# Patient Record
Sex: FEMALE | Race: WHITE | URBAN - METROPOLITAN AREA
[De-identification: names, ages, dates, MRNs, and addresses within clinical notes are randomized per-mention and may not be internally consistent; named-entity substitution may affect disease eponyms.]

---

## 2017-01-05 ENCOUNTER — ANTICOAGULATION THERAPY VISIT (OUTPATIENT)
Dept: ANTICOAGULATION | Facility: OTHER | Age: 77
End: 2017-01-05
Payer: COMMERCIAL

## 2017-01-05 DIAGNOSIS — Z79.01 LONG-TERM (CURRENT) USE OF ANTICOAGULANTS: Primary | ICD-10-CM

## 2017-01-05 DIAGNOSIS — I48.91 NEW ONSET ATRIAL FIBRILLATION (H): ICD-10-CM

## 2017-01-05 DIAGNOSIS — I63.40 CEREBROVASCULAR ACCIDENT (CVA) DUE TO EMBOLISM OF CEREBRAL ARTERY (H): ICD-10-CM

## 2017-01-05 LAB — INR POINT OF CARE: 1.6 (ref 0.86–1.14)

## 2017-01-05 PROCEDURE — 36416 COLLJ CAPILLARY BLOOD SPEC: CPT

## 2017-01-05 PROCEDURE — 99207 ZZC NO CHARGE NURSE ONLY: CPT

## 2017-01-05 PROCEDURE — 85610 PROTHROMBIN TIME: CPT | Mod: QW

## 2017-01-05 NOTE — PROGRESS NOTES
ANTICOAGULATION FOLLOW-UP CLINIC VISIT    Patient Name:  Carlos Alberto Fenton  Date:  1/5/2017  Contact Type:  Face to Face    SUBJECTIVE:     Patient Findings     Positives Unexplained INR or factor level change           OBJECTIVE    INR PROTIME   Date Value Ref Range Status   01/05/2017 1.6* 0.86 - 1.14 Final       ASSESSMENT / PLAN  INR assessment SUB    Recheck INR In: 10 DAYS    INR Location Clinic      Anticoagulation Summary as of 1/5/2017     INR goal 2.0-3.0   Selected INR 1.6! (1/5/2017)   Maintenance plan 5 mg (5 mg x 1) on Tue, Thu, Sat; 2.5 mg (5 mg x 0.5) all other days   Full instructions 1/5: 7.5 mg; Otherwise 5 mg on Tue, Thu, Sat; 2.5 mg all other days   Weekly total 25 mg   Plan last modified Catrachita Lacy, RN (1/5/2017)   Next INR check 1/16/2017   Target end date     Indications   Long-term (current) use of anticoagulants [Z79.01] [Z79.01]  CVA (cerebral vascular accident) (H) [I63.9]  New onset atrial fibrillation (H) [I48.91]         Anticoagulation Episode Summary     INR check location     Preferred lab     Send INR reminders to [facility] POOL    Comments 5 mg tablets, pm dose      Anticoagulation Care Providers     Provider Role Specialty Phone number    Nuha Bateman MD [facility] [phone]            See the Encounter Report to view Anticoagulation Flowsheet and Dosing Calendar (Go to Encounters tab in chart review, and find the Anticoagulation Therapy Visit)    Dosage adjustment made based on physician directed care plan.    Catrachita Lacy, RN

## 2017-01-05 NOTE — MR AVS SNAPSHOT
Carlos Alberto Fenton   1/5/2017 1:15 PM   Anticoagulation Therapy Visit    Description:  76 year old female   Provider:  ER ANTI COAG   Department:  Er Anticoag           INR as of 1/5/2017     Selected INR 1.6! (1/5/2017)      Anticoagulation Summary as of 1/5/2017     INR goal 2.0-3.0   Selected INR 1.6! (1/5/2017)   Full instructions 1/5: 7.5 mg; Otherwise 5 mg on Tue, Thu, Sat; 2.5 mg all other days   Next INR check 1/16/2017    Indications   Long-term (current) use of anticoagulants [Z79.01] [Z79.01]  CVA (cerebral vascular accident) (H) [I63.9]  New onset atrial fibrillation (H) [I48.91]         Your next Anticoagulation Clinic appointment(s)     Jan 05, 2017  1:15 PM   [facility] with [facility]   [facility]    [address] [facility] [address]   [phone]            Jan 16, 2017  1:15 PM   Anticoagulation Visit with ER ANTI COAG   [facility]    [address] [facility] [address]   [phone]              Contact Numbers     Clinic Number:         January 2017 Details    Sun Mon Tue Wed Thu Fri Sat     1               2               3               4               5      7.5 mg   See details      6      2.5 mg         7      5 mg           8      2.5 mg         9      2.5 mg         10      5 mg         11      2.5 mg         12      5 mg         13      2.5 mg         14      5 mg           15      2.5 mg         16            17               18               19               20               21                 22               23               24               25               26               27               28                 29               30               31                    Date Details   01/05 This INR check       Date of next INR:  1/16/2017         How to take your warfarin dose     To take:  2.5 mg Take 0.5 of a 5 mg tablet.    To take:  5 mg Take 1 of the 5 mg tablets.    To  take:  7.5 mg Take 1.5 of the 5 mg tablets.

## 2017-01-12 ENCOUNTER — TELEPHONE (OUTPATIENT)
Dept: FAMILY MEDICINE | Facility: OTHER | Age: 77
End: 2017-01-12

## 2017-01-12 NOTE — TELEPHONE ENCOUNTER
She is not on any medications that would lower her blood sugars suddently. I am not concerned about a blood sugars >60 . Please advise appt to discuss further concerns

## 2017-01-12 NOTE — TELEPHONE ENCOUNTER
Pt states that her BS have been lower than normal. Yesterday her morning BS was 99 and today it was 83. The original message was sent to the Coumadin pool but I spoke with the pt and advised her that this would need to go to her PCP. Please advise. Thank you! Jono Guerrero RN, BSN

## 2017-01-12 NOTE — TELEPHONE ENCOUNTER
Patient took medications and not sure what to do now. She feels that her blood sugar is low     Charity Anaya  Reception/ Scheduling

## 2017-01-13 ASSESSMENT — ENCOUNTER SYMPTOMS
NUMBNESS: 1
NERVOUS/ANXIOUS: 1
LOSS OF CONSCIOUSNESS: 0
TASTE DISTURBANCE: 0
HEADACHES: 0
DIZZINESS: 0
DOUBLE VISION: 0
TINGLING: 1
SEIZURES: 0
EYE REDNESS: 1
NECK MASS: 0
PARALYSIS: 0
INSOMNIA: 0
EYE WATERING: 1
SMELL DISTURBANCE: 0
TREMORS: 1
DISTURBANCES IN COORDINATION: 0
SINUS PAIN: 0
MEMORY LOSS: 1
WEAKNESS: 1
PANIC: 1
EYE IRRITATION: 1
SPEECH CHANGE: 0
NAIL CHANGES: 1
SORE THROAT: 0
TROUBLE SWALLOWING: 0
SINUS CONGESTION: 1
DEPRESSION: 1
EYE PAIN: 0
HOARSE VOICE: 0
POOR WOUND HEALING: 1
SKIN CHANGES: 0
DECREASED CONCENTRATION: 1

## 2017-01-16 ENCOUNTER — ANTICOAGULATION THERAPY VISIT (OUTPATIENT)
Dept: ANTICOAGULATION | Facility: OTHER | Age: 77
End: 2017-01-16
Payer: COMMERCIAL

## 2017-01-16 ENCOUNTER — TELEPHONE (OUTPATIENT)
Dept: FAMILY MEDICINE | Facility: OTHER | Age: 77
End: 2017-01-16

## 2017-01-16 DIAGNOSIS — E11.9 TYPE 2 DIABETES MELLITUS WITHOUT COMPLICATION, WITHOUT LONG-TERM CURRENT USE OF INSULIN (H): Primary | ICD-10-CM

## 2017-01-16 DIAGNOSIS — Z79.01 LONG-TERM (CURRENT) USE OF ANTICOAGULANTS: Primary | ICD-10-CM

## 2017-01-16 DIAGNOSIS — I63.40 CEREBROVASCULAR ACCIDENT (CVA) DUE TO EMBOLISM OF CEREBRAL ARTERY (H): ICD-10-CM

## 2017-01-16 DIAGNOSIS — I48.91 NEW ONSET ATRIAL FIBRILLATION (H): ICD-10-CM

## 2017-01-16 LAB — INR POINT OF CARE: 2.2 (ref 0.86–1.14)

## 2017-01-16 PROCEDURE — 36416 COLLJ CAPILLARY BLOOD SPEC: CPT

## 2017-01-16 PROCEDURE — 99207 ZZC NO CHARGE NURSE ONLY: CPT

## 2017-01-16 PROCEDURE — 85610 PROTHROMBIN TIME: CPT | Mod: QW

## 2017-01-16 RX ORDER — LANCETS 33 GAUGE
1 EACH MISCELLANEOUS DAILY
Qty: 100 EACH | Refills: 0 | Status: SHIPPED | OUTPATIENT
Start: 2017-01-16

## 2017-01-16 NOTE — MR AVS SNAPSHOT
Carlos Alberto Fenton   1/16/2017 1:15 PM   Anticoagulation Therapy Visit    Description:  76 year old female   Provider:  ER ANTI COAG   Department:  Er Anticoag           INR as of 1/16/2017     Selected INR 2.2 (1/16/2017)      Anticoagulation Summary as of 1/16/2017     INR goal 2.0-3.0   Selected INR 2.2 (1/16/2017)   Full instructions 5 mg on Tue, Thu, Sat; 2.5 mg all other days   Next INR check 1/30/2017    Indications   Long-term (current) use of anticoagulants [Z79.01] [Z79.01]  CVA (cerebral vascular accident) (H) [I63.9]  New onset atrial fibrillation (H) [I48.91]         Your next Anticoagulation Clinic appointment(s)     Jan 30, 2017 11:15 AM   [facility]    [address] [facility] [address]   [phone]              Contact Numbers     Clinic Number:         January 2017 Details    Sun Mon Tue Wed Thu Fri Sat     1               2               3               4               5               6               7                 8               9               10               11               12               13               14                 15               16      2.5 mg   See details      17      5 mg         18      2.5 mg         19      5 mg         20      2.5 mg         21      5 mg           22      2.5 mg         23      2.5 mg         24      5 mg         25      2.5 mg         26      5 mg         27      2.5 mg         28      5 mg           29      2.5 mg         30            31                    Date Details   01/16 This INR check       Date of next INR:  1/30/2017         How to take your warfarin dose     To take:  2.5 mg Take 0.5 of a 5 mg tablet.    To take:  5 mg Take 1 of the 5 mg tablets.

## 2017-01-16 NOTE — PROGRESS NOTES
ANTICOAGULATION FOLLOW-UP CLINIC VISIT    Patient Name:  Carlos Alberto Fenton  Date:  1/16/2017  Contact Type:  Face to Face    SUBJECTIVE:     Patient Findings     Positives No Problem Findings           OBJECTIVE    INR PROTIME   Date Value Ref Range Status   01/16/2017 2.2* 0.86 - 1.14 Final       ASSESSMENT / PLAN  INR assessment THER    Recheck INR In: 2 WEEKS    INR Location Clinic      Anticoagulation Summary as of 1/16/2017     INR goal 2.0-3.0   Selected INR 2.2 (1/16/2017)   Maintenance plan 5 mg (5 mg x 1) on Tue, Thu, Sat; 2.5 mg (5 mg x 0.5) all other days   Full instructions 5 mg on Tue, Thu, Sat; 2.5 mg all other days   Weekly total 25 mg   No change documented Gordo Brunner RN   Plan last modified Catrachita Lacy RN (1/5/2017)   Next INR check 1/30/2017   Target end date     Indications   Long-term (current) use of anticoagulants [Z79.01] [Z79.01]  CVA (cerebral vascular accident) (H) [I63.9]  New onset atrial fibrillation (H) [I48.91]         Anticoagulation Episode Summary     INR check location     Preferred lab     Send INR reminders to [facility] POOL    Comments 5 mg tablets, pm dose      Anticoagulation Care Providers     Provider Role Specialty Phone number    Nuha Bateman MD [facility] [phone]            See the Encounter Report to view Anticoagulation Flowsheet and Dosing Calendar (Go to Encounters tab in chart review, and find the Anticoagulation Therapy Visit)    Dosage adjustment made based on physician directed care plan.    Gordo Brunner, RN

## 2017-01-16 NOTE — TELEPHONE ENCOUNTER
Test strips      Last Written Prescription Date: 11-  Last Fill Quantity: 100,  # refills: 1   Last Office Visit with [facility], [facility] or [facility] prescribing provider: 12-                                         Next 5 appointments (look out 90 days)     Mar 20, 2017  2:00 PM   [facility] Visit with [facility]eman [facility]   [facility]    [address] [id number] [phone]                  Lancets    Last Written Prescription Date: 11-9-2016  Last Fill Quantity: -,  # refills: -   Last Office Visit with [facility], [facility] or [facility] prescribing provider: 12-                                         Next 5 appointments (look out 90 days)     Mar 20, 2017  2:00 PM   Office Visit with Nu[facility] Bateman MD   [facility]    [address] [place] [address] [phone]                  ** would like sent to [facility]**

## 2017-01-16 NOTE — TELEPHONE ENCOUNTER
Prescription approved per [facility] Refill Protocol.  Jeannie Bryant, RN, BSN

## 2017-01-16 NOTE — TELEPHONE ENCOUNTER
Reason for call:  Medication  Reason for Call:  Medication or medication refill:    Do you use a [facility]?  Name of the pharmacy and phone number for the current request:  [facility]    Name of the medication requested: lancettes, test strips    Other request: none    Can we leave a detailed message on this number? YES    Phone number patient can be reached at: Home number on file [phone] (home)    Best Time: any    Call taken on 1/16/2017 at 1:44 PM by Sherry Love

## 2017-01-17 ENCOUNTER — TELEPHONE (OUTPATIENT)
Dept: PULMONOLOGY | Facility: CLINIC | Age: 77
End: 2017-01-17

## 2017-01-17 DIAGNOSIS — R91.1 PULMONARY NODULE, RIGHT: Primary | ICD-10-CM

## 2017-01-17 NOTE — TELEPHONE ENCOUNTER
PULMONARY NEW PATIENT PRE-VISIT ASSESSMENT    Date Patient Contacted: 1/18/2017 left message for patient to call back        1. Patient is scheduled to see Dr Camargo on 2/20/17  2. Reason for visit: Pulmonary nodules [R91.8]  - Primary   3. Referring provider Nuha Bateman MD    4. Has patient seen previous specialist? ????      5. Previous Imaging: In [facility]: Last CXR done: 10/17/2016, Last Chest CT done: NONE      Outside Imaging: Location/Date/Type/Status (Requested, Received, Patient will hand carry disc, Pushed to iSite, Disc will be mailed) ?????        6.  Pulmonary Function Tests: Scheduled at  NONE       Outside PFT Lab:  Location/Date/Status (Requested, Received, Patient will hand carry) ?????     7.  Sleep History (sleep concerns, diagnosed sleep disorders): ???    Prior Sleep Study: ???    CPAP/BIPAP? ???    Oxygen? ???    Will route to care team for follow-up.    Patient Reminders Given:  Please, make sure you bring an updated list of your medications.   If you need to cancel or reschedule, please call [phone].

## 2017-01-20 ENCOUNTER — PRE VISIT (OUTPATIENT)
Dept: CARDIOLOGY | Facility: CLINIC | Age: 77
End: 2017-01-20

## 2017-01-20 ENCOUNTER — OFFICE VISIT (OUTPATIENT)
Dept: CARDIOLOGY | Facility: CLINIC | Age: 77
End: 2017-01-20
Attending: INTERNAL MEDICINE
Payer: MEDICARE

## 2017-01-20 VITALS
WEIGHT: 159.9 LBS | OXYGEN SATURATION: 93 % | BODY MASS INDEX: 29.43 KG/M2 | HEIGHT: 62 IN | HEART RATE: 57 BPM | SYSTOLIC BLOOD PRESSURE: 100 MMHG | DIASTOLIC BLOOD PRESSURE: 64 MMHG

## 2017-01-20 DIAGNOSIS — R60.9 EDEMA, UNSPECIFIED TYPE: ICD-10-CM

## 2017-01-20 DIAGNOSIS — I63.89: ICD-10-CM

## 2017-01-20 DIAGNOSIS — I42.9 CARDIOMYOPATHY, UNSPECIFIED (H): ICD-10-CM

## 2017-01-20 DIAGNOSIS — I10 ESSENTIAL HYPERTENSION WITH GOAL BLOOD PRESSURE LESS THAN 140/90: ICD-10-CM

## 2017-01-20 DIAGNOSIS — E78.5 HYPERLIPIDEMIA LDL GOAL <130: ICD-10-CM

## 2017-01-20 DIAGNOSIS — I48.0 PAROXYSMAL ATRIAL FIBRILLATION (H): Primary | ICD-10-CM

## 2017-01-20 DIAGNOSIS — I25.83 CORONARY ARTERY DISEASE DUE TO LIPID RICH PLAQUE: ICD-10-CM

## 2017-01-20 DIAGNOSIS — R06.02 SOB (SHORTNESS OF BREATH): ICD-10-CM

## 2017-01-20 DIAGNOSIS — I25.10 CORONARY ARTERY DISEASE DUE TO LIPID RICH PLAQUE: ICD-10-CM

## 2017-01-20 DIAGNOSIS — I48.91 ATRIAL FIBRILLATION (H): ICD-10-CM

## 2017-01-20 LAB
ANION GAP SERPL CALCULATED.3IONS-SCNC: 7 MMOL/L (ref 3–14)
BUN SERPL-MCNC: 25 MG/DL (ref 7–30)
CALCIUM SERPL-MCNC: 9.6 MG/DL (ref 8.5–10.1)
CHLORIDE SERPL-SCNC: 102 MMOL/L (ref 94–109)
CHOLEST SERPL-MCNC: 273 MG/DL
CO2 SERPL-SCNC: 32 MMOL/L (ref 20–32)
CREAT SERPL-MCNC: 0.81 MG/DL (ref 0.52–1.04)
GFR SERPL CREATININE-BSD FRML MDRD: 69 ML/MIN/1.7M2
GLUCOSE SERPL-MCNC: 89 MG/DL (ref 70–99)
HDLC SERPL-MCNC: 58 MG/DL
LDLC SERPL CALC-MCNC: 168 MG/DL
MAGNESIUM SERPL-MCNC: 2.1 MG/DL (ref 1.6–2.3)
NONHDLC SERPL-MCNC: 216 MG/DL
NT-PROBNP SERPL-MCNC: 924 PG/ML (ref 0–450)
POTASSIUM SERPL-SCNC: 4.2 MMOL/L (ref 3.4–5.3)
SODIUM SERPL-SCNC: 141 MMOL/L (ref 133–144)
TRIGL SERPL-MCNC: 236 MG/DL

## 2017-01-20 PROCEDURE — 83880 ASSAY OF NATRIURETIC PEPTIDE: CPT | Performed by: INTERNAL MEDICINE

## 2017-01-20 PROCEDURE — 93010 ELECTROCARDIOGRAM REPORT: CPT | Mod: ZP | Performed by: INTERNAL MEDICINE

## 2017-01-20 PROCEDURE — 99214 OFFICE O/P EST MOD 30 MIN: CPT | Mod: GC | Performed by: INTERNAL MEDICINE

## 2017-01-20 PROCEDURE — 99213 OFFICE O/P EST LOW 20 MIN: CPT | Mod: ZF

## 2017-01-20 PROCEDURE — 80061 LIPID PANEL: CPT | Performed by: INTERNAL MEDICINE

## 2017-01-20 PROCEDURE — 80048 BASIC METABOLIC PNL TOTAL CA: CPT | Performed by: INTERNAL MEDICINE

## 2017-01-20 PROCEDURE — 83735 ASSAY OF MAGNESIUM: CPT | Performed by: INTERNAL MEDICINE

## 2017-01-20 PROCEDURE — 36415 COLL VENOUS BLD VENIPUNCTURE: CPT | Performed by: INTERNAL MEDICINE

## 2017-01-20 RX ORDER — ATORVASTATIN CALCIUM 40 MG/1
40 TABLET, FILM COATED ORAL DAILY
Qty: 90 TABLET | Refills: 3 | Status: ON HOLD | OUTPATIENT
Start: 2017-01-20 | End: 2017-02-17

## 2017-01-20 RX ORDER — FUROSEMIDE 20 MG
20 TABLET ORAL DAILY PRN
Qty: 90 TABLET | Refills: 3 | Status: ON HOLD | OUTPATIENT
Start: 2017-01-20 | End: 2017-02-17

## 2017-01-20 RX ORDER — CARVEDILOL 6.25 MG/1
6.25 TABLET ORAL 2 TIMES DAILY WITH MEALS
Qty: 180 TABLET | Refills: 3 | Status: ON HOLD | OUTPATIENT
Start: 2017-01-20 | End: 2017-02-17

## 2017-01-20 RX ORDER — LIDOCAINE 40 MG/G
CREAM TOPICAL
Status: CANCELLED | OUTPATIENT
Start: 2017-01-20

## 2017-01-20 RX ORDER — SODIUM CHLORIDE 9 MG/ML
INJECTION, SOLUTION INTRAVENOUS CONTINUOUS
Status: CANCELLED | OUTPATIENT
Start: 2017-01-20

## 2017-01-20 ASSESSMENT — PAIN SCALES - GENERAL: PAINLEVEL: NO PAIN (0)

## 2017-01-20 NOTE — Clinical Note
1/20/2017      RE: Carlos Alberto Fenton  [address]  [facility] [address]       Dear Colleague,    Thank you for the opportunity to participate in the care of your patient, Carlos Alberto Fenton, at the [facility]. Please see a copy of my visit note below.    HPI:   Carlos Alberto Fenton is a 76 year-old female with history of hypertension, hyperlipidemia, diabetes, and CVA 11/2016 referred for evaluation of new-onset paroxysmal atrial fibrillation and cardiomyopathy, both noted during her admission 10/2016 for CVA.    She was admitted 10/2016 for CVA after presenting with altered mental status; MRI showed bilateral cortical infarcts raising suspicion for cardioembolic source. Telemetry revealed paroxysmal A.fib. She was started on warfarin for thromboembolic prophylaxis and metoprolol for rate control.    During admission, she also had TTE which showed LVEF=35-40% (new from TTE 1 week prior.)     Cardiology was consulted and felt decreased LVEF most likely represented stress cardiomyopathy; no ischemic workup was recommended. She now presents for follow up.     She does not recall any of the events during her admission, including those around the time of her decrease in LVEF as well as at the time of her episodes of A.fib. She does report some residual balance difficulties but otherwise does not feel she has any significant residual deficits. Prior to CVA, she walked 30 minutes daily on the treadmill. Her current level of activity is fairly limited, however she does report that she is able to walk up 1 flight of stairs regularly in her house without chest discomfort or dyspnea.     She denies any chest pain, dyspnea at rest or with exertion, palpitations, PND, orthopnea, peripheral edema, LH, or syncope.       PAST MEDICAL HISTORY:  Past Medical History   Diagnosis Date     Peripheral neuropathy (H) 1990     cause unknown     Headaches      Hyperlipemias       History of skin cancer        CURRENT MEDICATIONS:  Current Outpatient Prescriptions   Medication Sig Dispense Refill     atorvastatin (LIPITOR) 40 MG tablet Take 1 tablet (40 mg) by mouth daily 90 tablet 3     carvedilol (COREG) 6.25 MG tablet Take 1 tablet (6.25 mg) by mouth 2 times daily (with meals) 180 tablet 3     furosemide (LASIX) 20 MG tablet Take 1 tablet (20 mg) by mouth daily as needed 90 tablet 3     ONETOUCH DELICA LANCETS 33G MISC 1 Device daily 100 each 0     ONE TOUCH ULTRA test strip Test once daily 100 each 0     lisinopril (PRINIVIL/ZESTRIL) 20 MG tablet Take 1 tablet (20 mg) by mouth daily 90 tablet 2     metFORMIN (GLUCOPHAGE) 500 MG tablet Take 1 tablet (500 mg) by mouth 2 times daily (with meals) 180 tablet 1     traZODone (DESYREL) 50 MG tablet Take 0.5 tablets (25 mg) by mouth nightly as needed for sleep 45 tablet 2     venlafaxine (EFFEXOR-ER) 150 MG TB24 24 hr tablet Take 1 tablet (150 mg) by mouth daily (with breakfast) 90 each 1     warfarin (COUMADIN) 5 MG tablet Take 5 mg TU, TH and 2.5 mg other 5 days or as directed by coumadin clinic 30 tablet 2     clotrimazole (LOTRIMIN) 1 % cream Please apply to groins two times daily for one week after discharge and then follow up with PCP if no improvement of your skin rashes 12 g 0     blood glucose monitoring (ONE TOUCH ULTRA 2) meter device kit        NONFORMULARY Place 1 Squirt vaginally 3 times daily as needed For itching due to fungal rashes 1 each 0       PAST SURGICAL HISTORY:  Past Surgical History   Procedure Laterality Date     Hysterectomy, brenda  1988     Tonsillectomy  1942     C appendectomy  1959       ALLERGIES     Allergies   Allergen Reactions     Oxycodone Unknown       FAMILY HISTORY:  Family History   Problem Relation Age of Onset     DIABETES Mother      C.A.D. Mother      DIABETES Father      C.A.D. Father      Cardiovascular Sister      blood clot       SOCIAL HISTORY:  Social History     Social History     Marital  "Status:      Spouse Name: N/A     Number of Children: N/A     Years of Education: N/A     Social History Main Topics     Smoking status: Former Smoker     Smokeless tobacco: Never Used     Alcohol Use: No     Drug Use: No     Sexual Activity: No     Other Topics Concern     Parent/Sibling W/ Cabg, Mi Or Angioplasty Before 65f 55m? Yes     Social History Narrative       ROS:   Constitutional: No fever, chills, or sweats. No weight gain/loss   ENT: No visual disturbance, ear ache, epistaxis, sore throat  Allergies/Immunologic: Negative.   Respiratory: No cough, hemoptysia  Cardiovascular: As per HPI  GI: No nausea, vomiting, hematemesis, melena, or hematochezia  : No urinary frequency, dysuria, or hematuria  Integument: Negative  Psychiatric: Negative  Neuro: Negative  Endocrinology: Negative   Musculoskeletal: Negative    EXAM:  /64 mmHg  Pulse 57  Ht 1.575 m (5' 2\")  Wt 72.53 kg (159 lb 14.4 oz)  BMI 29.24 kg/m2  SpO2 93%  In general, the patient is a pleasant female in no apparent distress.    HEENT: NC/AT.  PERRLA.  EOMI.  Sclerae white, not injected.  Nares clear.  Pharynx without erythema or exudate.  Dentition intact.    Neck: No adenopathy.  No thyromegaly. Carotids +4/4 bilaterally without bruits.  No jugular venous distension.   Heart: RRR. Normal S1, S2 splits physiologically. No murmur, rub, click, or gallop. The PMI is in the 5th ICS in the midclavicular line. There is no heave.    Lungs: CTA.  No ronchi, wheezes, rales.  No dullness to percussion.   Abdomen: Soft, nontender, nondistended. No organomegaly.  No bruits.   Extremities: No clubbing, cyanosis, or edema.  The pulses are +4/4 at the radial, brachial, femoral, popliteal, DP, and PT sites bilaterally.  No bruits are noted.  Neurologic: Alert and oriented to person/place/time, normal speech, gait and affect  Skin: No petechiae, purpura or rash.    Labs:  LIPID RESULTS:  Lab Results   Component Value Date    CHOL 273* 01/20/2017 "    HDL 58 01/20/2017    * 01/20/2017    TRIG 236* 01/20/2017    NHDL 216* 01/20/2017       LIVER ENZYME RESULTS:  Lab Results   Component Value Date    AST 31 11/17/2016    ALT 56* 11/17/2016       CBC RESULTS:  Lab Results   Component Value Date    WBC 5.7 11/17/2016    RBC 4.35 11/17/2016    HGB 12.9 11/17/2016    HCT 43.3 11/17/2016     11/17/2016    MCH 29.7 11/17/2016    MCHC 29.8* 11/17/2016    RDW 16.1* 11/17/2016     11/17/2016       BMP RESULTS:  Lab Results   Component Value Date     01/20/2017    POTASSIUM 4.2 01/20/2017    CHLORIDE 102 01/20/2017    CO2 32 01/20/2017    ANIONGAP 7 01/20/2017    GLC 89 01/20/2017    BUN 25 01/20/2017    CR 0.81 01/20/2017    GFRESTIMATED 69 01/20/2017    GFRESTBLACK 83 01/20/2017    CARLY 9.6 01/20/2017        A1C RESULTS:  Lab Results   Component Value Date    A1C 5.9 10/17/2016       INR RESULTS:  Lab Results   Component Value Date    INR 2.2* 01/16/2017    INR 1.6* 01/05/2017    INR 3.3* 11/23/2016    INR 1.83* 11/10/2016    INR 1.94* 11/09/2016       Procedures:  TTE 10/25/16:   Mildly (EF 40-45%) reduced left ventricular function is present. Mild diffuse hypokinesis is present.  Global right ventricular function is normal.  Compared to the previous study dated 10/18/2016, there has been some interval improvement in LV systolic function.    TTE 10/18/16:  Moderately (EF 35-40%) reduced left ventricular function is present.  Global right ventricular function is mildly reduced.  The atrial septum is intact as assessed by color Doppler and agitated saline bubble study .  No pericardial effusion is present.    TTE 10/14/16:  The rhythm was sinus bradycardia.  The left ventricle is normal in size. Left ventricular systolic function is borderline reduced. The visual ejection fraction is estimated at 50-55%.  The right ventricle is normal in size and systolic function.  Normal left atrial size by volume at 31.6 ml/m2.  There is mild (1+) mitral  regurgitation.  There is mild to moderate (1-2+) tricuspid regurgitation.  The right ventricular systolic pressure is approximated at 25mmHg plus the right atrial pressure.  Right ventricle systolic pressure estimate normal  The inferior vena cava is not dilated.    ECG: sinus bradycardia, VR 54 with inferior infarct pattern, left axis deviation, and poor R-wave progression,  QRS 98 QTc 438.     Assessment and Plan:   76 year old female with history of hypertension, hyperlipidemia, DM, and recent CVA 11/2016 referred for management of newly-diagnosed paroxysmal A.fib ([id number]-[id number]=3 -- age++ HTN+ DM+ CVA++) and new cardiomyopathy of unclear etiology (LVEF~40%)    1. Cardiomyopathy/chronic systolic HF:   Class II, stage D  Etiology unclear, with multiple CAD risk factors (and unable to provide history of events surrounding the episode), raising concern for ischemic etiology; ECG furthermore with evidence of possible prior infarct. Given acuity of drop in LVEF, most probable alternative is stress CM in the setting of CVA.  Plan for coronary angiogram for further evaluation of etiology of cardiomyopathy.   Repeat TTE after coronary angiogram.  We are changing metoprolol tartrate to carvedilol 6.25 mg BID. She will take her BP daily and call us in 2 weeks; our tentative plan is to up-titrate by 3.125 mg in 2 weeks if she has no significant side effects or hypotension.   She should continue lisinopril 20 mg daily.   Indication for initiation of additional neurohormonal therapy (e.g., aldactone) is unclear pending up-titration of appropriate beta blocker and reassessment of LVEF.     2. Paroxysmal atrial fibrillation ([id number]-[id number]=9 -- age++ HTN+ DM+ CVA++):   Agree with rate control strategy; change metoprolol to carvedilol as above. Continue warfarin, goal INR=2-3.     3. Hyperlipidemia:   With DM, at least moderate-intensity statin therapy is indicated per ACC/AHA guidelines. We have started atorvastatin 40 mg  daily.    4. Hypertension:   Blood pressure is well controlled on present therapy, which she should continue.    The patient was seen with and examined by the attending physician, Dr. Lobato, who agrees with the above plan.   Zack Baugh MD  Cardiology Fellow    Attending Attestation:  Patient seen and examined by me with the Fellow. I have performed all pertinent elements of the physical examination and reviewed the note above. I have reviewed pertinent laboratory, echocardiographic, imaging, and cardiac catheterization results. I agree with the plan of care as described in this note.    Star Lobato MD, PhD

## 2017-01-20 NOTE — MR AVS SNAPSHOT
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    Please start taking Coreg 6.25 MG twice daily.    Please start taking Lipitor 40 MG once daily.    Please take Lasix on an as needed basis only, if weight goes up 3 lbs in 3 days or 5 lbs in a week, or if noted ankle/foot/leg swelling.    Please call Carloz in 2 weeks with an update on your blood pressures, pulse, and dizziness.    Please hold/don't take your coumadin for 3 days prior to your angiogram, restart taking after your procedure.    Please don't take your Metformin the morning of your procedure and for 48 hours after.        andThank you for your visit today.  Please call me with any questions or concerns.   Carloz Wilson  RN  Cardiology Care Coordinator  [phone], press option 1 then option 3        Follow-ups after your visit        Follow-up notes from your care team     Return in about 6 months (around 7/20/2017) for CAD, Dr. Lobato.      Your next 10 appointments already scheduled     Jan 20, 2017  4:15 PM   Lab with  LAB   [facility] ([facility])    [address] [place] [address] [phone]            Jan 30, 2017 11:15 AM   Anticoagulation Visit with ER ANTI COAG   [facility]  [facility])    [address]  [facility] [address]   [phone]            Jan 30, 2017 11:30 AM   Procedure 1.5 hr with U2A ROOM 16   [address] [facility] [place] ([facility])    [address] [facility] [address]               Jan 30, 2017  1:00 PM   Cath 90 Minute with UUHCVR3   [facility] [place]  [facility] ([facility])    [address] [facility] [address] [phone]            Feb 20, 2017  1:15 PM   CT CHEST W/O CONTRAST with MGCT1   [facility]    [address] [place] [address] [phone]           Please bring any scans or X-rays taken at other hospitals, if similar tests were done. Also bring a list of your medicines, including vitamins, minerals and over-the-counter drugs. It is safest to leave personal items at home.  Be sure to tell your doctor:   If you have any allergies.   If there s any chance you are pregnant.   If you are breastfeeding.   If you have any special needs.  You do not need to do anything special to prepare.  Please wear loose clothing, such as a sweat suit or jogging clothes. Avoid snaps, zippers and other metal. We may ask you to undress and put on a hospital gown.            Feb 20, 2017  1:40 PM   New Visit with Marty Camargo MD   [facility]    [address] [place] [address] [phone]            Mar 20, 2017  2:00 PM   Office Visit with Nuha Bateman MD   [facility]    [address]  [facility] [address]   [phone]           Bring a current list of meds and any records pertaining to this visit.  For Physicals, please bring immunization records and any forms needing to be filled out.  Please arrive 10 minutes early to complete paperwork.            Jul 14, 2017  1:30 PM    (Arrive by 1:15 PM)   Return Visit with Star Lobato MD   [facility] ([facility])    [address] [facility] [address] [phone]              Future tests that were ordered for you today     Open Future Orders        Priority Expected Expires Ordered    Outpatient Coronary Angiography Adult Order Routine  4/10/2017 1/20/2017    N terminal pro BNP outpatient Routine 1/20/2017 1/20/2018 1/20/2017    Magnesium Routine 1/20/2017 1/20/2018 1/20/2017    Basic metabolic panel Routine 1/20/2017 1/20/2018 1/20/2017    Lipid Profile Routine 1/20/2017 1/20/2018 1/20/2017            Who to contact     If you have questions or need follow up information about today's clinic visit or your schedule please contact [facility] directly at [phone].  Normal or non-critical lab and imaging results will be communicated to you by MyChart, letter or phone within 4 business days after the clinic has received the results. If you do not hear from us within 7 days, please contact the clinic through "[employer]hart or phone. If you have a critical or abnormal lab result, we will notify you by phone as soon as possible.  Submit refill requests through [employer] or call your pharmacy and they will forward the refill request to us. Please allow 3 business days for your refill to be completed.          Additional Information About Your Visit        MyChart Information     [employer] gives you secure access to your electronic health record. If you see a primary care provider, you can also send messages to your care team and make appointments. If you have questions, please call your primary care clinic.  If you do not have a primary care provider, please call [phone] and they will assist you.        Care EveryWhere ID     This is your Care EveryWhere ID. This could be used by other organizations to access your [facility] medical records  [id number]        Your Vitals  "Were     Pulse Height BMI (Body Mass Index) Pulse Oximetry          57 1.575 m (5' 2\") 29.24 kg/m2 93%         Blood Pressure from Last 3 Encounters:   01/20/17 100/64   12/19/16 100/58   11/17/16 110/64    Weight from Last 3 Encounters:   01/20/17 72.53 kg (159 lb 14.4 oz)   12/19/16 71.668 kg (158 lb)   11/17/16 71.215 kg (157 lb)              We Performed the Following     EKG 12-lead, tracing only (Same Day)          Today's Medication Changes          These changes are accurate as of: 1/20/17  3:56 PM.  If you have any questions, ask your nurse or doctor.               Start taking these medicines.        Dose/Directions    atorvastatin 40 MG tablet   Commonly known as:  LIPITOR   Used for:  Hyperlipidemia LDL goal <130   Started by:  Star Lobato MD        Dose:  40 mg   Take 1 tablet (40 mg) by mouth daily   Quantity:  90 tablet   Refills:  3       carvedilol 6.25 MG tablet   Commonly known as:  COREG   Used for:  Cardiomyopathy, unspecified (H)   Started by:  Star Lobato MD        Dose:  6.25 mg   Take 1 tablet (6.25 mg) by mouth 2 times daily (with meals)   Quantity:  180 tablet   Refills:  3         These medicines have changed or have updated prescriptions.        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 Please contact your care team if you have questions.     metoprolol 50 MG tablet   Commonly known as:  LOPRESSOR   Stopped by:  Star Lobato MD                Where to get your medicines      These " medications were sent to [facility] - [place], [place] - [address] [facility]  [address] [facility] [address]     Phone:  [phone]    - atorvastatin 40 MG tablet  - carvedilol 6.25 MG tablet  - furosemide 20 MG tablet             Primary Care Provider Office Phone # Fax #    Nuha Bateman [phone]       [facility] [address]    [facility] [address]        Thank you!     Thank you for choosing [facility]  for your care. Our goal is always to provide you with excellent care. Hearing back from our patients is one way we can continue to improve our services. Please take a few minutes to complete the written survey that you may receive in the mail after your visit with us. Thank you!             Your Updated Medication List - Protect others around you: Learn how to safely use, store and throw away your medicines at www.disposemymeds.org.          This list is accurate as of: 1/20/17  3:56 PM.  Always use your most recent med list.                   Brand Name Dispense Instructions for use    atorvastatin 40 MG tablet    LIPITOR    90 tablet    Take 1 tablet (40 mg) by mouth daily       blood glucose monitoring meter device kit          carvedilol 6.25 MG tablet    COREG    180 tablet    Take 1 tablet (6.25 mg) by mouth 2 times daily (with meals)       clotrimazole 1 % cream    LOTRIMIN    12 g    Please apply to groins two times daily for one week after discharge and then follow up with PCP if no improvement of your skin rashes       furosemide 20 MG tablet    LASIX    90 tablet    Take 1 tablet (20 mg) by mouth daily as needed       lisinopril 20 MG tablet    PRINIVIL/ZESTRIL    90 tablet    Take 1 tablet (20 mg) by mouth daily       metFORMIN 500 MG tablet    GLUCOPHAGE    180 tablet    Take 1 tablet (500 mg) by mouth 2 times daily (with meals)       NONFORMULARY     1 each    Place 1 Squirt vaginally 3 times daily as  needed For itching due to fungal rashes       ONE TOUCH ULTRA test strip   Generic drug:  blood glucose monitoring     100 each    Test once daily       ONETOUCH DELICA LANCETS 33G Misc     100 each    1 Device daily       traZODone 50 MG tablet    DESYREL    45 tablet    Take 0.5 tablets (25 mg) by mouth nightly as needed for sleep       venlafaxine 150 MG Tb24 24 hr tablet    EFFEXOR-ER    90 each    Take 1 tablet (150 mg) by mouth daily (with breakfast)       warfarin 5 MG tablet    COUMADIN    30 tablet    Take 5 mg TU, TH and 2.5 mg other 5 days or as directed by coumadin clinic

## 2017-01-20 NOTE — PATIENT INSTRUCTIONS
Please stop taking your Metoprolol.    Please start taking Coreg 6.25 MG twice daily.    Please start taking Lipitor 40 MG once daily.    Please take Lasix on an as needed basis only, if weight goes up 3 lbs in 3 days or 5 lbs in a week, or if noted ankle/foot/leg swelling.    Please call Carloz in 2 weeks with an update on your blood pressures, pulse, and dizziness.    Please hold/don't take your coumadin for 3 days prior to your angiogram, restart taking after your procedure.    Please don't take your Metformin the morning of your procedure and for 48 hours after.        andThank you for your visit today.  Please call me with any questions or concerns.   Carloz Wilson RN  Cardiology Care Coordinator  [phone], press option 1 then option 3

## 2017-01-20 NOTE — NURSING NOTE
Labs: NT pro BNP, BMP, Magnesium, and lipids in the near future. Patient demonstrated understanding of this information and agreed to call with further questions or concerns.   Left Heart Catheterization: Scheduled for 1/30/17.Patient given Coronary Angiogram and Angioplasty: A Patient's Guide booklet. Patient was instructed regarding left heart catheterization, including discussion of the indication, procedure, preparation, intra-procedural steps, and recovery at home. Patient demonstrated understanding of this information and agreed to call with further questions or concerns.  Med Reconcile: Reviewed and verified all current medications with the patient. The updated medication list was printed and given to the patient.  New Medication:Lipitor 40 MG once daily, Coreg 6.25 MG twice daily. Patient was educated regarding newly prescribed medication, including discussion of  the indication, administration, side effects, and when to report to MD or RN. Patient demonstrated understanding of this information and agreed to call with further questions or concerns.  Return Appointment: Follow up in 6 months.Patient given instructions regarding scheduling next clinic visit. Patient demonstrated understanding of this information and agreed to call with further questions or concerns.  Medication Change: Change Lasix to PRN, stop taking Metoprolol. Patient was educated regarding prescribed medication change, including discussion of the indication, administration, side effects, and when to report to MD or RN. Patient demonstrated understanding of this information and agreed to call with further questions or concerns.  Patient stated she understood all health information given and agreed to call with further questions or concerns.    Patient will hold coumadin for 3 days prior to angiogram.

## 2017-01-23 LAB — INTERPRETATION ECG - MUSE: NORMAL

## 2017-01-27 NOTE — PROGRESS NOTES
HPI:   Carlos Alberto Fenton is a 76 year-old female with history of hypertension, hyperlipidemia, diabetes, and CVA 11/2016 referred for evaluation of new-onset paroxysmal atrial fibrillation and cardiomyopathy, both noted during her admission 10/2016 for CVA.    She was admitted 10/2016 for CVA after presenting with altered mental status; MRI showed bilateral cortical infarcts raising suspicion for cardioembolic source. Telemetry revealed paroxysmal A.fib. She was started on warfarin for thromboembolic prophylaxis and metoprolol for rate control.    During admission, she also had TTE which showed LVEF=35-40% (new from TTE 1 week prior.)     Cardiology was consulted and felt decreased LVEF most likely represented stress cardiomyopathy; no ischemic workup was recommended. She now presents for follow up.     She does not recall any of the events during her admission, including those around the time of her decrease in LVEF as well as at the time of her episodes of A.fib. She does report some residual balance difficulties but otherwise does not feel she has any significant residual deficits. Prior to CVA, she walked 30 minutes daily on the treadmill. Her current level of activity is fairly limited, however she does report that she is able to walk up 1 flight of stairs regularly in her house without chest discomfort or dyspnea.     She denies any chest pain, dyspnea at rest or with exertion, palpitations, PND, orthopnea, peripheral edema, LH, or syncope.       PAST MEDICAL HISTORY:  Past Medical History   Diagnosis Date     Peripheral neuropathy (H) 1990     cause unknown     Headaches      Hyperlipemias      History of skin cancer        CURRENT MEDICATIONS:  Current Outpatient Prescriptions   Medication Sig Dispense Refill     atorvastatin (LIPITOR) 40 MG tablet Take 1 tablet (40 mg) by mouth daily 90 tablet 3     carvedilol (COREG) 6.25 MG tablet Take 1 tablet (6.25 mg) by mouth 2 times daily (with meals) 180 tablet 3      furosemide (LASIX) 20 MG tablet Take 1 tablet (20 mg) by mouth daily as needed 90 tablet 3     ONETOUCH DELICA LANCETS 33G MISC 1 Device daily 100 each 0     ONE TOUCH ULTRA test strip Test once daily 100 each 0     lisinopril (PRINIVIL/ZESTRIL) 20 MG tablet Take 1 tablet (20 mg) by mouth daily 90 tablet 2     metFORMIN (GLUCOPHAGE) 500 MG tablet Take 1 tablet (500 mg) by mouth 2 times daily (with meals) 180 tablet 1     traZODone (DESYREL) 50 MG tablet Take 0.5 tablets (25 mg) by mouth nightly as needed for sleep 45 tablet 2     venlafaxine (EFFEXOR-ER) 150 MG TB24 24 hr tablet Take 1 tablet (150 mg) by mouth daily (with breakfast) 90 each 1     warfarin (COUMADIN) 5 MG tablet Take 5 mg TU, TH and 2.5 mg other 5 days or as directed by coumadin clinic 30 tablet 2     clotrimazole (LOTRIMIN) 1 % cream Please apply to groins two times daily for one week after discharge and then follow up with PCP if no improvement of your skin rashes 12 g 0     blood glucose monitoring (ONE TOUCH ULTRA 2) meter device kit        NONFORMULARY Place 1 Squirt vaginally 3 times daily as needed For itching due to fungal rashes 1 each 0       PAST SURGICAL HISTORY:  Past Surgical History   Procedure Laterality Date     Hysterectomy, brenda  1988     Tonsillectomy  1942     C appendectomy  1959       ALLERGIES     Allergies   Allergen Reactions     Oxycodone Unknown       FAMILY HISTORY:  Family History   Problem Relation Age of Onset     DIABETES Mother      C.A.D. Mother      DIABETES Father      C.A.D. Father      Cardiovascular Sister      blood clot       SOCIAL HISTORY:  Social History     Social History     Marital Status:      Spouse Name: N/A     Number of Children: N/A     Years of Education: N/A     Social History Main Topics     Smoking status: Former Smoker     Smokeless tobacco: Never Used     Alcohol Use: No     Drug Use: No     Sexual Activity: No     Other Topics Concern     Parent/Sibling W/ Cabg, Mi Or Angioplasty  "Before 65f 55m? Yes     Social History Narrative       ROS:   Constitutional: No fever, chills, or sweats. No weight gain/loss   ENT: No visual disturbance, ear ache, epistaxis, sore throat  Allergies/Immunologic: Negative.   Respiratory: No cough, hemoptysia  Cardiovascular: As per HPI  GI: No nausea, vomiting, hematemesis, melena, or hematochezia  : No urinary frequency, dysuria, or hematuria  Integument: Negative  Psychiatric: Negative  Neuro: Negative  Endocrinology: Negative   Musculoskeletal: Negative    EXAM:  /64 mmHg  Pulse 57  Ht 1.575 m (5' 2\")  Wt 72.53 kg (159 lb 14.4 oz)  BMI 29.24 kg/m2  SpO2 93%  In general, the patient is a pleasant female in no apparent distress.    HEENT: NC/AT.  PERRLA.  EOMI.  Sclerae white, not injected.  Nares clear.  Pharynx without erythema or exudate.  Dentition intact.    Neck: No adenopathy.  No thyromegaly. Carotids +4/4 bilaterally without bruits.  No jugular venous distension.   Heart: RRR. Normal S1, S2 splits physiologically. No murmur, rub, click, or gallop. The PMI is in the 5th ICS in the midclavicular line. There is no heave.    Lungs: CTA.  No ronchi, wheezes, rales.  No dullness to percussion.   Abdomen: Soft, nontender, nondistended. No organomegaly.  No bruits.   Extremities: No clubbing, cyanosis, or edema.  The pulses are +4/4 at the radial, brachial, femoral, popliteal, DP, and PT sites bilaterally.  No bruits are noted.  Neurologic: Alert and oriented to person/place/time, normal speech, gait and affect  Skin: No petechiae, purpura or rash.    Labs:  LIPID RESULTS:  Lab Results   Component Value Date    CHOL 273* 01/20/2017    HDL 58 01/20/2017    * 01/20/2017    TRIG 236* 01/20/2017    NHDL 216* 01/20/2017       LIVER ENZYME RESULTS:  Lab Results   Component Value Date    AST 31 11/17/2016    ALT 56* 11/17/2016       CBC RESULTS:  Lab Results   Component Value Date    WBC 5.7 11/17/2016    RBC 4.35 11/17/2016    HGB 12.9 11/17/2016    " HCT 43.3 11/17/2016     11/17/2016    MCH 29.7 11/17/2016    MCHC 29.8* 11/17/2016    RDW 16.1* 11/17/2016     11/17/2016       BMP RESULTS:  Lab Results   Component Value Date     01/20/2017    POTASSIUM 4.2 01/20/2017    CHLORIDE 102 01/20/2017    CO2 32 01/20/2017    ANIONGAP 7 01/20/2017    GLC 89 01/20/2017    BUN 25 01/20/2017    CR 0.81 01/20/2017    GFRESTIMATED 69 01/20/2017    GFRESTBLACK 83 01/20/2017    CARLY 9.6 01/20/2017        A1C RESULTS:  Lab Results   Component Value Date    A1C 5.9 10/17/2016       INR RESULTS:  Lab Results   Component Value Date    INR 2.2* 01/16/2017    INR 1.6* 01/05/2017    INR 3.3* 11/23/2016    INR 1.83* 11/10/2016    INR 1.94* 11/09/2016       Procedures:  TTE 10/25/16:   Mildly (EF 40-45%) reduced left ventricular function is present. Mild diffuse hypokinesis is present.  Global right ventricular function is normal.  Compared to the previous study dated 10/18/2016, there has been some interval improvement in LV systolic function.    TTE 10/18/16:  Moderately (EF 35-40%) reduced left ventricular function is present.  Global right ventricular function is mildly reduced.  The atrial septum is intact as assessed by color Doppler and agitated saline bubble study .  No pericardial effusion is present.    TTE 10/14/16:  The rhythm was sinus bradycardia.  The left ventricle is normal in size. Left ventricular systolic function is borderline reduced. The visual ejection fraction is estimated at 50-55%.  The right ventricle is normal in size and systolic function.  Normal left atrial size by volume at 31.6 ml/m2.  There is mild (1+) mitral regurgitation.  There is mild to moderate (1-2+) tricuspid regurgitation.  The right ventricular systolic pressure is approximated at 25mmHg plus the right atrial pressure.  Right ventricle systolic pressure estimate normal  The inferior vena cava is not dilated.    ECG: sinus bradycardia, VR 54 with inferior infarct pattern,  left axis deviation, and poor R-wave progression,  QRS 98 QTc 438.     Assessment and Plan:   76 year old female with history of hypertension, hyperlipidemia, DM, and recent CVA 11/2016 referred for management of newly-diagnosed paroxysmal A.fib (SNL7HE2-KLSr=5 -- age++ HTN+ DM+ CVA++) and new cardiomyopathy of unclear etiology (LVEF~40%)    1. Cardiomyopathy/chronic systolic HF:   Class II, stage D  Etiology unclear, with multiple CAD risk factors (and unable to provide history of events surrounding the episode), raising concern for ischemic etiology; ECG furthermore with evidence of possible prior infarct. Given acuity of drop in LVEF, most probable alternative is stress CM in the setting of CVA.  Plan for coronary angiogram for further evaluation of etiology of cardiomyopathy.   Repeat TTE after coronary angiogram.  We are changing metoprolol tartrate to carvedilol 6.25 mg BID. She will take her BP daily and call us in 2 weeks; our tentative plan is to up-titrate by 3.125 mg in 2 weeks if she has no significant side effects or hypotension.   She should continue lisinopril 20 mg daily.   Indication for initiation of additional neurohormonal therapy (e.g., aldactone) is unclear pending up-titration of appropriate beta blocker and reassessment of LVEF.     2. Paroxysmal atrial fibrillation (JCI2GA9-UVZp=2 -- age++ HTN+ DM+ CVA++):   Agree with rate control strategy; change metoprolol to carvedilol as above. Continue warfarin, goal INR=2-3.     3. Hyperlipidemia:   With DM, at least moderate-intensity statin therapy is indicated per ACC/AHA guidelines. We have started atorvastatin 40 mg daily.    4. Hypertension:   Blood pressure is well controlled on present therapy, which she should continue.    The patient was seen with and examined by the attending physician, Dr. Lobato, who agrees with the above plan.   Zack Baugh MD  Cardiology Fellow    Attending Attestation:  Patient seen and examined by me  with the Fellow. I have performed all pertinent elements of the physical examination and reviewed the note above. I have reviewed pertinent laboratory, echocardiographic, imaging, and cardiac catheterization results. I agree with the plan of care as described in this note.    Star Lobato MD, PhD

## 2017-01-30 ENCOUNTER — APPOINTMENT (OUTPATIENT)
Dept: CARDIOLOGY | Facility: CLINIC | Age: 77
End: 2017-01-30
Attending: INTERNAL MEDICINE
Payer: MEDICARE

## 2017-01-30 ENCOUNTER — APPOINTMENT (OUTPATIENT)
Dept: MEDSURG UNIT | Facility: CLINIC | Age: 77
End: 2017-01-30
Attending: INTERNAL MEDICINE
Payer: MEDICARE

## 2017-01-30 ENCOUNTER — HOSPITAL ENCOUNTER (OUTPATIENT)
Facility: CLINIC | Age: 77
Discharge: HOME | End: 2017-01-30
Attending: INTERNAL MEDICINE | Admitting: INTERNAL MEDICINE
Payer: MEDICARE

## 2017-01-30 VITALS
DIASTOLIC BLOOD PRESSURE: 89 MMHG | OXYGEN SATURATION: 92 % | BODY MASS INDEX: 28.89 KG/M2 | SYSTOLIC BLOOD PRESSURE: 146 MMHG | TEMPERATURE: 98 F | WEIGHT: 157 LBS | HEART RATE: 62 BPM | HEIGHT: 62 IN | RESPIRATION RATE: 16 BRPM

## 2017-01-30 DIAGNOSIS — I42.9 CARDIOMYOPATHY, UNSPECIFIED (H): ICD-10-CM

## 2017-01-30 DIAGNOSIS — I25.10 CORONARY ARTERY DISEASE DUE TO LIPID RICH PLAQUE: ICD-10-CM

## 2017-01-30 DIAGNOSIS — I25.83 CORONARY ARTERY DISEASE DUE TO LIPID RICH PLAQUE: ICD-10-CM

## 2017-01-30 LAB
ANION GAP SERPL CALCULATED.3IONS-SCNC: 8 MMOL/L (ref 3–14)
BUN SERPL-MCNC: 17 MG/DL (ref 7–30)
CALCIUM SERPL-MCNC: 9.2 MG/DL (ref 8.5–10.1)
CHLORIDE SERPL-SCNC: 104 MMOL/L (ref 94–109)
CO2 SERPL-SCNC: 28 MMOL/L (ref 20–32)
CREAT SERPL-MCNC: 0.68 MG/DL (ref 0.52–1.04)
ERYTHROCYTE [DISTWIDTH] IN BLOOD BY AUTOMATED COUNT: 17.3 % (ref 10–15)
GFR SERPL CREATININE-BSD FRML MDRD: 85 ML/MIN/1.7M2
GLUCOSE BLDC GLUCOMTR-MCNC: 82 MG/DL (ref 70–99)
GLUCOSE SERPL-MCNC: 100 MG/DL (ref 70–99)
HCT VFR BLD AUTO: 40.2 % (ref 35–47)
HGB BLD-MCNC: 12.8 G/DL (ref 11.7–15.7)
INR PPP: 1.16 (ref 0.86–1.14)
MCH RBC QN AUTO: 30.3 PG (ref 26.5–33)
MCHC RBC AUTO-ENTMCNC: 31.8 G/DL (ref 31.5–36.5)
MCV RBC AUTO: 95 FL (ref 78–100)
PLATELET # BLD AUTO: 307 10E9/L (ref 150–450)
POTASSIUM SERPL-SCNC: 4 MMOL/L (ref 3.4–5.3)
RBC # BLD AUTO: 4.23 10E12/L (ref 3.8–5.2)
SODIUM SERPL-SCNC: 140 MMOL/L (ref 133–144)
WBC # BLD AUTO: 7.2 10E9/L (ref 4–11)

## 2017-01-30 PROCEDURE — 93454 CORONARY ARTERY ANGIO S&I: CPT | Mod: 26 | Performed by: INTERNAL MEDICINE

## 2017-01-30 PROCEDURE — 27210946 ZZH KIT HC TOTES DISP CR8

## 2017-01-30 PROCEDURE — 40000172 ZZH STATISTIC PROCEDURE PREP ONLY

## 2017-01-30 PROCEDURE — 80048 BASIC METABOLIC PNL TOTAL CA: CPT | Performed by: INTERNAL MEDICINE

## 2017-01-30 PROCEDURE — 27211089 ZZH KIT ACIST INJECTOR CR3

## 2017-01-30 PROCEDURE — 25000128 H RX IP 250 OP 636: Performed by: INTERNAL MEDICINE

## 2017-01-30 PROCEDURE — 93454 CORONARY ARTERY ANGIO S&I: CPT

## 2017-01-30 PROCEDURE — 85027 COMPLETE CBC AUTOMATED: CPT | Performed by: INTERNAL MEDICINE

## 2017-01-30 PROCEDURE — 93010 ELECTROCARDIOGRAM REPORT: CPT | Performed by: INTERNAL MEDICINE

## 2017-01-30 PROCEDURE — B2111ZZ FLUOROSCOPY OF MULTIPLE CORONARY ARTERIES USING LOW OSMOLAR CONTRAST: ICD-10-PCS | Performed by: INTERNAL MEDICINE

## 2017-01-30 PROCEDURE — 27210787 ZZH MANIFOLD CR2

## 2017-01-30 PROCEDURE — 85610 PROTHROMBIN TIME: CPT | Performed by: INTERNAL MEDICINE

## 2017-01-30 PROCEDURE — 40000065 ZZH STATISTIC EKG NON-CHARGEABLE

## 2017-01-30 PROCEDURE — 82962 GLUCOSE BLOOD TEST: CPT

## 2017-01-30 PROCEDURE — C1894 INTRO/SHEATH, NON-LASER: HCPCS

## 2017-01-30 PROCEDURE — 27210742 ZZH CATH CR1

## 2017-01-30 PROCEDURE — 99152 MOD SED SAME PHYS/QHP 5/>YRS: CPT

## 2017-01-30 PROCEDURE — 25000125 ZZHC RX 250: Performed by: INTERNAL MEDICINE

## 2017-01-30 RX ORDER — DOBUTAMINE HYDROCHLORIDE 200 MG/100ML
2-20 INJECTION INTRAVENOUS CONTINUOUS PRN
Status: DISCONTINUED | OUTPATIENT
Start: 2017-01-30 | End: 2017-01-30

## 2017-01-30 RX ORDER — VERAPAMIL HYDROCHLORIDE 2.5 MG/ML
1-2.5 INJECTION, SOLUTION INTRAVENOUS
Status: DISCONTINUED | OUTPATIENT
Start: 2017-01-30 | End: 2017-01-30

## 2017-01-30 RX ORDER — FENTANYL CITRATE 50 UG/ML
25-50 INJECTION, SOLUTION INTRAMUSCULAR; INTRAVENOUS
Status: DISCONTINUED | OUTPATIENT
Start: 2017-01-30 | End: 2017-01-31 | Stop reason: HOSPADM

## 2017-01-30 RX ORDER — DOPAMINE HYDROCHLORIDE 160 MG/100ML
2-20 INJECTION, SOLUTION INTRAVENOUS CONTINUOUS PRN
Status: DISCONTINUED | OUTPATIENT
Start: 2017-01-30 | End: 2017-01-30

## 2017-01-30 RX ORDER — LIDOCAINE 40 MG/G
CREAM TOPICAL
Status: DISCONTINUED | OUTPATIENT
Start: 2017-01-30 | End: 2017-01-31 | Stop reason: HOSPADM

## 2017-01-30 RX ORDER — PROTAMINE SULFATE 10 MG/ML
25-100 INJECTION, SOLUTION INTRAVENOUS EVERY 5 MIN PRN
Status: DISCONTINUED | OUTPATIENT
Start: 2017-01-30 | End: 2017-01-30

## 2017-01-30 RX ORDER — POTASSIUM CHLORIDE 7.45 MG/ML
10 INJECTION INTRAVENOUS
Status: DISCONTINUED | OUTPATIENT
Start: 2017-01-30 | End: 2017-01-30

## 2017-01-30 RX ORDER — FLUMAZENIL 0.1 MG/ML
0.2 INJECTION, SOLUTION INTRAVENOUS
Status: DISCONTINUED | OUTPATIENT
Start: 2017-01-30 | End: 2017-01-31 | Stop reason: HOSPADM

## 2017-01-30 RX ORDER — ADENOSINE 3 MG/ML
12-12000 INJECTION, SOLUTION INTRAVENOUS
Status: DISCONTINUED | OUTPATIENT
Start: 2017-01-30 | End: 2017-01-30

## 2017-01-30 RX ORDER — NALOXONE HYDROCHLORIDE 0.4 MG/ML
.1-.4 INJECTION, SOLUTION INTRAMUSCULAR; INTRAVENOUS; SUBCUTANEOUS
Status: DISCONTINUED | OUTPATIENT
Start: 2017-01-30 | End: 2017-01-31 | Stop reason: HOSPADM

## 2017-01-30 RX ORDER — PHENYLEPHRINE HCL IN 0.9% NACL 1 MG/10 ML
20-100 SYRINGE (ML) INTRAVENOUS
Status: DISCONTINUED | OUTPATIENT
Start: 2017-01-30 | End: 2017-01-30

## 2017-01-30 RX ORDER — SODIUM NITROPRUSSIDE 25 MG/ML
100-200 INJECTION INTRAVENOUS
Status: DISCONTINUED | OUTPATIENT
Start: 2017-01-30 | End: 2017-01-30

## 2017-01-30 RX ORDER — LORAZEPAM 2 MG/ML
.5-2 INJECTION INTRAMUSCULAR EVERY 4 HOURS PRN
Status: DISCONTINUED | OUTPATIENT
Start: 2017-01-30 | End: 2017-01-30

## 2017-01-30 RX ORDER — ONDANSETRON 2 MG/ML
4 INJECTION INTRAMUSCULAR; INTRAVENOUS EVERY 4 HOURS PRN
Status: DISCONTINUED | OUTPATIENT
Start: 2017-01-30 | End: 2017-01-30

## 2017-01-30 RX ORDER — ENALAPRILAT 1.25 MG/ML
1.25-2.5 INJECTION INTRAVENOUS
Status: DISCONTINUED | OUTPATIENT
Start: 2017-01-30 | End: 2017-01-30

## 2017-01-30 RX ORDER — IOPAMIDOL 755 MG/ML
200 INJECTION, SOLUTION INTRAVASCULAR ONCE
Status: COMPLETED | OUTPATIENT
Start: 2017-01-30 | End: 2017-01-30

## 2017-01-30 RX ORDER — METHYLPREDNISOLONE SODIUM SUCCINATE 125 MG/2ML
125 INJECTION, POWDER, LYOPHILIZED, FOR SOLUTION INTRAMUSCULAR; INTRAVENOUS
Status: DISCONTINUED | OUTPATIENT
Start: 2017-01-30 | End: 2017-01-30

## 2017-01-30 RX ORDER — CLOPIDOGREL BISULFATE 75 MG/1
300-600 TABLET ORAL
Status: DISCONTINUED | OUTPATIENT
Start: 2017-01-30 | End: 2017-01-30

## 2017-01-30 RX ORDER — NITROGLYCERIN 5 MG/ML
100-200 VIAL (ML) INTRAVENOUS
Status: DISCONTINUED | OUTPATIENT
Start: 2017-01-30 | End: 2017-01-30

## 2017-01-30 RX ORDER — ASPIRIN 81 MG/1
81-324 TABLET, CHEWABLE ORAL
Status: DISCONTINUED | OUTPATIENT
Start: 2017-01-30 | End: 2017-01-31 | Stop reason: HOSPADM

## 2017-01-30 RX ORDER — PROTAMINE SULFATE 10 MG/ML
1-5 INJECTION, SOLUTION INTRAVENOUS
Status: DISCONTINUED | OUTPATIENT
Start: 2017-01-30 | End: 2017-01-30

## 2017-01-30 RX ORDER — LIDOCAINE HYDROCHLORIDE 10 MG/ML
30 INJECTION, SOLUTION EPIDURAL; INFILTRATION; INTRACAUDAL; PERINEURAL
Status: DISCONTINUED | OUTPATIENT
Start: 2017-01-30 | End: 2017-01-30

## 2017-01-30 RX ORDER — DIPHENHYDRAMINE HYDROCHLORIDE 50 MG/ML
25-50 INJECTION INTRAMUSCULAR; INTRAVENOUS
Status: DISCONTINUED | OUTPATIENT
Start: 2017-01-30 | End: 2017-01-30

## 2017-01-30 RX ORDER — DEXTROSE MONOHYDRATE 25 G/50ML
12.5-5 INJECTION, SOLUTION INTRAVENOUS EVERY 30 MIN PRN
Status: DISCONTINUED | OUTPATIENT
Start: 2017-01-30 | End: 2017-01-30

## 2017-01-30 RX ORDER — ARGATROBAN 1 MG/ML
150 INJECTION, SOLUTION INTRAVENOUS
Status: DISCONTINUED | OUTPATIENT
Start: 2017-01-30 | End: 2017-01-30

## 2017-01-30 RX ORDER — ARGATROBAN 1 MG/ML
350 INJECTION, SOLUTION INTRAVENOUS
Status: DISCONTINUED | OUTPATIENT
Start: 2017-01-30 | End: 2017-01-30

## 2017-01-30 RX ORDER — NITROGLYCERIN 5 MG/ML
100-500 VIAL (ML) INTRAVENOUS
Status: DISCONTINUED | OUTPATIENT
Start: 2017-01-30 | End: 2017-01-30

## 2017-01-30 RX ORDER — FENTANYL CITRATE 50 UG/ML
25-50 INJECTION, SOLUTION INTRAMUSCULAR; INTRAVENOUS
Status: DISCONTINUED | OUTPATIENT
Start: 2017-01-30 | End: 2017-01-30

## 2017-01-30 RX ORDER — NIFEDIPINE 10 MG/1
10 CAPSULE ORAL
Status: DISCONTINUED | OUTPATIENT
Start: 2017-01-30 | End: 2017-01-30

## 2017-01-30 RX ORDER — HEPARIN SODIUM 1000 [USP'U]/ML
1000-10000 INJECTION, SOLUTION INTRAVENOUS; SUBCUTANEOUS EVERY 5 MIN PRN
Status: DISCONTINUED | OUTPATIENT
Start: 2017-01-30 | End: 2017-01-30

## 2017-01-30 RX ORDER — EPTIFIBATIDE 2 MG/ML
2 INJECTION, SOLUTION INTRAVENOUS CONTINUOUS PRN
Status: DISCONTINUED | OUTPATIENT
Start: 2017-01-30 | End: 2017-01-30

## 2017-01-30 RX ORDER — FLUMAZENIL 0.1 MG/ML
0.2 INJECTION, SOLUTION INTRAVENOUS
Status: DISCONTINUED | OUTPATIENT
Start: 2017-01-30 | End: 2017-01-30

## 2017-01-30 RX ORDER — PROMETHAZINE HYDROCHLORIDE 25 MG/ML
6.25-25 INJECTION, SOLUTION INTRAMUSCULAR; INTRAVENOUS EVERY 4 HOURS PRN
Status: DISCONTINUED | OUTPATIENT
Start: 2017-01-30 | End: 2017-01-30

## 2017-01-30 RX ORDER — NITROGLYCERIN 20 MG/100ML
.07-2 INJECTION INTRAVENOUS CONTINUOUS PRN
Status: DISCONTINUED | OUTPATIENT
Start: 2017-01-30 | End: 2017-01-30

## 2017-01-30 RX ORDER — NALOXONE HYDROCHLORIDE 0.4 MG/ML
0.4 INJECTION, SOLUTION INTRAMUSCULAR; INTRAVENOUS; SUBCUTANEOUS EVERY 5 MIN PRN
Status: DISCONTINUED | OUTPATIENT
Start: 2017-01-30 | End: 2017-01-31 | Stop reason: HOSPADM

## 2017-01-30 RX ORDER — NICARDIPINE HYDROCHLORIDE 2.5 MG/ML
100 INJECTION INTRAVENOUS
Status: DISCONTINUED | OUTPATIENT
Start: 2017-01-30 | End: 2017-01-30

## 2017-01-30 RX ORDER — NALOXONE HYDROCHLORIDE 0.4 MG/ML
.2-.4 INJECTION, SOLUTION INTRAMUSCULAR; INTRAVENOUS; SUBCUTANEOUS
Status: DISCONTINUED | OUTPATIENT
Start: 2017-01-30 | End: 2017-01-31 | Stop reason: HOSPADM

## 2017-01-30 RX ORDER — CLOPIDOGREL BISULFATE 75 MG/1
75 TABLET ORAL
Status: DISCONTINUED | OUTPATIENT
Start: 2017-01-30 | End: 2017-01-30

## 2017-01-30 RX ORDER — HYDRALAZINE HYDROCHLORIDE 20 MG/ML
10-20 INJECTION INTRAMUSCULAR; INTRAVENOUS
Status: DISCONTINUED | OUTPATIENT
Start: 2017-01-30 | End: 2017-01-30

## 2017-01-30 RX ORDER — PRASUGREL 10 MG/1
10-60 TABLET, FILM COATED ORAL
Status: DISCONTINUED | OUTPATIENT
Start: 2017-01-30 | End: 2017-01-30

## 2017-01-30 RX ORDER — ASPIRIN 325 MG
325 TABLET ORAL
Status: DISCONTINUED | OUTPATIENT
Start: 2017-01-30 | End: 2017-01-31 | Stop reason: HOSPADM

## 2017-01-30 RX ORDER — SODIUM CHLORIDE 9 MG/ML
INJECTION, SOLUTION INTRAVENOUS CONTINUOUS
Status: DISCONTINUED | OUTPATIENT
Start: 2017-01-30 | End: 2017-01-31 | Stop reason: HOSPADM

## 2017-01-30 RX ORDER — FUROSEMIDE 10 MG/ML
20-100 INJECTION INTRAMUSCULAR; INTRAVENOUS
Status: DISCONTINUED | OUTPATIENT
Start: 2017-01-30 | End: 2017-01-30

## 2017-01-30 RX ORDER — POTASSIUM CHLORIDE 29.8 MG/ML
20 INJECTION INTRAVENOUS
Status: DISCONTINUED | OUTPATIENT
Start: 2017-01-30 | End: 2017-01-30

## 2017-01-30 RX ORDER — EPTIFIBATIDE 2 MG/ML
180 INJECTION, SOLUTION INTRAVENOUS EVERY 10 MIN PRN
Status: DISCONTINUED | OUTPATIENT
Start: 2017-01-30 | End: 2017-01-30

## 2017-01-30 RX ORDER — NITROGLYCERIN 0.4 MG/1
0.4 TABLET SUBLINGUAL EVERY 5 MIN PRN
Status: DISCONTINUED | OUTPATIENT
Start: 2017-01-30 | End: 2017-01-30

## 2017-01-30 RX ADMIN — MIDAZOLAM HYDROCHLORIDE 1.5 MG: 1 INJECTION, SOLUTION INTRAMUSCULAR; INTRAVENOUS at 17:37

## 2017-01-30 RX ADMIN — SODIUM CHLORIDE: 9 INJECTION, SOLUTION INTRAVENOUS at 12:17

## 2017-01-30 RX ADMIN — IOPAMIDOL 47 ML: 755 INJECTION, SOLUTION INTRAVASCULAR at 18:30

## 2017-01-30 RX ADMIN — FENTANYL CITRATE 75 MCG: 50 INJECTION, SOLUTION INTRAMUSCULAR; INTRAVENOUS at 17:37

## 2017-01-30 NOTE — PROGRESS NOTES
1215  Prep is complete for procedure.  H&P is current.  Labs have been sent.  Carlos Alberto is here for CORS.  She has been off Coumadin 5mg for approx last 3 days.  Also has been off Metformin for past several days.  Denies any pain.  Pt is not sure why she is having angiogram done.  Daughter, Lindsay, is here with her.  Awaiting consent.  CTRN

## 2017-01-30 NOTE — IP AVS SNAPSHOT
[facility] [address] [facility] [address]    Phone:  [phone]    Fax:  [phone]                                       After Visit Summary   1/30/2017    Carlos Alberto [facility]    MRN: [id number]           After Visit Summary Signature Page     I have received my discharge instructions, and my questions have been answered. I have discussed any challenges I see with this plan with the nurse or doctor.    ..........................................................................................................................................  Patient/Patient Representative Signature      ..........................................................................................................................................  Patient Representative Print Name and Relationship to Patient    ..................................................               ................................................  Date                                            Time    ..........................................................................................................................................  Reviewed by Signature/Title    ...................................................              ..............................................  Date                                                            Time

## 2017-01-30 NOTE — IP AVS SNAPSHOT
MRN:[id number]                      After Visit Summary   1/30/2017    Carlos Alberto Fenton    MRN: [id number]           Thank you!     Thank you for choosing [facility] for your care. Our goal is always to provide you with excellent care. Hearing back from our patients is one way we can continue to improve our services. Please take a few minutes to complete the written survey that you may receive in the mail after you visit with us. Thank you!        Patient Information     Date Of Birth          1940        About your hospital stay     You were admitted on:  January 30, 2017 You last received care in the:  Unit [facility] Observation [facility]    You were discharged on:  January 30, 2017       Who to Call     For medical emergencies, please call 911.  For non-urgent questions about your medical care, please call your primary care provider or clinic, [phone]          Attending Provider     Provider    Star Lobato MD       Primary Care Provider Office Phone # Fax #    uNha Bateman [phone]       [address] [address]        After Care Instructions     Discharge Instructions - IF on Metformin (Glucophage or Glucovance) or Metformin containing medications       IF on Metformin (Glucophage or Glucovance) or Metformin containing medications , schedule a Basic Metabolic Panel at [facility] or Primary Clinic in 48 - 72 hours post procedure and PRIOR TO resuming the Metformin or Metformin containing medications.  Hold Metformin (Glucophage or Glucovance) or Metformin containing medications until after the Basic Metabolic Panel on the 2nd or 3rd day following the procedure.  May resume after blood draw is complete.                  Your next 10 appointments already scheduled     Feb 20, 2017  1:15 PM   CT CHEST W/O CONTRAST with MGCT1   [facility] ([facility])    [address] [place] [address]  [facility] [address]   [phone]           Please bring any scans or X-rays taken at other hospitals, if similar tests were done. Also bring a list of your medicines, including vitamins, minerals and over-the-counter drugs. It is safest to leave personal items at home.  Be sure to tell your doctor:   If you have any allergies.   If there s any chance you are pregnant.   If you are breastfeeding.   If you have any special needs.  You do not need to do anything special to prepare.  Please wear loose clothing, such as a sweat suit or jogging clothes. Avoid snaps, zippers and other metal. We may ask you to undress and put on a hospital gown.            Feb 20, 2017  1:40 PM   New Visit with Marty Camargo MD   [facility]    [address] [place] [address]   [phone]            Mar 20, 2017  2:00 PM   [facility]    [address] [place] [address] [phone]           Bring a current list of meds and any records pertaining to this visit.  For Physicals, please bring immunization records and any forms needing to be filled out.  Please arrive 10 minutes early to complete paperwork.            Jul 14, 2017  1:30 PM   (Arrive by 1:15 PM)   Return Visit with Star Lobato MD   [facility]    [address] [facility]  3rd [facility] [id number] [phone]              Additional Services     Cardiovascular Surgery Referral                 Further instructions from your care team         Coronary Angiography     The catheter can be placed into the groin, arm, or wrist.   Angiography is a special type of X-ray that allows your coronary arteries to be viewed and recorded on film. Your doctor can see if the blood vessels to your heart are clogged.   Before the procedure    Tell your doctor  what medicines you take and any allergies you may have.    Don t eat or drink anything after midnight, the night before the procedure.  During the procedure    A long, thin tube called a catheter is placed inside an artery in your groin or arm and guided into your heart.    A contrast dye is injected through the catheter into your blood vessels or heart chambers.    X-rays are taken to to show clear photos of the inside of your heart and coronary arteries.  After the procedure      Your doctor or nurse will tell you how long to lie down and keep the insertion site still.    If the insertion site was in your groin, you may need to lie down with your leg still for several hours.    A nurse will check your blood pressure and the insertion site.    You may be asked to drink fluid to help flush the contrast liquid out of your system.    Have someone drive you home from the hospital.    It s normal to find a small bruise or lump at the insertion site. These common side effects should disappear within a few weeks.  Call your healthcare provider  Contact your healthcare provider if:    You have angina (chest pain).    The insertion site has pain, swelling, redness, bleeding, or drainage.    You have severe pain, coldness, or a bluish color in the leg or arm that held the catheter.    You experience blood in your urine, black or tarry stools, or any other kind of bleeding.    You have a fever over 101 F (38.3 C).     5806-0542 [employer] [address] [place] [address]. All rights reserved. This information is not intended as a substitute for professional medical care. Always follow your healthcare professional's instructions.          Pending Results     Date and Time Order Name Status Description    1/30/2017 1132 EKG 12-lead, tracing only Preliminary             Admission Information        Provider Department Dept Phone    1/30/2017 Star Lobato MD [facility] [phone]      Your  "Vitals Were     Blood Pressure Pulse Temperature    138/81 mmHg 62 98  F (36.7  C) (Oral)    Respirations Height Weight    16 1.575 m (5' 2\") 71.215 kg (157 lb)    BMI (Body Mass Index) Pulse Oximetry       28.71 kg/m2 92%       MyChart Information     [employer] gives you secure access to your electronic health record. If you see a primary care provider, you can also send messages to your care team and make appointments. If you have questions, please call your primary care clinic.  If you do not have a primary care provider, please call [phone] and they will assist you.        Care EveryWhere ID     This is your Care EveryWhere ID. This could be used by other organizations to access your [facility] medical records  [id number]           Review of your medicines      UNREVIEWED medicines. Ask your doctor about these medicines        Dose / Directions    atorvastatin 40 MG tablet   Commonly known as:  LIPITOR   Used for:  Hyperlipidemia LDL goal <130        Dose:  40 mg   Take 1 tablet (40 mg) by mouth daily   Quantity:  90 tablet   Refills:  3       carvedilol 6.25 MG tablet   Commonly known as:  COREG   Used for:  Cardiomyopathy, unspecified (H)        Dose:  6.25 mg   Take 1 tablet (6.25 mg) by mouth 2 times daily (with meals)   Quantity:  180 tablet   Refills:  3       clotrimazole 1 % cream   Commonly known as:  LOTRIMIN   Used for:  Tinea cruris        Please apply to groins two times daily for one week after discharge and then follow up with PCP if no improvement of your skin rashes   Quantity:  12 g   Refills:  0       furosemide 20 MG tablet   Commonly known as:  LASIX   Used for:  Edema, unspecified type, Essential hypertension with goal blood pressure less than 140/90, 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)        Dose:  20 mg   Take 1 tablet (20 mg) by mouth daily as needed   Quantity:  90 tablet   Refills:  3       lisinopril 20 MG tablet   Commonly known as:  " PRINIVIL/ZESTRIL   Used for:  Paroxysmal atrial fibrillation (H), Essential hypertension with goal blood pressure less than 140/90, 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)        Dose:  20 mg   Take 1 tablet (20 mg) by mouth daily   Quantity:  90 tablet   Refills:  2       metFORMIN 500 MG tablet   Commonly known as:  GLUCOPHAGE   Used for:  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)        Dose:  500 mg   Take 1 tablet (500 mg) by mouth 2 times daily (with meals)   Quantity:  180 tablet   Refills:  1       NONFORMULARY   Used for:  Tinea cruris        Dose:  1 Squirt   Place 1 Squirt vaginally 3 times daily as needed For itching due to fungal rashes   Quantity:  1 each   Refills:  0       traZODone 50 MG tablet   Commonly known as:  DESYREL   Used for:  Primary insomnia        Dose:  25 mg   Take 0.5 tablets (25 mg) by mouth nightly as needed for sleep   Quantity:  45 tablet   Refills:  2       venlafaxine 150 MG Tb24 24 hr tablet   Commonly known as:  EFFEXOR-ER   Used for:  Adjustment disorder with depressed mood        Dose:  150 mg   Take 1 tablet (150 mg) by mouth daily (with breakfast)   Quantity:  90 each   Refills:  1       warfarin 5 MG tablet   Commonly known as:  COUMADIN   Used for:  Paroxysmal atrial fibrillation (H), Aphasia, 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)        Take 5 mg TU, TH and 2.5 mg other 5 days or as directed by coumadin clinic   Quantity:  30 tablet   Refills:  2         CONTINUE these medicines which have NOT CHANGED        Dose / Directions    blood glucose monitoring meter device kit        Refills:  0       ONE TOUCH ULTRA test strip   Used for:  Type 2 diabetes mellitus without complication, without long-term current use of insulin (H)   Generic drug:  blood glucose monitoring        Test once daily   Quantity:  100 each   Refills:  0       ONETOUCH DELICA LANCETS 33G Misc   Used for:  Type 2  diabetes mellitus without complication, without long-term current use of insulin (H)        Dose:  1 Device   1 Device daily   Quantity:  100 each   Refills:  0                Protect others around you: Learn how to safely use, store and throw away your medicines at www.disposemymeds.org.             Medication List: This is a list of all your medications and when to take them. Check marks below indicate your daily home schedule. Keep this list as a reference.      Medications           Morning Afternoon Evening Bedtime As Needed    atorvastatin 40 MG tablet   Commonly known as:  LIPITOR   Take 1 tablet (40 mg) by mouth daily                                blood glucose monitoring meter device kit                                carvedilol 6.25 MG tablet   Commonly known as:  COREG   Take 1 tablet (6.25 mg) by mouth 2 times daily (with meals)                                clotrimazole 1 % cream   Commonly known as:  LOTRIMIN   Please apply to groins two times daily for one week after discharge and then follow up with PCP if no improvement of your skin rashes                                furosemide 20 MG tablet   Commonly known as:  LASIX   Take 1 tablet (20 mg) by mouth daily as needed                                lisinopril 20 MG tablet   Commonly known as:  PRINIVIL/ZESTRIL   Take 1 tablet (20 mg) by mouth daily                                metFORMIN 500 MG tablet   Commonly known as:  GLUCOPHAGE   Take 1 tablet (500 mg) by mouth 2 times daily (with meals)                                NONFORMULARY   Place 1 Squirt vaginally 3 times daily as needed For itching due to fungal rashes                                ONE TOUCH ULTRA test strip   Test once daily   Generic drug:  blood glucose monitoring                                ONETOUCH DELICA LANCETS 33G Misc   1 Device daily                                traZODone 50 MG tablet   Commonly known as:  DESYREL   Take 0.5 tablets (25 mg) by mouth nightly as  needed for sleep                                venlafaxine 150 MG Tb24 24 hr tablet   Commonly known as:  EFFEXOR-ER   Take 1 tablet (150 mg) by mouth daily (with breakfast)                                warfarin 5 MG tablet   Commonly known as:  COUMADIN   Take 5 mg TU, TH and 2.5 mg other 5 days or as directed by coumadin clinic

## 2017-01-31 ENCOUNTER — PRE VISIT (OUTPATIENT)
Dept: CARDIOLOGY | Facility: CLINIC | Age: 77
End: 2017-01-31

## 2017-01-31 ENCOUNTER — CARE COORDINATION (OUTPATIENT)
Dept: CARDIOLOGY | Facility: CLINIC | Age: 77
End: 2017-01-31

## 2017-01-31 DIAGNOSIS — I25.10 CORONARY ARTERY DISEASE INVOLVING NATIVE CORONARY ARTERY OF NATIVE HEART WITHOUT ANGINA PECTORIS: Primary | ICD-10-CM

## 2017-01-31 DIAGNOSIS — R73.9 HYPERGLYCEMIA: ICD-10-CM

## 2017-01-31 DIAGNOSIS — Z79.01 LONG TERM CURRENT USE OF ANTICOAGULANT THERAPY: ICD-10-CM

## 2017-01-31 DIAGNOSIS — Z01.810 PRE-OPERATIVE CARDIOVASCULAR EXAMINATION: ICD-10-CM

## 2017-01-31 DIAGNOSIS — R09.89 OTHER SPECIFIED SYMPTOMS AND SIGNS INVOLVING THE CIRCULATORY AND RESPIRATORY SYSTEMS: ICD-10-CM

## 2017-01-31 LAB — INTERPRETATION ECG - MUSE: NORMAL

## 2017-01-31 NOTE — PROGRESS NOTES
2nd call placed patient to set up pre pre operative testing and schedule surgery per Dr Quiñones.  Patient's phone went directly to voicemail.    Called patient's emergency contact Lindsay, the patient's daughter and left a voicemail asking her to contact her mother and ask her to call this writer.

## 2017-01-31 NOTE — PROGRESS NOTES
Patient daughter called to state she is the person we should speak to about her mothers appointments.  daughter states tomorrow at 11 they will come to clinic to see Dr Quiñones, they would like all the pre op tests on Thursday and will have surgery on Monday 02/06.    Mayela Gudino, REESE  [facility]  Cardiothoracic Surgery Care Coordinator  O) [phone]

## 2017-01-31 NOTE — PROGRESS NOTES
Patient sheathe intact, dressing clean/dry, patient's vital signs stable. Patient reports no pain, in pleasant mood.

## 2017-01-31 NOTE — PROGRESS NOTES
"Patient off bedrest at 2200. Right groin site CDI, no hematoma, no bleeding. Pedal pulse present and normal. Patient ate and tolerated regular diet. Denies pain. Ambulated to the bathroom and voided spontaneously. PIV discontinued. All discharge instructions reviewed with patient and daughter and all questions answered. Patient discharged in a stable condition with daughter. Blood pressure 146/89, pulse 62, temperature 98  F (36.7  C), temperature source Oral, resp. rate 16, height 1.575 m (5' 2\"), weight 71.215 kg (157 lb), SpO2 92 %.    "

## 2017-01-31 NOTE — PROGRESS NOTES
Manual pressure held for 10 minutes to right groin site.  Sheath, size 4,  pulled by REESE Girard.  Site CDI, no hematoma.  Stasis achieved at 1958. Off bedrest @ 2927.

## 2017-01-31 NOTE — PROGRESS NOTES
Called patient to set up pre pre operative testing and schedule surgery per Dr Quiñones.  Patient's phone went directly to [employer].

## 2017-02-01 ENCOUNTER — OFFICE VISIT (OUTPATIENT)
Dept: CARDIOLOGY | Facility: CLINIC | Age: 77
End: 2017-02-01
Attending: SURGERY
Payer: MEDICARE

## 2017-02-01 VITALS
OXYGEN SATURATION: 97 % | HEART RATE: 50 BPM | SYSTOLIC BLOOD PRESSURE: 103 MMHG | BODY MASS INDEX: 30 KG/M2 | WEIGHT: 163 LBS | DIASTOLIC BLOOD PRESSURE: 61 MMHG | HEIGHT: 62 IN

## 2017-02-01 DIAGNOSIS — Z79.01 LONG TERM CURRENT USE OF ANTICOAGULANT THERAPY: Primary | ICD-10-CM

## 2017-02-01 DIAGNOSIS — I25.10 CORONARY ARTERY DISEASE DUE TO LIPID RICH PLAQUE: ICD-10-CM

## 2017-02-01 DIAGNOSIS — I25.83 CORONARY ARTERY DISEASE DUE TO LIPID RICH PLAQUE: ICD-10-CM

## 2017-02-01 PROCEDURE — 99214 OFFICE O/P EST MOD 30 MIN: CPT | Mod: ZF

## 2017-02-01 ASSESSMENT — ENCOUNTER SYMPTOMS
FATIGUE: 1
EYE WATERING: 0
PARALYSIS: 0
INSOMNIA: 0
ORTHOPNEA: 0
FEVER: 0
LIGHT-HEADEDNESS: 0
LEG PAIN: 0
CLAUDICATION: 0
EYE REDNESS: 1
PALPITATIONS: 0
TREMORS: 0
DECREASED CONCENTRATION: 1
HYPOTENSION: 0
EXERCISE INTOLERANCE: 0
MEMORY LOSS: 1
POLYDIPSIA: 0
HYPERTENSION: 1
INCREASED ENERGY: 1
SYNCOPE: 0
SPEECH CHANGE: 0
SLEEP DISTURBANCES DUE TO BREATHING: 0
WEIGHT GAIN: 1
TINGLING: 1
DOUBLE VISION: 0
ALTERED TEMPERATURE REGULATION: 1
WEAKNESS: 0
HEADACHES: 0
DEPRESSION: 1
TACHYCARDIA: 0
NERVOUS/ANXIOUS: 1
EYE PAIN: 0
NIGHT SWEATS: 1
HALLUCINATIONS: 0
EYE IRRITATION: 1
WEIGHT LOSS: 0
PANIC: 1
DECREASED APPETITE: 0
LEG SWELLING: 1
SEIZURES: 0
CHILLS: 0
DIZZINESS: 0
POLYPHAGIA: 1
DISTURBANCES IN COORDINATION: 0
LOSS OF CONSCIOUSNESS: 0
NUMBNESS: 1

## 2017-02-01 ASSESSMENT — PAIN SCALES - GENERAL: PAINLEVEL: NO PAIN (0)

## 2017-02-01 NOTE — NURSING NOTE
Chief Complaint   Patient presents with     New Patient     Consult for coronary artery bypass, 2 vessel, mid LAD, mid RCA

## 2017-02-01 NOTE — Clinical Note
2/1/2017      RE: Carlos Alberto Fenton  [address] [facility] [address]       Dear Colleague,    Thank you for the opportunity to participate in the care of your patient, Carlos Alberto Fenton, at the [facility]ART [facility]. Please see a copy of my visit note below.    I was requested to see Ms. Fenton by Dr. Gonsalez for evaluation of options for coronary bypass grafting.        HISTORY OF PRESENT ILLNESS:  Ms. [facility] is a pleasant, 76-year-old female with a past medical history of hypertension, hyperlipidemia, diabetes and stroke who was evaluated for new onset paroxysmal atrial fibrillation and cardiomyopathy.  She has some fatigue and shortness of breath with exertion, but denies any fever, chills, syncope, palpitations or chest pain.  She had a stroke in 10/2016.  Currently, she feels well.  She is able to walk a block and 1 flight of stairs without any chest pain or dyspnea.  She underwent a coronary angiogram recently that showed severe triple-vessel coronary artery disease.      PAST MEDICAL HISTORY:  Hypertension, hyperlipidemia, recent stroke, paroxysmal atrial fibrillation.      HOME MEDICATIONS:  Lipitor, Coreg, Lasix, Coumadin, Effexor, Desyrel, Glucophage, Prinivil.      ALLERGIES:  Oxycodone.       PAST SURGICAL HISTORY:  Hysterectomy, tonsillectomy, appendectomy.      FAMILY HISTORY:  Diabetes and coronary artery disease.      SOCIAL HISTORY:  Former smoker.  Denies current alcohol, drug or tobacco abuse.      REVIEW OF SYSTEMS:   CONSTITUTIONAL:  Occasional fatigue.   RESPIRATORY:  None.   CARDIOVASCULAR:  Coronary artery disease, atrial fibrillation, congestive heart failure.   GENITOURINARY:  None.   GASTROINTESTINAL:  None.   ENDOCRINE:  None.   MUSCULOSKELETAL:  None.   NEUROLOGIC:  None.     PSYCHIATRIC:  None.   INTEGUMENT:  None.   EYES:  None.   ENT:  None.      PHYSICAL EXAMINATION:   VITAL SIGNS:  She is afebrile.  Blood pressure 110/64,  heart rate 62.   GENERAL:  She is awake, alert, oriented x3.  She appears comfortable at rest.   CARDIOVASCULAR:  S1 and S2 normal, no S3, no murmurs.   RESPIRATORY:  Bilateral breath sounds.  No rhonchi or crepitations.   ABDOMEN:  Bowel sounds present, nondistended, nontender.   LOWER EXTREMITIES:  Warm, well perfused.  No pedal edema.   NEUROLOGICAL:  No focal deficits.   EYES:  Pupils equal and reactive to light.   DERMATOLOGICAL:  Within normal limits.   ENT:  Within normal limits.      LABORATORY DATA:  Electrolytes within normal limits.  Creatinine 0.81.  White count 7.2, hemoglobin 12.8, INR 1.16, platelet count 307.      IMAGING:  I reviewed her echocardiogram done in 10/2016 that shows a mildly reduced ejection fraction, EF 40%-45%.  Global RV function is normal.  No valvular abnormalities.  I reviewed her coronary angiogram with Dr. Gonsalez that showed a 50%-60% left main stenosis, a heavily calcified left anterior descending artery stenosis, a 90% focal left anterior descending artery stenosis.  No hemodynamic abnormalities in the circumflex system.  The right coronary artery has a mid 80% stenosis with MOSHE 2 flow.      ASSESSMENT AND PLAN:  A 76-year-old female with severe triple-vessel coronary artery disease, including left main stenosis and paroxysmal atrial fibrillation.  I agree with the recommendation of Dr. Gonsalez to consider her for coronary bypass grafting.  I discussed the benefits of surgery with the patient and family, including the risks of death, stroke, bleeding, wound infection, renal failure, arrhythmias.  As part of our preoperative workup, we will do a CT scan of the chest without contrast, lower extremity vein mapping, carotid duplex and echocardiogram.  I have also discussed with the patient that if the anatomy is favorable, we will do a modified maze procedure that will lessen the chance of recurrent atrial fibrillation as well as place a clip on the base of the left  atrial appendage.  We will plan to perform surgery next week pending the preop testing.       If you have any questions regarding her care, feel free to contact me.      cc:   Star Lobato MD   [facility] at [address] [address]      Gonsalo Gonsalez MD   [facility] at [address] [address]      [facility] Maddie LEE MD         D: 2017 11:51   T: 2017 09:18   MT: melissa      Name:     MENDOZA GREENBERG   MRN:      [id number]        Account:      [id number]   :      1940           Service Date: 2017      Document: [id number]

## 2017-02-01 NOTE — PATIENT INSTRUCTIONS
You were seen today in the [facility]                     Cardiothoracic Surgery Clinic    Your surgeon was:  Dr Mikhail Quiñones     Recommendations:  Dr Mikhail Quiñones is planning to do 3 vessel bypass surgery on Monday Feb 6th.  Your current arrival time is 9:40 am, this may change to 5:00 am.    Your schedule of appointments is attached as well as your pre operative prep letter and your coumadin/Lovenox bridging schedule.      Please feel free to call me with any questions or concerns.    Mayela Gudino RN  Care Coordinator, Cardiothoracic Surgery   [phone]

## 2017-02-01 NOTE — NURSING NOTE
Patient here today for pre op consult with surgeon    All pre op testing will be completed tomorrow.  Schedule of appointments and times given to the patient.    Patient to start lovenox for bridging, instructions given.      Patient and daughter agreed to plan and will call with any questions or concerns.

## 2017-02-01 NOTE — MR AVS SNAPSHOT
After Visit Summary   2/1/2017    Carlos Alberto Fenton    MRN: [id number]           Patient Information     Date Of Birth          1940        Visit Information        Provider Department      2/1/2017 11:00 AM Mikhail Quiñones MD [facility]        Today's Diagnoses     Coronary artery disease due to lipid rich plaque           Care Instructions    You were seen today in the [facility]                     Cardiothoracic Surgery Clinic    Your surgeon was:  Dr Mikhail Quiñones     Recommendations:  Dr Mikhail Quiñones is planning to do 3 vessel bypass surgery on Monday Feb 6th.  Your current arrival time is 9:40 am, this may change to 5:00 am.    Your schedule of appointments is attached as well as your pre operative prep letter and your coumadin/Lovenox bridging schedule.      Please feel free to call me with any questions or concerns.    Mayela Gudino, RN  Care Coordinator, Cardiothoracic Surgery   [phone]                Follow-ups after your visit        Your next 10 appointments already scheduled     Feb 02, 2017  1:00 PM   LAB with  LAB   [facility] ([facility] [place])    [address] [place] [address] [phone]           Patient must bring picture ID.  Patient should be prepared to give a urine specimen  Please do not eat 10-12 hours before your appointment if you are coming in fasting for labs on lipids, cholesterol, or glucose (sugar).  Pregnant women should follow their Care Team instructions. Water with medications is okay. Do not drink coffee or other fluids.   If you have concerns about taking  your medications, please ask at office or if scheduling via [employer], send a message by clicking on Secure Messaging, Message Your Care Team.            Feb 02, 2017  1:30 PM   (Arrive by 1:15 PM)   PAC RN ASSESSMENT with  Pac Rn   [facility] ([facility] [place])    [address] [place] [address]  [address] [address]   [phone]            Feb 02, 2017  2:00 PM   (Arrive by 1:45 PM)   PAC EVALUATION with  Pac Weston 1   [facility] ([facility])    [address] [address]   [phone]            Feb 02, 2017  3:00 PM   (Arrive by 2:45 PM)   PAC Anesthesia Consult with  Pac Anesthesiologist   [facility] Pre[facility] ([facility])    [address] [place] [address]   [phone]            Feb 02, 2017  4:00 PM   (Arrive by 3:45 PM)   CT CHEST W/O CONTRAST with UCCT2   [facility] CT ([facility])    [address] [place] [address]   [phone]           Please bring any scans or X-rays taken at other hospitals, if similar tests were done. Also bring a list of your medicines, including vitamins, minerals and over-the-counter drugs. It is safest to leave personal items at home.  Be sure to tell your doctor:   If you have any allergies.   If there s any chance you are pregnant.   If you are breastfeeding.   If you have any special needs.  You do not need to do anything special to prepare.  Please wear loose clothing, such as a sweat suit or jogging clothes. Avoid snaps, zippers and other metal. We may ask you to undress and put on a hospital gown.            Feb 02, 2017  4:30 PM   (Arrive by 4:15 PM)   XR CHEST 2 VIEWS with UCXR1   [facility] Xray ([facility])    [address] [address]   [phone]           Please bring a list of your current medicines to your exam. (Include vitamins, minerals and over-thecounter medicines.) Leave your valuables at home.  Tell your doctor if there is a chance you may be pregnant.  You do not need to do anything special for this exam.            Feb 02,  2017  5:00 PM   US LOWER EXTREMITY VENOUS MAPPING BILATERAL with UCUSV1   [facility] ([facility] [place])    [address]  [facility] [address] [phone]           Please bring a list of your medicines (including vitamins, minerals and over-the-counter drugs). Also, tell your doctor about any allergies you may have. Wear comfortable clothes and leave your valuables at home.  You do not need to do anything special to prepare for your exam.  Please call the Imaging Department at your exam site with any questions.            Feb 02, 2017  6:00 PM   US CAROTID BILATERAL with UCUSV1   [facility] ([facility] [place])    [address]  [facility] [address] [phone]           Please bring a list of your medicines (including vitamins, minerals and over-the-counter drugs). Also, tell your doctor about any allergies you may have. Wear comfortable clothes and leave your valuables at home.  You do not need to do anything special to prepare for your exam.  Please call the Imaging Department at your exam site with any questions.            Feb 06, 2017   Procedure with Mikhail Quiñones MD   [facility] [place], Same Day Surgery (--)    [address] [facility] [address] [phone]            Feb 20, 2017  1:15 PM   CT CHEST W/O CONTRAST with MGCT1   [facility]    [address] [place] [address] [phone]           Please bring any scans or X-rays taken at other hospitals, if similar tests were done. Also bring a list of your medicines, including vitamins, minerals and over-the-counter drugs. It is safest to leave personal items at home.  Be sure to tell your doctor:   If you have any allergies.   If there s any chance you are pregnant.   If you are breastfeeding.   If you have any special needs.  You do not need to do anything special to prepare.   Please wear loose clothing, such as a sweat suit or jogging clothes. Avoid snaps, zippers and other metal. We may ask you to undress and put on a hospital gown.              Future tests that were ordered for you today     Open Future Orders        Priority Expected Expires Ordered    X-ray Chest 2 vws* Routine 2/1/2017 2/17/2017 1/31/2017    ABO/Rh type and screen Routine 2/1/2017 2/17/2017 1/31/2017    Basic metabolic panel Routine 2/1/2017 2/17/2017 1/31/2017    Hemoglobin A1c Routine 2/1/2017 2/17/2017 1/31/2017    INR Routine 2/1/2017 2/17/2017 1/31/2017    Routine UA with micro reflex to culture Routine 2/1/2017 2/17/2017 1/31/2017    US Carotid Bilateral Routine 2/1/2017 2/17/2017 1/31/2017    Vasc Lower Vein Mapping Bilateral Routine 2/1/2017 2/17/2017 1/31/2017    CT Chest w/o contrast Routine 2/1/2017 2/16/2018 1/31/2017            Who to contact     If you have questions or need follow up information about today's clinic visit or your schedule please contact [facility] directly at [phone].  Normal or non-critical lab and imaging results will be communicated to you by [employer], letter or phone within 4 business days after the clinic has received the results. If you do not hear from us within 7 days, please contact the clinic through [employer] or phone. If you have a critical or abnormal lab result, we will notify you by phone as soon as possible.  Submit refill requests through Sealed or call your pharmacy and they will forward the refill request to us. Please allow 3 business days for your refill to be completed.          Additional Information About Your Visit        [employer] [facility] [employer] Information     Sealed gives you secure access to your electronic health record. If you see a primary care provider, you can also send messages to your care team and make appointments. If you have questions, please call your primary care clinic.  If you do not have a primary care provider, please call [phone] and  "they will assist you.        Care EveryWhere ID     This is your Care EveryWhere ID. This could be used by other organizations to access your [place] medical records  [id number]        Your Vitals Were     Pulse Height BMI (Body Mass Index) Pulse Oximetry          50 1.575 m (5' 2\") 29.81 kg/m2 97%         Blood Pressure from Last 3 Encounters:   02/01/17 103/61   01/30/17 146/89   01/20/17 100/64    Weight from Last 3 Encounters:   02/01/17 73.936 kg (163 lb)   01/30/17 71.215 kg (157 lb)   01/20/17 72.53 kg (159 lb 14.4 oz)              We Performed the Following     Cardiovascular Surgery Referral          Today's Medication Changes          These changes are accurate as of: 2/1/17 11:51 AM.  If you have any questions, ask your nurse or doctor.               Stop taking these medicines if you haven't already. Please contact your care team if you have questions.     NONFORMULARY   Stopped by:  Mikhail Quiñones MD                    Primary Care Provider Office Phone # Fax #    Nuha Bateman [phone]       [address] [address]        Thank you!     Thank you for choosing [facility]  for your care. Our goal is always to provide you with excellent care. Hearing back from our patients is one way we can continue to improve our services. Please take a few minutes to complete the written survey that you may receive in the mail after your visit with us. Thank you!             Your Updated Medication List - Protect others around you: Learn how to safely use, store and throw away your medicines at www.disposemymeds.org.          This list is accurate as of: 2/1/17 11:51 AM.  Always use your most recent med list.                   Brand Name Dispense Instructions for use    atorvastatin 40 MG tablet    LIPITOR    90 tablet    Take 1 tablet (40 mg) by mouth daily       blood glucose monitoring meter device kit          carvedilol 6.25 MG tablet "    COREG    180 tablet    Take 1 tablet (6.25 mg) by mouth 2 times daily (with meals)       clotrimazole 1 % cream    LOTRIMIN    12 g    Please apply to groins two times daily for one week after discharge and then follow up with PCP if no improvement of your skin rashes       furosemide 20 MG tablet    LASIX    90 tablet    Take 1 tablet (20 mg) by mouth daily as needed       lisinopril 20 MG tablet    PRINIVIL/ZESTRIL    90 tablet    Take 1 tablet (20 mg) by mouth daily       metFORMIN 500 MG tablet    GLUCOPHAGE    180 tablet    Take 1 tablet (500 mg) by mouth 2 times daily (with meals)       ONE TOUCH ULTRA test strip   Generic drug:  blood glucose monitoring     100 each    Test once daily       ONETOUCH DELICA LANCETS 33G Misc     100 each    1 Device daily       traZODone 50 MG tablet    DESYREL    45 tablet    Take 0.5 tablets (25 mg) by mouth nightly as needed for sleep       venlafaxine 150 MG Tb24 24 hr tablet    EFFEXOR-ER    90 each    Take 1 tablet (150 mg) by mouth daily (with breakfast)       warfarin 5 MG tablet    COUMADIN    30 tablet    Take 5 mg TU, TH and 2.5 mg other 5 days or as directed by coumadin clinic

## 2017-02-02 ENCOUNTER — ANESTHESIA EVENT (OUTPATIENT)
Dept: SURGERY | Facility: CLINIC | Age: 77
DRG: 228 | End: 2017-02-02
Payer: MEDICARE

## 2017-02-02 ENCOUNTER — OFFICE VISIT (OUTPATIENT)
Dept: SURGERY | Facility: CLINIC | Age: 77
End: 2017-02-02

## 2017-02-02 ENCOUNTER — ALLIED HEALTH/NURSE VISIT (OUTPATIENT)
Dept: SURGERY | Facility: CLINIC | Age: 77
End: 2017-02-02

## 2017-02-02 VITALS
TEMPERATURE: 97.9 F | RESPIRATION RATE: 16 BRPM | HEART RATE: 55 BPM | WEIGHT: 162.9 LBS | HEIGHT: 62 IN | SYSTOLIC BLOOD PRESSURE: 123 MMHG | BODY MASS INDEX: 29.98 KG/M2 | OXYGEN SATURATION: 98 % | DIASTOLIC BLOOD PRESSURE: 85 MMHG

## 2017-02-02 DIAGNOSIS — I25.10 CORONARY ARTERY DISEASE INVOLVING NATIVE CORONARY ARTERY OF NATIVE HEART WITHOUT ANGINA PECTORIS: ICD-10-CM

## 2017-02-02 DIAGNOSIS — I25.83 CORONARY ARTERY DISEASE DUE TO LIPID RICH PLAQUE: Primary | ICD-10-CM

## 2017-02-02 DIAGNOSIS — R73.9 HYPERGLYCEMIA: ICD-10-CM

## 2017-02-02 DIAGNOSIS — Z79.01 LONG TERM CURRENT USE OF ANTICOAGULANT THERAPY: ICD-10-CM

## 2017-02-02 DIAGNOSIS — I25.10 CORONARY ARTERY DISEASE DUE TO LIPID RICH PLAQUE: Primary | ICD-10-CM

## 2017-02-02 DIAGNOSIS — Z01.810 PRE-OPERATIVE CARDIOVASCULAR EXAMINATION: ICD-10-CM

## 2017-02-02 DIAGNOSIS — Z01.818 PREOP EXAMINATION: Primary | ICD-10-CM

## 2017-02-02 LAB
ALBUMIN UR-MCNC: NEGATIVE MG/DL
ANION GAP SERPL CALCULATED.3IONS-SCNC: 8 MMOL/L (ref 3–14)
APPEARANCE UR: CLEAR
BILIRUB UR QL STRIP: NEGATIVE
BUN SERPL-MCNC: 15 MG/DL (ref 7–30)
CALCIUM SERPL-MCNC: 9.3 MG/DL (ref 8.5–10.1)
CHLORIDE SERPL-SCNC: 105 MMOL/L (ref 94–109)
CO2 SERPL-SCNC: 29 MMOL/L (ref 20–32)
COLOR UR AUTO: YELLOW
CREAT SERPL-MCNC: 0.63 MG/DL (ref 0.52–1.04)
GFR SERPL CREATININE-BSD FRML MDRD: NORMAL ML/MIN/1.7M2
GLUCOSE SERPL-MCNC: 76 MG/DL (ref 70–99)
GLUCOSE UR STRIP-MCNC: NEGATIVE MG/DL
HBA1C MFR BLD: 6.3 % (ref 4.3–6)
HGB UR QL STRIP: NEGATIVE
INR PPP: 1.19 (ref 0.86–1.14)
KETONES UR STRIP-MCNC: NEGATIVE MG/DL
LEUKOCYTE ESTERASE UR QL STRIP: NEGATIVE
MUCOUS THREADS #/AREA URNS LPF: PRESENT /LPF
NITRATE UR QL: NEGATIVE
PH UR STRIP: 5 PH (ref 5–7)
POTASSIUM SERPL-SCNC: 4.7 MMOL/L (ref 3.4–5.3)
RBC #/AREA URNS AUTO: 1 /HPF (ref 0–2)
SODIUM SERPL-SCNC: 142 MMOL/L (ref 133–144)
SP GR UR STRIP: 1.02 (ref 1–1.03)
SQUAMOUS #/AREA URNS AUTO: 2 /HPF (ref 0–1)
URN SPEC COLLECT METH UR: ABNORMAL
UROBILINOGEN UR STRIP-MCNC: 0 MG/DL (ref 0–2)
WBC #/AREA URNS AUTO: 1 /HPF (ref 0–2)

## 2017-02-02 ASSESSMENT — COPD QUESTIONNAIRES: COPD: 1

## 2017-02-02 ASSESSMENT — LIFESTYLE VARIABLES: TOBACCO_USE: 1

## 2017-02-02 ASSESSMENT — ENCOUNTER SYMPTOMS: DYSRHYTHMIAS: 1

## 2017-02-02 NOTE — PHARMACY - PREOPERATIVE ASSESSMENT CENTER
ANTICOAGULATION DOCUMENTATION - PAC Pharmacist   Patient seen and interviewed during time of PAC Clinic appt February 2, 2017.     Based on profile review and patient interview Carlos Alberto Fenton has been on warfarin 5 mg Tu/TH and 2.5 mg on every other day of the week (per patient report, of note last anticoagulation team note suggests dose of 5 mg on Tu/Th/Sat and 2.5 mg on other days, but patient reports that it has changed) for treatment of afib with history of stroke in October 2016.    It is prescribed by Dr Guthrie and she fills this medication at [facility].  The expected duration of therapy is undetermined.    Current medications that may interact with this include venlafaxine, trazodone.  There has not been a recent change in oral intake/nutrition.      Carlos Alberto Fenton is scheduled for surgery on 2/6/17 and the perioperative anticoagulation plan outlined by the cardiothoracic surgery team is hold warfarin starting 2/2 and start enoxaparin 1 mg/kg q12 hours on 2/2 evening.  Patient's INR today was 1.19 and she was encouraged to start her enoxaparin once she gets home.  Plan is to have her taker her last dose of enoxaparin on 2/5 AM and hold until surgery. Postoperative resumption of anticoagulation will be decided upon by the inpatient CVTS primary team.   This plan may require re-assessment and modification by her primary team in the perioperative setting depending on patients clinical situation.        Lamin Chacko [facility]  February 2, 2017  3:35 PM

## 2017-02-02 NOTE — OR NURSING
Gave Carlos Alberto [facility] Cardiothoracic surgery Pre-op instructions again to clarify her Lovenox schedule.

## 2017-02-02 NOTE — PATIENT INSTRUCTIONS
Preparing for Your Surgery      Name:  Carlos Alberto Fenton   MRN:  [id number]   :  1940   Today's Date:  2017     Arriving for surgery:  Surgery date:  17  Surgery time:  11:40 a.m.  Arrival time:  09:40 a.m.  Please come to:       [facility] [address]  [address] [place]  [address]    -   parking is available in front of the hospital from 5:15 am to 8:00 pm    -  Stop at the Information Desk in the lobby    -   Inform the information person that you are here for surgery. An escort to 3c will be provided. If you would not like an escort, please proceed to 3C on the 3rd floor. [phone]     What can I eat or drink?  -  You may have solid food or milk products until 8 hours prior to your surgery. 01:00 a.m.-    You may have water, apple juice or 7up/Sprite until 2 hours prior to your surgery 09:40 a.m.    Which medicines can I take?  -  Do NOT take these medications in the morning, the day of surgery:  Furosemide, Lisinopril, Metformin,  (stop Coumadin 17  Start Lovenox protocol per instructions from Cardiology)      -  Please take these medications the day of surgery:  Carvedilol, Effexor    How do I prepare myself?  -  Take two showers: one the night before surgery; and one the morning of surgery.         Use Scrubcare or Hibiclens to wash from neck down.  You may use your own shampoo and conditioner. No other hair products.   -  Do NOT use lotion, powder, deodorant, or antiperspirant the day of your surgery.  -  Do NOT wear any makeup, fingernail polish or jewelry.  -  Begin using Incentive Spirometer 1 week prior to surgery.  Use 4 times per day, up to 5-10 breaths each time.  Bring Incentive Spirometer to hospital.  -Do not bring your own medications to the hospital, except for inhalers and eye drops.  -  Bring your ID and insurance card.    Questions or Concerns:  If you have questions or concerns, please call the  Preoperative Assessment  [place], Monday-Friday 7AM-7PM:  [phone]    Using an Incentive Spirometer  Soon after your surgery, a nurse or therapist will teach you breathing exercises. These keep your lungs clear, strengthen your breathing muscles, and help prevent complications.  The exercises include doing a deep-breathing exercise using a device called an incentive spirometer.  To do these exercises, you will breathe in through your mouth and not your nose. The incentive spirometer only works correctly if you breathe in through your mouth.  Four steps to clear lungs     Deep breathing expands the lungs, aids circulation, and helps prevent pneumonia.   1. Exhale normally.    Relax and breathe out.  2. Place your lips tightly around the mouthpiece.    Make sure the device is upright and not tilted.  3. Inhale as much air as you can through the mouthpiece (don't breath through your nose).    Inhale slowly and deeply.    Hold your breath long enough to keep the balls or disk raised for at least 3 seconds.    If you re inhaling too quickly, your device may make a tone. If you hear this tone, inhale more slowly.  4. Repeat the exercise regularly.    Do this exercise every hour while you're awake, or as your health care provider instructs.    You will also be taught coughing exercises and be asked to do them regularly on your own.    5613-9407 [employer]. [address] [place] [address], [place] [address]. All rights reserved. This information is not intended as a substitute for professional medical care. Always follow your healthcare professional's instructions.        AFTER YOUR SURGERY  Breathing exercises   Breathing exercises help you recover faster. Take deep breaths and let the air out slowly. This will:     Help you wake up after surgery.    Help prevent complications like pneumonia.  Preventing complications will help you go home sooner.   We may give you a breathing device (incentive spirometer) to encourage you to breathe  deeply.   Nausea and vomiting   You may feel sick to your stomach after surgery; if so, let your nurse know.    Pain control:  After surgery, you may have pain. Our goal is to help you manage your pain. Pain medicine will help you feel comfortable enough to do activities that will help you heal.  These activities may include breathing exercises, walking and physical therapy.   To help your health care team treat your pain we will ask: 1) If you have pain  2) where it is located 3) describe your pain in your words  Methods of pain control include medications given by mouth, vein or by nerve block for some surgeries.  We may give you a pain control pump that will:  1) Deliver the medicine through a tube placed in your vein  2) Control the amount of medicine you receive  3) Allow you to push a button to deliver a dose of pain medicine  Sequential Compression Device (SCD) or Pneumo Boots:  You may need to wear SCD S on your legs or feet. These are wraps connected to a machine that pumps in air and releases it. The repeated pumping helps prevent blood clots from forming.

## 2017-02-02 NOTE — MR AVS SNAPSHOT
After Visit Summary   2017    Carlos Alberto Fenton    MRN: [id number]           Patient Information     Date Of Birth          1940        Visit Information        Provider Department      2017 1:30 PM Rn, [facility]        Care Instructions    Preparing for Your Surgery      Name:  Carlos Alberto Fenton   MRN:  [id number]   :  1940   Today's Date:  2017     Arriving for surgery:  Surgery date:  17  Surgery time:  11:40 a.m.  Arrival time:  09:40 a.m.  Please come to:       [facility] [address]  [address] [place]  [address]    -   parking is available in front of the hospital from 5:15 am to 8:00 pm    -  Stop at the Information Desk in the lobby    -   Inform the information person that you are here for surgery. An escort to 3c will be provided. If you would not like an escort, please proceed to 3C on the 3rd floor. [phone]     What can I eat or drink?  -  You may have solid food or milk products until 8 hours prior to your surgery. 01:00 a.m.-    You may have water, apple juice or 7up/Sprite until 2 hours prior to your surgery 09:40 a.m.    Which medicines can I take?  -  Do NOT take these medications in the morning, the day of surgery:  Furosemide, Lisinopril, Metformin,  (stop Coumadin 17  Start Lovenox protocol per instructions from Cardiology)      -  Please take these medications the day of surgery:  Carvedilol, Effexor    How do I prepare myself?  -  Take two showers: one the night before surgery; and one the morning of surgery.         Use Scrubcare or Hibiclens to wash from neck down.  You may use your own shampoo and conditioner. No other hair products.   -  Do NOT use lotion, powder, deodorant, or antiperspirant the day of your surgery.  -  Do NOT wear any makeup, fingernail polish or jewelry.  -  Begin using Incentive Spirometer 1 week prior to surgery.  Use 4 times per day, up  to 5-10 breaths each time.  Bring Incentive Spirometer to hospital.  -Do not bring your own medications to the hospital, except for inhalers and eye drops.  -  Bring your ID and insurance card.    Questions or Concerns:  If you have questions or concerns, please call the  Preoperative Assessment Center, Monday-Friday 7AM-7PM:  [phone]    Using an Incentive Spirometer  Soon after your surgery, a nurse or therapist will teach you breathing exercises. These keep your lungs clear, strengthen your breathing muscles, and help prevent complications.  The exercises include doing a deep-breathing exercise using a device called an incentive spirometer.  To do these exercises, you will breathe in through your mouth and not your nose. The incentive spirometer only works correctly if you breathe in through your mouth.  Four steps to clear lungs     Deep breathing expands the lungs, aids circulation, and helps prevent pneumonia.   1. Exhale normally.    Relax and breathe out.  2. Place your lips tightly around the mouthpiece.    Make sure the device is upright and not tilted.  3. Inhale as much air as you can through the mouthpiece (don't breath through your nose).    Inhale slowly and deeply.    Hold your breath long enough to keep the balls or disk raised for at least 3 seconds.    If you re inhaling too quickly, your device may make a tone. If you hear this tone, inhale more slowly.  4. Repeat the exercise regularly.    Do this exercise every hour while you're awake, or as your health care provider instructs.    You will also be taught coughing exercises and be asked to do them regularly on your own.    6783-0376 [employer]. [address] [place] [address], [place] [address]. All rights reserved. This information is not intended as a substitute for professional medical care. Always follow your healthcare professional's instructions.        AFTER YOUR SURGERY  Breathing exercises   Breathing exercises help you  recover faster. Take deep breaths and let the air out slowly. This will:     Help you wake up after surgery.    Help prevent complications like pneumonia.  Preventing complications will help you go home sooner.   We may give you a breathing device (incentive spirometer) to encourage you to breathe deeply.   Nausea and vomiting   You may feel sick to your stomach after surgery; if so, let your nurse know.    Pain control:  After surgery, you may have pain. Our goal is to help you manage your pain. Pain medicine will help you feel comfortable enough to do activities that will help you heal.  These activities may include breathing exercises, walking and physical therapy.   To help your health care team treat your pain we will ask: 1) If you have pain  2) where it is located 3) describe your pain in your words  Methods of pain control include medications given by mouth, vein or by nerve block for some surgeries.  We may give you a pain control pump that will:  1) Deliver the medicine through a tube placed in your vein  2) Control the amount of medicine you receive  3) Allow you to push a button to deliver a dose of pain medicine  Sequential Compression Device (SCD) or Pneumo Boots:  You may need to wear SCD S on your legs or feet. These are wraps connected to a machine that pumps in air and releases it. The repeated pumping helps prevent blood clots from forming.         Follow-ups after your visit        Your next 10 appointments already scheduled     Feb 02, 2017  3:00 PM   (Arrive by 2:45 PM)   PAC Anesthesia Consult with  Pac Anesthesiologist   [facility] ([facility])    [address] [place] [id number]   [phone]            Feb 02, 2017  3:15 PM   (Arrive by 3:00 PM)   PAC Pharmacist with  Pac Pharmacist   [facility] ([facility])    [address] [place] [address]  [facility] [id number] [phone]            Feb 02, 2017  4:00 PM   (Arrive by 3:45 PM)   CT CHEST W/O CONTRAST with UCCT2   [facility] ([facility])    [address] [place] [id number] [phone]           Please bring any scans or X-rays taken at other hospitals, if similar tests were done. Also bring a list of your medicines, including vitamins, minerals and over-the-counter drugs. It is safest to leave personal items at home.  Be sure to tell your doctor:   If you have any allergies.   If there s any chance you are pregnant.   If you are breastfeeding.   If you have any special needs.  You do not need to do anything special to prepare.  Please wear loose clothing, such as a sweat suit or jogging clothes. Avoid snaps, zippers and other metal. We may ask you to undress and put on a hospital gown.            Feb 02, 2017  4:30 PM   (Arrive by 4:15 PM)   XR CHEST 2 VIEWS with UCXR1   [facility] ([facility])    [address] [phone] [phone]           Please bring a list of your current medicines to your exam. (Include vitamins, minerals and over-thecounter medicines.) Leave your valuables at home.  Tell your doctor if there is a chance you may be pregnant.  You do not need to do anything special for this exam.            Feb 02, 2017  5:00 PM   US LOWER EXTREMITY VENOUS MAPPING BILATERAL with UCUSV1   [facility] ([facility])    [address] [place] [id number] [phone]           Please bring a list of your medicines (including vitamins, minerals and over-the-counter drugs). Also, tell your doctor about any allergies you may have. Wear comfortable clothes and leave your valuables at home.  You do not need to do anything special to prepare for your exam.  Please call the Imaging  Department at your exam site with any questions.            Feb 02, 2017  6:00 PM   US CAROTID BILATERAL with UCUSV1   [facility] ([facility])    [address]  1st Floor  [facility] [address] [phone]           Please bring a list of your medicines (including vitamins, minerals and over-the-counter drugs). Also, tell your doctor about any allergies you may have. Wear comfortable clothes and leave your valuables at home.  You do not need to do anything special to prepare for your exam.  Please call the Imaging Department at your exam site with any questions.            Feb 06, 2017   Procedure with Mikhail Quiñones MD   [facility] [place], Same Day Surgery (--)    [address] [facility] [id number] [phone]            Feb 20, 2017  1:15 PM   CT CHEST W/O CONTRAST with MGCT1   [facility]    [address] [place] [address] [phone]           Please bring any scans or X-rays taken at other hospitals, if similar tests were done. Also bring a list of your medicines, including vitamins, minerals and over-the-counter drugs. It is safest to leave personal items at home.  Be sure to tell your doctor:   If you have any allergies.   If there s any chance you are pregnant.   If you are breastfeeding.   If you have any special needs.  You do not need to do anything special to prepare.  Please wear loose clothing, such as a sweat suit or jogging clothes. Avoid snaps, zippers and other metal. We may ask you to undress and put on a hospital gown.            Feb 20, 2017  1:40 PM   New Visit with Marty Camargo MD   [facility] ([facility])    [address] [place] [id number] [phone]            Mar 20, 2017  2:00 PM   Office Visit with Nuha Bateman MD   [facility]    [address]  [place]  [facility] [id number] [phone]           Bring a current list of meds and any records pertaining to this visit.  For Physicals, please bring immunization records and any forms needing to be filled out.  Please arrive 10 minutes early to complete paperwork.              Who to contact     Please call your clinic at [phone] to:    Ask questions about your health    Make or cancel appointments    Discuss your medicines    Learn about your test results    Speak to your doctor   If you have compliments or concerns about an experience at your clinic, or if you wish to file a complaint, please contact [facility] Patient Relations at [phone] or email us at [email or web address]         Additional Information About Your Visit        [employer] Information     [employer] gives you secure access to your electronic health record. If you see a primary care provider, you can also send messages to your care team and make appointments. If you have questions, please call your primary care clinic.  If you do not have a primary care provider, please call [phone] and they will assist you.      [employer] is an electronic gateway that provides easy, online access to your medical records. With [employer], you can request a clinic appointment, read your test results, renew a prescription or communicate with your care team.     To access your existing account, please contact your [facility] or call [phone] for assistance.        Care EveryWhere ID     This is your Care EveryWhere ID. This could be used by other organizations to access your [facility] medical records  [id number]         Blood Pressure from Last 3 Encounters:   02/02/17 123/85   02/01/17 103/61   01/30/17 146/89    Weight from Last 3 Encounters:   02/02/17 73.891 kg (162 lb 14.4 oz)   02/01/17 73.936 kg (163 lb)   01/30/17 71.215 kg (157 lb)              Today, you had the following     No orders  found for display         Today's Medication Changes          These changes are accurate as of: 2/2/17  2:32 PM.  If you have any questions, ask your nurse or doctor.               These medicines have changed or have updated prescriptions.        Dose/Directions    atorvastatin 40 MG tablet   Commonly known as:  LIPITOR   This may have changed:  when to take this   Used for:  Hyperlipidemia LDL goal <130        Dose:  40 mg   Take 1 tablet (40 mg) by mouth daily   Quantity:  90 tablet   Refills:  3       lisinopril 20 MG tablet   Commonly known as:  PRINIVIL/ZESTRIL   This may have changed:  when to take this   Used for:  Paroxysmal atrial fibrillation (H), Essential hypertension with goal blood pressure less than 140/90, 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)        Dose:  20 mg   Take 1 tablet (20 mg) by mouth daily   Quantity:  90 tablet   Refills:  2                Primary Care Provider Office Phone # Fax #    Nuha Bateman [phone]       [address] [address]        Thank you!     Thank you for choosing [facility]  for your care. Our goal is always to provide you with excellent care. Hearing back from our patients is one way we can continue to improve our services. Please take a few minutes to complete the written survey that you may receive in the mail after your visit with us. Thank you!             Your Updated Medication List - Protect others around you: Learn how to safely use, store and throw away your medicines at www.disposemymeds.org.          This list is accurate as of: 2/2/17  2:32 PM.  Always use your most recent med list.                   Brand Name Dispense Instructions for use    atorvastatin 40 MG tablet    LIPITOR    90 tablet    Take 1 tablet (40 mg) by mouth daily       blood glucose monitoring meter device kit          carvedilol 6.25 MG tablet    COREG    180  tablet    Take 1 tablet (6.25 mg) by mouth 2 times daily (with meals)       clotrimazole 1 % cream    LOTRIMIN    12 g    Please apply to groins two times daily for one week after discharge and then follow up with PCP if no improvement of your skin rashes       enoxaparin 80 MG/0.8ML injection    LOVENOX    6 Syringe    Inject 0.74 mLs (74 mg) Subcutaneous See Admin Instructions for 4 days Please take as instructed in your pre operative instructions.       furosemide 20 MG tablet    LASIX    90 tablet    Take 1 tablet (20 mg) by mouth daily as needed       lisinopril 20 MG tablet    PRINIVIL/ZESTRIL    90 tablet    Take 1 tablet (20 mg) by mouth daily       metFORMIN 500 MG tablet    GLUCOPHAGE    180 tablet    Take 1 tablet (500 mg) by mouth 2 times daily (with meals)       ONE TOUCH ULTRA test strip   Generic drug:  blood glucose monitoring     100 each    Test once daily       ONETOUCH DELICA LANCETS 33G Misc     100 each    1 Device daily       traZODone 50 MG tablet    DESYREL    45 tablet    Take 0.5 tablets (25 mg) by mouth nightly as needed for sleep       venlafaxine 150 MG Tb24 24 hr tablet    EFFEXOR-ER    90 each    Take 1 tablet (150 mg) by mouth daily (with breakfast)       warfarin 5 MG tablet    COUMADIN    30 tablet    Take 5 mg TU, TH and 2.5 mg other 5 days or as directed by coumadin clinic

## 2017-02-02 NOTE — PROGRESS NOTES
I was requested to see Ms. Fenton by Dr. Gonsalez for evaluation of options for coronary bypass grafting.        HISTORY OF PRESENT ILLNESS:  Ms. Fenton is a pleasant, 76-year-old female with a past medical history of hypertension, hyperlipidemia, diabetes and stroke who was evaluated for new onset paroxysmal atrial fibrillation and cardiomyopathy.  She has some fatigue and shortness of breath with exertion, but denies any fever, chills, syncope, palpitations or chest pain.  She had a stroke in 10/2016.  Currently, she feels well.  She is able to walk a block and 1 flight of stairs without any chest pain or dyspnea.  She underwent a coronary angiogram recently that showed severe triple-vessel coronary artery disease.      PAST MEDICAL HISTORY:  Hypertension, hyperlipidemia, recent stroke, paroxysmal atrial fibrillation.      HOME MEDICATIONS:  Lipitor, Coreg, Lasix, Coumadin, Effexor, Desyrel, Glucophage, Prinivil.      ALLERGIES:  Oxycodone.       PAST SURGICAL HISTORY:  Hysterectomy, tonsillectomy, appendectomy.      FAMILY HISTORY:  Diabetes and coronary artery disease.      SOCIAL HISTORY:  Former smoker.  Denies current alcohol, drug or tobacco abuse.      REVIEW OF SYSTEMS:   CONSTITUTIONAL:  Occasional fatigue.   RESPIRATORY:  None.   CARDIOVASCULAR:  Coronary artery disease, atrial fibrillation, congestive heart failure.   GENITOURINARY:  None.   GASTROINTESTINAL:  None.   ENDOCRINE:  None.   MUSCULOSKELETAL:  None.   NEUROLOGIC:  None.     PSYCHIATRIC:  None.   INTEGUMENT:  None.   EYES:  None.   ENT:  None.      PHYSICAL EXAMINATION:   VITAL SIGNS:  She is afebrile.  Blood pressure 110/64, heart rate 62.   GENERAL:  She is awake, alert, oriented x3.  She appears comfortable at rest.   CARDIOVASCULAR:  S1 and S2 normal, no S3, no murmurs.   RESPIRATORY:  Bilateral breath sounds.  No rhonchi or crepitations.   ABDOMEN:  Bowel sounds present, nondistended, nontender.   LOWER EXTREMITIES:  Warm, well perfused.   No pedal edema.   NEUROLOGICAL:  No focal deficits.   EYES:  Pupils equal and reactive to light.   DERMATOLOGICAL:  Within normal limits.   ENT:  Within normal limits.      LABORATORY DATA:  Electrolytes within normal limits.  Creatinine 0.81.  White count 7.2, hemoglobin 12.8, INR 1.16, platelet count 307.      IMAGING:  I reviewed her echocardiogram done in 10/2016 that shows a mildly reduced ejection fraction, EF 40%-45%.  Global RV function is normal.  No valvular abnormalities.  I reviewed her coronary angiogram with Dr. Gonsalez that showed a 50%-60% left main stenosis, a heavily calcified left anterior descending artery stenosis, a 90% focal left anterior descending artery stenosis.  No hemodynamic abnormalities in the circumflex system.  The right coronary artery has a mid 80% stenosis with MOSHE 2 flow.      ASSESSMENT AND PLAN:  A 76-year-old female with severe triple-vessel coronary artery disease, including left main stenosis and paroxysmal atrial fibrillation.  I agree with the recommendation of Dr. Gonsalez to consider her for coronary bypass grafting.  I discussed the benefits of surgery with the patient and family, including the risks of death, stroke, bleeding, wound infection, renal failure, arrhythmias.  As part of our preoperative workup, we will do a CT scan of the chest without contrast, lower extremity vein mapping, carotid duplex and echocardiogram.  I have also discussed with the patient that if the anatomy is favorable, we will do a modified maze procedure that will lessen the chance of recurrent atrial fibrillation as well as place a clip on the base of the left atrial appendage.  We will plan to perform surgery next week pending the preop testing.       If you have any questions regarding her care, feel free to contact me.      cc:   Star Lobato MD   [facility] [place] [address]      Gonsalo Gonsalez MD   [facility] at  [address] [address]      [facility] Maddie LEE MD             D: 2017 11:51   T: 2017 09:18   MT: melissa      Name:     MENDOZA GREENBERG   MRN:      [id number]        Account:      [id number]   :      1940           Service Date: 2017      Document: [id number]

## 2017-02-02 NOTE — ANESTHESIA PREPROCEDURE EVALUATION
Anesthesia Evaluation     . Pt has had prior anesthetic. Type: General and MAC    No history of anesthetic complications     ROS/MED HX    ENT/Pulmonary: Comment: 10 pack years, quit in 70s    (+)sleep apnea, tobacco use, Past use COPD, uses CPAP , . .    Neurologic:     (+)CVA date: 10/2016 with deficits- word finding ,     Cardiovascular:     (+) Dyslipidemia, hypertension--CAD, --. Taking blood thinners Pt has received instructions: Instructions Given to patient: Anticoagulated with planned bridge with Lovenox. CHF Last EF: 40-45% . . :. dysrhythmias a-fib, . Previous cardiac testing Echodate:10/25/16results:Interpretation Summary  Limited echocardiogram study. Mildly (EF 40-45%) reduced left ventricular function is present. Mild diffuse  hypokinesis is present. Global right ventricular function is normal. Compared to the previous study dated 10/18/2016, there has been some interval improvement in LV systolic function.  date: results:ECG reviewed date:30-JAN-2017 11:40:15  results:Sinus bradycardia  Low voltage QRS  Inferior infarct (cited on or before 18-OCT-2016)  Possible Anterolateral infarct (cited on or before 17-OCT-2016)  Abnormal ECG  When compared with ECG of 20-JAN-2017 15:03,  Nonspecific T wave abnormality has replaced inverted T waves in Inferior leads  Nonspecific T wave abnormality has replaced inverted T waves in Anterior leads    Cath date: 1/30/17 results:Coronary angiogram   Two vessel coronary artery disease (mid LAD and mid RCA) with left main disease.            METS/Exercise Tolerance: Comment: Lifelong yoga 3 - Able to walk 1-2 blocks without stopping   Hematologic:     (+) History of blood clots pt is anticoagulated, -     (-) History of Transfusion   Musculoskeletal:  - neg musculoskeletal ROS       GI/Hepatic:  - neg GI/hepatic ROS       Renal/Genitourinary:  - ROS Renal section negative       Endo:     (+) type II DM Last HgA1c: 6.3 date: 2/2/17 Not using insulin - not using insulin  pump not previously admitted for DM/DKA Diabetic complications: neuropathy, .      Psychiatric:     (+) psychiatric history depression      Infectious Disease:  - neg infectious disease ROS       Malignancy:   (+) Malignancy History of Skin  Skin CA Remission status post Surgery,         Other:    (+) No chance of pregnancy C-spine cleared: N/A, no H/O Chronic Pain,no other significant disability              Physical Exam  Normal systems: dental    Airway   Mallampati: II  TM distance: >3 FB  Neck ROM: full    Dental     Cardiovascular   Rhythm and rate: regular and normal      Pulmonary    breath sounds clear to auscultation    Other findings: For further details of assessment, testing, and physical exam please see H and P completed on same date.             PAC Discussion and Assessment    ASA Classification: 3  Case is suitable for: [place]  Anesthetic techniques and relevant risks discussed: GA  Invasive monitoring and risk discussed: Yes  Types: arterial catheter, CVC  Possibility and Risk of blood transfusion discussed: Yes  NPO instructions given:   Additional anesthetic preparation and risks discussed:   Needs early admission to pre-op area:   Other:     PAC Resident/NP Anesthesia Assessment:  Carlos Alberto Fenton is a 76 year old female scheduled to undergo coronary artery bypass grafting, 2 vessel, with Dr. Quiñones on 2/6/17. She has the following specific operative considerations:   - RCRI : >11%  risk of major adverse cardiac event.   - VTE risk: 1.8%  - If afib, [id number] score 8.  Risk category High    - Risk of PONV score = 2.  If > 2, anti-emetic intervention recommended.    BMI 30. 74 kg. Last GA for lumbar spine surgery 3 years ago. No history of problems with anesthesia.         --Coronary artery disease. Two vessel mid LAD and mid RCA with left main disease. Above procedure planned. HLD. Atorvastatin. HTN. Will take Coreg on DOS. Will hold Lisinopril. History of  paroxysmal atrial fibrillation and   new cardiomyopathy discovered during 10/2016 hospitalization. Last EF 40-45%. Lasix prn for ankle puffiness, taken ~ 3 times weekly. Last EKG above, sinus bradycardia. Somewhat limited activity at this time. Denies chest pain or irregular HR.   --Recent history (10/2016) of bilateral parietal lobe cardio-embolic strokes thought attributed to a fib. Prolonged hospital stay complicated by severe encephalopathy, requiring intubation and aspiration pneumonia. Gradual improvement. Residual word finding difficulty. Now living at home. Coumadin with planned Lovenox bridge. Last Coumadin dose yesterday. INR today 1.19. Patient was seen by Pharm D today and instructed to begin Lovenox as soon as possible this evening. Patient reports that she was unable to follow through with outpatient rehab due to daughter's injury. Outpatient cognitive rehab could be considered in the future if appropriate.    --NOAH. Wears CPAP and will bring on DOS. Former smoker. 1 PPD off and on for 10 years. Quit in 1976. Denies pulmonary symptoms.   --DM II. Last A1C 6.3. Neuropathy Will hold Metformin on DOS. Will require close monitoring of blood glucose during periop period.   --Depression/anxiety. Will take Effexor on DOS.    --Past history of lumbar spine surgery.  Type and screen drawn today by service. Antibody screen negative.        Patient was discussed with Dr Argueta.        Reviewed and Signed by PAC Mid-Level Provider/Resident  Mid-Level Provider/Resident: CHACORTA Melvin, ERIK  Date: 2/2/17  Time: 3:41pm    Attending Anesthesiologist Anesthesia Assessment:  STAFF:  Very pleasant 76 y.o. Type 2 diabetic woman with CAD disease for elective 2-vessel CABG  by Dr. Quiñones  using general anesthesia.   History summarized above.    # Two (2)-vessel (right and pLAD) coronary obstructions with preserved LV function  # s/p embolic stroke in October likely due to undiagnosed a. Fib  #only residual = word search is slowed down  Usual labs,  etc.  Instructions given and questions answered.   Final plans by anesthesiology team on DOS.   ---rcp      Reviewed and Signed by PAC Anesthesiologist  Anesthesiologist:   Date: 2/2  Time: 1600  Pass/Fail: Pass  Disposition:     PAC Pharmacist Assessment:        Pharmacist:   Date:   Time:      Anesthesia Plan      History & Physical Review  History and physical reviewed and following examination; no interval change.    ASA Status:  3 .        Plan for General with Intravenous induction. Maintenance will be Balanced.    PONV prophylaxis:  Ondansetron (or other 5HT-3)  Additional equipment: Arterial Line, Central Line, PA Catheter and TAVO Precedex infusion, insulin infusion  Continue Beta Blockers periop  Consider amiodarone for afib prophylaxis  Maintain high MAP during CPB due to recent embolic stroke  Cerebral oximetry      Postoperative Care  Postoperative pain management:  IV analgesics.      Consents          Carotid Duplex  Impression:     1. Right side:      Degree of stenosis: Less than 50% stenosis of the internal carotid  artery due to mild plaque.     2. Left side:        Degree of stenosis: Less than 50% stenosis of the internal carotid  artery due to mild plaque.      The external carotid artery is not visualized.    Cardiac cath (1/30/17)    LM 50-75%  Mid RCA 75-90%  Mid LAC 90-99%  EF 40%    A 76-year-old female with a past medical history of hypertension, hyperlipidemia, diabetes and recent embolic stroke was evaluated for new onset paroxysmal atrial fibrillation and cardiomyopathy. Mild SOB with exertion. Cardiac cath revealed 3 vessel CAD    Chart reviewed and additional information obtained.  Agree with assessment and plan above.    Rajan Barber MD                     .

## 2017-02-03 ENCOUNTER — TELEPHONE (OUTPATIENT)
Dept: CARDIOLOGY | Facility: CLINIC | Age: 77
End: 2017-02-03

## 2017-02-03 NOTE — TELEPHONE ENCOUNTER
Due to a change in the OR schedule, pt surgery has been moved to a sooner time. LM at daughters number asking her to call back to confirm time change. Will try again later

## 2017-02-06 ENCOUNTER — APPOINTMENT (OUTPATIENT)
Dept: GENERAL RADIOLOGY | Facility: CLINIC | Age: 77
DRG: 228 | End: 2017-02-06
Attending: SURGERY
Payer: MEDICARE

## 2017-02-06 ENCOUNTER — ANESTHESIA (OUTPATIENT)
Dept: SURGERY | Facility: CLINIC | Age: 77
DRG: 228 | End: 2017-02-06
Payer: MEDICARE

## 2017-02-06 ENCOUNTER — HOSPITAL ENCOUNTER (INPATIENT)
Facility: CLINIC | Age: 77
LOS: 11 days | Discharge: HOME HEALTH | DRG: 228 | End: 2017-02-17
Attending: SURGERY | Admitting: SURGERY
Payer: MEDICARE

## 2017-02-06 DIAGNOSIS — I63.89: ICD-10-CM

## 2017-02-06 DIAGNOSIS — I10 ESSENTIAL HYPERTENSION WITH GOAL BLOOD PRESSURE LESS THAN 140/90: ICD-10-CM

## 2017-02-06 DIAGNOSIS — R60.9 EDEMA, UNSPECIFIED TYPE: ICD-10-CM

## 2017-02-06 DIAGNOSIS — Z95.1 S/P CABG (CORONARY ARTERY BYPASS GRAFT): ICD-10-CM

## 2017-02-06 DIAGNOSIS — G59 MONONEUROPATHY DUE TO UNDERLYING DISEASE: ICD-10-CM

## 2017-02-06 DIAGNOSIS — I48.91 NEW ONSET ATRIAL FIBRILLATION (H): ICD-10-CM

## 2017-02-06 DIAGNOSIS — G47.01 INSOMNIA DUE TO MEDICAL CONDITION: ICD-10-CM

## 2017-02-06 DIAGNOSIS — T81.40XA POSTOPERATIVE INFECTION, INITIAL ENCOUNTER: ICD-10-CM

## 2017-02-06 DIAGNOSIS — R19.7 DIARRHEA, UNSPECIFIED TYPE: ICD-10-CM

## 2017-02-06 DIAGNOSIS — I25.119 CORONARY ARTERY DISEASE WITH ANGINA PECTORIS, UNSPECIFIED VESSEL OR LESION TYPE, UNSPECIFIED WHETHER NATIVE OR TRANSPLANTED HEART (H): Primary | ICD-10-CM

## 2017-02-06 PROBLEM — I25.10 CORONARY ARTERY DISEASE: Status: ACTIVE | Noted: 2017-02-06

## 2017-02-06 LAB
ABO + RH BLD: NORMAL
ABO + RH BLD: NORMAL
ANION GAP SERPL CALCULATED.3IONS-SCNC: 10 MMOL/L (ref 3–14)
ANION GAP SERPL CALCULATED.3IONS-SCNC: 9 MMOL/L (ref 3–14)
APTT PPP: 36 SEC (ref 22–37)
APTT PPP: 46 SEC (ref 22–37)
APTT PPP: 69 SEC (ref 22–37)
BASE DEFICIT BLDA-SCNC: 0.2 MMOL/L
BASE DEFICIT BLDA-SCNC: 0.9 MMOL/L
BASE DEFICIT BLDA-SCNC: 1.8 MMOL/L
BASE DEFICIT BLDA-SCNC: 3.7 MMOL/L
BASE DEFICIT BLDA-SCNC: 4 MMOL/L
BASE DEFICIT BLDV-SCNC: 0.8 MMOL/L
BASE DEFICIT BLDV-SCNC: 2.7 MMOL/L
BASE EXCESS BLDA CALC-SCNC: 1 MMOL/L
BASE EXCESS BLDA CALC-SCNC: 1.6 MMOL/L
BASE EXCESS BLDA CALC-SCNC: 1.9 MMOL/L
BASE EXCESS BLDA CALC-SCNC: 2.5 MMOL/L
BASE EXCESS BLDA CALC-SCNC: 3.2 MMOL/L
BASE EXCESS BLDV CALC-SCNC: 1 MMOL/L
BASE EXCESS BLDV CALC-SCNC: 1.2 MMOL/L
BASE EXCESS BLDV CALC-SCNC: 5.4 MMOL/L
BLD GP AB SCN SERPL QL: NORMAL
BLD PROD TYP BPU: NORMAL
BLD UNIT ID BPU: 0
BLOOD BANK CMNT PATIENT-IMP: NORMAL
BLOOD BANK CMNT PATIENT-IMP: NORMAL
BLOOD PRODUCT CODE: NORMAL
BPU ID: NORMAL
BUN SERPL-MCNC: 11 MG/DL (ref 7–30)
BUN SERPL-MCNC: 13 MG/DL (ref 7–30)
CA-I BLD-MCNC: 4.2 MG/DL (ref 4.4–5.2)
CA-I BLD-MCNC: 4.3 MG/DL (ref 4.4–5.2)
CA-I BLD-MCNC: 4.5 MG/DL (ref 4.4–5.2)
CA-I BLD-MCNC: 4.5 MG/DL (ref 4.4–5.2)
CA-I BLD-MCNC: 4.6 MG/DL (ref 4.4–5.2)
CA-I BLD-MCNC: 4.7 MG/DL (ref 4.4–5.2)
CA-I BLD-MCNC: 4.7 MG/DL (ref 4.4–5.2)
CALCIUM SERPL-MCNC: 7.3 MG/DL (ref 8.5–10.1)
CALCIUM SERPL-MCNC: 7.6 MG/DL (ref 8.5–10.1)
CHLORIDE SERPL-SCNC: 114 MMOL/L (ref 94–109)
CHLORIDE SERPL-SCNC: 114 MMOL/L (ref 94–109)
CO2 SERPL-SCNC: 23 MMOL/L (ref 20–32)
CO2 SERPL-SCNC: 24 MMOL/L (ref 20–32)
CREAT SERPL-MCNC: 0.56 MG/DL (ref 0.52–1.04)
CREAT SERPL-MCNC: 0.64 MG/DL (ref 0.52–1.04)
ERYTHROCYTE [DISTWIDTH] IN BLOOD BY AUTOMATED COUNT: 18.1 % (ref 10–15)
ERYTHROCYTE [DISTWIDTH] IN BLOOD BY AUTOMATED COUNT: 18.7 % (ref 10–15)
FIBRINOGEN PPP-MCNC: 172 MG/DL (ref 200–420)
FIBRINOGEN PPP-MCNC: 193 MG/DL (ref 200–420)
FIBRINOGEN PPP-MCNC: 213 MG/DL (ref 200–420)
GFR SERPL CREATININE-BSD FRML MDRD: ABNORMAL ML/MIN/1.7M2
GFR SERPL CREATININE-BSD FRML MDRD: ABNORMAL ML/MIN/1.7M2
GLUCOSE BLD-MCNC: 123 MG/DL (ref 70–99)
GLUCOSE BLD-MCNC: 149 MG/DL (ref 70–99)
GLUCOSE BLD-MCNC: 174 MG/DL (ref 70–99)
GLUCOSE BLD-MCNC: 175 MG/DL (ref 70–99)
GLUCOSE BLD-MCNC: 187 MG/DL (ref 70–99)
GLUCOSE BLD-MCNC: 193 MG/DL (ref 70–99)
GLUCOSE BLD-MCNC: 201 MG/DL (ref 70–99)
GLUCOSE BLD-MCNC: 202 MG/DL (ref 70–99)
GLUCOSE BLDC GLUCOMTR-MCNC: 101 MG/DL (ref 70–99)
GLUCOSE BLDC GLUCOMTR-MCNC: 115 MG/DL (ref 70–99)
GLUCOSE BLDC GLUCOMTR-MCNC: 127 MG/DL (ref 70–99)
GLUCOSE BLDC GLUCOMTR-MCNC: 136 MG/DL (ref 70–99)
GLUCOSE BLDC GLUCOMTR-MCNC: 181 MG/DL (ref 70–99)
GLUCOSE BLDC GLUCOMTR-MCNC: 77 MG/DL (ref 70–99)
GLUCOSE BLDC GLUCOMTR-MCNC: 81 MG/DL (ref 70–99)
GLUCOSE BLDC GLUCOMTR-MCNC: 87 MG/DL (ref 70–99)
GLUCOSE SERPL-MCNC: 197 MG/DL (ref 70–99)
GLUCOSE SERPL-MCNC: 96 MG/DL (ref 70–99)
HCO3 BLD-SCNC: 20 MMOL/L (ref 21–28)
HCO3 BLD-SCNC: 22 MMOL/L (ref 21–28)
HCO3 BLD-SCNC: 23 MMOL/L (ref 21–28)
HCO3 BLD-SCNC: 23 MMOL/L (ref 21–28)
HCO3 BLD-SCNC: 24 MMOL/L (ref 21–28)
HCO3 BLD-SCNC: 25 MMOL/L (ref 21–28)
HCO3 BLD-SCNC: 25 MMOL/L (ref 21–28)
HCO3 BLD-SCNC: 27 MMOL/L (ref 21–28)
HCO3 BLD-SCNC: 27 MMOL/L (ref 21–28)
HCO3 BLD-SCNC: 28 MMOL/L (ref 21–28)
HCO3 BLDV-SCNC: 24 MMOL/L (ref 21–28)
HCO3 BLDV-SCNC: 26 MMOL/L (ref 21–28)
HCO3 BLDV-SCNC: 30 MMOL/L (ref 21–28)
HCT VFR BLD AUTO: 27.3 % (ref 35–47)
HCT VFR BLD AUTO: 32.9 % (ref 35–47)
HGB BLD-MCNC: 10.2 G/DL (ref 11.7–15.7)
HGB BLD-MCNC: 10.4 G/DL (ref 11.7–15.7)
HGB BLD-MCNC: 10.4 G/DL (ref 11.7–15.7)
HGB BLD-MCNC: 10.9 G/DL (ref 11.7–15.7)
HGB BLD-MCNC: 7.7 G/DL (ref 11.7–15.7)
HGB BLD-MCNC: 7.9 G/DL (ref 11.7–15.7)
HGB BLD-MCNC: 7.9 G/DL (ref 11.7–15.7)
HGB BLD-MCNC: 8.3 G/DL (ref 11.7–15.7)
HGB BLD-MCNC: 8.7 G/DL (ref 11.7–15.7)
HGB BLD-MCNC: 8.9 G/DL (ref 11.7–15.7)
HGB BLD-MCNC: 9.5 G/DL (ref 11.7–15.7)
INR PPP: 1.08 (ref 0.86–1.14)
INR PPP: 1.55 (ref 0.86–1.14)
INR PPP: 1.63 (ref 0.86–1.14)
INR PPP: 1.66 (ref 0.86–1.14)
LACTATE BLD-SCNC: 0.8 MMOL/L (ref 0.7–2.1)
LACTATE BLD-SCNC: 1 MMOL/L (ref 0.7–2.1)
LACTATE BLD-SCNC: 1.2 MMOL/L (ref 0.7–2.1)
LACTATE BLD-SCNC: 1.3 MMOL/L (ref 0.7–2.1)
LACTATE BLD-SCNC: 1.3 MMOL/L (ref 0.7–2.1)
LACTATE BLD-SCNC: 1.4 MMOL/L (ref 0.7–2.1)
LACTATE BLD-SCNC: 1.8 MMOL/L (ref 0.7–2.1)
LACTATE BLD-SCNC: 2 MMOL/L (ref 0.7–2.1)
LACTATE BLD-SCNC: 2.8 MMOL/L (ref 0.7–2.1)
MAGNESIUM SERPL-MCNC: 2.2 MG/DL (ref 1.6–2.3)
MAGNESIUM SERPL-MCNC: 2.5 MG/DL (ref 1.6–2.3)
MCH RBC QN AUTO: 29.4 PG (ref 26.5–33)
MCH RBC QN AUTO: 29.8 PG (ref 26.5–33)
MCHC RBC AUTO-ENTMCNC: 31.6 G/DL (ref 31.5–36.5)
MCHC RBC AUTO-ENTMCNC: 31.9 G/DL (ref 31.5–36.5)
MCV RBC AUTO: 93 FL (ref 78–100)
MCV RBC AUTO: 94 FL (ref 78–100)
NUM BPU REQUESTED: 2
NUM BPU REQUESTED: 5
O2/TOTAL GAS SETTING VFR VENT: 100 %
O2/TOTAL GAS SETTING VFR VENT: 100 %
O2/TOTAL GAS SETTING VFR VENT: 40 %
O2/TOTAL GAS SETTING VFR VENT: 50 %
O2/TOTAL GAS SETTING VFR VENT: 50 %
O2/TOTAL GAS SETTING VFR VENT: 60 %
O2/TOTAL GAS SETTING VFR VENT: 70 %
O2/TOTAL GAS SETTING VFR VENT: 70 %
O2/TOTAL GAS SETTING VFR VENT: 80 %
O2/TOTAL GAS SETTING VFR VENT: 81 %
OXYHGB MFR BLD: 96 % (ref 92–100)
OXYHGB MFR BLD: 97 % (ref 92–100)
OXYHGB MFR BLD: 97 % (ref 92–100)
OXYHGB MFR BLDV: 36 %
OXYHGB MFR BLDV: 40 %
OXYHGB MFR BLDV: 44 %
OXYHGB MFR BLDV: 49 %
PCO2 BLD: 30 MM HG (ref 35–45)
PCO2 BLD: 31 MM HG (ref 35–45)
PCO2 BLD: 36 MM HG (ref 35–45)
PCO2 BLD: 37 MM HG (ref 35–45)
PCO2 BLD: 38 MM HG (ref 35–45)
PCO2 BLD: 38 MM HG (ref 35–45)
PCO2 BLD: 40 MM HG (ref 35–45)
PCO2 BLD: 41 MM HG (ref 35–45)
PCO2 BLD: 44 MM HG (ref 35–45)
PCO2 BLD: 48 MM HG (ref 35–45)
PCO2 BLDV: 41 MM HG (ref 40–50)
PCO2 BLDV: 44 MM HG (ref 40–50)
PCO2 BLDV: 46 MM HG (ref 40–50)
PCO2 BLDV: 50 MM HG (ref 40–50)
PCO2 BLDV: 55 MM HG (ref 40–50)
PH BLD: 7.33 PH (ref 7.35–7.45)
PH BLD: 7.36 PH (ref 7.35–7.45)
PH BLD: 7.39 PH (ref 7.35–7.45)
PH BLD: 7.41 PH (ref 7.35–7.45)
PH BLD: 7.41 PH (ref 7.35–7.45)
PH BLD: 7.42 PH (ref 7.35–7.45)
PH BLD: 7.42 PH (ref 7.35–7.45)
PH BLD: 7.44 PH (ref 7.35–7.45)
PH BLD: 7.45 PH (ref 7.35–7.45)
PH BLD: 7.53 PH (ref 7.35–7.45)
PH BLDV: 7.28 PH (ref 7.32–7.43)
PH BLDV: 7.28 PH (ref 7.32–7.43)
PH BLDV: 7.37 PH (ref 7.32–7.43)
PH BLDV: 7.41 PH (ref 7.32–7.43)
PH BLDV: 7.45 PH (ref 7.32–7.43)
PHOSPHATE SERPL-MCNC: 1.9 MG/DL (ref 2.5–4.5)
PHOSPHATE SERPL-MCNC: 3.2 MG/DL (ref 2.5–4.5)
PLATELET # BLD AUTO: 125 10E9/L (ref 150–450)
PLATELET # BLD AUTO: 133 10E9/L (ref 150–450)
PLATELET # BLD AUTO: 133 10E9/L (ref 150–450)
PO2 BLD: 100 MM HG (ref 80–105)
PO2 BLD: 111 MM HG (ref 80–105)
PO2 BLD: 136 MM HG (ref 80–105)
PO2 BLD: 169 MM HG (ref 80–105)
PO2 BLD: 289 MM HG (ref 80–105)
PO2 BLD: 292 MM HG (ref 80–105)
PO2 BLD: 337 MM HG (ref 80–105)
PO2 BLD: 372 MM HG (ref 80–105)
PO2 BLD: 478 MM HG (ref 80–105)
PO2 BLD: 85 MM HG (ref 80–105)
PO2 BLDV: 25 MM HG (ref 25–47)
PO2 BLDV: 26 MM HG (ref 25–47)
PO2 BLDV: 27 MM HG (ref 25–47)
PO2 BLDV: 32 MM HG (ref 25–47)
PO2 BLDV: 41 MM HG (ref 25–47)
POTASSIUM BLD-SCNC: 3.5 MMOL/L (ref 3.4–5.3)
POTASSIUM BLD-SCNC: 3.8 MMOL/L (ref 3.4–5.3)
POTASSIUM BLD-SCNC: 4 MMOL/L (ref 3.4–5.3)
POTASSIUM BLD-SCNC: 4.2 MMOL/L (ref 3.4–5.3)
POTASSIUM BLD-SCNC: 4.9 MMOL/L (ref 3.4–5.3)
POTASSIUM BLD-SCNC: 5 MMOL/L (ref 3.4–5.3)
POTASSIUM BLD-SCNC: 5.1 MMOL/L (ref 3.4–5.3)
POTASSIUM BLD-SCNC: 5.7 MMOL/L (ref 3.4–5.3)
POTASSIUM SERPL-SCNC: 3.9 MMOL/L (ref 3.4–5.3)
POTASSIUM SERPL-SCNC: 4.6 MMOL/L (ref 3.4–5.3)
RBC # BLD AUTO: 2.92 10E12/L (ref 3.8–5.2)
RBC # BLD AUTO: 3.54 10E12/L (ref 3.8–5.2)
SODIUM BLD-SCNC: 141 MMOL/L (ref 133–144)
SODIUM BLD-SCNC: 142 MMOL/L (ref 133–144)
SODIUM BLD-SCNC: 142 MMOL/L (ref 133–144)
SODIUM BLD-SCNC: 143 MMOL/L (ref 133–144)
SODIUM SERPL-SCNC: 147 MMOL/L (ref 133–144)
SODIUM SERPL-SCNC: 147 MMOL/L (ref 133–144)
SPECIMEN EXP DATE BLD: NORMAL
TRANSFUSION STATUS PATIENT QL: NORMAL
WBC # BLD AUTO: 13.6 10E9/L (ref 4–11)
WBC # BLD AUTO: 8.5 10E9/L (ref 4–11)

## 2017-02-06 PROCEDURE — 94002 VENT MGMT INPAT INIT DAY: CPT

## 2017-02-06 PROCEDURE — 27210995 ZZH RX 272: Performed by: SURGERY

## 2017-02-06 PROCEDURE — 40000940 XR CHEST PORT 1 VW

## 2017-02-06 PROCEDURE — 25000125 ZZHC RX 250: Performed by: NURSE ANESTHETIST, CERTIFIED REGISTERED

## 2017-02-06 PROCEDURE — 85027 COMPLETE CBC AUTOMATED: CPT | Performed by: THORACIC SURGERY (CARDIOTHORACIC VASCULAR SURGERY)

## 2017-02-06 PROCEDURE — 85610 PROTHROMBIN TIME: CPT | Performed by: ANESTHESIOLOGY

## 2017-02-06 PROCEDURE — 40000344 ZZHCL STATISTIC THAWING COMPONENT: Performed by: SURGERY

## 2017-02-06 PROCEDURE — P9041 ALBUMIN (HUMAN),5%, 50ML: HCPCS | Performed by: THORACIC SURGERY (CARDIOTHORACIC VASCULAR SURGERY)

## 2017-02-06 PROCEDURE — P9016 RBC LEUKOCYTES REDUCED: HCPCS | Performed by: SURGERY

## 2017-02-06 PROCEDURE — 37000009 ZZH ANESTHESIA TECHNICAL FEE, EACH ADDTL 15 MIN: Performed by: SURGERY

## 2017-02-06 PROCEDURE — 83735 ASSAY OF MAGNESIUM: CPT | Performed by: SURGERY

## 2017-02-06 PROCEDURE — 40000048 ZZH STATISTIC DAILY SWAN MONITORING

## 2017-02-06 PROCEDURE — 25000132 ZZH RX MED GY IP 250 OP 250 PS 637: Mod: GY | Performed by: SURGERY

## 2017-02-06 PROCEDURE — 84132 ASSAY OF SERUM POTASSIUM: CPT | Performed by: ANESTHESIOLOGY

## 2017-02-06 PROCEDURE — 80048 BASIC METABOLIC PNL TOTAL CA: CPT | Performed by: SURGERY

## 2017-02-06 PROCEDURE — 02L70CK OCCLUSION OF LEFT ATRIAL APPENDAGE WITH EXTRALUMINAL DEVICE, OPEN APPROACH: ICD-10-PCS | Performed by: SURGERY

## 2017-02-06 PROCEDURE — 40000940 XR ABDOMEN PORT F1 VW

## 2017-02-06 PROCEDURE — 25800025 ZZH RX 258: Performed by: THORACIC SURGERY (CARDIOTHORACIC VASCULAR SURGERY)

## 2017-02-06 PROCEDURE — 25000125 ZZHC RX 250: Performed by: STUDENT IN AN ORGANIZED HEALTH CARE EDUCATION/TRAINING PROGRAM

## 2017-02-06 PROCEDURE — 82947 ASSAY GLUCOSE BLOOD QUANT: CPT

## 2017-02-06 PROCEDURE — 02100Z9 BYPASS CORONARY ARTERY, ONE ARTERY FROM LEFT INTERNAL MAMMARY, OPEN APPROACH: ICD-10-PCS | Performed by: SURGERY

## 2017-02-06 PROCEDURE — 84100 ASSAY OF PHOSPHORUS: CPT | Performed by: THORACIC SURGERY (CARDIOTHORACIC VASCULAR SURGERY)

## 2017-02-06 PROCEDURE — 85730 THROMBOPLASTIN TIME PARTIAL: CPT | Performed by: THORACIC SURGERY (CARDIOTHORACIC VASCULAR SURGERY)

## 2017-02-06 PROCEDURE — 40000275 ZZH STATISTIC RCP TIME EA 10 MIN

## 2017-02-06 PROCEDURE — 20000004 ZZH R&B ICU UMMC

## 2017-02-06 PROCEDURE — 37000008 ZZH ANESTHESIA TECHNICAL FEE, 1ST 30 MIN: Performed by: SURGERY

## 2017-02-06 PROCEDURE — 27210447 ZZH PACK CELL SAVER CSP: Performed by: SURGERY

## 2017-02-06 PROCEDURE — 25000125 ZZHC RX 250: Performed by: SURGERY

## 2017-02-06 PROCEDURE — 27810169 ZZH OR IMPLANT GENERAL: Performed by: SURGERY

## 2017-02-06 PROCEDURE — A9270 NON-COVERED ITEM OR SERVICE: HCPCS | Mod: GY | Performed by: THORACIC SURGERY (CARDIOTHORACIC VASCULAR SURGERY)

## 2017-02-06 PROCEDURE — 36000076 ZZH SURGERY LEVEL 6 EA 15 ADDTL MIN - UMMC: Performed by: SURGERY

## 2017-02-06 PROCEDURE — 83605 ASSAY OF LACTIC ACID: CPT | Performed by: ANESTHESIOLOGY

## 2017-02-06 PROCEDURE — 82330 ASSAY OF CALCIUM: CPT | Performed by: ANESTHESIOLOGY

## 2017-02-06 PROCEDURE — 25000565 ZZH ISOFLURANE, EA 15 MIN: Performed by: SURGERY

## 2017-02-06 PROCEDURE — 36415 COLL VENOUS BLD VENIPUNCTURE: CPT | Performed by: ANESTHESIOLOGY

## 2017-02-06 PROCEDURE — 00000146 ZZHCL STATISTIC GLUCOSE BY METER IP

## 2017-02-06 PROCEDURE — P9012 CRYOPRECIPITATE EACH UNIT: HCPCS | Performed by: SURGERY

## 2017-02-06 PROCEDURE — 27210794 ZZH OR GENERAL SUPPLY STERILE: Performed by: SURGERY

## 2017-02-06 PROCEDURE — 85027 COMPLETE CBC AUTOMATED: CPT | Performed by: SURGERY

## 2017-02-06 PROCEDURE — 41000019 ZZH PERA-PERFUSION EACH ADDTL 15 MIN: Performed by: SURGERY

## 2017-02-06 PROCEDURE — 021109W BYPASS CORONARY ARTERY, TWO ARTERIES FROM AORTA WITH AUTOLOGOUS VENOUS TISSUE, OPEN APPROACH: ICD-10-PCS | Performed by: SURGERY

## 2017-02-06 PROCEDURE — 83605 ASSAY OF LACTIC ACID: CPT

## 2017-02-06 PROCEDURE — 82803 BLOOD GASES ANY COMBINATION: CPT | Performed by: ANESTHESIOLOGY

## 2017-02-06 PROCEDURE — 06BQ4ZZ EXCISION OF LEFT SAPHENOUS VEIN, PERCUTANEOUS ENDOSCOPIC APPROACH: ICD-10-PCS | Performed by: SURGERY

## 2017-02-06 PROCEDURE — 84295 ASSAY OF SERUM SODIUM: CPT | Performed by: ANESTHESIOLOGY

## 2017-02-06 PROCEDURE — 85384 FIBRINOGEN ACTIVITY: CPT | Performed by: THORACIC SURGERY (CARDIOTHORACIC VASCULAR SURGERY)

## 2017-02-06 PROCEDURE — S0017 INJECTION, AMINOCAPROIC ACID: HCPCS | Performed by: SURGERY

## 2017-02-06 PROCEDURE — 84132 ASSAY OF SERUM POTASSIUM: CPT

## 2017-02-06 PROCEDURE — 85730 THROMBOPLASTIN TIME PARTIAL: CPT | Performed by: SURGERY

## 2017-02-06 PROCEDURE — 25000132 ZZH RX MED GY IP 250 OP 250 PS 637: Mod: GY | Performed by: THORACIC SURGERY (CARDIOTHORACIC VASCULAR SURGERY)

## 2017-02-06 PROCEDURE — 82805 BLOOD GASES W/O2 SATURATION: CPT | Performed by: THORACIC SURGERY (CARDIOTHORACIC VASCULAR SURGERY)

## 2017-02-06 PROCEDURE — 25000125 ZZHC RX 250: Performed by: ANESTHESIOLOGY

## 2017-02-06 PROCEDURE — 85049 AUTOMATED PLATELET COUNT: CPT | Performed by: SURGERY

## 2017-02-06 PROCEDURE — 85610 PROTHROMBIN TIME: CPT | Performed by: SURGERY

## 2017-02-06 PROCEDURE — 25000125 ZZHC RX 250: Performed by: THORACIC SURGERY (CARDIOTHORACIC VASCULAR SURGERY)

## 2017-02-06 PROCEDURE — 25000128 H RX IP 250 OP 636: Performed by: SURGERY

## 2017-02-06 PROCEDURE — 83735 ASSAY OF MAGNESIUM: CPT | Performed by: THORACIC SURGERY (CARDIOTHORACIC VASCULAR SURGERY)

## 2017-02-06 PROCEDURE — 25000128 H RX IP 250 OP 636: Performed by: THORACIC SURGERY (CARDIOTHORACIC VASCULAR SURGERY)

## 2017-02-06 PROCEDURE — 40000170 ZZH STATISTIC PRE-PROCEDURE ASSESSMENT II: Performed by: SURGERY

## 2017-02-06 PROCEDURE — 82330 ASSAY OF CALCIUM: CPT | Performed by: THORACIC SURGERY (CARDIOTHORACIC VASCULAR SURGERY)

## 2017-02-06 PROCEDURE — 93503 INSERT/PLACE HEART CATHETER: CPT

## 2017-02-06 PROCEDURE — S0017 INJECTION, AMINOCAPROIC ACID: HCPCS | Performed by: THORACIC SURGERY (CARDIOTHORACIC VASCULAR SURGERY)

## 2017-02-06 PROCEDURE — 85018 HEMOGLOBIN: CPT | Performed by: THORACIC SURGERY (CARDIOTHORACIC VASCULAR SURGERY)

## 2017-02-06 PROCEDURE — 80048 BASIC METABOLIC PNL TOTAL CA: CPT | Performed by: THORACIC SURGERY (CARDIOTHORACIC VASCULAR SURGERY)

## 2017-02-06 PROCEDURE — C9399 UNCLASSIFIED DRUGS OR BIOLOG: HCPCS | Performed by: NURSE ANESTHETIST, CERTIFIED REGISTERED

## 2017-02-06 PROCEDURE — 25800025 ZZH RX 258: Performed by: NURSE ANESTHETIST, CERTIFIED REGISTERED

## 2017-02-06 PROCEDURE — 85384 FIBRINOGEN ACTIVITY: CPT | Performed by: SURGERY

## 2017-02-06 PROCEDURE — 27211024 ZZHC OR SUPPLY OTHER OPNP: Performed by: SURGERY

## 2017-02-06 PROCEDURE — 40000014 ZZH STATISTIC ARTERIAL MONITORING DAILY

## 2017-02-06 PROCEDURE — 41000018 ZZH PER-PERFUSION 1ST 30 MIN: Performed by: SURGERY

## 2017-02-06 PROCEDURE — 84295 ASSAY OF SERUM SODIUM: CPT

## 2017-02-06 PROCEDURE — 40000196 ZZH STATISTIC RAPCV CVP MONITORING

## 2017-02-06 PROCEDURE — 83605 ASSAY OF LACTIC ACID: CPT | Performed by: THORACIC SURGERY (CARDIOTHORACIC VASCULAR SURGERY)

## 2017-02-06 PROCEDURE — 5A1221Z PERFORMANCE OF CARDIAC OUTPUT, CONTINUOUS: ICD-10-PCS | Performed by: SURGERY

## 2017-02-06 PROCEDURE — 27210460 ZZH PUMP APP ADULT PERFUSION: Performed by: SURGERY

## 2017-02-06 PROCEDURE — 82330 ASSAY OF CALCIUM: CPT

## 2017-02-06 PROCEDURE — 85610 PROTHROMBIN TIME: CPT | Performed by: THORACIC SURGERY (CARDIOTHORACIC VASCULAR SURGERY)

## 2017-02-06 PROCEDURE — 02580ZZ DESTRUCTION OF CONDUCTION MECHANISM, OPEN APPROACH: ICD-10-PCS | Performed by: SURGERY

## 2017-02-06 PROCEDURE — 02Q50ZZ REPAIR ATRIAL SEPTUM, OPEN APPROACH: ICD-10-PCS | Performed by: SURGERY

## 2017-02-06 PROCEDURE — P9041 ALBUMIN (HUMAN),5%, 50ML: HCPCS | Performed by: NURSE ANESTHETIST, CERTIFIED REGISTERED

## 2017-02-06 PROCEDURE — 25000128 H RX IP 250 OP 636: Performed by: NURSE ANESTHETIST, CERTIFIED REGISTERED

## 2017-02-06 PROCEDURE — 82947 ASSAY GLUCOSE BLOOD QUANT: CPT | Performed by: ANESTHESIOLOGY

## 2017-02-06 PROCEDURE — 93005 ELECTROCARDIOGRAM TRACING: CPT

## 2017-02-06 PROCEDURE — 3E043XZ INTRODUCTION OF VASOPRESSOR INTO CENTRAL VEIN, PERCUTANEOUS APPROACH: ICD-10-PCS | Performed by: SURGERY

## 2017-02-06 PROCEDURE — 82803 BLOOD GASES ANY COMBINATION: CPT

## 2017-02-06 PROCEDURE — 84100 ASSAY OF PHOSPHORUS: CPT | Performed by: SURGERY

## 2017-02-06 PROCEDURE — 36000074 ZZH SURGERY LEVEL 6 1ST 30 MIN - UMMC: Performed by: SURGERY

## 2017-02-06 PROCEDURE — 25000128 H RX IP 250 OP 636: Performed by: STUDENT IN AN ORGANIZED HEALTH CARE EDUCATION/TRAINING PROGRAM

## 2017-02-06 DEVICE — IMP ATRICLIP GILLIVNOV-COSGROVE EXCL SYS STD 45MM ACH145: Type: IMPLANTABLE DEVICE | Site: HEART | Status: FUNCTIONAL

## 2017-02-06 RX ORDER — ONDANSETRON 2 MG/ML
INJECTION INTRAMUSCULAR; INTRAVENOUS PRN
Status: DISCONTINUED | OUTPATIENT
Start: 2017-02-06 | End: 2017-02-06

## 2017-02-06 RX ORDER — POTASSIUM CHLORIDE 29.8 MG/ML
20 INJECTION INTRAVENOUS
Status: DISCONTINUED | OUTPATIENT
Start: 2017-02-06 | End: 2017-02-17 | Stop reason: HOSPADM

## 2017-02-06 RX ORDER — ALBUMIN, HUMAN INJ 5% 5 %
500-1000 SOLUTION INTRAVENOUS
Status: COMPLETED | OUTPATIENT
Start: 2017-02-06 | End: 2017-02-06

## 2017-02-06 RX ORDER — PAPAVERINE HYDROCHLORIDE 30 MG/ML
INJECTION INTRAMUSCULAR; INTRAVENOUS PRN
Status: DISCONTINUED | OUTPATIENT
Start: 2017-02-06 | End: 2017-02-06 | Stop reason: HOSPADM

## 2017-02-06 RX ORDER — MEPERIDINE HYDROCHLORIDE 25 MG/ML
12.5-25 INJECTION INTRAMUSCULAR; INTRAVENOUS; SUBCUTANEOUS
Status: DISCONTINUED | OUTPATIENT
Start: 2017-02-06 | End: 2017-02-17 | Stop reason: HOSPADM

## 2017-02-06 RX ORDER — DEXMEDETOMIDINE HYDROCHLORIDE 4 UG/ML
0.2-1.2 INJECTION, SOLUTION INTRAVENOUS CONTINUOUS
Status: DISCONTINUED | OUTPATIENT
Start: 2017-02-06 | End: 2017-02-06 | Stop reason: HOSPADM

## 2017-02-06 RX ORDER — FENTANYL CITRATE 50 UG/ML
50-100 INJECTION, SOLUTION INTRAMUSCULAR; INTRAVENOUS
Status: DISCONTINUED | OUTPATIENT
Start: 2017-02-06 | End: 2017-02-07

## 2017-02-06 RX ORDER — POTASSIUM CHLORIDE 7.45 MG/ML
INJECTION INTRAVENOUS PRN
Status: DISCONTINUED | OUTPATIENT
Start: 2017-02-06 | End: 2017-02-06

## 2017-02-06 RX ORDER — NITROGLYCERIN 20 MG/100ML
.07-1 INJECTION INTRAVENOUS CONTINUOUS
Status: DISCONTINUED | OUTPATIENT
Start: 2017-02-06 | End: 2017-02-06

## 2017-02-06 RX ORDER — MUPIROCIN 20 MG/G
0.5 OINTMENT TOPICAL 2 TIMES DAILY
Status: COMPLETED | OUTPATIENT
Start: 2017-02-06 | End: 2017-02-11

## 2017-02-06 RX ORDER — DEXMEDETOMIDINE HYDROCHLORIDE 4 UG/ML
.2-.7 INJECTION, SOLUTION INTRAVENOUS CONTINUOUS
Status: DISCONTINUED | OUTPATIENT
Start: 2017-02-06 | End: 2017-02-07

## 2017-02-06 RX ORDER — SODIUM CHLORIDE, SODIUM LACTATE, POTASSIUM CHLORIDE, CALCIUM CHLORIDE 600; 310; 30; 20 MG/100ML; MG/100ML; MG/100ML; MG/100ML
INJECTION, SOLUTION INTRAVENOUS CONTINUOUS PRN
Status: DISCONTINUED | OUTPATIENT
Start: 2017-02-06 | End: 2017-02-06

## 2017-02-06 RX ORDER — ONDANSETRON 4 MG/1
4 TABLET, ORALLY DISINTEGRATING ORAL EVERY 6 HOURS PRN
Status: DISCONTINUED | OUTPATIENT
Start: 2017-02-06 | End: 2017-02-17 | Stop reason: HOSPADM

## 2017-02-06 RX ORDER — NITROGLYCERIN 20 MG/100ML
INJECTION INTRAVENOUS CONTINUOUS PRN
Status: DISCONTINUED | OUTPATIENT
Start: 2017-02-06 | End: 2017-02-06

## 2017-02-06 RX ORDER — NITROGLYCERIN 10 MG/100ML
INJECTION INTRAVENOUS PRN
Status: DISCONTINUED | OUTPATIENT
Start: 2017-02-06 | End: 2017-02-06

## 2017-02-06 RX ORDER — ASPIRIN 81 MG/1
81 TABLET, CHEWABLE ORAL DAILY
Status: DISCONTINUED | OUTPATIENT
Start: 2017-02-07 | End: 2017-02-17 | Stop reason: HOSPADM

## 2017-02-06 RX ORDER — POTASSIUM CHLORIDE 1.5 G/1.58G
20-40 POWDER, FOR SOLUTION ORAL
Status: DISCONTINUED | OUTPATIENT
Start: 2017-02-06 | End: 2017-02-17 | Stop reason: HOSPADM

## 2017-02-06 RX ORDER — ALBUMIN, HUMAN INJ 5% 5 %
SOLUTION INTRAVENOUS CONTINUOUS PRN
Status: DISCONTINUED | OUTPATIENT
Start: 2017-02-06 | End: 2017-02-06

## 2017-02-06 RX ORDER — DEXTROSE MONOHYDRATE 25 G/50ML
25-50 INJECTION, SOLUTION INTRAVENOUS
Status: DISCONTINUED | OUTPATIENT
Start: 2017-02-06 | End: 2017-02-13

## 2017-02-06 RX ORDER — CALCIUM GLUCONATE 94 MG/ML
1 INJECTION, SOLUTION INTRAVENOUS ONCE
Status: DISCONTINUED | OUTPATIENT
Start: 2017-02-06 | End: 2017-02-06 | Stop reason: CLARIF

## 2017-02-06 RX ORDER — EPHEDRINE SULFATE 50 MG/ML
INJECTION, SOLUTION INTRAMUSCULAR; INTRAVENOUS; SUBCUTANEOUS PRN
Status: DISCONTINUED | OUTPATIENT
Start: 2017-02-06 | End: 2017-02-06

## 2017-02-06 RX ORDER — CEFAZOLIN SODIUM 1 G/3ML
1 INJECTION, POWDER, FOR SOLUTION INTRAMUSCULAR; INTRAVENOUS SEE ADMIN INSTRUCTIONS
Status: DISCONTINUED | OUTPATIENT
Start: 2017-02-06 | End: 2017-02-06 | Stop reason: HOSPADM

## 2017-02-06 RX ORDER — ALBUTEROL SULFATE 90 UG/1
6 AEROSOL, METERED RESPIRATORY (INHALATION) EVERY 4 HOURS PRN
Status: DISCONTINUED | OUTPATIENT
Start: 2017-02-06 | End: 2017-02-13

## 2017-02-06 RX ORDER — PROPOFOL 10 MG/ML
5-75 INJECTION, EMULSION INTRAVENOUS CONTINUOUS
Status: DISCONTINUED | OUTPATIENT
Start: 2017-02-06 | End: 2017-02-07

## 2017-02-06 RX ORDER — POTASSIUM CHLORIDE 750 MG/1
20-40 TABLET, EXTENDED RELEASE ORAL
Status: DISCONTINUED | OUTPATIENT
Start: 2017-02-06 | End: 2017-02-17 | Stop reason: HOSPADM

## 2017-02-06 RX ORDER — LIDOCAINE 40 MG/G
CREAM TOPICAL
Status: DISCONTINUED | OUTPATIENT
Start: 2017-02-06 | End: 2017-02-06 | Stop reason: HOSPADM

## 2017-02-06 RX ORDER — CEFAZOLIN SODIUM 2 G/100ML
2 INJECTION, SOLUTION INTRAVENOUS
Status: COMPLETED | OUTPATIENT
Start: 2017-02-06 | End: 2017-02-06

## 2017-02-06 RX ORDER — HEPARIN SODIUM 1000 [USP'U]/ML
INJECTION, SOLUTION INTRAVENOUS; SUBCUTANEOUS PRN
Status: DISCONTINUED | OUTPATIENT
Start: 2017-02-06 | End: 2017-02-06

## 2017-02-06 RX ORDER — CEFAZOLIN SODIUM 1 G/3ML
1 INJECTION, POWDER, FOR SOLUTION INTRAMUSCULAR; INTRAVENOUS EVERY 8 HOURS
Status: COMPLETED | OUTPATIENT
Start: 2017-02-06 | End: 2017-02-07

## 2017-02-06 RX ORDER — DEXTROSE MONOHYDRATE, SODIUM CHLORIDE, AND POTASSIUM CHLORIDE 50; 1.49; 4.5 G/1000ML; G/1000ML; G/1000ML
INJECTION, SOLUTION INTRAVENOUS CONTINUOUS
Status: DISCONTINUED | OUTPATIENT
Start: 2017-02-06 | End: 2017-02-07

## 2017-02-06 RX ORDER — PROPOFOL 10 MG/ML
INJECTION, EMULSION INTRAVENOUS CONTINUOUS PRN
Status: DISCONTINUED | OUTPATIENT
Start: 2017-02-06 | End: 2017-02-06

## 2017-02-06 RX ORDER — NICOTINE POLACRILEX 4 MG
15-30 LOZENGE BUCCAL
Status: DISCONTINUED | OUTPATIENT
Start: 2017-02-06 | End: 2017-02-13

## 2017-02-06 RX ORDER — MAGNESIUM SULFATE HEPTAHYDRATE 40 MG/ML
4 INJECTION, SOLUTION INTRAVENOUS EVERY 4 HOURS PRN
Status: DISCONTINUED | OUTPATIENT
Start: 2017-02-06 | End: 2017-02-17 | Stop reason: HOSPADM

## 2017-02-06 RX ORDER — PROTAMINE SULFATE 10 MG/ML
25 INJECTION, SOLUTION INTRAVENOUS ONCE
Status: COMPLETED | OUTPATIENT
Start: 2017-02-06 | End: 2017-02-06

## 2017-02-06 RX ORDER — POTASSIUM CHLORIDE 7.45 MG/ML
10 INJECTION INTRAVENOUS
Status: DISCONTINUED | OUTPATIENT
Start: 2017-02-06 | End: 2017-02-17 | Stop reason: HOSPADM

## 2017-02-06 RX ORDER — NALOXONE HYDROCHLORIDE 0.4 MG/ML
.1-.4 INJECTION, SOLUTION INTRAMUSCULAR; INTRAVENOUS; SUBCUTANEOUS
Status: DISCONTINUED | OUTPATIENT
Start: 2017-02-06 | End: 2017-02-06

## 2017-02-06 RX ORDER — SODIUM CHLORIDE 9 MG/ML
INJECTION, SOLUTION INTRAVENOUS CONTINUOUS PRN
Status: DISCONTINUED | OUTPATIENT
Start: 2017-02-06 | End: 2017-02-06

## 2017-02-06 RX ORDER — ACETAMINOPHEN 650 MG/1
650 SUPPOSITORY RECTAL EVERY 4 HOURS PRN
Status: DISCONTINUED | OUTPATIENT
Start: 2017-02-06 | End: 2017-02-07

## 2017-02-06 RX ORDER — ETOMIDATE 2 MG/ML
INJECTION INTRAVENOUS PRN
Status: DISCONTINUED | OUTPATIENT
Start: 2017-02-06 | End: 2017-02-06

## 2017-02-06 RX ORDER — HYDRALAZINE HYDROCHLORIDE 20 MG/ML
10 INJECTION INTRAMUSCULAR; INTRAVENOUS EVERY 30 MIN PRN
Status: DISCONTINUED | OUTPATIENT
Start: 2017-02-06 | End: 2017-02-17 | Stop reason: HOSPADM

## 2017-02-06 RX ORDER — NALOXONE HYDROCHLORIDE 0.4 MG/ML
.1-.4 INJECTION, SOLUTION INTRAMUSCULAR; INTRAVENOUS; SUBCUTANEOUS
Status: DISCONTINUED | OUTPATIENT
Start: 2017-02-06 | End: 2017-02-17 | Stop reason: HOSPADM

## 2017-02-06 RX ORDER — AMOXICILLIN 250 MG
1-2 CAPSULE ORAL 2 TIMES DAILY
Status: DISCONTINUED | OUTPATIENT
Start: 2017-02-06 | End: 2017-02-06

## 2017-02-06 RX ORDER — PROPOFOL 10 MG/ML
INJECTION, EMULSION INTRAVENOUS PRN
Status: DISCONTINUED | OUTPATIENT
Start: 2017-02-06 | End: 2017-02-06

## 2017-02-06 RX ORDER — LIDOCAINE HYDROCHLORIDE 20 MG/ML
INJECTION, SOLUTION INFILTRATION; PERINEURAL PRN
Status: DISCONTINUED | OUTPATIENT
Start: 2017-02-06 | End: 2017-02-06

## 2017-02-06 RX ORDER — FENTANYL CITRATE 50 UG/ML
INJECTION, SOLUTION INTRAMUSCULAR; INTRAVENOUS PRN
Status: DISCONTINUED | OUTPATIENT
Start: 2017-02-06 | End: 2017-02-06

## 2017-02-06 RX ORDER — LIDOCAINE 40 MG/G
CREAM TOPICAL
Status: DISCONTINUED | OUTPATIENT
Start: 2017-02-06 | End: 2017-02-15

## 2017-02-06 RX ORDER — PROPOFOL 10 MG/ML
10-20 INJECTION, EMULSION INTRAVENOUS EVERY 30 MIN PRN
Status: DISCONTINUED | OUTPATIENT
Start: 2017-02-06 | End: 2017-02-07

## 2017-02-06 RX ORDER — ONDANSETRON 2 MG/ML
4 INJECTION INTRAMUSCULAR; INTRAVENOUS EVERY 6 HOURS PRN
Status: DISCONTINUED | OUTPATIENT
Start: 2017-02-06 | End: 2017-02-17 | Stop reason: HOSPADM

## 2017-02-06 RX ORDER — ACETAMINOPHEN 325 MG/1
650 TABLET ORAL EVERY 4 HOURS PRN
Status: DISCONTINUED | OUTPATIENT
Start: 2017-02-06 | End: 2017-02-07

## 2017-02-06 RX ORDER — PROTAMINE SULFATE 10 MG/ML
INJECTION, SOLUTION INTRAVENOUS PRN
Status: DISCONTINUED | OUTPATIENT
Start: 2017-02-06 | End: 2017-02-06

## 2017-02-06 RX ORDER — GLYCOPYRROLATE 0.2 MG/ML
INJECTION, SOLUTION INTRAMUSCULAR; INTRAVENOUS PRN
Status: DISCONTINUED | OUTPATIENT
Start: 2017-02-06 | End: 2017-02-06

## 2017-02-06 RX ADMIN — ONDANSETRON 4 MG: 2 INJECTION INTRAMUSCULAR; INTRAVENOUS at 14:04

## 2017-02-06 RX ADMIN — AMINOCAPROIC ACID 5 G/HR: 250 INJECTION, SOLUTION INTRAVENOUS at 10:44

## 2017-02-06 RX ADMIN — FENTANYL CITRATE 50 MCG: 50 INJECTION, SOLUTION INTRAMUSCULAR; INTRAVENOUS at 10:10

## 2017-02-06 RX ADMIN — ETOMIDATE 10 MG: 2 INJECTION INTRAVENOUS at 08:34

## 2017-02-06 RX ADMIN — PROPOFOL 40 MG: 10 INJECTION, EMULSION INTRAVENOUS at 12:57

## 2017-02-06 RX ADMIN — CEFAZOLIN SODIUM 2 G: 2 INJECTION, SOLUTION INTRAVENOUS at 08:59

## 2017-02-06 RX ADMIN — PANTOPRAZOLE SODIUM 40 MG: 40 INJECTION, POWDER, FOR SOLUTION INTRAVENOUS at 17:09

## 2017-02-06 RX ADMIN — MIDAZOLAM HYDROCHLORIDE 1 MG: 1 INJECTION, SOLUTION INTRAMUSCULAR; INTRAVENOUS at 12:42

## 2017-02-06 RX ADMIN — FENTANYL CITRATE 100 MCG: 50 INJECTION, SOLUTION INTRAMUSCULAR; INTRAVENOUS at 15:32

## 2017-02-06 RX ADMIN — ROCURONIUM BROMIDE 50 MG: 10 INJECTION INTRAVENOUS at 09:31

## 2017-02-06 RX ADMIN — Medication 0 UNITS/HR: at 17:09

## 2017-02-06 RX ADMIN — SUGAMMADEX 150 MG: 100 INJECTION, SOLUTION INTRAVENOUS at 14:37

## 2017-02-06 RX ADMIN — NITROGLYCERIN 0.05 MCG/KG/MIN: 20 INJECTION INTRAVENOUS at 12:41

## 2017-02-06 RX ADMIN — MIDAZOLAM HYDROCHLORIDE 2 MG: 1 INJECTION, SOLUTION INTRAMUSCULAR; INTRAVENOUS at 08:24

## 2017-02-06 RX ADMIN — Medication 6.4 MCG: at 14:21

## 2017-02-06 RX ADMIN — MIDAZOLAM HYDROCHLORIDE 1 MG: 1 INJECTION, SOLUTION INTRAMUSCULAR; INTRAVENOUS at 12:24

## 2017-02-06 RX ADMIN — Medication 6.4 MCG: at 08:46

## 2017-02-06 RX ADMIN — AMINOCAPROIC ACID 1 G/HR: 250 INJECTION, SOLUTION INTRAVENOUS at 16:02

## 2017-02-06 RX ADMIN — METOPROLOL TARTRATE 12.5 MG: 25 TABLET, FILM COATED ORAL at 06:29

## 2017-02-06 RX ADMIN — PROPOFOL 50 MG: 10 INJECTION, EMULSION INTRAVENOUS at 13:00

## 2017-02-06 RX ADMIN — PROPOFOL 30 MCG/KG/MIN: 10 INJECTION, EMULSION INTRAVENOUS at 14:21

## 2017-02-06 RX ADMIN — Medication 6.4 MCG: at 08:48

## 2017-02-06 RX ADMIN — FENTANYL CITRATE 50 MCG: 50 INJECTION, SOLUTION INTRAMUSCULAR; INTRAVENOUS at 09:00

## 2017-02-06 RX ADMIN — Medication 30000 UNITS: at 10:38

## 2017-02-06 RX ADMIN — Medication 6.4 MCG: at 12:47

## 2017-02-06 RX ADMIN — EPINEPHRINE 0.03 MCG/KG/MIN: 1 INJECTION, SOLUTION INTRAMUSCULAR; SUBCUTANEOUS at 12:43

## 2017-02-06 RX ADMIN — POTASSIUM CHLORIDE 10 MEQ: 7.46 INJECTION, SOLUTION INTRAVENOUS at 13:58

## 2017-02-06 RX ADMIN — CALCIUM GLUCONATE 1 G: 94 INJECTION, SOLUTION INTRAVENOUS at 23:32

## 2017-02-06 RX ADMIN — ACETAMINOPHEN 650 MG: 325 TABLET, FILM COATED ORAL at 19:51

## 2017-02-06 RX ADMIN — DEXMEDETOMIDINE HYDROCHLORIDE 0.2 MCG/KG/HR: 4 INJECTION, SOLUTION INTRAVENOUS at 09:12

## 2017-02-06 RX ADMIN — FENTANYL CITRATE 50 MCG: 50 INJECTION, SOLUTION INTRAMUSCULAR; INTRAVENOUS at 09:38

## 2017-02-06 RX ADMIN — Medication 6.4 MCG: at 11:00

## 2017-02-06 RX ADMIN — FENTANYL CITRATE 50 MCG/HR: 50 INJECTION, SOLUTION INTRAMUSCULAR; INTRAVENOUS at 17:10

## 2017-02-06 RX ADMIN — ALBUMIN (HUMAN) 250 ML: 12.5 SOLUTION INTRAVENOUS at 18:05

## 2017-02-06 RX ADMIN — EPINEPHRINE 0.07 MCG/KG/MIN: 1 INJECTION, SOLUTION INTRAMUSCULAR; SUBCUTANEOUS at 16:03

## 2017-02-06 RX ADMIN — MUPIROCIN 0.5 G: 20 OINTMENT TOPICAL at 20:55

## 2017-02-06 RX ADMIN — Medication 2000 UNITS: at 09:52

## 2017-02-06 RX ADMIN — PROPOFOL 50 MG: 10 INJECTION, EMULSION INTRAVENOUS at 08:34

## 2017-02-06 RX ADMIN — NOREPINEPHRINE BITARTRATE 0.03 MCG/KG/MIN: 1 INJECTION INTRAVENOUS at 12:47

## 2017-02-06 RX ADMIN — SODIUM CHLORIDE, POTASSIUM CHLORIDE, SODIUM LACTATE AND CALCIUM CHLORIDE: 600; 310; 30; 20 INJECTION, SOLUTION INTRAVENOUS at 09:12

## 2017-02-06 RX ADMIN — Medication 6.4 MCG: at 12:46

## 2017-02-06 RX ADMIN — NITROGLYCERIN 100 MCG: 10 INJECTION INTRAVENOUS at 14:30

## 2017-02-06 RX ADMIN — ROCURONIUM BROMIDE 50 MG: 10 INJECTION INTRAVENOUS at 08:34

## 2017-02-06 RX ADMIN — FENTANYL CITRATE 100 MCG: 50 INJECTION, SOLUTION INTRAMUSCULAR; INTRAVENOUS at 13:27

## 2017-02-06 RX ADMIN — DEXMEDETOMIDINE HYDROCHLORIDE 0.3 MCG/KG/HR: 4 INJECTION, SOLUTION INTRAVENOUS at 16:02

## 2017-02-06 RX ADMIN — AMINOCAPROIC ACID 1 G/HR: 250 INJECTION, SOLUTION INTRAVENOUS at 11:44

## 2017-02-06 RX ADMIN — Medication 5 MG: at 08:45

## 2017-02-06 RX ADMIN — PROTAMINE SULFATE 120 MG: 10 INJECTION, SOLUTION INTRAVENOUS at 12:59

## 2017-02-06 RX ADMIN — CEFAZOLIN 1 G: 1 INJECTION, POWDER, FOR SOLUTION INTRAMUSCULAR; INTRAVENOUS at 13:01

## 2017-02-06 RX ADMIN — MIDAZOLAM HYDROCHLORIDE 1 MG: 1 INJECTION, SOLUTION INTRAMUSCULAR; INTRAVENOUS at 13:25

## 2017-02-06 RX ADMIN — Medication 6.4 MCG: at 14:23

## 2017-02-06 RX ADMIN — PROTAMINE SULFATE 25 MG: 10 INJECTION, SOLUTION INTRAVENOUS at 15:49

## 2017-02-06 RX ADMIN — FENTANYL CITRATE 50 MCG: 50 INJECTION, SOLUTION INTRAMUSCULAR; INTRAVENOUS at 08:34

## 2017-02-06 RX ADMIN — POTASSIUM PHOSPHATE, MONOBASIC AND POTASSIUM PHOSPHATE, DIBASIC 20 MMOL: 224; 236 INJECTION, SOLUTION INTRAVENOUS at 17:00

## 2017-02-06 RX ADMIN — LIDOCAINE HYDROCHLORIDE 40 MG: 20 INJECTION, SOLUTION INFILTRATION; PERINEURAL at 08:34

## 2017-02-06 RX ADMIN — POTASSIUM CHLORIDE 20 MEQ: 29.8 INJECTION, SOLUTION INTRAVENOUS at 17:10

## 2017-02-06 RX ADMIN — SODIUM CHLORIDE, POTASSIUM CHLORIDE, SODIUM LACTATE AND CALCIUM CHLORIDE: 600; 310; 30; 20 INJECTION, SOLUTION INTRAVENOUS at 08:01

## 2017-02-06 RX ADMIN — Medication 2 UNITS/HR: at 11:16

## 2017-02-06 RX ADMIN — FENTANYL CITRATE 100 MCG: 50 INJECTION, SOLUTION INTRAMUSCULAR; INTRAVENOUS at 09:31

## 2017-02-06 RX ADMIN — FENTANYL CITRATE 100 MCG: 50 INJECTION, SOLUTION INTRAMUSCULAR; INTRAVENOUS at 10:50

## 2017-02-06 RX ADMIN — CEFAZOLIN 1 G: 1 INJECTION, POWDER, FOR SOLUTION INTRAMUSCULAR; INTRAVENOUS at 11:03

## 2017-02-06 RX ADMIN — PROPOFOL 30 MCG/KG/MIN: 10 INJECTION, EMULSION INTRAVENOUS at 16:03

## 2017-02-06 RX ADMIN — POTASSIUM CHLORIDE, DEXTROSE MONOHYDRATE AND SODIUM CHLORIDE: 150; 5; 450 INJECTION, SOLUTION INTRAVENOUS at 17:10

## 2017-02-06 RX ADMIN — CEFAZOLIN SODIUM 1 G: 1 INJECTION, POWDER, FOR SOLUTION INTRAMUSCULAR; INTRAVENOUS at 20:55

## 2017-02-06 RX ADMIN — ROCURONIUM BROMIDE 50 MG: 10 INJECTION INTRAVENOUS at 10:50

## 2017-02-06 RX ADMIN — Medication 6.4 MCG: at 08:53

## 2017-02-06 RX ADMIN — SODIUM CHLORIDE: 9 INJECTION, SOLUTION INTRAVENOUS at 09:15

## 2017-02-06 RX ADMIN — ALBUMIN (HUMAN): 12.5 SOLUTION INTRAVENOUS at 13:50

## 2017-02-06 RX ADMIN — NOREPINEPHRINE BITARTRATE 0.06 MCG/KG/MIN: 1 INJECTION INTRAVENOUS at 16:03

## 2017-02-06 RX ADMIN — GLYCOPYRROLATE 0.2 MG: 0.2 INJECTION, SOLUTION INTRAMUSCULAR; INTRAVENOUS at 08:45

## 2017-02-06 NOTE — PROGRESS NOTES
"CV ICU  17    SUBJECTIVE:    Summary:    Patient is a 76 y.o. F with a PMH of CVA 10/2016 (residual word finding difficulties), DM, CAD/Cardiomyopathy (LV EF 40-45%), HTN, Paroxsymal A Fib (anticoagulated), Former Smoker, and NOHA who is s/p CABG X 3, MAZE procedure, left atrial appendage ligation (AtriClip 45), and closure of patent foramen ovale 17.    OR Course:  Fluids:   mL,  mL, Albumin (amount not recorded)  Products:  Cell Saver 500 mL, pRBC 300 mL  Voided: 1100 mL  EBL 1L  Drips:  Propofol @ 30, Precedex @ 0.2, Epi @ 0.06, NE @ 0.03, Nitro @ 0.05, and Insulin @ 2  Additional:  10 potassium chloride   Bypass time:  11:10-12:55 - one hour and forty five minutes    Interval History:  On Epi and NE drips, Precedex for sedation    OBJECTIVE:    /71 mmHg  Pulse 59  Temp(Src) 97.8  F (36.6  C) (Oral)  Resp 16  Ht 1.575 m (5' 2\")  Wt 73.7 kg (162 lb 7.7 oz)  BMI 29.71 kg/m2  SpO2 95%     Physical Exam:    Neuro:  Intubated and Sedated  Cardiac:  RRR  Pulm:  Intubated, bilateral breath sounds  Abdomen:  soft  Extremities:  Bilateral pulses    Labs:    Lab Results   Component Value Date    WBC 7.2 2017    HGB 10.9* 2017    HCT 40.2 2017    * 2017     2017    POTASSIUM 3.5 2017    CHLORIDE 105 2017    CO2 29 2017    BUN 15 2017    CR 0.63 2017    * 2017    NTBNPI 2437* 10/14/2016    NTBNP 924* 2017    TROPI 0.036 10/25/2016    AST 31 2016    ALT 56* 2016    ALKPHOS 103 2016    BILITOTAL 0.4 2016    INR 1.63* 2017       Imagin/30 EKG:  Sinus bradycardia with low voltage QRS, inferior infarct, possible anterolateral infarct    10/25/16 Echo:  Limited, EF 40-45% with reduced LV function, mild diffuse hypokinesis, global RV function normal, since 10/18/16 there has been interval improvement in LV systolic function    17 Coronary Angiogram:  Two vessel CAD " (mid LAD and mid RCA) with left main disease    2/2/17 Carotid US: Right: less than 50% stenosis of the ICA due to mild plaque; Left: less than 50% stenosis of ICA due to mild plaque    2/2/17 CXR:  No acute cardiopulmonary abnormality; streaky opacity in the left midlung likely atelectasis/scarring    2/2/17 CT chest w/o contrast:  Mosaic attenuation of the lungs, can be seen with small vessel or small airways disease, pulmonary nodules, and calcification of LAD    2/2/17 Lower Extremity Venous Mapping Bilaterally:  4.6 cm right popliteal cyst                                                                                                     Assessment and Plan:  Patient is a 76 y.o. F with a PMH of CVA 10/2016 (residual word finding difficulties), DM, CAD/Cardiomyopathy (LV EF 40-45%), HTN, Paroxsymal A Fib (anticoagulated), Former Smoker, and NOAH who is s/p CABG X 3, MAZE procedure, left atrial appendage ligation (AtriClip 45), and closure of patent foramen ovale 2/6/17.    Neuro:  -Intubated and Sedated on Precedex drip  -Hx of CVA 10/2016 (thought to be 2/2 A Fib) with residual word finding difficulties  -Depression and Anxiety   -Home medications: Trazodone 0.5 tabs (25 mg) nightly and Venlafaxine 150 mg daily     PLAN:  -Precedex and Fentanyl drips  -Fentanyl PRN  -Tylenol 650 mg q 4 hr PRN    CV:  -Requiring Epi and NE drips for hemodynamics  -s/p CABG X 3, MAZE procedure, left atrial appendage ligation (AtriClip 45), and closure of patent foramen ovale 2/6/17  -Mediastinal X 2 and Left pleural X 1 drains  -Hx of HTN  -Hx of Paroxsymal A Fib (anticoagulated)  -Home medications:  Lipitor 40 mg at bedtime, Coreg 6.25 mg BID, Lasix 20 mg daily PRN, Lisinopril 20 mg every morning, and Coumadin/Lovenox    PLAN:  -Epi and NE drips - wean as able  -ASA 81 mg    PULM:  -Intubated  -NOAH - CPAP  -Past smoker    PLAN:  -Wean FiO2  -Wean sedation and pressure support trials for possible extubation tomorrow  -F/u  CXR  -Albuterol PRN    GI/FEN:  -NPO    PLAN:  -NPO    RENAL:  -Adequate urine output in OR    PLAN:  -F/u post op BMP and I&Os    ENDO:  -DM II (HgA1C 6.3)  -Home medications:  Metformin 500 mg BID    PLAN:  -Insulin drip    HEME:  -Hg 10.4, Platelets 133, INR 1.66, and PTT 69 at 1428; received 1 unit pRBC and Cell Saver in OR (1L EBL)  -Anticoagulated 2/2 CVA suspected 2/2 A Fib  -Hx of Blood clots    PLAN:  -F/u post or labs    ID:  No signs of active infection    PLAN:  -Perioperative abx: Ancef    Ppx:  Protonix, holding anticoagulation    Discussed with Dr. Malcolm.    Amy [place], [place] 2  Pager [phone]

## 2017-02-06 NOTE — PROGRESS NOTES
CLINICAL NUTRITION SERVICES - BRIEF NOTE    Received provider consult for nutrition education with comments post op cardiovascular surgery (automatic consult on post-op order set). S/p CABG x 3 on 2/6. Nutrition education to be completed as able/appropriate (as pt s/p CABG).    RD will follow per LOS protocol or if re-consulted.     Tran De León RDN, LD  Pgr: [phone]

## 2017-02-06 NOTE — BRIEF OP NOTE
[facility], [place]    Brief Operative Note    Pre-operative diagnosis: Coronary Artery Disease  Post-operative diagnosis * No post-op diagnosis entered *  Procedure: Procedure(s):  Median Sternotomy, Cardiopulmonary Bypass, Coronary Artery Bypass Graft x3, Left Internal Mammary Artery [place], Endoscopic Left Leg Saphenous Vein [place], MAZE Procedure, Left Atrial Appendage Ligation (AtriClip 45), Patent Foramen Ovale Closure - Wound Class: I-Clean   - Wound Class: I-Clean  Surgeon: Surgeon(s) and Role:     * Mikhail Quiñones MD - Primary     * Praneeth Hills MD - Assisting     * Jessica Rey PA-C - Assisting     * Yanna Santiago PA-C - Assisting     * Issa Felix PA - Assisting  Anesthesia: General   Estimated blood loss: 1000cc  Drains: Mediastinal x2, left pleural x1  Specimens: * No specimens in log *  Findings:   See op note.  Complications: None.      Bilateral PVI, ABDIEL ligation, CABx3, closure PFO

## 2017-02-06 NOTE — ANESTHESIA POSTPROCEDURE EVALUATION
Patient: Carlos Alberto Fenton    Procedure(s):  Median Sternotomy, Cardiopulmonary Bypass, Coronary Artery Bypass Graft x3, Left Internal Mammary Artery [place], Endoscopic Left Leg Saphenous Vein [place], MAZE Procedure, Left Atrial Appendage Ligation (AtriClip 45), Patent Foramen Ovale Closure - Wound Class: I-Clean   - Wound Class: I-Clean    Diagnosis:Coronary Artery Disease  Diagnosis Additional Information: No value filed.    Anesthesia Type:  General    Note:  Anesthesia Post Evaluation    Patient location during evaluation: ICU  Patient participation: Unable to evaluate secondary to administered sedation  Level of consciousness: obtunded/minimal responses  Pain management: adequate  Airway patency: patent  Cardiovascular status: hemodynamically stable  Respiratory status: acceptable, intubated, ventilator and ETT  Hydration status: acceptable  PONV: none     Anesthetic complications: None    Comments: Weaned from CPB without difficulty, V paced at 80, on epinephrine and norepinephrine infusion.  -130/70-90, CVP 10-15, PA 30-40/18-20  Received 1 PRBCs on CPB and cell saver blood.  Last Hgb 10.6  To ICU sedated, remained intubated.  Fast track extubation          Last vitals:  Filed Vitals:    02/06/17 0536   BP: 132/71   Pulse: 59   Temp: 36.6  C (97.8  F)   Resp: 16   SpO2: 95%         Electronically Signed By: Rajan Barber MD  February 6, 2017  2:45 PM

## 2017-02-06 NOTE — IP AVS SNAPSHOT
MRN:[id number]                      After Visit Summary   2/6/2017    Carlos Alberto Fenton    MRN: [id number]           Thank you!     Thank you for choosing [facility] for your care. Our goal is always to provide you with excellent care. Hearing back from our patients is one way we can continue to improve our services. Please take a few minutes to complete the written survey that you may receive in the mail after you visit with us. Thank you!        Patient Information     Date Of Birth          1940        About your hospital stay     You were admitted on:  February 6, 2017 You last received care in the:  [address] [facility]    You were discharged on:  February 17, 2017       Who to Call     For medical emergencies, please call 911.  For non-urgent questions about your medical care, please call your primary care provider or clinic, [phone]  For questions related to your surgery, please call your surgery clinic        Attending Provider     Provider Specialty    Mikhail Quiñones MD Transplant       Primary Care Provider Office Phone # Fax #    Nuha Bateman [phone]       [address] [address]        Follow-up Appointments     Follow Up and recommended labs and tests       [facility]  P: [phone]  F:  [phone]    Appointment made on Wednesday 2/22/2017 at 1:30pm with Dr. Humberto Conner  for establishment of care and post hospitalization follow up.                  Your next 10 appointments already scheduled     Feb 20, 2017  1:15 PM CST   CT CHEST W/O CONTRAST with MGCT1   [facility]    [address] [place] [id number] [phone]           Please bring any scans or X-rays taken at other hospitals, if similar tests were done. Also bring a list of your medicines, including vitamins, minerals and over-the-counter drugs. It is safest to leave  personal items at home.  Be sure to tell your doctor:   If you have any allergies.   If there s any chance you are pregnant.   If you are breastfeeding.   If you have any special needs.  You do not need to do anything special to prepare.  Please wear loose clothing, such as a sweat suit or jogging clothes. Avoid snaps, zippers and other metal. We may ask you to undress and put on a hospital gown.            Feb 20, 2017  1:40 PM CST   New Visit with Marty Camargo MD   [facility]    [address] [place] [address]   [phone]            Feb 22, 2017  1:30 PM CST   Office Visit with Humberto Conner MD   [facility]    [address] [facility] [id number] [phone]           Bring a current list of meds and any records pertaining to this visit.  For Physicals, please bring immunization records and any forms needing to be filled out.  Please arrive 10 minutes early to complete paperwork.            Mar 03, 2017  1:00 PM CST   (Arrive by 12:45 PM)   Return Visit with ILYA HERNANDEZ   [facility])    [address] [place] [id number] [phone]            Mar 20, 2017  2:00 PM CDT   [facility] Visit with Nuha [facility]eman MD   [facility]    [address] [facility] [address]  [facility] [id number] [phone]           Bring a current list of meds and any records pertaining to this visit.  For Physicals, please bring immunization records and any forms needing to be filled out.  Please arrive 10 minutes early to complete paperwork.            Jul 14, 2017  1:30 PM CDT   (Arrive by 1:15 PM)   Return Visit with Star Lobato MD   [facility])    [address] [place] [id number] [phone]              Additional  Services     Home care nursing referral       [facility]  Phone: [phone]  Fax: [phone]    For RN evaluation post hospitalization.   Assess vital signs, respiratory and cardiac status, activity tolerance, hydration, nutritional status.  Med setup and management.   INR lab draws, next INR draw due Monday 2/20/17 with result to [facility], with note that pt is establishing care with Dr. Conner (P: [phone], F: [phone])  PT/OT eval and treat for deconditioning, strengthening and endurance, please assist pt with setting up OP CR when ready.     Your provider has ordered home care nursing services. If you have not been contacted within 2 days of your discharge please call the inpatient department phone number at [phone] .                  Further instructions from your care team       [facility]      AFTER YOU GO HOME FROM YOUR HEART SURGERY    Avoid lifting anything greater than ten pounds for 6 weeks after surgery and then less than 20 pounds for an additional 6 weeks.    No driving for 4 weeks after surgery or while on pain medication. If you had a minimally invasive procedure you may drive after three weeks if not taking pain medication.    Avoid strenuous activities such as bowling, vacuuming, raking, shoveling, golf or tennis for 12 weeks after your surgery. It is okay to resume sex if you feel comfortable in doing so. You may have to try different positions with your partner.     Splint your chest incision by hugging a pillow or bringing your arms across your chest when coughing or sneezing. Avoid pushing off with your arms when getting up for the first month if you have had you sternum opened.     Shower or wash your incisions daily with soap and water (or as instructed), pat dry. Watch for signs of infection: increased redness, tenderness, warmth or any drainage that appears infected (pus like) or is persistent.  Also a  temperature > 100.5 F or chills. Call your surgeon or primary care doctors office immediately. Remove any skin glue left on incisions after 10 days. This will not affect your incision and will speed up healing.    Exercise is very important in your recovery.  Please follow the guidelines set up for you in your cardiac rehab classes at the hospital. If outpatient cardiac rehab was ordered for you, we highly recommend you participate. If you have problems arranging your cardiac rehab, please call [phone].     Avoid sitting for prolonged periods of time, try to walk every hour. If you have a leg incision, elevate your leg often when you are not walking.    Check your weight when you get home from the hospital and continue to check it daily through your recovery. If you notice a weight gain of 2-3 pounds in a week notify your primary care physician, cardiologist or surgeon.    Bowel activity may be slow after surgery. If necessary you may take an over the counter laxative such as Milk of Magnesia or Miralax.      DENTAL VISITS AFTER SURGERY  If you have had your heart valve repaired or replaced, we do not recommend having any dental work done for 6 months and you will need to take an antibiotic prior to dental visits from now on.  Please notify your dentist before any procedure for the proper treatment needed. The antibiotic is taken by mouth one hour prior to visit. This includes routine cleanings.    DO NOT SMOKE.  IF YOU NEED HELP QUITTING, PLEASE TALK WITH YOUR CARDIOLOGIST OR PRIMARY DOCTOR.    If you are on a blood thinner, follow the instructions you were given in the hospital and DO NOT SKIP this medication. Try and take it the same time everyday. Your primary care physician or coumadin clinic will manage the dosing.     For questions or concerns, please call the Cardiothoracic Surgery Department at [phone] 8-4:30 M-F.  They can assist you with your needs and contact or page the surgeon, Nurse  practitioner or Physician Assistant as indicated.     On weekends or after hours, please call [phone] and ask the  to page the Cardiothoracic Surgery fellow on call.      If you have an LVAD device call your LVAD coordinator.   If you had a heart transplant call your Transplant Coordinator.    REGARDING PRESCRIPTION REFILLS.  If you need a refill on your pain medication contact us.  All other medications will be adjusted, discontinued and re-filled by your primary care physician and/or your cardiologist as they were prior to your surgery. We have given you enough for one to three month with possibly one refill.    POST-OP VISIT  You have one follow up visit with [facility] Surgery Advance Care Practitioners on Friday 3/3/17 at 1:00 PM.  You will then return to the care of your primary physician and your cardiologist.   It is important to call for an appointment to see your cardiologist in 4-6 weeks after surgery and your primary care physician in 2-4 weeks after surgery. If there is a need to return to see CT Surgery please call our  at [phone].    Please call us with any questions.    Thank you,    Your Cardiothoracic Surgery Team  Main Office [phone]      Yanna Szymanski RN Care Coordinator  [phone]                  Warfarin Instruction     You have started taking a medicine called warfarin. This is a blood-thinning medicine (anticoagulant). It helps prevent and treat blood clots.      Before leaving the hospital, make sure you know how much to take and how long to take it.      You will need regular blood tests to make sure your blood is clotting safely. It is very important to see your doctor for regular blood tests.    Talk to your doctor before taking any new medicine (this includes over-the-counter drugs and herbal products). Many medicines can interact with warfarin. This may cause more bleeding or too much  "clotting.     Eating a lot of vitamin K--found in green, leafy vegetables--can change the way warfarin works in your body. Do NOT avoid these foods. Instead, try to eat the same amount each day.     Bleeding is the most common side-effect of warfarin. You may notice bleeding gums, a bloody nose, bruises and bleeding longer when you cut yourself. See a doctor at once if:   o You cough up blood  o You find blood in your stool (poop)  o You have a deep cut, or a cut that bleeds longer than 10 minutes   o You have a bad cut, hard fall, accident or hit your head (go to urgent care or the emergency room).    For women who can get pregnant: This medicine can harm an unborn baby. Be very careful not to get pregnant while taking this medicine. If you think you might be pregnant, call your doctor right away.    For more information, read \"Guide to Warfarin Therapy,  the booklet you received in the hospital.        Pending Results     No orders found from 2/4/2017 to 2/7/2017.            Statement of Approval     Ordered          02/17/17 0943  I have reviewed and agree with all the recommendations and orders detailed in this document.  EFFECTIVE NOW     Approved and electronically signed by:  Jennifer Staley APRN CNP           02/17/17 0943  I have reviewed and agree with all the recommendations and orders detailed in this document.     Approved and electronically signed by:  Jennifer Staley APRN CNP             Admission Information     Date & Time Provider Department Dept. Phone    2/6/2017 Mikhail Quiñones MD [address] [facility] [phone]      Your Vitals Were     Blood Pressure Pulse Temperature Respirations Height Weight    109/58 (BP Location: Left arm) 72 98.7  F (37.1  C) (Oral) 18 1.575 m (5' 2\") 75.3 kg (165 lb 14.4 oz)    Pulse Oximetry BMI (Body Mass Index)                97% 30.34 kg/m2          MyChart Information     [facility] gives you secure access to your electronic health record. If " you see a primary care provider, you can also send messages to your care team and make appointments. If you have questions, please call your primary care clinic.  If you do not have a primary care provider, please call [phone] and they will assist you.        Care EveryWhere ID     This is your Care EveryWhere ID. This could be used by other organizations to access your [facility] medical records  [id number]           Review of your medicines      START taking        Dose / Directions    acetaminophen 325 MG tablet   Commonly known as:  TYLENOL   Used for:  S/P CABG (coronary artery bypass graft)        Dose:  325-650 mg   Take 1-2 tablets (325-650 mg) by mouth every 4 hours as needed for mild pain or fever   Quantity:  100 tablet   Refills:  0       amiodarone 200 MG tablet   Commonly known as:  PACERONE/CODARONE   Used for:  New onset atrial fibrillation (H)        Take 2 tabs (400 mg) daily for 14 days, then decrease dose to 1 tab (200 mg) daily for 14 days, then stop   Quantity:  45 tablet   Refills:  0       aspirin  MG EC tablet   Used for:  S/P CABG (coronary artery bypass graft)        Dose:  325 mg   Take 1 tablet (325 mg) by mouth every 6 hours as needed for moderate pain   Quantity:  90 tablet   Refills:  3       ferrous sulfate 325 (65 FE) MG tablet   Commonly known as:  IRON SUPPLEMENT   Used for:  S/P CABG (coronary artery bypass graft)        Dose:  325 mg   Take 1 tablet (325 mg) by mouth daily (with breakfast)   Quantity:  100 tablet   Refills:  1       gabapentin 100 MG capsule   Commonly known as:  NEURONTIN   Used for:  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        Dose:  100 mg   Take 1 capsule (100 mg) by mouth 2 times daily   Quantity:  60 capsule   Refills:  3       HYDROmorphone 2 MG tablet   Commonly known as:  DILAUDID   Used for:  S/P CABG (coronary artery bypass graft)        Dose:  1 mg   Take 0.5 tablets (1 mg) by mouth every 4 hours as needed for moderate to severe pain    Quantity:  30 tablet   Refills:  0       loperamide 2 MG capsule   Commonly known as:  IMODIUM   Used for:  Diarrhea, unspecified type        Dose:  2 mg   Take 1 capsule (2 mg) by mouth 4 times daily as needed for diarrhea   Quantity:  20 capsule   Refills:  0       melatonin 1 MG Tabs tablet   Used for:  Insomnia due to medical condition        Dose:  1 mg   Take 1 tablet (1 mg) by mouth nightly as needed   Quantity:  30 tablet   Refills:  0       pantoprazole 40 MG EC tablet   Commonly known as:  PROTONIX        Dose:  40 mg   Take 1 tablet (40 mg) by mouth every morning   Quantity:  30 tablet   Refills:  0       potassium chloride SA 10 MEQ CR tablet   Commonly known as:  K-DUR/KLOR-CON M   Used for:  S/P CABG (coronary artery bypass graft)        Dose:  10 mEq   Take 1 tablet (10 mEq) by mouth 2 times daily   Quantity:  60 tablet   Refills:  0       sulfamethoxazole-trimethoprim 800-160 MG per tablet   Commonly known as:  BACTRIM DS/SEPTRA DS   Used for:  Postoperative infection, initial encounter        Dose:  1 tablet   Take 1 tablet by mouth 2 times daily   Quantity:  14 tablet   Refills:  0         CONTINUE these medicines which may have CHANGED, or have new prescriptions. If we are uncertain of the size of tablets/capsules you have at home, strength may be listed as something that might have changed.        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)        Dose:  20 mg   Take 1 tablet (20 mg) by mouth 2 times daily   Quantity:  60 tablet   Refills:  3       warfarin 2.5 MG tablet   Commonly known as:  COUMADIN   This may have changed:    - medication strength  - how much to take  - how to take this  - when to take this  - additional  instructions   Used for:  New onset atrial fibrillation (H), 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)        Dose:  5 mg   Take 2 tablets (5 mg) by mouth daily   Quantity:  100 tablet   Refills:  3         CONTINUE these medicines which have NOT CHANGED        Dose / Directions    blood glucose monitoring meter device kit        Refills:  0       clotrimazole 1 % cream   Commonly known as:  LOTRIMIN   Used for:  Tinea cruris        Please apply to groins two times daily for one week after discharge and then follow up with PCP if no improvement of your skin rashes   Quantity:  12 g   Refills:  0       ONE TOUCH ULTRA test strip   Used for:  Type 2 diabetes mellitus without complication, without long-term current use of insulin (H)   Generic drug:  blood glucose monitoring        Test once daily   Quantity:  100 each   Refills:  0       ONETOUCH DELICA LANCETS 33G Misc   Used for:  Type 2 diabetes mellitus without complication, without long-term current use of insulin (H)        Dose:  1 Device   1 Device daily   Quantity:  100 each   Refills:  0       traZODone 50 MG tablet   Commonly known as:  DESYREL   Used for:  Primary insomnia        Dose:  25 mg   Take 0.5 tablets (25 mg) by mouth nightly as needed for sleep   Quantity:  45 tablet   Refills:  2       venlafaxine 150 MG Tb24 24 hr tablet   Commonly known as:  EFFEXOR-ER   Used for:  Adjustment disorder with depressed mood        Dose:  150 mg   Take 1 tablet (150 mg) by mouth daily (with breakfast)   Quantity:  90 each   Refills:  1         STOP taking     atorvastatin 40 MG tablet   Commonly known as:  LIPITOR           carvedilol 6.25 MG tablet   Commonly known as:  COREG           enoxaparin 80 MG/0.8ML injection   Commonly known as:  LOVENOX           lisinopril 20 MG tablet   Commonly known as:  PRINIVIL/ZESTRIL           metFORMIN 500 MG tablet   Commonly known as:  GLUCOPHAGE                Where to get your medicines       These medications were sent to [facility] - [place], [place] - [address]  [address], [facility] [address]     Phone:  [phone]     acetaminophen 325 MG tablet    amiodarone 200 MG tablet    aspirin  MG EC tablet    ferrous sulfate 325 (65 FE) MG tablet    furosemide 20 MG tablet    gabapentin 100 MG capsule    loperamide 2 MG capsule    melatonin 1 MG Tabs tablet    pantoprazole 40 MG EC tablet    potassium chloride SA 10 MEQ CR tablet    sulfamethoxazole-trimethoprim 800-160 MG per tablet    warfarin 2.5 MG tablet         Some of these will need a paper prescription and others can be bought over the counter. Ask your nurse if you have questions.     Bring a paper prescription for each of these medications     HYDROmorphone 2 MG tablet                Protect others around you: Learn how to safely use, store and throw away your medicines at www.disposemymeds.org.             Medication List: This is a list of all your medications and when to take them. Check marks below indicate your daily home schedule. Keep this list as a reference.      Medications           Morning Afternoon Evening Bedtime As Needed    acetaminophen 325 MG tablet   Commonly known as:  TYLENOL   Take 1-2 tablets (325-650 mg) by mouth every 4 hours as needed for mild pain or fever   Last time this was given:  650 mg on 2/14/2017  2:30 PM                                   amiodarone 200 MG tablet   Commonly known as:  PACERONE/CODARONE   Take 2 tabs (400 mg) daily for 14 days, then decrease dose to 1 tab (200 mg) daily for 14 days, then stop   Last time this was given:  400 mg on 2/17/2017  7:51 AM                                   aspirin  MG EC tablet   Take 1 tablet (325 mg) by mouth every 6 hours as needed for moderate pain                                   blood glucose monitoring meter device kit                                clotrimazole 1 % cream   Commonly known as:  LOTRIMIN   Please  apply to groins two times daily for one week after discharge and then follow up with PCP if no improvement of your skin rashes                                      ferrous sulfate 325 (65 FE) MG tablet   Commonly known as:  IRON SUPPLEMENT   Take 1 tablet (325 mg) by mouth daily (with breakfast)                                   furosemide 20 MG tablet   Commonly known as:  LASIX   Take 1 tablet (20 mg) by mouth 2 times daily                                      gabapentin 100 MG capsule   Commonly known as:  NEURONTIN   Take 1 capsule (100 mg) by mouth 2 times daily   Last time this was given:  100 mg on 2/17/2017  7:51 AM                                      HYDROmorphone 2 MG tablet   Commonly known as:  DILAUDID   Take 0.5 tablets (1 mg) by mouth every 4 hours as needed for moderate to severe pain   Last time this was given:  1 mg on 2/17/2017  4:54 AM                                   loperamide 2 MG capsule   Commonly known as:  IMODIUM   Take 1 capsule (2 mg) by mouth 4 times daily as needed for diarrhea   Last time this was given:  2 mg on 2/17/2017 10:55 AM                                   melatonin 1 MG Tabs tablet   Take 1 tablet (1 mg) by mouth nightly as needed   Last time this was given:  1 mg on 2/17/2017  1:25 AM                                   ONE TOUCH ULTRA test strip   Test once daily   Generic drug:  blood glucose monitoring                                ONETOUCH DELICA LANCETS 33G Misc   1 Device daily                                pantoprazole 40 MG EC tablet   Commonly known as:  PROTONIX   Take 1 tablet (40 mg) by mouth every morning   Last time this was given:  40 mg on 2/17/2017  7:51 AM                                   potassium chloride SA 10 MEQ CR tablet   Commonly known as:  K-DUR/KLOR-CON M   Take 1 tablet (10 mEq) by mouth 2 times daily   Last time this was given:  20 mEq on 2/17/2017  7:51 AM                                      sulfamethoxazole-trimethoprim 800-160 MG per  tablet   Commonly known as:  BACTRIM DS/SEPTRA DS   Take 1 tablet by mouth 2 times daily                                      traZODone 50 MG tablet   Commonly known as:  DESYREL   Take 0.5 tablets (25 mg) by mouth nightly as needed for sleep   Last time this was given:  25 mg on 2/16/2017  9:10 PM                                   venlafaxine 150 MG Tb24 24 hr tablet   Commonly known as:  EFFEXOR-ER   Take 1 tablet (150 mg) by mouth daily (with breakfast)                                   warfarin 2.5 MG tablet   Commonly known as:  COUMADIN   Take 2 tablets (5 mg) by mouth daily   Last time this was given:  7.5 mg on 2/16/2017  5:54 PM

## 2017-02-06 NOTE — PROGRESS NOTES
SPIRITUAL HEALTH SERVICES  [facility]   PRE-SURGERY VISIT    Had pre-surgery visit with pt.  Provided spiritual support, prayer.     Uvaldo Coker  Chaplain Resident  Pager [phone]

## 2017-02-06 NOTE — IP AVS SNAPSHOT
[address] [address]    Phone:  [phone]                                       After Visit Summary   2/6/2017    Carlos Alberto Fenton    MRN: [id number]           After Visit Summary Signature Page     I have received my discharge instructions, and my questions have been answered. I have discussed any challenges I see with this plan with the nurse or doctor.    ..........................................................................................................................................  Patient/Patient Representative Signature      ..........................................................................................................................................  Patient Representative Print Name and Relationship to Patient    ..................................................               ................................................  Date                                            Time    ..........................................................................................................................................  Reviewed by Signature/Title    ...................................................              ..............................................  Date                                                            Time

## 2017-02-06 NOTE — ANESTHESIA CARE TRANSFER NOTE
Patient: Carlos Alberto Fenton    Procedure(s):  Median Sternotomy, Cardiopulmonary Bypass, Coronary Artery Bypass Graft x3, Left Internal Mammary Artery [place], Endoscopic Left Leg Saphenous Vein [place], MAZE Procedure, Left Atrial Appendage Ligation (AtriClip 45), Patent Foramen Ovale Closure - Wound Class: I-Clean   - Wound Class: I-Clean    Diagnosis: Coronary Artery Disease  Diagnosis Additional Information: No value filed.    Anesthesia Type:   General     Note:  Airway :ETT  Patient transferred to:ICU  Comments: Pt transported to ICU sedated with Propofol and Precedex, O2 via Ambu, VSS. Placed on vent on arrival to unit -- , RR 12, FiO2 60%, PEEP 5. Reversed with 150mg Suggamadex on arrival to ICU. Report shared with bedside RN.      Vitals: (Last set prior to Anesthesia Care Transfer)    CRNA VITALS  2/6/2017 1354 - 2/6/2017 1446      2/6/2017             ART BP: 125/79 mmHg    ART Mean: 99    Ht Rate: 80    SpO2: 100 %    EKG: V Paced                Electronically Signed By: CHACORTA Massey CRNA  February 6, 2017  2:46 PM

## 2017-02-06 NOTE — ANESTHESIA PROCEDURE NOTES
Arterial Line Procedure Note  Staff:     Anesthesiologist:  MELISA ESTRADA  Location: other  Procedure Start/Stop Times:     patient identified, IV checked, site marked, risks and benefits discussed, informed consent, monitors and equipment checked, pre-op evaluation and at physician/surgeon's request      Correct Patient: Yes      Correct Position: Yes      Correct Site: Yes      Correct Procedure: Yes      Correct Laterality:  Yes    Site Marked:  Yes  Line Placement:     Procedure:  Arterial Line    Insertion Site:  Radial    Insertion laterality:  Right    Skin Prep: Chloraprep      Patient Prep: patient draped, mask, sterile gloves, hat and hand hygiene      Local skin infiltration:  1% lidocaine    amount (mL):  0    Catheter size:  20 gauge, Quick cath    Cath secured with: suture      Dressing:  Tegaderm    Complications:  None obvious    Arterial waveform: Yes      IBP within 10% of NIBP: Yes      TAVO Probe Insertion Note  Probe Inserted by: MELISA ESTRADA   Insertion method: TAVO probe easily inserted   Bite block used:   Yes      Probe type:  Adult 3D   Insertion complications: No complications.      Billing for TAVO report is being done by Anesthesiology      Perioperative TAVO Report  Anesthesia Information  TAVO probe placed and report generated by: : MELISA ESTRADA IOANNA          Preanesthesia Checklist:  Patient identified.  General Procedure Information  Modalities:  3D, 2D, CW Doppler, PW Doppler and Color flow mapping  Diagnostic indications for TAVO:   Cardiomyopathy, other                          .    Echocardiographic and Doppler Measurements        Ventricular Regional Function:  1- Basal Anteroseptal:  hypokinetic  2- Basal Anterior:  hypokinetic  3- Basal Anterolateral:  hypokinetic  4- Basal Inferolateral:  hypokinetic  5- Basal Inferior:  dyskinetic  6- Basal Inferoseptal:  hypokinetic  7- Mid Anteroseptal:  hypokinetic  8- Mid Anterior:  hypokinetic  9- Mid  Anterolateral:  hypokinetic  10- Mid Inferolateral:  hypokinetic  11- Mid Inferior:  dyskinetic  12- Mid Inferoseptal:  hypokinetic  13- Apical Anterior:  hypokinetic  14- Apical Lateral:  hypokinetic  15- Apical Inferior:  hypokinetic  16- Apical Septal:  hypokinetic  17- [place]:  hypokinetic    Valves  Aortic Valve: Annulus normal.  Stenosis not present.  Regurgitation absent.  Leaflets normal.  Leaflet motions normal.    Mitral Valve: Annulus normal.  Stenosis not present.  Regurgitation +2.    Tricuspid Valve: Annulus normal.  Stenosis not present.  Regurgitation +2.        Aorta: Ascending Aorta: Size normal.    Aortic Arch: Size normal.     Descending Aorta: Size normal.         Right Atrium:  Size normal.    Left Atrium: Size normal.  Left atrial appendage normal.     Atrial Septum: Intra-atrial septal morphology contains patent foramen ovale.   Patent foramen ovale shunts left to right.         Other Findings:   Pericardium:  normal.  Pleural Effusion:  none. .  .  Cornoary sinus catheter present.  .  .  Post Intervention Findings  Procedure(s) performed:  CABG. Global function:  Improved.   Regional wall motion:  Improved   Surgeon(s) notified of all postintervention findings:  Yes  .  .  .   .  .  .  .  .  .  .        Echocardiogram Comments      Central Line Procedure Note  Staff:     Anesthesiologist:  MELISA ESTRADA  Location: In OR after induction  Procedure Start/Stop Times:     patient identified, IV checked, site marked, risks and benefits discussed, informed consent, monitors and equipment checked, pre-op evaluation and at physician/surgeon's request      Correct Patient: Yes      Correct Position: Yes      Correct Site: Yes      Correct Procedure: Yes      Correct Laterality:  Yes    Site Marked:  Yes  Line Placement:     Procedure:  Central Line    Insertion laterality:  Right    Insertion site:  Internal Jugular    Position:  Trendelenburg      Maximal Sterile Barriers: All elements of  maximal sterile barrier technique followed      (Maximal sterile barriers include:   Sterile gown, Sterile Gloves, Mask, Cap, Whole body draped, hand hygiene and acceptable skin prep).       Injection Technique:  Ultrasound guided    Sterile Ultrasound Technique:  Sterile probe cover and Sterile gel    Vein evaluated via U/S for patency/adequacy of catheter insertion and is adequate.  Using realtime U/S imaging the vein was punctured, and needle was observed entering vein on U/S      Permanent Image entered into patient's record      Catheter size:  9 Fr, 2 lumen 11.5 cm (MAC)    Catheter length at skin (cm):  10    Cath secured with: suture      Dressing:  Tegaderm and Biopatch    Complications:  None obvious    Blood aspirated all lumens: Yes      All Lumens Flushed: Yes      PA Catheter Insertion Note  Anesthesiologist: MELISA ESTRADA      Introducer: Introducer placed as part of procedure (SEE separate note)   Skin prep:  Chloraprep Cap, Full body drape, hand hygiene, Mask, Sterile gloves and Sterile gown    PA Catheter type:  CCO    Distance catheter advanced:  45 cm.    Appropriate RA, RV, PA  waveforms?: Yes    Dressing:  Biopatch and Tegaderm    Complications:  None apparent

## 2017-02-06 NOTE — PROGRESS NOTES
Received report from REESE Holm, who stated patient was ready for procedure today. Waiting on OR room to be ready at this time.

## 2017-02-07 ENCOUNTER — APPOINTMENT (OUTPATIENT)
Dept: GENERAL RADIOLOGY | Facility: CLINIC | Age: 77
DRG: 228 | End: 2017-02-07
Attending: SURGERY
Payer: MEDICARE

## 2017-02-07 ENCOUNTER — APPOINTMENT (OUTPATIENT)
Dept: OCCUPATIONAL THERAPY | Facility: CLINIC | Age: 77
DRG: 228 | End: 2017-02-07
Attending: SURGERY
Payer: MEDICARE

## 2017-02-07 LAB
ABO + RH BLD: NORMAL
ABO + RH BLD: NORMAL
ANION GAP SERPL CALCULATED.3IONS-SCNC: 9 MMOL/L (ref 3–14)
BASE DEFICIT BLDA-SCNC: 2.7 MMOL/L
BASE DEFICIT BLDA-SCNC: 3.4 MMOL/L
BASE DEFICIT BLDA-SCNC: 3.6 MMOL/L
BASE DEFICIT BLDA-SCNC: 4.2 MMOL/L
BASE DEFICIT BLDA-SCNC: 5.3 MMOL/L
BASE DEFICIT BLDV-SCNC: 0 MMOL/L
BASE DEFICIT BLDV-SCNC: 1.7 MMOL/L
BASE DEFICIT BLDV-SCNC: 1.7 MMOL/L
BASE DEFICIT BLDV-SCNC: 3.2 MMOL/L
BASE DEFICIT BLDV-SCNC: 3.2 MMOL/L
BLD GP AB SCN SERPL QL: NORMAL
BLOOD BANK CMNT PATIENT-IMP: NORMAL
BUN SERPL-MCNC: 13 MG/DL (ref 7–30)
CA-I BLD-MCNC: 4.7 MG/DL (ref 4.4–5.2)
CALCIUM SERPL-MCNC: 7.6 MG/DL (ref 8.5–10.1)
CHLORIDE SERPL-SCNC: 116 MMOL/L (ref 94–109)
CO2 SERPL-SCNC: 23 MMOL/L (ref 20–32)
CREAT SERPL-MCNC: 0.79 MG/DL (ref 0.52–1.04)
ERYTHROCYTE [DISTWIDTH] IN BLOOD BY AUTOMATED COUNT: 18.4 % (ref 10–15)
GFR SERPL CREATININE-BSD FRML MDRD: 70 ML/MIN/1.7M2
GLUCOSE BLDC GLUCOMTR-MCNC: 100 MG/DL (ref 70–99)
GLUCOSE BLDC GLUCOMTR-MCNC: 104 MG/DL (ref 70–99)
GLUCOSE BLDC GLUCOMTR-MCNC: 111 MG/DL (ref 70–99)
GLUCOSE BLDC GLUCOMTR-MCNC: 113 MG/DL (ref 70–99)
GLUCOSE BLDC GLUCOMTR-MCNC: 118 MG/DL (ref 70–99)
GLUCOSE BLDC GLUCOMTR-MCNC: 118 MG/DL (ref 70–99)
GLUCOSE BLDC GLUCOMTR-MCNC: 125 MG/DL (ref 70–99)
GLUCOSE BLDC GLUCOMTR-MCNC: 128 MG/DL (ref 70–99)
GLUCOSE BLDC GLUCOMTR-MCNC: 132 MG/DL (ref 70–99)
GLUCOSE BLDC GLUCOMTR-MCNC: 132 MG/DL (ref 70–99)
GLUCOSE BLDC GLUCOMTR-MCNC: 135 MG/DL (ref 70–99)
GLUCOSE BLDC GLUCOMTR-MCNC: 164 MG/DL (ref 70–99)
GLUCOSE BLDC GLUCOMTR-MCNC: 92 MG/DL (ref 70–99)
GLUCOSE SERPL-MCNC: 132 MG/DL (ref 70–99)
HCO3 BLD-SCNC: 21 MMOL/L (ref 21–28)
HCO3 BLD-SCNC: 22 MMOL/L (ref 21–28)
HCO3 BLD-SCNC: 22 MMOL/L (ref 21–28)
HCO3 BLD-SCNC: 23 MMOL/L (ref 21–28)
HCO3 BLD-SCNC: 23 MMOL/L (ref 21–28)
HCO3 BLDV-SCNC: 24 MMOL/L (ref 21–28)
HCO3 BLDV-SCNC: 24 MMOL/L (ref 21–28)
HCO3 BLDV-SCNC: 25 MMOL/L (ref 21–28)
HCO3 BLDV-SCNC: 25 MMOL/L (ref 21–28)
HCO3 BLDV-SCNC: 26 MMOL/L (ref 21–28)
HCT VFR BLD AUTO: 32.3 % (ref 35–47)
HGB BLD-MCNC: 10.2 G/DL (ref 11.7–15.7)
HGB BLD-MCNC: 10.4 G/DL (ref 11.7–15.7)
HGB BLD-MCNC: 8.5 G/DL (ref 11.7–15.7)
HGB BLD-MCNC: 8.9 G/DL (ref 11.7–15.7)
LACTATE BLD-SCNC: 1.3 MMOL/L (ref 0.7–2.1)
MAGNESIUM SERPL-MCNC: 2.2 MG/DL (ref 1.6–2.3)
MAGNESIUM SERPL-MCNC: 2.2 MG/DL (ref 1.6–2.3)
MAGNESIUM SERPL-MCNC: 2.3 MG/DL (ref 1.6–2.3)
MCH RBC QN AUTO: 29.4 PG (ref 26.5–33)
MCHC RBC AUTO-ENTMCNC: 31.6 G/DL (ref 31.5–36.5)
MCV RBC AUTO: 93 FL (ref 78–100)
O2/TOTAL GAS SETTING VFR VENT: 50 %
O2/TOTAL GAS SETTING VFR VENT: ABNORMAL %
OXYHGB MFR BLD: 96 % (ref 92–100)
OXYHGB MFR BLD: 97 % (ref 92–100)
OXYHGB MFR BLD: 97 % (ref 92–100)
OXYHGB MFR BLDV: 35 %
OXYHGB MFR BLDV: 52 %
OXYHGB MFR BLDV: 56 %
PCO2 BLD: 41 MM HG (ref 35–45)
PCO2 BLD: 42 MM HG (ref 35–45)
PCO2 BLD: 43 MM HG (ref 35–45)
PCO2 BLD: 45 MM HG (ref 35–45)
PCO2 BLD: 46 MM HG (ref 35–45)
PCO2 BLDV: 53 MM HG (ref 40–50)
PH BLD: 7.3 PH (ref 7.35–7.45)
PH BLD: 7.31 PH (ref 7.35–7.45)
PH BLD: 7.32 PH (ref 7.35–7.45)
PH BLDV: 7.26 PH (ref 7.32–7.43)
PH BLDV: 7.26 PH (ref 7.32–7.43)
PH BLDV: 7.28 PH (ref 7.32–7.43)
PH BLDV: 7.28 PH (ref 7.32–7.43)
PH BLDV: 7.31 PH (ref 7.32–7.43)
PHOSPHATE SERPL-MCNC: 2.8 MG/DL (ref 2.5–4.5)
PHOSPHATE SERPL-MCNC: 3.2 MG/DL (ref 2.5–4.5)
PHOSPHATE SERPL-MCNC: 4.2 MG/DL (ref 2.5–4.5)
PLATELET # BLD AUTO: 139 10E9/L (ref 150–450)
PO2 BLD: 106 MM HG (ref 80–105)
PO2 BLD: 108 MM HG (ref 80–105)
PO2 BLD: 127 MM HG (ref 80–105)
PO2 BLD: 128 MM HG (ref 80–105)
PO2 BLD: 142 MM HG (ref 80–105)
PO2 BLDV: 24 MM HG (ref 25–47)
PO2 BLDV: 32 MM HG (ref 25–47)
PO2 BLDV: 34 MM HG (ref 25–47)
POTASSIUM SERPL-SCNC: 4.6 MMOL/L (ref 3.4–5.3)
POTASSIUM SERPL-SCNC: 4.7 MMOL/L (ref 3.4–5.3)
POTASSIUM SERPL-SCNC: 4.8 MMOL/L (ref 3.4–5.3)
RBC # BLD AUTO: 3.47 10E12/L (ref 3.8–5.2)
SODIUM SERPL-SCNC: 148 MMOL/L (ref 133–144)
SPECIMEN EXP DATE BLD: NORMAL
WBC # BLD AUTO: 13.7 10E9/L (ref 4–11)

## 2017-02-07 PROCEDURE — 85018 HEMOGLOBIN: CPT | Performed by: STUDENT IN AN ORGANIZED HEALTH CARE EDUCATION/TRAINING PROGRAM

## 2017-02-07 PROCEDURE — 25000128 H RX IP 250 OP 636: Performed by: SURGERY

## 2017-02-07 PROCEDURE — A9270 NON-COVERED ITEM OR SERVICE: HCPCS | Mod: GY | Performed by: SURGERY

## 2017-02-07 PROCEDURE — 40000014 ZZH STATISTIC ARTERIAL MONITORING DAILY

## 2017-02-07 PROCEDURE — 93005 ELECTROCARDIOGRAM TRACING: CPT

## 2017-02-07 PROCEDURE — 25000132 ZZH RX MED GY IP 250 OP 250 PS 637: Mod: GY | Performed by: ANESTHESIOLOGY

## 2017-02-07 PROCEDURE — 25000128 H RX IP 250 OP 636: Performed by: ANESTHESIOLOGY

## 2017-02-07 PROCEDURE — 94003 VENT MGMT INPAT SUBQ DAY: CPT

## 2017-02-07 PROCEDURE — 40000048 ZZH STATISTIC DAILY SWAN MONITORING

## 2017-02-07 PROCEDURE — 84100 ASSAY OF PHOSPHORUS: CPT | Performed by: SURGERY

## 2017-02-07 PROCEDURE — 00000146 ZZHCL STATISTIC GLUCOSE BY METER IP

## 2017-02-07 PROCEDURE — 40000275 ZZH STATISTIC RCP TIME EA 10 MIN

## 2017-02-07 PROCEDURE — 25000128 H RX IP 250 OP 636: Performed by: THORACIC SURGERY (CARDIOTHORACIC VASCULAR SURGERY)

## 2017-02-07 PROCEDURE — A9270 NON-COVERED ITEM OR SERVICE: HCPCS | Mod: GY | Performed by: THORACIC SURGERY (CARDIOTHORACIC VASCULAR SURGERY)

## 2017-02-07 PROCEDURE — 82805 BLOOD GASES W/O2 SATURATION: CPT | Performed by: THORACIC SURGERY (CARDIOTHORACIC VASCULAR SURGERY)

## 2017-02-07 PROCEDURE — 85018 HEMOGLOBIN: CPT | Performed by: THORACIC SURGERY (CARDIOTHORACIC VASCULAR SURGERY)

## 2017-02-07 PROCEDURE — 86901 BLOOD TYPING SEROLOGIC RH(D): CPT | Performed by: STUDENT IN AN ORGANIZED HEALTH CARE EDUCATION/TRAINING PROGRAM

## 2017-02-07 PROCEDURE — 84100 ASSAY OF PHOSPHORUS: CPT | Performed by: THORACIC SURGERY (CARDIOTHORACIC VASCULAR SURGERY)

## 2017-02-07 PROCEDURE — 86850 RBC ANTIBODY SCREEN: CPT | Performed by: STUDENT IN AN ORGANIZED HEALTH CARE EDUCATION/TRAINING PROGRAM

## 2017-02-07 PROCEDURE — 97530 THERAPEUTIC ACTIVITIES: CPT | Mod: GO

## 2017-02-07 PROCEDURE — 83735 ASSAY OF MAGNESIUM: CPT | Performed by: THORACIC SURGERY (CARDIOTHORACIC VASCULAR SURGERY)

## 2017-02-07 PROCEDURE — 25000128 H RX IP 250 OP 636

## 2017-02-07 PROCEDURE — 83735 ASSAY OF MAGNESIUM: CPT | Performed by: SURGERY

## 2017-02-07 PROCEDURE — 82330 ASSAY OF CALCIUM: CPT | Performed by: THORACIC SURGERY (CARDIOTHORACIC VASCULAR SURGERY)

## 2017-02-07 PROCEDURE — 82805 BLOOD GASES W/O2 SATURATION: CPT | Performed by: SURGERY

## 2017-02-07 PROCEDURE — 25000125 ZZHC RX 250: Performed by: THORACIC SURGERY (CARDIOTHORACIC VASCULAR SURGERY)

## 2017-02-07 PROCEDURE — 99291 CRITICAL CARE FIRST HOUR: CPT | Mod: GC | Performed by: ANESTHESIOLOGY

## 2017-02-07 PROCEDURE — 40000196 ZZH STATISTIC RAPCV CVP MONITORING

## 2017-02-07 PROCEDURE — 84132 ASSAY OF SERUM POTASSIUM: CPT | Performed by: THORACIC SURGERY (CARDIOTHORACIC VASCULAR SURGERY)

## 2017-02-07 PROCEDURE — P9041 ALBUMIN (HUMAN),5%, 50ML: HCPCS | Performed by: SURGERY

## 2017-02-07 PROCEDURE — 86900 BLOOD TYPING SEROLOGIC ABO: CPT | Performed by: STUDENT IN AN ORGANIZED HEALTH CARE EDUCATION/TRAINING PROGRAM

## 2017-02-07 PROCEDURE — 71010 XR CHEST PORT 1 VW: CPT

## 2017-02-07 PROCEDURE — P9041 ALBUMIN (HUMAN),5%, 50ML: HCPCS | Performed by: STUDENT IN AN ORGANIZED HEALTH CARE EDUCATION/TRAINING PROGRAM

## 2017-02-07 PROCEDURE — 20000004 ZZH R&B ICU UMMC

## 2017-02-07 PROCEDURE — A9270 NON-COVERED ITEM OR SERVICE: HCPCS | Mod: GY | Performed by: ANESTHESIOLOGY

## 2017-02-07 PROCEDURE — 25000128 H RX IP 250 OP 636: Performed by: STUDENT IN AN ORGANIZED HEALTH CARE EDUCATION/TRAINING PROGRAM

## 2017-02-07 PROCEDURE — 25000132 ZZH RX MED GY IP 250 OP 250 PS 637: Mod: GY | Performed by: SURGERY

## 2017-02-07 PROCEDURE — 25000132 ZZH RX MED GY IP 250 OP 250 PS 637: Mod: GY | Performed by: THORACIC SURGERY (CARDIOTHORACIC VASCULAR SURGERY)

## 2017-02-07 PROCEDURE — 85027 COMPLETE CBC AUTOMATED: CPT | Performed by: SURGERY

## 2017-02-07 PROCEDURE — 85018 HEMOGLOBIN: CPT | Performed by: SURGERY

## 2017-02-07 PROCEDURE — 80048 BASIC METABOLIC PNL TOTAL CA: CPT | Performed by: SURGERY

## 2017-02-07 PROCEDURE — 83605 ASSAY OF LACTIC ACID: CPT | Performed by: THORACIC SURGERY (CARDIOTHORACIC VASCULAR SURGERY)

## 2017-02-07 PROCEDURE — P9041 ALBUMIN (HUMAN),5%, 50ML: HCPCS | Performed by: ANESTHESIOLOGY

## 2017-02-07 PROCEDURE — 97165 OT EVAL LOW COMPLEX 30 MIN: CPT | Mod: GO

## 2017-02-07 PROCEDURE — 40000133 ZZH STATISTIC OT WARD VISIT

## 2017-02-07 RX ORDER — ALBUMIN, HUMAN INJ 5% 5 %
12.5 SOLUTION INTRAVENOUS ONCE
Status: COMPLETED | OUTPATIENT
Start: 2017-02-07 | End: 2017-02-07

## 2017-02-07 RX ORDER — HEPARIN SODIUM 5000 [USP'U]/.5ML
5000 INJECTION, SOLUTION INTRAVENOUS; SUBCUTANEOUS EVERY 8 HOURS
Status: DISCONTINUED | OUTPATIENT
Start: 2017-02-07 | End: 2017-02-09

## 2017-02-07 RX ORDER — ACETAMINOPHEN 325 MG/1
650 TABLET ORAL
Status: DISCONTINUED | OUTPATIENT
Start: 2017-02-07 | End: 2017-02-09

## 2017-02-07 RX ORDER — HYDROMORPHONE HCL/0.9% NACL/PF 0.2MG/0.2
.2-.4 SYRINGE (ML) INTRAVENOUS
Status: DISCONTINUED | OUTPATIENT
Start: 2017-02-07 | End: 2017-02-08

## 2017-02-07 RX ORDER — ACETAMINOPHEN 325 MG/1
325-650 TABLET ORAL EVERY 4 HOURS PRN
Status: DISCONTINUED | OUTPATIENT
Start: 2017-02-07 | End: 2017-02-17 | Stop reason: HOSPADM

## 2017-02-07 RX ORDER — ALBUMIN, HUMAN INJ 5% 5 %
12.5 SOLUTION INTRAVENOUS ONCE
Status: DISCONTINUED | OUTPATIENT
Start: 2017-02-07 | End: 2017-02-07

## 2017-02-07 RX ORDER — FUROSEMIDE 10 MG/ML
40 INJECTION INTRAMUSCULAR; INTRAVENOUS ONCE
Status: COMPLETED | OUTPATIENT
Start: 2017-02-07 | End: 2017-02-07

## 2017-02-07 RX ORDER — HALOPERIDOL 5 MG/ML
2 INJECTION INTRAMUSCULAR EVERY 6 HOURS PRN
Status: DISCONTINUED | OUTPATIENT
Start: 2017-02-07 | End: 2017-02-08

## 2017-02-07 RX ORDER — HALOPERIDOL 5 MG/ML
INJECTION INTRAMUSCULAR
Status: COMPLETED
Start: 2017-02-07 | End: 2017-02-07

## 2017-02-07 RX ORDER — ALBUMIN, HUMAN INJ 5% 5 %
25 SOLUTION INTRAVENOUS ONCE
Status: COMPLETED | OUTPATIENT
Start: 2017-02-07 | End: 2017-02-07

## 2017-02-07 RX ADMIN — HYDROMORPHONE HYDROCHLORIDE 1 MG: 2 TABLET ORAL at 22:19

## 2017-02-07 RX ADMIN — HYDROMORPHONE HYDROCHLORIDE 0.2 MG: 10 INJECTION, SOLUTION INTRAMUSCULAR; INTRAVENOUS; SUBCUTANEOUS at 13:22

## 2017-02-07 RX ADMIN — CEFAZOLIN SODIUM 1 G: 1 INJECTION, POWDER, FOR SOLUTION INTRAMUSCULAR; INTRAVENOUS at 04:45

## 2017-02-07 RX ADMIN — HYDROMORPHONE HYDROCHLORIDE 0.2 MG: 10 INJECTION, SOLUTION INTRAMUSCULAR; INTRAVENOUS; SUBCUTANEOUS at 23:46

## 2017-02-07 RX ADMIN — ALBUMIN (HUMAN) 12.5 G: 12.5 SOLUTION INTRAVENOUS at 08:12

## 2017-02-07 RX ADMIN — VASOPRESSIN 2 UNITS/HR: 20 INJECTION, SOLUTION INTRAMUSCULAR; SUBCUTANEOUS at 00:00

## 2017-02-07 RX ADMIN — CEFAZOLIN SODIUM 1 G: 1 INJECTION, POWDER, FOR SOLUTION INTRAMUSCULAR; INTRAVENOUS at 12:00

## 2017-02-07 RX ADMIN — ACETAMINOPHEN 650 MG: 325 TABLET, FILM COATED ORAL at 21:13

## 2017-02-07 RX ADMIN — ALBUMIN (HUMAN) 25 G: 12.5 SOLUTION INTRAVENOUS at 04:29

## 2017-02-07 RX ADMIN — EPINEPHRINE 0.08 MCG/KG/MIN: 1 INJECTION, SOLUTION INTRAMUSCULAR; SUBCUTANEOUS at 04:54

## 2017-02-07 RX ADMIN — Medication 5 ML: at 21:13

## 2017-02-07 RX ADMIN — HYDROMORPHONE HYDROCHLORIDE 0.2 MG: 10 INJECTION, SOLUTION INTRAMUSCULAR; INTRAVENOUS; SUBCUTANEOUS at 16:48

## 2017-02-07 RX ADMIN — SODIUM BICARBONATE 50 MEQ: 84 INJECTION INTRAVENOUS at 17:15

## 2017-02-07 RX ADMIN — HEPARIN SODIUM 5000 UNITS: 10000 INJECTION, SOLUTION INTRAVENOUS; SUBCUTANEOUS at 18:34

## 2017-02-07 RX ADMIN — HEPARIN SODIUM 5000 UNITS: 10000 INJECTION, SOLUTION INTRAVENOUS; SUBCUTANEOUS at 12:00

## 2017-02-07 RX ADMIN — Medication 1 UNITS/HR: at 07:38

## 2017-02-07 RX ADMIN — EPINEPHRINE 0.07 MCG/KG/MIN: 1 INJECTION, SOLUTION INTRAMUSCULAR; SUBCUTANEOUS at 20:19

## 2017-02-07 RX ADMIN — HALOPERIDOL LACTATE 2 MG: 5 INJECTION, SOLUTION INTRAMUSCULAR at 18:40

## 2017-02-07 RX ADMIN — ACETAMINOPHEN 650 MG: 325 TABLET, FILM COATED ORAL at 17:16

## 2017-02-07 RX ADMIN — MUPIROCIN 0.5 G: 20 OINTMENT TOPICAL at 20:19

## 2017-02-07 RX ADMIN — HALOPERIDOL 2 MG: 5 INJECTION INTRAMUSCULAR at 18:40

## 2017-02-07 RX ADMIN — DOCUSATE SODIUM 50 MG: 50 LIQUID ORAL at 07:56

## 2017-02-07 RX ADMIN — MUPIROCIN 0.5 G: 20 OINTMENT TOPICAL at 07:56

## 2017-02-07 RX ADMIN — ACETAMINOPHEN 650 MG: 325 TABLET, FILM COATED ORAL at 01:15

## 2017-02-07 RX ADMIN — FUROSEMIDE 40 MG: 10 INJECTION, SOLUTION INTRAVENOUS at 17:16

## 2017-02-07 RX ADMIN — ASPIRIN 81 MG CHEWABLE TABLET 81 MG: 81 TABLET CHEWABLE at 07:54

## 2017-02-07 RX ADMIN — ALBUMIN (HUMAN) 12.5 G: 12.5 SOLUTION INTRAVENOUS at 08:20

## 2017-02-07 RX ADMIN — PANTOPRAZOLE SODIUM 40 MG: 40 INJECTION, POWDER, FOR SOLUTION INTRAVENOUS at 07:54

## 2017-02-07 RX ADMIN — Medication 5 ML: at 07:54

## 2017-02-07 RX ADMIN — FUROSEMIDE 40 MG: 10 INJECTION, SOLUTION INTRAVENOUS at 23:31

## 2017-02-07 ASSESSMENT — PAIN DESCRIPTION - DESCRIPTORS
DESCRIPTORS: ACHING

## 2017-02-07 ASSESSMENT — ACTIVITIES OF DAILY LIVING (ADL): PREVIOUS_RESPONSIBILITIES: MEAL PREP;HOUSEKEEPING;LAUNDRY;SHOPPING;YARDWORK;MEDICATION MANAGEMENT;FINANCES;DRIVING

## 2017-02-07 NOTE — PROGRESS NOTES
"SPIRITUAL HEALTH SERVICES  SPIRITUAL ASSESSMENT Progress Note  [facility] 4E     REFERRAL SOURCE: pt was seen on 3C by  before surgery. This was a follow-up 4E unit  visit.    Pt was quite sleepy. I introduced myself to pt's , let him know I am gone Wednesday, would stop by again on Thursday.  said he feels pt \"is doing quite well after her surgery.\"    PLAN: continue to follow, will check in on pt on Thursday if still on unit.    Mark Keys) Tita Perera M.Div., [facility]  Staff   Pager [phone]      "

## 2017-02-07 NOTE — ADDENDUM NOTE
Addendum  created 02/07/17 0855 by Erna Perla APRN CRNA    Modules edited: Anesthesia Responsible Staff

## 2017-02-07 NOTE — PROGRESS NOTES
"CV ICU  17    SUBJECTIVE:    Summary:    Patient is a 76 y.o. 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    OR Course:  Fluids:   mL,  mL, Albumin (amount not recorded)  Products:  Cell Saver 500 mL, pRBC 300 mL  Voided: 1100 mL  EBL 1L  Drips:  Propofol @ 30, Precedex @ 0.2, Epi @ 0.06, NE @ 0.03, Nitro @ 0.05, and Insulin @ 2  Additional:  10 potassium chloride    Bypass time:  11:10-12:55 - one hour and forty five minutes    Interval History:  Patient off NE this am, s/p 250 cc Albumin bolus this am for urine output of 25 mL/hr    OBJECTIVE:    Vital signs:  Temp: 99.1  F (37.3  C) Temp src: Pulmonary Artery BP: 97/59 mmHg   Heart Rate: 90 Resp: 21 SpO2: 100 % O2 Device: Mechanical Ventilator   Height: 157.5 cm (5' 2\") Weight: 75.2 kg (165 lb 12.6 oz)  Estimated body mass index is 30.31 kg/(m^2) as calculated from the following:    Height as of this encounter: 1.575 m (5' 2\").    Weight as of this encounter: 75.2 kg (165 lb 12.6 oz).    Physical Exam:    Neuro:  Intubated, following commands, off sedation  Cardiac:  RRR  Pulm:  Intubated, bilateral breath sounds  Abdomen:  soft  Extremities:  Bilateral pulses    Labs:      Lab Results   Component Value Date    WBC 13.7* 2017    HGB 8.5* 2017    HCT 32.3* 2017    * 2017    * 2017    POTASSIUM 4.6 2017    CHLORIDE 116* 2017    CO2 23 2017    BUN 13 2017    CR 0.79 2017    * 2017    NTBNPI 2437* 10/14/2016    NTBNP 924* 2017    TROPI 0.036 10/25/2016    AST 31 2016    ALT 56* 2016    ALKPHOS 103 2016    BILITOTAL 0.4 2016    INR 1.55* 2017       Imagin/30 EKG:  Sinus " bradycardia with low voltage QRS, inferior infarct, possible anterolateral infarct    2/7/17 EKG:  V paced 80    10/25/16 Echo:  Limited, EF 40-45% with reduced LV function, mild diffuse hypokinesis, global RV function normal, since 10/18/16 there has been interval improvement in LV systolic function    1/30/17 Coronary Angiogram:  Two vessel CAD (mid LAD and mid RCA) with left main disease    2/2/17 Carotid US: Right: less than 50% stenosis of the ICA due to mild plaque; Left: less than 50% stenosis of ICA due to mild plaque    2/2/17 CXR:  No acute cardiopulmonary abnormality; streaky opacity in the left midlung likely atelectasis/scarring    2/2/17 CT chest w/o contrast:  Mosaic attenuation of the lungs, can be seen with small vessel or small airways disease, pulmonary nodules, and calcification of LAD    2/2/17 Lower Extremity Venous Mapping Bilaterally:  4.6 cm right popliteal cyst                                                                                                    Assessment and Plan:  Patient is a 76 y.o. F with a PMH of CVA 10/2016 (residual word finding difficulties), DM, CAD/Cardiomyopathy (LV EF 40-45%), HTN, Paroxsymal A Fib (anticoagulated), Former Smoker, and NOAH who is s/p CABG X 3, MAZE procedure, left atrial appendage ligation (AtriClip 45), and closure of patent foramen ovale 2/6/17.    Neuro:  -Intubated and off sedation - following commands  -Hx of CVA 10/2016 (thought to be 2/2 A Fib) with residual word finding difficulties  -Depression and Anxiety   -Home medications: Trazodone 0.5 tabs (25 mg) nightly and Venlafaxine 150 mg daily     PLAN:  -Off sedation for extubation  -Once extubated will evaluate pain and for appropriate standing analgesia  -Fentanyl PRN  -Tylenol 650 mg q 4 hr PRN    CV:  -Requiring Epi for hemodynamics  -s/p CABG X 3, MAZE procedure, left atrial appendage ligation (AtriClip 45), and closure of patent foramen ovale 2/6/17  -Mediastinal X 2 and Left pleural X  1 drains  -Hx of HTN  -Hx of Paroxsymal A Fib (anticoagulated) s/p MAZE 2/6  -Home medications:  Lipitor 40 mg at bedtime, Coreg 6.25 mg BID, Lasix 20 mg daily PRN, Lisinopril 20 mg every morning, and Coumadin/Lovenox    PLAN:  -Wean Epinephrine down by 0.01 q 2 hr for CI > 2; f/u with surgery before stopping completely  -ASA 81 mg  -F/u with surgery regarding anticoagulation and treatment plan if A Fib should occur    PULM:  -Intubated and on pressure support   -NOAH - CPAP  -Past smoker    PLAN:  -Extubate - meets extubation criteria   -Albuterol PRN    GI/FEN:  -NPO    PLAN:  -Swallow study after extubation     RENAL:  -BUN/Cr stable    PLAN:  -Monitor urine output and BUN/Cr    ENDO:  -DM II (HgA1C 6.3)  -Home medications:  Metformin 500 mg BID    PLAN:  -Insulin drip    HEME:  -H/H stable  -Anticoagulated prior to surgery 2/2 CVA suspected 2/2 A Fib  -Hx of Blood clots    PLAN:  -CBC daily    ID:  Febrile with T .3 - likely 2/2 surgery  WBC stable    PLAN:  -Continue perioperative abx Ancef per surgery  -Monitor for signs/symptoms of infection    Ppx:  Protonix, SC Heparin    Discussed with Dr. Malcolm.    Amy Palma, [place] 2  Pager [phone]

## 2017-02-07 NOTE — PLAN OF CARE
Problem: Goal Outcome Summary  Goal: Goal Outcome Summary  OT/CR: OT evaluation completed and treatment initiated. Pt educated on sternal precautions and implications for ADLs. Pt mod A for supine > EOB. Pt dangled EOB with min A and R sided lean progressing to CGA. Pt completed sit > stand and transfer to bedside chair with min A x2. Pt limited by fatigue and post-op pain. Pt hemodynamically stable throughout session.     Rec TCU at this time as pt presents below baseline in ADLs/IADLs.

## 2017-02-07 NOTE — PROGRESS NOTES
Pt extubated without problems.  Pt placed on NC 4lpm with SaO2 98%.  BBS clear but decreased in the bases.

## 2017-02-07 NOTE — PROGRESS NOTES
02/07/17 1500   Quick Adds   Type of Visit Initial Occupational Therapy Evaluation   Living Environment   Lives With child(gris), adult   Living Arrangements house   Living Environment Comment Pt and SO previously living in [place]. Per family, pt planning to stay at daughters home during recovery and then determine long term living situation. Pt's daughter's home has 3 levels and bedroom pt would be staying in is on 3rd floor. Pt with both walk-in shower and tub shower.    Self-Care   Dominant Hand right   Usual Activity Tolerance good   Current Activity Tolerance moderate   Regular Exercise yes   Activity/Exercise Type walking   Exercise Amount/Frequency 3-5 times/wk   Equipment Currently Used at Home none   Activity/Exercise/Self-Care Comment Pt independent with ADLs/IADLs at baseline. Pt ambulated without AD.    Functional Level Prior   Ambulation 0-->independent   Transferring 0-->independent   Toileting 0-->independent   Bathing 0-->independent   Dressing 0-->independent   Eating 0-->independent   Communication 0-->understands/communicates without difficulty   Swallowing 0-->swallows foods/liquids without difficulty   Cognition 0 - no cognition issues reported   Fall history within last six months no   General Information   Onset of Illness/Injury or Date of Surgery - Date 02/06/17   Referring Physician Praneeth Hills MD    Patient/Family Goals Statement per  report they would like to get stronger to return to [place]    Additional Occupational Profile Info/Pertinent History of Current Problem Pt POD #1 Median Sternotomy, Cardiopulmonary Bypass, Coronary Artery Bypass Graft x3, Left Internal Mammary Artery [place], Endoscopic Left Leg Saphenous Vein [place], MAZE Procedure, Left Atrial Appendage Ligation (AtriClip 45.   Precautions/Limitations fall precautions;oxygen therapy device and L/min;sternal precautions   Heart Disease Risk Factors Age;Gender;Medical history;Overweight   General  Observations Activity orders: Up with assist    Cognitive Status Examination   Orientation orientation to person, place and time   Level of Consciousness lethargic/somnolent   Able to Follow Commands mild impairment   Personal Safety (Cognitive) mild impairment   Visual Perception   Visual Perception Wears glasses   Sensory Examination   Sensory Quick Adds No deficits were identified   Pain Assessment   Patient Currently in Pain Yes, see Vital Sign flowsheet   Integumentary/Edema   Integumentary/Edema no deficits were identifed   Range of Motion (ROM)   ROM Comment BUEs WFL    Strength   Strength Comments not formally assessed due to post surgical precautions, pt with AG strength assume 3/5 MMT or greater    Mobility   Bed Mobility Comments mod A for supine > EOB and max A for scooting to EOB to have B feet placed on floor    Transfer Skill: Bed to Chair/Chair to Bed   Level of Igo: Bed to Chair minimum assist (75% patients effort)   Physical Assist/Nonphysical Assist: Bed to Chair 2 persons   Transfer Skill: Sit to Stand   Level of Igo: Sit/Stand minimum assist (75% patients effort)   Balance   Balance Comments Pt with shuffled gait and needing min A for transfer to bedside chair    Lower Body Dressing   Level of Igo: Dress Lower Body maximum assist (25% patients effort)   Instrumental Activities of Daily Living (IADL)   Previous Responsibilities meal prep;housekeeping;laundry;shopping;yardwork;medication management;finances;driving   Activities of Daily Living Analysis   Impairments Contributing to Impaired Activities of Daily Living balance impaired;cognition impaired;pain;post surgical precautions;strength decreased;postural control impaired   General Therapy Interventions   Planned Therapy Interventions ADL retraining;IADL retraining;balance training;bed mobility training;cognition;strengthening;risk factor education;progressive activity/exercise;home program guidelines;transfer  "training   Clinical Impression   Criteria for Skilled Therapeutic Interventions Met yes, treatment indicated   OT Diagnosis decreased ADL I    Influenced by the following impairments pain, post surgical precautions, core strength, balance deficits, cognitive deficits    Assessment of Occupational Performance 3-5 Performance Deficits   Identified Performance Deficits dressing, bathing, bed mobility, toileting, stairs, home management, driving   Clinical Decision Making (Complexity) Low complexity   Therapy Frequency 5 times/wk   Predicted Duration of Therapy Intervention (days/wks) 2 weeks    Anticipated Discharge Disposition Transitional Care Facility   Risks and Benefits of Treatment have been explained. Yes   Patient, Family & other staff in agreement with plan of care Yes   Clinical Impression Comments Pt presents with pain, post surgical precautions, core strength, balance deficits, cognitive deficits leading to decreased ADL I. Pt to benefit from skilled OT intervention to address the above stated deficits.    [facility] TM \"6 Clicks\"   2016, Trustees of [facility], under license to [employer].  All rights reserved.   6 Clicks Short Forms Daily Activity Inpatient Short Form   [facility]  \"6 Clicks\" Daily Activity Inpatient Short Form   1. Putting on and taking off regular lower body clothing? 2 - A Lot   2. Bathing (including washing, rinsing, drying)? 2 - A Lot   3. Toileting, which includes using toilet, bedpan or urinal? 2 - A Lot   4. Putting on and taking off regular upper body clothing? 2 - A Lot   5. Taking care of personal grooming such as brushing teeth? 3 - A Little   6. Eating meals? 3 - A Little   Daily Activity Raw Score (Score out of 24.Lower scores equate to lower levels of function) 14   Total Evaluation Time   Total Evaluation Time (Minutes) 5     "

## 2017-02-07 NOTE — PLAN OF CARE
Problem: Goal Outcome Summary  Goal: Goal Outcome Summary  RN:  1.Pt will remain hemodynamically stable.   2.Pain will be well controlled.    Outcome: Improving  Problem: Goal Outcome Summary  Goal: Goal Outcome Summary  Outcome:  Sift Duration: [id number]      NEURO: Alert and oriented x4. ETHEL, follows commands. Pain controlled with dilaudid IV pushes. Beginning to verbalize confused thoughts, pt possibly becoming delirious. Haldol 2mg administered.   CARDIAC: Weaning Epi and Levo for goal MAP > 60. Levo now off, Epi weaned to 0.06. HR paced at 90. Tmax 38.1, tylenol administered.   PULM: Extubated 12:30. Now on 2L NC SpO2 99%. Unlabored breathing.   GI/: Urine output minimal at 20-30/hr. Lasix administered 40 mg.   SKIN: Incision dressings dry and intact.   MOBILITY: Up to chair X1. Tolerated well, slightly orthostatic.

## 2017-02-07 NOTE — PLAN OF CARE
Problem: Goal Outcome Summary  Goal: Goal Outcome Summary  Outcome: Improving  Patient is sedated/sleeping, propofol off, precedex at 0.2 Mcg/kg/min. Pupils equal, round, reactive to light. Opens eyes follows commands. Wiggles toes, squeezes hands. Lungs sound course in upper lobes diminished in bases. Remains intubated, clear/white thick sputum via inline suction.        02/07/17 0400   Mechanical Ventilation   Mode CMV/AC   Oxygen Concentration % 50 %   Rate 12   Tidal Volume 400   PEEP 5   Peak Inspiratory Pressure (cmH2O) 12     HR 80 paced, no ectopy overnight, underlying rhythm is sinus rate in 60's when pacer paused.        02/07/17 0400   Pacemaker   Pacemaker Temporary   Atrial Output (milliamps) 0 milliamps   Ventricular Output (milliamps) 15 milliamps   Ventricular Sensitivity (mV) 2   Pacemaker Set Rate (beats/min) 80 BPM   Pacer Mode VVI   Function Sensing;Capturing   Paced Amount Completely paced (100%)   Battery Check Done   Site Assessment UTV   Dressing Status Normal: Clean, Dry & Intact   Dressing Change Due 02/08/17 02/07/17 0400   Invasive Hemodynamic Monitoring   CVP (mmHg) 8 mmHg   Pulm Artery Pressure (PAP) 28/16 mmHg   Pulm Artery Pressure (PAP) Mean 20 mmHg   Pulm Capillary Wedge Pressure (PCWP) (dbp)   Cardiac Output (CO) 3.6 L/min   Cardiac Index (CI) 2 L/min/m2   Saturated Venous Oxygen (SVO2) 52 %   SaO2 97   Systemic Vasular Resistance (SVR) 1265 (dyne*sec)/cm5   SVRI (dyne*sec)/cm5 2277 (dyne*sec)/cm5   Pulm Vascular Resistance (PVR) 89 (dyne*sec)/cm5   PVRI (dyne*sec)/cm5 160 (dyne*sec)/cm5   Stroke Volume (SV) 45 mL   SVI 25   Left Ventricular Stroke Work (LVSW) 30 g/m/m2   LVSWI 16.7   Right Ventricular Stroke Work (RVSW) 2.5 g/m/m2     bowel sounds hypoactive, not passin gas. Bacon remains in place and is patent with dark jessica urine.  Sternal incision covered, is clean/dry/intact. Three chest tube drains present, needing to be stripped and milked often due to clots  present. Putting out about 20-30 ml of serosanguinous drainage collectively. Tylenol given for fevers as well as bathed, temp from 101.3 down to 99.9 F.   Denies pain.   Patient received one unit of PRBC and 500 of albumin for low urine output and CVP dropped for 16 to 8.  Receiving Epi 0.08 Mcg/kg/min, Levophed 0.03 Mcg/kg/min, Insulin drip algorithm 2 1 unit per hour. Precedex 0.2 Mcg/kg/min   Plan to wait for her wake up some more this morning and extubate when ready.   Continue to monitor patient and update team of changes.

## 2017-02-07 NOTE — PLAN OF CARE
Problem: Goal Outcome Summary  Goal: Goal Outcome Summary  Outcome: No Change  With sedation weaned down, patient follows commands and moves all extremities; pupils 3mm, equal and reactive. Lung sounds clear. CMV FiO2 40%, rate 12, tidal volume 400, peep 5. Scant amt secretions. SVO2 39-49; MDs aware. HR 79-80; paced rhythm. Pulse dopplerable.  MAP goal >35 and SBP less than 130.  Had to go up on pressors to keep MAP greater than 65; MD notified.  Will start vaso at 2 units per order.  CVP 12, 16, 14. PA pressures 38/20, 32/18, 38/22. SVR 1672, 2112, 880. CO 2.7, 3.4, 4.4. CI 1.5, 1.8, 2.3. UOP 25-50ml/hr; MD notified. CT output 200ml first hour on floor, then 10-70ml/hr. Hgb 8.7 at 22:00; one unit of PRBC ordered.  Replaced potassium and phos per protocol.  Tmax 38.2; administered tylenol and applied ice packs. Family updated.  Will continue to closely monitor and notify MD of any changes.

## 2017-02-07 NOTE — PROGRESS NOTES
"CV Surgery Note  02/07/2017    Subjective and Interval Events: From OR last afternoon.  1 U pRBC overnight.    Objective:   Temp: 99.9  F (37.7  C) Temp src: Pulmonary Artery BP: 97/59 mmHg   Heart Rate: 90 Resp: 15 SpO2: 98 % O2 Device: Mechanical Ventilator   Height: 157.5 cm (5' 2\") Weight: 75.2 kg (165 lb 12.6 oz)  Estimated body mass index is 30.31 kg/(m^2) as calculated from the following:    Height as of this encounter: 1.575 m (5' 2\").    Weight as of this encounter: 75.2 kg (165 lb 12.6 oz).  Ventilation Mode: CPAP/PS  FiO2 (%): 50 %  Rate Set (breaths/minute): 12 breaths/min  Tidal Volume Set (mL): 400 mL  PEEP (cm H2O): 5 cmH2O  Pressure Support (cm H2O): 7 cmH2O  Oxygen Concentration (%): 50 %  Resp: 15    Exam:   Gen: Intubated, sedated  Head: NCAT  Chest: Mech vent, paced at 80, CT output more thin, serosang  Abd: Soft, nt/nd  Ext: WWP    Drips: Levo 0.03, Epi 0.08    UO: 1.6L/205  CT: 670/200    WBC: 13.7, Hgb: 10.2, Plt: 139  Na: 148, K: 4.8, Crt: 0.79    Imaging:     CXR:    Impression:     No significant change in bilateral perihilar and lower lobe opacities  with probable small left pleural effusion.    Assessment and Plan: POD # 1 s/p 3v CABG, modified maze with pulmonary isolation, left atrial appendage ligation, PFO closure.    Neuro: Wean sedation as able this AM for PST, following commands appropriately  CV: Wean levo as able, filling pressures down a bit, 250 albumin, wean epi as able, likely will decrease with extubation  Resp: Mech vent, PST this AM, likely extubate later this morning  FEN/GI: Will hold on FT until extubated, bedside swallow eval prior to diet  Renal/: UOP marginal will re-eval after albumin  Heme: Hgb stable s/p 1 U last evening  ID: Periop abx  Endo: SSI  PPx: SQH today, protonix  Dispo: CVICU    Seen and discussed with fellow, Dr. Jeana English MD  General Surgery  Pager: [phone]            "

## 2017-02-07 NOTE — OP NOTE
DATE OF PROCEDURE:  02/06/2017       PREOPERATIVE DIAGNOSES:   1.  Coronary artery disease.   2.  Atrial fibrillation.   3.  Left ventricular dysfunction.      POSTOPERATIVE DIAGNOSES:   1.  Coronary artery disease.   2.  Atrial fibrillation.   3.  Left ventricular dysfunction.   4.  Patent foramen ovale.      OPERATIVE PROCEDURES:   1.  Coronary artery bypass grafting x3 (left internal mammary artery, left anterior descending artery, saphenous vein graft to first diagonal artery, saphenous vein graft to distal right coronary artery).   2.  Modified maze procedure with Radiofrequency ablation with bilateral pulmonary vein isolation using the AtriCure device.   3.  Left atrial appendage ligation with 45 mm AtriClip.   4.  Closure of patent foramen ovale.   5.  Endoscopic vein harvest.      SURGEON:  Mikhail Quiñones MD      ASSISTANTS:  Praneeth Hills MD and Yanna Santiago PA-C      OPERATIVE INDICATIONS:  Carlos Alberto Fenton is a 76-year-old female with coronary artery disease.  A decision was made to proceed with coronary bypass grafting with modified maze procedure.  I discussed risks and benefits of surgery with the patient and family including the risk of death, stroke, bleeding, wound infection, renal failure, arrhythmias.  He is willing to proceed with surgery.      OPERATIVE FINDINGS:  The patient's sternum was of adequate quality.  The veins obtained were adequate for bypass grafting.  The left internal mammary artery was of adequate quality with good flow.  The vessels bypassed include the left anterior descending artery which was a 2 mm vessel with proximal stenosis, the first diagonal artery was 1.75 mm in diameter with proximal stenosis and the distal right coronary artery was 2.5 mm in diameter with proximal stenosis.  There was also a small patent foramen ovale.      DESCRIPTION OF PROCEDURE:  The patient was brought to operating room in stable condition.  Following the general anesthesia, the patient's chest,  abdomen and lower extremities were prepped and draped in the usual sterile manner.  A long segment of saphenous vein was harvested from the left lower extremity from the left greater saphenous vein using endoscopic vein harvest techniques.  Simultaneously a median sternotomy was performed.  The left internal mammary artery was harvested from its bed and dilated with topical papaverine.  Preparation of cardiopulmonary bypass included ACT-guided heparinization and admission of Amicar.  The aorta was cannulated with a 22-Italian arterial cannula via 3-0 Tevdek pursestring pledgeted suture x2.  Angled venous cannula was inserted in both superior and inferior vena cava.  Antegrade and retrograde cardioplegic cannula were placed.  Full cardiopulmonary bypass was initiated.  The aortic pressure was temporarily reduced and the aorta was cross-clamped.  One liter of cold blood cardioplegia was administered via the antegrade and retrograde routes.  Subsequent dose of cardioplegia given at no greater than 20 minute intervals.  Bilateral pulmonary vein isolation on the right and left sides was performed with radiofrequency ablation using the AtriCure device.  Two firings were applied of the AtriCure clamp.  Next, a 45 mm ActriClip was placed at the base of the left atrial appendage.  Next the caval tapes were tightened.  The right atriotomy was performed.  The small patent foramen ovale was closed with double layer 4-0 Prolene suture.  The right atriotomy was closed with double layer 4-0 Prolene suture.  A reverse segment of saphenous vein was anastomosed end-to-side to an arteriotomy in the distal portion of the right coronary artery using 7-0 Prolene.  A second reverse segment of saphenous vein was anastomosed end-to-side to an arteriotomy in the mid portion of the first diagonal artery using 7-0 Prolene.  The distal end of the left internal mammary artery was anastomosed end-to-side to an arteriotomy in the mid portion of the  left anterior descending artery using 7-0 Prolene.  Proximal ends of the 2 vein grafts anastomosed two 4 mm aortotomies using 6-0 Prolene.  Warm dose of retrograde hotshot cardioplegia was given and with high suction on the aortic root vent the cross-clamp was released.  Organized rhythm resumed without need for any defibrillations.  Two right ventricular pacing wires were placed.  Rewarming and reperfusing allowed.  The patient gradually weaned off cardiopulmonary bypass using low dose epinephrine for termination of cardiopulmonary bypass.  LV function was approximately 45% to 50%.  Protamine was given and all cannulas removed.  Careful hemostasis obtained.  Two anterior mediastinal and one left pleural chest tube were placed.  Sternum was closed with surgical steel wires.  Fascia, subcutaneous tissue, skin of chest were closed in layers.  The patient transferred to ICU in stable but critical condition.         GUY LEE MD             D: 2017 09:40   T: 2017 10:01   MT: jeanie      Name:     MENDOZA GREENBERG   MRN:      [id number]        Account:        [id number]   :      1940           Procedure Date: 2017      Document: [id number]       cc: Gonsalo Lobato MD       [facility] Surgery Billing

## 2017-02-08 ENCOUNTER — APPOINTMENT (OUTPATIENT)
Dept: CT IMAGING | Facility: CLINIC | Age: 77
DRG: 228 | End: 2017-02-08
Attending: SURGERY
Payer: MEDICARE

## 2017-02-08 ENCOUNTER — APPOINTMENT (OUTPATIENT)
Dept: PHYSICAL THERAPY | Facility: CLINIC | Age: 77
DRG: 228 | End: 2017-02-08
Attending: SURGERY
Payer: MEDICARE

## 2017-02-08 ENCOUNTER — ALLIED HEALTH/NURSE VISIT (OUTPATIENT)
Dept: NEUROLOGY | Facility: CLINIC | Age: 77
DRG: 228 | End: 2017-02-08
Attending: PSYCHIATRY & NEUROLOGY
Payer: MEDICARE

## 2017-02-08 ENCOUNTER — APPOINTMENT (OUTPATIENT)
Dept: GENERAL RADIOLOGY | Facility: CLINIC | Age: 77
DRG: 228 | End: 2017-02-08
Attending: SURGERY
Payer: MEDICARE

## 2017-02-08 ENCOUNTER — APPOINTMENT (OUTPATIENT)
Dept: CARDIOLOGY | Facility: CLINIC | Age: 77
DRG: 228 | End: 2017-02-08
Attending: SURGERY
Payer: MEDICARE

## 2017-02-08 ENCOUNTER — APPOINTMENT (OUTPATIENT)
Dept: OCCUPATIONAL THERAPY | Facility: CLINIC | Age: 77
DRG: 228 | End: 2017-02-08
Attending: SURGERY
Payer: MEDICARE

## 2017-02-08 DIAGNOSIS — R56.9 SEIZURES (H): Primary | ICD-10-CM

## 2017-02-08 LAB
ALBUMIN SERPL-MCNC: 3.2 G/DL (ref 3.4–5)
ALBUMIN SERPL-MCNC: 3.7 G/DL (ref 3.4–5)
ALBUMIN UR-MCNC: 30 MG/DL
ALP SERPL-CCNC: 38 U/L (ref 40–150)
ALP SERPL-CCNC: 43 U/L (ref 40–150)
ALT SERPL W P-5'-P-CCNC: 834 U/L (ref 0–50)
ALT SERPL W P-5'-P-CCNC: 840 U/L (ref 0–50)
AMMONIA PLAS-SCNC: 31 UMOL/L (ref 10–50)
AMYLASE SERPL-CCNC: 120 U/L (ref 30–110)
ANION GAP SERPL CALCULATED.3IONS-SCNC: 11 MMOL/L (ref 3–14)
ANION GAP SERPL CALCULATED.3IONS-SCNC: 11 MMOL/L (ref 3–14)
ANION GAP SERPL CALCULATED.3IONS-SCNC: 8 MMOL/L (ref 3–14)
ANION GAP SERPL CALCULATED.3IONS-SCNC: 8 MMOL/L (ref 3–14)
APPEARANCE UR: ABNORMAL
AST SERPL W P-5'-P-CCNC: 1303 U/L (ref 0–45)
AST SERPL W P-5'-P-CCNC: 1433 U/L (ref 0–45)
BACTERIA #/AREA URNS HPF: ABNORMAL /HPF
BASE DEFICIT BLDA-SCNC: 0.8 MMOL/L
BASE DEFICIT BLDA-SCNC: 2.4 MMOL/L
BASE DEFICIT BLDA-SCNC: 8.6 MMOL/L
BASE DEFICIT BLDA-SCNC: 9.4 MMOL/L
BASE DEFICIT BLDV-SCNC: 1 MMOL/L
BASE EXCESS BLDV CALC-SCNC: 2 MMOL/L
BILIRUB SERPL-MCNC: 1.4 MG/DL (ref 0.2–1.3)
BILIRUB SERPL-MCNC: 1.6 MG/DL (ref 0.2–1.3)
BILIRUB UR QL STRIP: NEGATIVE
BUN SERPL-MCNC: 19 MG/DL (ref 7–30)
BUN SERPL-MCNC: 20 MG/DL (ref 7–30)
BUN SERPL-MCNC: 22 MG/DL (ref 7–30)
BUN SERPL-MCNC: 23 MG/DL (ref 7–30)
CA-I BLD-MCNC: 4.7 MG/DL (ref 4.4–5.2)
CALCIUM SERPL-MCNC: 7 MG/DL (ref 8.5–10.1)
CALCIUM SERPL-MCNC: 7.8 MG/DL (ref 8.5–10.1)
CALCIUM SERPL-MCNC: 8 MG/DL (ref 8.5–10.1)
CALCIUM SERPL-MCNC: 8.2 MG/DL (ref 8.5–10.1)
CHLORIDE SERPL-SCNC: 110 MMOL/L (ref 94–109)
CHLORIDE SERPL-SCNC: 112 MMOL/L (ref 94–109)
CHLORIDE SERPL-SCNC: 113 MMOL/L (ref 94–109)
CHLORIDE SERPL-SCNC: 116 MMOL/L (ref 94–109)
CO2 SERPL-SCNC: 21 MMOL/L (ref 20–32)
CO2 SERPL-SCNC: 22 MMOL/L (ref 20–32)
CO2 SERPL-SCNC: 27 MMOL/L (ref 20–32)
CO2 SERPL-SCNC: 27 MMOL/L (ref 20–32)
COLOR UR AUTO: YELLOW
CREAT SERPL-MCNC: 0.81 MG/DL (ref 0.52–1.04)
CREAT SERPL-MCNC: 0.83 MG/DL (ref 0.52–1.04)
CREAT SERPL-MCNC: 0.84 MG/DL (ref 0.52–1.04)
CREAT SERPL-MCNC: 1.01 MG/DL (ref 0.52–1.04)
ERYTHROCYTE [DISTWIDTH] IN BLOOD BY AUTOMATED COUNT: 19.2 % (ref 10–15)
GFR SERPL CREATININE-BSD FRML MDRD: 53 ML/MIN/1.7M2
GFR SERPL CREATININE-BSD FRML MDRD: 66 ML/MIN/1.7M2
GFR SERPL CREATININE-BSD FRML MDRD: 67 ML/MIN/1.7M2
GFR SERPL CREATININE-BSD FRML MDRD: 69 ML/MIN/1.7M2
GLUCOSE BLDC GLUCOMTR-MCNC: 112 MG/DL (ref 70–99)
GLUCOSE BLDC GLUCOMTR-MCNC: 116 MG/DL (ref 70–99)
GLUCOSE BLDC GLUCOMTR-MCNC: 130 MG/DL (ref 70–99)
GLUCOSE BLDC GLUCOMTR-MCNC: 144 MG/DL (ref 70–99)
GLUCOSE BLDC GLUCOMTR-MCNC: 147 MG/DL (ref 70–99)
GLUCOSE BLDC GLUCOMTR-MCNC: 166 MG/DL (ref 70–99)
GLUCOSE BLDC GLUCOMTR-MCNC: 168 MG/DL (ref 70–99)
GLUCOSE BLDC GLUCOMTR-MCNC: 182 MG/DL (ref 70–99)
GLUCOSE BLDC GLUCOMTR-MCNC: 184 MG/DL (ref 70–99)
GLUCOSE BLDC GLUCOMTR-MCNC: 203 MG/DL (ref 70–99)
GLUCOSE BLDC GLUCOMTR-MCNC: 93 MG/DL (ref 70–99)
GLUCOSE SERPL-MCNC: 126 MG/DL (ref 70–99)
GLUCOSE SERPL-MCNC: 145 MG/DL (ref 70–99)
GLUCOSE SERPL-MCNC: 162 MG/DL (ref 70–99)
GLUCOSE SERPL-MCNC: 176 MG/DL (ref 70–99)
GLUCOSE UR STRIP-MCNC: NEGATIVE MG/DL
HCO3 BLD-SCNC: 16 MMOL/L (ref 21–28)
HCO3 BLD-SCNC: 17 MMOL/L (ref 21–28)
HCO3 BLD-SCNC: 24 MMOL/L (ref 21–28)
HCO3 BLD-SCNC: 25 MMOL/L (ref 21–28)
HCO3 BLDV-SCNC: 26 MMOL/L (ref 21–28)
HCO3 BLDV-SCNC: 28 MMOL/L (ref 21–28)
HCT VFR BLD AUTO: 28.9 % (ref 35–47)
HGB BLD-MCNC: 9 G/DL (ref 11.7–15.7)
HGB UR QL STRIP: ABNORMAL
INTERPRETATION ECG - MUSE: NORMAL
INTERPRETATION ECG - MUSE: NORMAL
KETONES UR STRIP-MCNC: 5 MG/DL
LACTATE BLD-SCNC: 1.5 MMOL/L (ref 0.7–2.1)
LACTATE BLD-SCNC: 1.9 MMOL/L (ref 0.7–2.1)
LEUKOCYTE ESTERASE UR QL STRIP: NEGATIVE
LIPASE SERPL-CCNC: 48 U/L (ref 73–393)
MAGNESIUM SERPL-MCNC: 1.9 MG/DL (ref 1.6–2.3)
MAGNESIUM SERPL-MCNC: 2.1 MG/DL (ref 1.6–2.3)
MCH RBC QN AUTO: 29.6 PG (ref 26.5–33)
MCHC RBC AUTO-ENTMCNC: 31.1 G/DL (ref 31.5–36.5)
MCV RBC AUTO: 95 FL (ref 78–100)
NITRATE UR QL: NEGATIVE
O2/TOTAL GAS SETTING VFR VENT: ABNORMAL %
OXYHGB MFR BLD: 93 % (ref 92–100)
OXYHGB MFR BLD: 96 % (ref 92–100)
OXYHGB MFR BLD: 97 % (ref 92–100)
OXYHGB MFR BLDV: 45 %
OXYHGB MFR BLDV: 45 %
PCO2 BLD: 29 MM HG (ref 35–45)
PCO2 BLD: 31 MM HG (ref 35–45)
PCO2 BLD: 47 MM HG (ref 35–45)
PCO2 BLD: 47 MM HG (ref 35–45)
PCO2 BLDV: 54 MM HG (ref 40–50)
PCO2 BLDV: 56 MM HG (ref 40–50)
PH BLD: 7.31 PH (ref 7.35–7.45)
PH BLD: 7.34 PH (ref 7.35–7.45)
PH BLDV: 7.29 PH (ref 7.32–7.43)
PH BLDV: 7.31 PH (ref 7.32–7.43)
PH UR STRIP: 5.5 PH (ref 5–7)
PHOSPHATE SERPL-MCNC: 2.6 MG/DL (ref 2.5–4.5)
PLATELET # BLD AUTO: 110 10E9/L (ref 150–450)
PO2 BLD: 100 MM HG (ref 80–105)
PO2 BLD: 124 MM HG (ref 80–105)
PO2 BLD: 79 MM HG (ref 80–105)
PO2 BLD: 86 MM HG (ref 80–105)
PO2 BLDV: 29 MM HG (ref 25–47)
PO2 BLDV: 29 MM HG (ref 25–47)
POTASSIUM SERPL-SCNC: 3.8 MMOL/L (ref 3.4–5.3)
POTASSIUM SERPL-SCNC: 4.2 MMOL/L (ref 3.4–5.3)
POTASSIUM SERPL-SCNC: 4.4 MMOL/L (ref 3.4–5.3)
POTASSIUM SERPL-SCNC: 4.6 MMOL/L (ref 3.4–5.3)
PROT SERPL-MCNC: 5.4 G/DL (ref 6.8–8.8)
PROT SERPL-MCNC: 5.8 G/DL (ref 6.8–8.8)
RBC # BLD AUTO: 3.04 10E12/L (ref 3.8–5.2)
RBC #/AREA URNS AUTO: 45 /HPF (ref 0–2)
SODIUM SERPL-SCNC: 144 MMOL/L (ref 133–144)
SODIUM SERPL-SCNC: 148 MMOL/L (ref 133–144)
SP GR UR STRIP: 1.02 (ref 1–1.03)
SQUAMOUS #/AREA URNS AUTO: <1 /HPF (ref 0–1)
TRANS CELLS #/AREA URNS HPF: 1 /HPF (ref 0–1)
TROPONIN I SERPL-MCNC: 3.02 UG/L (ref 0–0.04)
URATE CRY #/AREA URNS HPF: ABNORMAL /HPF
URN SPEC COLLECT METH UR: ABNORMAL
UROBILINOGEN UR STRIP-MCNC: NORMAL MG/DL (ref 0–2)
WBC # BLD AUTO: 16.8 10E9/L (ref 4–11)
WBC #/AREA URNS AUTO: 7 /HPF (ref 0–2)

## 2017-02-08 PROCEDURE — 25000128 H RX IP 250 OP 636: Performed by: SURGERY

## 2017-02-08 PROCEDURE — 83735 ASSAY OF MAGNESIUM: CPT | Performed by: SURGERY

## 2017-02-08 PROCEDURE — 40000048 ZZH STATISTIC DAILY SWAN MONITORING

## 2017-02-08 PROCEDURE — 40000264 ECHO COMPLETE WITH OPTISON

## 2017-02-08 PROCEDURE — 40000275 ZZH STATISTIC RCP TIME EA 10 MIN

## 2017-02-08 PROCEDURE — 81001 URINALYSIS AUTO W/SCOPE: CPT | Performed by: SURGERY

## 2017-02-08 PROCEDURE — 93306 TTE W/DOPPLER COMPLETE: CPT | Mod: 26 | Performed by: INTERNAL MEDICINE

## 2017-02-08 PROCEDURE — 94660 CPAP INITIATION&MGMT: CPT

## 2017-02-08 PROCEDURE — 00000146 ZZHCL STATISTIC GLUCOSE BY METER IP

## 2017-02-08 PROCEDURE — 71010 XR CHEST PORT 1 VW: CPT

## 2017-02-08 PROCEDURE — 82140 ASSAY OF AMMONIA: CPT | Performed by: SURGERY

## 2017-02-08 PROCEDURE — 25000128 H RX IP 250 OP 636: Performed by: ANESTHESIOLOGY

## 2017-02-08 PROCEDURE — 40000014 ZZH STATISTIC ARTERIAL MONITORING DAILY

## 2017-02-08 PROCEDURE — 84484 ASSAY OF TROPONIN QUANT: CPT | Performed by: SURGERY

## 2017-02-08 PROCEDURE — 25000125 ZZHC RX 250: Performed by: SURGERY

## 2017-02-08 PROCEDURE — 82803 BLOOD GASES ANY COMBINATION: CPT | Performed by: SURGERY

## 2017-02-08 PROCEDURE — 25500064 ZZH RX 255 OP 636: Performed by: SURGERY

## 2017-02-08 PROCEDURE — A9270 NON-COVERED ITEM OR SERVICE: HCPCS | Mod: GY | Performed by: THORACIC SURGERY (CARDIOTHORACIC VASCULAR SURGERY)

## 2017-02-08 PROCEDURE — 80048 BASIC METABOLIC PNL TOTAL CA: CPT | Performed by: SURGERY

## 2017-02-08 PROCEDURE — 82330 ASSAY OF CALCIUM: CPT | Performed by: THORACIC SURGERY (CARDIOTHORACIC VASCULAR SURGERY)

## 2017-02-08 PROCEDURE — 80053 COMPREHEN METABOLIC PANEL: CPT | Performed by: SURGERY

## 2017-02-08 PROCEDURE — 25000128 H RX IP 250 OP 636: Performed by: NEUROLOGICAL SURGERY

## 2017-02-08 PROCEDURE — 25000132 ZZH RX MED GY IP 250 OP 250 PS 637: Mod: GY | Performed by: THORACIC SURGERY (CARDIOTHORACIC VASCULAR SURGERY)

## 2017-02-08 PROCEDURE — 25000128 H RX IP 250 OP 636: Performed by: THORACIC SURGERY (CARDIOTHORACIC VASCULAR SURGERY)

## 2017-02-08 PROCEDURE — 97110 THERAPEUTIC EXERCISES: CPT | Mod: GO | Performed by: OCCUPATIONAL THERAPIST

## 2017-02-08 PROCEDURE — 83605 ASSAY OF LACTIC ACID: CPT | Performed by: THORACIC SURGERY (CARDIOTHORACIC VASCULAR SURGERY)

## 2017-02-08 PROCEDURE — 40000809 ZZH STATISTIC NO DOCUMENTATION TO SUPPORT CHARGE

## 2017-02-08 PROCEDURE — 97163 PT EVAL HIGH COMPLEX 45 MIN: CPT | Mod: GP | Performed by: REHABILITATION PRACTITIONER

## 2017-02-08 PROCEDURE — 99291 CRITICAL CARE FIRST HOUR: CPT | Mod: GC | Performed by: ANESTHESIOLOGY

## 2017-02-08 PROCEDURE — 97110 THERAPEUTIC EXERCISES: CPT | Mod: GP | Performed by: REHABILITATION PRACTITIONER

## 2017-02-08 PROCEDURE — 40000133 ZZH STATISTIC OT WARD VISIT: Performed by: OCCUPATIONAL THERAPIST

## 2017-02-08 PROCEDURE — 40000193 ZZH STATISTIC PT WARD VISIT: Performed by: REHABILITATION PRACTITIONER

## 2017-02-08 PROCEDURE — 25000132 ZZH RX MED GY IP 250 OP 250 PS 637: Mod: GY | Performed by: SURGERY

## 2017-02-08 PROCEDURE — 82150 ASSAY OF AMYLASE: CPT | Performed by: SURGERY

## 2017-02-08 PROCEDURE — 70450 CT HEAD/BRAIN W/O DYE: CPT

## 2017-02-08 PROCEDURE — 85027 COMPLETE CBC AUTOMATED: CPT | Performed by: SURGERY

## 2017-02-08 PROCEDURE — 95951 ZZHC EEG VIDEO < 12 HR: CPT | Mod: 52,ZF

## 2017-02-08 PROCEDURE — 83690 ASSAY OF LIPASE: CPT | Performed by: SURGERY

## 2017-02-08 PROCEDURE — 40000196 ZZH STATISTIC RAPCV CVP MONITORING

## 2017-02-08 PROCEDURE — 87040 BLOOD CULTURE FOR BACTERIA: CPT | Performed by: SURGERY

## 2017-02-08 PROCEDURE — A9270 NON-COVERED ITEM OR SERVICE: HCPCS | Mod: GY | Performed by: SURGERY

## 2017-02-08 PROCEDURE — 84100 ASSAY OF PHOSPHORUS: CPT | Performed by: SURGERY

## 2017-02-08 PROCEDURE — 25000125 ZZHC RX 250: Performed by: THORACIC SURGERY (CARDIOTHORACIC VASCULAR SURGERY)

## 2017-02-08 PROCEDURE — 82805 BLOOD GASES W/O2 SATURATION: CPT | Performed by: THORACIC SURGERY (CARDIOTHORACIC VASCULAR SURGERY)

## 2017-02-08 PROCEDURE — 20000004 ZZH R&B ICU UMMC

## 2017-02-08 RX ORDER — PIPERACILLIN SODIUM, TAZOBACTAM SODIUM 4; .5 G/20ML; G/20ML
4.5 INJECTION, POWDER, LYOPHILIZED, FOR SOLUTION INTRAVENOUS EVERY 6 HOURS
Status: DISCONTINUED | OUTPATIENT
Start: 2017-02-08 | End: 2017-02-11

## 2017-02-08 RX ORDER — PANTOPRAZOLE SODIUM 40 MG/1
40 TABLET, DELAYED RELEASE ORAL EVERY MORNING
Status: DISCONTINUED | OUTPATIENT
Start: 2017-02-08 | End: 2017-02-17 | Stop reason: HOSPADM

## 2017-02-08 RX ADMIN — MUPIROCIN 0.5 G: 20 OINTMENT TOPICAL at 20:44

## 2017-02-08 RX ADMIN — Medication 2 G: at 23:00

## 2017-02-08 RX ADMIN — HUMAN ALBUMIN MICROSPHERES AND PERFLUTREN 6 ML: 10; .22 INJECTION, SOLUTION INTRAVENOUS at 13:00

## 2017-02-08 RX ADMIN — Medication 2 UNITS/HR: at 20:36

## 2017-02-08 RX ADMIN — HYDROMORPHONE HYDROCHLORIDE 0.4 MG: 10 INJECTION, SOLUTION INTRAMUSCULAR; INTRAVENOUS; SUBCUTANEOUS at 02:10

## 2017-02-08 RX ADMIN — MUPIROCIN 0.5 G: 20 OINTMENT TOPICAL at 11:19

## 2017-02-08 RX ADMIN — HYDROMORPHONE HYDROCHLORIDE 2 MG: 2 TABLET ORAL at 07:10

## 2017-02-08 RX ADMIN — ASPIRIN 81 MG CHEWABLE TABLET 81 MG: 81 TABLET CHEWABLE at 17:28

## 2017-02-08 RX ADMIN — HEPARIN SODIUM 5000 UNITS: 10000 INJECTION, SOLUTION INTRAVENOUS; SUBCUTANEOUS at 11:28

## 2017-02-08 RX ADMIN — PIPERACILLIN SODIUM,TAZOBACTAM SODIUM 4.5 G: 4; .5 INJECTION, POWDER, FOR SOLUTION INTRAVENOUS at 08:46

## 2017-02-08 RX ADMIN — POTASSIUM PHOSPHATE, MONOBASIC AND POTASSIUM PHOSPHATE, DIBASIC 10 MMOL: 224; 236 INJECTION, SOLUTION INTRAVENOUS at 05:26

## 2017-02-08 RX ADMIN — HEPARIN SODIUM 5000 UNITS: 10000 INJECTION, SOLUTION INTRAVENOUS; SUBCUTANEOUS at 20:42

## 2017-02-08 RX ADMIN — PIPERACILLIN SODIUM,TAZOBACTAM SODIUM 4.5 G: 4; .5 INJECTION, POWDER, FOR SOLUTION INTRAVENOUS at 20:41

## 2017-02-08 RX ADMIN — HEPARIN SODIUM 5000 UNITS: 10000 INJECTION, SOLUTION INTRAVENOUS; SUBCUTANEOUS at 02:10

## 2017-02-08 RX ADMIN — EPINEPHRINE 0.07 MCG/KG/MIN: 1 INJECTION, SOLUTION INTRAMUSCULAR; SUBCUTANEOUS at 13:42

## 2017-02-08 RX ADMIN — MILRINONE LACTATE 0.25 MCG/KG/MIN: 200 INJECTION, SOLUTION INTRAVENOUS at 14:00

## 2017-02-08 RX ADMIN — VANCOMYCIN HYDROCHLORIDE 1750 MG: 10 INJECTION, POWDER, LYOPHILIZED, FOR SOLUTION INTRAVENOUS at 10:04

## 2017-02-08 RX ADMIN — PHENYLEPHRINE HYDROCHLORIDE 0.5 MCG/KG/MIN: 10 INJECTION, SOLUTION INTRAMUSCULAR; INTRAVENOUS; SUBCUTANEOUS at 22:45

## 2017-02-08 RX ADMIN — POTASSIUM CHLORIDE 20 MEQ: 29.8 INJECTION, SOLUTION INTRAVENOUS at 22:33

## 2017-02-08 RX ADMIN — PIPERACILLIN SODIUM,TAZOBACTAM SODIUM 4.5 G: 4; .5 INJECTION, POWDER, FOR SOLUTION INTRAVENOUS at 15:47

## 2017-02-08 RX ADMIN — SODIUM BICARBONATE 50 MEQ: 84 INJECTION INTRAVENOUS at 11:11

## 2017-02-08 ASSESSMENT — PAIN DESCRIPTION - DESCRIPTORS: DESCRIPTORS: ACHING

## 2017-02-08 NOTE — PROGRESS NOTES
"CV ICU  17    SUBJECTIVE:    Summary:    Patient is a 76 y.o. 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    Interval History:  Patient sleep this am and smacking lips.  She answers questions with stimulation.  Patient still opening and closing mouth constantly over the day with no improvement    OBJECTIVE:    Vital signs:  Temp: 100.9  F (38.3  C) Temp src: Pulmonary Artery     Heart Rate: 102 Resp: 16 SpO2: 96 % O2 Device: Nasal cannula Oxygen Delivery: 3 LPM Height: 157.5 cm (5' 2\") Weight: 75.8 kg (167 lb 1.7 oz)  Estimated body mass index is 30.56 kg/(m^2) as calculated from the following:    Height as of this encounter: 1.575 m (5' 2\").    Weight as of this encounter: 75.8 kg (167 lb 1.7 oz).      Physical Exam:    Neuro:  Sleepy, lip smacking constantly, follows commands and moves all extremities   Cardiac:  RRR  Pulm:  Intubated, coarse bilateral breath sounds  Abdomen:  soft  Extremities:  Bilateral pulses, warm extremities    Labs:      Lab Results   Component Value Date    WBC 16.8* 2017    HGB 9.0* 2017    HCT 28.9* 2017    * 2017     2017    POTASSIUM 4.6 2017    CHLORIDE 110* 2017    CO2 22 2017    BUN 23 2017    CR 1.01 2017    * 2017    NTBNPI 2437* 10/14/2016    NTBNP 924* 2017    TROPI 3.025* 2017    AST 1433* 2017    * 2017    ALKPHOS 43 2017    BILITOTAL 1.4* 2017    INR 1.55* 2017       Imagin/30 EKG:  Sinus bradycardia with low voltage QRS, inferior infarct, possible anterolateral infarct    17 EKG:  V paced 80    10/25/16 Echo:  Limited, EF 40-45% with reduced LV function, mild diffuse hypokinesis, " global RV function normal, since 10/18/16 there has been interval improvement in LV systolic function    1/30/17 Coronary Angiogram:  Two vessel CAD (mid LAD and mid RCA) with left main disease    2/2/17 Carotid US: Right: less than 50% stenosis of the ICA due to mild plaque; Left: less than 50% stenosis of ICA due to mild plaque    2/2/17 CXR:  No acute cardiopulmonary abnormality; streaky opacity in the left midlung likely atelectasis/scarring    2/2/17 CT chest w/o contrast:  Mosaic attenuation of the lungs, can be seen with small vessel or small airways disease, pulmonary nodules, and calcification of LAD    2/2/17 Lower Extremity Venous Mapping Bilaterally:  4.6 cm right popliteal cyst                                                                                                  Assessment and Plan:  Patient is a 76 y.o. F with a PMH of CVA 10/2016 (residual word finding difficulties), DM, CAD/Cardiomyopathy (LV EF 40-45%), HTN, Paroxsymal A Fib (anticoagulated), Former Smoker, and NOAH who is s/p CABG X 3, MAZE procedure, left atrial appendage ligation (AtriClip 45), and closure of patent foramen ovale 2/6/17.    Neuro:  -Patient is sleepy through out the day but answers questions with stimulation  -Constant opening and closing of mouth during the entire day  -Hx of CVA 10/2016 (thought to be 2/2 A Fib) with residual word finding difficulties  -Depression and Anxiety   -Home medications: Trazodone 0.5 tabs (25 mg) nightly and Venlafaxine 150 mg daily     PLAN:  -Neurology consult  -Tylenol 650 mg q 4 hr PRN  -Avoid sedating meds    CV:  -s/p CABG X 3, MAZE procedure, left atrial appendage ligation (AtriClip 45), and closure of patent foramen ovale 2/6/17  -Hx of HTN  -Hx of Paroxsymal A Fib (anticoagulated) s/p MAZE 2/6  -Home medications:  Lipitor 40 mg at bedtime, Coreg 6.25 mg BID, Lasix 20 mg daily PRN, Lisinopril 20 mg every morning, and Coumadin/Lovenox    PLAN:  -MAPs 59-84; PAP 54/28; CI 2; CO 3.5;  SVO2 46; patient was on Epi 0.07 overnight and unable to wean; troponin 3.025, echo 2/8 showed mildly reduced LV function (EF 45-50%), RV normal, and trivial pericardial effusion; patient was started on Milrinone drip; CVT surgery aware  -Drips:  Epi and Milrinone  -ASA 81 mg  -F/u with surgery regarding anticoagulation and treatment plan if A Fib should occur    PULM:  -Extubated 2/7  -NOAH - CPAP at night  -Past smoker    PLAN:  -protecting airway; supplemental O2 PRN  -CPAP at night  -Albuterol PRN    GI/FEN:  -Shock Liver (elevated LFTs)  -ALT/AST: 834/1433  -NPO    PLAN:  -NPO given neurological status  -CMP tomorrow    RENAL:  -Non Anion Gap Metabolic Acidosis (lactate 1.9)  -BUN/Cr trending up  -Urine 30 / hr    PLAN:  -Sodium Bicarbonate PRN   -Monitor CMP and urine output    ENDO:  -DM II (HgA1C 6.3)  -Home medications:  Metformin 500 mg BID    PLAN:  -SSI    HEME:  -H/H stable  -Anticoagulated prior to surgery 2/2 CVA suspected 2/2 A Fib  -Hx of Blood clots    PLAN:  -F/u with CVT surgery regarding anticoagulation  -CBCs    ID:  Febrile and leukocytosis (trending up)    PLAN:  -Started Vancomycin and Zosyn  -Pan Culture    Ppx:  Protonix, SC Heparin    Discussed with Dr. Malcolm.    Amy Palma MD  [place] 2 Anesthesia Resdient  Pager [phone]

## 2017-02-08 NOTE — PLAN OF CARE
Problem: Goal Outcome Summary  Goal: Goal Outcome Summary  Outcome: No Change  Pt remained stable overnight. Paced at 90. MAPs >65. Home CPAP w/ 7L O2 and sats mid 90s. TMax 101.3- receiving scheduled Tylenol. Lung sounds coarse, diminished. Pt has weak cough- encouraged to cough frequently and use IS. 40mg IV Lasix given x1 last night. Pt is oriented and follows commands, but is lethargic at times. Given prn Dilaudid PO and IV for incision pain. Currently has Epi drip infusing. Levo and Insulin drips turned off at 0600. Bacon intact with adequate output. No bm- bowel sounds hypoactive. Chest tubes x3 to suction with 170cc serosang output. Incision sites clean, dry and intact. Hemodynamic numbers: CVP 18, PAP 50s/20s-30s, SVO2 35-45, CO 3.2-4.0, CI 1.7-2.2, SVR 9914-4167. Dr. Gisell Cole updated on pt during the night. Will continue to monitor and update MD with any changes in patient's status.

## 2017-02-08 NOTE — PHARMACY-VANCOMYCIN DOSING SERVICE
Pharmacy Vancomycin Initial Note  Date of Service 2017  Patient's  1940  76 year old, female    Indication: Sepsis    Current estimated CrCl = Estimated Creatinine Clearance: 54.3 mL/min (based on Cr of 0.84).    Creatinine for last 3 days  2017:  9:06 PM Creatinine 0.56 mg/dL;  2:27 PM Creatinine 0.64 mg/dL  2017:  4:00 AM Creatinine 0.79 mg/dL  2017:  3:26 AM Creatinine 0.84 mg/dL    Recent Vancomycin Level(s) for last 3 days  No results found for requested labs within last 3 days.      Vancomycin IV Administrations (past 72 hours)      No vancomycin orders with administrations in past 72 hours.                Nephrotoxins and other renal medications (Future)    Start     Dose/Rate Route Frequency Ordered Stop    17 0830  vancomycin (VANCOCIN) 1,500 mg in NaCl 0.9 % 250 mL intermittent infusion      1,500 mg Intravenous EVERY 24 HOURS 17 0826      17 0830  piperacillin-tazobactam (ZOSYN) 4.5 g vial to attach to  mL bag      4.5 g  over 1 Hours Intravenous EVERY 6 HOURS 17 0824      17 0830  vancomycin (VANCOCIN) 1,750 mg in NaCl 0.9 % 250 mL intermittent infusion      1,750 mg (central catheter) Intravenous ONCE 17 0826      17 1445  EPINEPHrine (ADRENALIN) 5 mg in NaCl 0.9 % 250 mL infusion      0.01-0.1 mcg/kg/min × 73.7 kg  2.2-22.1 mL/hr  Intravenous CONTINUOUS 17 1441      17 1445  norepinephrine (LEVOPHED) 16 mg in D5W 250 mL infusion      0.03-0.4 mcg/kg/min × 73.7 kg  2.1-27.6 mL/hr  Intravenous CONTINUOUS 17 1442            Contrast Orders - past 72 hours     None                Plan:  1.  Start vancomycin  1750 mg IV x1 then 1500 mg IV q24h.   2.  Goal Trough Level: 15-20 mg/L   3.  Pharmacy will check trough levels as appropriate in 1-3 Days.    4. Serum creatinine levels will be ordered daily for the first week of therapy and at least twice weekly for subsequent weeks.    5. [place] method utilized to  dose vancomycin therapy: Method 2    Kat Black, PharmD  Pager [phone]

## 2017-02-08 NOTE — PLAN OF CARE
Problem: Goal Outcome Summary  Goal: Goal Outcome Summary    OT-4e: Pt min/mod Ax2 for sit>stand, and close CGAx2 for pivot to chair. Unsteady on feet, unable to come to full stand. Following less than 25% of commands, with some spontaneous participation in UE AAROM, tolerating well. Rec d/c to TCU.

## 2017-02-08 NOTE — PROGRESS NOTES
02/08/17 1300   Quick Adds   Type of Visit Initial PT Evaluation   Living Environment   Lives With child(gris), adult   Living Arrangements house   Home Accessibility no concerns   Number of Stairs to Enter Home 5   Number of Stairs Within Home 0   Transportation Available car;family or friend will provide   Self-Care   Dominant Hand right   Usual Activity Tolerance good   Regular Exercise yes   Activity/Exercise Type walking   Exercise Amount/Frequency 3-5 times/wk   Equipment Currently Used at Home none   Activity/Exercise/Self-Care Comment Pt independent with ADLs/IADLs at baseline. Pt ambulated without AD   Functional Level Prior   Ambulation 0-->independent   Transferring 0-->independent   Toileting 0-->independent   Bathing 0-->independent   Dressing 0-->independent   Eating 0-->independent   Communication 0-->understands/communicates without difficulty   Swallowing 0-->swallows foods/liquids without difficulty   Cognition 0 - no cognition issues reported   Fall history within last six months no   Which of the above functional risks had a recent onset or change? (stroke last fall, used walker briefly)   Prior Functional Level Comment Info per chart review as pt not answering questions at this time.   General Information   Onset of Illness/Injury or Date of Surgery - Date 02/06/17   Referring Physician Praneeth Hills MD   Patient/Family Goals Statement unable to state   Pertinent History of Current Problem (include personal factors and/or comorbidities that impact the POC) POD # 2 s/p 3v CABG, modified maze with pulmonary isolation, left atrial appendage ligation, PFO closure.  76 y.o. F with a PMH of CVA 10/2016 (residual word finding difficulties), DM, CAD/Cardiomyopathy (LV EF 40-45%), HTN, Paroxsymal A Fib (anticoagulated), Former Smoker, and NOAH who is s/p CABG X 3, MAZE procedure, left atrial appendage ligation (AtriClip 45), and closure of patent foramen ovale 2/6/17.   Precautions/Limitations fall  precautions   Cognitive Status Examination   Orientation (unclear)   Level of Consciousness lethargic/somnolent  (eyes frequently closed, intermittent lip smacking)   Follows Commands and Answers Questions 25% of the time  (or less)   Personal Safety and Judgment (unclear, seemed to recognize when at risk of posterior LOB)   Memory (unable to assess)   Pain Assessment   Patient Currently in Pain No  (no facial or vitals indicating pain)   Integumentary/Edema   Integumentary/Edema Comments sternal incision   Posture    Posture Forward head position;Protracted shoulders;Kyphosis   Range of Motion (ROM)   ROM Quick Adds No deficits were identified   ROM Comment BLE   Strength   Strength Comments when attempted, 0/5, but the fact she transferred bed to chair with Ax2 of nursing, suggests lack of command following and that must have 3 to 3+ LE strength   Transfer Skills   Transfer Comments Pt not following commands for transfers at this moment   Gait   Gait Comments Not safe to initiate as of yet   Balance   Balance Comments Full trunk support from high back chair for sitting up, progressing to Min A for trunk when no chair trunk support   Sensory Examination   Sensory Perception Comments unable to assess   Muscle Tone   Muscle Tone Comments unable to assess due to poor command following   General Therapy Interventions   Planned Therapy Interventions gait training;transfer training;strengthening;progressive activity/exercise;risk factor education;balance training;neuromuscular re-education;home program guidelines   Clinical Impression   Criteria for Skilled Therapeutic Intervention yes, treatment indicated   PT Diagnosis impaired functional mobility   Influenced by the following impairments decreased alertness, command following, strength, endurance, posture, skin integrity, balance   Functional limitations due to impairments Min A for sitting, not safe to stand or walk at this moment   Clinical Presentation  "Evolving/Changing   Clinical Presentation Rationale ICU, fluctuating alertness, poor command following   Clinical Decision Making (Complexity) High complexity   Therapy Frequency` (4x/wk)   Predicted Duration of Therapy Intervention (days/wks) 2/23/17   Anticipated Discharge Disposition Transitional Care Facility   Risk & Benefits of therapy have been explained Yes   Patient, Family & other staff in agreement with plan of care Yes   Clinical Impression Comments Although notes indicate residual word finding difficulties s/p stroke, pt with poor command following and alertness that would not be explained by this.   [facility] [employer] [place] TM \"6 Clicks\"   2016, Trustees of [facility], under license to [employer].  All rights reserved.   6 Clicks Short Forms Basic Mobility Inpatient Short Form   [facility] AM-PAC  \"6 Clicks\" V.2 Basic Mobility Inpatient Short Form   1. Turning from your back to your side while in a flat bed without using bedrails? 2 - A Lot   2. Moving from lying on your back to sitting on the side of a flat bed without using bedrails? 2 - A Lot   3. Moving to and from a bed to a chair (including a wheelchair)? 2 - A Lot   4. Standing up from a chair using your arms (e.g., wheelchair, or bedside chair)? 2 - A Lot   5. To walk in hospital room? 1 - Total   6. Climbing 3-5 steps with a railing? 1 - Total   Basic Mobility Raw Score (Score out of 24.Lower scores equate to lower levels of function) 10   Total Evaluation Time   Total Evaluation Time (Minutes) 5     "

## 2017-02-08 NOTE — PLAN OF CARE
"Problem: Goal Outcome Summary  Goal: Goal Outcome Summary  Outcome: Therapy, progress toward functional goals is gradual  4E PT - Evaluation completed and treatment initiated. Pt Min A progressing to only CGA for sitting without back support edge of chair, however, PROM for all seated LE motions. Responded \"yes\" when asked if fatigued, but not following commands for exercises or even answering her name.  Nursing indicates pt had dilaudid, but it was oral and over 2 1/2 hrs ago and only responds yes/no/okay, but did transfer to chair with Ax2, not lift.  Limited command following and fatigue prevents progression to standing/gait at this time.  Currently recommend TCU to progress transfers and initiate gait.          "

## 2017-02-09 ENCOUNTER — APPOINTMENT (OUTPATIENT)
Dept: GENERAL RADIOLOGY | Facility: CLINIC | Age: 77
DRG: 228 | End: 2017-02-09
Attending: SURGERY
Payer: MEDICARE

## 2017-02-09 ENCOUNTER — APPOINTMENT (OUTPATIENT)
Dept: SPEECH THERAPY | Facility: CLINIC | Age: 77
DRG: 228 | End: 2017-02-09
Attending: SURGERY
Payer: MEDICARE

## 2017-02-09 ENCOUNTER — APPOINTMENT (OUTPATIENT)
Dept: OCCUPATIONAL THERAPY | Facility: CLINIC | Age: 77
DRG: 228 | End: 2017-02-09
Attending: SURGERY
Payer: MEDICARE

## 2017-02-09 ENCOUNTER — APPOINTMENT (OUTPATIENT)
Dept: PHYSICAL THERAPY | Facility: CLINIC | Age: 77
DRG: 228 | End: 2017-02-09
Attending: SURGERY
Payer: MEDICARE

## 2017-02-09 ENCOUNTER — ALLIED HEALTH/NURSE VISIT (OUTPATIENT)
Dept: NEUROLOGY | Facility: CLINIC | Age: 77
DRG: 228 | End: 2017-02-09
Attending: PSYCHIATRY & NEUROLOGY
Payer: MEDICARE

## 2017-02-09 DIAGNOSIS — R41.82 ALTERED MENTAL STATE: Primary | ICD-10-CM

## 2017-02-09 PROBLEM — I48.91 ATRIAL FIBRILLATION WITH RAPID VENTRICULAR RESPONSE (H): Status: ACTIVE | Noted: 2017-02-09

## 2017-02-09 LAB
ALBUMIN SERPL-MCNC: 3.2 G/DL (ref 3.4–5)
ALP SERPL-CCNC: 44 U/L (ref 40–150)
ALT SERPL W P-5'-P-CCNC: 894 U/L (ref 0–50)
ALT SERPL W P-5'-P-CCNC: 921 U/L (ref 0–50)
ANION GAP SERPL CALCULATED.3IONS-SCNC: 7 MMOL/L (ref 3–14)
ANION GAP SERPL CALCULATED.3IONS-SCNC: 8 MMOL/L (ref 3–14)
AST SERPL W P-5'-P-CCNC: 1279 U/L (ref 0–45)
AST SERPL W P-5'-P-CCNC: 1413 U/L (ref 0–45)
BASE EXCESS BLDA CALC-SCNC: 2 MMOL/L
BASE EXCESS BLDV CALC-SCNC: 3.4 MMOL/L
BASE EXCESS BLDV CALC-SCNC: 4 MMOL/L
BASE EXCESS BLDV CALC-SCNC: 5.2 MMOL/L
BASE EXCESS BLDV CALC-SCNC: 5.7 MMOL/L
BILIRUB SERPL-MCNC: 1.6 MG/DL (ref 0.2–1.3)
BUN SERPL-MCNC: 20 MG/DL (ref 7–30)
BUN SERPL-MCNC: 20 MG/DL (ref 7–30)
CA-I BLD-MCNC: 5 MG/DL (ref 4.4–5.2)
CALCIUM SERPL-MCNC: 8.4 MG/DL (ref 8.5–10.1)
CALCIUM SERPL-MCNC: 8.6 MG/DL (ref 8.5–10.1)
CHLORIDE SERPL-SCNC: 107 MMOL/L (ref 94–109)
CHLORIDE SERPL-SCNC: 115 MMOL/L (ref 94–109)
CO2 SERPL-SCNC: 28 MMOL/L (ref 20–32)
CO2 SERPL-SCNC: 28 MMOL/L (ref 20–32)
CREAT SERPL-MCNC: 0.71 MG/DL (ref 0.52–1.04)
CREAT SERPL-MCNC: 0.75 MG/DL (ref 0.52–1.04)
ERYTHROCYTE [DISTWIDTH] IN BLOOD BY AUTOMATED COUNT: 19.1 % (ref 10–15)
ERYTHROCYTE [DISTWIDTH] IN BLOOD BY AUTOMATED COUNT: 19.1 % (ref 10–15)
GFR SERPL CREATININE-BSD FRML MDRD: 75 ML/MIN/1.7M2
GFR SERPL CREATININE-BSD FRML MDRD: 80 ML/MIN/1.7M2
GLUCOSE BLDC GLUCOMTR-MCNC: 110 MG/DL (ref 70–99)
GLUCOSE BLDC GLUCOMTR-MCNC: 113 MG/DL (ref 70–99)
GLUCOSE BLDC GLUCOMTR-MCNC: 117 MG/DL (ref 70–99)
GLUCOSE BLDC GLUCOMTR-MCNC: 120 MG/DL (ref 70–99)
GLUCOSE BLDC GLUCOMTR-MCNC: 127 MG/DL (ref 70–99)
GLUCOSE BLDC GLUCOMTR-MCNC: 132 MG/DL (ref 70–99)
GLUCOSE BLDC GLUCOMTR-MCNC: 135 MG/DL (ref 70–99)
GLUCOSE BLDC GLUCOMTR-MCNC: 138 MG/DL (ref 70–99)
GLUCOSE BLDC GLUCOMTR-MCNC: 141 MG/DL (ref 70–99)
GLUCOSE BLDC GLUCOMTR-MCNC: 165 MG/DL (ref 70–99)
GLUCOSE BLDC GLUCOMTR-MCNC: 85 MG/DL (ref 70–99)
GLUCOSE BLDC GLUCOMTR-MCNC: 88 MG/DL (ref 70–99)
GLUCOSE BLDC GLUCOMTR-MCNC: 95 MG/DL (ref 70–99)
GLUCOSE SERPL-MCNC: 109 MG/DL (ref 70–99)
GLUCOSE SERPL-MCNC: 95 MG/DL (ref 70–99)
HCO3 BLD-SCNC: 27 MMOL/L (ref 21–28)
HCO3 BLDV-SCNC: 29 MMOL/L (ref 21–28)
HCO3 BLDV-SCNC: 29 MMOL/L (ref 21–28)
HCO3 BLDV-SCNC: 31 MMOL/L (ref 21–28)
HCO3 BLDV-SCNC: 31 MMOL/L (ref 21–28)
HCT VFR BLD AUTO: 25.4 % (ref 35–47)
HCT VFR BLD AUTO: 27.9 % (ref 35–47)
HGB BLD-MCNC: 8 G/DL (ref 11.7–15.7)
HGB BLD-MCNC: 8.8 G/DL (ref 11.7–15.7)
MAGNESIUM SERPL-MCNC: 2 MG/DL (ref 1.6–2.3)
MAGNESIUM SERPL-MCNC: 2.8 MG/DL (ref 1.6–2.3)
MCH RBC QN AUTO: 29.7 PG (ref 26.5–33)
MCH RBC QN AUTO: 30.1 PG (ref 26.5–33)
MCHC RBC AUTO-ENTMCNC: 31.5 G/DL (ref 31.5–36.5)
MCHC RBC AUTO-ENTMCNC: 31.5 G/DL (ref 31.5–36.5)
MCV RBC AUTO: 94 FL (ref 78–100)
MCV RBC AUTO: 96 FL (ref 78–100)
O2/TOTAL GAS SETTING VFR VENT: 5 %
O2/TOTAL GAS SETTING VFR VENT: ABNORMAL %
OXYHGB MFR BLDV: 33 %
OXYHGB MFR BLDV: 34 %
OXYHGB MFR BLDV: 40 %
OXYHGB MFR BLDV: 49 %
PCO2 BLD: 43 MM HG (ref 35–45)
PCO2 BLDV: 50 MM HG (ref 40–50)
PCO2 BLDV: 50 MM HG (ref 40–50)
PCO2 BLDV: 51 MM HG (ref 40–50)
PCO2 BLDV: 52 MM HG (ref 40–50)
PH BLD: 7.41 PH (ref 7.35–7.45)
PH BLDV: 7.37 PH (ref 7.32–7.43)
PH BLDV: 7.38 PH (ref 7.32–7.43)
PH BLDV: 7.38 PH (ref 7.32–7.43)
PH BLDV: 7.4 PH (ref 7.32–7.43)
PHOSPHATE SERPL-MCNC: 0.9 MG/DL (ref 2.5–4.5)
PHOSPHATE SERPL-MCNC: 1.9 MG/DL (ref 2.5–4.5)
PLATELET # BLD AUTO: 72 10E9/L (ref 150–450)
PLATELET # BLD AUTO: 79 10E9/L (ref 150–450)
PO2 BLD: 138 MM HG (ref 80–105)
PO2 BLDV: 23 MM HG (ref 25–47)
PO2 BLDV: 24 MM HG (ref 25–47)
PO2 BLDV: 26 MM HG (ref 25–47)
PO2 BLDV: 29 MM HG (ref 25–47)
POTASSIUM SERPL-SCNC: 3.8 MMOL/L (ref 3.4–5.3)
POTASSIUM SERPL-SCNC: 3.9 MMOL/L (ref 3.4–5.3)
PROT SERPL-MCNC: 5.4 G/DL (ref 6.8–8.8)
RBC # BLD AUTO: 2.69 10E12/L (ref 3.8–5.2)
RBC # BLD AUTO: 2.92 10E12/L (ref 3.8–5.2)
SODIUM SERPL-SCNC: 143 MMOL/L (ref 133–144)
SODIUM SERPL-SCNC: 150 MMOL/L (ref 133–144)
WBC # BLD AUTO: 12.2 10E9/L (ref 4–11)
WBC # BLD AUTO: 15.3 10E9/L (ref 4–11)

## 2017-02-09 PROCEDURE — 40000014 ZZH STATISTIC ARTERIAL MONITORING DAILY

## 2017-02-09 PROCEDURE — 40000193 ZZH STATISTIC PT WARD VISIT: Performed by: PHYSICAL THERAPIST

## 2017-02-09 PROCEDURE — A9270 NON-COVERED ITEM OR SERVICE: HCPCS | Mod: GY | Performed by: NEUROLOGICAL SURGERY

## 2017-02-09 PROCEDURE — 00000146 ZZHCL STATISTIC GLUCOSE BY METER IP

## 2017-02-09 PROCEDURE — A9270 NON-COVERED ITEM OR SERVICE: HCPCS | Mod: GY | Performed by: SURGERY

## 2017-02-09 PROCEDURE — 25000132 ZZH RX MED GY IP 250 OP 250 PS 637: Mod: GY | Performed by: SURGERY

## 2017-02-09 PROCEDURE — 85027 COMPLETE CBC AUTOMATED: CPT | Performed by: SURGERY

## 2017-02-09 PROCEDURE — A9270 NON-COVERED ITEM OR SERVICE: HCPCS | Mod: GY | Performed by: THORACIC SURGERY (CARDIOTHORACIC VASCULAR SURGERY)

## 2017-02-09 PROCEDURE — 83735 ASSAY OF MAGNESIUM: CPT | Performed by: SURGERY

## 2017-02-09 PROCEDURE — 97110 THERAPEUTIC EXERCISES: CPT | Mod: GP | Performed by: PHYSICAL THERAPIST

## 2017-02-09 PROCEDURE — 25000128 H RX IP 250 OP 636: Performed by: NEUROLOGICAL SURGERY

## 2017-02-09 PROCEDURE — 25000132 ZZH RX MED GY IP 250 OP 250 PS 637: Mod: GY | Performed by: ANESTHESIOLOGY

## 2017-02-09 PROCEDURE — 36600 WITHDRAWAL OF ARTERIAL BLOOD: CPT

## 2017-02-09 PROCEDURE — A9270 NON-COVERED ITEM OR SERVICE: HCPCS | Mod: GY | Performed by: ANESTHESIOLOGY

## 2017-02-09 PROCEDURE — 82810 BLOOD GASES O2 SAT ONLY: CPT | Performed by: SURGERY

## 2017-02-09 PROCEDURE — 97530 THERAPEUTIC ACTIVITIES: CPT | Mod: GO

## 2017-02-09 PROCEDURE — 40000196 ZZH STATISTIC RAPCV CVP MONITORING

## 2017-02-09 PROCEDURE — 40000048 ZZH STATISTIC DAILY SWAN MONITORING

## 2017-02-09 PROCEDURE — 84460 ALANINE AMINO (ALT) (SGPT): CPT | Performed by: SURGERY

## 2017-02-09 PROCEDURE — 95951 ZZHC EEG VIDEO < 12 HR: CPT | Mod: 52,ZF

## 2017-02-09 PROCEDURE — 84100 ASSAY OF PHOSPHORUS: CPT | Performed by: SURGERY

## 2017-02-09 PROCEDURE — 25000128 H RX IP 250 OP 636: Performed by: SURGERY

## 2017-02-09 PROCEDURE — 84450 TRANSFERASE (AST) (SGOT): CPT | Performed by: SURGERY

## 2017-02-09 PROCEDURE — 25000132 ZZH RX MED GY IP 250 OP 250 PS 637: Mod: GY | Performed by: NEUROLOGICAL SURGERY

## 2017-02-09 PROCEDURE — 40000275 ZZH STATISTIC RCP TIME EA 10 MIN

## 2017-02-09 PROCEDURE — 82330 ASSAY OF CALCIUM: CPT | Performed by: THORACIC SURGERY (CARDIOTHORACIC VASCULAR SURGERY)

## 2017-02-09 PROCEDURE — 83735 ASSAY OF MAGNESIUM: CPT | Performed by: NEUROLOGICAL SURGERY

## 2017-02-09 PROCEDURE — 40000133 ZZH STATISTIC OT WARD VISIT

## 2017-02-09 PROCEDURE — 82805 BLOOD GASES W/O2 SATURATION: CPT | Performed by: THORACIC SURGERY (CARDIOTHORACIC VASCULAR SURGERY)

## 2017-02-09 PROCEDURE — 86022 PLATELET ANTIBODIES: CPT | Performed by: SURGERY

## 2017-02-09 PROCEDURE — 99221 1ST HOSP IP/OBS SF/LOW 40: CPT | Performed by: INTERNAL MEDICINE

## 2017-02-09 PROCEDURE — 80048 BASIC METABOLIC PNL TOTAL CA: CPT | Performed by: SURGERY

## 2017-02-09 PROCEDURE — 20000004 ZZH R&B ICU UMMC

## 2017-02-09 PROCEDURE — 25000125 ZZHC RX 250: Performed by: SURGERY

## 2017-02-09 PROCEDURE — 80048 BASIC METABOLIC PNL TOTAL CA: CPT | Performed by: NEUROLOGICAL SURGERY

## 2017-02-09 PROCEDURE — 25000128 H RX IP 250 OP 636: Performed by: ANESTHESIOLOGY

## 2017-02-09 PROCEDURE — 99291 CRITICAL CARE FIRST HOUR: CPT | Mod: GC | Performed by: ANESTHESIOLOGY

## 2017-02-09 PROCEDURE — 93005 ELECTROCARDIOGRAM TRACING: CPT

## 2017-02-09 PROCEDURE — 82803 BLOOD GASES ANY COMBINATION: CPT | Performed by: SURGERY

## 2017-02-09 PROCEDURE — 92526 ORAL FUNCTION THERAPY: CPT | Mod: GN | Performed by: SPEECH-LANGUAGE PATHOLOGIST

## 2017-02-09 PROCEDURE — 80053 COMPREHEN METABOLIC PANEL: CPT | Performed by: SURGERY

## 2017-02-09 PROCEDURE — 25000125 ZZHC RX 250: Performed by: THORACIC SURGERY (CARDIOTHORACIC VASCULAR SURGERY)

## 2017-02-09 PROCEDURE — 40000225 ZZH STATISTIC SLP WARD VISIT: Performed by: SPEECH-LANGUAGE PATHOLOGIST

## 2017-02-09 PROCEDURE — 25000132 ZZH RX MED GY IP 250 OP 250 PS 637: Mod: GY | Performed by: THORACIC SURGERY (CARDIOTHORACIC VASCULAR SURGERY)

## 2017-02-09 PROCEDURE — 92610 EVALUATE SWALLOWING FUNCTION: CPT | Mod: GN | Performed by: SPEECH-LANGUAGE PATHOLOGIST

## 2017-02-09 PROCEDURE — 71010 XR CHEST PORT 1 VW: CPT

## 2017-02-09 RX ORDER — FUROSEMIDE 10 MG/ML
20 INJECTION INTRAMUSCULAR; INTRAVENOUS ONCE
Status: COMPLETED | OUTPATIENT
Start: 2017-02-09 | End: 2017-02-09

## 2017-02-09 RX ORDER — FUROSEMIDE 10 MG/ML
40 INJECTION INTRAMUSCULAR; INTRAVENOUS ONCE
Status: COMPLETED | OUTPATIENT
Start: 2017-02-09 | End: 2017-02-09

## 2017-02-09 RX ORDER — HYDROMORPHONE HCL/0.9% NACL/PF 0.2MG/0.2
.2-.4 SYRINGE (ML) INTRAVENOUS
Status: DISCONTINUED | OUTPATIENT
Start: 2017-02-09 | End: 2017-02-17 | Stop reason: HOSPADM

## 2017-02-09 RX ORDER — HYDROMORPHONE HCL/0.9% NACL/PF 0.2MG/0.2
0.1 SYRINGE (ML) INTRAVENOUS ONCE
Status: COMPLETED | OUTPATIENT
Start: 2017-02-09 | End: 2017-02-09

## 2017-02-09 RX ADMIN — POTASSIUM PHOSPHATE, MONOBASIC AND POTASSIUM PHOSPHATE, DIBASIC 25 MMOL: 224; 236 INJECTION, SOLUTION INTRAVENOUS at 07:04

## 2017-02-09 RX ADMIN — POTASSIUM CHLORIDE 20 MEQ: 750 TABLET, EXTENDED RELEASE ORAL at 18:19

## 2017-02-09 RX ADMIN — VANCOMYCIN HYDROCHLORIDE 1500 MG: 10 INJECTION, POWDER, LYOPHILIZED, FOR SOLUTION INTRAVENOUS at 08:56

## 2017-02-09 RX ADMIN — FUROSEMIDE 20 MG: 10 INJECTION, SOLUTION INTRAVENOUS at 04:17

## 2017-02-09 RX ADMIN — DEXTROSE MONOHYDRATE 1 MG/MIN: 50 INJECTION, SOLUTION INTRAVENOUS at 11:34

## 2017-02-09 RX ADMIN — Medication 5 ML: at 20:16

## 2017-02-09 RX ADMIN — AMIODARONE HYDROCHLORIDE 0.5 MG/MIN: 50 INJECTION, SOLUTION INTRAVENOUS at 17:58

## 2017-02-09 RX ADMIN — MILRINONE LACTATE 0.25 MCG/KG/MIN: 200 INJECTION, SOLUTION INTRAVENOUS at 02:07

## 2017-02-09 RX ADMIN — ASPIRIN 81 MG CHEWABLE TABLET 81 MG: 81 TABLET CHEWABLE at 08:51

## 2017-02-09 RX ADMIN — Medication 0.5 UNITS/HR: at 08:58

## 2017-02-09 RX ADMIN — PIPERACILLIN SODIUM,TAZOBACTAM SODIUM 4.5 G: 4; .5 INJECTION, POWDER, FOR SOLUTION INTRAVENOUS at 14:32

## 2017-02-09 RX ADMIN — PHENYLEPHRINE HYDROCHLORIDE 0.4 MCG/KG/MIN: 10 INJECTION, SOLUTION INTRAMUSCULAR; INTRAVENOUS; SUBCUTANEOUS at 14:34

## 2017-02-09 RX ADMIN — DOCUSATE SODIUM 100 MG: 50 LIQUID ORAL at 20:17

## 2017-02-09 RX ADMIN — MUPIROCIN 0.5 G: 20 OINTMENT TOPICAL at 20:18

## 2017-02-09 RX ADMIN — Medication 10 ML: at 08:51

## 2017-02-09 RX ADMIN — AMIODARONE HYDROCHLORIDE 150 MG: 1.5 INJECTION, SOLUTION INTRAVENOUS at 11:14

## 2017-02-09 RX ADMIN — ATROPA BELLADONNA AND OPIUM 0.5 SUPPOSITORY: 16.2; 3 SUPPOSITORY RECTAL at 04:18

## 2017-02-09 RX ADMIN — HYDROMORPHONE HYDROCHLORIDE 0.1 MG: 10 INJECTION, SOLUTION INTRAMUSCULAR; INTRAVENOUS; SUBCUTANEOUS at 08:25

## 2017-02-09 RX ADMIN — PANTOPRAZOLE SODIUM 40 MG: 40 TABLET, DELAYED RELEASE ORAL at 08:51

## 2017-02-09 RX ADMIN — POTASSIUM PHOSPHATE, MONOBASIC AND POTASSIUM PHOSPHATE, DIBASIC 20 MMOL: 224; 236 INJECTION, SOLUTION INTRAVENOUS at 17:59

## 2017-02-09 RX ADMIN — MUPIROCIN 0.5 G: 20 OINTMENT TOPICAL at 08:52

## 2017-02-09 RX ADMIN — ACETAMINOPHEN 650 MG: 325 TABLET, FILM COATED ORAL at 13:12

## 2017-02-09 RX ADMIN — MILRINONE LACTATE 0.25 MCG/KG/MIN: 200 INJECTION, SOLUTION INTRAVENOUS at 17:26

## 2017-02-09 RX ADMIN — DOCUSATE SODIUM 100 MG: 50 LIQUID ORAL at 08:52

## 2017-02-09 RX ADMIN — Medication 0.5 UNITS/HR: at 11:11

## 2017-02-09 RX ADMIN — FUROSEMIDE 40 MG: 10 INJECTION, SOLUTION INTRAVENOUS at 11:07

## 2017-02-09 RX ADMIN — PIPERACILLIN SODIUM,TAZOBACTAM SODIUM 4.5 G: 4; .5 INJECTION, POWDER, FOR SOLUTION INTRAVENOUS at 08:26

## 2017-02-09 RX ADMIN — PIPERACILLIN SODIUM,TAZOBACTAM SODIUM 4.5 G: 4; .5 INJECTION, POWDER, FOR SOLUTION INTRAVENOUS at 02:00

## 2017-02-09 RX ADMIN — PIPERACILLIN SODIUM,TAZOBACTAM SODIUM 4.5 G: 4; .5 INJECTION, POWDER, FOR SOLUTION INTRAVENOUS at 20:17

## 2017-02-09 RX ADMIN — HYDROMORPHONE HYDROCHLORIDE 0.2 MG: 10 INJECTION, SOLUTION INTRAMUSCULAR; INTRAVENOUS; SUBCUTANEOUS at 17:03

## 2017-02-09 RX ADMIN — HYDROMORPHONE HYDROCHLORIDE 0.2 MG: 10 INJECTION, SOLUTION INTRAMUSCULAR; INTRAVENOUS; SUBCUTANEOUS at 22:54

## 2017-02-09 RX ADMIN — HYDROMORPHONE HYDROCHLORIDE 0.2 MG: 10 INJECTION, SOLUTION INTRAMUSCULAR; INTRAVENOUS; SUBCUTANEOUS at 14:30

## 2017-02-09 RX ADMIN — HYDROMORPHONE HYDROCHLORIDE 0.2 MG: 10 INJECTION, SOLUTION INTRAMUSCULAR; INTRAVENOUS; SUBCUTANEOUS at 20:17

## 2017-02-09 RX ADMIN — HEPARIN SODIUM 5000 UNITS: 10000 INJECTION, SOLUTION INTRAVENOUS; SUBCUTANEOUS at 02:00

## 2017-02-09 ASSESSMENT — PAIN DESCRIPTION - DESCRIPTORS
DESCRIPTORS: ACHING

## 2017-02-09 NOTE — CONSULTS
[facility]  Neurology Consult  DOS:  February 8, 2017  Reason for Consult: Lethargy, spells of lip smacking, concern for seizure    HPI  Carlos Alberto Fenton is a 76 year old female with PMH cardioembolic bihemispheric strokes in October 2016, DM, HTN, who was admitted 2/6/17 for cardiac surgery. She underwent CABG, MAZE, left atriag appendage ligation, and closure of PFO on 2/6/17. She has been extubated and off all sedation since the morning of 2/7/17. Since early this morning she has been lethargic and intermittently unresponsive, with staring spells with lip smacking. Lip smacking occurs both while sleeping, and while awake with staring and unresponsiveness. Sometimes seems to be distractable during staring, but not consistently.    No prior history of seizure diagnosis in the patient nor her family. Near the beginning of her stroke admission in October 2016, she was encephalopathic to the point she was actually intubated. There was some concern for seizure, though multiple days of EEG recording both on and off sedation revealed no epileptiform activity.    Patient unable to complete 10-point ROS d/t mentation.   History taken from the patient, her daughter, and chart review.    Past Medical/Surgical History  Past Medical History   Diagnosis Date     Peripheral neuropathy (H) 1990     cause unknown     Headaches      Hyperlipemias      History of skin cancer      Diabetes (H)      Cancer (H)      Skin     CAD (coronary artery disease)      2 vessel     Hypertension      Cerebral infarction (H) 10/2016     Irregular heart beat      Antiplatelet or antithrombotic long-term use      Sleep apnea      Cerebral artery occlusion with cerebral infarction (H) 10/2016     Past Surgical History   Procedure Laterality Date     Hysterectomy, brenda  1988     Tonsillectomy  1942     C appendectomy  1959     Back surgery       Bypass graft artery coronary N/A 2/6/2017     Procedure: BYPASS GRAFT ARTERY CORONARY;  Surgeon:  Mikhail Quiñones MD;  Location: [facility] OR     Maze procedure N/A 2/6/2017     Procedure: MAZE PROCEDURE;  Surgeon: Mikhail Quiñones MD;  Location: [facility] OR     Current Facility-Administered Medications   Medication     pantoprazole (PROTONIX) EC tablet 40 mg     piperacillin-tazobactam (ZOSYN) 4.5 g vial to attach to  mL bag     [START ON 2/9/2017] vancomycin (VANCOCIN) 1,500 mg in NaCl 0.9 % 250 mL intermittent infusion     sodium bicarbonate 8.4 % injection 50 mEq     milrinone (PRIMACOR) infusion 200 mcg/mL PREMIX     heparin sodium PF injection 5,000 Units     acetaminophen (TYLENOL) tablet 650 mg     acetaminophen (TYLENOL) tablet 325-650 mg     naloxone (NARCAN) injection 0.1-0.4 mg     dextrose 10 % 1,000 mL infusion     insulin 1 unit/mL in saline (novoLIN-Regular) drip - ADULT IV Infusion     glucose 40 % gel 15-30 g    Or     dextrose 50 % injection 25-50 mL    Or     glucagon injection 1 mg     lidocaine 1 % 1 mL     lidocaine (LMX4) kit     sodium chloride (PF) 0.9% PF flush 3 mL     sodium chloride (PF) 0.9% PF flush 3 mL     Reason beta blocker order not selected     hydrALAZINE (APRESOLINE) injection 10 mg     insulin aspart (NovoLOG) inj (RAPID ACTING)     insulin aspart (NovoLOG) inj (RAPID ACTING)     meperidine (DEMEROL) injection 12.5-25 mg     albuterol (PROAIR HFA/PROVENTIL HFA/VENTOLIN HFA) Inhaler 6 puff     mupirocin (BACTROBAN) 2 % ointment 0.5 g     EPINEPHrine (ADRENALIN) 5 mg in NaCl 0.9 % 250 mL infusion     aspirin chewable tablet 81 mg     potassium chloride SA (K-DUR/KLOR-CON M) CR tablet 20-40 mEq     potassium chloride (KLOR-CON) Packet 20-40 mEq     potassium chloride 10 mEq in 100 mL intermittent infusion     potassium chloride 10 mEq in 100 mL intermittent infusion with 10 mg lidocaine     potassium chloride 20 mEq in 50 mL intermittent infusion     magnesium sulfate 2 g in NS intermittent infusion ([facility]ar[facility] or [facility]V Cmpd)     magnesium sulfate 4 g in 100 mL sterile water (premade)      "potassium phosphate 10 mmol in D5W 250 mL intermittent infusion     potassium phosphate 15 mmol in D5W 250 mL intermittent infusion     potassium phosphate 20 mmol in D5W 500 mL intermittent infusion     potassium phosphate 20 mmol in D5W 250 mL intermittent infusion     potassium phosphate 25 mmol in D5W 500 mL intermittent infusion     ondansetron (ZOFRAN-ODT) ODT tab 4 mg    Or     ondansetron (ZOFRAN) injection 4 mg     norepinephrine (LEVOPHED) 16 mg in D5W 250 mL infusion     sennosides (SENOKOT) syrup 5-10 mL    And     docusate (COLACE) 50 MG/5ML liquid  mg     Allergies   Allergen Reactions     Oxycodone      hallucinations     Adhesive Tape Itching and Rash     Diapers & Supplies Rash     Developed yeast infection from previous hospital stay     Family History  Family History   Problem Relation Age of Onset     DIABETES Mother      C.A.D. Mother      DIABETES Father      C.A.D. Father      Cardiovascular Sister      blood clot   No known seizure history    Social History  Social History   Substance Use Topics     Smoking status: Former Smoker     Smokeless tobacco: Never Used     Alcohol Use: No       OBJECTIVE  BP 97/59 mmHg  Pulse 59  Temp(Src) 100.6  F (38.1  C) (Pulmonary Artery)  Resp 18  Ht 1.575 m (5' 2\")  Wt 75.8 kg (167 lb 1.7 oz)  BMI 30.56 kg/m2  SpO2 97%  GEN Cooperative.  HEENT Sclerae anicteric, MMM  CVS Peripheral Pulse palpable  PULM Initially sleeping with CPAP on - taken off for exam and breathing on room air with exertional dyspnea.  PSYC Affect wnl  NEURO   MS Waxing waning level of alertness and responsiveness. Will be speaking and answering questions appropriately, then suddenly begin staring off, sometimes with lip smacking or R facial twitching. At times while staring she is not responsive to voice or tactile stimuli for 10-20 seconds at a time.   Knows she is in the hospital for heart surgery. Knows it is February 2017.   Able to follow simple verbal commands to move " arms and legs.   CN Visual fields grossly intact. PERRL. EOMI. Facial movements symmetric. Hearing intact to conversation.  No dysarthria   STR Occasional high frequency low amplitude shaking/shivering of both legs, without loss of consciousness - possibly more on the L.   Able to lift both arms antigravity and provide 4/5 strength on elbow flex/ext bilaterally.   Declines to lift legs d/t groin sheath. EHL fairly strong bilaterally.   SNS Patient unable to respond consistently.   RFLX 1+ and symmetric in biceps, brach. Deferred at L knee 2/2 pain. 1+ R knee. Ankles 1+ Plantars down.   CRD FN limited by weakness, but fairly intact. HS limited by weakness.   GAIT Non-ambulatory at present    All labs and imaging reviewed.      IMPRESSION  Staring spells with lip smacking - concerning for complex partial seizure vs toxic metabolic encephalopathy.  --Prior strokes place her at increased risk for seizure. Will start vEEG tonight - hold on starting any anticonvulsant meds for staring spells until EEG started.  --Several reasons to have toxic metabolic encephalopathy. Infectious workup in progress - noted to have fever, leukocytosis, dirty UA, and perihilar opacities.  Also appears to have shock liver with increased AST/ALT to 1400/800.  --No mention of hypoxia in op notes. Pt noted to be hypotensive postop - labile BPs may also contribute to confusion.  Exam without focal deficits to suggest new large infarction. Should get HCT to rule out any other process contributing to mentation.    RECS  1. VEEG tonight  2. Head CT prior to EEG, if possible  3. If found to have seizures on EEG, will likely start keppra  4. Ammonia level  5. Workup for infectious cause of encephalopathy per primary team    Patient discussed with staff neurologist, Dr. Huffman, who agrees with the plan.  Arnulfo Seaman MD, PGY-4.  Team pager: [phone]      Case discussed with me by telephone.     Arnulfo Huffman M.D.

## 2017-02-09 NOTE — PROGRESS NOTES
"[facility]: [place]  NEUROLOGY PROGRESS NOTE  DOS: 02/09/2017    IMPRESSION  # Toxic metabolic encephalopathy  Head CT was normal. Lip smacking recorded on EEG last night and had no change in the EEG background, nor ictal discharge. Based on these findings, her lip smacking/staring spells do not represent seizure activity. EEG would be consistent with a toxic metabolic encephalopathy, likely due to infection, and medication effect. Had worsening fever, leukocytosis and started treatment for suspected infection yesterday. Mentation appears to be improving today and lip smacking has resolved.    Recs:   1. D/c EEG  2. Ongoing treatment of any underlying infection  3. Minimize sedation    No further neuro workup planned at this time. We will sign off. Please contact us with any questions.      SUBJECTIVE  Patient had lip smacking last night while on EEG - no electrographic correlate. Lip smacking resolved last night, and has not occurred at all this morning.    Patient feeling a bit more alert and oriented this morning.      OBJECTIVE  BP 97/59 mmHg  Pulse 59  Temp(Src) 99.3  F (37.4  C) (Pulmonary Artery)  Resp 18  Ht 1.575 m (5' 2\")  Wt 76.2 kg (167 lb 15.9 oz)  BMI 30.72 kg/m2  SpO2 96%  GEN    Cooperative.  HEENT Sclerae anicteric, MMM  CVS    Peripheral Pulse palpable  NEURO   MS     More alert than yesterday. No spells of staring or lip smacking as noted yesterday.            Knows she is in the hospital for heart surgery. Knows it is February 2017.            Able to follow simple verbal commands to move arms and legs.   CN     Visual fields grossly intact. PERRL. EOMI. Facial movements symmetric. Hearing intact to conversation.  No dysarthria   STR   Able to lift both arms antigravity and provide 4/5 strength on elbow flex/ext bilaterally.            Hip flex strength weakly 3/5 bilaterally. EHL fairly strong bilaterally.   RFLX  1+ and symmetric in biceps, brach. Deferred at " L knee 2/2 pain. 1+ R knee. Ankles 1+ Plantars down.   CRD   FN limited by weakness, but fairly intact. HS limited by weakness.   GAIT  Non-ambulatory at present  Recent labs and imaging reviewed and used in formulating the plan.    Patient was seen and discussed with attending neurologist, Dr. Huffman, who agrees with the plan.  Arnulfo Seaman MD. PGY-4. Team pager: [phone]

## 2017-02-09 NOTE — PLAN OF CARE
Problem: Cardiac Surgery (Adult)  Goal: Signs and Symptoms of Listed Potential Problems Will be Absent or Manageable (Cardiac Surgery)  Signs and symptoms of listed potential problems will be absent or manageable by discharge/transition of care (reference Cardiac Surgery (Adult) CPG).  Outcome: Adequate for Discharge Date Met:  02/08/17  Unable to wake up this morning with extreme stimulation able to answer question appropiately but immediately back to deep sleep. Needs lots of encouragement to cough.No changes till 1730 when neurologist came, awoke spoke in sentences and stayed awake for full conversation. Able to chew and swallow aspirin then. Remains NPO. Up in chair X2 with no increase mentation ability. Vitals stable with continous epi and started some MIlrinone which increased CI and svo2 up to lo 50/sP: continue to monitor neuro status closely

## 2017-02-09 NOTE — PLAN OF CARE
Problem: Goal Outcome Summary  Goal: Goal Outcome Summary  PT 4E- pt seen for seated LE exercises while in the chair. Pt performed 10 reps each exercise and need frequent VC for correct performance. Pt will be progressed in exercise. Recommend discharge to TCU at this time as pt is weak and needing min A of 2 for any mobiity.

## 2017-02-09 NOTE — PROCEDURES
EEG#:  -1 (Day #1 of video EEG monitoring)      DATE OF RECORDIN2017      DURATION OF RECORDIN hours, 11 minutes and 27 seconds.      CLINICAL HISTORY:  Carlos Alberto Fenton is a 76-year-old female with history of cardioembolic ischemic infarct in 10/2016, causing small cortical infarcts in the right and left parietal lobes who is currently being evaluated for seizures due to spells, lip smacking, staring and unresponsiveness.  On this day of recording, patient reportedly was given intermittent Dilaudid.      TECHNICAL SUMMARY:  This continuous EEG monitoring procedure was performed with 23 scalp electrodes in the 10-20 system of placement, and additional scalp, precordial and other surface electrodes were used for electrical referencing and artifact detection. Video monitoring was utilized and periodically reviewed by EEG technologist and physician for electroclinical correlation.        BACKGROUND EEG ACTIVITIES:  EEG activity during wakefulness consisted predominantly of theta range activities, with frequent intermittent diffuse delta slowing, in the range of 2-6 Hz.  During maximal wakefulness, there is symmetric, poorly sustained 6 Hz posterior dominant rhythm.  The patient was able to follow commands during stimulation and was able to answer most of orientation questions.      No interictal epileptiform discharges were observed during this recording.      ICTAL RECORDINGS:  No electrographic seizures were recorded on this day.  The patient had an event of lip smacking at 20:42:31 during which there was no change in the EEG background activities.        SUMMARY OF DAY #1 of VIDEO EEG MONITORING:  This is an abnormal EEG due to the presence of frequent polymorphic theta-delta slowing in wakefulness consistent with moderate diffuse encephalopathy, which is etiologically nonspecific.  No focal abnormality was appreciated.  No interictal epileptiform discharges and no electrographic seizures were  seen.  The patient had an event of lip smacking, during which there was no change to EEG background activities.  Clinical correlation is recommended.        I personally reviewed the EEG and agree with the reported findings.     BAMBI SERNA MD       As dictated by KAYY CONNELLY MD            D: 2017 09:37   T: 2017 09:52   MT: BRADY      Name:     MENDOZA GREENBERG   MRN:      [id number]        Account:        [id number]   :      1940           Procedure Date: 2017      Document: [id number]

## 2017-02-09 NOTE — PLAN OF CARE
Problem: Goal Outcome Summary  Goal: Goal Outcome Summary  OT 4E: Pt reported mild incisional pain, therapist cued pt on use of heart pillow to splint incision with movement for pain reduction. Pt supine > EOB with min A at trunk. Pt able to scoot BLEs to EOB with cueing and CGA. Pt completed sit > stand with min A and verbal/tactiles cues for upright posture. Pt completed transfer to bedside chair with min A for balance and assist of 2nd person for line management. Pt on 3L NC VSS throughout.     Rec TCU to increase independence with ADLs and functional mobility.

## 2017-02-09 NOTE — PLAN OF CARE
Problem: Goal Outcome Summary  Goal: Goal Outcome Summary  Outcome: No Change  Patient had a change in neuro status previous shift following dilaudid administration. Patient rouses to voice and is alert and oriented X4, sleeps (with occasional lip pursing movement) between cares. CT head (negative) and ammonia level (normal) noted. EEG overnight per order. Tachycardia coupled with frequent PACs and PVCs noted, K/Mg drawn and replaced, weaned off epi gtt and replaced with low-dose phenylephrine gtt. NPO at this time, titrating insulin to euglycemia. U/o marginal, MD aware. Patient refusing to wear home cpap mask at this time, placed on oxymask for now, SpO2 %, lungs diminished, sternal and chest tube sites unremarkable, labs in am, continue per plan of care.

## 2017-02-09 NOTE — CONSULTS
Electrophysiology Consultation Note   EP Attending: .   Reason for consultation: AF with RVR.   Provider requesting consultation: CV ICU Service.  Date of Service: 2/9/2017      HPI:   Ms. 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 She has some altered mental status post procedure. She has had shock liver, marginal CIs post operatively. She has been weaned off NE and placed on milrinone. She was 100% paced at 90 bpm after her surgery and then today went into AF with RVR. She is unable to report if she has any symptoms at this time. She does have a history of AF for which she was on warfarin at home. Recent echo shows LVEf 40-45%. She has temporary epicardial pacing wire in place. Renal function and electrolytes stable. Current cardiac medications include: ASA, lasix, milrinone, and alfred.   Past Medical History:   Past Medical History   Diagnosis Date     Peripheral neuropathy (H) 1990     cause unknown     Headaches      Hyperlipemias      History of skin cancer      Diabetes (H)      Cancer (H)      Skin     CAD (coronary artery disease)      2 vessel     Hypertension      Cerebral infarction (H) 10/2016     Irregular heart beat      Antiplatelet or antithrombotic long-term use      Sleep apnea      Cerebral artery occlusion with cerebral infarction (H) 10/2016     Past Surgical History:   Past Surgical History   Procedure Laterality Date     Hysterectomy, brenda  1988     Tonsillectomy  1942     C appendectomy  1959     Back surgery       Bypass graft artery coronary N/A 2/6/2017     Procedure: BYPASS GRAFT ARTERY CORONARY;  Surgeon: Mikhail Quiñones MD;  Location: [facility] OR     Maze procedure N/A 2/6/2017     Procedure: MAZE PROCEDURE;  Surgeon: Mikhail Quiñones MD;   Location: [facility] OR     Allergies: Per MAR     Allergies   Allergen Reactions     Oxycodone      hallucinations     Adhesive Tape Itching and Rash     Diapers & Supplies Rash     Developed yeast infection from previous hospital stay     Medications:   Per MAR current outpatient cardiovascular medications include: Prescriptions prior to admission   Medication Sig Dispense Refill Last Dose     atorvastatin (LIPITOR) 40 MG tablet Take 1 tablet (40 mg) by mouth daily (Patient taking differently: Take 40 mg by mouth At Bedtime ) 90 tablet 3 2017 at 0800     carvedilol (COREG) 6.25 MG tablet Take 1 tablet (6.25 mg) by mouth 2 times daily (with meals) 180 tablet 3 2017 at 0500     furosemide (LASIX) 20 MG tablet Take 1 tablet (20 mg) by mouth daily as needed 90 tablet 3 Past Week at Unknown time     lisinopril (PRINIVIL/ZESTRIL) 20 MG tablet Take 1 tablet (20 mg) by mouth daily (Patient taking differently: Take 20 mg by mouth every morning ) 90 tablet 2 2017 at 0800     metFORMIN (GLUCOPHAGE) 500 MG tablet Take 1 tablet (500 mg) by mouth 2 times daily (with meals) 180 tablet 1 2017 at 0800     traZODone (DESYREL) 50 MG tablet Take 0.5 tablets (25 mg) by mouth nightly as needed for sleep 45 tablet 2 2017 at 2100     venlafaxine (EFFEXOR-ER) 150 MG TB24 24 hr tablet Take 1 tablet (150 mg) by mouth daily (with breakfast) 90 each 1 2017 at 0500     clotrimazole (LOTRIMIN) 1 % cream Please apply to groins two times daily for one week after discharge and then follow up with PCP if no improvement of your skin rashes 12 g 0 Past Month at Unknown time     [] enoxaparin (LOVENOX) 80 MG/0.8ML injection Inject 0.74 mLs (74 mg) Subcutaneous See Admin Instructions for 4 days Please take as instructed in your pre operative instructions. 6 Syringe 0      ONETOUCH DELICA LANCETS 33G MISC 1 Device daily 100 each 0 Taking     ONE TOUCH ULTRA test strip Test once daily 100 each 0 Taking     warfarin (COUMADIN) 5 MG  "tablet Take 5 mg TU, TH and 2.5 mg other 5 days or as directed by coumadin clinic 30 tablet 2 2/1/2017     blood glucose monitoring (ONE TOUCH ULTRA 2) meter device kit    Taking     No current outpatient prescriptions on file.     Current Facility-Administered Medications   Medication Dose Route Frequency     amiodarone  150 mg Intravenous Once     furosemide  40 mg Intravenous Once     pantoprazole  40 mg Oral QAM     piperacillin-tazobactam  4.5 g Intravenous Q6H     vancomycin (VANCOCIN) IV  1,500 mg Intravenous Q24H     sodium bicarbonate  50 mEq Intravenous Once     acetaminophen  650 mg Oral 4x daily     sodium chloride (PF)  3 mL Intracatheter Q8H     insulin aspart   Subcutaneous TID w/meals     mupirocin  0.5 g Both Nostrils BID     aspirin  81 mg Oral Daily     sennosides  5-10 mL Oral BID    And     docusate   mg Oral BID     Family History:   Family History   Problem Relation Age of Onset     DIABETES Mother      C.A.D. Mother      DIABETES Father      C.A.D. Father      Cardiovascular Sister      blood clot     Social History:   Social History   Substance Use Topics     Smoking status: Former Smoker     Smokeless tobacco: Never Used     Alcohol Use: No       ROS:   Unable to obtain d/t patient's mental status.     Physical Examination:   VITALS: BP 97/59 mmHg  Pulse 59  Temp(Src) 99.3  F (37.4  C) (Pulmonary Artery)  Resp 18  Ht 1.575 m (5' 2\")  Wt 76.2 kg (167 lb 15.9 oz)  BMI 30.72 kg/m2  SpO2 96%  GENERAL APPEARANCE: NAD   HEENT: MMM.   NECK: Supple.  CHEST: CTAB   CARDIOVASCULAR: Irregular.   ABDOMEN: BS+, soft, ND.   EXTREMITIES: trace pedal edema.  NEURO: altered mental stauts  SKIN: Warm and dry.   Data:   Labs:  [facility]  Recent Labs  Lab 02/09/17  0348 02/08/17  2138 02/08/17  1051 02/08/17  0821   * 148* 144 148*   POTASSIUM 3.9 3.8 4.6 4.2   CHLORIDE 115* 112* 110* 116*   CARLY 8.6 8.2* 8.0* 7.0*   CO2 28 27 22 21   BUN 20 22 23 19   CR 0.71 0.83 1.01 0.81   GLC 95 162* 176* 145* "     CBC  Recent Labs  Lab 17  0348 17  0326 17  2257 17  1115 17  0400  17  2106   WBC 12.2* 16.8*  --   --  13.7*  --  13.6*   RBC 2.69* 3.04*  --   --  3.47*  --  2.92*   HGB 8.0* 9.0* 8.9* 8.5* 10.2*  < > 8.7*   HCT 25.4* 28.9*  --   --  32.3*  --  27.3*   MCV 94 95  --   --  93  --  94   MCH 29.7 29.6  --   --  29.4  --  29.8   MCHC 31.5 31.1*  --   --  31.6  --  31.9   RDW 19.1* 19.2*  --   --  18.4*  --  18.7*   PLT 72* 110*  --   --  139*  --  133*   < > = values in this interval not displayed.  INR  Recent Labs  Lab 17  2106 17  1427 17  1316 17  0616   INR 1.55* 1.66* 1.63* 1.08     No results found for: CKTOTAL, CKMB, TROPN  CHOLESTEROL (mg/dL)   Date Value   2017 273*   10/18/2016 116     HDL CHOLESTEROL (mg/dL)   Date Value   2017 58   10/18/2016 38*     LDL CHOLESTEROL CALCULATED (mg/dL)   Date Value   2017 168*   10/18/2016 62     EK/2016 ECHO:   Interpretation Summary  Mildly (EF 45-50%) reduced left ventricular function is present.  Right ventricular function, chamber size, wall motion, and thickness are  normal.  Trivial pericardial effusion is present.     No change from prior.    Assessment:   Ms. 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 She has some altered mental status post procedure. She has had shock liver, marginal CIs post operatively. She has been weaned off NE and placed on milrinone. She was 100% paced at 90 bpm after her surgery and then today went into AF with RVR. She is unable to report if she has any symptoms at this time. She does have a history of AF for which she was on warfarin at home. Recent echo shows LVEf 40-45%. She has temporary  epicardial pacing wire in place. Renal function and electrolytes stable. Current cardiac medications include: ASA, lasix, milrinone, and alfred.   EP Recommendations:  We discussed in detail with the patient management/treatment options for Misael includin. Stroke Prophylaxis:  CHADSVASC=++CVA, +gender, ++age, +DM, +HTN, +CAD  8, corresponding to a 6.7% annual stroke / systemic emolism event rate. indicating need for long term oral anticoagulation.  We would recommend resuming theraputic anticoagulation as soon as deemed possible from a surgical perspective.   2. Rate Control: Add beta blockers when off pressors.   3. Rhythm Control: Cardioversion, Antiarrhythmics and/or ablation are options for rhythm control. We would recommend a short course of amiodarone. She has been given bolus and infusion. She can then transfer to oral 400 mg BID X 2 weeks then 200 mg daily for 4 weeks (a total of 6 weeks) and then stop. She was not previously on home AATs. If she has sustained AF we can consider DCCV prior to discharge.   4. Risk Factor Management: Statin, maintain tight BP control, and continued NOAH treatment.   She does not have any pacing indication at this time. Her baseline pre-op 12 lead ECG showed sinus israel, so she was likely just being overdrive paced post operatively at 90 bpm. She clearly has intact AV node conduction given she is able to conduct RVR in AF. Therefore, we would recommend capping temporary pacing wire and always to promote intrinsic conduction as much as possible.   At discharge follow up with Dr. Santos in 2-3 months.   The patient states understanding and is agreeable with plan.   Thank you much for allowing us to participate in the care of this pleasant patient.     The patient was discussed w/ Dr. Santos.  The above note reflects our joint plan.    CHACORTA Emery CNP  Electrophysiology Consult Service  Pager: [phone]      I have seen, interviewed, and examined patient. I reviewed the management  plan with the patient.  I have reviewed the laboratory tests, imaging, and other investigations.  Discussed with the team and agree with the findings and plan in this resident/fellow/nurse practitioner's note. In addition, changes in the physical examination, assessment and plan have been incorporated into the note by myself, as to make it a single cohesive document. Plan was communicated to referring team/physician.      Paige Santos MD, MS  Cardiology/Cardiac EP Attending Staff

## 2017-02-09 NOTE — PROGRESS NOTES
"CV Surgery Note  02/08/2017    Subjective and Interval Events: No acute events overnight.  Some confusion and repetitive movements this morning.  Febrile o/n.    Objective:   Temp: 100.6  F (38.1  C) Temp src: Pulmonary Artery     Heart Rate: 105 Resp: 18 SpO2: 97 % O2 Device: Nasal cannula Oxygen Delivery: 3 LPM Height: 157.5 cm (5' 2\") Weight: 75.8 kg (167 lb 1.7 oz)  Estimated body mass index is 30.56 kg/(m^2) as calculated from the following:    Height as of this encounter: 1.575 m (5' 2\").    Weight as of this encounter: 75.8 kg (167 lb 1.7 oz).  Ventilation Mode: CPAP/PS  FiO2 (%): 50 %  Rate Set (breaths/minute): 12 breaths/min  Tidal Volume Set (mL): 400 mL  PEEP (cm H2O): 5 cmH2O  Pressure Support (cm H2O): 7 cmH2O  Oxygen Concentration (%): 50 %  Resp: 18    Exam:   Gen: Awake, alert, frequently smacking lips but can converse  Head: NCAT  Chest: NLB on 3L NC  Heart: Paced  Abd: Soft, nt/nd  Ext: WWP  Strength 5/5 b/l, no focal neuro deficits    Drips: Epi 0.08    UO: 1.6L  CT: 520    WBC: 16.8, Hgb: 9.0, Plt: 110  Na: 148, K: 4.4, Crt: 0.84    Imaging:     CXR:    Impression:     No significant change in bilateral perihilar and lower lobe opacities  with probable small left pleural effusion. Status post extubation.    ECHO:    Interpretation Summary  Mildly (EF 45-50%) reduced left ventricular function is present.  Right ventricular function, chamber size, wall motion, and thickness are  normal.  Trivial pericardial effusion is present.    Assessment and Plan: POD # 2 s/p 3v CABG, modified maze with pulmonary isolation, left atrial appendage ligation, PFO closure.    Neuro: Unclear cause of AMS, likely mutlifactorial, will ask neurology to weigh in, d/c'd narcotics  CV: Wean EPI as able, ECHO unremarkable, started milrinone with some improvement in mixed venous O2  Resp: Mech vent, PST this AM, likely extubate later this morning  FEN/GI: NPO given AMS, will have FT placed should this not improve, LFTs " likely secondary to cardiac output, will trend  Renal/: UOP adequate, c/w payton for accurate I/Os  Heme: Hgb stable  ID: Febrile today, given AMS will panculture and start empiric abx  Endo: SSI  PPx: SQH, protonix  Dispo: CVICU    Seen and discussed with fellow, Dr. Jeana English MD  General Surgery  Pager: [phone]

## 2017-02-09 NOTE — PROGRESS NOTES
02/09/17 1554   General Information   Onset Date 02/06/17   Start of Care Date 02/09/17   Referring Physician Rodrick English MD    Patient Profile Review/OT: Additional Occupational Profile Info See Profile for full history and prior level of function   Patient/Family Goals Statement Pt would like a nito .    Swallowing Evaluation Bedside swallow evaluation   Behaviorial Observations WFL (within functional limits)   Mode of current nutrition NPO   Respiratory Status Extubated on (date)  (2/8/2017)   Comments Patient is a 76 y.o. F with a PMH of CVA 10/2016 (residual word finding difficulties), DM, CAD/Cardiomyopathy (LV EF 40-45%), HTN, Paroxsymal A Fib (anticoagulated), Former Smoker, and NOAH who is s/p CABG X 3, MAZE procedure, left atrial appendage ligation (AtriClip 45), and closure of patent foramen ovale 2/6/17.   Clinical Swallow Evaluation   Oral Musculature generally intact   Structural Abnormalities none present   Dentition present and adequate   Mucosal Quality adequate   Mandibular Strength and Mobility intact   Oral Labial Strength and Mobility WFL   Lingual Strength and Mobility WFL   Buccal Strength and Mobility intact   Laryngeal Function Cough;Throat clear;Swallow;Voicing initiated;Dry swallow palpated   Oral Musculature Comments WFL   Additional Documentation Yes   Clinical Swallow Eval: Thin Liquid Texture Trial   Mode of Presentation, Thin Liquids spoon;straw;fed by clinician   Volume of Liquid or Food Presented ice chips x3, sips via straw x6    Oral Phase of Swallow WFL   Pharyngeal Phase of Swallow reduction in laryngeal movement   Diagnostic Statement No overt s/s of aspiration.    Clinical Swallow Eval: Puree Solid Texture Trial   Mode of Presentation, Puree spoon;fed by clinician   Volume of Puree Presented 1 tsp bites x2    Oral Phase, Puree WFL   Pharyngeal Phase, Puree reduction in laryngeal movement;repeated swallows   Diagnostic Statement No overt s/s of aspiration.    Swallow  Compensations   Swallow Compensations Pacing;Reduce amounts;Alternate viscosity of consistencies   Esophageal Phase of Swallow   Patient reports or presents with symptoms of esophageal dysphagia Yes   Esophageal comments Excessive burping following liquid intake.  Pt    General Therapy Interventions   Planned Therapy Interventions Dysphagia Treatment   Dysphagia treatment Modified diet education;Instruction of safe swallow strategies;Compensatory strategies for swallowing   Swallow Eval: Clinical Impressions   Skilled Criteria for Therapy Intervention Skilled criteria met.  Treatment indicated.   Functional Assessment Scale (FAS) 5   Treatment Diagnosis Mild oropharygneal dysphagia    Diet texture recommendations Full liquid;Thin liquids   Recommended Feeding/Eating Techniques small sips/bites;maintain upright posture during/after eating for 30 mins;alternate between small bites and sips of food/liquid   Demonstrates Need for Referral to Another Service occupational therapy;physical therapy;dietitian   Therapy Frequency 5 times/wk   Predicted Duration of Therapy Intervention (days/wks) 2 weeks   Anticipated Discharge Disposition inpatient rehabilitation facility   Risks and Benefits of Treatment have been explained. Yes   Patient, family and/or staff in agreement with Plan of Care Yes   Clinical Impression Comments Pt seen bedside for dysphagia evaluation per MD orders.  Pt presents with mild oropharyngeal dysphagia in the setting of weakness likely related to recent intubation.  Pt tolerated thin liquids and purees without overt s/s of aspiration.  Reduced laryngeal elevation and need for multiple swallows noted on pureed textures.  Solid texture not trialed due to increased risk for aspiration given weakness.  Recommend advance diet to full liquids when fully alert and positioned upright.  Pt will need to pace self, take small bits/sips, and alternate consistencies.  ST to follow as indicated on POC.     Total  Evaluation Time   Total Evaluation Time (Minutes) 8

## 2017-02-09 NOTE — PLAN OF CARE
Problem: Goal Outcome Summary  Goal: Goal Outcome Summary  SLP: Pt seen bedside for dysphagia evaluation per MD orders.  Pt presents with mild oropharyngeal dysphagia in the setting of weakness likely related to recent intubation.  Pt tolerated thin liquids and purees without overt s/s of aspiration.  Reduced laryngeal elevation and need for multiple swallows noted on pureed textures.  Solid texture not trialed due to increased risk for aspiration given weakness.  Recommend advance diet to full liquids when fully alert and positioned upright.  Pt will need to pace self, take small bits/sips, and alternate consistencies.  ST to follow as indicated on POC.

## 2017-02-10 ENCOUNTER — APPOINTMENT (OUTPATIENT)
Dept: OCCUPATIONAL THERAPY | Facility: CLINIC | Age: 77
DRG: 228 | End: 2017-02-10
Attending: SURGERY
Payer: MEDICARE

## 2017-02-10 ENCOUNTER — APPOINTMENT (OUTPATIENT)
Dept: GENERAL RADIOLOGY | Facility: CLINIC | Age: 77
DRG: 228 | End: 2017-02-10
Attending: SURGERY
Payer: MEDICARE

## 2017-02-10 ENCOUNTER — APPOINTMENT (OUTPATIENT)
Dept: SPEECH THERAPY | Facility: CLINIC | Age: 77
DRG: 228 | End: 2017-02-10
Attending: SURGERY
Payer: MEDICARE

## 2017-02-10 ENCOUNTER — APPOINTMENT (OUTPATIENT)
Dept: PHYSICAL THERAPY | Facility: CLINIC | Age: 77
DRG: 228 | End: 2017-02-10
Attending: SURGERY
Payer: MEDICARE

## 2017-02-10 LAB
ANION GAP SERPL CALCULATED.3IONS-SCNC: 6 MMOL/L (ref 3–14)
ANION GAP SERPL CALCULATED.3IONS-SCNC: 8 MMOL/L (ref 3–14)
ANION GAP SERPL CALCULATED.3IONS-SCNC: 8 MMOL/L (ref 3–14)
BASE EXCESS BLDV CALC-SCNC: 4.8 MMOL/L
BUN SERPL-MCNC: 20 MG/DL (ref 7–30)
BUN SERPL-MCNC: 22 MG/DL (ref 7–30)
BUN SERPL-MCNC: 24 MG/DL (ref 7–30)
CA-I BLD-MCNC: 4.7 MG/DL (ref 4.4–5.2)
CALCIUM SERPL-MCNC: 7.2 MG/DL (ref 8.5–10.1)
CALCIUM SERPL-MCNC: 8.2 MG/DL (ref 8.5–10.1)
CALCIUM SERPL-MCNC: 8.3 MG/DL (ref 8.5–10.1)
CHLORIDE SERPL-SCNC: 103 MMOL/L (ref 94–109)
CHLORIDE SERPL-SCNC: 105 MMOL/L (ref 94–109)
CHLORIDE SERPL-SCNC: 108 MMOL/L (ref 94–109)
CO2 SERPL-SCNC: 27 MMOL/L (ref 20–32)
CO2 SERPL-SCNC: 29 MMOL/L (ref 20–32)
CO2 SERPL-SCNC: 30 MMOL/L (ref 20–32)
CREAT SERPL-MCNC: 0.74 MG/DL (ref 0.52–1.04)
CREAT SERPL-MCNC: 0.83 MG/DL (ref 0.52–1.04)
CREAT SERPL-MCNC: 0.84 MG/DL (ref 0.52–1.04)
ERYTHROCYTE [DISTWIDTH] IN BLOOD BY AUTOMATED COUNT: 18.7 % (ref 10–15)
GFR SERPL CREATININE-BSD FRML MDRD: 66 ML/MIN/1.7M2
GFR SERPL CREATININE-BSD FRML MDRD: 66 ML/MIN/1.7M2
GFR SERPL CREATININE-BSD FRML MDRD: 76 ML/MIN/1.7M2
GLUCOSE BLDC GLUCOMTR-MCNC: 101 MG/DL (ref 70–99)
GLUCOSE BLDC GLUCOMTR-MCNC: 105 MG/DL (ref 70–99)
GLUCOSE BLDC GLUCOMTR-MCNC: 126 MG/DL (ref 70–99)
GLUCOSE BLDC GLUCOMTR-MCNC: 138 MG/DL (ref 70–99)
GLUCOSE BLDC GLUCOMTR-MCNC: 149 MG/DL (ref 70–99)
GLUCOSE BLDC GLUCOMTR-MCNC: 66 MG/DL (ref 70–99)
GLUCOSE BLDC GLUCOMTR-MCNC: 95 MG/DL (ref 70–99)
GLUCOSE BLDC GLUCOMTR-MCNC: 98 MG/DL (ref 70–99)
GLUCOSE SERPL-MCNC: 107 MG/DL (ref 70–99)
GLUCOSE SERPL-MCNC: 168 MG/DL (ref 70–99)
GLUCOSE SERPL-MCNC: 231 MG/DL (ref 70–99)
HCO3 BLDV-SCNC: 30 MMOL/L (ref 21–28)
HCT VFR BLD AUTO: 27.1 % (ref 35–47)
HGB BLD-MCNC: 8.4 G/DL (ref 11.7–15.7)
INTERPRETATION ECG - MUSE: NORMAL
MAGNESIUM SERPL-MCNC: 1.9 MG/DL (ref 1.6–2.3)
MAGNESIUM SERPL-MCNC: 2.1 MG/DL (ref 1.6–2.3)
MAGNESIUM SERPL-MCNC: 2.6 MG/DL (ref 1.6–2.3)
MCH RBC QN AUTO: 29.5 PG (ref 26.5–33)
MCHC RBC AUTO-ENTMCNC: 31 G/DL (ref 31.5–36.5)
MCV RBC AUTO: 95 FL (ref 78–100)
O2/TOTAL GAS SETTING VFR VENT: ABNORMAL %
OXYHGB MFR BLDV: 36 %
PCO2 BLDV: 52 MM HG (ref 40–50)
PF4 HEPARIN CMPLX AB SER QL: NORMAL
PH BLDV: 7.37 PH (ref 7.32–7.43)
PHOSPHATE SERPL-MCNC: 1.9 MG/DL (ref 2.5–4.5)
PHOSPHATE SERPL-MCNC: 2.1 MG/DL (ref 2.5–4.5)
PLATELET # BLD AUTO: 97 10E9/L (ref 150–450)
PO2 BLDV: 25 MM HG (ref 25–47)
POTASSIUM SERPL-SCNC: 3.3 MMOL/L (ref 3.4–5.3)
POTASSIUM SERPL-SCNC: 3.6 MMOL/L (ref 3.4–5.3)
POTASSIUM SERPL-SCNC: 4.3 MMOL/L (ref 3.4–5.3)
RBC # BLD AUTO: 2.85 10E12/L (ref 3.8–5.2)
SODIUM SERPL-SCNC: 138 MMOL/L (ref 133–144)
SODIUM SERPL-SCNC: 141 MMOL/L (ref 133–144)
SODIUM SERPL-SCNC: 145 MMOL/L (ref 133–144)
WBC # BLD AUTO: 16.3 10E9/L (ref 4–11)

## 2017-02-10 PROCEDURE — 25000128 H RX IP 250 OP 636: Performed by: NEUROLOGICAL SURGERY

## 2017-02-10 PROCEDURE — A9270 NON-COVERED ITEM OR SERVICE: HCPCS | Mod: GY | Performed by: SURGERY

## 2017-02-10 PROCEDURE — 40000275 ZZH STATISTIC RCP TIME EA 10 MIN

## 2017-02-10 PROCEDURE — 25000128 H RX IP 250 OP 636: Performed by: SURGERY

## 2017-02-10 PROCEDURE — 00000146 ZZHCL STATISTIC GLUCOSE BY METER IP

## 2017-02-10 PROCEDURE — 25000132 ZZH RX MED GY IP 250 OP 250 PS 637: Mod: GY | Performed by: THORACIC SURGERY (CARDIOTHORACIC VASCULAR SURGERY)

## 2017-02-10 PROCEDURE — 92526 ORAL FUNCTION THERAPY: CPT | Mod: GN | Performed by: SPEECH-LANGUAGE PATHOLOGIST

## 2017-02-10 PROCEDURE — 40000193 ZZH STATISTIC PT WARD VISIT

## 2017-02-10 PROCEDURE — 40000014 ZZH STATISTIC ARTERIAL MONITORING DAILY

## 2017-02-10 PROCEDURE — 20000004 ZZH R&B ICU UMMC

## 2017-02-10 PROCEDURE — 97530 THERAPEUTIC ACTIVITIES: CPT | Mod: GO

## 2017-02-10 PROCEDURE — 40000225 ZZH STATISTIC SLP WARD VISIT: Performed by: SPEECH-LANGUAGE PATHOLOGIST

## 2017-02-10 PROCEDURE — 25000125 ZZHC RX 250: Performed by: THORACIC SURGERY (CARDIOTHORACIC VASCULAR SURGERY)

## 2017-02-10 PROCEDURE — 25000125 ZZHC RX 250: Performed by: SURGERY

## 2017-02-10 PROCEDURE — 97110 THERAPEUTIC EXERCISES: CPT | Mod: GO

## 2017-02-10 PROCEDURE — 97535 SELF CARE MNGMENT TRAINING: CPT | Mod: GO

## 2017-02-10 PROCEDURE — 97530 THERAPEUTIC ACTIVITIES: CPT | Mod: GP

## 2017-02-10 PROCEDURE — 83735 ASSAY OF MAGNESIUM: CPT | Performed by: SURGERY

## 2017-02-10 PROCEDURE — 25000132 ZZH RX MED GY IP 250 OP 250 PS 637: Mod: GY | Performed by: SURGERY

## 2017-02-10 PROCEDURE — 40000133 ZZH STATISTIC OT WARD VISIT

## 2017-02-10 PROCEDURE — 97110 THERAPEUTIC EXERCISES: CPT | Mod: GP

## 2017-02-10 PROCEDURE — 84100 ASSAY OF PHOSPHORUS: CPT | Performed by: SURGERY

## 2017-02-10 PROCEDURE — 40000196 ZZH STATISTIC RAPCV CVP MONITORING

## 2017-02-10 PROCEDURE — 40000048 ZZH STATISTIC DAILY SWAN MONITORING

## 2017-02-10 PROCEDURE — 85027 COMPLETE CBC AUTOMATED: CPT | Performed by: SURGERY

## 2017-02-10 PROCEDURE — 80048 BASIC METABOLIC PNL TOTAL CA: CPT | Performed by: SURGERY

## 2017-02-10 PROCEDURE — 25000125 ZZHC RX 250: Performed by: NEUROLOGICAL SURGERY

## 2017-02-10 PROCEDURE — 82805 BLOOD GASES W/O2 SATURATION: CPT | Performed by: THORACIC SURGERY (CARDIOTHORACIC VASCULAR SURGERY)

## 2017-02-10 PROCEDURE — 71010 XR CHEST PORT 1 VW: CPT

## 2017-02-10 PROCEDURE — 99232 SBSQ HOSP IP/OBS MODERATE 35: CPT | Mod: GC | Performed by: ANESTHESIOLOGY

## 2017-02-10 PROCEDURE — 82330 ASSAY OF CALCIUM: CPT | Performed by: THORACIC SURGERY (CARDIOTHORACIC VASCULAR SURGERY)

## 2017-02-10 PROCEDURE — A9270 NON-COVERED ITEM OR SERVICE: HCPCS | Mod: GY | Performed by: THORACIC SURGERY (CARDIOTHORACIC VASCULAR SURGERY)

## 2017-02-10 RX ORDER — POTASSIUM CHLORIDE 29.8 MG/ML
20 INJECTION INTRAVENOUS
Status: COMPLETED | OUTPATIENT
Start: 2017-02-10 | End: 2017-02-10

## 2017-02-10 RX ORDER — DEXTROSE MONOHYDRATE 25 G/50ML
25-50 INJECTION, SOLUTION INTRAVENOUS
Status: DISCONTINUED | OUTPATIENT
Start: 2017-02-10 | End: 2017-02-17 | Stop reason: HOSPADM

## 2017-02-10 RX ORDER — FUROSEMIDE 10 MG/ML
40 INJECTION INTRAMUSCULAR; INTRAVENOUS ONCE
Status: COMPLETED | OUTPATIENT
Start: 2017-02-10 | End: 2017-02-10

## 2017-02-10 RX ORDER — NICOTINE POLACRILEX 4 MG
15-30 LOZENGE BUCCAL
Status: DISCONTINUED | OUTPATIENT
Start: 2017-02-10 | End: 2017-02-17 | Stop reason: HOSPADM

## 2017-02-10 RX ORDER — FUROSEMIDE 10 MG/ML
10 INJECTION INTRAMUSCULAR; INTRAVENOUS DAILY
Status: DISCONTINUED | OUTPATIENT
Start: 2017-02-10 | End: 2017-02-13

## 2017-02-10 RX ADMIN — AMIODARONE HYDROCHLORIDE 150 MG: 1.5 INJECTION, SOLUTION INTRAVENOUS at 08:36

## 2017-02-10 RX ADMIN — HYDROMORPHONE HYDROCHLORIDE 0.2 MG: 10 INJECTION, SOLUTION INTRAMUSCULAR; INTRAVENOUS; SUBCUTANEOUS at 04:18

## 2017-02-10 RX ADMIN — POTASSIUM PHOSPHATE, MONOBASIC AND POTASSIUM PHOSPHATE, DIBASIC 20 MMOL: 224; 236 INJECTION, SOLUTION INTRAVENOUS at 20:34

## 2017-02-10 RX ADMIN — MUPIROCIN 0.5 G: 20 OINTMENT TOPICAL at 07:57

## 2017-02-10 RX ADMIN — PIPERACILLIN SODIUM,TAZOBACTAM SODIUM 4.5 G: 4; .5 INJECTION, POWDER, FOR SOLUTION INTRAVENOUS at 13:56

## 2017-02-10 RX ADMIN — ATROPA BELLADONNA AND OPIUM 0.5 SUPPOSITORY: 16.2; 3 SUPPOSITORY RECTAL at 18:59

## 2017-02-10 RX ADMIN — POTASSIUM CHLORIDE 40 MEQ: 1.5 POWDER, FOR SOLUTION ORAL at 18:59

## 2017-02-10 RX ADMIN — Medication 5 ML: at 07:57

## 2017-02-10 RX ADMIN — PIPERACILLIN SODIUM,TAZOBACTAM SODIUM 4.5 G: 4; .5 INJECTION, POWDER, FOR SOLUTION INTRAVENOUS at 02:30

## 2017-02-10 RX ADMIN — HYDROMORPHONE HYDROCHLORIDE 0.2 MG: 10 INJECTION, SOLUTION INTRAMUSCULAR; INTRAVENOUS; SUBCUTANEOUS at 06:12

## 2017-02-10 RX ADMIN — HYDROMORPHONE HYDROCHLORIDE 0.4 MG: 10 INJECTION, SOLUTION INTRAMUSCULAR; INTRAVENOUS; SUBCUTANEOUS at 23:34

## 2017-02-10 RX ADMIN — ASPIRIN 81 MG CHEWABLE TABLET 81 MG: 81 TABLET CHEWABLE at 07:56

## 2017-02-10 RX ADMIN — Medication 2 G: at 18:48

## 2017-02-10 RX ADMIN — PIPERACILLIN SODIUM,TAZOBACTAM SODIUM 4.5 G: 4; .5 INJECTION, POWDER, FOR SOLUTION INTRAVENOUS at 07:49

## 2017-02-10 RX ADMIN — Medication 2 G: at 00:52

## 2017-02-10 RX ADMIN — FUROSEMIDE 10 MG: 10 INJECTION, SOLUTION INTRAVENOUS at 11:43

## 2017-02-10 RX ADMIN — VANCOMYCIN HYDROCHLORIDE 1500 MG: 10 INJECTION, POWDER, LYOPHILIZED, FOR SOLUTION INTRAVENOUS at 09:22

## 2017-02-10 RX ADMIN — FUROSEMIDE 40 MG: 10 INJECTION, SOLUTION INTRAVENOUS at 00:52

## 2017-02-10 RX ADMIN — POTASSIUM CHLORIDE 20 MEQ: 29.8 INJECTION, SOLUTION INTRAVENOUS at 02:30

## 2017-02-10 RX ADMIN — MUPIROCIN 0.5 G: 20 OINTMENT TOPICAL at 20:20

## 2017-02-10 RX ADMIN — HYDROMORPHONE HYDROCHLORIDE 0.4 MG: 10 INJECTION, SOLUTION INTRAMUSCULAR; INTRAVENOUS; SUBCUTANEOUS at 14:01

## 2017-02-10 RX ADMIN — POTASSIUM CHLORIDE 20 MEQ: 29.8 INJECTION, SOLUTION INTRAVENOUS at 01:30

## 2017-02-10 RX ADMIN — PIPERACILLIN SODIUM,TAZOBACTAM SODIUM 4.5 G: 4; .5 INJECTION, POWDER, FOR SOLUTION INTRAVENOUS at 20:08

## 2017-02-10 RX ADMIN — HYDROMORPHONE HYDROCHLORIDE 0.2 MG: 10 INJECTION, SOLUTION INTRAMUSCULAR; INTRAVENOUS; SUBCUTANEOUS at 00:52

## 2017-02-10 RX ADMIN — ACETAMINOPHEN 650 MG: 325 TABLET, FILM COATED ORAL at 20:08

## 2017-02-10 RX ADMIN — HYDROMORPHONE HYDROCHLORIDE 0.2 MG: 10 INJECTION, SOLUTION INTRAMUSCULAR; INTRAVENOUS; SUBCUTANEOUS at 20:51

## 2017-02-10 RX ADMIN — POTASSIUM PHOSPHATE, MONOBASIC AND POTASSIUM PHOSPHATE, DIBASIC 15 MMOL: 224; 236 INJECTION, SOLUTION INTRAVENOUS at 06:04

## 2017-02-10 RX ADMIN — DOCUSATE SODIUM 100 MG: 50 LIQUID ORAL at 20:20

## 2017-02-10 RX ADMIN — MILRINONE LACTATE 0.12 MCG/KG/MIN: 200 INJECTION, SOLUTION INTRAVENOUS at 19:26

## 2017-02-10 RX ADMIN — PANTOPRAZOLE SODIUM 40 MG: 40 TABLET, DELAYED RELEASE ORAL at 07:56

## 2017-02-10 RX ADMIN — Medication 10 ML: at 20:08

## 2017-02-10 RX ADMIN — DEXTROSE 15 G: 15 GEL ORAL at 06:26

## 2017-02-10 ASSESSMENT — PAIN DESCRIPTION - DESCRIPTORS
DESCRIPTORS: ACHING

## 2017-02-10 NOTE — PLAN OF CARE
Problem: Goal Outcome Summary  Goal: Goal Outcome Summary  OT 4E: Pt more lethargic during today's session needing frequent VCs to maintain alertness. Pt mod A for supine > EOB. Pt completed transfer to chair with min A x2 and assist for line management. Pt tolerated standing bout ~3 min without LOB. Pt completed g/h tasks with mod VCs for sequencing. Pt on 3L NC, O2 sats >93% max .     Rec TCU as pt presents below baseline in ADLs and functional mobility. Pt limited by post surgical precautions, cognitive deficits, balance deficits, and decreased activity tolerance.

## 2017-02-10 NOTE — PROGRESS NOTES
"D/I: Patient up in chair for 2 hours this morning, eating DD1 diet with assistance, [place] removed, [place] gtt off at 1100, MAPs 70s, -120, goal is to keep less than 130 Afib with RVR, on [place] gtt. Patient oriented x4, some word finding difficulty and seems to ''zone out\" once in awhile.  Takes time to wake up.  Has been awake most of the day, used Dilaudid 0.4mg x1 for incisional pain this afternoon, and is resting quietly.  R: Will continue to monitor and assess.  "

## 2017-02-10 NOTE — PROCEDURES
EEG#: -2  (Day 2 of video-EEG monitoring)        DATE OF RECORDIN2017      DURATION OF RECORDIN hours and 4 minutes      CLINICAL HISTORY:  Carlos Alberto Fenton is a 76-year-old female with history of cardioembolic ischemic infarct in 10/2016, causing small cortical infarcts in the right and left parietal lobes, who is currently being evaluated for seizures due to spells of lip smacking, staring and unresponsiveness.  On this day of recording, patient reportedly was not taking any psychoactive medications.      TECHNICAL SUMMARY:  This continuous EEG monitoring procedure was performed with 23 scalp electrodes in the 10-20 system of placement, and additional scalp, precordial and other surface electrodes were used for electrical referencing and artifact detection.  Video monitoring was utilized and periodically reviewed by EEG technologist and physician for electroclinical correlation.       BACKGROUND EEG ACTIVITIES:  The predominant electro-cerebral activity during wakefulness consisted predominantly of theta range activities with intermittent, diffuse irregular delta slowing, in the range of 2-7 Hz.  During maximal wakefulness, there was a symmetric, poorly sustained posterior dominant rhythm of 6-7 Hz.  Drowsiness was manifested by increase in the diffuse slowing.  During clinically asleep state, normal sleep architecture such as sleep spindles were not present.        No interictal epileptiform discharges were observed during this recording.      ICTAL RECORDINGS:  No electrographic seizures or paroxysmal behavioral events were recorded on this day.      SUMMARY OF DAY #2 OF VIDEO-EEG MONITORING:  This is an abnormal EEG due to the presence of frequent polymorphic theta-delta slowing in wakefulness consistent with diffuse encephalopathy, which is etiologically nonspecific.  No focal abnormality was appreciated.  No interictal epileptiform discharges and no electrographic seizures were recorded.   Target events were not seen today.  Clinical correlation is recommended.         I personally reviewed the video EEG and agree with the reported findings.     BAMBI SERNA MD       As dictated by KAYY CONNELLY MD            D: 02/10/2017 12:02   T: 02/10/2017 12:31   MT: JENNIE      Name:     MENDOZA GREENBERG   MRN:      [id number]        Account:        [id number]   :      1940           Procedure Date: 2017      Document: [id number]

## 2017-02-10 NOTE — PLAN OF CARE
"Problem: Goal Outcome Summary  Goal: Goal Outcome Summary  Outcome: Therapy, progress toward functional goals as expected  SLP: Chart reviewed and consulted with RN. No reports of problems swallowing full liquid diet initiated yesterday. Patient up in chair attempting to feed self yogurt and note difficulty with feeding self. Note confusion and patient stated \"I forget what I'm doing\". Patient consumed 2 oz. yogurt, 2 bites semi-solid and 5-6 oz. water by straw and cup. No overt aspiration signs occurred. Note slow but functional mastication of semi-solid texture, however patient declined further trials. Given confusion, recommend cautiously advance diet to dysphagia diet level 1 with thin liquids given 1:1 supervision/assist and swallow precautions (fully upright and alert, small bites/sips one at a time, slow rate, alternate consistencies). Patient will need assistance with feeding task due to confusion. Consulted with RN and jame ALVAREZ with diet recommendation. Will f/u for diet tolerance and readiness for diet advancement. Recommend TCU at discharge.        "

## 2017-02-10 NOTE — PLAN OF CARE
Problem: Goal Outcome Summary  Goal: Goal Outcome Summary  PT 4E: Pt alert during only 25% of session, however is following 100% of commands while alert. Engaged in bed mobility, transfer, and LE functional strength training. Requires mod A for supine>sit with HOB raised, mod Ax1 sit<>stand with HHA, and min A x1 for bed>chair with HHA and assist of second for line management. Pt with standing tolerance of ~3 minutes while dependent eva cares performed. Limited tolerance to seated LE strengthening 2/2 to dec alertness with inc levels of fatigue by end of session. Pt also noted to be lip smacking intermittently throughout session. RN aware. Hemodynamically stable throughout.    REC: TCU once medically stable.

## 2017-02-10 NOTE — PROGRESS NOTES
"CV Surgery Note  02/09/2017    Subjective and Interval Events: No acute events overnight.  Added phenylephrine, now off epi.  Much more appropriate this MA.    Objective:   Temp: 99.3  F (37.4  C) Temp src: Pulmonary Artery     Heart Rate: 127 Resp: 18 SpO2: (!) 82 % O2 Device: Nasal cannula Oxygen Delivery: 2 LPM Height: 157.5 cm (5' 2\") Weight: 76.2 kg (167 lb 15.9 oz)  Estimated body mass index is 30.72 kg/(m^2) as calculated from the following:    Height as of this encounter: 1.575 m (5' 2\").    Weight as of this encounter: 76.2 kg (167 lb 15.9 oz).  Resp: 18    Exam:   Gen: Awake, alert, NAD  Head: NCAT  Chest: NLB on NC, CT serosang  Heart: Afib  Abd: Soft, nt/nd  Ext: WWP    Drips: Milrinon 0.25, alfred 0.8    UO: 1.9L  CT: 310/20    WBC: 12.2, Hgb: 8.0, Plt: 72  Na: 150, K: 3.9, Crt: 0.71    Assessment and Plan: POD # 3 s/p 3v CABG, modified maze with pulmonary isolation, left atrial appendage ligation, PFO closure.    Neuro: AMS much improved, likely multifactorial, HCT wnl, EEG w/o seizure, appreciate neurology input  CV: Wean phenylephrine as able, CI improved, c/w milrinone for today, afib this AM, bolus with amio and c/w gtt, HR now better controlled, d/w EP this AM, appreciate formal consult  Resp: Extubated, prn NC, IS/pulm toilet  FEN/GI: Speech eval today prior to any diet  Renal/: UOP adequate, c/w payton for accurate I/Os, gentle diuresis today  Heme: Hgb stable, d/c'd heparin given downtrend in platelets, check HIT  ID: Empiric abx for now, await culture results, d/c if negative  Endo: SSI  PPx: D/c heparin, await HIT panel, protonix  Dispo: CVICU    Seen and discussed with staff, Dr. Malcolm.    Rodrick English MD  General Surgery  Pager: [phone]              "

## 2017-02-10 NOTE — PROGRESS NOTES
"CV Surgery Note  02/09/2017    Subjective and Interval Events: No acute events overnight.  Added phenylephrine, now off epi.  Much more appropriate this MA.    Objective:   Temp: 99.3  F (37.4  C) Temp src: Pulmonary Artery     Heart Rate: 127 Resp: 18 SpO2: (!) 82 % O2 Device: Nasal cannula Oxygen Delivery: 2 LPM Height: 157.5 cm (5' 2\") Weight: 76.2 kg (167 lb 15.9 oz)  Estimated body mass index is 30.72 kg/(m^2) as calculated from the following:    Height as of this encounter: 1.575 m (5' 2\").    Weight as of this encounter: 76.2 kg (167 lb 15.9 oz).  Resp: 18    Exam:   Gen: Awake, alert, NAD  Head: NCAT  Chest: NLB on NC, CT serosang  Heart: Afib  Abd: Soft, nt/nd  Ext: WWP    Drips: Milrinon 0.25, alfred 0.8    UO: 1.9L  CT: 310/20    WBC: 12.2, Hgb: 8.0, Plt: 72  Na: 150, K: 3.9, Crt: 0.71    Assessment and Plan: POD # 3 s/p 3v CABG, modified maze with pulmonary isolation, left atrial appendage ligation, PFO closure.    Neuro: AMS much improved, likely multifactorial, HCT wnl, EEG w/o seizure, appreciate neurology input  CV: Wean phenylephrine as able, CI improved, c/w milrinone for today, afib this AM, bolus with amio and c/w gtt, HR now better controlled, d/w EP this AM, appreciate formal consult  Resp: Extubated, prn NC, IS/pulm toilet  FEN/GI: Speech eval today prior to any diet  Renal/: UOP adequate, c/w payton for accurate I/Os, gentle diuresis today  Heme: Hgb stable, d/c'd heparin given downtrend in platelets, check HIT  ID: Empiric abx for now, await culture results, d/c if negative  Endo: SSI  PPx: D/c heparin, await HIT panel, protonix  Dispo: CVICU    Seen and discussed with fellow, Dr. Jeana English MD  General Surgery  Pager: [phone]            "

## 2017-02-10 NOTE — PROGRESS NOTES
"[facility], [place]   Antimicrobial Management Team (AMT) Note              To: CV ICU  Unit: 4E   Allergies   Allergen Reactions     Oxycodone      hallucinations     Adhesive Tape Itching and Rash     Diapers & Supplies Rash     Developed yeast infection from previous hospital stay       Brief Summary/HPI: Patient is a 76 year old female with a PMH of CVA 10/2016, DM, CAD/Cardiomyopathy (LV EF 40-45%), HTN, paroxsymal a-fib (anticoagulated PTA) who underwent CABG x3, maze procedure, left atrial appendage ligation, and closure of patent foramen ovale on 2/6/17. Patient has been febrile since post-op, initially thought to be related to the surgery. Patient developed altered mental status on 2/8, which prompted the initiation of vancomycin and pip/tazo.    Interval History  2/10: Tmax 100.4 F, WBC 16.3  2/9: Tmax 100.6 F, WBC 15.3  2/8: Tmax 101.8 F, WBC 16.8,     Assessment:   1. Suspected sepsis: Patient is febrile with leukocytosis, which could be contributed to by her recent surgery. BCxs from 2/8 NGTD, UA does not look infected. Chest x-ray shows stable basilar/retrocardiac opacities with likely small left pleural effusion and atelectasis. Mentation has improved. No history of MRSA.    Recommendations:  1. Discontinue vancomycin  2. Continue pip/tazo for now  3. Obtain a procalcitonin level to help determine the presence of an infectious etiology    Discussed with ID Staff - Dr. Devon Mann, PharmD  [id number] Pharmacy Resident      Current Antibiotics    piperacillin-tazobactam  4.5 g Intravenous Q6H     vancomycin (VANCOCIN) IV  1,500 mg Intravenous Q24H       Vitals and other clinical features  BP 97/59 mmHg  Pulse 59  Temp(Src) 99  F (37.2  C) (Pulmonary Artery)  Resp 12  Ht 1.575 m (5' 2\")  Wt 77.8 kg (171 lb 8.3 oz)  BMI 31.36 kg/m2  SpO2 90%    Temperature curve:      Labs  Recent Labs   Lab Test  02/10/17   0415  02/09/17   1600  02/09/17   0348  02/08/17   " 0326  02/07/17   2257  02/07/17   1115  02/07/17   0400   02/06/17   2106   11/04/16   0705  11/01/16   0727  10/31/16   0630   10/17/16   2240   10/14/16   2242  12/08/12   1330   WBC  16.3*  15.3*  12.2*  16.8*   --    --   13.7*   --   13.6*   < >  5.6  11.6*  14.2*   < >  7.5   < >  8.7  9.7   ANEU   --    --    --    --    --    --    --    --    --    --   3.2  9.0*  10.3*   --   5.7   --   6.6  7.2   ALYM   --    --    --    --    --    --    --    --    --    --   1.4  1.1  2.9   --   0.9   --   1.2  1.4   ANETTE   --    --    --    --    --    --    --    --    --    --   0.7  1.3  0.8   --   0.9   --   0.7  0.9   AEOS   --    --    --    --    --    --    --    --    --    --   0.1  0.0  0.0   --   0.0   --   0.2  0.1   HGB  8.4*  8.8*  8.0*  9.0*  8.9*  8.5*  10.2*   < >  8.7*   < >  11.0*  11.3*  12.0   < >  12.5   < >  12.7  10.5*   HCT  27.1*  27.9*  25.4*  28.9*   --    --   32.3*   --   27.3*   < >  36.3  36.6  38.5   < >  40.1   < >  42.6  33.0*   MCV  95  96  94  95   --    --   93   --   94   < >  99  98  98   < >  105*   < >  101*  97   PLT  97*  79*  72*  110*   --    --   139*   --   133*   < >  224  224  234   < >  183   < >  269  415    < > = values in this interval not displayed.       Estimated Creatinine Clearance: 55.7 mL/min (based on Cr of 0.83).  Recent Labs   Lab Test  02/10/17   0415  02/09/17   2321  02/09/17   1600  02/09/17   0348  02/08/17   2138  02/08/17   1051   CR  0.83  0.84  0.75  0.71  0.83  1.01       Recent Labs   Lab Test  02/09/17   0348 02/09/17 02/08/17   2138  02/08/17   1051  11/17/16   1720  10/17/16   2240  10/14/16   2242   BILITOTAL  1.6*   --   1.6*  1.4*  0.4  0.7  0.6   ALKPHOS  44   --   38*  43  103  88  125   ALBUMIN  3.2*   --   3.2*  3.7  3.4  2.9*  3.5   AST  1279*  1413*  1303*  1433*  31  27  25   ALT  921*  894*  840*  834*  56*  18  21       Recent Labs   Lab Test  02/08/17   1249  02/08/17   0752  02/07/17   0400  02/06/17   1427  02/06/17   1341   02/06/17   1309  02/06/17   1233  02/06/17   1209  02/06/17   1133  02/06/17   1126  02/06/17   1045  02/06/17   0915  10/17/16   2240  10/17/16   1440   LACT  1.5  1.9  1.3  2.0  1.8  2.8*  1.3  1.3  1.4  1.2  1.0  0.8  1.7  2.5*       Culture results with specimen source  CULTURE MICRO   Date Value Ref Range Status   02/08/2017 No growth after 2 days  Final   02/08/2017 No growth after 2 days  Final   10/19/2016 No growth  Final   10/17/2016 *  Final    Moderate growth Staphylococcus aureus NOT MRSA No MRSA isolated   10/15/2016 No growth after 6 days  Final   10/15/2016 Moderate growth Normal katty  Final    SPECIMEN DESCRIPTION   Date Value Ref Range Status   02/08/2017 Blood Arterial blood  Final   02/08/2017 Blood White port  Final   10/29/2016 Feces  Final   10/19/2016 Catheterized Urine  Final   10/17/2016 Nasal  Final   10/15/2016 Left Hand  Final          Recent Labs  Lab 02/08/17  1051 02/08/17  1050   CULT No growth after 2 days No growth after 2 days   SDES Blood Arterial blood Blood White port       Urine Studies     Recent Labs   Lab Test  02/08/17   1701  02/02/17   1356  11/03/16   1830  10/19/16   1033  10/14/16   2350   URINEPH  5.5  5.0  7.0  5.5  6.0   NITRITE  Negative  Negative  Negative  Negative  Negative   LEUKEST  Negative  Negative  Negative  Moderate*  Trace*   WBCU  7*  1  1  1  2-5*       CSF Testing  No lab results found.    Respiratory Virus Testing    No results found for: RS, FLUAG    Last check of C difficile  C DIFF TOXIN B PCR   Date Value Ref Range Status   10/29/2016  NEG Final    Negative  Negative: Clostridium difficile target DNA sequences NOT detected, presumed   negative for Clostridium difficile toxin B or the number of bacteria present   may be below the limit of detection for the test.   FDA approved assay performed using [employer] real-time PCR.   A negative result does not exclude actual disease due to Clostridium difficile   and may be due to improper  collection, handling and storage of the specimen or   the number of organisms in the specimen is below the detection limit of the   assay.         Imaging:  Results for orders placed or performed during the hospital encounter of 02/06/17   XR Chest Port 1 View    Narrative    Exam:  XR CHEST PORT 1 VW, 2/6/2017 2:15 PM    History: portable chest in OR 23 for too many clips ater surgery    Comparison:  Chest x-ray 2/2/2017    Findings:  Single AP view of the chest. Endotracheal tube tip is  appropriately positioned in the mid thoracic trachea. Right IJ  [place]-Rosi catheter tip terminates in the proximal right main pulmonary  artery. There are bilateral mediastinal drains and a left-sided chest  tube. Atrial appendage closure device. Median sternotomy wires.  Cardiac silhouette is enlarged. There are increased perihilar and left  retrocardiac opacities and a probable small left pleural effusion. No  pneumothorax. Upper abdomen unremarkable. No suspicious radiopaque  foreign body.      Impression    Impression:    1. No evidence of abnormal radiopaque foreign body.  2. Endotracheal tube appropriately positioned in the mid thoracic  trachea. Other support devices as above.  3. Bilateral perihilar and lower lobe opacities most likely  representing a combination of atelectasis and postoperative pulmonary  edema.  4. Probable small left pleural effusion with chest tube in place. No  pneumothorax..    Jessica Rey with cardiovascular surgery was informed of the result  via telephone by Dr. Lebron 2:18 PM on 2/6/2017.    I have personally reviewed the examination and initial interpretation  and I agree with the findings.    ISABELLA LAKE MD   XR Chest Port 1 View    Narrative    Exam:  XR CHEST PORT 1 , 2/7/2017 3:21 AM    History: Post Op CVTS Surgery    Comparison:  2/6/2017    Findings:  Single AP view of the chest. Endotracheal tube tip is  appropriately positioned in the mid thoracic trachea. Right IJ  U[place]es-Rosi  catheter tip terminates in the proximal right main pulmonary  artery. There are bilateral mediastinal drains and a left-sided chest  tube. Atrial appendage closure device. Median sternotomy wires.  Cardiac silhouette is enlarged. There are increased perihilar and left  retrocardiac opacities and a probable small left pleural effusion. No  pneumothorax.     Upper abdomen unremarkable.       Impression    Impression:    No significant change in bilateral perihilar and lower lobe opacities  with probable small left pleural effusion.    I have personally reviewed the examination and initial interpretation  and I agree with the findings.    BEREKET DANIELS MD   XR Chest Port 1 View    Narrative    Exam:  XR CHEST PORT 1 VW, 2/6/2017 3:07 PM    History: ett placement    Comparison:  Chest radiograph from 2/6/2017.    Findings:  Single AP view of the chest obtained. Stable endotracheal  tube position projecting approximately 2 cm above the tamie. Right IJ  approach [place]-Rosi catheter tip projects over the right pulmonary  artery. Enteric tube tip and sidehole project over the stomach. Stable  position of mediastinal drains and left chest tube. Stable  postoperative changes of coronary artery bypass surgery including  median sternotomy wires, mediastinal surgical clips, and left atrial  appendage closure device.     Mild pulmonary vascular congestion. The cardiomediastinal silhouette  is stably enlarged. Probable small left pleural effusion and adjacent  basilar atelectasis and/or consolidation. No pneumothorax. No acute  osseous abnormality.      Impression    Impression:    1. Adequate position of endotracheal tube projecting approximately 2  cm above the tamie. Stable support devices.   2. Probable small left pleural effusion and adjacent basilar  atelectasis and/or consolidation.  3. Stable cardiomegaly and mild pulmonary vascular congestion.  4. Stable postoperative changes of coronary artery bypass surgery.    I  have personally reviewed the examination and initial interpretation  and I agree with the findings.    ISABELLA LAKE MD   XR Abdomen Port 1 View    Narrative    Examination:  XR ABDOMEN PORT F1 VW 2/6/2017 6:29 PM     Correlation: Chest x-ray on 2/6/2017    History: OG s/p OR    Findings: 1 portable abdominal x-ray was obtained. The OG tube  projects over the stomach. Partial visualization of stable mediastinal  drains, left chest tube, postoperative changes of coronary artery  bypass surgery including median sternotomy wires and mediastinal  surgical clips and left arterial appendage closure device. The small  intestine and colon are not distended. There are no abnormal  calcifications. No evidence of pneumatosis. No portal venous gas.  Possible left-sided small pleural effusion.      Impression    Impression:   1. The OG tube projects over the stomach.   2. Partial visualization of stable mediastinal drains, left chest  tube, and postoperative changes of coronary artery bypass surgery.    I have personally reviewed the examination and initial interpretation  and I agree with the findings.    BEREKET DANIELS MD   XR Chest Port 1 View    Narrative    Exam:  XR CHEST PORT 1 VW, 2/8/2017 6:37 AM    History: post extubation    Comparison:  2/7/2017    Findings:  Single AP view of the chest. Patient has been extubated.  Right IJ [place]-Rosi catheter tip terminates over the proximal right  main pulmonary artery as before. There are mediastinal drains and a  left-sided chest tube. Atrial appendage closure device. Median  sternotomy wires. Cardiac silhouette is enlarged, but unchanged.  Persistent perihilar and left retrocardiac opacities and a probable  small left pleural effusion. No pneumothorax.       Impression    Impression:    No significant change in bilateral perihilar and lower lobe opacities  with probable small left pleural effusion. Status post extubation.    I have personally reviewed the examination and  initial interpretation  and I agree with the findings.    CLARITZA DOMINGO MD   CT Head w/o Contrast    Narrative    CT HEAD W/O CONTRAST 2/8/2017 8:26 PM    History:  Alterted Mental Status      Comparison: 10/25/2016     Technique: Axial thin section CT images of the head were obtained from  the base of the skull to the vertex without intravenous contrast and  reviewed in brain, subdural, and bone windows.     Findings:   Mild diffuse cerebral volume loss. There is no evidence of  intracranial hemorrhage, mass effect, or midline shift. Gray to white  matter differentiation is preserved. Moderate periventricular and  subcortical white matter patchy and confluent hypodensities. Basal  cisterns are patent. The ventricles are within normal limits for age.    The bony calvaria and the bones of the skull base appear normal. The  visualized portions of paranasal sinuses and mastoid air cells are  clear.      Impression    Impression:   1.  No acute intracranial pathology.  2. Moderate chronic small vessel ischemic disease.       I have personally reviewed the examination and initial interpretation  and I agree with the findings.    ROSIBEL RAMOS MD   XR Chest Port 1 View    Narrative    XR CHEST PORT 1 VW  2/9/2017 8:29 PM      HISTORY: interval change    COMPARISON: 2/8/2017    FINDINGS: AP view of the chest semiupright. [place]-Rosi catheter tip  projects over the pulmonary outflow tract. Bilateral chest tubes  remain in place. Median sternotomy wires. Surgical clips in the left  hilum. Atrial appendage closure device. Stable enlargement of the  cardiac mediastinal silhouette. Bilateral perihilar and left  retrocardiac opacities appear more conspicuous than on the comparison  study, however this is likely due to lower lung volumes. Supporting  this is an area of platelike atelectasis in the left midlung. Likely  stable small left effusion. No pneumothorax. The visualized osseous  and soft tissue structures are  unremarkable. Unremarkable upper  abdomen.      Impression    IMPRESSION:   1. Stable appearance of support devices as above.  2. Interval change in appearance of opacities is likely related to  relatively decreased lung volumes with atelectasis from the comparison  study.    I have personally reviewed the examination and initial interpretation  and I agree with the findings.    SHAINA GONZALEZ MD   XR Chest Port 1 View    Narrative    XR CHEST PORT 1 VW  2/10/2017 12:54 AM      HISTORY: interval change    COMPARISON: 2/10/2017    FINDINGS: AP view of the chest.    Post surgical changes of CABG. Indistinct pulmonary vasculature.  [place]-Rosi catheter tip projects over the pulmonary outflow tract.  Bilateral chest tubes remain and mediastinal drains are stable. Median  sternotomy wires. Surgical clips in the left hilum. Atrial appendage  closure device. Stable enlargement of the cardiac mediastinal  silhouette. Bilateral perihilar and left retrocardiac opacities  unchanged. Likely stable small left effusion. No pneumothorax. The  visualized osseous and soft tissue structures are unremarkable.  Unremarkable upper abdomen.      Impression    IMPRESSION:   1. Pulmonary vascular congestion.  2. Unchanged left basilar/retrocardiac opacities with likely small  left pleural effusion and atelectasis.    I have personally reviewed the examination and initial interpretation  and I agree with the findings.    SHAINA GONZALEZ MD

## 2017-02-10 NOTE — PROGRESS NOTES
CLINICAL NUTRITION SERVICES    Reason for Assessment:  Heart-healthy nutrition education, received consult (2/6)    Diet History:  Patient sleepy during visit, did not obtain detailed diet history. Currently on dysphagia diet level 1 with thin liquids per SLP.    Nutrition Diagnosis:  Food- and nutrition-related knowledge deficit related to no known previous knowledge of heart-healthy diet as evidenced by consulted 2/6 to discuss post op CV surgery diet education.    Nutrition Prescription/Recs:  Continue diet adv per SLP discretion, encourage heart-healthy diet choices as approp.    Interventions:  Nutrition Education- Provided handouts: Heart Health Guidelines (includes Heart Healthy Food Choices), 10 Tips for Heart-Healthy Cooking and Dining Out With Your Heart In Mind.    Goals:    1.  Patient will verbalize at least four foods high in saturated/trans-fats and/or sodium.    2.  Patient will list at least two interventions to make current meal plan more heart-healthy.     Follow-up:   Patient to ask any further nutrition-related questions before discharge.  In addition, patient may request outpatient RD appointment.      Aziza Aguilar RD, LD (pager [phone])

## 2017-02-10 NOTE — PLAN OF CARE
Problem: Goal Outcome Summary  Goal: Goal Outcome Summary  Outcome: Improving  D/I/A: Pt w/lip smacking while sleeping. No confusion-A&Ox4 when waking off. Neuros intact. Pt into Afib w/RVR. EP consulted w/Amio bolus + gtt started. Pacemaker VVI-60bpm. See FS. Pt weaned off all gtts except for Milrinone @ 0.25 mcg/kg/min. K+ replaced per protocol. Phos replaced x2. CVP-18. PA-50-40s/30-20s. COs-4.1-4.3s. CI-2.2-2.6.SvO2-40-50s on monitor. VBG check @ 34>33 after management of pain, rest, O2.Team aware. Redraw @ 2200. 1x dose of Lasix. Good UO. See FS.CT output 0-100mL, serosanguinous. O2-2LPM NC. Crackles/coarse/diminished. Encourage IC. Full liquid diet after SLP due to lethargy in AM and cough after ice chips. Pt up +2 assist to chair w/lines and movement. BG in 100s. Insulin gtt Algo #1 0-0.5.     P: Continue to monitor. Contact MD w/changes. Encourage pul hygiene and recs per SLP while eating.

## 2017-02-11 ENCOUNTER — APPOINTMENT (OUTPATIENT)
Dept: PHYSICAL THERAPY | Facility: CLINIC | Age: 77
DRG: 228 | End: 2017-02-11
Attending: SURGERY
Payer: MEDICARE

## 2017-02-11 ENCOUNTER — APPOINTMENT (OUTPATIENT)
Dept: OCCUPATIONAL THERAPY | Facility: CLINIC | Age: 77
DRG: 228 | End: 2017-02-11
Attending: SURGERY
Payer: MEDICARE

## 2017-02-11 ENCOUNTER — APPOINTMENT (OUTPATIENT)
Dept: GENERAL RADIOLOGY | Facility: CLINIC | Age: 77
DRG: 228 | End: 2017-02-11
Attending: SURGERY
Payer: MEDICARE

## 2017-02-11 ENCOUNTER — APPOINTMENT (OUTPATIENT)
Dept: SPEECH THERAPY | Facility: CLINIC | Age: 77
DRG: 228 | End: 2017-02-11
Attending: SURGERY
Payer: MEDICARE

## 2017-02-11 LAB
ALBUMIN SERPL-MCNC: 3 G/DL (ref 3.4–5)
ALP SERPL-CCNC: 77 U/L (ref 40–150)
ALT SERPL W P-5'-P-CCNC: 869 U/L (ref 0–50)
ANION GAP SERPL CALCULATED.3IONS-SCNC: 10 MMOL/L (ref 3–14)
ANION GAP SERPL CALCULATED.3IONS-SCNC: 8 MMOL/L (ref 3–14)
ANION GAP SERPL CALCULATED.3IONS-SCNC: 8 MMOL/L (ref 3–14)
APTT PPP: 30 SEC (ref 22–37)
AST SERPL W P-5'-P-CCNC: 679 U/L (ref 0–45)
BILIRUB DIRECT SERPL-MCNC: 1.3 MG/DL (ref 0–0.2)
BILIRUB SERPL-MCNC: 2.6 MG/DL (ref 0.2–1.3)
BUN SERPL-MCNC: 18 MG/DL (ref 7–30)
CA-I BLD-MCNC: 4.8 MG/DL (ref 4.4–5.2)
CALCIUM SERPL-MCNC: 7.7 MG/DL (ref 8.5–10.1)
CALCIUM SERPL-MCNC: 7.9 MG/DL (ref 8.5–10.1)
CALCIUM SERPL-MCNC: 8 MG/DL (ref 8.5–10.1)
CHLORIDE SERPL-SCNC: 101 MMOL/L (ref 94–109)
CHLORIDE SERPL-SCNC: 103 MMOL/L (ref 94–109)
CHLORIDE SERPL-SCNC: 103 MMOL/L (ref 94–109)
CO2 SERPL-SCNC: 24 MMOL/L (ref 20–32)
CO2 SERPL-SCNC: 26 MMOL/L (ref 20–32)
CO2 SERPL-SCNC: 26 MMOL/L (ref 20–32)
CREAT SERPL-MCNC: 0.68 MG/DL (ref 0.52–1.04)
CREAT SERPL-MCNC: 0.72 MG/DL (ref 0.52–1.04)
CREAT SERPL-MCNC: 0.74 MG/DL (ref 0.52–1.04)
ERYTHROCYTE [DISTWIDTH] IN BLOOD BY AUTOMATED COUNT: 18.5 % (ref 10–15)
ERYTHROCYTE [DISTWIDTH] IN BLOOD BY AUTOMATED COUNT: 18.7 % (ref 10–15)
GFR SERPL CREATININE-BSD FRML MDRD: 77 ML/MIN/1.7M2
GFR SERPL CREATININE-BSD FRML MDRD: 79 ML/MIN/1.7M2
GFR SERPL CREATININE-BSD FRML MDRD: 84 ML/MIN/1.7M2
GLUCOSE BLDC GLUCOMTR-MCNC: 136 MG/DL (ref 70–99)
GLUCOSE BLDC GLUCOMTR-MCNC: 142 MG/DL (ref 70–99)
GLUCOSE BLDC GLUCOMTR-MCNC: 148 MG/DL (ref 70–99)
GLUCOSE BLDC GLUCOMTR-MCNC: 77 MG/DL (ref 70–99)
GLUCOSE SERPL-MCNC: 116 MG/DL (ref 70–99)
GLUCOSE SERPL-MCNC: 123 MG/DL (ref 70–99)
GLUCOSE SERPL-MCNC: 195 MG/DL (ref 70–99)
HCT VFR BLD AUTO: 25 % (ref 35–47)
HCT VFR BLD AUTO: 26.3 % (ref 35–47)
HGB BLD-MCNC: 7.9 G/DL (ref 11.7–15.7)
HGB BLD-MCNC: 8.3 G/DL (ref 11.7–15.7)
INR PPP: 2.29 (ref 0.86–1.14)
MAGNESIUM SERPL-MCNC: 2.5 MG/DL (ref 1.6–2.3)
MAGNESIUM SERPL-MCNC: 2.6 MG/DL (ref 1.6–2.3)
MCH RBC QN AUTO: 29.6 PG (ref 26.5–33)
MCH RBC QN AUTO: 29.8 PG (ref 26.5–33)
MCHC RBC AUTO-ENTMCNC: 31.6 G/DL (ref 31.5–36.5)
MCHC RBC AUTO-ENTMCNC: 31.6 G/DL (ref 31.5–36.5)
MCV RBC AUTO: 94 FL (ref 78–100)
MCV RBC AUTO: 94 FL (ref 78–100)
PHOSPHATE SERPL-MCNC: 2.3 MG/DL (ref 2.5–4.5)
PHOSPHATE SERPL-MCNC: 2.5 MG/DL (ref 2.5–4.5)
PHOSPHATE SERPL-MCNC: 5.8 MG/DL (ref 2.5–4.5)
PLATELET # BLD AUTO: 123 10E9/L (ref 150–450)
PLATELET # BLD AUTO: 98 10E9/L (ref 150–450)
POTASSIUM SERPL-SCNC: 4.4 MMOL/L (ref 3.4–5.3)
POTASSIUM SERPL-SCNC: 4.5 MMOL/L (ref 3.4–5.3)
POTASSIUM SERPL-SCNC: ABNORMAL MMOL/L (ref 3.4–5.3)
PROT SERPL-MCNC: 5.9 G/DL (ref 6.8–8.8)
RBC # BLD AUTO: 2.65 10E12/L (ref 3.8–5.2)
RBC # BLD AUTO: 2.8 10E12/L (ref 3.8–5.2)
SODIUM SERPL-SCNC: 135 MMOL/L (ref 133–144)
SODIUM SERPL-SCNC: 137 MMOL/L (ref 133–144)
SODIUM SERPL-SCNC: 137 MMOL/L (ref 133–144)
VANCOMYCIN SERPL-MCNC: 10.3 MG/L
WBC # BLD AUTO: 14.4 10E9/L (ref 4–11)
WBC # BLD AUTO: 15.3 10E9/L (ref 4–11)

## 2017-02-11 PROCEDURE — A9270 NON-COVERED ITEM OR SERVICE: HCPCS | Mod: GY | Performed by: SURGERY

## 2017-02-11 PROCEDURE — 85027 COMPLETE CBC AUTOMATED: CPT | Performed by: SURGERY

## 2017-02-11 PROCEDURE — 25000132 ZZH RX MED GY IP 250 OP 250 PS 637: Mod: GY | Performed by: SURGERY

## 2017-02-11 PROCEDURE — 97116 GAIT TRAINING THERAPY: CPT | Mod: GP

## 2017-02-11 PROCEDURE — 80048 BASIC METABOLIC PNL TOTAL CA: CPT | Performed by: SURGERY

## 2017-02-11 PROCEDURE — 25000132 ZZH RX MED GY IP 250 OP 250 PS 637: Mod: GY | Performed by: THORACIC SURGERY (CARDIOTHORACIC VASCULAR SURGERY)

## 2017-02-11 PROCEDURE — 40000275 ZZH STATISTIC RCP TIME EA 10 MIN

## 2017-02-11 PROCEDURE — 25000125 ZZHC RX 250: Performed by: SURGERY

## 2017-02-11 PROCEDURE — 40000076 ZZH STATISTIC IABP MONITORING

## 2017-02-11 PROCEDURE — 80202 ASSAY OF VANCOMYCIN: CPT | Performed by: SURGERY

## 2017-02-11 PROCEDURE — A9270 NON-COVERED ITEM OR SERVICE: HCPCS | Mod: GY | Performed by: THORACIC SURGERY (CARDIOTHORACIC VASCULAR SURGERY)

## 2017-02-11 PROCEDURE — 40000133 ZZH STATISTIC OT WARD VISIT: Performed by: OCCUPATIONAL THERAPIST

## 2017-02-11 PROCEDURE — 84100 ASSAY OF PHOSPHORUS: CPT | Performed by: SURGERY

## 2017-02-11 PROCEDURE — 71010 XR CHEST PORT 1 VW: CPT

## 2017-02-11 PROCEDURE — 40000193 ZZH STATISTIC PT WARD VISIT

## 2017-02-11 PROCEDURE — 25000128 H RX IP 250 OP 636: Performed by: SURGERY

## 2017-02-11 PROCEDURE — 00000146 ZZHCL STATISTIC GLUCOSE BY METER IP

## 2017-02-11 PROCEDURE — 82248 BILIRUBIN DIRECT: CPT | Performed by: SURGERY

## 2017-02-11 PROCEDURE — 25000125 ZZHC RX 250: Performed by: THORACIC SURGERY (CARDIOTHORACIC VASCULAR SURGERY)

## 2017-02-11 PROCEDURE — 83735 ASSAY OF MAGNESIUM: CPT | Performed by: SURGERY

## 2017-02-11 PROCEDURE — 40000556 ZZH STATISTIC PERIPHERAL IV START W US GUIDANCE

## 2017-02-11 PROCEDURE — 40000225 ZZH STATISTIC SLP WARD VISIT

## 2017-02-11 PROCEDURE — 97535 SELF CARE MNGMENT TRAINING: CPT | Mod: GO | Performed by: OCCUPATIONAL THERAPIST

## 2017-02-11 PROCEDURE — 20000004 ZZH R&B ICU UMMC

## 2017-02-11 PROCEDURE — 82330 ASSAY OF CALCIUM: CPT | Performed by: SURGERY

## 2017-02-11 PROCEDURE — 85730 THROMBOPLASTIN TIME PARTIAL: CPT | Performed by: SURGERY

## 2017-02-11 PROCEDURE — 85610 PROTHROMBIN TIME: CPT | Performed by: SURGERY

## 2017-02-11 PROCEDURE — 97110 THERAPEUTIC EXERCISES: CPT | Mod: GP

## 2017-02-11 PROCEDURE — 92526 ORAL FUNCTION THERAPY: CPT | Mod: GN

## 2017-02-11 PROCEDURE — 99232 SBSQ HOSP IP/OBS MODERATE 35: CPT | Mod: GC | Performed by: ANESTHESIOLOGY

## 2017-02-11 PROCEDURE — 80053 COMPREHEN METABOLIC PANEL: CPT | Performed by: SURGERY

## 2017-02-11 PROCEDURE — 25000128 H RX IP 250 OP 636

## 2017-02-11 RX ORDER — GABAPENTIN 100 MG/1
100 CAPSULE ORAL 2 TIMES DAILY
Status: DISCONTINUED | OUTPATIENT
Start: 2017-02-11 | End: 2017-02-17 | Stop reason: HOSPADM

## 2017-02-11 RX ORDER — DIGOXIN 0.25 MG/ML
500 INJECTION INTRAMUSCULAR; INTRAVENOUS ONCE
Status: COMPLETED | OUTPATIENT
Start: 2017-02-11 | End: 2017-02-11

## 2017-02-11 RX ORDER — DOCUSATE SODIUM 100 MG/1
100 CAPSULE, LIQUID FILLED ORAL 2 TIMES DAILY
Status: DISCONTINUED | OUTPATIENT
Start: 2017-02-11 | End: 2017-02-17 | Stop reason: HOSPADM

## 2017-02-11 RX ORDER — DIGOXIN 0.25 MG/ML
250 INJECTION INTRAMUSCULAR; INTRAVENOUS ONCE
Status: COMPLETED | OUTPATIENT
Start: 2017-02-11 | End: 2017-02-11

## 2017-02-11 RX ORDER — AMIODARONE HYDROCHLORIDE 200 MG/1
400 TABLET ORAL 2 TIMES DAILY
Status: DISCONTINUED | OUTPATIENT
Start: 2017-02-11 | End: 2017-02-17 | Stop reason: HOSPADM

## 2017-02-11 RX ORDER — HEPARIN SODIUM 10000 [USP'U]/100ML
0-3500 INJECTION, SOLUTION INTRAVENOUS CONTINUOUS
Status: DISCONTINUED | OUTPATIENT
Start: 2017-02-11 | End: 2017-02-12

## 2017-02-11 RX ORDER — SODIUM CHLORIDE 9 MG/ML
INJECTION, SOLUTION INTRAVENOUS
Status: COMPLETED
Start: 2017-02-11 | End: 2017-02-11

## 2017-02-11 RX ORDER — SENNOSIDES 8.6 MG
1-2 TABLET ORAL 2 TIMES DAILY
Status: DISCONTINUED | OUTPATIENT
Start: 2017-02-11 | End: 2017-02-15

## 2017-02-11 RX ADMIN — HYDROMORPHONE HYDROCHLORIDE 0.2 MG: 10 INJECTION, SOLUTION INTRAMUSCULAR; INTRAVENOUS; SUBCUTANEOUS at 14:13

## 2017-02-11 RX ADMIN — HYDROMORPHONE HYDROCHLORIDE 1 MG: 2 TABLET ORAL at 05:11

## 2017-02-11 RX ADMIN — SENNOSIDES 2 TABLET: 8.6 TABLET, FILM COATED ORAL at 12:07

## 2017-02-11 RX ADMIN — HEPARIN SODIUM 900 UNITS/HR: 10000 INJECTION, SOLUTION INTRAVENOUS at 19:37

## 2017-02-11 RX ADMIN — MUPIROCIN 0.5 G: 20 OINTMENT TOPICAL at 08:45

## 2017-02-11 RX ADMIN — HYDROMORPHONE HYDROCHLORIDE 0.2 MG: 10 INJECTION, SOLUTION INTRAMUSCULAR; INTRAVENOUS; SUBCUTANEOUS at 07:13

## 2017-02-11 RX ADMIN — DIGOXIN 250 MCG: 0.25 INJECTION INTRAMUSCULAR; INTRAVENOUS at 16:49

## 2017-02-11 RX ADMIN — AMIODARONE HYDROCHLORIDE 400 MG: 200 TABLET ORAL at 13:21

## 2017-02-11 RX ADMIN — GABAPENTIN 100 MG: 100 CAPSULE ORAL at 17:33

## 2017-02-11 RX ADMIN — VANCOMYCIN HYDROCHLORIDE 1500 MG: 10 INJECTION, POWDER, LYOPHILIZED, FOR SOLUTION INTRAVENOUS at 08:44

## 2017-02-11 RX ADMIN — DOCUSATE SODIUM 100 MG: 100 CAPSULE, LIQUID FILLED ORAL at 12:07

## 2017-02-11 RX ADMIN — GABAPENTIN 100 MG: 100 CAPSULE ORAL at 19:55

## 2017-02-11 RX ADMIN — ACETAMINOPHEN 650 MG: 325 TABLET, FILM COATED ORAL at 13:20

## 2017-02-11 RX ADMIN — SODIUM CHLORIDE: 900 INJECTION, SOLUTION INTRAVENOUS at 04:53

## 2017-02-11 RX ADMIN — PANTOPRAZOLE SODIUM 40 MG: 40 TABLET, DELAYED RELEASE ORAL at 08:45

## 2017-02-11 RX ADMIN — HYDROMORPHONE HYDROCHLORIDE 1 MG: 2 TABLET ORAL at 13:20

## 2017-02-11 RX ADMIN — HYDROMORPHONE HYDROCHLORIDE 1 MG: 2 TABLET ORAL at 19:55

## 2017-02-11 RX ADMIN — HYDROMORPHONE HYDROCHLORIDE 0.2 MG: 10 INJECTION, SOLUTION INTRAMUSCULAR; INTRAVENOUS; SUBCUTANEOUS at 03:07

## 2017-02-11 RX ADMIN — ASPIRIN 81 MG CHEWABLE TABLET 81 MG: 81 TABLET CHEWABLE at 08:44

## 2017-02-11 RX ADMIN — DIGOXIN 500 MCG: 0.25 INJECTION INTRAMUSCULAR; INTRAVENOUS at 12:59

## 2017-02-11 RX ADMIN — PIPERACILLIN SODIUM,TAZOBACTAM SODIUM 4.5 G: 4; .5 INJECTION, POWDER, FOR SOLUTION INTRAVENOUS at 09:46

## 2017-02-11 RX ADMIN — SENNOSIDES 2 TABLET: 8.6 TABLET, FILM COATED ORAL at 19:55

## 2017-02-11 RX ADMIN — PIPERACILLIN SODIUM,TAZOBACTAM SODIUM 4.5 G: 4; .5 INJECTION, POWDER, FOR SOLUTION INTRAVENOUS at 03:01

## 2017-02-11 RX ADMIN — FUROSEMIDE 10 MG: 10 INJECTION, SOLUTION INTRAVENOUS at 08:45

## 2017-02-11 RX ADMIN — AMIODARONE HYDROCHLORIDE 400 MG: 200 TABLET ORAL at 19:55

## 2017-02-11 RX ADMIN — DOCUSATE SODIUM 100 MG: 100 CAPSULE, LIQUID FILLED ORAL at 19:55

## 2017-02-11 RX ADMIN — POTASSIUM PHOSPHATE, MONOBASIC AND POTASSIUM PHOSPHATE, DIBASIC 15 MMOL: 224; 236 INJECTION, SOLUTION INTRAVENOUS at 09:50

## 2017-02-11 ASSESSMENT — PAIN DESCRIPTION - DESCRIPTORS
DESCRIPTORS: ACHING

## 2017-02-11 NOTE — PLAN OF CARE
Problem: Goal Outcome Summary  Goal: Goal Outcome Summary    SLP: Pt seen for dysphagia tx at bedside. Per RN and pt, no overt difficulties with breakfast of regular solids (regular diet was initiated following pt being NPO for procedure). Pt observed during trials of soft solid, regular solid and thin liquid trials. No overt s/s of aspiration presented. Significant improvment in mentation which positively impacts her swallowing safety. Educated pt on recommendations/rationale, and aspiration precautions. SLP to sign off, no further skilled intervention warranted.    Speech Language Therapy Discharge Summary    Reason for therapy discharge:    All goals and outcomes met, no further needs identified.    Progress towards therapy goal(s). See goals on [facility] electronic health record for goal details.  Goals met    Therapy recommendation(s):    No further therapy is recommended.

## 2017-02-11 NOTE — PROGRESS NOTES
"CV Surgery Note  02/10/2017    Subjective and Interval Events: No acute events overnight.    Objective:   Temp: 99  F (37.2  C) Temp src: Pulmonary Artery     Heart Rate: 129 Resp: 12 SpO2: 90 % O2 Device: Nasal cannula Oxygen Delivery: 2 LPM Height: 157.5 cm (5' 2\") Weight: 77.8 kg (171 lb 8.3 oz)  Estimated body mass index is 31.36 kg/(m^2) as calculated from the following:    Height as of this encounter: 1.575 m (5' 2\").    Weight as of this encounter: 77.8 kg (171 lb 8.3 oz).  Resp: 12    Exam:   Gen: Awake, alert, NAD  Head: NCAT  Chest: NLB on NC, tachycardic  Abd: Soft, nt/nd  Ext: WWP    Drips: Milrinone 0.25, Phenylephrin 0.4    UO: 1.6L  CT: 390/30    WBC: 16.3, Hgb: 8.4, Plt: 97  Na: 145, K: 4.3, Crt: 0.83    Imaging:     CXR:    IMPRESSION:    1. Pulmonary vascular congestion.  2. Unchanged left basilar/retrocardiac opacities with likely small  left pleural effusion and atelectasis.    Assessment and Plan: POD # 4 s/p 3v CABG, modified maze with pulmonary isolation, left atrial appendage ligation, PFO closure.    Neuro: AMS much improved, likely multifactorial, HCT wnl, EEG w/o seizure, appreciate neurology input  CV: Wean phenylephrine as able, CI improved, halve milrinone for today, rebolus with amio this AM, CI down with increased HR, appreciate EP consult, d/c swan  Resp: Extubated, prn NC, IS/pulm toilet  FEN/GI: Diet advancement per speech  Renal/: UOP adequate, c/w payton for accurate I/Os, gentle diuresis today  Heme: Hgb stable, d/c'd heparin given downtrend in platelets, check HIT  ID: Empiric abx for now, await culture results, d/c if negative  Endo: SSI  PPx: HIT negative, platelets not yet normal, SQH when normalize, protonix  Dispo: CVICU    Seen and discussed with fellow, Dr. Hills.    Rodrick English MD  General Surgery  Pager: [phone]              "

## 2017-02-11 NOTE — PROGRESS NOTES
"CV Surgery Note  02/11/2017    Subjective and Interval Events: No acute events overnight.  More clear MS.    Objective:   Temp: 96  F (35.6  C) Temp src: Oral BP: (!) 121/91 mmHg   Heart Rate: 112 Resp: 16 SpO2: 90 % O2 Device: Nasal cannula Oxygen Delivery: 3 LPM Height: 157.5 cm (5' 2\") Weight: 76 kg (167 lb 8.8 oz)  Estimated body mass index is 30.64 kg/(m^2) as calculated from the following:    Height as of this encounter: 1.575 m (5' 2\").    Weight as of this encounter: 76 kg (167 lb 8.8 oz).  Resp: 16    Exam:   Gen: Awake, alert, NAD  Head: NCAT  Chest: NLB on NC, CT serosang  Heart: Tachcyardic  Abd: Soft, nt/nd  Ext: WWP    Drips: Milrinone 0.025    UO: 1.8L  CT: 140/115, 90/110    WBC: 14.4, Hgb: 7.9, Plt: 98  Na: 137, K: 4.4, Crt: 0.72    Imaging:     CXR:    Impression:    1. [place] catheter removal with right internal jugular sheath tip  remaining in the right brachiocephalic vein.  2. Unchanged pulmonary vascular congestion.  3. Unchanged left basilar/retrocardiac opacities and likely small left  pleural effusion with atelectasis.    Assessment and Plan: POD # 5 s/p 3v CABG, modified maze with pulmonary isolation, left atrial appendage ligation, PFO closure.    Neuro: AMS much improved, likely multifactorial, HCT wnl, EEG w/o seizure, appreciate neurology input  CV: Wean milrinone today, load with dig, appreciate EP input  Resp: Extubated, prn NC, IS/pulm toilet  FEN/GI: Diet advancement per speech  Renal/: UOP adequate, d/c payton  Heme: Hgb stable, HIT negative, heparin gtt for persistent afib  ID: D/c abx, afebrile, cultures NGTD  Endo: SSI  PPx: Heparin gtt, protonix  Dispo: CVICU    Seen and discussed with fellow, Dr. Hills.    Rodrick English MD  General Surgery  Pager: [phone]            "

## 2017-02-11 NOTE — PLAN OF CARE
Problem: Goal Outcome Summary  Goal: Goal Outcome Summary  Outcome: Improving  Pt remains on Amio and Milrinone gtts; not titrated. HR remains Afib 120s. MAP >65. Art line removed d/t becoming occluded; unable to obtain accurate BP readings; draw labs. PRN dilaudid for incisional pain; effective. Patient becomes slightly lethargic and illogical at times after receiving PRN pain medication. Reorientates easily. Minimal CT output. Approximately 40 cc/hr urine output. No BM since surgery. Slept on and off throughout night.

## 2017-02-11 NOTE — PLAN OF CARE
Problem: Goal Outcome Summary  Goal: Goal Outcome Summary    4E: Recommend discharge to TCU to increase pt's (I) with functional transfers and ADLs. Pt needed significant encouragement for mobilizing in room though doing well with activity (appears in part due to fear). Pt able to complete sit<>stand from chair and bed with min A; mod A for toilet transfer. Pt able to complete ~20ft total of functional mobility in room with CGA and IV pole; normal CV response to activity.

## 2017-02-11 NOTE — PLAN OF CARE
Problem: Goal Outcome Summary  Goal: Goal Outcome Summary  4E: Pt transferred supine>sit, sit<>stand with min/mod-Ax1, consistent reinforcement of sternal precautions. Pt occasionally confused/disortiented to situation, but reorients quickly with prompting. Pt ambulated ~30' with 2WW, CGA, w/c follow, on 2L O2 NC with one seated rest break at 15'. Most limited by painful bladder spasms. Currently recommend discharge to TCU when medically appropriate for continued therapy.

## 2017-02-11 NOTE — PROGRESS NOTES
CT Surgery    No complaints    Awake and alert.   Afib , rate 100s-120s  Milrinone 0.062mcg/kg/min  Wires capped    76F s/p CAB, MAXE, PFO closure, left atrial appendage ligation  EP following for Afib, digoxin for rate control  Heparin drip for afib  Antibiotics stopped

## 2017-02-11 NOTE — PHARMACY-VANCOMYCIN DOSING SERVICE
Pharmacy Vancomycin Note  Date of Service 2017  Patient's  1940   76 year old, female    Indication: Sepsis  Goal Trough Level: 15-20 mg/L  Day of Therapy: 4  Current Vancomycin regimen:  1500 mg IV q24h    Current estimated CrCl = Estimated Creatinine Clearance: 63.5 mL/min (based on Cr of 0.72).    Creatinine for last 3 days  2017:  9:38 PM Creatinine 0.83 mg/dL  2017: 11:21 PM Creatinine 0.84 mg/dL;  4:00 PM Creatinine 0.75 mg/dL;  3:48 AM Creatinine 0.71 mg/dL  2/10/2017:  2:46 PM Creatinine 0.74 mg/dL;  4:15 AM Creatinine 0.83 mg/dL  2017:  5:38 AM Creatinine 0.72 mg/dL;  4:10 AM Creatinine 0.68 mg/dL    Recent Vancomycin Levels (past 3 days)  2017:  6:57 AM Vancomycin Level 10.3 mg/L    Vancomycin IV Administrations (past 72 hours)                   vancomycin (VANCOCIN) 1,500 mg in NaCl 0.9 % 250 mL intermittent infusion (mg) 1,500 mg New Bag 17 0844     1,500 mg New Bag 02/10/17 0922     1,500 mg New Bag 17 0856                Nephrotoxins and other renal medications (Future)    Start     Dose/Rate Route Frequency Ordered Stop    02/10/17 1000  furosemide (LASIX) injection 10 mg      10 mg Intravenous DAILY 02/10/17 0947               Contrast Orders - past 72 hours (72h ago through future)    Start     Dose/Rate Route Frequency Ordered Stop    17 1300  perflutren diluted 1mL to 2mL with saline (OPTISON) diluted injection 6 mL      6 mL Intravenous ONCE 17 1259 17 1300          Interpretation of levels and current regimen:  Trough level is  Subtherapeutic. Dose was discontinued today however if we had continued therapy i would have increased the dose to 1000mg q12h. .     Has serum creatinine changed > 50% in last 72 hours: No    Urine output:  good urine output    Renal Function: Stable    Plan:  1.  Increase to 1000mg q12h.   2.  Pharmacy will check trough levels as appropriate in 1-3 Days.    3. Serum creatinine levels will be ordered  daily for the first week of therapy and at least twice weekly for subsequent weeks.        Kani Quevedo PharmD, PGY2 Infectious Disease Pharmacy Resident        .

## 2017-02-11 NOTE — PROGRESS NOTES
Electrophysiology Progress Note    Patient Name: Carlos Alberto Fenton  Medical Record Number: [id number]  Date of Service: 2/11/2017  Ms. 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  At this point she is still on milrinone and amiodarone  -okay for dig load today to assist with weaning of pressors: digoxin 500 mcg iv x 1, the 250 mcg iv in 6 hours  -she is still no milrinone, hold betablockers at this time, please wean milrinone, this is likely driving some of her rate response  -Switch amiodraone to oral 400 mg BID X 2 weeks then 200 mg daily for 4 weeks (a total of 6 weeks) and then stop. She was not previously on home AATs. If she has sustained AF we can consider DCCV prior to discharge.    -she does not have pacing indications, but she may have a prolonged sinus pause after cardioversion due to lack of sinus function and prior rv pacing, that being said her AV node is functioning well  -Stroke Prophylaxis:  CHADSVASC=++CVA, +gender, ++age, +DM, +HTN, +CAD  8, corresponding to a 6.7% annual stroke   -she needs anti-coagulation as soon as possible  -At discharge follow up with Dr. Santos in 2-3 months.       Interval Hx:  Pt improved mental status, drips have been mostly weaned, she is sitting up eating breakfast  Meds:    docusate sodium  100 mg Oral BID     sennosides  1-2 tablet Oral BID     furosemide  10 mg Intravenous Daily     insulin aspart  1-7 Units Subcutaneous TID AC     insulin aspart  1-5 Units Subcutaneous At Bedtime     pantoprazole  40 mg Oral QAM     piperacillin-tazobactam  4.5 g Intravenous Q6H     vancomycin (VANCOCIN) IV   "1,500 mg Intravenous Q24H     sodium bicarbonate  50 mEq Intravenous Once     sodium chloride (PF)  3 mL Intracatheter Q8H     aspirin  81 mg Oral Daily       Objective:   Vital signs: BP 97/77 mmHg  Pulse 59  Temp(Src) 97.8  F (36.6  C) (Oral)  Resp 16  Ht 1.575 m (5' 2\")  Wt 76 kg (167 lb 8.8 oz)  BMI 30.64 kg/m2  SpO2 93%     Intake/Output Summary (Last 24 hours) at 02/11/17 1000  Last data filed at 02/11/17 0700   Gross per 24 hour   Intake 1796.5 ml   Output   1440 ml   Net  356.5 ml     Wt Readings from Last 3 Encounters:   02/11/17 76 kg (167 lb 8.8 oz)   02/02/17 73.891 kg (162 lb 14.4 oz)   02/01/17 73.936 kg (163 lb)     Gen: NAD  Heent: NCAT, supple neck, with IJ sheath in place  Chest: coarse, ct tubes in place, midline sternotomy  Cardiac: irregular, tachy  Abd: soft, nt,nd bs+  Ext: cool, pulses faint , lower extremity edema  Neuro: no focal deficits  Skin: scattered echymosies  Psych: mood congruent    LABS:  CMP  Recent Labs  Lab 02/11/17  0538 02/11/17  0410 02/10/17  1446 02/10/17  0415  02/09/17  0348 02/09/17 02/08/17  2138 02/08/17  1051    137 138 145*  < > 150*  --  148* 144   POTASSIUM 4.4 Unsatisfactory specimen - contaminatedNOTIFIED CHUCHO BAHENA AT 0534 ON 02/11/17 BY EABCanceled, Test credited Unsatisfactory specimen - contaminatedNOTIFIED JUAN BAHENA AT 0535 ON 02/11/17 BY MARYCRUZ 3.3* 4.3  < > 3.9  --  3.8 4.6   CHLORIDE 103 103 103 108  < > 115*  --  112* 110*   CO2 26 24 27 29  < > 28  --  27 22   ANIONGAP 8 10 8 8  < > 7  --  8 11   * 195* 231* 107*  < > 95  --  162* 176*   BUN 18 18 20 22  < > 20  --  22 23   CR 0.72 0.68 0.74 0.83  < > 0.71  --  0.83 1.01   GFRESTIMATED 79 84 76 66  < > 80  --  67 53*   GFRESTBLACK >90African American GFR Calc >90CORRECTED ON 02/11 AT 0542: PREVIOUSLY REPORTED AS >90  GFR Calc >90African American GFR Calc 80  < > >90African American GFR Calc  --  80 64   CARLY 7.9* 7.7* 7.2* 8.3*  < > 8.6  --  8.2* 8.0*   MAG 2.5* " 2.6* 1.9 2.6*  < > 2.8* 2.0 1.9  --    PHOS 2.3* 5.8* 1.9* 2.1*  < > 0.9*  --   --   --    PROTTOTAL  --   --   --   --   --  5.4*  --  5.4* 5.8*   ALBUMIN  --   --   --   --   --  3.2*  --  3.2* 3.7   BILITOTAL  --   --   --   --   --  1.6*  --  1.6* 1.4*   ALKPHOS  --   --   --   --   --  44  --  38* 43   AST  --   --   --   --   --  1279* 1413* 1303* 1433*   ALT  --   --   --   --   --  921* 894* 840* 834*   < > = values in this interval not displayed.  CBC  Recent Labs  Lab 17  0410 02/10/17  0415 17  1600 17  0348   WBC 14.4* 16.3* 15.3* 12.2*   RBC 2.65* 2.85* 2.92* 2.69*   HGB 7.9* 8.4* 8.8* 8.0*   HCT 25.0* 27.1* 27.9* 25.4*   MCV 94 95 96 94   MCH 29.8 29.5 30.1 29.7   MCHC 31.6 31.0* 31.5 31.5   RDW 18.7* 18.7* 19.1* 19.1*   PLT 98* 97* 79* 72*     INR  Recent Labs  Lab 17  2106 17  1427 17  1316 17  0616   INR 1.55* 1.66* 1.63* 1.08     Arterial Blood Gas  Recent Labs  Lab 02/10/17  0417 17  2142 17  1420 17  0812  17  1249 17  1051  17  0752   PH  --   --   --  7.41  --  7.34* 7.31*  --  7.34*   PCO2  --   --   --  43  --  31* 47*  --  29*   PO2  --   --   --  138*  --  100 86  --  124*   HCO3  --   --   --  27  --  17* 24  --  16*   O2PER 2L 3L 4L 5  < > 3L 2l  < > 2L   < > = values in this interval not displayed.  LipidsNo lab results found in last 7 days.    Invalid input(s): TRI  TSHNo lab results found in last 7 days.  [id number] input(s): HGBA1C  Troponin    EKG: reviewed     ECHO:  Recent Results (from the past 4320 hour(s))   ECHO COMPLETE WITH OPTISON    Narrative    [id number]  [id number]  [id number]  [id number]^RIKKI^RAVINDER^FAB           [facility],[place]  Echocardiography Laboratory  [address] [place] [id number]     Name: MENDOZA GREENBERG  MRN: [id number]  : 1940  Study Date: 2017 12:47 PM  Age: 76 yrs  Gender: Female  Patient Location: [facility]  Reason For Study:  CABG  Ordering Physician: RAVINDER BRANDT  Performed By: BRANDAN Cardoso     BSA: 1.8 m2  Height: 62 in  Weight: 167 lb  BP: 97/59 mmHg  _____________________________________________________________________________  __        Procedure  Echocardiogram with two-dimensional, color and spectral Doppler performed.  Contrast Optison. Optison (NDC #1076-9528-21) given intravenously. Patient was  given 6.0 ml mixture of 3 ml Optison and 6 ml saline. 3.0 ml wasted.  _____________________________________________________________________________  __        Interpretation Summary  Mildly (EF 45-50%) reduced left ventricular function is present.  Right ventricular function, chamber size, wall motion, and thickness are  normal.  Trivial pericardial effusion is present.     No change from prior.  _____________________________________________________________________________  __        Left Ventricle  Left ventricular wall thickness is normal. Mildly (EF 45-50%) reduced left  ventricular function is present. Left ventricular diastolic function is  indeterminate. Regional wall motion is probably normal.     Right Ventricle  Right ventricular function, chamber size, wall motion, and thickness are  normal. A right heart catheter is noted in the right ventricle.     Mitral Valve  Trace to mild mitral insufficiency is present.        Aortic Valve  Aortic valve is normal in structure and function.     Tricuspid Valve  Mild tricuspid insufficiency is present. The right ventricular systolic  pressure is approximated at 30.9 mmHg plus the right atrial pressure.     Pulmonic Valve  Trace to mild pulmonic insufficiency is present.     Vessels  The aorta root is normal. The inferior vena cava cannot be assessed.     Pericardium  Trivial pericardial effusion is present.     _____________________________________________________________________________  __  MMode/2D Measurements & Calculations  RVDd: 3.4 cm  IVSd: 0.95 cm  LVIDd: 4.4  cm  LVIDs: 3.5 cm  LVPWd: 0.93 cm  FS: 19.4 %  EDV(Teich): 87.7 ml  ESV(Teich): 52.6 ml  LV mass(C)d: 135.7 grams  Ao root diam: 3.0 cm           Doppler Measurements & Calculations  MV E max venkata: 75.6 cm/sec  TR max venkata: 278.0 cm/sec  TR max P.9 mmHg  Lateral E/e': 9.6  Medial E/e': 15.9           _____________________________________________________________________________  __           Report approved by: Young Alarcon 2017 04:11 PM      ECHO LIMITED WITH OPTISON    Narrative    Interpretation Summary                       [facility],[place]  Echocardiography Laboratory  [address] [place] [address]     Name: MENDOZA GREENBERG  MRN: [id number]  : 1940  Study Date: 10/25/2016 02:01 PM  Age: 76 yrs  Gender: Female  Patient Location: [facility]  Reason For Study: Afib  History: Afib  Ordering Physician: MARK BAIG  Referring Physician: HERNAN WOOD  Performed By: Dalia Hancock RDCS     BSA: 1.9 m2  Height: 62 in  Weight: 191 lb  BP: 119/77 mmHg  ______________________________________________________________________________        Procedure  Limited Portable Echo Adult. Contrast Optison. Optison (NDC #5641-3156-86)  given intravenously. Patient was given 6 ml mixture of 3 ml Optison and 6 ml  saline. 3 ml wasted.  ______________________________________________________________________________     Interpretation Summary  Limited echocardiogram study.  Mildly (EF 40-45%) reduced left ventricular function is present. Mild diffuse  hypokinesis is present.  Global right ventricular function is normal.  Compared to the previous study dated 10/18/2016, there has been some interval  improvement in LV systolic function.     ______________________________________________________________________________           Left Ventricle  Mildly (EF 40-45%) reduced left ventricular function is present. Mild diffuse  hypokinesis is present.     Right Ventricle  The right  ventricle is normal size. Global right ventricular function is  normal.  Atria  Mild biatrial enlargement is present.     Mitral Valve  The mitral valve is normal. Trace mitral insufficiency is present.     Tricuspid Valve  The tricuspid valve is normal. Trace tricuspid insufficiency is present.  Right ventricular systolic pressure is 48mmHg above the right atrial  pressure.     Vessels  The aorta root is normal. The inferior vena cava is normal.     Pericardium  No pericardial effusion is present.  ______________________________________________________________________________     MMode/2D Measurements & Calculations  LA Volume (BP): 82.0 ml  LA Volume Index (BP): 43.9 ml/m2           Doppler Measurements & Calculations  TR max venkata: 346.0 cm/sec  TR max P.9 mmHg           ______________________________________________________________________________        Report approved by: Young Lee 10/25/2016 03:15 PM      ECHO COMPLETE BUBBLE STUDY WITH OPTISON    Narrative    Interpretation Summary                       [facility],[place]  Echocardiography Laboratory  [address] [place] [id number]     Name: MENDOZA GREENBERG  MRN: [id number]  : 1940  Study Date: 10/18/2016 01:26 PM  Age: 76 yrs  Gender: Female  Patient Location: [facility]  Reason For Study: Afib  History: Afib  Ordering Physician: MARK BAIG  Referring Physician: HERNAN WOOD  Performed By: Dalia Hancock RDCS     BSA: 1.8 m2  Height: 62 in  Weight: 175 lb  BP: 157/99 mmHg  ______________________________________________________________________________        Procedure  Contrast Optison. Bubble Echocardiogram with two-dimensional, color and  spectral Doppler performed. Optison (NDC #0951-5381-02) given intravenously.  Patient was given 7 ml mixture of 3 ml Optison and 6 ml saline. 2 ml wasted.  ______________________________________________________________________________     Interpretation  Summary  Moderately (EF 35-40%) reduced left ventricular function is present.  Global right ventricular function is mildly reduced.  The atrial septum is intact as assessed by color Doppler and agitated saline  bubble study .  No pericardial effusion is present.  ______________________________________________________________________________           Left Ventricle  Moderately (EF 35-40%) reduced left ventricular function is present.     Right Ventricle  The right ventricle is normal size. Global right ventricular function is  mildly reduced.  Atria  The right atria appears normal. Mild left atrial enlargement is present. The  atrial septum is intact as assessed by color Doppler and agitated saline  bubble study .     Mitral Valve  Mild mitral insufficiency is present.     Aortic Valve  Aortic valve is normal in structure and function.     Tricuspid Valve  Mild to moderate tricuspid insufficiency is present. Estimated pulmonary  artery systolic pressure is 39 mmHg plus right atrial pressure.     Pulmonic Valve  Trace pulmonic insufficiency is present.     Vessels  The aorta root is normal. IVC size is normal. Patient is on the ventilator  and cannot assess RA pressure.  Pericardium  No pericardial effusion is present.     Compared to Previous Study  This study was compared with the study from 10.16.16, LV function is  reduced .     ______________________________________________________________________________  MMode/2D Measurements & Calculations  LA Volume (BP): 73.0 ml     LA Volume Index (BP): 40.3 ml/m2        Doppler Measurements & Calculations  MV E max venkata: 85.4 cm/sec  LV V1 max P.6 mmHg  LV V1 max: 63.4 cm/sec  LV V1 VTI: 13.5 cm  TR max venkata: 309.5 cm/sec  TR max P.3 mmHg              ______________________________________________________________________________        Report approved by: Young Lee 10/18/2016 03:03 PM      ECHO COMPLETE BUBBLE STUDY WITH OPTISON    Narrative     Interpretation Summary                    [facility]  Echocardiography Laboratory  [address] [place] [address]        Name: MENDOZA GREENBERG  MRN: [id number]  : 1940  Study Date: 10/16/2016 04:18 PM  Age: 76 yrs  Gender: Female  Patient Location: [facility]  Reason For Study: CVA  Ordering Physician: HERNAN WOOD  Referring Physician: JOSELYN KILGORE  Performed By: Bakari Nuñez RDCS     BSA: 1.8 m2  Height: 62 in  Weight: 170 lb  HR: 49  BP: 152/86 mmHg  ______________________________________________________________________________        Procedure  Complete Portable Bubble Echo Adult. Contrast Optison. Complete Echo Adult.  ______________________________________________________________________________     Interpretation Summary     The rhythm was sinus bradycardia.  The left ventricle is normal in size. Left ventricular systolic function is  borderline reduced. The visual ejection fraction is estimated at 50-55%.  The right ventricle is normal in size and systolic function.  Normal left atrial size by volume at 31.6 ml/m2.  There is mild (1+) mitral regurgitation.  There is mild to moderate (1-2+) tricuspid regurgitation.  The right ventricular systolic pressure is approximated at 25mmHg plus the  right atrial pressure.  Right ventricle systolic pressure estimate normal  The inferior vena cava is not dilated.  ______________________________________________________________________________           Left Ventricle  The left ventricle is normal in size. There is normal left ventricular wall  thickness. The transmitral spectral Doppler flow pattern is suggestive of  diastolic dysfunction of the left ventricle. The visual ejection fraction is  estimated at 50-55%. E by E prime ratio is greater than 15, that likely  suggests increased left ventricular filling pressures. Left ventricular  systolic function is borderline reduced. Normal left ventricular wall motion.  No regional  wall motion abnormalities noted. There is no thrombus seen in the  left ventricle.     Right Ventricle  Normal right ventricle structure and size. The right ventricle is normal in  size and function.  Atria  Normal left atrial size. by volume at 31.6 ml/m2. Right atrial size is  normal. There is no atrial shunt seen.     Mitral Valve  The mitral valve is normal in structure and function. There is no evidence of  mitral valve prolapse. There is mild (1+) mitral regurgitation. There is no  mitral valve stenosis.     Tricuspid Valve  Normal tricuspid valve. There is mild to moderate (1-2+) tricuspid  regurgitation. The right ventricular systolic pressure is approximated at  24.7 mmHg plus the right atrial pressure. Right ventricle systolic pressure  estimate normal. The right ventricular systolic pressure is approximated at  25mmHg plus the right atrial pressure.     Aortic Valve  Normal tricuspid aortic valve. The aortic valve is normal in structure and  function. No aortic regurgitation is present. No aortic stenosis is present.     Pulmonic Valve  The pulmonic valve is not well seen, but is grossly normal. There is trace  pulmonic valvular regurgitation.     Vessels  The aortic root is normal size. Normal size ascending aorta. The inferior  vena cava is not dilated.  Pericardial/Pleural  There is no pericardial effusion. There is no pleural effusion.     Rhythm  The rhythm was sinus bradycardia.     ______________________________________________________________________________  MMode/2D Measurements & Calculations  IVSd: 0.79 cm  LVIDd: 5.3 cm  LVIDs: 3.6 cm  LVPWd: 0.67 cm  FS: 31.0 %  EDV(Teich): 133.4 ml  ESV(Teich): 55.7 ml  LV mass(C)d: 131.4 grams  Ao root diam: 3.0 cm  LA dimension: 3.6 cm  asc Aorta Diam: 3.8 cm  LA/Ao: 1.2  LA Volume (BP): 56.2 ml     LA Volume Index (BP): 31.6 ml/m2        Doppler Measurements & Calculations  MV E max venkata: 78.2 cm/sec  MV A max venkata: 41.0 cm/sec  MV E/A: 1.9  MV dec time:  0.42 sec  PA acc time: 0.07 sec  TR max venkata: 248.4 cm/sec  TR max P.7 mmHg              ______________________________________________________________________________        Report approved by: Young Tee 10/16/2016 06:42 PM            Imaging/Angiography:      Thank you for allowing us to participate in the care of this  patient. Please call if you have further questions or concerns.     Carlos Alberto Fenton was seen and examined with Dr. Azevedo, attending physician, who agrees with the above assessment and plan.     Praneeth Ortiz M.D.  Cardiac Electrophysiology Fellow  [phone]  2017

## 2017-02-11 NOTE — PROGRESS NOTES
"CV Surgery Note  02/10/2017    Subjective and Interval Events: No acute events overnight.    Objective:   Temp: 99  F (37.2  C) Temp src: Pulmonary Artery     Heart Rate: 129 Resp: 12 SpO2: 90 % O2 Device: Nasal cannula Oxygen Delivery: 2 LPM Height: 157.5 cm (5' 2\") Weight: 77.8 kg (171 lb 8.3 oz)  Estimated body mass index is 31.36 kg/(m^2) as calculated from the following:    Height as of this encounter: 1.575 m (5' 2\").    Weight as of this encounter: 77.8 kg (171 lb 8.3 oz).  Resp: 12    Exam:   Gen: Awake, alert, NAD  Head: NCAT  Chest: NLB on NC, tachycardic  Abd: Soft, nt/nd  Ext: WWP    Drips: Milrinone 0.25, Phenylephrin 0.4    UO: 1.6L  CT: 390/30    WBC: 16.3, Hgb: 8.4, Plt: 97  Na: 145, K: 4.3, Crt: 0.83    Imaging:     CXR:    IMPRESSION:    1. Pulmonary vascular congestion.  2. Unchanged left basilar/retrocardiac opacities with likely small  left pleural effusion and atelectasis.    Assessment and Plan: POD # 4 s/p 3v CABG, modified maze with pulmonary isolation, left atrial appendage ligation, PFO closure.    Neuro: AMS much improved, likely multifactorial, HCT wnl, EEG w/o seizure, appreciate neurology input  CV: Wean phenylephrine as able, CI improved, halve milrinone for today, rebolus with amio this AM, CI down with increased HR, appreciate EP consult, d/c swan  Resp: Extubated, prn NC, IS/pulm toilet  FEN/GI: Diet advancement per speech  Renal/: UOP adequate, c/w payton for accurate I/Os, gentle diuresis today  Heme: Hgb stable, d/c'd heparin given downtrend in platelets, check HIT  ID: Empiric abx for now, await culture results, d/c if negative  Endo: SSI  PPx: HIT negative, platelets not yet normal, SQH when normalize, protonix  Dispo: CVICU    Seen and discussed with staff, Dr. Malcolm.    Rodrick English MD  General Surgery  Pager: [phone]            "

## 2017-02-12 ENCOUNTER — APPOINTMENT (OUTPATIENT)
Dept: PHYSICAL THERAPY | Facility: CLINIC | Age: 77
DRG: 228 | End: 2017-02-12
Attending: SURGERY
Payer: MEDICARE

## 2017-02-12 ENCOUNTER — APPOINTMENT (OUTPATIENT)
Dept: GENERAL RADIOLOGY | Facility: CLINIC | Age: 77
DRG: 228 | End: 2017-02-12
Attending: SURGERY
Payer: MEDICARE

## 2017-02-12 LAB
ALBUMIN SERPL-MCNC: 2.7 G/DL (ref 3.4–5)
ALP SERPL-CCNC: 87 U/L (ref 40–150)
ALT SERPL W P-5'-P-CCNC: 712 U/L (ref 0–50)
ANION GAP SERPL CALCULATED.3IONS-SCNC: 9 MMOL/L (ref 3–14)
AST SERPL W P-5'-P-CCNC: 464 U/L (ref 0–45)
BILIRUB SERPL-MCNC: 2.1 MG/DL (ref 0.2–1.3)
BUN SERPL-MCNC: 14 MG/DL (ref 7–30)
CA-I BLD-MCNC: 4.1 MG/DL (ref 4.4–5.2)
CALCIUM SERPL-MCNC: 7.8 MG/DL (ref 8.5–10.1)
CHLORIDE SERPL-SCNC: 104 MMOL/L (ref 94–109)
CO2 SERPL-SCNC: 26 MMOL/L (ref 20–32)
CREAT SERPL-MCNC: 0.52 MG/DL (ref 0.52–1.04)
DIGOXIN SERPL-MCNC: 2.6 UG/L (ref 0.5–2)
ERYTHROCYTE [DISTWIDTH] IN BLOOD BY AUTOMATED COUNT: 18.6 % (ref 10–15)
GFR SERPL CREATININE-BSD FRML MDRD: ABNORMAL ML/MIN/1.7M2
GLUCOSE BLDC GLUCOMTR-MCNC: 136 MG/DL (ref 70–99)
GLUCOSE BLDC GLUCOMTR-MCNC: 92 MG/DL (ref 70–99)
GLUCOSE SERPL-MCNC: 126 MG/DL (ref 70–99)
HCT VFR BLD AUTO: 26.4 % (ref 35–47)
HGB BLD-MCNC: 8.2 G/DL (ref 11.7–15.7)
INR PPP: 2.27 (ref 0.86–1.14)
LMWH PPP CHRO-ACNC: 0.38 IU/ML
LMWH PPP CHRO-ACNC: NORMAL IU/ML
MAGNESIUM SERPL-MCNC: 2.2 MG/DL (ref 1.6–2.3)
MCH RBC QN AUTO: 29.4 PG (ref 26.5–33)
MCHC RBC AUTO-ENTMCNC: 31.1 G/DL (ref 31.5–36.5)
MCV RBC AUTO: 95 FL (ref 78–100)
PHOSPHATE SERPL-MCNC: 1.5 MG/DL (ref 2.5–4.5)
PLATELET # BLD AUTO: 120 10E9/L (ref 150–450)
POTASSIUM SERPL-SCNC: 4.3 MMOL/L (ref 3.4–5.3)
PROT SERPL-MCNC: 5.2 G/DL (ref 6.8–8.8)
RBC # BLD AUTO: 2.79 10E12/L (ref 3.8–5.2)
SODIUM SERPL-SCNC: 139 MMOL/L (ref 133–144)
WBC # BLD AUTO: 14.2 10E9/L (ref 4–11)

## 2017-02-12 PROCEDURE — 25000125 ZZHC RX 250: Performed by: THORACIC SURGERY (CARDIOTHORACIC VASCULAR SURGERY)

## 2017-02-12 PROCEDURE — A9270 NON-COVERED ITEM OR SERVICE: HCPCS | Mod: GY | Performed by: SURGERY

## 2017-02-12 PROCEDURE — 83735 ASSAY OF MAGNESIUM: CPT | Performed by: SURGERY

## 2017-02-12 PROCEDURE — 82330 ASSAY OF CALCIUM: CPT | Performed by: SURGERY

## 2017-02-12 PROCEDURE — A9270 NON-COVERED ITEM OR SERVICE: HCPCS | Mod: GY | Performed by: THORACIC SURGERY (CARDIOTHORACIC VASCULAR SURGERY)

## 2017-02-12 PROCEDURE — 85027 COMPLETE CBC AUTOMATED: CPT | Performed by: SURGERY

## 2017-02-12 PROCEDURE — 25000132 ZZH RX MED GY IP 250 OP 250 PS 637: Mod: GY | Performed by: SURGERY

## 2017-02-12 PROCEDURE — 20000004 ZZH R&B ICU UMMC

## 2017-02-12 PROCEDURE — 93005 ELECTROCARDIOGRAM TRACING: CPT

## 2017-02-12 PROCEDURE — 93010 ELECTROCARDIOGRAM REPORT: CPT | Performed by: INTERNAL MEDICINE

## 2017-02-12 PROCEDURE — 40000076 ZZH STATISTIC IABP MONITORING

## 2017-02-12 PROCEDURE — 97530 THERAPEUTIC ACTIVITIES: CPT | Mod: GP

## 2017-02-12 PROCEDURE — 85520 HEPARIN ASSAY: CPT | Performed by: SURGERY

## 2017-02-12 PROCEDURE — 84100 ASSAY OF PHOSPHORUS: CPT | Performed by: SURGERY

## 2017-02-12 PROCEDURE — 00000146 ZZHCL STATISTIC GLUCOSE BY METER IP

## 2017-02-12 PROCEDURE — 80162 ASSAY OF DIGOXIN TOTAL: CPT | Performed by: SURGERY

## 2017-02-12 PROCEDURE — 80053 COMPREHEN METABOLIC PANEL: CPT | Performed by: SURGERY

## 2017-02-12 PROCEDURE — 40000193 ZZH STATISTIC PT WARD VISIT

## 2017-02-12 PROCEDURE — 25000125 ZZHC RX 250: Performed by: SURGERY

## 2017-02-12 PROCEDURE — 71010 XR CHEST PORT 1 VW: CPT

## 2017-02-12 PROCEDURE — 25000132 ZZH RX MED GY IP 250 OP 250 PS 637: Mod: GY | Performed by: THORACIC SURGERY (CARDIOTHORACIC VASCULAR SURGERY)

## 2017-02-12 PROCEDURE — 97116 GAIT TRAINING THERAPY: CPT | Mod: GP

## 2017-02-12 PROCEDURE — 25000128 H RX IP 250 OP 636: Performed by: SURGERY

## 2017-02-12 PROCEDURE — 85610 PROTHROMBIN TIME: CPT | Performed by: SURGERY

## 2017-02-12 PROCEDURE — 40000275 ZZH STATISTIC RCP TIME EA 10 MIN

## 2017-02-12 RX ORDER — WARFARIN SODIUM 2 MG/1
2 TABLET ORAL
Status: COMPLETED | OUTPATIENT
Start: 2017-02-12 | End: 2017-02-12

## 2017-02-12 RX ORDER — WARFARIN SODIUM 3 MG/1
3 TABLET ORAL
Status: DISCONTINUED | OUTPATIENT
Start: 2017-02-12 | End: 2017-02-12

## 2017-02-12 RX ADMIN — AMIODARONE HYDROCHLORIDE 400 MG: 200 TABLET ORAL at 19:33

## 2017-02-12 RX ADMIN — HYDROMORPHONE HYDROCHLORIDE 1 MG: 2 TABLET ORAL at 15:16

## 2017-02-12 RX ADMIN — ACETAMINOPHEN 650 MG: 325 TABLET, FILM COATED ORAL at 09:14

## 2017-02-12 RX ADMIN — WARFARIN SODIUM 2 MG: 2 TABLET ORAL at 17:10

## 2017-02-12 RX ADMIN — HYDROMORPHONE HYDROCHLORIDE 1 MG: 2 TABLET ORAL at 06:15

## 2017-02-12 RX ADMIN — DOCUSATE SODIUM 100 MG: 100 CAPSULE, LIQUID FILLED ORAL at 08:45

## 2017-02-12 RX ADMIN — FUROSEMIDE 10 MG: 10 INJECTION, SOLUTION INTRAVENOUS at 08:45

## 2017-02-12 RX ADMIN — PANTOPRAZOLE SODIUM 40 MG: 40 TABLET, DELAYED RELEASE ORAL at 08:45

## 2017-02-12 RX ADMIN — AMIODARONE HYDROCHLORIDE 400 MG: 200 TABLET ORAL at 08:45

## 2017-02-12 RX ADMIN — POTASSIUM PHOSPHATE, MONOBASIC AND POTASSIUM PHOSPHATE, DIBASIC 20 MMOL: 224; 236 INJECTION, SOLUTION INTRAVENOUS at 06:52

## 2017-02-12 RX ADMIN — GABAPENTIN 100 MG: 100 CAPSULE ORAL at 19:32

## 2017-02-12 RX ADMIN — HYDROMORPHONE HYDROCHLORIDE 0.2 MG: 10 INJECTION, SOLUTION INTRAMUSCULAR; INTRAVENOUS; SUBCUTANEOUS at 17:14

## 2017-02-12 RX ADMIN — ASPIRIN 81 MG CHEWABLE TABLET 81 MG: 81 TABLET CHEWABLE at 08:45

## 2017-02-12 RX ADMIN — HYDROMORPHONE HYDROCHLORIDE 1 MG: 2 TABLET ORAL at 19:45

## 2017-02-12 RX ADMIN — ACETAMINOPHEN 650 MG: 325 TABLET, FILM COATED ORAL at 17:14

## 2017-02-12 RX ADMIN — CALCIUM GLUCONATE 2 G: 94 INJECTION, SOLUTION INTRAVENOUS at 06:45

## 2017-02-12 RX ADMIN — SENNOSIDES 2 TABLET: 8.6 TABLET, FILM COATED ORAL at 08:45

## 2017-02-12 RX ADMIN — GABAPENTIN 100 MG: 100 CAPSULE ORAL at 08:45

## 2017-02-12 ASSESSMENT — PAIN DESCRIPTION - DESCRIPTORS
DESCRIPTORS: ACHING

## 2017-02-12 NOTE — PLAN OF CARE
"Problem: Goal Outcome Summary  Goal: Goal Outcome Summary  4E: Pt transferred supine>sit with min-Ax1 from RN, sit<>stand x3 with SBA with verbal reinforcement of sternal precautions. Ambulated ~80' with 2WW, CGA, w/c follow, on room air with one standing rest break. VSS, pt denied any symptoms, but did experience an apparent hallucination: reaching out to touch \"something floating\" while taking standing rest break from ambulation. Currently recommend TCU at discharged for continued rehab.      "

## 2017-02-12 NOTE — PLAN OF CARE
Problem: Goal Outcome Summary  Goal: Goal Outcome Summary  Outcome: Improving  Patient remains in atrial fib with runs of NSR; frequent PVCs. Patient's HR decreased to 55 bpm while sleeping; other vitals stable. CVTS made aware. HR 60-80s otherwise. Minimal CT output. Patient up to commode frequently to void; adequate UOP. PRN dilaudid for pain. Patient rested comfortably throughout night on CPAP. POC updated to patient and spouse.

## 2017-02-12 NOTE — PROGRESS NOTES
CT Surgery    No acute events.     Sinus rhythm.  BP stable  Chest tubes removed.     76F s/p CAB, MAXE, left atrial appendage ligation, closure PFO  F/u post pull CXR  Digoxin + amiodarone for afib  Coumadin for afib, heparin drip stopped

## 2017-02-12 NOTE — PROGRESS NOTES
Patient up in chair multiple times today, ambulated in hallway.  Able to have small BM. Eating well, diet advanced to cardiac diet, patient able to feed self today.  Bacon d/c'd, continues to have bladder spasms.  Tolerating 1 mg of Dilaudid PO for pain, with IVP Dilaudid for breakthrough pain.  Weaned off of amio and milrinone today, Dig loaded.  Heparin gtt started after difficult IV stick for a peripheral line, so that central line can be used for Heparin Xa draws.  Plan to transfer to floor tomorrow.  Will continue to monitor and assess.

## 2017-02-12 NOTE — PROGRESS NOTES
"CV Surgery Note  02/12/2017    Subjective and Interval Events: HR to 50s overnight, asymptomatic.    Objective:   Temp: 97.4  F (36.3  C) Temp src: Axillary BP: 114/57   Heart Rate: 68 Resp: 16 SpO2: 90 % O2 Device: Nasal cannula Oxygen Delivery: 2 LPM Height: 157.5 cm (5' 2\") Weight: 75.8 kg (167 lb 1.7 oz)  Estimated body mass index is 30.56 kg/(m^2) as calculated from the following:    Height as of this encounter: 1.575 m (5' 2\").    Weight as of this encounter: 75.8 kg (167 lb 1.7 oz).  Resp: 16    Exam:   Gen: Awake, alert, NAD  Head: NCAT  Chest: NLB on NC  Heart: More regular on tele  Abd: Soft, nt/nd  Ext: WWP    UO: 550  CT: Med= 240,     WBC: 14.2, Hgb: 8.2, Plt: 120  Na: 139, K: 4.3, Crt: 0.52    Imaging:     CXR:    IMPRESSION:  1. No significant change in bilateral perihilar opacities likely  pulmonary venous congestion/edema.  2. Slightly decreased retrocardiac opacity, likely atelectasis.  3. Support lines and tubes as detailed above.       Assessment and Plan: POD # 6 s/p 3v CABG, modified maze with pulmonary isolation, left atrial appendage ligation, PFO closure.     Neuro: AMS much improved, likely multifactorial, HCT wnl, EEG w/o seizure  CV: Dig supratherapeutic, HR better controlled, amio to oral today, appreciate EP input, d/c meds today  Resp: Extubated, prn NC, IS/pulm toilet  FEN/GI: Diet advancement per speech  Renal/: UOP adequate, payton out  Heme: Hgb stable, HIT negative, started coumadin d/c heparin gtt  ID: D/c abx, afebrile, cultures NGTD  Endo: SSI  PPx: Heparin gtt, protonix  Dispo: To floor     Seen and discussed with staff, Dr. Yun English MD  General Surgery  Pager: [phone]            "

## 2017-02-12 NOTE — PHARMACY
Clinical Pharmacy - Warfarin Dosing Consult     Pharmacy has been consulted to manage this patient s warfarin therapy.  Indication: Atrial Fibrillation  Therapy Goal: INR 2-3  Warfarin Prior to Admission: Yes  Warfarin PTA Regimen: Take 5 mg TU, TH and 2.5 mg other 5 days   Significant drug interactions: Amiodarine (significant-increased warfarin concentration), Aspirin (increased bleed risk)  Recent documented change in oral intake/nutrition: Unknown  Dose Comments: Will give conservative starting dose due to DDI w/ amiodarone, unknown nutrition intake, and albumin <3.0    INR   Date Value Ref Range Status   02/12/2017 2.27 (H) 0.86 - 1.14 Final   02/11/2017 2.29 (H) 0.86 - 1.14 Final       Recommend warfarin 2 mg today.  Pharmacy will monitor Carlos Alberto BISHOP Fenton daily and order warfarin doses to achieve specified goal.      Please contact pharmacy as soon as possible if the warfarin needs to be held for a procedure or if the warfarin goals change.      Kain ChisholmD, PGY2 Infectious Disease Pharmacy Resident

## 2017-02-13 ENCOUNTER — APPOINTMENT (OUTPATIENT)
Dept: OCCUPATIONAL THERAPY | Facility: CLINIC | Age: 77
DRG: 228 | End: 2017-02-13
Attending: SURGERY
Payer: MEDICARE

## 2017-02-13 ENCOUNTER — APPOINTMENT (OUTPATIENT)
Dept: GENERAL RADIOLOGY | Facility: CLINIC | Age: 77
DRG: 228 | End: 2017-02-13
Attending: SURGERY
Payer: MEDICARE

## 2017-02-13 LAB
ABO + RH BLD: NORMAL
ABO + RH BLD: NORMAL
ANION GAP SERPL CALCULATED.3IONS-SCNC: 8 MMOL/L (ref 3–14)
BLD GP AB SCN SERPL QL: NORMAL
BLOOD BANK CMNT PATIENT-IMP: NORMAL
BUN SERPL-MCNC: 11 MG/DL (ref 7–30)
CALCIUM SERPL-MCNC: 8.3 MG/DL (ref 8.5–10.1)
CHLORIDE SERPL-SCNC: 101 MMOL/L (ref 94–109)
CO2 SERPL-SCNC: 25 MMOL/L (ref 20–32)
CREAT SERPL-MCNC: 0.56 MG/DL (ref 0.52–1.04)
ERYTHROCYTE [DISTWIDTH] IN BLOOD BY AUTOMATED COUNT: 19.4 % (ref 10–15)
GFR SERPL CREATININE-BSD FRML MDRD: ABNORMAL ML/MIN/1.7M2
GLUCOSE BLDC GLUCOMTR-MCNC: 104 MG/DL (ref 70–99)
GLUCOSE BLDC GLUCOMTR-MCNC: 108 MG/DL (ref 70–99)
GLUCOSE BLDC GLUCOMTR-MCNC: 71 MG/DL (ref 70–99)
GLUCOSE SERPL-MCNC: 186 MG/DL (ref 70–99)
HCT VFR BLD AUTO: 28 % (ref 35–47)
HGB BLD-MCNC: 8.7 G/DL (ref 11.7–15.7)
INR PPP: 1.82 (ref 0.86–1.14)
MAGNESIUM SERPL-MCNC: 2 MG/DL (ref 1.6–2.3)
MCH RBC QN AUTO: 30.6 PG (ref 26.5–33)
MCHC RBC AUTO-ENTMCNC: 31.1 G/DL (ref 31.5–36.5)
MCV RBC AUTO: 99 FL (ref 78–100)
PHOSPHATE SERPL-MCNC: 1.6 MG/DL (ref 2.5–4.5)
PLATELET # BLD AUTO: 119 10E9/L (ref 150–450)
POTASSIUM SERPL-SCNC: 4.5 MMOL/L (ref 3.4–5.3)
RBC # BLD AUTO: 2.84 10E12/L (ref 3.8–5.2)
SODIUM SERPL-SCNC: 134 MMOL/L (ref 133–144)
SPECIMEN EXP DATE BLD: NORMAL
WBC # BLD AUTO: 12.6 10E9/L (ref 4–11)

## 2017-02-13 PROCEDURE — 85610 PROTHROMBIN TIME: CPT | Performed by: SURGERY

## 2017-02-13 PROCEDURE — 25000128 H RX IP 250 OP 636: Performed by: NURSE PRACTITIONER

## 2017-02-13 PROCEDURE — 25000132 ZZH RX MED GY IP 250 OP 250 PS 637: Mod: GY | Performed by: SURGERY

## 2017-02-13 PROCEDURE — 25000132 ZZH RX MED GY IP 250 OP 250 PS 637: Mod: GY | Performed by: THORACIC SURGERY (CARDIOTHORACIC VASCULAR SURGERY)

## 2017-02-13 PROCEDURE — A9270 NON-COVERED ITEM OR SERVICE: HCPCS | Mod: GY | Performed by: SURGERY

## 2017-02-13 PROCEDURE — 83735 ASSAY OF MAGNESIUM: CPT | Performed by: SURGERY

## 2017-02-13 PROCEDURE — 36415 COLL VENOUS BLD VENIPUNCTURE: CPT | Performed by: SURGERY

## 2017-02-13 PROCEDURE — 97530 THERAPEUTIC ACTIVITIES: CPT | Mod: GO | Performed by: OCCUPATIONAL THERAPIST

## 2017-02-13 PROCEDURE — 00000146 ZZHCL STATISTIC GLUCOSE BY METER IP

## 2017-02-13 PROCEDURE — 25000125 ZZHC RX 250: Performed by: THORACIC SURGERY (CARDIOTHORACIC VASCULAR SURGERY)

## 2017-02-13 PROCEDURE — A9270 NON-COVERED ITEM OR SERVICE: HCPCS | Mod: GY | Performed by: THORACIC SURGERY (CARDIOTHORACIC VASCULAR SURGERY)

## 2017-02-13 PROCEDURE — 86901 BLOOD TYPING SEROLOGIC RH(D): CPT | Performed by: SURGERY

## 2017-02-13 PROCEDURE — 21400006 ZZH R&B CCU INTERMEDIATE UMMC

## 2017-02-13 PROCEDURE — 84100 ASSAY OF PHOSPHORUS: CPT | Performed by: SURGERY

## 2017-02-13 PROCEDURE — 86850 RBC ANTIBODY SCREEN: CPT | Performed by: SURGERY

## 2017-02-13 PROCEDURE — 86900 BLOOD TYPING SEROLOGIC ABO: CPT | Performed by: SURGERY

## 2017-02-13 PROCEDURE — 85027 COMPLETE CBC AUTOMATED: CPT | Performed by: SURGERY

## 2017-02-13 PROCEDURE — 71010 XR CHEST PORT 1 VW: CPT

## 2017-02-13 PROCEDURE — 25000125 ZZHC RX 250: Performed by: NURSE PRACTITIONER

## 2017-02-13 PROCEDURE — 25000128 H RX IP 250 OP 636: Performed by: SURGERY

## 2017-02-13 PROCEDURE — 25000132 ZZH RX MED GY IP 250 OP 250 PS 637: Mod: GY | Performed by: NURSE PRACTITIONER

## 2017-02-13 PROCEDURE — A9270 NON-COVERED ITEM OR SERVICE: HCPCS | Mod: GY | Performed by: NURSE PRACTITIONER

## 2017-02-13 PROCEDURE — 80048 BASIC METABOLIC PNL TOTAL CA: CPT | Performed by: SURGERY

## 2017-02-13 PROCEDURE — 40000133 ZZH STATISTIC OT WARD VISIT: Performed by: OCCUPATIONAL THERAPIST

## 2017-02-13 RX ORDER — VANCOMYCIN HYDROCHLORIDE 1 G/200ML
1000 INJECTION, SOLUTION INTRAVENOUS ONCE
Status: COMPLETED | OUTPATIENT
Start: 2017-02-13 | End: 2017-02-13

## 2017-02-13 RX ORDER — POTASSIUM CHLORIDE 750 MG/1
20 TABLET, EXTENDED RELEASE ORAL DAILY
Status: DISCONTINUED | OUTPATIENT
Start: 2017-02-13 | End: 2017-02-14

## 2017-02-13 RX ORDER — FUROSEMIDE 10 MG/ML
20 INJECTION INTRAMUSCULAR; INTRAVENOUS
Status: DISCONTINUED | OUTPATIENT
Start: 2017-02-13 | End: 2017-02-14

## 2017-02-13 RX ORDER — CEPHALEXIN 500 MG/1
500 CAPSULE ORAL EVERY 8 HOURS SCHEDULED
Status: DISCONTINUED | OUTPATIENT
Start: 2017-02-13 | End: 2017-02-17 | Stop reason: HOSPADM

## 2017-02-13 RX ORDER — WARFARIN SODIUM 5 MG/1
5 TABLET ORAL
Status: COMPLETED | OUTPATIENT
Start: 2017-02-13 | End: 2017-02-13

## 2017-02-13 RX ADMIN — HYDROMORPHONE HYDROCHLORIDE 1 MG: 2 TABLET ORAL at 06:13

## 2017-02-13 RX ADMIN — DOCUSATE SODIUM 100 MG: 100 CAPSULE, LIQUID FILLED ORAL at 08:05

## 2017-02-13 RX ADMIN — PANTOPRAZOLE SODIUM 40 MG: 40 TABLET, DELAYED RELEASE ORAL at 08:05

## 2017-02-13 RX ADMIN — FUROSEMIDE 20 MG: 10 INJECTION, SOLUTION INTRAVENOUS at 17:06

## 2017-02-13 RX ADMIN — GABAPENTIN 100 MG: 100 CAPSULE ORAL at 19:11

## 2017-02-13 RX ADMIN — FUROSEMIDE 10 MG: 10 INJECTION, SOLUTION INTRAVENOUS at 08:05

## 2017-02-13 RX ADMIN — POTASSIUM PHOSPHATE, MONOBASIC AND POTASSIUM PHOSPHATE, DIBASIC 20 MMOL: 224; 236 INJECTION, SOLUTION INTRAVENOUS at 17:07

## 2017-02-13 RX ADMIN — WARFARIN SODIUM 5 MG: 5 TABLET ORAL at 17:06

## 2017-02-13 RX ADMIN — VANCOMYCIN HYDROCHLORIDE 1000 MG: 1 INJECTION, SOLUTION INTRAVENOUS at 13:54

## 2017-02-13 RX ADMIN — GABAPENTIN 100 MG: 100 CAPSULE ORAL at 08:04

## 2017-02-13 RX ADMIN — HYDROMORPHONE HYDROCHLORIDE 1 MG: 2 TABLET ORAL at 17:41

## 2017-02-13 RX ADMIN — POTASSIUM CHLORIDE 20 MEQ: 750 TABLET, EXTENDED RELEASE ORAL at 13:27

## 2017-02-13 RX ADMIN — Medication 2 G: at 11:28

## 2017-02-13 RX ADMIN — HYDROMORPHONE HYDROCHLORIDE 1 MG: 2 TABLET ORAL at 10:50

## 2017-02-13 RX ADMIN — ASPIRIN 81 MG CHEWABLE TABLET 81 MG: 81 TABLET CHEWABLE at 08:04

## 2017-02-13 RX ADMIN — AMIODARONE HYDROCHLORIDE 400 MG: 200 TABLET ORAL at 19:11

## 2017-02-13 RX ADMIN — CEPHALEXIN 500 MG: 500 CAPSULE ORAL at 17:06

## 2017-02-13 RX ADMIN — AMIODARONE HYDROCHLORIDE 400 MG: 200 TABLET ORAL at 08:05

## 2017-02-13 ASSESSMENT — PAIN DESCRIPTION - DESCRIPTORS
DESCRIPTORS: ACHING
DESCRIPTORS: ACHING

## 2017-02-13 NOTE — PLAN OF CARE
Problem: Goal Outcome Summary  Goal: Goal Outcome Summary  Outcome: Improving  Patient up walking in hallway, eating cardiac diet, good pain control with mostly oral medications, central line out.  Large BM today.  Transfer to floor when bed available. Will continue to monitor and assess and notify provider of any changes.

## 2017-02-13 NOTE — PLAN OF CARE
Problem: Individualization  Goal: Patient Preferences  Waiting to transfer to 6C. Up to bedside commode x4. Home CPAP on during the night. Pain tolerable with discomfort, 1 mg oral dilaudid given x1 with relief.

## 2017-02-13 NOTE — PROGRESS NOTES
Focus: Transfer from 4 E  Diagnosis: Ms. 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 Pt is now in NSR rates in 60s. VSS.   Via: stretcher   Accompanied by: RN  Belongings: with pt.   Teaching: Call don't fall, use of console, meal times, visiting hours, orientation to unit, when to call for the RN (angina/sob/dizzyness, etc.), and stressed the importance of safety.   Access: PIV   Telemetry: Placed on pt.   Ht./Wt.: complete    Pt neuro assessment intact, VSS, pt afebrile, Pt up with assistance of 1 to bedside commode. Pt has a left pleural chest tube connected to low cont suction. Output minimal. No air leak. Pt has a mid line incision that is sealed with glue and is JORGE. Incision has a scant amount of drainage from top end of incision. This RN cleaned the incision. Pt also has a graft site on lower left extremity that has scant amount of drainage also. RN cleaned this incision also. Dilaudid 1 mg PO given at 0613 for incisional pain. RN will continue to monitor.    Chelsea Goodwin

## 2017-02-13 NOTE — PLAN OF CARE
Problem: Goal Outcome Summary  Goal: Goal Outcome Summary  Ambulating well post-op; walked halls with therapy using walker; up in chair; ambulating to BR w/assist. Sats maintained 92-94% on RA. Pain controlled with po dilaudid. Remaining CT now to H2O seal-outpt too high to remove tube today. Pt has area of bloody drainage from upper part of sternal incision as well as serosang drainage from small area of L leg graft site. CVTS aware. Wound cleanser & new drsg applied to sternal open area; CVTS placed 2 new steristrips over leg graft site. Plan to give pt one IV dose of vanco and start on po keflex for protection with open draining areas. Voiding well, but weight still up. Plan to give more IV lasix this afternoon. Replacing Mg, K, and Phos per protocols. Encouraging use of IS. Continue plan of care.

## 2017-02-13 NOTE — CONSULTS
Social Work: Assessment with Discharge Plan    Patient Name:  Carlos Alberto Fenton  :  1940  Age:  76 year old  MRN:  [id number]  Risk/Complexity Score:  Filed Complexity Screen Score: 10  Completed assessment with:  Pt, spouse    Presenting Information   Reason for Referral:  Discharge plan  Date of Intake:  2017  Referral Source:  Chart Review  Decision Maker:  Pt when able  Alternate Decision Maker:  Pt states she would like her daughter Lindsay to be her secondary contact for decision making.  Health Care Directive:  Declined completing, pt and spouse notified that SW can assist with this if needed.  SW also educated on notary services at the hospital and clinics as needed.  Living Situation:  House, pt and spouse live in [place] full time.  They are currently living with daughter while pt heals.  Previous Functional Status:  Independent  Patient and family understanding of hospitalization:  Pt states she had a stroke and then had to have heart surgery.  Cultural/Language/Spiritual Considerations:  None reported  Adjustment to Illness:  Pt states that she is feeling tired, but does not feel that she had any additional impairments from the stroke.    Physical Health  Reason for Admission:  No diagnosis found.  Services Needed/Recommended:  TCU    Mental Health/Chemical Dependency  Diagnosis:  NA  Support/Services in Place:  na  Services Needed/Recommended:  NA    Support System  Significant relationship at present time:  Spouse and daughter  Family of origin is available for support:  Yes  Other support available:  Yes  Gaps in support system:  Pt lives in [facility] full time.  Spouse's preference is TCU to get stronger before going to daughter's home for more healing.  Patient is caregiver to:  None     Provider Information   Primary Care Physician:  Nuha Bateman   [phone]   Clinic:  [address]   / [place] [facility]*      :  ANNA    Financial   Income  "Source:  Retired  Financial Concerns:  None reported  Insurance:    Payor/Plan Subscriber Name Rel Member # Group #   MEDICARE - MEDICARE F* MENDOZA GREENBERG  [id number]       ATTN CLAIMS, [address]   MEDICA - MEDICA PRIME* MENDOZA GREENBERG  [id number]      [address] [address]       Discharge Plan   Patient and family discharge goal:  TCU, however pt would like to think about this more.  Provided education on discharge plan:  YES  Patient agreeable to discharge plan:  YES  A list of Medicare Certified Facilities was provided to the patient and/or family to encourage patient choice. Patient's choices for facility are:    1) [facility] - pt states she has heard of this facility and will look into this option.  2) [facility] - pt will also look into this option  Will NH provide Skilled rehabilitation or complex medical:  YES  General information regarding anticipated insurance coverage and possible out of pocket cost was discussed. Patient and patient's family are aware patient may incur the cost of transportation to the facility, pending insurance payment: YES  Barriers to discharge:  Medical stability.    Discharge Recommendations   Anticipated Disposition:  Facility:  TCU vs Home with Homecare.  Pt and spouse will discuss and let SW know tomorrow which they woudl like to pursue.  Spouse's preference is TCU.  Transportation Needs:  Medical:  Wheelchair  Name of Transportation Company and Phone:  TBD if family cannot drive.      Additional comments   SW met with pt and spouse and introduced self and role.  In absence of HCD pt nominates her daughter to be a primary decision maker.  Spouse is in agreement with this plan.  Pt states that she had a \"stroke\" and then was moved immediately for heart surgery.  Pt states that she was visiting [place] for her \"yearly physical\" at [facility].  Pt does not remember much of her events this afternoon between [facility] and coming to the [facility].  Pt was very sleepy in " the session.  SW discussed options for rehab with pt and spouse present.  Pt and spouse will discuss referral options and let SW know in the morning.  Spouse's preference is to go to TCU short term before returning to daughter's home.    RANDY Yanez, APSW  6C Unit   Pager: [phone]  Phone: [phone]

## 2017-02-13 NOTE — PROGRESS NOTES
CVTS Daily Note  2/13/2017  Attending: Mikhail Quiñones MD    S:  NAEO, transferred out of ICU last evening.  Denies CP, SOB, LH, dizziness or sweats.  Afebrile.  Up and around with therapies. Pain controlled on current regimen. Fair Appetite, (+) BM.      Single CT with increased output; weight still up a bit although voiding well.   Currently in NSR while on amiodarone    Small amount of drainage from upper part of sternal incision and tiny open area with drainage on LLE graft site.       docusate sodium  100 mg Oral BID     sennosides  1-2 tablet Oral BID     amiodarone  400 mg Oral BID     gabapentin  100 mg Oral BID     furosemide  10 mg Intravenous Daily     insulin aspart  1-7 Units Subcutaneous TID AC     insulin aspart  1-5 Units Subcutaneous At Bedtime     pantoprazole  40 mg Oral QAM     sodium bicarbonate  50 mEq Intravenous Once     sodium chloride (PF)  3 mL Intracatheter Q8H     aspirin  81 mg Oral Daily       Warfarin Therapy Reminder       IV fluid REPLACEMENT ONLY       Reason beta blocker order not selected       O:   Vitals:    02/13/17 0200 02/13/17 0400 02/13/17 0545 02/13/17 0737   BP: 114/73   119/79   BP Location:  Left arm Left arm Left arm   Pulse:       Resp:  14 16 16   Temp:  97  F (36.1  C) 96.3  F (35.7  C) 97.6  F (36.4  C)   TempSrc:  Axillary Axillary Oral   SpO2: 94%  93% 96%   Weight:  76.4 kg (168 lb 6.9 oz)     Height:         Vitals:    02/11/17 0511 02/12/17 0600 02/13/17 0400   Weight: 76 kg (167 lb 8.8 oz) 75.8 kg (167 lb 1.7 oz) 76.4 kg (168 lb 6.9 oz)     Intake/Output Summary (Last 24 hours) at 02/13/17 0855  Last data filed at 02/13/17 0600   Gross per 24 hour   Intake             1007 ml   Output             1870 ml   Net             -863 ml     Gen:  AAO x 3, pleasant, NAD  CV:  S1S2 normal, no murmurs, rubs, or gallops, RRR  Pulm:  CTA, no rhonchi or wheezes  Abd:  (+) BS, S/NT/ND, (+) BM  Ext: trace peripheral edema, scant drainage from tiny open area on LLE graft  site.  Incision: sternal incision with scant bloody drainage top of incision and mild erythema, no swelling  Chest Tubes:  dressings clean and dry, serosanguinous, no air leak, output 290/100 cc   Lines: piv, CT x 1     Labs:  BMP    Recent Labs  Lab 02/12/17  0253 02/11/17  1520 02/11/17  0538 02/11/17  0410    135 137 137   POTASSIUM 4.3 4.5 4.4 Unsatisfactory specimen - contaminatedNOTIFIED CHUCHO BAHENA AT 0534 ON 02/11/17 BY EABCanceled, Test credited Unsatisfactory specimen - contaminatedNOTIFIED JUAN BAHENA AT 0535 ON 02/11/17 BY EAB   CHLORIDE 104 101 103 103   CARLY 7.8* 8.0* 7.9* 7.7*   CO2 26 26 26 24   BUN 14 18 18 18   CR 0.52 0.74 0.72 0.68   * 123* 116* 195*     CBC    Recent Labs  Lab 02/12/17  0253 02/11/17  1520 02/11/17  0410 02/10/17  0415   WBC 14.2* 15.3* 14.4* 16.3*   RBC 2.79* 2.80* 2.65* 2.85*   HGB 8.2* 8.3* 7.9* 8.4*   HCT 26.4* 26.3* 25.0* 27.1*   MCV 95 94 94 95   MCH 29.4 29.6 29.8 29.5   MCHC 31.1* 31.6 31.6 31.0*   RDW 18.6* 18.5* 18.7* 18.7*   * 123* 98* 97*     INR    Recent Labs  Lab 02/13/17  0859 02/12/17  0253 02/11/17  1520 02/06/17  2106   INR 1.82* 2.27* 2.29* 1.55*      Hepatic Panel   Lab Results   Component Value Date     02/12/2017     Lab Results   Component Value Date     02/12/2017     No results found for: BILICONJ   Lab Results   Component Value Date    BILITOTAL 2.1 02/12/2017     Lab Results   Component Value Date    ALBUMIN 2.7 02/12/2017     Lab Results   Component Value Date    PROTTOTAL 5.2 02/12/2017      Lab Results   Component Value Date    ALKPHOS 87 02/12/2017       GLUCOSE:     Recent Labs  Lab 02/13/17  0741 02/12/17  2142 02/12/17  1250 02/12/17  0849 02/12/17  0253 02/11/17  2206 02/11/17  1520 02/11/17  1512  02/11/17  0538 02/11/17  0410  02/10/17  1446  02/10/17  0415   GLC  --   --   --   --  126*  --  123*  --   --  116* 195*  --  231*  --  107*   BGM 71 136* 108* 92  --  77  --  148*  < >  --   --   < >  --   < >   --    < > = values in this interval not displayed.    Imaging:  EXAMINATION: XR CHEST PORT 1 VW 2/13/2017 1:12 AM      CLINICAL HISTORY: Interval change      COMPARISON: 2/12/2017      FINDINGS:  Single portable AP view of the chest. Interval removal right IJ  sheath. Interval removal of mediastinal drains. Stable left chest  tube.Left atrial appendage closure device. Median sternotomy wires  from prior CABG.     Stable enlarged cardiac silhouette. No significant change in bilateral  perihilar opacity. Stable moderate retrocardiac opacity. No definite  pleural effusion or pneumothorax.       IMPRESSION:  1. No significant change in mild bilateral perihilar opacities and  bibasilar opacities; pulmonary edema versus atelectasis.  2. Support lines and tubes as detailed above.     A/P: Carlos Alberto Fenton is a 76 year old female with hx of CAD s/p CABG x 3. Modified maze with PVI, ABDIEL and PFO closure on 2/6/17 by Dr. Quiñones.  -- Hospital course complicated CHB postop and AFib with RVR.    Neuro:   - Acute postop pain: APAP, dilaudid prn  - AMS: resolved; likely multifactorial, HCT wnl, EEG w/o seizure, appreciate neurology input  - Hx of CVA 10/2016: MRI showed bilateral cortical infarcts concerning for cardioembolic source. She required intubation eventually for severe encephalopathy. Started on Warfarin for thromboembolic prophylaxis.  - Depression/Anxiety: hold off on home trazodone and venlafaxine, resume Venlafaxine prior to discharge    CV:   - CAD s/p CABG: increase ASA to 325 mg qd, no statin yet due to elevated LFTs, no BBs at this time, no ACEi due to marginal pressors.   - AFib with RVR: milrinone off, amiodarone taper 400 mg BID X 2 weeks then 200 mg daily for 4 weeks (a total of 6 weeks) and then stop. She was not previously on home AATs. If she has sustained AFib then EP will consider DCCV prior to discharge; currently in NSR.     - HLD: Lipitor 40 mg qd, HOLD due to elevated LFTs    Pulm: IS, enc activity  -  keep pleural CT due to high output    ID: complete eva-op abx; afebrile, WBC down-trending  - BCx 2/8/17 NGTD  - Scant drainage from incision and graft site; will give Vanco x 1 then start po Keflex x 7 days.    GI: diet, bowel regimen     Renal/: creatinine stable; weight up 2.7 kg> dry wgt; increase lasix     Heme: Hgb stable, no e/o bleeding    Endo:   - DM2: BG levels 136-71, takes metformin 500 mg BID at home.  Resume at discharge if creatinine OK    PPX: PPI    Anticoagulation: high CHADSVASC = 8; warfarin for AFib, goal INR 2-3, current INR 1.82, likely up due to elevated LFTs  - patient already on warfarin at home 2.5 mg daily except 5 mg on T/Th s/p embolic CVA     Dispo: 6C 2/13; anticipate discharge to   - Follow up with Dr. Santos from EP in 2-3 months post discharge        Jennifer Staley, [facility]  [phone]    8:55 AM   February 13, 2017

## 2017-02-13 NOTE — PLAN OF CARE
Problem: Goal Outcome Summary  Goal: Goal Outcome Summary  PT 6C: CANCEL-Pt sleeping upon attempt for PT session,  requesting to try tomorrow instead of this afternoon.

## 2017-02-13 NOTE — PLAN OF CARE
Problem: Goal Outcome Summary  Goal: Goal Outcome Summary  OT 6C: Pt required encouragement to participate with therapy. Pt ambulated ~50ft x2 with FWW and SBA for safety. Pt completed oral care while seated at EOB and able to safely perform logroll technique for bed mobility with min A and verbal cues. Pt limited by sternal precautions, decreased strength/endurance, and limited tolerance for activity.   Recommend pt discharge to TCU to increase safety and IND with ADLs.

## 2017-02-14 ENCOUNTER — APPOINTMENT (OUTPATIENT)
Dept: GENERAL RADIOLOGY | Facility: CLINIC | Age: 77
DRG: 228 | End: 2017-02-14
Attending: SURGERY
Payer: MEDICARE

## 2017-02-14 ENCOUNTER — APPOINTMENT (OUTPATIENT)
Dept: PHYSICAL THERAPY | Facility: CLINIC | Age: 77
DRG: 228 | End: 2017-02-14
Attending: SURGERY
Payer: MEDICARE

## 2017-02-14 ENCOUNTER — APPOINTMENT (OUTPATIENT)
Dept: OCCUPATIONAL THERAPY | Facility: CLINIC | Age: 77
DRG: 228 | End: 2017-02-14
Attending: SURGERY
Payer: MEDICARE

## 2017-02-14 LAB
ANION GAP SERPL CALCULATED.3IONS-SCNC: 8 MMOL/L (ref 3–14)
BACTERIA SPEC CULT: NO GROWTH
BACTERIA SPEC CULT: NO GROWTH
BUN SERPL-MCNC: 11 MG/DL (ref 7–30)
CA-I BLD-MCNC: 4.1 MG/DL (ref 4.4–5.2)
CALCIUM SERPL-MCNC: 8.1 MG/DL (ref 8.5–10.1)
CHLORIDE SERPL-SCNC: 101 MMOL/L (ref 94–109)
CO2 SERPL-SCNC: 26 MMOL/L (ref 20–32)
CREAT SERPL-MCNC: 0.67 MG/DL (ref 0.52–1.04)
ERYTHROCYTE [DISTWIDTH] IN BLOOD BY AUTOMATED COUNT: 20.8 % (ref 10–15)
GFR SERPL CREATININE-BSD FRML MDRD: 86 ML/MIN/1.7M2
GLUCOSE BLDC GLUCOMTR-MCNC: 101 MG/DL (ref 70–99)
GLUCOSE BLDC GLUCOMTR-MCNC: 118 MG/DL (ref 70–99)
GLUCOSE BLDC GLUCOMTR-MCNC: 177 MG/DL (ref 70–99)
GLUCOSE BLDC GLUCOMTR-MCNC: 91 MG/DL (ref 70–99)
GLUCOSE SERPL-MCNC: 181 MG/DL (ref 70–99)
HCT VFR BLD AUTO: 27.7 % (ref 35–47)
HGB BLD-MCNC: 9 G/DL (ref 11.7–15.7)
INR PPP: 1.65 (ref 0.86–1.14)
INTERPRETATION ECG - MUSE: NORMAL
MAGNESIUM SERPL-MCNC: 2.2 MG/DL (ref 1.6–2.3)
MCH RBC QN AUTO: 32.1 PG (ref 26.5–33)
MCHC RBC AUTO-ENTMCNC: 32.5 G/DL (ref 31.5–36.5)
MCV RBC AUTO: 99 FL (ref 78–100)
MICRO REPORT STATUS: NORMAL
MICRO REPORT STATUS: NORMAL
PHOSPHATE SERPL-MCNC: 2.3 MG/DL (ref 2.5–4.5)
PLATELET # BLD AUTO: 153 10E9/L (ref 150–450)
POTASSIUM SERPL-SCNC: 4.4 MMOL/L (ref 3.4–5.3)
RBC # BLD AUTO: 2.8 10E12/L (ref 3.8–5.2)
SODIUM SERPL-SCNC: 134 MMOL/L (ref 133–144)
SPECIMEN SOURCE: NORMAL
SPECIMEN SOURCE: NORMAL
WBC # BLD AUTO: 10.7 10E9/L (ref 4–11)

## 2017-02-14 PROCEDURE — 85610 PROTHROMBIN TIME: CPT | Performed by: SURGERY

## 2017-02-14 PROCEDURE — 25000132 ZZH RX MED GY IP 250 OP 250 PS 637: Mod: GY | Performed by: NURSE PRACTITIONER

## 2017-02-14 PROCEDURE — 00000146 ZZHCL STATISTIC GLUCOSE BY METER IP

## 2017-02-14 PROCEDURE — 25000125 ZZHC RX 250: Performed by: NURSE PRACTITIONER

## 2017-02-14 PROCEDURE — 84100 ASSAY OF PHOSPHORUS: CPT | Performed by: SURGERY

## 2017-02-14 PROCEDURE — A9270 NON-COVERED ITEM OR SERVICE: HCPCS | Mod: GY | Performed by: SURGERY

## 2017-02-14 PROCEDURE — 80048 BASIC METABOLIC PNL TOTAL CA: CPT | Performed by: SURGERY

## 2017-02-14 PROCEDURE — 97535 SELF CARE MNGMENT TRAINING: CPT | Mod: GO | Performed by: OCCUPATIONAL THERAPIST

## 2017-02-14 PROCEDURE — 21400006 ZZH R&B CCU INTERMEDIATE UMMC

## 2017-02-14 PROCEDURE — 36569 INSJ PICC 5 YR+ W/O IMAGING: CPT

## 2017-02-14 PROCEDURE — A9270 NON-COVERED ITEM OR SERVICE: HCPCS | Mod: GY | Performed by: THORACIC SURGERY (CARDIOTHORACIC VASCULAR SURGERY)

## 2017-02-14 PROCEDURE — 25000132 ZZH RX MED GY IP 250 OP 250 PS 637: Mod: GY | Performed by: SURGERY

## 2017-02-14 PROCEDURE — 71010 XR CHEST PORT 1 VW: CPT

## 2017-02-14 PROCEDURE — 93005 ELECTROCARDIOGRAM TRACING: CPT

## 2017-02-14 PROCEDURE — 83735 ASSAY OF MAGNESIUM: CPT | Performed by: SURGERY

## 2017-02-14 PROCEDURE — 93010 ELECTROCARDIOGRAM REPORT: CPT | Performed by: INTERNAL MEDICINE

## 2017-02-14 PROCEDURE — 27210195 ZZH KIT POWER PICC DOUBLE LUMEN

## 2017-02-14 PROCEDURE — 40000193 ZZH STATISTIC PT WARD VISIT

## 2017-02-14 PROCEDURE — 97110 THERAPEUTIC EXERCISES: CPT | Mod: GP

## 2017-02-14 PROCEDURE — 36415 COLL VENOUS BLD VENIPUNCTURE: CPT | Performed by: SURGERY

## 2017-02-14 PROCEDURE — 25000132 ZZH RX MED GY IP 250 OP 250 PS 637: Mod: GY | Performed by: THORACIC SURGERY (CARDIOTHORACIC VASCULAR SURGERY)

## 2017-02-14 PROCEDURE — 97530 THERAPEUTIC ACTIVITIES: CPT | Mod: GP

## 2017-02-14 PROCEDURE — 82330 ASSAY OF CALCIUM: CPT | Performed by: SURGERY

## 2017-02-14 PROCEDURE — 97116 GAIT TRAINING THERAPY: CPT | Mod: GP

## 2017-02-14 PROCEDURE — 85027 COMPLETE CBC AUTOMATED: CPT | Performed by: SURGERY

## 2017-02-14 PROCEDURE — 25000128 H RX IP 250 OP 636: Performed by: NURSE PRACTITIONER

## 2017-02-14 PROCEDURE — 25000125 ZZHC RX 250: Performed by: THORACIC SURGERY (CARDIOTHORACIC VASCULAR SURGERY)

## 2017-02-14 PROCEDURE — A9270 NON-COVERED ITEM OR SERVICE: HCPCS | Mod: GY | Performed by: NURSE PRACTITIONER

## 2017-02-14 PROCEDURE — 40000133 ZZH STATISTIC OT WARD VISIT: Performed by: OCCUPATIONAL THERAPIST

## 2017-02-14 RX ORDER — WARFARIN SODIUM 5 MG/1
5 TABLET ORAL
Status: COMPLETED | OUTPATIENT
Start: 2017-02-14 | End: 2017-02-14

## 2017-02-14 RX ORDER — HEPARIN SODIUM,PORCINE 10 UNIT/ML
2-5 VIAL (ML) INTRAVENOUS
Status: DISCONTINUED | OUTPATIENT
Start: 2017-02-14 | End: 2017-02-17 | Stop reason: HOSPADM

## 2017-02-14 RX ORDER — LIDOCAINE 40 MG/G
CREAM TOPICAL
Status: DISCONTINUED | OUTPATIENT
Start: 2017-02-14 | End: 2017-02-17 | Stop reason: HOSPADM

## 2017-02-14 RX ORDER — POTASSIUM CHLORIDE 750 MG/1
20 TABLET, EXTENDED RELEASE ORAL 2 TIMES DAILY
Status: DISCONTINUED | OUTPATIENT
Start: 2017-02-14 | End: 2017-02-17 | Stop reason: HOSPADM

## 2017-02-14 RX ORDER — HEPARIN SODIUM,PORCINE 10 UNIT/ML
5-10 VIAL (ML) INTRAVENOUS EVERY 24 HOURS
Status: DISCONTINUED | OUTPATIENT
Start: 2017-02-14 | End: 2017-02-17 | Stop reason: HOSPADM

## 2017-02-14 RX ORDER — HEPARIN SODIUM,PORCINE 10 UNIT/ML
5-10 VIAL (ML) INTRAVENOUS
Status: DISCONTINUED | OUTPATIENT
Start: 2017-02-14 | End: 2017-02-17 | Stop reason: HOSPADM

## 2017-02-14 RX ORDER — FUROSEMIDE 10 MG/ML
40 INJECTION INTRAMUSCULAR; INTRAVENOUS
Status: DISCONTINUED | OUTPATIENT
Start: 2017-02-14 | End: 2017-02-17 | Stop reason: HOSPADM

## 2017-02-14 RX ADMIN — ASPIRIN 81 MG CHEWABLE TABLET 81 MG: 81 TABLET CHEWABLE at 07:46

## 2017-02-14 RX ADMIN — HYDROMORPHONE HYDROCHLORIDE 1 MG: 2 TABLET ORAL at 12:33

## 2017-02-14 RX ADMIN — FUROSEMIDE 20 MG: 10 INJECTION, SOLUTION INTRAVENOUS at 07:53

## 2017-02-14 RX ADMIN — HYDROMORPHONE HYDROCHLORIDE 1 MG: 2 TABLET ORAL at 05:45

## 2017-02-14 RX ADMIN — POTASSIUM CHLORIDE 20 MEQ: 750 TABLET, EXTENDED RELEASE ORAL at 19:21

## 2017-02-14 RX ADMIN — FUROSEMIDE 40 MG: 10 INJECTION, SOLUTION INTRAVENOUS at 16:29

## 2017-02-14 RX ADMIN — POTASSIUM PHOSPHATE, MONOBASIC AND POTASSIUM PHOSPHATE, DIBASIC 15 MMOL: 224; 236 INJECTION, SOLUTION INTRAVENOUS at 12:36

## 2017-02-14 RX ADMIN — AMIODARONE HYDROCHLORIDE 400 MG: 200 TABLET ORAL at 19:23

## 2017-02-14 RX ADMIN — HYDROMORPHONE HYDROCHLORIDE 1 MG: 2 TABLET ORAL at 08:57

## 2017-02-14 RX ADMIN — HYDROMORPHONE HYDROCHLORIDE 1 MG: 2 TABLET ORAL at 15:37

## 2017-02-14 RX ADMIN — ACETAMINOPHEN 650 MG: 325 TABLET, FILM COATED ORAL at 14:30

## 2017-02-14 RX ADMIN — POTASSIUM CHLORIDE 20 MEQ: 750 TABLET, EXTENDED RELEASE ORAL at 07:51

## 2017-02-14 RX ADMIN — HYDROMORPHONE HYDROCHLORIDE 1 MG: 2 TABLET ORAL at 00:41

## 2017-02-14 RX ADMIN — AMIODARONE HYDROCHLORIDE 400 MG: 200 TABLET ORAL at 07:45

## 2017-02-14 RX ADMIN — PANTOPRAZOLE SODIUM 40 MG: 40 TABLET, DELAYED RELEASE ORAL at 07:50

## 2017-02-14 RX ADMIN — DOCUSATE SODIUM 100 MG: 100 CAPSULE, LIQUID FILLED ORAL at 07:48

## 2017-02-14 RX ADMIN — CEPHALEXIN 500 MG: 500 CAPSULE ORAL at 00:41

## 2017-02-14 RX ADMIN — CEPHALEXIN 500 MG: 500 CAPSULE ORAL at 16:29

## 2017-02-14 RX ADMIN — SODIUM CHLORIDE, PRESERVATIVE FREE 5 ML: 5 INJECTION INTRAVENOUS at 19:27

## 2017-02-14 RX ADMIN — CEPHALEXIN 500 MG: 500 CAPSULE ORAL at 07:47

## 2017-02-14 RX ADMIN — LIDOCAINE HYDROCHLORIDE 2 ML: 20 INJECTION, SOLUTION INFILTRATION; PERINEURAL at 18:24

## 2017-02-14 RX ADMIN — GABAPENTIN 100 MG: 100 CAPSULE ORAL at 19:22

## 2017-02-14 RX ADMIN — GABAPENTIN 100 MG: 100 CAPSULE ORAL at 07:49

## 2017-02-14 RX ADMIN — HYDROMORPHONE HYDROCHLORIDE 1 MG: 2 TABLET ORAL at 21:52

## 2017-02-14 RX ADMIN — WARFARIN SODIUM 5 MG: 5 TABLET ORAL at 19:22

## 2017-02-14 RX ADMIN — ACETAMINOPHEN 650 MG: 325 TABLET, FILM COATED ORAL at 06:41

## 2017-02-14 ASSESSMENT — PAIN DESCRIPTION - DESCRIPTORS
DESCRIPTORS: SORE
DESCRIPTORS: ACHING;DISCOMFORT
DESCRIPTORS: SHARP
DESCRIPTORS: DISCOMFORT

## 2017-02-14 NOTE — PLAN OF CARE
Problem: Goal Outcome Summary  Goal: Goal Outcome Summary  PT 6C: Pt is fully oriented today.  On RA throughout entire session with VSS.  Ambulates 300ft, 200ft and 150ft with FWW and 50 ft without.  Requires CGA-SBAx1 during mobility.  STS with SBA and IND bed mobility.  HEP instructed on.  PT recommends d/c home with OP CR and OP PT in order to increase functional strength and mobility. Requires assist as needed from spouse/daughter.

## 2017-02-14 NOTE — PROGRESS NOTES
"Social Work Services Progress Note    Hospital Day: 9  Date of Initial Social Work Evaluation:  2/13/17  Collaborated with:  Pt, spouse     Data:  Pt is a 76 year old female who was recommended for TCU placement.    Intervention:  SW met with pt and spouse to gather choices for TCU placement.  Pt and spouse were discussing their insurance coverage and options.  Spouse states the pt is \"more coherent\" today and will discuss TCU with him.  Pt and spouse will have their choice for care tomorrow.  SW available for futher questions.    Assessment:  Spouse has been at bedside and involved in care.  Pt has a daughter who lives in [place], [place].  Pt and spouse will live with her until pt is medically able to return to [place] where she is a full time resident.    Plan:    Anticipated Disposition:  Facility:  TCU referred, pt and spouse will look at options today and discuss.    Barriers to d/c plan:  Medical stability    Follow Up:  SW will continue to follow for discharge planning.    RANDY Yanez, APSW  6C Unit   Pager: [phone]  Phone: [phone]          "

## 2017-02-14 NOTE — PROGRESS NOTES
CVTS Daily Note  2/14/2017  Attending: Mikhail Quiñones MD    S:  NAEO.  Denies CP, SOB.  Afebrile.  Up and around with therapies. Pain controlled on current regimen. Fair Appetite, (+) BM.      Top part of sternal incision continues with mild yellowish drainage.  LLE graft site with fair amount of serous drainage, both sites with very mildl erythema.      cephalexin  500 mg Oral Q8H ABHISHEK     furosemide  20 mg Intravenous BID     potassium chloride SA  20 mEq Oral Daily     docusate sodium  100 mg Oral BID     sennosides  1-2 tablet Oral BID     amiodarone  400 mg Oral BID     gabapentin  100 mg Oral BID     insulin aspart  1-7 Units Subcutaneous TID AC     insulin aspart  1-5 Units Subcutaneous At Bedtime     pantoprazole  40 mg Oral QAM     sodium bicarbonate  50 mEq Intravenous Once     sodium chloride (PF)  3 mL Intracatheter Q8H     aspirin  81 mg Oral Daily       Warfarin Therapy Reminder       IV fluid REPLACEMENT ONLY       Reason beta blocker order not selected       O:   Vitals:    02/14/17 0500 02/14/17 0739 02/14/17 1000 02/14/17 1015   BP:  118/64 133/55    BP Location:  Right arm Right arm    Pulse:       Resp:  15     Temp:  98.5  F (36.9  C)     TempSrc:  Oral     SpO2:  96% 98% 92%   Weight: 79.4 kg (175 lb 1.6 oz)      Height:         Vitals:    02/12/17 0600 02/13/17 0400 02/14/17 0500   Weight: 75.8 kg (167 lb 1.7 oz) 76.4 kg (168 lb 6.9 oz) 79.4 kg (175 lb 1.6 oz)     Intake/Output Summary (Last 24 hours) at 02/14/17 1126  Last data filed at 02/14/17 0800   Gross per 24 hour   Intake             1930 ml   Output             2605 ml   Net             -675 ml     Gen: AAO x 3, pleasant, NAD  CV: S1S2 normal, no murmurs, rubs, or gallops, RRR  Pulm: CTA, no rhonchi or wheezes  Abd: (+) BS, S/NT/ND, (+) BM  Ext: trace peripheral edema, scant drainage from tiny open area on LLE graft site.  Incision: sternal incision with scant bloody drainage top of incision and mild erythema, no swelling  Chest Tubes:  dressings clean and dry, serosanguinous, no air leak, output 350 cc   Lines: piv, CT x 1     Labs:  BMP    Recent Labs  Lab 02/14/17  0833 02/13/17  0859 02/12/17  0253 02/11/17  1520    134 139 135   POTASSIUM 4.4 4.5 4.3 4.5   CHLORIDE 101 101 104 101   CARLY 8.1* 8.3* 7.8* 8.0*   CO2 26 25 26 26   BUN 11 11 14 18   CR 0.67 0.56 0.52 0.74   * 186* 126* 123*     CBC    Recent Labs  Lab 02/14/17  0833 02/13/17  0859 02/12/17  0253 02/11/17  1520   WBC 10.7 12.6* 14.2* 15.3*   RBC 2.80* 2.84* 2.79* 2.80*   HGB 9.0* 8.7* 8.2* 8.3*   HCT 27.7* 28.0* 26.4* 26.3*   MCV 99 99 95 94   MCH 32.1 30.6 29.4 29.6   MCHC 32.5 31.1* 31.1* 31.6   RDW 20.8* 19.4* 18.6* 18.5*    119* 120* 123*     INR    Recent Labs  Lab 02/14/17  0833 02/13/17  0859 02/12/17  0253 02/11/17  1520   INR 1.65* 1.82* 2.27* 2.29*      Hepatic Panel   Lab Results   Component Value Date     02/12/2017     Lab Results   Component Value Date     02/12/2017     No results found for: BILICONJ   Lab Results   Component Value Date    BILITOTAL 2.1 02/12/2017     Lab Results   Component Value Date    ALBUMIN 2.7 02/12/2017     Lab Results   Component Value Date    PROTTOTAL 5.2 02/12/2017      Lab Results   Component Value Date    ALKPHOS 87 02/12/2017     GLUCOSE:     Recent Labs  Lab 02/14/17  0833 02/14/17  0737 02/14/17  0053 02/13/17  1712 02/13/17  0859 02/13/17  0741 02/12/17  2142 02/12/17  1250  02/12/17  0253  02/11/17  1520  02/11/17  0538 02/11/17  0410   *  --   --   --  186*  --   --   --   --  126*  --  123*  --  116* 195*   BGM  --  91 101* 104*  --  71 136* 108*  < >  --   < >  --   < >  --   --    < > = values in this interval not displayed.    Imaging: EXAMINATION: XR CHEST PORT 1 VW 2/14/2017 1:48 AM      CLINICAL HISTORY: Interval change      COMPARISON: 2/13/2017      FINDINGS:  Single portable AP view of the chest. Stable left chest tube.Left  atrial appendage closure device. Median sternotomy wires  from prior  CABG.      Stable enlarged cardiac silhouette. No significant change in bilateral  perihilar and basilar opacity. Stable moderate retrocardiac opacity.  Tiny left apical pneumothorax.       IMPRESSION:  1. No significant change in mild bilateral perihilar opacities and  bibasilar opacities with moderate retrocardiac density; pulmonary  edema versus atelectasis/consolidation.  2. Support lines and tubes as detailed above.   3. Tiny left apical pneumothorax.    A/P: Carlos Alberto Fenton is a 76 year old female with hx of CAD s/p CABG x 3. Modified maze with PVI, ABDIEL and PFO closure on 2/6/17 by Dr. Quiñones.  -- Hospital course complicated CHB postop and AFib with RVR.     Neuro:   - Acute postop pain: APAP, dilaudid prn  - AMS: resolved; likely multifactorial, HCT wnl, EEG w/o seizure, appreciate neurology input  - Hx of CVA 10/2016: MRI showed bilateral cortical infarcts concerning for cardioembolic source. She required intubation eventually for severe encephalopathy. Started on Warfarin for thromboembolic prophylaxis.  - Depression/Anxiety: hold off on home trazodone and venlafaxine, resume Venlafaxine prior to discharge     CV:   - CAD s/p CABG: increase ASA to 325 mg qd, no statin yet due to elevated LFTs, no BBs at this time, no ACEi due to marginal pressors.   - AFib with RVR: milrinone off, amiodarone taper 400 mg BID X 2 weeks then 200 mg daily for 4 weeks (a total of 6 weeks) and then stop. She was not previously on home AATs. If she has sustained AFib then EP will consider DCCV prior to discharge; currently still in NSR.     - HLD: Lipitor 40 mg qd, HOLD due to elevated LFTs     Pulm: IS, enc activity  - keep pleural CT due to high output     ID: complete eva-op abx; afebrile, WBC down-trending  - BCx 2/8/17 NGTD  - Scant drainage from incision and graft site; will give Vanco x 1 then start po Keflex x 10 days.     GI: diet, bowel regimen      Renal/: creatinine stable; weight up ~6 kg> dry wgt;  increase lasix to 40 mg IV bid with scheduled K+      Heme: Hgb stable, no e/o bleeding     Endo:   - DM2: BG levels , takes metformin 500 mg BID at home. Resume at discharge if creatinine OK     PPX: PPI     Anticoagulation: high CHADSVASC = 8; warfarin for AFib, goal INR 2-3, current INR 1.65  - patient already on warfarin at home 2.5 mg daily except 5 mg on T/Th s/p embolic CVA      Dispo: 6C 2/13; anticipate discharge to home   - Follow up with Dr. Santos from EP in 2-3 months post discharge        Jennifer Staley, DAVID  [phone]    11:26 AM   February 14, 2017

## 2017-02-14 NOTE — PLAN OF CARE
Problem: Individualization  Goal: Patient Preferences  Outcome: Improving  Patient s/p CABGx3, modified MAZE, and PFO closure 2/6/17. Sternal incision with slight redness at top- covered. Left leg graft site with moderate serosang drainage, dressing changed x2. VSS. SR all shift. Complains of pain relieved by PRN PO dilaudid and tylenol. Up to bathroom with SBA. Working with PT/OT.  supportive at bedside. Chest tube to water seal with moderate drainage. Unable to be removed today to to output. +2 edema in lower extremities. Plan to continue to monitor patient and diurese. Plan to give increased dose of IV lasix this afternoon. Notify physician with changes or concerns.

## 2017-02-14 NOTE — PLAN OF CARE
Problem: Goal Outcome Summary  Goal: Goal Outcome Summary  OT 6C: Pt needed much encouragement to participate in OT this PM and appeared drowsy and mildly confused upon arrival. Pt required min A for physical help and verbal cues to tx supine<>seated at EOB. Pt ambulated ~50ft x2 in hallway with FWW and SBA for safety and prolonged seated rest break. Pt limited by weakness, fatigue, sternal precautions, cognition, and dyspnea.   Recommend pt discharge to TCU to increase strength and endurance for IND and safety with ADLs.

## 2017-02-14 NOTE — PLAN OF CARE
Problem: Goal Outcome Summary  Goal: Goal Outcome Summary  Outcome: No Change  D: S/p CABGx3. Hx HTN, CVA.  I/A: Pain controlled with PRN dilaudid. Sinus rhythm. VSS. Chest tube intact with serous drainage output. Up to commode with assist.   P: Continue to monitor.

## 2017-02-14 NOTE — PROVIDER NOTIFICATION
D:pt more tired as shift went on exhibiting wondering conversation when in bed but was able to re orientate. PO dilaudid given once this shift. Pt ambulated three times to bathroom this shift shuffle gait  I:Encourage respiratory toilet, contacted cross cover Surgery,to further access  A:Pt's daughter wanted to talk to the MD also  P:MD currently in the room

## 2017-02-15 ENCOUNTER — APPOINTMENT (OUTPATIENT)
Dept: PHYSICAL THERAPY | Facility: CLINIC | Age: 77
DRG: 228 | End: 2017-02-15
Attending: SURGERY
Payer: MEDICARE

## 2017-02-15 ENCOUNTER — APPOINTMENT (OUTPATIENT)
Dept: OCCUPATIONAL THERAPY | Facility: CLINIC | Age: 77
DRG: 228 | End: 2017-02-15
Attending: SURGERY
Payer: MEDICARE

## 2017-02-15 ENCOUNTER — APPOINTMENT (OUTPATIENT)
Dept: GENERAL RADIOLOGY | Facility: CLINIC | Age: 77
DRG: 228 | End: 2017-02-15
Attending: SURGERY
Payer: MEDICARE

## 2017-02-15 LAB
ANION GAP SERPL CALCULATED.3IONS-SCNC: 10 MMOL/L (ref 3–14)
BUN SERPL-MCNC: 17 MG/DL (ref 7–30)
C DIFF TOX B STL QL: NORMAL
CA-I BLD-MCNC: 4.5 MG/DL (ref 4.4–5.2)
CALCIUM SERPL-MCNC: 8.1 MG/DL (ref 8.5–10.1)
CHLORIDE SERPL-SCNC: 103 MMOL/L (ref 94–109)
CO2 SERPL-SCNC: 27 MMOL/L (ref 20–32)
CREAT SERPL-MCNC: 0.65 MG/DL (ref 0.52–1.04)
ERYTHROCYTE [DISTWIDTH] IN BLOOD BY AUTOMATED COUNT: 21.2 % (ref 10–15)
GFR SERPL CREATININE-BSD FRML MDRD: 88 ML/MIN/1.7M2
GLUCOSE BLDC GLUCOMTR-MCNC: 104 MG/DL (ref 70–99)
GLUCOSE BLDC GLUCOMTR-MCNC: 105 MG/DL (ref 70–99)
GLUCOSE BLDC GLUCOMTR-MCNC: 112 MG/DL (ref 70–99)
GLUCOSE BLDC GLUCOMTR-MCNC: 126 MG/DL (ref 70–99)
GLUCOSE BLDC GLUCOMTR-MCNC: 154 MG/DL (ref 70–99)
GLUCOSE SERPL-MCNC: 141 MG/DL (ref 70–99)
HCT VFR BLD AUTO: 26.9 % (ref 35–47)
HGB BLD-MCNC: 8.2 G/DL (ref 11.7–15.7)
INR PPP: 1.73 (ref 0.86–1.14)
INTERPRETATION ECG - MUSE: NORMAL
MAGNESIUM SERPL-MCNC: 1.8 MG/DL (ref 1.6–2.3)
MCH RBC QN AUTO: 29.8 PG (ref 26.5–33)
MCHC RBC AUTO-ENTMCNC: 30.5 G/DL (ref 31.5–36.5)
MCV RBC AUTO: 98 FL (ref 78–100)
PHOSPHATE SERPL-MCNC: 2.6 MG/DL (ref 2.5–4.5)
PLATELET # BLD AUTO: 202 10E9/L (ref 150–450)
POTASSIUM SERPL-SCNC: 4.1 MMOL/L (ref 3.4–5.3)
RBC # BLD AUTO: 2.75 10E12/L (ref 3.8–5.2)
SODIUM SERPL-SCNC: 139 MMOL/L (ref 133–144)
SPECIMEN SOURCE: NORMAL
WBC # BLD AUTO: 13.7 10E9/L (ref 4–11)

## 2017-02-15 PROCEDURE — 00000146 ZZHCL STATISTIC GLUCOSE BY METER IP

## 2017-02-15 PROCEDURE — A9270 NON-COVERED ITEM OR SERVICE: HCPCS | Mod: GY | Performed by: SURGERY

## 2017-02-15 PROCEDURE — 97530 THERAPEUTIC ACTIVITIES: CPT | Mod: GO | Performed by: OCCUPATIONAL THERAPIST

## 2017-02-15 PROCEDURE — 40000558 ZZH STATISTIC CVC DRESSING CHANGE

## 2017-02-15 PROCEDURE — 40000193 ZZH STATISTIC PT WARD VISIT

## 2017-02-15 PROCEDURE — A9270 NON-COVERED ITEM OR SERVICE: HCPCS | Mod: GY | Performed by: NURSE PRACTITIONER

## 2017-02-15 PROCEDURE — 25000132 ZZH RX MED GY IP 250 OP 250 PS 637: Mod: GY | Performed by: SURGERY

## 2017-02-15 PROCEDURE — 80048 BASIC METABOLIC PNL TOTAL CA: CPT | Performed by: SURGERY

## 2017-02-15 PROCEDURE — 87493 C DIFF AMPLIFIED PROBE: CPT | Performed by: PHYSICIAN ASSISTANT

## 2017-02-15 PROCEDURE — 21400006 ZZH R&B CCU INTERMEDIATE UMMC

## 2017-02-15 PROCEDURE — 97530 THERAPEUTIC ACTIVITIES: CPT | Mod: GP

## 2017-02-15 PROCEDURE — 71010 XR CHEST PORT 1 VW: CPT

## 2017-02-15 PROCEDURE — 36592 COLLECT BLOOD FROM PICC: CPT | Performed by: SURGERY

## 2017-02-15 PROCEDURE — 25000132 ZZH RX MED GY IP 250 OP 250 PS 637: Mod: GY | Performed by: NURSE PRACTITIONER

## 2017-02-15 PROCEDURE — 83735 ASSAY OF MAGNESIUM: CPT | Performed by: SURGERY

## 2017-02-15 PROCEDURE — 85027 COMPLETE CBC AUTOMATED: CPT | Performed by: SURGERY

## 2017-02-15 PROCEDURE — A9270 NON-COVERED ITEM OR SERVICE: HCPCS | Mod: GY | Performed by: THORACIC SURGERY (CARDIOTHORACIC VASCULAR SURGERY)

## 2017-02-15 PROCEDURE — 25000132 ZZH RX MED GY IP 250 OP 250 PS 637: Mod: GY | Performed by: THORACIC SURGERY (CARDIOTHORACIC VASCULAR SURGERY)

## 2017-02-15 PROCEDURE — 25000125 ZZHC RX 250: Performed by: NURSE PRACTITIONER

## 2017-02-15 PROCEDURE — 84100 ASSAY OF PHOSPHORUS: CPT | Performed by: SURGERY

## 2017-02-15 PROCEDURE — 25000128 H RX IP 250 OP 636: Performed by: NURSE PRACTITIONER

## 2017-02-15 PROCEDURE — 85610 PROTHROMBIN TIME: CPT | Performed by: SURGERY

## 2017-02-15 PROCEDURE — 40000133 ZZH STATISTIC OT WARD VISIT: Performed by: OCCUPATIONAL THERAPIST

## 2017-02-15 PROCEDURE — 82330 ASSAY OF CALCIUM: CPT | Performed by: SURGERY

## 2017-02-15 RX ORDER — SENNOSIDES 8.6 MG
1-2 TABLET ORAL 2 TIMES DAILY PRN
Status: DISCONTINUED | OUTPATIENT
Start: 2017-02-15 | End: 2017-02-17 | Stop reason: HOSPADM

## 2017-02-15 RX ORDER — WARFARIN SODIUM 5 MG/1
5 TABLET ORAL
Status: COMPLETED | OUTPATIENT
Start: 2017-02-15 | End: 2017-02-15

## 2017-02-15 RX ADMIN — CEPHALEXIN 500 MG: 500 CAPSULE ORAL at 08:32

## 2017-02-15 RX ADMIN — AMIODARONE HYDROCHLORIDE 400 MG: 200 TABLET ORAL at 08:31

## 2017-02-15 RX ADMIN — GABAPENTIN 100 MG: 100 CAPSULE ORAL at 20:20

## 2017-02-15 RX ADMIN — FUROSEMIDE 40 MG: 10 INJECTION, SOLUTION INTRAVENOUS at 15:56

## 2017-02-15 RX ADMIN — POTASSIUM CHLORIDE 20 MEQ: 750 TABLET, EXTENDED RELEASE ORAL at 08:33

## 2017-02-15 RX ADMIN — AMIODARONE HYDROCHLORIDE 400 MG: 200 TABLET ORAL at 20:20

## 2017-02-15 RX ADMIN — GABAPENTIN 100 MG: 100 CAPSULE ORAL at 08:31

## 2017-02-15 RX ADMIN — PANTOPRAZOLE SODIUM 40 MG: 40 TABLET, DELAYED RELEASE ORAL at 08:33

## 2017-02-15 RX ADMIN — DOCUSATE SODIUM 100 MG: 100 CAPSULE, LIQUID FILLED ORAL at 08:32

## 2017-02-15 RX ADMIN — POTASSIUM CHLORIDE 20 MEQ: 750 TABLET, EXTENDED RELEASE ORAL at 20:20

## 2017-02-15 RX ADMIN — CEPHALEXIN 500 MG: 500 CAPSULE ORAL at 23:31

## 2017-02-15 RX ADMIN — WARFARIN SODIUM 5 MG: 5 TABLET ORAL at 18:17

## 2017-02-15 RX ADMIN — CEPHALEXIN 500 MG: 500 CAPSULE ORAL at 15:56

## 2017-02-15 RX ADMIN — CEPHALEXIN 500 MG: 500 CAPSULE ORAL at 02:12

## 2017-02-15 RX ADMIN — SODIUM CHLORIDE, PRESERVATIVE FREE 10 ML: 5 INJECTION INTRAVENOUS at 18:18

## 2017-02-15 RX ADMIN — FUROSEMIDE 40 MG: 10 INJECTION, SOLUTION INTRAVENOUS at 08:36

## 2017-02-15 RX ADMIN — HYDROMORPHONE HYDROCHLORIDE 1 MG: 2 TABLET ORAL at 02:12

## 2017-02-15 RX ADMIN — HYDROMORPHONE HYDROCHLORIDE 1 MG: 2 TABLET ORAL at 06:59

## 2017-02-15 RX ADMIN — ASPIRIN 81 MG CHEWABLE TABLET 81 MG: 81 TABLET CHEWABLE at 08:31

## 2017-02-15 RX ADMIN — SODIUM CHLORIDE, PRESERVATIVE FREE 5 ML: 5 INJECTION INTRAVENOUS at 14:07

## 2017-02-15 RX ADMIN — SENNOSIDES 2 TABLET: 8.6 TABLET, FILM COATED ORAL at 08:32

## 2017-02-15 RX ADMIN — ALTEPLASE 2 MG: 2.2 INJECTION, POWDER, LYOPHILIZED, FOR SOLUTION INTRAVENOUS at 12:18

## 2017-02-15 NOTE — PLAN OF CARE
Problem: Goal Outcome Summary  Goal: Goal Outcome Summary  PT 6C: patient with decreased tolerance to activity this day, self-reporting fatigue. Ambulates with FWW ~400 feet with SBA, one standing rest break. Performs bed mobility and transfers with supervision. Refuses additional activity.     Recommend discharge home with assist and OP cardiac rehab when medically appropriate.

## 2017-02-15 NOTE — PLAN OF CARE
Problem: Goal Outcome Summary  Goal: Goal Outcome Summary  Outcome: No Change  D: CAD s/p CABG x3 modified MAZE, and PFO closure 2/6  I/A: A&O x4. VSS on RA. SR 70s -->A-fib 70s (between 1800 and 2230- (no rate change, difficult to assess, pt. at IR in early dayna and also intermittent artifact on tele). C/o incisional pain relieved by PRN dilaudid. Pleural CT to H20 seal, drsg reinforced, minimal serosanguinous output. Graft site on lower L leg continues to drain moderate amount of serosanguinous fluid. Good appetite. Adequate uop. No BM today.  and daughter at bedside for most of the evening.  P: Continue to monitor and notify CVTS with questions/concerns.     Provider notification: At 2030 Monitor Technician notified this RN that patient appeared to be in A-fib. 12- lead EKG obtained and verified A-fib. VSS and patient asymptomatic. Surgery Cross-cover notified and no further instructions were given at this time. Continue to monitor.

## 2017-02-15 NOTE — PROGRESS NOTES
Care Coordinator Progress Note     Admission Date/Time:  2/6/2017  Attending MD:  Mikhail Quiñones MD     Data  Chart reviewed, discussed with interdisciplinary team.   Patient with history of CAD s/p CABG x 3. Modified maze with PVI, ABDIEL and PFO closure on 2/6/17  Concerns with insurance coverage for discharge needs: None.  Current Living Situation: Patient lives with family. Pt lives in [facility], but is currently staying with spouse at daughter's home in [place].  Support System: Supportive and Involved daughter.  Services Involved: [facility]  Transportation: Family or Friend will provide. Pt said her daughter will give her a ride home at discharge.  Barriers to Discharge: Post op recovery    Coordination of Care and Referrals: Provided patient/family with options for home care. Pt said she would like to go through [facility]. Pt also states she would like to establish a new PCP doctor through [facility].      Assessment  PT currently recommending home with assist, pt is in agreement with this plan. Pt requested to have home care and therapy set up.  Intervention  Referral made to [facility] (P:[phone], F: [phone]) for RN evaluation post hospitalization. Assess vital signs, respiratory and cardiac status, activity tolerance, hydration, nutritional status, med setup and management. INR lab draws with result to [facility]. PT/OT eval and treat for deconditioning, strengthening and endurance, please assist pt with setting up OP CR when ready.   Per pt request, will need new PCP established.  Plan  Anticipated Discharge Date:  TBD unable to complete discharge planning at this time.  Anticipated Discharge Plan:  Discharge to home with home care    Lindsey Colmenares RN BSN  6C Unit Care Coordinator  Phone number: [phone]  Pager: [phone]

## 2017-02-15 NOTE — PLAN OF CARE
Problem: Goal Outcome Summary  Goal: Goal Outcome Summary  D:  Hx CAD s/p CABG x 3, Modified Maze and PFO closure.   I: Monitored vitals and assessed pt status.    A:  Pt is alert and oriented x 4, but forgetful at times. Up with 1 person assist to commode.  Left pleuralchest tube to water seal, with small amount of serous sanguinous out put. Midline incision with liquid bandage with small drainage noted, dry dressing in place, left leg with dry dressing in place. VSS, SR with PAC on the monitor, on room air denies any SOB.  Had 4 large loose BM today, CTVS aware. Senokot discontinued, stool sample sent for c.diff. Abd soft, BS + 4 quadrants using commode, voiding in adequate amonuts, get BID IV Lasix.     P: Continue to monitor Pt status and report changes to treatment team CTVS .

## 2017-02-15 NOTE — PLAN OF CARE
"Problem: Goal Outcome Summary  Goal: Goal Outcome Summary  OT 6C: Pt needed much encouragement to participate in OT today. Pt ambulated ~225ft with FWW, SBA for safety, and cues for posture/FWW placement. Pt engaged briefly in seated LE washing/dressing but when encouraged to do more ADL activity, pt forcefully told OT to \"get away from me\" and \"get out of here.\" Pt limited by weakness, fatigue, sternal precautions, and lack of participation in therapy.   Rec pt discharge to TCU to increase strength/endurance for ADL safety and IND.      "

## 2017-02-15 NOTE — PROGRESS NOTES
Social Work Services Progress Note    Hospital Day: 10  Date of Initial Social Work Evaluation:  2/13/17  Collaborated with:  Pt, spouse     Data:  Pt is a 76 year old female who was recommended for TCU placement.    Intervention:  RNCC to meet with pt and spouse today.  New rehab notes are indicating home with assist from daughter.  Will follow if SNF is needed and pt appropriate for placement.    Assessment:  Spouse at bedside and involved in care planning    Plan:    Anticipated Disposition:  Pending discussion with RNCC, current recs are for home with assist.    Barriers to d/c plan:  Medical stability    Follow Up:  SW will follow if placement is needed.    RANDY Yanez, APSW  6C Unit   Pager: [phone]  Phone: [phone]

## 2017-02-15 NOTE — PROGRESS NOTES
CVTS Daily Note  2/15/2017  Attending: Mikhail Quiñones MD    S:  NAEO.  Denies CP, SOB.  Afebrile.  Up and around with therapies. Pain controlled on current regimen. Good Appetite, (+) BM.      Lasix increased with excellent diuresis, net neg 1.1L but still large amount of s/s drainage in remaining CT.  Fair amount of drainage from LLE, mostly serous, small amount of s/s drainage from top part of sternal incsiion.  No erythema, on Keflex      alteplase  2 mg Intravenous Once     furosemide  40 mg Intravenous BID     potassium chloride SA  20 mEq Oral BID     heparin lock flush  5-10 mL Intracatheter Q24H     cephalexin  500 mg Oral Q8H ABHISHEK     docusate sodium  100 mg Oral BID     sennosides  1-2 tablet Oral BID     amiodarone  400 mg Oral BID     gabapentin  100 mg Oral BID     insulin aspart  1-7 Units Subcutaneous TID AC     insulin aspart  1-5 Units Subcutaneous At Bedtime     pantoprazole  40 mg Oral QAM     sodium bicarbonate  50 mEq Intravenous Once     sodium chloride (PF)  3 mL Intracatheter Q8H     aspirin  81 mg Oral Daily       Warfarin Therapy Reminder       IV fluid REPLACEMENT ONLY       Reason beta blocker order not selected       O:   Vitals:    02/14/17 2258 02/15/17 0152 02/15/17 0200 02/15/17 0730   BP: 117/84  117/68 124/52   BP Location: Right arm  Left arm Left arm   Pulse:       Resp: 18 16 16   Temp:   97.4  F (36.3  C) 98.2  F (36.8  C)   TempSrc:   Oral Oral   SpO2:   91% 97%   Weight:  79.5 kg (175 lb 4.8 oz)     Height:         Vitals:    02/13/17 0400 02/14/17 0500 02/15/17 0152   Weight: 76.4 kg (168 lb 6.9 oz) 79.4 kg (175 lb 1.6 oz) 79.5 kg (175 lb 4.8 oz)     Intake/Output Summary (Last 24 hours) at 02/15/17 0903  Last data filed at 02/15/17 0800   Gross per 24 hour   Intake             1009 ml   Output             2200 ml   Net            -1191 ml     Gen: AAO x 3, pleasant, NAD  CV: S1S2 normal, no murmurs, rubs, or gallops, RRR  Pulm: CTA, no rhonchi or wheezes  Abd: (+) BS,  S/NT/ND, (+) BM  Ext: trace peripheral edema, fair amount of drainage from LLE graft site.  Incision: sternal incision with scant bloody drainage top of incision, no swelling  Chest Tubes: dressings clean and dry, serosanguinous, no air leak, output 480 cc   Lines: piv, CT x 1     Labs:  BMP    Recent Labs  Lab 02/14/17  0833 02/13/17  0859 02/12/17  0253 02/11/17  1520    134 139 135   POTASSIUM 4.4 4.5 4.3 4.5   CHLORIDE 101 101 104 101   CARLY 8.1* 8.3* 7.8* 8.0*   CO2 26 25 26 26   BUN 11 11 14 18   CR 0.67 0.56 0.52 0.74   * 186* 126* 123*     CBC    Recent Labs  Lab 02/14/17  0833 02/13/17  0859 02/12/17  0253 02/11/17  1520   WBC 10.7 12.6* 14.2* 15.3*   RBC 2.80* 2.84* 2.79* 2.80*   HGB 9.0* 8.7* 8.2* 8.3*   HCT 27.7* 28.0* 26.4* 26.3*   MCV 99 99 95 94   MCH 32.1 30.6 29.4 29.6   MCHC 32.5 31.1* 31.1* 31.6   RDW 20.8* 19.4* 18.6* 18.5*    119* 120* 123*     INR    Recent Labs  Lab 02/14/17  0833 02/13/17  0859 02/12/17  0253 02/11/17  1520   INR 1.65* 1.82* 2.27* 2.29*      Hepatic Panel   Lab Results   Component Value Date     02/12/2017     Lab Results   Component Value Date     02/12/2017     No results found for: BILICONJ   Lab Results   Component Value Date    BILITOTAL 2.1 02/12/2017     Lab Results   Component Value Date    ALBUMIN 2.7 02/12/2017     Lab Results   Component Value Date    PROTTOTAL 5.2 02/12/2017      Lab Results   Component Value Date    ALKPHOS 87 02/12/2017       GLUCOSE:     Recent Labs  Lab 02/15/17  0738 02/15/17  0221 02/14/17  2218 02/14/17  1221 02/14/17  0833 02/14/17  0737 02/14/17  0053  02/13/17  0859  02/12/17  0253  02/11/17  1520  02/11/17  0538 02/11/17  0410   GLC  --   --   --   --  181*  --   --   --  186*  --  126*  --  123*  --  116* 195*   * 154* 177* 118*  --  91 101*  < >  --   < >  --   < >  --   < >  --   --    < > = values in this interval not displayed.    Imaging:  reviewed    A/P: Carlos Alberto Fenton is a 76 year old  female with hx of CAD s/p CABG x 3. Modified maze with PVI, ABDIEL and PFO closure on 2/6/17 by Dr. Quiñones.  -- Hospital course complicated CHB postop and AFib with RVR.      Neuro:   - Acute postop pain: APAP, dilaudid prn  - AMS: resolved; likely multifactorial, HCT wnl, EEG w/o seizure, appreciate neurology input  - Hx of CVA 10/2016: MRI showed bilateral cortical infarcts concerning for cardioembolic source. She required intubation eventually for severe encephalopathy. Started on Warfarin for thromboembolic prophylaxis.  - Depression/Anxiety: hold off on home trazodone and venlafaxine, resume Venlafaxine prior to discharge      CV:   - CAD s/p CABG: increase ASA to 325 mg qd, no statin yet due to elevated LFTs, no BBs at this time, no ACEi due to marginal pressors.   - AFib with RVR: milrinone off, amiodarone taper 400 mg BID X 2 weeks then 200 mg daily for 4 weeks (a total of 6 weeks) and then stop. She was not previously on home AATs. If she has sustained AFib then EP will consider DCCV prior to discharge; currently still in NSR.     - HLD: Lipitor 40 mg qd, HOLD due to elevated LFTs      Pulm: IS, enc activity  - keep pleural CT due to high output, no air-leak      ID: complete eva-op abx; afebrile, WBC down-trending  - BCx 2/8/17 NGTD  - Scant drainage from incision and graft site; will give Vanco x 1 then start po Keflex x 10 days.      GI: diet, bowel regimen      Renal/: creatinine stable; weight up ~5.8 kg> dry wgt; continue lasix to 40 mg IV bid with scheduled K+       Heme: Hgb stable, no e/o bleeding      Endo:   - DM2: BG levels 177-105, takes metformin 500 mg BID at home. Resume at discharge if creatinine OK      PPX: PPI      Anticoagulation: high CHADSVASC = 8; warfarin for AFib, goal INR 2-3, current INR pending  - patient already on warfarin at home 2.5 mg daily except 5 mg on T/Th s/p embolic CVA       Dispo: 6C 2/13; anticipate discharge to home   - Follow up with Dr. Santos from EP in 2-3 months  post discharge        Jennifer Staley [facility]  [phone]    9:03 AM   February 15, 2017

## 2017-02-16 ENCOUNTER — APPOINTMENT (OUTPATIENT)
Dept: GENERAL RADIOLOGY | Facility: CLINIC | Age: 77
DRG: 228 | End: 2017-02-16
Attending: NURSE PRACTITIONER
Payer: MEDICARE

## 2017-02-16 ENCOUNTER — TELEPHONE (OUTPATIENT)
Dept: ANTICOAGULATION | Facility: OTHER | Age: 77
End: 2017-02-16

## 2017-02-16 ENCOUNTER — APPOINTMENT (OUTPATIENT)
Dept: OCCUPATIONAL THERAPY | Facility: CLINIC | Age: 77
DRG: 228 | End: 2017-02-16
Attending: SURGERY
Payer: MEDICARE

## 2017-02-16 LAB
ALBUMIN SERPL-MCNC: 2.3 G/DL (ref 3.4–5)
ALP SERPL-CCNC: 77 U/L (ref 40–150)
ALT SERPL W P-5'-P-CCNC: 242 U/L (ref 0–50)
ANION GAP SERPL CALCULATED.3IONS-SCNC: 7 MMOL/L (ref 3–14)
AST SERPL W P-5'-P-CCNC: 103 U/L (ref 0–45)
BILIRUB DIRECT SERPL-MCNC: 0.5 MG/DL (ref 0–0.2)
BILIRUB SERPL-MCNC: 1.6 MG/DL (ref 0.2–1.3)
BUN SERPL-MCNC: 12 MG/DL (ref 7–30)
CA-I BLD-MCNC: 4.4 MG/DL (ref 4.4–5.2)
CALCIUM SERPL-MCNC: 8 MG/DL (ref 8.5–10.1)
CHLORIDE SERPL-SCNC: 101 MMOL/L (ref 94–109)
CO2 SERPL-SCNC: 26 MMOL/L (ref 20–32)
CREAT SERPL-MCNC: 0.65 MG/DL (ref 0.52–1.04)
ERYTHROCYTE [DISTWIDTH] IN BLOOD BY AUTOMATED COUNT: 21.2 % (ref 10–15)
GFR SERPL CREATININE-BSD FRML MDRD: 89 ML/MIN/1.7M2
GLUCOSE BLDC GLUCOMTR-MCNC: 103 MG/DL (ref 70–99)
GLUCOSE BLDC GLUCOMTR-MCNC: 104 MG/DL (ref 70–99)
GLUCOSE BLDC GLUCOMTR-MCNC: 108 MG/DL (ref 70–99)
GLUCOSE BLDC GLUCOMTR-MCNC: 127 MG/DL (ref 70–99)
GLUCOSE BLDC GLUCOMTR-MCNC: 75 MG/DL (ref 70–99)
GLUCOSE BLDC GLUCOMTR-MCNC: 90 MG/DL (ref 70–99)
GLUCOSE SERPL-MCNC: 103 MG/DL (ref 70–99)
HCT VFR BLD AUTO: 25.4 % (ref 35–47)
HGB BLD-MCNC: 7.8 G/DL (ref 11.7–15.7)
INR PPP: 1.53 (ref 0.86–1.14)
MAGNESIUM SERPL-MCNC: 1.8 MG/DL (ref 1.6–2.3)
MCH RBC QN AUTO: 29.8 PG (ref 26.5–33)
MCHC RBC AUTO-ENTMCNC: 30.7 G/DL (ref 31.5–36.5)
MCV RBC AUTO: 97 FL (ref 78–100)
PHOSPHATE SERPL-MCNC: 2.2 MG/DL (ref 2.5–4.5)
PLATELET # BLD AUTO: 228 10E9/L (ref 150–450)
POTASSIUM SERPL-SCNC: 3.9 MMOL/L (ref 3.4–5.3)
PROT SERPL-MCNC: 5.9 G/DL (ref 6.8–8.8)
RBC # BLD AUTO: 2.62 10E12/L (ref 3.8–5.2)
SODIUM SERPL-SCNC: 134 MMOL/L (ref 133–144)
WBC # BLD AUTO: 10.1 10E9/L (ref 4–11)

## 2017-02-16 PROCEDURE — 84100 ASSAY OF PHOSPHORUS: CPT | Performed by: SURGERY

## 2017-02-16 PROCEDURE — 71020 XR CHEST 2 VW: CPT

## 2017-02-16 PROCEDURE — 80048 BASIC METABOLIC PNL TOTAL CA: CPT | Performed by: SURGERY

## 2017-02-16 PROCEDURE — A9270 NON-COVERED ITEM OR SERVICE: HCPCS | Mod: GY | Performed by: NURSE PRACTITIONER

## 2017-02-16 PROCEDURE — 00000146 ZZHCL STATISTIC GLUCOSE BY METER IP

## 2017-02-16 PROCEDURE — 40000275 ZZH STATISTIC RCP TIME EA 10 MIN

## 2017-02-16 PROCEDURE — 25000128 H RX IP 250 OP 636: Performed by: NURSE PRACTITIONER

## 2017-02-16 PROCEDURE — 80076 HEPATIC FUNCTION PANEL: CPT | Performed by: SURGERY

## 2017-02-16 PROCEDURE — A9270 NON-COVERED ITEM OR SERVICE: HCPCS | Mod: GY | Performed by: SURGERY

## 2017-02-16 PROCEDURE — 83735 ASSAY OF MAGNESIUM: CPT | Performed by: SURGERY

## 2017-02-16 PROCEDURE — A9270 NON-COVERED ITEM OR SERVICE: HCPCS | Mod: GY | Performed by: THORACIC SURGERY (CARDIOTHORACIC VASCULAR SURGERY)

## 2017-02-16 PROCEDURE — 25000132 ZZH RX MED GY IP 250 OP 250 PS 637: Mod: GY | Performed by: SURGERY

## 2017-02-16 PROCEDURE — 25000125 ZZHC RX 250: Performed by: THORACIC SURGERY (CARDIOTHORACIC VASCULAR SURGERY)

## 2017-02-16 PROCEDURE — 21400006 ZZH R&B CCU INTERMEDIATE UMMC

## 2017-02-16 PROCEDURE — 40000802 ZZH SITE CHECK

## 2017-02-16 PROCEDURE — 25000125 ZZHC RX 250: Performed by: NURSE PRACTITIONER

## 2017-02-16 PROCEDURE — 97110 THERAPEUTIC EXERCISES: CPT | Mod: GO | Performed by: OCCUPATIONAL THERAPIST

## 2017-02-16 PROCEDURE — 85610 PROTHROMBIN TIME: CPT | Performed by: SURGERY

## 2017-02-16 PROCEDURE — 40000133 ZZH STATISTIC OT WARD VISIT: Performed by: OCCUPATIONAL THERAPIST

## 2017-02-16 PROCEDURE — 85027 COMPLETE CBC AUTOMATED: CPT | Performed by: SURGERY

## 2017-02-16 PROCEDURE — 25000132 ZZH RX MED GY IP 250 OP 250 PS 637: Mod: GY | Performed by: THORACIC SURGERY (CARDIOTHORACIC VASCULAR SURGERY)

## 2017-02-16 PROCEDURE — 25000132 ZZH RX MED GY IP 250 OP 250 PS 637: Mod: GY | Performed by: NURSE PRACTITIONER

## 2017-02-16 PROCEDURE — 82330 ASSAY OF CALCIUM: CPT | Performed by: SURGERY

## 2017-02-16 PROCEDURE — 36592 COLLECT BLOOD FROM PICC: CPT | Performed by: SURGERY

## 2017-02-16 PROCEDURE — 97535 SELF CARE MNGMENT TRAINING: CPT | Mod: GO | Performed by: OCCUPATIONAL THERAPIST

## 2017-02-16 RX ORDER — LOPERAMIDE HCL 2 MG
2 CAPSULE ORAL 4 TIMES DAILY PRN
Status: DISCONTINUED | OUTPATIENT
Start: 2017-02-16 | End: 2017-02-17 | Stop reason: HOSPADM

## 2017-02-16 RX ORDER — WARFARIN SODIUM 7.5 MG/1
7.5 TABLET ORAL
Status: COMPLETED | OUTPATIENT
Start: 2017-02-16 | End: 2017-02-16

## 2017-02-16 RX ADMIN — ASPIRIN 81 MG CHEWABLE TABLET 81 MG: 81 TABLET CHEWABLE at 09:26

## 2017-02-16 RX ADMIN — WARFARIN SODIUM 7.5 MG: 7.5 TABLET ORAL at 17:54

## 2017-02-16 RX ADMIN — POTASSIUM CHLORIDE 20 MEQ: 750 TABLET, EXTENDED RELEASE ORAL at 09:25

## 2017-02-16 RX ADMIN — SODIUM CHLORIDE, PRESERVATIVE FREE 5 ML: 5 INJECTION INTRAVENOUS at 08:01

## 2017-02-16 RX ADMIN — GABAPENTIN 100 MG: 100 CAPSULE ORAL at 21:00

## 2017-02-16 RX ADMIN — HYDROMORPHONE HYDROCHLORIDE 1 MG: 2 TABLET ORAL at 09:42

## 2017-02-16 RX ADMIN — FUROSEMIDE 40 MG: 10 INJECTION, SOLUTION INTRAVENOUS at 09:31

## 2017-02-16 RX ADMIN — SODIUM CHLORIDE, PRESERVATIVE FREE 10 ML: 5 INJECTION INTRAVENOUS at 17:54

## 2017-02-16 RX ADMIN — POTASSIUM CHLORIDE 20 MEQ: 750 TABLET, EXTENDED RELEASE ORAL at 21:00

## 2017-02-16 RX ADMIN — POTASSIUM PHOSPHATE, MONOBASIC AND POTASSIUM PHOSPHATE, DIBASIC 15 MMOL: 224; 236 INJECTION, SOLUTION INTRAVENOUS at 21:02

## 2017-02-16 RX ADMIN — LOPERAMIDE HYDROCHLORIDE 2 MG: 2 CAPSULE ORAL at 13:30

## 2017-02-16 RX ADMIN — FUROSEMIDE 40 MG: 10 INJECTION, SOLUTION INTRAVENOUS at 16:41

## 2017-02-16 RX ADMIN — POTASSIUM CHLORIDE 20 MEQ: 750 TABLET, EXTENDED RELEASE ORAL at 21:01

## 2017-02-16 RX ADMIN — Medication 2 G: at 18:41

## 2017-02-16 RX ADMIN — AMIODARONE HYDROCHLORIDE 400 MG: 200 TABLET ORAL at 09:26

## 2017-02-16 RX ADMIN — LOPERAMIDE HYDROCHLORIDE 2 MG: 2 CAPSULE ORAL at 11:59

## 2017-02-16 RX ADMIN — PANTOPRAZOLE SODIUM 40 MG: 40 TABLET, DELAYED RELEASE ORAL at 09:25

## 2017-02-16 RX ADMIN — CEPHALEXIN 500 MG: 500 CAPSULE ORAL at 09:24

## 2017-02-16 RX ADMIN — HYDROMORPHONE HYDROCHLORIDE 1 MG: 2 TABLET ORAL at 22:39

## 2017-02-16 RX ADMIN — GABAPENTIN 100 MG: 100 CAPSULE ORAL at 09:25

## 2017-02-16 RX ADMIN — AMIODARONE HYDROCHLORIDE 400 MG: 200 TABLET ORAL at 21:00

## 2017-02-16 RX ADMIN — Medication 25 MG: at 21:10

## 2017-02-16 ASSESSMENT — PAIN DESCRIPTION - DESCRIPTORS: DESCRIPTORS: ACHING

## 2017-02-16 NOTE — PROGRESS NOTES
CVTS Daily Note  2/16/2017  Attending: Mikhail Quiñones MD    S:  NAEO.  Denies CP, SOB.  Low grade temp 100.3, nl WBC.  Up and around with therapies. Pain controlled on current regimen. Good Appetite, (+)  Loose BMs, sample send and C.Diff (-).       Continue Keflex for sternal drainage and LLE graft drainage, improving.  Pleural CT with minimal output, will remove today.      furosemide  40 mg Intravenous BID     potassium chloride SA  20 mEq Oral BID     heparin lock flush  5-10 mL Intracatheter Q24H     cephalexin  500 mg Oral Q8H ABHISHEK     docusate sodium  100 mg Oral BID     amiodarone  400 mg Oral BID     gabapentin  100 mg Oral BID     insulin aspart  1-7 Units Subcutaneous TID AC     insulin aspart  1-5 Units Subcutaneous At Bedtime     pantoprazole  40 mg Oral QAM     sodium bicarbonate  50 mEq Intravenous Once     sodium chloride (PF)  3 mL Intracatheter Q8H     aspirin  81 mg Oral Daily       Warfarin Therapy Reminder       IV fluid REPLACEMENT ONLY       Reason beta blocker order not selected       O:   Vitals:    02/15/17 2326 02/16/17 0201 02/16/17 0244 02/16/17 0351   BP: 109/57   119/59   BP Location: Left arm   Left arm   Pulse:       Resp: 18   20   Temp: 99.2  F (37.3  C) 99.5  F (37.5  C)  99.4  F (37.4  C)   TempSrc: Axillary Axillary  Oral   SpO2: 95%   93%   Weight:   77 kg (169 lb 11.2 oz)    Height:         Vitals:    02/14/17 0500 02/15/17 0152 02/16/17 0244   Weight: 79.4 kg (175 lb 1.6 oz) 79.5 kg (175 lb 4.8 oz) 77 kg (169 lb 11.2 oz)     Intake/Output Summary (Last 24 hours) at 02/16/17 0858  Last data filed at 02/16/17 0801   Gross per 24 hour   Intake             1150 ml   Output             3090 ml   Net            -1940 ml     Gen: AAO x 3, pleasant, NAD  CV: S1S2 normal, no murmurs, rubs, or gallops, RRR  Pulm: CTA, no rhonchi or wheezes  Abd: (+) BS, S/NT/ND, (+) loose BMs  Ext: trace peripheral edema, no drainage drainage from LLE graft site anymore.  Incision: sternal incision with  scant bloody drainage top of incision, no swelling  Chest Tubes: dressings clean and dry, serosanguinous, no air leak, output 100/60 cc   Lines: piv, CT x 1     Labs:  BMP    Recent Labs  Lab 02/16/17  0811 02/15/17  1428 02/14/17  0833 02/13/17  0859    139 134 134   POTASSIUM 3.9 4.1 4.4 4.5   CHLORIDE 101 103 101 101   CARLY 8.0* 8.1* 8.1* 8.3*   CO2 26 27 26 25   BUN 12 17 11 11   CR 0.65 0.65 0.67 0.56   * 141* 181* 186*     CBC    Recent Labs  Lab 02/16/17  0811 02/15/17  1428 02/14/17  0833 02/13/17  0859   WBC 10.1 13.7* 10.7 12.6*   RBC 2.62* 2.75* 2.80* 2.84*   HGB 7.8* 8.2* 9.0* 8.7*   HCT 25.4* 26.9* 27.7* 28.0*   MCV 97 98 99 99   MCH 29.8 29.8 32.1 30.6   MCHC 30.7* 30.5* 32.5 31.1*   RDW 21.2* 21.2* 20.8* 19.4*    202 153 119*     INR    Recent Labs  Lab 02/16/17  0811 02/15/17  1428 02/14/17  0833 02/13/17  0859   INR 1.53* 1.73* 1.65* 1.82*      Hepatic Panel   Lab Results   Component Value Date     02/16/2017     Lab Results   Component Value Date     02/16/2017     No results found for: BILICONJ   Lab Results   Component Value Date    BILITOTAL 1.6 02/16/2017     Lab Results   Component Value Date    ALBUMIN 2.3 02/16/2017     Lab Results   Component Value Date    PROTTOTAL 5.9 02/16/2017      Lab Results   Component Value Date    ALKPHOS 77 02/16/2017     GLUCOSE:     Recent Labs  Lab 02/16/17  0811 02/16/17  0737 02/16/17  0648 02/16/17  0159 02/15/17  2134 02/15/17  1818 02/15/17  1428 02/15/17  1130  02/14/17  0833  02/13/17  0859  02/12/17  0253  02/11/17  1520   *  --   --   --   --   --  141*  --   --  181*  --  186*  --  126*  --  123*   BGM  --  75 103* 104* 112* 126*  --  104*  < >  --   < >  --   < >  --   < >  --    < > = values in this interval not displayed.    Imaging:  reviewed    A/P: Carlos Alberto Fenton is a 76 year old female with hx of CAD s/p CABG x 3. Modified maze with PVI, ABDIEL and PFO closure on 2/6/17 by Dr. Quiñones.  -- Hospital course  complicated CHB postop and AFib with RVR.      Neuro:   - Acute postop pain: APAP, dilaudid prn  - AMS: resolved; likely multifactorial, HCT wnl, EEG w/o seizure, appreciate neurology input  - Hx of CVA 10/2016: MRI showed bilateral cortical infarcts concerning for cardioembolic source. She required intubation eventually for severe encephalopathy. Started on Warfarin for thromboembolic prophylaxis.  - Depression/Anxiety: hold off on home trazodone and venlafaxine, resume Venlafaxine prior to discharge      CV:   - CAD s/p CABG: increase ASA to 325 mg qd, no statin yet due to elevated LFTs, no BBs at this time, no ACEi due to marginal pressors.   - AFib with RVR: milrinone off, amiodarone taper 400 mg BID X 2 weeks then 200 mg daily for 4 weeks (a total of 6 weeks) and then stop. She was not previously on home AATs. If she has sustained AFib then EP will consider DCCV prior to discharge; currently still in NSR.     - HLD: Lipitor 40 mg qd, HOLD due to elevated liver enzymes      Pulm: IS, enc activity  - keep pleural CT due to high output, no air-leak      ID: complete eva-op abx; afebrile, WBC down-trending  - BCx 2/8/17 NGTD  - Scant drainage from incision and graft site; will give Vanco x 1 then start po Keflex x 10 days --> appears resolving.    GI: diet, bowel regimen      Renal/: creatinine stable; weight up ~5.8 kg> dry wgt; continue lasix to 40 mg IV bid with scheduled K+       Heme: Hgb stable, no e/o bleeding      Endo:   - DM2: BG levels , takes metformin 500 mg BID at home. May not need metformin at discharge; recent A1c 6.3     PPX: PPI      Anticoagulation: high CHADSVASC = 8; warfarin for AFib, goal INR 2-3, current INR 1.53, need higher dose  - patient already on warfarin at home 2.5 mg daily except 5 mg on T/Th s/p embolic CVA       Dispo: 6C 2/13; anticipate discharge to home friday   - Follow up with Dr. Santos from EP in 2-3 months post discharge       Discussed with Dr. Ishmael Tesfaye  [facility]  [phone]    8:58 AM   February 16, 2017

## 2017-02-16 NOTE — PLAN OF CARE
Problem: Goal Outcome Summary  Goal: Goal Outcome Summary     OT-6c: Pt ambulated 5 min in hallway with CGA and FWW, tolerating well. Will assess cognition in next session as pt feels some residual confusion. Pt may be able to d/c to home with assist, although pt still deconditioned and feeling more comfortable with TCU at this time. Pt appears to have 24 hour assist if discharging home. At this time rec d/c to TCU vs home with home therapy and OP CR, but will continue to assess.

## 2017-02-16 NOTE — PROGRESS NOTES
Care Coordinator- Discharge Planning     Admission Date/Time:  2/6/2017  Attending MD:  Mikhail Quiñones MD  Data  Date of initial CC assessment:  2/15/2017  Chart reviewed, discussed with interdisciplinary team.   Patient was admitted for:   1. Coronary artery disease with angina pectoris, unspecified vessel or lesion type, unspecified whether native or transplanted heart (H)         Assessment  Full assessment completed in previous note    Coordination of Care and Referrals: Pt requesting to establish new PCP doctor with an internist at [facility].  Intervention  Per pt request, arrangements made with [facility] (P: [phone], F:  [phone]) for establishment of new PCP doctor. Appointment made on Wednesday 2/22/2017 at 1:30pm with Dr. Humberto Conner for establishment of care and post hospitalization follow up.      Plan  Anticipated Discharge Date:  2/17/2017  Anticipated Discharge Plan:  Discharge to home with home care.    CTS Handoff completed:  YES    Lindsey Colmenares RN BSN  6C Unit Care Coordinator  Phone number: [phone]  Pager: [phone]

## 2017-02-16 NOTE — PLAN OF CARE
Problem: Goal Outcome Summary  Goal: Goal Outcome Summary  PT 6C: cancel. Patient refusing PT this day verbalizing frequent episodes of diarrhea.

## 2017-02-16 NOTE — PROGRESS NOTES
CLINICAL NUTRITION SERVICES - LOS BRIEF NOTE    Reviewed nutrition risk factors due to LOS.  Pt is tolerating diet and eating adequately with 100% intake of meals most times with the exception of 1x  25% on 2/15 per nursing documentation. However, per patient, notes good/adequate po intake with typical intake of at least 2 meals daily if not 3, eating to completion. Patient reports appetite has not changed from baseline and even experiences ravenous appetite at times. Reviewed wt hx. No unintentional wt loss PTA and wt gain since admit.     Nutrition education- Patient declined to receive heart healthy education at this time and is okay with just reading the written material that was dropped off on 2/10.     Follow Up / Monitoring:   Will continue to follow pt via rounds.     Cooper Brito  Dietetic Intern    I have read and agree with the above nutrition note.  Kelly Wright, MS, RD, LD, [facility]   6C Pgr:  [phone]

## 2017-02-16 NOTE — DISCHARGE INSTRUCTIONS
[facility]      AFTER YOU GO HOME FROM YOUR HEART SURGERY    Avoid lifting anything greater than ten pounds for 6 weeks after surgery and then less than 20 pounds for an additional 6 weeks.    No driving for 4 weeks after surgery or while on pain medication. If you had a minimally invasive procedure you may drive after three weeks if not taking pain medication.    Avoid strenuous activities such as bowling, vacuuming, raking, shoveling, golf or tennis for 12 weeks after your surgery. It is okay to resume sex if you feel comfortable in doing so. You may have to try different positions with your partner.     Splint your chest incision by hugging a pillow or bringing your arms across your chest when coughing or sneezing. Avoid pushing off with your arms when getting up for the first month if you have had you sternum opened.     Shower or wash your incisions daily with soap and water (or as instructed), pat dry. Watch for signs of infection: increased redness, tenderness, warmth or any drainage that appears infected (pus like) or is persistent.  Also a temperature > 100.5 F or chills. Call your surgeon or primary care doctors office immediately. Remove any skin glue left on incisions after 10 days. This will not affect your incision and will speed up healing.    Exercise is very important in your recovery.  Please follow the guidelines set up for you in your cardiac rehab classes at the hospital. If outpatient cardiac rehab was ordered for you, we highly recommend you participate. If you have problems arranging your cardiac rehab, please call [phone].     Avoid sitting for prolonged periods of time, try to walk every hour. If you have a leg incision, elevate your leg often when you are not walking.    Check your weight when you get home from the hospital and continue to check it daily through your recovery. If you notice a weight gain of 2-3 pounds in a week notify your primary care  physician, cardiologist or surgeon.    Bowel activity may be slow after surgery. If necessary you may take an over the counter laxative such as Milk of Magnesia or Miralax.      DENTAL VISITS AFTER SURGERY  If you have had your heart valve repaired or replaced, we do not recommend having any dental work done for 6 months and you will need to take an antibiotic prior to dental visits from now on.  Please notify your dentist before any procedure for the proper treatment needed. The antibiotic is taken by mouth one hour prior to visit. This includes routine cleanings.    DO NOT SMOKE.  IF YOU NEED HELP QUITTING, PLEASE TALK WITH YOUR CARDIOLOGIST OR PRIMARY DOCTOR.    If you are on a blood thinner, follow the instructions you were given in the hospital and DO NOT SKIP this medication. Try and take it the same time everyday. Your primary care physician or coumadin clinic will manage the dosing.     For questions or concerns, please call the Cardiothoracic Surgery Department at [phone] 8-4:30 M-F.  They can assist you with your needs and contact or page the surgeon, Nurse practitioner or Physician Assistant as indicated.     On weekends or after hours, please call [phone] and ask the  to page the Cardiothoracic Surgery fellow on call.      If you have an LVAD device call your LVAD coordinator.   If you had a heart transplant call your Transplant Coordinator.    REGARDING PRESCRIPTION REFILLS.  If you need a refill on your pain medication contact us.  All other medications will be adjusted, discontinued and re-filled by your primary care physician and/or your cardiologist as they were prior to your surgery. We have given you enough for one to three month with possibly one refill.    POST-OP VISIT  You have one follow up visit with CVTS Surgery Advance Care Practitioners on Friday 3/3/17 at 1:00 PM.  You will then return to the care of your primary physician and your cardiologist.   It is important to  call for an appointment to see your cardiologist in 4-6 weeks after surgery and your primary care physician in 2-4 weeks after surgery. If there is a need to return to see CT Surgery please call our  at [phone].    Please call us with any questions.    Thank you,    Your Cardiothoracic Surgery Team  Main Office [phone]      Yanna Szymanski RN Care Coordinator  [phone]

## 2017-02-16 NOTE — TELEPHONE ENCOUNTER
Received call from Zunilda- RN care coordinator, pt likely being discharged home tomorrow (friday 2/17) with home care. I have asked that pt's coumadin is dosed over the weekend and home care to check INR on Monday since there not be a coumadin clinic RN available on Saturday to address HC INR. Inpatient coumadin doses: Sun 2 mg, M 5 mg, TU 5 mg, W 5 mg= 17 mg, INR today 1.53. Note, pt is on amiodarone and keflex at this time.     Catrachita Lacy, RN- Coumadin Clinic RN

## 2017-02-17 ENCOUNTER — APPOINTMENT (OUTPATIENT)
Dept: PHYSICAL THERAPY | Facility: CLINIC | Age: 77
DRG: 228 | End: 2017-02-17
Attending: SURGERY
Payer: MEDICARE

## 2017-02-17 ENCOUNTER — TELEPHONE (OUTPATIENT)
Dept: FAMILY MEDICINE | Facility: OTHER | Age: 77
End: 2017-02-17

## 2017-02-17 ENCOUNTER — APPOINTMENT (OUTPATIENT)
Dept: OCCUPATIONAL THERAPY | Facility: CLINIC | Age: 77
DRG: 228 | End: 2017-02-17
Attending: SURGERY
Payer: MEDICARE

## 2017-02-17 VITALS
HEIGHT: 62 IN | TEMPERATURE: 98.7 F | WEIGHT: 165.9 LBS | OXYGEN SATURATION: 97 % | SYSTOLIC BLOOD PRESSURE: 109 MMHG | HEART RATE: 72 BPM | BODY MASS INDEX: 30.53 KG/M2 | DIASTOLIC BLOOD PRESSURE: 58 MMHG | RESPIRATION RATE: 18 BRPM

## 2017-02-17 LAB
ANION GAP SERPL CALCULATED.3IONS-SCNC: 9 MMOL/L (ref 3–14)
BUN SERPL-MCNC: 7 MG/DL (ref 7–30)
CALCIUM SERPL-MCNC: 7.7 MG/DL (ref 8.5–10.1)
CHLORIDE SERPL-SCNC: 105 MMOL/L (ref 94–109)
CO2 SERPL-SCNC: 22 MMOL/L (ref 20–32)
CREAT SERPL-MCNC: 0.62 MG/DL (ref 0.52–1.04)
ERYTHROCYTE [DISTWIDTH] IN BLOOD BY AUTOMATED COUNT: 21.2 % (ref 10–15)
GFR SERPL CREATININE-BSD FRML MDRD: ABNORMAL ML/MIN/1.7M2
GLUCOSE BLDC GLUCOMTR-MCNC: 112 MG/DL (ref 70–99)
GLUCOSE BLDC GLUCOMTR-MCNC: 126 MG/DL (ref 70–99)
GLUCOSE SERPL-MCNC: 113 MG/DL (ref 70–99)
HCT VFR BLD AUTO: 24.4 % (ref 35–47)
HGB BLD-MCNC: 7.5 G/DL (ref 11.7–15.7)
INR PPP: 1.84 (ref 0.86–1.14)
MAGNESIUM SERPL-MCNC: 2.2 MG/DL (ref 1.6–2.3)
MCH RBC QN AUTO: 29.8 PG (ref 26.5–33)
MCHC RBC AUTO-ENTMCNC: 30.7 G/DL (ref 31.5–36.5)
MCV RBC AUTO: 97 FL (ref 78–100)
PLATELET # BLD AUTO: 263 10E9/L (ref 150–450)
POTASSIUM SERPL-SCNC: 4 MMOL/L (ref 3.4–5.3)
RBC # BLD AUTO: 2.52 10E12/L (ref 3.8–5.2)
SODIUM SERPL-SCNC: 135 MMOL/L (ref 133–144)
WBC # BLD AUTO: 9.4 10E9/L (ref 4–11)

## 2017-02-17 PROCEDURE — A9270 NON-COVERED ITEM OR SERVICE: HCPCS | Mod: GY | Performed by: ANESTHESIOLOGY

## 2017-02-17 PROCEDURE — 85610 PROTHROMBIN TIME: CPT | Performed by: SURGERY

## 2017-02-17 PROCEDURE — 80048 BASIC METABOLIC PNL TOTAL CA: CPT | Performed by: SURGERY

## 2017-02-17 PROCEDURE — 25000132 ZZH RX MED GY IP 250 OP 250 PS 637: Mod: GY | Performed by: SURGERY

## 2017-02-17 PROCEDURE — A9270 NON-COVERED ITEM OR SERVICE: HCPCS | Mod: GY | Performed by: NURSE PRACTITIONER

## 2017-02-17 PROCEDURE — 25000132 ZZH RX MED GY IP 250 OP 250 PS 637: Mod: GY | Performed by: THORACIC SURGERY (CARDIOTHORACIC VASCULAR SURGERY)

## 2017-02-17 PROCEDURE — 40000193 ZZH STATISTIC PT WARD VISIT

## 2017-02-17 PROCEDURE — A9270 NON-COVERED ITEM OR SERVICE: HCPCS | Mod: GY | Performed by: THORACIC SURGERY (CARDIOTHORACIC VASCULAR SURGERY)

## 2017-02-17 PROCEDURE — 25000132 ZZH RX MED GY IP 250 OP 250 PS 637: Mod: GY | Performed by: ANESTHESIOLOGY

## 2017-02-17 PROCEDURE — 25000132 ZZH RX MED GY IP 250 OP 250 PS 637: Mod: GY | Performed by: NURSE PRACTITIONER

## 2017-02-17 PROCEDURE — A9270 NON-COVERED ITEM OR SERVICE: HCPCS | Mod: GY | Performed by: SURGERY

## 2017-02-17 PROCEDURE — 40000802 ZZH SITE CHECK

## 2017-02-17 PROCEDURE — 97535 SELF CARE MNGMENT TRAINING: CPT | Mod: GO | Performed by: OCCUPATIONAL THERAPIST

## 2017-02-17 PROCEDURE — 83735 ASSAY OF MAGNESIUM: CPT | Performed by: SURGERY

## 2017-02-17 PROCEDURE — 85027 COMPLETE CBC AUTOMATED: CPT | Performed by: SURGERY

## 2017-02-17 PROCEDURE — 36415 COLL VENOUS BLD VENIPUNCTURE: CPT | Performed by: SURGERY

## 2017-02-17 PROCEDURE — 00000146 ZZHCL STATISTIC GLUCOSE BY METER IP

## 2017-02-17 PROCEDURE — 97530 THERAPEUTIC ACTIVITIES: CPT | Mod: GP

## 2017-02-17 PROCEDURE — 40000133 ZZH STATISTIC OT WARD VISIT: Performed by: OCCUPATIONAL THERAPIST

## 2017-02-17 RX ORDER — AMIODARONE HYDROCHLORIDE 200 MG/1
TABLET ORAL
Qty: 45 TABLET | Refills: 0 | Status: SHIPPED | OUTPATIENT
Start: 2017-02-17 | End: 2017-03-08

## 2017-02-17 RX ORDER — WARFARIN SODIUM 5 MG/1
5 TABLET ORAL
Status: DISCONTINUED | OUTPATIENT
Start: 2017-02-17 | End: 2017-02-17 | Stop reason: HOSPADM

## 2017-02-17 RX ORDER — LOPERAMIDE HCL 2 MG
2 CAPSULE ORAL 4 TIMES DAILY PRN
Qty: 20 CAPSULE | Refills: 0 | Status: SHIPPED | OUTPATIENT
Start: 2017-02-17 | End: 2017-04-01

## 2017-02-17 RX ORDER — HYDROMORPHONE HYDROCHLORIDE 2 MG/1
1 TABLET ORAL EVERY 4 HOURS PRN
Qty: 30 TABLET | Refills: 0 | Status: SHIPPED | OUTPATIENT
Start: 2017-02-17 | End: 2017-03-22

## 2017-02-17 RX ORDER — ACETAMINOPHEN 325 MG/1
325-650 TABLET ORAL EVERY 4 HOURS PRN
Qty: 100 TABLET | Refills: 0 | Status: ON HOLD | OUTPATIENT
Start: 2017-02-17 | End: 2017-05-26

## 2017-02-17 RX ORDER — WARFARIN SODIUM 2.5 MG/1
5 TABLET ORAL DAILY
Qty: 100 TABLET | Refills: 3 | Status: ON HOLD | OUTPATIENT
Start: 2017-02-17 | End: 2017-03-02

## 2017-02-17 RX ORDER — GABAPENTIN 100 MG/1
100 CAPSULE ORAL 2 TIMES DAILY
Qty: 60 CAPSULE | Refills: 3 | Status: SHIPPED | OUTPATIENT
Start: 2017-02-17 | End: 2017-03-31

## 2017-02-17 RX ORDER — SULFAMETHOXAZOLE/TRIMETHOPRIM 800-160 MG
1 TABLET ORAL 2 TIMES DAILY
Qty: 14 TABLET | Refills: 0 | Status: ON HOLD | OUTPATIENT
Start: 2017-02-17 | End: 2017-03-02

## 2017-02-17 RX ORDER — POTASSIUM CHLORIDE 750 MG/1
10 TABLET, EXTENDED RELEASE ORAL 2 TIMES DAILY
Qty: 60 TABLET | Refills: 0 | Status: SHIPPED | OUTPATIENT
Start: 2017-02-17 | End: 2017-03-31

## 2017-02-17 RX ORDER — PANTOPRAZOLE SODIUM 40 MG/1
40 TABLET, DELAYED RELEASE ORAL EVERY MORNING
Qty: 30 TABLET | Refills: 0 | Status: SHIPPED | OUTPATIENT
Start: 2017-02-17 | End: 2017-03-31

## 2017-02-17 RX ORDER — FERROUS SULFATE 325(65) MG
325 TABLET ORAL
Qty: 100 TABLET | Refills: 1 | Status: ON HOLD | OUTPATIENT
Start: 2017-02-17 | End: 2018-04-04

## 2017-02-17 RX ORDER — FUROSEMIDE 20 MG
20 TABLET ORAL 2 TIMES DAILY
Qty: 60 TABLET | Refills: 3 | Status: ON HOLD | OUTPATIENT
Start: 2017-02-17 | End: 2017-03-03

## 2017-02-17 RX ADMIN — LOPERAMIDE HYDROCHLORIDE 2 MG: 2 CAPSULE ORAL at 09:55

## 2017-02-17 RX ADMIN — LOPERAMIDE HYDROCHLORIDE 2 MG: 2 CAPSULE ORAL at 01:25

## 2017-02-17 RX ADMIN — PANTOPRAZOLE SODIUM 40 MG: 40 TABLET, DELAYED RELEASE ORAL at 07:51

## 2017-02-17 RX ADMIN — AMIODARONE HYDROCHLORIDE 400 MG: 200 TABLET ORAL at 07:51

## 2017-02-17 RX ADMIN — Medication 1 MG: at 01:25

## 2017-02-17 RX ADMIN — LOPERAMIDE HYDROCHLORIDE 2 MG: 2 CAPSULE ORAL at 10:55

## 2017-02-17 RX ADMIN — ASPIRIN 81 MG CHEWABLE TABLET 81 MG: 81 TABLET CHEWABLE at 07:51

## 2017-02-17 RX ADMIN — HYDROMORPHONE HYDROCHLORIDE 1 MG: 2 TABLET ORAL at 04:54

## 2017-02-17 RX ADMIN — POTASSIUM CHLORIDE 20 MEQ: 750 TABLET, EXTENDED RELEASE ORAL at 07:51

## 2017-02-17 RX ADMIN — GABAPENTIN 100 MG: 100 CAPSULE ORAL at 07:51

## 2017-02-17 ASSESSMENT — PAIN DESCRIPTION - DESCRIPTORS: DESCRIPTORS: ACHING

## 2017-02-17 NOTE — TELEPHONE ENCOUNTER
Katy given verbal orders to go ahead and do pt's INR on Monday as discussed with hosp coordinator.     Catrachita Lacy, RN- Coumadin Clinic RN

## 2017-02-17 NOTE — DISCHARGE SUMMARY
Discharge Summary    Carlos Alberto Fenton MRN# [id number]   YOB: 1940 Age: 76 year old     Date of Admission:  2/6/2017  Date of Discharge::  2/17/2017  Admitting Physician:  Mikhail Quiñones MD  Discharge Physician:  CHACORTA Alexander CNP  [facility] Care Phys[facility]        [facility]          Admission Diagnoses:   Coronary Artery Disease  Cerebral artery occlusion with cerebral infarction  Diabetes  History of skin cancer  Hyperlipemias  Hypertension  Paroxysmal AFib  Peripheral neuropathy  Obstructive sleep apnea.         Discharge Diagnosis:   Active problems:  CAD s/p CABG 2/6/17  PFO s/p repair 2/6/17  PAfib s/p maze, ABDIEL 2/6/17  Possible postop sternal and LLE infection  Elevated liver enzymes  NOAH on CPAP         Procedures:   On 2/6/17:  1. Coronary artery bypass grafting x3 (left internal mammary artery, left anterior descending artery, saphenous vein graft to first diagonal artery, saphenous vein graft to distal right coronary artery).   2. Modified maze procedure with Radiofrequency ablation with bilateral pulmonary vein isolation using the AtriCure device.   3. Left atrial appendage ligation with 45 mm AtriClip.   4. Closure of patent foramen ovale.   5. Endoscopic vein harvest.         Non-operative procedures:   None performed          Consultations:   NUTRITION SERVICES ADULT IP CONSULT  CARDIAC REHAB IP CONSULT  PHYSICAL THERAPY ADULT IP CONSULT  OCCUPATIONAL THERAPY ADULT IP CONSULT  NEUROLOGY IP CONSULT  SPEECH LANGUAGE PATH ADULT IP CONSULT  ELECTROPHYSIOLOGY IP CONSULT  PHARMACY TO DOSE WARFARIN         Imaging Studies:   EXAMINATION: XR CHEST 2 VW 2/16/2017 1:18 PM   CLINICAL HISTORY: Interval change   COMPARISON: 2/15/2017   FINDINGS:  Single portable AP view of the chest. Stable left chest tube.Left  atrial appendage clip device. Median sternotomy wires from prior CABG.     Stable enlarged cardiac silhouette. No significant change in bilateral  perihilar  and basilar opacity. Left retrocardiac opacities. Tiny left  apical pneumothorax.      IMPRESSION:  1. Tiny left apical pneumothorax slightly decreased.  2. No significant change in left retrocardiac opacities.    CT HEAD W/O CONTRAST 2/8/2017 8:26 PM  History: Alterted Mental Status   Comparison: 10/25/2016      Technique: Axial thin section CT images of the head were obtained from  the base of the skull to the vertex without intravenous contrast and  reviewed in brain, subdural, and bone windows.      Findings:   Mild diffuse cerebral volume loss. There is no evidence of  intracranial hemorrhage, mass effect, or midline shift. Gray to white  matter differentiation is preserved. Moderate periventricular and  subcortical white matter patchy and confluent hypodensities. Basal  cisterns are patent. The ventricles are within normal limits for age.     The bony calvaria and the bones of the skull base appear normal. The  visualized portions of paranasal sinuses and mastoid air cells are  clear.      Impression:   1. No acute intracranial pathology.  2. Moderate chronic small vessel ischemic disease.     ECHO 2/8/17:   BSA: 1.8 m2  Height: 62 in  Weight: 167 lb  BP: 97/59 mmHg    Interpretation Summary  Mildly (EF 45-50%) reduced left ventricular function is present.  Right ventricular function, chamber size, wall motion, and thickness are  normal.  Trivial pericardial effusion is present.     No change from prior.    Left Ventricle  Left ventricular wall thickness is normal. Mildly (EF 45-50%) reduced left  ventricular function is present. Left ventricular diastolic function is  indeterminate. Regional wall motion is probably normal.     Right Ventricle  Right ventricular function, chamber size, wall motion, and thickness are  normal. A right heart catheter is noted in the right ventricle.     Mitral Valve  Trace to mild mitral insufficiency is present.        Aortic Valve  Aortic valve is normal in structure and  function.     Tricuspid Valve  Mild tricuspid insufficiency is present. The right ventricular systolic  pressure is approximated at 30.9 mmHg plus the right atrial pressure.     Pulmonic Valve  Trace to mild pulmonic insufficiency is present.     Vessels  The aorta root is normal. The inferior vena cava cannot be assessed.     Pericardium  Trivial pericardial effusion is present.    MMode/2D Measurements & Calculations  RVDd: 3.4 cm  IVSd: 0.95 cm  LVIDd: 4.4 cm  LVIDs: 3.5 cm  LVPWd: 0.93 cm  FS: 19.4 %  EDV(Teich): 87.7 ml  ESV(Teich): 52.6 ml  LV mass(C)d: 135.7 grams  Ao root diam: 3.0 cm     Doppler Measurements & Calculations  MV E max venkata: 75.6 cm/sec  TR max venkata: 278.0 cm/sec  TR max P.9 mmHg  Lateral E/e': 9.6  Medial E/e': 15.9    Lab Results   Component Value Date    INR 1.84 2017    INR 1.53 2017    INR 1.73 02/15/2017     Lab Results   Component Value Date    WBC 9.4 2017     Lab Results   Component Value Date    RBC 2.52 2017     Lab Results   Component Value Date    HGB 7.5 2017     Lab Results   Component Value Date    HCT 24.4 2017     Lab Results   Component Value Date    MCV 97 2017     Lab Results   Component Value Date    MCH 29.8 2017     Lab Results   Component Value Date    MCHC 30.7 2017     Lab Results   Component Value Date    RDW 21.2 2017     Lab Results   Component Value Date     2017       Last Basic Metabolic Panel:  Lab Results   Component Value Date     2017      Lab Results   Component Value Date    POTASSIUM 4.0 2017     Lab Results   Component Value Date    CHLORIDE 105 2017     Lab Results   Component Value Date    CARLY 7.7 2017     Lab Results   Component Value Date    CO2 22 2017     Lab Results   Component Value Date    BUN 7 2017     Lab Results   Component Value Date    CR 0.62 2017     Lab Results   Component Value Date     2017      EEG#: -2 (Day 2 of video-EEG monitoring)   DATE OF RECORDIN2017   DURATION OF RECORDIN hours and 4 minutes       CLINICAL HISTORY: Carlos Alberto Fenton is a 76-year-old female with history of cardioembolic ischemic infarct in 10/2016, causing small cortical infarcts in the right and left parietal lobes, who is currently being evaluated for seizures due to spells of lip smacking, staring and unresponsiveness. On this day of recording, patient reportedly was not taking any psychoactive medications.       TECHNICAL SUMMARY: This continuous EEG monitoring procedure was performed with 23 scalp electrodes in the 10-20 system of placement, and additional scalp, precordial and other surface electrodes were used for electrical referencing and artifact detection. Video monitoring was utilized and periodically reviewed by EEG technologist and physician for electroclinical correlation.       BACKGROUND EEG ACTIVITIES: The predominant electro-cerebral activity during wakefulness consisted predominantly of theta range activities with intermittent, diffuse irregular delta slowing, in the range of 2-7 Hz. During maximal wakefulness, there was a symmetric, poorly sustained posterior dominant rhythm of 6-7 Hz. Drowsiness was manifested by increase in the diffuse slowing. During clinically asleep state, normal sleep architecture such as sleep spindles were not present.       No interictal epileptiform discharges were observed during this recording.       ICTAL RECORDINGS: No electrographic seizures or paroxysmal behavioral events were recorded on this day.       SUMMARY OF DAY #2 OF VIDEO-EEG MONITORING: This is an abnormal EEG due to the presence of frequent polymorphic theta-delta slowing in wakefulness consistent with diffuse encephalopathy, which is etiologically nonspecific. No focal abnormality was appreciated. No interictal epileptiform discharges and no electrographic seizures were recorded. Target events were  not seen today. Clinical correlation is recommended.     Color Urine (no units)   Date Value   02/08/2017 Yellow     Appearance Urine (no units)   Date Value   02/08/2017 Cloudy     Glucose Urine (mg/dL)   Date Value   02/08/2017 Negative     Bilirubin Urine (no units)   Date Value   02/08/2017 Negative     Ketones Urine (mg/dL)   Date Value   02/08/2017 5 (A)     Specific Gravity Urine (no units)   Date Value   02/08/2017 1.023     pH Urine (pH)   Date Value   02/08/2017 5.5     Protein Albumin Urine (mg/dL)   Date Value   02/08/2017 30 (A)     Urobilinogen Urine (EU/dL)   Date Value   10/14/2016 0.2     Nitrite Urine (no units)   Date Value   02/08/2017 Negative     Leukocyte Esterase Urine (no units)   Date Value   02/08/2017 Negative            Medications Prior to Admission:     Prescriptions Prior to Admission   Medication Sig Dispense Refill Last Dose     atorvastatin (LIPITOR) 40 MG tablet Take 1 tablet (40 mg) by mouth daily (Patient taking differently: Take 40 mg by mouth At Bedtime ) 90 tablet 3 2/5/2017 at 0800     traZODone (DESYREL) 50 MG tablet Take 0.5 tablets (25 mg) by mouth nightly as needed for sleep 45 tablet 2 2/5/2017 at 2100     venlafaxine (EFFEXOR-ER) 150 MG TB24 24 hr tablet Take 1 tablet (150 mg) by mouth daily (with breakfast) 90 each 1 2/6/2017 at 0500     clotrimazole (LOTRIMIN) 1 % cream Please apply to groins two times daily for one week after discharge and then follow up with PCP if no improvement of your skin rashes 12 g 0 Past Month at Unknown time     [DISCONTINUED] enoxaparin (LOVENOX) 80 MG/0.8ML injection Inject 0.74 mLs (74 mg) Subcutaneous See Admin Instructions for 4 days Please take as instructed in your pre operative instructions. 6 Syringe 0      [DISCONTINUED] carvedilol (COREG) 6.25 MG tablet Take 1 tablet (6.25 mg) by mouth 2 times daily (with meals) 180 tablet 3 2/6/2017 at 0500     ONETOUCH DELICA LANCETS 33G MISC 1 Device daily 100 each 0 Taking     ONE TOUCH ULTRA  test strip Test once daily 100 each 0 Taking     [DISCONTINUED] lisinopril (PRINIVIL/ZESTRIL) 20 MG tablet Take 1 tablet (20 mg) by mouth daily (Patient taking differently: Take 20 mg by mouth every morning ) 90 tablet 2 2/5/2017 at 0800     [DISCONTINUED] metFORMIN (GLUCOPHAGE) 500 MG tablet Take 1 tablet (500 mg) by mouth 2 times daily (with meals) 180 tablet 1 2/5/2017 at 0800     [DISCONTINUED] warfarin (COUMADIN) 5 MG tablet Take 5 mg TU, TH and 2.5 mg other 5 days or as directed by coumadin clinic 30 tablet 2 2/1/2017     blood glucose monitoring (ONE TOUCH ULTRA 2) meter device kit    Taking            Discharge Medications:     Current Discharge Medication List      START taking these medications    Details   melatonin 1 MG TABS tablet Take 1 tablet (1 mg) by mouth nightly as needed  Qty: 30 tablet, Refills: 0    Associated Diagnoses: Insomnia due to medical condition      HYDROmorphone (DILAUDID) 2 MG tablet Take 0.5 tablets (1 mg) by mouth every 4 hours as needed for moderate to severe pain  Qty: 30 tablet, Refills: 0    Associated Diagnoses: S/P CABG (coronary artery bypass graft)      acetaminophen (TYLENOL) 325 MG tablet Take 1-2 tablets (325-650 mg) by mouth every 4 hours as needed for mild pain or fever  Qty: 100 tablet, Refills: 0    Associated Diagnoses: S/P CABG (coronary artery bypass graft)      aspirin  MG EC tablet Take 1 tablet (325 mg) by mouth every 6 hours as needed for moderate pain  Qty: 90 tablet, Refills: 3    Associated Diagnoses: S/P CABG (coronary artery bypass graft)      amiodarone (PACERONE/CODARONE) 200 MG tablet Take 2 tabs (400 mg) daily for 14 days, then decrease dose to 1 tab (200 mg) daily for 14 days, then stop  Qty: 45 tablet, Refills: 0    Associated Diagnoses: New onset atrial fibrillation (H)      gabapentin (NEURONTIN) 100 MG capsule Take 1 capsule (100 mg) by mouth 2 times daily  Qty: 60 capsule, Refills: 3    Associated Diagnoses: Mononeuropathy due to  underlying disease      loperamide (IMODIUM) 2 MG capsule Take 1 capsule (2 mg) by mouth 4 times daily as needed for diarrhea  Qty: 20 capsule, Refills: 0    Associated Diagnoses: Diarrhea, unspecified type      sulfamethoxazole-trimethoprim (BACTRIM DS/SEPTRA DS) 800-160 MG per tablet Take 1 tablet by mouth 2 times daily  Qty: 14 tablet, Refills: 0    Associated Diagnoses: Postoperative infection, initial encounter      potassium chloride SA (K-DUR/KLOR-CON M) 10 MEQ CR tablet Take 1 tablet (10 mEq) by mouth 2 times daily  Qty: 60 tablet, Refills: 0    Associated Diagnoses: S/P CABG (coronary artery bypass graft)      pantoprazole (PROTONIX) 40 MG EC tablet Take 1 tablet (40 mg) by mouth every morning  Qty: 30 tablet, Refills: 0    Comments: Take for 30 days then stop  Associated Diagnoses: Coronary artery disease with angina pectoris, unspecified vessel or lesion type, unspecified whether native or transplanted heart (H)      ferrous sulfate (IRON SUPPLEMENT) 325 (65 FE) MG tablet Take 1 tablet (325 mg) by mouth daily (with breakfast)  Qty: 100 tablet, Refills: 1    Associated Diagnoses: S/P CABG (coronary artery bypass graft)         CONTINUE these medications which have CHANGED    Details   furosemide (LASIX) 20 MG tablet Take 1 tablet (20 mg) by mouth 2 times daily  Qty: 60 tablet, Refills: 3    Associated Diagnoses: Edema, unspecified type; Essential hypertension with goal blood pressure less than 140/90; 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)      warfarin (COUMADIN) 2.5 MG tablet Take 2 tablets (5 mg) by mouth daily  Qty: 100 tablet, Refills: 3    Comments: Recheck INR on Saturday 2/18/17; Goal INR 2-3  Associated Diagnoses: New onset atrial fibrillation (H); 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)         CONTINUE these medications which have NOT CHANGED    Details   atorvastatin (LIPITOR) 40 MG tablet Take 1 tablet (40 mg) by mouth daily  Qty: 90  tablet, Refills: 3    Associated Diagnoses: Hyperlipidemia LDL goal <130      traZODone (DESYREL) 50 MG tablet Take 0.5 tablets (25 mg) by mouth nightly as needed for sleep  Qty: 45 tablet, Refills: 2    Associated Diagnoses: Primary insomnia      venlafaxine (EFFEXOR-ER) 150 MG TB24 24 hr tablet Take 1 tablet (150 mg) by mouth daily (with breakfast)  Qty: 90 each, Refills: 1    Associated Diagnoses: Adjustment disorder with depressed mood      clotrimazole (LOTRIMIN) 1 % cream Please apply to groins two times daily for one week after discharge and then follow up with PCP if no improvement of your skin rashes  Qty: 12 g, Refills: 0    Associated Diagnoses: Tinea cruris      ONETOUCH DELICA LANCETS 33G MISC 1 Device daily  Qty: 100 each, Refills: 0    Associated Diagnoses: Type 2 diabetes mellitus without complication, without long-term current use of insulin (H)      ONE TOUCH ULTRA test strip Test once daily  Qty: 100 each, Refills: 0    Associated Diagnoses: Type 2 diabetes mellitus without complication, without long-term current use of insulin (H)      blood glucose monitoring (ONE TOUCH ULTRA 2) meter device kit          STOP taking these medications       enoxaparin (LOVENOX) 80 MG/0.8ML injection Comments:   Reason for Stopping:         carvedilol (COREG) 6.25 MG tablet Comments:   Reason for Stopping:         lisinopril (PRINIVIL/ZESTRIL) 20 MG tablet Comments:   Reason for Stopping:         metFORMIN (GLUCOPHAGE) 500 MG tablet Comments:   Reason for Stopping:                   Medications Discontinued or Adjusted During This Hospitalization:   See medication list         Antibiotics Prescribed at Discharge:   Bactrim DS 1 tab PO BID, Duration: 7 days          Brief History of Illness:   Carlos Alberto Fenton is a 76 year old female with history of coronary artery disease who was admitted in anticipation of bypass grafting.      Ms. Fenton has a history of CVA in 10/2016, after she presented with mental status  changes. MRI showed bilateral cortical infarcts concerning for cardioembolic source. She required intubation eventually for severe encephalopathy.  Ms Fenton was started on Warfarin for thromboembolic prophylaxis. During that prior hospitalization she was discovered to be in atrial fibrillation and metoprolol was intiated for rate control.    ECHO evaluations during that time showed a decreased EF 35-40% during one week time interval, unclear etiology, thought possibly stress cardiomyopathy. Last EF 40-45%. Hospital stay was prolonged and she returned to Cardiology in follow up. Due to cardiovascular risk factors angiogram was performed on 1/30/17 showing 2 vessel coronary artery disease with left main lesion. Consult with Dr. Quiñones with above procedure now planned.  Patient presents today feeling well. She is living with her daughter since she had a stroke.  Goal is to return to [place] where she and her hubsnad permanently reside after she recovers.  She is performing ADLs without difficulty. She has some residual word finding difficulty.           Hospital Course:   Ms. Fenton tolerated the procedure well and post surgery was taken to CVICU in stable condition.  She was extubated on POD #1 without problems.  Pressors were slowly weaned off when able.  On POD #2 she was noted to have some confusion and repetitive movements and there was concern for complex partial seizures versus toxic metabolic encephalopathy.  She was seen by neurology. Head CT was negative for any acute process and EEG was consistent with toxic encephalopathy. We did note her liver enzymes were elevated with ALT/AST of 834/1433 respectively, possibly due to shocked liver.  At discharge this was improving with ALT down to 243 and AST down to 103.  We already started amiodarone for brief episode of AFib with RVR and therefore we will hold off on a statin.     She had a brief episode of AFib with RVR and she apparently had some arrhthymias prior to  surgery when AFib was noted when she had her CVA.  Ms. Fenton was started on amiodarone bolus and then an infusion. She was transitioned to oral amiodarone and at discharge will be on 400 mg daily x 2 weeks, then 200 mg daily x 2 weeks then discontinue.  She can follow up with Dr. Santos from Electrophysiology in 4-6 weeks.  Due to her low heart rate and low normal blood pressures, we elected to hold metoprolol and an ACEi as would be the guidelines for bypass surgery.  She is not on a statin either due to elevated liver enzymes as she is already on amiodarone. Would recommend the primary physician to check LFTs in a few weeks. We started aspirin 325 mg qd.      Ms. Fenton has a history of DM2 and was on low dose metformin.  Recent A1c is 6.3.  Blood glucose levels have been stable here in the 127-112 range without need for sliding scale insulin.  We elected not to resume metformin at discharge.     She developed some drainage from the top part of her sternal incision as well as drainage from the left leg graft site.  She was started on Keflex, did well and drainage stopped but this morning there was more drainage present.  Discovered that she has not taken her antibiotic due to gastric upset and diarrhea.  Stool is C.diff negative and Keflex was changed to Bactrim DS 1 tab bid x 7 days per Rx recommendation.      Warfarin was resumed for her history of embolic CVA.  Antibiotics will impact her INR, so she will need to get her INR checked Saturday by home health nursing. Her primary physician will be managing her warfarin. Goal INR is 2-3.  Please note that Ms. Fenton may be a good candidate for different blood thinner in the future as her permanent home is in [facility] with limited services available to her for checking INR.    Overall Ms. Fenton has done well.  She is up and around with therapies.  Pain is adequately controlled.  Fair to good appetite. Sleeping fairly well.  She uses her own CPAP machine. Full return of  bowel and bladder function. Discharge instructions were reviewed with the patient who verbalized understanding of instructions.  She was discharged home with support of her  and daughter.    Post-operative pain control included Hydromorphone (dilaudid) IV and will be Hydromorphone oral on discharge.          Final Pathology Result:   No pathology submitted         Discharge Instructions and Follow-Up:   Discharge diet: Regular   Discharge activity: Lifting restricted to 10 pounds for 6 weeks, then up to 20 lbs for additional 6 weeks  No heavy lifting, pushing, pulling for 12 week(s)  No driving for 4 week(s)   Discharge follow-up: F/U [facility] 2/3/17 at 1:00PM  F/U Dr. Conner, [facility] 2/22/17 at 1:30   Tubes/Lines/Drains: None   Wound care: Wash with warm soapy water and pat dry.  You may shower but no baths until incisions completely healed. You may have drainage from your old chest tube sites after you cough or sneeze.  This is nothing to worry about as long as it is clear, thin yellowish fluid.  It can stain your clothes and you may put gauze or an old towel over the site until it stops draining; it is self-limiting but can be annoying.            Home Health Care:   Arranged     Your provider has referred you to: FM: [facility] [phone]   [email or web address]          Home care nursing referral    [facility]   Phone: [phone]   Fax: [phone]     For RN evaluation post hospitalization.   Assess vital signs, respiratory and cardiac status, activity tolerance, hydration, nutritional status.   Med setup and management.   INR lab draws, next INR draw due Monday 2/20/17 with result to [facility], with note that pt is establishing care with Dr. Conner (P: [phone], F: [phone])   PT/OT eval and treat for deconditioning, strengthening and endurance, please assist pt with setting up OP CR when  ready.     Your provider has ordered home care nursing services. If you have not been contacted within 2 days of your discharge please call the inpatient department phone number at [phone] .                     Discharge Disposition:   Discharged to home      Condition at discharge: Stable    DAVID May  Pager: [phone]  Rounding pager: [phone]                       :

## 2017-02-17 NOTE — PROGRESS NOTES
Problem: Goal Outcome Summary  Goal: Goal Outcome Summary  Outcome: Improving  D: CAD, s/p CABG x 3, Modified Maze and PFO closure on 2/6/17  I: Monitored vitals and assessed pt status.   A:  Pt is alert and oriented x 4, denies any SOB. C/o of incisional pain midline. PRN Dilaudid 1 mg given x 2 overnight. Midline incision with liquid bandage, and left leg incision open to air. Leg incision is leaking serous drainage overnight. Incision was cleaned and RN applied primapore dressing. Chest tube removed on day shift and dry dressing in place. Pt is on room air and denies any SOB. VSS, SR with PAC's on the monitor. Phosphorus replacement done per protocol. Potassium replaced per protocol. Pt had loose BM diarrhea, x 2 , Imodium 2 mg given x 1 overnight. Pt up with standby assistance and uses walker to get to commode and bathroom. BS check ranging 100's and no coverage required per sliding scale.   P: RN will continue to monitor and report changes to treatment team.    Chelsea Goodwin

## 2017-02-17 NOTE — PROGRESS NOTES
DISCHARGE            2/17/2017         ----------------------------------------------------------------------------  Discharged to: Home  Via: Automobile  Accompanied by: Family  Discharge Instructions: diet, activity, medications, follow up appointments, when to call the MD, aftercare instructions, and what to watchout for (i.e. s/s of infection, increasing SOB, palpitations, chest pain,)  Prescriptions: To be filled by [place] discharge pharmacy per pt's request; medication list reviewed & sent with pt  Follow Up Appointments: arranged; information given  Belongings: All sent with pt  Midline catheter IV: out  Telemetry: off  Pt exhibits understanding of above discharge instructions; all questions answered. Post surgical instructions were given. Importance of medication compliance stressed. Pt verbalized understanding of all d/c instructions. Pt daughter expressed concern regarding pt being d/c due to  at home with the flu. Pt and daughter were reassured that the pt is medically stable to go home, and to take extra precautions at home in order to avoid contamination (i.e. Wear a mask, careful handwashing, etc). CVTS NP notified of the daughters concern and agreed with the writer's comments.     Discharge Paperwork: Signed, copied, and sent home with patient.

## 2017-02-17 NOTE — PHARMACY-ANTICOAGULATION SERVICE
Anticoagulation Dose History     Recent Dosing and Labs Latest Ref Rng & Units 2/11/2017 2/12/2017 2/13/2017 2/14/2017 2/15/2017 2/16/2017 2/17/2017    Warfarin 2 mg - - 2 mg - - - - -    Warfarin 5 mg - - - 5 mg 5 mg 5 mg - -    Warfarin 7.5 mg - - - - - - 7.5 mg -    INR 0.86 - 1.14 2.29(H) 2.27(H) 1.82(H) 1.65(H) 1.73(H) 1.53(H) 1.84(H)    INR 0.86 - 1.14 - - - - - - -    INR Point of Care 0.86 - 1.14 - - - - - - -        Pt is being switched from Keflex to Bactrim and is preparing for D/C. Will have INR checked again tomorrow.    Recommend 5 mg today (2/17).    Jerrell Hunter, PharmD Student

## 2017-02-17 NOTE — PLAN OF CARE
Problem: Goal Outcome Summary  Goal: Goal Outcome Summary     OT-6c: Administered MOCA cognitive screen, with pt scoring 25/30 indicating minimal impairments (26 and above is considered normal). Pt with some awareness of deficits. Rec assist with medication management. Rec d/c to home with assist, home PT, and Phase II OP CR at [facility].

## 2017-02-17 NOTE — PLAN OF CARE
Problem: Goal Outcome Summary  Goal: Goal Outcome Summary  PT 6C: patient refusing OOB activity, heavily educated on safety at home including use of FWW for ambulation, having help available when showering, dressing, performing stairs. Patient verbalizing she doesn't need to perform stairs at daughters home and has access to FWW for use. Educated to ambulate frequently over weekend as HH PT likely will not arrive until Monday.      Unable to make accurate discharge recommendation due to patient refusal to work with PT since 2/15, at that time recommendation was for home with OP rehab, patient expected to discharge today to [facility] with  PT.     Physical Therapy Discharge Summary    Reason for therapy discharge:    Discharged to home with home therapy.    Progress towards therapy goal(s). See goals on Care Plan in [facility] electronic health record for goal details.  Goals partially met.  Barriers to achieving goals:   discharge from facility.    Therapy recommendation(s):    Continued therapy is recommended.  Rationale/Recommendations:  HH PT to progress indep with functional mobility.

## 2017-02-17 NOTE — TELEPHONE ENCOUNTER
Katy from  Home Care. She would like a call to get verbal orders for INR, she cannot take the order out of Epic. Please call her at [phone] please leave detailed message.   Thank you,  Cindy Orozco- Pt Rep.

## 2017-02-17 NOTE — PLAN OF CARE
Problem: Goal Outcome Summary  Goal: Goal Outcome Summary  Outcome: Improving  D:  CAD, s/p CABG x 3, Modified Maze and PFO closure on 2/6/17  I: Monitored vitals and assessed pt status.   A:  Pt is alert and oriented x 4, denies any SOB. C/o of incisional pain midline, PRN Hydromorphone 1 mg given with effect. Midline incision with liquid bandage, and left leg incision open to air. Chest tube removed today, dry dressing in place.  X-ray completed post removal. Pt is on room air, denies any SOB. VSS,  SR with PAC's on the monitor.  Mg =1.8, replacement per protocol.  Pt had loose BM diarrhea, x 4 , Imodium 2 mg given x 2. Up with standby assistance and uses walker to get to commode and bathroom. Receiving BID IV Lasix 40 mg with good urine output.  BS check ranging 100's no coverage per sliding scale.   P: Continue to monitor Pt status and report changes to treatment team.

## 2017-02-18 NOTE — PLAN OF CARE
Problem: Goal Outcome Summary  Goal: Goal Outcome Summary  Occupational Therapy Discharge Summary     Reason for therapy discharge:    Discharged to home.     Progress towards therapy goal(s). See goals on Care Plan in [facility] electronic health record for goal details.  Goals partially met.  Barriers to achieving goals:   discharge from facility.     Therapy recommendation(s):    Continued therapy is recommended.  Rationale/Recommendations:  Home with assist, home PT, and Phase II OP CR at [facility]. .

## 2017-02-20 ENCOUNTER — TELEPHONE (OUTPATIENT)
Dept: NURSING | Facility: CLINIC | Age: 77
End: 2017-02-20

## 2017-02-20 ENCOUNTER — TELEPHONE (OUTPATIENT)
Dept: FAMILY MEDICINE | Facility: OTHER | Age: 77
End: 2017-02-20

## 2017-02-20 ENCOUNTER — CARE COORDINATION (OUTPATIENT)
Dept: CARDIOLOGY | Facility: CLINIC | Age: 77
End: 2017-02-20

## 2017-02-20 ENCOUNTER — DOCUMENTATION ONLY (OUTPATIENT)
Dept: CARE COORDINATION | Facility: CLINIC | Age: 77
End: 2017-02-20

## 2017-02-20 ENCOUNTER — ANTICOAGULATION THERAPY VISIT (OUTPATIENT)
Dept: ANTICOAGULATION | Facility: OTHER | Age: 77
End: 2017-02-20
Payer: COMMERCIAL

## 2017-02-20 DIAGNOSIS — I63.40 CEREBROVASCULAR ACCIDENT (CVA) DUE TO EMBOLISM OF CEREBRAL ARTERY (H): ICD-10-CM

## 2017-02-20 DIAGNOSIS — Z79.01 LONG-TERM (CURRENT) USE OF ANTICOAGULANTS: ICD-10-CM

## 2017-02-20 DIAGNOSIS — I48.91 NEW ONSET ATRIAL FIBRILLATION (H): ICD-10-CM

## 2017-02-20 LAB — INR PPP: 5.3

## 2017-02-20 PROCEDURE — 99207 ZZC NO CHARGE NURSE ONLY: CPT | Performed by: FAMILY MEDICINE

## 2017-02-20 NOTE — PROGRESS NOTES
[facility] now requests orders and shares plan of care/discharge summaries for some patients through [facility].  Please REPLY TO THIS MESSAGE in order to give authorization for orders when needed.  This is considered a verbal order, you will still receive a faxed copy of orders for signature.  Thank you for your assistance in improving collaboration for our patients.    ORDER    SN 2 x week for 3 weeks, 1 x week for 6 weeks with 5 prn  PT/OT/SW  HA 2 x week for 9 weeks    Wound cares orders: Clean incisions/chest tube sites with soap/water, NS, wound cleanser.  Cover with ABD/gauze and secure with medipore tape. Changes dressing every other day or as needed for soiling.    OF note: LLE incision site is weaping clear fluid. At SOC assessment this was dripping down the leg.  No s/s of infection present.  Site covered with ABD pad.    Client will be establishing care with Dr. Conner on 2/22/17.  She is in need of IM provider.     SOC on 2/20/17  SUMMARY TO MD PAYNE ....Client DCd from the hosptital on 2/17/17. She is status post CABG. Client resides in a [facility] with her daughter.  She also had a CVA back in OCT of 2016 and has issues with memory/confusion.  Client reporting daughter is able to help with some cares, but would benefit from SW visit for alternative options/transportation assist. Client has surgical incision to LLE at sight vein was harvested.  This elizabeth has been draining a large amount of serous fluid.  Client had JORGE at SOC and it was dripping. SN covered with ABD and medipore tape.  No s/s of infection present.  Client also has midline chest incision that is CDI with no s/s of infection.  There are three other chest wounds from her chest tube sites. These are also draining a small/moderate amount of serous fluid. SN covered these incisions with sterile gauze/medipore tape.  Client reporting pain to her chest and LLE.  Pain is getting up to a 6 or 7 and as low as a 2. She is taking  1000mg of Tylenol typically BID. She will take a dilaudid if needed for breakthrough pain, but has been avoiding this medication.  SN reconciled meds at SOC visit and assessed med set. There were errors in med set and SN will plan to continue setting up meds and teach client on proper med set.  OT needed for cognitive eval and medicaiton assistance as well.  Client denies falls. She is getting around without assistive device, but does have some unsteadiness. PT is also needed for LE strenghening. Client does require assist wtih bathing and personal cares and HA will be needed as well.  Client reports appetite is decreased, but she feels she is getting adequate nutrition/hydration. She is watching Na content in her diet. Client is reporting depression and reports feeling down/depressed daily. She also has anxiety present, but this is less often than daily.  Client reports that she would be interested in being referred to outpatient mental health and will address with new PMD at  on 2/22. Client did have severe diarrhea while in the hospital. This has subsided and she reports a formed stool yesterday. She reprots urine has been normal and has not had any urinary frequency despite being on lasix.  Elton has 2 plus pitting BLE and pedal edema.  Her skin is cold to the tough.  She reports SOB with minimal activity.  Client does check and record BP/HR daily.   Client is checking BG every AM as well and this has been between 100 and 115.  Client reporting poor sleep. She does take melatonin and trazadone, but reports this has not helped with sleep.    VS.../62, HR 71, RR 18, O2 sats 94 percent on RA.      BACKGROUND..hx of CAD, edema, essential HTN, acute bilateral cerebral infarction in a watershed distribution, Afib, post op encounter, HLDL, HTN, migraine, hx seizures, sinus israel, DM2, adj d/o w depr mood, insomnia   ANALYSIS...Client is at risk for post op infection, open incision present, risk for fluid  overload, SOB/respiratory depression, mediation mismangement, poor pain control, falls, and constipation.  RECOMMENDATION...SN for incisional cares/teaching/assesment, monitor and mitigate pain, teach nutritin/hydration, assess edema/teach edema managment, and medication teaching/set up. OT/PT/SW/HA.    Medication Duplicate Thereapy Warnings include:  Aspirin/warfarin  Effexor/trazodone    Level 2 interactions include:  Amiodarone/warfarin  Trazodone/amiodarone  Aspirin/warfarin  Bactrim/warfarin  Effexor/warfarin    Ester Pineda RN    [phone]  [email or web address]

## 2017-02-20 NOTE — PROGRESS NOTES
Patient has clinic visit today so no post DC follow up call is needed        Feb 20, 2017 1:40 PM CST   New Visit with Marty Camargo MD   [facility] ([facility]

## 2017-02-20 NOTE — PROGRESS NOTES
Called to speak to patient, daughter answered the phone and said she was sick with the Leno virus and hung up.  Will try again in a couple of days.  Third day trying to contact patient and daughter Lindsay answered the phone states mother is on her way to the ED for necrotic toes.  Will continue to follow.         Tell me how you have been doing since you were discharged from the hospital     ACTIVITY  How is your activity tolerance?     PAIN  How is your pain on a 0-10 scale, how are you managing your pain?     Are you still doing sternal precautions?     Do you hear any clicking when you are moving or taking a deep breath?        SIGNS AND SYMPTOMS OF INFECTION  1. INCREASE IN PAIN   2. FEVER   3. DRAINAGE     If Yes, color:                 5. REDNESS     6. SWELLING     ASSISTANCE  Do you have someone at home to assist you with your daily activities?        MEDICATIONS  I would like to review your discharge medications and answer any questions you may have about them and also make sure you have all the medications that are new to you, and discuss any changes that were made to your pre-hospital medications.     Is someone helping you to set up your medications?       Are you on a blood thinner?       Who is managing your INRs?       FOLLOW UP  Are you scheduled for cardiac rehab?       You are scheduled to see our surgery advanced practice provider for post operative follow up on  at .     You are scheduled to see your cardiologist  on  at  for    You are scheduled to see your primary care physician  on  at  for          CONTACT INFORMATION  Please feel free to call us with any other questions or symptoms that are concerning for you at [phone].  if it is after 4:30 in the afternoon, or a weekend please call [phone] and ask for the on call specialist.  We want to do everything we can to help prevent you needing to return to the ED, so please do not hesitate to call us.

## 2017-02-20 NOTE — TELEPHONE ENCOUNTER
Gave verbal okay and informed needing a face to face with 30 days of last hospital discharge. Patient scheduled with Dr. Humberto Conner

## 2017-02-20 NOTE — MR AVS SNAPSHOT
Carlos Alberto Fenton   2/20/2017   Anticoagulation Therapy Visit    Description:  76 year old female   Provider:  Nuha Bateman MD   Department:  [facility]           INR as of 2/20/2017     Today's INR 5.3!      Anticoagulation Summary as of 2/20/2017     INR goal 2.0-3.0   Today's INR 5.3!   Full instructions 2/20: Hold; 2/21: 1.25 mg   Next INR check 2/22/2017    Indications   Long-term (current) use of anticoagulants [Z79.01] [Z79.01]  CVA (cerebral vascular accident) (H) [I63.9]  New onset atrial fibrillation (H) [I48.91]         Contact Numbers     Clinic Number:         February 2017 Details    Sun Mon Tue Wed Thu Fri Sat        1               2               3               4                 5               6               7               8               9               10               11                 12               13               14               15               16               17               18                 19               20      Hold   See details      21      1.25 mg         22            23               24               25                 26               27               28                    Date Details   02/20 This INR check       Date of next INR:  2/22/2017         How to take your warfarin dose     To take:  1.25 mg Take 0.5 of a 2.5 mg tablet.    Hold Do not take your warfarin dose. See the Details table to the right for additional instructions.

## 2017-02-20 NOTE — TELEPHONE ENCOUNTER
Chantal is calling from  home care and she needs a verbal order for :    SN 2 x week for 3 weeks, 1 x week for 6 weeks with 5 prn  PT/OT/SW  HA 2 x week for 9 weeks     Wound cares orders: Clean incisions/chest tube sites with soap/water, NS, wound cleanser. Cover with ABD/gauze and secure with medipore tape. Changes dressing every other day or as needed for soiling.     wont sign off until she is seen in clinic with him until the 22nd but Chantal from home care is needing these now. She was wondering if  would be okay with giving a verbal. Please advise asap due to chantal being out for the next 3 days.

## 2017-02-20 NOTE — PROGRESS NOTES
ANTICOAGULATION FOLLOW-UP CLINIC VISIT    Patient Name:  Carlos Alberto Fenton  Date:  2/20/2017  Contact Type:  Telephone    SUBJECTIVE:     Patient Findings     Positives Initiation of therapy    Comments Pt was restarted on coumadin in the hosp and her dose was significantly increased over the weekend, she is also now on bactrim and amiodarone which will increase her INR.     Reason for Call: INR     Who is calling? Home Care:      Phone number: [phone]           Name of caller: Hodge     INR Value: 5.3  Are there any other concerns: Yes:      Can we leave a detailed message on this number? YES           OBJECTIVE    INR   Date Value Ref Range Status   02/20/2017 5.3  Final       ASSESSMENT / PLAN  INR assessment SUPRA    Recheck INR In: 2 DAYS    INR Location Homecare INR      Anticoagulation Summary as of 2/20/2017     INR goal 2.0-3.0   Today's INR 5.3!   Maintenance plan No maintenance plan   Full instructions 2/20: Hold; 2/21: 1.25 mg   Plan last modified Catrachita Lacy RN (2/20/2017)   Next INR check 2/22/2017   Target end date     Indications   Long-term (current) use of anticoagulants [Z79.01] [Z79.01]  CVA (cerebral vascular accident) (H) [I63.9]  New onset atrial fibrillation (H) [I48.91]         Anticoagulation Episode Summary     INR check location     Preferred lab     Send INR reminders to [facility] POOL    Comments 5 mg tablets, pm dose      Anticoagulation Care Providers     Provider Role Specialty Phone number    Nuha Bateman MD [facility] [phone]            See the Encounter Report to view Anticoagulation Flowsheet and Dosing Calendar (Go to Encounters tab in chart review, and find the Anticoagulation Therapy Visit)    Dosage adjustment made based on physician directed care plan.    Catrachita Lacy, REESE

## 2017-02-20 NOTE — TELEPHONE ENCOUNTER
Reason for Call:  INR    Who is calling?  Home Care:     Phone number:  [phone]        Name of caller: Naveen    INR Value:  5.3  Are there any other concerns:  Yes:     Can we leave a detailed message on this number? YES    Phone number patient can be reached at: Other phone number:  [phone]      Call taken on 2/20/2017 at 10:22 AM by Yolanda Martinez  \

## 2017-02-20 NOTE — TELEPHONE ENCOUNTER
Ok to give a verbal order . But she needs a face to face with in 30days from the last hospital discharge

## 2017-02-21 ENCOUNTER — TELEPHONE (OUTPATIENT)
Dept: FAMILY MEDICINE | Facility: OTHER | Age: 77
End: 2017-02-21

## 2017-02-21 NOTE — TELEPHONE ENCOUNTER
Called and spoke with daughter and pt in background. She reports that they both have norovirus and are very ill. She did hold her coumadin yesterday (2/21) and I advised that she take 1.25 mg (1/2 tab) today. The HC nurse is supposed to be back there tomorrow to recheck her INR.     Catrachita Lacy, RN- Coumadin Clinic RN

## 2017-02-21 NOTE — TELEPHONE ENCOUNTER
Reason for call:  Someone was going to call her with Instructions on how to take her warfarin and she has not heard from anyone.   Call her at [phone]

## 2017-02-21 NOTE — TELEPHONE ENCOUNTER
"Call Type: Triage Call    Presenting Problem: \"My INR was 5.3 today and my homecare nurse said  that she would get back to me, about taking Coumadin, but I have not  heard from her.\" RN then checked EPIC, and saw, under pt's  Anticoagulation Therapy visit, that there are instructions on 2/20:  Hold; Next INR check: 2/22/2017. RN told this information to pt. and  instructed pt. to call her homecare nurse, for verification, about  pt's Coumadin dosage. Pt. voiced understanding and says that the  homecare nurse did tell pt. to call her, and gave pt. her card. Pt.  voiced understanding.  Triage Note:  Guideline Title: Information Only Call; No Symptom Triage (Adult)  Recommended Disposition: Provide Information or Advice Only  Original Inclination: Wanted to speak with a nurse  Override Disposition:  Intended Action: Follow advice given  Physician Contacted: No  Follow-up call to recent contact; no triage required. Information provided from  past call documentation, approved references or experience. ?  YES  Requesting regular office appointment ? NO  Sign(s) or symptom(s) associated with a diagnosed condition or with a new illness  ? NO  Requesting information about provider, services or community resources ? NO  Call back to complete assessment/clarification of information from prior caller to  complete triage ? NO  Requesting information and provider is best resource; no triage required. ? NO  Caller not with patient and is unable to provide clinical information about  patient to facilitate triage. ? NO  Requesting provider information for recently scheduled test, procedure; no triage  required. Needed information not available per approved resources or clinical  experience. ? NO  Requesting information not available per approved reference or clinical  experience; no triage required. ? NO  Requesting information regarding scheduled exam, test or procedure; no triage  required. Information provided from approved " resources or clinical experience. ?  NO  General information question; no triage required. Information provided from  approved references or knowledge of organization. ? NO  Health information question; person denies any symptoms, no triage required.  Information provided from approved references or clinical experience. ? NO  Physician Instructions:  Care Advice:

## 2017-02-21 NOTE — TELEPHONE ENCOUNTER
Spoke with Yani at [facility] care. Chantal, pt's home care nurse did attempt to call pt yesterday with coumadin dosing instructions but phone went straight to VM as did daughters. She did leave dosing inst on both VM. Pt is supposed to have INR checked tomorrow in clinic when she see's Dr. Conner. Will watch to see if pt goes in tomorrow for this appt and if not, she will need additional dosing instructions until INR can be checked. Possibly Friday by home care.     Catrachita Lacy, RN- Coumadin Clinic RN

## 2017-02-22 ENCOUNTER — ANTICOAGULATION THERAPY VISIT (OUTPATIENT)
Dept: ANTICOAGULATION | Facility: OTHER | Age: 77
End: 2017-02-22

## 2017-02-22 DIAGNOSIS — Z79.01 LONG-TERM (CURRENT) USE OF ANTICOAGULANTS: ICD-10-CM

## 2017-02-22 DIAGNOSIS — I63.40 CEREBROVASCULAR ACCIDENT (CVA) DUE TO EMBOLISM OF CEREBRAL ARTERY (H): ICD-10-CM

## 2017-02-22 DIAGNOSIS — I48.91 NEW ONSET ATRIAL FIBRILLATION (H): ICD-10-CM

## 2017-02-23 ENCOUNTER — TELEPHONE (OUTPATIENT)
Dept: INTERNAL MEDICINE | Facility: CLINIC | Age: 77
End: 2017-02-23

## 2017-02-23 NOTE — TELEPHONE ENCOUNTER
Reason for Call:  Other INR     Detailed comments: Chantal calling from home care needs to know how often to do INR checks at the home, as she will be her home care nurse, please call and advise    Phone Number Patient can be reached at: Other phone number: [phone]     Best Time: any    Can we leave a detailed message on this number? YES    Call taken on 2/23/2017 at 3:09 PM by Audrey Carbajal

## 2017-02-23 NOTE — TELEPHONE ENCOUNTER
Spoke with HC nurse Chantal. Updated her with the dosing given to pt the past few days and status of pt being ill. Chantal has been unsuccessful in getting into touch with pt/daughter today.   INR recheck tomorrow.   Catrachita Layc, RN- Coumadin Clinic RN

## 2017-02-23 NOTE — TELEPHONE ENCOUNTER
I have attempted to contact this  nurse, left message to return call at [phone] or [phone].  Will try again later.    Catrachita Lacy, RN- Coumadin Clinic RN

## 2017-02-24 ENCOUNTER — APPOINTMENT (OUTPATIENT)
Dept: ULTRASOUND IMAGING | Facility: CLINIC | Age: 77
DRG: 813 | End: 2017-02-24
Attending: EMERGENCY MEDICINE
Payer: MEDICARE

## 2017-02-24 ENCOUNTER — TELEPHONE (OUTPATIENT)
Dept: FAMILY MEDICINE | Facility: OTHER | Age: 77
End: 2017-02-24

## 2017-02-24 ENCOUNTER — HOSPITAL ENCOUNTER (INPATIENT)
Facility: CLINIC | Age: 77
LOS: 6 days | Discharge: HOME HEALTH | DRG: 813 | End: 2017-03-03
Attending: EMERGENCY MEDICINE | Admitting: INTERNAL MEDICINE
Payer: MEDICARE

## 2017-02-24 ENCOUNTER — ANTICOAGULATION THERAPY VISIT (OUTPATIENT)
Dept: ANTICOAGULATION | Facility: OTHER | Age: 77
End: 2017-02-24
Payer: COMMERCIAL

## 2017-02-24 ENCOUNTER — HOSPITAL ENCOUNTER (EMERGENCY)
Facility: CLINIC | Age: 77
Discharge: ANOTHER HOSPITAL | End: 2017-02-24
Attending: EMERGENCY MEDICINE | Admitting: EMERGENCY MEDICINE
Payer: MEDICARE

## 2017-02-24 ENCOUNTER — APPOINTMENT (OUTPATIENT)
Dept: CT IMAGING | Facility: CLINIC | Age: 77
DRG: 813 | End: 2017-02-24
Attending: EMERGENCY MEDICINE
Payer: MEDICARE

## 2017-02-24 ENCOUNTER — APPOINTMENT (OUTPATIENT)
Dept: GENERAL RADIOLOGY | Facility: CLINIC | Age: 77
End: 2017-02-24
Attending: EMERGENCY MEDICINE
Payer: MEDICARE

## 2017-02-24 VITALS
WEIGHT: 170 LBS | HEART RATE: 75 BPM | SYSTOLIC BLOOD PRESSURE: 115 MMHG | DIASTOLIC BLOOD PRESSURE: 96 MMHG | TEMPERATURE: 97.5 F | OXYGEN SATURATION: 95 % | BODY MASS INDEX: 31.09 KG/M2 | RESPIRATION RATE: 16 BRPM

## 2017-02-24 DIAGNOSIS — R79.1 SUPRATHERAPEUTIC INR: ICD-10-CM

## 2017-02-24 DIAGNOSIS — R60.0 BILATERAL EDEMA OF LOWER EXTREMITY: ICD-10-CM

## 2017-02-24 DIAGNOSIS — E11.42 TYPE 2 DIABETES MELLITUS WITH DIABETIC POLYNEUROPATHY, UNSPECIFIED LONG TERM INSULIN USE STATUS: ICD-10-CM

## 2017-02-24 DIAGNOSIS — I48.91 NEW ONSET ATRIAL FIBRILLATION (H): ICD-10-CM

## 2017-02-24 DIAGNOSIS — I96 NECROTIC TOES (H): ICD-10-CM

## 2017-02-24 DIAGNOSIS — D64.9 ANEMIA, UNSPECIFIED TYPE: ICD-10-CM

## 2017-02-24 DIAGNOSIS — I63.40 CEREBROVASCULAR ACCIDENT (CVA) DUE TO EMBOLISM OF CEREBRAL ARTERY (H): ICD-10-CM

## 2017-02-24 DIAGNOSIS — I48.0 PAROXYSMAL ATRIAL FIBRILLATION (H): ICD-10-CM

## 2017-02-24 DIAGNOSIS — Z79.01 LONG-TERM (CURRENT) USE OF ANTICOAGULANTS: ICD-10-CM

## 2017-02-24 DIAGNOSIS — M79.671 BILATERAL FOOT PAIN: ICD-10-CM

## 2017-02-24 DIAGNOSIS — M79.672 BILATERAL FOOT PAIN: ICD-10-CM

## 2017-02-24 DIAGNOSIS — T14.8XXA WOUND DRAINAGE: ICD-10-CM

## 2017-02-24 DIAGNOSIS — I96 ISCHEMIC NECROSIS OF TOE (H): ICD-10-CM

## 2017-02-24 DIAGNOSIS — R79.1 ELEVATED INR: ICD-10-CM

## 2017-02-24 DIAGNOSIS — G59 MONONEUROPATHY DUE TO UNDERLYING DISEASE: ICD-10-CM

## 2017-02-24 DIAGNOSIS — I96 GANGRENE (H): Primary | ICD-10-CM

## 2017-02-24 DIAGNOSIS — I87.2 STASIS DERMATITIS OF BOTH LEGS: ICD-10-CM

## 2017-02-24 DIAGNOSIS — I25.119 CORONARY ARTERY DISEASE WITH ANGINA PECTORIS, UNSPECIFIED VESSEL OR LESION TYPE, UNSPECIFIED WHETHER NATIVE OR TRANSPLANTED HEART (H): ICD-10-CM

## 2017-02-24 LAB
ABO + RH BLD: NORMAL
ABO + RH BLD: NORMAL
ALBUMIN SERPL-MCNC: 2.8 G/DL (ref 3.4–5)
ALP SERPL-CCNC: 103 U/L (ref 40–150)
ALT SERPL W P-5'-P-CCNC: 70 U/L (ref 0–50)
ANION GAP SERPL CALCULATED.3IONS-SCNC: 10 MMOL/L (ref 3–14)
AST SERPL W P-5'-P-CCNC: 34 U/L (ref 0–45)
BASOPHILS # BLD AUTO: 0.1 10E9/L (ref 0–0.2)
BASOPHILS NFR BLD AUTO: 0.7 %
BILIRUB SERPL-MCNC: 0.4 MG/DL (ref 0.2–1.3)
BLD GP AB SCN SERPL QL: NORMAL
BLOOD BANK CMNT PATIENT-IMP: NORMAL
BUN SERPL-MCNC: 12 MG/DL (ref 7–30)
CALCIUM SERPL-MCNC: 8.5 MG/DL (ref 8.5–10.1)
CHLORIDE SERPL-SCNC: 104 MMOL/L (ref 94–109)
CO2 SERPL-SCNC: 27 MMOL/L (ref 20–32)
CREAT SERPL-MCNC: 0.94 MG/DL (ref 0.52–1.04)
DIFFERENTIAL METHOD BLD: ABNORMAL
EOSINOPHIL # BLD AUTO: 0.3 10E9/L (ref 0–0.7)
EOSINOPHIL NFR BLD AUTO: 3.7 %
ERYTHROCYTE [DISTWIDTH] IN BLOOD BY AUTOMATED COUNT: 21.4 % (ref 10–15)
GFR SERPL CREATININE-BSD FRML MDRD: 58 ML/MIN/1.7M2
GLUCOSE BLDC GLUCOMTR-MCNC: 95 MG/DL (ref 70–99)
GLUCOSE SERPL-MCNC: 128 MG/DL (ref 70–99)
HCT VFR BLD AUTO: 27.8 % (ref 35–47)
HGB BLD-MCNC: 8.4 G/DL (ref 11.7–15.7)
IMM GRANULOCYTES # BLD: 0 10E9/L (ref 0–0.4)
IMM GRANULOCYTES NFR BLD: 0.5 %
INR PPP: 6.99 (ref 0.86–1.14)
INR PPP: 7
LACTATE BLD-SCNC: 1.7 MMOL/L (ref 0.7–2.1)
LYMPHOCYTES # BLD AUTO: 0.7 10E9/L (ref 0.8–5.3)
LYMPHOCYTES NFR BLD AUTO: 8.2 %
MCH RBC QN AUTO: 29.5 PG (ref 26.5–33)
MCHC RBC AUTO-ENTMCNC: 30.2 G/DL (ref 31.5–36.5)
MCV RBC AUTO: 98 FL (ref 78–100)
MONOCYTES # BLD AUTO: 0.7 10E9/L (ref 0–1.3)
MONOCYTES NFR BLD AUTO: 8.1 %
NEUTROPHILS # BLD AUTO: 6.7 10E9/L (ref 1.6–8.3)
NEUTROPHILS NFR BLD AUTO: 78.8 %
NT-PROBNP SERPL-MCNC: 2570 PG/ML (ref 0–1800)
PLATELET # BLD AUTO: 571 10E9/L (ref 150–450)
POTASSIUM SERPL-SCNC: 4.4 MMOL/L (ref 3.4–5.3)
PROT SERPL-MCNC: 6.6 G/DL (ref 6.8–8.8)
RBC # BLD AUTO: 2.85 10E12/L (ref 3.8–5.2)
SODIUM SERPL-SCNC: 141 MMOL/L (ref 133–144)
SPECIMEN EXP DATE BLD: NORMAL
WBC # BLD AUTO: 8.5 10E9/L (ref 4–11)

## 2017-02-24 PROCEDURE — 00000146 ZZHCL STATISTIC GLUCOSE BY METER IP

## 2017-02-24 PROCEDURE — 25000128 H RX IP 250 OP 636: Performed by: EMERGENCY MEDICINE

## 2017-02-24 PROCEDURE — 99285 EMERGENCY DEPT VISIT HI MDM: CPT | Mod: 25 | Performed by: EMERGENCY MEDICINE

## 2017-02-24 PROCEDURE — 86850 RBC ANTIBODY SCREEN: CPT | Performed by: EMERGENCY MEDICINE

## 2017-02-24 PROCEDURE — 80053 COMPREHEN METABOLIC PANEL: CPT | Performed by: EMERGENCY MEDICINE

## 2017-02-24 PROCEDURE — 85610 PROTHROMBIN TIME: CPT | Performed by: EMERGENCY MEDICINE

## 2017-02-24 PROCEDURE — 83880 ASSAY OF NATRIURETIC PEPTIDE: CPT | Performed by: EMERGENCY MEDICINE

## 2017-02-24 PROCEDURE — 86901 BLOOD TYPING SEROLOGIC RH(D): CPT | Performed by: EMERGENCY MEDICINE

## 2017-02-24 PROCEDURE — 71020 XR CHEST 2 VW: CPT | Mod: TC

## 2017-02-24 PROCEDURE — 93010 ELECTROCARDIOGRAM REPORT: CPT | Performed by: EMERGENCY MEDICINE

## 2017-02-24 PROCEDURE — 96361 HYDRATE IV INFUSION ADD-ON: CPT | Performed by: EMERGENCY MEDICINE

## 2017-02-24 PROCEDURE — 40000556 ZZH STATISTIC PERIPHERAL IV START W US GUIDANCE

## 2017-02-24 PROCEDURE — 99285 EMERGENCY DEPT VISIT HI MDM: CPT | Mod: 25

## 2017-02-24 PROCEDURE — 75635 CT ANGIO ABDOMINAL ARTERIES: CPT

## 2017-02-24 PROCEDURE — 86900 BLOOD TYPING SEROLOGIC ABO: CPT | Performed by: EMERGENCY MEDICINE

## 2017-02-24 PROCEDURE — 85025 COMPLETE CBC W/AUTO DIFF WBC: CPT | Performed by: EMERGENCY MEDICINE

## 2017-02-24 PROCEDURE — 93005 ELECTROCARDIOGRAM TRACING: CPT

## 2017-02-24 PROCEDURE — 99207 ZZC NO CHARGE NURSE ONLY: CPT | Performed by: FAMILY MEDICINE

## 2017-02-24 PROCEDURE — 99207 ZZC APP CREDIT; MD BILLING SHARED VISIT: CPT | Mod: Z6 | Performed by: EMERGENCY MEDICINE

## 2017-02-24 PROCEDURE — 93971 EXTREMITY STUDY: CPT | Mod: RT

## 2017-02-24 PROCEDURE — 25500064 ZZH RX 255 OP 636: Performed by: RADIOLOGY

## 2017-02-24 PROCEDURE — 83605 ASSAY OF LACTIC ACID: CPT | Performed by: EMERGENCY MEDICINE

## 2017-02-24 PROCEDURE — 96360 HYDRATION IV INFUSION INIT: CPT | Performed by: EMERGENCY MEDICINE

## 2017-02-24 PROCEDURE — 99223 1ST HOSP IP/OBS HIGH 75: CPT | Mod: GC | Performed by: INTERNAL MEDICINE

## 2017-02-24 PROCEDURE — 99285 EMERGENCY DEPT VISIT HI MDM: CPT | Performed by: EMERGENCY MEDICINE

## 2017-02-24 RX ORDER — LIDOCAINE HYDROCHLORIDE 10 MG/ML
INJECTION, SOLUTION EPIDURAL; INFILTRATION; INTRACAUDAL; PERINEURAL
Status: DISCONTINUED
Start: 2017-02-24 | End: 2017-02-25 | Stop reason: HOSPADM

## 2017-02-24 RX ORDER — SODIUM CHLORIDE 9 MG/ML
1000 INJECTION, SOLUTION INTRAVENOUS CONTINUOUS
Status: DISCONTINUED | OUTPATIENT
Start: 2017-02-24 | End: 2017-02-24 | Stop reason: HOSPADM

## 2017-02-24 RX ORDER — IOPAMIDOL 755 MG/ML
150 INJECTION, SOLUTION INTRAVASCULAR ONCE
Status: COMPLETED | OUTPATIENT
Start: 2017-02-24 | End: 2017-02-24

## 2017-02-24 RX ADMIN — IOPAMIDOL 150 ML: 755 INJECTION, SOLUTION INTRAVENOUS at 20:04

## 2017-02-24 RX ADMIN — SODIUM CHLORIDE 500 ML: 9 INJECTION, SOLUTION INTRAVENOUS at 22:51

## 2017-02-24 RX ADMIN — SODIUM CHLORIDE 1000 ML: 9 INJECTION, SOLUTION INTRAVENOUS at 14:25

## 2017-02-24 ASSESSMENT — ENCOUNTER SYMPTOMS
SHORTNESS OF BREATH: 0
VOMITING: 0
NUMBNESS: 0
ANAL BLEEDING: 0
DIARRHEA: 0
WEAKNESS: 0
NAUSEA: 0
HEADACHES: 0
ABDOMINAL PAIN: 0
BLOOD IN STOOL: 0

## 2017-02-24 NOTE — ED PROVIDER NOTES
History     Chief Complaint   Patient presents with     Wound Infection     HPI  Carlos Alberto Fenton is a 76 year old female who [facility]. Patient states she was in the hospital for several days and then has been home now for approximately one week. Patient states that she has been fairly well, but two days ago noticed that her toes on her right foot were becoming gangrenous. Patient was seen at the [facility] ED today where she was evaluated with a chest x-ray and some laboratory testing and then was sent here to the ER for further evaluation. Patient presents here with complaints of gangrenous toes on her right foot. Patient denies any vomiting, shortness of breath, any visual complaints, or focal numbness or weakness or headaches. Patient also denies any vomiting, diarrhea, melena, or bright blood per rectum.       This part of the document was transcribed by Lu Bazzi Scribe.   I have reviewed the Medications, Allergies, Past Medical and Surgical History, and Social History in the [employer] system.  Past Medical History   Diagnosis Date     Antiplatelet or antithrombotic long-term use      CAD (coronary artery disease)      2 vessel     Cancer (H)      Skin     Cerebral artery occlusion with cerebral infarction (H) 10/2016     Cerebral infarction (H) 10/2016     Diabetes (H)      Headaches      History of skin cancer      Hyperlipemias      Hypertension      Irregular heart beat      Peripheral neuropathy (H) 1990     cause unknown     Sleep apnea        Past Surgical History   Procedure Laterality Date     Hysterectomy, brenda  1988     Tonsillectomy  1942     C appendectomy  1959     Back surgery       Bypass graft artery coronary N/A 2/6/2017     Procedure: BYPASS GRAFT ARTERY CORONARY;  Surgeon: Mikhail Quiñones MD;  Location: [facility] OR     Maze procedure N/A 2/6/2017     Procedure: MAZE PROCEDURE;  Surgeon: Mikhail Quiñones,  MD;  Location:  OR       Family History   Problem Relation Age of Onset     DIABETES Mother      C.A.D. Mother      DIABETES Father      C.A.D. Father      Cardiovascular Sister      blood clot       Social History   Substance Use Topics     Smoking status: Former Smoker     Smokeless tobacco: Never Used     Alcohol use No     Previous Medications    ACETAMINOPHEN (TYLENOL) 325 MG TABLET    Take 1-2 tablets (325-650 mg) by mouth every 4 hours as needed for mild pain or fever    AMIODARONE (PACERONE/CODARONE) 200 MG TABLET    Take 2 tabs (400 mg) daily for 14 days, then decrease dose to 1 tab (200 mg) daily for 14 days, then stop    ASPIRIN  MG EC TABLET    Take 1 tablet (325 mg) by mouth every 6 hours as needed for moderate pain    BLOOD GLUCOSE MONITORING (ONE TOUCH ULTRA 2) METER DEVICE KIT        CLOTRIMAZOLE (LOTRIMIN) 1 % CREAM    Please apply to groins two times daily for one week after discharge and then follow up with PCP if no improvement of your skin rashes    FERROUS SULFATE (IRON SUPPLEMENT) 325 (65 FE) MG TABLET    Take 1 tablet (325 mg) by mouth daily (with breakfast)    FUROSEMIDE (LASIX) 20 MG TABLET    Take 1 tablet (20 mg) by mouth 2 times daily    GABAPENTIN (NEURONTIN) 100 MG CAPSULE    Take 1 capsule (100 mg) by mouth 2 times daily    HYDROMORPHONE (DILAUDID) 2 MG TABLET    Take 0.5 tablets (1 mg) by mouth every 4 hours as needed for moderate to severe pain    LOPERAMIDE (IMODIUM) 2 MG CAPSULE    Take 1 capsule (2 mg) by mouth 4 times daily as needed for diarrhea    MELATONIN 1 MG TABS TABLET    Take 1 tablet (1 mg) by mouth nightly as needed    ONE TOUCH ULTRA TEST STRIP    Test once daily    ONETOUCH DELICA LANCETS 33G MISC    1 Device daily    PANTOPRAZOLE (PROTONIX) 40 MG EC TABLET    Take 1 tablet (40 mg) by mouth every morning    POTASSIUM CHLORIDE SA (K-DUR/KLOR-CON M) 10 MEQ CR TABLET    Take 1 tablet (10 mEq) by mouth 2 times daily    SULFAMETHOXAZOLE-TRIMETHOPRIM (BACTRIM  DS/SEPTRA DS) 800-160 MG PER TABLET    Take 1 tablet by mouth 2 times daily    TRAZODONE (DESYREL) 50 MG TABLET    Take 0.5 tablets (25 mg) by mouth nightly as needed for sleep    VENLAFAXINE (EFFEXOR-ER) 150 MG TB24 24 HR TABLET    Take 1 tablet (150 mg) by mouth daily (with breakfast)    WARFARIN (COUMADIN) 2.5 MG TABLET    Take 2 tablets (5 mg) by mouth daily        Allergies   Allergen Reactions     Oxycodone      hallucinations     Adhesive Tape Itching and Rash     Diapers & Supplies Rash     Developed yeast infection from previous hospital stay       Review of Systems   Eyes: Negative for visual disturbance.   Respiratory: Negative for shortness of breath.    Gastrointestinal: Negative for abdominal pain, anal bleeding, blood in stool, diarrhea, nausea and vomiting.   Skin:        Positive for gangrenous right toes   Neurological: Negative for weakness, numbness and headaches.       Physical Exam   BP: 124/89  Pulse: 71  Heart Rate: 77  Temp: 97.5  F (36.4  C)  Resp: 18  Physical Exam   Constitutional: She is oriented to person, place, and time. No distress.   Alert and conversant   HENT:   Head: Atraumatic.   Eyes: EOM are normal. Pupils are equal, round, and reactive to light.   Neck: Neck supple.   Cardiovascular: Normal heart sounds.    Pulmonary/Chest: Breath sounds normal.   Postsurgical changes of the anterior chest wall   Abdominal: Soft. There is no tenderness.   Musculoskeletal:   Patient does have several gangrenous toes of the right foot with a difficult dorsalis pedis pulse to palpate.    Patient's left foot has weight ischemic toes of the 1st 3 toes of her foot but does have a fairly good dorsalis pedis pulse palpable   Neurological: She is alert and oriented to person, place, and time.   Grossly intact and symmetric   Skin: Skin is warm.   Psychiatric: She has a normal mood and affect.       ED Course     ED Course     Procedures        EKG reviewed from [facility] earlier today  revealed atrial fibrillation rate controlled at a rate of approximately 80.  There were no acute ST or T-wave changes noted for ischemia.  This is reviewed by me personally.    Chest x-ray at [facility] earlier today revealed :  FINDINGS: Postop findings of the heart and sternum are stable in  appearance. Linear left lateral lung atelectasis or scarring is again  noted. Subtle patchy density in the left base likely represents  atelectasis and/or scarring and is likely still postoperative. No  definite infiltrate is seen. Heart is mildly enlarged. No pneumothorax  or right pleural fluid collection. Small left pleural fluid collection  is difficult to exclude. No other changes.    IMPRESSION: Essentially stable findings of the chest since the most  recent prior study from 2/16/2017. No definite infiltrate or pulmonary  edema, to suggest congestive heart failure, is seen.    JAYDE PRIETO MD    Results for GREENBERGMENDOZA (MRN [id number]) as of 2/24/2017 17:52   Ref. Range 2/24/2017 12:20   Sodium Latest Ref Range: 133 - 144 mmol/L 141   Potassium Latest Ref Range: 3.4 - 5.3 mmol/L 4.4   Chloride Latest Ref Range: 94 - 109 mmol/L 104   Carbon Dioxide Latest Ref Range: 20 - 32 mmol/L 27   Urea Nitrogen Latest Ref Range: 7 - 30 mg/dL 12   Creatinine Latest Ref Range: 0.52 - 1.04 mg/dL 0.94   GFR Estimate Latest Ref Range: >60 mL/min/1.7m2 58 (L)   GFR Estimate If Black Latest Ref Range: >60 mL/min/1.7m2 70   Calcium Latest Ref Range: 8.5 - 10.1 mg/dL 8.5   Anion Gap Latest Ref Range: 3 - 14 mmol/L 10   Albumin Latest Ref Range: 3.4 - 5.0 g/dL 2.8 (L)   Protein Total Latest Ref Range: 6.8 - 8.8 g/dL 6.6 (L)   Bilirubin Total Latest Ref Range: 0.2 - 1.3 mg/dL 0.4   Alkaline Phosphatase Latest Ref Range: 40 - 150 U/L 103   ALT Latest Ref Range: 0 - 50 U/L 70 (H)   AST Latest Ref Range: 0 - 45 U/L 34   N-Terminal Pro BNP Inpatient Latest Ref Range: 0 - 1800 pg/mL 2570 (H)   Glucose Latest Ref Range: 70 - 99 mg/dL 128  (H)   WBC Latest Ref Range: 4.0 - 11.0 10e9/L 8.5   Hemoglobin Latest Ref Range: 11.7 - 15.7 g/dL 8.4 (L)   Hematocrit Latest Ref Range: 35.0 - 47.0 % 27.8 (L)   Platelet Count Latest Ref Range: 150 - 450 10e9/L 571 (H)   RBC Count Latest Ref Range: 3.8 - 5.2 10e12/L 2.85 (L)   MCV Latest Ref Range: 78 - 100 fl 98   MCH Latest Ref Range: 26.5 - 33.0 pg 29.5   MCHC Latest Ref Range: 31.5 - 36.5 g/dL 30.2 (L)   RDW Latest Ref Range: 10.0 - 15.0 % 21.4 (H)   Diff Method Unknown Automated Method   % Neutrophils Latest Units: % 78.8   % Lymphocytes Latest Units: % 8.2   % Monocytes Latest Units: % 8.1   % Eosinophils Latest Units: % 3.7   % Basophils Latest Units: % 0.7   % Immature Granulocytes Latest Units: % 0.5   Absolute Neutrophil Latest Ref Range: 1.6 - 8.3 10e9/L 6.7   Absolute Lymphocytes Latest Ref Range: 0.8 - 5.3 10e9/L 0.7 (L)   Absolute Monocytes Latest Ref Range: 0.0 - 1.3 10e9/L 0.7   Absolute Eosinophils Latest Ref Range: 0.0 - 0.7 10e9/L 0.3   Absolute Basophils Latest Ref Range: 0.0 - 0.2 10e9/L 0.1   Abs Immature Granulocytes Latest Ref Range: 0 - 0.4 10e9/L 0.0   INR Latest Ref Range: 0.86 - 1.14  6.99 (HH)     Results for orders placed or performed during the hospital encounter of 02/24/17 (from the past 24 hour(s))   Glucose by meter   Result Value Ref Range    Glucose 95 70 - 99 mg/dL   ABO/Rh type and screen   Result Value Ref Range    ABO A     RH(D)  Pos     Antibody Screen Neg     Test Valid Only At       [facility]    Specimen Expires 02/27/2017    Lactic acid whole blood   Result Value Ref Range    Lactic Acid 1.7 0.7 - 2.1 mmol/L       Labs Ordered and Resulted from Time of ED Arrival Up to the Time of Departure from the ED   GLUCOSE BY METER   LACTIC ACID WHOLE BLOOD   PERIPHERAL IV CATHETER   ABO/RH TYPE AND SCREEN       Assessments & Plan (with Medical Decision Making)     I have reviewed the nursing notes.    Medications   sodium chloride  (PF) 0.9% PF flush 3 mL (not administered)   sodium chloride (PF) 0.9% PF flush 3 mL ( Intracatheter Canceled Entry 2/24/17 1813)   dextrose 5% and 0.45% NaCl infusion (not administered)   lidocaine (PF) (XYLOCAINE) 1 % injection (not administered)   0.9% sodium chloride BOLUS (not administered)   sodium chloride (PF) 0.9% PF flush 85 mL (85 mLs Intravenous Given 2/24/17 2005)   iopamidol (ISOVUE-370) solution 150 mL (150 mLs Intravenous Given 2/24/17 2004)     Case was discussed with cardiology, CV surgery, vascular surgery and eventually medicine.  Vascular surgery recommended CT angiogram with runoff's to further evaluate the patient's potential thromboembolic status of her lower extremities but did not want to admit the patient primarily.  Several calls were placed back and forth between the services mentioned previously.    I have reviewed the findings, diagnosis, and plan with the patient.    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

## 2017-02-24 NOTE — IP AVS SNAPSHOT
MRN:[id number]                      After Visit Summary   2/24/2017    Carlos Alberto Fenton    MRN: [id number]           Thank you!     Thank you for choosing [facility] for your care. Our goal is always to provide you with excellent care. Hearing back from our patients is one way we can continue to improve our services. Please take a few minutes to complete the written survey that you may receive in the mail after you visit with us. Thank you!        Patient Information     Date Of Birth          1940        About your hospital stay     You were admitted on:  February 25, 2017 You last received care in the:  [address] [facility]    You were discharged on:  March 3, 2017        Reason for your hospital stay       You were in the hospital for a lack of blood flow to your toes. You were started on new medications to help prevent recurrence of this event.                  Who to Call     For medical emergencies, please call 911.  For non-urgent questions about your medical care, please call your primary care provider or clinic, None          Attending Provider     Provider Specialty    Gagan Molina MD Emergency Medicine    Bakari Azevedo MD Clinical Cardiac Electrophysiology       Primary Care Provider    Physician No Ref-Primary       No address on file        After Care Instructions     Activity       Your activity upon discharge: activity as tolerated            Diet       Follow this diet upon discharge: Orders Placed This Encounter      Fluid restriction 2000 ML FLUID      Combination Diet 2 gm NA Diet; Low Saturated Fat Diet            Discharge Instructions       You will START taking fondaparinux shots for the next month. You will then follow up with Dr. Bernal to discuss long term blood thinning medications. You will have follow up with Dr. Easton Torres in 1-2 weeks to evaluate your toe wounds. You should establish care with a primary doctor near your daughter after  discharge.                  Follow-up Appointments     Adult [facility] Follow-up and recommended labs and tests       1) Follow up with Dr. Bernal , at (location with clinic name or city) [facility] Heme/Onc, within 1 month  to evaluate medication change and for hospital follow- up. The following labs/tests are recommended: CBC and BMP.  2) Follow up in wound clinic at the [facility] [place]. Easton Torres every week for next two weeks.  3) Follow up with [facility] in one month (can be same day as your HemOnc followup)    Appointments on [place] and/or [facility] (with [facility] or [facility] provider or service). Call [phone] if you haven't heard regarding these appointments within 7 days of discharge.            Follow Up and recommended labs and tests       Dr. Humberto Conner  [facility]   Phone: [phone]  Fax: [phone]    Appointment on 3/8/17 at 2:45 PM with Dr. Humberto Conner for establishment of care and post hospitalization follow up and BMP lab draw                  Your next 10 appointments already scheduled     Mar 08, 2017  2:45 PM CST   Office Visit with Humberto Conner MD   [facility] ([facility])    [address] [address] [phone]           Bring a current list of meds and any records pertaining to this visit.  For Physicals, please bring immunization records and any forms needing to be filled out.  Please arrive 10 minutes early to complete paperwork.            Mar 20, 2017 11:45 AM CDT   CT CHEST W/O CONTRAST with MGCT1   [facility]    [address] [place] [address] [phone]           Please bring any scans or X-rays taken at other hospitals, if similar tests were done. Also bring a list of your medicines, including vitamins, minerals and over-the-counter drugs. It is safest to leave personal items at home.  Be sure to tell your doctor:   If you have any  allergies.   If there s any chance you are pregnant.   If you are breastfeeding.   If you have any special needs.  You do not need to do anything special to prepare.  Please wear loose clothing, such as a sweat suit or jogging clothes. Avoid snaps, zippers and other metal. We may ask you to undress and put on a hospital gown.            Mar 20, 2017  2:00 PM CDT   Office Visit with Nuha Bateman MD   [facility]    [address]  [place] [id number] [phone]           Bring a current list of meds and any records pertaining to this visit.  For Physicals, please bring immunization records and any forms needing to be filled out.  Please arrive 10 minutes early to complete paperwork.            Jul 14, 2017  1:30 PM CDT   (Arrive by 1:15 PM)   Return Visit with Star Lobato MD   [facility] ([facility])    [address]  [address]  [place] [address] [phone]              Additional Services     Home care nursing referral       [facility]   Phone: [phone]   Fax: [phone]    For RN eval post hospitalization.   Assess: vital signs, surgical site, skin on feet and toes, respiratory and cardiac status, activity tolerance, hydration, nutritional status.   Med set up and management.   PT/OT eval and treat for deconditioning, strengthening, and endurance.   HHA for assist with ADL's.    for referral to community resources.           Your provider has ordered home care nursing services. If you have not been contacted within 2 days of your discharge please call the inpatient department phone number at [phone] .                  Further instructions from your care team       Wound Care  Right and left toes:  -Wash with soap and water  -Dry  -Place lambs wool in between affected toes and wrap with kerlix    Left leg graft site  -Wash with soap and  "water  -Dry  -Apply dry gauze over the site    Pending Results     No orders found from 2/22/2017 to 2/25/2017.            Statement of Approval     Ordered          03/03/17 0931  I have reviewed and agree with all the recommendations and orders detailed in this document.  EFFECTIVE NOW     Approved and electronically signed by:  Jace Aguilera MD             Admission Information     Date & Time Department Dept. Phone    2/24/2017 [address] [facility] [phone]      Your Vitals Were     Blood Pressure Pulse Temperature Respirations Height Weight    114/78 (BP Location: Left arm) 73 97.7  F (36.5  C) (Oral) 18 1.575 m (5' 2\") 79 kg (174 lb 1.6 oz)    Pulse Oximetry BMI (Body Mass Index)                90% 31.84 kg/m2          [facility] Information     [employer] gives you secure access to your electronic health record. If you see a primary care provider, you can also send messages to your care team and make appointments. If you have questions, please call your primary care clinic.  If you do not have a primary care provider, please call [phone] and they will assist you.        Care EveryWhere ID     This is your Care EveryWhere ID. This could be used by other organizations to access your [facility] medical records  [id number]           Review of your medicines      START taking        Dose / Directions    aspirin 81 MG chewable tablet   Used for:  Coronary artery disease with angina pectoris, unspecified vessel or lesion type, unspecified whether native or transplanted heart (H)   Replaces:  aspirin  MG EC tablet        Dose:  81 mg   Take 1 tablet (81 mg) by mouth daily   Quantity:  30 tablet   Refills:  0       atorvastatin 40 MG tablet   Commonly known as:  LIPITOR   Used for:  Coronary artery disease with angina pectoris, unspecified vessel or lesion type, unspecified whether native or transplanted heart (H)        Dose:  40 mg   Take 1 tablet (40 mg) by mouth daily   Quantity:  30 tablet "   Refills:  0       cephALEXin 500 MG capsule   Commonly known as:  KEFLEX   Indication:  Skin and Soft Tissue Infection, Surgical Prophylaxis   Used for:  Wound drainage        Dose:  500 mg   Take 1 capsule (500 mg) by mouth every 6 hours   Quantity:  28 capsule   Refills:  0       fondaparinux 7.5MG/0.6ML injection   Commonly known as:  ARIXTRA   Used for:  Long-term (current) use of anticoagulants        Dose:  7.5 mg   Inject 0.6 mLs (7.5 mg) Subcutaneous daily   Quantity:  40 Syringe   Refills:  0         CONTINUE these medicines which may have CHANGED, or have new prescriptions. If we are uncertain of the size of tablets/capsules you have at home, strength may be listed as something that might have changed.        Dose / Directions    furosemide 40 MG tablet   Commonly known as:  LASIX   This may have changed:    - medication strength  - how much to take  - when to take this   Used for:  Bilateral edema of lower extremity        Dose:  40 mg   Take 1 tablet (40 mg) by mouth 2 times daily   Quantity:  60 tablet   Refills:  1         CONTINUE these medicines which have NOT CHANGED        Dose / Directions    acetaminophen 325 MG tablet   Commonly known as:  TYLENOL   Used for:  S/P CABG (coronary artery bypass graft)        Dose:  325-650 mg   Take 1-2 tablets (325-650 mg) by mouth every 4 hours as needed for mild pain or fever   Quantity:  100 tablet   Refills:  0       amiodarone 200 MG tablet   Commonly known as:  PACERONE/CODARONE   Used for:  New onset atrial fibrillation (H)        Take 2 tabs (400 mg) daily for 14 days, then decrease dose to 1 tab (200 mg) daily for 14 days, then stop   Quantity:  45 tablet   Refills:  0       blood glucose monitoring meter device kit        Refills:  0       clotrimazole 1 % cream   Commonly known as:  LOTRIMIN   Used for:  Tinea cruris        Please apply to groins two times daily for one week after discharge and then follow up with PCP if no improvement of your skin  rashes   Quantity:  12 g   Refills:  0       ferrous sulfate 325 (65 FE) MG tablet   Commonly known as:  IRON SUPPLEMENT   Used for:  S/P CABG (coronary artery bypass graft)        Dose:  325 mg   Take 1 tablet (325 mg) by mouth daily (with breakfast)   Quantity:  100 tablet   Refills:  1       gabapentin 100 MG capsule   Commonly known as:  NEURONTIN   Used for:  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        Dose:  100 mg   Take 1 capsule (100 mg) by mouth 2 times daily   Quantity:  60 capsule   Refills:  3       HYDROmorphone 2 MG tablet   Commonly known as:  DILAUDID   Used for:  S/P CABG (coronary artery bypass graft)        Dose:  1 mg   Take 0.5 tablets (1 mg) by mouth every 4 hours as needed for moderate to severe pain   Quantity:  30 tablet   Refills:  0       loperamide 2 MG capsule   Commonly known as:  IMODIUM   Used for:  Diarrhea, unspecified type        Dose:  2 mg   Take 1 capsule (2 mg) by mouth 4 times daily as needed for diarrhea   Quantity:  20 capsule   Refills:  0       melatonin 1 MG Tabs tablet   Used for:  Insomnia due to medical condition        Dose:  1 mg   Take 1 tablet (1 mg) by mouth nightly as needed   Quantity:  30 tablet   Refills:  0       ONE TOUCH ULTRA test strip   Used for:  Type 2 diabetes mellitus without complication, without long-term current use of insulin (H)   Generic drug:  blood glucose monitoring        Test once daily   Quantity:  100 each   Refills:  0       ONETOUCH DELICA LANCETS 33G Misc   Used for:  Type 2 diabetes mellitus without complication, without long-term current use of insulin (H)        Dose:  1 Device   1 Device daily   Quantity:  100 each   Refills:  0       pantoprazole 40 MG EC tablet   Commonly known as:  PROTONIX   Used for:  Coronary artery disease with angina pectoris, unspecified vessel or lesion type, unspecified whether native or transplanted heart (H)        Dose:  40 mg   Take 1 tablet (40 mg) by mouth every morning   Quantity:  30 tablet    Refills:  0       potassium chloride SA 10 MEQ CR tablet   Commonly known as:  K-DUR/KLOR-CON M   Used for:  S/P CABG (coronary artery bypass graft)        Dose:  10 mEq   Take 1 tablet (10 mEq) by mouth 2 times daily   Quantity:  60 tablet   Refills:  0       traZODone 50 MG tablet   Commonly known as:  DESYREL   Used for:  Primary insomnia        Dose:  25 mg   Take 0.5 tablets (25 mg) by mouth nightly as needed for sleep   Quantity:  45 tablet   Refills:  2       venlafaxine 150 MG Tb24 24 hr tablet   Commonly known as:  EFFEXOR-ER   Used for:  Adjustment disorder with depressed mood        Dose:  150 mg   Take 1 tablet (150 mg) by mouth daily (with breakfast)   Quantity:  90 each   Refills:  1         STOP taking     aspirin  MG EC tablet   Replaced by:  aspirin 81 MG chewable tablet           sulfamethoxazole-trimethoprim 800-160 MG per tablet   Commonly known as:  BACTRIM DS/SEPTRA DS           warfarin 2.5 MG tablet   Commonly known as:  COUMADIN                Where to get your medicines      These medications were sent to [address] [address]     Phone:  [phone]     atorvastatin 40 MG tablet    fondaparinux 7.5MG/0.6ML injection         These medications were sent to [address] [address]     Phone:  [phone]     aspirin 81 MG chewable tablet    cephALEXin 500 MG capsule    furosemide 40 MG tablet                Protect others around you: Learn how to safely use, store and throw away your medicines at www.disposemymeds.org.             Medication List: This is a list of all your medications and when to take them. Check marks below indicate your daily home schedule. Keep this list as a reference.      Medications           Morning Afternoon Evening Bedtime As Needed    acetaminophen 325 MG tablet   Commonly known as:   TYLENOL   Take 1-2 tablets (325-650 mg) by mouth every 4 hours as needed for mild pain or fever   Last time this was given:  650 mg on 3/2/2017 10:16 PM                                   amiodarone 200 MG tablet   Commonly known as:  PACERONE/CODARONE   Take 2 tabs (400 mg) daily for 14 days, then decrease dose to 1 tab (200 mg) daily for 14 days, then stop   Last time this was given:  200 mg on 3/3/2017  8:22 AM                                   aspirin 81 MG chewable tablet   Take 1 tablet (81 mg) by mouth daily   Last time this was given:  81 mg on 3/3/2017  8:22 AM                                   atorvastatin 40 MG tablet   Commonly known as:  LIPITOR   Take 1 tablet (40 mg) by mouth daily   Last time this was given:  40 mg on 3/2/2017  8:51 PM                                   blood glucose monitoring meter device kit                                   cephALEXin 500 MG capsule   Commonly known as:  KEFLEX   Take 1 capsule (500 mg) by mouth every 6 hours   Last time this was given:  500 mg on 3/3/2017 12:38 PM                                            clotrimazole 1 % cream   Commonly known as:  LOTRIMIN   Please apply to groins two times daily for one week after discharge and then follow up with PCP if no improvement of your skin rashes   Last time this was given:  3/3/2017  8:35 AM                                      ferrous sulfate 325 (65 FE) MG tablet   Commonly known as:  IRON SUPPLEMENT   Take 1 tablet (325 mg) by mouth daily (with breakfast)                                   fondaparinux 7.5MG/0.6ML injection   Commonly known as:  ARIXTRA   Inject 0.6 mLs (7.5 mg) Subcutaneous daily   Last time this was given:  7.5 mg on 3/3/2017  8:23 AM                                   furosemide 40 MG tablet   Commonly known as:  LASIX   Take 1 tablet (40 mg) by mouth 2 times daily   Last time this was given:  40 mg on 3/3/2017  8:34 AM                                gabapentin 100 MG capsule   Commonly known as:   NEURONTIN   Take 1 capsule (100 mg) by mouth 2 times daily   Last time this was given:  100 mg on 3/3/2017  8:22 AM                                      HYDROmorphone 2 MG tablet   Commonly known as:  DILAUDID   Take 0.5 tablets (1 mg) by mouth every 4 hours as needed for moderate to severe pain                                   loperamide 2 MG capsule   Commonly known as:  IMODIUM   Take 1 capsule (2 mg) by mouth 4 times daily as needed for diarrhea                                   melatonin 1 MG Tabs tablet   Take 1 tablet (1 mg) by mouth nightly as needed                                   ONE TOUCH ULTRA test strip   Test once daily   Generic drug:  blood glucose monitoring                                   ONETOUCH DELICA LANCETS 33G Misc   1 Device daily                                   pantoprazole 40 MG EC tablet   Commonly known as:  PROTONIX   Take 1 tablet (40 mg) by mouth every morning   Last time this was given:  40 mg on 3/3/2017  8:22 AM                                   potassium chloride SA 10 MEQ CR tablet   Commonly known as:  K-DUR/KLOR-CON M   Take 1 tablet (10 mEq) by mouth 2 times daily   Last time this was given:  20 mEq on 3/2/2017  5:11 PM                                      traZODone 50 MG tablet   Commonly known as:  DESYREL   Take 0.5 tablets (25 mg) by mouth nightly as needed for sleep   Last time this was given:  25 mg on 3/2/2017 10:57 PM                                   venlafaxine 150 MG Tb24 24 hr tablet   Commonly known as:  EFFEXOR-ER   Take 1 tablet (150 mg) by mouth daily (with breakfast)   Last time this was given:  150 mg on 3/3/2017  8:22 AM

## 2017-02-24 NOTE — ED NOTES
Bed: ED04  Expected date: 2/24/17  Expected time: 4:07 PM  Means of arrival: Ambulance  Comments:  Carols Alberto Fenton; 77 y/o woman senf from O[facility] ([facility]) for necrotic toes. Hx of A fib. Recent cardiac bypass surgery.    RN Report:  Coming from [facility]. 2 weeks post triple bypass. Gangrene bilateral feet. Black toes.   INR ok  No podiatrist avail..  Coming to  for podiatry and Ortho  Has bad neuropathy.

## 2017-02-24 NOTE — ED PROVIDER NOTES
History     Chief Complaint   Patient presents with     Foot Pain     HPI  Carlos Alberto Fenton is a 76 year old female who brought in for one-week history of discoloration of her toes that has progressed.  Patient has mild to moderate foot pain.  There is been blisters on the tops of her toes.  On the right great toe this blister has opened and drained.  No redness of the foot.  No fever or chills.  Her graft site has been draining some clear discharge but again no redness in the area.  Her sternal site is scabbed but no drainage.  She denies chest pain.  She's had improving shortness of breath.  No cough.  Currently denies nausea, vomiting, or diarrhea.  No change in her urinary symptoms.  Patient had coronary bypass on February 6.  She did have a history of A. 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  She was off Coumadin for her treatment and she is unsure how she was treated postoperatively from an anticoagulation standpoint.  She is also on aspirin.  Her toes are affected but hands, ears and nose are not affected.  Her INR has been elevated over 5 on last check.  She denies trauma to the feet.    I have reviewed the Medications, Allergies, Past Medical and Surgical History, and Social History in the Epic system.    Review of Systems all other systems reviewed and are negative.  Past Medical History   Diagnosis Date     Antiplatelet or antithrombotic long-term use      CAD (coronary artery disease)      2 vessel     Cancer (H)      Skin     Cerebral artery occlusion with cerebral infarction (H) 10/2016     Cerebral infarction (H) 10/2016     Diabetes (H)      Headaches      History of skin cancer      Hyperlipemias      Hypertension      Irregular heart beat      Peripheral neuropathy (H) 1990     cause unknown     Sleep apnea      Patient Active Problem List   Diagnosis     Peripheral neuropathy (H)     Hyperlipidemia     Headache     History of skin cancer     Hyperlipidemia LDL goal <130     Pelvic  floor dysfunction     Urge incontinence of urine     Generalized muscle weakness     Intractable chronic migraine without aura     Essential hypertension     Hypoxemia     Hyperglycemia     Elevated brain natriuretic peptide (BNP) level     Pulmonary nodule, right     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)     Sinus bradycardia     Fever, unspecified     New onset atrial fibrillation (H)     Stroke (H)     Seizures (H)     CVA (cerebral vascular accident) (H)     Long-term (current) use of anticoagulants [Z79.01]     Adjustment disorder with depressed mood     Primary insomnia     Paroxysmal atrial fibrillation (H)     Essential hypertension with goal blood pressure less than 140/90     Edema, unspecified type     Coronary artery disease     Atrial fibrillation with rapid ventricular response (H)     Current Facility-Administered Medications   Medication     0.9% sodium chloride BOLUS    Followed by     0.9% sodium chloride infusion     Current Outpatient Prescriptions   Medication     melatonin 1 MG TABS tablet     acetaminophen (TYLENOL) 325 MG tablet     aspirin  MG EC tablet     amiodarone (PACERONE/CODARONE) 200 MG tablet     gabapentin (NEURONTIN) 100 MG capsule     furosemide (LASIX) 20 MG tablet     potassium chloride SA (K-DUR/KLOR-CON M) 10 MEQ CR tablet     pantoprazole (PROTONIX) 40 MG EC tablet     warfarin (COUMADIN) 2.5 MG tablet     ferrous sulfate (IRON SUPPLEMENT) 325 (65 FE) MG tablet     traZODone (DESYREL) 50 MG tablet     venlafaxine (EFFEXOR-ER) 150 MG TB24 24 hr tablet     HYDROmorphone (DILAUDID) 2 MG tablet     loperamide (IMODIUM) 2 MG capsule     sulfamethoxazole-trimethoprim (BACTRIM DS/SEPTRA DS) 800-160 MG per tablet     ONETOUCH DELICA LANCETS 33G MISC     ONE TOUCH ULTRA test strip     clotrimazole (LOTRIMIN) 1 % cream     blood glucose monitoring (ONE TOUCH ULTRA 2) meter device kit        Allergies   Allergen Reactions     Oxycodone       hallucinations     Adhesive Tape Itching and Rash     Diapers & Supplies Rash     Developed yeast infection from previous hospital stay     Social History     Social History     Marital status:      Spouse name: N/A     Number of children: N/A     Years of education: N/A     Occupational History     Not on file.     Social History Main Topics     Smoking status: Former Smoker     Smokeless tobacco: Never Used     Alcohol use No     Drug use: No     Sexual activity: No     Other Topics Concern     Parent/Sibling W/ Cabg, Mi Or Angioplasty Before 65f 55m? Yes     Social History Narrative     Family History   Problem Relation Age of Onset     DIABETES Mother      C.A.D. Mother      DIABETES Father      C.A.D. Father      Cardiovascular Sister      blood clot           Physical Exam   BP: 138/86  Pulse: 75  Heart Rate: 75  Temp: 97.5  F (36.4  C)  Resp: 16  Weight: 77.1 kg (170 lb)  SpO2: 95 %  Physical Exam Gen. alert cooperative female in mild distress.  Examination of her lungs reveal basilar crackles but no adventitious wheezes.  Neck bradycardic with regular rate.  Midline chest incision is intact without signs of drainage or infection.  Examination of her extremities reveal the graft site with scant discharge.  No surrounding erythema.  Examination of her feet revealed palpable pulses in the feet feel warm.  Her toes however reveal serous sanguinous blisters on the tops of the toes of her right foot.  The great toe has ruptured and drained.  On the plantar aspect of the 1st 2nd and 3rd toe the toe is black and hard to the touch.  The other toes are  discolored but not black.  On her left foot she has the tips of her 2nd and 3rd toe is black in color.  The other toes are somewhat discolored.  No draining lesions on that foot.    ED Course     ED Course     Procedures             EKG Interpretation:      Interpreted by Brian Shields  Time reviewed: 11:45  Symptoms at time of EKG: sob   Rhythm: normal sinus    Rate: Normal and Bradycardia  Axis: Normal  Ectopy: none  Conduction: normal  ST Segments/ T Waves: Non-specific ST-T wave changes  Q Waves: none  Comparison to prior: Compared to previous EKG the T waves in V3 through V5 are not as prominent as previous.    Clinical Impression: sinus bradycardia                Critical Care time:  none               Labs Ordered and Resulted from Time of ED Arrival Up to the Time of Departure from the ED   CBC WITH PLATELETS DIFFERENTIAL - Abnormal; Notable for the following:        Result Value    RBC Count 2.85 (*)     Hemoglobin 8.4 (*)     Hematocrit 27.8 (*)     MCHC 30.2 (*)     RDW 21.4 (*)     Platelet Count 571 (*)     Absolute Lymphocytes 0.7 (*)     All other components within normal limits   INR - Abnormal; Notable for the following:     INR 6.99 (*)     All other components within normal limits   COMPREHENSIVE METABOLIC PANEL - Abnormal; Notable for the following:     Glucose 128 (*)     GFR Estimate 58 (*)     Albumin 2.8 (*)     Protein Total 6.6 (*)     ALT 70 (*)     All other components within normal limits   NT PROBNP INPATIENT - Abnormal; Notable for the following:     N-Terminal Pro BNP Inpatient 2570 (*)     All other components within normal limits       Assessments & Plan (with Medical Decision Making)   Patient is a 75 yo female presents with discoloration of her toes especially on the right foot.  Patient had recently coronary bypass surgery on February 6.  She did have a history of A. fib and previous embolic CVA.  She was previously on Coumadin prior to and after surgery.  Anticoagulation during surgery is unclear around the surgical procedure.  She is currently on Coumadin and aspirin.  Her INR is supratherapeutic at 6.99.  Question if the patient had an embolic phenomena redated during the surgical procedure affecting the above-described digits.  Do not think this is an acute arterial occlusion at this time.  Her feet are warm.  She has palpable  pulses.  The foot has blisters described above.  There is no secondary infection.  Question viability of the above-described toes.  I contact with Dr. Hernandez from podiatry and he felt the patient could follow-up in the clinic on Monday or Tuesday.  Did not think she needed emergent intervention.  However when the patient talked to her daughter she was quite concerned and adamant that the patient be transferred back to [facility] emergency room for a 2nd opinion and assessment by her cardiac surgeon.  I tried to reason with the patient with her current findings and plan but at this point she was adamant in having the patient transferred.  I discussed this with the provider in the emergency room at [facility] and they will accept care and evaluation on transfer.  The patient be transferred by ambulance at family request.  I have reviewed the nursing notes.    I have reviewed the findings, diagnosis, plan and need for follow up with the patient.    New Prescriptions    No medications on file       Final diagnoses:   Bilateral foot pain   Necrotic toes (H)   Elevated INR       2/24/2017   [facility] EMERGENCY DEPARTMENT     Brian Shields MD  02/24/17 9195

## 2017-02-24 NOTE — ED NOTES
Patient recently had a triple bypass. Noticed about a week ago her toes discoloring, now almost all toes on both feet are black/purple.

## 2017-02-24 NOTE — TELEPHONE ENCOUNTER
I have attempted to contact HC nurse by phone, left message to return call at [phone] or [phone].  Will try again later.  Pt had norovirus earlier this week for a day or two. Also need to verify that pt was using 2.5 mg tablets vs 5 mg tabs with her dosing.     Catrachita Lacy, RN- Coumadin Clinic RN

## 2017-02-24 NOTE — MR AVS SNAPSHOT
Carlos Alberto Fenton   2/24/2017   Anticoagulation Therapy Visit    Description:  76 year old female   Provider:  Nuha Bateman MD   Department:  Er Anticoag           INR as of 2/24/2017     Today's INR 7.0!      Anticoagulation Summary as of 2/24/2017     INR goal 2.0-3.0   Today's INR 7.0!   Full instructions 2/24: Hold; 2/25: Hold; 2/26: 1.25 mg   Next INR check 2/27/2017    Indications   Long-term (current) use of anticoagulants [Z79.01] [Z79.01]  CVA (cerebral vascular accident) (H) [I63.9]  New onset atrial fibrillation (H) [I48.91]         Contact Numbers     Clinic Number:         February 2017 Details    Sun Mon Tue Wed Thu Fri Sat        1               2               3               4                 5               6               7               8               9               10               11                 12               13               14               15               16               17               18                 19               20               21               22               23               24      Hold   See details      25      Hold           26      1.25 mg         27            28                    Date Details   02/24 This INR check       Date of next INR:  2/27/2017         How to take your warfarin dose     To take:  1.25 mg Take 0.5 of a 2.5 mg tablet.    Hold Do not take your warfarin dose. See the Details table to the right for additional instructions.

## 2017-02-24 NOTE — PROGRESS NOTES
Care Coordinator Progress Note     Admission Date/Time:  2/24/2017  Attending MD:  Currently unassigned     Data  Chart reviewed, discussed with interdisciplinary team.   Patient was seen in the ED for blackened toes.      Concerns with insurance coverage for discharge needs: No concerns - Medicare plus [employer] supplement.    Current Living Situation: Patient lives with family (daughter) in a single family home in [place], [place].  Permanent home is with spouse in [place], but she is staying with her daughter to recuperate.      Support System: Supportive and Involved - daughter,     Services Involved: Home Care and Anticoagulation monitoring     Home Care Agency    Transportation:   Needs assessment   Transferred from [facility] ED to [facility] ED via EMS.  Daughter provided transportation home from hospital on recent discharge.    Barriers to Discharge: chronically ill and physical impairment    Coordination of Care and Referrals: Received call from Katy Card RN, [facility] Liaison with report from home care RN re: patient sent to ED at [facility] this morning for blackening of first three toes on right foot.  Discharged 2/17/17 from [facility] after CABG 2/6/17.  Considered TCU but opted to d/c home with home care and family assistance.  New warfarin - INR being drawn by home care and followed by PCP at [facility]. [facility] following for RN, PT, OT, HHA, and SW - had RN visit on Monday and today, other home services have not started yet. Notes and home RN report indicate that there has been some confusion about warfarin dosing over the last several days.  INR today was 7+ and pt was instructed to hold dose today and tomorrow and take 1.5mg on Sunday.  Next INR draw scheduled for primary care appointment on Monday 2/27.  Next home RN visit scheduled for Tuesday 2/28.       Did not assess pt in person this evening - no MD has seen as of this writing.         Assessment  Patient continues to recover from CABG early this month, presenting for evaluation of potential complication.  No indication from home care that patient needs a different level of care on discharge, but will depend on clinical course.     Plan  Anticipated Discharge Date:  Pending ED evaluation  Anticipated Discharge Plan:  Likely return to daughter's home with resumption of home care and INR monitoring.  If admitted, will likely require further assessment from RN CC.    Harvey Mackay RN, PHN, ACM  RN Care Coordinator     [facility]   Emergency Department and 6D Observation Unit  [address] [place] [address]   [email or web address]  [email or web address]   Office: [phone]  Pager: [phone]  Please call or e-mail for fax contact information.

## 2017-02-24 NOTE — PROGRESS NOTES
Updated:   S/O:  Chantal from  home care is left message about pt's INR today of 7.0.  Carlos Alberto Fenton is on Coumadin for A. Fib and CVA.  Current Coumadin dose is 5 Fri, 5 Sat, 5 Sun, 0 Mon, 1.25 Tu, 2.5 Wed, 5 Thu=23.75 mg.   Concerns today are: HC nurse reports that pt has a few toes on her right foot that are blackish and stiff and weight gain. HC nurse called 911 and pt is now in the ED being assessed.     A/P: Carlos Alberto Fenton's INR is Supratherapeutic  for his/her goal range of 2 - 3.  Reasons why INR is out of range may include: Pt recently had norovirus, pt used wrong tablets for her recommended dosing (took too much).  Recommended to have Carlos Alberto Fenton Hold dose Fri, Sat and 1. 25 mg Sun (if she goes home from the [facility]) and to have his/her INR rechecked in 3 days on 2/27/17.      Catrachita Lacy RN- Coumadin Clinic RN

## 2017-02-24 NOTE — TELEPHONE ENCOUNTER
Reason for call:  INR Reason for Call:  INR    Who is calling?  Home Care: [facility]    Phone number:  [phone]    Name of caller: Chantal    INR Value:  7.0    Are there any other concerns:  Yes: Please call asap today. States ok to lm    Can we leave a detailed message on this number? YES    Phone number patient can be reached at: Home number on file [phone] (home)      Call taken on 2/24/2017 at 10:24 AM by Ester Logan

## 2017-02-24 NOTE — PROGRESS NOTES
ANTICOAGULATION FOLLOW-UP CLINIC VISIT    Patient Name:  Carlos Alberto Fenton  Date:  2/24/2017  Contact Type:  Telephone/ Left message for AMIRA Cornejo nurse    SUBJECTIVE:     Patient Findings     Comments S/O:  Chantal from  home care is left message about pt's INR today of 7.0.  Carlos Alberto Fenton is on Coumadin for A. Fib and CVA.  Current Coumadin dose is 5 Fri, 5 Sat, 5 Sun, 0 Mon, 1.25 Tu, 1.25 Wed, 2.5 Thu=20 mg.   Concerns today are: Requested for Chantal to call clinic back to discuss high INR, assess symptoms and confirm recheck on Monday.    A/P: Carlos Alberto Fenton's INR is Supratherapeutic  for his/her goal range of 2 - 3.  Reasons why INR is out of range may include:Pt recently had norovirus.  Recommended to have Carlos Alberto Fenton Hold dose Fri, take 1.25 mg Sat and 2.5 mg Sun and to have his/her INR rechecked in 3 days on 2/27/17.      Gordo Brunner RN, BSN               OBJECTIVE    INR   Date Value Ref Range Status   02/24/2017 7.0  Final       ASSESSMENT / PLAN  INR assessment SUPRA    Recheck INR In: 3 DAYS    INR Location Homecare INR      Anticoagulation Summary as of 2/24/2017     INR goal 2.0-3.0   Today's INR 7.0!   Maintenance plan No maintenance plan   Full instructions 2/24: Hold; 2/25: 1.25 mg; 2/26: 2.5 mg   Plan last modified Catrachita Lacy, RN (2/20/2017)   Next INR check 2/27/2017   Target end date     Indications   Long-term (current) use of anticoagulants [Z79.01] [Z79.01]  CVA (cerebral vascular accident) (H) [I63.9]  New onset atrial fibrillation (H) [I48.91]         Anticoagulation Episode Summary     INR check location     Preferred lab     Send INR reminders to MIHM POOL    Comments 5 mg tablets, pm dose      Anticoagulation Care Providers     Provider Role Specialty Phone number    Nuha Bateman MD [facility] [phone]            See the Encounter Report to view Anticoagulation Flowsheet and Dosing Calendar (Go to Encounters tab in chart review, and find the  Anticoagulation Therapy Visit)    Dosage adjustment made based on physician directed care plan.    Gordo Brunner RN

## 2017-02-24 NOTE — TELEPHONE ENCOUNTER
Received phone call back from HC nurse, Chantal.  States that pt took 2.5 mg on Wednesday instead of 1.25 mg and took 5 mg on Thursday instead of 2.5 mg.  States that she sent pt to the ER via ambulance for SOB, retaining fluid, and blackness in several toes.  States pt might be hospitalized, planning to discuss plan with another INR nurse and call HC nurse back to confirm plan.    Gordo Brunner RN, BSN

## 2017-02-24 NOTE — LETTER
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]  Anemia, unspecified type [D64.9]  Admit Date/Time: 2/24/2017  4:21 PM  Discharge Date: 3/3/2017  Payor Source: Payor: MEDICA / Plan: MEDICA PRIME SOLUTION / Product Type: Indemnity /       Reason for Communication Hand-off Referral: Multiple providers/specialties    Discharge Plan:       Concern for non-adherence with plan of care:   No  Discharge Needs Assessment:  Needs       Most Recent Value    Equipment Currently Used at Home none    Transportation Available car, family or friend will provide    Other Resources Other (see comment) [[facility]]    Home Care [facility] [phone], Fax: [phone]    Other -- [[facility]]            Follow-up specialty is recommended: Yes    Follow-up plan:  Future Appointments  Date Time Provider Department Center   3/8/2017 2:45 PM Humberto Conner MD [facility]   3/20/2017 11:45 AM [facility] [place]   3/20/2017 2:00 PM Nuha Bateman MD [facility]   7/14/2017 1:30 PM Star Lobato MD [facility]       Any outstanding tests or procedures:        Referrals     Future Labs/Procedures    Home care nursing referral     Comments:    [facility]   Phone: [phone]   Fax: [phone]    For RN eval post hospitalization.   Assess: vital signs, surgical site, skin on feet and toes, respiratory and cardiac status, activity tolerance, hydration, nutritional status.   Med set up and management.   PT/OT eval and treat for deconditioning, strengthening, and endurance.   HHA for assist with ADL's.    for  referral to community resources.           Your provider has ordered home care nursing services. If you have not been contacted within 2 days of your discharge please call the inpatient department phone number at [phone] .            Nunez Recommendations:  Post hospitalization follow up.    Lindsey Colmenares RN BSN  6C Unit Care Coordinator  Phone number: [phone]  Pager: [phone]      AVS/Discharge Summary is the source of truth; this is a helpful guide for improved communication of patient story

## 2017-02-24 NOTE — IP AVS SNAPSHOT
[address] [address]    Phone:  [phone]                                       After Visit Summary   2/24/2017    Carlos Alberto Fenton    MRN: [id number]           After Visit Summary Signature Page     I have received my discharge instructions, and my questions have been answered. I have discussed any challenges I see with this plan with the nurse or doctor.    ..........................................................................................................................................  Patient/Patient Representative Signature      ..........................................................................................................................................  Patient Representative Print Name and Relationship to Patient    ..................................................               ................................................  Date                                            Time    ..........................................................................................................................................  Reviewed by Signature/Title    ...................................................              ..............................................  Date                                                            Time

## 2017-02-24 NOTE — TELEPHONE ENCOUNTER
Telephone call back to Chantal, no answer.  Left message with dosing instructions and plan, but requested for Chantal to call back to ask about symptoms/cause of high INR and confirm INR recheck.  Anticoagulation encounter open still.    Gordo Brunner RN, BSN

## 2017-02-25 ENCOUNTER — HOSPITAL ENCOUNTER (INPATIENT)
Dept: VASCULAR ULTRASOUND | Facility: CLINIC | Age: 77
DRG: 813 | End: 2017-02-25
Attending: INTERNAL MEDICINE
Payer: MEDICARE

## 2017-02-25 PROBLEM — I96 TOE GANGRENE (H): Status: ACTIVE | Noted: 2017-02-25

## 2017-02-25 PROBLEM — I82.A11: Status: ACTIVE | Noted: 2017-02-25

## 2017-02-25 PROBLEM — I96 GANGRENE (H): Status: ACTIVE | Noted: 2017-02-25

## 2017-02-25 LAB
AMYLASE SERPL-CCNC: 55 U/L (ref 30–110)
AMYLASE SERPL-CCNC: NORMAL U/L (ref 30–110)
ANION GAP SERPL CALCULATED.3IONS-SCNC: 9 MMOL/L (ref 3–14)
ANION GAP SERPL CALCULATED.3IONS-SCNC: NORMAL MMOL/L (ref 6–17)
APTT PPP: 70 SEC (ref 22–37)
BASOPHILS # BLD AUTO: 0 10E9/L (ref 0–0.2)
BASOPHILS # BLD AUTO: 0.1 10E9/L (ref 0–0.2)
BASOPHILS NFR BLD AUTO: 0.4 %
BASOPHILS NFR BLD AUTO: 0.6 %
BUN SERPL-MCNC: 12 MG/DL (ref 7–30)
BUN SERPL-MCNC: NORMAL MG/DL (ref 7–30)
CALCIUM SERPL-MCNC: 8.7 MG/DL (ref 8.5–10.1)
CALCIUM SERPL-MCNC: NORMAL MG/DL (ref 8.5–10.1)
CHLORIDE SERPL-SCNC: 102 MMOL/L (ref 94–109)
CHLORIDE SERPL-SCNC: NORMAL MMOL/L (ref 94–109)
CO2 SERPL-SCNC: 25 MMOL/L (ref 20–32)
CO2 SERPL-SCNC: NORMAL MMOL/L (ref 20–32)
CREAT SERPL-MCNC: 0.73 MG/DL (ref 0.52–1.04)
CREAT SERPL-MCNC: NORMAL MG/DL (ref 0.52–1.04)
DIFFERENTIAL METHOD BLD: ABNORMAL
DIFFERENTIAL METHOD BLD: NORMAL
EOSINOPHIL # BLD AUTO: 0.1 10E9/L (ref 0–0.7)
EOSINOPHIL # BLD AUTO: 0.2 10E9/L (ref 0–0.7)
EOSINOPHIL NFR BLD AUTO: 1.4 %
EOSINOPHIL NFR BLD AUTO: 1.9 %
ERYTHROCYTE [DISTWIDTH] IN BLOOD BY AUTOMATED COUNT: 21.5 % (ref 10–15)
ERYTHROCYTE [DISTWIDTH] IN BLOOD BY AUTOMATED COUNT: 21.6 % (ref 10–15)
FACT X ACT/NOR PPP CHRO: 21 % (ref 70–130)
GFR SERPL CREATININE-BSD FRML MDRD: 78 ML/MIN/1.7M2
GFR SERPL CREATININE-BSD FRML MDRD: NORMAL ML/MIN/1.7M2
GLUCOSE BLDC GLUCOMTR-MCNC: 108 MG/DL (ref 70–99)
GLUCOSE BLDC GLUCOMTR-MCNC: 114 MG/DL (ref 70–99)
GLUCOSE BLDC GLUCOMTR-MCNC: 144 MG/DL (ref 70–99)
GLUCOSE BLDC GLUCOMTR-MCNC: 173 MG/DL (ref 70–99)
GLUCOSE SERPL-MCNC: 125 MG/DL (ref 70–99)
GLUCOSE SERPL-MCNC: NORMAL MG/DL (ref 70–99)
HCT VFR BLD AUTO: 27.1 % (ref 35–47)
HCT VFR BLD AUTO: 27.3 % (ref 35–47)
HGB BLD-MCNC: 8.2 G/DL (ref 11.7–15.7)
HGB BLD-MCNC: 8.4 G/DL (ref 11.7–15.7)
IMM GRANULOCYTES # BLD: 0.1 10E9/L (ref 0–0.4)
IMM GRANULOCYTES # BLD: 0.1 10E9/L (ref 0–0.4)
IMM GRANULOCYTES NFR BLD: 0.6 %
IMM GRANULOCYTES NFR BLD: 0.6 %
INR PPP: 4.28 (ref 0.86–1.14)
LIPASE SERPL-CCNC: 317 U/L (ref 73–393)
LIPASE SERPL-CCNC: NORMAL U/L (ref 73–393)
LYMPHOCYTES # BLD AUTO: 0.7 10E9/L (ref 0.8–5.3)
LYMPHOCYTES # BLD AUTO: 0.9 10E9/L (ref 0.8–5.3)
LYMPHOCYTES NFR BLD AUTO: 10.4 %
LYMPHOCYTES NFR BLD AUTO: 7.4 %
MCH RBC QN AUTO: 30.1 PG (ref 26.5–33)
MCH RBC QN AUTO: 30.8 PG (ref 26.5–33)
MCHC RBC AUTO-ENTMCNC: 30.3 G/DL (ref 31.5–36.5)
MCHC RBC AUTO-ENTMCNC: 30.8 G/DL (ref 31.5–36.5)
MCV RBC AUTO: 100 FL (ref 78–100)
MCV RBC AUTO: 100 FL (ref 78–100)
MONOCYTES # BLD AUTO: 0.7 10E9/L (ref 0–1.3)
MONOCYTES # BLD AUTO: 0.8 10E9/L (ref 0–1.3)
MONOCYTES NFR BLD AUTO: 7.5 %
MONOCYTES NFR BLD AUTO: 9.6 %
NEUTROPHILS # BLD AUTO: 6.7 10E9/L (ref 1.6–8.3)
NEUTROPHILS # BLD AUTO: 7.5 10E9/L (ref 1.6–8.3)
NEUTROPHILS NFR BLD AUTO: 76.9 %
NEUTROPHILS NFR BLD AUTO: 82.7 %
NRBC # BLD AUTO: 0.1 10*3/UL
NRBC BLD AUTO-RTO: 2 /100
PLATELET # BLD AUTO: 419 10E9/L (ref 150–450)
PLATELET # BLD AUTO: 539 10E9/L (ref 150–450)
POTASSIUM SERPL-SCNC: 4.5 MMOL/L (ref 3.4–5.3)
POTASSIUM SERPL-SCNC: NORMAL MMOL/L (ref 3.4–5.3)
RADIOLOGIST FLAGS: ABNORMAL
RADIOLOGIST FLAGS: ABNORMAL
RBC # BLD AUTO: 2.72 10E12/L (ref 3.8–5.2)
RBC # BLD AUTO: 2.73 10E12/L (ref 3.8–5.2)
RETICS # AUTO: 171.6 10E9/L (ref 25–95)
RETICS/RBC NFR AUTO: 6.3 % (ref 0.5–2)
SODIUM SERPL-SCNC: 136 MMOL/L (ref 133–144)
SODIUM SERPL-SCNC: NORMAL MMOL/L (ref 133–144)
WBC # BLD AUTO: 8.7 10E9/L (ref 4–11)
WBC # BLD AUTO: 9.1 10E9/L (ref 4–11)

## 2017-02-25 PROCEDURE — 86160 COMPLEMENT ANTIGEN: CPT | Performed by: INTERNAL MEDICINE

## 2017-02-25 PROCEDURE — 93005 ELECTROCARDIOGRAM TRACING: CPT

## 2017-02-25 PROCEDURE — 83690 ASSAY OF LIPASE: CPT | Performed by: INTERNAL MEDICINE

## 2017-02-25 PROCEDURE — 36415 COLL VENOUS BLD VENIPUNCTURE: CPT | Performed by: INTERNAL MEDICINE

## 2017-02-25 PROCEDURE — 85260 CLOT FACTOR X STUART-POWER: CPT | Performed by: INTERNAL MEDICINE

## 2017-02-25 PROCEDURE — 80048 BASIC METABOLIC PNL TOTAL CA: CPT | Performed by: INTERNAL MEDICINE

## 2017-02-25 PROCEDURE — A9270 NON-COVERED ITEM OR SERVICE: HCPCS | Mod: GY | Performed by: HOSPITALIST

## 2017-02-25 PROCEDURE — 40000611 ZZHCL STATISTIC MORPHOLOGY W/INTERP HEMEPATH TC 85060: Performed by: INTERNAL MEDICINE

## 2017-02-25 PROCEDURE — 85730 THROMBOPLASTIN TIME PARTIAL: CPT | Performed by: INTERNAL MEDICINE

## 2017-02-25 PROCEDURE — 86147 CARDIOLIPIN ANTIBODY EA IG: CPT | Performed by: INTERNAL MEDICINE

## 2017-02-25 PROCEDURE — 00000146 ZZHCL STATISTIC GLUCOSE BY METER IP

## 2017-02-25 PROCEDURE — 21400006 ZZH R&B CCU INTERMEDIATE UMMC

## 2017-02-25 PROCEDURE — A9270 NON-COVERED ITEM OR SERVICE: HCPCS | Mod: GY | Performed by: INTERNAL MEDICINE

## 2017-02-25 PROCEDURE — 99233 SBSQ HOSP IP/OBS HIGH 50: CPT | Mod: GC | Performed by: INTERNAL MEDICINE

## 2017-02-25 PROCEDURE — 25000128 H RX IP 250 OP 636: Performed by: HOSPITALIST

## 2017-02-25 PROCEDURE — 85045 AUTOMATED RETICULOCYTE COUNT: CPT | Performed by: INTERNAL MEDICINE

## 2017-02-25 PROCEDURE — 85004 AUTOMATED DIFF WBC COUNT: CPT | Performed by: INTERNAL MEDICINE

## 2017-02-25 PROCEDURE — 25000128 H RX IP 250 OP 636: Performed by: INTERNAL MEDICINE

## 2017-02-25 PROCEDURE — 82150 ASSAY OF AMYLASE: CPT | Performed by: INTERNAL MEDICINE

## 2017-02-25 PROCEDURE — 86162 COMPLEMENT TOTAL (CH50): CPT | Performed by: INTERNAL MEDICINE

## 2017-02-25 PROCEDURE — 85610 PROTHROMBIN TIME: CPT | Performed by: INTERNAL MEDICINE

## 2017-02-25 PROCEDURE — 27210197 ZZH KIT POWER PICC TRIPLE LUMEN

## 2017-02-25 PROCEDURE — 25000132 ZZH RX MED GY IP 250 OP 250 PS 637: Mod: GY | Performed by: HOSPITALIST

## 2017-02-25 PROCEDURE — 80048 BASIC METABOLIC PNL TOTAL CA: CPT | Performed by: EMERGENCY MEDICINE

## 2017-02-25 PROCEDURE — 40000275 ZZH STATISTIC RCP TIME EA 10 MIN

## 2017-02-25 PROCEDURE — 85520 HEPARIN ASSAY: CPT | Performed by: INTERNAL MEDICINE

## 2017-02-25 PROCEDURE — 85027 COMPLETE CBC AUTOMATED: CPT | Performed by: INTERNAL MEDICINE

## 2017-02-25 PROCEDURE — 85025 COMPLETE CBC W/AUTO DIFF WBC: CPT | Performed by: INTERNAL MEDICINE

## 2017-02-25 PROCEDURE — 93010 ELECTROCARDIOGRAM REPORT: CPT | Performed by: INTERNAL MEDICINE

## 2017-02-25 PROCEDURE — 36569 INSJ PICC 5 YR+ W/O IMAGING: CPT

## 2017-02-25 PROCEDURE — 25000132 ZZH RX MED GY IP 250 OP 250 PS 637: Mod: GY | Performed by: INTERNAL MEDICINE

## 2017-02-25 PROCEDURE — 40000558 ZZH STATISTIC CVC DRESSING CHANGE

## 2017-02-25 PROCEDURE — 94660 CPAP INITIATION&MGMT: CPT

## 2017-02-25 PROCEDURE — 25000125 ZZHC RX 250: Performed by: INTERNAL MEDICINE

## 2017-02-25 RX ORDER — POTASSIUM CHLORIDE 7.45 MG/ML
10 INJECTION INTRAVENOUS
Status: DISCONTINUED | OUTPATIENT
Start: 2017-02-25 | End: 2017-03-03 | Stop reason: HOSPADM

## 2017-02-25 RX ORDER — ONDANSETRON 4 MG/1
4 TABLET, ORALLY DISINTEGRATING ORAL EVERY 6 HOURS PRN
Status: DISCONTINUED | OUTPATIENT
Start: 2017-02-25 | End: 2017-03-03 | Stop reason: HOSPADM

## 2017-02-25 RX ORDER — AMIODARONE HYDROCHLORIDE 200 MG/1
400 TABLET ORAL DAILY
Status: DISCONTINUED | OUTPATIENT
Start: 2017-02-25 | End: 2017-02-25

## 2017-02-25 RX ORDER — POTASSIUM CHLORIDE 1.5 G/1.58G
20-40 POWDER, FOR SOLUTION ORAL
Status: DISCONTINUED | OUTPATIENT
Start: 2017-02-25 | End: 2017-03-03 | Stop reason: HOSPADM

## 2017-02-25 RX ORDER — LORAZEPAM 0.5 MG/1
0.5 TABLET ORAL ONCE
Status: COMPLETED | OUTPATIENT
Start: 2017-02-25 | End: 2017-02-25

## 2017-02-25 RX ORDER — FUROSEMIDE 10 MG/ML
20 INJECTION INTRAMUSCULAR; INTRAVENOUS ONCE
Status: DISCONTINUED | OUTPATIENT
Start: 2017-02-25 | End: 2017-02-27

## 2017-02-25 RX ORDER — NALOXONE HYDROCHLORIDE 0.4 MG/ML
.1-.4 INJECTION, SOLUTION INTRAMUSCULAR; INTRAVENOUS; SUBCUTANEOUS
Status: DISCONTINUED | OUTPATIENT
Start: 2017-02-25 | End: 2017-03-03 | Stop reason: HOSPADM

## 2017-02-25 RX ORDER — HEPARIN SODIUM,PORCINE 10 UNIT/ML
2-5 VIAL (ML) INTRAVENOUS
Status: COMPLETED | OUTPATIENT
Start: 2017-02-25 | End: 2017-02-26

## 2017-02-25 RX ORDER — VENLAFAXINE HYDROCHLORIDE 150 MG/1
150 TABLET, EXTENDED RELEASE ORAL
Status: DISCONTINUED | OUTPATIENT
Start: 2017-02-25 | End: 2017-03-03 | Stop reason: HOSPADM

## 2017-02-25 RX ORDER — CLOTRIMAZOLE 1 %
CREAM (GRAM) TOPICAL 2 TIMES DAILY
Status: DISCONTINUED | OUTPATIENT
Start: 2017-02-25 | End: 2017-03-03 | Stop reason: HOSPADM

## 2017-02-25 RX ORDER — LIDOCAINE 40 MG/G
CREAM TOPICAL
Status: DISCONTINUED | OUTPATIENT
Start: 2017-02-25 | End: 2017-03-03 | Stop reason: HOSPADM

## 2017-02-25 RX ORDER — ASPIRIN 81 MG/1
81 TABLET, CHEWABLE ORAL DAILY
Status: DISCONTINUED | OUTPATIENT
Start: 2017-02-25 | End: 2017-03-03 | Stop reason: HOSPADM

## 2017-02-25 RX ORDER — ACETAMINOPHEN 325 MG/1
325-650 TABLET ORAL EVERY 4 HOURS PRN
Status: DISCONTINUED | OUTPATIENT
Start: 2017-02-25 | End: 2017-03-03 | Stop reason: HOSPADM

## 2017-02-25 RX ORDER — ATORVASTATIN CALCIUM 40 MG/1
40 TABLET, FILM COATED ORAL
Status: DISCONTINUED | OUTPATIENT
Start: 2017-02-25 | End: 2017-03-03 | Stop reason: HOSPADM

## 2017-02-25 RX ORDER — PANTOPRAZOLE SODIUM 40 MG/1
40 TABLET, DELAYED RELEASE ORAL EVERY MORNING
Status: DISCONTINUED | OUTPATIENT
Start: 2017-02-25 | End: 2017-03-03 | Stop reason: HOSPADM

## 2017-02-25 RX ORDER — POTASSIUM CHLORIDE 29.8 MG/ML
20 INJECTION INTRAVENOUS
Status: DISCONTINUED | OUTPATIENT
Start: 2017-02-25 | End: 2017-03-03 | Stop reason: HOSPADM

## 2017-02-25 RX ORDER — HEPARIN SODIUM,PORCINE 10 UNIT/ML
5-10 VIAL (ML) INTRAVENOUS EVERY 24 HOURS
Status: DISCONTINUED | OUTPATIENT
Start: 2017-02-25 | End: 2017-02-26 | Stop reason: HOSPADM

## 2017-02-25 RX ORDER — AMIODARONE HYDROCHLORIDE 200 MG/1
200 TABLET ORAL DAILY
Status: DISCONTINUED | OUTPATIENT
Start: 2017-02-26 | End: 2017-03-03 | Stop reason: HOSPADM

## 2017-02-25 RX ORDER — HEPARIN SODIUM 10000 [USP'U]/100ML
0-3500 INJECTION, SOLUTION INTRAVENOUS CONTINUOUS
Status: DISCONTINUED | OUTPATIENT
Start: 2017-02-25 | End: 2017-02-27

## 2017-02-25 RX ORDER — HEPARIN SODIUM,PORCINE 10 UNIT/ML
5-10 VIAL (ML) INTRAVENOUS
Status: DISCONTINUED | OUTPATIENT
Start: 2017-02-25 | End: 2017-02-26 | Stop reason: HOSPADM

## 2017-02-25 RX ORDER — ACETAMINOPHEN 325 MG/1
650 TABLET ORAL EVERY 4 HOURS PRN
Status: DISCONTINUED | OUTPATIENT
Start: 2017-02-25 | End: 2017-02-25

## 2017-02-25 RX ORDER — HEPARIN SODIUM 10000 [USP'U]/100ML
0-3500 INJECTION, SOLUTION INTRAVENOUS CONTINUOUS
Status: DISCONTINUED | OUTPATIENT
Start: 2017-02-25 | End: 2017-02-25

## 2017-02-25 RX ORDER — ONDANSETRON 2 MG/ML
4 INJECTION INTRAMUSCULAR; INTRAVENOUS EVERY 6 HOURS PRN
Status: DISCONTINUED | OUTPATIENT
Start: 2017-02-25 | End: 2017-03-03 | Stop reason: HOSPADM

## 2017-02-25 RX ORDER — OSELTAMIVIR PHOSPHATE 75 MG/1
75 CAPSULE ORAL 2 TIMES DAILY
Status: DISCONTINUED | OUTPATIENT
Start: 2017-02-25 | End: 2017-02-25

## 2017-02-25 RX ORDER — MAGNESIUM SULFATE HEPTAHYDRATE 40 MG/ML
4 INJECTION, SOLUTION INTRAVENOUS EVERY 4 HOURS PRN
Status: DISCONTINUED | OUTPATIENT
Start: 2017-02-25 | End: 2017-03-03 | Stop reason: HOSPADM

## 2017-02-25 RX ORDER — LORAZEPAM 0.5 MG/1
0.5 TABLET ORAL 2 TIMES DAILY PRN
Status: DISCONTINUED | OUTPATIENT
Start: 2017-02-25 | End: 2017-03-03 | Stop reason: HOSPADM

## 2017-02-25 RX ORDER — AMIODARONE HYDROCHLORIDE 200 MG/1
200 TABLET ORAL DAILY
Status: DISCONTINUED | OUTPATIENT
Start: 2017-02-25 | End: 2017-02-25

## 2017-02-25 RX ORDER — FUROSEMIDE 20 MG
20 TABLET ORAL 2 TIMES DAILY
Status: DISCONTINUED | OUTPATIENT
Start: 2017-02-25 | End: 2017-02-28

## 2017-02-25 RX ORDER — GABAPENTIN 100 MG/1
100 CAPSULE ORAL 2 TIMES DAILY
Status: DISCONTINUED | OUTPATIENT
Start: 2017-02-25 | End: 2017-03-03 | Stop reason: HOSPADM

## 2017-02-25 RX ORDER — POTASSIUM CHLORIDE 750 MG/1
20-40 TABLET, EXTENDED RELEASE ORAL
Status: DISCONTINUED | OUTPATIENT
Start: 2017-02-25 | End: 2017-03-03 | Stop reason: HOSPADM

## 2017-02-25 RX ADMIN — ENOXAPARIN SODIUM 80 MG: 80 INJECTION SUBCUTANEOUS at 06:51

## 2017-02-25 RX ADMIN — PANTOPRAZOLE SODIUM 40 MG: 40 TABLET, DELAYED RELEASE ORAL at 08:47

## 2017-02-25 RX ADMIN — LORAZEPAM 0.5 MG: 0.5 TABLET ORAL at 16:24

## 2017-02-25 RX ADMIN — LORAZEPAM 0.5 MG: 0.5 TABLET ORAL at 08:46

## 2017-02-25 RX ADMIN — GABAPENTIN 100 MG: 100 CAPSULE ORAL at 08:47

## 2017-02-25 RX ADMIN — HEPARIN SODIUM 950 UNITS/HR: 10000 INJECTION, SOLUTION INTRAVENOUS at 18:33

## 2017-02-25 RX ADMIN — FUROSEMIDE 20 MG: 20 TABLET ORAL at 19:34

## 2017-02-25 RX ADMIN — GABAPENTIN 100 MG: 100 CAPSULE ORAL at 19:34

## 2017-02-25 RX ADMIN — FUROSEMIDE 20 MG: 20 TABLET ORAL at 08:46

## 2017-02-25 RX ADMIN — LIDOCAINE HYDROCHLORIDE 2 ML: 10 INJECTION, SOLUTION INFILTRATION; PERINEURAL at 12:41

## 2017-02-25 RX ADMIN — Medication 25 MG: at 22:56

## 2017-02-25 RX ADMIN — AMIODARONE HYDROCHLORIDE 400 MG: 200 TABLET ORAL at 08:47

## 2017-02-25 RX ADMIN — ATORVASTATIN CALCIUM 40 MG: 40 TABLET, FILM COATED ORAL at 19:34

## 2017-02-25 RX ADMIN — VENLAFAXINE HYDROCHLORIDE 150 MG: 150 TABLET, EXTENDED RELEASE ORAL at 08:47

## 2017-02-25 RX ADMIN — ASPIRIN 81 MG CHEWABLE TABLET 81 MG: 81 TABLET CHEWABLE at 10:41

## 2017-02-25 RX ADMIN — CLOTRIMAZOLE: 10 CREAM TOPICAL at 21:24

## 2017-02-25 NOTE — PLAN OF CARE
Focus:  Admission  Diagnosis: Necrotic toes  Admitted from: [facility]   Via: stretcher   Accompanied by: RN   Belongings: Placed in closet; valuables sent home with family.   Admission paperwork: complete.   Teaching: Call don't fall, use of console, meal times, visiting hours, orientation to unit, when to call for the RN (angina/sob/dizzyness, etc.), and stressed the importance of safety.   Access: PIV   Telemetry: Placed on pt, remains NSR.   Ht./Wt.: complete  MD notified of the fact we have had 4 IVs fail and currently have no access. Vasc access RN evaluated with US and was unable to obtain a good site. We are limited to LUE only due to thrombus in RUE and extravasation in L Lower FS. MD said ok to wait for PICC RN in the am,

## 2017-02-25 NOTE — ED PROVIDER NOTES
Carlos Alberto Fenton is a 76 year old female who was signed out to me with a history of recent bypass and lower extremity gangrene pending CT angio. Case discussed with Vascular and CT surgery --> more concern for cardio embolic events. The patient also has anasarca with an elevated pro-BNP. Case discussed with Cardiology who has accepted the patient for admission.     INR is elevated in the setting of ?microemboli: Considering starting Heparin. Filling defect in the RIJ-->US pending. As for the anticoagulation, this is a complicated patient with multiple problems-->will defer starting heparin to the admitting team.    LUE continue to looks ok in the setting of recent contrast extravasation.     Cari Hodges MD  02/25/17 0045

## 2017-02-25 NOTE — PROGRESS NOTES
SPIRITUAL HEALTH SERVICES  SPIRITUAL ASSESSMENT Progress Note  [facility] ([facility]) 6C     REFERRAL SOURCE: Patient requested hospital  on admission to the unit.     Carlos Alberto was asleep during the three attempts made to see her today.     PLAN: I will alert the Sunday  of the on-going need.     Rosalinda Orourke  Chaplain Resident  Pager [phone]

## 2017-02-25 NOTE — PLAN OF CARE
Problem: Goal Outcome Summary  Goal: Goal Outcome Summary  D/I/A: Pt here w/ ischemic pedal digits. PICC line placed. Dressings changed on oozing LLE graft site and oozing CT site on R abdomen. Otherwise VSS, SR.  P: Continue to monitor.

## 2017-02-25 NOTE — PROGRESS NOTES
"Vascular Surgery    No acute issues.  No pain in both feet.      /77 (BP Location: Left arm)  Pulse 71  Temp 97.7  F (36.5  C) (Oral)  Resp 18  Ht 5' 2\"  Wt 172 lb 14.4 oz  SpO2 96%  BMI 31.62 kg/m2  Ext: right great toe shows evidence of gangrenous changes that are consistent with longer duration issue.  Left 3rd toe at the tip also significant changes.  Pedal pulses are palpable bilaterally.  She has no evidence of significant peripheral neuropathy.  NO signs of infection.      WBC 8.7  Hgb 8.4  PLt 539    INR last night 6.99    A/P:  Probable cardioembolic source of gangrenous changes of bilateral toes.  CT angio reviewed and no evidence of significant disease causing this problem.    - Cardiac ECHO/MRI  - Anticoagulation per primary team  - Suggest work up for hypercoagulable state  - Recommend checking HIT panel  - discussed with cardiology team  - No acute vascular intervention needed, work to keep toes very clean and dry and prevent infection  - She may need amputation of the end of a couple toes pending demarcation    Vu Eden  Vascular Fellow  "

## 2017-02-25 NOTE — CONSULTS
Vascular Surgery Consultation    Carlos Alberto Fenton MRN# [id number]   Age: 76 year old YOB: 1940     Date of Admission:  2/24/2017    Date of Consult:   2/24/17    Reason for consult: Gangrenous changes of toes                           Assessment and Plan:   Assessment:   Carlos Alberto Fenton is a 76 year old female s/p CABG (2/6/17) who presents with gangrenous changes to her toes bilaterally.  Pt is not toxic and does not show evidence or concern for acute limb ischemia.        Plan:   -admit to medicine   - CTA chest/abd/pevis w/ LE runoff to eval for source  -vascular surgery will cont to follow  -please page with any questions      Discussed with fellow Dr. elias         CT angiogram reviewed.  No evidence of significant vascular disease and 3 vessel run-off to both feet.  Most likely based on history of A. Fib and previous TIA/Stroke that this is a small cardioembolic event to both feet.      - Plan:  Admission to medicine/cardiology for management of fluid status in light of recent CAB  Recommend starting heparin infusion and holding coumadin and letting INR drift down  Cardiac ECHO to rule out source of embolus  Also recommend work up for hypercoagulable state given her current INR and probable cardioembolic event  No acute surgical vascular event.   Management as outlined above.  Will follow as consultant.     Vu Elias  Vascular Fellow           Chief Complaint:   Gangrenous changes of toes         History of Present Illness:   Carlos Alberto Fenton is a 76 year old female s/p CABG (2/6/17) who presents with gangrenous changes to her toes bilaterally.  Pt states her home health nurse noticed darkening of her toes 2 days ago and they have progressively worsened prompting her to seek evaluation.  Denies any pain in her feet, calves or thighs.  Denies any swelling.  Denies any recent trauma.  Endorses mild nueropathy in her feet.  Able to move her toes and feet without issue.  Denies any pain or skin  changes in her upper extremities.  Denies any CP or SOB.  Denies abd pain.              Past Medical History:     Past Medical History   Diagnosis Date     Antiplatelet or antithrombotic long-term use      CAD (coronary artery disease)      2 vessel     Cancer (H)      Skin     Cerebral artery occlusion with cerebral infarction (H) 10/2016     Cerebral infarction (H) 10/2016     Diabetes (H)      Headaches      History of skin cancer      Hyperlipemias      Hypertension      Irregular heart beat      Peripheral neuropathy (H) 1990     cause unknown     Sleep apnea              Past Surgical History:     Past Surgical History   Procedure Laterality Date     Hysterectomy, brenda  1988     Tonsillectomy  1942     C appendectomy  1959     Back surgery       Bypass graft artery coronary N/A 2/6/2017     Procedure: BYPASS GRAFT ARTERY CORONARY;  Surgeon: Mikhail Quiñones MD;  Location: [facility] OR     Maze procedure N/A 2/6/2017     Procedure: MAZE PROCEDURE;  Surgeon: Mikhail Quiñones MD;  Location:  OR             Social History:     Social History   Substance Use Topics     Smoking status: Former Smoker     Smokeless tobacco: Never Used     Alcohol use No             Family History:     Family History   Problem Relation Age of Onset     DIABETES Mother      C.A.D. Mother      DIABETES Father      C.A.D. Father      Cardiovascular Sister      blood clot                Allergies:     Allergies   Allergen Reactions     Oxycodone      hallucinations     Adhesive Tape Itching and Rash     Diapers & Supplies Rash     Developed yeast infection from previous hospital stay             Medications:     Current Facility-Administered Medications   Medication     sodium chloride (PF) 0.9% PF flush 3 mL     sodium chloride (PF) 0.9% PF flush 3 mL     dextrose 5% and 0.45% NaCl infusion     lidocaine (PF) (XYLOCAINE) 1 % injection     Current Outpatient Prescriptions   Medication Sig     HYDROmorphone (DILAUDID) 2 MG tablet Take 0.5 tablets (1  mg) by mouth every 4 hours as needed for moderate to severe pain     acetaminophen (TYLENOL) 325 MG tablet Take 1-2 tablets (325-650 mg) by mouth every 4 hours as needed for mild pain or fever     amiodarone (PACERONE/CODARONE) 200 MG tablet Take 2 tabs (400 mg) daily for 14 days, then decrease dose to 1 tab (200 mg) daily for 14 days, then stop     gabapentin (NEURONTIN) 100 MG capsule Take 1 capsule (100 mg) by mouth 2 times daily     loperamide (IMODIUM) 2 MG capsule Take 1 capsule (2 mg) by mouth 4 times daily as needed for diarrhea     sulfamethoxazole-trimethoprim (BACTRIM DS/SEPTRA DS) 800-160 MG per tablet Take 1 tablet by mouth 2 times daily     furosemide (LASIX) 20 MG tablet Take 1 tablet (20 mg) by mouth 2 times daily     potassium chloride SA (K-DUR/KLOR-CON M) 10 MEQ CR tablet Take 1 tablet (10 mEq) by mouth 2 times daily     pantoprazole (PROTONIX) 40 MG EC tablet Take 1 tablet (40 mg) by mouth every morning     warfarin (COUMADIN) 2.5 MG tablet Take 2 tablets (5 mg) by mouth daily     ferrous sulfate (IRON SUPPLEMENT) 325 (65 FE) MG tablet Take 1 tablet (325 mg) by mouth daily (with breakfast)     traZODone (DESYREL) 50 MG tablet Take 0.5 tablets (25 mg) by mouth nightly as needed for sleep     venlafaxine (EFFEXOR-ER) 150 MG TB24 24 hr tablet Take 1 tablet (150 mg) by mouth daily (with breakfast)     melatonin 1 MG TABS tablet Take 1 tablet (1 mg) by mouth nightly as needed     aspirin  MG EC tablet Take 1 tablet (325 mg) by mouth every 6 hours as needed for moderate pain     ONETOUCH DELICA LANCETS 33G MISC 1 Device daily     ONE TOUCH ULTRA test strip Test once daily     clotrimazole (LOTRIMIN) 1 % cream Please apply to groins two times daily for one week after discharge and then follow up with PCP if no improvement of your skin rashes     blood glucose monitoring (ONE TOUCH ULTRA 2) meter device kit                Review of Systems:   Negative in addition to HPI          Physical Exam:    All vitals have been reviewed  Temp:  [97.5  F (36.4  C)] 97.5  F (36.4  C)  Pulse:  [71-75] 71  Heart Rate:  [] 77  Resp:  [16-18] 18  BP: (112-138)/(73-96) 124/89  SpO2:  [95 %] 95 %  No intake or output data in the 24 hours ending 02/24/17 4616  Physical Exam:  NAD, non-toxic appearing  NLB on RA  Well healing median sternotomy  irreg rythym  abd soft, NT. Ecchymotic skin changes scattered over lower abdomen, no pulsatile mass  superficial fungal infection of groins bilaterally   LE feet cool compared to calves, righ foot has gangrenous changes to digits 1-3 and left foot has distal ischemic changes to toes 2&3.  1+ dp pulses bilaterally and palpable PT pulses.  No LE mottling          Data:   All laboratory data reviewed    Results:  BMP  Recent Labs  Lab 02/24/17  1220      POTASSIUM 4.4   CHLORIDE 104   CO2 27   BUN 12   CR 0.94   *     CBC  Recent Labs  Lab 02/24/17  1220   WBC 8.5   HGB 8.4*   *     LFT  Recent Labs  Lab 02/24/17  1220 02/24/17 02/20/17   AST 34  --   --    ALT 70*  --   --    ALKPHOS 103  --   --    BILITOTAL 0.4  --   --    ALBUMIN 2.8*  --   --    INR 6.99* 7.0 5.3       Recent Labs  Lab 02/24/17  1625 02/24/17  1220   GLC  --  128*   BGM 95  --        Imaging: CTA pending         John Maloney MD

## 2017-02-25 NOTE — PROVIDER NOTIFICATION
Left forearm extravasation of contrast media. Dr. Molina notified. Discussed with pharmacy. Ice pack to left forearm.

## 2017-02-25 NOTE — PROGRESS NOTES
[facility] Cardiology Progress Note          Assessment and Plan:   Carlos Alberto Fenton is a 76 year old female with a medical history significant for coronary artery disease s/p bypass surgery 02/06/2017, diabetes, hypertension and sleep apnea, admitted to the Cardiology service via the ED, with findings suggestive of bilateral toe gangrene.      #. Bilateral toe gangrene  Possible embolic disease, given sudden onset, with little to no chronic claudication, in the setting of atrial fibrillation with previous CVA.  No evidence of sepsis, with absence of fever, normal WBC and normal lactate.  Seen by the Vascular team, recommendations appreciated.  -- Hold warfarin  -- switch enoxaparin to heparin gtt  -- Monitor closely for any evidence of compartment syndrome  -- Doppler q4H for DP and TP pulses  -- ECHO ordered  -- HIT panel in AM  -- hypercoagulable workup pending     #. Deep venous thrombosis  Noted on USS of upper extremities. Present in R IJ and R subclavian. Suspect that these may be catheter related from recent hospitalization. Chromogenic  -- Heparin gtt  -- Cardiolipin IgG and IgM    #. Atrial fibrillation:  Known history of paroxysmal Atrial fibrillation.  Rate controlled on admission  On amiodarone and warfarin.  CHADS-VaSc score of 9 (12.2% yearly risk of stroke)  -- amiodarone to 200 mg daily  -- Hold warfarin  -- heparin gtt    #. Coronary artery disease s/p CABG  #. Mild decrease in LVEF  History of CAD, s/p 3v CABG, modified maze with pulmonary isolation, left atrial appendage ligation, PFO closure, done 3 weeks ago.  -- Fluid restriction to 2L per day, salt restriction  -- home diuretics  -- daily weights and I/Os  -- decrease to ASA 81 mg  -- start atorvastatin    #. Diabetes mellitus:  Known history of DM  Well controlled in the past, Last HgbA1c 6.3 (02/02/2017)  No home insulin or OHA  -- Monitor BG and institute sliding scale insulin if needed  -- Hypoglycemia protocol with regular BG  "checks  -- Continue gabapentin     #. Left forearm swelling  Following contrast extravasation.  Hot packs as needed  Monitor for skin breakdown     #. Obstructive sleep apnea:  Uses C-PAP at home.  Will provide while in hospital     #. Hypertension:  Continue home medication     #. Peripheral neuropathy:  Continue home meds as scheduled    FEN: cardiac  Proph: heparin gtt  Dispo: pending further evaluation of gangrene and stabilization of anticoagulation    Code: FULL    I have discussed this patient and plan with Dr. Santos.    Chapincito Bill, PGY-2  Internal Medicine  [phone]    I have seen, interviewed, and examined patient. I have reviewed the laboratory tests, imaging, and other investigations. I have reviewed the management plan with the patient. I discussed with the team and agree with the findings and plan in this resident/fellow/nurse practitioner's note. In addition, changes in the physical examination, assessment and plan have been incorporated into the note by myself, as to make it a single cohesive document.       Paige Santos MD, MS  Cardiology/Cardiac EP Attending Staff            Interval History:   Notes reviewed. No acute events overnight.    Feeling well. No pain in toes. No fever/chills.        Review Of Systems   Negative except as stated above.            Medications:   Reviewed. Details in EPIC.     Blood pressure 132/85, pulse 71, temperature 97.7  F (36.5  C), temperature source Oral, resp. rate 18, height 1.575 m (5' 2\"), weight 78.4 kg (172 lb 14.4 oz), SpO2 98 %.    General: Alert, pleasant  CV: regular, no m/g/r  Chest: clear bilaterally; healing sternotomy  Abdomen: Soft, nontender, nondistended. +BS.   Extremities: 1+ edema to mid shins  Skin: dark blue to black coloration of right 1,2,3 digits and left 2,3 digits of her feet bilaterally  Neuro:  Alert, face symmetric, moving all extremities without focal deficit; decreased sensation in toes.           Data:   Reviewed. Details in " EPIC.

## 2017-02-25 NOTE — H&P
Cardiology History and Physical  Date of service: 2017  Attending physician: Dr. Azevedo      Patient: Carlos Alberto Fenton      : 1940      MRN: [id number]          Assessment/Plan:   Carlos Alberto Fenton is a 76 year old female with a medical history significant for coronary artery disease s/p bypass surgery 2017, diabetes, hypertension and sleep apnea, admitted to the Cardiology service via the ED, with findings suggestive of bilateral toe gangrene.     #. Bilateral toe gangrene  Possible embolic disease, given sudden onset, with little to no chronic claudication, in the setting of atrial fibrillation with previous CVA.  No evidence of sepsis, with absence of fever, normal WBC and normal lactate.  Seen by the Vascular team, recommendations appreciated.  -- Hold warfarin  -- Heparin gtt  -- Monitor closely for any evidence of compartment syndrome  -- Doppler q4H for DP and TP pulses  -- Echocardiogram in the AM    #. Deep venous thrombosis  Noted on USS of upper extremities. Present in R IJ and R subclavian  ? Possible warfarin failure given supra-therapeutic INR (6.99 today).   Considering hypercoagulable state. She gives a history of miscarriages in her and her mother, so lupus anticoagulant is possible  -- Hold warfarin for now     Consider Chromogenic factor X for monitoring     Will add it on today for correlation  -- Heparin gtt  -- Cardiolipin IgG and IgM      If positive, will need to be seen in 12 weeks for a repeat check to make a diagnosis.       Do not think this will make a huge difference in her anticoagulation treatment, as she needs anticoagulation for her Atrial fibrillation.    #.Atrial fibrillation:  Known history of paroxysmal Atrial fibrillation.  Rate controlled on admission  On amiodarone and warfarin.  CHADS-VaSc score of 9 (12.2% yearly risk of stroke)  -- Continue amiodarone  -- Hold warfarin     #. Coronary artery disease  Possible mild volume overload  History of CAD, s/p 3v  CABG, modified maze with pulmonary isolation, left atrial appendage ligation, PFO closure, done 3 weeks ago.  Has remained stable.   No chest pain today, no symptoms suggestive of acute decompensation.  However has elevated NT pro-BNP and crackles on chest examination. She has missed her doses of lasix today.  -- Fluid restriction to 2L per day, salt restriction  -- daily weights and I/Os  -- Will diurese with 20 mg Lasix IV now      Continue home dose tomorrow      Continue to reassess  -- Formal Echocardiogram in the AM   -- Monitor vitals closely    #. Diabetes mellitus:  Known history of DM  Well controlled in the past, Last HgbA1c 6.3 (02/02/2017)  No home insulin or OHA  -- Monitor BG and institute sliding scale insulin if needed  -- Hypoglycemia protocol with regular BG checks  -- Continue gabapentin    #. Left forearm swelling  Following contrast extravasation.  Hot packs as needed  Monitor for skin breakdown    #. Obstructive sleep apnea:  Uses C-PAP at home.  Will provide while in hospital    #. Hypertension:  Continue home medication    #. Peripheral neuropathy:  Continue home meds as scheduled      FEN:  -- IVF: None  -- Labs: As needed  -- Diet: cardiac diet    PPX:   -- Ulcer: Home PPI  -- VTE: On warfarin    Code Status: Full code    Dispo: Cards I service    Patient will be staffed in the AM. Please, see staff addendum for changes/additions to the plan.    Wojciech Ashraf MD  Cardiology  PGY-2  [phone]    1) PICC line for heparin gtt  2) Wait for INR to return to 1 - 2 range  3) Coagulation work-up    I have seen, interviewed, and examined patient. I have reviewed the laboratory tests, imaging, and other investigations. I have reviewed the management plan with the patient. I discussed with the team and agree with the findings and plan in this resident/fellow/nurse practitioner's note. In addition, changes in the physical examination, assessment and plan have been incorporated into the note by  myself, as to make it a single cohesive document.       Paige Santos MD, MS  Cardiology/Cardiac EP Attending Staff              Chief complaint:   Discolored toes          History of Present Illness:   Carlos Alberto Fenton is a 76 year old female with a medical history significant for coronary artery disease s/p bypass surgery 02/06/2017, diabetes, hypertension and sleep apnea, admitted to the Cardiology service via the ED with toe discoloration.   Patient states she was in the hospital for several days and then has been home now for approximately one week. Patient states that she has been fairly well, but two days ago noticed that her toes on her right foot were becoming discolored. Patient was seen at the [facility] ED today where she was evaluated with a chest x-ray and some laboratory testing and then was sent here to the ER for further evaluation. She says that apart from the discolored toes, she is doing well. She denies any pain in her toes, no fever, chills or rigors. She has been short of breath since she was discharged, however, this has improved and she is able to ambulate around her house with less difficulty. She denies any orthopnea or new cough, no increased swelling. No nausea, abdominal pain, vomiting or diarrhea. No bleeding from any orifices reported.            Review of Symptoms:   10-point ROS reviewed, & found negative w/ exceptions noted in the HPI.        Most Recent Imaging:     CTA Abdominal aorta, bilateral iliofem Angio  02/24/2017  IMPRESSION:  1. Mild aortobiiliac atherosclerosis without evidence of aneurysm,  dissection, or significant stenosis in the chest, abdomen or pelvis.  Bilateral lower extremity three-vessel runoff.  2. New postsurgical changes of coronary artery bypass grafting with  small pericardial effusion and trace fluid underlying the sternotomy.  3. Findings suggestive of volume overload including small left pleural  effusion, diffuse interlobular septal thickening  suggestive of  interstitial pulmonary edema, trace pelvic ascites and anasarca.  4. Possible filling defect in the right internal jugular vein  extending into the right brachiocephalic vein suggestive of deep  venous thrombosis versus admixture phenomenon. Consider further  evaluation with venous Doppler ultrasound.  5. During the examination approximately 40 cc of intravenous contrast  versus saline chaser extravasated into the left antecubital fossa. The  patient reported mild tenderness in this region at that time with  intact sensory motor function in the left upper extremity. The patient  was instructed to seek care if there was evidence of skin breakdown.  Elevation of the extremity and hot or cold packs can also help with  absorption of the fluid. Recommend plastic surgery consultation if  there is evidence of tissue necrosis    Echo:   Study Date: 02/08/2017 12:47 PM       Interpretation Summary  Mildly (EF 45-50%) reduced left ventricular function is present.  Right ventricular function, chamber size, wall motion, and thickness are  normal.  Trivial pericardial effusion is present.  No change from prior.                  Past Medical History:     Past Medical History   Diagnosis Date     Antiplatelet or antithrombotic long-term use      CAD (coronary artery disease)      2 vessel     Cancer (H)      Skin     Cerebral artery occlusion with cerebral infarction (H) 10/2016     Cerebral infarction (H) 10/2016     Diabetes (H)      Headaches      History of skin cancer      Hyperlipemias      Hypertension      Irregular heart beat      Peripheral neuropathy (H) 1990     cause unknown     Sleep apnea        Past Surgical History   Procedure Laterality Date     Hysterectomy, brenda  1988     Tonsillectomy  1942     C appendectomy  1959     Back surgery       Bypass graft artery coronary N/A 2/6/2017     Procedure: BYPASS GRAFT ARTERY CORONARY;  Surgeon: Mikhail Quiñones MD;  Location: [facility] OR     Maze procedure N/A  2/6/2017     Procedure: MAZE PROCEDURE;  Surgeon: Mikhail Quiñones MD;  Location: [facility] OR            Allergies:     Allergies   Allergen Reactions     Oxycodone      hallucinations     Adhesive Tape Itching and Rash     Diapers & Supplies Rash     Developed yeast infection from previous hospital stay             Outpatient Medications:       Current Facility-Administered Medications on File Prior to Encounter:  [COMPLETED] 0.9% sodium chloride BOLUS   [DISCONTINUED] 0.9% sodium chloride infusion     Current Outpatient Prescriptions on File Prior to Encounter:  HYDROmorphone (DILAUDID) 2 MG tablet Take 0.5 tablets (1 mg) by mouth every 4 hours as needed for moderate to severe pain   acetaminophen (TYLENOL) 325 MG tablet Take 1-2 tablets (325-650 mg) by mouth every 4 hours as needed for mild pain or fever   amiodarone (PACERONE/CODARONE) 200 MG tablet Take 2 tabs (400 mg) daily for 14 days, then decrease dose to 1 tab (200 mg) daily for 14 days, then stop   gabapentin (NEURONTIN) 100 MG capsule Take 1 capsule (100 mg) by mouth 2 times daily   loperamide (IMODIUM) 2 MG capsule Take 1 capsule (2 mg) by mouth 4 times daily as needed for diarrhea   sulfamethoxazole-trimethoprim (BACTRIM DS/SEPTRA DS) 800-160 MG per tablet Take 1 tablet by mouth 2 times daily   furosemide (LASIX) 20 MG tablet Take 1 tablet (20 mg) by mouth 2 times daily   potassium chloride SA (K-DUR/KLOR-CON M) 10 MEQ CR tablet Take 1 tablet (10 mEq) by mouth 2 times daily   pantoprazole (PROTONIX) 40 MG EC tablet Take 1 tablet (40 mg) by mouth every morning   warfarin (COUMADIN) 2.5 MG tablet Take 2 tablets (5 mg) by mouth daily   ferrous sulfate (IRON SUPPLEMENT) 325 (65 FE) MG tablet Take 1 tablet (325 mg) by mouth daily (with breakfast)   traZODone (DESYREL) 50 MG tablet Take 0.5 tablets (25 mg) by mouth nightly as needed for sleep   venlafaxine (EFFEXOR-ER) 150 MG TB24 24 hr tablet Take 1 tablet (150 mg) by mouth daily (with breakfast)   melatonin 1 MG  TABS tablet Take 1 tablet (1 mg) by mouth nightly as needed   aspirin  MG EC tablet Take 1 tablet (325 mg) by mouth every 6 hours as needed for moderate pain   ONETOUCH DELICA LANCETS 33G MISC 1 Device daily   ONE TOUCH ULTRA test strip Test once daily   clotrimazole (LOTRIMIN) 1 % cream Please apply to groins two times daily for one week after discharge and then follow up with PCP if no improvement of your skin rashes   blood glucose monitoring (ONE TOUCH ULTRA 2) meter device kit              Family History:     Family History   Problem Relation Age of Onset     DIABETES Mother      C.A.D. Mother      DIABETES Father      C.A.D. Father      Cardiovascular Sister      blood clot               Social History:     Social History     Social History     Marital status:      Spouse name: N/A     Number of children: N/A     Years of education: N/A     Occupational History     Not on file.     Social History Main Topics     Smoking status: Former Smoker     Smokeless tobacco: Never Used     Alcohol use No     Drug use: No     Sexual activity: No     Other Topics Concern     Parent/Sibling W/ Cabg, Mi Or Angioplasty Before 65f 55m? Yes     Social History Narrative               Physical Exam:   /67  Pulse 71  Temp 97.5  F (36.4  C) (Oral)  Resp 19  SpO2 97%  Temp (24hrs), Av.5  F (36.4  C), Min:97.5  F (36.4  C), Max:97.5  F (36.4  C)      General:  Alert, not in respiratory or painful distress, Not toxic looking, afebrile, not pale, not dehydrated,   HEENT: anicteric sclera, PERRL, EOMI, MMM, OP unobstructed, w/o erythema/discharge;   CV: RR, nl S1/S2, no S3/S4 appreciated, systolic murmur noted; intact & symmetric 3+ pulses radial pulses, DP pulses palpable but faint. JVD unable to ascertain due to increased neck tissue  Lungs: CTAB, no  crackles, no cough  Abd: Obese, soft, moves with respiration, BS+-, not tender, not distended, no palpable organomegaly, no masses   Ext: Cool feet, discolored  first 3 toes, no sensation in these toes as well, trace BLE edema  Skin: small palpable petechiae on both feet,   Neuro:  AOx3, CN II-XII intact and symmetric, No focal neurologic deficits          Data:   Labs (all laboratory studies reviewed by me):   Most pertinent for:  ROUTINE ICU LABS (Last four results)  CMP  Recent Labs  Lab 02/24/17  1220      POTASSIUM 4.4   CHLORIDE 104   CO2 27   ANIONGAP 10   *   BUN 12   CR 0.94   GFRESTIMATED 58*   GFRESTBLACK 70   CARLY 8.5   PROTTOTAL 6.6*   ALBUMIN 2.8*   BILITOTAL 0.4   ALKPHOS 103   AST 34   ALT 70*     CBC  Recent Labs  Lab 02/24/17  1220   WBC 8.5   RBC 2.85*   HGB 8.4*   HCT 27.8*   MCV 98   MCH 29.5   MCHC 30.2*   RDW 21.4*   *     INR  Recent Labs  Lab 02/24/17  1220 02/24/17 02/20/17   INR 6.99* 7.0 5.3     Arterial Blood GasNo lab results found in last 7 days.  Labs Reviewed on Admission  Pertinent for:  Lab Results   Component Value Date    TROPI 3.025 (HH) 02/08/2017    TROPI 0.036 10/25/2016    TROPI 0.050 (H) 10/24/2016    TROPI 0.050 (H) 10/23/2016    TROPI 0.041 10/18/2016                Imaging (all imaging studies reviewed by me):    #. CXR   CHEST TWO VIEWS 2/24/2017 12:41 PM       IMPRESSION: Essentially stable findings of the chest since the most  recent prior study from 2/16/2017. No definite infiltrate or pulmonary  edema, to suggest congestive heart failure, is seen.    #CTA Abdominal aorta, bilateral iliofem Angio  02/24/2017  IMPRESSION:  1. Mild aortobiiliac atherosclerosis without evidence of aneurysm,  dissection, or significant stenosis in the chest, abdomen or pelvis.  Bilateral lower extremity three-vessel runoff.  2. New postsurgical changes of coronary artery bypass grafting with  small pericardial effusion and trace fluid underlying the sternotomy.  3. Findings suggestive of volume overload including small left pleural  effusion, diffuse interlobular septal thickening suggestive of  interstitial pulmonary edema,  trace pelvic ascites and anasarca.  4. Possible filling defect in the right internal jugular vein  extending into the right brachiocephalic vein suggestive of deep  venous thrombosis versus admixture phenomenon. Consider further  evaluation with venous Doppler ultrasound.  5. During the examination approximately 40 cc of intravenous contrast  versus saline chaser extravasated into the left antecubital fossa. The  patient reported mild tenderness in this region at that time with  intact sensory motor function in the left upper extremity. The patient  was instructed to seek care if there was evidence of skin breakdown.  Elevation of the extremity and hot or cold packs can also help with  absorption of the fluid. Recommend plastic surgery consultation if  there is evidence of tissue necrosis    USS  impression:  1. Nonocclusive deep venous thrombosis in the right internal jugular  vein and right subclavian vein.  2. Occlusive thrombus of the basilic vein at the antecubital fossa

## 2017-02-26 ENCOUNTER — APPOINTMENT (OUTPATIENT)
Dept: CARDIOLOGY | Facility: CLINIC | Age: 77
DRG: 813 | End: 2017-02-26
Attending: INTERNAL MEDICINE
Payer: MEDICARE

## 2017-02-26 LAB
EOSINOPHIL SPEC QL WRIGHT STN: NORMAL
GLUCOSE BLDC GLUCOMTR-MCNC: 118 MG/DL (ref 70–99)
GLUCOSE BLDC GLUCOMTR-MCNC: 127 MG/DL (ref 70–99)
GLUCOSE BLDC GLUCOMTR-MCNC: 93 MG/DL (ref 70–99)
INR PPP: 2.57 (ref 0.86–1.14)
LMWH PPP CHRO-ACNC: 0.18 IU/ML
LMWH PPP CHRO-ACNC: 0.37 IU/ML
LMWH PPP CHRO-ACNC: 0.6 IU/ML
PROTHROM ACT/NOR PPP: 25 % (ref 60–140)
SPECIMEN SOURCE: NORMAL

## 2017-02-26 PROCEDURE — 40000264 ECHO LIMITED WITH OPTISON

## 2017-02-26 PROCEDURE — 25000132 ZZH RX MED GY IP 250 OP 250 PS 637: Mod: GY | Performed by: HOSPITALIST

## 2017-02-26 PROCEDURE — 21400006 ZZH R&B CCU INTERMEDIATE UMMC

## 2017-02-26 PROCEDURE — 93325 DOPPLER ECHO COLOR FLOW MAPG: CPT | Mod: 26 | Performed by: INTERNAL MEDICINE

## 2017-02-26 PROCEDURE — 25000128 H RX IP 250 OP 636: Performed by: INTERNAL MEDICINE

## 2017-02-26 PROCEDURE — A9270 NON-COVERED ITEM OR SERVICE: HCPCS | Mod: GY | Performed by: HOSPITALIST

## 2017-02-26 PROCEDURE — 25500064 ZZH RX 255 OP 636: Performed by: INTERNAL MEDICINE

## 2017-02-26 PROCEDURE — 90662 IIV NO PRSV INCREASED AG IM: CPT | Performed by: INTERNAL MEDICINE

## 2017-02-26 PROCEDURE — 00000146 ZZHCL STATISTIC GLUCOSE BY METER IP

## 2017-02-26 PROCEDURE — 40000802 ZZH SITE CHECK

## 2017-02-26 PROCEDURE — 25000125 ZZHC RX 250: Performed by: INTERNAL MEDICINE

## 2017-02-26 PROCEDURE — A9270 NON-COVERED ITEM OR SERVICE: HCPCS | Mod: GY | Performed by: INTERNAL MEDICINE

## 2017-02-26 PROCEDURE — 85520 HEPARIN ASSAY: CPT | Performed by: INTERNAL MEDICINE

## 2017-02-26 PROCEDURE — 94660 CPAP INITIATION&MGMT: CPT

## 2017-02-26 PROCEDURE — 25000132 ZZH RX MED GY IP 250 OP 250 PS 637: Mod: GY | Performed by: INTERNAL MEDICINE

## 2017-02-26 PROCEDURE — 36415 COLL VENOUS BLD VENIPUNCTURE: CPT | Performed by: INTERNAL MEDICINE

## 2017-02-26 PROCEDURE — 99233 SBSQ HOSP IP/OBS HIGH 50: CPT | Mod: 25 | Performed by: INTERNAL MEDICINE

## 2017-02-26 PROCEDURE — 86146 BETA-2 GLYCOPROTEIN ANTIBODY: CPT | Performed by: INTERNAL MEDICINE

## 2017-02-26 PROCEDURE — 85210 CLOT FACTOR II PROTHROM SPEC: CPT | Performed by: INTERNAL MEDICINE

## 2017-02-26 PROCEDURE — 85610 PROTHROMBIN TIME: CPT | Performed by: INTERNAL MEDICINE

## 2017-02-26 PROCEDURE — 36592 COLLECT BLOOD FROM PICC: CPT | Performed by: INTERNAL MEDICINE

## 2017-02-26 PROCEDURE — 93308 TTE F-UP OR LMTD: CPT

## 2017-02-26 PROCEDURE — 93321 DOPPLER ECHO F-UP/LMTD STD: CPT | Mod: 26 | Performed by: INTERNAL MEDICINE

## 2017-02-26 PROCEDURE — 86022 PLATELET ANTIBODIES: CPT | Performed by: INTERNAL MEDICINE

## 2017-02-26 PROCEDURE — 93308 TTE F-UP OR LMTD: CPT | Mod: 26 | Performed by: INTERNAL MEDICINE

## 2017-02-26 RX ORDER — WARFARIN SODIUM 2.5 MG/1
2.5 TABLET ORAL
Status: COMPLETED | OUTPATIENT
Start: 2017-02-26 | End: 2017-02-26

## 2017-02-26 RX ADMIN — GABAPENTIN 100 MG: 100 CAPSULE ORAL at 09:14

## 2017-02-26 RX ADMIN — INFLUENZA A VIRUS A/CALIFORNIA/7/2009 X-179A (H1N1) ANTIGEN (FORMALDEHYDE INACTIVATED), INFLUENZA A VIRUS A/HONG KONG/4801/2014 X-263B (H3N2) ANTIGEN (FORMALDEHYDE INACTIVATED), AND INFLUENZA B VIRUS B/BRISBANE/60/2008 ANTIGEN (FORMALDEHYDE INACTIVATED) 0.5 ML: 60; 60; 60 INJECTION, SUSPENSION INTRAMUSCULAR at 09:36

## 2017-02-26 RX ADMIN — FUROSEMIDE 20 MG: 20 TABLET ORAL at 19:19

## 2017-02-26 RX ADMIN — PANTOPRAZOLE SODIUM 40 MG: 40 TABLET, DELAYED RELEASE ORAL at 09:14

## 2017-02-26 RX ADMIN — SODIUM CHLORIDE, PRESERVATIVE FREE 5 ML: 5 INJECTION INTRAVENOUS at 15:20

## 2017-02-26 RX ADMIN — HEPARIN SODIUM 750 UNITS/HR: 10000 INJECTION, SOLUTION INTRAVENOUS at 00:40

## 2017-02-26 RX ADMIN — WARFARIN SODIUM 2.5 MG: 2.5 TABLET ORAL at 17:56

## 2017-02-26 RX ADMIN — FUROSEMIDE 20 MG: 20 TABLET ORAL at 09:14

## 2017-02-26 RX ADMIN — CLOTRIMAZOLE: 10 CREAM TOPICAL at 19:19

## 2017-02-26 RX ADMIN — ASPIRIN 81 MG CHEWABLE TABLET 81 MG: 81 TABLET CHEWABLE at 09:14

## 2017-02-26 RX ADMIN — Medication 25 MG: at 23:53

## 2017-02-26 RX ADMIN — VENLAFAXINE HYDROCHLORIDE 150 MG: 150 TABLET, EXTENDED RELEASE ORAL at 09:14

## 2017-02-26 RX ADMIN — GABAPENTIN 100 MG: 100 CAPSULE ORAL at 19:19

## 2017-02-26 RX ADMIN — ATORVASTATIN CALCIUM 40 MG: 40 TABLET, FILM COATED ORAL at 19:19

## 2017-02-26 RX ADMIN — AMIODARONE HYDROCHLORIDE 200 MG: 200 TABLET ORAL at 09:14

## 2017-02-26 RX ADMIN — HUMAN ALBUMIN MICROSPHERES AND PERFLUTREN 6 ML: 10; .22 INJECTION, SOLUTION INTRAVENOUS at 09:15

## 2017-02-26 NOTE — PROGRESS NOTES
"SPIRITUAL HEALTH SERVICES  [facility]  ON-CALL VISIT     REFERRAL SOURCE: Admission request     Brief visit with Carlos Alberto and a visitor at bedside.  She shared that she is doing well & \"on the road to recovery.\"  No needs identified and Carlos Alberto preferred to continue conversation with visitor.     PLAN: No planned follow-up, though unit  will be aware of visit & may choose to assess future needs for spiritual/emotional support/       Cleo Smallwood  Staff   Pager [phone]      "

## 2017-02-26 NOTE — PROGRESS NOTES
"Vascular Surgery    No acute issues.  No pain in both feet.      BP (!) 126/97 (BP Location: Right leg)  Pulse 71  Temp 98  F (36.7  C) (Oral)  Resp 20  Ht 5' 2\"  Wt 172 lb 8 oz  SpO2 95%  BMI 31.55 kg/m2  Ext: right great toe shows evidence of gangrenous changes that are consistent with longer duration issue.  Left 3rd toe at the tip also significant changes.  Pedal pulses are palpable bilaterally.  She has no evidence of significant peripheral neuropathy.  NO signs of infection.      INR 2.57    A/P:  Probable cardioembolic source of gangrenous changes of bilateral toes.  CT angio reviewed and no evidence of significant disease causing this problem.    - Cardiac ECHO/MRI  - Anticoagulation per primary team  - Suggest work up for hypercoagulable state  - Recommend checking HIT panel  - discussed with cardiology team  - No acute vascular intervention needed, work to keep toes very clean and dry and prevent infection  - She may need amputation of the end of a couple toes pending demarcation    Vu Eden  Vascular Fellow  "

## 2017-02-26 NOTE — PLAN OF CARE
A0x4. VSS, on RA. NSR. Afebrile. Denies any pain. Ativan given PRN per request. BM+2, Adequate UO. 2L FR. Graft site dressing changed due to drainage. Drainage noted from LE as well. PICC redressed and line reinforced with quick clot at lumen opening of dressing. Patient cooperative with cares.     Running: Heparin 950 (units/hr) - 10a due @ 00:30 (2/26)  PRN:Ativan 0.5mg (x1)    I/O this shift:  In: 840 [P.O.:840]  Out: 350 [Urine:350]    Temp:  [97.7  F (36.5  C)-98.1  F (36.7  C)] 98.1  F (36.7  C)  Heart Rate:  [77-79] 79  Resp:  [16-18] 16  BP: (102-132)/(50-85) 121/76  SpO2:  [85 %-99 %] 98 %      P: Continue to monitor Pt status and report changes to treatment team.

## 2017-02-26 NOTE — PLAN OF CARE
Problem: Goal Outcome Summary  Goal: Goal Outcome Summary  D/I/A: Pt here w/ ischemic pedal digits. Heparin continues at 600U/hr, recheck ordered for 1500. Dressings changed on oozing LLE graft site and oozing CT site on R abdomen. Per vascular surgery toes cleansed w/ soap and water and R 1st toe painted w/ betadine and blister covered w/ xeroform and kerlix. Otherwise VSS, SR.  P: Possible DC tomorrow pending INR. PT and Ortho consults ordered.

## 2017-02-26 NOTE — PHARMACY-ANTICOAGULATION SERVICE
Clinical Pharmacy - Warfarin Dosing Consult     Pharmacy has been consulted to manage this patient s warfarin therapy.  Indication: Atrial Fibrillation  Therapy Goal: INR 2-3  Warfarin Prior to Admission: Yes  Warfarin PTA Regimen:  5 Fri, 5 Sat, 5 Sun, 0 Mon, 1.25 Tu, 1.25 Wed, 2.5 Thu  Significant drug interactions: amiodarone, asa, heparin gtt  Recent documented change in oral intake/nutrition: No    INR   Date Value Ref Range Status   02/26/2017 2.57 (H) 0.86 - 1.14 Final   02/25/2017 4.28 (H) 0.86 - 1.14 Final     Chromogenic Factor 10   Date Value Ref Range Status   02/25/2017 21 (L) 70 - 130 % Final     Comment:     Therapeutic Range:  A Chromogenic Factor 10 level of approximately 20-40%   inversely correlates with an INR of 2-3 for patients receiving Warfarin.   Chromogenic Factor 10 levels below 20% indicate an INR greater than 3 and   levels above 40% indicate an INR less than 2.       Factor 2 Assay   Date Value Ref Range Status   02/26/2017 25 (L) 60 - 140 % Final       Recommend warfarin 2.5 mg today.  Pharmacy will monitor Carlos Alberto Fenton daily and order warfarin doses to achieve specified goal.      Please contact pharmacy as soon as possible if the warfarin needs to be held for a procedure or if the warfarin goals change.      Samy Moore, Pharm.D, BCPS

## 2017-02-26 NOTE — PLAN OF CARE
Problem: Goal Outcome Summary  Goal: Goal Outcome Summary  Outcome: No Change     D. Pt is stable.  AVSS. Bilateral toes black and also blood blisters on R 2nd and 3rd toes.  +pp with doppler. Up up walking with SBA.  Heparin gtt infusing at 950 units/hr via PICC.  Next 10A is at 0030.    I. Strict I and O's. Bed alarm activated due to fall risks/not calling for assistance.    A. Pt is stable.   P. Continue to monitor.

## 2017-02-26 NOTE — PLAN OF CARE
Problem: Goal Outcome Summary  Goal: Goal Outcome Summary  Outcome: No Change     D. Pt is stable.  AVSS. Bilateral toes black and also blood blisters on R 2nd and 3rd toes.  +pp with doppler. Up up walking with SBA.  Heparin gtt infusing at 950 units/hr via PICC.  Next 10A is at 0030.    I. Strict I and O's. Bed alarm activated due to fall risk/not calling for assistance.    A. Pt is stable.   P. Continue to monitor.

## 2017-02-26 NOTE — PROGRESS NOTES
[facility] Cardiology Progress Note          Assessment and Plan:   Carlos Alberto Fenton is a 76 year old female with a medical history significant for coronary artery disease s/p bypass surgery 02/06/2017, diabetes, hypertension and sleep apnea, admitted to the Cardiology service via the ED, with findings suggestive of bilateral toe gangrene.      #. Bilateral toe gangrene  Suspect thromboembolic or atheroembolic source. No signs of major vascular compromise on CTA. ECHO without LV thrombus. Pulses are palpable. No signs of infection.  -- restart warfarin today (has good correlation between INR and chromogenic factor X and Factor 2)  -- continue heparin gtt, will stop tomorrow if INR remains therapeutic  -- follow up hypercoagulable and HIT panel  -- ABIs per vascular surgery  -- Podiatry consult in AM for follow up     #. Deep venous thrombosis  Noted on US of upper extremities. Present in R IJ and R subclavian. Suspect that these may be catheter related from recent hospitalization.  -- anticoagulation as above    #. Thrombocytosis, improving  Likely reactive to stress of ischemia.   - peripheral smear pending    #. Paroxysmal atrial fibrillation (CHADSVASC 9) with history of CVA  Currently in sinus and is not on any rate control.  -- amiodarone 200 mg daily (will have appt on 3/3 to discuss long term need)  -- warfarin as above    #. Coronary artery disease s/p CABG  #. Mild decrease in LVEF  History of CAD, s/p 3v CABG, modified maze with pulmonary isolation, left atrial appendage ligation, PFO closure, done 3 weeks ago.  -- Fluid restriction to 2L per day, salt restriction  -- home diuretics  -- daily weights and I/Os  -- decrease to ASA 81 mg  -- continue atorvastatin (new medication this admission)    #. Left forearm swelling, improving  Following contrast extravasation.  Hot packs as needed  Monitor for skin breakdown    Chronic medical problems  # DM2 with neuropathy - not on glycemic meds; continue  "gabapentin  # NOAH - CPAP  # HTN - not on meds     FEN: cardiac  Proph: heparin gtt, restart warfarin  Dispo: as soon as tomorrow pending stable INR    Code: FULL    I have discussed this patient and plan with Dr. Santos.    Chapincito Bill, PGY-2  Internal Medicine  [phone]    I have seen, interviewed, and examined patient. I have reviewed the laboratory tests, imaging, and other investigations. I have reviewed the management plan with the patient. I discussed with the team and agree with the findings and plan in this resident/fellow/nurse practitioner's note. In addition, changes in the physical examination, assessment and plan have been incorporated into the note by myself, as to make it a single cohesive document.       Paige Santos MD, MS  Cardiology/Cardiac EP Attending Staff            Interval History:   Notes reviewed. No acute events overnight.    Feeling well. No pain in toes. Some increased warmth        Review Of Systems   Negative except as stated above.            Medications:   Reviewed. Details in EPIC.     Blood pressure 128/65, pulse 71, temperature 98  F (36.7  C), resp. rate 20, height 1.575 m (5' 2\"), weight 78.2 kg (172 lb 8 oz), SpO2 98 %.    General: Alert, pleasant  CV: regular, no m/g/r  Chest: clear bilaterally; healing sternotomy  Abdomen: Soft, nontender, nondistended. +BS.   Extremities: 1+ edema to mid shins  Skin: dark blue to black coloration (slightly darker today) of right 1,2,3 digits and left 2,3 digits of her feet bilaterally; increased warmth of non-black areas of her toes bilaterally  Neuro:  Alert, face symmetric, moving all extremities without focal deficit; decreased sensation in toes.         Data:   Reviewed. Details in EPIC.        "

## 2017-02-27 ENCOUNTER — APPOINTMENT (OUTPATIENT)
Dept: ULTRASOUND IMAGING | Facility: CLINIC | Age: 77
DRG: 813 | End: 2017-02-27
Attending: INTERNAL MEDICINE
Payer: MEDICARE

## 2017-02-27 ENCOUNTER — APPOINTMENT (OUTPATIENT)
Dept: PHYSICAL THERAPY | Facility: CLINIC | Age: 77
DRG: 813 | End: 2017-02-27
Attending: INTERNAL MEDICINE
Payer: MEDICARE

## 2017-02-27 LAB
ALBUMIN SERPL-MCNC: 2.5 G/DL (ref 3.4–5)
ALP SERPL-CCNC: 110 U/L (ref 40–150)
ALT SERPL W P-5'-P-CCNC: 50 U/L (ref 0–50)
APTT PPP: 190 SEC (ref 22–37)
APTT PPP: 51 SEC (ref 22–37)
AST SERPL W P-5'-P-CCNC: 33 U/L (ref 0–45)
BILIRUB DIRECT SERPL-MCNC: 0.3 MG/DL (ref 0–0.2)
BILIRUB SERPL-MCNC: 0.5 MG/DL (ref 0.2–1.3)
BLD PROD TYP BPU: NORMAL
BLD UNIT ID BPU: 0
BLD UNIT ID BPU: 0
BLOOD PRODUCT CODE: NORMAL
BLOOD PRODUCT CODE: NORMAL
BPU ID: NORMAL
BPU ID: NORMAL
C3 SERPL-MCNC: 114 MG/DL (ref 76–169)
C4 SERPL-MCNC: 31 MG/DL (ref 15–50)
CARDIOLIPIN ANTIBODY IGG: 1.9 GPL-U/ML (ref 0–19.9)
CARDIOLIPIN ANTIBODY IGM: 10.5 MPL-U/ML (ref 0–19.9)
COPATH REPORT: NORMAL
ERYTHROCYTE [DISTWIDTH] IN BLOOD BY AUTOMATED COUNT: 22.3 % (ref 10–15)
GLUCOSE BLDC GLUCOMTR-MCNC: 122 MG/DL (ref 70–99)
GLUCOSE BLDC GLUCOMTR-MCNC: 133 MG/DL (ref 70–99)
GLUCOSE BLDC GLUCOMTR-MCNC: 134 MG/DL (ref 70–99)
HCT VFR BLD AUTO: 27 % (ref 35–47)
HGB BLD-MCNC: 7.9 G/DL (ref 11.7–15.7)
INR PPP: 1.88 (ref 0.86–1.14)
INTERPRETATION ECG - MUSE: NORMAL
LMWH PPP CHRO-ACNC: NORMAL IU/ML
MAGNESIUM SERPL-MCNC: 1.6 MG/DL (ref 1.6–2.3)
MCH RBC QN AUTO: 29 PG (ref 26.5–33)
MCHC RBC AUTO-ENTMCNC: 29.3 G/DL (ref 31.5–36.5)
MCV RBC AUTO: 99 FL (ref 78–100)
NUM BPU REQUESTED: 4
PF4 HEPARIN CMPLX AB SER QL: ABNORMAL
PLATELET # BLD AUTO: 284 10E9/L (ref 150–450)
POTASSIUM SERPL-SCNC: 3.6 MMOL/L (ref 3.4–5.3)
PROT SERPL-MCNC: 5.7 G/DL (ref 6.8–8.8)
PROTHROM ACT/NOR PPP: 37 % (ref 60–140)
RBC # BLD AUTO: 2.72 10E12/L (ref 3.8–5.2)
TRANSFUSION STATUS PATIENT QL: NORMAL
WBC # BLD AUTO: 6.4 10E9/L (ref 4–11)

## 2017-02-27 PROCEDURE — 25000128 H RX IP 250 OP 636: Performed by: INTERNAL MEDICINE

## 2017-02-27 PROCEDURE — 84132 ASSAY OF SERUM POTASSIUM: CPT | Performed by: INTERNAL MEDICINE

## 2017-02-27 PROCEDURE — 85730 THROMBOPLASTIN TIME PARTIAL: CPT | Performed by: INTERNAL MEDICINE

## 2017-02-27 PROCEDURE — 36592 COLLECT BLOOD FROM PICC: CPT | Performed by: INTERNAL MEDICINE

## 2017-02-27 PROCEDURE — 93010 ELECTROCARDIOGRAM REPORT: CPT | Performed by: INTERNAL MEDICINE

## 2017-02-27 PROCEDURE — 85520 HEPARIN ASSAY: CPT | Performed by: INTERNAL MEDICINE

## 2017-02-27 PROCEDURE — A9270 NON-COVERED ITEM OR SERVICE: HCPCS | Mod: GY | Performed by: HOSPITALIST

## 2017-02-27 PROCEDURE — 85210 CLOT FACTOR II PROTHROM SPEC: CPT | Performed by: INTERNAL MEDICINE

## 2017-02-27 PROCEDURE — 99233 SBSQ HOSP IP/OBS HIGH 50: CPT | Mod: GC | Performed by: INTERNAL MEDICINE

## 2017-02-27 PROCEDURE — 86022 PLATELET ANTIBODIES: CPT | Performed by: INTERNAL MEDICINE

## 2017-02-27 PROCEDURE — 36415 COLL VENOUS BLD VENIPUNCTURE: CPT | Performed by: INTERNAL MEDICINE

## 2017-02-27 PROCEDURE — A9270 NON-COVERED ITEM OR SERVICE: HCPCS | Mod: GY | Performed by: INTERNAL MEDICINE

## 2017-02-27 PROCEDURE — 25000132 ZZH RX MED GY IP 250 OP 250 PS 637: Mod: GY | Performed by: HOSPITALIST

## 2017-02-27 PROCEDURE — 80076 HEPATIC FUNCTION PANEL: CPT | Performed by: INTERNAL MEDICINE

## 2017-02-27 PROCEDURE — 00000146 ZZHCL STATISTIC GLUCOSE BY METER IP

## 2017-02-27 PROCEDURE — 97116 GAIT TRAINING THERAPY: CPT | Mod: GP

## 2017-02-27 PROCEDURE — 85610 PROTHROMBIN TIME: CPT | Performed by: INTERNAL MEDICINE

## 2017-02-27 PROCEDURE — 25000125 ZZHC RX 250: Performed by: INTERNAL MEDICINE

## 2017-02-27 PROCEDURE — 25000125 ZZHC RX 250: Performed by: HOSPITALIST

## 2017-02-27 PROCEDURE — P9059 PLASMA, FRZ BETWEEN 8-24HOUR: HCPCS | Performed by: STUDENT IN AN ORGANIZED HEALTH CARE EDUCATION/TRAINING PROGRAM

## 2017-02-27 PROCEDURE — 25000132 ZZH RX MED GY IP 250 OP 250 PS 637: Mod: GY | Performed by: INTERNAL MEDICINE

## 2017-02-27 PROCEDURE — 83735 ASSAY OF MAGNESIUM: CPT | Performed by: INTERNAL MEDICINE

## 2017-02-27 PROCEDURE — 97530 THERAPEUTIC ACTIVITIES: CPT | Mod: GP

## 2017-02-27 PROCEDURE — 97162 PT EVAL MOD COMPLEX 30 MIN: CPT | Mod: GP

## 2017-02-27 PROCEDURE — 85027 COMPLETE CBC AUTOMATED: CPT | Performed by: INTERNAL MEDICINE

## 2017-02-27 PROCEDURE — 99223 1ST HOSP IP/OBS HIGH 75: CPT | Performed by: INTERNAL MEDICINE

## 2017-02-27 PROCEDURE — 40000344 ZZHCL STATISTIC THAWING COMPONENT: Performed by: STUDENT IN AN ORGANIZED HEALTH CARE EDUCATION/TRAINING PROGRAM

## 2017-02-27 PROCEDURE — A9270 NON-COVERED ITEM OR SERVICE: HCPCS | Mod: GY | Performed by: PHYSICIAN ASSISTANT

## 2017-02-27 PROCEDURE — 93005 ELECTROCARDIOGRAM TRACING: CPT

## 2017-02-27 PROCEDURE — 99221 1ST HOSP IP/OBS SF/LOW 40: CPT | Mod: 25 | Performed by: INTERNAL MEDICINE

## 2017-02-27 PROCEDURE — 40000193 ZZH STATISTIC PT WARD VISIT

## 2017-02-27 PROCEDURE — 25000132 ZZH RX MED GY IP 250 OP 250 PS 637: Mod: GY | Performed by: PHYSICIAN ASSISTANT

## 2017-02-27 PROCEDURE — 21400006 ZZH R&B CCU INTERMEDIATE UMMC

## 2017-02-27 PROCEDURE — 93922 UPR/L XTREMITY ART 2 LEVELS: CPT

## 2017-02-27 PROCEDURE — 94660 CPAP INITIATION&MGMT: CPT

## 2017-02-27 RX ORDER — WARFARIN SODIUM 4 MG/1
4 TABLET ORAL
Status: COMPLETED | OUTPATIENT
Start: 2017-02-27 | End: 2017-02-27

## 2017-02-27 RX ORDER — NYSTATIN AND TRIAMCINOLONE ACETONIDE 100000; 1 [USP'U]/G; MG/G
CREAM TOPICAL 2 TIMES DAILY
Status: DISCONTINUED | OUTPATIENT
Start: 2017-02-27 | End: 2017-03-03 | Stop reason: HOSPADM

## 2017-02-27 RX ORDER — FUROSEMIDE 10 MG/ML
20 INJECTION INTRAMUSCULAR; INTRAVENOUS ONCE
Status: COMPLETED | OUTPATIENT
Start: 2017-02-28 | End: 2017-02-28

## 2017-02-27 RX ADMIN — FUROSEMIDE 20 MG: 20 TABLET ORAL at 20:36

## 2017-02-27 RX ADMIN — POTASSIUM CHLORIDE 20 MEQ: 750 TABLET, EXTENDED RELEASE ORAL at 16:54

## 2017-02-27 RX ADMIN — AMIODARONE HYDROCHLORIDE 200 MG: 200 TABLET ORAL at 08:15

## 2017-02-27 RX ADMIN — CLOTRIMAZOLE: 10 CREAM TOPICAL at 22:28

## 2017-02-27 RX ADMIN — VENLAFAXINE HYDROCHLORIDE 150 MG: 150 TABLET, EXTENDED RELEASE ORAL at 08:15

## 2017-02-27 RX ADMIN — PANTOPRAZOLE SODIUM 40 MG: 40 TABLET, DELAYED RELEASE ORAL at 08:15

## 2017-02-27 RX ADMIN — ARGATROBAN 1 MCG/KG/MIN: 1 INJECTION INTRAVENOUS at 15:20

## 2017-02-27 RX ADMIN — ASPIRIN 81 MG CHEWABLE TABLET 81 MG: 81 TABLET CHEWABLE at 08:15

## 2017-02-27 RX ADMIN — GABAPENTIN 100 MG: 100 CAPSULE ORAL at 20:36

## 2017-02-27 RX ADMIN — FUROSEMIDE 20 MG: 20 TABLET ORAL at 08:15

## 2017-02-27 RX ADMIN — ATORVASTATIN CALCIUM 40 MG: 40 TABLET, FILM COATED ORAL at 20:35

## 2017-02-27 RX ADMIN — GABAPENTIN 100 MG: 100 CAPSULE ORAL at 08:15

## 2017-02-27 RX ADMIN — HEPARIN SODIUM 600 UNITS/HR: 10000 INJECTION, SOLUTION INTRAVENOUS at 04:12

## 2017-02-27 RX ADMIN — Medication 2 G: at 16:54

## 2017-02-27 RX ADMIN — WARFARIN SODIUM 4 MG: 4 TABLET ORAL at 18:11

## 2017-02-27 RX ADMIN — NYSTATIN AND TRIAMCINOLONE ACETONIDE: 100000; 1 CREAM TOPICAL at 20:35

## 2017-02-27 NOTE — CONSULTS
"Guevara Hall M.D.  Cardiovascular Medicine    I personally saw and examined this patient, discussed care with housestaff and other consultants, reviewed current laboratories and imaging studies, and conveyed impression and diagnostic/therapeutic plan to patient.    Problem List  1. Gangrene of toes (atheroembolic v thromboembolic)  2. Atrial fibrillation  3. History of recent CVA/bilateral  4. CAB  5. MAZE procedure  6. PFO closure  7. LAP closure  8. NOAH  9. Anemia  10. Hypoproteinemia   11. 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  13. Hypervolemia      History  76 year old female seen for ischemic necrosis of lower extremity digits (see pictures) of uncertain etiology including: atrial fibrillation, recent LA surgery, heparin exposure, atherosclerosis and diabetes as well as catheterization exposure.  The patient has been noted to have recent, bilateral parietal strokes, RIJ thrombosis.  Imaging and function work-up negative for macrovascular disease of the vasculature as obvious source of atheroemboli.  Onset of symptoms last week would be very late for cholesterol emboli.  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  Remainder of clotting abnormality studies pending.  She is noted to have transient thrombocytosis.     Surgical history, PMH, allergies and family history reviewed and confirmed          Objective  /82 (BP Location: Right leg)  Pulse 85  Temp 98.1  F (36.7  C) (Oral)  Resp 19  Ht 1.575 m (5' 2\")  Wt 78.6 kg (173 lb 4.8 oz)  SpO2 97%  BMI 31.7 kg/m2   Alert, oriented, pulse irregular, S1 variable, S2 split, no gallop, abdomen soft, no ileofemoral bruits, no other cutaneous evidence of thrombosis, toes per photograph, PT, DT present.     Intake/Output Summary (Last 24 hours) at 02/27/17 1625  Last data filed at 02/27/17 1415   Gross per 24 hour   Intake          1074.47 ml   Output             1925 ml   Net          -850.53 ml     Wt Readings from Last 5 Encounters: "   02/27/17 78.6 kg (173 lb 4.8 oz)   02/24/17 77.1 kg (170 lb)   02/17/17 75.3 kg (165 lb 14.4 oz)   02/02/17 73.9 kg (162 lb 14.4 oz)   02/01/17 73.9 kg (163 lb)       Meds    warfarin  4 mg Oral ONCE at 18:00     nystatin-triamcinolone   Topical BID     clotrimazole   Topical BID     furosemide  20 mg Oral BID     gabapentin  100 mg Oral BID     pantoprazole  40 mg Oral QAM     venlafaxine  150 mg Oral Daily with breakfast     amiodarone  200 mg Oral Daily     atorvastatin  40 mg Oral Daily at 8 pm     aspirin  81 mg Oral Daily     sodium chloride (PF)  3 mL Intracatheter Q8H       Labs  CMP  Recent Labs  Lab 02/27/17  1141 02/25/17  1100 02/25/17  0910 02/24/17  1220   NA  --  136 Test canceled - Lab  error 02/25/17 1000 FMCORRECTED ON 02/25 AT 1034: PREVIOUSLY REPORTED  141   POTASSIUM  --  4.5 Test canceled - Lab  error 02/25/17 1000 FMCORRECTED ON 02/25 AT 1034: PREVIOUSLY REPORTED AS 4.3 4.4   CHLORIDE  --  102 Test canceled - Lab  error 02/25/17 1000 FMCORRECTED ON 02/25 AT 1034: PREVIOUSLY REPORTED  104   CO2  --  25 Test canceled - Lab  error 02/25/17 1000 FMCORRECTED ON 02/25 AT 1034: PREVIOUSLY REPORTED AS 22 27   ANIONGAP  --  9 Test canceled - Lab  error 02/25/17 1000 FMCORRECTED ON 02/25 AT 1034: PREVIOUSLY REPORTED AS 7 10   GLC  --  125* Test canceled - Lab  error 02/25/17 1000 FMCORRECTED ON 02/25 AT 1034: PREVIOUSLY REPORTED AS 94 128*   BUN  --  12 Test canceled - Lab  error 02/25/17 1000 FMCORRECTED ON 02/25 AT 1034: PREVIOUSLY REPORTED AS 7 12   CR  --  0.73 Test canceled - Lab  error 02/25/17 1000 FMCORRECTED ON 02/25 AT 1034: PREVIOUSLY REPORTED AS 0.88 0.94   GFRESTIMATED  --  78 Test canceled - Lab  error 02/25/17 1000 FMCORRECTED ON 02/25 AT 1034: PREVIOUSLY REPORTED AS 62 Non  GFR Calc 58*   GFRESTBLACK  --  >90African American GFR Calc Test canceled - Lab order  entry error 02/25/17 1000 FMCORRECTED ON 02/25 AT 1034: PREVIOUSLY REPORTED AS 76  GFR Calc 70   CARLY  --  8.7 Test canceled - Lab  error 02/25/17 1000 FMCORRECTED ON 02/25 AT 1034: PREVIOUSLY REPORTED AS 8.4 8.5   PROTTOTAL 5.7*  --   --  6.6*   ALBUMIN 2.5*  --   --  2.8*   BILITOTAL 0.5  --   --  0.4   ALKPHOS 110  --   --  103   AST 33  --   --  34   ALT 50  --   --  70*     CBC  Recent Labs  Lab 02/27/17  0833 02/25/17  0953 02/25/17  0408 02/24/17  1220   WBC 6.4 9.1 8.7 8.5   RBC 2.72* 2.72* 2.73* 2.85*   HGB 7.9* 8.2* 8.4* 8.4*   HCT 27.0* 27.1* 27.3* 27.8*   MCV 99 100 100 98   MCH 29.0 30.1 30.8 29.5   MCHC 29.3* 30.3* 30.8* 30.2*   RDW 22.3* 21.6* 21.5* 21.4*    419 539* 571*     INR  Recent Labs  Lab 02/27/17  0833 02/26/17  0808 02/25/17  1100 02/24/17  1220   INR 1.88* 2.57* 4.28* 6.99*     Arterial Blood GasNo lab results found in last 7 days.    Imaging   lower extremity runoff on 2/24/2017.     HISTORY: Ischemic feet status post coronary artery bypass grafting.     COMPARISON: Chest CT on 2/22/2017.     TECHNIQUE: Helical CT through the lung bases through the pubic  symphysis was performed without contrast. Helical CT from the lung  apices to the feet was then performed following the administration of  intravenous contrast in the arterial phase. 3-D reconstructions were  performed and archived.     Contrast: 150cc isovue 370     FINDINGS:     Vasculature: Classic branch pattern of the great vessels. Normal  caliber of the thoracic and abdominal aorta without evidence of  dissection. The celiac axis, superior mesenteric artery, and inferior  mesenteric artery are patent. No aneurysm or stenosis of the common  iliac, external iliac, or internal iliac arteries bilaterally with a  few scattered calcified atherosclerotic plaques.     Right lower extremity: The common femoral, deep femoral, superficial  femoral, and popliteal arteries are patent without stenosis.  The  anterior tibial artery is patent to the ankle. The posterior tibial  artery is patent to the posterior plantar arch. The peroneal artery is  patent to at least the mid calf.     Left lower extremity: The common femoral, deep femoral, superficial  femoral, popliteal are patent. The anterior tibial artery is patent to  the ankle. The posterior tibial artery is patent to at least the  posterior plantar arch. The peroneal artery is patent to at least the  mid calf.     Possible filling defects in the right internal jugular vein extending  inferiorly into the right brachiocephalic vein.     CHEST: Postsurgical changes of coronary artery bypass grafting with  median sternotomy wires noted. Cardiac size is within normal limits.  Small pericardial effusion. Trace fluid underlying the sternotomy. No  central pulmonary embolism. No pathologically enlarged mediastinal,  hilar or axillary lymph nodes. Small left pleural effusion with  minimal overlying atelectasis. Mild linear opacities in the lingula  and right lower lobe likely representing atelectasis. Diffuse  interlobular septal thickening. No pneumothorax.     Abdomen/pelvis: Evaluation of the abdomen and pelvis is limited by  arterial phase of intravenous contrast. The liver, gallbladder,  adrenal glands, kidneys and pancreas are unremarkable. Multiple  punctate calcified granulomas within the large spleen. The stomach,  small bowel and colon are within normal limits. The appendix is not  identified. There are no secondary signs of appendicitis. Postsurgical  changes of hysterectomy. The bladder is mildly distended and otherwise  unremarkable. Trace free fluid in the pelvis.     Bones/soft tissues: Anasarca with bilateral lower extremity edema. No  suspicious osseous lesions. Moderate degenerative changes of the  lumbar spine associated with mild convex left scoliosis of the lumbar  spine. L3-L5 laminectomies.         IMPRESSION:  1. Mild aortobiiliac  atherosclerosis without evidence of aneurysm,  dissection, or significant stenosis in the chest, abdomen or pelvis.  Bilateral patent lower extremity three-vessel runoff.  2. New postsurgical changes of coronary artery bypass grafting with  small pericardial effusion and trace fluid underlying the sternotomy.  3. Findings suggestive of volume overload including small left pleural  effusion, diffuse interlobular septal thickening suggestive of  interstitial pulmonary edema, trace pelvic ascites and anasarca.  4. Probable filling defect in the right internal jugular vein  extending into the right brachiocephalic vein suggestive of deep  venous thrombosis versus admixture phenomenon. Consider further  evaluation with venous Doppler ultrasound.  5. During the examination approximately 40 cc of intravenous contrast  versus saline flush extravasated into the left antecubital fossa. The  patient reported mild tenderness in this region at that time with  intact sensory motor function in the left upper extremity. The patient  was instructed to seek care if there was evidence of skin breakdown.  Elevation of the extremity and hot or cold packs can also help with  absorption of the fluid. Recommend plastic surgery consultation if  there is evidence of tissue necrosis.     [facility], [place]     Brief Operative Note     Pre-operative diagnosis: Coronary Artery Disease  Post-operative diagnosis * No post-op diagnosis entered *  Procedure: Procedure(s):  Median Sternotomy, Cardiopulmonary Bypass, Coronary Artery Bypass Graft x3, Left Internal Mammary Artery Harvey, Endoscopic Left Leg Saphenous Vein Harvey, MAZE Procedure, Left Atrial Appendage Ligation (AtriClip 45), Patent Foramen Ovale Closure - Wound Class: I-Clean  - Wound Class: I-Clean  Surgeon: Surgeon(s) and Role:  * Mikhail Quiñones MD - Primary  * Praneeth Hills MD - Assisting  * Jessica Rey PA-C - Assisting  * Yanna Santiago  MATTHEW CHARLTON - Assisting  * Issa Felix PA - Assisting  Anesthesia: General   Estimated blood loss: 1000cc  Drains: Mediastinal x2, left pleural x1  Specimens: * No specimens in log *  Findings:   See op note.  Complications: None.        Assessment/Plan     The appearance is most consistent with in-situ thrombosis as in my experience cardioembolic lesions to toes are less extensive.  She does not have reasons for cryoglobulins.  She has no clearcut history of collagen vascular disease.  She does have heparin exposure.  PFO closure, MAZE and LA appendage closure all may be associated with small incidence of embolization.      She is currently being treated for HITS.  Our therapies would include prostacyclin or Botox digital blocks.  Prostacyclin has been used to treat HITS but would have small incremental risk of bleeding.  Digital blocks with 27 gauge needle carries small risk of bleeding.    I discussed the risks and benefits and off-label use of therapies described, but will hold off until discussed with hematology.    The goal of therapy would be to limit gangrene and promote healing environment through microvascular augmentation  for debridement or amputation.      I would consider TAVO to look at atrial septum, and left atrial appendage left atrial appendage    I would push diuretics as she is clinically volume up with lower extremity edema (tense) which increases venous pressure and reduces cutaneous blood flow.

## 2017-02-27 NOTE — PROGRESS NOTES
Care Coordinator- Discharge Planning     Admission Date/Time:  2/24/2017  Attending MD:  Dr. Britt Whelan   Data  Date of initial CC assessment:  2/24/17  Chart reviewed, discussed with interdisciplinary team.   Patient was admitted for:   1. Ischemic necrosis of toe (H)    2. Supratherapeutic INR    3. Anemia, unspecified type    4. Paroxysmal atrial fibrillation (H)    5.  History of s/p CABG on 2/6/17.   Assessment  Concerns with insurance coverage for discharge needs: None noted.  Current Living Situation: Patient lives with family (daughter) in a single family home in [place], [place]. Permanent home is with spouse in [place], but she is staying with her daughter to recuperate.   Support System: Supportive and Involved daughter and   Services Involved: home care, [facility].   Transportation: Family or Friend will provide  Barriers to Discharge: acute illness  Coordination of Care and Referrals: Provided patient/family with options for new PCP, resumption of home care.     Pt was re-admitted before going to scheduled outpt PCP appt. Pt requesting to have appt set up with Dr. Humberto Conner at the [facility]. Pt also was already set up for home care with [facility] and she wants to resume their services.    Intervention:      Arrangements made with [facility] (Ph: [phone] Fax: [phone]) for RN eval post hospitalization, assess vital signs, surgical site,skin on feet and toes, respiratory and cardiac status, activity tolerance, hydration, nutritional status, med set up and management, INR lab draws with results to [facility]. PT/OT eval and treat for deconditioning, strengthening, and endurance. HHA for assist with ADL's. SW for referral to community resources.      Arrangements made with Dr. Humberto Conner at the [facility] (Ph: [phone] Fax: [phone]) on 3/3/17 at 4 PM for establishment of  care and post hospitalization follow up, INR lab draw.   Plan  Anticipated Discharge Date:  3/1/17  Anticipated Discharge Plan:  Discharge to home with home care.   CC will continue to monitor pt's medical condition and progress toward discharge.   KENDRA WARE RN BSN  Care Coordinator

## 2017-02-27 NOTE — PROGRESS NOTES
02/27/17 1000   Quick Adds   Type of Visit Initial PT Evaluation   Living Environment   Lives With child(gris), adult   Living Arrangements house   Home Accessibility stairs to enter home;stairs within home   Number of Stairs to Enter Home 2   Number of Stairs Within Home 1   Stair Railings at Home inside, present on left side;outside, present on right side   Transportation Available car;family or friend will provide   Living Environment Comment Pt lives in house with daughter.  Daughter provides 24/7 assistance and is able to provide minimal assistance.  Since recent hospital admission pt reports being in home only.     Self-Care   Usual Activity Tolerance moderate   Current Activity Tolerance moderate   Regular Exercise no   Equipment Currently Used at Home none   Activity/Exercise/Self-Care Comment Pt reports very little activity.  Just recently states she is able to ambulate from living room to kitchen.     Functional Level Prior   Ambulation 0-->independent   Transferring 0-->independent   Toileting 0-->independent   Cognition 0 - no cognition issues reported   Fall history within last six months no   Which of the above functional risks had a recent onset or change? ambulation   Prior Functional Level Comment IND with all mobility and transfers   General Information   Onset of Illness/Injury or Date of Surgery - Date 02/24/17   Referring Physician Chapincito Bill MD   Patient/Family Goals Statement return home at [facility]   Pertinent History of Current Problem (include personal factors and/or comorbidities that impact the POC) 76 year old female with a medical history significant for coronary artery disease s/p bypass surgery 02/06/2017, diabetes, hypertension and sleep apnea, admitted to the Cardiology service via the ED, with findings suggestive of bilateral toe gangrene.    Precautions/Limitations fall precautions;sternal precautions   Heart Disease Risk Factors Age;Medical history;Overweight;Lack of  "physical activity;High blood pressure   General Observations Pt is cooperative with PT.    Cognitive Status Examination   Orientation orientation to person, place and time   Level of Consciousness alert   Follows Commands and Answers Questions 100% of the time   Personal Safety and Judgment intact   Memory intact   Posture    Posture Not impaired   Range of Motion (ROM)   ROM Comment wfl   Strength   Strength Comments WFL   Bed Mobility   Bed Mobility Comments IND with bed flat and moderate use of RUE to push.   Transfer Skills   Transfer Comments SBAx1 from STS and chair to bed   Gait   Gait Comments Ambualtes 150 x2 and 100x2 without AD and SBAx1.  Minimal sway and unsteadiness noted.  No overt LOB   General Therapy Interventions   Planned Therapy Interventions balance training;bed mobility training;gait training;strengthening;risk factor education;home program guidelines;progressive activity/exercise   Clinical Impression   Criteria for Skilled Therapeutic Intervention yes, treatment indicated   PT Diagnosis impaired balance   Influenced by the following impairments poor balance, decreased acitivity tolerance.    Functional limitations due to impairments IND mobility   Clinical Presentation Evolving/Changing   Clinical Presentation Rationale New onset of gangrene to toes, may d/c today/tomorrow   Clinical Decision Making (Complexity) Low complexity   Therapy Frequency` 1 time/week   Predicted Duration of Therapy Intervention (days/wks) 3/1/17   Anticipated Discharge Disposition Home with Home Therapy   Risk & Benefits of therapy have been explained Yes   Patient, Family & other staff in agreement with plan of care Yes   [facility] [employer] TM \"6 Clicks\"   2016, Trustees of [facility], under license to [employer].  All rights reserved.   6 Clicks Short Forms Basic Mobility Inpatient Short Form   [facility] AM-PAC  \"6 Clicks\" V.2 Basic Mobility Inpatient Short Form   1. Turning from your back " to your side while in a flat bed without using bedrails? 4 - None   2. Moving from lying on your back to sitting on the side of a flat bed without using bedrails? 3 - A Little   3. Moving to and from a bed to a chair (including a wheelchair)? 3 - A Little   4. Standing up from a chair using your arms (e.g., wheelchair, or bedside chair)? 4 - None   5. To walk in hospital room? 3 - A Little   6. Climbing 3-5 steps with a railing? 3 - A Little   Basic Mobility Raw Score (Score out of 24.Lower scores equate to lower levels of function) 20   Total Evaluation Time   Total Evaluation Time (Minutes) 10

## 2017-02-27 NOTE — PLAN OF CARE
D: Left toes improving in color, tips of 2nd and 3rd digit black.  I: Monitored/assessed pt. Assisted with cares.  A: Pt stable and comfortable. CPAP on at night. L arm edema increased from yesterday into hand. CMS intact. L PICC placed 2/25. Heparin drip continues at 600u/hr, next Xa in with am labs. Per vascular surgery toes cleansed w/ soap and water and R 1st toe painted w/ betadine and blister covered w/ xeroform and kerlix, this dressing done x4 due to it falling off with her slippers, kerlix applied over. SBA to bathroom. Surgical incisions from L leg graft site and R chest tube site still leaking serous fluid from 2/6. VSS and pt denies pain. Podiatry consult for 2/27.  P: Continue to monitor/assess pt, contact provider with concerns.

## 2017-02-27 NOTE — PLAN OF CARE
Problem: Goal Outcome Summary  Goal: Goal Outcome Summary  PT 6C: Ambulates 150x2 and 100ft x2 without AD and CGA-SBAx1.  No overt LOB.  Ascends and descends  3 stiars with single railing and SBAx1.  Pt minimally unsteady with Rhomberg balance assessment.  Educated on sternal precautions.  Pt recommends d/c home with FWW for longer distances, 24/7 assist from daughter and HH PT in order to increase stability and balance with functional activities.

## 2017-02-27 NOTE — PROGRESS NOTES
[facility]  Patient is currently open to home care services with [facility].  The patient is currently receiving  RN,PT,OT,SW and HHA  services.  [facility]  and team have been notified of patient admission.  [facility] liaison will continue to follow patient during stay.  If appropriate provide orders to resume home care at time of discharge.    Katy Card RN   [facility] Liaison

## 2017-02-27 NOTE — PLAN OF CARE
Problem: Goal Outcome Summary  Goal: Goal Outcome Summary  Outcome: No Change        D/I/A. Pt is stable.  AVSS.  pain free.  LE would care done per POC.  + PP. Pt up in chair for a few hrs this evening.  Up to the commode with SBA.  On Heparin gtt.   P. D/c to home when INR is therapeutic.

## 2017-02-27 NOTE — CONSULTS
[facility], Orthopaedic Surgery Consultation    Carlos Alberto Fenton MRN# [id number]   Age: 76 year old YOB: 1940     Date of Admission:  2/24/2017    Reason for consult: Gangrene to toes bilaterally       Requesting physician: Dr. 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 Change dressing daily. Do not get affected toes wet, but can wash around the affected area with soap and water. Wear surgical shoes to avoid pressure on these toes. Keep bullae intact. Allow gangrene to demarcate, she has good potential for healing due to good blood flow. Watch for signs of infection like increasing redness, swelling and fluctuance to the blackened areas. Prescribed topical steroid cream to use on lower extremities. Consider LE compression therapy - unsure of her volume status as she has had a recent CABG, so I will defer this to medicine team. [place] [facility] [place] about 1-2 weeks post discharge.           History of Present Illness:   Patient was seen and examined by me. History, PMH, Meds, SH, complete ROS (10 organ systems) and PE reviewed with patient and prior medical records.      Carlos Alberto is a 76 year old female who was admitted to [facility] on 2/24/17 for gangrene of toes with history of Afib, CAD, chronic anticoagulant use, DM type 2 controlled, and peripheral neuropathy. She recently had a CABG on 2/6. She was found to have a supratherapeutic INR upon admission as well as gangrenous toes bilaterally. She noticed that her toes were becoming bruised looking last Thursday. She was  sent to the ED and then admitted. Vascular was consulted who feel this is most likely an embolic process. Heparin is being administered. RADHA study has been ordered but hasn't been not performed yet. She also notes that she has been very edematous since her surgery on 2/6 and she never was before. There is also a rash that has been there since surgery that is slowly improving. Nursing has been applying endoform to right toes and gauze wrap. Admits shooting pain from ball of right foot into toes. Denies tingling, burning, fever, chills.           Past Medical History:     Past Medical History   Diagnosis Date     Antiplatelet or antithrombotic long-term use      CAD (coronary artery disease)      2 vessel     Cancer (H)      Skin     Cerebral artery occlusion with cerebral infarction (H) 10/2016     Cerebral infarction (H) 10/2016     Diabetes (H)      Headaches      History of skin cancer      Hyperlipemias      Hypertension      Irregular heart beat      Peripheral neuropathy (H) 1990     cause unknown     Sleep apnea              Past Surgical History:     Past Surgical History   Procedure Laterality Date     Hysterectomy, brenda  1988     Tonsillectomy  1942     C appendectomy  1959     Back surgery       Bypass graft artery coronary N/A 2/6/2017     Procedure: BYPASS GRAFT ARTERY CORONARY;  Surgeon: Mikhail Quiñones MD;  Location: [facility] OR     Maze procedure N/A 2/6/2017     Procedure: MAZE PROCEDURE;  Surgeon: Mikhail Quiñones MD;  Location: [facility] OR     Picc insertion Left 02/25/2017     5fr TL Bard PICC, 47cm (3cm external) in the L basilic vein w/ tip in the SVC RA junction             Social History:     Social History     Social History     Marital status:      Spouse name: N/A     Number of children: N/A     Years of education: N/A     Social History Main Topics     Smoking status: Former Smoker     Smokeless tobacco: Never Used     Alcohol use No     Drug use: No     Sexual activity: No     Other Topics Concern      Parent/Sibling W/ Cabg, Mi Or Angioplasty Before 65f 55m? Yes     Social History Narrative             Family History:     Family History   Problem Relation Age of Onset     DIABETES Mother      C.A.D. Mother      DIABETES Father      C.A.D. Father      Cardiovascular Sister      blood clot              Medications:     Current Facility-Administered Medications   Medication     warfarin (COUMADIN) tablet 4 mg     Warfarin Therapy Reminder (Check START DATE - warfarin may be starting in the FUTURE)     acetaminophen (TYLENOL) tablet 325-650 mg     clotrimazole (LOTRIMIN) 1 % cream     furosemide (LASIX) tablet 20 mg     gabapentin (NEURONTIN) capsule 100 mg     melatonin tablet 1 mg     pantoprazole (PROTONIX) EC tablet 40 mg     traZODone (DESYREL) half-tab 25 mg     venlafaxine (EFFEXOR-ER) 24 hr tablet 150 mg     naloxone (NARCAN) injection 0.1-0.4 mg     Patient is already receiving anticoagulation with heparin, enoxaparin (LOVENOX), warfarin (COUMADIN)  or other anticoagulant medication     potassium chloride SA (K-DUR/KLOR-CON M) CR tablet 20-40 mEq     potassium chloride (KLOR-CON) Packet 20-40 mEq     potassium chloride 10 mEq in 100 mL intermittent infusion     potassium chloride 10 mEq in 100 mL intermittent infusion with 10 mg lidocaine     potassium chloride 20 mEq in 50 mL intermittent infusion     magnesium sulfate 2 g in NS intermittent infusion ([facility]ar[facility] or [facility] Cmpd)     magnesium sulfate 4 g in 100 mL sterile water (premade)     ondansetron (ZOFRAN-ODT) ODT tab 4 mg    Or     ondansetron (ZOFRAN) injection 4 mg     lidocaine (LMX4) kit     amiodarone (PACERONE/CODARONE) tablet 200 mg     atorvastatin (LIPITOR) tablet 40 mg     aspirin chewable tablet 81 mg     LORazepam (ATIVAN) tablet 0.5 mg     sodium chloride (PF) 0.9% PF flush 3 mL     sodium chloride (PF) 0.9% PF flush 3 mL             Allergies:      Allergies   Allergen Reactions     Oxycodone      hallucinations     Adhesive Tape Itching  "and Rash     Diapers & Supplies Rash     Developed yeast infection from previous hospital stay            Review of Systems:   A comprehensive 10 point review of systems (constitutional, ENT, cardiac, peripheral vascular, respiratory, GI, , Musculoskeletal, skin, Neurological) was performed and found to be negative except as described in this note.           Physical Exam:   COMPLETE EXAMINATION:   VITAL SIGNS: /75 (BP Location: Right leg)  Pulse 85  Temp 98.3  F (36.8  C) (Oral)  Resp 18  Ht 1.575 m (5' 2\")  Wt 78.6 kg (173 lb 4.8 oz)  SpO2 98%  BMI 31.7 kg/m2  DP and PT pulses 2/4 bilaterally. Capillary refill <3 seconds. Absent pedal hair. 2+ pitting edema bilaterally.  Gross sensation slightly decreased bilaterally.   Denies pain to palpation of affected digits or elsewhere. Plantar surfaces of right digits 1-3 and left digits 2-3 are distally blackened. Of those affected, only the right hallux has hardened. There are tense, purple colored bullae at the eponychium of the second and third digits of right foot, 2nd digit>3rd digit. There is a superficial open wound noted to the dorsal surface of the right hallux from a popped bullae. Skin below left second and third digits dorsally is warm and erythematous. Nails are normal bilaterally. Skin is normal except for circumferentially around the lower legs and dorsal surfaces of feet which have a non-pruritic erythematous, flaking rash with some superficial openings.           Data:   All pertinent laboratory data reviewed  All imaging studies reviewed by me.    Recent Labs   Lab Test  02/27/17   0833  02/25/17   0953  02/25/17   0408   HGB  7.9*  8.2*  8.4*   WBC  6.4  9.1  8.7     No results for input(s): FTYP, FNEU, FOTH, FCOL, FAPR, FWBC in the last [id number] hours.        "

## 2017-02-27 NOTE — CONSULTS
[facility] Hematology/Oncology Consult    Carlos Alberto Fneton MRN# [id number]   Age: 76 year old YOB: 1940          Reason for Consult:   Drops in platelet count with positive HIT panel         Assessment and Plan:   Mrs. Fenton is a 77 yo female with medical history significant for coronary artery disease s/p bypass surgery on 2/6/17, cerebral infarction in 11/16, afib on warfarin started in 11/16, was admitted for bilateral toe gangrene on 2/24.The drop of her platelet count within the last 2 days is most likely a resolving thrombocytosis 2/2 ischemia stress or dehydration. Of note, her baseline platelet prior to hospitalization was in the 200s. HIT is a possibility given 90% inhibition on HIT panel. However, onset of less than 5 days with no known heparin exposure prior to this hospitalization is atypical for HIT. 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     Recommendations:  - serotonin release assay  - avoid using heparin   - HIGINIO, haptoglobin, LDH  - daily CBC and platelet         History of Present Illness:   History obtained from chart review and confirmed with patient.  Mrs. Fenton is a 77 yo female with medical history significant for coronary artery disease s/p bypass surgery on 2/6/17, cerebral infarction in 11/16, afib on warfarin started in 11/16, was admitted for bilateral toe gangrene on 2/24. Upon admission, her INR was supra-theraputic. She was suspected to have warfarin necrosis, warfarin was hold and heparin was started on 2/25. Platelet started to trend down after starting heparin. HIT was suspected. Heparin was stopped and argatroban was started on 2/27.   Of note, she also has upper DVT showed on ultrasound on 2/24, thought to be PICC  associated.   Per patient, she started taking warfarin in 11/16 to prevent thrombosis 2/2 Afib. She denies any bleeding complications. She started taking bactrim on 2/17 for postoperative infection from the previous admission.   Patient denied history of uncontrolled bleeding or easy bruising, abnormal masses anywhere, fever or chills. She wasn't aware of being on heparin prior to this hospitalization.     Complete ROS is otherwise negative.       Past Medical History:     Past Medical History   Diagnosis Date     Antiplatelet or antithrombotic long-term use      CAD (coronary artery disease)      2 vessel     Cancer (H)      Skin     Cerebral artery occlusion with cerebral infarction (H) 10/2016     Cerebral infarction (H) 10/2016     Diabetes (H)      Headaches      History of skin cancer      Hyperlipemias      Hypertension      Irregular heart beat      Peripheral neuropathy (H) 1990     cause unknown     Sleep apnea           Past Surgical History:     Past Surgical History   Procedure Laterality Date     Hysterectomy, brenda  1988     Tonsillectomy  1942     C appendectomy  1959     Back surgery       Bypass graft artery coronary N/A 2/6/2017     Procedure: BYPASS GRAFT ARTERY CORONARY;  Surgeon: Mikhail Quiñones MD;  Location: [facility] OR     Maze procedure N/A 2/6/2017     Procedure: MAZE PROCEDURE;  Surgeon: Mikhail Quiñones MD;  Location: [facility] OR     Picc insertion Left 02/25/2017     5fr TL Bard PICC, 47cm (3cm external) in the L basilic vein w/ tip in the SVC RA junction          Social History:     Social History     Social History     Marital status:      Spouse name: N/A     Number of children: N/A     Years of education: N/A     Occupational History     Not on file.     Social History Main Topics     Smoking status: Former Smoker     Smokeless tobacco: Never Used     Alcohol use No     Drug use: No     Sexual activity: No     Other Topics Concern     Parent/Sibling W/ Cabg, Mi Or Angioplasty Before 65f 55m?  Yes     Social History Narrative          Family History:     Family History   Problem Relation Age of Onset     DIABETES Mother      C.A.D. Mother      DIABETES Father      C.A.D. Father      Cardiovascular Sister      blood clot          Allergies:     Allergies   Allergen Reactions     Heparin      HIT/ thrombocytopenia     Oxycodone      hallucinations     Adhesive Tape Itching and Rash     Diapers & Supplies Rash     Developed yeast infection from previous hospital stay          Medications:     Prescriptions Prior to Admission   Medication Sig Dispense Refill Last Dose     HYDROmorphone (DILAUDID) 2 MG tablet Take 0.5 tablets (1 mg) by mouth every 4 hours as needed for moderate to severe pain 30 tablet 0 Past Week at Unknown time     acetaminophen (TYLENOL) 325 MG tablet Take 1-2 tablets (325-650 mg) by mouth every 4 hours as needed for mild pain or fever 100 tablet 0 2/24/2017 at Unknown time     amiodarone (PACERONE/CODARONE) 200 MG tablet Take 2 tabs (400 mg) daily for 14 days, then decrease dose to 1 tab (200 mg) daily for 14 days, then stop 45 tablet 0 2/24/2017 at Unknown time     gabapentin (NEURONTIN) 100 MG capsule Take 1 capsule (100 mg) by mouth 2 times daily 60 capsule 3 2/24/2017 at Unknown time     loperamide (IMODIUM) 2 MG capsule Take 1 capsule (2 mg) by mouth 4 times daily as needed for diarrhea 20 capsule 0 Past Month at Unknown time     sulfamethoxazole-trimethoprim (BACTRIM DS/SEPTRA DS) 800-160 MG per tablet Take 1 tablet by mouth 2 times daily 14 tablet 0 2/24/2017 at Unknown time     furosemide (LASIX) 20 MG tablet Take 1 tablet (20 mg) by mouth 2 times daily 60 tablet 3 2/24/2017 at Unknown time     potassium chloride SA (K-DUR/KLOR-CON M) 10 MEQ CR tablet Take 1 tablet (10 mEq) by mouth 2 times daily 60 tablet 0 2/24/2017 at Unknown time     pantoprazole (PROTONIX) 40 MG EC tablet Take 1 tablet (40 mg) by mouth every morning 30 tablet 0 2/24/2017 at Unknown time     warfarin (COUMADIN)  "2.5 MG tablet Take 2 tablets (5 mg) by mouth daily 100 tablet 3 2/23/2017 at Unknown time     ferrous sulfate (IRON SUPPLEMENT) 325 (65 FE) MG tablet Take 1 tablet (325 mg) by mouth daily (with breakfast) 100 tablet 1 2/24/2017 at Unknown time     traZODone (DESYREL) 50 MG tablet Take 0.5 tablets (25 mg) by mouth nightly as needed for sleep 45 tablet 2 2/24/2017 at Unknown time     venlafaxine (EFFEXOR-ER) 150 MG TB24 24 hr tablet Take 1 tablet (150 mg) by mouth daily (with breakfast) 90 each 1 2/24/2017 at Unknown time     melatonin 1 MG TABS tablet Take 1 tablet (1 mg) by mouth nightly as needed 30 tablet 0 Unknown at Unknown time     aspirin  MG EC tablet Take 1 tablet (325 mg) by mouth every 6 hours as needed for moderate pain 90 tablet 3 Unknown at Unknown time     ONETOUCH DELICA LANCETS 33G MISC 1 Device daily 100 each 0 Unknown at Unknown time     ONE TOUCH ULTRA test strip Test once daily 100 each 0 Unknown at Unknown time     clotrimazole (LOTRIMIN) 1 % cream Please apply to groins two times daily for one week after discharge and then follow up with PCP if no improvement of your skin rashes 12 g 0 Unknown at Unknown time     blood glucose monitoring (ONE TOUCH ULTRA 2) meter device kit    Unknown at Unknown time          Physical Exam:   /82 (BP Location: Right leg)  Pulse 85  Temp 98.1  F (36.7  C) (Oral)  Resp 19  Ht 1.575 m (5' 2\")  Wt 78.6 kg (173 lb 4.8 oz)  SpO2 97%  BMI 31.7 kg/m2  Vitals:    02/25/17 0216 02/26/17 0056 02/27/17 0116   Weight: 78.4 kg (172 lb 14.4 oz) 78.2 kg (172 lb 8 oz) 78.6 kg (173 lb 4.8 oz)     General: pleasant, alert and in no acute distress  HEENT: anicteric sclera  Neck: no adenopathy  Respiratory: normal airflow bilaterally, no crackles or wheezes  CV: RRR, normal S1 and S2  Abdomen: normal bowel sounds, no tenderness, no palpable hepatosplenomegaly  Extremities: small petechia on both legs, feet wrapped         Data:   CBC  Recent Labs  Lab 02/27/17  0833 " 02/25/17  0953 02/25/17  0408 02/24/17  1220   WBC 6.4 9.1 8.7 8.5   RBC 2.72* 2.72* 2.73* 2.85*   HGB 7.9* 8.2* 8.4* 8.4*   HCT 27.0* 27.1* 27.3* 27.8*   MCV 99 100 100 98   MCH 29.0 30.1 30.8 29.5   MCHC 29.3* 30.3* 30.8* 30.2*   RDW 22.3* 21.6* 21.5* 21.4*    419 539* 571*     CMP  Recent Labs  Lab 02/27/17  1600 02/27/17  1141 02/25/17  1100 02/25/17  0910 02/24/17  1220   NA  --   --  136 Test canceled - Lab  error 02/25/17 1000 FMCORRECTED ON 02/25 AT 1034: PREVIOUSLY REPORTED  141   POTASSIUM 3.6  --  4.5 Test canceled - Lab  error 02/25/17 1000 FMCORRECTED ON 02/25 AT 1034: PREVIOUSLY REPORTED AS 4.3 4.4   CHLORIDE  --   --  102 Test canceled - Lab  error 02/25/17 1000 FMCORRECTED ON 02/25 AT 1034: PREVIOUSLY REPORTED  104   CO2  --   --  25 Test canceled - Lab  error 02/25/17 1000 FMCORRECTED ON 02/25 AT 1034: PREVIOUSLY REPORTED AS 22 27   ANIONGAP  --   --  9 Test canceled - Lab  error 02/25/17 1000 FMCORRECTED ON 02/25 AT 1034: PREVIOUSLY REPORTED AS 7 10   GLC  --   --  125* Test canceled - Lab  error 02/25/17 1000 FMCORRECTED ON 02/25 AT 1034: PREVIOUSLY REPORTED AS 94 128*   BUN  --   --  12 Test canceled - Lab  error 02/25/17 1000 FMCORRECTED ON 02/25 AT 1034: PREVIOUSLY REPORTED AS 7 12   CR  --   --  0.73 Test canceled - Lab  error 02/25/17 1000 FMCORRECTED ON 02/25 AT 1034: PREVIOUSLY REPORTED AS 0.88 0.94   GFRESTIMATED  --   --  78 Test canceled - Lab  error 02/25/17 1000 FMCORRECTED ON 02/25 AT 1034: PREVIOUSLY REPORTED AS 62 Non  GFR Calc 58*   GFRESTBLACK  --   --  >90African American GFR Calc Test canceled - Lab  error 02/25/17 1000 FMCORRECTED ON 02/25 AT 1034: PREVIOUSLY REPORTED AS 76  GFR Calc 70   CARLY  --   --  8.7 Test canceled - Lab  error 02/25/17 1000 FMCORRECTED ON 02/25 AT 1034: PREVIOUSLY REPORTED AS 8.4 8.5    MAG 1.6  --   --   --   --    PROTTOTAL  --  5.7*  --   --  6.6*   ALBUMIN  --  2.5*  --   --  2.8*   BILITOTAL  --  0.5  --   --  0.4   ALKPHOS  --  110  --   --  103   AST  --  33  --   --  34   ALT  --  50  --   --  70*     INR  Recent Labs  Lab 02/27/17  0833 02/26/17  0808 02/25/17  1100 02/24/17  1220   INR 1.88* 2.57* 4.28* 6.99*

## 2017-02-27 NOTE — PROGRESS NOTES
"Vascular Surgery Progress Note       Patient seen and evaluated at bedside. No overnight complaints. Ambulating independently without pain.     /67  Pulse 85  Temp 98.4  F (36.9  C) (Oral)  Resp 20  Ht 5' 2\"  Wt 173 lb 4.8 oz  SpO2 96%  BMI 31.7 kg/m2    General:  Elderly  female. NAD  Cor: Irregularly irregular.   LE: Palpable femoral pulses bilaterally. Multiphasic dopplerable DP and PT. Acrocyanosis is observed at digits 1-3 on the right with evolving gangrenous changes observed at the tuft of the great toe. Bullous changes over the dorsum of the second toe. Changes also observed at the third toe.     Assessment: 76 year old diabetic female with history of CAD evaluated for micro atheroembolic disease affecting toes in bilateral lower extremity s/p Coronary artery bypass 02/06.    Plan:     1. Will wait for toes to demaracate. Supportive care. Rooke boot and local wound care for toes.   2. Anticoagulation for atrial fibrillation. Prevent further embolic insult. Anticoagulation not definitively shown to improve outcomes with microembolic disease.   3. Hypercoagulable work up as previously requested.   4. Vascular will follow.     Paul Long MD   Vascular Surgery Fellow   Pager: [phone]  "

## 2017-02-28 LAB
ANION GAP SERPL CALCULATED.3IONS-SCNC: 7 MMOL/L (ref 3–14)
APTT PPP: 77 SEC (ref 22–37)
APTT PPP: 94 SEC (ref 22–37)
B2 GLYCOPROT1 IGG SERPL IA-ACNC: 1.4 U/ML
B2 GLYCOPROT1 IGM SERPL IA-ACNC: NORMAL U/ML
BLD GP AB SCN SERPL QL ELUTION: NORMAL
BLD GP AB SCN SERPL QL: NORMAL
BLD PROD TYP BPU: NORMAL
BLD UNIT ID BPU: 0
BLOOD BANK CMNT PATIENT-IMP: NORMAL
BLOOD PRODUCT CODE: NORMAL
BPU ID: NORMAL
BUN SERPL-MCNC: 8 MG/DL (ref 7–30)
CALCIUM SERPL-MCNC: 8.2 MG/DL (ref 8.5–10.1)
CH50 SERPL-ACNC: 64
CHLORIDE SERPL-SCNC: 100 MMOL/L (ref 94–109)
CO2 SERPL-SCNC: 34 MMOL/L (ref 20–32)
CREAT SERPL-MCNC: 0.57 MG/DL (ref 0.52–1.04)
DAT C3-SP REAG RBC QL: NORMAL
DAT IGG-SP REAG RBC-IMP: NORMAL
DAT POLY-SP REAG RBC QL: NORMAL
ERYTHROCYTE [DISTWIDTH] IN BLOOD BY AUTOMATED COUNT: 22.3 % (ref 10–15)
FERRITIN SERPL-MCNC: 193 NG/ML (ref 8–252)
FOLATE SERPL-MCNC: 11.8 NG/ML
GFR SERPL CREATININE-BSD FRML MDRD: ABNORMAL ML/MIN/1.7M2
GLUCOSE BLDC GLUCOMTR-MCNC: 81 MG/DL (ref 70–99)
GLUCOSE BLDC GLUCOMTR-MCNC: 92 MG/DL (ref 70–99)
GLUCOSE BLDC GLUCOMTR-MCNC: 93 MG/DL (ref 70–99)
GLUCOSE SERPL-MCNC: 100 MG/DL (ref 70–99)
HAPTOGLOB SERPL-MCNC: 173 MG/DL (ref 35–175)
HCT VFR BLD AUTO: 23.3 % (ref 35–47)
HGB BLD-MCNC: 6.9 G/DL (ref 11.7–15.7)
INR PPP: 2.58 (ref 0.86–1.14)
IRON SATN MFR SERPL: 9 % (ref 15–46)
IRON SERPL-MCNC: 25 UG/DL (ref 35–180)
LA PPP-IMP: ABNORMAL
LDH SERPL L TO P-CCNC: 264 U/L (ref 81–234)
MAGNESIUM SERPL-MCNC: 2.1 MG/DL (ref 1.6–2.3)
MCH RBC QN AUTO: 29.9 PG (ref 26.5–33)
MCHC RBC AUTO-ENTMCNC: 29.6 G/DL (ref 31.5–36.5)
MCV RBC AUTO: 101 FL (ref 78–100)
NUM BPU REQUESTED: 2
PLATELET # BLD AUTO: 210 10E9/L (ref 150–450)
POTASSIUM SERPL-SCNC: 3.3 MMOL/L (ref 3.4–5.3)
POTASSIUM SERPL-SCNC: 3.4 MMOL/L (ref 3.4–5.3)
POTASSIUM SERPL-SCNC: 4.4 MMOL/L (ref 3.4–5.3)
RBC # BLD AUTO: 2.31 10E12/L (ref 3.8–5.2)
SODIUM SERPL-SCNC: 141 MMOL/L (ref 133–144)
TIBC SERPL-MCNC: 268 UG/DL (ref 240–430)
TRANSFERRIN SERPL-MCNC: 221 MG/DL (ref 210–360)
TRANSFUSION STATUS PATIENT QL: NORMAL
TSH SERPL DL<=0.05 MIU/L-ACNC: 3.04 MU/L (ref 0.4–4)
VIT B12 SERPL-MCNC: 510 PG/ML (ref 193–986)
WBC # BLD AUTO: 5.7 10E9/L (ref 4–11)

## 2017-02-28 PROCEDURE — 82746 ASSAY OF FOLIC ACID SERUM: CPT | Performed by: STUDENT IN AN ORGANIZED HEALTH CARE EDUCATION/TRAINING PROGRAM

## 2017-02-28 PROCEDURE — 85610 PROTHROMBIN TIME: CPT | Performed by: STUDENT IN AN ORGANIZED HEALTH CARE EDUCATION/TRAINING PROGRAM

## 2017-02-28 PROCEDURE — 83540 ASSAY OF IRON: CPT | Performed by: STUDENT IN AN ORGANIZED HEALTH CARE EDUCATION/TRAINING PROGRAM

## 2017-02-28 PROCEDURE — 40000344 ZZHCL STATISTIC THAWING COMPONENT: Performed by: INTERNAL MEDICINE

## 2017-02-28 PROCEDURE — 83010 ASSAY OF HAPTOGLOBIN QUANT: CPT | Performed by: STUDENT IN AN ORGANIZED HEALTH CARE EDUCATION/TRAINING PROGRAM

## 2017-02-28 PROCEDURE — 99233 SBSQ HOSP IP/OBS HIGH 50: CPT | Performed by: INTERNAL MEDICINE

## 2017-02-28 PROCEDURE — 84132 ASSAY OF SERUM POTASSIUM: CPT | Performed by: STUDENT IN AN ORGANIZED HEALTH CARE EDUCATION/TRAINING PROGRAM

## 2017-02-28 PROCEDURE — 86880 COOMBS TEST DIRECT: CPT | Performed by: STUDENT IN AN ORGANIZED HEALTH CARE EDUCATION/TRAINING PROGRAM

## 2017-02-28 PROCEDURE — 85730 THROMBOPLASTIN TIME PARTIAL: CPT | Performed by: INTERNAL MEDICINE

## 2017-02-28 PROCEDURE — 83615 LACTATE (LD) (LDH) ENZYME: CPT | Performed by: STUDENT IN AN ORGANIZED HEALTH CARE EDUCATION/TRAINING PROGRAM

## 2017-02-28 PROCEDURE — 25000125 ZZHC RX 250: Performed by: INTERNAL MEDICINE

## 2017-02-28 PROCEDURE — 85610 PROTHROMBIN TIME: CPT | Performed by: INTERNAL MEDICINE

## 2017-02-28 PROCEDURE — 99233 SBSQ HOSP IP/OBS HIGH 50: CPT | Mod: GC | Performed by: INTERNAL MEDICINE

## 2017-02-28 PROCEDURE — 83735 ASSAY OF MAGNESIUM: CPT | Performed by: STUDENT IN AN ORGANIZED HEALTH CARE EDUCATION/TRAINING PROGRAM

## 2017-02-28 PROCEDURE — 21400006 ZZH R&B CCU INTERMEDIATE UMMC

## 2017-02-28 PROCEDURE — 36592 COLLECT BLOOD FROM PICC: CPT | Performed by: INTERNAL MEDICINE

## 2017-02-28 PROCEDURE — 85730 THROMBOPLASTIN TIME PARTIAL: CPT | Performed by: STUDENT IN AN ORGANIZED HEALTH CARE EDUCATION/TRAINING PROGRAM

## 2017-02-28 PROCEDURE — 86850 RBC ANTIBODY SCREEN: CPT | Performed by: STUDENT IN AN ORGANIZED HEALTH CARE EDUCATION/TRAINING PROGRAM

## 2017-02-28 PROCEDURE — 86860 RBC ANTIBODY ELUTION: CPT | Performed by: STUDENT IN AN ORGANIZED HEALTH CARE EDUCATION/TRAINING PROGRAM

## 2017-02-28 PROCEDURE — 84443 ASSAY THYROID STIM HORMONE: CPT | Performed by: STUDENT IN AN ORGANIZED HEALTH CARE EDUCATION/TRAINING PROGRAM

## 2017-02-28 PROCEDURE — 25000128 H RX IP 250 OP 636: Performed by: INTERNAL MEDICINE

## 2017-02-28 PROCEDURE — 25000132 ZZH RX MED GY IP 250 OP 250 PS 637: Mod: GY | Performed by: INTERNAL MEDICINE

## 2017-02-28 PROCEDURE — P9059 PLASMA, FRZ BETWEEN 8-24HOUR: HCPCS | Performed by: STUDENT IN AN ORGANIZED HEALTH CARE EDUCATION/TRAINING PROGRAM

## 2017-02-28 PROCEDURE — 84466 ASSAY OF TRANSFERRIN: CPT | Performed by: STUDENT IN AN ORGANIZED HEALTH CARE EDUCATION/TRAINING PROGRAM

## 2017-02-28 PROCEDURE — 83550 IRON BINDING TEST: CPT | Performed by: STUDENT IN AN ORGANIZED HEALTH CARE EDUCATION/TRAINING PROGRAM

## 2017-02-28 PROCEDURE — 82607 VITAMIN B-12: CPT | Performed by: STUDENT IN AN ORGANIZED HEALTH CARE EDUCATION/TRAINING PROGRAM

## 2017-02-28 PROCEDURE — 40000802 ZZH SITE CHECK

## 2017-02-28 PROCEDURE — 84132 ASSAY OF SERUM POTASSIUM: CPT | Performed by: INTERNAL MEDICINE

## 2017-02-28 PROCEDURE — 25000132 ZZH RX MED GY IP 250 OP 250 PS 637: Mod: GY | Performed by: HOSPITALIST

## 2017-02-28 PROCEDURE — A9270 NON-COVERED ITEM OR SERVICE: HCPCS | Mod: GY | Performed by: INTERNAL MEDICINE

## 2017-02-28 PROCEDURE — 82728 ASSAY OF FERRITIN: CPT | Performed by: STUDENT IN AN ORGANIZED HEALTH CARE EDUCATION/TRAINING PROGRAM

## 2017-02-28 PROCEDURE — P9059 PLASMA, FRZ BETWEEN 8-24HOUR: HCPCS | Performed by: INTERNAL MEDICINE

## 2017-02-28 PROCEDURE — 00000146 ZZHCL STATISTIC GLUCOSE BY METER IP

## 2017-02-28 PROCEDURE — 80048 BASIC METABOLIC PNL TOTAL CA: CPT | Performed by: STUDENT IN AN ORGANIZED HEALTH CARE EDUCATION/TRAINING PROGRAM

## 2017-02-28 PROCEDURE — 36592 COLLECT BLOOD FROM PICC: CPT | Performed by: STUDENT IN AN ORGANIZED HEALTH CARE EDUCATION/TRAINING PROGRAM

## 2017-02-28 PROCEDURE — A9270 NON-COVERED ITEM OR SERVICE: HCPCS | Mod: GY | Performed by: HOSPITALIST

## 2017-02-28 PROCEDURE — 40000558 ZZH STATISTIC CVC DRESSING CHANGE

## 2017-02-28 PROCEDURE — 40000344 ZZHCL STATISTIC THAWING COMPONENT: Performed by: STUDENT IN AN ORGANIZED HEALTH CARE EDUCATION/TRAINING PROGRAM

## 2017-02-28 PROCEDURE — 25000128 H RX IP 250 OP 636: Performed by: STUDENT IN AN ORGANIZED HEALTH CARE EDUCATION/TRAINING PROGRAM

## 2017-02-28 PROCEDURE — 85027 COMPLETE CBC AUTOMATED: CPT | Performed by: STUDENT IN AN ORGANIZED HEALTH CARE EDUCATION/TRAINING PROGRAM

## 2017-02-28 PROCEDURE — L3260 AMBULATORY SURGICAL BOOT EAC: HCPCS

## 2017-02-28 RX ORDER — METHYLPREDNISOLONE SODIUM SUCCINATE 125 MG/2ML
125 INJECTION, POWDER, LYOPHILIZED, FOR SOLUTION INTRAMUSCULAR; INTRAVENOUS
Status: DISCONTINUED | OUTPATIENT
Start: 2017-02-28 | End: 2017-03-03 | Stop reason: HOSPADM

## 2017-02-28 RX ORDER — FUROSEMIDE 10 MG/ML
20 INJECTION INTRAMUSCULAR; INTRAVENOUS
Status: DISCONTINUED | OUTPATIENT
Start: 2017-02-28 | End: 2017-03-02

## 2017-02-28 RX ORDER — DIPHENHYDRAMINE HYDROCHLORIDE 50 MG/ML
50 INJECTION INTRAMUSCULAR; INTRAVENOUS
Status: DISCONTINUED | OUTPATIENT
Start: 2017-02-28 | End: 2017-03-03 | Stop reason: HOSPADM

## 2017-02-28 RX ORDER — PHYTONADIONE 5 MG/1
5 TABLET ORAL ONCE
Status: COMPLETED | OUTPATIENT
Start: 2017-02-28 | End: 2017-02-28

## 2017-02-28 RX ORDER — EPINEPHRINE 1 MG/ML
0.3 INJECTION INTRAMUSCULAR; INTRAVENOUS; SUBCUTANEOUS
Status: DISCONTINUED | OUTPATIENT
Start: 2017-02-28 | End: 2017-03-03 | Stop reason: HOSPADM

## 2017-02-28 RX ORDER — FUROSEMIDE 40 MG
40 TABLET ORAL 2 TIMES DAILY
Status: DISCONTINUED | OUTPATIENT
Start: 2017-02-28 | End: 2017-02-28

## 2017-02-28 RX ORDER — POTASSIUM CHLORIDE 750 MG/1
40 TABLET, EXTENDED RELEASE ORAL ONCE
Status: DISCONTINUED | OUTPATIENT
Start: 2017-02-28 | End: 2017-02-28

## 2017-02-28 RX ADMIN — PANTOPRAZOLE SODIUM 40 MG: 40 TABLET, DELAYED RELEASE ORAL at 09:04

## 2017-02-28 RX ADMIN — ATORVASTATIN CALCIUM 40 MG: 40 TABLET, FILM COATED ORAL at 19:17

## 2017-02-28 RX ADMIN — POTASSIUM CHLORIDE 20 MEQ: 750 TABLET, EXTENDED RELEASE ORAL at 02:14

## 2017-02-28 RX ADMIN — POTASSIUM CHLORIDE 40 MEQ: 750 TABLET, EXTENDED RELEASE ORAL at 06:07

## 2017-02-28 RX ADMIN — CLOTRIMAZOLE: 10 CREAM TOPICAL at 10:01

## 2017-02-28 RX ADMIN — ASPIRIN 81 MG CHEWABLE TABLET 81 MG: 81 TABLET CHEWABLE at 09:01

## 2017-02-28 RX ADMIN — FUROSEMIDE 20 MG: 10 INJECTION, SOLUTION INTRAVENOUS at 01:39

## 2017-02-28 RX ADMIN — Medication 25 MG: at 00:12

## 2017-02-28 RX ADMIN — LORAZEPAM 0.5 MG: 0.5 TABLET ORAL at 18:06

## 2017-02-28 RX ADMIN — PHYTONADIONE 5 MG: 5 TABLET ORAL at 13:42

## 2017-02-28 RX ADMIN — GABAPENTIN 100 MG: 100 CAPSULE ORAL at 19:17

## 2017-02-28 RX ADMIN — CLOTRIMAZOLE: 10 CREAM TOPICAL at 20:01

## 2017-02-28 RX ADMIN — NYSTATIN AND TRIAMCINOLONE ACETONIDE: 100000; 1 CREAM TOPICAL at 20:02

## 2017-02-28 RX ADMIN — IRON SUCROSE 200 MG: 20 INJECTION, SOLUTION INTRAVENOUS at 19:08

## 2017-02-28 RX ADMIN — NYSTATIN AND TRIAMCINOLONE ACETONIDE: 100000; 1 CREAM TOPICAL at 10:28

## 2017-02-28 RX ADMIN — Medication 25 MG: at 21:36

## 2017-02-28 RX ADMIN — FUROSEMIDE 20 MG: 10 INJECTION, SOLUTION INTRAVENOUS at 09:03

## 2017-02-28 RX ADMIN — VENLAFAXINE HYDROCHLORIDE 150 MG: 150 TABLET, EXTENDED RELEASE ORAL at 09:05

## 2017-02-28 RX ADMIN — POTASSIUM CHLORIDE 20 MEQ: 750 TABLET, EXTENDED RELEASE ORAL at 09:07

## 2017-02-28 RX ADMIN — ARGATROBAN 0.5 MCG/KG/MIN: 1 INJECTION INTRAVENOUS at 06:54

## 2017-02-28 RX ADMIN — FUROSEMIDE 20 MG: 10 INJECTION, SOLUTION INTRAVENOUS at 15:58

## 2017-02-28 RX ADMIN — GABAPENTIN 100 MG: 100 CAPSULE ORAL at 09:04

## 2017-02-28 RX ADMIN — AMIODARONE HYDROCHLORIDE 200 MG: 200 TABLET ORAL at 09:00

## 2017-02-28 NOTE — PLAN OF CARE
Problem: Goal Outcome Summary  Goal: Goal Outcome Summary  D/I/A: Pt here w/ ischemic pedal digits. Currently pt's toes seem to be reperfusing (warmer, pinker, regaining feeling). HIT assay positive, Cards 1 informed and heparin gtt stopped. Argatroban started,  @ 1900, so drip turned off and recheck ordered for 2100. Heme consult and podiatry consults completed. Podiatry recommended betadine soaked gauze dressing changed daily for toes and surgical shoes to be worn on both feet. Pt was found to be in a-fib at one point today, however rate was controlled and pt was asymptomatic. Pt had 6 beats of vtach, cards 1 ordered K, Mg which were both low and both were replaced. Dr Hall saw patient and evaluated her for microvascular interventions.  P: Continue to monitor.

## 2017-02-28 NOTE — CONSULTS
[facility], [place]    Hematology Consultation    Date of Admission:  2/24/2017    Reason for Consult   Reason for consult: concern for HIT in the setting of acute platelet count drop and DVT.    Assessment & Plan     Ms. 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    HIT: after extensive review of patients chart believe now that patient developed HIT on admission for CABG in early February and that this is not delayed HIT.  Patients was exposed to heparin on 2/6/17 (of note history of heparin exposure at prior admissions) right after exposure her platelets began to downtrend and almost halved on day 4 of admission.  That day 2/9/17 she had a HIT gogo that was negative (probably to early in process), her platelet slowly began to recover and then she received heparin again on 2/11/17 when they started again to down trend, heparin stopped 2/12/17 platelets began to trend up on  2/13/17 where they have remained normal until this admission where she was again expose to heparin. HIT gogo positive 2/26/17.  We also believe that the DVT and gangrene on her toes are also possibly related to her HIT and will discuss below further   -- daily CBC diff, PTT  -- continue argatroban, once platelets plateau can transition to fondaparinaux  -- It is essential that it is now on her allergy list and obtains a bracelet saying she has a heparin allergy    Recent exposure to coumadin: last dose 2/27/17 could skew the PTT results which is important for monitoring the argatroban.  Have already communicated with the team to order 2 units of FFP and 5mg vitamin K.    Prolonged INR: INR prior to coumadin exposure was prolonged concerned that  patient has protein c deficiency which could be playing a role into her supratherapeutic INR.  Cannot test protein c deficiency due to recent exposure to coumadin at this time.    B/l gangrenous toes: on exam, per patients report started having symptoms around time of discharge from last admission 2/17/17 could be possibly 2/2 HIT, primary team plans to do TAVO to see if cardiac possibility as origin.  If negative and vascular surgery feels that there is chance of recovery of the tissue will consider therapeutic plasma exchange if TAVO  negative for cardiac origin.  -- please consult vascular surgery to determine that there is a chance of tissue recovery, if so pending TAVO results will consider therapeutic plasma exchange.    RUE DVT: asymptomatic on admission site of previous central line, could be foreign body associated and related to HIT from last admission.  Currently on argatroban for treatment, one platelets plateau, can be transitioned fondaparinux for a month were she will follow up with Dr. Bernal in clinic and then probably bridged at that time to coumadin.    Case and plan discussed with Dr. Dolores Sanchez  PGY4  Hematology Oncology Fellow        Patient Summary    Ms. 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    Subjective    Patient reports she is ready to leave the hospital and feels the same.  Maybe her toes are improving but not sure.    Physical Exam   Temp: 97.9  F (36.6  C) Temp src: Oral BP: 134/74   Heart Rate: 81 Resp: 18 SpO2: 92 % O2 Device: None (Room air)    Vital Signs with Ranges  Temp:  [97.4  F (36.3  C)-98.7  F (37.1  C)] 97.9  F (36.6  C)  Heart Rate:  [74-92] 81  Resp:  [16-20] 18  BP: (104-167)/(62-89)  134/74  FiO2 (%):  [30 %] 30 %  SpO2:  [89 %-97 %] 92 %  173 lbs 1.6 oz  GEN: comfortable, in no distress  HEENT: atraumatic, sclera anicteric  Neck: No LAD  Chest: scars present  CV: + LE edema  LUNG: comfortable on room air  Abdomen: no distension  MSK: no gross deformities  SKIN: warm, dry, no rash  NEURO: no gross abnormalities, alert and oriented x3  PSYCH: appropriate affect and mood.    Data   -Data reviewed today: All pertinent laboratory and imaging results from this encounter were reviewed.    Recent Labs  Lab 02/28/17  1329 02/28/17  0439 02/28/17  0059  02/27/17  1141 02/27/17  0833 02/26/17  0808 02/25/17  1100 02/25/17  0953 02/25/17  0910  02/24/17  1220   WBC  --  5.7  --   --   --  6.4  --   --  9.1  --   < > 8.5   HGB  --  6.9*  --   --   --  7.9*  --   --  8.2*  --   < > 8.4*   MCV  --  101*  --   --   --  99  --   --  100  --   < > 98   PLT  --  210  --   --   --  284  --   --  419  --   < > 571*   INR  --  2.58*  --   --   --  1.88* 2.57* 4.28*  --   --   --  6.99*   NA  --  141  --   --   --   --   --  136  --  Test canceled - Lab  error 02/25/17 1000 FMCORRECTED ON 02/25 AT 1034: PREVIOUSLY REPORTED   --  141   POTASSIUM 4.4 3.3* 3.4  < >  --   --   --  4.5  --  Test canceled - Lab  error 02/25/17 1000 FMCORRECTED ON 02/25 AT 1034: PREVIOUSLY REPORTED AS 4.3  --  4.4   CHLORIDE  --  100  --   --   --   --   --  102  --  Test canceled - Lab  error 02/25/17 1000 FMCORRECTED ON 02/25 AT 1034: PREVIOUSLY REPORTED   --  104   CO2  --  34*  --   --   --   --   --  25  --  Test canceled - Lab  error 02/25/17 1000 FMCORRECTED ON 02/25 AT 1034: PREVIOUSLY REPORTED AS 22  --  27   BUN  --  8  --   --   --   --   --  12  --  Test canceled - Lab  error 02/25/17 1000 FMCORRECTED ON 02/25 AT 1034: PREVIOUSLY REPORTED AS 7  --  12   CR  --  0.57  --   --   --   --   --  0.73  --  Test canceled - Lab  error 02/25/17 1000 FMCORRECTED  ON 02/25 AT 1034: PREVIOUSLY REPORTED AS 0.88  --  0.94   ANIONGAP  --  7  --   --   --   --   --  9  --  Test canceled - Lab  error 02/25/17 1000 FMCORRECTED ON 02/25 AT 1034: PREVIOUSLY REPORTED AS 7  --  10   CARLY  --  8.2*  --   --   --   --   --  8.7  --  Test canceled - Lab  error 02/25/17 1000 FMCORRECTED ON 02/25 AT 1034: PREVIOUSLY REPORTED AS 8.4  --  8.5   GLC  --  100*  --   --   --   --   --  125*  --  Test canceled - Lab  error 02/25/17 1000 FMCORRECTED ON 02/25 AT 1034: PREVIOUSLY REPORTED AS 94  --  128*   ALBUMIN  --   --   --   --  2.5*  --   --   --   --   --   --  2.8*   PROTTOTAL  --   --   --   --  5.7*  --   --   --   --   --   --  6.6*   BILITOTAL  --   --   --   --  0.5  --   --   --   --   --   --  0.4   ALKPHOS  --   --   --   --  110  --   --   --   --   --   --  103   ALT  --   --   --   --  50  --   --   --   --   --   --  70*   AST  --   --   --   --  33  --   --   --   --   --   --  34   LIPASE  --   --   --   --   --   --   --  317  --  Test canceled - Lab  error02/25/17 FMCORRECTED ON 02/25 AT 1034: PREVIOUSLY REPORTED   --   --    < > = values in this interval not displayed.     2/24/2017 12:20 2/25/2017 11:00 2/25/2017 23:37 2/26/2017 08:08 2/26/2017 15:21 2/27/2017 08:33 2/27/2017 17:32   INR 6.99 (HH) 4.28 (H)  2.57 (H)  1.88 (H)    PTT  70 (H)     190 (HH)   Heparin 10A Level   0.60 0.37 0.18 <0.10...    Chromogenic Factor 10  21 (L)        Factor 2 Assay    25 (L)  37 (L)    Heparin Induced Thrombocytopenia    Positive... (A)            No results found for this or any previous visit (from the past 24 hour(s)).  EXAMINATION: DOPPLER VENOUS ULTRASOUND OF THE RIGHT UPPER EXTREMITY,  2/24/2017 11:00 PM      COMPARISON: CTA 2/24/2017     HISTORY: Possible thrombus in the IJ on CTA.     TECHNIQUE: Gray-scale evaluation with compression, spectral flow and  color Doppler assessment of the deep venous system of the right  upper  extremity.     FINDINGS:     Right:   Nonocclusive filling defect in the central right internal jugular vein  and subclavian vein. The basilic vein is not compressible.     Normal blood flow and waveforms are demonstrated in the innominate and  axillary veins. There is normal compressibility of the brachial and  cephalic veins.         IMPRESSION:  1. Nonocclusive deep venous thrombosis in the right internal jugular  vein and right subclavian vein.  2. Occlusive thrombus of the basilic vein at the antecubital fossa.     CT angiogram through the chest, abdomen and pelvis with bilateral  lower extremity runoff on 2/24/2017.     HISTORY: Ischemic feet status post coronary artery bypass grafting.     COMPARISON: Chest CT on 2/22/2017.     TECHNIQUE: Helical CT through the lung bases through the pubic  symphysis was performed without contrast. Helical CT from the lung  apices to the feet was then performed following the administration of  intravenous contrast in the arterial phase. 3-D reconstructions were  performed and archived.     Contrast: 150cc isovue 370     FINDINGS:     Vasculature: Classic branch pattern of the great vessels. Normal  caliber of the thoracic and abdominal aorta without evidence of  dissection. The celiac axis, superior mesenteric artery, and inferior  mesenteric artery are patent. No aneurysm or stenosis of the common  iliac, external iliac, or internal iliac arteries bilaterally with a  few scattered calcified atherosclerotic plaques.     Right lower extremity: The common femoral, deep femoral, superficial  femoral, and popliteal arteries are patent without stenosis. The  anterior tibial artery is patent to the ankle. The posterior tibial  artery is patent to the posterior plantar arch. The peroneal artery is  patent to at least the mid calf.     Left lower extremity: The common femoral, deep femoral, superficial  femoral, popliteal are patent. The anterior tibial artery is patent  to  the ankle. The posterior tibial artery is patent to at least the  posterior plantar arch. The peroneal artery is patent to at least the  mid calf.     Possible filling defects in the right internal jugular vein extending  inferiorly into the right brachiocephalic vein.     CHEST: Postsurgical changes of coronary artery bypass grafting with  median sternotomy wires noted. Cardiac size is within normal limits.  Small pericardial effusion. Trace fluid underlying the sternotomy. No  central pulmonary embolism. No pathologically enlarged mediastinal,  hilar or axillary lymph nodes. Small left pleural effusion with  minimal overlying atelectasis. Mild linear opacities in the lingula  and right lower lobe likely representing atelectasis. Diffuse  interlobular septal thickening. No pneumothorax.     Abdomen/pelvis: Evaluation of the abdomen and pelvis is limited by  arterial phase of intravenous contrast. The liver, gallbladder,  adrenal glands, kidneys and pancreas are unremarkable. Multiple  punctate calcified granulomas within the large spleen. The stomach,  small bowel and colon are within normal limits. The appendix is not  identified. There are no secondary signs of appendicitis. Postsurgical  changes of hysterectomy. The bladder is mildly distended and otherwise  unremarkable. Trace free fluid in the pelvis.     Bones/soft tissues: Anasarca with bilateral lower extremity edema. No  suspicious osseous lesions. Moderate degenerative changes of the  lumbar spine associated with mild convex left scoliosis of the lumbar  spine. L3-L5 laminectomies.         IMPRESSION:  1. Mild aortobiiliac atherosclerosis without evidence of aneurysm,  dissection, or significant stenosis in the chest, abdomen or pelvis.  Bilateral patent lower extremity three-vessel runoff.  2. New postsurgical changes of coronary artery bypass grafting with  small pericardial effusion and trace fluid underlying the sternotomy.  3. Findings suggestive  of volume overload including small left pleural  effusion, diffuse interlobular septal thickening suggestive of  interstitial pulmonary edema, trace pelvic ascites and anasarca.  4. Probable filling defect in the right internal jugular vein  extending into the right brachiocephalic vein suggestive of deep  venous thrombosis versus admixture phenomenon. Consider further  evaluation with venous Doppler ultrasound.  5. During the examination approximately 40 cc of intravenous contrast  versus saline flush extravasated into the left antecubital fossa. The  patient reported mild tenderness in this region at that time with  intact sensory motor function in the left upper extremity. The patient  was instructed to seek care if there was evidence of skin breakdown.  Elevation of the extremity and hot or cold packs can also help with  absorption of the fluid. Recommend plastic surgery consultation if  there is evidence of tissue necrosis.

## 2017-02-28 NOTE — CONSULTS
[facility], [place]    Hematology Consultation    Date of Admission:  2/24/2017    Reason for Consult   Reason for consult: concern for HIT in the setting of acute platelet count drop and DVT.    Assessment & Plan     Ms. 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  She also has acute on chronic mildly macrocytic anemia since CABG that warrants a further evaluation.    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  Pending LORAINE.  Currently on argatroban.  Concerned currently that PTT may be under estimating argatroban level due to recent coumadin exposure will need to be completely reversed with serial PTT.  -- pending LORAINE  -- start PTT q4H overnight for close monitoring, afterwards can switch to daily (ordered)  -- daily CBC diff  -- 4 units FFP to reverse coumadin exposure (ordered)  -- continue on argatroban drip  -- stop coumadin    B/l gangrenous toes: unclear etiology concern related to delayed HIT, also concerned patient has an underlying Protein c or s deficiency but will not be able to evaluate in current condition.  Appears to be improving, will need close evaluation by vascular surgery/podiatry and hem/onc in the out patient setting.    Acute on chronic mildly macrocytic anemia: likely 2/2 recent stress, but need to rule out etiologies, including to hemolysis with clumping of red cells on p. Smear and recent use of bactrim. Retic index appropriate 1.9%  -- ordered (HIGINIO, LD, haptoglobin, iron panel,  B12, folate, TSH)    Case and plan discussed with Dr. Dolores Sanchez  PGY4  Hematology Oncology Fellow        History of Present Illness     Ms. Fenton is a complex medical patient with a PMHx of DM, HTN , CVA 10/2016 2/2 cardio embolic source from afib now on coumadin, CAD s/p CABG and PFO closure 2/6/17 discharged 2/17/17 admitted on 2/24/17 due to b/l gangrenous toes.    During patient hospitalization 2/9-2/17 she was exposed to heparin.    On admission she had a CTA abd aorta to further evaluate her b/l LE and incidentally a RIJ and subclavian vein DVT was discovered patient was asymptomatic.    Patient on admission was supra therapeutic on her coumadin so a chromogenic factor X level was completed which correlated with her INR.    On admission she was started on heparin.  Prior to starting heparin her plt count was 571 now 284.  Hit Lorri 1.01. Pending serotonin release assay.    She reports since her admission her toes appear better, she denies other sites of swelling or bleeding.  Spontaneous bruising, other skin changes or bleeding.    No known family history of bleeding or clotting disorders.    Past Medical History    I have reviewed this patient's medical history and updated it with pertinent information if needed.   Past Medical History   Diagnosis Date     Antiplatelet or antithrombotic long-term use      CAD (coronary artery disease)      2 vessel     Cancer (H)      Skin     Cerebral artery occlusion with cerebral infarction (H) 10/2016     Cerebral infarction (H) 10/2016     Diabetes (H)      Headaches      History of skin cancer      Hyperlipemias      Hypertension      Irregular heart beat      Peripheral neuropathy (H) 1990     cause unknown     Sleep apnea        Past Surgical History   I have reviewed this patient's surgical history and updated it with pertinent information if needed.  Past Surgical History   Procedure Laterality Date     Hysterectomy, brenda  1988     Tonsillectomy  1942      C appendectomy       Back surgery       Bypass graft artery coronary N/A 2017     Procedure: BYPASS GRAFT ARTERY CORONARY;  Surgeon: Mikhail Quiñones MD;  Location: [facility] OR     Maze procedure N/A 2017     Procedure: MAZE PROCEDURE;  Surgeon: Mikhail Quiñones MD;  Location: [facility] OR     Picc insertion Left 2017     5fr TL Bard PICC, 47cm (3cm external) in the L basilic vein w/ tip in the SVC RA junction       Prior to Admission Medications   Prior to Admission Medications   Prescriptions Last Dose Informant Patient Reported? Taking?   HYDROmorphone (DILAUDID) 2 MG tablet Past Week at Unknown time  No Yes   Sig: Take 0.5 tablets (1 mg) by mouth every 4 hours as needed for moderate to severe pain   ONE TOUCH ULTRA test strip Unknown at Unknown time Self No No   Sig: Test once daily   ONETOUCH DELICA LANCETS 33G MISC Unknown at Unknown time Self No No   Si Device daily   acetaminophen (TYLENOL) 325 MG tablet 2017 at Unknown time  No Yes   Sig: Take 1-2 tablets (325-650 mg) by mouth every 4 hours as needed for mild pain or fever   amiodarone (PACERONE/CODARONE) 200 MG tablet 2017 at Unknown time  No Yes   Sig: Take 2 tabs (400 mg) daily for 14 days, then decrease dose to 1 tab (200 mg) daily for 14 days, then stop   aspirin  MG EC tablet Unknown at Unknown time  No No   Sig: Take 1 tablet (325 mg) by mouth every 6 hours as needed for moderate pain   blood glucose monitoring (ONE TOUCH ULTRA 2) meter device kit Unknown at Unknown time Self Yes No   clotrimazole (LOTRIMIN) 1 % cream Unknown at Unknown time Self No No   Sig: Please apply to groins two times daily for one week after discharge and then follow up with PCP if no improvement of your skin rashes   ferrous sulfate (IRON SUPPLEMENT) 325 (65 FE) MG tablet 2017 at Unknown time  No Yes   Sig: Take 1 tablet (325 mg) by mouth daily (with breakfast)   furosemide (LASIX) 20 MG tablet 2017 at Unknown time  No Yes   Sig: Take 1 tablet  (20 mg) by mouth 2 times daily   gabapentin (NEURONTIN) 100 MG capsule 2/24/2017 at Unknown time  No Yes   Sig: Take 1 capsule (100 mg) by mouth 2 times daily   loperamide (IMODIUM) 2 MG capsule Past Month at Unknown time  No Yes   Sig: Take 1 capsule (2 mg) by mouth 4 times daily as needed for diarrhea   melatonin 1 MG TABS tablet Unknown at Unknown time  No No   Sig: Take 1 tablet (1 mg) by mouth nightly as needed   pantoprazole (PROTONIX) 40 MG EC tablet 2/24/2017 at Unknown time  No Yes   Sig: Take 1 tablet (40 mg) by mouth every morning   potassium chloride SA (K-DUR/KLOR-CON M) 10 MEQ CR tablet 2/24/2017 at Unknown time  No Yes   Sig: Take 1 tablet (10 mEq) by mouth 2 times daily   sulfamethoxazole-trimethoprim (BACTRIM DS/SEPTRA DS) 800-160 MG per tablet 2/24/2017 at Unknown time  No Yes   Sig: Take 1 tablet by mouth 2 times daily   traZODone (DESYREL) 50 MG tablet 2/24/2017 at Unknown time Self No Yes   Sig: Take 0.5 tablets (25 mg) by mouth nightly as needed for sleep   venlafaxine (EFFEXOR-ER) 150 MG TB24 24 hr tablet 2/24/2017 at Unknown time Self No Yes   Sig: Take 1 tablet (150 mg) by mouth daily (with breakfast)   warfarin (COUMADIN) 2.5 MG tablet 2/23/2017 at Unknown time  No Yes   Sig: Take 2 tablets (5 mg) by mouth daily      Facility-Administered Medications: None     Allergies   Allergies   Allergen Reactions     Heparin      HIT/ thrombocytopenia     Oxycodone      hallucinations     Adhesive Tape Itching and Rash     Diapers & Supplies Rash     Developed yeast infection from previous hospital stay       Social History   I have reviewed this patient's social history and updated it with pertinent information if needed. Urszulaabel DARIO Fenton  reports that she has quit smoking. She has never used smokeless tobacco. She reports that she does not drink alcohol or use illicit drugs.    Family History   I have reviewed this patient's family history and updated it with pertinent information if needed.   Family  History   Problem Relation Age of Onset     DIABETES Mother      C.A.D. Mother      DIABETES Father      C.A.D. Father      Cardiovascular Sister      blood clot       Review of Systems   The 10 point Review of Systems is negative other than noted in the HPI.     Physical Exam   Temp: 98.1  F (36.7  C) Temp src: Oral BP: 136/82 Pulse: 85 Heart Rate: 78 Resp: 19 SpO2: 97 % O2 Device: None (Room air) Oxygen Delivery: 2 LPM  Vital Signs with Ranges  Temp:  [98.1  F (36.7  C)-99.8  F (37.7  C)] 98.1  F (36.7  C)  Pulse:  [85] 85  Heart Rate:  [72-80] 78  Resp:  [18-20] 19  BP: (136-182)/(67-98) 136/82  SpO2:  [90 %-98 %] 97 %  173 lbs 4.8 oz  GEN: comfortable, in no distress  HEENT: atraumatic, sclera anicteric  Neck: No LAD  Chest: scars present  CV: + LE edema  LUNG: comfortable on room air  Abdomen: no distension  MSK: no gross deformities  SKIN: warm, dry, no rash  NEURO: no gross abnormalities, alert and oriented x3  PSYCH: appropriate affect and mood.    Data   -Data reviewed today: All pertinent laboratory and imaging results from this encounter were reviewed.    Recent Labs  Lab 02/27/17  1600 02/27/17  1141 02/27/17  0833 02/26/17  0808 02/25/17  1100 02/25/17  0953 02/25/17  0910 02/25/17  0408 02/24/17  1220   WBC  --   --  6.4  --   --  9.1  --  8.7 8.5   HGB  --   --  7.9*  --   --  8.2*  --  8.4* 8.4*   MCV  --   --  99  --   --  100  --  100 98   PLT  --   --  284  --   --  419  --  539* 571*   INR  --   --  1.88* 2.57* 4.28*  --   --   --  6.99*   NA  --   --   --   --  136  --  Test canceled - Lab  error 02/25/17 1000 FMCORRECTED ON 02/25 AT 1034: PREVIOUSLY REPORTED   --  141   POTASSIUM 3.6  --   --   --  4.5  --  Test canceled - Lab  error 02/25/17 1000 FMCORRECTED ON 02/25 AT 1034: PREVIOUSLY REPORTED AS 4.3  --  4.4   CHLORIDE  --   --   --   --  102  --  Test canceled - Lab  error 02/25/17 1000 FMCORRECTED ON 02/25 AT 1034: PREVIOUSLY REPORTED   --  104    CO2  --   --   --   --  25  --  Test canceled - Lab  error 02/25/17 1000 FMCORRECTED ON 02/25 AT 1034: PREVIOUSLY REPORTED AS 22  --  27   BUN  --   --   --   --  12  --  Test canceled - Lab  error 02/25/17 1000 FMCORRECTED ON 02/25 AT 1034: PREVIOUSLY REPORTED AS 7  --  12   CR  --   --   --   --  0.73  --  Test canceled - Lab  error 02/25/17 1000 FMCORRECTED ON 02/25 AT 1034: PREVIOUSLY REPORTED AS 0.88  --  0.94   ANIONGAP  --   --   --   --  9  --  Test canceled - Lab  error 02/25/17 1000 FMCORRECTED ON 02/25 AT 1034: PREVIOUSLY REPORTED AS 7  --  10   CARLY  --   --   --   --  8.7  --  Test canceled - Lab  error 02/25/17 1000 FMCORRECTED ON 02/25 AT 1034: PREVIOUSLY REPORTED AS 8.4  --  8.5   GLC  --   --   --   --  125*  --  Test canceled - Lab  error 02/25/17 1000 FMCORRECTED ON 02/25 AT 1034: PREVIOUSLY REPORTED AS 94  --  128*   ALBUMIN  --  2.5*  --   --   --   --   --   --  2.8*   PROTTOTAL  --  5.7*  --   --   --   --   --   --  6.6*   BILITOTAL  --  0.5  --   --   --   --   --   --  0.4   ALKPHOS  --  110  --   --   --   --   --   --  103   ALT  --  50  --   --   --   --   --   --  70*   AST  --  33  --   --   --   --   --   --  34   LIPASE  --   --   --   --  317  --  Test canceled - Lab  error02/25/17 FMCORRECTED ON 02/25 AT 1034: PREVIOUSLY REPORTED   --   --         2/24/2017 12:20 2/25/2017 11:00 2/25/2017 23:37 2/26/2017 08:08 2/26/2017 15:21 2/27/2017 08:33 2/27/2017 17:32   INR 6.99 (HH) 4.28 (H)  2.57 (H)  1.88 (H)    PTT  70 (H)     190 (HH)   Heparin 10A Level   0.60 0.37 0.18 <0.10...    Chromogenic Factor 10  21 (L)        Factor 2 Assay    25 (L)  37 (L)    Heparin Induced Thrombocytopenia    Positive... (A)            Recent Results (from the past 24 hour(s))   US RADHA Doppler No Exercise    Narrative    Exam: Bilateral lower extremity RADHA dated 2/27/2017 3:16 PM    Comparison study: None    Clinical  history: Eval for PVD.    Ordering provider:    Findings:    Right:    PT at ankle: 161 mmHg   DP at foot: 165 mmHg   RADHA: 1.16    Left:   Arm: 142 mmHg   PT at ankle: 165 mmHg   DP at foot: 150 mmHg   RADHA: 1.16      Impression    Impression:   Right leg:   Normal RADHA of 1.16.    Left leg:   Normal RADHA of 1.16.    Guidelines:    RADHA Diagnostic Criteria (Based on criteria published in Circulation  2011; 124: 4168-6844):    > 1.4: Non compressible    1.00 - 1.40: Normal    0.91 - 0.99: Borderline    At or below 0.90: Abnormal    RADHA Diagnostic Criteria (Based on ACC/AHA guideline 2008):    >/=1.3 - non compressible vessels    1.00  -1.29 - Normal    0.91 - 0.99 - Borderline    0.41 - 0.90 - Mild to moderate PAD    0.00 - 0.40 - Severe PAD     EXAMINATION: DOPPLER VENOUS ULTRASOUND OF THE RIGHT UPPER EXTREMITY,  2/24/2017 11:00 PM      COMPARISON: CTA 2/24/2017     HISTORY: Possible thrombus in the IJ on CTA.     TECHNIQUE: Gray-scale evaluation with compression, spectral flow and  color Doppler assessment of the deep venous system of the right upper  extremity.     FINDINGS:     Right:   Nonocclusive filling defect in the central right internal jugular vein  and subclavian vein. The basilic vein is not compressible.     Normal blood flow and waveforms are demonstrated in the innominate and  axillary veins. There is normal compressibility of the brachial and  cephalic veins.         IMPRESSION:  1. Nonocclusive deep venous thrombosis in the right internal jugular  vein and right subclavian vein.  2. Occlusive thrombus of the basilic vein at the antecubital fossa.     CT angiogram through the chest, abdomen and pelvis with bilateral  lower extremity runoff on 2/24/2017.     HISTORY: Ischemic feet status post coronary artery bypass grafting.     COMPARISON: Chest CT on 2/22/2017.     TECHNIQUE: Helical CT through the lung bases through the pubic  symphysis was performed without contrast. Helical CT from the lung  apices  to the feet was then performed following the administration of  intravenous contrast in the arterial phase. 3-D reconstructions were  performed and archived.     Contrast: 150cc isovue 370     FINDINGS:     Vasculature: Classic branch pattern of the great vessels. Normal  caliber of the thoracic and abdominal aorta without evidence of  dissection. The celiac axis, superior mesenteric artery, and inferior  mesenteric artery are patent. No aneurysm or stenosis of the common  iliac, external iliac, or internal iliac arteries bilaterally with a  few scattered calcified atherosclerotic plaques.     Right lower extremity: The common femoral, deep femoral, superficial  femoral, and popliteal arteries are patent without stenosis. The  anterior tibial artery is patent to the ankle. The posterior tibial  artery is patent to the posterior plantar arch. The peroneal artery is  patent to at least the mid calf.     Left lower extremity: The common femoral, deep femoral, superficial  femoral, popliteal are patent. The anterior tibial artery is patent to  the ankle. The posterior tibial artery is patent to at least the  posterior plantar arch. The peroneal artery is patent to at least the  mid calf.     Possible filling defects in the right internal jugular vein extending  inferiorly into the right brachiocephalic vein.     CHEST: Postsurgical changes of coronary artery bypass grafting with  median sternotomy wires noted. Cardiac size is within normal limits.  Small pericardial effusion. Trace fluid underlying the sternotomy. No  central pulmonary embolism. No pathologically enlarged mediastinal,  hilar or axillary lymph nodes. Small left pleural effusion with  minimal overlying atelectasis. Mild linear opacities in the lingula  and right lower lobe likely representing atelectasis. Diffuse  interlobular septal thickening. No pneumothorax.     Abdomen/pelvis: Evaluation of the abdomen and pelvis is limited by  arterial phase of  intravenous contrast. The liver, gallbladder,  adrenal glands, kidneys and pancreas are unremarkable. Multiple  punctate calcified granulomas within the large spleen. The stomach,  small bowel and colon are within normal limits. The appendix is not  identified. There are no secondary signs of appendicitis. Postsurgical  changes of hysterectomy. The bladder is mildly distended and otherwise  unremarkable. Trace free fluid in the pelvis.     Bones/soft tissues: Anasarca with bilateral lower extremity edema. No  suspicious osseous lesions. Moderate degenerative changes of the  lumbar spine associated with mild convex left scoliosis of the lumbar  spine. L3-L5 laminectomies.         IMPRESSION:  1. Mild aortobiiliac atherosclerosis without evidence of aneurysm,  dissection, or significant stenosis in the chest, abdomen or pelvis.  Bilateral patent lower extremity three-vessel runoff.  2. New postsurgical changes of coronary artery bypass grafting with  small pericardial effusion and trace fluid underlying the sternotomy.  3. Findings suggestive of volume overload including small left pleural  effusion, diffuse interlobular septal thickening suggestive of  interstitial pulmonary edema, trace pelvic ascites and anasarca.  4. Probable filling defect in the right internal jugular vein  extending into the right brachiocephalic vein suggestive of deep  venous thrombosis versus admixture phenomenon. Consider further  evaluation with venous Doppler ultrasound.  5. During the examination approximately 40 cc of intravenous contrast  versus saline flush extravasated into the left antecubital fossa. The  patient reported mild tenderness in this region at that time with  intact sensory motor function in the left upper extremity. The patient  was instructed to seek care if there was evidence of skin breakdown.  Elevation of the extremity and hot or cold packs can also help with  absorption of the fluid. Recommend plastic surgery  consultation if  there is evidence of tissue necrosis.

## 2017-02-28 NOTE — PROGRESS NOTES
Received 4u plasma yesterday's evening. Hgb dropped to 6.9 early this morning. Pt didn't want blood transfusion at that time.     Vitals  B/P: 134/69, T: 98.4, P: 85, R: 16  General: pleasant, alert and in no acute distress, speaking in full sentences  Skin: no active bleeding    Labs:  Creatinine 0.57  Iron 25  Iron Saturation Index 9    Hgb 6.9  Platelet 210  HIGINIO neg  Haptoglobin 173     Assessment: Mrs. Fenton is a 77 yo female with medical history significant for CVA 10/16 2/2 cardio embolic source from afib previously on warfarin, CAD s/p CABG and PFO closure on 2/6/17, was admitted for bilateral toe gangrene on 2/24, found to have almost 50% platelet drop after 2 days of heparin exposure. 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   Of note, studies have found that limb gangrene occurred in some patients after heparin stopped 2/2 HIT diagnosis and warfarin initiated, possibly due to acquired protein C deficiency.       Plan:  - continue to reverse effect of warfarin with FFP and vitamin K  - continue to anticoagulate with Argatroban  - agree with vascular surgery

## 2017-02-28 NOTE — PLAN OF CARE
Problem: Goal Outcome Summary  Goal: Goal Outcome Summary  kcl at 0600 for k+ of 3.3.  Needs 20 meq more at 0800.  Recheck k+ ordered for 12 noon.  hgb 6.9 this am.  junior oneill notified.  She did not want to transfuse at this time.  She said she would notify the primary team this am.

## 2017-02-28 NOTE — PROGRESS NOTES
"Vascular Surgery Progress Note         Patient seen and evaluated at bedside. No overnight complaints. On Bipap.      /69  Pulse 85  Temp 98.4  F (36.9  C) (Oral)  Resp 16  Ht 5' 2\"  Wt 173 lb 1.6 oz  SpO2 93%  BMI 31.66 kg/m2     General: Elderly  female. NAD  Cor: Irregularly irregular.   LE: Palpable femoral pulses bilaterally. Multiphasic dopplerable DP and PT. Acrocyanosis is observed at digits 1-3 on the right with evolving gangrenous changes observed at the tuft of the great toe. Bullous changes over the dorsum of the second toe. Changes also observed at the third toe and second toe most notably at the joey of the left foot.      Assessment: 76 year old diabetic female with history of CAD evaluated for micro atheroembolic disease affecting toes in bilateral lower extremity s/p Coronary artery bypass 02/06 now with HIT.     Plan:      1. Recommend Lambs wool in between toes with the application of Rooke boots bilaterally.   2. Argatroban for HIT with bridge to Arixtra per Hematology Oncology  3.  Again the plan is to await toe demarcation.  May consult orthopedics for partial amputation of the great toe at that point in time. Presently there is no evidence of infection.  4. Hypercoagulable work up deferred to Hematology  5. Vascular Surgery will sign off. Please reconsult with questions as needed.       Paul Long MD   Vascular Surgery Fellow   Pager: [phone]      "

## 2017-02-28 NOTE — PROGRESS NOTES
[facility] Cardiology Progress Note          Assessment and Plan:   Carlos Alberto Fenton is a 76 year old female with a medical history significant for coronary artery disease s/p bypass surgery 02/06/2017, diabetes, hypertension and sleep apnea, admitted to the Cardiology service via the ED, with findings suggestive of bilateral toe gangrene. Now with positive HIT panel.     #. Bilateral toe gangrene  #. DVT, possibly catheter related  #. HIT positive  Suspect thromboembolic or atheroembolic source. No signs of major vascular compromise on CTA. ECHO without LV thrombus. Pulses are palpable. No signs of infection.  -- stop heparin gtt  -- start argatroban  -- continue warfarin  -- heme/onc consult  -- seratonin release assay  -- follow up hypercoagulable panel  -- podiatry and vascular medicine consult    #. Paroxysmal atrial fibrillation (CHADSVASC 9) with history of CVA  Currently in sinus and is not on any rate control.  -- amiodarone 200 mg daily (will have appt on 3/3 to discuss long term need)  -- warfarin/argatroban as above    #. Coronary artery disease s/p CABG  #. Mild decrease in LVEF  History of CAD, s/p 3v CABG, modified maze with pulmonary isolation, left atrial appendage ligation, PFO closure, done 3 weeks ago.  -- Fluid restriction to 2L per day, salt restriction  -- home diuretics  -- daily weights and I/Os  -- decrease to ASA 81 mg  -- continue atorvastatin (new medication this admission)    #. Contrast extravasation, left forearm    Chronic medical problems  # DM2 with neuropathy - not on glycemic meds; continue gabapentin  # NOAH - CPAP  # HTN - not on meds     FEN: cardiac  Proph: argatroban gtt, warfarin  Dispo: pending podiatry follow up and anticoagulation plan for possible HIT, 1-2 days    Code: FULL    I have discussed this patient and plan with Dr. Whelan.    [place] Bill, PGY-2  Internal Medicine  [phone]        Interval History:   Notes reviewed. No acute events  "overnight.    Feeling well. No pain in toes. Some increased warmth        Review Of Systems   Negative except as stated above.            Medications:   Reviewed. Details in EPIC.       Blood pressure 136/82, pulse 85, temperature 98.3  F (36.8  C), temperature source Oral, resp. rate 16, height 1.575 m (5' 2\"), weight 78.6 kg (173 lb 4.8 oz), SpO2 94 %.    General: Alert, pleasant  CV: regular, no m/g/r  Chest: clear bilaterally; healing sternotomy  Abdomen: Soft, nontender, nondistended. +BS.   Extremities: 1+ edema to mid shins; improving pedal edema  Skin: dark blue to black coloration of right 1,2,3 digits (now with bullae formation on 2,3 toes) and left 2,3 digits of her feet bilaterally         Data:   Reviewed. Details in EPIC.    ATTENDING ATTESTATION:  Patient has been seen and evaluated by me on February 27, 2017. I have reviewed the medications, laboratory, imaging, and other relevant results.  I agree with the above note which I have edited, as necessary.  I have discussed my assessment and plan with the house staff.    Will ask Dr. Hall to evaluate for prostacyclin vs. Botox to limit gangrene.     Britt Whelan MD, MS  Staff Cardiologist, [facility]  Pager: [phone]        "

## 2017-02-28 NOTE — PLAN OF CARE
Problem: Goal Outcome Summary  Goal: Goal Outcome Summary     D/I: Patient's vital signs have been stable. Rhythm was SR/junctional (WAP) 70-80 with an isolated PVC, a rare PVC couplet and 0-5 PAC'/min. Her hemoglobin was 6.9, INR 2.58, and potassium 3.3. Cards 1 ordered 2 units of FFP, 5 mg oral vitamin K, and Iron Sucrose 200 mg iv. Dr. Bill stated to give FFP first and to give her 16:00 dose of iv lasix between the units of FFP. Her first unit is currently running now and received the vitamin K. She was told about getting iron sucrose after her 2 units of FFP. Lab called and stated her Lupus panel was cancelled since she has to be off Argatroban for 4 days prior to it getting drawn. MD was notified of this. She has her boots on while she is in bed and orthotics came by to fit her with [employer]co loading shoes. She has lambs wool between her toes and it is wrapped in kerlix. She denies pain. She needs a 2nd unit of FFP after getting iv Lasix and iv Iron Sucrose.              .  A: See flow sheets for further assessment details  P: Continue to monitor vital signs, rhythm, and labs. Notify MD with any changes or concerns

## 2017-02-28 NOTE — PLAN OF CARE
Problem: Goal Outcome Summary  Goal: Goal Outcome Summary  Vss.  Monitor shows sb-sr 50s-80s .  Had 11 beat run of vt x1 tonight.  md notified.  K+ was 3.4 so 20 meq given,  Mg+ 2.1.  No further vt runs noted tonight.  Per heme md this rn infused 4 units of ffp tonight.  20 mg iv lasix given during the 4 units.  Recheck ptt and inr sent.  Recheck k+ was 3.3 so 60 meq kcl will be given.  Argatroban restarted per ptt og 51 at 2130.  Recheck ptt 2 hours later was 94 so gtt decreased to .5mcg.  Next ptt pending in lab.  Incisions and dressings dry and intact.  Voiding.  Had bm on pms.  Feet warm with + dopplar pulses.  Dressings remain dry and intact.  Needs rooke boots ordered.  Will monitor and notify md of changes or issues.

## 2017-02-28 NOTE — PLAN OF CARE
D: Patient's right 1st, 2nd and 3rd toes are black.  Patient reports numbness present.  A: Applied Lambs wool between the toes of both feet.  Wrapped toes in Kerlix.  Patient received Elise boots for when at rest.  Ambulated patient after she received her ortho shoes.  R: Patient reported mild shortness of breath after ambulation.  Patient still adjusting to wearing the ortho shoes.

## 2017-02-28 NOTE — PROGRESS NOTES
[facility] Cardiology Progress Note          Assessment and Plan:   Carlos Alberto Fenton is a 76 year old female with a medical history significant for coronary artery disease s/p bypass surgery 02/06/2017, diabetes, hypertension and sleep apnea, admitted to the Cardiology service via the ED, with findings suggestive of bilateral toe gangrene. Now with diagnosis of HIT.     #. Bilateral toe gangrene  #. DVT, possibly catheter related  #. HIT  Most likely thromboembolic or atheroembolic source, but possible this is related to HIT. No signs of macrovascular involvement. ECHO without LV thrombus. CTA without significant calcification. No signs of infection.  -- continue argatroban  -- stop warfarin, will reverse with FFP and vitamin K  -- heme/onc consult  -- seratonin release assay pending  -- anti-cardiolipin and Beta-2 glycoprotein negative  -- podiatry and vascular medicine consult  -- NPO for TAVO    #. Paroxysmal atrial fibrillation (CHADSVASC 9) with history of CVA  Currently in sinus and is not on any rate control.  -- amiodarone 200 mg daily (will have appt on 3/3 to discuss long term need)  -- argatroban as above    #. Coronary artery disease s/p CABG  #. Mild decrease in LVEF  History of CAD, s/p 3v CABG, modified maze with pulmonary isolation, left atrial appendage ligation, PFO closure, done 2/2017  -- Fluid restriction to 2L per day, salt restriction  -- lasix 20 mg IV BID  -- daily weights and I/Os  -- decrease to ASA 81 mg  -- continue atorvastatin (new medication this admission)    #. Contrast extravasation, left forearm    Chronic medical problems  # DM2 with neuropathy - not on glycemic meds; continue gabapentin  # NOAH - CPAP  # HTN - not on meds     FEN: cardiac, NPO at MN  Proph: argatroban gtt  Dispo: pending anticoagulation plan for HIT    Code: FULL    I have discussed this patient and plan with Dr. Whelan.    Chapincito Bill, PGY-2  Internal Medicine  [phone]        Interval History:   Notes  reviewed. Given 4 units FFP and 20 mg IV lasix overnight. Had 11 beat run of VT. Potassium was replaced.    Feeling fine. Still having more sensation in toes. No shortness of breath.        Review Of Systems   Negative except as stated above.            Medications:     Current Facility-Administered Medications   Medication     furosemide (LASIX) injection 20 mg     EPINEPHrine (ADRENALIN) injection 0.3 mg     diphenhydrAMINE (BENADRYL) injection 50 mg     methylPREDNISolone sodium succinate (solu-MEDROL) injection 125 mg     famotidine (PEPCID) injection 20 mg     iron sucrose (VENOFER) 200 mg in NaCl 0.9 % 100 mL intermittent infusion     sodium chloride (PF) 0.9% PF flush 10-20 mL     sodium chloride (PF) 0.9% PF flush 10 mL     medication instruction -  NO argatroban (ACOVA) bolus or loading dose     argatroban (ACOVA) 1 mg/mL infusion SOLN     nystatin-triamcinolone (MYCOLOG II) cream     acetaminophen (TYLENOL) tablet 325-650 mg     clotrimazole (LOTRIMIN) 1 % cream     gabapentin (NEURONTIN) capsule 100 mg     melatonin tablet 1 mg     pantoprazole (PROTONIX) EC tablet 40 mg     traZODone (DESYREL) half-tab 25 mg     venlafaxine (EFFEXOR-ER) 24 hr tablet 150 mg     naloxone (NARCAN) injection 0.1-0.4 mg     Patient is already receiving anticoagulation with heparin, enoxaparin (LOVENOX), warfarin (COUMADIN)  or other anticoagulant medication     potassium chloride SA (K-DUR/KLOR-CON M) CR tablet 20-40 mEq     potassium chloride (KLOR-CON) Packet 20-40 mEq     potassium chloride 10 mEq in 100 mL intermittent infusion     potassium chloride 10 mEq in 100 mL intermittent infusion with 10 mg lidocaine     potassium chloride 20 mEq in 50 mL intermittent infusion     magnesium sulfate 2 g in NS intermittent infusion (PharMEDium or FV Cmpd)     magnesium sulfate 4 g in 100 mL sterile water (premade)     ondansetron (ZOFRAN-ODT) ODT tab 4 mg    Or     ondansetron (ZOFRAN) injection 4 mg     lidocaine (LMX4) kit      "amiodarone (PACERONE/CODARONE) tablet 200 mg     atorvastatin (LIPITOR) tablet 40 mg     aspirin chewable tablet 81 mg     LORazepam (ATIVAN) tablet 0.5 mg     sodium chloride (PF) 0.9% PF flush 3 mL     sodium chloride (PF) 0.9% PF flush 3 mL        Blood pressure 154/86, pulse 85, temperature 97.4  F (36.3  C), temperature source Axillary, resp. rate 20, height 1.575 m (5' 2\"), weight 78.5 kg (173 lb 1.6 oz), SpO2 97 %.    General: Alert, pleasant  CV: regular, no m/g/r  Chest: mild crackles in the bases  Abdomen: Soft, nontender, nondistended. +BS.   Extremities: 1+ edema to mid shins; improving pedal edema  Skin: dark blue to black coloration of right 1,2,3 digits (now with bullae formation on 2,3 toes) and left 2,3 digits of her feet bilaterally          Data:   Reviewed. Details in EPIC.    ATTENDING ATTESTATION:  Patient has been seen and evaluated by me on Feb 28, 2017. I have reviewed the medications, laboratory, imaging, and other relevant results.  I agree with the above note which I have edited, as necessary.  I have discussed my assessment and plan with the house staff.    Britt Whelan MD, MS  Staff Cardiologist, [facility]  Pager: [phone]      "

## 2017-02-28 NOTE — PROGRESS NOTES
S: order received to see patient at [facility]  for an eval for an offloading shoe as ordered by Walker  O/G: offload ulcerated area on patient's (left/right) foot  A: patient seen for an evaluation.  Patient has Black toe.  Pegs were not removed.  Patient was fit with a size small DH2 offloading shoe.  P: patient to contact us if any issues arise

## 2017-03-01 ENCOUNTER — TELEPHONE (OUTPATIENT)
Dept: FAMILY MEDICINE | Facility: OTHER | Age: 77
End: 2017-03-01

## 2017-03-01 ENCOUNTER — APPOINTMENT (OUTPATIENT)
Dept: CARDIOLOGY | Facility: CLINIC | Age: 77
DRG: 813 | End: 2017-03-01
Attending: INTERNAL MEDICINE
Payer: MEDICARE

## 2017-03-01 ENCOUNTER — APPOINTMENT (OUTPATIENT)
Dept: PHYSICAL THERAPY | Facility: CLINIC | Age: 77
DRG: 813 | End: 2017-03-01
Attending: INTERNAL MEDICINE
Payer: MEDICARE

## 2017-03-01 LAB
ANION GAP SERPL CALCULATED.3IONS-SCNC: 8 MMOL/L (ref 3–14)
APTT PPP: 88 SEC (ref 22–37)
BUN SERPL-MCNC: 8 MG/DL (ref 7–30)
CALCIUM SERPL-MCNC: 8.8 MG/DL (ref 8.5–10.1)
CHLORIDE SERPL-SCNC: 100 MMOL/L (ref 94–109)
CO2 SERPL-SCNC: 31 MMOL/L (ref 20–32)
CREAT SERPL-MCNC: 0.67 MG/DL (ref 0.52–1.04)
ERYTHROCYTE [DISTWIDTH] IN BLOOD BY AUTOMATED COUNT: 21.9 % (ref 10–15)
GFR SERPL CREATININE-BSD FRML MDRD: 85 ML/MIN/1.7M2
GLUCOSE BLDC GLUCOMTR-MCNC: 77 MG/DL (ref 70–99)
GLUCOSE BLDC GLUCOMTR-MCNC: 81 MG/DL (ref 70–99)
GLUCOSE SERPL-MCNC: 68 MG/DL (ref 70–99)
HCT VFR BLD AUTO: 26.6 % (ref 35–47)
HGB BLD-MCNC: 7.8 G/DL (ref 11.7–15.7)
INR PPP: 3.43 (ref 0.86–1.14)
INTERPRETATION ECG - MUSE: NORMAL
LAB SCANNED RESULT: NORMAL
MCH RBC QN AUTO: 30.2 PG (ref 26.5–33)
MCHC RBC AUTO-ENTMCNC: 29.3 G/DL (ref 31.5–36.5)
MCV RBC AUTO: 103 FL (ref 78–100)
PLATELET # BLD AUTO: 272 10E9/L (ref 150–450)
POTASSIUM SERPL-SCNC: 3.9 MMOL/L (ref 3.4–5.3)
RBC # BLD AUTO: 2.58 10E12/L (ref 3.8–5.2)
SODIUM SERPL-SCNC: 138 MMOL/L (ref 133–144)
WBC # BLD AUTO: 6.9 10E9/L (ref 4–11)

## 2017-03-01 PROCEDURE — 40000193 ZZH STATISTIC PT WARD VISIT

## 2017-03-01 PROCEDURE — 21400006 ZZH R&B CCU INTERMEDIATE UMMC

## 2017-03-01 PROCEDURE — A9270 NON-COVERED ITEM OR SERVICE: HCPCS | Mod: GY | Performed by: HOSPITALIST

## 2017-03-01 PROCEDURE — 25000125 ZZHC RX 250: Performed by: INTERNAL MEDICINE

## 2017-03-01 PROCEDURE — 25000132 ZZH RX MED GY IP 250 OP 250 PS 637: Mod: GY | Performed by: INTERNAL MEDICINE

## 2017-03-01 PROCEDURE — 25000132 ZZH RX MED GY IP 250 OP 250 PS 637: Mod: GY | Performed by: HOSPITALIST

## 2017-03-01 PROCEDURE — 85610 PROTHROMBIN TIME: CPT | Performed by: INTERNAL MEDICINE

## 2017-03-01 PROCEDURE — 93312 ECHO TRANSESOPHAGEAL: CPT | Mod: 26 | Performed by: INTERNAL MEDICINE

## 2017-03-01 PROCEDURE — 99152 MOD SED SAME PHYS/QHP 5/>YRS: CPT

## 2017-03-01 PROCEDURE — 25000128 H RX IP 250 OP 636: Performed by: INTERNAL MEDICINE

## 2017-03-01 PROCEDURE — 93325 DOPPLER ECHO COLOR FLOW MAPG: CPT | Mod: 26 | Performed by: INTERNAL MEDICINE

## 2017-03-01 PROCEDURE — 93325 DOPPLER ECHO COLOR FLOW MAPG: CPT

## 2017-03-01 PROCEDURE — 93320 DOPPLER ECHO COMPLETE: CPT | Mod: 26 | Performed by: INTERNAL MEDICINE

## 2017-03-01 PROCEDURE — 36592 COLLECT BLOOD FROM PICC: CPT | Performed by: INTERNAL MEDICINE

## 2017-03-01 PROCEDURE — 40000275 ZZH STATISTIC RCP TIME EA 10 MIN

## 2017-03-01 PROCEDURE — A9270 NON-COVERED ITEM OR SERVICE: HCPCS | Mod: GY | Performed by: INTERNAL MEDICINE

## 2017-03-01 PROCEDURE — 97750 PHYSICAL PERFORMANCE TEST: CPT | Mod: GP

## 2017-03-01 PROCEDURE — 99232 SBSQ HOSP IP/OBS MODERATE 35: CPT | Mod: 25 | Performed by: INTERNAL MEDICINE

## 2017-03-01 PROCEDURE — 00000146 ZZHCL STATISTIC GLUCOSE BY METER IP

## 2017-03-01 PROCEDURE — 97116 GAIT TRAINING THERAPY: CPT | Mod: GP

## 2017-03-01 PROCEDURE — 80048 BASIC METABOLIC PNL TOTAL CA: CPT | Performed by: INTERNAL MEDICINE

## 2017-03-01 PROCEDURE — 85027 COMPLETE CBC AUTOMATED: CPT | Performed by: INTERNAL MEDICINE

## 2017-03-01 PROCEDURE — 85730 THROMBOPLASTIN TIME PARTIAL: CPT | Performed by: INTERNAL MEDICINE

## 2017-03-01 PROCEDURE — 99153 MOD SED SAME PHYS/QHP EA: CPT

## 2017-03-01 PROCEDURE — 94660 CPAP INITIATION&MGMT: CPT

## 2017-03-01 RX ORDER — SODIUM CHLORIDE 9 MG/ML
INJECTION, SOLUTION INTRAVENOUS CONTINUOUS PRN
Status: DISCONTINUED | OUTPATIENT
Start: 2017-03-01 | End: 2017-03-01 | Stop reason: HOSPADM

## 2017-03-01 RX ORDER — FLUMAZENIL 0.1 MG/ML
0.2 INJECTION, SOLUTION INTRAVENOUS
Status: DISCONTINUED | OUTPATIENT
Start: 2017-03-01 | End: 2017-03-01 | Stop reason: HOSPADM

## 2017-03-01 RX ORDER — FENTANYL CITRATE 50 UG/ML
25-50 INJECTION, SOLUTION INTRAMUSCULAR; INTRAVENOUS
Status: DISCONTINUED | OUTPATIENT
Start: 2017-03-01 | End: 2017-03-01 | Stop reason: HOSPADM

## 2017-03-01 RX ORDER — NALOXONE HYDROCHLORIDE 0.4 MG/ML
.1-.4 INJECTION, SOLUTION INTRAMUSCULAR; INTRAVENOUS; SUBCUTANEOUS
Status: DISCONTINUED | OUTPATIENT
Start: 2017-03-01 | End: 2017-03-01 | Stop reason: HOSPADM

## 2017-03-01 RX ORDER — FENTANYL CITRATE 50 UG/ML
25 INJECTION, SOLUTION INTRAMUSCULAR; INTRAVENOUS
Status: DISCONTINUED | OUTPATIENT
Start: 2017-03-01 | End: 2017-03-01 | Stop reason: HOSPADM

## 2017-03-01 RX ADMIN — PANTOPRAZOLE SODIUM 40 MG: 40 TABLET, DELAYED RELEASE ORAL at 08:14

## 2017-03-01 RX ADMIN — NYSTATIN AND TRIAMCINOLONE ACETONIDE: 100000; 1 CREAM TOPICAL at 08:32

## 2017-03-01 RX ADMIN — Medication 25 MG: at 22:19

## 2017-03-01 RX ADMIN — IRON SUCROSE 200 MG: 20 INJECTION, SOLUTION INTRAVENOUS at 14:55

## 2017-03-01 RX ADMIN — VENLAFAXINE HYDROCHLORIDE 150 MG: 150 TABLET, EXTENDED RELEASE ORAL at 08:14

## 2017-03-01 RX ADMIN — ARGATROBAN 0.5 MCG/KG/MIN: 1 INJECTION INTRAVENOUS at 03:43

## 2017-03-01 RX ADMIN — FUROSEMIDE 20 MG: 10 INJECTION, SOLUTION INTRAVENOUS at 08:20

## 2017-03-01 RX ADMIN — LORAZEPAM 0.5 MG: 0.5 TABLET ORAL at 18:33

## 2017-03-01 RX ADMIN — FUROSEMIDE 20 MG: 10 INJECTION, SOLUTION INTRAVENOUS at 16:28

## 2017-03-01 RX ADMIN — ASPIRIN 81 MG CHEWABLE TABLET 81 MG: 81 TABLET CHEWABLE at 08:15

## 2017-03-01 RX ADMIN — POTASSIUM CHLORIDE 20 MEQ: 750 TABLET, EXTENDED RELEASE ORAL at 20:07

## 2017-03-01 RX ADMIN — ATORVASTATIN CALCIUM 40 MG: 40 TABLET, FILM COATED ORAL at 19:54

## 2017-03-01 RX ADMIN — AMIODARONE HYDROCHLORIDE 200 MG: 200 TABLET ORAL at 08:14

## 2017-03-01 RX ADMIN — TOPICAL ANESTHETIC 1 APPLICATOR: 200 SPRAY DENTAL; PERIODONTAL at 11:13

## 2017-03-01 RX ADMIN — CLOTRIMAZOLE: 10 CREAM TOPICAL at 22:34

## 2017-03-01 RX ADMIN — LIDOCAINE HYDROCHLORIDE 30 ML: 20 SOLUTION ORAL; TOPICAL at 11:13

## 2017-03-01 RX ADMIN — GABAPENTIN 100 MG: 100 CAPSULE ORAL at 19:55

## 2017-03-01 RX ADMIN — MIDAZOLAM 1.5 MG: 1 INJECTION INTRAMUSCULAR; INTRAVENOUS at 11:55

## 2017-03-01 RX ADMIN — FENTANYL CITRATE 50 MCG: 50 INJECTION, SOLUTION INTRAMUSCULAR; INTRAVENOUS at 11:20

## 2017-03-01 RX ADMIN — GABAPENTIN 100 MG: 100 CAPSULE ORAL at 08:14

## 2017-03-01 RX ADMIN — CLOTRIMAZOLE: 10 CREAM TOPICAL at 08:28

## 2017-03-01 RX ADMIN — ACETAMINOPHEN 650 MG: 325 TABLET, FILM COATED ORAL at 20:00

## 2017-03-01 NOTE — PROGRESS NOTES
[facility] Cardiology Progress Note          Assessment and Plan:   Carlos Alberto Fenton is a 76 year old female with a medical history significant for coronary artery disease s/p bypass surgery 02/06/2017, diabetes, hypertension and sleep apnea, admitted to the Cardiology service via the ED, with findings suggestive of bilateral toe gangrene. Now with diagnosis of HIT.     #. Bilateral toe gangrene  #. DVT, possibly catheter related  #. HIT  Most likely thromboembolic or atheroembolic source, but possible this is related to HIT. No signs of macrovascular involvement. ECHO and TAVO without LV or LA thrombus. CTA without significant calcification. No signs of infection.  -- continue argatroban, will likely transition to fondaparinux for ~ 1 month  -- hold warfarin  -- heme/onc consult; will discuss benefit of plasmaphereis   -- seratonin release assay pending  -- anti-cardiolipin and Beta-2 glycoprotein negative  -- podiatry and vascular medicine consult    #. Paroxysmal atrial fibrillation (CHADSVASC 9) with history of CVA  Currently in sinus and is not on any rate control.  -- amiodarone 200 mg daily (will have appt on 3/3 to discuss long term need)  -- argatroban as above    #. Coronary artery disease s/p CABG  #. Mild decrease in LVEF  History of CAD, s/p 3v CABG, modified maze with pulmonary isolation, left atrial appendage ligation, PFO closure, done 2/2017  -- Fluid restriction to 2L per day, salt restriction  -- lasix 20 mg IV BID  -- daily weights and I/Os  -- decrease to ASA 81 mg  -- continue atorvastatin (new medication this admission)    #. Contrast extravasation, left forearm    Chronic medical problems  # DM2 with neuropathy - not on glycemic meds; continue gabapentin  # NOAH - CPAP  # HTN - not on meds     FEN: cardiac, 2 gram sodium restriction  Proph: argatroban gtt  Dispo: rach transitioning to fondaparinux with d/c in next 1-2 days    Code: FULL    I have discussed this patient and plan with   Cleopatra.    [place]inc[place] Bill, PGY-2  Internal Medicine  [phone]        Interval History:   Notes reviewed. No acute events. Given 2 units FFP and vitamin K last night.    Feeling fine. More sensation in her toes.        Review Of Systems   Negative except as stated above.            Medications:     Current Facility-Administered Medications   Medication     furosemide (LASIX) injection 20 mg     EPINEPHrine (ADRENALIN) injection 0.3 mg     diphenhydrAMINE (BENADRYL) injection 50 mg     methylPREDNISolone sodium succinate (solu-MEDROL) injection 125 mg     famotidine (PEPCID) injection 20 mg     iron sucrose (VENOFER) 200 mg in NaCl 0.9 % 100 mL intermittent infusion     sodium chloride (PF) 0.9% PF flush 10-20 mL     sodium chloride (PF) 0.9% PF flush 10 mL     medication instruction -  NO argatroban (ACOVA) bolus or loading dose     argatroban (ACOVA) 1 mg/mL infusion SOLN     nystatin-triamcinolone (MYCOLOG II) cream     acetaminophen (TYLENOL) tablet 325-650 mg     clotrimazole (LOTRIMIN) 1 % cream     gabapentin (NEURONTIN) capsule 100 mg     melatonin tablet 1 mg     pantoprazole (PROTONIX) EC tablet 40 mg     traZODone (DESYREL) half-tab 25 mg     venlafaxine (EFFEXOR-ER) 24 hr tablet 150 mg     naloxone (NARCAN) injection 0.1-0.4 mg     Patient is already receiving anticoagulation with heparin, enoxaparin (LOVENOX), warfarin (COUMADIN)  or other anticoagulant medication     potassium chloride SA (K-DUR/KLOR-CON M) CR tablet 20-40 mEq     potassium chloride (KLOR-CON) Packet 20-40 mEq     potassium chloride 10 mEq in 100 mL intermittent infusion     potassium chloride 10 mEq in 100 mL intermittent infusion with 10 mg lidocaine     potassium chloride 20 mEq in 50 mL intermittent infusion     magnesium sulfate 2 g in NS intermittent infusion (PharMEDium or [facility]V Cmpd)     magnesium sulfate 4 g in 100 mL sterile water (premade)     ondansetron (ZOFRAN-ODT) ODT tab 4 mg    Or     ondansetron (ZOFRAN) injection 4 mg  "    lidocaine (LMX4) kit     amiodarone (PACERONE/CODARONE) tablet 200 mg     atorvastatin (LIPITOR) tablet 40 mg     aspirin chewable tablet 81 mg     LORazepam (ATIVAN) tablet 0.5 mg     sodium chloride (PF) 0.9% PF flush 3 mL     sodium chloride (PF) 0.9% PF flush 3 mL        Blood pressure (!) 138/91, pulse 85, temperature 98.2  F (36.8  C), temperature source Oral, resp. rate 16, height 1.575 m (5' 2\"), weight 79 kg (174 lb 1.6 oz), SpO2 98 %.    General: Alert, pleasant  CV: regular, no m/g/r  Chest: mild crackles in the bases  Abdomen: Soft, nontender, nondistended. +BS.   Extremities: 1+ edema to mid shins; improving pedal edema           Data:   Reviewed. Details in EPIC.    ATTENDING ATTESTATION:  Patient has been seen and evaluated by me on March 1, 2017. I have reviewed the medications, laboratory, imaging, and other relevant results.  I agree with the above note which I have edited, as necessary.  I have discussed my assessment and plan with the house staff.    Britt Whelan MD, MS  Staff Cardiologist, [facility]  Pager: [phone]      "

## 2017-03-01 NOTE — PLAN OF CARE
Problem: Goal Outcome Summary  Goal: Goal Outcome Summary  Outcome: No Change  D/I: Pt with recent hx of bypass comes in with gangrene of the toes. Dressings done on days, wearing profo boots, denies pain. Pt received 2nd unit of FFP and a dose of iron sucrose this evening without problems. VSS. Continues on argatroban drip through PICC. NPO after MN for TAVO tomorrow. Voiding well on the commode. SR 60s-80s. CPAP on for the noc.  A: Stable.  P: Monitor and assist as needed.

## 2017-03-01 NOTE — PROGRESS NOTES
Pt here for TAVO. Procedure was explained to the pt and the consent was signed. Pt was given Fentanyl 50 mcg IV and Versed 1.5 mg IV for sedation. Pt tolerated the procedure without any adverse effects. Probe #12 was used. Pt to remain NPO until 1315. Report called to pt's nurse on 6C. Pt transported back to 6C on a stretcher.

## 2017-03-01 NOTE — PROGRESS NOTES
[facility], [place]    Hematology Progress Note    Date of Admission:  2/24/2017    Reason for Consult   Reason for consult: concern for HIT in the setting of acute platelet count drop and DVT.    Assessment & Plan     Ms. 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    HIT: after extensive review of patients chart believe now that patient developed HIT on admission for CABG in early February and that this is not delayed HIT.  Patients was exposed to heparin on 2/6/17 (of note history of heparin exposure at prior admissions) right after exposure her platelets began to downtrend and almost halved on day 4 of admission.  That day 2/9/17 she had a HIT gogo that was negative (probably to early in process), her platelet slowly began to recover and then she received heparin again on 2/11/17 when they started again to down trend, heparin stopped 2/12/17 platelets began to trend up on  2/13/17 where they have remained normal until this admission where she was again expose to heparin. HIT gogo positive 2/26/17.  We also believe that the DVT and gangrene on her toes are also possibly related to her HIT and will discuss below further.  LORAINE positive for HIT  -- daily CBC diff, PTT  -- continue argatroban, once platelets plateau can transition to fondaparinaux, if no planned procedures by vascular surgery  -- It is essential that it is now on her allergy list and obtains a bracelet saying she has a heparin allergy    Recent exposure to coumadin: attempted to reverse.    Prolonged INR: INR prior to coumadin exposure was prolonged concerned that patient has protein c deficiency which could be playing a role into her supratherapeutic  INR.  Cannot test protein c deficiency due to recent exposure to coumadin at this time.  Also of note argatroban is direct thrombin inhibitor and prolongs the INR    B/l gangrenous toes: on exam, per patients report started having symptoms around time of discharge from last admission 2/17/17 could be possibly 2/2 HIT, primary team plans to do TAVO today to see if cardiac possibility as origin.  If negative and vascular surgery feels that there is chance of recovery of the tissue will consider therapeutic plasma exchange if TAVO  negative for cardiac origin.  -- please consult vascular surgery to determine that there is a chance of tissue recovery, if so pending TAVO results will consider therapeutic plasma exchange.    RUE DVT: asymptomatic on admission site of previous central line, could be foreign body associated and related to HIT from last admission.  Currently on argatroban for treatment, one platelets plateau, can be transitioned fondaparinux for a month were she will follow up with Dr. Bernal in clinic and then probably bridged at that time to coumadin.    Case and plan discussed with Dr. Murali Sanchez  PGY4  Hematology Oncology Fellow        Patient Summary    Ms. 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    Subjective    Feels great and ready to leave, concerned about her toes    Physical Exam   Temp: 97.6  F (36.4  C) Temp src: Oral BP: 106/75 Pulse: 73 Heart Rate: 74 Resp: 20 SpO2: 96 % O2 Device: None (Room air) Oxygen Delivery: 2 LPM  Vital Signs with Ranges  Temp:  [97.4  F (36.3  C)-98.3  F (36.8  C)] 97.6  F (36.4  C)  Pulse:  [73-81] 73  Heart Rate:  [70-83] 74  Resp:  [14-20] 20  BP: (104-159)/() 106/75  FiO2 (%):  [30 %]  30 %  SpO2:  [92 %-100 %] 96 %  174 lbs 1.6 oz  GEN: comfortable, in no distress  HEENT: atraumatic, sclera anicteric  Neck: No LAD  Chest: scars present  CV: + LE edema  LUNG: comfortable on room air  Abdomen: no distension  PSYCH: appropriate affect and mood.    Data   -Data reviewed today: All pertinent laboratory and imaging results from this encounter were reviewed.    Recent Labs  Lab 03/01/17  0901 02/28/17  1329 02/28/17  0439  02/27/17  1141 02/27/17  0833  02/25/17  1100  02/25/17  0910  02/24/17  1220   WBC 6.9  --  5.7  --   --  6.4  --   --   < >  --   < > 8.5   HGB 7.8*  --  6.9*  --   --  7.9*  --   --   < >  --   < > 8.4*   *  --  101*  --   --  99  --   --   < >  --   < > 98     --  210  --   --  284  --   --   < >  --   < > 571*   INR 3.43*  --  2.58*  --   --  1.88*  < > 4.28*  --   --   --  6.99*     --  141  --   --   --   --  136  --  Test canceled - Lab  error 02/25/17 1000 FMCORRECTED ON 02/25 AT 1034: PREVIOUSLY REPORTED   --  141   POTASSIUM 3.9 4.4 3.3*  < >  --   --   --  4.5  --  Test canceled - Lab  error 02/25/17 1000 FMCORRECTED ON 02/25 AT 1034: PREVIOUSLY REPORTED AS 4.3  --  4.4   CHLORIDE 100  --  100  --   --   --   --  102  --  Test canceled - Lab  error 02/25/17 1000 FMCORRECTED ON 02/25 AT 1034: PREVIOUSLY REPORTED   --  104   CO2 31  --  34*  --   --   --   --  25  --  Test canceled - Lab  error 02/25/17 1000 FMCORRECTED ON 02/25 AT 1034: PREVIOUSLY REPORTED AS 22  --  27   BUN 8  --  8  --   --   --   --  12  --  Test canceled - Lab  error 02/25/17 1000 FMCORRECTED ON 02/25 AT 1034: PREVIOUSLY REPORTED AS 7  --  12   CR 0.67  --  0.57  --   --   --   --  0.73  --  Test canceled - Lab  error 02/25/17 1000 FMCORRECTED ON 02/25 AT 1034: PREVIOUSLY REPORTED AS 0.88  --  0.94   ANIONGAP 8  --  7  --   --   --   --  9  --  Test canceled - Lab  error 02/25/17 1000  FMCORRECTED ON 02/25 AT 1034: PREVIOUSLY REPORTED AS 7  --  10   CARLY 8.8  --  8.2*  --   --   --   --  8.7  --  Test canceled - Lab  error 02/25/17 1000 FMCORRECTED ON 02/25 AT 1034: PREVIOUSLY REPORTED AS 8.4  --  8.5   GLC 68*  --  100*  --   --   --   --  125*  --  Test canceled - Lab  error 02/25/17 1000 FMCORRECTED ON 02/25 AT 1034: PREVIOUSLY REPORTED AS 94  --  128*   ALBUMIN  --   --   --   --  2.5*  --   --   --   --   --   --  2.8*   PROTTOTAL  --   --   --   --  5.7*  --   --   --   --   --   --  6.6*   BILITOTAL  --   --   --   --  0.5  --   --   --   --   --   --  0.4   ALKPHOS  --   --   --   --  110  --   --   --   --   --   --  103   ALT  --   --   --   --  50  --   --   --   --   --   --  70*   AST  --   --   --   --  33  --   --   --   --   --   --  34   LIPASE  --   --   --   --   --   --   --  317  --  Test canceled - Lab  error02/25/17 FMCORRECTED ON 02/25 AT 1034: PREVIOUSLY REPORTED   --   --    < > = values in this interval not displayed.      LORAINE positive    No results found for this or any previous visit (from the past 24 hour(s)).  EXAMINATION: DOPPLER VENOUS ULTRASOUND OF THE RIGHT UPPER EXTREMITY,  2/24/2017 11:00 PM      COMPARISON: CTA 2/24/2017     HISTORY: Possible thrombus in the IJ on CTA.     TECHNIQUE: Gray-scale evaluation with compression, spectral flow and  color Doppler assessment of the deep venous system of the right upper  extremity.     FINDINGS:     Right:   Nonocclusive filling defect in the central right internal jugular vein  and subclavian vein. The basilic vein is not compressible.     Normal blood flow and waveforms are demonstrated in the innominate and  axillary veins. There is normal compressibility of the brachial and  cephalic veins.         IMPRESSION:  1. Nonocclusive deep venous thrombosis in the right internal jugular  vein and right subclavian vein.  2. Occlusive thrombus of the basilic vein at the antecubital  fossa.     CT angiogram through the chest, abdomen and pelvis with bilateral  lower extremity runoff on 2/24/2017.     HISTORY: Ischemic feet status post coronary artery bypass grafting.     COMPARISON: Chest CT on 2/22/2017.     TECHNIQUE: Helical CT through the lung bases through the pubic  symphysis was performed without contrast. Helical CT from the lung  apices to the feet was then performed following the administration of  intravenous contrast in the arterial phase. 3-D reconstructions were  performed and archived.     Contrast: 150cc isovue 370     FINDINGS:     Vasculature: Classic branch pattern of the great vessels. Normal  caliber of the thoracic and abdominal aorta without evidence of  dissection. The celiac axis, superior mesenteric artery, and inferior  mesenteric artery are patent. No aneurysm or stenosis of the common  iliac, external iliac, or internal iliac arteries bilaterally with a  few scattered calcified atherosclerotic plaques.     Right lower extremity: The common femoral, deep femoral, superficial  femoral, and popliteal arteries are patent without stenosis. The  anterior tibial artery is patent to the ankle. The posterior tibial  artery is patent to the posterior plantar arch. The peroneal artery is  patent to at least the mid calf.     Left lower extremity: The common femoral, deep femoral, superficial  femoral, popliteal are patent. The anterior tibial artery is patent to  the ankle. The posterior tibial artery is patent to at least the  posterior plantar arch. The peroneal artery is patent to at least the  mid calf.     Possible filling defects in the right internal jugular vein extending  inferiorly into the right brachiocephalic vein.     CHEST: Postsurgical changes of coronary artery bypass grafting with  median sternotomy wires noted. Cardiac size is within normal limits.  Small pericardial effusion. Trace fluid underlying the sternotomy. No  central pulmonary embolism. No  pathologically enlarged mediastinal,  hilar or axillary lymph nodes. Small left pleural effusion with  minimal overlying atelectasis. Mild linear opacities in the lingula  and right lower lobe likely representing atelectasis. Diffuse  interlobular septal thickening. No pneumothorax.     Abdomen/pelvis: Evaluation of the abdomen and pelvis is limited by  arterial phase of intravenous contrast. The liver, gallbladder,  adrenal glands, kidneys and pancreas are unremarkable. Multiple  punctate calcified granulomas within the large spleen. The stomach,  small bowel and colon are within normal limits. The appendix is not  identified. There are no secondary signs of appendicitis. Postsurgical  changes of hysterectomy. The bladder is mildly distended and otherwise  unremarkable. Trace free fluid in the pelvis.     Bones/soft tissues: Anasarca with bilateral lower extremity edema. No  suspicious osseous lesions. Moderate degenerative changes of the  lumbar spine associated with mild convex left scoliosis of the lumbar  spine. L3-L5 laminectomies.         IMPRESSION:  1. Mild aortobiiliac atherosclerosis without evidence of aneurysm,  dissection, or significant stenosis in the chest, abdomen or pelvis.  Bilateral patent lower extremity three-vessel runoff.  2. New postsurgical changes of coronary artery bypass grafting with  small pericardial effusion and trace fluid underlying the sternotomy.  3. Findings suggestive of volume overload including small left pleural  effusion, diffuse interlobular septal thickening suggestive of  interstitial pulmonary edema, trace pelvic ascites and anasarca.  4. Probable filling defect in the right internal jugular vein  extending into the right brachiocephalic vein suggestive of deep  venous thrombosis versus admixture phenomenon. Consider further  evaluation with venous Doppler ultrasound.  5. During the examination approximately 40 cc of intravenous contrast  versus saline flush  extravasated into the left antecubital fossa. The  patient reported mild tenderness in this region at that time with  intact sensory motor function in the left upper extremity. The patient  was instructed to seek care if there was evidence of skin breakdown.  Elevation of the extremity and hot or cold packs can also help with  absorption of the fluid. Recommend plastic surgery consultation if  there is evidence of tissue necrosis.

## 2017-03-01 NOTE — PLAN OF CARE
Problem: Goal Outcome Summary  Goal: Goal Outcome Summary     D/I: Patient's vital signs have been stable. Rhythm was SB/SR 50-80 with possible WAP and 0-4 PVC's/min vs. Ventricular escape beats. She went down for a TAVO at 10:50 and came back to unit 6C at 12:35. I was told by  that there were no clots found. She was able to eat and drink by 13:15 and is currently on a cardiac diet. Her bilateral foot dressing was changed and it was noted that she has developed a large blister on her right great toe and right 2nd toe. The right foot was also red and warm.  was notified and saw her feet before they were wrapped with kerlix.Her argatroban continues to be at 0.5mcg/kg/min and her INR was 3.43. Her left foot's 2nd toe and 3 rd toe have blackened tips but no redness. She is receiving her 2nd dose of Iron Sucrose now. Her hgb was 7.8 today which is up from 6.9 yesterday.         .  A: See flow sheets for further assessment details  P: Continue to monitor vital signs, rhythm, and labs. Notify MD with any changes or concerns

## 2017-03-01 NOTE — PLAN OF CARE
Problem: Goal Outcome Summary  Goal: Goal Outcome Summary  Outcome: No Change  D: CAD, CABG, CVA, HIT, Gangrene of toes.  I/A: Denies pain. VSS. Sinus rhythm. Argatroban drip at 0.5 mcg/kg/min. Dressings intact.  P: Continue to monitor.  Patient NPO for TAVO today.

## 2017-03-02 DIAGNOSIS — Z53.9 DIAGNOSIS NOT YET DEFINED: Primary | ICD-10-CM

## 2017-03-02 LAB
ANION GAP SERPL CALCULATED.3IONS-SCNC: 8 MMOL/L (ref 3–14)
APTT PPP: 129 SEC (ref 22–37)
APTT PPP: 48 SEC (ref 22–37)
BUN SERPL-MCNC: 13 MG/DL (ref 7–30)
CALCIUM SERPL-MCNC: 8.9 MG/DL (ref 8.5–10.1)
CHLORIDE SERPL-SCNC: 102 MMOL/L (ref 94–109)
CO2 SERPL-SCNC: 31 MMOL/L (ref 20–32)
CREAT SERPL-MCNC: 0.58 MG/DL (ref 0.52–1.04)
ERYTHROCYTE [DISTWIDTH] IN BLOOD BY AUTOMATED COUNT: 21.9 % (ref 10–15)
FACT X ACT/NOR PPP CHRO: 65 % (ref 70–130)
GFR SERPL CREATININE-BSD FRML MDRD: NORMAL ML/MIN/1.7M2
GLUCOSE SERPL-MCNC: 98 MG/DL (ref 70–99)
HCT VFR BLD AUTO: 28 % (ref 35–47)
HGB BLD-MCNC: 8.1 G/DL (ref 11.7–15.7)
INR PPP: 7.75 (ref 0.86–1.14)
MCH RBC QN AUTO: 29.9 PG (ref 26.5–33)
MCHC RBC AUTO-ENTMCNC: 28.9 G/DL (ref 31.5–36.5)
MCV RBC AUTO: 103 FL (ref 78–100)
PLATELET # BLD AUTO: 270 10E9/L (ref 150–450)
POTASSIUM SERPL-SCNC: 3.8 MMOL/L (ref 3.4–5.3)
RBC # BLD AUTO: 2.71 10E12/L (ref 3.8–5.2)
SODIUM SERPL-SCNC: 141 MMOL/L (ref 133–144)
WBC # BLD AUTO: 7.4 10E9/L (ref 4–11)

## 2017-03-02 PROCEDURE — 94660 CPAP INITIATION&MGMT: CPT

## 2017-03-02 PROCEDURE — 99232 SBSQ HOSP IP/OBS MODERATE 35: CPT | Mod: GC | Performed by: INTERNAL MEDICINE

## 2017-03-02 PROCEDURE — 85027 COMPLETE CBC AUTOMATED: CPT | Performed by: INTERNAL MEDICINE

## 2017-03-02 PROCEDURE — 85730 THROMBOPLASTIN TIME PARTIAL: CPT | Performed by: INTERNAL MEDICINE

## 2017-03-02 PROCEDURE — 36592 COLLECT BLOOD FROM PICC: CPT | Performed by: INTERNAL MEDICINE

## 2017-03-02 PROCEDURE — 85260 CLOT FACTOR X STUART-POWER: CPT | Performed by: INTERNAL MEDICINE

## 2017-03-02 PROCEDURE — 25000128 H RX IP 250 OP 636: Performed by: INTERNAL MEDICINE

## 2017-03-02 PROCEDURE — 21400006 ZZH R&B CCU INTERMEDIATE UMMC

## 2017-03-02 PROCEDURE — 36592 COLLECT BLOOD FROM PICC: CPT | Performed by: PODIATRIST

## 2017-03-02 PROCEDURE — 40000802 ZZH SITE CHECK

## 2017-03-02 PROCEDURE — A9270 NON-COVERED ITEM OR SERVICE: HCPCS | Mod: GY | Performed by: HOSPITALIST

## 2017-03-02 PROCEDURE — 25000132 ZZH RX MED GY IP 250 OP 250 PS 637: Mod: GY | Performed by: INTERNAL MEDICINE

## 2017-03-02 PROCEDURE — 80048 BASIC METABOLIC PNL TOTAL CA: CPT | Performed by: INTERNAL MEDICINE

## 2017-03-02 PROCEDURE — 85610 PROTHROMBIN TIME: CPT | Performed by: INTERNAL MEDICINE

## 2017-03-02 PROCEDURE — 25000125 ZZHC RX 250: Performed by: INTERNAL MEDICINE

## 2017-03-02 PROCEDURE — G0180 MD CERTIFICATION HHA PATIENT: HCPCS | Performed by: FAMILY MEDICINE

## 2017-03-02 PROCEDURE — 85730 THROMBOPLASTIN TIME PARTIAL: CPT | Performed by: PODIATRIST

## 2017-03-02 PROCEDURE — 25000132 ZZH RX MED GY IP 250 OP 250 PS 637: Mod: GY | Performed by: HOSPITALIST

## 2017-03-02 PROCEDURE — A9270 NON-COVERED ITEM OR SERVICE: HCPCS | Mod: GY | Performed by: INTERNAL MEDICINE

## 2017-03-02 RX ORDER — FUROSEMIDE 10 MG/ML
20 INJECTION INTRAMUSCULAR; INTRAVENOUS ONCE
Status: COMPLETED | OUTPATIENT
Start: 2017-03-02 | End: 2017-03-02

## 2017-03-02 RX ORDER — ATORVASTATIN CALCIUM 40 MG/1
40 TABLET, FILM COATED ORAL DAILY
Qty: 30 TABLET | Refills: 0 | Status: SHIPPED | OUTPATIENT
Start: 2017-03-02 | End: 2017-03-03

## 2017-03-02 RX ORDER — ASPIRIN 81 MG/1
81 TABLET, CHEWABLE ORAL DAILY
Qty: 30 TABLET | Refills: 0 | Status: ON HOLD | OUTPATIENT
Start: 2017-03-02 | End: 2018-04-04

## 2017-03-02 RX ORDER — FONDAPARINUX SODIUM 7.5 MG/.6ML
7.5 INJECTION SUBCUTANEOUS DAILY
Qty: 40 SYRINGE | Refills: 0 | Status: SHIPPED | OUTPATIENT
Start: 2017-03-02 | End: 2017-03-03

## 2017-03-02 RX ORDER — FUROSEMIDE 20 MG
20 TABLET ORAL
Status: DISCONTINUED | OUTPATIENT
Start: 2017-03-02 | End: 2017-03-03

## 2017-03-02 RX ORDER — FONDAPARINUX SODIUM 7.5 MG/.6ML
7.5 INJECTION SUBCUTANEOUS DAILY
Status: DISCONTINUED | OUTPATIENT
Start: 2017-03-02 | End: 2017-03-03 | Stop reason: HOSPADM

## 2017-03-02 RX ADMIN — FUROSEMIDE 20 MG: 20 TABLET ORAL at 09:26

## 2017-03-02 RX ADMIN — VENLAFAXINE HYDROCHLORIDE 150 MG: 150 TABLET, EXTENDED RELEASE ORAL at 07:50

## 2017-03-02 RX ADMIN — FUROSEMIDE 20 MG: 20 TABLET ORAL at 16:01

## 2017-03-02 RX ADMIN — CLOTRIMAZOLE: 10 CREAM TOPICAL at 20:51

## 2017-03-02 RX ADMIN — FONDAPARINUX SODIUM 7.5 MG: 7.5 INJECTION, SOLUTION SUBCUTANEOUS at 13:40

## 2017-03-02 RX ADMIN — POTASSIUM CHLORIDE 20 MEQ: 750 TABLET, EXTENDED RELEASE ORAL at 17:11

## 2017-03-02 RX ADMIN — GABAPENTIN 100 MG: 100 CAPSULE ORAL at 07:51

## 2017-03-02 RX ADMIN — CLOTRIMAZOLE: 10 CREAM TOPICAL at 10:21

## 2017-03-02 RX ADMIN — ARGATROBAN 0.25 MCG/KG/MIN: 1 INJECTION INTRAVENOUS at 09:31

## 2017-03-02 RX ADMIN — GABAPENTIN 100 MG: 100 CAPSULE ORAL at 20:51

## 2017-03-02 RX ADMIN — IRON SUCROSE 200 MG: 20 INJECTION, SOLUTION INTRAVENOUS at 15:47

## 2017-03-02 RX ADMIN — ARGATROBAN 0.5 MCG/KG/MIN: 1 INJECTION INTRAVENOUS at 00:50

## 2017-03-02 RX ADMIN — FUROSEMIDE 20 MG: 10 INJECTION, SOLUTION INTRAVENOUS at 13:37

## 2017-03-02 RX ADMIN — ATORVASTATIN CALCIUM 40 MG: 40 TABLET, FILM COATED ORAL at 20:51

## 2017-03-02 RX ADMIN — Medication 25 MG: at 22:57

## 2017-03-02 RX ADMIN — PANTOPRAZOLE SODIUM 40 MG: 40 TABLET, DELAYED RELEASE ORAL at 07:51

## 2017-03-02 RX ADMIN — AMIODARONE HYDROCHLORIDE 200 MG: 200 TABLET ORAL at 07:51

## 2017-03-02 RX ADMIN — ACETAMINOPHEN 650 MG: 325 TABLET, FILM COATED ORAL at 22:16

## 2017-03-02 RX ADMIN — ASPIRIN 81 MG CHEWABLE TABLET 81 MG: 81 TABLET CHEWABLE at 07:51

## 2017-03-02 NOTE — PLAN OF CARE
Problem: Goal Outcome Summary  Goal: Goal Outcome Summary  PT 6C: Ambulates 250ft x2 with 1 seated break in between each rep due to fatigue with SBAx1.  STS and bed mobility IND. Ascends and descends 3 steps with single railing and SBAx1.  Mcmillan scores 39/56 indicating medium falls risk. PT reports 24/7 supervision at home.  Is at baseline functional mobility.  PT recommends d/c home with 24/7 assist as needed and HHPT in order to improve balance beyond baseline and increase functional strength and activity tolerance.  PT will complete orders.  Please re-order with change in functional status.      Physical Therapy Discharge Summary     Reason for therapy discharge:    All goals and outcomes met, no further needs identified.     Progress towards therapy goal(s). See goals on Care Plan in [facility] electronic health record for goal details.  Goals met     Therapy recommendation(s):    Continued therapy is recommended.  Rationale/Recommendations:  HHPT.

## 2017-03-02 NOTE — PLAN OF CARE
Problem: Goal Outcome Summary  Goal: Goal Outcome Summary     D/I: Patient's vital signs have been stable. Rhythm was SR 60-80 (WAP) with 0-6 PAC's/min, up to 23 PVC/PJC's/min. She denies pain. Her Argatroban drip was shut off for 1 hour today and decreased to 0.25 mc/kg/min since her PTT was 129. Her INR was 7.75 but that is from the Argatroban drip and the MD said we are going by the Chromogenicfactor 10 which was 65. Her Argatroban drip was then shut off at 13:42 after she received her first dose of Fondaparinux 7.5 mg injection. She received 20 mg iv Lasix twice today. Her potassium was 3.8 today and received 20 mEq of potassium Chloride per protocol. Her dressings on her toes were done at 10 AM. Her right great toe's blister is a little smaller since it drained serous fluid, Her right foot continues to be red but has not increased from the area that was marked yesterday. Lambs wool was placed between her right and left toes then a telfa dressing was put around her right toes in case the blisters opened up. Then kerlix was wrapped around both feet. Cards 1 saw her feet and toes when the dressing was done      .  A: See flow sheets for further assessment details  P: Continue to monitor vital signs, rhythm, and labs. Notify MD with any changes or concerns.

## 2017-03-02 NOTE — PLAN OF CARE
Problem: Goal Outcome Summary  Goal: Goal Outcome Summary     No acute issues overnight. NSR, VSS, denies pain. Bipap on overnight. Argatroban gtt 0.5 mcg/kg/min. Assisted to BR w/ standby assist. Good urine output. Continue to monitor.

## 2017-03-02 NOTE — PROGRESS NOTES
[facility] Cardiology Progress Note          Assessment and Plan:   Carlos Alberto Fenton is a 76 year old female with a medical history significant for coronary artery disease s/p bypass surgery 02/06/2017, diabetes, hypertension and sleep apnea, admitted to the Cardiology service via the ED, with findings suggestive of bilateral toe gangrene. Now with diagnosis of HIT.     #. Bilateral toe gangrene  #. DVT, possibly catheter related  #. HIT  Most likely thromboembolic or atheroembolic source, but possible this is related to HIT. No signs of macrovascular involvement. ECHO and TAVO without LV or LA thrombus. CTA without significant calcification. No signs of infection.  -- stop argatroban, start fondaparinux (will continue on this for 1 month with follow up with Dr. Bernal in Hem/Onc)  -- stop warfarin  -- seratonin release assay positive  -- anti-cardiolipin and Beta-2 glycoprotein negative  -- vascular surgery recommended no acute intervention  -- seen by orthopedic surgery and will follow up [place] [facility] [place]. Easton Torres about 1-2 weeks post discharge    #. Paroxysmal atrial fibrillation (CHADSVASC 9) with history of CVA  Currently in sinus and is not on any rate control.  -- amiodarone 200 mg daily (will have appt on 3/3 to discuss long term need)  -- fondaparinux as above    #. Coronary artery disease s/p CABG  #. Mild decrease in LVEF  History of CAD, s/p 3v CABG, modified maze with pulmonary isolation, left atrial appendage ligation, PFO closure, done 2/2017  -- Fluid restriction to 2L per day, salt restriction  -- will transition to PO lasix tomorrow  -- daily weights and I/Os  -- decrease to ASA 81 mg  -- continue atorvastatin (new medication this admission)    #. Contrast extravasation, left forearm    Chronic medical problems  # DM2 with neuropathy - not on glycemic meds; continue gabapentin  # NOAH - CPAP  # HTN - not on meds     FEN: cardiac, 2 gram sodium restriction  Proph:  fondaparinux   Dispo: plan for d/c tomorrow after education and completion of fondaparinux shots; as well as optimization of volume status    Code: FULL    I have discussed this patient and plan with Dr. Whelan.    [place]inc[place] Bill, PGY-2  Internal Medicine  [phone]        Interval History:   Notes reviewed. No acute events.    Feeling fine. Still has good sensation in toes. No chest pain.        Review Of Systems   Negative except as stated above.            Medications:     Current Facility-Administered Medications   Medication     furosemide (LASIX) tablet 20 mg     fondaparinux (ARIXTRA) injection 7.5 mg     EPINEPHrine (ADRENALIN) injection 0.3 mg     diphenhydrAMINE (BENADRYL) injection 50 mg     methylPREDNISolone sodium succinate (solu-MEDROL) injection 125 mg     famotidine (PEPCID) injection 20 mg     iron sucrose (VENOFER) 200 mg in NaCl 0.9 % 100 mL intermittent infusion     sodium chloride (PF) 0.9% PF flush 10-20 mL     sodium chloride (PF) 0.9% PF flush 10 mL     medication instruction -  NO argatroban (ACOVA) bolus or loading dose     nystatin-triamcinolone (MYCOLOG II) cream     acetaminophen (TYLENOL) tablet 325-650 mg     clotrimazole (LOTRIMIN) 1 % cream     gabapentin (NEURONTIN) capsule 100 mg     melatonin tablet 1 mg     pantoprazole (PROTONIX) EC tablet 40 mg     traZODone (DESYREL) half-tab 25 mg     venlafaxine (EFFEXOR-ER) 24 hr tablet 150 mg     naloxone (NARCAN) injection 0.1-0.4 mg     Patient is already receiving anticoagulation with heparin, enoxaparin (LOVENOX), warfarin (COUMADIN)  or other anticoagulant medication     potassium chloride SA (K-DUR/KLOR-CON M) CR tablet 20-40 mEq     potassium chloride (KLOR-CON) Packet 20-40 mEq     potassium chloride 10 mEq in 100 mL intermittent infusion     potassium chloride 10 mEq in 100 mL intermittent infusion with 10 mg lidocaine     potassium chloride 20 mEq in 50 mL intermittent infusion     magnesium sulfate 2 g in NS intermittent  "infusion (PharMEDium or FV Cmpd)     magnesium sulfate 4 g in 100 mL sterile water (premade)     ondansetron (ZOFRAN-ODT) ODT tab 4 mg    Or     ondansetron (ZOFRAN) injection 4 mg     lidocaine (LMX4) kit     amiodarone (PACERONE/CODARONE) tablet 200 mg     atorvastatin (LIPITOR) tablet 40 mg     aspirin chewable tablet 81 mg     LORazepam (ATIVAN) tablet 0.5 mg     sodium chloride (PF) 0.9% PF flush 3 mL     sodium chloride (PF) 0.9% PF flush 3 mL        Blood pressure 122/71, pulse 73, temperature 97.7  F (36.5  C), temperature source Oral, resp. rate 18, height 1.575 m (5' 2\"), weight 78.3 kg (172 lb 11.2 oz), SpO2 97 %.    General: Alert, pleasant  CV: regular, no m/g/r  Chest: mild crackles in the bases  Abdomen: Soft, nontender, nondistended. +BS.   Extremities: 1+ edema to mid shins; improving pedal edema  Skin: great toe bullae broke today, still with mild redness of skin (stable and not expanding)         Data:   Reviewed. Details in EPIC.    ATTENDING ATTESTATION:  Patient has been seen and evaluated by me on March 2, 2017. I have reviewed the medications, laboratory, imaging, and other relevant results.  I agree with the above note which I have edited, as necessary.  I have discussed my assessment and plan with the house staff.    Britt Whelan MD, MS  Staff Cardiologist, [facility]  Pager: [phone]      "

## 2017-03-02 NOTE — PROGRESS NOTES
[facility], [place]    Hematology Progress Note    Date of Admission:  2/24/2017    Reason for Consult   Reason for consult: concern for HIT in the setting of acute platelet count drop and DVT.    Assessment & Plan     Ms. 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    HIT: after extensive review of patients chart believe now that patient developed HIT on admission for CABG in early February and that this is not delayed HIT.  Patients was exposed to heparin on 2/6/17 (of note history of heparin exposure at prior admissions) right after exposure her platelets began to downtrend and almost halved on day 4 of admission.  That day 2/9/17 she had a HIT gogo that was negative (probably to early in process), her platelet slowly began to recover and then she received heparin again on 2/11/17 when they started again to down trend, heparin stopped 2/12/17 platelets began to trend up on  2/13/17 where they have remained normal until this admission where she was again expose to heparin. HIT gogo positive 2/26/17.  We also believe that the DVT and gangrene on her toes are also possibly related to her HIT and will discuss below further.  LORAINE positive for HIT  -- platelets remain stable  -- It is essential that it is now on her allergy list and obtains a bracelet saying she has a heparin allergy    RUE DVT: asymptomatic on admission site of previous central line, could be foreign body associated and related to HIT from last admission.  Currently on argatroban for treatment, one platelets plateau, can be transitioned fondaparinux for a month were she will follow up with Dr. Bernal in clinic and then probably bridged at that time to  coumadin.  -- stop argatroban  -- within 2 hours of stopping argatroban patient needs to receive first dose of fondaparinaux 7.5mg daily  -- continue 7.5mg daily until follows up with Dr. Bernal in one month  -- please schedule FU with DR. Bernal in one month at that time will discuss transitioning to another medication.    Case and plan discussed with Dr. Murali Sanchez  PGY4  Hematology Oncology Fellow        Patient Summary    Ms. 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    Subjective    Feels great and ready to leave, concerned about her toes    Physical Exam   Temp: 96  F (35.6  C) Temp src: Axillary BP: 111/60 Pulse: 73 Heart Rate: 68 Resp: 20 SpO2: 99 % O2 Device: BiPAP/CPAP Oxygen Delivery: 2 LPM  Vital Signs with Ranges  Temp:  [96  F (35.6  C)-97.7  F (36.5  C)] 96  F (35.6  C)  Pulse:  [73] 73  Heart Rate:  [66-83] 68  Resp:  [18-20] 20  BP: (109-125)/(60-93) 111/60  FiO2 (%):  [30 %] 30 %  SpO2:  [92 %-99 %] 99 %  172 lbs 11.2 oz  GEN: woken from sleep  HEENT: atraumatic, sclera anicteric  Chest: scars present  LUNG: comfortable on CPAP  Abdomen: no distension  PSYCH: appropriate affect and mood.    Data   -Data reviewed today: All pertinent laboratory and imaging results from this encounter were reviewed.    Recent Labs  Lab 03/02/17  0723 03/01/17  0901 02/28/17  1329 02/28/17  0439  02/27/17  1141  02/25/17  1100  02/25/17  0910  02/24/17  1220   WBC 7.4 6.9  --  5.7  --   --   < >  --   < >  --   < > 8.5   HGB 8.1* 7.8*  --  6.9*  --   --   < >  --   < >  --   < > 8.4*   * 103*  --  101*  --   --   < >  --   < >  --   < > 98    272  --  210  --   --   < >  --   < >  --   < > 571*   INR 7.75* 3.43*  --  2.58*  --    --   < > 4.28*  --   --   --  6.99*    138  --  141  --   --   --  136  --  Test canceled - Lab  error 02/25/17 1000 FMCORRECTED ON 02/25 AT 1034: PREVIOUSLY REPORTED   --  141   POTASSIUM 3.8 3.9 4.4 3.3*  < >  --   --  4.5  --  Test canceled - Lab  error 02/25/17 1000 FMCORRECTED ON 02/25 AT 1034: PREVIOUSLY REPORTED AS 4.3  --  4.4   CHLORIDE 102 100  --  100  --   --   --  102  --  Test canceled - Lab  error 02/25/17 1000 FMCORRECTED ON 02/25 AT 1034: PREVIOUSLY REPORTED   --  104   CO2 31 31  --  34*  --   --   --  25  --  Test canceled - Lab  error 02/25/17 1000 FMCORRECTED ON 02/25 AT 1034: PREVIOUSLY REPORTED AS 22  --  27   BUN 13 8  --  8  --   --   --  12  --  Test canceled - Lab  error 02/25/17 1000 FMCORRECTED ON 02/25 AT 1034: PREVIOUSLY REPORTED AS 7  --  12   CR 0.58 0.67  --  0.57  --   --   --  0.73  --  Test canceled - Lab  error 02/25/17 1000 FMCORRECTED ON 02/25 AT 1034: PREVIOUSLY REPORTED AS 0.88  --  0.94   ANIONGAP 8 8  --  7  --   --   --  9  --  Test canceled - Lab  error 02/25/17 1000 FMCORRECTED ON 02/25 AT 1034: PREVIOUSLY REPORTED AS 7  --  10   CARLY 8.9 8.8  --  8.2*  --   --   --  8.7  --  Test canceled - Lab  error 02/25/17 1000 FMCORRECTED ON 02/25 AT 1034: PREVIOUSLY REPORTED AS 8.4  --  8.5   GLC 98 68*  --  100*  --   --   --  125*  --  Test canceled - Lab  error 02/25/17 1000 FMCORRECTED ON 02/25 AT 1034: PREVIOUSLY REPORTED AS 94  --  128*   ALBUMIN  --   --   --   --   --  2.5*  --   --   --   --   --  2.8*   PROTTOTAL  --   --   --   --   --  5.7*  --   --   --   --   --  6.6*   BILITOTAL  --   --   --   --   --  0.5  --   --   --   --   --  0.4   ALKPHOS  --   --   --   --   --  110  --   --   --   --   --  103   ALT  --   --   --   --   --  50  --   --   --   --   --  70*   AST  --   --   --   --   --  33  --   --   --   --   --  34   LIPASE  --   --   --    --   --   --   --  317  --  Test canceled - Lab  error02/25/17 FMCORRECTED ON 02/25 AT 1034: PREVIOUSLY REPORTED   --   --    < > = values in this interval not displayed.      LORAINE positive    No results found for this or any previous visit (from the past 24 hour(s)).  EXAMINATION: DOPPLER VENOUS ULTRASOUND OF THE RIGHT UPPER EXTREMITY,  2/24/2017 11:00 PM      COMPARISON: CTA 2/24/2017     HISTORY: Possible thrombus in the IJ on CTA.     TECHNIQUE: Gray-scale evaluation with compression, spectral flow and  color Doppler assessment of the deep venous system of the right upper  extremity.     FINDINGS:     Right:   Nonocclusive filling defect in the central right internal jugular vein  and subclavian vein. The basilic vein is not compressible.     Normal blood flow and waveforms are demonstrated in the innominate and  axillary veins. There is normal compressibility of the brachial and  cephalic veins.         IMPRESSION:  1. Nonocclusive deep venous thrombosis in the right internal jugular  vein and right subclavian vein.  2. Occlusive thrombus of the basilic vein at the antecubital fossa.     CT angiogram through the chest, abdomen and pelvis with bilateral  lower extremity runoff on 2/24/2017.     HISTORY: Ischemic feet status post coronary artery bypass grafting.     COMPARISON: Chest CT on 2/22/2017.     TECHNIQUE: Helical CT through the lung bases through the pubic  symphysis was performed without contrast. Helical CT from the lung  apices to the feet was then performed following the administration of  intravenous contrast in the arterial phase. 3-D reconstructions were  performed and archived.     Contrast: 150cc isovue 370     FINDINGS:     Vasculature: Classic branch pattern of the great vessels. Normal  caliber of the thoracic and abdominal aorta without evidence of  dissection. The celiac axis, superior mesenteric artery, and inferior  mesenteric artery are patent. No aneurysm or stenosis of  the common  iliac, external iliac, or internal iliac arteries bilaterally with a  few scattered calcified atherosclerotic plaques.     Right lower extremity: The common femoral, deep femoral, superficial  femoral, and popliteal arteries are patent without stenosis. The  anterior tibial artery is patent to the ankle. The posterior tibial  artery is patent to the posterior plantar arch. The peroneal artery is  patent to at least the mid calf.     Left lower extremity: The common femoral, deep femoral, superficial  femoral, popliteal are patent. The anterior tibial artery is patent to  the ankle. The posterior tibial artery is patent to at least the  posterior plantar arch. The peroneal artery is patent to at least the  mid calf.     Possible filling defects in the right internal jugular vein extending  inferiorly into the right brachiocephalic vein.     CHEST: Postsurgical changes of coronary artery bypass grafting with  median sternotomy wires noted. Cardiac size is within normal limits.  Small pericardial effusion. Trace fluid underlying the sternotomy. No  central pulmonary embolism. No pathologically enlarged mediastinal,  hilar or axillary lymph nodes. Small left pleural effusion with  minimal overlying atelectasis. Mild linear opacities in the lingula  and right lower lobe likely representing atelectasis. Diffuse  interlobular septal thickening. No pneumothorax.     Abdomen/pelvis: Evaluation of the abdomen and pelvis is limited by  arterial phase of intravenous contrast. The liver, gallbladder,  adrenal glands, kidneys and pancreas are unremarkable. Multiple  punctate calcified granulomas within the large spleen. The stomach,  small bowel and colon are within normal limits. The appendix is not  identified. There are no secondary signs of appendicitis. Postsurgical  changes of hysterectomy. The bladder is mildly distended and otherwise  unremarkable. Trace free fluid in the pelvis.     Bones/soft tissues:  Anasarca with bilateral lower extremity edema. No  suspicious osseous lesions. Moderate degenerative changes of the  lumbar spine associated with mild convex left scoliosis of the lumbar  spine. L3-L5 laminectomies.         IMPRESSION:  1. Mild aortobiiliac atherosclerosis without evidence of aneurysm,  dissection, or significant stenosis in the chest, abdomen or pelvis.  Bilateral patent lower extremity three-vessel runoff.  2. New postsurgical changes of coronary artery bypass grafting with  small pericardial effusion and trace fluid underlying the sternotomy.  3. Findings suggestive of volume overload including small left pleural  effusion, diffuse interlobular septal thickening suggestive of  interstitial pulmonary edema, trace pelvic ascites and anasarca.  4. Probable filling defect in the right internal jugular vein  extending into the right brachiocephalic vein suggestive of deep  venous thrombosis versus admixture phenomenon. Consider further  evaluation with venous Doppler ultrasound.  5. During the examination approximately 40 cc of intravenous contrast  versus saline flush extravasated into the left antecubital fossa. The  patient reported mild tenderness in this region at that time with  intact sensory motor function in the left upper extremity. The patient  was instructed to seek care if there was evidence of skin breakdown.  Elevation of the extremity and hot or cold packs can also help with  absorption of the fluid. Recommend plastic surgery consultation if  there is evidence of tissue necrosis.

## 2017-03-02 NOTE — PROGRESS NOTES
Mcmillan Balance Scale (BBS) Cutoff Scores: A score of ? 45/56 indicates an increased risk for falls.   Patient Score: 39/56; indicating medium risk for falls. Pt does not have a fall history and will have 24/7 supervision with HHPT when d/c home.      The BBS is a measure of static and dynamic standing balance that has been validated in community dwelling elderly individuals and individuals who have Parkinson's Disease, MS, and those who are s/p CVA and TBI. The test is administered without an assistive device. Scores from the Mcmillan are used to determine the probability of falling based on the patient's previous history of falls and their test performance.     Minimal Detectable Change = 6.5   & Minimal Detectable Change (Parkinson's Disease) = 5 according to Brent & Kpey 2008    Assessment (rationale for performing, application to patient s function & care plan): BBS administered in order to assess stability dueing funcitonal mobility and assist in determining safe d/c location.  Pt is able to demonstates baseline mobility during session with minor sway but no LOB during gait and BBS.    Minutes billed as physical performance test: 10

## 2017-03-02 NOTE — PROGRESS NOTES
Care Coordinator- Discharge Planning     Admission Date/Time:  2/24/2017  Attending MD: Dr. Whelan     Data  Date of initial CC assessment:  2/27/2017  Chart reviewed, discussed with interdisciplinary team.   Patient was admitted for:   1. Gangrene (H)    2. Ischemic necrosis of toe (H)    3. Supratherapeutic INR    4. Anemia, unspecified type    5. Paroxysmal atrial fibrillation (H)    6. Bilateral edema of lower extremity    7. Stasis dermatitis of both legs    8. 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    9. Long-term (current) use of anticoagulants [Z79.01]    10. Type 2 diabetes mellitus with diabetic polyneuropathy, unspecified long term insulin use status (H)      Assessment  Full assessment completed in previous note  Per MD, pt will discharge home on Fondaparinux for 30 days and then follow up for long term anticoagulation plan.   PCP appointment previously scheduled for 3/3/2017 will need to be rescheduled as pt is still inpatient.   Intervention  Arrangements made with [facility] (Ph: [phone] Fax: [phone]) for RN eval post hospitalization, assess vital signs, surgical site,skin on feet and toes, respiratory and cardiac status, activity tolerance, hydration, nutritional status, med set up and management. PT/OT eval and treat for deconditioning, strengthening, and endurance. HHA for assist with ADL's. SW for referral to community resources.  Arrangements made with Dr. Humberto Conner at the [facility] (Ph: [phone] Fax: [phone]) on 3/8/17 at 2:45 PM for establishment of care and post hospitalization follow up..       Plan  Anticipated Discharge Date:  3/3/2017  Anticipated Discharge Plan:  Discharge to home with home care    CTS Handoff completed:  YES  Lindsey Colmenares RN BSN  6C Unit Care Coordinator  Phone number: [phone]  Pager: [phone]

## 2017-03-03 ENCOUNTER — TELEPHONE (OUTPATIENT)
Dept: ANTICOAGULATION | Facility: OTHER | Age: 77
End: 2017-03-03

## 2017-03-03 VITALS
HEIGHT: 62 IN | DIASTOLIC BLOOD PRESSURE: 78 MMHG | RESPIRATION RATE: 18 BRPM | OXYGEN SATURATION: 90 % | TEMPERATURE: 97.7 F | WEIGHT: 174.1 LBS | BODY MASS INDEX: 32.04 KG/M2 | SYSTOLIC BLOOD PRESSURE: 114 MMHG | HEART RATE: 73 BPM

## 2017-03-03 LAB
ANION GAP SERPL CALCULATED.3IONS-SCNC: 4 MMOL/L (ref 3–14)
BUN SERPL-MCNC: 9 MG/DL (ref 7–30)
CALCIUM SERPL-MCNC: 8.5 MG/DL (ref 8.5–10.1)
CHLORIDE SERPL-SCNC: 104 MMOL/L (ref 94–109)
CO2 SERPL-SCNC: 34 MMOL/L (ref 20–32)
CREAT SERPL-MCNC: 0.58 MG/DL (ref 0.52–1.04)
ERYTHROCYTE [DISTWIDTH] IN BLOOD BY AUTOMATED COUNT: 22.4 % (ref 10–15)
GFR SERPL CREATININE-BSD FRML MDRD: ABNORMAL ML/MIN/1.7M2
GLUCOSE BLDC GLUCOMTR-MCNC: 79 MG/DL (ref 70–99)
GLUCOSE SERPL-MCNC: 74 MG/DL (ref 70–99)
HCT VFR BLD AUTO: 26.7 % (ref 35–47)
HGB BLD-MCNC: 7.7 G/DL (ref 11.7–15.7)
MCH RBC QN AUTO: 29.8 PG (ref 26.5–33)
MCHC RBC AUTO-ENTMCNC: 28.8 G/DL (ref 31.5–36.5)
MCV RBC AUTO: 104 FL (ref 78–100)
PLATELET # BLD AUTO: 253 10E9/L (ref 150–450)
POTASSIUM SERPL-SCNC: 3.7 MMOL/L (ref 3.4–5.3)
RBC # BLD AUTO: 2.58 10E12/L (ref 3.8–5.2)
SODIUM SERPL-SCNC: 141 MMOL/L (ref 133–144)
WBC # BLD AUTO: 7.3 10E9/L (ref 4–11)

## 2017-03-03 PROCEDURE — 40000275 ZZH STATISTIC RCP TIME EA 10 MIN

## 2017-03-03 PROCEDURE — A9270 NON-COVERED ITEM OR SERVICE: HCPCS | Mod: GY | Performed by: HOSPITALIST

## 2017-03-03 PROCEDURE — 94660 CPAP INITIATION&MGMT: CPT

## 2017-03-03 PROCEDURE — 00000146 ZZHCL STATISTIC GLUCOSE BY METER IP

## 2017-03-03 PROCEDURE — 99239 HOSP IP/OBS DSCHRG MGMT >30: CPT | Mod: GC | Performed by: INTERNAL MEDICINE

## 2017-03-03 PROCEDURE — A9270 NON-COVERED ITEM OR SERVICE: HCPCS | Mod: GY | Performed by: INTERNAL MEDICINE

## 2017-03-03 PROCEDURE — 36592 COLLECT BLOOD FROM PICC: CPT | Performed by: INTERNAL MEDICINE

## 2017-03-03 PROCEDURE — 25000132 ZZH RX MED GY IP 250 OP 250 PS 637: Mod: GY | Performed by: INTERNAL MEDICINE

## 2017-03-03 PROCEDURE — 25000128 H RX IP 250 OP 636: Performed by: INTERNAL MEDICINE

## 2017-03-03 PROCEDURE — 80048 BASIC METABOLIC PNL TOTAL CA: CPT | Performed by: INTERNAL MEDICINE

## 2017-03-03 PROCEDURE — 25000132 ZZH RX MED GY IP 250 OP 250 PS 637: Mod: GY | Performed by: HOSPITALIST

## 2017-03-03 PROCEDURE — 25000132 ZZH RX MED GY IP 250 OP 250 PS 637: Mod: GY | Performed by: PHYSICIAN ASSISTANT

## 2017-03-03 PROCEDURE — A9270 NON-COVERED ITEM OR SERVICE: HCPCS | Mod: GY | Performed by: PHYSICIAN ASSISTANT

## 2017-03-03 PROCEDURE — 85027 COMPLETE CBC AUTOMATED: CPT | Performed by: INTERNAL MEDICINE

## 2017-03-03 RX ORDER — FUROSEMIDE 40 MG
40 TABLET ORAL
Qty: 60 TABLET | Refills: 1 | Status: SHIPPED
Start: 2017-03-03 | End: 2017-03-31

## 2017-03-03 RX ORDER — ATORVASTATIN CALCIUM 40 MG/1
40 TABLET, FILM COATED ORAL DAILY
Qty: 30 TABLET | Refills: 0 | Status: SHIPPED
Start: 2017-03-03 | End: 2017-03-08

## 2017-03-03 RX ORDER — FUROSEMIDE 40 MG
40 TABLET ORAL
Status: DISCONTINUED | OUTPATIENT
Start: 2017-03-03 | End: 2017-03-03 | Stop reason: HOSPADM

## 2017-03-03 RX ORDER — CEPHALEXIN 500 MG/1
500 CAPSULE ORAL EVERY 6 HOURS
Qty: 28 CAPSULE | Refills: 0 | Status: SHIPPED
Start: 2017-03-03 | End: 2017-03-22

## 2017-03-03 RX ORDER — CEPHALEXIN 500 MG/1
500 CAPSULE ORAL EVERY 6 HOURS SCHEDULED
Status: DISCONTINUED | OUTPATIENT
Start: 2017-03-03 | End: 2017-03-03 | Stop reason: HOSPADM

## 2017-03-03 RX ORDER — FUROSEMIDE 40 MG
40 TABLET ORAL
Qty: 60 TABLET | Refills: 1 | Status: SHIPPED
Start: 2017-03-03 | End: 2017-03-03

## 2017-03-03 RX ORDER — FONDAPARINUX SODIUM 7.5 MG/.6ML
7.5 INJECTION SUBCUTANEOUS DAILY
Qty: 40 SYRINGE | Refills: 0 | Status: SHIPPED
Start: 2017-03-03 | End: 2017-04-14

## 2017-03-03 RX ADMIN — CEPHALEXIN 500 MG: 500 CAPSULE ORAL at 12:38

## 2017-03-03 RX ADMIN — ASPIRIN 81 MG CHEWABLE TABLET 81 MG: 81 TABLET CHEWABLE at 08:22

## 2017-03-03 RX ADMIN — FUROSEMIDE 40 MG: 40 TABLET ORAL at 08:34

## 2017-03-03 RX ADMIN — PANTOPRAZOLE SODIUM 40 MG: 40 TABLET, DELAYED RELEASE ORAL at 08:22

## 2017-03-03 RX ADMIN — GABAPENTIN 100 MG: 100 CAPSULE ORAL at 08:22

## 2017-03-03 RX ADMIN — AMIODARONE HYDROCHLORIDE 200 MG: 200 TABLET ORAL at 08:22

## 2017-03-03 RX ADMIN — FONDAPARINUX SODIUM 7.5 MG: 7.5 INJECTION, SOLUTION SUBCUTANEOUS at 08:23

## 2017-03-03 RX ADMIN — CLOTRIMAZOLE: 10 CREAM TOPICAL at 08:35

## 2017-03-03 RX ADMIN — LORAZEPAM 0.5 MG: 0.5 TABLET ORAL at 10:09

## 2017-03-03 RX ADMIN — VENLAFAXINE HYDROCHLORIDE 150 MG: 150 TABLET, EXTENDED RELEASE ORAL at 08:22

## 2017-03-03 NOTE — PROGRESS NOTES
"SPIRITUAL HEALTH SERVICES  SPIRITUAL ASSESSMENT Progress Note  [facility] ([place]) 6C     Brief visit with pt, who was on facemask, welcomed prayer support. Pt said \"the mask helps my breathing a lot - I'm grateful for it.\" I shared prayer and blessing song.  PLAN: will continue to follow, visiting again early next week if pt still on unit.    Mark Keys) Tita Perera M.Div., [facility]  Staff   Pager [phone]      "

## 2017-03-03 NOTE — PROGRESS NOTES
CLINICAL NUTRITION SERVICES    Reason for Assessment:  Heart-healthy nutrition education, received consult.    Diet History:  Pt reports receiving written nutrition education in the past. Pt reports wanting more detail on what she should eat and not eat.     Nutrition Diagnosis:  Food- and nutrition-related knowledge deficit r/t desire to learn more about a healthy nutrition regimen in detail AEB pt report of knowledge deficit regarding healthy food choices.     Nutrition Prescription/Recs:  Continue heart-healthy diet.     Interventions:  Nutrition Education: Provided verbal instruction on a heart-healthy diet. Implemented motivational interviewing.  Counseled the patient on ways to modify current dietary regimen to follow heart healthy guidelines. Emphasis on non-starchy vegetables, lean meats (low sat fat) and fish, low-fat dairy products, and whole grains. Dicussed importance of maintaining balanced meals, portion sizes, and implementing the plate method. Discussed strategies for modifying cooking with substitutes for high sodium seasoning and sauces. The patient did not want written edu as she has received it in the past.     Goals:    1.  Pt will verbalize at least four foods high in saturated or trans-fats and five foods high in sodium.    2.  Pt will list at least two interventions to make current meal plan more heart-healthy.     Follow-up:   Patient to ask any further nutrition-related questions before discharge.  In addition, pt may request outpatient RD appointment.    Cooper Brito  Dietetic Intern  I have read and agree with the above nutrition note.  Kelly Wright, MS, RD, LD, [facility]   6C Pgr:  [phone]

## 2017-03-03 NOTE — PLAN OF CARE
Problem: Goal Outcome Summary  Goal: Goal Outcome Summary  Outcome: No Change  Pt VSS, SR on tele, BiPap overnight. Pt's feet remain cdi in dressings, blue boots in bed and black walking shoes when OOB to commode or BR. Pt calls for SBA. No c/o pain. Trazodone at bedtime with pt able to rest comfortably. At pt's request, nutrition consult placed for discussion today prior to pt d/c. Continue to monitor, follow poc, alert C1 with changes and/or concerns.

## 2017-03-03 NOTE — PROGRESS NOTES
DISCHARGE   Discharged to: Home  Via: Automobile  Accompanied by: Family  Discharge Instructions: diet, activity, medications, follow up appointments, when to call the MD, and what to watchout for (i.e. s/s of infection, increasing SOB, palpitations, chest pain,)  Prescriptions: To be filled by [facility] per pt's request; medication list reviewed & sent with pt, special attention paid to fondaparix instructions.  Follow Up Appointments: arranged; information given  Belongings: All sent with pt  IV: out  Telemetry: off  Pt exhibits understanding of above discharge instructions; all questions answered.  Discharge Paperwork: faxed

## 2017-03-03 NOTE — DISCHARGE SUMMARY
Cardiology Discharge Summary  Carlos Alberto Fenton MRN: [id number]  1940  Home clinic: [facility]  Primary care provider: Nuha Bateman MD  ___________________________________              Date of Admission:  2/24/2017  Date of Discharge:  3/3/2017   Admitting Physician:  Ester Mike MD  Discharge Physician:  Britt Whelan MD  Discharging Service:  Cardiology, Cards 1      Primary Provider: Nuha Bateman MD     Reason for Admission:   Dry gangrene of toes, bilateral  Supratherapeutic INR      Discharge Diagnosis:   Dry gangrene of toes, bilateral  Supratherapeutic INR  Nonocclusive thrombus of RIJ and right subclavian vein  HIT     Procedures & Significant Findings:   CTA Abdomen bilateral iliofem  IMPRESSION:  1. Mild aortobiiliac atherosclerosis without evidence of aneurysm,  dissection, or significant stenosis in the chest, abdomen or pelvis.  Bilateral patent lower extremity three-vessel runoff.  2. New postsurgical changes of coronary artery bypass grafting with  small pericardial effusion and trace fluid underlying the sternotomy.  3. Findings suggestive of volume overload including small left pleural  effusion, diffuse interlobular septal thickening suggestive of  interstitial pulmonary edema, trace pelvic ascites and anasarca.  4. Probable filling defect in the right internal jugular vein  extending into the right brachiocephalic vein suggestive of deep  venous thrombosis versus admixture phenomenon. Consider further  evaluation with venous Doppler ultrasound.  5. During the examination approximately 40 cc of intravenous contrast  versus saline flush extravasated into the left antecubital fossa. The  patient reported mild tenderness in this region at that time with  intact sensory motor function in the left upper extremity. The patient  was instructed to seek care if there was evidence of skin breakdown.  Elevation  of the extremity and hot or cold packs can also help with  absorption of the fluid. Recommend plastic surgery consultation if  there is evidence of tissue necrosis.     US Right Upper Extremity  IMPRESSION:  1. Nonocclusive deep venous thrombosis in the right internal jugular  vein and right subclavian vein.  2. Occlusive thrombus of the basilic vein at the antecubital fossa.     ECHO  Interpretation Summary  No LV throbus noted.     Left Ventricle:  Left ventricular size is normal. The Ejection Fraction is estimated at 55-60%.  Diastolic function not assessed due to atrial fibrillation. No LV throbus  noted.     Right Ventricle:  Right ventricular function, chamber size, wall motion, and thickness are  normal.     Vessels:  The inferior vena cava is normal.    Pericardium:  No pericardial effusion is present.     TAVO  Interpretation Summary  H/o ABDIEL ligation. No LA clot seen.  H/o PFO closure. The atrial septum is intact as assessed by color Doppler.  No LV thrombus seen. Mildly (EF 45-50%) reduced left ventricular function is  present.  The right ventricle is normal size. Global right ventricular function is  normal.     RADHA  Impression:      Right leg:   Normal RADHA of 1.16.      Left leg:   Normal RADHA of 1.16.     Consultations:   Vascular Surgery  Vascular Medicine  Podiatry  Heme/Onc          Hospital Course by Problem:    #. Dry gangrene of toes, bilateral   Involving the right 1,2,3 digits and left 1,2,3 (mainly 2,3) digits. Likely due to micro-atheroemoblic disease, but could also be a consequence of HIT. There were no signs of major vascular compromise on CTA. ECHO and TAVO did not demonstrate LV thrombus or other intracardiac thrombus. ABIs were normal. Hypercoagulable workup (anti-cardiolipin and Beta-2 glycoprotein) was negative. Vascular surgery and podiatry did not recommend any acute surgical intervention. She was initially started on heparin gtt, which was stopped upon discovery of HIT. She was then  started on argatroban and her warfarin was reversed. After stabilization of her platelet count, she was transitioned from argatroban to fondaparinux. She will follow up with podiatry in 1-2 weeks and with follow up with Dr. Bernal of Heme/Onc in 1 month. She will continue on fondaparinux until then. She was able to get 1 week supply it from [place] discharge pharmacy. Process was started for her insurance to provide further doses after 1st week. She was also started on atorvastatin this admission due to LFT improvement after her recent CABG. She was advised to put lamb wool between toes and wear Ro[place] boots. She ambulated in hallways without significant issues.     #. Deep venous thrombosis, likely catheter related  Noted on US of upper extremities. Present in R IJ and R subclavian. There was also thrombus in the right basilic vein. It is possible that there were catheter related from recent hospitalization. She will need anticoagulation as highlighted above.      #. 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 Heparin placed in allergy list. Treatment and follow up as highlighted above.      #. Paroxysmal atrial fibrillation (CHADSVASC 9) with history of CVA  Mostly sinus, but did briefly transition to Afib during the admission. Her amiodarone was continued on discharge. She will have a discussion about long term need for amiodarone in outpatient cardiology follow up. She will continue on anticoagulation as highlighted above.     #. Coronary artery disease s/p CABG  #. Mild decrease in LVEF  History of CAD, s/p 3v CABG, modified maze with pulmonary isolation, left atrial appendage ligation, PFO closure, done 3 weeks ago. She was started on atorvastatin this admission as her LFTs had marked improved from her last hospitalization. She was mildly volume overloaded and was diuresed. She was discharged on her home diuretics. Her aspirin was decreased from 325 mg to 81 mg daily. Lasix dose was increased to 40mg PO BID given leg  "swelling to improve toe wound healing. CV surgery saw her in the hospital and recommended cephalexin for 1 week due to drainage of vein harvesting site.     #. Contrast extravasation and left forearm swelling    Physical Exam on day of Discharge:  Blood pressure 114/79, pulse 73, temperature 97.7  F (36.5  C), temperature source Oral, resp. rate 18, height 1.575 m (5' 2\"), weight 79 kg (174 lb 1.6 oz), SpO2 90 %.    General: Alert, pleasant  CV: regular, no m/g/r  Chest: mild crackles in the bases  Abdomen: Soft, nontender, nondistended. +BS.   Extremities: 1+ edema to mid shins; improving pedal edema  Skin: great toe bullae broke, still with mild redness of skin (stable and not expanding)           Pending Results:   None         Discharge Medications:     Discharge Medication List as of 3/3/2017  2:06 PM      START taking these medications    Details   cephALEXin (KEFLEX) 500 MG capsule Take 1 capsule (500 mg) by mouth every 6 hours, Disp-28 capsule, R-0, Fax      aspirin 81 MG chewable tablet Take 1 tablet (81 mg) by mouth daily, Disp-30 tablet, R-0, E-Prescribe         CONTINUE these medications which have CHANGED    Details   fondaparinux (ARIXTRA) 7.5MG/0.6ML injection Inject 0.6 mLs (7.5 mg) Subcutaneous daily, Disp-40 Syringe, R-0, Fax      atorvastatin (LIPITOR) 40 MG tablet Take 1 tablet (40 mg) by mouth daily, Disp-30 tablet, R-0, Fax      furosemide (LASIX) 40 MG tablet Take 1 tablet (40 mg) by mouth 2 times daily, Disp-60 tablet, R-1, Fax         CONTINUE these medications which have NOT CHANGED    Details   HYDROmorphone (DILAUDID) 2 MG tablet Take 0.5 tablets (1 mg) by mouth every 4 hours as needed for moderate to severe pain, Disp-30 tablet, R-0, Local Print      acetaminophen (TYLENOL) 325 MG tablet Take 1-2 tablets (325-650 mg) by mouth every 4 hours as needed for mild pain or fever, Disp-100 tablet, R-0, E-Prescribe      amiodarone (PACERONE/CODARONE) 200 MG tablet Take 2 tabs (400 mg) daily for " 14 days, then decrease dose to 1 tab (200 mg) daily for 14 days, then stop, Disp-45 tablet, R-0, E-Prescribe      gabapentin (NEURONTIN) 100 MG capsule Take 1 capsule (100 mg) by mouth 2 times daily, Disp-60 capsule, R-3, E-Prescribe      loperamide (IMODIUM) 2 MG capsule Take 1 capsule (2 mg) by mouth 4 times daily as needed for diarrhea, Disp-20 capsule, R-0, E-Prescribe      potassium chloride SA (K-DUR/KLOR-CON M) 10 MEQ CR tablet Take 1 tablet (10 mEq) by mouth 2 times daily, Disp-60 tablet, R-0, E-Prescribe      pantoprazole (PROTONIX) 40 MG EC tablet Take 1 tablet (40 mg) by mouth every morning, Disp-30 tablet, R-0, E-PrescribeTake for 30 days then stop      ferrous sulfate (IRON SUPPLEMENT) 325 (65 FE) MG tablet Take 1 tablet (325 mg) by mouth daily (with breakfast), Disp-100 tablet, R-1, E-Prescribe      traZODone (DESYREL) 50 MG tablet Take 0.5 tablets (25 mg) by mouth nightly as needed for sleep, Disp-45 tablet, R-2, E-Prescribe      venlafaxine (EFFEXOR-ER) 150 MG TB24 24 hr tablet Take 1 tablet (150 mg) by mouth daily (with breakfast), Disp-90 each, R-1, E-Prescribe      melatonin 1 MG TABS tablet Take 1 tablet (1 mg) by mouth nightly as needed, Disp-30 tablet, R-0, E-Prescribe      ONETOUCH DELICA LANCETS 33G MISC 1 Device daily, Disp-100 each, R-0, E-Prescribe      ONE TOUCH ULTRA test strip Test once dailyDisp-100 each, R-0, DAWE-Prescribe      clotrimazole (LOTRIMIN) 1 % cream Please apply to groins two times daily for one week after discharge and then follow up with PCP if no improvement of your skin rashesDisp-12 g, [id number]      blood glucose monitoring (ONE TOUCH ULTRA 2) meter device kit Historical         STOP taking these medications       aspirin 81 MG tablet Comments:   Reason for Stopping:         aspirin  MG EC tablet Comments:   Reason for Stopping:         sulfamethoxazole-trimethoprim (BACTRIM DS/SEPTRA DS) 800-160 MG per tablet Comments:   Reason for Stopping:          warfarin (COUMADIN) 2.5 MG tablet Comments:   Reason for Stopping:                    Discharge Instructions and Follow-Up:     Discharge Procedure Orders  Home care nursing referral   Referral Type: Home Health Therapies & Aides     Follow Up and recommended labs and tests   Order Comments: Dr. Humberto Conner  [facility]   Phone: [phone]  Fax: [phone]    Appointment on 3/8/17 at 2:45 PM with Dr. Humberto Conner for establishment of care and post hospitalization follow up and BMP lab draw     Reason for your hospital stay   Order Comments: You were in the hospital for a lack of blood flow to your toes. You were started on new medications to help prevent recurrence of this event.     Activity   Order Comments: Your activity upon discharge: activity as tolerated   Order Specific Question Answer Comments   Is discharge order? Yes      Discharge Instructions   Order Comments: You will START taking fondaparinux shots for the next month. You will then follow up with Dr. Bernal to discuss long term blood thinning medications. You will have follow up with Dr. Easton Torres in 1-2 weeks to evaluate your toe wounds. You should establish care with a primary doctor near your daughter after discharge.     Adult [facility] Follow-up and recommended labs and tests   Order Comments: 1) Follow up with Dr. Bernal , at (location with clinic name or city) [facility] Heme/Onc, within 1 month  to evaluate medication change and for hospital follow- up. The following labs/tests are recommended: CBC and BMP.  2) [place] [facility] every week for next two weeks.  3) Follow up with [facility] in one month (can be same day as your HemOnc followup)    Appointments on [place] and/or [facility] (with [facility] or [facility] provider or service). Call [phone] if you haven't heard regarding these appointments within 7 days of discharge.     Full Code     Diet   Order Comments: Follow this  diet upon discharge: Orders Placed This Encounter     Fluid restriction 2000 ML FLUID     Combination Diet 2 gm NA Diet; Low Saturated Fat Diet   Order Specific Question Answer Comments   Is discharge order? Yes              Discharge Disposition:   Home         Condition on Discharge:   Discharge condition: Stable   Code status on discharge: Full Code        Date of service: 3/3/2017    Discussed with Dr. Whelan, who agrees with above plan.    Chapinc[place] Bill, PGY-2  Internal Medicine  [phone]    ATTENDING ATTESTATION:  Patient has been seen and evaluated by me on March 3, 2017. I agree with the discharge summary note above. I have reviewed pertinent labs and studies. More than 30 minutes was spent organizing this patient's hospital discharge.     Britt Whelan MD, MS  Staff Cardiologist, [facility]   Pager: [phone]

## 2017-03-03 NOTE — TELEPHONE ENCOUNTER
Received call from Zunilda, care coordinator at the  who reports that pt is off her coumadin at this time, on fondaparinux (ARIXTRA) at least until f/u in one month. They will then decide if pt will resume coumadin. Anticoag episode resolve at this time.     Catrachita Lacy RN- Coumadin Clinic RN

## 2017-03-03 NOTE — DISCHARGE INSTRUCTIONS
Wound Care  Right and left toes:  -Wash with soap and water  -Dry  -Place lambs wool in between affected toes and wrap with kerlix    Left leg graft site  -Wash with soap and water  -Dry  -Apply dry gauze over the site

## 2017-03-03 NOTE — PROGRESS NOTES
CVTS Post-op Follow-up  3/3/2017   Attending provider: No att. providers found      HPI: Patient is a 76 year old female who underwent CABG and PFO closure on 2/6/2017 by Dr. Quiñones. Patient was discharged on 2/17/2017. Patient was readmitted for bilateral gangrenous toes due to HIT. Patient was scheduled to see CVTS in clinic this afternoon for post-op evaluation.       S: Patient reports her sternal incision is well healed. She does have some drainage from her vein harvest incision that is improving. She has return of normal bowel and bladder function. Her pain is well controlled. No acute issues over night. Her appetite is back to normal. Patient denies SOB, CP, fever, chills, sweats.    O:   Vitals:    03/02/17 2300 03/03/17 0200 03/03/17 0352 03/03/17 0730   BP: 122/78   114/79   BP Location:    Left arm   Pulse:       Resp:   18 18   Temp:    97.7  F (36.5  C)   TempSrc:    Oral   SpO2: 92%   90%   Weight:  79 kg (174 lb 1.6 oz)     Height:         Vitals:    03/01/17 0005 03/02/17 0011 03/03/17 0200   Weight: 79 kg (174 lb 1.6 oz) 78.3 kg (172 lb 11.2 oz) 79 kg (174 lb 1.6 oz)       Intake/Output Summary (Last 24 hours) at 03/03/17 1056  Last data filed at 03/03/17 0945   Gross per 24 hour   Intake           1478.4 ml   Output             2600 ml   Net          -1121.6 ml     Gen: A&Ox3, NAD  Neck: No JVD   CV: RRR, no murmurs, rubs or gallops   Pulm: Lungs CTA, No wheezing or rhonchi  Abd: Soft, NT, ND, +BS  Ext: + LE edema, serous drainage from vein harvest site. No erythema or purulent drainage.  Incision: c/d/i, no erythema, sternum stable,    Labs:   BMP  Recent Labs  Lab 03/03/17  0714 03/02/17  0723 03/01/17  0901 02/28/17  1329 02/28/17  0439    141 138  --  141   POTASSIUM 3.7 3.8 3.9 4.4 3.3*   CHLORIDE 104 102 100  --  100   CARLY 8.5 8.9 8.8  --  8.2*   CO2 34* 31 31  --  34*   BUN 9 13 8  --  8   CR 0.58 0.58 0.67  --  0.57   GLC 74 98 68*  --  100*     CBC  Recent Labs  Lab 03/03/17  0714  03/02/17  0723 03/01/17  0901 02/28/17  0439   WBC 7.3 7.4 6.9 5.7   RBC 2.58* 2.71* 2.58* 2.31*   HGB 7.7* 8.1* 7.8* 6.9*   HCT 26.7* 28.0* 26.6* 23.3*   * 103* 103* 101*   MCH 29.8 29.9 30.2 29.9   MCHC 28.8* 28.9* 29.3* 29.6*   RDW 22.4* 21.9* 21.9* 22.3*    270 272 210     INR  Recent Labs  Lab 03/02/17  0723 03/01/17  0901 02/28/17  0439 02/27/17  0833   INR 7.75* 3.43* 2.58* 1.88*      Hepatic Panel   Lab Results   Component Value Date    AST 33 02/27/2017     Lab Results   Component Value Date    ALT 50 02/27/2017     No results found for: BILICONJ   Lab Results   Component Value Date    BILITOTAL 0.5 02/27/2017     Lab Results   Component Value Date    ALBUMIN 2.5 02/27/2017     Lab Results   Component Value Date    PROTTOTAL 5.7 02/27/2017      Lab Results   Component Value Date    ALKPHOS 110 02/27/2017         Recent Labs  Lab 03/03/17  0733 03/03/17  0714 03/02/17  0723 03/01/17  1322 03/01/17  1027 03/01/17  0901 02/28/17  2149 02/28/17  1759 02/28/17  1225 02/28/17  0439  02/25/17  1100 02/25/17  0910   GLC  --  74 98  --   --  68*  --   --   --  100*  --  125* Test canceled - Lab  error 02/25/17 1000 FMCORRECTED ON 02/25 AT 1034: PREVIOUSLY REPORTED AS 94   BGM 79  --   --  81 77  --  93 92 81  --   < >  --   --    < > = values in this interval not displayed.      A/P: Patient is a 76 year old female with a complex medical patient of DM, HTN , CVA 10/2016 2/2 cardio embolic source from afib now on coumadin, CAD s/p CABG and PFO closure 2/6/17 discharged 2/17/17 admitted on 2/24/17 due to b/l gangrenous toes, on admission was given a dose of heparin leading to platelet drop now diagnosed with HIT. Pain is well controlled and sternal incision is well healed.   - Patient does have clear drainage from her vein harvest site, likely from peripheral edema. No signs of infection, but will start 2 week course of keflex.   - Patient was instruected to call our clinic if she continues to  have drainage from her incision or if she has any signs     Yoandy Godwin PA-C  March 3, 2017 1:53 PM   p. [phone]

## 2017-03-06 ENCOUNTER — CARE COORDINATION (OUTPATIENT)
Dept: CARE COORDINATION | Facility: CLINIC | Age: 77
End: 2017-03-06

## 2017-03-06 NOTE — PROGRESS NOTES
RN TRIAGE CALL:    Patient Contact    Attempt # 1    Was call answered?  No.  Unable to leave message.Unidentified VM.  Mariela Baker RN

## 2017-03-06 NOTE — PROGRESS NOTES
Received CTS on pt from [facility].  In reviewing the chart pt does not have a primary care provider. Will forward to care team for follow up outreach.       Elizabeth Cooley RN,BSN  Clinical Care Coordinator    [facility] [phone]  [facility] [phone]

## 2017-03-07 ENCOUNTER — CARE COORDINATION (OUTPATIENT)
Dept: CARDIOLOGY | Facility: CLINIC | Age: 77
End: 2017-03-07

## 2017-03-07 ENCOUNTER — TELEPHONE (OUTPATIENT)
Dept: CARDIOLOGY | Facility: CLINIC | Age: 77
End: 2017-03-07

## 2017-03-07 ENCOUNTER — MEDICAL CORRESPONDENCE (OUTPATIENT)
Dept: HEALTH INFORMATION MANAGEMENT | Facility: CLINIC | Age: 77
End: 2017-03-07

## 2017-03-07 ENCOUNTER — TELEPHONE (OUTPATIENT)
Dept: WOUND CARE | Facility: CLINIC | Age: 77
End: 2017-03-07

## 2017-03-07 NOTE — TELEPHONE ENCOUNTER
Message was left for daughter to schedule an appointment missed by this patient.     Luz Marina Rodas  Periop Electrophysiology   [phone]

## 2017-03-07 NOTE — TELEPHONE ENCOUNTER
----- Message from India Turcios sent at 3/3/2017 11:22 AM CST -----  Regarding: Hospital f/u appt Dr Torres  Contact: [phone]  Pt needs to be seen by Dr Torres for ischemic necrosis of the toe.  Pt is still inpatient and discharge orders say 1 to 2 weeks but I only found 3 week out appts.      Please contact the pt with an earlier appt.  Thank you!    India SANON  Community Referral Specialist

## 2017-03-07 NOTE — TELEPHONE ENCOUNTER
Called daughter to schedule appointment for patient.  Left message with only time available on 3/16/2017 which is 1600 pm along with nurses number to call back if this will not work out.

## 2017-03-07 NOTE — PROGRESS NOTES
"[facility]  \"Hello, my name is Moon Jeronimo , and I am calling from the [facility].  I want to check in and see how you are doing, after leaving the hospital.  You may also receive a call from your Care Coordinator (care team), but I want to make sure you don t have any urgent needs.  I have a couple questions to review with you:     Post-Discharge Outreach                                                    Carlos Alberto Fenton is a 76 year old female         Care Team:    Patient Care Team       Relationship Specialty Notifications Start End    No Ref-Primary, Physician PCP - General   2/24/17     Comment:  Per Pt, no PCP/PCC at this time.    ALLEN Wilson MD MD Cardiology  11/10/16     Phone: [phone] Fax: [phone]          [facility] [address] [place] [address] [address] [facility] [address]            Transition of Care Review                                                      Patient was called three times and no answer so post 24 hr DC follow up calls will be closed out             Next 5 appointments (look out 90 days)     Mar 08, 2017  2:45 PM CST   [facility]    [address] [facility] [id number] [phone]            Mar 20, 2017  2:00 PM CDT   Office Visit with Nuha Bateman MD   [facility]    [address] [id number] [phone]                      Plan                                                      Thanks for your time.  Your Care Coordinator may follow-up within the next couple days.  In the meantime if you have questions, concerns or problems call your care team.        Moon Jeronimo    "

## 2017-03-07 NOTE — PROGRESS NOTES
RN TRIAGE CALL:    Patient Contact    Attempt # 2    Was call answered?  No.  Unable to leave message. Unidentified VM.  Mariela Baker RN

## 2017-03-08 ENCOUNTER — DOCUMENTATION ONLY (OUTPATIENT)
Dept: CARE COORDINATION | Facility: CLINIC | Age: 77
End: 2017-03-08

## 2017-03-08 ENCOUNTER — OFFICE VISIT (OUTPATIENT)
Dept: INTERNAL MEDICINE | Facility: CLINIC | Age: 77
End: 2017-03-08
Payer: COMMERCIAL

## 2017-03-08 VITALS
TEMPERATURE: 97.1 F | HEART RATE: 86 BPM | SYSTOLIC BLOOD PRESSURE: 108 MMHG | WEIGHT: 174 LBS | RESPIRATION RATE: 16 BRPM | DIASTOLIC BLOOD PRESSURE: 84 MMHG | OXYGEN SATURATION: 92 % | BODY MASS INDEX: 31.83 KG/M2

## 2017-03-08 DIAGNOSIS — D64.9 ANEMIA, UNSPECIFIED TYPE: ICD-10-CM

## 2017-03-08 DIAGNOSIS — I82.A11 DEEP VEIN THROMBOSIS (DVT) OF AXILLARY VEIN OF RIGHT UPPER EXTREMITY, UNSPECIFIED CHRONICITY (H): ICD-10-CM

## 2017-03-08 DIAGNOSIS — I96 TOE GANGRENE (H): Primary | ICD-10-CM

## 2017-03-08 DIAGNOSIS — D75.829 HIT (HEPARIN-INDUCED THROMBOCYTOPENIA) (H): ICD-10-CM

## 2017-03-08 DIAGNOSIS — I48.91 NEW ONSET ATRIAL FIBRILLATION (H): ICD-10-CM

## 2017-03-08 DIAGNOSIS — E78.49 OTHER HYPERLIPIDEMIA: ICD-10-CM

## 2017-03-08 DIAGNOSIS — I25.119 CORONARY ARTERY DISEASE WITH ANGINA PECTORIS, UNSPECIFIED VESSEL OR LESION TYPE, UNSPECIFIED WHETHER NATIVE OR TRANSPLANTED HEART (H): ICD-10-CM

## 2017-03-08 LAB
ALBUMIN SERPL-MCNC: 3.1 G/DL (ref 3.4–5)
ALP SERPL-CCNC: 144 U/L (ref 40–150)
ALT SERPL W P-5'-P-CCNC: 29 U/L (ref 0–50)
ANION GAP SERPL CALCULATED.3IONS-SCNC: 12 MMOL/L (ref 3–14)
AST SERPL W P-5'-P-CCNC: 27 U/L (ref 0–45)
BILIRUB SERPL-MCNC: 0.6 MG/DL (ref 0.2–1.3)
BUN SERPL-MCNC: 12 MG/DL (ref 7–30)
CALCIUM SERPL-MCNC: 8.8 MG/DL (ref 8.5–10.1)
CHLORIDE SERPL-SCNC: 98 MMOL/L (ref 94–109)
CO2 SERPL-SCNC: 30 MMOL/L (ref 20–32)
CREAT SERPL-MCNC: 0.64 MG/DL (ref 0.52–1.04)
ERYTHROCYTE [DISTWIDTH] IN BLOOD BY AUTOMATED COUNT: 22.4 % (ref 10–15)
GFR SERPL CREATININE-BSD FRML MDRD: 90 ML/MIN/1.7M2
GLUCOSE SERPL-MCNC: 126 MG/DL (ref 70–99)
HCT VFR BLD AUTO: 31.9 % (ref 35–47)
HGB BLD-MCNC: 9.1 G/DL (ref 11.7–15.7)
MCH RBC QN AUTO: 30 PG (ref 26.5–33)
MCHC RBC AUTO-ENTMCNC: 28.5 G/DL (ref 31.5–36.5)
MCV RBC AUTO: 105 FL (ref 78–100)
PLATELET # BLD AUTO: 346 10E9/L (ref 150–450)
POTASSIUM SERPL-SCNC: 3.5 MMOL/L (ref 3.4–5.3)
PROT SERPL-MCNC: 7.4 G/DL (ref 6.8–8.8)
RBC # BLD AUTO: 3.03 10E12/L (ref 3.8–5.2)
SODIUM SERPL-SCNC: 140 MMOL/L (ref 133–144)
WBC # BLD AUTO: 7.6 10E9/L (ref 4–11)

## 2017-03-08 PROCEDURE — 80053 COMPREHEN METABOLIC PANEL: CPT | Performed by: INTERNAL MEDICINE

## 2017-03-08 PROCEDURE — 99496 TRANSJ CARE MGMT HIGH F2F 7D: CPT | Performed by: INTERNAL MEDICINE

## 2017-03-08 PROCEDURE — 85027 COMPLETE CBC AUTOMATED: CPT | Performed by: INTERNAL MEDICINE

## 2017-03-08 PROCEDURE — 36415 COLL VENOUS BLD VENIPUNCTURE: CPT | Performed by: INTERNAL MEDICINE

## 2017-03-08 RX ORDER — ATORVASTATIN CALCIUM 40 MG/1
40 TABLET, FILM COATED ORAL DAILY
Qty: 90 TABLET | Refills: 3 | Status: SHIPPED | OUTPATIENT
Start: 2017-03-08 | End: 2017-03-31

## 2017-03-08 RX ORDER — AMIODARONE HYDROCHLORIDE 200 MG/1
TABLET ORAL
Qty: 45 TABLET | Refills: 0 | COMMUNITY
Start: 2017-03-08 | End: 2017-04-17

## 2017-03-08 ASSESSMENT — PAIN SCALES - GENERAL: PAINLEVEL: EXTREME PAIN (9)

## 2017-03-08 NOTE — PROGRESS NOTES
"RN TRIAGE CALL:    Patient Contact    Attempt # 3    Was call answered?  Yes.  \"May I please speak with <patient name>\"  Is patient available?   No.  Spoke with daughter; Her mother is sleeping. They will be in to see Dr Conner later today.    Mariela Baker RN                  "

## 2017-03-08 NOTE — NURSING NOTE
"Chief Complaint   Patient presents with     Hospital F/U       Initial /84 (BP Location: Left arm, Patient Position: Chair, Cuff Size: Adult Regular)  Pulse 86  Temp 97.1  F (36.2  C) (Temporal)  Resp 16  Wt 174 lb (78.9 kg)  SpO2 92%  BMI 31.83 kg/m2 Estimated body mass index is 31.83 kg/(m^2) as calculated from the following:    Height as of 2/25/17: 5' 2\" (1.575 m).    Weight as of this encounter: 174 lb (78.9 kg).  Medication Reconciliation: complete    "

## 2017-03-08 NOTE — PROGRESS NOTES
SUBJECTIVE:                                                    Carlos Alberto Fenton is a 76 year old female who presents to clinic today for the following health issues:          Hospital Follow-up Visit:    Hospital/Nursing Home/IP Rehab Facility: [facility]  Date of Admission: 2/24/17  Date of Discharge: 3/3/17  Reason(s) for Admission: dry gangrene of toes            Problems taking medications regularly:  None       Medication changes since discharge: None       Problems adhering to non-medication therapy:  None    Summary of hospitalization:  [facility] discharge summary reviewed  Diagnostic Tests/Treatments reviewed.  Follow up needed: none  Other Healthcare Providers Involved in Patient s Care:         Homecare  Update since discharge: improved.     Post Discharge Medication Reconciliation: discharge medications reconciled and changed, per note/orders (see AVS).  Plan of care communicated with patient and family          This patient seeing me today. She is here for post hospital visit she is accompanied by her daughter.    She was hospitalized for a triple bypass and PFO repair in early February. Discharged home with Coumadin due to some postop A. fib. 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    She has minimal pain at her incision of her open heart surgery, otherwise her toes and the gangrene have no pain.    She is back to eating. She is having normal bowel movements.    She is taking her fondaparinux shots.    Sugars are 109-113, patient is not on any diabetic meds.    Past Medical History   Diagnosis Date     Antiplatelet or antithrombotic long-term use      CAD (coronary artery disease)      2 vessel     Cancer (H)      Skin     Cerebral artery occlusion with cerebral infarction (H) 10/2016     Cerebral infarction (H) 10/2016     Diabetes (H)      Headaches      History of skin cancer      Hyperlipemias      Hypertension      Irregular heart beat       Peripheral neuropathy (H) 1990     cause unknown     Sleep apnea      Current Outpatient Prescriptions   Medication     atorvastatin (LIPITOR) 40 MG tablet     amiodarone (PACERONE/CODARONE) 200 MG tablet     fondaparinux (ARIXTRA) 7.5MG/0.6ML injection     furosemide (LASIX) 40 MG tablet     cephALEXin (KEFLEX) 500 MG capsule     aspirin 81 MG chewable tablet     melatonin 1 MG TABS tablet     HYDROmorphone (DILAUDID) 2 MG tablet     acetaminophen (TYLENOL) 325 MG tablet     gabapentin (NEURONTIN) 100 MG capsule     loperamide (IMODIUM) 2 MG capsule     potassium chloride SA (K-DUR/KLOR-CON M) 10 MEQ CR tablet     pantoprazole (PROTONIX) 40 MG EC tablet     ferrous sulfate (IRON SUPPLEMENT) 325 (65 FE) MG tablet     traZODone (DESYREL) 50 MG tablet     venlafaxine (EFFEXOR-ER) 150 MG TB24 24 hr tablet     clotrimazole (LOTRIMIN) 1 % cream     [DISCONTINUED] atorvastatin (LIPITOR) 40 MG tablet     [DISCONTINUED] amiodarone (PACERONE/CODARONE) 200 MG tablet     ONETOUCH DELICA LANCETS 33G MISC     ONE TOUCH ULTRA test strip     blood glucose monitoring (ONE TOUCH ULTRA 2) meter device kit     No current facility-administered medications for this visit.      Social History   Substance Use Topics     Smoking status: Former Smoker     Smokeless tobacco: Never Used     Alcohol use No     Review of Systems  Constitutional-No fevers, chills, or weight changes..  ENT-No earpain, sore throat, voice changes or rhinitis.  Cardiac-No chest pain or palpitations.  Respiratory-No cough, sob, or hemoptysis.  GI-No nausea, vomitting, diarrhea, constipation, or blood in the stool.  Musculoskeletal-No muscles aches or joint pains.  Skin-toes have gangrenous changes.  Endocrine-Sugars are stable.    Physical Exam  /84 (BP Location: Left arm, Patient Position: Chair, Cuff Size: Adult Regular)  Pulse 86  Temp 97.1  F (36.2  C) (Temporal)  Resp 16  Wt 174 lb (78.9 kg)  SpO2 92%  BMI 31.83 kg/m2  General Appearance-alert, no  distress  ENT-ENT exam normal, no neck nodes or sinus tenderness  Cardiac-regular rate and rhythm  with normal S1, S2 ; no murmur, rub or gallops  Lungs-clear to auscultation  GI-Soft, nontender.  Normal bowel sounds.  No hepatosplenomegaly or abnormal masses  Extremities-3 toes on the right arm black and dry gangrene jail up the toes, some erythema at the base of the toes.  Left foot has 2 toes with some black dry gangrene otherwise normal  Skin-chest incision is healing nicely, she has 1 port site which is minimal drainage on the right    ASSESSMENT:  Dry gangrene of her toes, right leg has 3 toes which are jail black and dry, minimal erythema. Left has 2 toes with gangrenous changes. She'll follow up with the surgeon who saw her in the hospital Dr. Torres in the next week. Continue her Keflex.    CABG-patient had triple bypass. She is doing well, she has no chest pain. Her blood pressure is stable. Her heart rate is good at 86. Share wounds are healing nicely.    Hyperlipidemia-his back on atorvastatin and doing well. We will check her LFTs today.    Atrial fibrillation after CABG she is on amiodarone, we will reduce this to 200 mg a day from her 400, she continues blood thinners of fondaparinux.    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 She is doing well on fondaparinux she did have a DVT in her arm and atrial fibrillation and therefore needs to continue on anticoagulation until she sees hematology in 1 month.    Anemia-the patient had anemia down to 7.6. She is on iron therapy orally. We will recheck her labs today.    Diabetes-the patient is on no medications and her blood sugars are doing well at 100 9013. No changes. Continue to follow her diet improves.    Pain-the patient's pain is getting better she does take the latter occasionally but will try to use just Tylenol.    Anxiety-the patient is on Effexor. She can continue this. We discussed Ativan  but will try to avoid it.    Follow-up with myself in 2 weeks.    Electronically signed by Humberto Conner MD

## 2017-03-08 NOTE — MR AVS SNAPSHOT
After Visit Summary   3/8/2017    Carlos Alberto Fenton    MRN: [id number]           Patient Information     Date Of Birth          1940        Visit Information        Provider Department      3/8/2017 2:45 PM Humberto Conner MD [facility]        Today's Diagnoses     Coronary artery disease with angina pectoris, unspecified vessel or lesion type, unspecified whether native or transplanted heart (H)           Follow-ups after your visit        Your next 10 appointments already scheduled     Mar 16, 2017  4:00 PM CDT   (Arrive by 3:45 PM)   Return Visit with Easton Torres DPM   [facility] ([facility])    [address] [id number] [phone]            Mar 20, 2017 11:45 AM CDT   CT CHEST W/O CONTRAST with MGCT1   [facility] ([facility])    [address] [place] [id number] [phone]           Please bring any scans or X-rays taken at other hospitals, if similar tests were done. Also bring a list of your medicines, including vitamins, minerals and over-the-counter drugs. It is safest to leave personal items at home.  Be sure to tell your doctor:   If you have any allergies.   If there s any chance you are pregnant.   If you are breastfeeding.   If you have any special needs.  You do not need to do anything special to prepare.  Please wear loose clothing, such as a sweat suit or jogging clothes. Avoid snaps, zippers and other metal. We may ask you to undress and put on a hospital gown.            Mar 23, 2017  4:00 PM CDT   (Arrive by 3:45 PM)   Return Visit with Easotn Torres DPM   [facility] ([facility])    [address] [id number] [phone]            Apr 03, 2017  3:00 PM CDT   (Arrive by 2:45 PM)   Return Visit with Angelina Bernal MD   [facility] ([facility]  [facility])    [address] [place] [address]  [address] [facility] [address]   [phone]            Apr 17, 2017  2:20 PM CDT   (Arrive by 2:05 PM)   ATRIAL FIBULATION VISIT with Paige Santos MD   [facility] ([facility])    [address] [place] [address]  [address] [facility] [address]   [phone]            Jul 14, 2017  1:30 PM CDT   (Arrive by 1:15 PM)   Return Visit with Star Lobato MD   [facility] ([facility])    [address] [place] [address]  [address] [facility] [address]   [phone]              Who to contact     If you have questions or need follow up information about today's clinic visit or your schedule please contact [facility] directly at [phone].  Normal or non-critical lab and imaging results will be communicated to you by MyChart, letter or phone within 4 business days after the clinic has received the results. If you do not hear from us within 7 days, please contact the clinic through [employer] or phone. If you have a critical or abnormal lab result, we will notify you by phone as soon as possible.  Submit refill requests through [employer] or call your pharmacy and they will forward the refill request to us. Please allow 3 business days for your refill to be completed.          Additional Information About Your Visit        MyChart Information     [employer] gives you secure access to your electronic health record. If you see a primary care provider, you can also send messages to your care team and make appointments. If you have questions, please call your primary care clinic.  If you do not have a primary care provider, please call [phone] and they will assist you.        Care EveryWhere ID     This is your Care EveryWhere ID. This could be used by other organizations to access your [place] medical records  [id number]        Your Vitals Were     Pulse Temperature Respirations Pulse  Oximetry BMI (Body Mass Index)       86 97.1  F (36.2  C) (Temporal) 16 92% 31.83 kg/m2        Blood Pressure from Last 3 Encounters:   03/08/17 108/84   03/03/17 114/78   02/24/17 (!) 115/96    Weight from Last 3 Encounters:   03/08/17 174 lb (78.9 kg)   03/03/17 174 lb 1.6 oz (79 kg)   02/24/17 170 lb (77.1 kg)              Today, you had the following     No orders found for display       Primary Care Provider    Physician No Ref-Primary       No address on file        Thank you!     Thank you for choosing [facility]  for your care. Our goal is always to provide you with excellent care. Hearing back from our patients is one way we can continue to improve our services. Please take a few minutes to complete the written survey that you may receive in the mail after your visit with us. Thank you!             Your Updated Medication List - Protect others around you: Learn how to safely use, store and throw away your medicines at www.disposemymeds.org.          This list is accurate as of: 3/8/17  3:22 PM.  Always use your most recent med list.                   Brand Name Dispense Instructions for use    acetaminophen 325 MG tablet    TYLENOL    100 tablet    Take 1-2 tablets (325-650 mg) by mouth every 4 hours as needed for mild pain or fever       amiodarone 200 MG tablet    PACERONE/CODARONE    45 tablet    Take 2 tabs (400 mg) daily for 14 days, then decrease dose to 1 tab (200 mg) daily for 14 days, then stop       aspirin 81 MG chewable tablet     30 tablet    Take 1 tablet (81 mg) by mouth daily       atorvastatin 40 MG tablet    LIPITOR    30 tablet    Take 1 tablet (40 mg) by mouth daily       blood glucose monitoring meter device kit          cephALEXin 500 MG capsule    KEFLEX    28 capsule    Take 1 capsule (500 mg) by mouth every 6 hours       clotrimazole 1 % cream    LOTRIMIN    12 g    Please apply to groins two times daily for one week after discharge and then follow up with PCP if no  improvement of your skin rashes       ferrous sulfate 325 (65 FE) MG tablet    IRON SUPPLEMENT    100 tablet    Take 1 tablet (325 mg) by mouth daily (with breakfast)       fondaparinux 7.5MG/0.6ML injection    ARIXTRA    40 Syringe    Inject 0.6 mLs (7.5 mg) Subcutaneous daily       furosemide 40 MG tablet    LASIX    60 tablet    Take 1 tablet (40 mg) by mouth 2 times daily       gabapentin 100 MG capsule    NEURONTIN    60 capsule    Take 1 capsule (100 mg) by mouth 2 times daily       HYDROmorphone 2 MG tablet    DILAUDID    30 tablet    Take 0.5 tablets (1 mg) by mouth every 4 hours as needed for moderate to severe pain       loperamide 2 MG capsule    IMODIUM    20 capsule    Take 1 capsule (2 mg) by mouth 4 times daily as needed for diarrhea       melatonin 1 MG Tabs tablet     30 tablet    Take 1 tablet (1 mg) by mouth nightly as needed       ONE TOUCH ULTRA test strip   Generic drug:  blood glucose monitoring     100 each    Test once daily       ONETOUCH DELICA LANCETS 33G Misc     100 each    1 Device daily       pantoprazole 40 MG EC tablet    PROTONIX    30 tablet    Take 1 tablet (40 mg) by mouth every morning       potassium chloride SA 10 MEQ CR tablet    K-DUR/KLOR-CON M    60 tablet    Take 1 tablet (10 mEq) by mouth 2 times daily       traZODone 50 MG tablet    DESYREL    45 tablet    Take 0.5 tablets (25 mg) by mouth nightly as needed for sleep       venlafaxine 150 MG Tb24 24 hr tablet    EFFEXOR-ER    90 each    Take 1 tablet (150 mg) by mouth daily (with breakfast)

## 2017-03-08 NOTE — PROGRESS NOTES
[facility] now requests orders and shares plan of care/discharge summaries for some patients through [employer].  Please REPLY TO THIS MESSAGE in order to give authorization for orders when needed.  This is considered a verbal order, you will still receive a faxed copy of orders for signature.  Thank you for your assistance in improving collaboration for our patients.    ORDER  Pt seen for home PT eval 3/8/17, requesting one additional PT visit for gait training advancement on steps/home mobility safety 1w1 beginning wk of 3/12/17

## 2017-03-09 ENCOUNTER — TELEPHONE (OUTPATIENT)
Dept: INTERNAL MEDICINE | Facility: CLINIC | Age: 77
End: 2017-03-09

## 2017-03-09 NOTE — TELEPHONE ENCOUNTER
----- Message from Humberto Conner MD sent at 3/8/2017  4:52 PM CST -----  Her labs are good, hgb is coming up nicely to 9.

## 2017-03-10 ENCOUNTER — TELEPHONE (OUTPATIENT)
Dept: INTERNAL MEDICINE | Facility: CLINIC | Age: 77
End: 2017-03-10

## 2017-03-10 NOTE — TELEPHONE ENCOUNTER
Reason for Call: Request for an order or referral:    Order or referral being requested: Andrea from Home care is requesting that orders for changed for left & right toes, recommending to stop lambs wool & replace with silver calcium alginate, also each day the toes be painted with providine iodine, please advise    Date needed: as soon as possible    Has the patient been seen by the PCP for this problem? YES    Additional comments:     Phone number Patient can be reached at:  Other phone number:  [phone]  Andrea    Best Time:      Can we leave a detailed message on this number?  YES    Call taken on 3/10/2017 at 10:56 AM by Yolanda Martinez\

## 2017-03-15 ENCOUNTER — TELEPHONE (OUTPATIENT)
Dept: FAMILY MEDICINE | Facility: OTHER | Age: 77
End: 2017-03-15

## 2017-03-15 NOTE — TELEPHONE ENCOUNTER
Reason for call:  Form  Reason for Call:  Form, our goal is to have forms completed with 72 hours, however, some forms may require a visit or additional information.    Type of letter, form or note:  medical    Who is the form from?: Home care    Where did the form come from: form was faxed in    What clinic location was the form placed at?: [facility] - [phone]    Where the form was placed: [facility]    What number is listed as a contact on the form?: [phone]         Additional comments: none    Call taken on 3/15/2017 at 10:48 AM by Sherry Love

## 2017-03-16 ENCOUNTER — OFFICE VISIT (OUTPATIENT)
Dept: WOUND CARE | Facility: CLINIC | Age: 77
End: 2017-03-16

## 2017-03-16 ENCOUNTER — MEDICAL CORRESPONDENCE (OUTPATIENT)
Dept: HEALTH INFORMATION MANAGEMENT | Facility: CLINIC | Age: 77
End: 2017-03-16

## 2017-03-16 DIAGNOSIS — E11.8 TYPE 2 DIABETES MELLITUS WITH COMPLICATION, WITHOUT LONG-TERM CURRENT USE OF INSULIN (H): ICD-10-CM

## 2017-03-16 DIAGNOSIS — I96 TOE GANGRENE (H): Primary | ICD-10-CM

## 2017-03-16 DIAGNOSIS — G59 MONONEUROPATHY DUE TO UNDERLYING DISEASE: ICD-10-CM

## 2017-03-16 DIAGNOSIS — R60.9 EDEMA, UNSPECIFIED TYPE: ICD-10-CM

## 2017-03-16 NOTE — PROGRESS NOTES
Date of Service: 3/16/2017    Chief Complaint:   Chief Complaint   Patient presents with     WOUND CARE     wound care and check        HPI: Carlos Alberto is a 76 year old female who presents today for hospital follow up after being admitted to [facility] on 2/24/17 - 3/3/17 for blackening toes. 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 She has continued to wear the DH2 shoes since being discharge and dressing the toes daily by painting them with iodine and wrapping with silvercel and a DSD. Denies pain, odor or purulent drainage to the toes. She still has LE edema which started shortly after her CABG which was performed on 2/6/17. She has not had a post-operative appointment yet. An RADHA was ordered during her hospital stay which was normal. She has not followed up with a vascular specialist since discharge. She is having some trouble getting around in the DH2 shoes, having to grab on to banisters and walls, but otherwise has been doing okay.    PMH:   Past Medical History   Diagnosis Date     Antiplatelet or antithrombotic long-term use      CAD (coronary artery disease)      2 vessel     Cancer (H)      Skin     Cerebral artery occlusion with cerebral infarction (H) 10/2016     Cerebral infarction (H) 10/2016     Diabetes (H)      Headaches      History of skin cancer      Hyperlipemias      Hypertension      Irregular heart beat      Peripheral neuropathy (H) 1990     cause unknown     Sleep apnea        PSxH:   Past Surgical History   Procedure Laterality Date     Hysterectomy, brenda  1988     Tonsillectomy  1942     C appendectomy  1959     Back surgery       Bypass graft artery coronary N/A 2/6/2017     Procedure: BYPASS GRAFT ARTERY CORONARY;  Surgeon: Mikhail Quiñones MD;  Location: [facility] OR     Maze procedure N/A 2/6/2017     Procedure: MAZE PROCEDURE;  Surgeon: Mikhail Quiñones MD;  Location: [facility] OR     Picc insertion Left 02/25/2017     5fr TL Bard PICC, 47cm (3cm  external) in the L basilic vein w/ tip in the SVC RA junction       Allergies: Heparin; Latex; Oxycodone; Adhesive tape; and Diapers & supplies    SH:   Social History     Social History     Marital status:      Spouse name: N/A     Number of children: N/A     Years of education: N/A     Occupational History     Not on file.     Social History Main Topics     Smoking status: Former Smoker     Smokeless tobacco: Never Used     Alcohol use No     Drug use: No     Sexual activity: No     Other Topics Concern     Parent/Sibling W/ Cabg, Mi Or Angioplasty Before 65f 55m? Yes     Social History Narrative       FH:   Family History   Problem Relation Age of Onset     DIABETES Mother      C.A.D. Mother      DIABETES Father      C.A.D. Father      Cardiovascular Sister      blood clot       Objective:  PT and DP pulses are non-palpable bilaterally. CRT is >3 seconds. Absent pedal hair.   Gross sensation is decreased bilaterally.   Affected digits: right hallux and right second and third digits, left second and third digits are all partially blackened. Right hallux is distally mummified, the blackened portion found more proximally is not as hardened and there is a line of demarcation found mid-hallux. The right second and third digits are affected more distally - the second digit is hardened about to about 1/3 of it's length and the third digit is hardened to about 1/4 of its length. The left second digit is minimally affected, with only a ~0.2 x 0.2 cm portion affected distally. The left third digit is more so affected to about 1/4 of the digit. There is no erythema, calor, odor, or purulent drainage noted to any of the affected toes. Skin is otherwise normal. There is slight purplish discoloration to the forefoot. There is 2+ pitting edema found to both LE. No tenderness to palpation of affected or unaffected digits or elsewhere.    Assessment:   - Dry gangrene  - LE edema  - DM type 2  - Peripheral neuropathy  -  Recent CABG performed on 2/6/17  - Hx of DVT (embolic)  - Atrial fibrillation    Plan:  - Pt seen and evaluated. Diagnosis and treatment options discussed.   - We discussed that it is likely that the distal hallux and distal toes will auto-amputate at some point in the future with no other interventions. However, this increases the chances of infection in a person in the community. Since being seen in the hospital by our PA, the gangrene has progressed, significantly on the right hallux. However there are no newly affected digits on either foot. At this stage, the unaffected digits will likely remain intact. She is not quite 6 weeks s/p her CABG. I recommend that we observe the digits for the next couple of weeks to see if the current area of gangrene is truly the full area of demarcation. I have written a referral to vascular surgery. She understands that there may be no intervention possible at this time, and she will likely need amputations of the area.  If vascular would like to proceed with the amputations, she will need coordination with cardio and primary. From my perspective, I will work to maintain the status quo and try to prevent a progression into wet gangrene. Because of the extent of the mummification, I don't think HBO would be beneficial at this time  - Dressing change instructions: No need to wash the toes. Keep toes dry at all times, avoid getting wet while bathing. Soak rolled gauze in betadine. Liberally wrap in between all toes with the gauze. Cover with kerlix. Change this dressing daily.   - Refer patient to vascular  - Continue to wear DH2 shoes at all times except in bed.  - RTC 1 week

## 2017-03-16 NOTE — LETTER
3/16/2017       RE: Carlos Alberto Fenton  [address]  [facility] [address]     Dear Colleague,    Thank you for referring your patient, Carlos Alberto Fenton, to the [facility] WOUND CARE at [facility]. Please see a copy of my visit note below.    Date of Service: 3/16/2017    Chief Complaint:   Chief Complaint   Patient presents with     WOUND CARE     wound care and check        HPI: Carlos Alberto is a 76 year old female who presents today for hospital follow up after being admitted to [facility] on 2/24/17 - 3/3/17 for blackening toes. 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 She has continued to wear the DH2 shoes since being discharge and dressing the toes daily by painting them with iodine and wrapping with silvercel and a DSD. Denies pain, odor or purulent drainage to the toes. She still has LE edema which started shortly after her CABG which was performed on 2/6/17. She has not had a post-operative appointment yet. An RADHA was ordered during her hospital stay which was normal. She has not followed up with a vascular specialist since discharge. She is having some trouble getting around in the DH2 shoes, having to grab on to banisters and walls, but otherwise has been doing okay.    PMH:   Past Medical History   Diagnosis Date     Antiplatelet or antithrombotic long-term use      CAD (coronary artery disease)      2 vessel     Cancer (H)      Skin     Cerebral artery occlusion with cerebral infarction (H) 10/2016     Cerebral infarction (H) 10/2016     Diabetes (H)      Headaches      History of skin cancer      Hyperlipemias      Hypertension      Irregular heart beat      Peripheral neuropathy (H) 1990     cause unknown     Sleep apnea        PSxH:   Past Surgical History   Procedure Laterality Date     Hysterectomy, brenda  1988     Tonsillectomy  1942     C appendectomy  1959     Back surgery       Bypass graft artery  coronary N/A 2/6/2017     Procedure: BYPASS GRAFT ARTERY CORONARY;  Surgeon: Mikhail Quiñones MD;  Location: [facility] OR     Maze procedure N/A 2/6/2017     Procedure: MAZE PROCEDURE;  Surgeon: Mikhail Quiñones MD;  Location: [facility] OR     Picc insertion Left 02/25/2017     5fr TL Bard PICC, 47cm (3cm external) in the L basilic vein w/ tip in the SVC RA junction       Allergies: Heparin; Latex; Oxycodone; Adhesive tape; and Diapers & supplies    SH:   Social History     Social History     Marital status:      Spouse name: N/A     Number of children: N/A     Years of education: N/A     Occupational History     Not on file.     Social History Main Topics     Smoking status: Former Smoker     Smokeless tobacco: Never Used     Alcohol use No     Drug use: No     Sexual activity: No     Other Topics Concern     Parent/Sibling W/ Cabg, Mi Or Angioplasty Before 65f 55m? Yes     Social History Narrative       FH:   Family History   Problem Relation Age of Onset     DIABETES Mother      C.A.D. Mother      DIABETES Father      C.A.D. Father      Cardiovascular Sister      blood clot       Objective:  PT and DP pulses are non-palpable bilaterally. CRT is >3 seconds. Absent pedal hair.   Gross sensation is decreased bilaterally.   Affected digits: right hallux and right second and third digits, left second and third digits are all partially blackened. Right hallux is distally mummified, the blackened portion found more proximally is not as hardened and there is a line of demarcation found mid-hallux. The right second and third digits are affected more distally - the second digit is hardened about to about 1/3 of it's length and the third digit is hardened to about 1/4 of its length. The left second digit is minimally affected, with only a ~0.2 x 0.2 cm portion affected distally. The left third digit is more so affected to about 1/4 of the digit. There is no erythema, calor, odor, or purulent drainage noted to any of the affected toes.  Skin is otherwise normal. There is slight purplish discoloration to the forefoot. There is 2+ pitting edema found to both LE. No tenderness to palpation of affected or unaffected digits or elsewhere.    Assessment:   - Dry gangrene  - LE edema  - DM type 2  - Peripheral neuropathy  - Recent CABG performed on 2/6/17  - Hx of DVT (embolic)  - Atrial fibrillation    Plan:  - Pt seen and evaluated. Diagnosis and treatment options discussed.   - We discussed that it is likely that the distal hallux and distal toes will auto-amputate at some point in the future with no other interventions. However, this increases the chances of infection in a person in the community. Since being seen in the hospital by our PA, the gangrene has progressed, significantly on the right hallux. However there are no newly affected digits on either foot. At this stage, the unaffected digits will likely remain intact. She is not quite 6 weeks s/p her CABG. I recommend that we observe the digits for the next couple of weeks to see if the current area of gangrene is truly the full area of demarcation. I have written a referral to vascular surgery. She understands that there may be no intervention possible at this time, and she will likely need amputations of the area.  If vascular would like to proceed with the amputations, she will need coordination with cardio and primary. From my perspective, I will work to maintain the status quo and try to prevent a progression into wet gangrene. Because of the extent of the mummification, I don't think HBO would be beneficial at this time  - Dressing change instructions: No need to wash the toes. Keep toes dry at all times, avoid getting wet while bathing. Soak rolled gauze in betadine. Liberally wrap in between all toes with the gauze. Cover with kerlix. Change this dressing daily.   - Refer patient to vascular  - Continue to wear DH2 shoes at all times except in bed.  - RTC 1 week          Again, thank you  for allowing me to participate in the care of your patient.      Sincerely,    Easton Torres DPM

## 2017-03-17 ENCOUNTER — DOCUMENTATION ONLY (OUTPATIENT)
Dept: CARE COORDINATION | Facility: CLINIC | Age: 77
End: 2017-03-17

## 2017-03-17 NOTE — PROGRESS NOTES
[facility] now requests orders and shares plan of care/discharge summaries for some patients through [facility].  Please REPLY TO THIS MESSAGE in order to give authorization for orders when needed.  This is considered a verbal order, you will still receive a faxed copy of orders for signature.  Thank you for your assistance in improving collaboration for our patients.    ORDER    New Wound Care orders based off visit from 3/16/17    Dressing change instructions: No need to wash the toes. Keep toes dry at all times, avoid getting wet while bathing. Soak rolled gauze in betadine. Liberally wrap in between all toes with the gauze. Cover with kerlix. Change this dressing daily.     Please respond with approval for orders.    Ester Pineda RN    [phone]  [email or web address]

## 2017-03-20 ENCOUNTER — RADIANT APPOINTMENT (OUTPATIENT)
Dept: CT IMAGING | Facility: CLINIC | Age: 77
End: 2017-03-20
Attending: INTERNAL MEDICINE
Payer: COMMERCIAL

## 2017-03-20 DIAGNOSIS — R91.1 PULMONARY NODULE, RIGHT: ICD-10-CM

## 2017-03-20 LAB — RADIOLOGIST FLAGS: NORMAL

## 2017-03-20 PROCEDURE — 71250 CT THORAX DX C-: CPT | Performed by: RADIOLOGY

## 2017-03-22 ENCOUNTER — OFFICE VISIT (OUTPATIENT)
Dept: INTERNAL MEDICINE | Facility: CLINIC | Age: 77
End: 2017-03-22
Payer: COMMERCIAL

## 2017-03-22 VITALS
OXYGEN SATURATION: 97 % | BODY MASS INDEX: 30.91 KG/M2 | WEIGHT: 169 LBS | SYSTOLIC BLOOD PRESSURE: 108 MMHG | HEART RATE: 70 BPM | DIASTOLIC BLOOD PRESSURE: 66 MMHG | TEMPERATURE: 97.8 F

## 2017-03-22 DIAGNOSIS — I25.10 ASCVD (ARTERIOSCLEROTIC CARDIOVASCULAR DISEASE): ICD-10-CM

## 2017-03-22 DIAGNOSIS — D75.829 HIT (HEPARIN-INDUCED THROMBOCYTOPENIA) (H): ICD-10-CM

## 2017-03-22 DIAGNOSIS — R60.9 EDEMA, UNSPECIFIED TYPE: ICD-10-CM

## 2017-03-22 DIAGNOSIS — I96 TOE GANGRENE (H): Primary | ICD-10-CM

## 2017-03-22 PROCEDURE — 99214 OFFICE O/P EST MOD 30 MIN: CPT | Performed by: INTERNAL MEDICINE

## 2017-03-22 ASSESSMENT — PAIN SCALES - GENERAL: PAINLEVEL: NO PAIN (0)

## 2017-03-22 NOTE — NURSING NOTE
"Chief Complaint   Patient presents with     RECHECK     folllow up from 3/8/17 visit       Initial /66 (BP Location: Left arm, Patient Position: Chair, Cuff Size: Adult Regular)  Pulse 70  Temp 97.8  F (36.6  C) (Temporal)  Wt 169 lb (76.7 kg)  SpO2 97%  BMI 30.91 kg/m2 Estimated body mass index is 30.91 kg/(m^2) as calculated from the following:    Height as of 2/25/17: 5' 2\" (1.575 m).    Weight as of this encounter: 169 lb (76.7 kg).  Medication Reconciliation: complete    "

## 2017-03-22 NOTE — MR AVS SNAPSHOT
After Visit Summary   3/22/2017    Carlos Alberto Fenton    MRN: [id number]           Patient Information     Date Of Birth          1940        Visit Information        Provider Department      3/22/2017 3:00 PM Humberto Conner MD [facility]        Today's Diagnoses     ASCVD (arteriosclerotic cardiovascular disease)    -  1       Follow-ups after your visit        Additional Services     NUTRITION REFERRAL       Your provider has referred you to: [facility]: [facility] [phone]   [email or web address]    Please be aware that coverage of these services is subject to the terms and limitations of your health insurance plan.  Call member services at your health plan with any benefit or coverage questions.      Please bring the following to your appointment:      >>   This referral request   >>   Any documents given to you for this referral  >>   Any specific questions you have about diet or food choices                  Your next 10 appointments already scheduled     Mar 23, 2017  4:00 PM CDT   (Arrive by 3:45 PM)   Return Visit with Easton Torres DPM   [facility] ([facility])    [address] [place] [address]  [address] [facility] [id number] [phone]            Apr 03, 2017  3:00 PM CDT   (Arrive by 2:45 PM)   Return Visit with Angelina Bernal MD   [facility] ([facility])    [address] [place] [address]  [address]nd [facility] [id number] [phone]            Apr 17, 2017  2:20 PM CDT   (Arrive by 2:05 PM)   ATRIAL FIBULATION VISIT with Paige Santos MD   [facility] ([facility])    [address] [place] [address]  3rd [facility] [id number] [phone]            Apr 24, 2017  2:00 PM CDT   (Arrive by 1:45 PM)   New Vascular Visit with Leah Santamaria MD   [facility] ([facility])     [address] [facility]  3rd [facility] [id number] [phone]            Jul 14, 2017  1:30 PM CDT   (Arrive by 1:15 PM)   Return Visit with Star Lobato MD   [facility] ([facility])    [address] [id number] [phone]              Who to contact     If you have questions or need follow up information about today's clinic visit or your schedule please contact [facility] directly at [phone].  Normal or non-critical lab and imaging results will be communicated to you by [employer], letter or phone within 4 business days after the clinic has received the results. If you do not hear from us within 7 days, please contact the clinic through [employer] or phone. If you have a critical or abnormal lab result, we will notify you by phone as soon as possible.  Submit refill requests through [employer] or call your pharmacy and they will forward the refill request to us. Please allow 3 business days for your refill to be completed.          Additional Information About Your Visit        MyChart Information     [employer] gives you secure access to your electronic health record. If you see a primary care provider, you can also send messages to your care team and make appointments. If you have questions, please call your primary care clinic.  If you do not have a primary care provider, please call [phone] and they will assist you.        Care EveryWhere ID     This is your Care EveryWhere ID. This could be used by other organizations to access your [facility] medical records  [id number]        Your Vitals Were     Pulse Temperature Pulse Oximetry BMI (Body Mass Index)          70 97.8  F (36.6  C) (Temporal) 97% 30.91 kg/m2         Blood Pressure from Last 3 Encounters:   03/22/17 108/66   03/08/17 108/84   03/03/17 114/78    Weight from Last 3 Encounters:   03/22/17 169 lb (76.7 kg)   03/08/17 174 lb (78.9 kg)    03/03/17 174 lb 1.6 oz (79 kg)              We Performed the Following     NUTRITION REFERRAL        Primary Care Provider    Physician No Ref-Primary       No address on file        Thank you!     Thank you for choosing [facility]  for your care. Our goal is always to provide you with excellent care. Hearing back from our patients is one way we can continue to improve our services. Please take a few minutes to complete the written survey that you may receive in the mail after your visit with us. Thank you!             Your Updated Medication List - Protect others around you: Learn how to safely use, store and throw away your medicines at www.disposemymeds.org.          This list is accurate as of: 3/22/17  3:42 PM.  Always use your most recent med list.                   Brand Name Dispense Instructions for use    acetaminophen 325 MG tablet    TYLENOL    100 tablet    Take 1-2 tablets (325-650 mg) by mouth every 4 hours as needed for mild pain or fever       amiodarone 200 MG tablet    PACERONE/CODARONE    45 tablet    decrease dose to 1 tab (200 mg) daily for 14 days, then stop       aspirin 81 MG chewable tablet     30 tablet    Take 1 tablet (81 mg) by mouth daily       atorvastatin 40 MG tablet    LIPITOR    90 tablet    Take 1 tablet (40 mg) by mouth daily       blood glucose monitoring meter device kit          clotrimazole 1 % cream    LOTRIMIN    12 g    Please apply to groins two times daily for one week after discharge and then follow up with PCP if no improvement of your skin rashes       ferrous sulfate 325 (65 FE) MG tablet    IRON SUPPLEMENT    100 tablet    Take 1 tablet (325 mg) by mouth daily (with breakfast)       fondaparinux 7.5MG/0.6ML injection    ARIXTRA    40 Syringe    Inject 0.6 mLs (7.5 mg) Subcutaneous daily       furosemide 40 MG tablet    LASIX    60 tablet    Take 1 tablet (40 mg) by mouth 2 times daily       gabapentin 100 MG capsule    NEURONTIN    60 capsule     Take 1 capsule (100 mg) by mouth 2 times daily       loperamide 2 MG capsule    IMODIUM    20 capsule    Take 1 capsule (2 mg) by mouth 4 times daily as needed for diarrhea       melatonin 1 MG Tabs tablet     30 tablet    Take 1 tablet (1 mg) by mouth nightly as needed       ONE TOUCH ULTRA test strip   Generic drug:  blood glucose monitoring     100 each    Test once daily       ONETOUCH DELICA LANCETS 33G Misc     100 each    1 Device daily       pantoprazole 40 MG EC tablet    PROTONIX    30 tablet    Take 1 tablet (40 mg) by mouth every morning       potassium chloride SA 10 MEQ CR tablet    K-DUR/KLOR-CON M    60 tablet    Take 1 tablet (10 mEq) by mouth 2 times daily       traZODone 50 MG tablet    DESYREL    45 tablet    Take 0.5 tablets (25 mg) by mouth nightly as needed for sleep       venlafaxine 150 MG Tb24 24 hr tablet    EFFEXOR-ER    90 each    Take 1 tablet (150 mg) by mouth daily (with breakfast)

## 2017-03-22 NOTE — PROGRESS NOTES
SUBJECTIVE:                                                    Carlos Alberto Fenton is a 76 year old female who presents to clinic today for the following health issues:    Chief Complaint   Patient presents with     RECHECK     folllow up from 3/8/17 visit     She has seen podiatry and will see him again tomorrow.    Trying to get a heart healthy diet and weight is down 5 pounds.  Trying to eat better.      Legs kick around at night, usually after falling asleep.      On [place] until seen by hematology.      Past Medical History:   Diagnosis Date     Antiplatelet or antithrombotic long-term use      CAD (coronary artery disease)     2 vessel     Cancer (H)     Skin     Cerebral artery occlusion with cerebral infarction (H) 10/2016     Cerebral infarction (H) 10/2016     Diabetes (H)      Headaches      History of skin cancer      Hyperlipemias      Hypertension      Irregular heart beat      Peripheral neuropathy (H) 1990    cause unknown     Sleep apnea      Current Outpatient Prescriptions   Medication     atorvastatin (LIPITOR) 40 MG tablet     amiodarone (PACERONE/CODARONE) 200 MG tablet     fondaparinux (ARIXTRA) 7.5MG/0.6ML injection     furosemide (LASIX) 40 MG tablet     aspirin 81 MG chewable tablet     acetaminophen (TYLENOL) 325 MG tablet     gabapentin (NEURONTIN) 100 MG capsule     potassium chloride SA (K-DUR/KLOR-CON M) 10 MEQ CR tablet     pantoprazole (PROTONIX) 40 MG EC tablet     ferrous sulfate (IRON SUPPLEMENT) 325 (65 FE) MG tablet     traZODone (DESYREL) 50 MG tablet     venlafaxine (EFFEXOR-ER) 150 MG TB24 24 hr tablet     melatonin 1 MG TABS tablet     loperamide (IMODIUM) 2 MG capsule     ONETOUCH DELICA LANCETS 33G MISC     ONE TOUCH ULTRA test strip     clotrimazole (LOTRIMIN) 1 % cream     blood glucose monitoring (ONE TOUCH ULTRA 2) meter device kit     No current facility-administered medications for this visit.      Social History   Substance Use Topics     Smoking status: Former  Smoker     Smokeless tobacco: Never Used     Alcohol use No     Review of Systems  Constitutional-No fevers, chills, or weight changes..  Cardiac-No chest pain or palpitations.  Respiratory-No cough, sob, or hemoptysis.  GI-No nausea, vomitting, diarrhea, constipation, or blood in the stool.  Musculoskeletal-toes are not painful but stable with gangrene.    Physical Exam  /66 (BP Location: Left arm, Patient Position: Chair, Cuff Size: Adult Regular)  Pulse 70  Temp 97.8  F (36.6  C) (Temporal)  Wt 169 lb (76.7 kg)  SpO2 97%  BMI 30.91 kg/m2  General Appearance-healthy, alert, no distress  Cardiac-regular rate and rhythm  with normal S1, S2 ; no murmur, rub or gallops  Lungs-clear to auscultation  [id number]-1-4+ pitting edema in both ankles and lower legs      ASSESSMENT:  Patient who is here after a long hospitalization for coronary artery bypass grafting which was complicated by HIT and  gangrenous toes.  I did see her for the first time 2 weeks ago. She appears to be getting better and stronger.     Gangrenous toes-the patient will see podiatry tomorrow. This is dry gangrene. She is doing wound care to the areas. Will need some amputation and debridement    Coronary artery disease-patient is doing well after her bypass. Incision is well-healed. She has no chest pain, blood pressure is stable continue medications     HIT- patient continues on fondaparinux injections daily she will see hematology on April 3    Will refer to dietitian to get a heart healthy diet.    Electronically signed by Humberto Conner MD

## 2017-03-23 ENCOUNTER — OFFICE VISIT (OUTPATIENT)
Dept: WOUND CARE | Facility: CLINIC | Age: 77
End: 2017-03-23

## 2017-03-23 DIAGNOSIS — I96 GANGRENE OF TOE (H): Primary | ICD-10-CM

## 2017-03-23 NOTE — LETTER
3/23/2017       RE: Carlos Alberto Fenton  [address]  [facility] [address]     Dear Colleague,    Thank you for referring your patient, Carlos Alberto Fenton, to the [facility] WOUND CARE at [facility]. Please see a copy of my visit note below.    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       Oxycodone      hallucinations     Adhesive Tape Itching and Rash     Diapers & Supplies Rash     Developed yeast infection from previous hospital stay         Subjective: Carlos Alberto is a 76 year old female who presents to the clinic today for a follow up of BL toe gangrene. She presents with her daughter today. They have been changing the dressing daily with Betadine. She has seen IM, Dr. Conner since her last visit. No new complaints today.     Objective    Gangrenous digits 1, 2, 3 noted on the right and 2 & 3 on the left. The left 2nd digit felt fluctuant under the eschar, so this was sharply debrided away. Underneath, I noted that there was some, what appeared to be, healthy dermis with a small area of tightly adhered eschar on the distal aspect of the digit.   The black eschar has increased in size on the left 3rd digit.   Right hallux digit eschar was stable with no expansion.  Right 2nd and 3rd digits had a small increase in the eschar. There is a new wound on the medial 2nd and 3rd digits that appear to be turning necrotic 2/2 ischemia.  The left hallux and 4th and 5th toes, and right 4th and 5th toes are still not affected.       Assessment:  - Dry gangrene  - LE edema  - DM type 2  - Peripheral neuropathy  - Recent CABG performed on 2/6/17  - Hx of DVT (embolic)  - Atrial fibrillation    Plan:   - Pt seen and  evaluated  - There has been some progression in the ischemia, as noted above. Continue with the betadine dressings. Be sure to apply gauntlets between the right toes.   - Vascular consult is next month. However, the area is still demarcating, so the timing will be good for their assessment. I also ordered an arterial duplex and a TCO2 of the leg and foot. These will be helpful in knowing the stop point for the eschar formation. We may be able to save the left 2nd digit. Will continue to monitor.   - Pt to return to clinic in 1 week for continued monitoring. Goal is to halt progression to wet gangrene. No infection noted today.       Again, thank you for allowing me to participate in the care of your patient.      Sincerely,    Easton Torres DPM

## 2017-03-23 NOTE — MR AVS SNAPSHOT
After Visit Summary   3/23/2017    Carlos Alberto Fenton    MRN: [id number]           Patient Information     Date Of Birth          1940        Visit Information        Provider Department      3/23/2017 4:00 PM Easton Torres DPM [facility]        Today's Diagnoses     Gangrene of toe (H)    -  1       Follow-ups after your visit        Your next 10 appointments already scheduled     Mar 30, 2017  5:00 PM CDT   (Arrive by 4:45 PM)   Return Visit with Easton Torres DPM   [facility] ([facility])    [address] [place] [address]  [address] [facility] [address]   [phone]            Apr 03, 2017  3:00 PM CDT   (Arrive by 2:45 PM)   Return Visit with Angelina Bernal MD   [facility] ([facility])    [address] [place] [address]  [address] [facility] [address]   [phone]            Apr 17, 2017  2:20 PM CDT   (Arrive by 2:05 PM)   ATRIAL FIBULATION VISIT with Paige Santos MD   [facility] ([facility])    [address] [place] [address]  [address] [facility] [address]   [phone]            Apr 24, 2017  2:00 PM CDT   (Arrive by 1:45 PM)   New Vascular Visit with Leah Santamaria MD   [facility] ([facility])    [address] [place] [address]  [address] [facility] [address]   [phone]            Jul 14, 2017  1:30 PM CDT   (Arrive by 1:15 PM)   Return Visit with Star Lobato MD   [facility] ([facility])    [address] [place] [address]  [address] [facility] [id number] [phone]              Future tests that were ordered for you today     Open Future Orders        Priority Expected Expires Ordered    US TCO2 bilateral (vascular lab) Routine  3/23/2018 3/23/2017    US Lower Extremity Arterial Bilateral (vascular lab) Routine  3/23/2018 3/23/2017            Who to contact     Please call your  clinic at [phone] to:    Ask questions about your health    Make or cancel appointments    Discuss your medicines    Learn about your test results    Speak to your doctor   If you have compliments or concerns about an experience at your clinic, or if you wish to file a complaint, please contact [facility] Patient Relations at [phone] or email us at [email or web address]         Additional Information About Your Visit        MyChart Information     [employer] gives you secure access to your electronic health record. If you see a primary care provider, you can also send messages to your care team and make appointments. If you have questions, please call your primary care clinic.  If you do not have a primary care provider, please call [phone] and they will assist you.      [employer] is an electronic gateway that provides easy, online access to your medical records. With [employer], you can request a clinic appointment, read your test results, renew a prescription or communicate with your care team.     To access your existing account, please contact your [facility] or call [phone] for assistance.        Care EveryWhere ID     This is your Care EveryWhere ID. This could be used by other organizations to access your [facility] medical records  [id number]         Blood Pressure from Last 3 Encounters:   03/22/17 108/66   03/08/17 108/84   03/03/17 114/78    Weight from Last 3 Encounters:   03/22/17 169 lb   03/08/17 174 lb   03/03/17 174 lb 1.6 oz                 Today's Medication Changes          These changes are accurate as of: 3/23/17  5:07 PM.  If you have any questions, ask your nurse or doctor.               These medicines have changed or have updated prescriptions.        Dose/Directions    ferrous sulfate 325 (65 FE) MG tablet   Commonly known as:  IRON SUPPLEMENT   This may have changed:  when to take this   Used for:  S/P CABG  (coronary artery bypass graft)        Dose:  325 mg   Take 1 tablet (325 mg) by mouth daily (with breakfast)   Quantity:  100 tablet   Refills:  1                Primary Care Provider    Physician No Ref-Primary       No address on file        Thank you!     Thank you for choosing [facility]  for your care. Our goal is always to provide you with excellent care. Hearing back from our patients is one way we can continue to improve our services. Please take a few minutes to complete the written survey that you may receive in the mail after your visit with us. Thank you!             Your Updated Medication List - Protect others around you: Learn how to safely use, store and throw away your medicines at www.disposemymeds.org.          This list is accurate as of: 3/23/17  5:07 PM.  Always use your most recent med list.                   Brand Name Dispense Instructions for use    acetaminophen 325 MG tablet    TYLENOL    100 tablet    Take 1-2 tablets (325-650 mg) by mouth every 4 hours as needed for mild pain or fever       amiodarone 200 MG tablet    PACERONE/CODARONE    45 tablet    decrease dose to 1 tab (200 mg) daily for 14 days, then stop       aspirin 81 MG chewable tablet     30 tablet    Take 1 tablet (81 mg) by mouth daily       atorvastatin 40 MG tablet    LIPITOR    90 tablet    Take 1 tablet (40 mg) by mouth daily       blood glucose monitoring meter device kit          clotrimazole 1 % cream    LOTRIMIN    12 g    Please apply to groins two times daily for one week after discharge and then follow up with PCP if no improvement of your skin rashes       ferrous sulfate 325 (65 FE) MG tablet    IRON SUPPLEMENT    100 tablet    Take 1 tablet (325 mg) by mouth daily (with breakfast)       fondaparinux 7.5MG/0.6ML injection    ARIXTRA    40 Syringe    Inject 0.6 mLs (7.5 mg) Subcutaneous daily       furosemide 40 MG tablet    LASIX    60 tablet    Take 1 tablet (40 mg) by mouth 2 times daily        gabapentin 100 MG capsule    NEURONTIN    60 capsule    Take 1 capsule (100 mg) by mouth 2 times daily       loperamide 2 MG capsule    IMODIUM    20 capsule    Take 1 capsule (2 mg) by mouth 4 times daily as needed for diarrhea       melatonin 1 MG Tabs tablet     30 tablet    Take 1 tablet (1 mg) by mouth nightly as needed       ONE TOUCH ULTRA test strip   Generic drug:  blood glucose monitoring     100 each    Test once daily       ONETOUCH DELICA LANCETS 33G Misc     100 each    1 Device daily       pantoprazole 40 MG EC tablet    PROTONIX    30 tablet    Take 1 tablet (40 mg) by mouth every morning       potassium chloride SA 10 MEQ CR tablet    K-DUR/KLOR-CON M    60 tablet    Take 1 tablet (10 mEq) by mouth 2 times daily       traZODone 50 MG tablet    DESYREL    45 tablet    Take 0.5 tablets (25 mg) by mouth nightly as needed for sleep       venlafaxine 150 MG Tb24 24 hr tablet    EFFEXOR-ER    90 each    Take 1 tablet (150 mg) by mouth daily (with breakfast)

## 2017-03-24 NOTE — PROGRESS NOTES
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       Oxycodone      hallucinations     Adhesive Tape Itching and Rash     Diapers & Supplies Rash     Developed yeast infection from previous hospital stay         Subjective: Carlos Alberto is a 76 year old female who presents to the clinic today for a follow up of BL toe gangrene. She presents with her daughter today. They have been changing the dressing daily with Betadine. She has seen IM, Dr. Conner since her last visit. No new complaints today.     Objective    Gangrenous digits 1, 2, 3 noted on the right and 2 & 3 on the left. The left 2nd digit felt fluctuant under the eschar, so this was sharply debrided away. Underneath, I noted that there was some, what appeared to be, healthy dermis with a small area of tightly adhered eschar on the distal aspect of the digit.   The black eschar has increased in size on the left 3rd digit.   Right hallux digit eschar was stable with no expansion.  Right 2nd and 3rd digits had a small increase in the eschar. There is a new wound on the medial 2nd and 3rd digits that appear to be turning necrotic 2/2 ischemia.  The left hallux and 4th and 5th toes, and right 4th and 5th toes are still not affected.       Assessment:  - Dry gangrene  - LE edema  - DM type 2  - Peripheral neuropathy  - Recent CABG performed on 2/6/17  - Hx of DVT (embolic)  - Atrial fibrillation    Plan:   - Pt seen and evaluated  - There has been some progression in the ischemia, as noted above. Continue with the betadine dressings. Be sure to apply gauntlets between the right toes.   - Vascular consult is next month. However, the area is still demarcating, so the timing will be good for their  assessment. I also ordered an arterial duplex and a TCO2 of the leg and foot. These will be helpful in knowing the stop point for the eschar formation. We may be able to save the left 2nd digit. Will continue to monitor.   - Pt to return to clinic in 1 week for continued monitoring. Goal is to halt progression to wet gangrene. No infection noted today.

## 2017-03-30 ENCOUNTER — OFFICE VISIT (OUTPATIENT)
Dept: WOUND CARE | Facility: CLINIC | Age: 77
End: 2017-03-30

## 2017-03-30 DIAGNOSIS — I96 TOE GANGRENE (H): Primary | ICD-10-CM

## 2017-03-30 DIAGNOSIS — G63 POLYNEUROPATHY ASSOCIATED WITH UNDERLYING DISEASE (H): ICD-10-CM

## 2017-03-30 NOTE — MR AVS SNAPSHOT
After Visit Summary   3/30/2017    Carlos Alberto Fenton    MRN: [id number]           Patient Information     Date Of Birth          1940        Visit Information        Provider Department      3/30/2017 2:30 PM Easton Torres DPM [facility]         Follow-ups after your visit        Your next 10 appointments already scheduled     Apr 03, 2017 11:00 AM CDT   (Arrive by 10:45 AM)   RETURN FOOT/ANKLE with Easton Torres DPM   [facility] ([facility])    [address] [place] [id number] [phone]            Apr 03, 2017  3:00 PM CDT   (Arrive by 2:45 PM)   Return Visit with Angelina Bernal MD   [facility] ([facility])    [address] [place] [id number] [phone]            Apr 06, 2017  4:00 PM CDT   (Arrive by 3:45 PM)   Return Visit with Easton Torres DPM   [facility] ([facility])    [address] [place] [address] [id number] [phone]            Apr 17, 2017  2:20 PM CDT   (Arrive by 2:05 PM)   ATRIAL FIBULATION VISIT with Paige Santos MD   [facility] ([facility])    [address] [place] [id number] [phone]            Apr 24, 2017  2:00 PM CDT   (Arrive by 1:45 PM)   New Vascular Visit with Leah Santamaria MD   [facility] ([facility])    [address] [place] [id number] [phone]            Jul 14, 2017  1:30 PM CDT   (Arrive by 1:15 PM)   Return Visit with Star Lobato MD   [facility] ([facility])    [address] [place] [address] [id number] [phone]              Who to contact     Please call your clinic at [phone] to:    Ask questions about your  health    Make or cancel appointments    Discuss your medicines    Learn about your test results    Speak to your doctor   If you have compliments or concerns about an experience at your clinic, or if you wish to file a complaint, please contact [facility] Patient Relations at [phone] or email us at [email or web address]         Additional Information About Your Visit        MyChart Information     [employer] gives you secure access to your electronic health record. If you see a primary care provider, you can also send messages to your care team and make appointments. If you have questions, please call your primary care clinic.  If you do not have a primary care provider, please call [phone] and they will assist you.      [employer] is an electronic gateway that provides easy, online access to your medical records. With [employer], you can request a clinic appointment, read your test results, renew a prescription or communicate with your care team.     To access your existing account, please contact your [facility] or call [phone] for assistance.        Care EveryWhere ID     This is your Care EveryWhere ID. This could be used by other organizations to access your [facility] medical records  [id number]         Blood Pressure from Last 3 Encounters:   03/22/17 108/66   03/08/17 108/84   03/03/17 114/78    Weight from Last 3 Encounters:   03/22/17 169 lb   03/08/17 174 lb   03/03/17 174 lb 1.6 oz              Today, you had the following     No orders found for display         Today's Medication Changes          These changes are accurate as of: 3/30/17  3:22 PM.  If you have any questions, ask your nurse or doctor.               These medicines have changed or have updated prescriptions.        Dose/Directions    ferrous sulfate 325 (65 FE) MG tablet   Commonly known as:  IRON SUPPLEMENT   This may have changed:  when to take this   Used for:   S/P CABG (coronary artery bypass graft)        Dose:  325 mg   Take 1 tablet (325 mg) by mouth daily (with breakfast)   Quantity:  100 tablet   Refills:  1                Primary Care Provider    Physician No Ref-Primary       No address on file        Thank you!     Thank you for choosing [facility]  for your care. Our goal is always to provide you with excellent care. Hearing back from our patients is one way we can continue to improve our services. Please take a few minutes to complete the written survey that you may receive in the mail after your visit with us. Thank you!             Your Updated Medication List - Protect others around you: Learn how to safely use, store and throw away your medicines at www.disposemymeds.org.          This list is accurate as of: 3/30/17  3:22 PM.  Always use your most recent med list.                   Brand Name Dispense Instructions for use    acetaminophen 325 MG tablet    TYLENOL    100 tablet    Take 1-2 tablets (325-650 mg) by mouth every 4 hours as needed for mild pain or fever       amiodarone 200 MG tablet    PACERONE/CODARONE    45 tablet    decrease dose to 1 tab (200 mg) daily for 14 days, then stop       aspirin 81 MG chewable tablet     30 tablet    Take 1 tablet (81 mg) by mouth daily       atorvastatin 40 MG tablet    LIPITOR    90 tablet    Take 1 tablet (40 mg) by mouth daily       blood glucose monitoring meter device kit          clotrimazole 1 % cream    LOTRIMIN    12 g    Please apply to groins two times daily for one week after discharge and then follow up with PCP if no improvement of your skin rashes       ferrous sulfate 325 (65 FE) MG tablet    IRON SUPPLEMENT    100 tablet    Take 1 tablet (325 mg) by mouth daily (with breakfast)       fondaparinux 7.5MG/0.6ML injection    ARIXTRA    40 Syringe    Inject 0.6 mLs (7.5 mg) Subcutaneous daily       furosemide 40 MG tablet    LASIX    60 tablet    Take 1 tablet (40 mg) by mouth 2 times daily        gabapentin 100 MG capsule    NEURONTIN    60 capsule    Take 1 capsule (100 mg) by mouth 2 times daily       loperamide 2 MG capsule    IMODIUM    20 capsule    Take 1 capsule (2 mg) by mouth 4 times daily as needed for diarrhea       melatonin 1 MG Tabs tablet     30 tablet    Take 1 tablet (1 mg) by mouth nightly as needed       ONE TOUCH ULTRA test strip   Generic drug:  blood glucose monitoring     100 each    Test once daily       ONETOUCH DELICA LANCETS 33G Misc     100 each    1 Device daily       pantoprazole 40 MG EC tablet    PROTONIX    30 tablet    Take 1 tablet (40 mg) by mouth every morning       potassium chloride SA 10 MEQ CR tablet    K-DUR/KLOR-CON M    60 tablet    Take 1 tablet (10 mEq) by mouth 2 times daily       traZODone 50 MG tablet    DESYREL    45 tablet    Take 0.5 tablets (25 mg) by mouth nightly as needed for sleep       venlafaxine 150 MG Tb24 24 hr tablet    EFFEXOR-ER    90 each    Take 1 tablet (150 mg) by mouth daily (with breakfast)

## 2017-03-30 NOTE — LETTER
3/30/2017       RE: Carlos Alberto Fenton  [address]  [place] [facility] [place] [address]     Dear Colleague,    Thank you for referring your patient, Carlos Alberto Fenton, to the [facility] at [facility]. Please see a copy of my visit note below.    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       Oxycodone      hallucinations     Adhesive Tape Itching and Rash     Diapers & Supplies Rash     Developed yeast infection from previous hospital stay     Subjective: Carlos Alberto is a 76 year old female who presents to the clinic today for a follow up of BL toe gangrene. She presents with her daughter today. They have not been changing the dressing daily with Betadine - Carlos Alberto misinterpreted what we instructed at the last visit. She left the betadine dressing on for a day or so, then took it off and the toes have just been exposed to air with an ACE around her foot. No pain to the toes.     Objective  Gangrenous digits 1, 2, 3 noted on the right and 2 & 3 on the left.   Right hallux digit eschar has turned mostly to yellow, goopy slough on the dorsal surface with no expansion in past demarcation line. All loose fibrotic tissue was sharply debrided.  Right 2nd and 3rd digits have stable eschar, but they now appear slightly moist and feel a little sloughy, but no material was loose enough to be debrided today.   The left second digit plantar surface is now moist with yellow slough tissue, minimal eschar.   Left third digit's eschar is stable.  The left hallux and 4th and 5th toes, and right 4th and 5th toes are still not affected.     The wet slough tissue on the dorsal right hallux was sharply debrided of the loose necrotic tissue.          Assessment:  - Wet gangrene - non-infected  - LE edema  - DM type 2  - Peripheral neuropathy  - Recent CABG performed on 2/6/17  - Hx of DVT (embolic)  - Atrial fibrillation     Plan:   - Pt seen and evaluated  - Continue with the betadine dressings DAILY. Be sure to apply gauntlets between the right toes. Left hallux was debrided of loose necrotic tissue today.   - Wait for RADHA results.  - Pt to return to clinic in 1 week. Patient instructed to go to [facility] ER if she notices signs of infection including erythema, edema, pain, malodor or purulent drainage.       Again, thank you for allowing me to participate in the care of your patient.      Sincerely,    Easton Torres DPM

## 2017-03-30 NOTE — PROGRESS NOTES
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       Oxycodone      hallucinations     Adhesive Tape Itching and Rash     Diapers & Supplies Rash     Developed yeast infection from previous hospital stay     Subjective: Carlos Alberto is a 76 year old female who presents to the clinic today for a follow up of BL toe gangrene. She presents with her daughter today. They have not been changing the dressing daily with Betadine - Carlos Alberto misinterpreted what we instructed at the last visit. She left the betadine dressing on for a day or so, then took it off and the toes have just been exposed to air with an ACE around her foot. No pain to the toes.     Objective  Gangrenous digits 1, 2, 3 noted on the right and 2 & 3 on the left.   Right hallux digit eschar has turned mostly to yellow, goopy slough on the dorsal surface with no expansion in past demarcation line. All loose fibrotic tissue was sharply debrided.  Right 2nd and 3rd digits have stable eschar, but they now appear slightly moist and feel a little sloughy, but no material was loose enough to be debrided today.   The left second digit plantar surface is now moist with yellow slough tissue, minimal eschar.   Left third digit's eschar is stable.  The left hallux and 4th and 5th toes, and right 4th and 5th toes are still not affected.     The wet slough tissue on the dorsal right hallux was sharply debrided of the loose necrotic tissue.         Assessment:  - Wet gangrene - non-infected  - LE edema  - DM type 2  - Peripheral neuropathy  - Recent CABG performed on 2/6/17  - Hx of DVT (embolic)  - Atrial fibrillation     Plan:   - Pt seen and evaluated  - Continue with the betadine dressings DAILY. Be sure to apply gauntlets  between the right toes. Left hallux was debrided of loose necrotic tissue today.   - Wait for RADHA results.  - Pt to return to clinic in 1 week. Patient instructed to go to [facility] ER if she notices signs of infection including erythema, edema, pain, malodor or purulent drainage.

## 2017-03-31 ENCOUNTER — MYC MEDICAL ADVICE (OUTPATIENT)
Dept: INTERNAL MEDICINE | Facility: CLINIC | Age: 77
End: 2017-03-31

## 2017-03-31 ENCOUNTER — DOCUMENTATION ONLY (OUTPATIENT)
Dept: CARE COORDINATION | Facility: CLINIC | Age: 77
End: 2017-03-31

## 2017-03-31 DIAGNOSIS — Z95.1 S/P CABG (CORONARY ARTERY BYPASS GRAFT): ICD-10-CM

## 2017-03-31 DIAGNOSIS — F43.21 ADJUSTMENT DISORDER WITH DEPRESSED MOOD: ICD-10-CM

## 2017-03-31 DIAGNOSIS — G59 MONONEUROPATHY DUE TO UNDERLYING DISEASE: ICD-10-CM

## 2017-03-31 DIAGNOSIS — I25.119 CORONARY ARTERY DISEASE WITH ANGINA PECTORIS, UNSPECIFIED VESSEL OR LESION TYPE, UNSPECIFIED WHETHER NATIVE OR TRANSPLANTED HEART (H): ICD-10-CM

## 2017-03-31 DIAGNOSIS — F51.01 PRIMARY INSOMNIA: ICD-10-CM

## 2017-03-31 DIAGNOSIS — R60.0 BILATERAL EDEMA OF LOWER EXTREMITY: ICD-10-CM

## 2017-03-31 RX ORDER — FUROSEMIDE 40 MG
40 TABLET ORAL
Qty: 180 TABLET | Refills: 1 | Status: SHIPPED | OUTPATIENT
Start: 2017-03-31 | End: 2017-06-26

## 2017-03-31 RX ORDER — VENLAFAXINE HYDROCHLORIDE 150 MG/1
150 TABLET, EXTENDED RELEASE ORAL
Qty: 90 EACH | Refills: 1 | Status: SHIPPED | OUTPATIENT
Start: 2017-03-31 | End: 2017-07-21

## 2017-03-31 RX ORDER — GABAPENTIN 100 MG/1
100 CAPSULE ORAL 2 TIMES DAILY
Qty: 180 CAPSULE | Refills: 1 | Status: SHIPPED | OUTPATIENT
Start: 2017-03-31 | End: 2017-08-19

## 2017-03-31 RX ORDER — POTASSIUM CHLORIDE 750 MG/1
10 TABLET, EXTENDED RELEASE ORAL 2 TIMES DAILY
Qty: 180 TABLET | Refills: 1 | Status: SHIPPED | OUTPATIENT
Start: 2017-03-31 | End: 2017-06-26

## 2017-03-31 RX ORDER — TRAZODONE HYDROCHLORIDE 50 MG/1
25 TABLET, FILM COATED ORAL
Qty: 45 TABLET | Refills: 2 | Status: SHIPPED | OUTPATIENT
Start: 2017-03-31 | End: 2017-06-26

## 2017-03-31 RX ORDER — PANTOPRAZOLE SODIUM 40 MG/1
40 TABLET, DELAYED RELEASE ORAL EVERY MORNING
Qty: 90 TABLET | Refills: 1 | Status: SHIPPED | OUTPATIENT
Start: 2017-03-31 | End: 2017-04-01

## 2017-03-31 RX ORDER — ATORVASTATIN CALCIUM 40 MG/1
40 TABLET, FILM COATED ORAL DAILY
Qty: 90 TABLET | Refills: 1 | Status: SHIPPED | OUTPATIENT
Start: 2017-03-31 | End: 2017-06-26

## 2017-03-31 NOTE — TELEPHONE ENCOUNTER
Patient recently started seeing you this month and would like you to take over prescribing all of her medications so that they are all coming from one doctor. Please sign the pended medications if you are okay with doing this. She would like 90 day supplies sent to [place] mail order, pharmacy entered.     Jessica Frausto CMA ([facility])  3/31/2017

## 2017-03-31 NOTE — PROGRESS NOTES
[facility] now requests orders and shares plan of care/discharge summaries for some patients through [facility].  Please REPLY TO THIS MESSAGE in order to give authorization for orders when needed.  This is considered a verbal order, you will still receive a faxed copy of orders for signature.  Thank you for your assistance in improving collaboration for our patients.    Hi Dr Torres.  My name is Andrea Jang, I am an RN [facility] nurse with  home care and with [facility].  I have been seeing Carlos Alberto Fenton in her home for her wounds.  At my last with her she stated you wanted her to continue with the daily betadine dressing changes for her toes but that you only wanted family to do these cares.  I check the notes in Epic from your last two visits with her and only see that you want to keep the daily changes but no mention of having home care do the wound cares or not.  Please clarify this issue so I can enter it into the orders for her.  As she is Medicare we have to measure the wounds weekly and chart on them and may need to discharge if this is no longer a skilled nursing need.  Thank you and have a good day.  Andrea Jang RN [email or web address]

## 2017-04-01 ENCOUNTER — HOSPITAL ENCOUNTER (EMERGENCY)
Facility: CLINIC | Age: 77
Discharge: HOME | End: 2017-04-01
Attending: INTERNAL MEDICINE | Admitting: INTERNAL MEDICINE
Payer: MEDICARE

## 2017-04-01 ENCOUNTER — APPOINTMENT (OUTPATIENT)
Dept: GENERAL RADIOLOGY | Facility: CLINIC | Age: 77
End: 2017-04-01
Attending: INTERNAL MEDICINE
Payer: MEDICARE

## 2017-04-01 VITALS
SYSTOLIC BLOOD PRESSURE: 120 MMHG | WEIGHT: 167 LBS | HEIGHT: 62 IN | DIASTOLIC BLOOD PRESSURE: 85 MMHG | RESPIRATION RATE: 18 BRPM | HEART RATE: 73 BPM | BODY MASS INDEX: 30.73 KG/M2 | OXYGEN SATURATION: 95 % | TEMPERATURE: 97.8 F

## 2017-04-01 DIAGNOSIS — I96 TOE GANGRENE (H): ICD-10-CM

## 2017-04-01 LAB
ALBUMIN SERPL-MCNC: 3.2 G/DL (ref 3.4–5)
ALP SERPL-CCNC: 158 U/L (ref 40–150)
ALT SERPL W P-5'-P-CCNC: 28 U/L (ref 0–50)
ANION GAP SERPL CALCULATED.3IONS-SCNC: 8 MMOL/L (ref 3–14)
AST SERPL W P-5'-P-CCNC: 40 U/L (ref 0–45)
BASOPHILS # BLD AUTO: 0 10E9/L (ref 0–0.2)
BASOPHILS NFR BLD AUTO: 0.5 %
BILIRUB SERPL-MCNC: 0.6 MG/DL (ref 0.2–1.3)
BUN SERPL-MCNC: 18 MG/DL (ref 7–30)
CALCIUM SERPL-MCNC: 9.2 MG/DL (ref 8.5–10.1)
CHLORIDE SERPL-SCNC: 100 MMOL/L (ref 94–109)
CO2 SERPL-SCNC: 32 MMOL/L (ref 20–32)
CREAT SERPL-MCNC: 0.57 MG/DL (ref 0.52–1.04)
CRP SERPL-MCNC: 36 MG/L (ref 0–8)
DIFFERENTIAL METHOD BLD: ABNORMAL
EOSINOPHIL # BLD AUTO: 0.2 10E9/L (ref 0–0.7)
EOSINOPHIL NFR BLD AUTO: 2.7 %
ERYTHROCYTE [DISTWIDTH] IN BLOOD BY AUTOMATED COUNT: 19.5 % (ref 10–15)
ERYTHROCYTE [SEDIMENTATION RATE] IN BLOOD BY WESTERGREN METHOD: 41 MM/H (ref 0–30)
GFR SERPL CREATININE-BSD FRML MDRD: ABNORMAL ML/MIN/1.7M2
GLUCOSE SERPL-MCNC: 104 MG/DL (ref 70–99)
GRAM STN SPEC: ABNORMAL
HCT VFR BLD AUTO: 36.9 % (ref 35–47)
HGB BLD-MCNC: 10.6 G/DL (ref 11.7–15.7)
IMM GRANULOCYTES # BLD: 0 10E9/L (ref 0–0.4)
IMM GRANULOCYTES NFR BLD: 0.3 %
INR PPP: 1.5 (ref 0.86–1.14)
LYMPHOCYTES # BLD AUTO: 1 10E9/L (ref 0.8–5.3)
LYMPHOCYTES NFR BLD AUTO: 12.8 %
MCH RBC QN AUTO: 30.5 PG (ref 26.5–33)
MCHC RBC AUTO-ENTMCNC: 28.7 G/DL (ref 31.5–36.5)
MCV RBC AUTO: 106 FL (ref 78–100)
MICRO REPORT STATUS: ABNORMAL
MONOCYTES # BLD AUTO: 1.3 10E9/L (ref 0–1.3)
MONOCYTES NFR BLD AUTO: 17.1 %
NEUTROPHILS # BLD AUTO: 5.1 10E9/L (ref 1.6–8.3)
NEUTROPHILS NFR BLD AUTO: 66.6 %
NRBC # BLD AUTO: 0 10*3/UL
NRBC BLD AUTO-RTO: 0 /100
PLATELET # BLD AUTO: 341 10E9/L (ref 150–450)
POTASSIUM SERPL-SCNC: 3.6 MMOL/L (ref 3.4–5.3)
PROT SERPL-MCNC: 8 G/DL (ref 6.8–8.8)
RBC # BLD AUTO: 3.48 10E12/L (ref 3.8–5.2)
SODIUM SERPL-SCNC: 140 MMOL/L (ref 133–144)
SPECIMEN SOURCE: ABNORMAL
WBC # BLD AUTO: 7.7 10E9/L (ref 4–11)

## 2017-04-01 PROCEDURE — 80053 COMPREHEN METABOLIC PANEL: CPT | Performed by: INTERNAL MEDICINE

## 2017-04-01 PROCEDURE — 85025 COMPLETE CBC W/AUTO DIFF WBC: CPT | Performed by: INTERNAL MEDICINE

## 2017-04-01 PROCEDURE — 73630 X-RAY EXAM OF FOOT: CPT | Mod: RT

## 2017-04-01 PROCEDURE — 99284 EMERGENCY DEPT VISIT MOD MDM: CPT | Mod: Z6 | Performed by: INTERNAL MEDICINE

## 2017-04-01 PROCEDURE — 99284 EMERGENCY DEPT VISIT MOD MDM: CPT | Performed by: INTERNAL MEDICINE

## 2017-04-01 PROCEDURE — 87186 SC STD MICRODIL/AGAR DIL: CPT | Performed by: INTERNAL MEDICINE

## 2017-04-01 PROCEDURE — 87070 CULTURE OTHR SPECIMN AEROBIC: CPT | Performed by: INTERNAL MEDICINE

## 2017-04-01 PROCEDURE — 87077 CULTURE AEROBIC IDENTIFY: CPT | Performed by: INTERNAL MEDICINE

## 2017-04-01 PROCEDURE — 85652 RBC SED RATE AUTOMATED: CPT | Performed by: INTERNAL MEDICINE

## 2017-04-01 PROCEDURE — 87205 SMEAR GRAM STAIN: CPT | Performed by: INTERNAL MEDICINE

## 2017-04-01 PROCEDURE — 85610 PROTHROMBIN TIME: CPT | Performed by: INTERNAL MEDICINE

## 2017-04-01 PROCEDURE — 86140 C-REACTIVE PROTEIN: CPT | Performed by: INTERNAL MEDICINE

## 2017-04-01 ASSESSMENT — ENCOUNTER SYMPTOMS
JOINT SWELLING: 0
ARTHRALGIAS: 0
WOUND: 1
CHILLS: 0
COLOR CHANGE: 1
NUMBNESS: 0
CONFUSION: 0
WEAKNESS: 0
FEVER: 0
SHORTNESS OF BREATH: 0
ABDOMINAL PAIN: 0

## 2017-04-01 NOTE — ED PROVIDER NOTES
History     Chief Complaint   Patient presents with     Wound Infection     HPI  Carlos Alberto Fenton presents with increased drainage from necrotic eschar on her right great toe since this morning. 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 She has been followed by podiatry for this and is treating it with betadine dressings. She had adequate circulation at the base of the toe on CTA. When she was seen in clinic 2 days ago she had yellow goopy texture to the dorsum of the right great toe with an intact surface. This morning, she noted yellow drainage from the dorsolateral surface of the hallux adjacent to the second toe. She has no fever, chills, pain. She has some movement in the toe. She has no calf pain or swelling. She otherwise denies cough, shortness of breath, chest pain, nausea, vomiting or other complaints.    PAST MEDICAL HISTORY:   Past Medical History:   Diagnosis Date     Antiplatelet or antithrombotic long-term use      CAD (coronary artery disease)     2 vessel     Cancer (H)     Skin     Cerebral artery occlusion with cerebral infarction (H) 10/2016     Cerebral infarction (H) 10/2016     Diabetes (H)      Headaches      History of skin cancer      Hyperlipemias      Hypertension      Irregular heart beat      Peripheral neuropathy (H) 1990    cause unknown     Sleep apnea        PAST SURGICAL HISTORY:   Past Surgical History:   Procedure Laterality Date     BACK SURGERY       BYPASS GRAFT ARTERY CORONARY N/A 2/6/2017    Procedure: BYPASS GRAFT ARTERY CORONARY;  Surgeon: Mikhail Quiñones MD;  Location: [facility] OR     C APPENDECTOMY  1959     HYSTERECTOMY, PASTORA  1988     MAZE PROCEDURE N/A 2/6/2017    Procedure: MAZE PROCEDURE;  Surgeon: Mikhail Quiñones MD;  Location: [facility] OR     PICC INSERTION Left 02/25/2017    5fr TL Bard PICC, 47cm (3cm external) in the L basilic vein w/ tip in the SVC RA junction      "TONSILLECTOMY  1942       FAMILY HISTORY:   Family History   Problem Relation Age of Onset     DIABETES Mother      C.A.D. Mother      DIABETES Father      C.A.D. Father      Cardiovascular Sister      blood clot       SOCIAL HISTORY:   Social History   Substance Use Topics     Smoking status: Former Smoker     Smokeless tobacco: Never Used     Alcohol use No             I have reviewed the Medications, Allergies, Past Medical and Surgical History, and Social History in the Epic system.    Review of Systems   Constitutional: Negative for chills and fever.   Eyes: Negative for visual disturbance.   Respiratory: Negative for shortness of breath.    Cardiovascular: Negative for chest pain.   Gastrointestinal: Negative for abdominal pain.   Musculoskeletal: Negative for arthralgias and joint swelling.   Skin: Positive for color change and wound.   Neurological: Negative for weakness and numbness.   Psychiatric/Behavioral: Negative for confusion.   All other systems reviewed and are negative.      Physical Exam   BP: 138/82  Pulse: 73  Temp: 97.8  F (36.6  C)  Resp: 18  Height: 157.5 cm (5' 2\")  Weight: 75.8 kg (167 lb)  SpO2: 95 %  Physical Exam   Constitutional: She is oriented to person, place, and time. She appears well-developed and well-nourished. No distress.   HENT:   Head: Normocephalic and atraumatic.   Right Ear: External ear normal.   Left Ear: External ear normal.   Nose: Nose normal.   Mouth/Throat: Oropharynx is clear and moist. No oropharyngeal exudate.   Eyes: EOM are normal. Pupils are equal, round, and reactive to light. No scleral icterus.   Neck: Normal range of motion. Neck supple. No JVD present.   Cardiovascular: Normal rate, regular rhythm and normal heart sounds.    No murmur heard.  Pulmonary/Chest: Effort normal and breath sounds normal. She has no wheezes. She has no rales.   Abdominal: Soft. Bowel sounds are normal.   Musculoskeletal: She exhibits no tenderness.        " Feet:    Lymphadenopathy:     She has no cervical adenopathy.   Neurological: She is alert and oriented to person, place, and time. No cranial nerve deficit.   Skin: Skin is warm and dry.   Psychiatric: She has a normal mood and affect. Her behavior is normal.   Nursing note and vitals reviewed.      ED Course     ED Course     Procedures          Labs/Imaging    Results for orders placed or performed during the hospital encounter of 04/01/17 (from the past 24 hour(s))   Gram stain   Result Value Ref Range    Specimen Description Wound Right Foot     Gram Stain (A)      Few Gram positive cocci  Few WBC'S seen predominantly PMN's      Micro Report Status FINAL 04/01/2017    CBC with platelets differential   Result Value Ref Range    WBC 7.7 4.0 - 11.0 10e9/L    RBC Count 3.48 (L) 3.8 - 5.2 10e12/L    Hemoglobin 10.6 (L) 11.7 - 15.7 g/dL    Hematocrit 36.9 35.0 - 47.0 %     (H) 78 - 100 fl    MCH 30.5 26.5 - 33.0 pg    MCHC 28.7 (L) 31.5 - 36.5 g/dL    RDW 19.5 (H) 10.0 - 15.0 %    Platelet Count 341 150 - 450 10e9/L    Diff Method Automated Method     % Neutrophils 66.6 %    % Lymphocytes 12.8 %    % Monocytes 17.1 %    % Eosinophils 2.7 %    % Basophils 0.5 %    % Immature Granulocytes 0.3 %    Nucleated RBCs 0 0 /100    Absolute Neutrophil 5.1 1.6 - 8.3 10e9/L    Absolute Lymphocytes 1.0 0.8 - 5.3 10e9/L    Absolute Monocytes 1.3 0.0 - 1.3 10e9/L    Absolute Eosinophils 0.2 0.0 - 0.7 10e9/L    Absolute Basophils 0.0 0.0 - 0.2 10e9/L    Abs Immature Granulocytes 0.0 0 - 0.4 10e9/L    Absolute Nucleated RBC 0.0    Comprehensive metabolic panel   Result Value Ref Range    Sodium 140 133 - 144 mmol/L    Potassium 3.6 3.4 - 5.3 mmol/L    Chloride 100 94 - 109 mmol/L    Carbon Dioxide 32 20 - 32 mmol/L    Anion Gap 8 3 - 14 mmol/L    Glucose 104 (H) 70 - 99 mg/dL    Urea Nitrogen 18 7 - 30 mg/dL    Creatinine 0.57 0.52 - 1.04 mg/dL    GFR Estimate >90  Non  GFR Calc   >60 mL/min/1.7m2    GFR  Estimate If Black >90   GFR Calc   >60 mL/min/1.7m2    Calcium 9.2 8.5 - 10.1 mg/dL    Bilirubin Total 0.6 0.2 - 1.3 mg/dL    Albumin 3.2 (L) 3.4 - 5.0 g/dL    Protein Total 8.0 6.8 - 8.8 g/dL    Alkaline Phosphatase 158 (H) 40 - 150 U/L    ALT 28 0 - 50 U/L    AST 40 0 - 45 U/L   INR   Result Value Ref Range    INR 1.50 (H) 0.86 - 1.14   CRP inflammation   Result Value Ref Range    CRP Inflammation 36.0 (H) 0.0 - 8.0 mg/L   Erythrocyte sedimentation rate auto   Result Value Ref Range    Sed Rate 41 (H) 0 - 30 mm/h   Foot  XR, G/E 3 views, right    Narrative    XR FOOT RT G/E 3 VW  4/1/2017 6:46 PM      HISTORY: Increased drainage from gangrnous toe    COMPARISON: None    FINDINGS: Nonweight bearing AP, oblique, and lateral views of the  right foot. Normal bony alignment. No visualized fractures. Ossicle  over the base of the fifth metatarsal. Cortical thickening of the  second through fourth metatarsals, likely normal variant. Significant  soft tissue swelling predominantly at the first digit. No subcutaneous  emphysema.      Impression    IMPRESSION: Soft tissue thickening about the right great toe. No  subcutaneous emphysema.    I have personally reviewed the examination and initial interpretation  and I agree with the findings.    TOMMY MARCOS MD         Assessments & Plan (with Medical Decision Making)   Impression:  Gangrene of distal toes on right. There is no significant change in appearance from the photo at her last clinic visit. The wet fat gangrene proximal to the demarcated black dry gangrene on the dorsum of the great toe is starting to liquify. There is no strong suggestion of infection by labs. Plain film does not suggest gas in the tissue or osteomyelitis. At this point I think she can continue the betadine dressings with toes . She has follow up in 36 hours in clinic and should be reassessed at that time. I do not think further debridement is indicated at  present.    I have reviewed the nursing notes.    I have reviewed the findings, diagnosis, plan and need for follow up with the patient.    Discharge Medication List as of 4/1/2017  7:54 PM          Final diagnoses:   Toe gangrene (H)       4/1/2017   [facility], [place], EMERGENCY DEPARTMENT     Carloz Wade MD  04/02/17 0149

## 2017-04-01 NOTE — ED NOTES
Pt presents ambulatory to triage from home with daughter. Pt has been seeing and treated by podiatry for wound and black toes for awhile. Pt to follow up Monday with vascular surgery. Pt states noticed that drainage from right 1st toe looked much worse today and was draining yellow thick drainage. Pt denies fevers and is A and O x4.

## 2017-04-01 NOTE — ED AVS SNAPSHOT
[facility], [place], Emergency Department    [address] [place] [address]    Phone:  [phone]                                       Carlos Alberto Fenton   MRN: [id number]    Department:  [facility], Emergency Department   Date of Visit:  4/1/2017           After Visit Summary Signature Page     I have received my discharge instructions, and my questions have been answered. I have discussed any challenges I see with this plan with the nurse or doctor.    ..........................................................................................................................................  Patient/Patient Representative Signature      ..........................................................................................................................................  Patient Representative Print Name and Relationship to Patient    ..................................................               ................................................  Date                                            Time    ..........................................................................................................................................  Reviewed by Signature/Title    ...................................................              ..............................................  Date                                                            Time

## 2017-04-01 NOTE — ED AVS SNAPSHOT
[facility], Emergency Department    [address] [facility] [address]    Phone:  [phone]                                       Carlos Alberto Fenton   MRN: [id number]    Department:  [facility], Emergency Department   Date of Visit:  4/1/2017           Patient Information     Date Of Birth          1940        Your diagnoses for this visit were:     Toe gangrene (H)        You were seen by Carloz Wade MD.        Discharge Instructions       Continue the daily betadine dressings with toes .  Follow up Monday in clinic as scheduled.  Return for fevers, increased redness or red streaks up the foot or leg.    Future Appointments        Provider Department Dept Phone Center    4/3/2017 11:00 AM Easton Torres DPM [facility] Orthopaedic [facility] [phone] [facility]    4/3/2017 3:00 PM [facility] [phone] [facility]    4/6/2017 4:00 PM Easton Torres DPM [facility] Wound Care [phone] [facility]    4/17/2017 2:20 PM Paige Santos MD [facility] Heart [facility] [phone] [facility]    4/24/2017 2:00 PM Leah Santamaria MD [facility] [phone] [facility]    7/14/2017 1:30 PM Star Lobato MD [facility] [phone] [facility]      24 Hour Appointment Hotline       To make an appointment at any [facility], call [phone] ([phone]). If you don't have a family doctor or clinic, we will help you find one. [place] clinics are conveniently located to serve the needs of you and your family.             Review of your medicines      Our records show that you are taking the medicines listed below. If these are incorrect, please call your family doctor or clinic.        Dose / Directions Last dose taken    acetaminophen 325 MG tablet   Commonly known as:  TYLENOL   Dose:  325-650 mg   Quantity:  100 tablet        Take 1-2 tablets (325-650 mg) by mouth every 4 hours as needed for mild pain or fever   Refills:  0         amiodarone 200 MG tablet   Commonly known as:  PACERONE/CODARONE   Quantity:  45 tablet        decrease dose to 1 tab (200 mg) daily for 14 days, then stop   Refills:  0        aspirin 81 MG chewable tablet   Dose:  81 mg   Quantity:  30 tablet        Take 1 tablet (81 mg) by mouth daily   Refills:  0        atorvastatin 40 MG tablet   Commonly known as:  LIPITOR   Dose:  40 mg   Quantity:  90 tablet        Take 1 tablet (40 mg) by mouth daily   Refills:  1        blood glucose monitoring meter device kit        Refills:  0        clotrimazole 1 % cream   Commonly known as:  LOTRIMIN   Quantity:  12 g        Please apply to groins two times daily for one week after discharge and then follow up with PCP if no improvement of your skin rashes   Refills:  0        ferrous sulfate 325 (65 FE) MG tablet   Commonly known as:  IRON SUPPLEMENT   Dose:  325 mg   Quantity:  100 tablet        Take 1 tablet (325 mg) by mouth daily (with breakfast)   Refills:  1        fondaparinux 7.5MG/0.6ML injection   Commonly known as:  ARIXTRA   Dose:  7.5 mg   Quantity:  40 Syringe        Inject 0.6 mLs (7.5 mg) Subcutaneous daily   Refills:  0        furosemide 40 MG tablet   Commonly known as:  LASIX   Dose:  40 mg   Quantity:  180 tablet        Take 1 tablet (40 mg) by mouth 2 times daily   Refills:  1        gabapentin 100 MG capsule   Commonly known as:  NEURONTIN   Dose:  100 mg   Quantity:  180 capsule        Take 1 capsule (100 mg) by mouth 2 times daily   Refills:  1        melatonin 1 MG Tabs tablet   Dose:  1 mg   Quantity:  30 tablet        Take 1 tablet (1 mg) by mouth nightly as needed   Refills:  0        ONE TOUCH ULTRA test strip   Quantity:  100 each   Generic drug:  blood glucose monitoring        Test once daily   Refills:  0        ONETOUCH DELICA LANCETS 33G Misc   Dose:  1 Device   Quantity:  100 each        1 Device daily   Refills:  0        potassium chloride SA 10 MEQ CR tablet   Commonly known as:   K-DUR/KLOR-CON M   Dose:  10 mEq   Quantity:  180 tablet        Take 1 tablet (10 mEq) by mouth 2 times daily   Refills:  1        traZODone 50 MG tablet   Commonly known as:  DESYREL   Dose:  25 mg   Quantity:  45 tablet        Take 0.5 tablets (25 mg) by mouth nightly as needed for sleep   Refills:  2        venlafaxine 150 MG Tb24 24 hr tablet   Commonly known as:  EFFEXOR-ER   Dose:  150 mg   Quantity:  90 each        Take 1 tablet (150 mg) by mouth daily (with breakfast)   Refills:  1                Procedures and tests performed during your visit     CBC with platelets differential    CRP inflammation    Comprehensive metabolic panel    Erythrocyte sedimentation rate auto    Foot  XR, G/E 3 views, right    Gram stain    INR    Wound Culture Aerobic Bacterial      Orders Needing Specimen Collection     None      Pending Results     Date and Time Order Name Status Description    4/1/2017 1739 Wound Culture Aerobic Bacterial In process             Pending Culture Results     Date and Time Order Name Status Description    4/1/2017 1739 Wound Culture Aerobic Bacterial In process             Thank you for choosing [facility]       Thank you for choosing [facility] for your care. Our goal is always to provide you with excellent care. Hearing back from our patients is one way we can continue to improve our services. Please take a few minutes to complete the written survey that you may receive in the mail after you visit with us. Thank you!        [employer] Information     [employer] gives you secure access to your electronic health record. If you see a primary care provider, you can also send messages to your care team and make appointments. If you have questions, please call your primary care clinic.  If you do not have a primary care provider, please call [phone] and they will assist you.        Care EveryWhere ID     This is your Care EveryWhere ID. This could be used by other organizations to access your [facility]  medical records  [id number]        After Visit Summary       This is your record. Keep this with you and show to your community pharmacist(s) and doctor(s) at your next visit.

## 2017-04-02 NOTE — DISCHARGE INSTRUCTIONS
Continue the daily betadine dressings with toes .  Follow up Monday in clinic as scheduled.  Return for fevers, increased redness or red streaks up the foot or leg.

## 2017-04-03 ENCOUNTER — OFFICE VISIT (OUTPATIENT)
Dept: ORTHOPEDICS | Facility: CLINIC | Age: 77
End: 2017-04-03

## 2017-04-03 DIAGNOSIS — T79.2XXA SEROMA DUE TO TRAUMA (H): ICD-10-CM

## 2017-04-03 DIAGNOSIS — I96 TOE GANGRENE (H): Primary | ICD-10-CM

## 2017-04-03 NOTE — MR AVS SNAPSHOT
After Visit Summary   4/3/2017    Carlos Alberto Fenton    MRN: [id number]           Patient Information     Date Of Birth          1940        Visit Information        Provider Department      4/3/2017 11:00 AM Easton Torres DPM [facility]        Today's Diagnoses     Toe gangrene (H)    -  1    Seroma due to trauma           Follow-ups after your visit        Your next 10 appointments already scheduled     Apr 03, 2017  3:00 PM CDT   (Arrive by 2:45 PM)   [facility]   [facility]    [address] [place] [address]  [address] [facility] [address]   [phone]            Apr 06, 2017  4:00 PM CDT   (Arrive by 3:45 PM)   Return Visit with Easton Torres DPM   [facility] ([facility])    [address] [place] [address]  [address] [facility] [address] [phone]            Apr 07, 2017  9:20 AM CDT   (Arrive by 9:05 AM)   RETURN FOOT/ANKLE with Easton Torres DPM   [facility] ([facility])    [address] [place] [address]  4[address] [facility] [address] [phone]            Apr 17, 2017  2:20 PM CDT   (Arrive by 2:05 PM)   ATRIAL FIBULATION VISIT with Paige Santos MD   [facility] ([facility])    [address] [place] [address]  3[address] [facility] [address] [phone]            Apr 24, 2017  2:00 PM CDT   (Arrive by 1:45 PM)   New Vascular Visit with Leah Santamaria MD   [facility] ([facility])    [address] [place] [address]  3[address] [facility] [address] [phone]            Jul 14, 2017  1:30 PM CDT   (Arrive by 1:15 PM)   Return Visit with Star Lobato MD   [facility] ([facility])    [address] [place] [address]  3[address] [facility] [address] [phone]              Who to contact      Please call your clinic at [phone] to:    Ask questions about your health    Make or cancel appointments    Discuss your medicines    Learn about your test results    Speak to your doctor   If you have compliments or concerns about an experience at your clinic, or if you wish to file a complaint, please contact [facility] Patient Relations at [phone] or email us at [email or web address]         Additional Information About Your Visit        [employer] Information     [employer] gives you secure access to your electronic health record. If you see a primary care provider, you can also send messages to your care team and make appointments. If you have questions, please call your primary care clinic.  If you do not have a primary care provider, please call [phone] and they will assist you.      [employer] ([employer]) is an electronic gateway that provides easy, online access to your medical records. With [employer]), you can request a clinic appointment, read your test results, renew a prescription or communicate with your care team.     To access your existing account, please contact your [facility] or call [phone] for assistance.        Care EveryWhere ID     This is your Care EveryWhere ID. This could be used by other organizations to access your [place] medical records  [id number]         Blood Pressure from Last 3 Encounters:   04/01/17 120/85   03/22/17 108/66   03/08/17 108/84    Weight from Last 3 Encounters:   04/01/17 75.8 kg (167 lb)   03/22/17 76.7 kg (169 lb)   03/08/17 78.9 kg (174 lb)              Today, you had the following     No orders found for display         Today's Medication Changes          These changes are accurate as of: 4/3/17 12:51 PM.  If you have any questions, ask your nurse or doctor.               These medicines have changed or have updated prescriptions.        Dose/Directions    ferrous sulfate 325 (65 FE) MG tablet    Commonly known as:  IRON SUPPLEMENT   This may have changed:  when to take this   Used for:  S/P CABG (coronary artery bypass graft)        Dose:  325 mg   Take 1 tablet (325 mg) by mouth daily (with breakfast)   Quantity:  100 tablet   Refills:  1                Primary Care Provider    Physician No Ref-Primary       No address on file        Thank you!     Thank you for choosing [facility]  for your care. Our goal is always to provide you with excellent care. Hearing back from our patients is one way we can continue to improve our services. Please take a few minutes to complete the written survey that you may receive in the mail after your visit with us. Thank you!             Your Updated Medication List - Protect others around you: Learn how to safely use, store and throw away your medicines at www.disposemymeds.org.          This list is accurate as of: 4/3/17 12:51 PM.  Always use your most recent med list.                   Brand Name Dispense Instructions for use    acetaminophen 325 MG tablet    TYLENOL    100 tablet    Take 1-2 tablets (325-650 mg) by mouth every 4 hours as needed for mild pain or fever       amiodarone 200 MG tablet    PACERONE/CODARONE    45 tablet    decrease dose to 1 tab (200 mg) daily for 14 days, then stop       aspirin 81 MG chewable tablet     30 tablet    Take 1 tablet (81 mg) by mouth daily       atorvastatin 40 MG tablet    LIPITOR    90 tablet    Take 1 tablet (40 mg) by mouth daily       blood glucose monitoring meter device kit          clotrimazole 1 % cream    LOTRIMIN    12 g    Please apply to groins two times daily for one week after discharge and then follow up with PCP if no improvement of your skin rashes       ferrous sulfate 325 (65 FE) MG tablet    IRON SUPPLEMENT    100 tablet    Take 1 tablet (325 mg) by mouth daily (with breakfast)       fondaparinux 7.5MG/0.6ML injection    ARIXTRA    40 Syringe    Inject 0.6 mLs (7.5 mg) Subcutaneous daily        furosemide 40 MG tablet    LASIX    180 tablet    Take 1 tablet (40 mg) by mouth 2 times daily       gabapentin 100 MG capsule    NEURONTIN    180 capsule    Take 1 capsule (100 mg) by mouth 2 times daily       melatonin 1 MG Tabs tablet     30 tablet    Take 1 tablet (1 mg) by mouth nightly as needed       ONE TOUCH ULTRA test strip   Generic drug:  blood glucose monitoring     100 each    Test once daily       ONETOUCH DELICA LANCETS 33G Misc     100 each    1 Device daily       potassium chloride SA 10 MEQ CR tablet    K-DUR/KLOR-CON M    180 tablet    Take 1 tablet (10 mEq) by mouth 2 times daily       traZODone 50 MG tablet    DESYREL    45 tablet    Take 0.5 tablets (25 mg) by mouth nightly as needed for sleep       venlafaxine 150 MG Tb24 24 hr tablet    EFFEXOR-ER    90 each    Take 1 tablet (150 mg) by mouth daily (with breakfast)

## 2017-04-03 NOTE — LETTER
4/3/2017       RE: Carlos Alberto Fenton  [address]  [facility] [address]     Dear Colleague,    Thank you for referring your patient, Carlos Alberto Fenton, to the [facility] at [facility]. Please see a copy of my visit note below.    Chief Complaint:   Chief Complaint   Patient presents with     RECHECK     Follow up. Gangrene, Bilateral toes. Pt stated that her Left lower extremity keeps leaking.           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       Oxycodone      hallucinations     Adhesive Tape Itching and Rash     Diapers & Supplies Rash     Developed yeast infection from previous hospital stay       Subjective: Carlos Alberto is a 76 year old female who presents to the clinic today for an ED f/u for worsening gangrenous toes. After her appointment last Thursday, she continued to wrap the toes daily with betadine soaked gauze. When they removed the dressing on Saturday, she thought it looked much worse - the big toe had a blister and was bloody and the second toe looked more yellow and sloughy - so she went to the ED. After evaluation, they determined that the toes did not need further debridement at that time and suggested continuing with the betadine dressings and clinic f/u. Wound culture and gram stain were performed - g+ cocci are present and culture is pending. She also has a seeping wound that has been present since early February on the inside of her left calf, just below the knee. She is wondering what to do about this, she thought it had closed, but it seems to be seeping every so often. This began over the last day or so. No pain or purulent drainage to this wound. Denies n/v, fever, chills, increasing redness or foot pain.    Objective:  Gangrenous digits 1, 2, 3 noted on the right and 2 & 3 on  "the left. No malodor or erythema.  Right hallux digit eschar has turned mostly to yellow, goopy slough on the dorsal surface with no expansion in past demarcation line. A growth of fibrous tissue is now present on the dorsolateral surface - no fluid was expelled with lancing.  Right 2nd and 3rd digits have stable eschar. On the medial side of second digit, the tissue is yellow, fibrous and wet.   The left second digit plantar surface is moist with yellow fibrous tissue.  Left third digit's eschar is stable.  The left hallux and 4th and 5th toes, and right 4th and 5th toes are still not affected.     There is an erythematous band of tissue surrounding the vein procurement site on the left medial proximal calf. 3/4 of the wound is closed, with some scabbing. About 0.8 cm of the wound is open and leaking clear fluid. When probed, maximum depth is about 1.8 cm. All pockets were released and a moderate amount of clear serous fluid was drained from seroma. No bleeding occurred. No purulent drainage or malodor present. No pain with procedure.    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  - I&D of seroma performed as described above. Defect was packed with saline moistened 1/4\" packing gauze and wrapped with ACE. Home nursing to continue this daily.  - Continue with the betadine dressings daily. Be sure to apply gauntlets between the right toes. Left hallux was debrided of loose necrotic tissue today.   - Pt to return to clinic on Friday    Again, thank you for allowing me to participate in the care of your patient.      Sincerely,    Easton Torres DPM      "

## 2017-04-03 NOTE — NURSING NOTE
Reason For Visit:   Chief Complaint   Patient presents with     RECHECK     Follow up. Gangrene, Bilateral toes. Pt stated that her Left lower extremity keeps leaking.             Current Outpatient Prescriptions   Medication Sig Dispense Refill     atorvastatin (LIPITOR) 40 MG tablet Take 1 tablet (40 mg) by mouth daily 90 tablet 1     furosemide (LASIX) 40 MG tablet Take 1 tablet (40 mg) by mouth 2 times daily 180 tablet 1     gabapentin (NEURONTIN) 100 MG capsule Take 1 capsule (100 mg) by mouth 2 times daily 180 capsule 1     traZODone (DESYREL) 50 MG tablet Take 0.5 tablets (25 mg) by mouth nightly as needed for sleep 45 tablet 2     venlafaxine (EFFEXOR-ER) 150 MG TB24 24 hr tablet Take 1 tablet (150 mg) by mouth daily (with breakfast) 90 each 1     potassium chloride SA (K-DUR/KLOR-CON M) 10 MEQ CR tablet Take 1 tablet (10 mEq) by mouth 2 times daily 180 tablet 1     amiodarone (PACERONE/CODARONE) 200 MG tablet decrease dose to 1 tab (200 mg) daily for 14 days, then stop 45 tablet 0     fondaparinux (ARIXTRA) 7.5MG/0.6ML injection Inject 0.6 mLs (7.5 mg) Subcutaneous daily 40 Syringe 0     aspirin 81 MG chewable tablet Take 1 tablet (81 mg) by mouth daily 30 tablet 0     melatonin 1 MG TABS tablet Take 1 tablet (1 mg) by mouth nightly as needed 30 tablet 0     acetaminophen (TYLENOL) 325 MG tablet Take 1-2 tablets (325-650 mg) by mouth every 4 hours as needed for mild pain or fever 100 tablet 0     ferrous sulfate (IRON SUPPLEMENT) 325 (65 FE) MG tablet Take 1 tablet (325 mg) by mouth daily (with breakfast) (Patient taking differently: Take 325 mg by mouth 2 times daily ) 100 tablet 1     ONETOUCH DELICA LANCETS 33G MISC 1 Device daily 100 each 0     ONE TOUCH ULTRA test strip Test once daily 100 each 0     clotrimazole (LOTRIMIN) 1 % cream Please apply to groins two times daily for one week after discharge and then follow up with PCP if no improvement of your skin rashes 12 g 0     blood glucose monitoring  (ONE TOUCH ULTRA 2) meter device kit             Allergies   Allergen Reactions     Heparin      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       Oxycodone      hallucinations     Adhesive Tape Itching and Rash     Diapers & Supplies Rash     Developed yeast infection from previous hospital stay

## 2017-04-03 NOTE — PROGRESS NOTES
Chief Complaint:   Chief Complaint   Patient presents with     RECHECK     Follow up. Gangrene, Bilateral toes. Pt stated that her Left lower extremity keeps leaking.           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       Oxycodone      hallucinations     Adhesive Tape Itching and Rash     Diapers & Supplies Rash     Developed yeast infection from previous hospital stay       Subjective: Carlos Alberto is a 76 year old female who presents to the clinic today for an ED f/u for worsening gangrenous toes. After her appointment last Thursday, she continued to wrap the toes daily with betadine soaked gauze. When they removed the dressing on Saturday, she thought it looked much worse - the big toe had a blister and was bloody and the second toe looked more yellow and sloughy - so she went to the ED. After evaluation, they determined that the toes did not need further debridement at that time and suggested continuing with the betadine dressings and clinic f/u. Wound culture and gram stain were performed - g+ cocci are present and culture is pending. She also has a seeping wound that has been present since early February on the inside of her left calf, just below the knee. She is wondering what to do about this, she thought it had closed, but it seems to be seeping every so often. This began over the last day or so. No pain or purulent drainage to this wound. Denies n/v, fever, chills, increasing redness or foot pain.    Objective:  Gangrenous digits 1, 2, 3 noted on the right and 2 & 3 on the left. No malodor or erythema.  Right hallux digit eschar has turned mostly to yellow, goopy slough on the dorsal surface with no expansion in past demarcation line. A growth of fibrous tissue is now present on the dorsolateral surface - no fluid was expelled with lancing.  Right 2nd  "and 3rd digits have stable eschar. On the medial side of second digit, the tissue is yellow, fibrous and wet.   The left second digit plantar surface is moist with yellow fibrous tissue.  Left third digit's eschar is stable.  The left hallux and 4th and 5th toes, and right 4th and 5th toes are still not affected.     There is an erythematous band of tissue surrounding the vein procurement site on the left medial proximal calf. 3/4 of the wound is closed, with some scabbing. About 0.8 cm of the wound is open and leaking clear fluid. When probed, maximum depth is about 1.8 cm. All pockets were released and a moderate amount of clear serous fluid was drained from seroma. No bleeding occurred. No purulent drainage or malodor present. No pain with procedure.    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  - I&D of seroma performed as described above. Defect was packed with saline moistened 1/4\" packing gauze and wrapped with ACE. Home nursing to continue this daily.  - Continue with the betadine dressings daily. Be sure to apply gauntlets between the right toes. Left hallux was debrided of loose necrotic tissue today.   - Pt to return to clinic on Friday  "

## 2017-04-04 LAB
BACTERIA SPEC CULT: ABNORMAL
MICRO REPORT STATUS: ABNORMAL
MICROORGANISM SPEC CULT: ABNORMAL
SPECIMEN SOURCE: ABNORMAL

## 2017-04-05 ENCOUNTER — TELEPHONE (OUTPATIENT)
Dept: WOUND CARE | Facility: CLINIC | Age: 77
End: 2017-04-05

## 2017-04-05 DIAGNOSIS — I96 GANGRENE OF TOE (H): Primary | ICD-10-CM

## 2017-04-05 NOTE — TELEPHONE ENCOUNTER
"Chantal from [facility] called for wound care orders for the 1.8 cm open wound on left lower extremity.  Wound care orders verbally given for daily wound changes for now is pack wound with saline moistened 1/4\" packing gauze and wrap leg with ACE. Home nursing to continue this daily. Dr. Torres placed written home care orders into Epic as well.  Will update tomorrow with any changes needed per home care.  Diana Dyson LPN.  "

## 2017-04-07 ENCOUNTER — OFFICE VISIT (OUTPATIENT)
Dept: ORTHOPEDICS | Facility: CLINIC | Age: 77
End: 2017-04-07

## 2017-04-07 DIAGNOSIS — L97.922 ULCER OF LEFT LOWER EXTREMITY WITH FAT LAYER EXPOSED (H): ICD-10-CM

## 2017-04-07 DIAGNOSIS — I96 TOE GANGRENE (H): Primary | ICD-10-CM

## 2017-04-07 NOTE — MR AVS SNAPSHOT
After Visit Summary   4/7/2017    Carlos Alberto Fenton    MRN: [id number]           Patient Information     Date Of Birth          1940        Visit Information        Provider Department      4/7/2017 9:20 AM Easton Torres DPM [facility]        Today's Diagnoses     Toe gangrene (H)    -  1    Ulcer of left lower extremity with fat layer exposed (H)           Follow-ups after your visit        Your next 10 appointments already scheduled     Apr 10, 2017  4:00 PM CDT   (Arrive by 3:45 PM)   Return Visit with Angelina Bernal MD   [facility] ([facility])    [address] [place] [address]  2nd [facility] [id number] [phone]            Apr 13, 2017  2:00 PM CDT   (Arrive by 1:45 PM)   Return Visit with Easton Torres DPM   [facility] ([facility])    [address] [place] [address]  4th [facility] [id number] [phone]            Apr 17, 2017  2:20 PM CDT   (Arrive by 2:05 PM)   ATRIAL FIBULATION VISIT with Paige Santos MD   [facility] ([facility])    [address] [place] [address]  3rd [facility] [id number] [phone]            Apr 24, 2017  2:00 PM CDT   (Arrive by 1:45 PM)   New Vascular Visit with Leah Santamaria MD   [facility] ([facility])    [address] [place] [address]  3rd [facility] [id number] [phone]            Jul 14, 2017  1:30 PM CDT   (Arrive by 1:15 PM)   Return Visit with Star Lobato MD   [facility] ([facility])    [address] [place] [address]  3rd [facility] [id number] [phone]              Who to contact     Please call your clinic at [phone] to:    Ask questions about your health    Make or cancel appointments    Discuss your medicines    Learn about your test results    Speak to your doctor   If you have compliments  or concerns about an experience at your clinic, or if you wish to file a complaint, please contact [facility] Patient Relations at [phone] or email us at [email or web address]         Additional Information About Your Visit        [employer] Information     [employer] gives you secure access to your electronic health record. If you see a primary care provider, you can also send messages to your care team and make appointments. If you have questions, please call your primary care clinic.  If you do not have a primary care provider, please call [phone] and they will assist you.      [employer] is an electronic gateway that provides easy, online access to your medical records. With [employer], you can request a clinic appointment, read your test results, renew a prescription or communicate with your care team.     To access your existing account, please contact your [facility] or call [phone] for assistance.        Care EveryWhere ID     This is your Care EveryWhere ID. This could be used by other organizations to access your [facility] medical records  [id number]         Blood Pressure from Last 3 Encounters:   04/01/17 120/85   03/22/17 108/66   03/08/17 108/84    Weight from Last 3 Encounters:   04/01/17 75.8 kg (167 lb)   03/22/17 76.7 kg (169 lb)   03/08/17 78.9 kg (174 lb)              We Performed the Following     DEBRIDEMENT WOUND UP TO 20 SQ CM          Today's Medication Changes          These changes are accurate as of: 4/7/17 12:08 PM.  If you have any questions, ask your nurse or doctor.               These medicines have changed or have updated prescriptions.        Dose/Directions    ferrous sulfate 325 (65 FE) MG tablet   Commonly known as:  IRON SUPPLEMENT   This may have changed:  when to take this   Used for:  S/P CABG (coronary artery bypass graft)        Dose:  325 mg   Take 1 tablet (325 mg) by mouth daily (with breakfast)    Quantity:  100 tablet   Refills:  1                Primary Care Provider    Physician No Ref-Primary       No address on file        Thank you!     Thank you for choosing [facility]  for your care. Our goal is always to provide you with excellent care. Hearing back from our patients is one way we can continue to improve our services. Please take a few minutes to complete the written survey that you may receive in the mail after your visit with us. Thank you!             Your Updated Medication List - Protect others around you: Learn how to safely use, store and throw away your medicines at www.disposemymeds.org.          This list is accurate as of: 4/7/17 12:08 PM.  Always use your most recent med list.                   Brand Name Dispense Instructions for use    acetaminophen 325 MG tablet    TYLENOL    100 tablet    Take 1-2 tablets (325-650 mg) by mouth every 4 hours as needed for mild pain or fever       amiodarone 200 MG tablet    PACERONE/CODARONE    45 tablet    decrease dose to 1 tab (200 mg) daily for 14 days, then stop       aspirin 81 MG chewable tablet     30 tablet    Take 1 tablet (81 mg) by mouth daily       atorvastatin 40 MG tablet    LIPITOR    90 tablet    Take 1 tablet (40 mg) by mouth daily       blood glucose monitoring meter device kit          clotrimazole 1 % cream    LOTRIMIN    12 g    Please apply to groins two times daily for one week after discharge and then follow up with PCP if no improvement of your skin rashes       ferrous sulfate 325 (65 FE) MG tablet    IRON SUPPLEMENT    100 tablet    Take 1 tablet (325 mg) by mouth daily (with breakfast)       fondaparinux 7.5MG/0.6ML injection    ARIXTRA    40 Syringe    Inject 0.6 mLs (7.5 mg) Subcutaneous daily       furosemide 40 MG tablet    LASIX    180 tablet    Take 1 tablet (40 mg) by mouth 2 times daily       gabapentin 100 MG capsule    NEURONTIN    180 capsule    Take 1 capsule (100 mg) by mouth 2 times daily        melatonin 1 MG Tabs tablet     30 tablet    Take 1 tablet (1 mg) by mouth nightly as needed       ONE TOUCH ULTRA test strip   Generic drug:  blood glucose monitoring     100 each    Test once daily       ONETOUCH DELICA LANCETS 33G Misc     100 each    1 Device daily       potassium chloride SA 10 MEQ CR tablet    K-DUR/KLOR-CON M    180 tablet    Take 1 tablet (10 mEq) by mouth 2 times daily       traZODone 50 MG tablet    DESYREL    45 tablet    Take 0.5 tablets (25 mg) by mouth nightly as needed for sleep       venlafaxine 150 MG Tb24 24 hr tablet    EFFEXOR-ER    90 each    Take 1 tablet (150 mg) by mouth daily (with breakfast)

## 2017-04-07 NOTE — LETTER
4/7/2017       RE: Carlos Alberto Fenton  [address]  [facility] [address]     Dear Colleague,    Thank you for referring your patient, Carlos Alberto Fenton, to the [facility] Please see a copy of my visit note below.    Chief Complaint:   Chief Complaint   Patient presents with     RECHECK     Bilateral gangrene toes. Left lower leg is still weeping. Pt stated that she doesnt have pain but she has a burning itch in both of her feet.           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       Oxycodone      hallucinations     Adhesive Tape Itching and Rash     Diapers & Supplies Rash     Developed yeast infection from previous hospital stay         Subjective: Carlos Alberto is a 76 year old female who presents to the clinic today for a follow up of BL foot gangrene and a left lower leg wound. She relates that she is still having home nursing every day to change the dressings and the packing in the left leg. She relates that the left leg wound is leaking a lot of fluid. No other complaints today.    Objective    A surgical dehiscence wound is noted at left  medial lower leg below the knee measuring 1cm x 0.5cm x 1.8cm. There is a satellite wound just distal to this about 2mm x 2mm x 0.5cm     Yousif Classification: Unstageable    Wound base: /Not Visible    Edges: intact    Drainage: moderate/serous    Odor: no    Undermining: no    Tunneling: 10 O'Clock    Bone Exposure: No    Clinical Signs of Infection: No    Debridements were not indicated on these wounds today     The gangrenous toes were examined. The left 3rd digit had an area of movable eschar. This was sharply debrided and it was noted that there was actually good blood flow to the underlying base. The 2nd digit also has some good tissue  forming on the distal aspect.   The right digits are unchanged and the eschars are stable. It does not appear to be changing and healing, as the left has done. No infection is noted. The proximal portion of the right hallux is still moist fibrotic tissue, however there is no infection noted.      Assessment: Left leg wound from incision dehiscence  Gangrenous toes - left may be getting a return in blood flow. Right still mummified.     Plan:   - Pt seen and evaluated  - The left 3rd digit was debrided of some of the eschar. This foot was wrapped with the betadine dressing. The right was not debrided and wrapped in the betadine. The left leg wound was packed with saline moistened 1/4th in plain packing gauze. This should continue to be changed daily by home nursing.   - Awaiting vascular. Would like their input on amputation. If they agree, [facility] can schedule with them.   - Pt to return to clinic in 1 week.       Again, thank you for allowing me to participate in the care of your patient.      Sincerely,    Easton Torres DPM

## 2017-04-07 NOTE — PROGRESS NOTES
Chief Complaint:   Chief Complaint   Patient presents with     RECHECK     Bilateral gangrene toes. Left lower leg is still weeping. Pt stated that she doesnt have pain but she has a burning itch in both of her feet.           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       Oxycodone      hallucinations     Adhesive Tape Itching and Rash     Diapers & Supplies Rash     Developed yeast infection from previous hospital stay         Subjective: Carlos Alberto is a 76 year old female who presents to the clinic today for a follow up of BL foot gangrene and a left lower leg wound. She relates that she is still having home nursing every day to change the dressings and the packing in the left leg. She relates that the left leg wound is leaking a lot of fluid. No other complaints today.    Objective    A surgical dehiscence wound is noted at left  medial lower leg below the knee measuring 1cm x 0.5cm x 1.8cm. There is a satellite wound just distal to this about 2mm x 2mm x 0.5cm     Yousif Classification: Unstageable    Wound base: /Not Visible    Edges: intact    Drainage: moderate/serous    Odor: no    Undermining: no    Tunneling: 10 O'Clock    Bone Exposure: No    Clinical Signs of Infection: No    Debridements were not indicated on these wounds today     The gangrenous toes were examined. The left 3rd digit had an area of movable eschar. This was sharply debrided and it was noted that there was actually good blood flow to the underlying base. The 2nd digit also has some good tissue forming on the distal aspect.   The right digits are unchanged and the eschars are stable. It does not appear to be changing and healing, as the left has done. No infection is noted. The proximal portion of the right hallux is still moist fibrotic tissue, however there is no infection noted.       Assessment: Left leg wound from incision dehiscence  Gangrenous toes - left may be getting a return in blood flow. Right still mummified.     Plan:   - Pt seen and evaluated  - The left 3rd digit was debrided of some of the eschar. This foot was wrapped with the betadine dressing. The right was not debrided and wrapped in the betadine. The left leg wound was packed with saline moistened 1/4th in plain packing gauze. This should continue to be changed daily by home nursing.   - Awaiting vascular. Would like their input on amputation. [facility]   - Pt to return to clinic in 1 week.

## 2017-04-07 NOTE — NURSING NOTE
Reason For Visit:   Chief Complaint   Patient presents with     RECHECK     Bilateral gangrene toes. Left lower leg is still weeping. Pt stated that she doesnt have pain but she has a burning itch in both of her feet.        Pain Assessment  Patient Currently in Pain: Denies                 Current Outpatient Prescriptions   Medication Sig Dispense Refill     atorvastatin (LIPITOR) 40 MG tablet Take 1 tablet (40 mg) by mouth daily 90 tablet 1     furosemide (LASIX) 40 MG tablet Take 1 tablet (40 mg) by mouth 2 times daily 180 tablet 1     gabapentin (NEURONTIN) 100 MG capsule Take 1 capsule (100 mg) by mouth 2 times daily 180 capsule 1     traZODone (DESYREL) 50 MG tablet Take 0.5 tablets (25 mg) by mouth nightly as needed for sleep 45 tablet 2     venlafaxine (EFFEXOR-ER) 150 MG TB24 24 hr tablet Take 1 tablet (150 mg) by mouth daily (with breakfast) 90 each 1     potassium chloride SA (K-DUR/KLOR-CON M) 10 MEQ CR tablet Take 1 tablet (10 mEq) by mouth 2 times daily 180 tablet 1     amiodarone (PACERONE/CODARONE) 200 MG tablet decrease dose to 1 tab (200 mg) daily for 14 days, then stop 45 tablet 0     fondaparinux (ARIXTRA) 7.5MG/0.6ML injection Inject 0.6 mLs (7.5 mg) Subcutaneous daily 40 Syringe 0     aspirin 81 MG chewable tablet Take 1 tablet (81 mg) by mouth daily 30 tablet 0     melatonin 1 MG TABS tablet Take 1 tablet (1 mg) by mouth nightly as needed 30 tablet 0     acetaminophen (TYLENOL) 325 MG tablet Take 1-2 tablets (325-650 mg) by mouth every 4 hours as needed for mild pain or fever 100 tablet 0     ferrous sulfate (IRON SUPPLEMENT) 325 (65 FE) MG tablet Take 1 tablet (325 mg) by mouth daily (with breakfast) (Patient taking differently: Take 325 mg by mouth 2 times daily ) 100 tablet 1     ONETOUCH DELICA LANCETS 33G MISC 1 Device daily 100 each 0     ONE TOUCH ULTRA test strip Test once daily 100 each 0     clotrimazole (LOTRIMIN) 1 % cream Please apply to groins two times daily for one week after  discharge and then follow up with PCP if no improvement of your skin rashes 12 g 0     blood glucose monitoring (ONE TOUCH ULTRA 2) meter device kit             Allergies   Allergen Reactions     Heparin      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       Oxycodone      hallucinations     Adhesive Tape Itching and Rash     Diapers & Supplies Rash     Developed yeast infection from previous hospital stay

## 2017-04-10 DIAGNOSIS — E11.9 TYPE 2 DIABETES MELLITUS WITHOUT COMPLICATION, WITHOUT LONG-TERM CURRENT USE OF INSULIN (H): ICD-10-CM

## 2017-04-10 NOTE — TELEPHONE ENCOUNTER
One touch Ultra Test Strips          Last Written Prescription Date: 01/16/2017  Last Fill Quantity: 100, # refills: 0  Last Office Visit with G, P or  Health prescribing provider:  12/19/2016        BP Readings from Last 3 Encounters:   04/01/17 120/85   03/22/17 108/66   03/08/17 108/84     No results found for: MICROL  No results found for: UMALCR  Creatinine   Date Value Ref Range Status   04/01/2017 0.57 0.52 - 1.04 mg/dL Final   ]  GFR Estimate   Date Value Ref Range Status   04/01/2017 >90  Non  GFR Calc   >60 mL/min/1.7m2 Final   03/08/2017 90 >60 mL/min/1.7m2 Final     Comment:     Non  GFR Calc   03/03/2017 >90  Non  GFR Calc   >60 mL/min/1.7m2 Final     GFR Estimate If Black   Date Value Ref Range Status   04/01/2017 >90   GFR Calc   >60 mL/min/1.7m2 Final   03/08/2017 >90   GFR Calc   >60 mL/min/1.7m2 Final   03/03/2017 >90   GFR Calc   >60 mL/min/1.7m2 Final     Lab Results   Component Value Date    CHOL 273 01/20/2017     Lab Results   Component Value Date    HDL 58 01/20/2017     Lab Results   Component Value Date     01/20/2017     Lab Results   Component Value Date    TRIG 236 01/20/2017     No results found for: CHOLHDLRATIO  Lab Results   Component Value Date    AST 40 04/01/2017     Lab Results   Component Value Date    ALT 28 04/01/2017     Lab Results   Component Value Date    A1C 6.3 02/02/2017    A1C 5.9 10/17/2016     Potassium   Date Value Ref Range Status   04/01/2017 3.6 3.4 - 5.3 mmol/L Final

## 2017-04-12 ENCOUNTER — PRE VISIT (OUTPATIENT)
Dept: CARDIOLOGY | Facility: CLINIC | Age: 77
End: 2017-04-12

## 2017-04-12 DIAGNOSIS — I48.0 PAROXYSMAL ATRIAL FIBRILLATION (H): Primary | ICD-10-CM

## 2017-04-13 ENCOUNTER — OFFICE VISIT (OUTPATIENT)
Dept: WOUND CARE | Facility: CLINIC | Age: 77
End: 2017-04-13

## 2017-04-13 DIAGNOSIS — L97.922 ULCER OF LOWER LIMB, LEFT, WITH FAT LAYER EXPOSED (H): ICD-10-CM

## 2017-04-13 DIAGNOSIS — I96 TOE GANGRENE (H): Primary | ICD-10-CM

## 2017-04-13 DIAGNOSIS — I96 GANGRENE (H): ICD-10-CM

## 2017-04-13 DIAGNOSIS — G63 POLYNEUROPATHY ASSOCIATED WITH UNDERLYING DISEASE (H): ICD-10-CM

## 2017-04-13 LAB
GRAM STN SPEC: ABNORMAL
MICRO REPORT STATUS: ABNORMAL
SPECIMEN SOURCE: ABNORMAL

## 2017-04-13 RX ORDER — CEPHALEXIN 500 MG/1
500 CAPSULE ORAL 3 TIMES DAILY
Qty: 30 CAPSULE | Refills: 0 | Status: SHIPPED | OUTPATIENT
Start: 2017-04-13 | End: 2017-04-28 | Stop reason: ALTCHOICE

## 2017-04-13 NOTE — MR AVS SNAPSHOT
After Visit Summary   4/13/2017    Carlos Alberto Fenton    MRN: [id number]           Patient Information     Date Of Birth          1940        Visit Information        Provider Department      4/13/2017 2:00 PM Easton Torres DPM [facility]        Today's Diagnoses     Toe gangrene (H)    -  1    Ulcer of lower limb, left, with fat layer exposed (H)        Gangrene (H)        Polyneuropathy associated with underlying disease (H)           Follow-ups after your visit        Your next 10 appointments already scheduled     Apr 17, 2017  2:20 PM CDT   (Arrive by 2:05 PM)   ATRIAL FIBULATION VISIT with Paige Santos MD   [facility] ([facility])    [address] [place] [address]  3rd Floor  [facility] [address]   [phone]            Apr 17, 2017  4:00 PM CDT   (Arrive by 3:45 PM)   Return Visit with Angelina Bernal MD   [facility] ([facility])    [address] [place] [address]  2nd Floor  [facility] [id number] [phone]            Apr 20, 2017  4:30 PM CDT   (Arrive by 4:15 PM)   Return Visit with Easton Torres DPM   [facility] ([facility])    [address] [place] [address]  4th [facility] [id number] [phone]            Apr 24, 2017  2:00 PM CDT   (Arrive by 1:45 PM)   New Vascular Visit with Leah Santamaria MD   [facility] ([facility])    [address] [place] [address]  3rd [facility] [address]   [phone]            Apr 27, 2017  4:00 PM CDT   (Arrive by 3:45 PM)   Return Visit with Easton Torres DPM   [facility] ([facility])    [address] [place] [address]  4th Floor  [facility] [id number] [phone]            Jul 14, 2017  1:30 PM CDT   (Arrive by 1:15 PM)   Return Visit with Star Lobato MD   [facility] ([facility] [place])    [address] [place] [address]  [address]  3rd [facility] [address] [phone]              Future tests that were ordered for you today     Open Future Orders        Priority Expected Expires Ordered    MR Tibia Fibula Left w/o Contrast Routine  4/14/2018 4/13/2017            Who to contact     Please call your clinic at [phone] to:    Ask questions about your health    Make or cancel appointments    Discuss your medicines    Learn about your test results    Speak to your doctor   If you have compliments or concerns about an experience at your clinic, or if you wish to file a complaint, please contact [facility] Patient Relations at [phone] or email us at [email or web address]         Additional Information About Your Visit        [employer] Information     [employer] gives you secure access to your electronic health record. If you see a primary care provider, you can also send messages to your care team and make appointments. If you have questions, please call your primary care clinic.  If you do not have a primary care provider, please call [phone] and they will assist you.      [employer] is an electronic gateway that provides easy, online access to your medical records. With [employer], you can request a clinic appointment, read your test results, renew a prescription or communicate with your care team.     To access your existing account, please contact your [facility] or call [phone] for assistance.        Care EveryWhere ID     This is your Care EveryWhere ID. This could be used by other organizations to access your [facility] medical records  [id number]         Blood Pressure from Last 3 Encounters:   04/01/17 120/85   03/22/17 108/66   03/08/17 108/84    Weight from Last 3 Encounters:   04/01/17 167 lb   03/22/17 169 lb   03/08/17 174 lb              We Performed the Following     Anaerobic bacterial culture     DEBRIDEMENT WOUND UP TO 20 SQ CM      Gram stain     Wound Culture Aerobic Bacterial          Today's Medication Changes          These changes are accurate as of: 4/13/17 11:59 PM.  If you have any questions, ask your nurse or doctor.               Start taking these medicines.        Dose/Directions    cephALEXin 500 MG capsule   Commonly known as:  KEFLEX   Used for:  Ulcer of lower limb, left, with fat layer exposed (H)        Dose:  500 mg   Take 1 capsule (500 mg) by mouth 3 times daily   Quantity:  30 capsule   Refills:  0         These medicines have changed or have updated prescriptions.        Dose/Directions    ferrous sulfate 325 (65 FE) MG tablet   Commonly known as:  IRON SUPPLEMENT   This may have changed:  when to take this   Used for:  S/P CABG (coronary artery bypass graft)        Dose:  325 mg   Take 1 tablet (325 mg) by mouth daily (with breakfast)   Quantity:  100 tablet   Refills:  1            Where to get your medicines      These medications were sent to [facility] [address] [place] [id number]     Phone:  [phone]     cephALEXin 500 MG capsule                Primary Care Provider    Physician No Ref-Primary       No address on file        Thank you!     Thank you for choosing [facility]  for your care. Our goal is always to provide you with excellent care. Hearing back from our patients is one way we can continue to improve our services. Please take a few minutes to complete the written survey that you may receive in the mail after your visit with us. Thank you!             Your Updated Medication List - Protect others around you: Learn how to safely use, store and throw away your medicines at www.disposemymeds.org.          This list is accurate as of: 4/13/17 11:59 PM.  Always use your most recent med list.                   Brand Name Dispense Instructions for use    acetaminophen 325 MG tablet    TYLENOL    100 tablet    Take 1-2 tablets (325-650  mg) by mouth every 4 hours as needed for mild pain or fever       amiodarone 200 MG tablet    PACERONE/CODARONE    45 tablet    decrease dose to 1 tab (200 mg) daily for 14 days, then stop       aspirin 81 MG chewable tablet     30 tablet    Take 1 tablet (81 mg) by mouth daily       atorvastatin 40 MG tablet    LIPITOR    90 tablet    Take 1 tablet (40 mg) by mouth daily       blood glucose monitoring meter device kit          cephALEXin 500 MG capsule    KEFLEX    30 capsule    Take 1 capsule (500 mg) by mouth 3 times daily       clotrimazole 1 % cream    LOTRIMIN    12 g    Please apply to groins two times daily for one week after discharge and then follow up with PCP if no improvement of your skin rashes       ferrous sulfate 325 (65 FE) MG tablet    IRON SUPPLEMENT    100 tablet    Take 1 tablet (325 mg) by mouth daily (with breakfast)       fondaparinux 7.5MG/0.6ML injection    ARIXTRA    40 Syringe    Inject 0.6 mLs (7.5 mg) Subcutaneous daily       furosemide 40 MG tablet    LASIX    180 tablet    Take 1 tablet (40 mg) by mouth 2 times daily       gabapentin 100 MG capsule    NEURONTIN    180 capsule    Take 1 capsule (100 mg) by mouth 2 times daily       melatonin 1 MG Tabs tablet     30 tablet    Take 1 tablet (1 mg) by mouth nightly as needed       ONE TOUCH ULTRA test strip   Generic drug:  blood glucose monitoring     100 each    USE AS DIRECTED TO TEST ONE TIME A DAY       ONETOUCH DELICA LANCETS 33G Misc     100 each    1 Device daily       potassium chloride SA 10 MEQ CR tablet    K-DUR/KLOR-CON M    180 tablet    Take 1 tablet (10 mEq) by mouth 2 times daily       traZODone 50 MG tablet    DESYREL    45 tablet    Take 0.5 tablets (25 mg) by mouth nightly as needed for sleep       venlafaxine 150 MG Tb24 24 hr tablet    EFFEXOR-ER    90 each    Take 1 tablet (150 mg) by mouth daily (with breakfast)

## 2017-04-13 NOTE — LETTER
4/13/2017       RE: Carlos Alberto Fenton  [address]  [facility] [address]     Dear Colleague,    Thank you for referring your patient, Carlos Alberto Fenton, to the [facility] at [facility]. Please see a copy of my visit note below.    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       Oxycodone      hallucinations     Adhesive Tape Itching and Rash     Diapers & Supplies Rash     Developed yeast infection from previous hospital stay     Subjective: Carlos Alberto is a 76 year old female who presents to the clinic today for a follow up of BL foot gangrene and a left lower leg wound. She relates that she is still having home nursing every day to change the dressings. She relates that the left leg wound is still leaking a lot of fluid and is painful, but is has decreased somewhat. She has an appointment with Dr. Santamaria on 4/24/17. No other complaints today.     Objective  Two surgical dehiscence wounds are noted at left medial lower leg below the knee measuring 3 mm x 3mm x 3 cm and 3 mm x 5 mm x 1.5 cm. I cannot find a connection between the two wounds, but it is likely that one exists.    Yousif Classification: Unstageable     Wound base: Not Visible     Edges: intact     Drainage: moderate/serous and  Slight/purulent     Odor: no     Undermining: no     Tunneling: circumferential in the distal wound, at about 6 o'clock in the proximal wound.     Bone Exposure: No     Clinical Signs of Infection: Yes, purulent drainage, surrounding tissue is erythematous and indurated.       Wounds were washed with scrub care and rinsed copiously with normal saline. No sharp debridement was performed of these wounds  today.     The gangrenous toes were examined. The left 3rd digit had an area of movable eschar. This was sharply debrided once again and it was noted that there was actually good blood flow to the underlying base. The 2nd digit is unchanged.  The right digits are unchanged, except the plantar eschar of the hallux was loose and able to be debrided. All devitalized tissue of the right hallux was sharply debrided with #15 blade, subcutaneous at deepest. No infection is noted. The proximal portion of the right hallux is still moist fibrotic tissue, however there is no infection noted.      Assessment: Left leg wound from incision dehiscence  Gangrenous toes - left may be getting a return in blood flow. Right still mummified.      Plan:   - Pt seen and evaluated  - The left 3rd digit and right hallux was debrided of eschar and loose, devitalized tissue. Bilateral digits were wrapped in the betadine soaked gauze dressings and covered with kerlix. The left leg wound was packed with saline moistened 1/4th in iodoform packing gauze. This should continue to be changed daily by home nursing.   - MRI of left leg ordered to evaluate seroma/abscess. Scheduled for tomorrow AM.   - Rx'd Keflex x 10 days  - Wound culture of left leg wound performed and sent for aerobic, anaerobic cultures and gram stain.  - Pt to return to clinic in 1 week.       Again, thank you for allowing me to participate in the care of your patient.      Sincerely,    Easton Torres DPM

## 2017-04-13 NOTE — PROGRESS NOTES
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       Oxycodone      hallucinations     Adhesive Tape Itching and Rash     Diapers & Supplies Rash     Developed yeast infection from previous hospital stay     Subjective: Carlos Alberto is a 76 year old female who presents to the clinic today for a follow up of BL foot gangrene and a left lower leg wound. She relates that she is still having home nursing every day to change the dressings. She relates that the left leg wound is still leaking a lot of fluid and is painful, but is has decreased somewhat. She has an appointment with Dr. Santamaria on 4/24/17. No other complaints today.     Objective  Two surgical dehiscence wounds are noted at left medial lower leg below the knee measuring 3 mm x 3mm x 3 cm and 3 mm x 5 mm x 1.5 cm. I cannot find a connection between the two wounds, but it is likely that one exists.    Yousif Classification: Unstageable     Wound base: Not Visible     Edges: intact     Drainage: moderate/serous and  Slight/purulent     Odor: no     Undermining: no     Tunneling: circumferential in the distal wound, at about 6 o'clock in the proximal wound.     Bone Exposure: No     Clinical Signs of Infection: Yes, purulent drainage, surrounding tissue is erythematous and indurated.       Wounds were washed with scrub care and rinsed copiously with normal saline. No sharp debridement was performed of these wounds today.     The gangrenous toes were examined. The left 3rd digit had an area of movable eschar. This was sharply debrided once again and it was noted that there was actually good blood flow to the underlying base. The 2nd digit is unchanged.  The right digits are unchanged, except the  plantar eschar of the hallux was loose and able to be debrided. All devitalized tissue of the right hallux was sharply debrided with #15 blade, subcutaneous at deepest. No infection is noted. The proximal portion of the right hallux is still moist fibrotic tissue, however there is no infection noted.      Assessment: Left leg wound from incision dehiscence  Gangrenous toes - left may be getting a return in blood flow. Right still mummified.      Plan:   - Pt seen and evaluated  - The left 3rd digit and right hallux was debrided of eschar and loose, devitalized tissue. Bilateral digits were wrapped in the betadine soaked gauze dressings and covered with kerlix. The left leg wound was packed with saline moistened 1/4th in iodoform packing gauze. This should continue to be changed daily by home nursing.   - MRI of left leg ordered to evaluate seroma/abscess. Scheduled for tomorrow AM.   - Rx'd Keflex x 10 days  - Wound culture of left leg wound performed and sent for aerobic, anaerobic cultures and gram stain.  - Pt to return to clinic in 1 week.

## 2017-04-14 ENCOUNTER — CARE COORDINATION (OUTPATIENT)
Dept: ONCOLOGY | Facility: CLINIC | Age: 77
End: 2017-04-14

## 2017-04-14 ENCOUNTER — HOSPITAL ENCOUNTER (OUTPATIENT)
Dept: MRI IMAGING | Facility: CLINIC | Age: 77
Discharge: HOME | End: 2017-04-14
Attending: PODIATRIST | Admitting: PODIATRIST
Payer: MEDICARE

## 2017-04-14 DIAGNOSIS — Z79.01 LONG-TERM (CURRENT) USE OF ANTICOAGULANTS: ICD-10-CM

## 2017-04-14 DIAGNOSIS — L97.922 ULCER OF LOWER LIMB, LEFT, WITH FAT LAYER EXPOSED (H): ICD-10-CM

## 2017-04-14 PROCEDURE — 25500064 ZZH RX 255 OP 636: Performed by: PODIATRIST

## 2017-04-14 PROCEDURE — 73718 MRI LOWER EXTREMITY W/O DYE: CPT | Mod: LT

## 2017-04-14 PROCEDURE — A9585 GADOBUTROL INJECTION: HCPCS | Performed by: PODIATRIST

## 2017-04-14 RX ORDER — FONDAPARINUX SODIUM 7.5 MG/.6ML
7.5 INJECTION SUBCUTANEOUS DAILY
Qty: 40 SYRINGE | Refills: 2 | Status: ON HOLD | OUTPATIENT
Start: 2017-04-14 | End: 2017-05-26

## 2017-04-14 RX ORDER — GADOBUTROL 604.72 MG/ML
7.5 INJECTION INTRAVENOUS ONCE
Status: DISCONTINUED | OUTPATIENT
Start: 2017-04-14 | End: 2017-04-15 | Stop reason: HOSPADM

## 2017-04-14 ASSESSMENT — ENCOUNTER SYMPTOMS
SKIN CHANGES: 0
PANIC: 1
NAIL CHANGES: 0
FEVER: 0
SKIN CHANGES: 0
SKIN CHANGES: 0
WEIGHT LOSS: 0
HALLUCINATIONS: 0
BRUISES/BLEEDS EASILY: 1
FEVER: 0
SWOLLEN GLANDS: 0
INCREASED ENERGY: 1
DECREASED APPETITE: 0
BRUISES/BLEEDS EASILY: 1
POLYDIPSIA: 0
ALTERED TEMPERATURE REGULATION: 1
FATIGUE: 1
NERVOUS/ANXIOUS: 1
NIGHT SWEATS: 1
INSOMNIA: 1
DECREASED APPETITE: 0
POLYPHAGIA: 0
POOR WOUND HEALING: 0
NERVOUS/ANXIOUS: 1
FATIGUE: 1
INSOMNIA: 1
CHILLS: 0
WEIGHT GAIN: 0
ALTERED TEMPERATURE REGULATION: 1
POOR WOUND HEALING: 0
PANIC: 1
HALLUCINATIONS: 0
HALLUCINATIONS: 0
INCREASED ENERGY: 1
POLYPHAGIA: 0
WEIGHT LOSS: 0
SWOLLEN GLANDS: 0
CHILLS: 0
WEIGHT GAIN: 0
NIGHT SWEATS: 1
INCREASED ENERGY: 1
CHILLS: 0
SWOLLEN GLANDS: 0
BRUISES/BLEEDS EASILY: 1
PANIC: 1
NAIL CHANGES: 0
INSOMNIA: 1
WEIGHT GAIN: 0
NIGHT SWEATS: 1
NAIL CHANGES: 0
WEIGHT LOSS: 0
POLYDIPSIA: 0
POLYDIPSIA: 0
POLYPHAGIA: 0
FATIGUE: 1
NERVOUS/ANXIOUS: 1
POOR WOUND HEALING: 0
FEVER: 0
DECREASED APPETITE: 0
ALTERED TEMPERATURE REGULATION: 1

## 2017-04-14 NOTE — PROGRESS NOTES
Received call from Ms. Fenton's home care RN who states she ran out of her Arixtra on Thursday 4/13.  Dr. Bernal would like her to continue the 1x daily injection until her appt on Monday.  Contacted [facility] where last script was sent.  Pt never picked it up, they will provide 1 week supply per Dr. Bernal request.    Called pt, and spoke to daughter Lindsay.  She will  medication and give injection today.  Reviewed that cost for one week supply is $275.00  Reviwed RTC appts on Monday.

## 2017-04-14 NOTE — TELEPHONE ENCOUNTER
Reason for Call:  Medication or medication refill:    Do you use a [facility]?  Name of the pharmacy and phone number for the current request:  [facility] - [phone]    Name of the medication requested: fondaparinux (ARIXTRA) 7.5MG/0.6ML injection         Other request: Homecare states patient ran out of this RX yest or today, requesting a refill from Dr Conner as homecare states it was an outpatient RX where it was originally given.  Homecare was advised of 3 business days    Can we leave a detailed message on this number? YES    Phone number patient can be reached at: Other phone number:  [phone]  [facility]    Best Time: any    Call taken on 4/14/2017 at 12:15 PM by Yolanda Martinez

## 2017-04-17 ENCOUNTER — OFFICE VISIT (OUTPATIENT)
Dept: CARDIOLOGY | Facility: CLINIC | Age: 77
End: 2017-04-17
Attending: INTERNAL MEDICINE
Payer: MEDICARE

## 2017-04-17 ENCOUNTER — ONCOLOGY VISIT (OUTPATIENT)
Dept: ONCOLOGY | Facility: CLINIC | Age: 77
End: 2017-04-17
Attending: INTERNAL MEDICINE
Payer: MEDICARE

## 2017-04-17 VITALS
HEART RATE: 61 BPM | WEIGHT: 172 LBS | DIASTOLIC BLOOD PRESSURE: 46 MMHG | OXYGEN SATURATION: 98 % | RESPIRATION RATE: 18 BRPM | BODY MASS INDEX: 31.65 KG/M2 | SYSTOLIC BLOOD PRESSURE: 109 MMHG | HEIGHT: 62 IN

## 2017-04-17 VITALS
SYSTOLIC BLOOD PRESSURE: 109 MMHG | HEART RATE: 61 BPM | HEIGHT: 62 IN | WEIGHT: 172 LBS | OXYGEN SATURATION: 98 % | DIASTOLIC BLOOD PRESSURE: 46 MMHG | BODY MASS INDEX: 31.65 KG/M2

## 2017-04-17 DIAGNOSIS — D75.829 HIT (HEPARIN-INDUCED THROMBOCYTOPENIA) (H): Primary | ICD-10-CM

## 2017-04-17 DIAGNOSIS — D75.829 HIT (HEPARIN-INDUCED THROMBOCYTOPENIA) (H): ICD-10-CM

## 2017-04-17 DIAGNOSIS — I82.A21 CHRONIC DEEP VEIN THROMBOSIS (DVT) OF AXILLARY VEIN OF RIGHT UPPER EXTREMITY (H): ICD-10-CM

## 2017-04-17 DIAGNOSIS — Z79.01 LONG-TERM (CURRENT) USE OF ANTICOAGULANTS: ICD-10-CM

## 2017-04-17 DIAGNOSIS — I48.0 PAROXYSMAL ATRIAL FIBRILLATION (H): ICD-10-CM

## 2017-04-17 LAB
ALBUMIN SERPL-MCNC: 3.1 G/DL (ref 3.4–5)
ALP SERPL-CCNC: 137 U/L (ref 40–150)
ALT SERPL W P-5'-P-CCNC: 28 U/L (ref 0–50)
ANION GAP SERPL CALCULATED.3IONS-SCNC: 6 MMOL/L (ref 3–14)
AST SERPL W P-5'-P-CCNC: 36 U/L (ref 0–45)
BASOPHILS # BLD AUTO: 0.1 10E9/L (ref 0–0.2)
BASOPHILS NFR BLD AUTO: 1 %
BILIRUB SERPL-MCNC: 0.5 MG/DL (ref 0.2–1.3)
BUN SERPL-MCNC: 19 MG/DL (ref 7–30)
CALCIUM SERPL-MCNC: 9 MG/DL (ref 8.5–10.1)
CHLORIDE SERPL-SCNC: 99 MMOL/L (ref 94–109)
CO2 SERPL-SCNC: 36 MMOL/L (ref 20–32)
CREAT SERPL-MCNC: 0.68 MG/DL (ref 0.52–1.04)
DIFFERENTIAL METHOD BLD: ABNORMAL
EOSINOPHIL # BLD AUTO: 0.2 10E9/L (ref 0–0.7)
EOSINOPHIL NFR BLD AUTO: 3 %
ERYTHROCYTE [DISTWIDTH] IN BLOOD BY AUTOMATED COUNT: 18.2 % (ref 10–15)
GFR SERPL CREATININE-BSD FRML MDRD: 85 ML/MIN/1.7M2
GLUCOSE SERPL-MCNC: 96 MG/DL (ref 70–99)
HCT VFR BLD AUTO: 36.9 % (ref 35–47)
HGB BLD-MCNC: 10.9 G/DL (ref 11.7–15.7)
IMM GRANULOCYTES # BLD: 0.1 10E9/L (ref 0–0.4)
IMM GRANULOCYTES NFR BLD: 1 %
LYMPHOCYTES # BLD AUTO: 1.2 10E9/L (ref 0.8–5.3)
LYMPHOCYTES NFR BLD AUTO: 17.2 %
MCH RBC QN AUTO: 30.1 PG (ref 26.5–33)
MCHC RBC AUTO-ENTMCNC: 29.5 G/DL (ref 31.5–36.5)
MCV RBC AUTO: 102 FL (ref 78–100)
MONOCYTES # BLD AUTO: 0.8 10E9/L (ref 0–1.3)
MONOCYTES NFR BLD AUTO: 11.1 %
NEUTROPHILS # BLD AUTO: 4.7 10E9/L (ref 1.6–8.3)
NEUTROPHILS NFR BLD AUTO: 66.7 %
NRBC # BLD AUTO: 0 10*3/UL
NRBC BLD AUTO-RTO: 0 /100
PLATELET # BLD AUTO: 311 10E9/L (ref 150–450)
POTASSIUM SERPL-SCNC: 3.4 MMOL/L (ref 3.4–5.3)
PROT SERPL-MCNC: 8.1 G/DL (ref 6.8–8.8)
RBC # BLD AUTO: 3.62 10E12/L (ref 3.8–5.2)
SODIUM SERPL-SCNC: 140 MMOL/L (ref 133–144)
WBC # BLD AUTO: 7 10E9/L (ref 4–11)

## 2017-04-17 PROCEDURE — 36415 COLL VENOUS BLD VENIPUNCTURE: CPT | Performed by: INTERNAL MEDICINE

## 2017-04-17 PROCEDURE — 99212 OFFICE O/P EST SF 10 MIN: CPT | Mod: 27

## 2017-04-17 PROCEDURE — 93005 ELECTROCARDIOGRAM TRACING: CPT | Mod: ZF

## 2017-04-17 PROCEDURE — 85520 HEPARIN ASSAY: CPT | Performed by: INTERNAL MEDICINE

## 2017-04-17 PROCEDURE — 99214 OFFICE O/P EST MOD 30 MIN: CPT | Mod: GC | Performed by: INTERNAL MEDICINE

## 2017-04-17 PROCEDURE — 99212 OFFICE O/P EST SF 10 MIN: CPT | Mod: ZF

## 2017-04-17 PROCEDURE — 93010 ELECTROCARDIOGRAM REPORT: CPT | Mod: ZP | Performed by: INTERNAL MEDICINE

## 2017-04-17 PROCEDURE — 99214 OFFICE O/P EST MOD 30 MIN: CPT | Mod: ZP | Performed by: INTERNAL MEDICINE

## 2017-04-17 ASSESSMENT — PAIN SCALES - GENERAL
PAINLEVEL: NO PAIN (0)
PAINLEVEL: NO PAIN (0)

## 2017-04-17 NOTE — LETTER
4/17/2017      RE: Carlos Alberto Fenton  [address]  [place] [address]       Dear Colleague,    Thank you for the opportunity to participate in the care of your patient, Carlos Alberto Fenton, at the [facility]. Please see a copy of my visit note below.    Reason for visit:  AFib follow up      HPI:   76F, PMH of HTN, HPL, DM, CVA (11/2016), CAD (s/p 3v CABG: LIMA-LAD, SVG-D1, SVG-dRCA and modified MAZE, ABDIEL ligation - 2/2016), paroxysmal AFib (on amio, [id number] - 8), HIT (on Fondaparinux), RUE DVT who is here for AFib follow up.    In terms of her symptoms, she denies any chest pain, pressure, heaviness or tightness, denies any dizziness/lightheadedness, any syncope/presyncope. Denies any fluttering or palpitations in the chest. She says her SOB is improving as she moves farther from the surgery. She says she does feel tired during the day, and attributes it to her lack of sleep at night (which is related to her restless leg syndrome).     After her CABG/ABDIEL ligation/MAZE procedure, within a couple of weeks she was readmitted to the hospital with toe gangrene and was found to have AFib during the hospitalization.         PAST MEDICAL HISTORY:  Past Medical History:   Diagnosis Date     Antiplatelet or antithrombotic long-term use      Atrial fibrillation (H)      CAD (coronary artery disease)     2 vessel     Cancer (H)     Skin     Cerebral artery occlusion with cerebral infarction (H) 10/2016     Cerebral infarction (H) 10/2016     Diabetes (H)      Headaches      History of skin cancer      Hyperlipemias      Hypertension      Irregular heart beat      Peripheral neuropathy (H) 1990    cause unknown     Sleep apnea        CURRENT MEDICATIONS:  Current Outpatient Prescriptions   Medication Sig Dispense Refill     fondaparinux (ARIXTRA) 7.5MG/0.6ML injection Inject 0.6 mLs (7.5 mg) Subcutaneous daily 40 Syringe 2     cephALEXin (KEFLEX) 500 MG capsule  Take 1 capsule (500 mg) by mouth 3 times daily 30 capsule 0     ONE TOUCH ULTRA test strip USE AS DIRECTED TO TEST ONE TIME A  each 0     atorvastatin (LIPITOR) 40 MG tablet Take 1 tablet (40 mg) by mouth daily 90 tablet 1     furosemide (LASIX) 40 MG tablet Take 1 tablet (40 mg) by mouth 2 times daily 180 tablet 1     gabapentin (NEURONTIN) 100 MG capsule Take 1 capsule (100 mg) by mouth 2 times daily 180 capsule 1     traZODone (DESYREL) 50 MG tablet Take 0.5 tablets (25 mg) by mouth nightly as needed for sleep 45 tablet 2     venlafaxine (EFFEXOR-ER) 150 MG TB24 24 hr tablet Take 1 tablet (150 mg) by mouth daily (with breakfast) 90 each 1     potassium chloride SA (K-DUR/KLOR-CON M) 10 MEQ CR tablet Take 1 tablet (10 mEq) by mouth 2 times daily 180 tablet 1     amiodarone (PACERONE/CODARONE) 200 MG tablet decrease dose to 1 tab (200 mg) daily for 14 days, then stop 45 tablet 0     aspirin 81 MG chewable tablet Take 1 tablet (81 mg) by mouth daily 30 tablet 0     melatonin 1 MG TABS tablet Take 1 tablet (1 mg) by mouth nightly as needed 30 tablet 0     acetaminophen (TYLENOL) 325 MG tablet Take 1-2 tablets (325-650 mg) by mouth every 4 hours as needed for mild pain or fever 100 tablet 0     ferrous sulfate (IRON SUPPLEMENT) 325 (65 FE) MG tablet Take 1 tablet (325 mg) by mouth daily (with breakfast) (Patient taking differently: Take 325 mg by mouth 2 times daily ) 100 tablet 1     ONETOUCH DELICA LANCETS 33G MISC 1 Device daily 100 each 0     clotrimazole (LOTRIMIN) 1 % cream Please apply to groins two times daily for one week after discharge and then follow up with PCP if no improvement of your skin rashes 12 g 0     blood glucose monitoring (ONE TOUCH ULTRA 2) meter device kit          PAST SURGICAL HISTORY:  Past Surgical History:   Procedure Laterality Date     BACK SURGERY       BYPASS GRAFT ARTERY CORONARY N/A 2/6/2017    Procedure: BYPASS GRAFT ARTERY CORONARY;  Surgeon: Mikhail Quiñones MD;  Location:  [facility] OR     C APPENDECTOMY  1959     CORONARY ARTERY BYPASS       HYSTERECTOMY, PASTORA  1988     MAZE PROCEDURE N/A 2/6/2017    Procedure: MAZE PROCEDURE;  Surgeon: Mikhail Quiñones MD;  Location: [facility] OR     PICC INSERTION Left 02/25/2017    5fr TL Bard PICC, 47cm (3cm external) in the L basilic vein w/ tip in the SVC RA junction     TONSILLECTOMY  1942       ALLERGIES:     Allergies   Allergen Reactions     Heparin      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       Oxycodone      hallucinations     Adhesive Tape Itching and Rash     Diapers & Supplies Rash     Developed yeast infection from previous hospital stay       SOCIAL HISTORY:  Social History   Substance Use Topics     Smoking status: Former Smoker     Smokeless tobacco: Never Used     Alcohol use No       ROS:   Constitutional: No fever, chills, or sweats. Weight stable.   ENT: No visual disturbance, ear ache, epistaxis, sore throat.   Cardiovascular: As per HPI.   Respiratory: No cough, hemoptysis.    GI: No nausea, vomiting, hematemesis, melena, or hematochezia.   : No hematuria.   Integument: Negative.   Psychiatric: Negative.   Neuro: Negative.   Endocrinology: No significant heat or cold intolerance   Musculoskeletal: No myalgia.      Exam:  There were no vitals taken for this visit.  GEN: aao x 3, NAD  Neck: No JVD elevation  LUNGS: No wheezing or rales  HEART: S1S2 audible, no murmurs or rubs. Regular rhythm  ABDOMEN: Soft, nt, nd. +BS  EXTREMITIES: Warm calves, +DPs, no LE edema  NEURO: aao x 3, no focal deficits        Labs:  CBC RESULTS:   Lab Results   Component Value Date    WBC 7.7 04/01/2017    RBC 3.48 (L) 04/01/2017    HGB 10.6 (L) 04/01/2017    HCT 36.9 04/01/2017     (H) 04/01/2017    MCH 30.5 04/01/2017    MCHC 28.7 (L) 04/01/2017    RDW 19.5 (H) 04/01/2017     04/01/2017       BMP RESULTS:  Lab Results   Component Value  Date     04/01/2017    POTASSIUM 3.6 04/01/2017    CHLORIDE 100 04/01/2017    CO2 32 04/01/2017    ANIONGAP 8 04/01/2017     (H) 04/01/2017    BUN 18 04/01/2017    CR 0.57 04/01/2017    GFRESTIMATED >90  Non  GFR Calc   04/01/2017    GFRESTBLACK >90   GFR Calc   04/01/2017    CARLY 9.2 04/01/2017        INR RESULTS:  Lab Results   Component Value Date    INR 1.50 (H) 04/01/2017    INR 7.75 (HH) 03/02/2017    INR 3.43 (H) 03/01/2017    INR 2.58 (H) 02/28/2017       Procedures:  ECHO - 2/26/17  No LV throbus noted.      Left Ventricle:  Left ventricular size is normal. The Ejection Fraction is estimated at 55-60%.  Diastolic function not assessed due to atrial fibrillation. No LV throbus  noted.     Right Ventricle:  Right ventricular function, chamber size, wall motion, and thickness are normal.        TAVO - 3/1/17   H/o ABDIEL ligation. No LA clot seen.  H/o PFO closure. The atrial septum is intact as assessed by color Doppler.  No LV thrombus seen. Mildly (EF 45-50%) reduced left ventricular function is present.  The right ventricle is normal size. Global right ventricular function is normal.    Assessment and Plan:   - 76F, PMH of HTN, HPL, DM, CVA (11/2016), CAD (s/p 3v CABG: LIMA-LAD, SVG-D1, SVG-dRCA and modified MAZE, ABDIEL ligation - 2/2016), paroxysmal AFib (on amio, [id number] - 8), HIT (on Fondaparinux), RUE DVT who is here for AFib follow up.    - Given her documented AFib was very soon after the CABG/MAZE it could just be from post op inflammation of the myocardium. Also keeping in mind the high success rate of MAZE, we would hold her amiodarone for now. She will follow up in clinic with Tracey Ambrosio in 6 months with a 2 week Zio monitor prior to her visit.   - Will recommend continuing Fondaparinux.        Ben Monae MD  Cardiovascular Disease Fellow  Pager: [phone]      I have seen, interviewed, and examined patient. I have reviewed the laboratory tests,  imaging, and other investigations. I have reviewed the management plan with the patient. I discussed with the team and agree with the findings and plan in this resident/fellow/nurse practitioner's note. In addition, changes in the physical examination, assessment and plan have been incorporated into the note by myself, as to make it a single cohesive document.       Paige Santos MD, MS  Cardiology/Cardiac EP Attending Staff            CC  Patient Care Team:  No Ref-Primary, Physician as PCP - General  ALLEN Wilson MD as MD (Cardiology)  Jessica Aguillon, RN as Nurse Coordinator (Clinical Cardiac Electrophysiology)  BEREKET GARCIA

## 2017-04-17 NOTE — PATIENT INSTRUCTIONS
Cardiology Provider you saw in clinic today: Dr. Santos    Medication Changes:     1. Stop amiodarone     Follow-up Visit:  6 months with Tracey Taylor NP with a ziopatch 1 month prior        Please contact us via [employer] for questions or concerns.    Jesscia Aguillon RN   Electrophysiology Nurse Coordinator.  [phone]    If your question concerns the schedule/appointment times, contact:  [facility]   [phone]    Device Clinic (Pacemakers, ICD, Loop Recorders)   [phone]      You will receive all normal lab and testing results via [employer] or mail if not reviewed in clinic today.   If you need a medication refill please contact your pharmacy.    As always, thank you for trusting us with your health care needs!

## 2017-04-17 NOTE — NURSING NOTE
"Carlos Alberto Fenton is a 76 year old female who presents for:  Chief Complaint   Patient presents with     Oncology Clinic Visit     Return: hospital follow up History of skin cancer        Initial Vitals:  /46  Pulse 61  Resp 18  Ht 1.575 m (5' 2\")  Wt 78 kg (172 lb)  SpO2 98%  BMI 31.46 kg/m2 Estimated body mass index is 31.46 kg/(m^2) as calculated from the following:    Height as of this encounter: 1.575 m (5' 2\").    Weight as of this encounter: 78 kg (172 lb).. Body surface area is 1.85 meters squared. BP completed using cuff size: regular  No Pain (0) No LMP recorded. Patient has had a hysterectomy. Allergies and medications reviewed.     Medications: Medication refills not needed today.  Pharmacy name entered into [employer]: [facility] [address] [facility]    Comments:     6 minutes for nursing intake (face to face time)   Katherine May CMA        "

## 2017-04-17 NOTE — PROGRESS NOTES
Reason for visit:  AFib follow up      HPI:   76F, PMH of HTN, HPL, DM, CVA (11/2016), CAD (s/p 3v CABG: LIMA-LAD, SVG-D1, SVG-dRCA and modified MAZE, ABDIEL ligation - 2/2016), paroxysmal AFib (on amio, [id number] - 8), HIT (on Fondaparinux), RUE DVT who is here for AFib follow up.    In terms of her symptoms, she denies any chest pain, pressure, heaviness or tightness, denies any dizziness/lightheadedness, any syncope/presyncope. Denies any fluttering or palpitations in the chest. She says her SOB is improving as she moves farther from the surgery. She says she does feel tired during the day, and attributes it to her lack of sleep at night (which is related to her restless leg syndrome).     After her CABG/ABDIEL ligation/MAZE procedure, within a couple of weeks she was readmitted to the hospital with toe gangrene and was found to have AFib during the hospitalization.         PAST MEDICAL HISTORY:  Past Medical History:   Diagnosis Date     Antiplatelet or antithrombotic long-term use      Atrial fibrillation (H)      CAD (coronary artery disease)     2 vessel     Cancer (H)     Skin     Cerebral artery occlusion with cerebral infarction (H) 10/2016     Cerebral infarction (H) 10/2016     Diabetes (H)      Headaches      History of skin cancer      Hyperlipemias      Hypertension      Irregular heart beat      Peripheral neuropathy (H) 1990    cause unknown     Sleep apnea        CURRENT MEDICATIONS:  Current Outpatient Prescriptions   Medication Sig Dispense Refill     fondaparinux (ARIXTRA) 7.5MG/0.6ML injection Inject 0.6 mLs (7.5 mg) Subcutaneous daily 40 Syringe 2     cephALEXin (KEFLEX) 500 MG capsule Take 1 capsule (500 mg) by mouth 3 times daily 30 capsule 0     ONE TOUCH ULTRA test strip USE AS DIRECTED TO TEST ONE TIME A  each 0     atorvastatin (LIPITOR) 40 MG tablet Take 1 tablet (40 mg) by mouth daily 90 tablet 1     furosemide (LASIX) 40 MG tablet Take 1 tablet (40 mg) by mouth 2 times daily 180  tablet 1     gabapentin (NEURONTIN) 100 MG capsule Take 1 capsule (100 mg) by mouth 2 times daily 180 capsule 1     traZODone (DESYREL) 50 MG tablet Take 0.5 tablets (25 mg) by mouth nightly as needed for sleep 45 tablet 2     venlafaxine (EFFEXOR-ER) 150 MG TB24 24 hr tablet Take 1 tablet (150 mg) by mouth daily (with breakfast) 90 each 1     potassium chloride SA (K-DUR/KLOR-CON M) 10 MEQ CR tablet Take 1 tablet (10 mEq) by mouth 2 times daily 180 tablet 1     amiodarone (PACERONE/CODARONE) 200 MG tablet decrease dose to 1 tab (200 mg) daily for 14 days, then stop 45 tablet 0     aspirin 81 MG chewable tablet Take 1 tablet (81 mg) by mouth daily 30 tablet 0     melatonin 1 MG TABS tablet Take 1 tablet (1 mg) by mouth nightly as needed 30 tablet 0     acetaminophen (TYLENOL) 325 MG tablet Take 1-2 tablets (325-650 mg) by mouth every 4 hours as needed for mild pain or fever 100 tablet 0     ferrous sulfate (IRON SUPPLEMENT) 325 (65 FE) MG tablet Take 1 tablet (325 mg) by mouth daily (with breakfast) (Patient taking differently: Take 325 mg by mouth 2 times daily ) 100 tablet 1     ONETOUCH DELICA LANCETS 33G MISC 1 Device daily 100 each 0     clotrimazole (LOTRIMIN) 1 % cream Please apply to groins two times daily for one week after discharge and then follow up with PCP if no improvement of your skin rashes 12 g 0     blood glucose monitoring (ONE TOUCH ULTRA 2) meter device kit          PAST SURGICAL HISTORY:  Past Surgical History:   Procedure Laterality Date     BACK SURGERY       BYPASS GRAFT ARTERY CORONARY N/A 2/6/2017    Procedure: BYPASS GRAFT ARTERY CORONARY;  Surgeon: Mikhail Quiñones MD;  Location: [facility] OR     C APPENDECTOMY  1959     CORONARY ARTERY BYPASS       HYSTERECTOMY, PASTORA  1988     MAZE PROCEDURE N/A 2/6/2017    Procedure: MAZE PROCEDURE;  Surgeon: Mikhail Quiñones MD;  Location: [facility] OR     PICC INSERTION Left 02/25/2017    5fr TL Bard PICC, 47cm (3cm external) in the L basilic vein w/ tip in the SVC RA  junction     TONSILLECTOMY  1942       ALLERGIES:     Allergies   Allergen Reactions     Heparin      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       Oxycodone      hallucinations     Adhesive Tape Itching and Rash     Diapers & Supplies Rash     Developed yeast infection from previous hospital stay       SOCIAL HISTORY:  Social History   Substance Use Topics     Smoking status: Former Smoker     Smokeless tobacco: Never Used     Alcohol use No       ROS:   Constitutional: No fever, chills, or sweats. Weight stable.   ENT: No visual disturbance, ear ache, epistaxis, sore throat.   Cardiovascular: As per HPI.   Respiratory: No cough, hemoptysis.    GI: No nausea, vomiting, hematemesis, melena, or hematochezia.   : No hematuria.   Integument: Negative.   Psychiatric: Negative.   Neuro: Negative.   Endocrinology: No significant heat or cold intolerance   Musculoskeletal: No myalgia.      Exam:  There were no vitals taken for this visit.  GEN: aao x 3, NAD  Neck: No JVD elevation  LUNGS: No wheezing or rales  HEART: S1S2 audible, no murmurs or rubs. Regular rhythm  ABDOMEN: Soft, nt, nd. +BS  EXTREMITIES: Warm calves, +DPs, no LE edema  NEURO: aao x 3, no focal deficits        Labs:  CBC RESULTS:   Lab Results   Component Value Date    WBC 7.7 04/01/2017    RBC 3.48 (L) 04/01/2017    HGB 10.6 (L) 04/01/2017    HCT 36.9 04/01/2017     (H) 04/01/2017    MCH 30.5 04/01/2017    MCHC 28.7 (L) 04/01/2017    RDW 19.5 (H) 04/01/2017     04/01/2017       BMP RESULTS:  Lab Results   Component Value Date     04/01/2017    POTASSIUM 3.6 04/01/2017    CHLORIDE 100 04/01/2017    CO2 32 04/01/2017    ANIONGAP 8 04/01/2017     (H) 04/01/2017    BUN 18 04/01/2017    CR 0.57 04/01/2017    GFRESTIMATED >90  Non  GFR Calc   04/01/2017    GFRESTBLACK >90   GFR Calc    04/01/2017    CARLY 9.2 04/01/2017        INR RESULTS:  Lab Results   Component Value Date    INR 1.50 (H) 04/01/2017    INR 7.75 (HH) 03/02/2017    INR 3.43 (H) 03/01/2017    INR 2.58 (H) 02/28/2017       Procedures:  ECHO - 2/26/17  No LV throbus noted.      Left Ventricle:  Left ventricular size is normal. The Ejection Fraction is estimated at 55-60%.  Diastolic function not assessed due to atrial fibrillation. No LV throbus  noted.     Right Ventricle:  Right ventricular function, chamber size, wall motion, and thickness are normal.        TAVO - 3/1/17   H/o ABDIEL ligation. No LA clot seen.  H/o PFO closure. The atrial septum is intact as assessed by color Doppler.  No LV thrombus seen. Mildly (EF 45-50%) reduced left ventricular function is present.  The right ventricle is normal size. Global right ventricular function is normal.    Assessment and Plan:   - 76F, PMH of HTN, HPL, DM, CVA (11/2016), CAD (s/p 3v CABG: LIMA-LAD, SVG-D1, SVG-dRCA and modified MAZE, ABDIEL ligation - 2/2016), paroxysmal AFib (on amio, [id number] - 8), HIT (on Fondaparinux), RUE DVT who is here for AFib follow up.    - Given her documented AFib was very soon after the CABG/MAZE it could just be from post op inflammation of the myocardium. Also keeping in mind the high success rate of MAZE, we would hold her amiodarone for now. She will follow up in clinic with Tracey Ambrosio in 6 months with a 2 week Zio monitor prior to her visit.   - Will recommend continuing Fondaparinux.        Ben Monae MD  Cardiovascular Disease Fellow  Pager: [phone]      I have seen, interviewed, and examined patient. I have reviewed the laboratory tests, imaging, and other investigations. I have reviewed the management plan with the patient. I discussed with the team and agree with the findings and plan in this resident/fellow/nurse practitioner's note. In addition, changes in the physical examination, assessment and plan have been incorporated into the note  by myself, as to make it a single cohesive document.       Paige Santos MD, MS  Cardiology/Cardiac EP Attending Staff            CC  Patient Care Team:  No Ref-Primary, Physician as PCP - General  ALLEN Wilson MD as MD (Cardiology)  Jessica Aguillon, RN as Nurse Coordinator (Clinical Cardiac Electrophysiology)  Paige Santos MD as MD (Cardiology)  BEREKET GARCIA

## 2017-04-17 NOTE — LETTER
4/17/2017       RE: Carlos Alberto Fenton  [address] [facility] [address]     Dear Colleague,    Thank you for referring your patient, Carlos Alberto Fenton, to the [facility]. Please see a copy of my visit note below.    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  The patient is currently on fondaparinux.        HISTORY OF PRESENT ILLNESS:  Kindly refer to my detailed note from inpatient consultation.  Briefly, the patient underwent a CABG, and right after the CABG she was put on heparin bridging to Coumadin a few days after she was noted to have significant platelet drop.  A heparin-induced thrombocytopenia YUAN was checked and was negative.  She was started on Coumadin and she quickly got into the 2-3 range and she was discharged on Coumadin alone.  Her platelets promptly recovered and actually she became thrombocythemic  for a little while she was outpatient, but she noted that she was having pain in both her great toes and other toes.  Then 2 or 3 days later she presented with blackening of her toes.  This was thought to be embolic and she was worked up for embolic reasons and was started on heparin.  She again promptly dropped her platelets and this time HIT panel was positive.  She was transitioned to argatroban and we were consulted.  When I saw her in the hospital, this was deemed to be real HIT and this was likely present at the time of her previous hospital discharge and this was an evolving process after her heparin was stopped and the HIT panel this time was just a reflection of her heparin sensitivity.  The recommendation was made to put her on fondaparinux and she was discharged from the hospital for followup.  She has been seeing Podiatry and has had some wound debridement and a plan for management of her toes with amputation versus conservative management is still up  in the air.  She comes in today for followup and decide further anticoagulation.       INTERVAL HISTORY:  The patient states that she has been doing well otherwise.  She has been on antibiotics and she has been seeing Podiatry regularly to manage these toes.  She has a followup appointment with Vascular to decide if they are going to do some surgical intervention.  She has a high copay for fondaparinux and is wondering if we could switch her to anything else.  Otherwise, she denies chest pain, fevers, chills, coughing or abdominal pain.  She has not had significant bleeding problems from Arixtra except she has significant oozing from the debrided sites.       PAST MEDICAL HISTORY:   Past Medical History:   Diagnosis Date     Antiplatelet or antithrombotic long-term use      Atrial fibrillation (H)      CAD (coronary artery disease)     2 vessel     Cancer (H)     Skin     Cerebral artery occlusion with cerebral infarction (H) 10/2016     Cerebral infarction (H) 10/2016     Diabetes (H)      Headaches      History of skin cancer      Hyperlipemias      Hypertension      Irregular heart beat      Peripheral neuropathy (H) 1990    cause unknown     Sleep apnea           PAST SURGICAL HISTORY:   Past Surgical History:   Procedure Laterality Date     BACK SURGERY       BYPASS GRAFT ARTERY CORONARY N/A 2/6/2017    Procedure: BYPASS GRAFT ARTERY CORONARY;  Surgeon: Mikhail Quiñones MD;  Location: [facility] OR     C APPENDECTOMY  1959     CORONARY ARTERY BYPASS       HYSTERECTOMY, PASTORA  1988     MAZE PROCEDURE N/A 2/6/2017    Procedure: MAZE PROCEDURE;  Surgeon: Mikhail Quiñones MD;  Location: [facility] OR     PICC INSERTION Left 02/25/2017    5fr TL Bard PICC, 47cm (3cm external) in the L basilic vein w/ tip in the SVC RA junction     TONSILLECTOMY  1942          MEDICATIONS:   Current Outpatient Prescriptions   Medication Sig Dispense Refill     fondaparinux (ARIXTRA) 7.5MG/0.6ML injection Inject 0.6 mLs (7.5 mg) Subcutaneous daily 40 Syringe  2     cephALEXin (KEFLEX) 500 MG capsule Take 1 capsule (500 mg) by mouth 3 times daily 30 capsule 0     ONE TOUCH ULTRA test strip USE AS DIRECTED TO TEST ONE TIME A  each 0     atorvastatin (LIPITOR) 40 MG tablet Take 1 tablet (40 mg) by mouth daily 90 tablet 1     furosemide (LASIX) 40 MG tablet Take 1 tablet (40 mg) by mouth 2 times daily 180 tablet 1     gabapentin (NEURONTIN) 100 MG capsule Take 1 capsule (100 mg) by mouth 2 times daily 180 capsule 1     traZODone (DESYREL) 50 MG tablet Take 0.5 tablets (25 mg) by mouth nightly as needed for sleep 45 tablet 2     venlafaxine (EFFEXOR-ER) 150 MG TB24 24 hr tablet Take 1 tablet (150 mg) by mouth daily (with breakfast) 90 each 1     potassium chloride SA (K-DUR/KLOR-CON M) 10 MEQ CR tablet Take 1 tablet (10 mEq) by mouth 2 times daily 180 tablet 1     aspirin 81 MG chewable tablet Take 1 tablet (81 mg) by mouth daily 30 tablet 0     acetaminophen (TYLENOL) 325 MG tablet Take 1-2 tablets (325-650 mg) by mouth every 4 hours as needed for mild pain or fever 100 tablet 0     ferrous sulfate (IRON SUPPLEMENT) 325 (65 FE) MG tablet Take 1 tablet (325 mg) by mouth daily (with breakfast) (Patient taking differently: Take 325 mg by mouth 2 times daily ) 100 tablet 1     ONETOUCH DELICA LANCETS 33G MISC 1 Device daily 100 each 0     blood glucose monitoring (ONE TOUCH ULTRA 2) meter device kit        magic mouthwash suspension (diphenhydramine, lidocaine, aluminum-magnesium & simethicone) Swish and swallow 5-10 mLs in mouth every 6 hours as needed for mouth sores 237 mL 1     clindamycin (CLEOCIN) 300 MG capsule Take 1 capsule (300 mg) by mouth 3 times daily 40 capsule 0          SOCIAL HISTORY:   Social History     Social History     Marital status:      Spouse name: N/A     Number of children: N/A     Years of education: N/A     Occupational History     Not on file.     Social History Main Topics     Smoking status: Former Smoker     Smokeless tobacco:  "Never Used     Alcohol use No     Drug use: No     Sexual activity: No     Other Topics Concern     Parent/Sibling W/ Cabg, Mi Or Angioplasty Before 65f 55m? Yes     Social History Narrative            FAMILY HISTORY:   Family History   Problem Relation Age of Onset     DIABETES Mother      C.A.D. Mother      DIABETES Father      C.A.D. Father      Cardiovascular Sister      blood clot          ALLERGIES:      Allergies   Allergen Reactions     Heparin      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       Oxycodone      hallucinations     Adhesive Tape Itching and Rash     Diapers & Supplies Rash     Developed yeast infection from previous hospital stay        PHYSICAL EXAMINATION:   VITAL SIGNS:   /46  Pulse 61  Resp 18  Ht 1.575 m (5' 2\")  Wt 78 kg (172 lb)  SpO2 98%  BMI 31.46 kg/m2    GENERAL:  Alert and oriented x3, in no apparent distress.   HEENT:  Moist mucous membranes.   SKIN:  No areas of bruising otherwise except for small bumps.     EXTREMITIES:  I did not unwrap her toes, but one of the dressings was soaking through.  She is due for a dressing change anyway at this point.  There is no swelling on her arm that was previously swollen when I saw her in the hospital.      LABORATORY:   Reviewed in EMR, she will get repeat labs.     IMAGING:  No further imaging.      ASSESSMENT:   1.  Heparin-induced thrombocytopenia, on Arixtra.   2.  Right upper extremity PICC line-associated deep venous thrombosis.        PLAN:  I discussed with the patient that the typical treatment for HIT-related thrombosis is for 3 months with Arixtra transitioning to Coumadin.  For her, given the fact that surgical management is on chart off and on, Arixtra may be a good approach for her, but given her copay once her surgical plan has been talked out with Vascular Surgery, we can come up with a plan for her to " transition on and off around the time of surgery.  She should be done with anticoagulation from an HIT standpoint in 3 months' time and should have heparin/Lovenox on her allergies for the rest of her life and not be exposed to it again.  It seems like from a cardiac standpoint she would need long-term anticoagulation and she should be fine with Coumadin on that note.       She had a DVT in the right upper extremity which was PICC line-associated in the setting of HIT.  The patient is wearing a band which she really would like to get rid of.  We would repeat an ultrasound and if the ultrasound is clear of the DVT, she can get the band off.        We will follow through regarding her surgical management and then come up with a plan going forward.     I spent 35 minutes in the care of this patient >50% of which was spent in coordinating and counseling.    Angelina Bernal   of Medicine   Hematology and medical Oncology   [facility]    Apr 17, 2017      Again, thank you for allowing me to participate in the care of your patient.      Sincerely,    Angelina Bernal MD

## 2017-04-17 NOTE — LETTER
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  The patient is currently on fondaparinux.        HISTORY OF PRESENT ILLNESS:  Kindly refer to my detailed note from inpatient consultation.  Briefly, the patient underwent a CABG, and right after the CABG she was put on heparin bridging to Coumadin a few days after she was noted to have significant platelet drop.  A heparin-induced thrombocytopenia YUAN was checked and was negative.  She was started on Coumadin and she quickly got into the 2-3 range and she was discharged on Coumadin alone.  Her platelets promptly recovered and actually she became thrombocythemic  for a little while she was outpatient, but she noted that she was having pain in both her great toes and other toes.  Then 2 or 3 days later she presented with blackening of her toes.  This was thought to be embolic and she was worked up for embolic reasons and was started on heparin.  She again promptly dropped her platelets and this time HIT panel was positive.  She was transitioned to argatroban and we were consulted.  When I saw her in the hospital, this was deemed to be real HIT and this was likely present at the time of her previous hospital discharge and this was an evolving process after her heparin was stopped and the HIT panel this time was just a reflection of her heparin sensitivity.  The recommendation was made to put her on fondaparinux and she was discharged from the hospital for followup.  She has been seeing Podiatry and has had some wound debridement and a plan for management of her toes with amputation versus conservative management is still up in the air.  She comes in today for followup and decide further anticoagulation.       INTERVAL HISTORY:  The patient states that she has been doing well  otherwise.  She has been on antibiotics and she has been seeing Podiatry regularly to manage these toes.  She has a followup appointment with Vascular to decide if they are going to do some surgical intervention.  She has a high copay for fondaparinux and is wondering if we could switch her to anything else.  Otherwise, she denies chest pain, fevers, chills, coughing or abdominal pain.  She has not had significant bleeding problems from Arixtra except she has significant oozing from the debrided sites.       PAST MEDICAL HISTORY:   Past Medical History:   Diagnosis Date     Antiplatelet or antithrombotic long-term use      Atrial fibrillation (H)      CAD (coronary artery disease)     2 vessel     Cancer (H)     Skin     Cerebral artery occlusion with cerebral infarction (H) 10/2016     Cerebral infarction (H) 10/2016     Diabetes (H)      Headaches      History of skin cancer      Hyperlipemias      Hypertension      Irregular heart beat      Peripheral neuropathy (H) 1990    cause unknown     Sleep apnea           PAST SURGICAL HISTORY:   Past Surgical History:   Procedure Laterality Date     BACK SURGERY       BYPASS GRAFT ARTERY CORONARY N/A 2/6/2017    Procedure: BYPASS GRAFT ARTERY CORONARY;  Surgeon: Mikhail Quiñones MD;  Location: [facility] OR     C APPENDECTOMY  1959     CORONARY ARTERY BYPASS       HYSTERECTOMY, PASTORA  1988     MAZE PROCEDURE N/A 2/6/2017    Procedure: MAZE PROCEDURE;  Surgeon: Mikhail Quiñones MD;  Location: [facility] OR     PICC INSERTION Left 02/25/2017    5fr TL Bard PICC, 47cm (3cm external) in the L basilic vein w/ tip in the SVC RA junction     TONSILLECTOMY  1942          MEDICATIONS:   Current Outpatient Prescriptions   Medication Sig Dispense Refill     fondaparinux (ARIXTRA) 7.5MG/0.6ML injection Inject 0.6 mLs (7.5 mg) Subcutaneous daily 40 Syringe 2     cephALEXin (KEFLEX) 500 MG capsule Take 1 capsule (500 mg) by mouth 3 times daily 30 capsule 0     ONE TOUCH ULTRA test strip USE AS DIRECTED TO  TEST ONE TIME A  each 0     atorvastatin (LIPITOR) 40 MG tablet Take 1 tablet (40 mg) by mouth daily 90 tablet 1     furosemide (LASIX) 40 MG tablet Take 1 tablet (40 mg) by mouth 2 times daily 180 tablet 1     gabapentin (NEURONTIN) 100 MG capsule Take 1 capsule (100 mg) by mouth 2 times daily 180 capsule 1     traZODone (DESYREL) 50 MG tablet Take 0.5 tablets (25 mg) by mouth nightly as needed for sleep 45 tablet 2     venlafaxine (EFFEXOR-ER) 150 MG TB24 24 hr tablet Take 1 tablet (150 mg) by mouth daily (with breakfast) 90 each 1     potassium chloride SA (K-DUR/KLOR-CON M) 10 MEQ CR tablet Take 1 tablet (10 mEq) by mouth 2 times daily 180 tablet 1     aspirin 81 MG chewable tablet Take 1 tablet (81 mg) by mouth daily 30 tablet 0     acetaminophen (TYLENOL) 325 MG tablet Take 1-2 tablets (325-650 mg) by mouth every 4 hours as needed for mild pain or fever 100 tablet 0     ferrous sulfate (IRON SUPPLEMENT) 325 (65 FE) MG tablet Take 1 tablet (325 mg) by mouth daily (with breakfast) (Patient taking differently: Take 325 mg by mouth 2 times daily ) 100 tablet 1     ONETOUCH DELICA LANCETS 33G MISC 1 Device daily 100 each 0     blood glucose monitoring (ONE TOUCH ULTRA 2) meter device kit        magic mouthwash suspension (diphenhydramine, lidocaine, aluminum-magnesium & simethicone) Swish and swallow 5-10 mLs in mouth every 6 hours as needed for mouth sores 237 mL 1     clindamycin (CLEOCIN) 300 MG capsule Take 1 capsule (300 mg) by mouth 3 times daily 40 capsule 0          SOCIAL HISTORY:   Social History     Social History     Marital status:      Spouse name: N/A     Number of children: N/A     Years of education: N/A     Occupational History     Not on file.     Social History Main Topics     Smoking status: Former Smoker     Smokeless tobacco: Never Used     Alcohol use No     Drug use: No     Sexual activity: No     Other Topics Concern     Parent/Sibling W/ Cabg, Mi Or Angioplasty Before 65f  "55m? Yes     Social History Narrative            FAMILY HISTORY:   Family History   Problem Relation Age of Onset     DIABETES Mother      C.A.D. Mother      DIABETES Father      C.A.D. Father      Cardiovascular Sister      blood clot          ALLERGIES:      Allergies   Allergen Reactions     Heparin      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       Oxycodone      hallucinations     Adhesive Tape Itching and Rash     Diapers & Supplies Rash     Developed yeast infection from previous hospital stay        PHYSICAL EXAMINATION:   VITAL SIGNS:   /46  Pulse 61  Resp 18  Ht 1.575 m (5' 2\")  Wt 78 kg (172 lb)  SpO2 98%  BMI 31.46 kg/m2    GENERAL:  Alert and oriented x3, in no apparent distress.   HEENT:  Moist mucous membranes.   SKIN:  No areas of bruising otherwise except for small bumps.     EXTREMITIES:  I did not unwrap her toes, but one of the dressings was soaking through.  She is due for a dressing change anyway at this point.  There is no swelling on her arm that was previously swollen when I saw her in the hospital.      LABORATORY:   Reviewed in EMR, she will get repeat labs.     IMAGING:  No further imaging.      ASSESSMENT:   1.  Heparin-induced thrombocytopenia, on Arixtra.   2.  Right upper extremity PICC line-associated deep venous thrombosis.        PLAN:  I discussed with the patient that the typical treatment for HIT-related thrombosis is for 3 months with Arixtra transitioning to Coumadin.  For her, given the fact that surgical management is on chart off and on, Arixtra may be a good approach for her, but given her copay once her surgical plan has been talked out with Vascular Surgery, we can come up with a plan for her to transition on and off around the time of surgery.  She should be done with anticoagulation from an HIT standpoint in 3 months' time and should have " heparin/Lovenox on her allergies for the rest of her life and not be exposed to it again.  It seems like from a cardiac standpoint she would need long-term anticoagulation and she should be fine with Coumadin on that note.       She had a DVT in the right upper extremity which was PICC line-associated in the setting of HIT.  The patient is wearing a band which she really would like to get rid of.  We would repeat an ultrasound and if the ultrasound is clear of the DVT, she can get the band off.        We will follow through regarding her surgical management and then come up with a plan going forward.     I spent 35 minutes in the care of this patient >50% of which was spent in coordinating and counseling.    Angelina Bernal   of Medicine   Hematology and medical Oncology   [facility]    Apr 17, 2017

## 2017-04-17 NOTE — NURSING NOTE
Chief Complaint   Patient presents with     Follow Up For     2 mo f/u AF s/p CABG, MAZE, ABDIEL ligation, PFO repair with AF recurrence with admission 2/24/17. EKG

## 2017-04-17 NOTE — MR AVS SNAPSHOT
After Visit Summary   4/17/2017    Carlos Alberto Fenton    MRN: [id number]           Patient Information     Date Of Birth          1940        Visit Information        Provider Department      4/17/2017 2:20 PM Paige Santos MD [facility]        Today's Diagnoses     Paroxysmal atrial fibrillation (H)          Care Instructions    Cardiology Provider you saw in clinic today: Dr. Santos    Medication Changes:     1. Stop amiodarone     Follow-up Visit:  6 months with Tracey Taylor NP with a ziopatch 1 month prior        Please contact us via Numerext for questions or concerns.    Jessica Aguillon, RN   Electrophysiology Nurse Coordinator.  [phone]    If your question concerns the schedule/appointment times, contact:  [facility]   [phone]    Device Clinic (Pacemakers, ICD, Loop Recorders)   [phone]      You will receive all normal lab and testing results via InterAtlas or mail if not reviewed in clinic today.   If you need a medication refill please contact your pharmacy.    As always, thank you for trusting us with your health care needs!          Follow-ups after your visit        Follow-up notes from your care team     Return in about 6 months (around 10/17/2017) for Afib, Tracey Taylor NP.      Your next 10 appointments already scheduled     Apr 17, 2017  4:00 PM CDT   (Arrive by 3:45 PM)   Return Visit with Angelina Bernal MD   [facility] ([facility])    [address]  2nd Floor  [facility] [id number] [phone]            Apr 20, 2017  4:30 PM CDT   (Arrive by 4:15 PM)   Return Visit with Easton Torres DPM   [facility] ([facility])    [address]  4th Floor  [facility] [id number] [phone]            Apr 24, 2017  2:00 PM CDT   (Arrive by 1:45 PM)   New Vascular Visit with Leah Santamaria MD   [facility] ([facility]  [facility] [place])    [address] [place] [address]  3rd [facility] [address]   [phone]            Apr 27, 2017  4:00 PM CDT   (Arrive by 3:45 PM)   Return Visit with Easton Torres DPM    [facility] ([facility] [place])    [address] [place] [address]  4th [facility] [address]   [phone]            Jul 14, 2017  1:30 PM CDT   (Arrive by 1:15 PM)   Return Visit with Star Lobato MD   [facility] ([facility] [place])    [address] [place] [address]  3rd [facility] [address] [phone]            Sep 11, 2017  2:00 PM CDT   (Arrive by 1:45 PM)   HOLTER MONITOR VISIT with  Cvc Monitor [facility] ([facility])    [address] [place] [address]  3rd [facility] [address] [phone]            Oct 19, 2017  2:30 PM CDT   (Arrive by 2:15 PM)   RETURN ATRIAL FIBULATION VISIT with HCACORTA Joshi [facility] ([facility] [place])    [address] [place] [address]  3rd [facility] [address] [phone]              Future tests that were ordered for you today     Open Future Orders        Priority Expected Expires Ordered    Comprehensive metabolic panel Routine 4/17/2017 4/27/2017 4/17/2017    CBC with platelets differential Routine 4/17/2017 4/27/2017 4/17/2017    Arixtra level Routine 4/17/2017 4/27/2017 4/17/2017            Who to contact     If you have questions or need follow up information about today's clinic visit or your schedule please contact [facility] directly at [phone].  Normal or non-critical lab and imaging results will be communicated to you by MyChart, letter or phone within 4 business days after the clinic has received the results. If you do not hear from us within 7 days, please contact the clinic through MyChart or phone. If you have a critical or abnormal lab result, we will notify you by  "phone as soon as possible.  Submit refill requests through Retevo or call your pharmacy and they will forward the refill request to us. Please allow 3 business days for your refill to be completed.          Additional Information About Your Visit        Retevo Information     Retevo gives you secure access to your electronic health record. If you see a primary care provider, you can also send messages to your care team and make appointments. If you have questions, please call your primary care clinic.  If you do not have a primary care provider, please call [phone] and they will assist you.        Care EveryWhere ID     This is your Care EveryWhere ID. This could be used by other organizations to access your [place] medical records  [id number]        Your Vitals Were     Pulse Height Pulse Oximetry BMI (Body Mass Index)          61 1.575 m (5' 2\") 98% 31.46 kg/m2         Blood Pressure from Last 3 Encounters:   04/17/17 109/46   04/01/17 120/85   03/22/17 108/66    Weight from Last 3 Encounters:   04/17/17 78 kg (172 lb)   04/01/17 75.8 kg (167 lb)   03/22/17 76.7 kg (169 lb)              We Performed the Following     EKG 12-lead, tracing only (Future)          Today's Medication Changes          These changes are accurate as of: 4/17/17  3:15 PM.  If you have any questions, ask your nurse or doctor.               These medicines have changed or have updated prescriptions.        Dose/Directions    ferrous sulfate 325 (65 FE) MG tablet   Commonly known as:  IRON SUPPLEMENT   This may have changed:  when to take this   Used for:  S/P CABG (coronary artery bypass graft)        Dose:  325 mg   Take 1 tablet (325 mg) by mouth daily (with breakfast)   Quantity:  100 tablet   Refills:  1         Stop taking these medicines if you haven't already. Please contact your care team if you have questions.     amiodarone 200 MG tablet   Commonly known as:  PACERONE/CODARONE   Stopped by:  Paige Santos MD           "          Primary Care Provider    Physician No Ref-Primary       No address on file        Thank you!     Thank you for choosing [facility]  for your care. Our goal is always to provide you with excellent care. Hearing back from our patients is one way we can continue to improve our services. Please take a few minutes to complete the written survey that you may receive in the mail after your visit with us. Thank you!             Your Updated Medication List - Protect others around you: Learn how to safely use, store and throw away your medicines at www.disposemymeds.org.          This list is accurate as of: 4/17/17  3:15 PM.  Always use your most recent med list.                   Brand Name Dispense Instructions for use    acetaminophen 325 MG tablet    TYLENOL    100 tablet    Take 1-2 tablets (325-650 mg) by mouth every 4 hours as needed for mild pain or fever       aspirin 81 MG chewable tablet     30 tablet    Take 1 tablet (81 mg) by mouth daily       atorvastatin 40 MG tablet    LIPITOR    90 tablet    Take 1 tablet (40 mg) by mouth daily       blood glucose monitoring meter device kit          cephALEXin 500 MG capsule    KEFLEX    30 capsule    Take 1 capsule (500 mg) by mouth 3 times daily       clotrimazole 1 % cream    LOTRIMIN    12 g    Please apply to groins two times daily for one week after discharge and then follow up with PCP if no improvement of your skin rashes       ferrous sulfate 325 (65 FE) MG tablet    IRON SUPPLEMENT    100 tablet    Take 1 tablet (325 mg) by mouth daily (with breakfast)       fondaparinux 7.5MG/0.6ML injection    ARIXTRA    40 Syringe    Inject 0.6 mLs (7.5 mg) Subcutaneous daily       furosemide 40 MG tablet    LASIX    180 tablet    Take 1 tablet (40 mg) by mouth 2 times daily       gabapentin 100 MG capsule    NEURONTIN    180 capsule    Take 1 capsule (100 mg) by mouth 2 times daily       melatonin 1 MG Tabs tablet     30 tablet    Take 1 tablet (1 mg) by  mouth nightly as needed       ONE TOUCH ULTRA test strip   Generic drug:  blood glucose monitoring     100 each    USE AS DIRECTED TO TEST ONE TIME A DAY       ONETOUCH DELICA LANCETS 33G Misc     100 each    1 Device daily       potassium chloride SA 10 MEQ CR tablet    K-DUR/KLOR-CON M    180 tablet    Take 1 tablet (10 mEq) by mouth 2 times daily       traZODone 50 MG tablet    DESYREL    45 tablet    Take 0.5 tablets (25 mg) by mouth nightly as needed for sleep       venlafaxine 150 MG Tb24 24 hr tablet    EFFEXOR-ER    90 each    Take 1 tablet (150 mg) by mouth daily (with breakfast)

## 2017-04-17 NOTE — MR AVS SNAPSHOT
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)    -  1    Long-term (current) use of anticoagulants [Z79.01]        Chronic deep vein thrombosis (DVT) of axillary vein of right upper extremity (H)           Follow-ups after your visit        Your next 10 appointments already scheduled     Apr 17, 2017  5:15 PM CDT   Lab with  LAB   [facility] ([facility])    [address] [place] [address]  1st [facility] [address] [phone]            Apr 17, 2017  5:30 PM CDT   US VENOUS with UCUSV1   [facility] Imaging Center US ([facility])    [address] [place] [id number] [phone]           Please bring a list of your medicines (including vitamins, minerals and over-the-counter drugs). Also, tell your doctor about any allergies you may have. Wear comfortable clothes and leave your valuables at home.  You do not need to do anything special to prepare for your exam.  Please call the Imaging Department at your exam site with any questions.            Apr 20, 2017  4:30 PM CDT   (Arrive by 4:15 PM)   Return Visit with Easton Torres DPM   [facility] Wound [facility] ([facility])    [address] [place] [address]  4th [facility] [id number] [phone]            Apr 24, 2017  2:00 PM CDT   (Arrive by 1:45 PM)   New Vascular Visit with Leah Santamaria MD   [facility] ([facility])    [address] [place] [address]  3rd [facility] [id number] [phone]            Apr 27, 2017  4:00 PM CDT   (Arrive by 3:45 PM)   Return Visit with Easton Torres DPM   [facility] Wound  [facility] ([facility])    [address]  4th Floor  [facility] [address]   [phone]            May 01, 2017  2:00 PM CDT   [facility] Lab Draw with  [facility] LAB DRAW   [facility] Draw ([facility])    [address]  2nd [facility] [address]   [phone]            May 01, 2017  2:30 PM CDT   (Arrive by 2:15 PM)   Return Visit with Angelina Bernal MD   [facility] ([facility])    [address] [place] [address]  2nd [facility] [address]   [phone]            Jul 14, 2017  1:30 PM CDT   (Arrive by 1:15 PM)   Return Visit with Star Lobato MD   [facility] ([facility])    [address] [place] [address]  3rd [facility] [address]   [phone]            Sep 11, 2017  2:00 PM CDT   (Arrive by 1:45 PM)   HOLTER MONITOR VISIT with  Cvc Monitor [facility] ([facility])    [address]  3rd [facility] [address] [phone]            Oct 19, 2017  2:30 PM CDT   (Arrive by 2:15 PM)   RETURN ATRIAL FIBULATION VISIT with CHACORTA Joshi [facility] ([facility])    [address] [place] [address]  3rd [facility] [address] [phone]              Future tests that were ordered for you today     Open Future Orders        Priority Expected Expires Ordered    US Upper Extremity Venous Duplex Right Routine  4/17/2018 4/17/2017    Comprehensive metabolic panel Routine 4/17/2017 4/27/2017 4/17/2017    CBC with platelets differential Routine 4/17/2017 4/27/2017 4/17/2017    Arixtra level Routine 4/17/2017 4/27/2017 4/17/2017            Who to contact     If you have questions or need follow up information about today's clinic visit or your schedule please contact [facility] directly at [phone].  Normal or  "non-critical lab and imaging results will be communicated to you by MyChart, letter or phone within 4 business days after the clinic has received the results. If you do not hear from us within 7 days, please contact the clinic through Software Artistry or phone. If you have a critical or abnormal lab result, we will notify you by phone as soon as possible.  Submit refill requests through Software Artistry or call your pharmacy and they will forward the refill request to us. Please allow 3 business days for your refill to be completed.          Additional Information About Your Visit        Software Artistry Information     Software Artistry gives you secure access to your electronic health record. If you see a primary care provider, you can also send messages to your care team and make appointments. If you have questions, please call your primary care clinic.  If you do not have a primary care provider, please call [phone] and they will assist you.        Care EveryWhere ID     This is your Care EveryWhere ID. This could be used by other organizations to access your [place] medical records  [id number]        Your Vitals Were     Pulse Respirations Height Pulse Oximetry BMI (Body Mass Index)       61 18 1.575 m (5' 2\") 98% 31.46 kg/m2        Blood Pressure from Last 3 Encounters:   04/17/17 109/46   04/17/17 109/46   04/01/17 120/85    Weight from Last 3 Encounters:   04/17/17 78 kg (172 lb)   04/17/17 78 kg (172 lb)   04/01/17 75.8 kg (167 lb)                 Today's Medication Changes          These changes are accurate as of: 4/17/17  5:13 PM.  If you have any questions, ask your nurse or doctor.               These medicines have changed or have updated prescriptions.        Dose/Directions    ferrous sulfate 325 (65 FE) MG tablet   Commonly known as:  IRON SUPPLEMENT   This may have changed:  when to take this   Used for:  S/P CABG (coronary artery bypass graft)        Dose:  325 mg   Take 1 tablet (325 mg) by mouth daily (with breakfast) "   Quantity:  100 tablet   Refills:  1         Stop taking these medicines if you haven't already. Please contact your care team if you have questions.     amiodarone 200 MG tablet   Commonly known as:  PACERONE/CODARONE   Stopped by:  Paige Santos MD           clotrimazole 1 % cream   Commonly known as:  LOTRIMIN   Stopped by:  Angelina Bernal MD           melatonin 1 MG Tabs tablet   Stopped by:  Angelina Bernal MD                    Primary Care Provider    Physician No Ref-Primary       No address on file        Thank you!     Thank you for choosing [facility]  for your care. Our goal is always to provide you with excellent care. Hearing back from our patients is one way we can continue to improve our services. Please take a few minutes to complete the written survey that you may receive in the mail after your visit with us. Thank you!             Your Updated Medication List - Protect others around you: Learn how to safely use, store and throw away your medicines at www.disposemymeds.org.          This list is accurate as of: 4/17/17  5:13 PM.  Always use your most recent med list.                   Brand Name Dispense Instructions for use    acetaminophen 325 MG tablet    TYLENOL    100 tablet    Take 1-2 tablets (325-650 mg) by mouth every 4 hours as needed for mild pain or fever       aspirin 81 MG chewable tablet     30 tablet    Take 1 tablet (81 mg) by mouth daily       atorvastatin 40 MG tablet    LIPITOR    90 tablet    Take 1 tablet (40 mg) by mouth daily       blood glucose monitoring meter device kit          cephALEXin 500 MG capsule    KEFLEX    30 capsule    Take 1 capsule (500 mg) by mouth 3 times daily       ferrous sulfate 325 (65 FE) MG tablet    IRON SUPPLEMENT    100 tablet    Take 1 tablet (325 mg) by mouth daily (with breakfast)       fondaparinux 7.5MG/0.6ML injection    ARIXTRA    40 Syringe    Inject 0.6 mLs (7.5 mg) Subcutaneous daily       furosemide 40 MG tablet     LASIX    180 tablet    Take 1 tablet (40 mg) by mouth 2 times daily       gabapentin 100 MG capsule    NEURONTIN    180 capsule    Take 1 capsule (100 mg) by mouth 2 times daily       ONE TOUCH ULTRA test strip   Generic drug:  blood glucose monitoring     100 each    USE AS DIRECTED TO TEST ONE TIME A DAY       ONETOUCH DELICA LANCETS 33G Misc     100 each    1 Device daily       potassium chloride SA 10 MEQ CR tablet    K-DUR/KLOR-CON M    180 tablet    Take 1 tablet (10 mEq) by mouth 2 times daily       traZODone 50 MG tablet    DESYREL    45 tablet    Take 0.5 tablets (25 mg) by mouth nightly as needed for sleep       venlafaxine 150 MG Tb24 24 hr tablet    EFFEXOR-ER    90 each    Take 1 tablet (150 mg) by mouth daily (with breakfast)

## 2017-04-18 ENCOUNTER — DOCUMENTATION ONLY (OUTPATIENT)
Dept: CARE COORDINATION | Facility: CLINIC | Age: 77
End: 2017-04-18

## 2017-04-18 LAB — FONDAPARINUX SERPL-MCNC: 0.92 MCG/ML

## 2017-04-18 NOTE — PROGRESS NOTES
[facility] now requests orders and shares plan of care/discharge summaries for some patients through [place].  Please REPLY TO THIS MESSAGE in order to give authorization for orders when needed.  This is considered a verbal order, you will still receive a faxed copy of orders for signature.  Thank you for your assistance in improving collaboration for our patients.    ORDER    Home Care is unable to continue with daily wound care visits under Medicare. Medicare requires a skilled need for home care visits and daily wound care is not considered a skill under Medicare.  Client does not have a willing/abled caregiver to perform wound care to LLE.  Caregiver is however willing to perform wound care to toes.      Requesting change in wound care to LLE wound.     Could an antimicrobial packing strip be used in place of the current nugauze with dressing change every other day? Client would be able to redress the top dressing for drainage control daily, but the packing would be changed every other day.     Please advise.    Ester Pineda RN    [phone]  [email or web address]

## 2017-04-19 LAB — INTERPRETATION ECG - MUSE: NORMAL

## 2017-04-19 NOTE — PROGRESS NOTES
[facility] calling for orders.  See below.  Diana is out will route to Mary Lou.      4:30 Chantal calling again.  I was mistaken Diana and Mary Lou are out today.  Will route to Dr. Torres.  She reports needs frequency of wound care adjusted as below.  Needs issues addressed ASAP.  Dr. Torres will be in clinic tomorrow.  First patient 7:30, if no note in Epic, she will call the clinic in the morning.  [facility] does have follow up with Dr. Torres 4/20, but not until later in the day.

## 2017-04-20 ENCOUNTER — OFFICE VISIT (OUTPATIENT)
Dept: WOUND CARE | Facility: CLINIC | Age: 77
End: 2017-04-20

## 2017-04-20 ENCOUNTER — DOCUMENTATION ONLY (OUTPATIENT)
Dept: CARE COORDINATION | Facility: CLINIC | Age: 77
End: 2017-04-20

## 2017-04-20 DIAGNOSIS — L97.922 ULCER OF LOWER LIMB, LEFT, WITH FAT LAYER EXPOSED (H): ICD-10-CM

## 2017-04-20 DIAGNOSIS — A49.01 INFECTION DUE TO STAPHYLOCOCCUS AUREUS: ICD-10-CM

## 2017-04-20 DIAGNOSIS — I96 TOE GANGRENE (H): Primary | ICD-10-CM

## 2017-04-20 LAB
BACTERIA SPEC CULT: NORMAL
Lab: NORMAL
MICRO REPORT STATUS: NORMAL
SPECIMEN SOURCE: NORMAL

## 2017-04-20 RX ORDER — CLINDAMYCIN HCL 300 MG
300 CAPSULE ORAL 3 TIMES DAILY
Qty: 40 CAPSULE | Refills: 0 | Status: SHIPPED | OUTPATIENT
Start: 2017-04-20 | End: 2017-04-28 | Stop reason: ALTCHOICE

## 2017-04-20 NOTE — LETTER
4/20/2017       RE: Carlos Alberto Fenton  [address]  [facility] [address]     Dear Colleague,    Thank you for referring your patient, Carlos Alberto Fenton, to the [facility]OUND [facility] at [facility]. Please see a copy of my visit note below.    Chief Complaint:   Chief Complaint   Patient presents with     RECHECK     wound check           Allergies   Allergen Reactions     Heparin      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       Oxycodone      hallucinations     Adhesive Tape Itching and Rash     Diapers & Supplies Rash     Developed yeast infection from previous hospital stay         Subjective: Carlos Alberto is a 76 year old female who presents to the clinic today for a follow up of toe gangrene and leg wounds. She relates that home nursing is still coming in to change the dressings. No new complaints today. She presents with her daughter.     Objective  Two surgical dehiscence wounds are noted at left medial lower leg below the knee measuring 3 mm x 3mm x 3 cm and 3 mm x 5 mm x 2 cm. Still, I cannot find a connection between the two wounds.     Yousif Classification: Unstageable      Wound base: Not Visible      Edges: erythematous      Drainage: moderate/serous and Slight/purulent      Odor: no      Undermining: no      Tunneling: circumferential in the distal wound, at about 6 o'clock in the proximal wound.      Bone Exposure: No      Clinical Signs of Infection: surrounding tissue is erythematous and indurated.       Wounds were washed with scrub care and rinsed copiously with normal saline. No sharp debridement was performed of these wounds today.      The gangrenous toes were examined. The left digits are free of eschar today. The distal aspects of these digits are still open, but appear to be viable today.   The right digits are unchanged. All devitalized tissue  of the right hallux was sharply debrided with #15 blade, subcutaneous at deepest. No infection is noted. The proximal portion of the right hallux is still moist fibrotic tissue, however there is no infection noted.   Areas have demarcated and no new tissue necrosis is noted today.       Assessment: Gangrene BL toes - left is healing and showing signs of increased blood flow. Right remains the same.   Left surgical wound dehiscence - Grew susceptible Staph aureus on culture     Plan:   - Pt seen and evaluated  - Some flaking was removed from the right hallux. No debridement was performed today.  - I changed her abx to PO clinda as directed by culture. She can stop the previous abx today and start this.   - She will be seeing Dr. Santamaria Monday. I will connect with her to coordinate a plan. Also hopeful that Dr. Santamaria will look at the leg wound because of the underlying vascular procedure that was performed.   - I think we can salvage the left toes. I changed the dressing on these to Iodosorb. Iodine on the right. Iodosorb packing on the left leg - to be changed QOD by home nursing.   - Pt to return to clinic in 1 week. Will await Dr. Santamaria's recs.       Again, thank you for allowing me to participate in the care of your patient.      Sincerely,    Easton Torres DPM

## 2017-04-20 NOTE — PATIENT INSTRUCTIONS
Pack the left leg wounds with iodoform packing. Apply the Iodosorb paste to the left toes. Apply Iodine-soaked gauze to the right toes. Change these dressings daily, except the packing. The home nurse will change the packing every other day.

## 2017-04-20 NOTE — MR AVS SNAPSHOT
After Visit Summary   4/20/2017    Carlos Alberto Fenton    MRN: [id number]           Patient Information     Date Of Birth          1940        Visit Information        Provider Department      4/20/2017 4:30 PM Easton Torres DPM [facility] Wound Care        Today's Diagnoses     Toe gangrene (H)    -  1    Ulcer of lower limb, left, with fat layer exposed (H)        Infection due to Staphylococcus aureus          Care Instructions    Pack the left leg wounds with iodoform packing. Apply the Iodosorb paste to the left toes. Apply Iodine-soaked gauze to the right toes. Change these dressings daily, except the packing. The home nurse will change the packing every other day.         Follow-ups after your visit        Your next 10 appointments already scheduled     Apr 24, 2017  2:00 PM CDT   (Arrive by 1:45 PM)   New Vascular Visit with Leah Santamaria MD   [facility] ([facility])    [address] [place] [address]  3rd [facility] [address]   [phone]            Apr 27, 2017  4:00 PM CDT   (Arrive by 3:45 PM)   Return Visit with Easton Torres DPM   [facility] Wound Care ([facility])    [address] [place] [address]  4th Floor  [facility] [address]   [phone]            May 01, 2017  2:00 PM CDT   [facility] Draw with  [facility] DRAW   [facility] Lab Draw ([facility])    [address] [place] [address]  2nd Floor  [facility] [address]   [phone]            May 01, 2017  2:30 PM CDT   (Arrive by 2:15 PM)   Return Visit with Angelina Bernal MD   [facility] ([facility])    [address] [place] [address]  2nd [facility] [address]   [phone]            Jul 14, 2017  1:30 PM CDT   (Arrive by 1:15 PM)   Return Visit with Star Lobato MD   [facility] ([facility])    [address] [place] [address]  [facility] [id number] [phone]            Sep 11, 2017  2:00 PM CDT   (Arrive by 1:45 PM)   HOLTER MONITOR VISIT with  Cvc Monitor Tech, TH   [facility] ([facility])    [address] [place] [id number] [phone]            Oct 19, 2017  2:30 PM CDT   (Arrive by 2:15 PM)   RETURN ATRIAL FIBULATION VISIT with CHACORTA Joshi CNP   [facility] ([facility])    [address] [place] [id number] [phone]              Who to contact     Please call your clinic at [phone] to:    Ask questions about your health    Make or cancel appointments    Discuss your medicines    Learn about your test results    Speak to your doctor   If you have compliments or concerns about an experience at your clinic, or if you wish to file a complaint, please contact [facility] Patient Relations at [phone] or email us at [email or web address]         Additional Information About Your Visit        [employer] Information     [employer] gives you secure access to your electronic health record. If you see a primary care provider, you can also send messages to your care team and make appointments. If you have questions, please call your primary care clinic.  If you do not have a primary care provider, please call [phone] and they will assist you.      [employer] group is an electronic gateway that provides easy, online access to your medical records. With [employer], you can request a clinic appointment, read your test results, renew a prescription or communicate with your care team.     To access your existing account, please contact your [facility] or call [phone] for assistance.        Care EveryWhere ID     This is your Care EveryWhere ID. This could be used by other organizations to access your [facility]  records  [id number]         Blood Pressure from Last 3 Encounters:   04/17/17 109/46   04/17/17 109/46   04/01/17 120/85    Weight from Last 3 Encounters:   04/17/17 172 lb   04/17/17 172 lb   04/01/17 167 lb              Today, you had the following     No orders found for display         Today's Medication Changes          These changes are accurate as of: 4/20/17 11:59 PM.  If you have any questions, ask your nurse or doctor.               Start taking these medicines.        Dose/Directions    clindamycin 300 MG capsule   Commonly known as:  CLEOCIN   Used for:  Ulcer of lower limb, left, with fat layer exposed (H), Infection due to Staphylococcus aureus   Started by:  Easton Torres DPM        Dose:  300 mg   Take 1 capsule (300 mg) by mouth 3 times daily   Quantity:  40 capsule   Refills:  0         These medicines have changed or have updated prescriptions.        Dose/Directions    ferrous sulfate 325 (65 FE) MG tablet   Commonly known as:  IRON SUPPLEMENT   This may have changed:  when to take this   Used for:  S/P CABG (coronary artery bypass graft)        Dose:  325 mg   Take 1 tablet (325 mg) by mouth daily (with breakfast)   Quantity:  100 tablet   Refills:  1            Where to get your medicines      These medications were sent to [facility] [address] [place] [address]     Phone:  [phone]     clindamycin 300 MG capsule                Primary Care Provider    Physician No Ref-Primary       No address on file        Thank you!     Thank you for choosing [facility]  for your care. Our goal is always to provide you with excellent care. Hearing back from our patients is one way we can continue to improve our services. Please take a few minutes to complete the written survey that you may receive in the mail after your visit with us. Thank you!             Your Updated Medication List - Protect others around you: Learn  how to safely use, store and throw away your medicines at www.disposemymeds.org.          This list is accurate as of: 4/20/17 11:59 PM.  Always use your most recent med list.                   Brand Name Dispense Instructions for use    acetaminophen 325 MG tablet    TYLENOL    100 tablet    Take 1-2 tablets (325-650 mg) by mouth every 4 hours as needed for mild pain or fever       aspirin 81 MG chewable tablet     30 tablet    Take 1 tablet (81 mg) by mouth daily       atorvastatin 40 MG tablet    LIPITOR    90 tablet    Take 1 tablet (40 mg) by mouth daily       blood glucose monitoring meter device kit          cephALEXin 500 MG capsule    KEFLEX    30 capsule    Take 1 capsule (500 mg) by mouth 3 times daily       clindamycin 300 MG capsule    CLEOCIN    40 capsule    Take 1 capsule (300 mg) by mouth 3 times daily       ferrous sulfate 325 (65 FE) MG tablet    IRON SUPPLEMENT    100 tablet    Take 1 tablet (325 mg) by mouth daily (with breakfast)       fondaparinux 7.5MG/0.6ML injection    ARIXTRA    40 Syringe    Inject 0.6 mLs (7.5 mg) Subcutaneous daily       furosemide 40 MG tablet    LASIX    180 tablet    Take 1 tablet (40 mg) by mouth 2 times daily       gabapentin 100 MG capsule    NEURONTIN    180 capsule    Take 1 capsule (100 mg) by mouth 2 times daily       ONE TOUCH ULTRA test strip   Generic drug:  blood glucose monitoring     100 each    USE AS DIRECTED TO TEST ONE TIME A DAY       ONETOUCH DELICA LANCETS 33G Misc     100 each    1 Device daily       potassium chloride SA 10 MEQ CR tablet    K-DUR/KLOR-CON M    180 tablet    Take 1 tablet (10 mEq) by mouth 2 times daily       traZODone 50 MG tablet    DESYREL    45 tablet    Take 0.5 tablets (25 mg) by mouth nightly as needed for sleep       venlafaxine 150 MG Tb24 24 hr tablet    EFFEXOR-ER    90 each    Take 1 tablet (150 mg) by mouth daily (with breakfast)

## 2017-04-20 NOTE — PROGRESS NOTES
[facility] now requests orders and shares plan of care/discharge summaries for some patients through [employer].  Please REPLY TO THIS MESSAGE in order to give authorization for orders when needed.  This is considered a verbal order, you will still receive a faxed copy of orders for signature.  Thank you for your assistance in improving collaboration for our patients.    ORDER    SN every other day for 28 days for wound cares  HA twice weekly for 4 weeks  SW to assess for available CMTY resources    Wound Care directed by Dr. Torres    For bilateral toe and feet wounds.  Do not cleanse them, keep them dry.  Apply Betadine or Providone iodine moist gauze between the toe and around the toes.  Cover with dry gauze and secure with tape.  Dressing changes to be done daily.  Patients family to do cares on days [facility] nursing is not making visits.    For wound to Interior left LE.  Cleanse with wound cleanser or normal saline, pack wound bed with saline moistened iodoform, cover with dry gauze, secure with tape. Iodoform dressing to be changed every other day.  Top layer of dressing to be changed daily by patient to control drainage. Apply ACE bandage to site.    RECERT SUMMARY TO MD on 4/17  SITUATION  ....Client DCd from the hosptital on 2/17/17. She is status post CABG. Since the surgery client developed heparin induced thrombocytopenia, gangrene, AFIB, and now has a dehesied sugical wound at the harvested vein site on her LLE. She has been taken off coumaedin and been using axtrita daily injection. She is tolerating this well.  Client resides in a [facility] with her daughter.  She also had a CVA back in OCT of 2016 and has issues with memory/confusion.  Client reporting daughter is able to help with some cares, but is not able/willing to assist with wound care to her LLE.  Daughter was performing wound care to gangrenous toes, but is not comfortable with the packing needed on the leg. LLE is draining a large  amount of yellow/serous drainage.  This site has been cultured by Dr Torres and results are pending. Right foot toes are in worse condition than the left foot toes with gangrene infection. Client is reporting that Dr. Torres is hopeful they do not have to amputate toes on the left foot as there is blood flow. Client has appt with vascular next week and will receive more information regarding amputation at that time.  Homecare to start every other day wound cares to LLE using antimicorbial packing strip. Client continues to have some soreness to her chest post CABG.  Pain is at a 3 at max and massage along tylenol have helped with this pain. She denies pain associated with her toes.  Pain to her LLE is more of a soreness per report.  She is setting up her own medicaitons and following a med list. She denies complications with this.  Client denies falls. She is getting around without assistive device, but does have some unsteadiness. PT/OT/SW were involved during previous CERT with goals met.  Client does require assist with bathing and personal cares and HA services are to be continued.  Appetite and hydration have greatly impoved. She is watching Na content in her diet. Client is reporting anxiety over not knowing the outcome of her toes and occasionally feeling down/depressed.  Bowels have been WNL and no urinary concerns reported aside from frequency associated with lasix. Edema is present and plus 1 pitting to BLE/bilate pedal.  She denies SOB.  Client does check and record BP/HR/weight daily.  Client is checking BG every AM as well and this has been between 100 and 115.    VS.../80, HR 70, RR 18, O2 sats 96 percent on RA.  Weight 171lbs.    BACKGROUND..hx of CAD, edema, essential HTN, acute bilateral cerebral infarction in a watershed distribution, Afib, post op encounter, HLDL, HTN, migraine, hx  seizures, sinus israel, DM2, adj d/o w depr mood, insomnia   ANALYSIS...Client is at risk for post op  infection, open incision present, risk for fluid overload, SOB/respiratory depression, mediation mismangement, poor pain control, falls, and  constipation.  RECOMMENDATION...SN for incisional cares/teaching/assesment, monitor and mitigate pain, teach nutritin/hydration, assess edema/teach edema managment, and medication teaching/set up. CLAUS/SCOTT.    Ester Pineda RN    [phone]  [email or web address]

## 2017-04-21 NOTE — PROGRESS NOTES
Chief Complaint:   Chief Complaint   Patient presents with     RECHECK     wound check           Allergies   Allergen Reactions     Heparin      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       Oxycodone      hallucinations     Adhesive Tape Itching and Rash     Diapers & Supplies Rash     Developed yeast infection from previous hospital stay         Subjective: Carlos Alberto is a 76 year old female who presents to the clinic today for a follow up of toe gangrene and leg wounds. She relates that home nursing is still coming in to change the dressings. No new complaints today. She presents with her daughter.     Objective  Two surgical dehiscence wounds are noted at left medial lower leg below the knee measuring 3 mm x 3mm x 3 cm and 3 mm x 5 mm x 2 cm. Still, I cannot find a connection between the two wounds.     Yousif Classification: Unstageable      Wound base: Not Visible      Edges: erythematous      Drainage: moderate/serous and Slight/purulent      Odor: no      Undermining: no      Tunneling: circumferential in the distal wound, at about 6 o'clock in the proximal wound.      Bone Exposure: No      Clinical Signs of Infection: surrounding tissue is erythematous and indurated.       Wounds were washed with scrub care and rinsed copiously with normal saline. No sharp debridement was performed of these wounds today.      The gangrenous toes were examined. The left digits are free of eschar today. The distal aspects of these digits are still open, but appear to be viable today.   The right digits are unchanged. All devitalized tissue of the right hallux was sharply debrided with #15 blade, subcutaneous at deepest. No infection is noted. The proximal portion of the right hallux is still moist fibrotic tissue, however there is no infection noted.   Areas have demarcated and no new tissue necrosis is noted today.        Assessment: Gangrene BL toes - left is healing and showing signs of increased blood flow. Right remains the same.   Left surgical wound dehiscence - Grew susceptible Staph aureus on culture     Plan:   - Pt seen and evaluated  - Some flaking was removed from the right hallux. No debridement was performed today.  - I changed her abx to PO clinda as directed by culture. She can stop the previous abx today and start this.   - She will be seeing Dr. Santamaria Monday. I will connect with her to coordinate a plan. Also hopeful that Dr. Santamaria will look at the leg wound because of the underlying vascular procedure that was performed.   - I think we can salvage the left toes. I changed the dressing on these to Iodosorb. Iodine on the right. Iodosorb packing on the left leg - to be changed QOD by home nursing.   - Pt to return to clinic in 1 week. Will await Dr. Santamaria's recs.

## 2017-04-23 ENCOUNTER — HOSPITAL ENCOUNTER (EMERGENCY)
Facility: CLINIC | Age: 77
Discharge: HOME | End: 2017-04-24
Attending: EMERGENCY MEDICINE | Admitting: EMERGENCY MEDICINE
Payer: MEDICARE

## 2017-04-23 ENCOUNTER — APPOINTMENT (OUTPATIENT)
Dept: CT IMAGING | Facility: CLINIC | Age: 77
End: 2017-04-23
Attending: EMERGENCY MEDICINE
Payer: MEDICARE

## 2017-04-23 ENCOUNTER — APPOINTMENT (OUTPATIENT)
Dept: GENERAL RADIOLOGY | Facility: CLINIC | Age: 77
End: 2017-04-23
Attending: EMERGENCY MEDICINE
Payer: MEDICARE

## 2017-04-23 DIAGNOSIS — R13.10 DYSPHAGIA, UNSPECIFIED TYPE: ICD-10-CM

## 2017-04-23 LAB
ANION GAP SERPL CALCULATED.3IONS-SCNC: 8 MMOL/L (ref 3–14)
BASOPHILS # BLD AUTO: 0.1 10E9/L (ref 0–0.2)
BASOPHILS NFR BLD AUTO: 0.8 %
BUN SERPL-MCNC: 12 MG/DL (ref 7–30)
CALCIUM SERPL-MCNC: 8.8 MG/DL (ref 8.5–10.1)
CHLORIDE SERPL-SCNC: 98 MMOL/L (ref 94–109)
CO2 SERPL-SCNC: 34 MMOL/L (ref 20–32)
CREAT SERPL-MCNC: 0.65 MG/DL (ref 0.52–1.04)
DIFFERENTIAL METHOD BLD: ABNORMAL
EOSINOPHIL # BLD AUTO: 0.2 10E9/L (ref 0–0.7)
EOSINOPHIL NFR BLD AUTO: 2.4 %
ERYTHROCYTE [DISTWIDTH] IN BLOOD BY AUTOMATED COUNT: 18.9 % (ref 10–15)
GFR SERPL CREATININE-BSD FRML MDRD: 89 ML/MIN/1.7M2
GLUCOSE SERPL-MCNC: 175 MG/DL (ref 70–99)
HCT VFR BLD AUTO: 39.4 % (ref 35–47)
HGB BLD-MCNC: 11.7 G/DL (ref 11.7–15.7)
IMM GRANULOCYTES # BLD: 0 10E9/L (ref 0–0.4)
IMM GRANULOCYTES NFR BLD: 0.5 %
INR PPP: 1.31 (ref 0.86–1.14)
LYMPHOCYTES # BLD AUTO: 0.9 10E9/L (ref 0.8–5.3)
LYMPHOCYTES NFR BLD AUTO: 14.8 %
MCH RBC QN AUTO: 30.5 PG (ref 26.5–33)
MCHC RBC AUTO-ENTMCNC: 29.7 G/DL (ref 31.5–36.5)
MCV RBC AUTO: 103 FL (ref 78–100)
MONOCYTES # BLD AUTO: 0.7 10E9/L (ref 0–1.3)
MONOCYTES NFR BLD AUTO: 11.3 %
NEUTROPHILS # BLD AUTO: 4.5 10E9/L (ref 1.6–8.3)
NEUTROPHILS NFR BLD AUTO: 70.2 %
NRBC # BLD AUTO: 0 10*3/UL
NRBC BLD AUTO-RTO: 0 /100
PLATELET # BLD AUTO: 289 10E9/L (ref 150–450)
POTASSIUM SERPL-SCNC: 3.9 MMOL/L (ref 3.4–5.3)
RBC # BLD AUTO: 3.83 10E12/L (ref 3.8–5.2)
SODIUM SERPL-SCNC: 139 MMOL/L (ref 133–144)
TROPONIN I SERPL-MCNC: 0.02 UG/L (ref 0–0.04)
WBC # BLD AUTO: 6.4 10E9/L (ref 4–11)

## 2017-04-23 PROCEDURE — 25500064 ZZH RX 255 OP 636: Performed by: EMERGENCY MEDICINE

## 2017-04-23 PROCEDURE — 84484 ASSAY OF TROPONIN QUANT: CPT | Performed by: EMERGENCY MEDICINE

## 2017-04-23 PROCEDURE — 99285 EMERGENCY DEPT VISIT HI MDM: CPT | Mod: Z6 | Performed by: EMERGENCY MEDICINE

## 2017-04-23 PROCEDURE — 85610 PROTHROMBIN TIME: CPT | Performed by: EMERGENCY MEDICINE

## 2017-04-23 PROCEDURE — 25000125 ZZHC RX 250: Performed by: EMERGENCY MEDICINE

## 2017-04-23 PROCEDURE — 71260 CT THORAX DX C+: CPT

## 2017-04-23 PROCEDURE — 71010 XR CHEST PORT 1 VW: CPT

## 2017-04-23 PROCEDURE — 99285 EMERGENCY DEPT VISIT HI MDM: CPT | Mod: 25 | Performed by: EMERGENCY MEDICINE

## 2017-04-23 PROCEDURE — 85025 COMPLETE CBC W/AUTO DIFF WBC: CPT | Performed by: EMERGENCY MEDICINE

## 2017-04-23 PROCEDURE — 93005 ELECTROCARDIOGRAM TRACING: CPT | Performed by: EMERGENCY MEDICINE

## 2017-04-23 PROCEDURE — 80048 BASIC METABOLIC PNL TOTAL CA: CPT | Performed by: EMERGENCY MEDICINE

## 2017-04-23 RX ORDER — LIDOCAINE 40 MG/G
CREAM TOPICAL
Status: DISCONTINUED | OUTPATIENT
Start: 2017-04-23 | End: 2017-04-24 | Stop reason: HOSPADM

## 2017-04-23 RX ORDER — IOPAMIDOL 755 MG/ML
82 INJECTION, SOLUTION INTRAVASCULAR ONCE
Status: COMPLETED | OUTPATIENT
Start: 2017-04-23 | End: 2017-04-23

## 2017-04-23 RX ADMIN — IOPAMIDOL 82 ML: 755 INJECTION, SOLUTION INTRAVENOUS at 23:42

## 2017-04-23 RX ADMIN — SODIUM CHLORIDE, PRESERVATIVE FREE 78 ML: 5 INJECTION INTRAVENOUS at 23:43

## 2017-04-23 NOTE — ED AVS SNAPSHOT
[facility], Emergency Department    [address] [facility] [address]    Phone:  [phone]                                       Carlos Alberto Fenton   MRN: [id number]    Department:  [facility], Emergency Department   Date of Visit:  4/23/2017           Patient Information     Date Of Birth          1940        Your diagnoses for this visit were:     Dysphagia, unspecified type        You were seen by Dylan March MD.        Discharge Instructions       Please make an appointment to follow up with Your Primary Care Provider as soon as possible.  Call the radiology department tomorrow to come in for a esophagram   [facility]  [address] [place], [place] ?[address]  Appointments: [phone]  Provider Referrals: [phone]  Information: [phone]    Future Appointments        Provider Department Dept Phone Center    4/24/2017 2:00 PM Leah Santamaria MD [facility] [phone] [facility]    4/27/2017 4:00 PM [facility] [phone] [facility]    5/1/2017 2:00 PM [facility] Lab Draw [facility] Lab Draw [phone] [facility]    5/1/2017 2:30 PM Angelina Bernal MD [facility] [phone] [facility]    7/14/2017 1:30 PM Star Lobato MD [facility] [phone] [facility]    9/11/2017 2:00 PM Cardiovascular Monitor Tech, [facility] [phone] [facility]    10/19/2017 2:30 PM CHACORTA [facility] [phone] [facility]      24 Hour Appointment Hotline       To make an appointment at any [facility], call [phone] ([phone]). If you don't have a family doctor or clinic, we will help you find one. [facility] are conveniently located to serve the needs of you and your family.             Review of your medicines      START taking        Dose / Directions Last dose taken    lidocaine visc 2% & diphenhydramine 12.5mg/5mL &  maalox/mylanta w/simethicone (1:1:1 v/v/v) Susp compounding kit   Dose:  5-10 mL   Quantity:  237 mL        Swish and swallow 5-10 mLs in mouth every 6 hours as needed for mouth sores   Refills:  1          Our records show that you are taking the medicines listed below. If these are incorrect, please call your family doctor or clinic.        Dose / Directions Last dose taken    acetaminophen 325 MG tablet   Commonly known as:  TYLENOL   Dose:  325-650 mg   Quantity:  100 tablet        Take 1-2 tablets (325-650 mg) by mouth every 4 hours as needed for mild pain or fever   Refills:  0        aspirin 81 MG chewable tablet   Dose:  81 mg   Quantity:  30 tablet        Take 1 tablet (81 mg) by mouth daily   Refills:  0        atorvastatin 40 MG tablet   Commonly known as:  LIPITOR   Dose:  40 mg   Quantity:  90 tablet        Take 1 tablet (40 mg) by mouth daily   Refills:  1        blood glucose monitoring meter device kit        Refills:  0        cephALEXin 500 MG capsule   Commonly known as:  KEFLEX   Dose:  500 mg   Quantity:  30 capsule        Take 1 capsule (500 mg) by mouth 3 times daily   Refills:  0        clindamycin 300 MG capsule   Commonly known as:  CLEOCIN   Dose:  300 mg   Quantity:  40 capsule        Take 1 capsule (300 mg) by mouth 3 times daily   Refills:  0        ferrous sulfate 325 (65 FE) MG tablet   Commonly known as:  IRON SUPPLEMENT   Dose:  325 mg   Quantity:  100 tablet        Take 1 tablet (325 mg) by mouth daily (with breakfast)   Refills:  1        fondaparinux 7.5MG/0.6ML injection   Commonly known as:  ARIXTRA   Dose:  7.5 mg   Quantity:  40 Syringe        Inject 0.6 mLs (7.5 mg) Subcutaneous daily   Refills:  2        furosemide 40 MG tablet   Commonly known as:  LASIX   Dose:  40 mg   Quantity:  180 tablet        Take 1 tablet (40 mg) by mouth 2 times daily   Refills:  1        gabapentin 100 MG capsule   Commonly known as:  NEURONTIN   Dose:  100 mg   Quantity:  180 capsule        Take  1 capsule (100 mg) by mouth 2 times daily   Refills:  1        ONE TOUCH ULTRA test strip   Quantity:  100 each   Generic drug:  blood glucose monitoring        USE AS DIRECTED TO TEST ONE TIME A DAY   Refills:  0        ONETOUCH DELICA LANCETS 33G Misc   Dose:  1 Device   Quantity:  100 each        1 Device daily   Refills:  0        potassium chloride SA 10 MEQ CR tablet   Commonly known as:  K-DUR/KLOR-CON M   Dose:  10 mEq   Quantity:  180 tablet        Take 1 tablet (10 mEq) by mouth 2 times daily   Refills:  1        traZODone 50 MG tablet   Commonly known as:  DESYREL   Dose:  25 mg   Quantity:  45 tablet        Take 0.5 tablets (25 mg) by mouth nightly as needed for sleep   Refills:  2        venlafaxine 150 MG Tb24 24 hr tablet   Commonly known as:  EFFEXOR-ER   Dose:  150 mg   Quantity:  90 each        Take 1 tablet (150 mg) by mouth daily (with breakfast)   Refills:  1                Prescriptions were sent or printed at these locations (1 Prescription)                   Other Prescriptions                Printed at Department/Unit printer (1 of 1)         magic mouthwash suspension (diphenhydramine, lidocaine, aluminum-magnesium & simethicone)                Procedures and tests performed during your visit     Basic metabolic panel    CBC with platelets differential    CT Chest w Contrast    Cardiac Continuous Monitoring    EKG 12 lead    INR    Peripheral IV: Standard    Pulse oximetry nursing    Troponin I    XR Chest Port 1 View      Orders Needing Specimen Collection     None      Pending Results     Date and Time Order Name Status Description    4/23/2017 2132 CT Chest w Contrast In process     4/23/2017 2052 EKG 12 lead Preliminary             Pending Culture Results     No orders found for last 3 day(s).            Thank you for choosing [facility]       Thank you for choosing [facility] for your care. Our goal is always to provide you with excellent care. Hearing back from our patients is one way we  can continue to improve our services. Please take a few minutes to complete the written survey that you may receive in the mail after you visit with us. Thank you!        [employer] Information     [employer] gives you secure access to your electronic health record. If you see a primary care provider, you can also send messages to your care team and make appointments. If you have questions, please call your primary care clinic.  If you do not have a primary care provider, please call [phone] and they will assist you.        Care EveryWhere ID     This is your Care EveryWhere ID. This could be used by other organizations to access your [place] medical records  [id number]        After Visit Summary       This is your record. Keep this with you and show to your community pharmacist(s) and doctor(s) at your next visit.

## 2017-04-23 NOTE — ED AVS SNAPSHOT
[facility] [place], Emergency Department    [address] [place] [address]    Phone:  [phone]                                       Carlos Alberto Fenton   MRN: [id number]    Department:  [facility], Emergency Department   Date of Visit:  4/23/2017           After Visit Summary Signature Page     I have received my discharge instructions, and my questions have been answered. I have discussed any challenges I see with this plan with the nurse or doctor.    ..........................................................................................................................................  Patient/Patient Representative Signature      ..........................................................................................................................................  Patient Representative Print Name and Relationship to Patient    ..................................................               ................................................  Date                                            Time    ..........................................................................................................................................  Reviewed by Signature/Title    ...................................................              ..............................................  Date                                                            Time

## 2017-04-24 ENCOUNTER — OFFICE VISIT (OUTPATIENT)
Dept: VASCULAR SURGERY | Facility: CLINIC | Age: 77
End: 2017-04-24

## 2017-04-24 ENCOUNTER — TELEPHONE (OUTPATIENT)
Dept: INTERNAL MEDICINE | Facility: CLINIC | Age: 77
End: 2017-04-24

## 2017-04-24 VITALS
DIASTOLIC BLOOD PRESSURE: 78 MMHG | RESPIRATION RATE: 19 BRPM | HEART RATE: 55 BPM | OXYGEN SATURATION: 95 % | SYSTOLIC BLOOD PRESSURE: 131 MMHG

## 2017-04-24 VITALS
RESPIRATION RATE: 19 BRPM | WEIGHT: 167 LBS | TEMPERATURE: 97.4 F | SYSTOLIC BLOOD PRESSURE: 127 MMHG | DIASTOLIC BLOOD PRESSURE: 92 MMHG | OXYGEN SATURATION: 99 % | BODY MASS INDEX: 30.73 KG/M2 | HEART RATE: 102 BPM | HEIGHT: 62 IN

## 2017-04-24 DIAGNOSIS — I96 TOE GANGRENE (H): Primary | ICD-10-CM

## 2017-04-24 DIAGNOSIS — R13.10 DYSPHAGIA: Primary | ICD-10-CM

## 2017-04-24 PROCEDURE — 25000125 ZZHC RX 250: Performed by: EMERGENCY MEDICINE

## 2017-04-24 RX ORDER — DIPHENHYDRAMINE HYDROCHLORIDE AND LIDOCAINE HYDROCHLORIDE AND ALUMINUM HYDROXIDE AND MAGNESIUM HYDRO
5-10 KIT EVERY 6 HOURS PRN
Qty: 237 ML | Refills: 1 | Status: SHIPPED | OUTPATIENT
Start: 2017-04-24 | End: 2017-05-24

## 2017-04-24 RX ADMIN — LIDOCAINE HYDROCHLORIDE 5 ML: 20 SOLUTION ORAL; TOPICAL at 00:50

## 2017-04-24 ASSESSMENT — PAIN SCALES - GENERAL: PAINLEVEL: MODERATE PAIN (5)

## 2017-04-24 NOTE — LETTER
4/24/2017       RE: Carlos Alberto Fenton  [address]  [facility] [address]     Dear Colleague,    Thank you for referring your patient, Carlos Alberto Fenton, to the [facility] at [facility]. Please see a copy of my visit note below.    VASCULAR SURGERY CLINIC PATIENT CONSULTATION NOTE    HPI:  Carlos Alberto Fenton is a 76 year old female with past medical history of HTN, HPL, CM, CVA (11/2016) CAD, HIT, paroxsymal a-fib, and status post  Median Sternotomy, Cardiopulmonary Bypass, Coronary Artery Bypass Graft x3, Left Internal Mammary Artery [place], Endoscopic Left Leg Saphenous Vein [place], MAZE Procedure, Left Atrial Appendage Ligation (AtriClip 45), Patent Foramen Ovale Closure on 2/16/2017.  She was admitted to [facility] on 2/24/17-3/3/17 for blackening toes and was diagnosed with DVT likely due to emboli with a supratherapeutic INR/HIT.  She is wearing DH2 shoes and painting toes with betadine daily.  She is being follow by Dr. Hatfield for weekly wound cares.  Vascular surgery is seeing patient to assess wounds and determine if amputation is needed.     REFERRAL SOURCE: Dr. Hatfield     PAST MEDICAL HISTORY:  Past Medical History:   Diagnosis Date     Antiplatelet or antithrombotic long-term use      Atrial fibrillation (H)      CAD (coronary artery disease)     2 vessel     Cancer (H)     Skin     Cerebral artery occlusion with cerebral infarction (H) 10/2016     Cerebral infarction (H) 10/2016     Diabetes (H)      Headaches      History of skin cancer      Hyperlipemias      Hypertension      Irregular heart beat      Peripheral neuropathy (H) 1990    cause unknown     Sleep apnea        PAST SURGICAL HISTORY:  Past Surgical History:   Procedure Laterality Date     BACK SURGERY       BYPASS GRAFT ARTERY CORONARY N/A 2/6/2017    Procedure: BYPASS GRAFT ARTERY CORONARY;  Surgeon: Mikhail Quiñones MD;  Location: [facility] OR     C APPENDECTOMY  1959     CORONARY ARTERY BYPASS        HYSTERECTOMY, PASTORA  1988     MAZE PROCEDURE N/A 2/6/2017    Procedure: MAZE PROCEDURE;  Surgeon: Mikhail Quiñones MD;  Location: [facility] OR     PICC INSERTION Left 02/25/2017    5fr TL Bard PICC, 47cm (3cm external) in the L basilic vein w/ tip in the SVC RA junction     TONSILLECTOMY  1942       FAMILY HISTORY:  Family History   Problem Relation Age of Onset     DIABETES Mother      C.A.D. Mother      DIABETES Father      C.A.D. Father      Cardiovascular Sister      blood clot       SOCIAL HISTORY:   Social History   Substance Use Topics     Smoking status: Former Smoker     Smokeless tobacco: Never Used     Alcohol use No         HOME MEDICATIONS:  Prior to Admission medications    Medication Sig Start Date End Date Taking? Authorizing Provider   magic mouthwash suspension (diphenhydramine, lidocaine, aluminum-magnesium & simethicone) Swish and swallow 5-10 mLs in mouth every 6 hours as needed for mouth sores 4/24/17   Dylan March MD   clindamycin (CLEOCIN) 300 MG capsule Take 1 capsule (300 mg) by mouth 3 times daily 4/20/17   Easton Torres DPM   fondaparinux (ARIXTRA) 7.5MG/0.6ML injection Inject 0.6 mLs (7.5 mg) Subcutaneous daily 4/14/17   Humberto Conner MD   cephALEXin (KEFLEX) 500 MG capsule Take 1 capsule (500 mg) by mouth 3 times daily 4/13/17   Easton Torres DPM   ONE TOUCH ULTRA test strip USE AS DIRECTED TO TEST ONE TIME A DAY 4/11/17   Nuha Bateman MD   atorvastatin (LIPITOR) 40 MG tablet Take 1 tablet (40 mg) by mouth daily 3/31/17   Humberto Conner MD   furosemide (LASIX) 40 MG tablet Take 1 tablet (40 mg) by mouth 2 times daily 3/31/17   Humberto Conner MD   gabapentin (NEURONTIN) 100 MG capsule Take 1 capsule (100 mg) by mouth 2 times daily 3/31/17   Humberto Conner MD   traZODone (DESYREL) 50 MG tablet Take 0.5 tablets (25 mg) by mouth nightly as needed for sleep 3/31/17   Humberto Conner MD   venlafaxine (EFFEXOR-ER) 150 MG TB24 24 hr tablet Take 1 tablet (150 mg) by  mouth daily (with breakfast) 3/31/17   Humberto Conner MD   potassium chloride SA (K-DUR/KLOR-CON M) 10 MEQ CR tablet Take 1 tablet (10 mEq) by mouth 2 times daily 3/31/17   Humberto Conner MD   aspirin 81 MG chewable tablet Take 1 tablet (81 mg) by mouth daily 3/2/17   Britt Whelan MD   acetaminophen (TYLENOL) 325 MG tablet Take 1-2 tablets (325-650 mg) by mouth every 4 hours as needed for mild pain or fever 2/17/17   Jennifer Staley APRN CNP   ferrous sulfate (IRON SUPPLEMENT) 325 (65 FE) MG tablet Take 1 tablet (325 mg) by mouth daily (with breakfast)  Patient taking differently: Take 325 mg by mouth 2 times daily  2/17/17   Jennifer Staley APRN CNP   ONETOUCH DELICA LANCETS 33G MISC 1 Device daily 1/16/17   Nuha Bateman MD   blood glucose monitoring (ONE TOUCH ULTRA 2) meter device kit  11/9/16   Reported, Patient       VITAL SIGNS:      BP: 131/78 Pulse: 55   Resp: 19 SpO2: 95 %          PHYSICAL EXAM:  NEURO/PSYCH:  The patient is alert and oriented.  Appropriate.  Moves all extremities.   SKIN: Color is appropriate for race, warm, dry.  PULMONARY: non-labored breathing  EXTREMITIES: Palpable right DP and doppler right PT, palpable left DP and doppler left PT, 1+ lower extremity edema. Left medial lower leg- two surgical dehiscence wounds, packed with packing strips. Left digits no eschar noted, right great toe demarcating, 2nd toe gangrene with bone exposed  and 3rd toe gangrene     ULTRASOUND:  Exam: Duplex ultrasound of bilateral lower extremity arteries dated 3/30/2017 1:49 PM     Impression:      1. Right leg: No hemodynamically significant stenosis.     2. Left leg: No hemodynamically significant stenosis.         Lower extremity transcutaneous oximetry (TCO2) dated 3/30/2017 1:50 PM.    Technique:      Electrodes were placed on:     1. Left chest: 79 mmHg  2. Left medial calf: 60 mmHg  3. Left medial ankle: 71 mmHg  4. Left dorsal foot: 57 mmHg  5. Right medial calf: 46  mmHg  6. Right medial ankle: 32 mmHg  7. Right dorsal foot: 41 mmHg         Impression: Marginally diminished healing potential in the right foot by transvenous oxygen determination, otherwise adequate wound healing  capability.            ASSESSMENT:  Patient Active Problem List   Diagnosis     Peripheral neuropathy (H)     Hyperlipidemia     Headache     History of skin cancer     Hyperlipidemia LDL goal <130     Pelvic floor dysfunction     Urge incontinence of urine     Generalized muscle weakness     Intractable chronic migraine without aura     Essential hypertension     Hypoxemia     Hyperglycemia     Elevated brain natriuretic peptide (BNP) level     Pulmonary nodule, right     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)     Sinus bradycardia     Fever, unspecified     New onset atrial fibrillation (H)     Stroke (H)     Seizures (H)     CVA (cerebral vascular accident) (H)     Long-term (current) use of anticoagulants [Z79.01]     Adjustment disorder with depressed mood     Primary insomnia     Paroxysmal atrial fibrillation (H)     Essential hypertension with goal blood pressure less than 140/90     Edema, unspecified type     Coronary artery disease     Atrial fibrillation with rapid ventricular response (H)     Gangrene (H)     Toe gangrene (H)     Deep vein thrombosis (DVT) of axillary vein of right upper extremity (H)     HIT (heparin-induced thrombocytopenia) (H)     Ulcer of left lower extremity with fat layer exposed (H)           PLAN:  - Dr. Santamaria did some debridement of toes, will likely need right great toe and 2nd toe amputations.  Will get this scheduled in the next couple weeks.  Patient would like to wait until her  arrives to help her post operatively.  He is currently in [place].      - continue betadine dressing changes and DH2 shoes    - spoke with CV surgery regarding left medial surgical dehiscence wounds, asked them to see patient in  clinic.      Meena WHATLEY, CNS  Division of Vascular Surgery  [facility]  Pager [phone]      I personally examined the patient and agree with the findings above.  I discussed the patient with the resident / fellow and care team, and agree with the assessment and plan of care as documented in the note.  Vascular surgery staff  L foot toes healing nicely.  R great toe gangrenous, R 2nd toe with bone exposed at the tip.  Will need R great toe amputation and debridement of the 2nd toe back.  Can be done under local block  Leah Santamaria MD

## 2017-04-24 NOTE — ED NOTES
Patient c/o chest pain starting yesterday and it is getting worse. Pain is midsternal and worse with activity. EKG done at triage. Hx of stroke in October, triple bypass in February, afib, CAD. Patient takes daily 81 mg aspirin and daily Arixtra injections.

## 2017-04-24 NOTE — NURSING NOTE
Chief Complaint   Patient presents with     Consult     Garngrene/blackened areas to both feet        Vitals:    04/24/17 1341   BP: 131/78   BP Location: Right arm   Pulse: 55   Resp: 19   SpO2: 95%       There is no height or weight on file to calculate BMI.           Jeannie Baker LPN

## 2017-04-24 NOTE — MR AVS SNAPSHOT
After Visit Summary   4/24/2017    Carlos Alberto Fenton    MRN: [id number]           Patient Information     Date Of Birth          1940        Visit Information        Provider Department      4/24/2017 2:00 PM Leah Santamaria MD [facility]         Follow-ups after your visit        Your next 10 appointments already scheduled     Apr 27, 2017  4:00 PM CDT   (Arrive by 3:45 PM)   Return Visit with Easton Torres DPM   [facility] ([facility])    [address] [place] [address]  [facility] [id number] [phone]            May 01, 2017  2:00 PM CDT   [facility] Draw with  [facility] DRAW   [facility] ([facility])    [address] [place] [id number] [phone]            May 01, 2017  2:30 PM CDT   (Arrive by 2:15 PM)   Return Visit with Angelina Bernal MD   [facility] ([facility])    [address] [place] [id number] [phone]            Jul 14, 2017  1:30 PM CDT   (Arrive by 1:15 PM)   Return Visit with Star Lobato MD   [facility] ([facility])    [address] [place] [id number] [phone]            Sep 11, 2017  2:00 PM CDT   (Arrive by 1:45 PM)   HOLTER MONITOR VISIT with  Cvc Monitor Tech, [facility] ([facility])    [address] [place] [id number] [phone]            Oct 19, 2017  2:30 PM CDT   (Arrive by 2:15 PM)   RETURN ATRIAL FIBULATION VISIT with CHACORTA Joshi [facility] ([facility])    [address] [place] [id number] [phone]              Who to contact     Please call your clinic at [phone] to:    Ask questions about your health    Make or  cancel appointments    Discuss your medicines    Learn about your test results    Speak to your doctor   If you have compliments or concerns about an experience at your clinic, or if you wish to file a complaint, please contact [facility] Patient Relations at [phone] or email us at [email or web address]         Additional Information About Your Visit        [employer] Information     [employer] gives you secure access to your electronic health record. If you see a primary care provider, you can also send messages to your care team and make appointments. If you have questions, please call your primary care clinic.  If you do not have a primary care provider, please call [phone] and they will assist you.      [employer] is an electronic gateway that provides easy, online access to your medical records. With [employer], you can request a clinic appointment, read your test results, renew a prescription or communicate with your care team.     To access your existing account, please contact your [facility] Clinic or call [phone] for assistance.        Care EveryWhere ID     This is your Care EveryWhere ID. This could be used by other organizations to access your [facility] medical records  [id number]        Your Vitals Were     Pulse Respirations Pulse Oximetry Breastfeeding?          55 19 95% No         Blood Pressure from Last 3 Encounters:   04/24/17 131/78   04/24/17 (!) 127/92   04/17/17 109/46    Weight from Last 3 Encounters:   04/23/17 167 lb   04/17/17 172 lb   04/17/17 172 lb              Today, you had the following     No orders found for display         Today's Medication Changes          These changes are accurate as of: 4/24/17  3:11 PM.  If you have any questions, ask your nurse or doctor.               These medicines have changed or have updated prescriptions.        Dose/Directions    ferrous sulfate 325 (65 FE) MG tablet   Commonly known  as:  IRON SUPPLEMENT   This may have changed:  when to take this   Used for:  S/P CABG (coronary artery bypass graft)        Dose:  325 mg   Take 1 tablet (325 mg) by mouth daily (with breakfast)   Quantity:  100 tablet   Refills:  1                Primary Care Provider Office Phone # Fax #    Humberto Conner [phone]       [facility] [address] [place] [address]        Thank you!     Thank you for choosing [facility]IN[facility]  for your care. Our goal is always to provide you with excellent care. Hearing back from our patients is one way we can continue to improve our services. Please take a few minutes to complete the written survey that you may receive in the mail after your visit with us. Thank you!             Your Updated Medication List - Protect others around you: Learn how to safely use, store and throw away your medicines at www.disposemymeds.org.          This list is accurate as of: 4/24/17  3:11 PM.  Always use your most recent med list.                   Brand Name Dispense Instructions for use    acetaminophen 325 MG tablet    TYLENOL    100 tablet    Take 1-2 tablets (325-650 mg) by mouth every 4 hours as needed for mild pain or fever       aspirin 81 MG chewable tablet     30 tablet    Take 1 tablet (81 mg) by mouth daily       atorvastatin 40 MG tablet    LIPITOR    90 tablet    Take 1 tablet (40 mg) by mouth daily       blood glucose monitoring meter device kit          cephALEXin 500 MG capsule    KEFLEX    30 capsule    Take 1 capsule (500 mg) by mouth 3 times daily       clindamycin 300 MG capsule    CLEOCIN    40 capsule    Take 1 capsule (300 mg) by mouth 3 times daily       ferrous sulfate 325 (65 FE) MG tablet    IRON SUPPLEMENT    100 tablet    Take 1 tablet (325 mg) by mouth daily (with breakfast)       fondaparinux 7.5MG/0.6ML injection    ARIXTRA    40 Syringe    Inject 0.6 mLs (7.5 mg) Subcutaneous daily       furosemide 40 MG tablet     LASIX    180 tablet    Take 1 tablet (40 mg) by mouth 2 times daily       gabapentin 100 MG capsule    NEURONTIN    180 capsule    Take 1 capsule (100 mg) by mouth 2 times daily       lidocaine visc 2% & diphenhydramine 12.5mg/5mL & maalox/mylanta w/simethicone (1:1:1 v/v/v) Susp compounding kit     237 mL    Swish and swallow 5-10 mLs in mouth every 6 hours as needed for mouth sores       ONE TOUCH ULTRA test strip   Generic drug:  blood glucose monitoring     100 each    USE AS DIRECTED TO TEST ONE TIME A DAY       ONETOUCH DELICA LANCETS 33G Misc     100 each    1 Device daily       potassium chloride SA 10 MEQ CR tablet    K-DUR/KLOR-CON M    180 tablet    Take 1 tablet (10 mEq) by mouth 2 times daily       traZODone 50 MG tablet    DESYREL    45 tablet    Take 0.5 tablets (25 mg) by mouth nightly as needed for sleep       venlafaxine 150 MG Tb24 24 hr tablet    EFFEXOR-ER    90 each    Take 1 tablet (150 mg) by mouth daily (with breakfast)

## 2017-04-24 NOTE — TELEPHONE ENCOUNTER
Reason for Call: Request for an order or referral:  Order    Order or referral being requested:  Carlos Alberto was in ED at the [facility] last night.  AVS states her PCP is to order and Esophagram for her    Date needed: as soon as possible    Has the patient been seen by the PCP for this problem? NO    Additional comments: Pt states she can do this on Tues, Weds or Friday this week    Phone number Patient can be reached at:  [phone]    Best Time:  any    Can we leave a detailed message on this number?  YES    Call taken on 4/24/2017 at 9:30 AM by Amanda Kumar

## 2017-04-24 NOTE — DISCHARGE INSTRUCTIONS
Please make an appointment to follow up with Your Primary Care Provider as soon as possible.  Call the radiology department tomorrow to come in for a esophagram   [address] ?[address]  Appointments: [phone]  Provider Referrals: [phone]  Information: [phone]

## 2017-04-25 ENCOUNTER — TELEPHONE (OUTPATIENT)
Dept: CARDIOLOGY | Facility: CLINIC | Age: 77
End: 2017-04-25

## 2017-04-25 ENCOUNTER — TELEPHONE (OUTPATIENT)
Dept: ONCOLOGY | Facility: CLINIC | Age: 77
End: 2017-04-25

## 2017-04-25 LAB — INTERPRETATION ECG - MUSE: NORMAL

## 2017-04-25 NOTE — TELEPHONE ENCOUNTER
I called pt's only listed phone # which is actually her daughter Lindsay's cell ([phone]). I asked Lindsay to call back and update me on Vascular Surgery (Dr Peña) plans for possible amputation of pt's gangrenous toes. Left my name & direct # ([phone]) for call back.     Plan to discuss how to coordinate pt's Arixtra injections around pending surgery date. Dr Bernal intends to bridge pt off Arixtra to Coumadin after surgery is completed. Pt also paying $200+ for one week of Arixtra supply and this is not sustainable for long. If surgery will be many weeks away, we may bridge pt now to Couamdin. This discussion is pending. Awaiting call back from pt/Lindsay or Vascular Surgery date of procedure.

## 2017-04-25 NOTE — PROGRESS NOTES
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  The patient is currently on fondaparinux.        HISTORY OF PRESENT ILLNESS:  Kindly refer to my detailed note from inpatient consultation.  Briefly, the patient underwent a CABG, and right after the CABG she was put on heparin bridging to Coumadin a few days after she was noted to have significant platelet drop.  A heparin-induced thrombocytopenia YUAN was checked and was negative.  She was started on Coumadin and she quickly got into the 2-3 range and she was discharged on Coumadin alone.  Her platelets promptly recovered and actually she became thrombocythemic  for a little while she was outpatient, but she noted that she was having pain in both her great toes and other toes.  Then 2 or 3 days later she presented with blackening of her toes.  This was thought to be embolic and she was worked up for embolic reasons and was started on heparin.  She again promptly dropped her platelets and this time HIT panel was positive.  She was transitioned to argatroban and we were consulted.  When I saw her in the hospital, this was deemed to be real HIT and this was likely present at the time of her previous hospital discharge and this was an evolving process after her heparin was stopped and the HIT panel this time was just a reflection of her heparin sensitivity.  The recommendation was made to put her on fondaparinux and she was discharged from the hospital for followup.  She has been seeing Podiatry and has had some wound debridement and a plan for management of her toes with amputation versus conservative management is still up in the air.  She comes in today for followup and decide further anticoagulation.       INTERVAL HISTORY:  The patient states that she has been doing well otherwise.  She has been on antibiotics and she has been seeing Podiatry regularly to  manage these toes.  She has a followup appointment with Vascular to decide if they are going to do some surgical intervention.  She has a high copay for fondaparinux and is wondering if we could switch her to anything else.  Otherwise, she denies chest pain, fevers, chills, coughing or abdominal pain.  She has not had significant bleeding problems from Arixtra except she has significant oozing from the debrided sites.       PAST MEDICAL HISTORY:   Past Medical History:   Diagnosis Date     Antiplatelet or antithrombotic long-term use      Atrial fibrillation (H)      CAD (coronary artery disease)     2 vessel     Cancer (H)     Skin     Cerebral artery occlusion with cerebral infarction (H) 10/2016     Cerebral infarction (H) 10/2016     Diabetes (H)      Headaches      History of skin cancer      Hyperlipemias      Hypertension      Irregular heart beat      Peripheral neuropathy (H) 1990    cause unknown     Sleep apnea           PAST SURGICAL HISTORY:   Past Surgical History:   Procedure Laterality Date     BACK SURGERY       BYPASS GRAFT ARTERY CORONARY N/A 2/6/2017    Procedure: BYPASS GRAFT ARTERY CORONARY;  Surgeon: Mikhail Quiñones MD;  Location: [facility] OR     C APPENDECTOMY  1959     CORONARY ARTERY BYPASS       HYSTERECTOMY, PASTORA  1988     MAZE PROCEDURE N/A 2/6/2017    Procedure: MAZE PROCEDURE;  Surgeon: Mikhail Quiñones MD;  Location: [facility] OR     PICC INSERTION Left 02/25/2017    5fr TL Bard PICC, 47cm (3cm external) in the L basilic vein w/ tip in the SVC RA junction     TONSILLECTOMY  1942          MEDICATIONS:   Current Outpatient Prescriptions   Medication Sig Dispense Refill     fondaparinux (ARIXTRA) 7.5MG/0.6ML injection Inject 0.6 mLs (7.5 mg) Subcutaneous daily 40 Syringe 2     cephALEXin (KEFLEX) 500 MG capsule Take 1 capsule (500 mg) by mouth 3 times daily 30 capsule 0     ONE TOUCH ULTRA test strip USE AS DIRECTED TO TEST ONE TIME A  each 0     atorvastatin (LIPITOR) 40 MG tablet Take 1 tablet (40  mg) by mouth daily 90 tablet 1     furosemide (LASIX) 40 MG tablet Take 1 tablet (40 mg) by mouth 2 times daily 180 tablet 1     gabapentin (NEURONTIN) 100 MG capsule Take 1 capsule (100 mg) by mouth 2 times daily 180 capsule 1     traZODone (DESYREL) 50 MG tablet Take 0.5 tablets (25 mg) by mouth nightly as needed for sleep 45 tablet 2     venlafaxine (EFFEXOR-ER) 150 MG TB24 24 hr tablet Take 1 tablet (150 mg) by mouth daily (with breakfast) 90 each 1     potassium chloride SA (K-DUR/KLOR-CON M) 10 MEQ CR tablet Take 1 tablet (10 mEq) by mouth 2 times daily 180 tablet 1     aspirin 81 MG chewable tablet Take 1 tablet (81 mg) by mouth daily 30 tablet 0     acetaminophen (TYLENOL) 325 MG tablet Take 1-2 tablets (325-650 mg) by mouth every 4 hours as needed for mild pain or fever 100 tablet 0     ferrous sulfate (IRON SUPPLEMENT) 325 (65 FE) MG tablet Take 1 tablet (325 mg) by mouth daily (with breakfast) (Patient taking differently: Take 325 mg by mouth 2 times daily ) 100 tablet 1     ONETOUCH DELICA LANCETS 33G MISC 1 Device daily 100 each 0     blood glucose monitoring (ONE TOUCH ULTRA 2) meter device kit        magic mouthwash suspension (diphenhydramine, lidocaine, aluminum-magnesium & simethicone) Swish and swallow 5-10 mLs in mouth every 6 hours as needed for mouth sores 237 mL 1     clindamycin (CLEOCIN) 300 MG capsule Take 1 capsule (300 mg) by mouth 3 times daily 40 capsule 0          SOCIAL HISTORY:   Social History     Social History     Marital status:      Spouse name: N/A     Number of children: N/A     Years of education: N/A     Occupational History     Not on file.     Social History Main Topics     Smoking status: Former Smoker     Smokeless tobacco: Never Used     Alcohol use No     Drug use: No     Sexual activity: No     Other Topics Concern     Parent/Sibling W/ Cabg, Mi Or Angioplasty Before 65f 55m? Yes     Social History Narrative            FAMILY HISTORY:   Family History   Problem  "Relation Age of Onset     DIABETES Mother      C.A.D. Mother      DIABETES Father      C.A.D. Father      Cardiovascular Sister      blood clot          ALLERGIES:      Allergies   Allergen Reactions     Heparin      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       Oxycodone      hallucinations     Adhesive Tape Itching and Rash     Diapers & Supplies Rash     Developed yeast infection from previous hospital stay        PHYSICAL EXAMINATION:   VITAL SIGNS:   /46  Pulse 61  Resp 18  Ht 1.575 m (5' 2\")  Wt 78 kg (172 lb)  SpO2 98%  BMI 31.46 kg/m2    GENERAL:  Alert and oriented x3, in no apparent distress.   HEENT:  Moist mucous membranes.   SKIN:  No areas of bruising otherwise except for small bumps.     EXTREMITIES:  I did not unwrap her toes, but one of the dressings was soaking through.  She is due for a dressing change anyway at this point.  There is no swelling on her arm that was previously swollen when I saw her in the hospital.      LABORATORY:   Reviewed in EMR, she will get repeat labs.     IMAGING:  No further imaging.      ASSESSMENT:   1.  Heparin-induced thrombocytopenia, on Arixtra.   2.  Right upper extremity PICC line-associated deep venous thrombosis.        PLAN:  I discussed with the patient that the typical treatment for HIT-related thrombosis is for 3 months with Arixtra transitioning to Coumadin.  For her, given the fact that surgical management is on chart off and on, Arixtra may be a good approach for her, but given her copay once her surgical plan has been talked out with Vascular Surgery, we can come up with a plan for her to transition on and off around the time of surgery.  She should be done with anticoagulation from an HIT standpoint in 3 months' time and should have heparin/Lovenox on her allergies for the rest of her life and not be exposed to it again.  It seems " like from a cardiac standpoint she would need long-term anticoagulation and she should be fine with Coumadin on that note.       She had a DVT in the right upper extremity which was PICC line-associated in the setting of HIT.  The patient is wearing a band which she really would like to get rid of.  We would repeat an ultrasound and if the ultrasound is clear of the DVT, she can get the band off.        We will follow through regarding her surgical management and then come up with a plan going forward.     I spent 35 minutes in the care of this patient >50% of which was spent in coordinating and counseling.    Angelina Bernal   of Medicine   Hematology and medical Oncology   [facility]    Apr 17, 2017          Answers for HPI/ROS submitted by the patient on 4/14/2017   General Symptoms: Yes  Skin Symptoms: Yes  HENT Symptoms: No  EYE SYMPTOMS: No  HEART SYMPTOMS: No  LUNG SYMPTOMS: No  INTESTINAL SYMPTOMS: No  URINARY SYMPTOMS: No  GYNECOLOGIC SYMPTOMS: No  BREAST SYMPTOMS: No  SKELETAL SYMPTOMS: No  BLOOD SYMPTOMS: Yes  NERVOUS SYSTEM SYMPTOMS: No  MENTAL HEALTH SYMPTOMS: Yes  Fever: No  Loss of appetite: No  Weight loss: No  Weight gain: No  Fatigue: Yes  Night sweats: Yes  Chills: No  Increased stress: Yes  Excessive hunger: No  Excessive thirst: No  Feeling hot or cold when others believe the temperature is normal: Yes  Loss of height: No  Post-operative complications: Yes  Surgical site pain: Yes  Hallucinations: No  Change in or Loss of Energy: Yes  Hyperactivity: No  Confusion: No  Changes in hair: No  Changes in moles/birth marks: No  Itching: Yes  Rashes: No  Changes in nails: No  Acne: No  Hair in places you don't want it: No  Change in facial hair: No  Warts: No  Non-healing sores: No  Scarring: No  Flaking of skin: Yes  Color changes of hands/feet in cold : Yes  Sun sensitivity: Yes  Skin thickening: No  Anemia: Yes  Swollen glands: No  Easy bleeding or bruising: Yes  Edema or  swelling: Yes  Nervous or Anxious: Yes  Trouble sleeping: Yes  Panic attacks: Yes

## 2017-04-25 NOTE — TELEPHONE ENCOUNTER
Called patient's daughter Lindsay and confirmed her mother's appointment this Friday at 2:30 in the CVTS clinic for her leg wound.  Lindsay verbalized understanding and will come to appointment.

## 2017-04-25 NOTE — ED PROVIDER NOTES
"  History     Chief Complaint   Patient presents with     Chest Pain     HPI  Carlos Alberto Fenton is a 76 year old female who presents to the emergency room stating that yesterday she started having pain when she swallowed. she localized the pain in the center of her chest. The pain only occurred while she was swallowing..  She denies any previous similar symptoms to this.  She does not believe that she has a food impaction.  The difficulty swallowing started with food and progress to liquids. She states she is able to swallow but it is just very uncomfortable.she denies any fever, cough chest pain difficulty breathing    I have reviewed the Medications, Allergies, Past Medical and Surgical History, and Social History in the Epic system.    Review of Systems   ROS: 10 point ROS neg other than the symptoms noted above in the HPI.    Physical Exam   BP: (!) 128/93  Pulse: 102  Heart Rate: 69  Temp: 97.4  F (36.3  C)  Resp: 20  Height: 157.5 cm (5' 2\")  Weight: 75.8 kg (167 lb)  SpO2: 99 %  Physical Exam   Constitutional: She is oriented to person, place, and time. She appears well-developed and well-nourished. No distress.   HENT:   Head: Normocephalic and atraumatic.   Eyes: No scleral icterus.   Neck: Normal range of motion. Neck supple.   Cardiovascular: Normal rate.    Pulmonary/Chest: Effort normal. No respiratory distress.   Abdominal: Soft. There is no tenderness.   patient able to swallow liquids without issue   Musculoskeletal:   dressings on both feet due to a recent amputation   Neurological: She is alert and oriented to person, place, and time.   Skin: Skin is warm and dry. No rash noted. She is not diaphoretic. No erythema. No pallor.       ED Course     ED Course     Procedures             Critical Care time:  none        Results for orders placed or performed during the hospital encounter of 04/23/17   XR Chest Port 1 View    Narrative     Examination:  XR CHEST PORT 1 VW     Date:  4/23/2017 9:23 PM      " Clinical Information: Extreme dyspnea     Additional Information: none    Comparison: chest x-ray 2/24/2017, CT 3/20/2017    Findings:   Sternotomy wires. The pulmonary vasculature is prominent. Cardiac  silhouette is normal in size. Linear scarring left midlung. Osseous  structures unremarkable.      Impression    Impression:  Increased interstitial markings, differential pulmonary edema,  infection.    TOMMY MARCOS MD   CT Chest w Contrast    Narrative    EXAMINATION: Chest CT  4/24/2017 12:58 AM    CLINICAL HISTORY: difficulty swallowing both food and fluids.     COMPARISON: CT 3/20/2017.    TECHNIQUE: CT imaging obtained through the chest with contrast.  Coronal and axial MIP reformatted images obtained.     DOSE: 477 mGycm    FINDINGS:    Neck and chest wall: The thyroid is unremarkable. No axillary  lymphadenopathy.    Lungs:     Central tracheobronchial tree is patent.     Again seen upper lobe predominant mosaic attenuation. Again seen  fibroglandular atelectatic changes in the left lingula.     Multiple pulmonary nodules as described on the series 6.   Image 48:  New 14 x 8 mm right upper lobe solid nodule.  Image 67: Stable 5 mm right upper lobe solid nodule.  Image 112:  New 8 mm right upper lobe groundglass nodule.  Image 117:  new 10 mm right upper lobe groundglass nodule.    Resolved left pleural effusion. No pneumothorax.    Mediastinum: Stable prominent heart size. Left atrial appendage clip.  No pericardial effusion. Coronary artery calcifications. No pulmonary  embolism. Reflux of contrast into hepatic veins. Normal thoracic  vasculature. No mediastinal mass. No mediastinal or perihilar  lymphadenopathy.     Bones and soft tissues: Intact median sternotomy wires. Multilevel  degenerative changes.    Partially imaged upper abdomen: Calcified splenic granuloma. Small  perihepatic fluid      Impression    IMPRESSION:   1.  No mediastinal or esophageal mass.   2.  New right upper lobe solid and  groundglass nodules since CT  3/20/2017, suspicious for infection.  3.  Again seen upper lobe predominant mosaic attenuation, due to small  vessel or small airway disease.  4.  Resolved left pleural effusion.  5.  Reflux of contrast into hepatic veins, which is suggestive of  right heart dysfunction.   6.  Small perihepatic fluid.    I have personally reviewed the examination and initial interpretation  and I agree with the findings.    TOMMY MARCOS MD   CBC with platelets differential   Result Value Ref Range    WBC 6.4 4.0 - 11.0 10e9/L    RBC Count 3.83 3.8 - 5.2 10e12/L    Hemoglobin 11.7 11.7 - 15.7 g/dL    Hematocrit 39.4 35.0 - 47.0 %     (H) 78 - 100 fl    MCH 30.5 26.5 - 33.0 pg    MCHC 29.7 (L) 31.5 - 36.5 g/dL    RDW 18.9 (H) 10.0 - 15.0 %    Platelet Count 289 150 - 450 10e9/L    Diff Method Automated Method     % Neutrophils 70.2 %    % Lymphocytes 14.8 %    % Monocytes 11.3 %    % Eosinophils 2.4 %    % Basophils 0.8 %    % Immature Granulocytes 0.5 %    Nucleated RBCs 0 0 /100    Absolute Neutrophil 4.5 1.6 - 8.3 10e9/L    Absolute Lymphocytes 0.9 0.8 - 5.3 10e9/L    Absolute Monocytes 0.7 0.0 - 1.3 10e9/L    Absolute Eosinophils 0.2 0.0 - 0.7 10e9/L    Absolute Basophils 0.1 0.0 - 0.2 10e9/L    Abs Immature Granulocytes 0.0 0 - 0.4 10e9/L    Absolute Nucleated RBC 0.0    Basic metabolic panel   Result Value Ref Range    Sodium 139 133 - 144 mmol/L    Potassium 3.9 3.4 - 5.3 mmol/L    Chloride 98 94 - 109 mmol/L    Carbon Dioxide 34 (H) 20 - 32 mmol/L    Anion Gap 8 3 - 14 mmol/L    Glucose 175 (H) 70 - 99 mg/dL    Urea Nitrogen 12 7 - 30 mg/dL    Creatinine 0.65 0.52 - 1.04 mg/dL    GFR Estimate 89 >60 mL/min/1.7m2    GFR Estimate If Black >90   GFR Calc   >60 mL/min/1.7m2    Calcium 8.8 8.5 - 10.1 mg/dL   Troponin I   Result Value Ref Range    Troponin I ES 0.023 0.000 - 0.045 ug/L   INR   Result Value Ref Range    INR 1.31 (H) 0.86 - 1.14   EKG 12 lead   Result Value  Ref Range    Interpretation ECG Click View Image link to view waveform and result             Labs Ordered and Resulted from Time of ED Arrival Up to the Time of Departure from the ED   CBC WITH PLATELETS DIFFERENTIAL - Abnormal; Notable for the following:        Result Value     (*)     MCHC 29.7 (*)     RDW 18.9 (*)     All other components within normal limits   BASIC METABOLIC PANEL - Abnormal; Notable for the following:     Carbon Dioxide 34 (*)     Glucose 175 (*)     All other components within normal limits   INR - Abnormal; Notable for the following:     INR 1.31 (*)     All other components within normal limits   TROPONIN I   PERIPHERAL IV CATHETER   CARDIAC CONTINUOUS MONITORING   PULSE OXIMETRY NURSING            Assessments & Plan (with Medical Decision Making)   This is a76-year-old female patient sent to the emergency room with dysphagia. hysical exam was otherwise unremarkable.  She is resting comfortably in bed in obvious distress.  She is able to handle her saliva.  She was able to swallow while I observed her at the bedside. CT was performed and reviewed with radiology.  There is no significant acute findings at that time that shows any change from previous that might cause her symptoms.  We did provide viscous lidocaine and she had significant relief of all her symptoms.  Her symptoms may be associated with erosive gastritis. I do recommend that she get an esophagram.  She'll be discharged with a prescription for Magic mouthwash which she can utilize for discomfort.  esophagram is ordered for tomorrow which we are unable to do an esophagram this evening.  She is encouraged to follow up with her primary care doctor soon as possible.    I have reviewed the nursing notes.    I have reviewed the findings, diagnosis, plan and need for follow up with the patient.    Discharge Medication List as of 4/24/2017  1:11 AM      START taking these medications    Details   magic mouthwash suspension  (diphenhydramine, lidocaine, aluminum-magnesium & simethicone) Swish and swallow 5-10 mLs in mouth every 6 hours as needed for mouth sores, Disp-237 mL, R-1, Local Print             Final diagnoses:   Dysphagia, unspecified type       4/23/2017   [facility], [place], EMERGENCY DEPARTMENT     Dylan March MD  04/24/17 [id number]

## 2017-04-26 ENCOUNTER — TELEPHONE (OUTPATIENT)
Dept: WOUND CARE | Facility: CLINIC | Age: 77
End: 2017-04-26

## 2017-04-26 NOTE — TELEPHONE ENCOUNTER
Lindsay called me back today (4/26/17); she is not aware yet of any surgical date for pt's toe amputation w/ Dr Santamaria's office, this is still pending. Lindsay was provided w/ [facility] RN line ([phone]) to notify Dr Bernal's team once a surgical date is set so we can help coordinate Arixtra injections before, during & after surgery. Dr Bernal plans to bridge pt to Coumadin after surgery. Lindsay confirms that pt has a week's worth of Arixtra for now (Epic also shows refills available).     Per Meena WHATLEY (Dr Santamaria's team), pt is tentatively scheduled for amputation on 5/9/17 but this is not final yet - I did NOT share this info w/ Lindsay today; the surgical team will be working with them directly. Dr eBrnal team updated.

## 2017-04-26 NOTE — TELEPHONE ENCOUNTER
Chantal from [facility] called in to the triage line to get updated wound care orders for patient.  Dr. Torres's nurse gave Chantal updated orders for 3x weekly dressing changes and packing.   Chantal left the [facility] fax number for updates in future [phone]

## 2017-04-27 ENCOUNTER — OFFICE VISIT (OUTPATIENT)
Dept: WOUND CARE | Facility: CLINIC | Age: 77
End: 2017-04-27

## 2017-04-27 DIAGNOSIS — G63 POLYNEUROPATHY ASSOCIATED WITH UNDERLYING DISEASE (H): ICD-10-CM

## 2017-04-27 DIAGNOSIS — D75.829 HIT (HEPARIN-INDUCED THROMBOCYTOPENIA) (H): ICD-10-CM

## 2017-04-27 DIAGNOSIS — I96 TOE GANGRENE (H): Primary | ICD-10-CM

## 2017-04-27 DIAGNOSIS — I96 GANGRENE (H): ICD-10-CM

## 2017-04-27 DIAGNOSIS — L97.922 ULCER OF LEFT LOWER EXTREMITY WITH FAT LAYER EXPOSED (H): ICD-10-CM

## 2017-04-27 NOTE — LETTER
4/27/2017       RE: Carlos Alberto Fenton  [address]  [place] [address]     Dear Colleague,    Thank you for referring your patient, Carlos Alberto Fenton, to the [facility]OUND [facility] at [facility]. Please see a copy of my visit note below.    Chief Complaint:   No chief complaint on file.     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       Oxycodone      hallucinations     Adhesive Tape Itching and Rash     Diapers & Supplies Rash     Developed yeast infection from previous hospital stay     Subjective: Carlos Alberto is a 76 year old female who presents to the clinic today for a follow up of toe gangrene and leg wounds.   Home nursing continues with dressing changes 3x per week.   No new complaints today.   Assisted by her daughter.     Objective  L leg surgical dehiscence wounds will be evaluated by cardiovascular surgery tomorrow. Dressings remain intact.      Yousif Classification: Unstageable  Wound base: Not Visible  Edges: erythematous  Drainage: moderate/serous and Slight/purulent  Odor: no  Undermining: no  Tunneling: circumferential in the distal wound, at about 6 o'clock in the proximal wound.  Bone Exposure: yes 2nd toe right foot  Clinical Signs of Infection: no      The gangrenous toes were examined.   The left digits are free of eschar today.   The distal aspects of these digits appear viable.     The right digits are unchanged. right hallux with dry gangrene IP and distal. Demarcated edges around base of toe. No infection is noted. The proximal portion of the right hallux is still moist fibrotic tissue.     Assessment: Gangrene BL toes    - Left is healing and showing signs of increased blood flow.    - Left surgical  wound dehiscence   - Right toes remains the same, continue to demarcate without s/o  infection    Plan:   - Pt seen and evaluated  - Some flaking was removed from the right hallux. No debridement was performed today.  - Antibiotics: PO clinda (completed course yesterday)  - Dr. Santamaria note 4/24:    - Dr. Santamaria did some debridement of toes, will likely need right great toe  and 2nd toe amputations. Will get this scheduled in the next couple  weeks. Patient would like to wait until her  arrives to help her  post operatively. He is currently in [place].    - DH2 shoes     - Continue Iodosorb dressing to be changed QOD by home nursing.     - Pt to return to clinic in 2 week. Proceed with amputation as scheduled.    I saw the pt along with Dr. Hernandez and agree with the above documentation. Vascular will proceed with the amputations as noted. She will be seeing CV tomorrow for the leg wound.   I will see her again in 2 weeks.     Again, thank you for allowing me to participate in the care of your patient.      Sincerely,    Easton Torres DPM

## 2017-04-27 NOTE — TELEPHONE ENCOUNTER
Left message for Lindsay regarding Carlos Alberto's Monday 5/1/17 appt with Dr. Bernla  If Carlos Alberto and Lindsay have questions for Dr. Bernal they can keep the appt.  Dr. Bernal does not have any new information and is just awaiting the surgery to help change blood thinner after.  It would be acceptable to cancel appt.  Provided number for [facility] Sched[facility] to cancel appt.

## 2017-04-27 NOTE — NURSING NOTE
Chief Complaint   Patient presents with     WOUND CARE     F/U Wound Care       There were no vitals filed for this visit.    There is no height or weight on file to calculate BMI.    Rosina Rock

## 2017-04-27 NOTE — MR AVS SNAPSHOT
After Visit Summary   4/27/2017    Carlos Alberto Fenton    MRN: [id number]           Patient Information     Date Of Birth          1940        Visit Information        Provider Department      4/27/2017 4:00 PM Easton Torres DPM [facility]         Follow-ups after your visit        Your next 10 appointments already scheduled     Apr 28, 2017  2:30 PM CDT   (Arrive by 2:15 PM)   Return Visit with  CVTS   [facility] ([facility])    [address] [place] [id number]   [phone]            May 01, 2017  2:00 PM CDT   Masonic Lab Draw with  MASONIC LAB DRAW   [facility] ([facility])    [address] [place] [id number] [phone]            May 01, 2017  2:30 PM CDT   (Arrive by 2:15 PM)   Return Visit with Angelina Bernal MD   [facility] ([facility])    [address] [place] [id number] [phone]            May 04, 2017  1:00 PM CDT   XR ESOPHAGRAM with PHXR1, PH RAD   [facility] ([facility])    [address] [place] [address] [phone]           Please bring a list of your current medicines to your exam. (Include vitamins, minerals and over-the-counter medicines.) Leave your valuables at home.  Tell the doctor if there is a chance you could be pregnant.  Do not eat for 8 hours before the exam. Keep drinking clear liquids until 2 hours before the exam.  You may take pain medicine (with a sip of water) up to 4 hours before the exam.  Do not swallow any other medicines unless your doctor tells you to. Talk to your doctor to be sure it s safe to stop your medicines.  Please call the Imaging Department at your exam site with any questions.            May 16, 2017  1:00 PM CDT   (Arrive by 12:45 PM)   RETURN FOOT/ANKLE with Easton Verdin  RONEL Torres   [facility] ([facility])    [address] [place] [id number] [phone]            May 25, 2017  2:30 PM CDT   (Arrive by 2:15 PM)   Return Visit with Easton Torres DPM   [facility] ([facility])    [address] [place] [id number] [phone]            Jul 14, 2017  1:30 PM CDT   (Arrive by 1:15 PM)   Return Visit with Star Lobato MD   [facility] ([facility])    [address] [place] [id number] [phone]            Sep 11, 2017  2:00 PM CDT   (Arrive by 1:45 PM)   HOLTER MONITOR VISIT with  Cvc Monitor [facility] ([facility])    [address] [place] [id number] [phone]            Oct 19, 2017  2:30 PM CDT   (Arrive by 2:15 PM)   RETURN ATRIAL FIBULATION VISIT with CHACORTA Joshi [facility] ([facility])    [address] [place] [id number] [phone]              Who to contact     Please call your clinic at [phone] to:    Ask questions about your health    Make or cancel appointments    Discuss your medicines    Learn about your test results    Speak to your doctor   If you have compliments or concerns about an experience at your clinic, or if you wish to file a complaint, please contact [facility] Patient Relations at [phone] or email us at [email or web address]         Additional Information About Your Visit        MyChart Information     [employer] gives you secure access to your electronic health record. If you see a primary care provider, you can also send messages to your care team and make appointments. If you have questions, please call your primary care clinic.  If you do  not have a primary care provider, please call [phone] and they will assist you.      [employer] is an electronic gateway that provides easy, online access to your medical records. With [employer], you can request a clinic appointment, read your test results, renew a prescription or communicate with your care team.     To access your existing account, please contact your [facility] or call [phone] for assistance.        Care EveryWhere ID     This is your Care EveryWhere ID. This could be used by other organizations to access your [place] medical records  [id number]         Blood Pressure from Last 3 Encounters:   04/24/17 131/78   04/24/17 (!) 127/92   04/17/17 109/46    Weight from Last 3 Encounters:   04/23/17 167 lb   04/17/17 172 lb   04/17/17 172 lb              Today, you had the following     No orders found for display         Today's Medication Changes          These changes are accurate as of: 4/27/17  4:33 PM.  If you have any questions, ask your nurse or doctor.               These medicines have changed or have updated prescriptions.        Dose/Directions    ferrous sulfate 325 (65 FE) MG tablet   Commonly known as:  IRON SUPPLEMENT   This may have changed:  when to take this   Used for:  S/P CABG (coronary artery bypass graft)        Dose:  325 mg   Take 1 tablet (325 mg) by mouth daily (with breakfast)   Quantity:  100 tablet   Refills:  1                Primary Care Provider Office Phone # Fax #    Humberto Conner [phone]       [facility] [address] [place] [address]        Thank you!     Thank you for choosing [facility]  for your care. Our goal is always to provide you with excellent care. Hearing back from our patients is one way we can continue to improve our services. Please take a few minutes to complete the written survey that you may receive in the mail after your visit with us. Thank you!              Your Updated Medication List - Protect others around you: Learn how to safely use, store and throw away your medicines at www.disposemymeds.org.          This list is accurate as of: 4/27/17  4:33 PM.  Always use your most recent med list.                   Brand Name Dispense Instructions for use    acetaminophen 325 MG tablet    TYLENOL    100 tablet    Take 1-2 tablets (325-650 mg) by mouth every 4 hours as needed for mild pain or fever       aspirin 81 MG chewable tablet     30 tablet    Take 1 tablet (81 mg) by mouth daily       atorvastatin 40 MG tablet    LIPITOR    90 tablet    Take 1 tablet (40 mg) by mouth daily       blood glucose monitoring meter device kit          cephALEXin 500 MG capsule    KEFLEX    30 capsule    Take 1 capsule (500 mg) by mouth 3 times daily       clindamycin 300 MG capsule    CLEOCIN    40 capsule    Take 1 capsule (300 mg) by mouth 3 times daily       ferrous sulfate 325 (65 FE) MG tablet    IRON SUPPLEMENT    100 tablet    Take 1 tablet (325 mg) by mouth daily (with breakfast)       fondaparinux 7.5MG/0.6ML injection    ARIXTRA    40 Syringe    Inject 0.6 mLs (7.5 mg) Subcutaneous daily       furosemide 40 MG tablet    LASIX    180 tablet    Take 1 tablet (40 mg) by mouth 2 times daily       gabapentin 100 MG capsule    NEURONTIN    180 capsule    Take 1 capsule (100 mg) by mouth 2 times daily       lidocaine visc 2% & diphenhydramine 12.5mg/5mL & maalox/mylanta w/simethicone (1:1:1 v/v/v) Susp compounding kit     237 mL    Swish and swallow 5-10 mLs in mouth every 6 hours as needed for mouth sores       ONE TOUCH ULTRA test strip   Generic drug:  blood glucose monitoring     100 each    USE AS DIRECTED TO TEST ONE TIME A DAY       ONETOUCH DELICA LANCETS 33G Misc     100 each    1 Device daily       potassium chloride SA 10 MEQ CR tablet    K-DUR/KLOR-CON M    180 tablet    Take 1 tablet (10 mEq) by mouth 2 times daily       traZODone 50 MG tablet    DESYREL    45 tablet     Take 0.5 tablets (25 mg) by mouth nightly as needed for sleep       venlafaxine 150 MG Tb24 24 hr tablet    EFFEXOR-ER    90 each    Take 1 tablet (150 mg) by mouth daily (with breakfast)

## 2017-04-27 NOTE — PROGRESS NOTES
Chief Complaint:   No chief complaint on file.         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       Oxycodone      hallucinations     Adhesive Tape Itching and Rash     Diapers & Supplies Rash     Developed yeast infection from previous hospital stay     Subjective: Carlos Alberto is a 76 year old female who presents to the clinic today for a follow up of toe gangrene and leg wounds.   Home nursing continues with dressing changes 3x per week.   No new complaints today.   Assisted by her daughter.     Objective  L leg surgical dehiscence wounds will be evaluated by cardiovascular surgery tomorrow. Dressings remain intact.      Yousif Classification: Unstageable  Wound base: Not Visible  Edges: erythematous  Drainage: moderate/serous and Slight/purulent  Odor: no  Undermining: no  Tunneling: circumferential in the distal wound, at about 6 o'clock in the proximal wound.  Bone Exposure: yes 2nd toe right foot  Clinical Signs of Infection: no      The gangrenous toes were examined.   The left digits are free of eschar today.   The distal aspects of these digits appear viable.     The right digits are unchanged. right hallux with dry gangrene IP and distal. Demarcated edges around base of toe. No infection is noted. The proximal portion of the right hallux is still moist fibrotic tissue.     Assessment: Gangrene BL toes    - Left is healing and showing signs of increased blood flow.    - Left surgical  wound dehiscence   - Right toes remains the same, continue to demarcate without s/o infection    Plan:   - Pt seen and evaluated  - Some flaking was removed from the right hallux. No debridement was performed today.  - Antibiotics: PO clinda (completed course yesterday)  - Dr. Santamaria note 4/24:    - Dr. Santamaria did some debridement of toes, will likely need right great toe   and 2nd toe amputations. Will get this scheduled in the next couple  weeks. Patient would like to wait until her  arrives to help her  post operatively. He is currently in [place].    - DH2 shoes     - Continue Iodosorb dressing to be changed QOD by home nursing.     - Pt to return to clinic in 2 week. Proceed with amputation as scheduled.    I saw the pt along with Dr. Hernandez and agree with the above documentation. Vascular will proceed with the amputations as noted. She will be seeing CV tomorrow for the leg wound.   I will see her again in 2 weeks.

## 2017-04-28 ENCOUNTER — OFFICE VISIT (OUTPATIENT)
Dept: CARDIOLOGY | Facility: CLINIC | Age: 77
End: 2017-04-28
Attending: SURGERY
Payer: MEDICARE

## 2017-04-28 VITALS
SYSTOLIC BLOOD PRESSURE: 123 MMHG | DIASTOLIC BLOOD PRESSURE: 72 MMHG | OXYGEN SATURATION: 95 % | HEART RATE: 68 BPM | HEIGHT: 62 IN | WEIGHT: 167.1 LBS | BODY MASS INDEX: 30.75 KG/M2

## 2017-04-28 DIAGNOSIS — T14.8XXA WOUND INFECTION: Primary | ICD-10-CM

## 2017-04-28 DIAGNOSIS — L08.9 WOUND INFECTION: Primary | ICD-10-CM

## 2017-04-28 RX ORDER — LEVOFLOXACIN 250 MG/1
250 TABLET, FILM COATED ORAL DAILY
Qty: 10 TABLET | Refills: 0 | Status: SHIPPED | OUTPATIENT
Start: 2017-04-28 | End: 2017-05-03 | Stop reason: SINTOL

## 2017-04-28 ASSESSMENT — PAIN SCALES - GENERAL: PAINLEVEL: NO PAIN (0)

## 2017-04-28 NOTE — MR AVS SNAPSHOT
After Visit Summary   4/28/2017    Carlos Alberto Fenton    MRN: [id number]           Patient Information     Date Of Birth          1940        Visit Information        Provider Department      4/28/2017 2:30 PM ILYA CVTS [facility]        Today's Diagnoses     Wound infection    -  1       Follow-ups after your visit        Follow-up notes from your care team     Return in about 1 week (around 5/5/2017).      Your next 10 appointments already scheduled     May 01, 2017  2:00 PM CDT   Masonic Lab Draw with  MASONIC LAB DRAW   [facility] Lab Draw ([facility])    [address] [place] [phone]            May 01, 2017  2:30 PM CDT   (Arrive by 2:15 PM)   Return Visit with Angelina Bernal MD   [facility] ([facility])    [address] [place] [phone]            May 04, 2017  1:00 PM CDT   XR ESOPHAGRAM with PHXR1, PH RAD   [facility] ([facility])    [address] [place] [address] [phone]           Please bring a list of your current medicines to your exam. (Include vitamins, minerals and over-the-counter medicines.) Leave your valuables at home.  Tell the doctor if there is a chance you could be pregnant.  Do not eat for 8 hours before the exam. Keep drinking clear liquids until 2 hours before the exam.  You may take pain medicine (with a sip of water) up to 4 hours before the exam.  Do not swallow any other medicines unless your doctor tells you to. Talk to your doctor to be sure it s safe to stop your medicines.  Please call the Imaging Department at your exam site with any questions.            May 16, 2017  1:00 PM CDT   (Arrive by 12:45 PM)   RETURN FOOT/ANKLE with Easton Torres DPM   [facility] ([facility])    [address] [place] [address]  [address] [address]   [phone]            May 25, 2017  2:30 PM CDT   (Arrive by 2:15 PM)   Return Visit with Easton Torres DPM   [facility] ([facility] [place])    [address] [place] [address]   [phone]            Jul 14, 2017  1:30 PM CDT   (Arrive by 1:15 PM)   Return Visit with Star Lobato MD   [facility] ([facility] [place])    [address] [place] [phone]            Sep 11, 2017  2:00 PM CDT   (Arrive by 1:45 PM)   HOLTER MONITOR VISIT with  Cvc Monitor [facility] ([facility])    [address] [place] [address]   [phone]            Oct 19, 2017  2:30 PM CDT   (Arrive by 2:15 PM)   RETURN ATRIAL FIBULATION VISIT with CHACORTA Joshi [facility] ([facility] [place] [facility])    [address] [place] [id number] [phone]              Who to contact     If you have questions or need follow up information about today's clinic visit or your schedule please contact [facility] directly at [phone].  Normal or non-critical lab and imaging results will be communicated to you by MyChart, letter or phone within 4 business days after the clinic has received the results. If you do not hear from us within 7 days, please contact the clinic through MyChart or phone. If you have a critical or abnormal lab result, we will notify you by phone as soon as possible.  Submit refill requests through [employer] or call your pharmacy and they will forward the refill request to us. Please allow 3 business days for your refill to be completed.          Additional Information About Your Visit        MyChart Information     [employer] gives you secure access to your electronic health record. If you see a primary care  "provider, you can also send messages to your care team and make appointments. If you have questions, please call your primary care clinic.  If you do not have a primary care provider, please call [phone] and they will assist you.        Care EveryWhere ID     This is your Care EveryWhere ID. This could be used by other organizations to access your [place] medical records  [id number]        Your Vitals Were     Pulse Height Pulse Oximetry BMI (Body Mass Index)          68 1.575 m (5' 2\") 95% 30.56 kg/m2         Blood Pressure from Last 3 Encounters:   04/28/17 123/72   04/24/17 131/78   04/24/17 (!) 127/92    Weight from Last 3 Encounters:   04/28/17 75.8 kg (167 lb 1.6 oz)   04/23/17 75.8 kg (167 lb)   04/17/17 78 kg (172 lb)              Today, you had the following     No orders found for display         Today's Medication Changes          These changes are accurate as of: 4/28/17  3:29 PM.  If you have any questions, ask your nurse or doctor.               Start taking these medicines.        Dose/Directions    levofloxacin 250 MG tablet   Commonly known as:  LEVAQUIN   Used for:  Wound infection        Dose:  250 mg   Take 1 tablet (250 mg) by mouth daily   Quantity:  10 tablet   Refills:  0         These medicines have changed or have updated prescriptions.        Dose/Directions    ferrous sulfate 325 (65 FE) MG tablet   Commonly known as:  IRON SUPPLEMENT   This may have changed:  when to take this   Used for:  S/P CABG (coronary artery bypass graft)        Dose:  325 mg   Take 1 tablet (325 mg) by mouth daily (with breakfast)   Quantity:  100 tablet   Refills:  1         Stop taking these medicines if you haven't already. Please contact your care team if you have questions.     cephALEXin 500 MG capsule   Commonly known as:  KEFLEX           clindamycin 300 MG capsule   Commonly known as:  CLEOCIN                Where to get your medicines      These medications were sent to [facility] [place] [facility] [address] [address]  [address] [address] [address]     Phone:  [phone]     levofloxacin 250 MG tablet                Primary Care Provider Office Phone # Fax #    Humberto Conner [phone]       [place] [address] [place] [address]        Thank you!     Thank you for choosing [facility]  for your care. Our goal is always to provide you with excellent care. Hearing back from our patients is one way we can continue to improve our services. Please take a few minutes to complete the written survey that you may receive in the mail after your visit with us. Thank you!             Your Updated Medication List - Protect others around you: Learn how to safely use, store and throw away your medicines at www.disposemymeds.org.          This list is accurate as of: 4/28/17  3:29 PM.  Always use your most recent med list.                   Brand Name Dispense Instructions for use    acetaminophen 325 MG tablet    TYLENOL    100 tablet    Take 1-2 tablets (325-650 mg) by mouth every 4 hours as needed for mild pain or fever       aspirin 81 MG chewable tablet     30 tablet    Take 1 tablet (81 mg) by mouth daily       atorvastatin 40 MG tablet    LIPITOR    90 tablet    Take 1 tablet (40 mg) by mouth daily       blood glucose monitoring meter device kit          ferrous sulfate 325 (65 FE) MG tablet    IRON SUPPLEMENT    100 tablet    Take 1 tablet (325 mg) by mouth daily (with breakfast)       fondaparinux 7.5MG/0.6ML injection    ARIXTRA    40 Syringe    Inject 0.6 mLs (7.5 mg) Subcutaneous daily       furosemide 40 MG tablet    LASIX    180 tablet    Take 1 tablet (40 mg) by mouth 2 times daily       gabapentin 100 MG capsule    NEURONTIN    180 capsule    Take 1 capsule (100 mg) by mouth 2 times daily       levofloxacin 250 MG tablet    LEVAQUIN    10 tablet    Take 1 tablet (250 mg) by mouth daily       lidocaine visc 2% & diphenhydramine  12.5mg/5mL & maalox/mylanta w/simethicone (1:1:1 v/v/v) Susp compounding kit     237 mL    Swish and swallow 5-10 mLs in mouth every 6 hours as needed for mouth sores       ONE TOUCH ULTRA test strip   Generic drug:  blood glucose monitoring     100 each    USE AS DIRECTED TO TEST ONE TIME A DAY       ONETOUCH DELICA LANCETS 33G Misc     100 each    1 Device daily       potassium chloride SA 10 MEQ CR tablet    K-DUR/KLOR-CON M    180 tablet    Take 1 tablet (10 mEq) by mouth 2 times daily       traZODone 50 MG tablet    DESYREL    45 tablet    Take 0.5 tablets (25 mg) by mouth nightly as needed for sleep       venlafaxine 150 MG Tb24 24 hr tablet    EFFEXOR-ER    90 each    Take 1 tablet (150 mg) by mouth daily (with breakfast)

## 2017-04-28 NOTE — LETTER
4/28/2017    RE: Carlos Alberto Fenton  [address]  [facility] [address]     Dear Colleague,    Thank you for the opportunity to participate in the care of your patient, Carlos Alberto Fenton, at the [facility]. Please see a copy of my visit note below.    CARDIOTHORACIC SURGERY FOLLOW-UP VISIT     Carlos Alberto Fenton   1940   [id number]      Reason for visit: Post-Op CABG with MAZE/AtriClip with Dr. LORAINE Quiñones on 2/6/2017    HPI: Carlos Alberto Fenton is a 76 year old year old female seen in clinic for a routine follow-up appointment after surgery. Patient has past medical history of CAD, cerebral infarction, DM, HGN, A-fib, and NOAH.  Hospital course was remarkable for possible LLE and sternal wound infection, discharged on Bactrim.    Patient has been doing well since discharge and now returns to clinic for postop visit.  Has been dealing with problems with leg SVH site wound not healing.   Has seen primary care provider, treated with Keflex and Clindamycin without resolution of infection.   Wound continues to drain.   Has toes that Vascular team has been following, may need amputation in OR soon.     PAST MEDICAL HISTORY:  Past Medical History:   Diagnosis Date     Antiplatelet or antithrombotic long-term use      Atrial fibrillation (H)      CAD (coronary artery disease)     2 vessel     Cancer (H)     Skin     Cerebral artery occlusion with cerebral infarction (H) 10/2016     Cerebral infarction (H) 10/2016     Diabetes (H)      Headaches      History of skin cancer      Hyperlipemias      Hypertension      Irregular heart beat      Peripheral neuropathy (H) 1990    cause unknown     Sleep apnea        PAST SURGICAL HISTORY:  Past Surgical History:   Procedure Laterality Date     BACK SURGERY       BYPASS GRAFT ARTERY CORONARY N/A 2/6/2017    Procedure: BYPASS GRAFT ARTERY CORONARY;  Surgeon: Mikhail Quiñones MD;  Location: [facility] OR     C APPENDECTOMY  1959     CORONARY  ARTERY BYPASS       HYSTERECTOMY, PASTORA  1988     MAZE PROCEDURE N/A 2/6/2017    Procedure: MAZE PROCEDURE;  Surgeon: Mikhail Quiñones MD;  Location: [facility] OR     PICC INSERTION Left 02/25/2017    5fr TL Bard PICC, 47cm (3cm external) in the L basilic vein w/ tip in the SVC RA junction     TONSILLECTOMY  1942       CURRENT MEDICATIONS:   Current Outpatient Prescriptions   Medication     magic mouthwash suspension (diphenhydramine, lidocaine, aluminum-magnesium & simethicone)     clindamycin (CLEOCIN) 300 MG capsule     fondaparinux (ARIXTRA) 7.5MG/0.6ML injection     cephALEXin (KEFLEX) 500 MG capsule     ONE TOUCH ULTRA test strip     atorvastatin (LIPITOR) 40 MG tablet     furosemide (LASIX) 40 MG tablet     gabapentin (NEURONTIN) 100 MG capsule     traZODone (DESYREL) 50 MG tablet     venlafaxine (EFFEXOR-ER) 150 MG TB24 24 hr tablet     potassium chloride SA (K-DUR/KLOR-CON M) 10 MEQ CR tablet     aspirin 81 MG chewable tablet     acetaminophen (TYLENOL) 325 MG tablet     ferrous sulfate (IRON SUPPLEMENT) 325 (65 FE) MG tablet     ONETOUCH DELICA LANCETS 33G MISC     blood glucose monitoring (ONE TOUCH ULTRA 2) meter device kit     No current facility-administered medications for this visit.        ALLERGIES:      Allergies   Allergen Reactions     Heparin      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       Oxycodone      hallucinations     Adhesive Tape Itching and Rash     Diapers & Supplies Rash     Developed yeast infection from previous hospital stay       LABS:  Last Basic Metabolic Panel:  Lab Results   Component Value Date     04/23/2017      Lab Results   Component Value Date    POTASSIUM 3.9 04/23/2017     Lab Results   Component Value Date    CHLORIDE 98 04/23/2017     Lab Results   Component Value Date    CARLY 8.8 04/23/2017     Lab Results   Component Value Date    CO2 34 04/23/2017     Lab  "Results   Component Value Date    BUN 12 04/23/2017     Lab Results   Component Value Date    CR 0.65 04/23/2017     Lab Results   Component Value Date     04/23/2017       Last CBC:   Lab Results   Component Value Date    WBC 6.4 04/23/2017     Lab Results   Component Value Date    RBC 3.83 04/23/2017     Lab Results   Component Value Date    HGB 11.7 04/23/2017     Lab Results   Component Value Date    HCT 39.4 04/23/2017     No components found for: MCT  Lab Results   Component Value Date     04/23/2017     Lab Results   Component Value Date    MCH 30.5 04/23/2017     Lab Results   Component Value Date    MCHC 29.7 04/23/2017     Lab Results   Component Value Date    RDW 18.9 04/23/2017     Lab Results   Component Value Date     04/23/2017       INR:  Lab Results   Component Value Date    INR 1.31 04/23/2017    INR 1.50 04/01/2017    INR 7.75 03/02/2017    INR 3.43 03/01/2017       IMAGING:  None    PHYSICAL EXAM:   /72 (BP Location: Left arm, Patient Position: Chair, Cuff Size: Adult Large)  Pulse 68  Ht 1.575 m (5' 2\")  Wt 75.8 kg (167 lb 1.6 oz)  SpO2 95%  BMI 30.56 kg/m2  General: alert and oriented x 3, pleasant, no acute distress, normal mood and affect  CV: HD stable  Pulm: RA, easy work of breathing  Incision: incision has open areas on each apex of wound by knee, both about 1 cm wide/long and 2-3 cm deep without dep tracking or communication between the two wounds. Packed with nugauze (dry).     PROCEDURES:  Reviewed Leg MRI       ASSESSMENT/PLAN:  Carlos Alberto Fenton is a 76 year old year old female status post CABG with MAZE/AtriClip who returns to clinic for postop visit.     1. SVH leg incision infection. Repack daily (or BID if possible) for best wound treatment and care, keep area clean.    2. Sensitivities to Levaquin 250 mg, will try despite diabetes (no renal issues) due to failure with keflex and clinda.  Stop clinda and keflex.   3. RTC in one week.   4. May need OR " debridement, possibly during toe amputation.      The total time spent with the patient was 25 minutes, > 50% of which was spent in counseling.    CC  Humberto Conner       Please do not hesitate to contact me if you have any questions/concerns.     Sincerely,     Cardiovascular Thoracic Surgery

## 2017-04-28 NOTE — PROGRESS NOTES
CARDIOTHORACIC SURGERY FOLLOW-UP VISIT     Carlos Alberto Fenton   1940   [id number]      Reason for visit: Post-Op CABG with MAZE/AtriClip with Dr. LORAINE Quiñones on 2/6/2017    HPI: Carlos Alberto Fenton is a 76 year old year old female seen in clinic for a routine follow-up appointment after surgery. Patient has past medical history of CAD, cerebral infarction, DM, HGN, A-fib, and NOAH.  Hospital course was remarkable for possible LLE and sternal wound infection, discharged on Bactrim.    Patient has been doing well since discharge and now returns to clinic for postop visit.  Has been dealing with problems with leg SVH site wound not healing.   Has seen primary care provider, treated with Keflex and Clindamycin without resolution of infection.   Wound continues to drain.   Has toes that Vascular team has been following, may need amputation in OR soon.     PAST MEDICAL HISTORY:  Past Medical History:   Diagnosis Date     Antiplatelet or antithrombotic long-term use      Atrial fibrillation (H)      CAD (coronary artery disease)     2 vessel     Cancer (H)     Skin     Cerebral artery occlusion with cerebral infarction (H) 10/2016     Cerebral infarction (H) 10/2016     Diabetes (H)      Headaches      History of skin cancer      Hyperlipemias      Hypertension      Irregular heart beat      Peripheral neuropathy (H) 1990    cause unknown     Sleep apnea        PAST SURGICAL HISTORY:  Past Surgical History:   Procedure Laterality Date     BACK SURGERY       BYPASS GRAFT ARTERY CORONARY N/A 2/6/2017    Procedure: BYPASS GRAFT ARTERY CORONARY;  Surgeon: Mikhail Quiñones MD;  Location: [facility] OR     C APPENDECTOMY  1959     CORONARY ARTERY BYPASS       HYSTERECTOMY, PASTORA  1988     MAZE PROCEDURE N/A 2/6/2017    Procedure: MAZE PROCEDURE;  Surgeon: Mikhail Quiñones MD;  Location: [facility] OR     PICC INSERTION Left 02/25/2017    5fr TL Bard PICC, 47cm (3cm external) in the L basilic vein w/ tip in the SVC RA junction     TONSILLECTOMY  1942        CURRENT MEDICATIONS:   Current Outpatient Prescriptions   Medication     magic mouthwash suspension (diphenhydramine, lidocaine, aluminum-magnesium & simethicone)     clindamycin (CLEOCIN) 300 MG capsule     fondaparinux (ARIXTRA) 7.5MG/0.6ML injection     cephALEXin (KEFLEX) 500 MG capsule     ONE TOUCH ULTRA test strip     atorvastatin (LIPITOR) 40 MG tablet     furosemide (LASIX) 40 MG tablet     gabapentin (NEURONTIN) 100 MG capsule     traZODone (DESYREL) 50 MG tablet     venlafaxine (EFFEXOR-ER) 150 MG TB24 24 hr tablet     potassium chloride SA (K-DUR/KLOR-CON M) 10 MEQ CR tablet     aspirin 81 MG chewable tablet     acetaminophen (TYLENOL) 325 MG tablet     ferrous sulfate (IRON SUPPLEMENT) 325 (65 FE) MG tablet     ONETOUCH DELICA LANCETS 33G MISC     blood glucose monitoring (ONE TOUCH ULTRA 2) meter device kit     No current facility-administered medications for this visit.        ALLERGIES:      Allergies   Allergen Reactions     Heparin      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       Oxycodone      hallucinations     Adhesive Tape Itching and Rash     Diapers & Supplies Rash     Developed yeast infection from previous hospital stay       LABS:  Last Basic Metabolic Panel:  Lab Results   Component Value Date     04/23/2017      Lab Results   Component Value Date    POTASSIUM 3.9 04/23/2017     Lab Results   Component Value Date    CHLORIDE 98 04/23/2017     Lab Results   Component Value Date    CARLY 8.8 04/23/2017     Lab Results   Component Value Date    CO2 34 04/23/2017     Lab Results   Component Value Date    BUN 12 04/23/2017     Lab Results   Component Value Date    CR 0.65 04/23/2017     Lab Results   Component Value Date     04/23/2017       Last CBC:   Lab Results   Component Value Date    WBC 6.4 04/23/2017     Lab Results   Component Value Date    RBC 3.83  "04/23/2017     Lab Results   Component Value Date    HGB 11.7 04/23/2017     Lab Results   Component Value Date    HCT 39.4 04/23/2017     No components found for: MCT  Lab Results   Component Value Date     04/23/2017     Lab Results   Component Value Date    MCH 30.5 04/23/2017     Lab Results   Component Value Date    MCHC 29.7 04/23/2017     Lab Results   Component Value Date    RDW 18.9 04/23/2017     Lab Results   Component Value Date     04/23/2017       INR:  Lab Results   Component Value Date    INR 1.31 04/23/2017    INR 1.50 04/01/2017    INR 7.75 03/02/2017    INR 3.43 03/01/2017       IMAGING:  None    PHYSICAL EXAM:   /72 (BP Location: Left arm, Patient Position: Chair, Cuff Size: Adult Large)  Pulse 68  Ht 1.575 m (5' 2\")  Wt 75.8 kg (167 lb 1.6 oz)  SpO2 95%  BMI 30.56 kg/m2  General: alert and oriented x 3, pleasant, no acute distress, normal mood and affect  CV: HD stable  Pulm: RA, easy work of breathing  Incision: incision has open areas on each apex of wound by knee, both about 1 cm wide/long and 2-3 cm deep without dep tracking or communication between the two wounds. Packed with nugauze (dry).     PROCEDURES:  Reviewed Leg MRI       ASSESSMENT/PLAN:  Carlos Alberto Fenton is a 76 year old year old female status post CABG with MAZE/AtriClip who returns to clinic for postop visit.     1. SVH leg incision infection. Repack daily (or BID if possible) for best wound treatment and care, keep area clean.    2. Sensitivities to Levaquin 250 mg, will try despite diabetes (no renal issues) due to failure with keflex and clinda.  Stop clinda and keflex.   3. RTC in one week.   4. May need OR debridement, possibly during toe amputation.        The total time spent with the patient was 25 minutes, > 50% of which was spent in counseling.    CC  Humberto Conner     "

## 2017-05-01 DIAGNOSIS — I96 GANGRENOUS TOE (H): Primary | ICD-10-CM

## 2017-05-01 RX ORDER — VANCOMYCIN HYDROCHLORIDE 1 G/200ML
1000 INJECTION, SOLUTION INTRAVENOUS
Status: CANCELLED | OUTPATIENT
Start: 2017-05-01

## 2017-05-03 ENCOUNTER — CARE COORDINATION (OUTPATIENT)
Dept: CARDIOLOGY | Facility: CLINIC | Age: 77
End: 2017-05-03

## 2017-05-03 ENCOUNTER — TELEPHONE (OUTPATIENT)
Dept: INTERNAL MEDICINE | Facility: CLINIC | Age: 77
End: 2017-05-03

## 2017-05-03 DIAGNOSIS — R13.10 DYSPHAGIA, UNSPECIFIED TYPE: Primary | ICD-10-CM

## 2017-05-03 NOTE — PROGRESS NOTES
Chantal, RN with [facility] calling to report side effects of levaquin.  Patient is having tendon pain in legs, thighs and shoulders since starting levaquin.  According to note by LYDIA Paniagua, patient had sensitivity but was going to be tried on levaquin because of failure on cipro and keflex.  Nurse would like a call back for update to antibiotic.  She would also need updated wound care orders.  The orders in the last clinic note are different than what was previously prescribed.  I will forward to Mayela NAIK, care coordinator, for follow up.  Chantal states understanding and asks the return call be placed to number below.    Call back  [phone]

## 2017-05-03 NOTE — PROGRESS NOTES
Called and spoke to home care nurse regarding patient's symptoms, she states patient is having tendon pain in her arms, legs and shoulders as well as a severe headache yesterday,that she says is from the Levaquin she started taking on 04/28.   Home care nurse describes wounds as healing slowly without signs of infection.  Patient has not had fever or chills.  Home care nurse is not able to do daily or twice daily wound cares as requested by PA at last clinic visit because patient's insurance will not pay for daily visits and patient and her daughter will not pack wounds themselves so they are continuing with QOD dressing changes for now.  Home care nurse Chantal has requested a home visit from their wound care nurse for recommendation for continued dressing changes.      Spoke to PA and since patient has not had s/sx of active infection patient is to stop the Levaquin and follow up in clinic this Friday as scheduled.  Relayed message to home care nurse and she will call the patient with instructions.     Date: 5/3/2017    Time of Call: 5:02 PM     Diagnosis:  Open leg wounds from surgery on 02/06/17.     [ VORB ] Ordering provider: KOFI Carney    Order: Discontinue Levaquin     Order received by: Mayela Gudino RN       Follow-up/additional notes: Instruction relayed to patient by Home Care REESE Cornejo.      Mayela Gudino RNCC  Cardiothoracic Surgery   O) [phone]

## 2017-05-04 ENCOUNTER — HOSPITAL ENCOUNTER (OUTPATIENT)
Dept: GENERAL RADIOLOGY | Facility: CLINIC | Age: 77
Discharge: HOME | End: 2017-05-04
Attending: INTERNAL MEDICINE | Admitting: INTERNAL MEDICINE
Payer: MEDICARE

## 2017-05-04 DIAGNOSIS — R13.10 DYSPHAGIA, UNSPECIFIED TYPE: ICD-10-CM

## 2017-05-04 PROCEDURE — 25500045 ZZH RX 255: Performed by: RADIOLOGY

## 2017-05-04 PROCEDURE — 74220 X-RAY XM ESOPHAGUS 1CNTRST: CPT | Mod: TC

## 2017-05-04 RX ADMIN — ANTACID/ANTIFLATULENT 4 G: 380; 550; 10; 10 GRANULE, EFFERVESCENT ORAL at 13:25

## 2017-05-04 NOTE — PROGRESS NOTES
Appropriate assistive devices provided during their visit. YES (Yes, No, N/A) wheelchair (list device)    Exam table and/or cart  placed in the lowest position. yes (Yes, No, N/A)    Brakes on tables/carts/wheelchairs used at all times. yes (Yes, No, N/A)    Non slip footwear applied. na (Yes, No, NA)    Patient was accompanied by staff throughout visit. na (Yes, No, N/A)    Equipment safety straps used. na (Yes, No, N/A)    Assist with toileting. na (Yes, No, N/A)

## 2017-05-05 ENCOUNTER — TELEPHONE (OUTPATIENT)
Dept: INTERNAL MEDICINE | Facility: CLINIC | Age: 77
End: 2017-05-05

## 2017-05-05 ENCOUNTER — OFFICE VISIT (OUTPATIENT)
Dept: CARDIOLOGY | Facility: CLINIC | Age: 77
End: 2017-05-05
Attending: THORACIC SURGERY (CARDIOTHORACIC VASCULAR SURGERY)
Payer: MEDICARE

## 2017-05-05 VITALS
HEART RATE: 74 BPM | DIASTOLIC BLOOD PRESSURE: 71 MMHG | HEIGHT: 62 IN | SYSTOLIC BLOOD PRESSURE: 113 MMHG | BODY MASS INDEX: 30.53 KG/M2 | WEIGHT: 165.9 LBS | OXYGEN SATURATION: 92 %

## 2017-05-05 DIAGNOSIS — L08.9 WOUND INFECTION: Primary | ICD-10-CM

## 2017-05-05 DIAGNOSIS — T14.8XXA WOUND INFECTION: Primary | ICD-10-CM

## 2017-05-05 RX ORDER — CIPROFLOXACIN 500 MG/1
500 TABLET, FILM COATED ORAL 2 TIMES DAILY
Qty: 14 TABLET | Refills: 0 | Status: SHIPPED | OUTPATIENT
Start: 2017-05-05 | End: 2017-05-12

## 2017-05-05 ASSESSMENT — PAIN SCALES - GENERAL: PAINLEVEL: NO PAIN (0)

## 2017-05-05 NOTE — PROGRESS NOTES
CARDIOTHORACIC SURGERY FOLLOW-UP VISIT     Carlos Alberto Fenton   1940   [id number]      Reason for visit: Post-Op CABG with MAZE/AtriClip with Dr. LORAINE Quiñones on 2/6/2017    HPI:   Carlos Alberto Fenton is a 76 year old year old female seen in clinic for a routine follow-up appointment after surgery. Patient has past medical history of CAD, cerebral infarction, DM, HGN, A-fib, and NOAH. Hospital course was remarkable for possible LLE and sternal wound infection, discharged on Bactrim.     Patient now returns to clinic for wound check visit. Last seen 4/28.     Stopped taking levaquin due to tendon sensitivity.      Wound no longer draining or oozy.   Packing BID, getting drier.  Less tenderness.   She thinks it is getting better.   Upper wound minimal packing.   Lower wound still needing packing.       PAST MEDICAL HISTORY:  Past Medical History:   Diagnosis Date     Antiplatelet or antithrombotic long-term use      Atrial fibrillation (H)      CAD (coronary artery disease)     2 vessel     Cancer (H)     Skin     Cerebral artery occlusion with cerebral infarction (H) 10/2016     Cerebral infarction (H) 10/2016     Diabetes (H)      Headaches      History of skin cancer      Hyperlipemias      Hypertension      Irregular heart beat      Peripheral neuropathy (H) 1990    cause unknown     Sleep apnea        PAST SURGICAL HISTORY:  Past Surgical History:   Procedure Laterality Date     BACK SURGERY       BYPASS GRAFT ARTERY CORONARY N/A 2/6/2017    Procedure: BYPASS GRAFT ARTERY CORONARY;  Surgeon: Mikhail Quiñones MD;  Location: [facility] OR     C APPENDECTOMY  1959     CORONARY ARTERY BYPASS       HYSTERECTOMY, PASTORA  1988     MAZE PROCEDURE N/A 2/6/2017    Procedure: MAZE PROCEDURE;  Surgeon: Mikhail Quiñones MD;  Location: [facility] OR     PICC INSERTION Left 02/25/2017    5fr TL Bard PICC, 47cm (3cm external) in the L basilic vein w/ tip in the SVC RA junction     TONSILLECTOMY  1942       CURRENT MEDICATIONS:   Current Outpatient Prescriptions    Medication     magic mouthwash suspension (diphenhydramine, lidocaine, aluminum-magnesium & simethicone)     fondaparinux (ARIXTRA) 7.5MG/0.6ML injection     ONE TOUCH ULTRA test strip     atorvastatin (LIPITOR) 40 MG tablet     furosemide (LASIX) 40 MG tablet     gabapentin (NEURONTIN) 100 MG capsule     traZODone (DESYREL) 50 MG tablet     venlafaxine (EFFEXOR-ER) 150 MG TB24 24 hr tablet     potassium chloride SA (K-DUR/KLOR-CON M) 10 MEQ CR tablet     aspirin 81 MG chewable tablet     acetaminophen (TYLENOL) 325 MG tablet     ferrous sulfate (IRON SUPPLEMENT) 325 (65 FE) MG tablet     ONETOUCH DELICA LANCETS 33G MISC     blood glucose monitoring (ONE TOUCH ULTRA 2) meter device kit     No current facility-administered medications for this visit.        ALLERGIES:      Allergies   Allergen Reactions     Levaquin [Levofloxacin] Other (See Comments)     Tendon pain in legs, arms and shoulders.      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       Oxycodone      hallucinations     Adhesive Tape Itching and Rash     Diapers & Supplies Rash     Developed yeast infection from previous hospital stay       LABS:  Last Basic Metabolic Panel:  Lab Results   Component Value Date     04/23/2017      Lab Results   Component Value Date    POTASSIUM 3.9 04/23/2017     Lab Results   Component Value Date    CHLORIDE 98 04/23/2017     Lab Results   Component Value Date    CARLY 8.8 04/23/2017     Lab Results   Component Value Date    CO2 34 04/23/2017     Lab Results   Component Value Date    BUN 12 04/23/2017     Lab Results   Component Value Date    CR 0.65 04/23/2017     Lab Results   Component Value Date     04/23/2017       Last CBC:   Lab Results   Component Value Date    WBC 6.4 04/23/2017     Lab Results   Component Value Date    RBC 3.83 04/23/2017     Lab Results   Component Value Date     "HGB 11.7 04/23/2017     Lab Results   Component Value Date    HCT 39.4 04/23/2017     No components found for: MCT  Lab Results   Component Value Date     04/23/2017     Lab Results   Component Value Date    MCH 30.5 04/23/2017     Lab Results   Component Value Date    MCHC 29.7 04/23/2017     Lab Results   Component Value Date    RDW 18.9 04/23/2017     Lab Results   Component Value Date     04/23/2017       INR:  Lab Results   Component Value Date    INR 1.31 04/23/2017    INR 1.50 04/01/2017       IMAGING: No new imaging    PHYSICAL EXAM:   /71 (BP Location: Right arm, Patient Position: Chair, Cuff Size: Adult Regular)  Pulse 74  Ht 1.575 m (5' 2\")  Wt 75.3 kg (165 lb 14.4 oz)  SpO2 92%  BMI 30.34 kg/m2  General: alert and oriented x 3, pleasant, no acute distress, normal mood and affect  Incision: incision clean dry, upper open wound is now < 1 cm deep. Lower wound still 1 cm wide/long and about 2.5 cm deep.  Improved erythema, has induration about 2 cm radius from wound, less tender than last week.   Neuro: nonfocal    PROCEDURES:  Wound repacking       ASSESSMENT/PLAN:  Carlos Alberto Fenton is a 76 year old year old female status post CAB with LE draining wound (vein harvest site).      1. SVH leg incision infection. Repack daily (or BID if possible) for best wound treatment and care, keep area clean. This will slowly fill in from deep to shallow like the first area did.   2. Sensitivities to Levaquin 250 mg, took for about 5 days, stopped due to tendon sensitivity. Will give 7 days ciprofloxacin due to good response to levaquin.     3. RTC PRN, gave office number and instructions to call for appt if needed. Her daughter his her ride and will be out of town next week.   4. Likely won't need OR debridement.          The total time spent with the patient was 25 minutes, > 50% of which was spent in counseling.    CC  Humberto Conner     "

## 2017-05-05 NOTE — TELEPHONE ENCOUNTER
----- Message from Humberto Conner MD sent at 5/4/2017  8:34 PM CDT -----  Esophagram was ok, just had reflux on it.

## 2017-05-05 NOTE — MR AVS SNAPSHOT
After Visit Summary   5/5/2017    Carlos Alberto Fenton    MRN: [id number]           Patient Information     Date Of Birth          1940        Visit Information        Provider Department      5/5/2017 3:00 PM UC CVTS [facility]        Today's Diagnoses     Wound infection    -  1       Follow-ups after your visit        Follow-up notes from your care team     Return if symptoms worsen or fail to improve.      Your next 10 appointments already scheduled     May 10, 2017   Procedure with Laeh Santamaria MD   [facility], [place], Same Day Surgery (--)    [address] [address]   [phone]            May 15, 2017  3:00 PM CDT   (Arrive by 2:45 PM)   Return Vascular Visit with Leah Santamaria MD   [facility] ([facility])    [address] [place] [id number] [phone]            May 16, 2017  1:00 PM CDT   (Arrive by 12:45 PM)   RETURN FOOT/ANKLE with Easton Torres DPM   [facility] ([facility])    [address] [place] [address] [phone]            May 25, 2017  2:30 PM CDT   (Arrive by 2:15 PM)   Return Visit with Easton Torres DPM   [facility] ([facility])    [address] [place] [address] [phone]            Jul 14, 2017  1:30 PM CDT   (Arrive by 1:15 PM)   Return Visit with Star Lobato MD   [facility] ([facility])    [address] [place] [address]   [phone]            Sep 11, 2017  2:00 PM CDT   (Arrive by 1:45 PM)   HOLTER MONITOR VISIT with [facility] Monitor [facility] ([facility])    [address] [place] [address] [phone]            Oct 19, 2017  2:30 PM CDT   (Arrive by 2:15 PM)   RETURN ATRIAL  "FIBULATION VISIT with CHACORTA Joshi CNP   [facility] ([facility])    [address]  3rd Floor  [facility] [address] [phone]              Who to contact     If you have questions or need follow up information about today's clinic visit or your schedule please contact [facility] directly at [phone].  Normal or non-critical lab and imaging results will be communicated to you by [employer], letter or phone within 4 business days after the clinic has received the results. If you do not hear from us within 7 days, please contact the clinic through [employer] or phone. If you have a critical or abnormal lab result, we will notify you by phone as soon as possible.  Submit refill requests through [employer] or call your pharmacy and they will forward the refill request to us. Please allow 3 business days for your refill to be completed.          Additional Information About Your Visit        [employer] Information     [employer] gives you secure access to your electronic health record. If you see a primary care provider, you can also send messages to your care team and make appointments. If you have questions, please call your primary care clinic.  If you do not have a primary care provider, please call [phone] and they will assist you.        Care EveryWhere ID     This is your Care EveryWhere ID. This could be used by other organizations to access your [place] medical records  [id number]        Your Vitals Were     Pulse Height Pulse Oximetry BMI (Body Mass Index)          74 1.575 m (5' 2\") 92% 30.34 kg/m2         Blood Pressure from Last 3 Encounters:   05/05/17 113/71   04/28/17 123/72   04/24/17 131/78    Weight from Last 3 Encounters:   05/05/17 75.3 kg (165 lb 14.4 oz)   04/28/17 75.8 kg (167 lb 1.6 oz)   04/23/17 75.8 kg (167 lb)              Today, you had the following     No orders found for display         Today's Medication Changes    "       These changes are accurate as of: 5/5/17  3:41 PM.  If you have any questions, ask your nurse or doctor.               Start taking these medicines.        Dose/Directions    ciprofloxacin 500 MG tablet   Commonly known as:  CIPRO   Used for:  Wound infection        Dose:  500 mg   Take 1 tablet (500 mg) by mouth 2 times daily for 7 days   Quantity:  14 tablet   Refills:  0         These medicines have changed or have updated prescriptions.        Dose/Directions    ferrous sulfate 325 (65 FE) MG tablet   Commonly known as:  IRON SUPPLEMENT   This may have changed:  when to take this   Used for:  S/P CABG (coronary artery bypass graft)        Dose:  325 mg   Take 1 tablet (325 mg) by mouth daily (with breakfast)   Quantity:  100 tablet   Refills:  1            Where to get your medicines      These medications were sent to [address] [address]     Phone:  [phone]     ciprofloxacin 500 MG tablet                Primary Care Provider Office Phone # Fax #    Humberto Conner [phone]       [facility] [address] [place] [address]        Thank you!     Thank you for choosing [facility]  for your care. Our goal is always to provide you with excellent care. Hearing back from our patients is one way we can continue to improve our services. Please take a few minutes to complete the written survey that you may receive in the mail after your visit with us. Thank you!             Your Updated Medication List - Protect others around you: Learn how to safely use, store and throw away your medicines at www.disposemymeds.org.          This list is accurate as of: 5/5/17  3:41 PM.  Always use your most recent med list.                   Brand Name Dispense Instructions for use    acetaminophen 325 MG tablet    TYLENOL    100 tablet    Take 1-2 tablets (325-650 mg) by mouth every 4 hours as  needed for mild pain or fever       aspirin 81 MG chewable tablet     30 tablet    Take 1 tablet (81 mg) by mouth daily       atorvastatin 40 MG tablet    LIPITOR    90 tablet    Take 1 tablet (40 mg) by mouth daily       blood glucose monitoring meter device kit          ciprofloxacin 500 MG tablet    CIPRO    14 tablet    Take 1 tablet (500 mg) by mouth 2 times daily for 7 days       ferrous sulfate 325 (65 FE) MG tablet    IRON SUPPLEMENT    100 tablet    Take 1 tablet (325 mg) by mouth daily (with breakfast)       fondaparinux 7.5MG/0.6ML injection    ARIXTRA    40 Syringe    Inject 0.6 mLs (7.5 mg) Subcutaneous daily       furosemide 40 MG tablet    LASIX    180 tablet    Take 1 tablet (40 mg) by mouth 2 times daily       gabapentin 100 MG capsule    NEURONTIN    180 capsule    Take 1 capsule (100 mg) by mouth 2 times daily       lidocaine visc 2% & diphenhydramine 12.5mg/5mL & maalox/mylanta w/simethicone (1:1:1 v/v/v) Susp compounding kit     237 mL    Swish and swallow 5-10 mLs in mouth every 6 hours as needed for mouth sores       ONE TOUCH ULTRA test strip   Generic drug:  blood glucose monitoring     100 each    USE AS DIRECTED TO TEST ONE TIME A DAY       ONETOUCH DELICA LANCETS 33G Misc     100 each    1 Device daily       potassium chloride SA 10 MEQ CR tablet    K-DUR/KLOR-CON M    180 tablet    Take 1 tablet (10 mEq) by mouth 2 times daily       traZODone 50 MG tablet    DESYREL    45 tablet    Take 0.5 tablets (25 mg) by mouth nightly as needed for sleep       venlafaxine 150 MG Tb24 24 hr tablet    EFFEXOR-ER    90 each    Take 1 tablet (150 mg) by mouth daily (with breakfast)

## 2017-05-05 NOTE — LETTER
5/5/2017      RE: Carlos Alberto Fenton  [address]  [facility] [address]       Dear Colleague,    Thank you for the opportunity to participate in the care of your patient, Carlos Alberto Fenton, at the [facility]. Please see a copy of my visit note below.    CARDIOTHORACIC SURGERY FOLLOW-UP VISIT     Carlos Alberto Fenton   1940   [id number]      Reason for visit: Post-Op CABG with MAZE/AtriClip with Dr. LORAINE Quiñones on 2/6/2017    HPI:   Carlos Alberto Fentno is a 76 year old year old female seen in clinic for a routine follow-up appointment after surgery. Patient has past medical history of CAD, cerebral infarction, DM, HGN, A-fib, and NOAH. Hospital course was remarkable for possible LLE and sternal wound infection, discharged on Bactrim.     Patient now returns to clinic for wound check visit. Last seen 4/28.     Stopped taking levaquin due to tendon sensitivity.      Wound no longer draining or oozy.   Packing BID, getting drier.  Less tenderness.   She thinks it is getting better.   Upper wound minimal packing.   Lower wound still needing packing.       PAST MEDICAL HISTORY:  Past Medical History:   Diagnosis Date     Antiplatelet or antithrombotic long-term use      Atrial fibrillation (H)      CAD (coronary artery disease)     2 vessel     Cancer (H)     Skin     Cerebral artery occlusion with cerebral infarction (H) 10/2016     Cerebral infarction (H) 10/2016     Diabetes (H)      Headaches      History of skin cancer      Hyperlipemias      Hypertension      Irregular heart beat      Peripheral neuropathy (H) 1990    cause unknown     Sleep apnea        PAST SURGICAL HISTORY:  Past Surgical History:   Procedure Laterality Date     BACK SURGERY       BYPASS GRAFT ARTERY CORONARY N/A 2/6/2017    Procedure: BYPASS GRAFT ARTERY CORONARY;  Surgeon: Mikhail Quiñones MD;  Location: [facility] OR     C APPENDECTOMY  1959     CORONARY ARTERY BYPASS       HYSTERECTOMY, PASTORA  1988      MAZE PROCEDURE N/A 2/6/2017    Procedure: MAZE PROCEDURE;  Surgeon: Mikhail Quiñones MD;  Location: [facility] OR     PICC INSERTION Left 02/25/2017    5fr TL Bard PICC, 47cm (3cm external) in the L basilic vein w/ tip in the SVC RA junction     TONSILLECTOMY  1942       CURRENT MEDICATIONS:   Current Outpatient Prescriptions   Medication     magic mouthwash suspension (diphenhydramine, lidocaine, aluminum-magnesium & simethicone)     fondaparinux (ARIXTRA) 7.5MG/0.6ML injection     ONE TOUCH ULTRA test strip     atorvastatin (LIPITOR) 40 MG tablet     furosemide (LASIX) 40 MG tablet     gabapentin (NEURONTIN) 100 MG capsule     traZODone (DESYREL) 50 MG tablet     venlafaxine (EFFEXOR-ER) 150 MG TB24 24 hr tablet     potassium chloride SA (K-DUR/KLOR-CON M) 10 MEQ CR tablet     aspirin 81 MG chewable tablet     acetaminophen (TYLENOL) 325 MG tablet     ferrous sulfate (IRON SUPPLEMENT) 325 (65 FE) MG tablet     ONETOUCH DELICA LANCETS 33G MISC     blood glucose monitoring (ONE TOUCH ULTRA 2) meter device kit     No current facility-administered medications for this visit.        ALLERGIES:      Allergies   Allergen Reactions     Levaquin [Levofloxacin] Other (See Comments)     Tendon pain in legs, arms and shoulders.      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       Oxycodone      hallucinations     Adhesive Tape Itching and Rash     Diapers & Supplies Rash     Developed yeast infection from previous hospital stay       LABS:  Last Basic Metabolic Panel:  Lab Results   Component Value Date     04/23/2017      Lab Results   Component Value Date    POTASSIUM 3.9 04/23/2017     Lab Results   Component Value Date    CHLORIDE 98 04/23/2017     Lab Results   Component Value Date    CARLY 8.8 04/23/2017     Lab Results   Component Value Date    CO2 34 04/23/2017     Lab Results   Component Value Date     "BUN 12 04/23/2017     Lab Results   Component Value Date    CR 0.65 04/23/2017     Lab Results   Component Value Date     04/23/2017       Last CBC:   Lab Results   Component Value Date    WBC 6.4 04/23/2017     Lab Results   Component Value Date    RBC 3.83 04/23/2017     Lab Results   Component Value Date    HGB 11.7 04/23/2017     Lab Results   Component Value Date    HCT 39.4 04/23/2017     No components found for: MCT  Lab Results   Component Value Date     04/23/2017     Lab Results   Component Value Date    MCH 30.5 04/23/2017     Lab Results   Component Value Date    MCHC 29.7 04/23/2017     Lab Results   Component Value Date    RDW 18.9 04/23/2017     Lab Results   Component Value Date     04/23/2017       INR:  Lab Results   Component Value Date    INR 1.31 04/23/2017    INR 1.50 04/01/2017       IMAGING: No new imaging    PHYSICAL EXAM:   /71 (BP Location: Right arm, Patient Position: Chair, Cuff Size: Adult Regular)  Pulse 74  Ht 1.575 m (5' 2\")  Wt 75.3 kg (165 lb 14.4 oz)  SpO2 92%  BMI 30.34 kg/m2  General: alert and oriented x 3, pleasant, no acute distress, normal mood and affect  Incision: incision clean dry, upper open wound is now < 1 cm deep. Lower wound still 1 cm wide/long and about 2.5 cm deep.  Improved erythema, has induration about 2 cm radius from wound, less tender than last week.   Neuro: nonfocal    PROCEDURES:  Wound repacking       ASSESSMENT/PLAN:  Carlos Alberto Fenton is a 76 year old year old female status post CAB with LE draining wound (vein harvest site).      1. SVH leg incision infection. Repack daily (or BID if possible) for best wound treatment and care, keep area clean. This will slowly fill in from deep to shallow like the first area did.   2. Sensitivities to Levaquin 250 mg, took for about 5 days, stopped due to tendon sensitivity. Will give 7 days ciprofloxacin due to good response to levaquin.     3. RTC PRN, gave office number and " instructions to call for appt if needed. Her daughter his her ride and will be out of town next week.   4. Likely won't need OR debridement.          The total time spent with the patient was 25 minutes, > 50% of which was spent in counseling.    CC  Humberto Conner       Please do not hesitate to contact me if you have any questions/concerns.     Sincerely,     Cardiovascular Thoracic Surgery

## 2017-05-08 ENCOUNTER — DOCUMENTATION ONLY (OUTPATIENT)
Dept: CARE COORDINATION | Facility: CLINIC | Age: 77
End: 2017-05-08

## 2017-05-08 NOTE — PROGRESS NOTES
[facility] now requests orders and shares plan of care/discharge summaries for some patients through [employer].  Please REPLY TO THIS MESSAGE in order to give authorization for orders when needed.  This is considered a verbal order, you will still receive a faxed copy of orders for signature.  Thank you for your assistance in improving collaboration for our patients.    ORDER      Client is now packing her own wound to the LLE.  She has been using nugauze and doing this on days that nursing does not visit.  Is it ok to switch back to the nugauze with daily packing?    Daughter is now refusing to perform wound care to the toes per patient report. SN has not seen daughter at any recent visit.  Sounds like daughter has concerns with removal of dressing due to the status of the right first and second toe.  Wounds are being dressing on Monday, Wednesday and Saturday.  Is this frequency ok? Home care continues to use provodine/iodine to the right toes and the iodosorb to the left toes. Surgery for amputation appears to be scheduled for 5/19.     Ester Pineda RN    [phone]  [email or web address]

## 2017-05-09 ENCOUNTER — TELEPHONE (OUTPATIENT)
Dept: WOUND CARE | Facility: CLINIC | Age: 77
End: 2017-05-09

## 2017-05-09 ENCOUNTER — TELEPHONE (OUTPATIENT)
Dept: FAMILY MEDICINE | Facility: OTHER | Age: 77
End: 2017-05-09

## 2017-05-09 NOTE — TELEPHONE ENCOUNTER
VM left on triage line: [facility] calling with update on wound care.  Carlos Alberto is not getting wound care to her toes daily.  Her daughter is not comfortable with it, so only getting done 3-4 times per week with home care visits.  Packing to left lower extremity wound is getting done daily with nu gauze.  Daughter is comfortable with that.    Please call to get updated wound care orders with specifics they request. (Phone cut out for this and call back number)

## 2017-05-09 NOTE — TELEPHONE ENCOUNTER
Reason for call:  Form  Reason for Call:  Form, our goal is to have forms completed with 72 hours, however, some forms may require a visit or additional information.    Type of letter, form or note:  medical    Who is the form from?: Home care    Where did the form come from: form was faxed in    What clinic location was the form placed at?: [facility] - [phone]    Where the form was placed: [facility]    What number is listed as a contact on the form?: NA       Additional comments: please sign orders    Call taken on 5/9/2017 at 8:01 AM by Elinor Marin

## 2017-05-09 NOTE — TELEPHONE ENCOUNTER
Called and left message for Chantal with [facility] to call Dr. Torres's nurse back to go over orders for wound care and concerns since patient and daughter of patient are no longer doing wound care daily.

## 2017-05-12 ENCOUNTER — TELEPHONE (OUTPATIENT)
Dept: VASCULAR SURGERY | Facility: CLINIC | Age: 77
End: 2017-05-12

## 2017-05-12 NOTE — TELEPHONE ENCOUNTER
Per task , pt wants to r/s her surgery for a later date. Talked with the pt and the home health care nurse Chantal and offered the pt a new date for 5/19 at 745. Pt ok with that. Will mail letter. Pt will call if anything changes

## 2017-05-16 ENCOUNTER — OFFICE VISIT (OUTPATIENT)
Dept: ORTHOPEDICS | Facility: CLINIC | Age: 77
End: 2017-05-16

## 2017-05-16 DIAGNOSIS — G63 POLYNEUROPATHY ASSOCIATED WITH UNDERLYING DISEASE (H): ICD-10-CM

## 2017-05-16 DIAGNOSIS — I96 TOE GANGRENE (H): Primary | ICD-10-CM

## 2017-05-16 NOTE — NURSING NOTE
Reason For Visit:   Chief Complaint   Patient presents with     RECHECK     Follow up. Gangrene, toes. Left upper leg wound. Surgery scheduled with Hina on 5- for Right Great Toe and 2nd Toe Amputation.       Pain Assessment  Patient Currently in Pain: Other (Comment) (Pt stated that she occasionaly has pain in her feet but tylenol takes it away. )               Current Outpatient Prescriptions   Medication Sig Dispense Refill     magic mouthwash suspension (diphenhydramine, lidocaine, aluminum-magnesium & simethicone) Swish and swallow 5-10 mLs in mouth every 6 hours as needed for mouth sores 237 mL 1     fondaparinux (ARIXTRA) 7.5MG/0.6ML injection Inject 0.6 mLs (7.5 mg) Subcutaneous daily 40 Syringe 2     ONE TOUCH ULTRA test strip USE AS DIRECTED TO TEST ONE TIME A  each 0     atorvastatin (LIPITOR) 40 MG tablet Take 1 tablet (40 mg) by mouth daily 90 tablet 1     furosemide (LASIX) 40 MG tablet Take 1 tablet (40 mg) by mouth 2 times daily 180 tablet 1     gabapentin (NEURONTIN) 100 MG capsule Take 1 capsule (100 mg) by mouth 2 times daily 180 capsule 1     traZODone (DESYREL) 50 MG tablet Take 0.5 tablets (25 mg) by mouth nightly as needed for sleep 45 tablet 2     venlafaxine (EFFEXOR-ER) 150 MG TB24 24 hr tablet Take 1 tablet (150 mg) by mouth daily (with breakfast) 90 each 1     potassium chloride SA (K-DUR/KLOR-CON M) 10 MEQ CR tablet Take 1 tablet (10 mEq) by mouth 2 times daily 180 tablet 1     aspirin 81 MG chewable tablet Take 1 tablet (81 mg) by mouth daily 30 tablet 0     acetaminophen (TYLENOL) 325 MG tablet Take 1-2 tablets (325-650 mg) by mouth every 4 hours as needed for mild pain or fever 100 tablet 0     ferrous sulfate (IRON SUPPLEMENT) 325 (65 FE) MG tablet Take 1 tablet (325 mg) by mouth daily (with breakfast) (Patient taking differently: Take 325 mg by mouth 2 times daily ) 100 tablet 1     ONETOUCH DELICA LANCETS 33G MISC 1 Device daily 100 each 0     blood glucose  monitoring (ONE TOUCH ULTRA 2) meter device kit             Allergies   Allergen Reactions     Levaquin [Levofloxacin] Other (See Comments)     Tendon pain in legs, arms and shoulders.      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       Oxycodone      hallucinations     Adhesive Tape Itching and Rash     Diapers & Supplies Rash     Developed yeast infection from previous hospital stay

## 2017-05-16 NOTE — PROGRESS NOTES
Chief Complaint:   Chief Complaint   Patient presents with     RECHECK     Follow up. Gangrene, toes. Left upper leg wound. Surgery scheduled with Hina on 5- for Right Great Toe and 2nd Toe Amputation.          Allergies   Allergen Reactions     Levaquin [Levofloxacin] Other (See Comments)     Tendon pain in legs, arms and shoulders.      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       Oxycodone      hallucinations     Adhesive Tape Itching and Rash     Diapers & Supplies Rash     Developed yeast infection from previous hospital stay     Subjective: Carlos Alberto is a 76 year old female who presents to the clinic today for a follow up of toe gangrene and leg wounds. She has been packing the L calf wound with packing gauze (dry) and using iodosorb on the left toes and betadine soaked wraps for the right gauze. Admits moderate serous drainage to the R hallux.     Objective  L leg surgical dehiscence wound on medial calf closed on one end, but is still open on the other. Area is indurated and erythematous. No purulent drainage could be expressed. Measures 2.4 cm going proximally toward knee and 1.6 cm deep toward tibia.    The gangrenous toes were examined. The left digits are free of eschar today. The distal aspects of these digits still appear viable.      Right hallux with dry gangrene from IP joint and distally. Distal end of toe is very loose and angled medially, the rest of the toe has bulbous, red/yellow hypergranulation tissue and slough. No infection is noted. The right second digit is edematous and slightly erythematous and has the same red/yellow hypergranulation tissue and slough. No signs of infection. Right third digit is slightly edematous and post debridement, bleeds well. There is a circular wound on the distal plantar surface similar to the wounds on the left digits. This toe  appears viable.      Assessment:   - Dry gangrene BL toes   - Left calf surgical  wound dehiscence     Plan:   - Pt seen and evaluated  - Sharp, excisional debridement of devitalized tissue was performed on right third digit wound with tissue nipper. There was healthy bleeding upon debridement. Deepest layer debrided was to subcutaneous. No anesthesia was necessary due to lack of sensation  - Continue iodosorb on left digit wounds and betadine gauze wrap on right digit wounds. Continue packing left calf wound with saline moistened packing gauze.   - Amputation of 1st and 2nd right digits is planned for this Friday with Dr. Santamaria.  - Follow up in 2 weeks

## 2017-05-16 NOTE — LETTER
5/16/2017       RE: Carlos Alberto Fenton  [address]  [facility] [address]     Dear Colleague,    Thank you for referring your patient, Carlos Alberto Fenton, to the [facility]TH[facility] at [facility]. Please see a copy of my visit note below.    Chief Complaint:   Chief Complaint   Patient presents with     RECHECK     Follow up. Gangrene, toes. Left upper leg wound. Surgery scheduled with Hina on 5- for Right Great Toe and 2nd Toe Amputation.          Allergies   Allergen Reactions     Levaquin [Levofloxacin] Other (See Comments)     Tendon pain in legs, arms and shoulders.      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       Oxycodone      hallucinations     Adhesive Tape Itching and Rash     Diapers & Supplies Rash     Developed yeast infection from previous hospital stay     Subjective: Carlos Alberto is a 76 year old female who presents to the clinic today for a follow up of toe gangrene and leg wounds. She has been packing the L calf wound with packing gauze (dry) and using iodosorb on the left toes and betadine soaked wraps for the right gauze. Admits moderate serous drainage to the R hallux.     Objective  L leg surgical dehiscence wound on medial calf closed on one end, but is still open on the other. Area is indurated and erythematous. No purulent drainage could be expressed. Measures 2.4 cm going proximally toward knee and 1.6 cm deep toward tibia.    The gangrenous toes were examined. The left digits are free of eschar today. The distal aspects of these digits still appear viable.      Right hallux with dry gangrene from IP joint and distally. Distal end of toe is very loose and angled medially, the rest of the toe has bulbous, red/yellow hypergranulation tissue and slough. No infection is noted. The right second digit is edematous and  slightly erythematous and has the same red/yellow hypergranulation tissue and slough. No signs of infection. Right third digit is slightly edematous and post debridement, bleeds well. There is a circular wound on the distal plantar surface similar to the wounds on the left digits. This toe appears viable.      Assessment:   - Dry gangrene BL toes   - Left calf surgical  wound dehiscence     Plan:   - Pt seen and evaluated  - Sharp, excisional debridement of devitalized tissue was performed on right third digit wound with tissue nipper. There was healthy bleeding upon debridement. Deepest layer debrided was to subcutaneous. No anesthesia was necessary due to lack of sensation  - Continue iodosorb on left digit wounds and betadine gauze wrap on right digit wounds. Continue packing left calf wound with saline moistened packing gauze.   - Amputation of 1st and 2nd right digits is planned for this Friday with Dr. Santamaria.  - Follow up in 2 weeks      Again, thank you for allowing me to participate in the care of your patient.      Sincerely,    Easton Torres DPM

## 2017-05-16 NOTE — MR AVS SNAPSHOT
After Visit Summary   5/16/2017    Carlos Alberto Fenton    MRN: [id number]           Patient Information     Date Of Birth          1940        Visit Information        Provider Department      5/16/2017 1:00 PM Easton Torres DPM [facility]        Today's Diagnoses     Toe gangrene (H)    -  1    Polyneuropathy associated with underlying disease (H)           Follow-ups after your visit        Your next 10 appointments already scheduled     May 19, 2017   Procedure with Leah Santamaria MD   [facility], Same Day Surgery (--)    [address] [facility] [address]   [phone]            May 25, 2017  2:30 PM CDT   (Arrive by 2:15 PM)   Return Visit with Easton Torres DPM   [facility] Wound [facility] ([facility] [place])    [address] [place] [address]  [address] [facility] [id number] [phone]            Jul 14, 2017  1:30 PM CDT   (Arrive by 1:15 PM)   Return Visit with Star Lobato MD   [facility] Heart [facility] ([facility] [place])    [address] [place] [id number] [phone]            Sep 11, 2017  2:00 PM CDT   (Arrive by 1:45 PM)   HOLTER MONITOR VISIT with  Cvc Monitor [facility] ([facility] [place])    [address] [place] [id number] [phone]            Oct 19, 2017  2:30 PM CDT   (Arrive by 2:15 PM)   RETURN ATRIAL FIBULATION VISIT with CHACORTA Joshi [facility] [place])    [address] [place] [address]  3rd [facility] [id number] [phone]              Who to contact     Please call your clinic at [phone] to:    Ask questions about your health    Make or cancel appointments    Discuss your medicines    Learn about your test results    Speak to your doctor   If you have compliments or concerns about an experience at your clinic, or if you wish to  file a complaint, please contact [facility] Patient Relations at [phone] or email us at [email or web address]         Additional Information About Your Visit        [employer] Information     [employer] gives you secure access to your electronic health record. If you see a primary care provider, you can also send messages to your care team and make appointments. If you have questions, please call your primary care clinic.  If you do not have a primary care provider, please call [phone] and they will assist you.      M[employer]ie is an electronic gateway that provides easy, online access to your medical records. With [employer], you can request a clinic appointment, read your test results, renew a prescription or communicate with your care team.     To access your existing account, please contact your [facility] or call [phone] for assistance.        Care EveryWhere ID     This is your Care EveryWhere ID. This could be used by other organizations to access your [place] medical records  [id number]         Blood Pressure from Last 3 Encounters:   05/05/17 113/71   04/28/17 123/72   04/24/17 131/78    Weight from Last 3 Encounters:   05/05/17 75.3 kg (165 lb 14.4 oz)   04/28/17 75.8 kg (167 lb 1.6 oz)   04/23/17 75.8 kg (167 lb)              We Performed the Following     DEBRIDEMENT WOUND UP TO 20 SQ CM          Today's Medication Changes          These changes are accurate as of: 5/16/17  3:25 PM.  If you have any questions, ask your nurse or doctor.               These medicines have changed or have updated prescriptions.        Dose/Directions    ferrous sulfate 325 (65 FE) MG tablet   Commonly known as:  IRON SUPPLEMENT   This may have changed:  when to take this   Used for:  S/P CABG (coronary artery bypass graft)        Dose:  325 mg   Take 1 tablet (325 mg) by mouth daily (with breakfast)   Quantity:  100 tablet   Refills:  1                 Primary Care Provider Office Phone # Fax #    Humberto Conner [phone]       [facility] [address] [place] [address]        Thank you!     Thank you for choosing [facility] ORTHOPAEDIC [facility]  for your care. Our goal is always to provide you with excellent care. Hearing back from our patients is one way we can continue to improve our services. Please take a few minutes to complete the written survey that you may receive in the mail after your visit with us. Thank you!             Your Updated Medication List - Protect others around you: Learn how to safely use, store and throw away your medicines at www.disposemymeds.org.          This list is accurate as of: 5/16/17  3:25 PM.  Always use your most recent med list.                   Brand Name Dispense Instructions for use    acetaminophen 325 MG tablet    TYLENOL    100 tablet    Take 1-2 tablets (325-650 mg) by mouth every 4 hours as needed for mild pain or fever       aspirin 81 MG chewable tablet     30 tablet    Take 1 tablet (81 mg) by mouth daily       atorvastatin 40 MG tablet    LIPITOR    90 tablet    Take 1 tablet (40 mg) by mouth daily       blood glucose monitoring meter device kit          ferrous sulfate 325 (65 FE) MG tablet    IRON SUPPLEMENT    100 tablet    Take 1 tablet (325 mg) by mouth daily (with breakfast)       fondaparinux 7.5MG/0.6ML injection    ARIXTRA    40 Syringe    Inject 0.6 mLs (7.5 mg) Subcutaneous daily       furosemide 40 MG tablet    LASIX    180 tablet    Take 1 tablet (40 mg) by mouth 2 times daily       gabapentin 100 MG capsule    NEURONTIN    180 capsule    Take 1 capsule (100 mg) by mouth 2 times daily       lidocaine visc 2% & diphenhydramine 12.5mg/5mL & maalox/mylanta w/simethicone (1:1:1 v/v/v) Susp compounding kit     237 mL    Swish and swallow 5-10 mLs in mouth every 6 hours as needed for mouth sores       ONE TOUCH ULTRA test strip   Generic drug:  blood glucose monitoring      100 each    USE AS DIRECTED TO TEST ONE TIME A DAY       ONETOUCH DELICA LANCETS 33G Misc     100 each    1 Device daily       potassium chloride SA 10 MEQ CR tablet    K-DUR/KLOR-CON M    180 tablet    Take 1 tablet (10 mEq) by mouth 2 times daily       traZODone 50 MG tablet    DESYREL    45 tablet    Take 0.5 tablets (25 mg) by mouth nightly as needed for sleep       venlafaxine 150 MG Tb24 24 hr tablet    EFFEXOR-ER    90 each    Take 1 tablet (150 mg) by mouth daily (with breakfast)

## 2017-05-17 ENCOUNTER — MYC MEDICAL ADVICE (OUTPATIENT)
Dept: INTERNAL MEDICINE | Facility: CLINIC | Age: 77
End: 2017-05-17

## 2017-05-18 ENCOUNTER — MYC MEDICAL ADVICE (OUTPATIENT)
Dept: INTERNAL MEDICINE | Facility: CLINIC | Age: 77
End: 2017-05-18

## 2017-05-18 NOTE — TELEPHONE ENCOUNTER
Called daughter.  Daughter is reporting everything has been addressed with both her mother and father.  They do not need any additional help.  Closing this encounter.  Erna Boateng RN

## 2017-05-23 ENCOUNTER — ANESTHESIA EVENT (OUTPATIENT)
Dept: SURGERY | Facility: CLINIC | Age: 77
End: 2017-05-23
Payer: MEDICARE

## 2017-05-23 PROBLEM — I48.91 ATRIAL FIBRILLATION WITH RAPID VENTRICULAR RESPONSE (H): Status: RESOLVED | Noted: 2017-02-09 | Resolved: 2017-05-23

## 2017-05-24 ENCOUNTER — APPOINTMENT (OUTPATIENT)
Dept: SURGERY | Facility: CLINIC | Age: 77
End: 2017-05-24

## 2017-05-24 ENCOUNTER — ALLIED HEALTH/NURSE VISIT (OUTPATIENT)
Dept: SURGERY | Facility: CLINIC | Age: 77
End: 2017-05-24

## 2017-05-24 ENCOUNTER — OFFICE VISIT (OUTPATIENT)
Dept: SURGERY | Facility: CLINIC | Age: 77
End: 2017-05-24

## 2017-05-24 VITALS
DIASTOLIC BLOOD PRESSURE: 73 MMHG | OXYGEN SATURATION: 92 % | HEART RATE: 74 BPM | RESPIRATION RATE: 16 BRPM | WEIGHT: 164.9 LBS | BODY MASS INDEX: 30.35 KG/M2 | SYSTOLIC BLOOD PRESSURE: 122 MMHG | TEMPERATURE: 97.8 F | HEIGHT: 62 IN

## 2017-05-24 DIAGNOSIS — Z01.818 PREOP EXAMINATION: Primary | ICD-10-CM

## 2017-05-24 ASSESSMENT — ENCOUNTER SYMPTOMS: DYSRHYTHMIAS: 1

## 2017-05-24 ASSESSMENT — LIFESTYLE VARIABLES: TOBACCO_USE: 1

## 2017-05-24 NOTE — MR AVS SNAPSHOT
After Visit Summary   2017    Carlos Alberto Fenton    MRN: [id number]           Patient Information     Date Of Birth          1940        Visit Information        Provider Department      2017 5:30 PM Rn, [facility]        Care Instructions    Preparing for Your Surgery      Name:  Carlos Alberto Fenton   MRN:  [id number]   :  1940   Today's Date:  2017     Arriving for surgery:  Surgery date:  17  Surgery time:  11:15  Arrival time:  09:15  Please come to:       [facility] [address]  [address] [place]  [address]    -   parking is available in front of the hospital from 5:15 am to 8:00 pm    -  Stop at the Information Desk in the lobby    -   Inform the information person that you are here for surgery. An escort to 3c will be provided. If you would not like an escort, please proceed to 3C on the 3rd floor. [phone]     What can I eat or drink?  -  You may have solid food or milk products until 8 hours prior to your surgery.  -  You may have water, apple juice or 7up/Sprite until 2 hours prior to your surgery.    Which medicines can I take?  -  Do NOT take these medications in the morning, the day of surgery:  Aspirin x 1 day before surgery, ibuprofen x 2 days, no medications the morning of surgery, stop arixtra 17.  -  Please take these medications the day of surgery:  Tylenol if needed, gabapentin and effexor if normally taken in the morning.    How do I prepare myself?  -  Take two showers: one the night before surgery; and one the morning of surgery.         Use Scrubcare or Hibiclens to wash from neck down.  You may use your own shampoo and conditioner. No other hair products.   -  Do NOT use lotion, powder, deodorant, or antiperspirant the day of your surgery.  -  Do NOT wear any makeup, fingernail polish or jewelry.  -  Begin using Incentive Spirometer 1 week prior to surgery.  Use  4 times per day, up to 5-10 breaths each time.  Bring Incentive Spirometer to hospital.  -Do not bring your own medications to the hospital, except for inhalers and eye drops.  -  Bring your ID and insurance card.    Questions or Concerns:  If you have questions or concerns, please call the  Preoperative Assessment Center, Monday-Friday 7AM-7PM:  [phone]                    Follow-ups after your visit        Your next 10 appointments already scheduled     May 24, 2017  5:30 PM CDT   (Arrive by 5:15 PM)   PAC RN ASSESSMENT with Libia Pac Rn   [facility] ([facility])    [address] [place] [id number] [phone]            May 26, 2017   Procedure with Leah Santamaria MD   [facility] [place], Same Day Surgery (--)    [address] [facility] [address] [phone]            Jun 01, 2017  2:30 PM CDT   (Arrive by 2:15 PM)   Return Visit with Easton Torres DPM   [facility] Wound [facility] ([facility])    [address] [place] [id number] [phone]            Jul 14, 2017  1:30 PM CDT   (Arrive by 1:15 PM)   Return Visit with Star Lobato MD   [facility] ([facility])    [address] [place] [id number] [phone]            Sep 11, 2017  2:00 PM CDT   (Arrive by 1:45 PM)   HOLTER MONITOR VISIT with Libia Cvc Monitor Tech, [facility] ([facility])    [address] [place] [id number] [phone]            Oct 19, 2017  2:30 PM CDT   (Arrive by 2:15 PM)   RETURN ATRIAL FIBULATION VISIT with CHACORTA Joshi [facility] ([facility])    [address] [place] [id number] [phone]              Who to contact     Please call your clinic at [phone] to:    Ask  questions about your health    Make or cancel appointments    Discuss your medicines    Learn about your test results    Speak to your doctor   If you have compliments or concerns about an experience at your clinic, or if you wish to file a complaint, please contact [facility] Patient Relations at [phone] or email us at [email or web address]         Additional Information About Your Visit        MyChart Information     [employer] gives you secure access to your electronic health record. If you see a primary care provider, you can also send messages to your care team and make appointments. If you have questions, please call your primary care clinic.  If you do not have a primary care provider, please call [phone] and they will assist you.      [employer] is an electronic gateway that provides easy, online access to your medical records. With [employer], you can request a clinic appointment, read your test results, renew a prescription or communicate with your care team.     To access your existing account, please contact your [facility] or call [phone] for assistance.        Care EveryWhere ID     This is your Care EveryWhere ID. This could be used by other organizations to access your [place] medical records  [id number]         Blood Pressure from Last 3 Encounters:   05/24/17 122/73   05/05/17 113/71   04/28/17 123/72    Weight from Last 3 Encounters:   05/24/17 74.8 kg (164 lb 14.4 oz)   05/05/17 75.3 kg (165 lb 14.4 oz)   04/28/17 75.8 kg (167 lb 1.6 oz)              Today, you had the following     No orders found for display         Today's Medication Changes          These changes are accurate as of: 5/24/17  5:12 PM.  If you have any questions, ask your nurse or doctor.               These medicines have changed or have updated prescriptions.        Dose/Directions    aspirin 81 MG chewable tablet   This may have changed:  when to  take this   Used for:  Coronary artery disease with angina pectoris, unspecified vessel or lesion type, unspecified whether native or transplanted heart (H)        Dose:  81 mg   Take 1 tablet (81 mg) by mouth daily   Quantity:  30 tablet   Refills:  0       atorvastatin 40 MG tablet   Commonly known as:  LIPITOR   This may have changed:  when to take this   Used for:  Coronary artery disease with angina pectoris, unspecified vessel or lesion type, unspecified whether native or transplanted heart (H)        Dose:  40 mg   Take 1 tablet (40 mg) by mouth daily   Quantity:  90 tablet   Refills:  1       ferrous sulfate 325 (65 FE) MG tablet   Commonly known as:  IRON SUPPLEMENT   This may have changed:  when to take this   Used for:  S/P CABG (coronary artery bypass graft)        Dose:  325 mg   Take 1 tablet (325 mg) by mouth daily (with breakfast)   Quantity:  100 tablet   Refills:  1       fondaparinux 7.5MG/0.6ML injection   Commonly known as:  ARIXTRA   This may have changed:  when to take this   Used for:  Long-term (current) use of anticoagulants        Dose:  7.5 mg   Inject 0.6 mLs (7.5 mg) Subcutaneous daily   Quantity:  40 Syringe   Refills:  2                Primary Care Provider Office Phone # Fax #    Humberto Conner [phone]       [facility] [address] [place] [address]        Thank you!     Thank you for choosing [facility] PRE[facility] C[facility]  for your care. Our goal is always to provide you with excellent care. Hearing back from our patients is one way we can continue to improve our services. Please take a few minutes to complete the written survey that you may receive in the mail after your visit with us. Thank you!             Your Updated Medication List - Protect others around you: Learn how to safely use, store and throw away your medicines at www.disposemymeds.org.          This list is accurate as of: 5/24/17  5:12 PM.  Always use your most  recent med list.                   Brand Name Dispense Instructions for use    acetaminophen 325 MG tablet    TYLENOL    100 tablet    Take 1-2 tablets (325-650 mg) by mouth every 4 hours as needed for mild pain or fever       aspirin 81 MG chewable tablet     30 tablet    Take 1 tablet (81 mg) by mouth daily       atorvastatin 40 MG tablet    LIPITOR    90 tablet    Take 1 tablet (40 mg) by mouth daily       blood glucose monitoring meter device kit          ferrous sulfate 325 (65 FE) MG tablet    IRON SUPPLEMENT    100 tablet    Take 1 tablet (325 mg) by mouth daily (with breakfast)       fondaparinux 7.5MG/0.6ML injection    ARIXTRA    40 Syringe    Inject 0.6 mLs (7.5 mg) Subcutaneous daily       furosemide 40 MG tablet    LASIX    180 tablet    Take 1 tablet (40 mg) by mouth 2 times daily       gabapentin 100 MG capsule    NEURONTIN    180 capsule    Take 1 capsule (100 mg) by mouth 2 times daily       ONE TOUCH ULTRA test strip   Generic drug:  blood glucose monitoring     100 each    USE AS DIRECTED TO TEST ONE TIME A DAY       ONETOUCH DELICA LANCETS 33G Misc     100 each    1 Device daily       potassium chloride SA 10 MEQ CR tablet    K-DUR/KLOR-CON M    180 tablet    Take 1 tablet (10 mEq) by mouth 2 times daily       traZODone 50 MG tablet    DESYREL    45 tablet    Take 0.5 tablets (25 mg) by mouth nightly as needed for sleep       venlafaxine 150 MG Tb24 24 hr tablet    EFFEXOR-ER    90 each    Take 1 tablet (150 mg) by mouth daily (with breakfast)

## 2017-05-24 NOTE — H&P
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      HPI:  Carlos Alberto Fenton is a 76 year old female who presents for pre-operative H & P in preparation for Right Great and Second Toe Amputation on 5/26/17 with Dr. Leah Santamaria at [facility].   The patient's history of significant for CVA in October 2016 and CABG x 3 with MAZE procedure, AtriClip placement, and PFO closure on 2/6/17.  She was readmitted on 2/24/17 through 3/3/17 with black discoloration of some of the toes of both feet.  Evaluation revealed no signs of significant vascular compromise; surface and transesophageal echocardiography did not demonstrate intracardiac thrombus, hypercoagulable workup was negative.  Thought likely to be due to microatheroembolic disease or HIT.  Vascular Surgery did not recommend intervention during the hospitalization.  She was reevaluated by Dr. Santamaria on 4/24, it was determined that the patient will need right great and 2nd toe amputations due to dry gangrene.    History is obtained from the patient and the medical record.    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     Hyperlipidemia LDL goal <130 10/31/2010     Hypertension      Peripheral neuropathy (H) 1990    cause unknown     Seizures (H) 10/19/2016     Sleep apnea       Past Surgical History:  Past Surgical History:   Procedure Laterality Date     BACK SURGERY       BYPASS GRAFT ARTERY CORONARY N/A 2/6/2017    Procedure: BYPASS GRAFT ARTERY CORONARY;  Surgeon: Mikhail Quiñones MD;  Location: [facility] OR     C APPENDECTOMY  1959     CORONARY ARTERY BYPASS       HYSTERECTOMY, PASTORA  1988     MAZE PROCEDURE N/A 2/6/2017    Procedure: MAZE PROCEDURE;  Surgeon: Mikhail Quiñones MD;  Location: [facility] OR     PICC INSERTION Left 02/25/2017    5fr TL Bard PICC, 47cm (3cm external) in the L basilic vein w/ tip in the SVC RA junction     TONSILLECTOMY  1942     Hx of Blood transfusions/reactions: Received plasma infusions at the time of her cardiac surgery.    Hx of abnormal bleeding or anti-platelet use: ASA 81 mg PO QD to be held the AM of the procedure.  Arixtra to be held for 24 hours prior to the procedure.    Menstrual history: No LMP recorded. Patient has had a hysterectomy.    Steroid use in the last year: Denies.    Personal or FH of difficulty with anesthesia: Denies.    Prior to admission medications  Current Outpatient Prescriptions   Medication Sig Dispense Refill     fondaparinux (ARIXTRA) 7.5MG/0.6ML injection Inject 0.6 mLs (7.5 mg) Subcutaneous daily (Patient taking differently: Inject 7.5 mg Subcutaneous every morning ) 40 Syringe 2     ONE TOUCH ULTRA test strip USE AS DIRECTED TO TEST ONE TIME A  each 0     atorvastatin (LIPITOR) 40 MG tablet Take 1 tablet (40 mg) by mouth daily (Patient taking differently: Take 40 mg by mouth every morning ) 90 tablet 1     furosemide (LASIX) 40 MG tablet Take 1 tablet (40 mg) by mouth 2 times daily 180 tablet 1     gabapentin (NEURONTIN) 100 MG capsule Take 1 capsule (100 mg) by mouth 2 times daily 180 capsule 1     traZODone (DESYREL) 50 MG tablet Take 0.5 tablets (25 mg) by mouth nightly as needed for sleep 45 tablet 2     venlafaxine (EFFEXOR-ER) 150 MG TB24 24 hr tablet Take 1 tablet (150 mg) by mouth daily (with breakfast) 90 each 1      potassium chloride SA (K-DUR/KLOR-CON M) 10 MEQ CR tablet Take 1 tablet (10 mEq) by mouth 2 times daily 180 tablet 1     aspirin 81 MG chewable tablet Take 1 tablet (81 mg) by mouth daily (Patient taking differently: Take 81 mg by mouth every morning ) 30 tablet 0     acetaminophen (TYLENOL) 325 MG tablet Take 1-2 tablets (325-650 mg) by mouth every 4 hours as needed for mild pain or fever 100 tablet 0     ferrous sulfate (IRON SUPPLEMENT) 325 (65 FE) MG tablet Take 1 tablet (325 mg) by mouth daily (with breakfast) (Patient taking differently: Take 325 mg by mouth 2 times daily ) 100 tablet 1     ONETOUCH DELICA LANCETS 33G MISC 1 Device daily 100 each 0     blood glucose monitoring (ONE TOUCH ULTRA 2) meter device kit        Allergies  Allergies   Allergen Reactions     Levaquin [Levofloxacin] Other (See Comments)     Tendon pain in legs, arms and shoulders.      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       Oxycodone      hallucinations     Adhesive Tape Itching and Rash     Diapers & Supplies Rash     Developed yeast infection from previous hospital stay     Social History  Social History     Social History     Marital status:      Spouse name: N/A     Number of children: N/A     Years of education: N/A     Occupational History     Not on file.     Social History Main Topics     Smoking status: Former Smoker     Smokeless tobacco: Never Used     Alcohol use No     Drug use: No     Sexual activity: No     Other Topics Concern     Parent/Sibling W/ Cabg, Mi Or Angioplasty Before 65f 55m? Yes     Social History Narrative     Family History  Family History   Problem Relation Age of Onset     DIABETES Mother      C.A.D. Mother      DIABETES Father      C.A.D. Father      Cardiovascular Sister      blood clot     Review of Systems  Functional status: Independent in ADL's.  4 METS.     The  "complete review of systems is negative other than noted in the HPI or here.   Constitutional: Denies recent changes in sleeping patterns, or fevers/chills.  Reports that she has gradually lost weight since her cardiac surgery.  Eyes: Glasses for vision correction.  No recent vision changes.  EENT: Denies recent changes in hearing, mouth pain, or difficulty swallowing.  Cardiovascular: Denies chest pain, MADDEN or orthopnea, or palpitations.  Respiratory: Denies shortness of breath or significant cough.    GI: Denies nausea/vomiting or diarrhea/constipation.    : Denies dysuria.    Musculoskeletal: Denies joint pain or swelling.    Skin: Denies rashes.  Surgical incisions from cardiac surgery are healing.    Hematologic: Denies bleeding issues.  Notes that she has large bruises over her abdomen from the Arixtra injections.    Neurologic: Denies migraines, seizures, dizziness, numbness/tingling.  Psychiatric: Denies changes in mood or affect.      /73  Pulse 74  Temp 97.8  F (36.6  C) (Oral)  Resp 16  Ht 1.575 m (5' 2\")  Wt 74.8 kg (164 lb 14.4 oz)  SpO2 92%  BMI 30.16 kg/m2    164 lbs 14.4 oz  5' 2\"   Body mass index is 30.16 kg/(m^2).    Physical Exam  Constitutional: Patient awake, seated upright in a chair, in no apparent distress.  Appears stated age.  Eyes: Pupils equal, round and reactive to light.  Extra ocular muscles intact. Sclera clear.  Conjunctiva normal.  HENT: Head normocephalic.  Oral pharynx intact with moist mucous membranes.  Dentition intact.  No thyromegaly appreciated.   Respiratory: Lung sounds slightly diminished to auscultation bilaterally.  No rales, rhonchi, or wheezing noted.    Cardiovascular: S1, S2, irregular rate and rhythm.  No murmurs, rubs, or gallops noted. Radial and pedal pulses palpable, bilaterally.  1+ pedal edema noted, bilaterally.  GI: Bowel sounds present.  Abdomen rounded, soft, non-tender to light palpation.  No hepatosplenomegaly or masses palpated. "   Genitourinary: Exam deferred.  Lymph/Hematologic: No cervical or supraclavicular lymphadenopathy noted.  Bruising noted over the lower abdomen from Arixtra injections.    Skin: Color appropriate for race, warm, dry.  Midline sternotomy surgical incision intact, healing.  Surgical incision over the medial aspect of the left lower leg intact, healing, with the exception of a very small area of the incision that is packed with gauze dressing.  Dressings over the right toes intact.     Musculoskeletal: Slightly limited extension of the neck.  No redness, warmth, or swelling of the joints noted. Gross motor strength is normal.    Neurologic: Alert, oriented to name, place and time. Cranial nerves II-XII are grossly intact. Gait is normal.      Neuropsychiatric: Calm, cooperative. Normal affect.     Labs:  17: WBC 6.4; Hgb 11.7; Hct 39.4; Plt 289  17: Na 139; K 3.9; Cl 98; Glu 175; BUN 12; Cr 0.65; Ca 8.8    Imagin17 Transesophageal Echocardiogram:  Interpretation Summary  H/o ABDIEL ligation. No LA clot seen.  H/o PFO closure. The atrial septum is intact as assessed by color Doppler.  No LV thrombus seen. Mildly (EF 45-50%) reduced left ventricular function is present.  The right ventricle is normal size. Global right ventricular function is normal.    3/30/17 Bilateral Lower Extremity Arterial Duplex:  1. Right leg: No hemodynamically significant stenosis.  2. Left leg: No hemodynamically significant stenosis.     17 EKG: Sinus rhythm    Lab results were personally reviewed by this provider.      Assessment and Plan  Carlos Alberto Fenton is a 76 year old female scheduled to undergo Right Great and Second Toe Amputation on 17 with Dr. Leah Santamaria.    She has the following specific operative considerations:   1.  Diagnosis of obstructive sleep apnea with CPAP use: Recommend careful positioning to prevent airway compromise and close monitoring of the patient's respiratory status.    2.  History of  CVA in October 2016: Recommend that prolonged episodes of hypotension be avoided during the perioperative period.   3.  History of CAD with CABG x 3 in February 2017: Patient instructed to hold ASA and Lasix the AM of the procedure.      4.  Atrial fibrillation and recent DVT: Patient instructed to hold Arixtra for 24 hours prior to the procedure.  Recommend that anticoagulation be resumed as soon as possible after surgery.  5.  Diabetes: Recommend close monitoring of blood glucose levels throughout the perioperative period.    Revised Cardiac Risk Index: 0.9% risk of major adverse cardiac event.  VTE risk: 1.8%, but expect that this risk is actually higher given the patient's history.  NOAH risk: Diagnosed with NOAH, uses CPaP.  PONV risk score= 2.  (If > 2, anti-emetic intervention is recommended.)  Anesthesia considerations: Refer to PAC assessment in the anesthesia records.    Patient was discussed with Dr. Malcolm.    Muna Harvey NP  Preoperative Assessment Center  [facility]  Phone: [phone]  Fax: [phone]

## 2017-05-24 NOTE — ANESTHESIA PREPROCEDURE EVALUATION
Anesthesia Evaluation     . Pt has had prior anesthetic. Type: General    No history of anesthetic complications          ROS/MED HX    ENT/Pulmonary:     (+)sleep apnea, tobacco use, Past use uses CPAP , . .    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 Taking blood thinners Pt has received instructions: Instructions Given to patient: Arixtra to be held for 24 hours prior to surgery. . . :. dysrhythmias a-fib, .       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  Received platelets at the time of her cardiac surgery.    (+) History of blood clots pt is anticoagulated, History of Transfusion no previous transfusion reaction -      Musculoskeletal:  - neg musculoskeletal ROS       GI/Hepatic:  - neg GI/hepatic ROS       Renal/Genitourinary:  - ROS Renal section negative       Endo:     (+) type II DM Not using insulin - not using insulin pump Normal glucose range: Diet controlled, 100-140s not previously admitted for DM/DKA Diabetic complications: neuropathy cardiac problems, .      Psychiatric:     (+) psychiatric history anxiety and depression      Infectious Disease:  - neg infectious disease ROS       Malignancy:   (+) Malignancy History of Skin  Skin CA Remission status post Surgery,         Other:    - neg other ROS                 Physical Exam  Normal systems: dental    Airway   Mallampati: II  TM distance: >3 FB  Neck ROM: limited    Dental     Cardiovascular    Rhythm and rate: irregular  (+) peripheral edema   (-) no murmur    Pulmonary (+) decreased breath sounds       Other findings: 4/23/17: WBC 6.4; Hgb 11.7; Hct 39.4; Plt 289  4/23/17: Na 139; K 3.9; Cl 98; Glu 175; BUN 12; Cr 0.65; Ca 8.8    2/24/17 Transesophageal Echocardiogram:  Interpretation Summary  H/o ABDIEL ligation. No LA clot seen.  H/o PFO closure. The atrial septum is intact as assessed by color Doppler.  No LV thrombus seen. Mildly (EF 45-50%) reduced left ventricular function is present.  The right ventricle is normal size. Global right ventricular function is normal.    3/30/17 Bilateral Lower Extremity Arterial Duplex:  1. Right leg: No hemodynamically significant stenosis.  2. Left leg: No hemodynamically significant stenosis.     4/17/17 EKG: Sinus rhythm           PAC Discussion and Assessment    ASA Classification: 3  Case is suitable for: [place]  Anesthetic techniques and relevant risks discussed: MAC with GA as backup  Invasive monitoring and risk discussed: No  Types:   Possibility and Risk of blood transfusion discussed: No  NPO instructions given:   Additional anesthetic preparation and risks discussed:   Needs early admission to pre-op area:   Other:     PAC Resident/NP Anesthesia Assessment:  Carlos Alberto Fenton is a 76 year old female scheduled to undergo Right Great and Second Toe Amputation on 5/26/17 with Dr. Leah Santamaria.    She has the following specific operative considerations:   1.  Diagnosis of obstructive sleep apnea with CPAP use: Recommend careful positioning to prevent airway compromise and close monitoring of the patient's respiratory status.    2.  History of CVA in October 2016: Recommend that prolonged episodes of hypotension be avoided during the perioperative period.   3.  History of CAD with CABG x 3 in February 2017: Patient instructed to hold ASA and Lasix the AM of the procedure.      4.  Atrial fibrillation and recent DVT: Patient instructed to hold Arixtra for 24  hours prior to the procedure.  Recommend that anticoagulation be resumed as soon as possible after surgery.  5.  Diabetes: Recommend close monitoring of blood glucose levels throughout the perioperative period.    Revised Cardiac Risk Index: 0.9% risk of major adverse cardiac event.  VTE risk: 1.8%, but expect that this risk is actually higher given the patient's history.  NOAH risk: Diagnosed with NOAH, uses CPaP.  PONV risk score= 2.  (If > 2, anti-emetic intervention is recommended.)  Anesthesia considerations: Refer to PAC assessment in the anesthesia records.        Reviewed and Signed by PAC Mid-Level Provider/Resident  Mid-Level Provider/Resident: Muna Harvey CNP  Date: 5/24/17  Time: 1900    Attending Anesthesiologist Anesthesia Assessment:        Anesthesiologist:   Date:   Time:   Pass/Fail:   Disposition:     PAC Pharmacist Assessment:        Pharmacist:   Date:   Time:      Anesthesia Plan      History & Physical Review  History and physical reviewed and following examination; no interval change.    ASA Status:  3 .    NPO Status:  > 8 hours    Plan for MAC and Peripheral Nerve Block with Other induction. Maintenance will be TIVA.  Reason for MAC:  Deep or markedly invasive procedure (G8)  PONV prophylaxis:  Ondansetron (or other 5HT-3)       Postoperative Care  Postoperative pain management:  IV analgesics.      Consents  Anesthetic plan, risks, benefits and alternatives discussed with:  Patient..                          .

## 2017-05-24 NOTE — PATIENT INSTRUCTIONS
Preparing for Your Surgery      Name:  Carlos Alberto Fenton   MRN:  [id number]   :  1940   Today's Date:  2017     Arriving for surgery:  Surgery date:  17  Surgery time:  11:15  Arrival time:  09:15  Please come to:       [facility] [address]  [address] [place]  [address]    -   parking is available in front of the hospital from 5:15 am to 8:00 pm    -  Stop at the Information Desk in the lobby    -   Inform the information person that you are here for surgery. An escort to 3c will be provided. If you would not like an escort, please proceed to 3C on the 3rd floor. [phone]     What can I eat or drink?  -  You may have solid food or milk products until 8 hours prior to your surgery.  -  You may have water, apple juice or 7up/Sprite until 2 hours prior to your surgery.    Which medicines can I take?  -  Do NOT take these medications in the morning, the day of surgery:  Aspirin x 1 day before surgery, ibuprofen x 2 days, no medications the morning of surgery, stop arixtra 17.  -  Please take these medications the day of surgery:  Tylenol if needed, gabapentin and effexor if normally taken in the morning.    How do I prepare myself?  -  Take two showers: one the night before surgery; and one the morning of surgery.         Use Scrubcare or Hibiclens to wash from neck down.  You may use your own shampoo and conditioner. No other hair products.   -  Do NOT use lotion, powder, deodorant, or antiperspirant the day of your surgery.  -  Do NOT wear any makeup, fingernail polish or jewelry.  -  Begin using Incentive Spirometer 1 week prior to surgery.  Use 4 times per day, up to 5-10 breaths each time.  Bring Incentive Spirometer to hospital.  -Do not bring your own medications to the hospital, except for inhalers and eye drops.  -  Bring your ID and insurance card.    Questions or Concerns:  If you have questions or concerns, please call  the  Preoperative Assessment Center, Monday-Friday 7AM-7PM:  [phone]

## 2017-05-26 ENCOUNTER — ANESTHESIA (OUTPATIENT)
Dept: SURGERY | Facility: CLINIC | Age: 77
End: 2017-05-26
Payer: MEDICARE

## 2017-05-26 ENCOUNTER — HOSPITAL ENCOUNTER (OUTPATIENT)
Facility: CLINIC | Age: 77
Discharge: HOME | End: 2017-05-26
Attending: SURGERY | Admitting: SURGERY
Payer: MEDICARE

## 2017-05-26 ENCOUNTER — SURGERY (OUTPATIENT)
Age: 77
End: 2017-05-26

## 2017-05-26 VITALS
WEIGHT: 164.68 LBS | BODY MASS INDEX: 30.31 KG/M2 | TEMPERATURE: 97.7 F | HEIGHT: 62 IN | RESPIRATION RATE: 15 BRPM | OXYGEN SATURATION: 94 % | SYSTOLIC BLOOD PRESSURE: 100 MMHG | DIASTOLIC BLOOD PRESSURE: 64 MMHG

## 2017-05-26 DIAGNOSIS — I96 GANGRENOUS TOE (H): ICD-10-CM

## 2017-05-26 DIAGNOSIS — L97.922 ULCER OF LEFT LOWER EXTREMITY WITH FAT LAYER EXPOSED (H): Primary | ICD-10-CM

## 2017-05-26 DIAGNOSIS — Z79.01 LONG-TERM (CURRENT) USE OF ANTICOAGULANTS: ICD-10-CM

## 2017-05-26 DIAGNOSIS — Z95.1 S/P CABG (CORONARY ARTERY BYPASS GRAFT): ICD-10-CM

## 2017-05-26 LAB
ANION GAP SERPL CALCULATED.3IONS-SCNC: 6 MMOL/L (ref 3–14)
APTT PPP: 40 SEC (ref 22–37)
BUN SERPL-MCNC: 13 MG/DL (ref 7–30)
CALCIUM SERPL-MCNC: 9.2 MG/DL (ref 8.5–10.1)
CHLORIDE SERPL-SCNC: 102 MMOL/L (ref 94–109)
CO2 SERPL-SCNC: 33 MMOL/L (ref 20–32)
CREAT SERPL-MCNC: 0.63 MG/DL (ref 0.52–1.04)
ERYTHROCYTE [DISTWIDTH] IN BLOOD BY AUTOMATED COUNT: 17.2 % (ref 10–15)
GFR SERPL CREATININE-BSD FRML MDRD: ABNORMAL ML/MIN/1.7M2
GLUCOSE BLDC GLUCOMTR-MCNC: 119 MG/DL (ref 70–99)
GLUCOSE BLDC GLUCOMTR-MCNC: 129 MG/DL (ref 70–99)
GLUCOSE SERPL-MCNC: 106 MG/DL (ref 70–99)
HCT VFR BLD AUTO: 39.4 % (ref 35–47)
HGB BLD-MCNC: 11.8 G/DL (ref 11.7–15.7)
INR PPP: 1.44 (ref 0.86–1.14)
MCH RBC QN AUTO: 29.4 PG (ref 26.5–33)
MCHC RBC AUTO-ENTMCNC: 29.9 G/DL (ref 31.5–36.5)
MCV RBC AUTO: 98 FL (ref 78–100)
PLATELET # BLD AUTO: 203 10E9/L (ref 150–450)
POTASSIUM SERPL-SCNC: 3.6 MMOL/L (ref 3.4–5.3)
RBC # BLD AUTO: 4.02 10E12/L (ref 3.8–5.2)
SODIUM SERPL-SCNC: 141 MMOL/L (ref 133–144)
WBC # BLD AUTO: 6.2 10E9/L (ref 4–11)

## 2017-05-26 PROCEDURE — 25000125 ZZHC RX 250

## 2017-05-26 PROCEDURE — 25000125 ZZHC RX 250: Performed by: NURSE ANESTHETIST, CERTIFIED REGISTERED

## 2017-05-26 PROCEDURE — 80048 BASIC METABOLIC PNL TOTAL CA: CPT | Performed by: SURGERY

## 2017-05-26 PROCEDURE — 71000027 ZZH RECOVERY PHASE 2 EACH 15 MINS: Performed by: SURGERY

## 2017-05-26 PROCEDURE — 88305 TISSUE EXAM BY PATHOLOGIST: CPT | Performed by: SURGERY

## 2017-05-26 PROCEDURE — 25000128 H RX IP 250 OP 636: Performed by: SURGERY

## 2017-05-26 PROCEDURE — 36000053 ZZH SURGERY LEVEL 2 EA 15 ADDTL MIN - UMMC: Performed by: SURGERY

## 2017-05-26 PROCEDURE — 82947 ASSAY GLUCOSE BLOOD QUANT: CPT | Performed by: ANESTHESIOLOGY

## 2017-05-26 PROCEDURE — 85027 COMPLETE CBC AUTOMATED: CPT | Performed by: SURGERY

## 2017-05-26 PROCEDURE — 82962 GLUCOSE BLOOD TEST: CPT

## 2017-05-26 PROCEDURE — 25000128 H RX IP 250 OP 636: Performed by: NURSE ANESTHETIST, CERTIFIED REGISTERED

## 2017-05-26 PROCEDURE — 37000008 ZZH ANESTHESIA TECHNICAL FEE, 1ST 30 MIN: Performed by: SURGERY

## 2017-05-26 PROCEDURE — 85730 THROMBOPLASTIN TIME PARTIAL: CPT | Performed by: SURGERY

## 2017-05-26 PROCEDURE — 37000009 ZZH ANESTHESIA TECHNICAL FEE, EACH ADDTL 15 MIN: Performed by: SURGERY

## 2017-05-26 PROCEDURE — 36415 COLL VENOUS BLD VENIPUNCTURE: CPT | Performed by: SURGERY

## 2017-05-26 PROCEDURE — 27210794 ZZH OR GENERAL SUPPLY STERILE: Performed by: SURGERY

## 2017-05-26 PROCEDURE — 85610 PROTHROMBIN TIME: CPT | Performed by: SURGERY

## 2017-05-26 PROCEDURE — 36000051 ZZH SURGERY LEVEL 2 1ST 30 MIN - UMMC: Performed by: SURGERY

## 2017-05-26 PROCEDURE — 40000170 ZZH STATISTIC PRE-PROCEDURE ASSESSMENT II: Performed by: SURGERY

## 2017-05-26 DEVICE — IMP REPAIR MATRIX GRAFIX CORE 5X5 CHG PER SQ CM=25 PS12055: Type: IMPLANTABLE DEVICE | Site: FOOT | Status: FUNCTIONAL

## 2017-05-26 RX ORDER — PROPOFOL 10 MG/ML
INJECTION, EMULSION INTRAVENOUS CONTINUOUS PRN
Status: DISCONTINUED | OUTPATIENT
Start: 2017-05-26 | End: 2017-05-26

## 2017-05-26 RX ORDER — HYDROCODONE BITARTRATE AND ACETAMINOPHEN 5; 325 MG/1; MG/1
1-2 TABLET ORAL EVERY 4 HOURS PRN
Qty: 72 TABLET | Refills: 0 | Status: SHIPPED | OUTPATIENT
Start: 2017-05-26 | End: 2017-07-28

## 2017-05-26 RX ORDER — FONDAPARINUX SODIUM 7.5 MG/.6ML
7.5 INJECTION SUBCUTANEOUS EVERY MORNING
Qty: 10 SYRINGE | Refills: 0 | Status: SHIPPED | OUTPATIENT
Start: 2017-05-26 | End: 2017-08-27

## 2017-05-26 RX ORDER — FENTANYL CITRATE 50 UG/ML
25-50 INJECTION, SOLUTION INTRAMUSCULAR; INTRAVENOUS
Status: DISCONTINUED | OUTPATIENT
Start: 2017-05-26 | End: 2017-05-26 | Stop reason: HOSPADM

## 2017-05-26 RX ORDER — SODIUM CHLORIDE, SODIUM LACTATE, POTASSIUM CHLORIDE, CALCIUM CHLORIDE 600; 310; 30; 20 MG/100ML; MG/100ML; MG/100ML; MG/100ML
INJECTION, SOLUTION INTRAVENOUS CONTINUOUS
Status: DISCONTINUED | OUTPATIENT
Start: 2017-05-26 | End: 2017-05-26 | Stop reason: HOSPADM

## 2017-05-26 RX ORDER — SODIUM CHLORIDE, SODIUM LACTATE, POTASSIUM CHLORIDE, CALCIUM CHLORIDE 600; 310; 30; 20 MG/100ML; MG/100ML; MG/100ML; MG/100ML
INJECTION, SOLUTION INTRAVENOUS CONTINUOUS PRN
Status: DISCONTINUED | OUTPATIENT
Start: 2017-05-26 | End: 2017-05-26

## 2017-05-26 RX ORDER — NALOXONE HYDROCHLORIDE 0.4 MG/ML
.1-.4 INJECTION, SOLUTION INTRAMUSCULAR; INTRAVENOUS; SUBCUTANEOUS
Status: DISCONTINUED | OUTPATIENT
Start: 2017-05-26 | End: 2017-05-26 | Stop reason: HOSPADM

## 2017-05-26 RX ORDER — PROPOFOL 10 MG/ML
INJECTION, EMULSION INTRAVENOUS PRN
Status: DISCONTINUED | OUTPATIENT
Start: 2017-05-26 | End: 2017-05-26

## 2017-05-26 RX ORDER — ACETAMINOPHEN 325 MG/1
975 TABLET ORAL EVERY 6 HOURS PRN
Qty: 100 TABLET | Refills: 0 | Status: ON HOLD
Start: 2017-05-26 | End: 2018-03-21

## 2017-05-26 RX ORDER — FLUMAZENIL 0.1 MG/ML
0.2 INJECTION, SOLUTION INTRAVENOUS
Status: DISCONTINUED | OUTPATIENT
Start: 2017-05-26 | End: 2017-05-26 | Stop reason: HOSPADM

## 2017-05-26 RX ORDER — BUPIVACAINE HYDROCHLORIDE AND EPINEPHRINE 5; 5 MG/ML; UG/ML
INJECTION, SOLUTION PERINEURAL PRN
Status: DISCONTINUED | OUTPATIENT
Start: 2017-05-26 | End: 2017-05-26

## 2017-05-26 RX ORDER — VANCOMYCIN HYDROCHLORIDE 1 G/200ML
1000 INJECTION, SOLUTION INTRAVENOUS
Status: COMPLETED | OUTPATIENT
Start: 2017-05-26 | End: 2017-05-26

## 2017-05-26 RX ADMIN — PROPOFOL 20 MG: 10 INJECTION, EMULSION INTRAVENOUS at 11:02

## 2017-05-26 RX ADMIN — BUPIVACAINE HYDROCHLORIDE AND EPINEPHRINE BITARTRATE 20 ML: 5; .005 INJECTION, SOLUTION EPIDURAL; INTRACAUDAL; PERINEURAL at 10:30

## 2017-05-26 RX ADMIN — VANCOMYCIN HYDROCHLORIDE 1 G: 1 INJECTION, SOLUTION INTRAVENOUS at 10:55

## 2017-05-26 RX ADMIN — SODIUM CHLORIDE, POTASSIUM CHLORIDE, SODIUM LACTATE AND CALCIUM CHLORIDE: 600; 310; 30; 20 INJECTION, SOLUTION INTRAVENOUS at 10:45

## 2017-05-26 RX ADMIN — FENTANYL CITRATE 25 MCG: 50 INJECTION, SOLUTION INTRAMUSCULAR; INTRAVENOUS at 10:40

## 2017-05-26 RX ADMIN — PROPOFOL 25 MCG/KG/MIN: 10 INJECTION, EMULSION INTRAVENOUS at 11:02

## 2017-05-26 ASSESSMENT — PAIN DESCRIPTION - DESCRIPTORS: DESCRIPTORS: SORE

## 2017-05-26 NOTE — IP AVS SNAPSHOT
Same Day Surgery [address] [address]    Phone:  [phone]                                       After Visit Summary   5/26/2017    Carlos Alberto Fenton    MRN: [id number]           After Visit Summary Signature Page     I have received my discharge instructions, and my questions have been answered. I have discussed any challenges I see with this plan with the nurse or doctor.    ..........................................................................................................................................  Patient/Patient Representative Signature      ..........................................................................................................................................  Patient Representative Print Name and Relationship to Patient    ..................................................               ................................................  Date                                            Time    ..........................................................................................................................................  Reviewed by Signature/Title    ...................................................              ..............................................  Date                                                            Time

## 2017-05-26 NOTE — DISCHARGE INSTRUCTIONS
[facility]  Same-Day Surgery   Adult Discharge Orders & Instructions     For 24 hours after surgery    1. Get plenty of rest.  A responsible adult must stay with you for at least 24 hours after you leave the hospital.   2. Do not drive or use heavy equipment.  If you have weakness or tingling, don't drive or use heavy equipment until this feeling goes away.  3. Do not drink alcohol.  4. Avoid strenuous or risky activities.  Ask for help when climbing stairs.   5. You may feel lightheaded.  IF so, sit for a few minutes before standing.  Have someone help you get up.   6. If you have nausea (feel sick to your stomach): Drink only clear liquids such as apple juice, ginger ale, broth or 7-Up.  Rest may also help.  Be sure to drink enough fluids.  Move to a regular diet as you feel able.  7. You may have a slight fever. Call the doctor if your fever is over 100 F (37.7 C) (taken under the tongue) or lasts longer than 24 hours.  8. You may have a dry mouth, a sore throat, muscle aches or trouble sleeping.  These should go away after 24 hours.  9. Do not make important or legal decisions.   Call your doctor for any of the followin.  Signs of infection (fever, growing tenderness at the surgery site, a large amount of drainage or bleeding, severe pain, foul-smelling drainage, redness, swelling).    2. It has been over 8 to 10 hours since surgery and you are still not able to urinate (pass water).    3.  Headache for over 24 hours.    4.  Numbness, tingling or weakness the day after surgery (if you had spinal anesthesia).  To contact doctor zhang, call [phone] or:        [phone] and ask for the resident on call for vascular surgery (answered 24 hours a day)      Emergency Department:    [facility]: [phone]

## 2017-05-26 NOTE — OR NURSING
OR RN, Elinor,  came into room to assess pt.  She removed pt's dressing from her right foot.  In the process of doing so, pt's right great toe came off with dressing.  Pt did not experience pain.  Dr. Santamaria to beside shortly thereafter and stated that she expected this to happen.  Plan to go to OR and debride/clean wounds.

## 2017-05-26 NOTE — ANESTHESIA POSTPROCEDURE EVALUATION
Patient: Asenath I Fenton    Procedure(s):  Right Great Toe amputation, debriedment of 2 and 3rd toe soft tissu right foot. and application of Grafix - Wound Class: III-Contaminated    Diagnosis:Gangrene Toes   Diagnosis Additional Information: No value filed.    Anesthesia Type:  MAC, Peripheral Nerve Block    Note:  Anesthesia Post Evaluation    Patient location during evaluation: PACU  Patient participation: Able to fully participate in evaluation  Level of consciousness: awake and alert  Pain management: adequate  Airway patency: patent  Cardiovascular status: blood pressure returned to baseline  Respiratory status: acceptable  Hydration status: euvolemic  PONV: none             Last vitals:  Vitals:    05/26/17 1040 05/26/17 1216 05/26/17 1230   BP: 111/85 110/68 100/64   Resp: 16 15 15   Temp:  36.5  C (97.7  F)    SpO2: 100% 94%          Electronically Signed By: Leroy Mckeon MD  May 26, 2017  12:37 PM

## 2017-05-26 NOTE — ANESTHESIA PROCEDURE NOTES
Peripheral Nerve Block Procedure Note    Staff:     Anesthesiologist:  CONCEPCION RIBERA    Resident/CRNA:  FARHAD JONES    Block performed by resident/CRNA in the presence of a teaching physician    Location: Pre-op  Procedure Start/Stop TImes:      5/26/2017 10:20 AM     5/26/2017 10:30 AM    patient identified, IV checked, site marked, risks and benefits discussed, informed consent, monitors and equipment checked, pre-op evaluation, at physician/surgeon's request and post-op pain management      Correct Patient: Yes      Correct Position: Yes      Correct Site: Yes      Correct Procedure: Yes      Correct Laterality:  Yes    Site Marked:  Yes  Procedure details:     Procedure:  Ankle    Laterality:  Right    Position:  Supine    Sterile Prep: chloraprep, mask and sterile gloves      Local skin infiltration:  None    Needle:  Other    Needle gauge:  25    Needle length (inches):  1.5    Ultrasound: No      Abnormal pain on injection: No      Blood Aspirated: No      Paresthesias:  No    Bleeding at site: No      Test dose negative for signs of intravascular injection: Yes      Bolus via:  Needle    Infusion Method:  Single Shot    Blood aspirated via catheter: No      Complications:  None  Assessment/Narrative:     Injection made incrementally with aspirations every (mL):  3

## 2017-05-26 NOTE — OR NURSING
Pt received right ankle block in pre op with Dr. Shipley and Dr. Cotton.  Pt tolerated well.  See MAR and Flowsheets for specifics.

## 2017-05-26 NOTE — IP AVS SNAPSHOT
MRN:[id number]                      After Visit Summary   5/26/2017    Carlos Alberto Fenton    MRN: [id number]           Thank you!     Thank you for choosing [facility] for your care. Our goal is always to provide you with excellent care. Hearing back from our patients is one way we can continue to improve our services. Please take a few minutes to complete the written survey that you may receive in the mail after you visit with us. Thank you!        Patient Information     Date Of Birth          1940        About your hospital stay     You were admitted on:  May 26, 2017 You last received care in the:  Same Day Surgery [facility]    You were discharged on:  May 26, 2017        Reason for your hospital stay       You were here for partial toe amputations on your right foot with Grafix application.                  Who to Call     For medical emergencies, please call 911.  For non-urgent questions about your medical care, please call your primary care provider or clinic, [phone]  For questions related to your surgery, please call your surgery clinic        Attending Provider     Provider Leah Olvera MD Vascular Surgery       Primary Care Provider Office Phone # Fax #    Humberto Conner [phone]       [facility] [address] [place] [address]        After Care Instructions     Activity       1. No weight bearing on right foot  2. Do not remove right foot dressing  3. Do not submerse dressing in any water; do not get dressing wet            Diet       Follow this diet upon discharge: follow your usual diet            Discharge Instructions       Please contact Dr. Santamaria's Service with fevers, chills, uncontrolled pain, bleeding, excessive drainage, or any other concerns.    Edelmira Rodríguez, Vascular Surgery Nurse  [phone]    Dr. Leah Santamaria MD  Vascular Surgery   [facility]  [address] [place] [address]  [id number]  [place] [address]  [phone]    CHACORTA Lewis, CNP  Division of Vascular Surgery  [facility]  Pager: [phone]  Office: [phone]    [facility]  [phone]  - ask to speak to a member of Dr. Santamaria's Team                  Follow-up Appointments     Adult [facility] Follow-up and recommended labs and tests       Wednesday, May 31, 2017, 1: 00 PM  [facility] [place], 3rd floor  - You will see CHACORTA Valera, CNS, for outer dressing change    Appointments on [place] and/or [facility] (with [facility] or [facility] provider or service). Call [phone] if you haven't heard regarding these appointments within 7 days of discharge.                  Your next 10 appointments already scheduled     Jun 01, 2017  2:30 PM CDT   (Arrive by 2:15 PM)   Return Visit with Easton Torres DPM   [facility] Wound [facility] ([facility])    [address] [place] [id number] [phone]            Jul 14, 2017  1:30 PM CDT   (Arrive by 1:15 PM)   Return Visit with Star Lobato MD   [facility] ([facility])    [address] [place] [id number] [phone]            Sep 11, 2017  2:00 PM CDT   (Arrive by 1:45 PM)   HOLTER MONITOR VISIT with  Cvc Monitor [facility] ([facility])    [address] [place] [id number] [phone]            Oct 19, 2017  2:30 PM CDT   (Arrive by 2:15 PM)   RETURN ATRIAL FIBULATION VISIT with CHACORTA Joshi CNP   [facility] ([facility])    [address] [place] [id number] [phone]              Future tests that were ordered for you     [facility]-Operative Worksheet (Vascular)       There is no height or weight on file to calculate BMI.                     Further  instructions from your care team       [facility] [place]  Same-Day Surgery   Adult Discharge Orders & Instructions     For 24 hours after surgery    1. Get plenty of rest.  A responsible adult must stay with you for at least 24 hours after you leave the hospital.   2. Do not drive or use heavy equipment.  If you have weakness or tingling, don't drive or use heavy equipment until this feeling goes away.  3. Do not drink alcohol.  4. Avoid strenuous or risky activities.  Ask for help when climbing stairs.   5. You may feel lightheaded.  IF so, sit for a few minutes before standing.  Have someone help you get up.   6. If you have nausea (feel sick to your stomach): Drink only clear liquids such as apple juice, ginger ale, broth or 7-Up.  Rest may also help.  Be sure to drink enough fluids.  Move to a regular diet as you feel able.  7. You may have a slight fever. Call the doctor if your fever is over 100 F (37.7 C) (taken under the tongue) or lasts longer than 24 hours.  8. You may have a dry mouth, a sore throat, muscle aches or trouble sleeping.  These should go away after 24 hours.  9. Do not make important or legal decisions.   Call your doctor for any of the followin.  Signs of infection (fever, growing tenderness at the surgery site, a large amount of drainage or bleeding, severe pain, foul-smelling drainage, redness, swelling).    2. It has been over 8 to 10 hours since surgery and you are still not able to urinate (pass water).    3.  Headache for over 24 hours.    4.  Numbness, tingling or weakness the day after surgery (if you had spinal anesthesia).  To contact doctor zhang, call [phone] or:        [phone] and ask for the resident on call for vascular surgery (answered 24 hours a day)      Emergency Department:    [facility]: [phone]              Pending Results     No orders found from 2017 to 2017.            Admission Information      "Date & Time Provider Department Dept. Phone    5/26/2017 Leah Santamaria MD Same Day Surgery [facility] [place] [phone]      Your Vitals Were     Blood Pressure Temperature Respirations Height Weight Pulse Oximetry    100/64 97.7  F (36.5  C) 15 1.575 m (5' 2\") 74.7 kg (164 lb 10.9 oz) 94%    BMI (Body Mass Index)                   30.12 kg/m2           [employer] Information     [employer] gives you secure access to your electronic health record. If you see a primary care provider, you can also send messages to your care team and make appointments. If you have questions, please call your primary care clinic.  If you do not have a primary care provider, please call [phone] and they will assist you.        Care EveryWhere ID     This is your Care EveryWhere ID. This could be used by other organizations to access your [facility] medical records  [id number]           Review of your medicines      START taking        Dose / Directions    HYDROcodone-acetaminophen 5-325 MG per tablet   Commonly known as:  NORCO   Used for:  Ulcer of left lower extremity with fat layer exposed (H)        Dose:  1-2 tablet   Take 1-2 tablets by mouth every 4 hours as needed for moderate to severe pain maximum 12 tablet(s) per day. Do not take more than 4, 000 mg of Tylenol (Acetaminophen) per day. Use caution if taking extra Tylenol (Acetaminophen)   Quantity:  72 tablet   Refills:  0         CONTINUE these medicines which may have CHANGED, or have new prescriptions. If we are uncertain of the size of tablets/capsules you have at home, strength may be listed as something that might have changed.        Dose / Directions    acetaminophen 325 MG tablet   Commonly known as:  TYLENOL   This may have changed:    - how much to take  - when to take this  - reasons to take this   Used for:  S/P CABG (coronary artery bypass graft)        Dose:  975 mg   Take 3 tablets (975 mg) by mouth every 6 hours as needed for mild pain   Quantity:  100 " tablet   Refills:  0       aspirin 81 MG chewable tablet   This may have changed:  when to take this   Used for:  Coronary artery disease with angina pectoris, unspecified vessel or lesion type, unspecified whether native or transplanted heart (H)        Dose:  81 mg   Take 1 tablet (81 mg) by mouth daily   Quantity:  30 tablet   Refills:  0       atorvastatin 40 MG tablet   Commonly known as:  LIPITOR   This may have changed:  when to take this   Used for:  Coronary artery disease with angina pectoris, unspecified vessel or lesion type, unspecified whether native or transplanted heart (H)        Dose:  40 mg   Take 1 tablet (40 mg) by mouth daily   Quantity:  90 tablet   Refills:  1       ferrous sulfate 325 (65 FE) MG tablet   Commonly known as:  IRON SUPPLEMENT   This may have changed:  when to take this   Used for:  S/P CABG (coronary artery bypass graft)        Dose:  325 mg   Take 1 tablet (325 mg) by mouth daily (with breakfast)   Quantity:  100 tablet   Refills:  1       fondaparinux 7.5MG/0.6ML injection   Commonly known as:  ARIXTRA   This may have changed:    - when to take this  - additional instructions   Used for:  Long-term (current) use of anticoagulants        Dose:  7.5 mg   Inject 0.6 mLs (7.5 mg) Subcutaneous every morning *Do not take Saturday, May 27, 2017 *Do not take Adolfo, May 28, 2017 *May resuming taking on Monday, May 29, 2017   Quantity:  10 Syringe   Refills:  0         CONTINUE these medicines which have NOT CHANGED        Dose / Directions    blood glucose monitoring meter device kit        Refills:  0       furosemide 40 MG tablet   Commonly known as:  LASIX   Used for:  Bilateral edema of lower extremity        Dose:  40 mg   Take 1 tablet (40 mg) by mouth 2 times daily   Quantity:  180 tablet   Refills:  1       gabapentin 100 MG capsule   Commonly known as:  NEURONTIN   Used for:  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        Dose:  100 mg   Take 1 capsule (100 mg) by mouth 2  times daily   Quantity:  180 capsule   Refills:  1       ONE TOUCH ULTRA test strip   Used for:  Type 2 diabetes mellitus without complication, without long-term current use of insulin (H)   Generic drug:  blood glucose monitoring        USE AS DIRECTED TO TEST ONE TIME A DAY   Quantity:  100 each   Refills:  0       ONETOUCH DELICA LANCETS 33G Misc   Used for:  Type 2 diabetes mellitus without complication, without long-term current use of insulin (H)        Dose:  1 Device   1 Device daily   Quantity:  100 each   Refills:  0       potassium chloride SA 10 MEQ CR tablet   Commonly known as:  K-DUR/KLOR-CON M   Used for:  S/P CABG (coronary artery bypass graft)        Dose:  10 mEq   Take 1 tablet (10 mEq) by mouth 2 times daily   Quantity:  180 tablet   Refills:  1       traZODone 50 MG tablet   Commonly known as:  DESYREL   Used for:  Primary insomnia        Dose:  25 mg   Take 0.5 tablets (25 mg) by mouth nightly as needed for sleep   Quantity:  45 tablet   Refills:  2       venlafaxine 150 MG Tb24 24 hr tablet   Commonly known as:  EFFEXOR-ER   Used for:  Adjustment disorder with depressed mood        Dose:  150 mg   Take 1 tablet (150 mg) by mouth daily (with breakfast)   Quantity:  90 each   Refills:  1            Where to get your medicines      Some of these will need a paper prescription and others can be bought over the counter. Ask your nurse if you have questions.     Bring a paper prescription for each of these medications     fondaparinux 7.5MG/0.6ML injection    HYDROcodone-acetaminophen 5-325 MG per tablet       You don't need a prescription for these medications     acetaminophen 325 MG tablet                Protect others around you: Learn how to safely use, store and throw away your medicines at www.disposemymeds.org.             Medication List: This is a list of all your medications and when to take them. Check marks below indicate your daily home schedule. Keep this list as a reference.       Medications           Morning Afternoon Evening Bedtime As Needed    acetaminophen 325 MG tablet   Commonly known as:  TYLENOL   Take 3 tablets (975 mg) by mouth every 6 hours as needed for mild pain                                aspirin 81 MG chewable tablet   Take 1 tablet (81 mg) by mouth daily                                atorvastatin 40 MG tablet   Commonly known as:  LIPITOR   Take 1 tablet (40 mg) by mouth daily                                blood glucose monitoring meter device kit                                ferrous sulfate 325 (65 FE) MG tablet   Commonly known as:  IRON SUPPLEMENT   Take 1 tablet (325 mg) by mouth daily (with breakfast)                                fondaparinux 7.5MG/0.6ML injection   Commonly known as:  ARIXTRA   Inject 0.6 mLs (7.5 mg) Subcutaneous every morning *Do not take Saturday, May 27, 2017 *Do not take Adolfo, May 28, 2017 *May resuming taking on Monday, May 29, 2017                                furosemide 40 MG tablet   Commonly known as:  LASIX   Take 1 tablet (40 mg) by mouth 2 times daily                                gabapentin 100 MG capsule   Commonly known as:  NEURONTIN   Take 1 capsule (100 mg) by mouth 2 times daily                                HYDROcodone-acetaminophen 5-325 MG per tablet   Commonly known as:  NORCO   Take 1-2 tablets by mouth every 4 hours as needed for moderate to severe pain maximum 12 tablet(s) per day. Do not take more than 4, 000 mg of Tylenol (Acetaminophen) per day. Use caution if taking extra Tylenol (Acetaminophen)                                ONE TOUCH ULTRA test strip   USE AS DIRECTED TO TEST ONE TIME A DAY   Generic drug:  blood glucose monitoring                                ONETOUCH DELICA LANCETS 33G Misc   1 Device daily                                potassium chloride SA 10 MEQ CR tablet   Commonly known as:  K-DUR/KLOR-CON M   Take 1 tablet (10 mEq) by mouth 2 times daily                                 traZODone 50 MG tablet   Commonly known as:  DESYREL   Take 0.5 tablets (25 mg) by mouth nightly as needed for sleep                                venlafaxine 150 MG Tb24 24 hr tablet   Commonly known as:  EFFEXOR-ER   Take 1 tablet (150 mg) by mouth daily (with breakfast)

## 2017-05-26 NOTE — ANESTHESIA CARE TRANSFER NOTE
Patient: Asenath I Fenton    Procedure(s):  Right Great Toe amputation, debriedment of 2 and 3rd toe soft tissu right foot. and application of Grafix - Wound Class: III-Contaminated    Diagnosis: Gangrene Toes   Diagnosis Additional Information: No value filed.    Anesthesia Type:   MAC, Peripheral Nerve Block     Note:  Airway :Room Air  Patient transferred to:Phase II        Vitals: (Last set prior to Anesthesia Care Transfer)    CRNA VITALS  5/26/2017 1139 - 5/26/2017 1215      5/26/2017             Pulse: 62    SpO2: 96 %                Electronically Signed By: CHACORTA Somers CRNA  May 26, 2017  12:15 PM

## 2017-05-26 NOTE — BRIEF OP NOTE
[facility] Brief Operative Note    Pre-operative diagnosis: Gangrene Toes    Post-operative diagnosis * No post-op diagnosis entered *   Procedure: Procedure(s):  Right Great Toe amputation, debriedment of 2 and 3rd toe soft tissu right foot. and application of Grafix - Wound Class: III-Contaminated   Surgeon: Leah Santamaria MD   Assistants(s): Zack Cheek MD   Estimated blood loss: Minimal    Specimens: Right distal toe   Findings: Small amount of bone exposed, debrided back  Grafix core 5x5cm placed    Leah Santamaria MD

## 2017-05-26 NOTE — OP NOTE
DATE OF SERVICE: May 26, 2017     PREOPERATIVE DIAGNOSIS:  Right 1st and 2nd toe wounds and gangrene, embolic, after cardiac surgery      POSTOPERATIVE DIAGNOSIS:  Right 1st and 2nd toe wounds and gangrene, embolic, after cardiac surgery    PROCEDURES: Debridement of the right 1st and 2nd toe wounds and bone, placement of skin substitute, Amputation of distal right 1st toe      ATTENDING SURGEON: Leah Santamaria MD       FELLOW SURGEON:  Zack Hannon MD    ANESTHESIA:  Block, MAC      COMPLICATIONS: None    EBL:  Minimal    INSTRUMENT COUNTS:  Correct    INDICATIONS: This is a pleasant 76 year old female who had embolized to bilateral feet after cardiac surgery.  The right great toe had demarcated quite a bit, and now presents for amputation and washout.      PROCEDURE:  The patient was consented and taken to the operating room. MAC and block anesthesia was performed. The patient was prepped and draped in sterile fashion and timeout procedure was performed.   The distal part of the right great toe was barely attached, and it was removed without difficulty using scissors.  The first and second toes were examined further and there was a significant amount of hypergranulation tissue.  This was debrided back a little.  In addition, the bone of the first and second toes were exposed at the tip.  Using a ronger, the bone on each toe was taken back.  The size of the wound on the 2nd toe was ~ 0.5x0.5cm and the great toe was ~ 2x3cm.  The toes were then irrigated with brown volcano and 4L of warm irrigation.  The grafix core 5x5 was cut into 3 pieces, and a small piece was placed against the bone of the first toe.  The area around the bone was then closed over top using 3-0 vicryl.  The remaining grafix was placed on top and secured in place using steristripes.  The other small piece was placed on the 2nd toe and this was also secured in place using steristripes.  The dressing was then placed.  She tolerated the procedure  well and was transferred to recovery room in stable condition.    Leah Santamaria MD

## 2017-05-30 ENCOUNTER — MYC MEDICAL ADVICE (OUTPATIENT)
Dept: INTERNAL MEDICINE | Facility: CLINIC | Age: 77
End: 2017-05-30

## 2017-05-30 LAB — COPATH REPORT: NORMAL

## 2017-05-30 NOTE — TELEPHONE ENCOUNTER
I tried to reach out to patient and phone number has been disconnected.   Thank you,  Scarlet Ortega   for [facility]

## 2017-05-31 ENCOUNTER — OFFICE VISIT (OUTPATIENT)
Dept: VASCULAR SURGERY | Facility: CLINIC | Age: 77
End: 2017-05-31

## 2017-05-31 VITALS
RESPIRATION RATE: 18 BRPM | OXYGEN SATURATION: 93 % | SYSTOLIC BLOOD PRESSURE: 120 MMHG | HEART RATE: 63 BPM | DIASTOLIC BLOOD PRESSURE: 79 MMHG

## 2017-05-31 DIAGNOSIS — I96 GANGRENOUS TOE (H): Primary | ICD-10-CM

## 2017-05-31 ASSESSMENT — PAIN SCALES - GENERAL: PAINLEVEL: NO PAIN (0)

## 2017-05-31 NOTE — NURSING NOTE
Chief Complaint   Patient presents with     RECHECK     right foot wound check        Vitals:    05/31/17 1304   BP: 120/79   BP Location: Right arm   Pulse: 63   Resp: 18   SpO2: 93%       There is no height or weight on file to calculate BMI.                       Jeannie Baker LPN

## 2017-05-31 NOTE — MR AVS SNAPSHOT
After Visit Summary   5/31/2017    Carlos Alberto Fenton    MRN: [id number]           Patient Information     Date Of Birth          1940        Visit Information        Provider Department      5/31/2017 1:00 PM Meena Cardoso APRN CNS [facility]         Follow-ups after your visit        Your next 10 appointments already scheduled     Jun 01, 2017  2:30 PM CDT   (Arrive by 2:15 PM)   Return Visit with Easton Torres DPM   [facility] ([facility])    [address] [place] [address]  4th [facility] [address]   [phone]            Jun 02, 2017  1:30 PM CDT   (Arrive by 1:15 PM)   New Vascular Visit with CHACORTA Veliz [facility] ([facility])    [address] [place] [id number] [phone]            Jul 14, 2017  1:30 PM CDT   (Arrive by 1:15 PM)   Return Visit with Star Lobato MD   [facility] ([facility])    [address] [place] [id number] [phone]            Sep 11, 2017  2:00 PM CDT   (Arrive by 1:45 PM)   HOLTER MONITOR VISIT with  Cvc Monitor [facility] ([facility])    [address] [place] [id number] [phone]            Oct 19, 2017  2:30 PM CDT   (Arrive by 2:15 PM)   RETURN ATRIAL FIBULATION VISIT with CHACORTA Joshi CNP   [facility] ([facility])    [address] [place] [id number] [phone]              Who to contact     Please call your clinic at [phone] to:    Ask questions about your health    Make or cancel appointments    Discuss your medicines    Learn about your test results    Speak to your doctor   If you have compliments or concerns about an experience at your clinic, or if you wish to file a  complaint, please contact [facility] [place] [facility] Patient Relations at [phone] or email us at [email or web address]         Additional Information About Your Visit        Dynamo Micropowerhart Information     Mob[employer]xt gives you secure access to your electronic health record. If you see a primary care provider, you can also send messages to your care team and make appointments. If you have questions, please call your primary care clinic.  If you do not have a primary care provider, please call [phone] and they will assist you.      Popcorn5 is an electronic gateway that provides easy, online access to your medical records. With Popcorn5, you can request a clinic appointment, read your test results, renew a prescription or communicate with your care team.     To access your existing account, please contact your [facility] [place] [facility] or call [phone] for assistance.        Care EveryWhere ID     This is your Care EveryWhere ID. This could be used by other organizations to access your [facility] medical records  [id number]        Your Vitals Were     Pulse Respirations Pulse Oximetry Breastfeeding?          63 18 93% No         Blood Pressure from Last 3 Encounters:   05/31/17 120/79   05/26/17 100/64   05/24/17 122/73    Weight from Last 3 Encounters:   05/26/17 164 lb 10.9 oz   05/24/17 164 lb 14.4 oz   05/05/17 165 lb 14.4 oz              Today, you had the following     No orders found for display         Today's Medication Changes          These changes are accurate as of: 5/31/17  1:36 PM.  If you have any questions, ask your nurse or doctor.               These medicines have changed or have updated prescriptions.        Dose/Directions    aspirin 81 MG chewable tablet   This may have changed:  when to take this   Used for:  Coronary artery disease with angina pectoris, unspecified vessel or lesion type, unspecified whether native or transplanted heart (H)        Dose:  81  mg   Take 1 tablet (81 mg) by mouth daily   Quantity:  30 tablet   Refills:  0       atorvastatin 40 MG tablet   Commonly known as:  LIPITOR   This may have changed:  when to take this   Used for:  Coronary artery disease with angina pectoris, unspecified vessel or lesion type, unspecified whether native or transplanted heart (H)        Dose:  40 mg   Take 1 tablet (40 mg) by mouth daily   Quantity:  90 tablet   Refills:  1       ferrous sulfate 325 (65 FE) MG tablet   Commonly known as:  IRON SUPPLEMENT   This may have changed:  when to take this   Used for:  S/P CABG (coronary artery bypass graft)        Dose:  325 mg   Take 1 tablet (325 mg) by mouth daily (with breakfast)   Quantity:  100 tablet   Refills:  1                Primary Care Provider Office Phone # Fax #    Humberto Conner [phone]       [facility] [address] [place] [address]        Thank you!     Thank you for choosing [facility]  for your care. Our goal is always to provide you with excellent care. Hearing back from our patients is one way we can continue to improve our services. Please take a few minutes to complete the written survey that you may receive in the mail after your visit with us. Thank you!             Your Updated Medication List - Protect others around you: Learn how to safely use, store and throw away your medicines at www.disposemymeds.org.          This list is accurate as of: 5/31/17  1:36 PM.  Always use your most recent med list.                   Brand Name Dispense Instructions for use    acetaminophen 325 MG tablet    TYLENOL    100 tablet    Take 3 tablets (975 mg) by mouth every 6 hours as needed for mild pain       aspirin 81 MG chewable tablet     30 tablet    Take 1 tablet (81 mg) by mouth daily       atorvastatin 40 MG tablet    LIPITOR    90 tablet    Take 1 tablet (40 mg) by mouth daily       blood glucose monitoring meter device kit          ferrous sulfate  325 (65 FE) MG tablet    IRON SUPPLEMENT    100 tablet    Take 1 tablet (325 mg) by mouth daily (with breakfast)       fondaparinux 7.5MG/0.6ML injection    ARIXTRA    10 Syringe    Inject 0.6 mLs (7.5 mg) Subcutaneous every morning *Do not take Saturday, May 27, 2017 *Do not take Adolfo, May 28, 2017 *May resuming taking on Monday, May 29, 2017       furosemide 40 MG tablet    LASIX    180 tablet    Take 1 tablet (40 mg) by mouth 2 times daily       gabapentin 100 MG capsule    NEURONTIN    180 capsule    Take 1 capsule (100 mg) by mouth 2 times daily       HYDROcodone-acetaminophen 5-325 MG per tablet    NORCO    72 tablet    Take 1-2 tablets by mouth every 4 hours as needed for moderate to severe pain maximum 12 tablet(s) per day. Do not take more than 4, 000 mg of Tylenol (Acetaminophen) per day. Use caution if taking extra Tylenol (Acetaminophen)       ONE TOUCH ULTRA test strip   Generic drug:  blood glucose monitoring     100 each    USE AS DIRECTED TO TEST ONE TIME A DAY       ONETOUCH DELICA LANCETS 33G Misc     100 each    1 Device daily       potassium chloride SA 10 MEQ CR tablet    K-DUR/KLOR-CON M    180 tablet    Take 1 tablet (10 mEq) by mouth 2 times daily       traZODone 50 MG tablet    DESYREL    45 tablet    Take 0.5 tablets (25 mg) by mouth nightly as needed for sleep       venlafaxine 150 MG Tb24 24 hr tablet    EFFEXOR-ER    90 each    Take 1 tablet (150 mg) by mouth daily (with breakfast)

## 2017-05-31 NOTE — LETTER
5/31/2017       RE: Carlos Alberto Fenton  [address]  [place] [facility] [address]     Dear Colleague,    Thank you for referring your patient, Carlos Alberto Fenton, to the [facility] at [facility]. Please see a copy of my visit note below.    VASCULAR SURGERY CLINIC ESTABLISHED PATIENT NOTE    HPI:    Carlos Alberto Fenton is a 76 year old female with past medical history of HTN, HPL, CM, CVA (11/2016) CAD, HIT, paroxsymal a-fib, and status post  Median Sternotomy, Cardiopulmonary Bypass, Coronary Artery Bypass Graft x3, Left Internal Mammary Artery [place], Endoscopic Left Leg Saphenous Vein [place], MAZE Procedure, Left Atrial Appendage Ligation (AtriClip 45), Patent Foramen Ovale Closure on 2/16/2017.  She was admitted to [facility] on 2/24/17-3/3/17 for blackening toes and was diagnosed with DVT likely due to emboli with a supratherapeutic INR/HIT.  She is wearing DH2 shoes. She is being follow by Dr. Hatfield for weekly wound cares.   On 5/26/2017 she underwent Right Great Toe amputation, debriedment of 2 and 3rd toe soft tissu right foot. and application of Grafix with Dr. Santamaria.  She returns to clinic today for outer dressing change.      SUBJECTIVE:  She denies any right foot pain, fever, swelling, night sweats and headaches.  She offers no specific complaints.      OBJECTIVE:  Vital signs:  120/79  63  18      Prior to Admission medications    Medication Sig Start Date End Date Taking? Authorizing Provider   acetaminophen (TYLENOL) 325 MG tablet Take 3 tablets (975 mg) by mouth every 6 hours as needed for mild pain 5/26/17   Rosalinda Ron APRN CNP   fondaparinux (ARIXTRA) 7.5MG/0.6ML injection Inject 0.6 mLs (7.5 mg) Subcutaneous every morning *Do not take Saturday, May 27, 2017  *Do not take Adolfo, May 28, 2017  *May resuming taking on Monday, May 29, 2017 5/26/17   Rosalinda Ron APRN CNP   HYDROcodone-acetaminophen (NORCO) 5-325 MG per tablet Take 1-2 tablets by  mouth every 4 hours as needed for moderate to severe pain maximum 12 tablet(s) per day. Do not take more than 4, 000 mg of Tylenol (Acetaminophen) per day. Use caution if taking extra Tylenol (Acetaminophen) 5/26/17   Rosalinda Ron APRN CNP   ONE TOUCH ULTRA test strip USE AS DIRECTED TO TEST ONE TIME A DAY 4/11/17   Nuha Bateman MD   atorvastatin (LIPITOR) 40 MG tablet Take 1 tablet (40 mg) by mouth daily  Patient taking differently: Take 40 mg by mouth every morning  3/31/17   Humberto Conner MD   furosemide (LASIX) 40 MG tablet Take 1 tablet (40 mg) by mouth 2 times daily 3/31/17   Humberto Conner MD   gabapentin (NEURONTIN) 100 MG capsule Take 1 capsule (100 mg) by mouth 2 times daily 3/31/17   Humberto Conner MD   traZODone (DESYREL) 50 MG tablet Take 0.5 tablets (25 mg) by mouth nightly as needed for sleep 3/31/17   Humberto Conner MD   venlafaxine (EFFEXOR-ER) 150 MG TB24 24 hr tablet Take 1 tablet (150 mg) by mouth daily (with breakfast) 3/31/17   Humberto Conner MD   potassium chloride SA (K-DUR/KLOR-CON M) 10 MEQ CR tablet Take 1 tablet (10 mEq) by mouth 2 times daily 3/31/17   Humberto Conner MD   aspirin 81 MG chewable tablet Take 1 tablet (81 mg) by mouth daily  Patient taking differently: Take 81 mg by mouth every morning  3/2/17   Britt Whelan MD   ferrous sulfate (IRON SUPPLEMENT) 325 (65 FE) MG tablet Take 1 tablet (325 mg) by mouth daily (with breakfast)  Patient taking differently: Take 325 mg by mouth 2 times daily  2/17/17   Jennifer Staley APRN CNP   ONETOUCH DELICA LANCETS 33G MISC 1 Device daily 1/16/17   Nuha Bateman MD   blood glucose monitoring (ONE TOUCH ULTRA 2) meter device kit  11/9/16   Reported, Patient       PHYSICAL EXAM:  NEURO/PSYCH: The patient is alert and oriented.  Appropriate.  Moves all extremities.   SKIN: Color appropriate for race, warm, dry.  PULMONARY: non-labored breathing  EXTREMITIES: right foot ace wrap, Kerlix and fluff 4 X 4's  removed, did not disrupt Grafix, 4x4 fluff, Kerlix and ace wrap re-applied      ASSESSMENT:  Patient Active Problem List   Diagnosis     History of skin cancer     Pelvic floor dysfunction     Urge incontinence of urine     Pulmonary nodule, right     Long-term (current) use of anticoagulants [Z79.01]     Adjustment disorder with depressed mood     Primary insomnia     Gangrene (H)     Toe gangrene (H)     Ulcer of left lower extremity with fat layer exposed (H)     PLAN:  - return to clinic on 6/2/2017 for outer dressing change  - stressed the importance of non-weight bearing and keeping dressing intact on her right foot  - continue DH2 shoes  - will plan on removing Grafix next week and start adaptic dressing changes if okay with Dr. Santamaria.    Please do not hesistate to contact Dr. Santamaria's vascular surgery team with any questions.     Meena WHATLEY, CNS  Division of Vascular Surgery  [facility]  Pager [phone]

## 2017-05-31 NOTE — PROGRESS NOTES
VASCULAR SURGERY CLINIC ESTABLISHED PATIENT NOTE    HPI:    Carlos Alberto Fenton is a 76 year old female with past medical history of HTN, HPL, CM, CVA (11/2016) CAD, HIT, paroxsymal a-fib, and status post  Median Sternotomy, Cardiopulmonary Bypass, Coronary Artery Bypass Graft x3, Left Internal Mammary Artery [place], Endoscopic Left Leg Saphenous Vein [place], MAZE Procedure, Left Atrial Appendage Ligation (AtriClip 45), Patent Foramen Ovale Closure on 2/16/2017.  She was admitted to [facility] on 2/24/17-3/3/17 for blackening toes and was diagnosed with DVT likely due to emboli with a supratherapeutic INR/HIT.  She is wearing DH2 shoes. She is being follow by Dr. Hatfield for weekly wound cares.   On 5/26/2017 she underwent Right Great Toe amputation, debriedment of 2 and 3rd toe soft tissu right foot. and application of Grafix with Dr. Santamaria.  She returns to clinic today for outer dressing change.      SUBJECTIVE:  She denies any right foot pain, fever, swelling, night sweats and headaches.  She offers no specific complaints.      OBJECTIVE:  Vital signs:  120/79  63  18      Prior to Admission medications    Medication Sig Start Date End Date Taking? Authorizing Provider   acetaminophen (TYLENOL) 325 MG tablet Take 3 tablets (975 mg) by mouth every 6 hours as needed for mild pain 5/26/17   Rosalinda Ron APRN CNP   fondaparinux (ARIXTRA) 7.5MG/0.6ML injection Inject 0.6 mLs (7.5 mg) Subcutaneous every morning *Do not take Saturday, May 27, 2017  *Do not take Adolfo, May 28, 2017  *May resuming taking on Monday, May 29, 2017 5/26/17   Rosalinda Ron APRN CNP   HYDROcodone-acetaminophen (NORCO) 5-325 MG per tablet Take 1-2 tablets by mouth every 4 hours as needed for moderate to severe pain maximum 12 tablet(s) per day. Do not take more than 4, 000 mg of Tylenol (Acetaminophen) per day. Use caution if taking extra Tylenol (Acetaminophen) 5/26/17   Rosalinda Ron APRN CNP   ONE TOUCH ULTRA test strip USE AS  DIRECTED TO TEST ONE TIME A DAY 4/11/17   Nuha Bateman MD   atorvastatin (LIPITOR) 40 MG tablet Take 1 tablet (40 mg) by mouth daily  Patient taking differently: Take 40 mg by mouth every morning  3/31/17   Humberto Conner MD   furosemide (LASIX) 40 MG tablet Take 1 tablet (40 mg) by mouth 2 times daily 3/31/17   Humberto Conner MD   gabapentin (NEURONTIN) 100 MG capsule Take 1 capsule (100 mg) by mouth 2 times daily 3/31/17   Humberto Conner MD   traZODone (DESYREL) 50 MG tablet Take 0.5 tablets (25 mg) by mouth nightly as needed for sleep 3/31/17   Humberto Conner MD   venlafaxine (EFFEXOR-ER) 150 MG TB24 24 hr tablet Take 1 tablet (150 mg) by mouth daily (with breakfast) 3/31/17   Humberto Conner MD   potassium chloride SA (K-DUR/KLOR-CON M) 10 MEQ CR tablet Take 1 tablet (10 mEq) by mouth 2 times daily 3/31/17   Humberto Conner MD   aspirin 81 MG chewable tablet Take 1 tablet (81 mg) by mouth daily  Patient taking differently: Take 81 mg by mouth every morning  3/2/17   Britt Whelan MD   ferrous sulfate (IRON SUPPLEMENT) 325 (65 FE) MG tablet Take 1 tablet (325 mg) by mouth daily (with breakfast)  Patient taking differently: Take 325 mg by mouth 2 times daily  2/17/17   Jennifer Staley, APRN CNP   ONETOUCH DELICA LANCETS 33G MISC 1 Device daily 1/16/17   Nuha Bateman MD   blood glucose monitoring (ONE TOUCH ULTRA 2) meter device kit  11/9/16   Reported, Patient       PHYSICAL EXAM:  NEURO/PSYCH: The patient is alert and oriented.  Appropriate.  Moves all extremities.   SKIN: Color appropriate for race, warm, dry.  PULMONARY: non-labored breathing  EXTREMITIES: right foot ace wrap, Kerlix and fluff 4 X 4's removed, did not disrupt Grafix, 4x4 fluff, Kerlix and ace wrap re-applied      ASSESSMENT:  Patient Active Problem List   Diagnosis     History of skin cancer     Pelvic floor dysfunction     Urge incontinence of urine     Pulmonary nodule, right     Long-term (current) use of  anticoagulants [Z79.01]     Adjustment disorder with depressed mood     Primary insomnia     Gangrene (H)     Toe gangrene (H)     Ulcer of left lower extremity with fat layer exposed (H)           PLAN:  - return to clinic on 6/2/2017 for outer dressing change  - stressed the importance of non-weight bearing and keeping dressing intact on her right foot  - continue DH2 shoes  - will plan on removing Grafix next week and start adaptic dressing changes if okay with Dr. Santamaria.    Please do not hesistate to contact Dr. Santamaria's vascular surgery team with any questions.     Meena WHATLEY, CNS  Division of Vascular Surgery  [facility]  Pager [phone]

## 2017-06-01 ENCOUNTER — OFFICE VISIT (OUTPATIENT)
Dept: WOUND CARE | Facility: CLINIC | Age: 77
End: 2017-06-01

## 2017-06-01 DIAGNOSIS — L97.922 ULCER OF LEFT LOWER EXTREMITY WITH FAT LAYER EXPOSED (H): ICD-10-CM

## 2017-06-01 DIAGNOSIS — I96 TOE GANGRENE (H): Primary | ICD-10-CM

## 2017-06-01 NOTE — PROGRESS NOTES
Chief Complaint: left 2nd and 3rd digital wounds       Allergies   Allergen Reactions     Levaquin [Levofloxacin] Other (See Comments)     Tendon pain in legs, arms and shoulders.      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       Oxycodone      hallucinations     Adhesive Tape Itching and Rash     Diapers & Supplies Rash     Developed yeast infection from previous hospital stay         Subjective: Carlos Alberto is a 76 year old female who presents to the clinic today for a follow up of left 2nd and 3rd digit wounds. She relates that she had the right big toe amputation on 5/26/17 and is currently being seen by their team for this. This dressing was left intact today. She relates a little pain to the left foot today. She has been changing the dressing daily with Iodosorb. Her  is here from [place].     Objective    A  wound is noted at left  2nd digit measuring 3mm x 3mm x 1mm.    Yousif Classification: II    Wound base: Pink  Yellow/Granulation    Edges: intact    Drainage: small/serous    Odor: no    Undermining: no    Bone Exposure: No    Clinical Signs of Infection: No    After obtaining patient consent, the wound was irrigated with copious amounts of saline. A scalpel was then used to debride the wound into the subcutaneous tissue. The wound edges were debrided to healthy, bleeding tissue. No anesthesia was necessary for the procedure.   _________________________  A  wound is noted at left  3rd digit measuring 3mm x 3mm x 1mm.    Yousif Classification: III    Wound base: Red/Granulation    Edges: hyperkeratotic    Drainage: scant/serous    Odor: no    Undermining: no    Bone Exposure: Yes: tuft of the phalanx    Clinical Signs of Infection: No    After obtaining patient consent, the wound was irrigated with copious amounts of saline. A scalpel was then used to debride the wound into  subcutaneous tissue. The wound edges were debrided back to healthy, bleeding tissue. The wound base exhibited healthy bleeding. No anesthesia was necessary for the procedure.  Site of vascular harvest wound has crusted over and healed.         Assessment: s/p right hallux amp - currently managed by Dr. Santamaria  Left 2nd and 3rd digit wounds 2/2 emboli    Plan:   - Pt seen and evaluated  - The tissue appears viable around the digits, however there is some bone that is exposed in the 3rd digit. In the next couple of weeks, this may need to be removed in clinic. We will try to get tissue to for over this first.   - She should continue with the Iodosorb dressings on the left and defer to vascular for the right.    - Pt to return to clinic in 2 weeks.

## 2017-06-01 NOTE — LETTER
6/1/2017       RE: Carlos Alberto Fenton  [address]  [place] [address]     Dear Colleague,    Thank you for referring your patient, Carlos Alberto Fenton, to the [facility] at [facility]. Please see a copy of my visit note below.    Chief Complaint: left 2nd and 3rd digital wounds       Allergies   Allergen Reactions     Levaquin [Levofloxacin] Other (See Comments)     Tendon pain in legs, arms and shoulders.      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       Oxycodone      hallucinations     Adhesive Tape Itching and Rash     Diapers & Supplies Rash     Developed yeast infection from previous hospital stay         Subjective: Carlos Alberto is a 76 year old female who presents to the clinic today for a follow up of left 2nd and 3rd digit wounds. She relates that she had the right big toe amputation on 5/26/17 and is currently being seen by their team for this. This dressing was left intact today. She relates a little pain to the left foot today. She has been changing the dressing daily with Iodosorb. Her  is here from [place].     Objective    A  wound is noted at left  2nd digit measuring 3mm x 3mm x 1mm.    Yousif Classification: II    Wound base: Pink  Yellow/Granulation    Edges: intact    Drainage: small/serous    Odor: no    Undermining: no    Bone Exposure: No    Clinical Signs of Infection: No    After obtaining patient consent, the wound was irrigated with copious amounts of saline. A scalpel was then used to debride the wound into the subcutaneous tissue. The wound edges were debrided to healthy, bleeding tissue. No anesthesia was necessary for the procedure.   _________________________  A  wound is noted at left  3rd digit measuring 3mm x 3mm x 1mm.    Yousif Classification: III    Wound base: Red/Granulation    Edges:  hyperkeratotic    Drainage: scant/serous    Odor: no    Undermining: no    Bone Exposure: Yes: tuft of the phalanx    Clinical Signs of Infection: No    After obtaining patient consent, the wound was irrigated with copious amounts of saline. A scalpel was then used to debride the wound into subcutaneous tissue. The wound edges were debrided back to healthy, bleeding tissue. The wound base exhibited healthy bleeding. No anesthesia was necessary for the procedure.  Site of vascular harvest wound has crusted over and healed.         Assessment: s/p right hallux amp - currently managed by Dr. Santamaria  Left 2nd and 3rd digit wounds 2/2 emboli    Plan:   - Pt seen and evaluated  - The tissue appears viable around the digits, however there is some bone that is exposed in the 3rd digit. In the next couple of weeks, this may need to be removed in clinic. We will try to get tissue to for over this first.   - She should continue with the Iodosorb dressings on the left and defer to vascular for the right.    - Pt to return to clinic in 2 weeks.     Easton Torres DPM

## 2017-06-01 NOTE — MR AVS SNAPSHOT
After Visit Summary   6/1/2017    Carlos Alberto Fenotn    MRN: [id number]           Patient Information     Date Of Birth          1940        Visit Information        Provider Department      6/1/2017 2:30 PM Easton Torres DPM [facility]         Follow-ups after your visit        Your next 10 appointments already scheduled     Jun 01, 2017  2:30 PM CDT   (Arrive by 2:15 PM)   Return Visit with Easton Torres DPM   [facility] ([facility])    [address] [place] [id number] [phone]            Jun 02, 2017  1:30 PM CDT   (Arrive by 1:15 PM)   New Vascular Visit with CHACORTA Veliz CNP   [facility] ([facility])    [address] [place] [id number] [phone]            Obed 15, 2017  8:00 AM CDT   (Arrive by 7:45 AM)   Return Visit with Easton Torres DPM   [facility] Wound [facility] ([facility])    [address] [place] [id number] [phone]            Jul 14, 2017  1:30 PM CDT   (Arrive by 1:15 PM)   Return Visit with Star Lobato MD   [facility] Heart [facility] ([facility])    [address] [place] [id number] [phone]            Sep 11, 2017  2:00 PM CDT   (Arrive by 1:45 PM)   HOLTER MONITOR VISIT with  Cvc Monitor [facility] ([facility])    [address] [place] [id number] [phone]            Oct 19, 2017  2:30 PM CDT   (Arrive by 2:15 PM)   RETURN ATRIAL FIBULATION VISIT with CHACORTA Joshi CNP   [facility] ([facility])    [address] [place] [id number] [phone]              Who to contact     Please call your clinic at [phone] to:    Ask questions about  your health    Make or cancel appointments    Discuss your medicines    Learn about your test results    Speak to your doctor   If you have compliments or concerns about an experience at your clinic, or if you wish to file a complaint, please contact [facility] Patient Relations at [phone] or email us at [email or web address]         Additional Information About Your Visit        MyChart Information     [employer] gives you secure access to your electronic health record. If you see a primary care provider, you can also send messages to your care team and make appointments. If you have questions, please call your primary care clinic.  If you do not have a primary care provider, please call [phone] and they will assist you.      [employer] is an electronic gateway that provides easy, online access to your medical records. With [employer], you can request a clinic appointment, read your test results, renew a prescription or communicate with your care team.     To access your existing account, please contact your [facility] or call [phone] for assistance.        Care EveryWhere ID     This is your Care EveryWhere ID. This could be used by other organizations to access your [place] medical records  [id number]         Blood Pressure from Last 3 Encounters:   05/31/17 120/79   05/26/17 100/64   05/24/17 122/73    Weight from Last 3 Encounters:   05/26/17 74.7 kg (164 lb 10.9 oz)   05/24/17 74.8 kg (164 lb 14.4 oz)   05/05/17 75.3 kg (165 lb 14.4 oz)              Today, you had the following     No orders found for display         Today's Medication Changes          These changes are accurate as of: 6/1/17  1:45 PM.  If you have any questions, ask your nurse or doctor.               These medicines have changed or have updated prescriptions.        Dose/Directions    aspirin 81 MG chewable tablet   This may have changed:  when to take this   Used  for:  Coronary artery disease with angina pectoris, unspecified vessel or lesion type, unspecified whether native or transplanted heart (H)        Dose:  81 mg   Take 1 tablet (81 mg) by mouth daily   Quantity:  30 tablet   Refills:  0       atorvastatin 40 MG tablet   Commonly known as:  LIPITOR   This may have changed:  when to take this   Used for:  Coronary artery disease with angina pectoris, unspecified vessel or lesion type, unspecified whether native or transplanted heart (H)        Dose:  40 mg   Take 1 tablet (40 mg) by mouth daily   Quantity:  90 tablet   Refills:  1       ferrous sulfate 325 (65 FE) MG tablet   Commonly known as:  IRON SUPPLEMENT   This may have changed:  when to take this   Used for:  S/P CABG (coronary artery bypass graft)        Dose:  325 mg   Take 1 tablet (325 mg) by mouth daily (with breakfast)   Quantity:  100 tablet   Refills:  1                Primary Care Provider Office Phone # Fax #    Humberto Conner [phone]       [facility] [address] [place] [address]        Thank you!     Thank you for choosing [facility]  for your care. Our goal is always to provide you with excellent care. Hearing back from our patients is one way we can continue to improve our services. Please take a few minutes to complete the written survey that you may receive in the mail after your visit with us. Thank you!             Your Updated Medication List - Protect others around you: Learn how to safely use, store and throw away your medicines at www.disposemymeds.org.          This list is accurate as of: 6/1/17  1:45 PM.  Always use your most recent med list.                   Brand Name Dispense Instructions for use    acetaminophen 325 MG tablet    TYLENOL    100 tablet    Take 3 tablets (975 mg) by mouth every 6 hours as needed for mild pain       aspirin 81 MG chewable tablet     30 tablet    Take 1 tablet (81 mg) by mouth daily       atorvastatin  40 MG tablet    LIPITOR    90 tablet    Take 1 tablet (40 mg) by mouth daily       blood glucose monitoring meter device kit          ferrous sulfate 325 (65 FE) MG tablet    IRON SUPPLEMENT    100 tablet    Take 1 tablet (325 mg) by mouth daily (with breakfast)       fondaparinux 7.5MG/0.6ML injection    ARIXTRA    10 Syringe    Inject 0.6 mLs (7.5 mg) Subcutaneous every morning *Do not take Saturday, May 27, 2017 *Do not take Adolfo, May 28, 2017 *May resuming taking on Monday, May 29, 2017       furosemide 40 MG tablet    LASIX    180 tablet    Take 1 tablet (40 mg) by mouth 2 times daily       gabapentin 100 MG capsule    NEURONTIN    180 capsule    Take 1 capsule (100 mg) by mouth 2 times daily       HYDROcodone-acetaminophen 5-325 MG per tablet    NORCO    72 tablet    Take 1-2 tablets by mouth every 4 hours as needed for moderate to severe pain maximum 12 tablet(s) per day. Do not take more than 4, 000 mg of Tylenol (Acetaminophen) per day. Use caution if taking extra Tylenol (Acetaminophen)       ONE TOUCH ULTRA test strip   Generic drug:  blood glucose monitoring     100 each    USE AS DIRECTED TO TEST ONE TIME A DAY       ONETOUCH DELICA LANCETS 33G Misc     100 each    1 Device daily       potassium chloride SA 10 MEQ CR tablet    K-DUR/KLOR-CON M    180 tablet    Take 1 tablet (10 mEq) by mouth 2 times daily       traZODone 50 MG tablet    DESYREL    45 tablet    Take 0.5 tablets (25 mg) by mouth nightly as needed for sleep       venlafaxine 150 MG Tb24 24 hr tablet    EFFEXOR-ER    90 each    Take 1 tablet (150 mg) by mouth daily (with breakfast)

## 2017-06-02 ENCOUNTER — OFFICE VISIT (OUTPATIENT)
Dept: VASCULAR SURGERY | Facility: CLINIC | Age: 77
End: 2017-06-02

## 2017-06-02 VITALS
HEART RATE: 64 BPM | DIASTOLIC BLOOD PRESSURE: 57 MMHG | RESPIRATION RATE: 19 BRPM | OXYGEN SATURATION: 92 % | SYSTOLIC BLOOD PRESSURE: 116 MMHG

## 2017-06-02 DIAGNOSIS — I96 GANGRENE OF TOE (H): Primary | ICD-10-CM

## 2017-06-02 ASSESSMENT — PAIN SCALES - GENERAL: PAINLEVEL: NO PAIN (0)

## 2017-06-02 NOTE — PROGRESS NOTES
VASCULAR SURGERY CLINIC ESTABLISHED PATIENT NOTE    HPI:  Mrs. Carlos Alberto Fenton is a 76- year old female who presents for right foot dressing change status post Right Great Toe Amputation, Debridement of Toes 2 and 3, and application of Grafix with Dr. Santamaria on 5/26/2017.     SUBJECTIVE: She endorses no pain and has not required Norco. She states she saw Dr. Torres yesterday for left foot wound care. She specifically denies headache, dizziness, chest pain, and shortness of breath.     OBJECTIVE:  Vital signs:  116/57  64  19    Prior to Admission medications    Medication Sig Start Date End Date Taking? Authorizing Provider   acetaminophen (TYLENOL) 325 MG tablet Take 3 tablets (975 mg) by mouth every 6 hours as needed for mild pain 5/26/17   Rosalinda Ron APRN CNP   fondaparinux (ARIXTRA) 7.5MG/0.6ML injection Inject 0.6 mLs (7.5 mg) Subcutaneous every morning *Do not take Saturday, May 27, 2017  *Do not take Adolfo, May 28, 2017  *May resuming taking on Monday, May 29, 2017 5/26/17   Rosalinda Ron APRN CNP   HYDROcodone-acetaminophen (NORCO) 5-325 MG per tablet Take 1-2 tablets by mouth every 4 hours as needed for moderate to severe pain maximum 12 tablet(s) per day. Do not take more than 4, 000 mg of Tylenol (Acetaminophen) per day. Use caution if taking extra Tylenol (Acetaminophen) 5/26/17   Rosalinda Ron APRN CNP   ONE TOUCH ULTRA test strip USE AS DIRECTED TO TEST ONE TIME A DAY 4/11/17   Nuha Bateman MD   atorvastatin (LIPITOR) 40 MG tablet Take 1 tablet (40 mg) by mouth daily  Patient taking differently: Take 40 mg by mouth every morning  3/31/17   Humberto Conner MD   furosemide (LASIX) 40 MG tablet Take 1 tablet (40 mg) by mouth 2 times daily 3/31/17   Humberto Conner MD   gabapentin (NEURONTIN) 100 MG capsule Take 1 capsule (100 mg) by mouth 2 times daily 3/31/17   Humberto Conner MD   traZODone (DESYREL) 50 MG tablet Take 0.5 tablets (25 mg) by mouth nightly as needed for sleep  3/31/17   Humberto Conner MD   venlafaxine (EFFEXOR-ER) 150 MG TB24 24 hr tablet Take 1 tablet (150 mg) by mouth daily (with breakfast) 3/31/17   Humberto Conner MD   potassium chloride SA (K-DUR/KLOR-CON M) 10 MEQ CR tablet Take 1 tablet (10 mEq) by mouth 2 times daily 3/31/17   Humberto Conner MD   aspirin 81 MG chewable tablet Take 1 tablet (81 mg) by mouth daily  Patient taking differently: Take 81 mg by mouth every morning  3/2/17   Britt Whelan MD   ferrous sulfate (IRON SUPPLEMENT) 325 (65 FE) MG tablet Take 1 tablet (325 mg) by mouth daily (with breakfast)  Patient taking differently: Take 325 mg by mouth 2 times daily  2/17/17   Jennifer Staley APRN CNP   ONETOUCH DELICA LANCETS 33G MISC 1 Device daily 1/16/17   Nuha Bateman MD   blood glucose monitoring (ONE TOUCH ULTRA 2) meter device kit  11/9/16   Reported, Patient       PHYSICAL EXAM:  NEURO/PSYCH: The patient is alert and oriented.  Appropriate.  Moves all extremities.   SKIN: Color appropriate for race, warm, dry.  PULMONARY: Does not require supplemental oxygen; no acute distress.     EXTREMITIES: Warm and well-perfused, no obvious signs of infection. Right foot dressing taken down; Grafix left in-place. Re-dressed with 4x4 gauze, Kerlix, and ACE. Left foot re-dressed with Betadine, Kerlix, and ACE. 4x4 gauze placed gently between all toes to prevent pressure.     ASSESSMENT/PLAN:  #1 Dry gangrene, right foot great and second toe, secondary to microembolization or Heparin- Induce Thrombocytopenia  #2 Status post Right Great Toe Amputation, Debridement of Toes 2 and 3, and application of Grafix with Dr. Santamaria on 5/26/2017  - dressing re-applied; Meena will wash on on 6/6/2017 at 11: 00 AM  - continue non-weight bearing  - left foot wound care per Dr. Torres  - I have suggested Lamb's Wool between toes to prevent pressure    ANTI-PLATELET: Aspirin  ANTI-COAGULANT: Arixtra  STATIN: Atorvastatin  DIABETES MEDICATIONS:  Not-diabetic  TOBACCO CESSATION: Non-smoker    Please contact Dr. Santamaria's Vascular Surgery Service with questions or concerns.    CHACORTA Lewis, CNP  Division of Vascular Surgery  [facility]  Pager: [phone]

## 2017-06-02 NOTE — NURSING NOTE
Chief Complaint   Patient presents with     RECHECK     Follow up feet wounds       Vitals:    06/02/17 1322   BP: 116/57   BP Location: Right arm   Pulse: 64   Resp: 19   SpO2: 92%       There is no height or weight on file to calculate BMI.              Jeannie Baker LPN

## 2017-06-02 NOTE — MR AVS SNAPSHOT
After Visit Summary   6/2/2017    Carlos Alberto Fenton    MRN: [id number]           Patient Information     Date Of Birth          1940        Visit Information        Provider Department      6/2/2017 1:30 PM Rosalinda Ron APRN CNP [facility]        Today's Diagnoses     Gangrene of toe (H)    -  1       Follow-ups after your visit        Your next 10 appointments already scheduled     Obed 15, 2017  8:00 AM CDT   (Arrive by 7:45 AM)   Return Visit with Easton Torres DPM   [facility] Wound [facility] ([facility])    [address] [place] [address]  [address] [facility] [address]   [phone]            Jul 14, 2017  1:30 PM CDT   (Arrive by 1:15 PM)   Return Visit with Star Lobato MD   [facility] ([facility])    [address] [place] [address]  3rd [facility] [id number] [phone]            Sep 11, 2017  2:00 PM CDT   (Arrive by 1:45 PM)   HOLTER MONITOR VISIT with  Cvc Monitor Tech, [facility] ([facility])    [address]  [address] [facility] [id number] [phone]            Oct 19, 2017  2:30 PM CDT   (Arrive by 2:15 PM)   RETURN ATRIAL FIBULATION VISIT with CHACORTA Joshi CNP   [facility] ([facility])    [address] [place] [address]  [address] [facility] [id number] [phone]              Who to contact     Please call your clinic at [phone] to:    Ask questions about your health    Make or cancel appointments    Discuss your medicines    Learn about your test results    Speak to your doctor   If you have compliments or concerns about an experience at your clinic, or if you wish to file a complaint, please contact [facility] Patient Relations at [phone] or email us at [email or web address]         Additional Information About Your Visit        Mia  Information     [employer] gives you secure access to your electronic health record. If you see a primary care provider, you can also send messages to your care team and make appointments. If you have questions, please call your primary care clinic.  If you do not have a primary care provider, please call [phone] and they will assist you.      [employer] is an electronic gateway that provides easy, online access to your medical records. With [employer], you can request a clinic appointment, read your test results, renew a prescription or communicate with your care team.     To access your existing account, please contact your [facility] or call [phone] for assistance.        Care EveryWhere ID     This is your Care EveryWhere ID. This could be used by other organizations to access your [place] medical records  [id number]        Your Vitals Were     Pulse Respirations Pulse Oximetry Breastfeeding?          64 19 92% No         Blood Pressure from Last 3 Encounters:   06/02/17 116/57   05/31/17 120/79   05/26/17 100/64    Weight from Last 3 Encounters:   05/26/17 74.7 kg (164 lb 10.9 oz)   05/24/17 74.8 kg (164 lb 14.4 oz)   05/05/17 75.3 kg (165 lb 14.4 oz)              Today, you had the following     No orders found for display         Today's Medication Changes          These changes are accurate as of: 6/2/17  3:59 PM.  If you have any questions, ask your nurse or doctor.               These medicines have changed or have updated prescriptions.        Dose/Directions    aspirin 81 MG chewable tablet   This may have changed:  when to take this   Used for:  Coronary artery disease with angina pectoris, unspecified vessel or lesion type, unspecified whether native or transplanted heart (H)        Dose:  81 mg   Take 1 tablet (81 mg) by mouth daily   Quantity:  30 tablet   Refills:  0       atorvastatin 40 MG tablet   Commonly known as:  LIPITOR   This may have changed:  when to  take this   Used for:  Coronary artery disease with angina pectoris, unspecified vessel or lesion type, unspecified whether native or transplanted heart (H)        Dose:  40 mg   Take 1 tablet (40 mg) by mouth daily   Quantity:  90 tablet   Refills:  1       ferrous sulfate 325 (65 FE) MG tablet   Commonly known as:  IRON SUPPLEMENT   This may have changed:  when to take this   Used for:  S/P CABG (coronary artery bypass graft)        Dose:  325 mg   Take 1 tablet (325 mg) by mouth daily (with breakfast)   Quantity:  100 tablet   Refills:  1                Primary Care Provider Office Phone # Fax #    Humberto Conner [phone]       [facility] [address] [place] [address]        Thank you!     Thank you for choosing [facility] VASCULAR CLINIC  for your care. Our goal is always to provide you with excellent care. Hearing back from our patients is one way we can continue to improve our services. Please take a few minutes to complete the written survey that you may receive in the mail after your visit with us. Thank you!             Your Updated Medication List - Protect others around you: Learn how to safely use, store and throw away your medicines at www.disposemymeds.org.          This list is accurate as of: 6/2/17  3:59 PM.  Always use your most recent med list.                   Brand Name Dispense Instructions for use    acetaminophen 325 MG tablet    TYLENOL    100 tablet    Take 3 tablets (975 mg) by mouth every 6 hours as needed for mild pain       aspirin 81 MG chewable tablet     30 tablet    Take 1 tablet (81 mg) by mouth daily       atorvastatin 40 MG tablet    LIPITOR    90 tablet    Take 1 tablet (40 mg) by mouth daily       blood glucose monitoring meter device kit          ferrous sulfate 325 (65 FE) MG tablet    IRON SUPPLEMENT    100 tablet    Take 1 tablet (325 mg) by mouth daily (with breakfast)       fondaparinux 7.5MG/0.6ML injection    ARIXTRA    10  Syringe    Inject 0.6 mLs (7.5 mg) Subcutaneous every morning *Do not take Saturday, May 27, 2017 *Do not take Adolfo, May 28, 2017 *May resuming taking on Monday, May 29, 2017       furosemide 40 MG tablet    LASIX    180 tablet    Take 1 tablet (40 mg) by mouth 2 times daily       gabapentin 100 MG capsule    NEURONTIN    180 capsule    Take 1 capsule (100 mg) by mouth 2 times daily       HYDROcodone-acetaminophen 5-325 MG per tablet    NORCO    72 tablet    Take 1-2 tablets by mouth every 4 hours as needed for moderate to severe pain maximum 12 tablet(s) per day. Do not take more than 4, 000 mg of Tylenol (Acetaminophen) per day. Use caution if taking extra Tylenol (Acetaminophen)       ONE TOUCH ULTRA test strip   Generic drug:  blood glucose monitoring     100 each    USE AS DIRECTED TO TEST ONE TIME A DAY       ONETOUCH DELICA LANCETS 33G Misc     100 each    1 Device daily       potassium chloride SA 10 MEQ CR tablet    K-DUR/KLOR-CON M    180 tablet    Take 1 tablet (10 mEq) by mouth 2 times daily       traZODone 50 MG tablet    DESYREL    45 tablet    Take 0.5 tablets (25 mg) by mouth nightly as needed for sleep       venlafaxine 150 MG Tb24 24 hr tablet    EFFEXOR-ER    90 each    Take 1 tablet (150 mg) by mouth daily (with breakfast)

## 2017-06-02 NOTE — LETTER
6/2/2017       RE: Carlos Alberto Fenton  [address]  [facility] [address]     Dear Colleague,    Thank you for referring your patient, Carlos Alberto Fenton, to the [facility] VASCULAR CLINIC at [facility]. Please see a copy of my visit note below.    VASCULAR SURGERY CLINIC ESTABLISHED PATIENT NOTE    HPI:  Mrs. Carlos Alberto Fenton is a 76- year old female who presents for right foot dressing change status post Right Great Toe Amputation, Debridement of Toes 2 and 3, and application of Grafix with Dr. Santamaria on 5/26/2017.     SUBJECTIVE: She endorses no pain and has not required Norco. She states she saw Dr. Torres yesterday for left foot wound care. She specifically denies headache, dizziness, chest pain, and shortness of breath.     OBJECTIVE:  Vital signs:  116/57  64  19    Prior to Admission medications    Medication Sig Start Date End Date Taking? Authorizing Provider   acetaminophen (TYLENOL) 325 MG tablet Take 3 tablets (975 mg) by mouth every 6 hours as needed for mild pain 5/26/17   Rosalinda Ron APRN CNP   fondaparinux (ARIXTRA) 7.5MG/0.6ML injection Inject 0.6 mLs (7.5 mg) Subcutaneous every morning *Do not take Saturday, May 27, 2017  *Do not take Adolfo, May 28, 2017  *May resuming taking on Monday, May 29, 2017 5/26/17   Rosalinda Ron APRN CNP   HYDROcodone-acetaminophen (NORCO) 5-325 MG per tablet Take 1-2 tablets by mouth every 4 hours as needed for moderate to severe pain maximum 12 tablet(s) per day. Do not take more than 4, 000 mg of Tylenol (Acetaminophen) per day. Use caution if taking extra Tylenol (Acetaminophen) 5/26/17   Rosalinda Ron APRN CNP   ONE TOUCH ULTRA test strip USE AS DIRECTED TO TEST ONE TIME A DAY 4/11/17   Nuha Bateman MD   atorvastatin (LIPITOR) 40 MG tablet Take 1 tablet (40 mg) by mouth daily  Patient taking differently: Take 40 mg by mouth every morning  3/31/17   Humberto Conner MD   furosemide (LASIX) 40 MG tablet Take 1  tablet (40 mg) by mouth 2 times daily 3/31/17   Humberto Conner MD   gabapentin (NEURONTIN) 100 MG capsule Take 1 capsule (100 mg) by mouth 2 times daily 3/31/17   Humberto Conner MD   traZODone (DESYREL) 50 MG tablet Take 0.5 tablets (25 mg) by mouth nightly as needed for sleep 3/31/17   Humberto Conner MD   venlafaxine (EFFEXOR-ER) 150 MG TB24 24 hr tablet Take 1 tablet (150 mg) by mouth daily (with breakfast) 3/31/17   Humberto Conner MD   potassium chloride SA (K-DUR/KLOR-CON M) 10 MEQ CR tablet Take 1 tablet (10 mEq) by mouth 2 times daily 3/31/17   Humberto Conner MD   aspirin 81 MG chewable tablet Take 1 tablet (81 mg) by mouth daily  Patient taking differently: Take 81 mg by mouth every morning  3/2/17   Britt Whelan MD   ferrous sulfate (IRON SUPPLEMENT) 325 (65 FE) MG tablet Take 1 tablet (325 mg) by mouth daily (with breakfast)  Patient taking differently: Take 325 mg by mouth 2 times daily  2/17/17   Jennifer Staley APRN CNP   ONETOUCH DELICA LANCETS 33G MISC 1 Device daily 1/16/17   Nuha Bateman MD   blood glucose monitoring (ONE TOUCH ULTRA 2) meter device kit  11/9/16   Reported, Patient       PHYSICAL EXAM:  NEURO/PSYCH: The patient is alert and oriented.  Appropriate.  Moves all extremities.   SKIN: Color appropriate for race, warm, dry.  PULMONARY: Does not require supplemental oxygen; no acute distress.     EXTREMITIES: Warm and well-perfused, no obvious signs of infection. Right foot dressing taken down; Grafix left in-place. Re-dressed with 4x4 gauze, Kerlix, and ACE. Left foot re-dressed with Betadine, Kerlix, and ACE. 4x4 gauze placed gently between all toes to prevent pressure.     ASSESSMENT/PLAN:  #1 Dry gangrene, right foot great and second toe, secondary to microembolization or Heparin- Induce Thrombocytopenia  #2 Status post Right Great Toe Amputation, Debridement of Toes 2 and 3, and application of Grafix with Dr. Santamaria on 5/26/2017  - dressing re-applied; Meena dunbar  wash on on 6/6/2017 at 11: 00 AM  - continue non-weight bearing  - left foot wound care per Dr. Torres  - I have suggested Lamb's Wool between toes to prevent pressure    ANTI-PLATELET: Aspirin  ANTI-COAGULANT: Arixtra  STATIN: Atorvastatin  DIABETES MEDICATIONS: Not-diabetic  TOBACCO CESSATION: Non-smoker    Please contact Dr. Santamaria's Vascular Surgery Service with questions or concerns.    Rosalinda Ron, APRN, CNP  Division of Vascular Surgery  [facility]  Pager: [phone]

## 2017-06-06 ENCOUNTER — OFFICE VISIT (OUTPATIENT)
Dept: VASCULAR SURGERY | Facility: CLINIC | Age: 77
End: 2017-06-06

## 2017-06-06 VITALS
HEART RATE: 76 BPM | SYSTOLIC BLOOD PRESSURE: 130 MMHG | DIASTOLIC BLOOD PRESSURE: 74 MMHG | RESPIRATION RATE: 16 BRPM | OXYGEN SATURATION: 100 %

## 2017-06-06 DIAGNOSIS — I96 GANGRENE OF TOE (H): Primary | ICD-10-CM

## 2017-06-06 ASSESSMENT — PAIN SCALES - GENERAL: PAINLEVEL: NO PAIN (0)

## 2017-06-06 NOTE — LETTER
6/6/2017       RE: Carlos Alberto Fenton  [address]  [facility] [address]     Dear Colleague,    Thank you for referring your patient, Carlos Alberto Fenton, to the [facility] VASCULAR CLINIC at [facility]. Please see a copy of my visit note below.    VASCULAR SURGERY CLINIC ESTABLISHED PATIENT NOTE    HPI:    Carlos Alberto Fenton is a 76 year old female who underwent Right Great Toe amputation, debriedment of 2nd and 3rd toes and application of Grafix with Dr. Santamaria on 5/26/2017.   She is wearing DH2 shoes. She is being followed by Dr. Hatfield for weekly wound cares for her left foot.  She returns to clinic today for dressing change and Grafix removal.       SUBJECTIVE:  She denies any right foot pain, fever, swelling, night sweats and headaches.  She offers no specific complaints.    OBJECTIVE:  Vital signs:  /74 (BP Location: Right arm)  Pulse 76  Resp 16  SpO2 100%  Breastfeeding? No      Prior to Admission medications    Medication Sig Start Date End Date Taking? Authorizing Provider   acetaminophen (TYLENOL) 325 MG tablet Take 3 tablets (975 mg) by mouth every 6 hours as needed for mild pain 5/26/17   Rosalinda Ron APRN CNP   fondaparinux (ARIXTRA) 7.5MG/0.6ML injection Inject 0.6 mLs (7.5 mg) Subcutaneous every morning *Do not take Saturday, May 27, 2017  *Do not take Adolfo, May 28, 2017  *May resuming taking on Monday, May 29, 2017 5/26/17   Rosalinda Ron APRN CNP   HYDROcodone-acetaminophen (NORCO) 5-325 MG per tablet Take 1-2 tablets by mouth every 4 hours as needed for moderate to severe pain maximum 12 tablet(s) per day. Do not take more than 4, 000 mg of Tylenol (Acetaminophen) per day. Use caution if taking extra Tylenol (Acetaminophen) 5/26/17   Rosalinda Ron APRN CNP   ONE TOUCH ULTRA test strip USE AS DIRECTED TO TEST ONE TIME A DAY 4/11/17   Nuha Bateman MD   atorvastatin (LIPITOR) 40 MG tablet Take 1 tablet (40 mg) by mouth daily  Patient  taking differently: Take 40 mg by mouth every morning  3/31/17   Humberto Conner MD   furosemide (LASIX) 40 MG tablet Take 1 tablet (40 mg) by mouth 2 times daily 3/31/17   Humberto Conner MD   gabapentin (NEURONTIN) 100 MG capsule Take 1 capsule (100 mg) by mouth 2 times daily 3/31/17   Humberto Conner MD   traZODone (DESYREL) 50 MG tablet Take 0.5 tablets (25 mg) by mouth nightly as needed for sleep 3/31/17   Humberto Conner MD   venlafaxine (EFFEXOR-ER) 150 MG TB24 24 hr tablet Take 1 tablet (150 mg) by mouth daily (with breakfast) 3/31/17   Humberto Conner MD   potassium chloride SA (K-DUR/KLOR-CON M) 10 MEQ CR tablet Take 1 tablet (10 mEq) by mouth 2 times daily 3/31/17   Humberto Conner MD   aspirin 81 MG chewable tablet Take 1 tablet (81 mg) by mouth daily  Patient taking differently: Take 81 mg by mouth every morning  3/2/17   Britt Whelan MD   ferrous sulfate (IRON SUPPLEMENT) 325 (65 FE) MG tablet Take 1 tablet (325 mg) by mouth daily (with breakfast)  Patient taking differently: Take 325 mg by mouth 2 times daily  2/17/17   Jennifer Staley, CHACORTA CNP   ONETOUCH DELICA LANCETS 33G MISC 1 Device daily 1/16/17   Nuha Bateamn MD   blood glucose monitoring (ONE TOUCH ULTRA 2) meter device kit  11/9/16   Reported, Patient       PHYSICAL EXAM:  NEURO/PSYCH: The patient is alert and oriented.  Appropriate.  Moves all extremities.   SKIN: Color appropriate for race, warm, dry.  PULMONARY: non-labored breathing  EXTREMITIES: right foot dressing removed, Grafix washed off with soap and water, adaptic, 4x4 fluff, Kerlix and ace wrap applied, 2x2's placed between toes        ASSESSMENT:  Patient Active Problem List   Diagnosis     History of skin cancer     Pelvic floor dysfunction     Urge incontinence of urine     Pulmonary nodule, right     Long-term (current) use of anticoagulants [Z79.01]     Adjustment disorder with depressed mood     Primary insomnia     Gangrene (H)     Toe gangrene (H)      Ulcer of left lower extremity with fat layer exposed (H)           PLAN:  - return to clinic in one week for wound check and dressing change.    - patient and her  instructed on how to perform BID dressing changes to right foot with adaptic, 4x4 fluff, kerlix and ace warp    - Dr. Torres managing left foot wounds.    Please do not hesistate to contact Dr. Santamaria's vascular surgery team with any questions.     Meena WHATLEY, CNS  Division of Vascular Surgery  [facility]  Pager [phone]

## 2017-06-06 NOTE — MR AVS SNAPSHOT
After Visit Summary   6/6/2017    Carlos Alberto Fenton    MRN: [id number]           Patient Information     Date Of Birth          1940        Visit Information        Provider Department      6/6/2017 11:00 AM Meena Cardoso APRN CNS [facility]         Follow-ups after your visit        Your next 10 appointments already scheduled     Jun 13, 2017 11:30 AM CDT   (Arrive by 11:15 AM)   Return Vascular Visit with CHACORTA Macias [facility] ([facility])    [address] [place] [address]  [address] [facility] [address]   [phone]            Obed 15, 2017  8:00 AM CDT   (Arrive by 7:45 AM)   Return Visit with Easton Torres DPM   [facility] Wound [facility] ([facility])    [address] [place] [address]  [address] [facility] [address] [phone]            Jul 14, 2017  1:30 PM CDT   (Arrive by 1:15 PM)   Return Visit with Star Lobato MD   [facility] Heart [facility] ([facility])    [address] [place] [address]   [phone]            Sep 11, 2017  2:00 PM CDT   (Arrive by 1:45 PM)   HOLTER MONITOR VISIT with  Cvc Monitor [facility] ([facility])    [address] [place] [id number] [phone]            Oct 19, 2017  2:30 PM CDT   (Arrive by 2:15 PM)   RETURN ATRIAL FIBULATION VISIT with CHACORTA Joshi [facility] ([facility])    [address] [place] [address]  [address] [facility] [address] [phone]              Who to contact     Please call your clinic at [phone] to:    Ask questions about your health    Make or cancel appointments    Discuss your medicines    Learn about your test results    Speak to your doctor   If you have compliments or concerns about an experience at your clinic, or if you wish to file a  complaint, please contact [facility] Patient Relations at [phone] or email us at [email or web address]         Additional Information About Your Visit        [employer] Information     Needly gives you secure access to your electronic health record. If you see a primary care provider, you can also send messages to your care team and make appointments. If you have questions, please call your primary care clinic.  If you do not have a primary care provider, please call [phone] and they will assist you.      Needly is an electronic gateway that provides easy, online access to your medical records. With [employer], you can request a clinic appointment, read your test results, renew a prescription or communicate with your care team.     To access your existing account, please contact your [facility] or call [phone] for assistance.        Care EveryWhere ID     This is your Care EveryWhere ID. This could be used by other organizations to access your [facility] medical records  [id number]        Your Vitals Were     Pulse Respirations Pulse Oximetry Breastfeeding?          76 16 100% No         Blood Pressure from Last 3 Encounters:   06/06/17 130/74   06/02/17 116/57   05/31/17 120/79    Weight from Last 3 Encounters:   05/26/17 164 lb 10.9 oz   05/24/17 164 lb 14.4 oz   05/05/17 165 lb 14.4 oz              Today, you had the following     No orders found for display         Today's Medication Changes          These changes are accurate as of: 6/6/17 11:39 AM.  If you have any questions, ask your nurse or doctor.               These medicines have changed or have updated prescriptions.        Dose/Directions    aspirin 81 MG chewable tablet   This may have changed:  when to take this   Used for:  Coronary artery disease with angina pectoris, unspecified vessel or lesion type, unspecified whether native or transplanted heart (H)        Dose:  81  mg   Take 1 tablet (81 mg) by mouth daily   Quantity:  30 tablet   Refills:  0       atorvastatin 40 MG tablet   Commonly known as:  LIPITOR   This may have changed:  when to take this   Used for:  Coronary artery disease with angina pectoris, unspecified vessel or lesion type, unspecified whether native or transplanted heart (H)        Dose:  40 mg   Take 1 tablet (40 mg) by mouth daily   Quantity:  90 tablet   Refills:  1       ferrous sulfate 325 (65 FE) MG tablet   Commonly known as:  IRON SUPPLEMENT   This may have changed:  when to take this   Used for:  S/P CABG (coronary artery bypass graft)        Dose:  325 mg   Take 1 tablet (325 mg) by mouth daily (with breakfast)   Quantity:  100 tablet   Refills:  1                Primary Care Provider Office Phone # Fax #    Humberto Conner [phone]       [facility] [address] [place] [address]        Thank you!     Thank you for choosing [facility] VASCULAR CLINIC  for your care. Our goal is always to provide you with excellent care. Hearing back from our patients is one way we can continue to improve our services. Please take a few minutes to complete the written survey that you may receive in the mail after your visit with us. Thank you!             Your Updated Medication List - Protect others around you: Learn how to safely use, store and throw away your medicines at www.disposemymeds.org.          This list is accurate as of: 6/6/17 11:39 AM.  Always use your most recent med list.                   Brand Name Dispense Instructions for use    acetaminophen 325 MG tablet    TYLENOL    100 tablet    Take 3 tablets (975 mg) by mouth every 6 hours as needed for mild pain       aspirin 81 MG chewable tablet     30 tablet    Take 1 tablet (81 mg) by mouth daily       atorvastatin 40 MG tablet    LIPITOR    90 tablet    Take 1 tablet (40 mg) by mouth daily       blood glucose monitoring meter device kit          ferrous sulfate  325 (65 FE) MG tablet    IRON SUPPLEMENT    100 tablet    Take 1 tablet (325 mg) by mouth daily (with breakfast)       fondaparinux 7.5MG/0.6ML injection    ARIXTRA    10 Syringe    Inject 0.6 mLs (7.5 mg) Subcutaneous every morning *Do not take Saturday, May 27, 2017 *Do not take Adolfo, May 28, 2017 *May resuming taking on Monday, May 29, 2017       furosemide 40 MG tablet    LASIX    180 tablet    Take 1 tablet (40 mg) by mouth 2 times daily       gabapentin 100 MG capsule    NEURONTIN    180 capsule    Take 1 capsule (100 mg) by mouth 2 times daily       HYDROcodone-acetaminophen 5-325 MG per tablet    NORCO    72 tablet    Take 1-2 tablets by mouth every 4 hours as needed for moderate to severe pain maximum 12 tablet(s) per day. Do not take more than 4, 000 mg of Tylenol (Acetaminophen) per day. Use caution if taking extra Tylenol (Acetaminophen)       ONE TOUCH ULTRA test strip   Generic drug:  blood glucose monitoring     100 each    USE AS DIRECTED TO TEST ONE TIME A DAY       ONETOUCH DELICA LANCETS 33G Misc     100 each    1 Device daily       potassium chloride SA 10 MEQ CR tablet    K-DUR/KLOR-CON M    180 tablet    Take 1 tablet (10 mEq) by mouth 2 times daily       traZODone 50 MG tablet    DESYREL    45 tablet    Take 0.5 tablets (25 mg) by mouth nightly as needed for sleep       venlafaxine 150 MG Tb24 24 hr tablet    EFFEXOR-ER    90 each    Take 1 tablet (150 mg) by mouth daily (with breakfast)

## 2017-06-06 NOTE — PROGRESS NOTES
VASCULAR SURGERY CLINIC ESTABLISHED PATIENT NOTE    HPI:    Carlos Alberto Fenton is a 76 year old female who underwent Right Great Toe amputation, debriedment of 2nd and 3rd toes and application of Grafix with Dr. Santamaria on 5/26/2017.   She is wearing DH2 shoes. She is being followed by Dr. Hatfield for weekly wound cares for her left foot.  She returns to clinic today for dressing change and Grafix removal.       SUBJECTIVE:  She denies any right foot pain, fever, swelling, night sweats and headaches.  She offers no specific complaints.    OBJECTIVE:  Vital signs:  /74 (BP Location: Right arm)  Pulse 76  Resp 16  SpO2 100%  Breastfeeding? No      Prior to Admission medications    Medication Sig Start Date End Date Taking? Authorizing Provider   acetaminophen (TYLENOL) 325 MG tablet Take 3 tablets (975 mg) by mouth every 6 hours as needed for mild pain 5/26/17   Rosalinda Ron APRN CNP   fondaparinux (ARIXTRA) 7.5MG/0.6ML injection Inject 0.6 mLs (7.5 mg) Subcutaneous every morning *Do not take Saturday, May 27, 2017  *Do not take Adolfo, May 28, 2017  *May resuming taking on Monday, May 29, 2017 5/26/17   Rosalinda Ron APRN CNP   HYDROcodone-acetaminophen (NORCO) 5-325 MG per tablet Take 1-2 tablets by mouth every 4 hours as needed for moderate to severe pain maximum 12 tablet(s) per day. Do not take more than 4, 000 mg of Tylenol (Acetaminophen) per day. Use caution if taking extra Tylenol (Acetaminophen) 5/26/17   Rosalinda Ron APRN CNP   ONE TOUCH ULTRA test strip USE AS DIRECTED TO TEST ONE TIME A DAY 4/11/17   Nuha Bateman MD   atorvastatin (LIPITOR) 40 MG tablet Take 1 tablet (40 mg) by mouth daily  Patient taking differently: Take 40 mg by mouth every morning  3/31/17   Humberto Conner MD   furosemide (LASIX) 40 MG tablet Take 1 tablet (40 mg) by mouth 2 times daily 3/31/17   Humberto Conner MD   gabapentin (NEURONTIN) 100 MG capsule Take 1 capsule (100 mg) by mouth 2 times daily  3/31/17   Humberto Conner MD   traZODone (DESYREL) 50 MG tablet Take 0.5 tablets (25 mg) by mouth nightly as needed for sleep 3/31/17   Humberto Conner MD   venlafaxine (EFFEXOR-ER) 150 MG TB24 24 hr tablet Take 1 tablet (150 mg) by mouth daily (with breakfast) 3/31/17   Humberto Conner MD   potassium chloride SA (K-DUR/KLOR-CON M) 10 MEQ CR tablet Take 1 tablet (10 mEq) by mouth 2 times daily 3/31/17   Humberto Conner MD   aspirin 81 MG chewable tablet Take 1 tablet (81 mg) by mouth daily  Patient taking differently: Take 81 mg by mouth every morning  3/2/17   Britt Whelan MD   ferrous sulfate (IRON SUPPLEMENT) 325 (65 FE) MG tablet Take 1 tablet (325 mg) by mouth daily (with breakfast)  Patient taking differently: Take 325 mg by mouth 2 times daily  2/17/17   Jennifer Staley APRN CNP   ONETOUCH DELICA LANCETS 33G MISC 1 Device daily 1/16/17   Nuha Bateman MD   blood glucose monitoring (ONE TOUCH ULTRA 2) meter device kit  11/9/16   Reported, Patient       PHYSICAL EXAM:  NEURO/PSYCH: The patient is alert and oriented.  Appropriate.  Moves all extremities.   SKIN: Color appropriate for race, warm, dry.  PULMONARY: non-labored breathing  EXTREMITIES: right foot dressing removed, Grafix washed off with soap and water, adaptic, 4x4 fluff, Kerlix and ace wrap applied, 2x2's placed between toes        ASSESSMENT:  Patient Active Problem List   Diagnosis     History of skin cancer     Pelvic floor dysfunction     Urge incontinence of urine     Pulmonary nodule, right     Long-term (current) use of anticoagulants [Z79.01]     Adjustment disorder with depressed mood     Primary insomnia     Gangrene (H)     Toe gangrene (H)     Ulcer of left lower extremity with fat layer exposed (H)           PLAN:  - return to clinic in one week for wound check and dressing change.    - patient and her  instructed on how to perform BID dressing changes to right foot with adaptic, 4x4 fluff, kerlix and ace  meghana    - Dr. Torres managing left foot wounds.    Please do not hesistate to contact Dr. Santamaria's vascular surgery team with any questions.     Meena WHATLEY, CNS  Division of Vascular Surgery  [facility]  Pager [phone]

## 2017-06-06 NOTE — NURSING NOTE
Chief Complaint   Patient presents with     RECHECK     Follow up right foot wound        Vitals:    06/06/17 1106   BP: 130/74   BP Location: Right arm   Pulse: 76   Resp: 16   SpO2: 100%       There is no height or weight on file to calculate BMI.                 Jeannie Baker LPN

## 2017-06-13 ENCOUNTER — OFFICE VISIT (OUTPATIENT)
Dept: VASCULAR SURGERY | Facility: CLINIC | Age: 77
End: 2017-06-13

## 2017-06-13 VITALS
OXYGEN SATURATION: 94 % | RESPIRATION RATE: 18 BRPM | DIASTOLIC BLOOD PRESSURE: 80 MMHG | SYSTOLIC BLOOD PRESSURE: 116 MMHG | HEART RATE: 58 BPM

## 2017-06-13 DIAGNOSIS — I96 GANGRENE OF TOE (H): Primary | ICD-10-CM

## 2017-06-13 ASSESSMENT — PAIN SCALES - GENERAL: PAINLEVEL: MILD PAIN (3)

## 2017-06-13 NOTE — NURSING NOTE
Chief Complaint   Patient presents with     RECHECK     follow up right foot        Vitals:    06/13/17 1126   BP: 116/80   BP Location: Right arm   Pulse: 58   Resp: 18   SpO2: 94%       There is no height or weight on file to calculate BMI.                 Jeannie Baker LPN

## 2017-06-13 NOTE — MR AVS SNAPSHOT
After Visit Summary   6/13/2017    Carlos Alberto Fenton    MRN: [id number]           Patient Information     Date Of Birth          1940        Visit Information        Provider Department      6/13/2017 11:30 AM Meena Cardoso APRN [facility]         Follow-ups after your visit        Your next 10 appointments already scheduled     Obed 15, 2017  8:00 AM CDT   (Arrive by 7:45 AM)   Return Visit with Easton Torres DPM   [facility] ([facility])    [address] [place] [id number] [phone]            Jun 27, 2017 11:30 AM CDT   (Arrive by 11:15 AM)   Return Vascular Visit with CHACORTA Macias   [facility] ([facility])    [address] [place] [id number] [phone]            Jun 29, 2017  4:00 PM CDT   [facility] with Gamal Tate MD   [facility] [place] Sleep [facility] ([facility])    [address] [place] [id number] [phone]           You will receive a sleep questionnaire packet in the mail from your clinic prior to the scheduled visit with your sleep specialist.  If you do not receive that packet within 10 days of the scheduled appointment time please call the clinic directly.    At your consultation visit, the provider will review your current symptoms to determine the most appropriate treatment and determine if a sleep test is appropriate.  If it is the right choice for you, it will be determined if you can have a home sleep test or an in lab test.  Prior to any of this happening, your insurance will have to be prior authorized if applicable.    ***If you have a sleep machine currently, please bring it with you to your appointment***            Jul 14, 2017  1:30 PM CDT   (Arrive by 1:15 PM)   Return Visit with Star Lobato MD   [facility]  Heart Care ([facility])    [address]  [address] [facility] [address] [phone]            Sep 11, 2017  2:00 PM CDT   (Arrive by 1:45 PM)   HOLTER MONITOR VISIT with  Cvc Monitor Tech, TH   [facility] ([facility])    [address] [address] [phone]            Oct 19, 2017  2:30 PM CDT   (Arrive by 2:15 PM)   RETURN ATRIAL FIBULATION VISIT with CHACORTA Joshi CNP   [facility] ([facility])    [address] [phone] [phone]              Who to contact     Please call your clinic at [phone] to:    Ask questions about your health    Make or cancel appointments    Discuss your medicines    Learn about your test results    Speak to your doctor   If you have compliments or concerns about an experience at your clinic, or if you wish to file a complaint, please contact [facility] Patient Relations at [phone] or email us at [email or web address]         Additional Information About Your Visit        [employer] Information     [employer] gives you secure access to your electronic health record. If you see a primary care provider, you can also send messages to your care team and make appointments. If you have questions, please call your primary care clinic.  If you do not have a primary care provider, please call [phone] and they will assist you.      [employer] is an electronic gateway that provides easy, online access to your medical records. With [employer], you can request a clinic appointment, read your test results, renew a prescription or communicate with your care team.     To access your existing account, please contact your [facility] Clinic or call [phone] for assistance.        Care EveryWhere ID     This is your Care EveryWhere ID.  This could be used by other organizations to access your [facility] medical records  [id number]        Your Vitals Were     Pulse Respirations Pulse Oximetry Breastfeeding?          58 18 94% No         Blood Pressure from Last 3 Encounters:   06/13/17 116/80   06/06/17 130/74   06/02/17 116/57    Weight from Last 3 Encounters:   05/26/17 164 lb 10.9 oz   05/24/17 164 lb 14.4 oz   05/05/17 165 lb 14.4 oz              Today, you had the following     No orders found for display         Today's Medication Changes          These changes are accurate as of: 6/13/17 11:56 AM.  If you have any questions, ask your nurse or doctor.               These medicines have changed or have updated prescriptions.        Dose/Directions    aspirin 81 MG chewable tablet   This may have changed:  when to take this   Used for:  Coronary artery disease with angina pectoris, unspecified vessel or lesion type, unspecified whether native or transplanted heart (H)        Dose:  81 mg   Take 1 tablet (81 mg) by mouth daily   Quantity:  30 tablet   Refills:  0       atorvastatin 40 MG tablet   Commonly known as:  LIPITOR   This may have changed:  when to take this   Used for:  Coronary artery disease with angina pectoris, unspecified vessel or lesion type, unspecified whether native or transplanted heart (H)        Dose:  40 mg   Take 1 tablet (40 mg) by mouth daily   Quantity:  90 tablet   Refills:  1       ferrous sulfate 325 (65 FE) MG tablet   Commonly known as:  IRON SUPPLEMENT   This may have changed:  when to take this   Used for:  S/P CABG (coronary artery bypass graft)        Dose:  325 mg   Take 1 tablet (325 mg) by mouth daily (with breakfast)   Quantity:  100 tablet   Refills:  1                Primary Care Provider Office Phone # Fax #    Humberto Conner [phone]       [facility] [address] [place] [address]        Thank you!     Thank you for choosing [facility]  for your  care. Our goal is always to provide you with excellent care. Hearing back from our patients is one way we can continue to improve our services. Please take a few minutes to complete the written survey that you may receive in the mail after your visit with us. Thank you!             Your Updated Medication List - Protect others around you: Learn how to safely use, store and throw away your medicines at www.disposemymeds.org.          This list is accurate as of: 6/13/17 11:56 AM.  Always use your most recent med list.                   Brand Name Dispense Instructions for use    acetaminophen 325 MG tablet    TYLENOL    100 tablet    Take 3 tablets (975 mg) by mouth every 6 hours as needed for mild pain       aspirin 81 MG chewable tablet     30 tablet    Take 1 tablet (81 mg) by mouth daily       atorvastatin 40 MG tablet    LIPITOR    90 tablet    Take 1 tablet (40 mg) by mouth daily       blood glucose monitoring meter device kit          ferrous sulfate 325 (65 FE) MG tablet    IRON SUPPLEMENT    100 tablet    Take 1 tablet (325 mg) by mouth daily (with breakfast)       fondaparinux 7.5MG/0.6ML injection    ARIXTRA    10 Syringe    Inject 0.6 mLs (7.5 mg) Subcutaneous every morning *Do not take Saturday, May 27, 2017 *Do not take Adolfo, May 28, 2017 *May resuming taking on Monday, May 29, 2017       furosemide 40 MG tablet    LASIX    180 tablet    Take 1 tablet (40 mg) by mouth 2 times daily       gabapentin 100 MG capsule    NEURONTIN    180 capsule    Take 1 capsule (100 mg) by mouth 2 times daily       HYDROcodone-acetaminophen 5-325 MG per tablet    NORCO    72 tablet    Take 1-2 tablets by mouth every 4 hours as needed for moderate to severe pain maximum 12 tablet(s) per day. Do not take more than 4, 000 mg of Tylenol (Acetaminophen) per day. Use caution if taking extra Tylenol (Acetaminophen)       ONE TOUCH ULTRA test strip   Generic drug:  blood glucose monitoring     100 each    USE AS DIRECTED TO TEST  ONE TIME A DAY       ONETOUCH DELICA LANCETS 33G Misc     100 each    1 Device daily       potassium chloride SA 10 MEQ CR tablet    K-DUR/KLOR-CON M    180 tablet    Take 1 tablet (10 mEq) by mouth 2 times daily       traZODone 50 MG tablet    DESYREL    45 tablet    Take 0.5 tablets (25 mg) by mouth nightly as needed for sleep       venlafaxine 150 MG Tb24 24 hr tablet    EFFEXOR-ER    90 each    Take 1 tablet (150 mg) by mouth daily (with breakfast)

## 2017-06-13 NOTE — LETTER
6/13/2017       RE: Carlos Alberto Fenton  [address]  [place] [place] [facility] [address]     Dear Colleague,    Thank you for referring your patient, Carlos Alberto Fenton, to the [facility] at [facility]. Please see a copy of my visit note below.    VASCULAR SURGERY CLINIC ESTABLISHED PATIENT NOTE    HPI:    Carlos Alberto Fenton is a 76 year old female who underwent Right Great Toe amputation, debriedment of 2nd and 3rd toes and application of Grafix with Dr. Santamaria on 5/26/2017.   She is wearing DH2 shoes. She is being followed by Dr. Hatfield for weekly wound cares for her left foot.  She returns to clinic today for dressing change and wound check.      SUBJECTIVE:  She denies any fever, chills, night sweats, HA or pain.  She offers no specific complaints.     OBJECTIVE:  Vital signs:  BP: 116/80  HR: 58  RR: 18      Prior to Admission medications    Medication Sig Start Date End Date Taking? Authorizing Provider   acetaminophen (TYLENOL) 325 MG tablet Take 3 tablets (975 mg) by mouth every 6 hours as needed for mild pain 5/26/17   Rosalinda Ron APRN CNP   fondaparinux (ARIXTRA) 7.5MG/0.6ML injection Inject 0.6 mLs (7.5 mg) Subcutaneous every morning *Do not take Saturday, May 27, 2017  *Do not take Adolfo, May 28, 2017  *May resuming taking on Monday, May 29, 2017 5/26/17   Rosalinda Ron APRN CNP   HYDROcodone-acetaminophen (NORCO) 5-325 MG per tablet Take 1-2 tablets by mouth every 4 hours as needed for moderate to severe pain maximum 12 tablet(s) per day. Do not take more than 4, 000 mg of Tylenol (Acetaminophen) per day. Use caution if taking extra Tylenol (Acetaminophen) 5/26/17   Rosalinda Ron APRN CNP   ONE TOUCH ULTRA test strip USE AS DIRECTED TO TEST ONE TIME A DAY 4/11/17   Nuha Bateman MD   atorvastatin (LIPITOR) 40 MG tablet Take 1 tablet (40 mg) by mouth daily  Patient taking differently: Take 40 mg by mouth every morning  3/31/17   Humberto Conner MD    furosemide (LASIX) 40 MG tablet Take 1 tablet (40 mg) by mouth 2 times daily 3/31/17   Humberto Conner MD   gabapentin (NEURONTIN) 100 MG capsule Take 1 capsule (100 mg) by mouth 2 times daily 3/31/17   Humberto Conner MD   traZODone (DESYREL) 50 MG tablet Take 0.5 tablets (25 mg) by mouth nightly as needed for sleep 3/31/17   Humberto Conner MD   venlafaxine (EFFEXOR-ER) 150 MG TB24 24 hr tablet Take 1 tablet (150 mg) by mouth daily (with breakfast) 3/31/17   Humberto Conner MD   potassium chloride SA (K-DUR/KLOR-CON M) 10 MEQ CR tablet Take 1 tablet (10 mEq) by mouth 2 times daily 3/31/17   Humberto Conner MD   aspirin 81 MG chewable tablet Take 1 tablet (81 mg) by mouth daily  Patient taking differently: Take 81 mg by mouth every morning  3/2/17   Britt Whelan MD   ferrous sulfate (IRON SUPPLEMENT) 325 (65 FE) MG tablet Take 1 tablet (325 mg) by mouth daily (with breakfast)  Patient taking differently: Take 325 mg by mouth 2 times daily  2/17/17   Jennifer Staley APRN CNP   ONETOUCH DELICA LANCETS 33G MISC 1 Device daily 1/16/17   Nuha Bateman MD   blood glucose monitoring (ONE TOUCH ULTRA 2) meter device kit  11/9/16   Reported, Patient       PHYSICAL EXAM:  NEURO/PSYCH: The patient is alert and oriented.  Appropriate.  Moves all extremities.   SKIN: Color appropriate for race, warm, dry.  PULMONARY: non-labored breathing   EXTREMITIES: right foot dressing removed,wounds cleansed, wounds appear smaller, good granulation tissue, adaptic, 4x4 fluff, Kerlix and ace wrap applied, 2x2's placed between toes        ASSESSMENT:  Patient Active Problem List   Diagnosis     History of skin cancer     Pelvic floor dysfunction     Urge incontinence of urine     Pulmonary nodule, right     Long-term (current) use of anticoagulants [Z79.01]     Adjustment disorder with depressed mood     Primary insomnia     Gangrene (H)     Toe gangrene (H)     Ulcer of left lower extremity with fat layer exposed (H)            PLAN:  - wounds improved    - return to clinic in 2 week for wound check and dressing change.     - continue BID dressing changes to right foot with adaptic, 4x4 fluff, kerlix and ace warp     - Dr. Torres managing left foot wounds.     Please do not hesistate to contact Dr. Santamaria's vascular surgery team with any questions.     Meena WHATLEY, CNS  Division of Vascular Surgery  [facility]  Pager [phone]

## 2017-06-13 NOTE — PROGRESS NOTES
VASCULAR SURGERY CLINIC ESTABLISHED PATIENT NOTE    HPI:    Carlos Alberto Fenton is a 76 year old female who underwent Right Great Toe amputation, debriedment of 2nd and 3rd toes and application of Grafix with Dr. Santamaria on 5/26/2017.   She is wearing DH2 shoes. She is being followed by Dr. Hatfield for weekly wound cares for her left foot.  She returns to clinic today for dressing change and wound check.      SUBJECTIVE:  She denies any fever, chills, night sweats, HA or pain.  She offers no specific complaints.     OBJECTIVE:  Vital signs:  BP: 116/80  HR: 58  RR: 18      Prior to Admission medications    Medication Sig Start Date End Date Taking? Authorizing Provider   acetaminophen (TYLENOL) 325 MG tablet Take 3 tablets (975 mg) by mouth every 6 hours as needed for mild pain 5/26/17   Rosalinda Ron APRN CNP   fondaparinux (ARIXTRA) 7.5MG/0.6ML injection Inject 0.6 mLs (7.5 mg) Subcutaneous every morning *Do not take Saturday, May 27, 2017  *Do not take Adolfo, May 28, 2017  *May resuming taking on Monday, May 29, 2017 5/26/17   Rosalinda Ron APRN CNP   HYDROcodone-acetaminophen (NORCO) 5-325 MG per tablet Take 1-2 tablets by mouth every 4 hours as needed for moderate to severe pain maximum 12 tablet(s) per day. Do not take more than 4, 000 mg of Tylenol (Acetaminophen) per day. Use caution if taking extra Tylenol (Acetaminophen) 5/26/17   Rosalinda Ron APRN CNP   ONE TOUCH ULTRA test strip USE AS DIRECTED TO TEST ONE TIME A DAY 4/11/17   Nuha Bateman MD   atorvastatin (LIPITOR) 40 MG tablet Take 1 tablet (40 mg) by mouth daily  Patient taking differently: Take 40 mg by mouth every morning  3/31/17   Humberto Conner MD   furosemide (LASIX) 40 MG tablet Take 1 tablet (40 mg) by mouth 2 times daily 3/31/17   Humberto Conner MD   gabapentin (NEURONTIN) 100 MG capsule Take 1 capsule (100 mg) by mouth 2 times daily 3/31/17   Humberto Conner MD   traZODone (DESYREL) 50 MG tablet Take 0.5 tablets (25 mg) by  mouth nightly as needed for sleep 3/31/17   Humberto Conner MD   venlafaxine (EFFEXOR-ER) 150 MG TB24 24 hr tablet Take 1 tablet (150 mg) by mouth daily (with breakfast) 3/31/17   Humberto Conner MD   potassium chloride SA (K-DUR/KLOR-CON M) 10 MEQ CR tablet Take 1 tablet (10 mEq) by mouth 2 times daily 3/31/17   Humberto Conner MD   aspirin 81 MG chewable tablet Take 1 tablet (81 mg) by mouth daily  Patient taking differently: Take 81 mg by mouth every morning  3/2/17   Britt Whelan MD   ferrous sulfate (IRON SUPPLEMENT) 325 (65 FE) MG tablet Take 1 tablet (325 mg) by mouth daily (with breakfast)  Patient taking differently: Take 325 mg by mouth 2 times daily  2/17/17   Jennifer Staley APRN CNP   ONETOUCH DELICA LANCETS 33G MISC 1 Device daily 1/16/17   Nuha Bateman MD   blood glucose monitoring (ONE TOUCH ULTRA 2) meter device kit  11/9/16   Reported, Patient       PHYSICAL EXAM:  NEURO/PSYCH: The patient is alert and oriented.  Appropriate.  Moves all extremities.   SKIN: Color appropriate for race, warm, dry.  PULMONARY: non-labored breathing   EXTREMITIES: right foot dressing removed,wounds cleansed, wounds appear smaller, good granulation tissue, adaptic, 4x4 fluff, Kerlix and ace wrap applied, 2x2's placed between toes        ASSESSMENT:  Patient Active Problem List   Diagnosis     History of skin cancer     Pelvic floor dysfunction     Urge incontinence of urine     Pulmonary nodule, right     Long-term (current) use of anticoagulants [Z79.01]     Adjustment disorder with depressed mood     Primary insomnia     Gangrene (H)     Toe gangrene (H)     Ulcer of left lower extremity with fat layer exposed (H)           PLAN:  - wounds improved    - return to clinic in 2 week for wound check and dressing change.     - continue BID dressing changes to right foot with adaptic, 4x4 fluff, kerlix and ace warp     - Dr. Torres managing left foot wounds.     Please do not hesistate to contact   [facility]'s vascular surgery team with any questions.     Meena WHATLEY, CNS  Division of Vascular Surgery  [facility]  Pager [phone]

## 2017-06-14 ENCOUNTER — TELEPHONE (OUTPATIENT)
Dept: OTHER | Facility: CLINIC | Age: 77
End: 2017-06-14

## 2017-06-14 NOTE — TELEPHONE ENCOUNTER
I called and spoke with Mrs. Fenton regarding Dr. Santamaria has reviewed her right toe wounds photos and was happy with the healing process.  I also informed her that Dr. Santamaria would like to schedule her for right foot I & D with Grafix application in the next 2-3 weeks.  I advised Mrs. Fenton to continue BID dressing changes with adaptic that we discussed during her clinic visit on 6/13/17.  I informed her that Dr. Santamaria's surgery scheduler will be reaching out to her to schedule surgery.  Advised Mrs. Fenton to contact vascular surgery should any questions or concerns arise.    Meena WHATLEY, CNS  Division of Vascular Surgery  [facility]  Pager [phone]

## 2017-06-20 ENCOUNTER — MYC REFILL (OUTPATIENT)
Dept: INTERNAL MEDICINE | Facility: CLINIC | Age: 77
End: 2017-06-20

## 2017-06-20 DIAGNOSIS — G59 MONONEUROPATHY DUE TO UNDERLYING DISEASE: ICD-10-CM

## 2017-06-20 DIAGNOSIS — F43.21 ADJUSTMENT DISORDER WITH DEPRESSED MOOD: ICD-10-CM

## 2017-06-20 DIAGNOSIS — R60.0 BILATERAL EDEMA OF LOWER EXTREMITY: ICD-10-CM

## 2017-06-20 DIAGNOSIS — I25.119 CORONARY ARTERY DISEASE WITH ANGINA PECTORIS, UNSPECIFIED VESSEL OR LESION TYPE, UNSPECIFIED WHETHER NATIVE OR TRANSPLANTED HEART (H): ICD-10-CM

## 2017-06-20 DIAGNOSIS — Z95.1 S/P CABG (CORONARY ARTERY BYPASS GRAFT): ICD-10-CM

## 2017-06-20 DIAGNOSIS — F51.01 PRIMARY INSOMNIA: ICD-10-CM

## 2017-06-20 RX ORDER — TRAZODONE HYDROCHLORIDE 50 MG/1
25 TABLET, FILM COATED ORAL
Qty: 45 TABLET | Refills: 2 | Status: CANCELLED | OUTPATIENT
Start: 2017-06-20

## 2017-06-20 RX ORDER — GABAPENTIN 100 MG/1
100 CAPSULE ORAL 2 TIMES DAILY
Qty: 180 CAPSULE | Refills: 1 | Status: CANCELLED | OUTPATIENT
Start: 2017-06-20

## 2017-06-20 RX ORDER — FUROSEMIDE 40 MG
40 TABLET ORAL
Qty: 180 TABLET | Refills: 1 | Status: CANCELLED | OUTPATIENT
Start: 2017-06-20

## 2017-06-20 RX ORDER — POTASSIUM CHLORIDE 750 MG/1
10 TABLET, EXTENDED RELEASE ORAL 2 TIMES DAILY
Qty: 180 TABLET | Refills: 1 | Status: CANCELLED | OUTPATIENT
Start: 2017-06-20

## 2017-06-20 RX ORDER — ATORVASTATIN CALCIUM 40 MG/1
40 TABLET, FILM COATED ORAL DAILY
Qty: 90 TABLET | Refills: 1 | Status: CANCELLED | OUTPATIENT
Start: 2017-06-20

## 2017-06-20 RX ORDER — VENLAFAXINE HYDROCHLORIDE 150 MG/1
150 TABLET, EXTENDED RELEASE ORAL
Qty: 90 EACH | Refills: 1 | Status: CANCELLED | OUTPATIENT
Start: 2017-06-20

## 2017-06-21 NOTE — TELEPHONE ENCOUNTER
Message from MyChart:  Original authorizing provider: MD Carlos Alberto Connelly would like a refill of the following medications:  atorvastatin (LIPITOR) 40 MG tablet [Humberto Conner MD]  furosemide (LASIX) 40 MG tablet [Humberto Conner MD]  gabapentin (NEURONTIN) 100 MG capsule [Humberto Conner MD]  traZODone (DESYREL) 50 MG tablet [Humberto Conner MD]  venlafaxine (EFFEXOR-ER) 150 MG TB24 24 hr tablet [Humberto Conner MD]  potassium chloride SA (K-DUR/KLOR-CON M) 10 MEQ CR tablet [Humberto Conner MD]    Preferred pharmacy: [phone]    Comment:

## 2017-06-26 ENCOUNTER — TELEPHONE (OUTPATIENT)
Dept: VASCULAR SURGERY | Facility: CLINIC | Age: 77
End: 2017-06-26

## 2017-06-26 DIAGNOSIS — R60.0 BILATERAL EDEMA OF LOWER EXTREMITY: ICD-10-CM

## 2017-06-26 DIAGNOSIS — I25.119 CORONARY ARTERY DISEASE WITH ANGINA PECTORIS, UNSPECIFIED VESSEL OR LESION TYPE, UNSPECIFIED WHETHER NATIVE OR TRANSPLANTED HEART (H): ICD-10-CM

## 2017-06-26 DIAGNOSIS — F51.01 PRIMARY INSOMNIA: ICD-10-CM

## 2017-06-26 DIAGNOSIS — Z95.1 S/P CABG (CORONARY ARTERY BYPASS GRAFT): ICD-10-CM

## 2017-06-26 NOTE — TELEPHONE ENCOUNTER
Pantoprazole      Last Written Prescription Date: n/a  Last Fill Quantity: n/a,  # refills: n/a   Last Office Visit with [facility] or  Health prescribing provider: n/a        Atorvastatin     Last Written Prescription Date: 3/31/2017  Last Fill Quantity: 90, # refills: 1  Last Office Visit with [facility] or  Health prescribing provider: n/a       Lab Results   Component Value Date    CHOL 273 01/20/2017     Lab Results   Component Value Date    HDL 58 01/20/2017     Lab Results   Component Value Date     01/20/2017     Lab Results   Component Value Date    TRIG 236 01/20/2017     No results found for: CHOLHDLRATIO                                                 Furosemide      Last Written Prescription Date: 3/31/2017  Last Fill Quantity: 180, # refills: 1  Last Office Visit with [facility] or  Health prescribing provider: n/a       Potassium   Date Value Ref Range Status   05/26/2017 3.6 3.4 - 5.3 mmol/L Final     Creatinine   Date Value Ref Range Status   05/26/2017 0.63 0.52 - 1.04 mg/dL Final     BP Readings from Last 3 Encounters:   06/13/17 116/80   06/06/17 130/74   06/02/17 116/57           Potassium      Last Written Prescription Date: 3/31/2017  Last Fill Quantity: 180, # refills: 1  Last Office Visit with [facility] or  Health prescribing provider: n/a       Potassium   Date Value Ref Range Status   05/26/2017 3.6 3.4 - 5.3 mmol/L Final     Creatinine   Date Value Ref Range Status   05/26/2017 0.63 0.52 - 1.04 mg/dL Final     BP Readings from Last 3 Encounters:   06/13/17 116/80   06/06/17 130/74   06/02/17 116/57           Trazodone       Last Written Prescription Date: 3/31/2017  Last Fill Quantity: 45; # refills: 2  Last Office Visit with [facility] or  Health prescribing provider:  n/a        Last PHQ-9 score on record= No flowsheet data found.    Lab Results   Component Value Date    AST 36 04/17/2017     Lab Results   Component Value Date    ALT 28 04/17/2017

## 2017-06-26 NOTE — TELEPHONE ENCOUNTER
Per task, pt needs to come in for a I&D and poss Grafix application. Talked to pt and offered them 7/7 at 9:10am with a 7am check in. Pt states that to early in the morning and they are not going to come. they want something later. Explained that currently that is the latest time [facility] has. Pt asked that I cancel. Sent update to NP/Nurse

## 2017-06-27 ENCOUNTER — OFFICE VISIT (OUTPATIENT)
Dept: VASCULAR SURGERY | Facility: CLINIC | Age: 77
End: 2017-06-27

## 2017-06-27 VITALS
RESPIRATION RATE: 16 BRPM | HEART RATE: 55 BPM | OXYGEN SATURATION: 99 % | DIASTOLIC BLOOD PRESSURE: 68 MMHG | SYSTOLIC BLOOD PRESSURE: 117 MMHG

## 2017-06-27 DIAGNOSIS — I96 GANGRENE OF TOE (H): Primary | ICD-10-CM

## 2017-06-27 ASSESSMENT — PAIN SCALES - GENERAL: PAINLEVEL: MODERATE PAIN (4)

## 2017-06-27 NOTE — PROGRESS NOTES
VASCULAR SURGERY CLINIC ESTABLISHED PATIENT NOTE    HPI:    Carlos Alberto Fenton is a 76 year old female who underwent Right Great Toe amputation, debriedment of 2nd and 3rd toes and application of Grafix with Dr. Santamaria on 5/26/2017.   She is wearing DH2 shoes. She is being followed by Dr. Hatfield for weekly wound cares for her left foot.  She returns to clinic today for dressing change and wound check.      SUBJECTIVE:  She denies any fever, chills, night sweats, HA or pain.  She offers no specific complaints.     OBJECTIVE:  Vital signs:  /68 (BP Location: Right arm)  Pulse 55  Resp 16  SpO2 99%  Breastfeeding? No        Prior to Admission medications    Medication Sig Start Date End Date Taking? Authorizing Provider   acetaminophen (TYLENOL) 325 MG tablet Take 3 tablets (975 mg) by mouth every 6 hours as needed for mild pain 5/26/17   Rosalinda Ron APRN CNP   fondaparinux (ARIXTRA) 7.5MG/0.6ML injection Inject 0.6 mLs (7.5 mg) Subcutaneous every morning *Do not take Saturday, May 27, 2017  *Do not take Adolfo, May 28, 2017  *May resuming taking on Monday, May 29, 2017 5/26/17   Rosalinda Ron APRN CNP   HYDROcodone-acetaminophen (NORCO) 5-325 MG per tablet Take 1-2 tablets by mouth every 4 hours as needed for moderate to severe pain maximum 12 tablet(s) per day. Do not take more than 4, 000 mg of Tylenol (Acetaminophen) per day. Use caution if taking extra Tylenol (Acetaminophen) 5/26/17   Rosalinda Ron APRN CNP   ONE TOUCH ULTRA test strip USE AS DIRECTED TO TEST ONE TIME A DAY 4/11/17   Nuha Bateman MD   atorvastatin (LIPITOR) 40 MG tablet Take 1 tablet (40 mg) by mouth daily  Patient taking differently: Take 40 mg by mouth every morning  3/31/17   Humberto Conner MD   furosemide (LASIX) 40 MG tablet Take 1 tablet (40 mg) by mouth 2 times daily 3/31/17   Humberto Conner MD   gabapentin (NEURONTIN) 100 MG capsule Take 1 capsule (100 mg) by mouth 2 times daily 3/31/17   Humberto Conner MD    traZODone (DESYREL) 50 MG tablet Take 0.5 tablets (25 mg) by mouth nightly as needed for sleep 3/31/17   Humberto Conner MD   venlafaxine (EFFEXOR-ER) 150 MG TB24 24 hr tablet Take 1 tablet (150 mg) by mouth daily (with breakfast) 3/31/17   Humberto Conner MD   potassium chloride SA (K-DUR/KLOR-CON M) 10 MEQ CR tablet Take 1 tablet (10 mEq) by mouth 2 times daily 3/31/17   Humberto Conner MD   aspirin 81 MG chewable tablet Take 1 tablet (81 mg) by mouth daily  Patient taking differently: Take 81 mg by mouth every morning  3/2/17   Britt Whelan MD   ferrous sulfate (IRON SUPPLEMENT) 325 (65 FE) MG tablet Take 1 tablet (325 mg) by mouth daily (with breakfast)  Patient taking differently: Take 325 mg by mouth 2 times daily  2/17/17   Jennifer Staley APRN CNP   ONETOUCH DELICA LANCETS 33G MISC 1 Device daily 1/16/17   Nuha Bateman MD   blood glucose monitoring (ONE TOUCH ULTRA 2) meter device kit  11/9/16   Reported, Patient       PHYSICAL EXAM:  NEURO/PSYCH: The patient is alert and oriented.  Appropriate.  Moves all extremities.   SKIN: Color appropriate for race, warm, dry.  PULMONARY: non-labored breathing   EXTREMITIES: right foot dressing removed,wounds cleansed, wounds appear smaller, good granulation tissue, adaptic, 4x4 fluff, Kerlix and ace wrap applied, 2x2's placed between toes        ASSESSMENT:  Patient Active Problem List   Diagnosis     History of skin cancer     Pelvic floor dysfunction     Urge incontinence of urine     Pulmonary nodule, right     Long-term (current) use of anticoagulants [Z79.01]     Adjustment disorder with depressed mood     Primary insomnia     Gangrene (H)     Toe gangrene (H)     Ulcer of left lower extremity with fat layer exposed (H)           PLAN:  - wounds continue to improve    - continue BID dressing changes to right foot with adaptic, 4x4 fluff, kerlix and ace warp    - patient to get scheduled for another Grafix application with Dr. Santamaria in 2-3  weeks, surgery scheduler will contact patient to schedule.      - Dr. Torres managing left foot wounds.    - continue Arixtra, ASA, Lipitor      Please do not hesistate to contact Dr. Santamaria's vascular surgery team with any questions.     Meena WHATLEY, CNS  Division of Vascular Surgery  [facility]  Pager [phone]

## 2017-06-27 NOTE — LETTER
6/27/2017       RE: Carlos Alberto Fenton  [address]  [place] [facility] [address]     Dear Colleague,    Thank you for referring your patient, Carlos Alberto Fenton, to the [facility] VASCULAR [facility]INIC at [facility]. Please see a copy of my visit note below.    VASCULAR SURGERY CLINIC ESTABLISHED PATIENT NOTE    HPI:    Carlos Alberto Fenton is a 76 year old female who underwent Right Great Toe amputation, debriedment of 2nd and 3rd toes and application of Grafix with Dr. Santamaria on 5/26/2017.   She is wearing DH2 shoes. She is being followed by Dr. Hatfield for weekly wound cares for her left foot.  She returns to clinic today for dressing change and wound check.    SUBJECTIVE:  She denies any fever, chills, night sweats, HA or pain.  She offers no specific complaints.     OBJECTIVE:  Vital signs:  /68 (BP Location: Right arm)  Pulse 55  Resp 16  SpO2 99%  Breastfeeding? No  Prior to Admission medications    Medication Sig Start Date End Date Taking? Authorizing Provider   acetaminophen (TYLENOL) 325 MG tablet Take 3 tablets (975 mg) by mouth every 6 hours as needed for mild pain 5/26/17   Rosalinda Ron APRN CNP   fondaparinux (ARIXTRA) 7.5MG/0.6ML injection Inject 0.6 mLs (7.5 mg) Subcutaneous every morning *Do not take Saturday, May 27, 2017  *Do not take Adolfo, May 28, 2017  *May resuming taking on Monday, May 29, 2017 5/26/17   Rosalinda Ron APRN CNP   HYDROcodone-acetaminophen (NORCO) 5-325 MG per tablet Take 1-2 tablets by mouth every 4 hours as needed for moderate to severe pain maximum 12 tablet(s) per day. Do not take more than 4, 000 mg of Tylenol (Acetaminophen) per day. Use caution if taking extra Tylenol (Acetaminophen) 5/26/17   Rosalinda Ron APRN CNP   ONE TOUCH ULTRA test strip USE AS DIRECTED TO TEST ONE TIME A DAY 4/11/17   Nuha Bateman MD   atorvastatin (LIPITOR) 40 MG tablet Take 1 tablet (40 mg) by mouth daily  Patient taking differently: Take 40 mg  by mouth every morning  3/31/17   Humberto Conner MD   furosemide (LASIX) 40 MG tablet Take 1 tablet (40 mg) by mouth 2 times daily 3/31/17   Humberto Conner MD   gabapentin (NEURONTIN) 100 MG capsule Take 1 capsule (100 mg) by mouth 2 times daily 3/31/17   Humberto Conner MD   traZODone (DESYREL) 50 MG tablet Take 0.5 tablets (25 mg) by mouth nightly as needed for sleep 3/31/17   Humberto Conner MD   venlafaxine (EFFEXOR-ER) 150 MG TB24 24 hr tablet Take 1 tablet (150 mg) by mouth daily (with breakfast) 3/31/17   Humberto Conner MD   potassium chloride SA (K-DUR/KLOR-CON M) 10 MEQ CR tablet Take 1 tablet (10 mEq) by mouth 2 times daily 3/31/17   Humberto Conner MD   aspirin 81 MG chewable tablet Take 1 tablet (81 mg) by mouth daily  Patient taking differently: Take 81 mg by mouth every morning  3/2/17   Britt Whelan MD   ferrous sulfate (IRON SUPPLEMENT) 325 (65 FE) MG tablet Take 1 tablet (325 mg) by mouth daily (with breakfast)  Patient taking differently: Take 325 mg by mouth 2 times daily  2/17/17   Jennifer Staley APRN CNP   ONETOUCH DELICA LANCETS 33G MISC 1 Device daily 1/16/17   Nuha Bateman MD   blood glucose monitoring (ONE TOUCH ULTRA 2) meter device kit  11/9/16   Reported, Patient     PHYSICAL EXAM:  NEURO/PSYCH: The patient is alert and oriented.  Appropriate.  Moves all extremities.   SKIN: Color appropriate for race, warm, dry.  PULMONARY: non-labored breathing   EXTREMITIES: right foot dressing removed,wounds cleansed, wounds appear smaller, good granulation tissue, adaptic, 4x4 fluff, Kerlix and ace wrap applied, 2x2's placed between toes    ASSESSMENT:  Patient Active Problem List   Diagnosis     History of skin cancer     Pelvic floor dysfunction     Urge incontinence of urine     Pulmonary nodule, right     Long-term (current) use of anticoagulants [Z79.01]     Adjustment disorder with depressed mood     Primary insomnia     Gangrene (H)     Toe gangrene (H)     Ulcer of  left lower extremity with fat layer exposed (H)     PLAN:  - wounds continue to improve    - continue BID dressing changes to right foot with adaptic, 4x4 fluff, kerlix and ace warp    - patient to get scheduled for another Grafix application with Dr. Santamaria in 2-3 weeks, surgery scheduler will contact patient to schedule.      - Dr. Torres managing left foot wounds.    - continue Arixtra, ASA, Lipitor      Please do not hesistate to contact Dr. Santamaria's vascular surgery team with any questions.     Again, thank you for allowing me to participate in the care of your patient.      Sincerely,    CHACORTA Ruiz CNS

## 2017-06-27 NOTE — PATIENT INSTRUCTIONS
You need another Grafix application with Dr. Santamaria in the next 2-3 weeks.  Dr. Santamaria's surgery scheduler will contact you to schedule.  Continue BID dressing changes with adaptic, 4 X 4 fluff, Kerlix and ace wrap.    With questions, concerns, or to request an appointment, please call either:    Edelmira Rodríguez, Care Coordinator RN, Vascular Surgery  [phone]    Vascular Call Center  [phone]    To contact someone after 5 pm, on a weekend, or on a Holiday, please call:  [facility]  [phone], option 4 to have a member of the Vascular Surgery Service paged.

## 2017-06-27 NOTE — NURSING NOTE
Chief Complaint   Patient presents with     RECHECK     recheck wound on right foot.         Vitals:    06/27/17 1146   BP: 117/68   BP Location: Right arm   Pulse: 55   Resp: 16   SpO2: 99%       There is no height or weight on file to calculate BMI.               Jeannie Baker LPN

## 2017-06-27 NOTE — MR AVS SNAPSHOT
After Visit Summary   6/27/2017    Carlos Alberto Fenton    MRN: [id number]           Patient Information     Date Of Birth          1940        Visit Information        Provider Department      6/27/2017 11:30 AM Meena Cardoso APRN [facility]        Care Instructions    You need another Grafix application with Dr. Santamaria in the next 2-3 weeks.  Dr. Santamaria's surgery scheduler will contact you to schedule.  Continue BID dressing changes with adaptic, 4 X 4 fluff, Kerlix and ace wrap.    With questions, concerns, or to request an appointment, please call either:    Edelmira Rodríguez, Care Coordinator RN, Vascular Surgery  [phone]    Vascular Call Center  [phone]    To contact someone after 5 pm, on a weekend, or on a Holiday, please call:  [facility]  [phone], option 4 to have a member of the Vascular Surgery Service paged.          Follow-ups after your visit        Follow-up notes from your care team     Return in about 2 weeks (around 7/11/2017).      Your next 10 appointments already scheduled     Jul 06, 2017  9:00 AM CDT   (Arrive by 8:45 AM)   Return Visit with Easton Torres DPM   [facility] ([facility])    [address] [place] [id number] [phone]            Jul 14, 2017  1:30 PM CDT   (Arrive by 1:15 PM)   Return Visit with Star Lobato MD   [facility] ([facility])    [address] [place] [address] [id number] [phone]            Sep 11, 2017  2:00 PM CDT   (Arrive by 1:45 PM)   HOLTER MONITOR VISIT with  Cvc Monitor [facility] ([facility])    [address] [place] [address] [id number] [phone]            Oct 19, 2017  2:30 PM CDT   (Arrive by 2:15 PM)   RETURN ATRIAL FIBULATION VISIT with CHACORTA Joshi CNP    [facility] ([facility] [place])    [address]  3rd [facility] [id number] [phone]              Who to contact     Please call your clinic at [phone] to:    Ask questions about your health    Make or cancel appointments    Discuss your medicines    Learn about your test results    Speak to your doctor   If you have compliments or concerns about an experience at your clinic, or if you wish to file a complaint, please contact [facility] Patient Relations at [phone] or email us at [email or web address]         Additional Information About Your Visit        [employer]rol[employer] Information     [employer] gives you secure access to your electronic health record. If you see a primary care provider, you can also send messages to your care team and make appointments. If you have questions, please call your primary care clinic.  If you do not have a primary care provider, please call [phone] and they will assist you.      [employer] is an electronic gateway that provides easy, online access to your medical records. With [employer], you can request a clinic appointment, read your test results, renew a prescription or communicate with your care team.     To access your existing account, please contact your [facility] or call [phone] for assistance.        Care EveryWhere ID     This is your Care EveryWhere ID. This could be used by other organizations to access your [facility] medical records  [id number]        Your Vitals Were     Pulse Respirations Pulse Oximetry Breastfeeding?          55 16 99% No         Blood Pressure from Last 3 Encounters:   06/27/17 117/68   06/13/17 116/80   06/06/17 130/74    Weight from Last 3 Encounters:   05/26/17 164 lb 10.9 oz   05/24/17 164 lb 14.4 oz   05/05/17 165 lb 14.4 oz              Today, you had the following     No orders found for display         Today's Medication  Changes          These changes are accurate as of: 6/27/17 12:19 PM.  If you have any questions, ask your nurse or doctor.               These medicines have changed or have updated prescriptions.        Dose/Directions    aspirin 81 MG chewable tablet   This may have changed:  when to take this   Used for:  Coronary artery disease with angina pectoris, unspecified vessel or lesion type, unspecified whether native or transplanted heart (H)        Dose:  81 mg   Take 1 tablet (81 mg) by mouth daily   Quantity:  30 tablet   Refills:  0       atorvastatin 40 MG tablet   Commonly known as:  LIPITOR   This may have changed:  when to take this   Used for:  Coronary artery disease with angina pectoris, unspecified vessel or lesion type, unspecified whether native or transplanted heart (H)        Dose:  40 mg   Take 1 tablet (40 mg) by mouth daily   Quantity:  90 tablet   Refills:  1       ferrous sulfate 325 (65 FE) MG tablet   Commonly known as:  IRON SUPPLEMENT   This may have changed:  when to take this   Used for:  S/P CABG (coronary artery bypass graft)        Dose:  325 mg   Take 1 tablet (325 mg) by mouth daily (with breakfast)   Quantity:  100 tablet   Refills:  1                Primary Care Provider Office Phone # Fax #    Humberto Conner [phone]       [facility] [address] [place] [address]        Equal Access to Services     NATALIE HAYES AH: Elise wakefieldo Soedwige, waaxda luqadaha, qaybta kaalmada adeegyada, scott mattson. So [facility] [phone].    ATENCIÓN: Si habla español, tiene a espinoza disposición servicios gratuitos de asistencia lingüística. Llame al [phone].    We comply with applicable federal civil rights laws and [place] laws. We do not discriminate on the basis of race, color, national origin, age, disability sex, sexual orientation or gender identity.            Thank you!     Thank you for choosing [facility] VASCULAR  CLINIC  for your care. Our goal is always to provide you with excellent care. Hearing back from our patients is one way we can continue to improve our services. Please take a few minutes to complete the written survey that you may receive in the mail after your visit with us. Thank you!             Your Updated Medication List - Protect others around you: Learn how to safely use, store and throw away your medicines at www.disposemymeds.org.          This list is accurate as of: 6/27/17 12:19 PM.  Always use your most recent med list.                   Brand Name Dispense Instructions for use Diagnosis    acetaminophen 325 MG tablet    TYLENOL    100 tablet    Take 3 tablets (975 mg) by mouth every 6 hours as needed for mild pain    S/P CABG (coronary artery bypass graft)       aspirin 81 MG chewable tablet     30 tablet    Take 1 tablet (81 mg) by mouth daily    Coronary artery disease with angina pectoris, unspecified vessel or lesion type, unspecified whether native or transplanted heart (H)       atorvastatin 40 MG tablet    LIPITOR    90 tablet    Take 1 tablet (40 mg) by mouth daily    Coronary artery disease with angina pectoris, unspecified vessel or lesion type, unspecified whether native or transplanted heart (H)       blood glucose monitoring meter device kit           ferrous sulfate 325 (65 FE) MG tablet    IRON SUPPLEMENT    100 tablet    Take 1 tablet (325 mg) by mouth daily (with breakfast)    S/P CABG (coronary artery bypass graft)       fondaparinux 7.5MG/0.6ML injection    ARIXTRA    10 Syringe    Inject 0.6 mLs (7.5 mg) Subcutaneous every morning *Do not take Saturday, May 27, 2017 *Do not take Adolfo, May 28, 2017 *May resuming taking on Monday, May 29, 2017    Long-term (current) use of anticoagulants       furosemide 40 MG tablet    LASIX    180 tablet    Take 1 tablet (40 mg) by mouth 2 times daily    Bilateral edema of lower extremity       gabapentin 100 MG capsule    NEURONTIN    180  capsule    Take 1 capsule (100 mg) by mouth 2 times daily    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       HYDROcodone-acetaminophen 5-325 MG per tablet    NORCO    72 tablet    Take 1-2 tablets by mouth every 4 hours as needed for moderate to severe pain maximum 12 tablet(s) per day. Do not take more than 4, 000 mg of Tylenol (Acetaminophen) per day. Use caution if taking extra Tylenol (Acetaminophen)    Ulcer of left lower extremity with fat layer exposed (H)       ONE TOUCH ULTRA test strip   Generic drug:  blood glucose monitoring     100 each    USE AS DIRECTED TO TEST ONE TIME A DAY    Type 2 diabetes mellitus without complication, without long-term current use of insulin (H)       ONETOUCH DELICA LANCETS 33G Misc     100 each    1 Device daily    Type 2 diabetes mellitus without complication, without long-term current use of insulin (H)       potassium chloride SA 10 MEQ CR tablet    K-DUR/KLOR-CON M    180 tablet    Take 1 tablet (10 mEq) by mouth 2 times daily    S/P CABG (coronary artery bypass graft)       traZODone 50 MG tablet    DESYREL    45 tablet    Take 0.5 tablets (25 mg) by mouth nightly as needed for sleep    Primary insomnia       venlafaxine 150 MG Tb24 24 hr tablet    EFFEXOR-ER    90 each    Take 1 tablet (150 mg) by mouth daily (with breakfast)    Adjustment disorder with depressed mood

## 2017-06-28 RX ORDER — POTASSIUM CHLORIDE 750 MG/1
10 TABLET, EXTENDED RELEASE ORAL 2 TIMES DAILY
Qty: 180 TABLET | Refills: 1 | Status: SHIPPED | OUTPATIENT
Start: 2017-06-28 | End: 2017-09-25

## 2017-06-28 RX ORDER — TRAZODONE HYDROCHLORIDE 50 MG/1
25 TABLET, FILM COATED ORAL
Qty: 45 TABLET | Refills: 2 | Status: SHIPPED | OUTPATIENT
Start: 2017-06-28 | End: 2017-08-19

## 2017-06-28 RX ORDER — FUROSEMIDE 40 MG
40 TABLET ORAL
Qty: 180 TABLET | Refills: 1 | Status: SHIPPED | OUTPATIENT
Start: 2017-06-28 | End: 2017-09-20

## 2017-06-28 RX ORDER — ATORVASTATIN CALCIUM 40 MG/1
40 TABLET, FILM COATED ORAL DAILY
Qty: 90 TABLET | Refills: 1 | Status: SHIPPED | OUTPATIENT
Start: 2017-06-28 | End: 2017-10-03

## 2017-06-30 ENCOUNTER — TELEPHONE (OUTPATIENT)
Dept: FAMILY MEDICINE | Facility: CLINIC | Age: 77
End: 2017-06-30

## 2017-06-30 DIAGNOSIS — I96 GANGRENE (H): ICD-10-CM

## 2017-06-30 DIAGNOSIS — L97.922 ULCER OF LEFT LOWER EXTREMITY WITH FAT LAYER EXPOSED (H): Primary | ICD-10-CM

## 2017-06-30 NOTE — TELEPHONE ENCOUNTER
Pt had home care through [facility] but patient had it discontinued because she had to live in hospitals temporally but now she is back in [place] and needs restart services.

## 2017-06-30 NOTE — TELEPHONE ENCOUNTER
Reason for Call: Request for an order or referral:    Order or referral being requested: Home Care    Date needed: at your convenience    Has the patient been seen by the PCP for this problem? YES    Additional comments: Patient states she needs an order for Home care services through [facility]    Phone number Patient can be reached at:  Cell number on file:    Telephone Information:   Mobile [phone]       Best Time:  Any    Can we leave a detailed message on this number?  YES    Call taken on 6/30/2017 at 3:36 PM by Soumya Benson

## 2017-07-06 ENCOUNTER — TELEPHONE (OUTPATIENT)
Dept: INTERNAL MEDICINE | Facility: CLINIC | Age: 77
End: 2017-07-06

## 2017-07-06 ENCOUNTER — ANESTHESIA EVENT (OUTPATIENT)
Dept: SURGERY | Facility: CLINIC | Age: 77
End: 2017-07-06
Payer: MEDICARE

## 2017-07-06 ENCOUNTER — TELEPHONE (OUTPATIENT)
Dept: VASCULAR SURGERY | Facility: CLINIC | Age: 77
End: 2017-07-06

## 2017-07-06 NOTE — TELEPHONE ENCOUNTER
Pt needs to be scheduled for Surgery and would prefer something later in the day. Hina as a opening tomorrow at 120pm with a 1120am check. LM offered surgery and asked pt to call back to confirm if date/time works. Will try again later today

## 2017-07-06 NOTE — TELEPHONE ENCOUNTER
Talked with Lindsay about bring Asenat in for surgery for 7/7 at 120pm with a 1120am check in . She was ok with that. Will call if anything changes

## 2017-07-06 NOTE — TELEPHONE ENCOUNTER
Reason for Call: Request for an order or referral:    Order or referral being requested: delay in start of care-something happened where home care didn't get the referral for some reason right away and they will be sending someone out to start home care on Saturday     Date needed: at your convenience    Has the patient been seen by the PCP for this problem? YES    Additional comments:     Phone number Patient can be reached at:  Home number on file [phone] (home)    Best Time:  any    Can we leave a detailed message on this number?  YES    Call taken on 7/6/2017 at 3:34 PM by Ghada Schulz

## 2017-07-07 ENCOUNTER — ANESTHESIA (OUTPATIENT)
Dept: SURGERY | Facility: CLINIC | Age: 77
End: 2017-07-07
Payer: MEDICARE

## 2017-07-07 ENCOUNTER — HOSPITAL ENCOUNTER (OUTPATIENT)
Facility: CLINIC | Age: 77
Discharge: HOME | End: 2017-07-07
Attending: SURGERY | Admitting: SURGERY
Payer: MEDICARE

## 2017-07-07 VITALS
SYSTOLIC BLOOD PRESSURE: 116 MMHG | DIASTOLIC BLOOD PRESSURE: 74 MMHG | TEMPERATURE: 97.4 F | RESPIRATION RATE: 16 BRPM | OXYGEN SATURATION: 93 % | WEIGHT: 167.55 LBS | BODY MASS INDEX: 30.83 KG/M2 | HEIGHT: 62 IN

## 2017-07-07 DIAGNOSIS — I96 TOE GANGRENE (H): Primary | ICD-10-CM

## 2017-07-07 LAB
BASOPHILS # BLD AUTO: 0.1 10E9/L (ref 0–0.2)
BASOPHILS NFR BLD AUTO: 0.9 %
CREAT SERPL-MCNC: 0.74 MG/DL (ref 0.52–1.04)
DIFFERENTIAL METHOD BLD: ABNORMAL
EOSINOPHIL # BLD AUTO: 0.2 10E9/L (ref 0–0.7)
EOSINOPHIL NFR BLD AUTO: 2.7 %
ERYTHROCYTE [DISTWIDTH] IN BLOOD BY AUTOMATED COUNT: 19.9 % (ref 10–15)
GFR SERPL CREATININE-BSD FRML MDRD: 76 ML/MIN/1.7M2
GLUCOSE BLDC GLUCOMTR-MCNC: 67 MG/DL (ref 70–99)
GLUCOSE BLDC GLUCOMTR-MCNC: 72 MG/DL (ref 70–99)
GLUCOSE BLDC GLUCOMTR-MCNC: 75 MG/DL (ref 70–99)
GLUCOSE BLDC GLUCOMTR-MCNC: 86 MG/DL (ref 70–99)
HCT VFR BLD AUTO: 37.9 % (ref 35–47)
HGB BLD-MCNC: 11.7 G/DL (ref 11.7–15.7)
IMM GRANULOCYTES # BLD: 0 10E9/L (ref 0–0.4)
IMM GRANULOCYTES NFR BLD: 0.3 %
INR PPP: 1.52 (ref 0.86–1.14)
INR PPP: NORMAL (ref 0.86–1.14)
LYMPHOCYTES # BLD AUTO: 1.3 10E9/L (ref 0.8–5.3)
LYMPHOCYTES NFR BLD AUTO: 21.4 %
MCH RBC QN AUTO: 30.3 PG (ref 26.5–33)
MCHC RBC AUTO-ENTMCNC: 30.9 G/DL (ref 31.5–36.5)
MCV RBC AUTO: 98 FL (ref 78–100)
MONOCYTES # BLD AUTO: 0.9 10E9/L (ref 0–1.3)
MONOCYTES NFR BLD AUTO: 15.3 %
NEUTROPHILS # BLD AUTO: 3.5 10E9/L (ref 1.6–8.3)
NEUTROPHILS NFR BLD AUTO: 59.4 %
NRBC # BLD AUTO: 0 10*3/UL
NRBC BLD AUTO-RTO: 0 /100
PLATELET # BLD AUTO: 203 10E9/L (ref 150–450)
POTASSIUM SERPL-SCNC: 4 MMOL/L (ref 3.4–5.3)
RBC # BLD AUTO: 3.86 10E12/L (ref 3.8–5.2)
WBC # BLD AUTO: 5.9 10E9/L (ref 4–11)

## 2017-07-07 PROCEDURE — S0020 INJECTION, BUPIVICAINE HYDRO: HCPCS | Performed by: ANESTHESIOLOGY

## 2017-07-07 PROCEDURE — 25000125 ZZHC RX 250: Performed by: ANESTHESIOLOGY

## 2017-07-07 PROCEDURE — 27210794 ZZH OR GENERAL SUPPLY STERILE: Performed by: SURGERY

## 2017-07-07 PROCEDURE — 85025 COMPLETE CBC W/AUTO DIFF WBC: CPT | Performed by: CLINICAL NURSE SPECIALIST

## 2017-07-07 PROCEDURE — 82962 GLUCOSE BLOOD TEST: CPT

## 2017-07-07 PROCEDURE — 71000027 ZZH RECOVERY PHASE 2 EACH 15 MINS: Performed by: SURGERY

## 2017-07-07 PROCEDURE — 93010 ELECTROCARDIOGRAM REPORT: CPT | Performed by: INTERNAL MEDICINE

## 2017-07-07 PROCEDURE — 36000068 ZZH SURGERY LEVEL 5 1ST 30 MIN - UMMC: Performed by: SURGERY

## 2017-07-07 PROCEDURE — 25000125 ZZHC RX 250: Performed by: NURSE ANESTHETIST, CERTIFIED REGISTERED

## 2017-07-07 PROCEDURE — 25000128 H RX IP 250 OP 636: Performed by: NURSE ANESTHETIST, CERTIFIED REGISTERED

## 2017-07-07 PROCEDURE — 40000065 ZZH STATISTIC EKG NON-CHARGEABLE

## 2017-07-07 PROCEDURE — 93005 ELECTROCARDIOGRAM TRACING: CPT

## 2017-07-07 PROCEDURE — 84132 ASSAY OF SERUM POTASSIUM: CPT | Performed by: CLINICAL NURSE SPECIALIST

## 2017-07-07 PROCEDURE — 36415 COLL VENOUS BLD VENIPUNCTURE: CPT | Performed by: ANESTHESIOLOGY

## 2017-07-07 PROCEDURE — 25000128 H RX IP 250 OP 636: Performed by: CLINICAL NURSE SPECIALIST

## 2017-07-07 PROCEDURE — 25000128 H RX IP 250 OP 636: Performed by: ANESTHESIOLOGY

## 2017-07-07 PROCEDURE — 85610 PROTHROMBIN TIME: CPT | Performed by: ANESTHESIOLOGY

## 2017-07-07 PROCEDURE — 36000070 ZZH SURGERY LEVEL 5 EA 15 ADDTL MIN - UMMC: Performed by: SURGERY

## 2017-07-07 PROCEDURE — 82565 ASSAY OF CREATININE: CPT | Performed by: CLINICAL NURSE SPECIALIST

## 2017-07-07 PROCEDURE — 40000171 ZZH STATISTIC PRE-PROCEDURE ASSESSMENT III: Performed by: SURGERY

## 2017-07-07 PROCEDURE — 37000009 ZZH ANESTHESIA TECHNICAL FEE, EACH ADDTL 15 MIN: Performed by: SURGERY

## 2017-07-07 PROCEDURE — 40000141 ZZH STATISTIC PERIPHERAL IV START W/O US GUIDANCE

## 2017-07-07 PROCEDURE — 37000008 ZZH ANESTHESIA TECHNICAL FEE, 1ST 30 MIN: Performed by: SURGERY

## 2017-07-07 DEVICE — IMP REPAIR MATRIX GRAFIX CORE 5X5 CHG PER SQ CM=25 PS12055: Type: IMPLANTABLE DEVICE | Site: TOE | Status: FUNCTIONAL

## 2017-07-07 RX ORDER — EPHEDRINE SULFATE 50 MG/ML
INJECTION, SOLUTION INTRAMUSCULAR; INTRAVENOUS; SUBCUTANEOUS PRN
Status: DISCONTINUED | OUTPATIENT
Start: 2017-07-07 | End: 2017-07-07

## 2017-07-07 RX ORDER — LIDOCAINE HYDROCHLORIDE 20 MG/ML
INJECTION, SOLUTION INFILTRATION; PERINEURAL PRN
Status: DISCONTINUED | OUTPATIENT
Start: 2017-07-07 | End: 2017-07-07

## 2017-07-07 RX ORDER — FENTANYL CITRATE 50 UG/ML
25 INJECTION, SOLUTION INTRAMUSCULAR; INTRAVENOUS
Status: DISCONTINUED | OUTPATIENT
Start: 2017-07-07 | End: 2017-07-07 | Stop reason: HOSPADM

## 2017-07-07 RX ORDER — FENTANYL CITRATE 50 UG/ML
25-50 INJECTION, SOLUTION INTRAMUSCULAR; INTRAVENOUS
Status: DISCONTINUED | OUTPATIENT
Start: 2017-07-07 | End: 2017-07-07 | Stop reason: HOSPADM

## 2017-07-07 RX ORDER — ONDANSETRON 4 MG/1
4 TABLET, ORALLY DISINTEGRATING ORAL EVERY 30 MIN PRN
Status: DISCONTINUED | OUTPATIENT
Start: 2017-07-07 | End: 2017-07-07 | Stop reason: HOSPADM

## 2017-07-07 RX ORDER — PROPOFOL 10 MG/ML
INJECTION, EMULSION INTRAVENOUS CONTINUOUS PRN
Status: DISCONTINUED | OUTPATIENT
Start: 2017-07-07 | End: 2017-07-07

## 2017-07-07 RX ORDER — CEFAZOLIN SODIUM 2 G/100ML
2 INJECTION, SOLUTION INTRAVENOUS
Status: COMPLETED | OUTPATIENT
Start: 2017-07-07 | End: 2017-07-07

## 2017-07-07 RX ORDER — LABETALOL HYDROCHLORIDE 5 MG/ML
10 INJECTION, SOLUTION INTRAVENOUS
Status: DISCONTINUED | OUTPATIENT
Start: 2017-07-07 | End: 2017-07-07 | Stop reason: HOSPADM

## 2017-07-07 RX ORDER — SODIUM CHLORIDE, SODIUM LACTATE, POTASSIUM CHLORIDE, CALCIUM CHLORIDE 600; 310; 30; 20 MG/100ML; MG/100ML; MG/100ML; MG/100ML
INJECTION, SOLUTION INTRAVENOUS CONTINUOUS PRN
Status: DISCONTINUED | OUTPATIENT
Start: 2017-07-07 | End: 2017-07-07

## 2017-07-07 RX ORDER — ONDANSETRON 2 MG/ML
INJECTION INTRAMUSCULAR; INTRAVENOUS PRN
Status: DISCONTINUED | OUTPATIENT
Start: 2017-07-07 | End: 2017-07-07

## 2017-07-07 RX ORDER — PROPOFOL 10 MG/ML
INJECTION, EMULSION INTRAVENOUS PRN
Status: DISCONTINUED | OUTPATIENT
Start: 2017-07-07 | End: 2017-07-07

## 2017-07-07 RX ORDER — ONDANSETRON 2 MG/ML
4 INJECTION INTRAMUSCULAR; INTRAVENOUS EVERY 30 MIN PRN
Status: DISCONTINUED | OUTPATIENT
Start: 2017-07-07 | End: 2017-07-07 | Stop reason: HOSPADM

## 2017-07-07 RX ORDER — SODIUM CHLORIDE, SODIUM LACTATE, POTASSIUM CHLORIDE, CALCIUM CHLORIDE 600; 310; 30; 20 MG/100ML; MG/100ML; MG/100ML; MG/100ML
INJECTION, SOLUTION INTRAVENOUS CONTINUOUS
Status: DISCONTINUED | OUTPATIENT
Start: 2017-07-07 | End: 2017-07-07 | Stop reason: HOSPADM

## 2017-07-07 RX ORDER — BUPIVACAINE HYDROCHLORIDE 2.5 MG/ML
INJECTION, SOLUTION EPIDURAL; INFILTRATION; INTRACAUDAL PRN
Status: DISCONTINUED | OUTPATIENT
Start: 2017-07-07 | End: 2017-07-07

## 2017-07-07 RX ORDER — MEPERIDINE HYDROCHLORIDE 25 MG/ML
12.5 INJECTION INTRAMUSCULAR; INTRAVENOUS; SUBCUTANEOUS
Status: DISCONTINUED | OUTPATIENT
Start: 2017-07-07 | End: 2017-07-07 | Stop reason: HOSPADM

## 2017-07-07 RX ORDER — NALOXONE HYDROCHLORIDE 0.4 MG/ML
.1-.4 INJECTION, SOLUTION INTRAMUSCULAR; INTRAVENOUS; SUBCUTANEOUS
Status: DISCONTINUED | OUTPATIENT
Start: 2017-07-07 | End: 2017-07-07 | Stop reason: HOSPADM

## 2017-07-07 RX ORDER — FLUMAZENIL 0.1 MG/ML
0.2 INJECTION, SOLUTION INTRAVENOUS
Status: DISCONTINUED | OUTPATIENT
Start: 2017-07-07 | End: 2017-07-07 | Stop reason: HOSPADM

## 2017-07-07 RX ORDER — HYDRALAZINE HYDROCHLORIDE 20 MG/ML
2.5-5 INJECTION INTRAMUSCULAR; INTRAVENOUS EVERY 10 MIN PRN
Status: DISCONTINUED | OUTPATIENT
Start: 2017-07-07 | End: 2017-07-07 | Stop reason: HOSPADM

## 2017-07-07 RX ADMIN — Medication 5 MG: at 16:24

## 2017-07-07 RX ADMIN — Medication 10 MG: at 16:19

## 2017-07-07 RX ADMIN — ONDANSETRON 4 MG: 2 INJECTION INTRAMUSCULAR; INTRAVENOUS at 16:00

## 2017-07-07 RX ADMIN — MIDAZOLAM 1 MG: 1 INJECTION INTRAMUSCULAR; INTRAVENOUS at 15:16

## 2017-07-07 RX ADMIN — CEFAZOLIN SODIUM 2 G: 2 INJECTION, SOLUTION INTRAVENOUS at 15:45

## 2017-07-07 RX ADMIN — PROPOFOL 10 MG: 10 INJECTION, EMULSION INTRAVENOUS at 15:47

## 2017-07-07 RX ADMIN — Medication 10 MG: at 16:12

## 2017-07-07 RX ADMIN — PROPOFOL 50 MCG/KG/MIN: 10 INJECTION, EMULSION INTRAVENOUS at 15:47

## 2017-07-07 RX ADMIN — BUPIVACAINE HYDROCHLORIDE 25 ML: 2.5 INJECTION, SOLUTION EPIDURAL; INFILTRATION; INTRACAUDAL at 15:19

## 2017-07-07 RX ADMIN — FENTANYL CITRATE 50 MCG: 50 INJECTION, SOLUTION INTRAMUSCULAR; INTRAVENOUS at 15:15

## 2017-07-07 RX ADMIN — SODIUM CHLORIDE, POTASSIUM CHLORIDE, SODIUM LACTATE AND CALCIUM CHLORIDE: 600; 310; 30; 20 INJECTION, SOLUTION INTRAVENOUS at 15:29

## 2017-07-07 RX ADMIN — LIDOCAINE HYDROCHLORIDE 40 MG: 20 INJECTION, SOLUTION INFILTRATION; PERINEURAL at 15:47

## 2017-07-07 RX ADMIN — PHENYLEPHRINE HYDROCHLORIDE 50 MCG: 10 INJECTION, SOLUTION INTRAMUSCULAR; INTRAVENOUS; SUBCUTANEOUS at 16:29

## 2017-07-07 NOTE — OR NURSING
Pre op block to right leg done in pre op.  Pt tolerated well.  See MAR and flowsheets for specifics.  No s/s of lidocaine toxicity.

## 2017-07-07 NOTE — ANESTHESIA PROCEDURE NOTES
Peripheral Nerve Block Procedure Note    Staff:     Anesthesiologist:  FLORIN TOBIAS    Resident/CRNA:  CARLITOS MEYER    Block performed by resident/CRNA in the presence of a teaching physician    Location: Pre-op  Procedure Start/Stop TImes:      7/7/2017 3:14 PM     7/7/2017 3:24 PM    patient identified, IV checked, site marked, risks and benefits discussed, informed consent, monitors and equipment checked, pre-op evaluation, at physician/surgeon's request and post-op pain management      Correct Patient: Yes      Correct Position: Yes      Correct Site: Yes      Correct Procedure: Yes      Correct Laterality:  Yes    Site Marked:  Yes  Procedure details:     Procedure:  Popliteal    ASA:  3    Diagnosis:  Foot surgery    Laterality:  Right    Position:  Supine    Sterile Prep: chloraprep, mask and sterile gloves      Local skin infiltration:  None    Needle:  Short bevel    Needle gauge:  21    Needle length (mm):  110    Ultrasound: Yes      Ultrasound used to identify targeted nerve, plexus, or vascular structure and placed a needle adjacent to it      Permanent Image entered into patiient's record      Abnormal pain on injection: No      Blood Aspirated: No      Paresthesias:  No    Bleeding at site: No      Bolus via:  Needle    Infusion Method:  Single Shot    Blood aspirated via catheter: No      Complications:  None  Assessment/Narrative:      Informed consent obtained.  All risks and benefits of the nerve block discussed with the patient.  All questions answered and all parties agreed with the plan.

## 2017-07-07 NOTE — IP AVS SNAPSHOT
MRN:[id number]                      After Visit Summary   7/7/2017    Carlos Alberto Fenton    MRN: [id number]           Thank you!     Thank you for choosing [facility] for your care. Our goal is always to provide you with excellent care. Hearing back from our patients is one way we can continue to improve our services. Please take a few minutes to complete the written survey that you may receive in the mail after you visit with us. Thank you!        Patient Information     Date Of Birth          1940        About your hospital stay     You were admitted on:  July 7, 2017 You last received care in the:  [facility] MAIN OR    You were discharged on:  July 7, 2017        Reason for your hospital stay       You had a right foot irrigation and debridement with Grafix application with Dr. Santamaria                  Who to Call     For medical emergencies, please call 911.  For non-urgent questions about your medical care, please call your primary care provider or clinic, [phone]  For questions related to your surgery, please call your surgery clinic        Attending Provider     Provider Specialty    Leah Santamaria MD Vascular Surgery       Primary Care Provider Office Phone # Fax #    Humberto Conner [phone]       When to contact your care team       Contact Dr. Santamaria's vascular surgery team if any increased pain, drainage, swelling or fever develop.                  After Care Instructions     Activity       Your activity upon discharge: no weight bearing on right leg.  No lifting, pushing or pulling over 10 lbs.  No strenuous activity            Diet       Follow this diet upon discharge: Regular            Wound care and dressings       Instructions to care for your wound at home: please keep right foot dressing in place. Do not remove dressing or get it wet.  Outer dressing will be changed next Tuesday by vascular APRN                  Follow-up Appointments     Follow Up and  recommended labs and tests       Follow up with vascular surgery APRN on Tuesday for outer dressing change.    With questions, concerns, or to request an appointment, please call either:    Edelmira Rodríguez, Care Coordinator RN, Vascular Surgery  [phone]    Vascular Call Center  [phone]    To contact someone after 5 pm, on a weekend, or on a Holiday, please call:  [facility]  [phone], option 4 to have a member of the Vascular Surgery Service paged.                  Your next 10 appointments already scheduled     Jul 11, 2017  1:30 PM CDT   (Arrive by 1:15 PM)   Return Vascular Visit with CHACORTA Macias [facility] ([facility] [place])    [address] [place] [address] [id number] [phone]            Jul 20, 2017  4:30 PM CDT   (Arrive by 4:15 PM)   Return Visit with Easton Torres DPM   [facility] Wound [facility] ([facility] [place])    [address] [place] [address] [id number] [phone]            Sep 01, 2017  2:00 PM CDT   (Arrive by 1:45 PM)   Return Visit with Star Lobato MD   [facility] Heart [facility] ([facility] [place])    [address] [place] [id number] [phone]            Sep 11, 2017  2:00 PM CDT   (Arrive by 1:45 PM)   HOLTER MONITOR VISIT with  Cvc Monitor [facility] ([facility] [place])    [address] [place] [id number] [phone]            Oct 19, 2017  2:30 PM CDT   (Arrive by 2:15 PM)   RETURN ATRIAL FIBULATION VISIT with CHACORTA Joshi [facility] ([facility] [place])    [address] [place] [address] [id number] [phone]              Further instructions from your care team       [facility]  Same-Day Surgery   Adult  Discharge Orders & Instructions     For 24 hours after surgery    1. Get plenty of rest.  A responsible adult must stay with you for at least 24 hours after you leave the hospital.   2. Do not drive or use heavy equipment.  If you have weakness or tingling, don't drive or use heavy equipment until this feeling goes away.  3. Do not drink alcohol.  4. Avoid strenuous or risky activities.  Ask for help when climbing stairs.   5. You may feel lightheaded.  IF so, sit for a few minutes before standing.  Have someone help you get up.   6. If you have nausea (feel sick to your stomach): Drink only clear liquids such as apple juice, ginger ale, broth or 7-Up.  Rest may also help.  Be sure to drink enough fluids.  Move to a regular diet as you feel able.  7. You may have a slight fever. Call the doctor if your fever is over 100 F (37.7 C) (taken under the tongue) or lasts longer than 24 hours.  8. You may have a dry mouth, a sore throat, muscle aches or trouble sleeping.  These should go away after 24 hours.  9. Do not make important or legal decisions.   Call your doctor for any of the followin.  Signs of infection (fever, growing tenderness at the surgery site, a large amount of drainage or bleeding, severe pain, foul-smelling drainage, redness, swelling).    2. It has been over 8 to 10 hours since surgery and you are still not able to urinate (pass water).    3.  Headache for over 24 hours.    To contact a doctor, call ___Dr. Santamaria at [phone]_____________ or:        [phone] and ask for the resident on call for ___Vascular Surgery __ (answered 24 hours a day)      Emergency Department:     [facility]: [phone]              Pending Results     Date and Time Order Name Status Description    2017 1309 EKG 12-lead, tracing only Preliminary             Statement of Approval     Ordered          17 4024  I have reviewed and agree with all the recommendations and orders detailed in this  "document.  EFFECTIVE NOW     Approved and electronically signed by:  Meena Cardoso APRN CNS             Admission Information     Date & Time Provider Department Dept. Phone    7/7/2017 Leah Santamaria MD  MAIN [phone]      Your Vitals Were     Blood Pressure Temperature Respirations Height Weight Pulse Oximetry    121/77 97.7  F (36.5  C) (Oral) 16 1.575 m (5' 2\") 76 kg (167 lb 8.8 oz) 100%    BMI (Body Mass Index)                   30.65 kg/m2           MyChart Information     [facility] gives you secure access to your electronic health record. If you see a primary care provider, you can also send messages to your care team and make appointments. If you have questions, please call your primary care clinic.  If you do not have a primary care provider, please call [phone] and they will assist you.        Care EveryWhere ID     This is your Care EveryWhere ID. This could be used by other organizations to access your [place] medical records  [id number]        Equal Access to Services     NATALIE HAYES : Hadii aad ku hadasho Soedwige, waaxda luqadaha, qaybta kaalmada adeanilayagucci, scott mccollum . So [facility] [phone].    ATENCIÓN: Si habla español, tiene a espinoza disposición servicios gratuitos de asistencia lingüística. Llame al [phone].    We comply with applicable federal civil rights laws and [place] laws. We do not discriminate on the basis of race, color, national origin, age, disability sex, sexual orientation or gender identity.               Review of your medicines      START taking        Dose / Directions    amoxicillin-clavulanate 875-125 MG per tablet   Commonly known as:  AUGMENTIN   Used for:  Toe gangrene (H)        Dose:  1 tablet   Take 1 tablet by mouth 2 times daily   Quantity:  28 tablet   Refills:  3         CONTINUE these medicines which may have CHANGED, or have new prescriptions. If we are uncertain of the size of tablets/capsules you " have at home, strength may be listed as something that might have changed.        Dose / Directions    aspirin 81 MG chewable tablet   This may have changed:  when to take this   Used for:  Coronary artery disease with angina pectoris, unspecified vessel or lesion type, unspecified whether native or transplanted heart (H)        Dose:  81 mg   Take 1 tablet (81 mg) by mouth daily   Quantity:  30 tablet   Refills:  0       ferrous sulfate 325 (65 FE) MG tablet   Commonly known as:  IRON SUPPLEMENT   This may have changed:  when to take this   Used for:  S/P CABG (coronary artery bypass graft)        Dose:  325 mg   Take 1 tablet (325 mg) by mouth daily (with breakfast)   Quantity:  100 tablet   Refills:  1         CONTINUE these medicines which have NOT CHANGED        Dose / Directions    acetaminophen 325 MG tablet   Commonly known as:  TYLENOL   Used for:  S/P CABG (coronary artery bypass graft)        Dose:  975 mg   Take 3 tablets (975 mg) by mouth every 6 hours as needed for mild pain   Quantity:  100 tablet   Refills:  0       atorvastatin 40 MG tablet   Commonly known as:  LIPITOR   Used for:  Coronary artery disease with angina pectoris, unspecified vessel or lesion type, unspecified whether native or transplanted heart (H)        Dose:  40 mg   Take 1 tablet (40 mg) by mouth daily   Quantity:  90 tablet   Refills:  1       blood glucose monitoring meter device kit        Refills:  0       fondaparinux 7.5MG/0.6ML injection   Commonly known as:  ARIXTRA   Used for:  Long-term (current) use of anticoagulants        Dose:  7.5 mg   Inject 0.6 mLs (7.5 mg) Subcutaneous every morning *Do not take Saturday, May 27, 2017 *Do not take Adolfo, May 28, 2017 *May resuming taking on Monday, May 29, 2017   Quantity:  10 Syringe   Refills:  0       furosemide 40 MG tablet   Commonly known as:  LASIX   Used for:  Bilateral edema of lower extremity        Dose:  40 mg   Take 1 tablet (40 mg) by mouth 2 times daily    Quantity:  180 tablet   Refills:  1       gabapentin 100 MG capsule   Commonly known as:  NEURONTIN   Used for:  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        Dose:  100 mg   Take 1 capsule (100 mg) by mouth 2 times daily   Quantity:  180 capsule   Refills:  1       HYDROcodone-acetaminophen 5-325 MG per tablet   Commonly known as:  NORCO   Used for:  Ulcer of left lower extremity with fat layer exposed (H)        Dose:  1-2 tablet   Take 1-2 tablets by mouth every 4 hours as needed for moderate to severe pain maximum 12 tablet(s) per day. Do not take more than 4, 000 mg of Tylenol (Acetaminophen) per day. Use caution if taking extra Tylenol (Acetaminophen)   Quantity:  72 tablet   Refills:  0       ONE TOUCH ULTRA test strip   Used for:  Type 2 diabetes mellitus without complication, without long-term current use of insulin (H)   Generic drug:  blood glucose monitoring        USE AS DIRECTED TO TEST ONE TIME A DAY   Quantity:  100 each   Refills:  0       ONETOUCH DELICA LANCETS 33G Misc   Used for:  Type 2 diabetes mellitus without complication, without long-term current use of insulin (H)        Dose:  1 Device   1 Device daily   Quantity:  100 each   Refills:  0       potassium chloride SA 10 MEQ CR tablet   Commonly known as:  K-DUR/KLOR-CON M   Used for:  S/P CABG (coronary artery bypass graft)        Dose:  10 mEq   Take 1 tablet (10 mEq) by mouth 2 times daily   Quantity:  180 tablet   Refills:  1       traZODone 50 MG tablet   Commonly known as:  DESYREL   Used for:  Primary insomnia        Dose:  25 mg   Take 0.5 tablets (25 mg) by mouth nightly as needed for sleep   Quantity:  45 tablet   Refills:  2       venlafaxine 150 MG Tb24 24 hr tablet   Commonly known as:  EFFEXOR-ER   Used for:  Adjustment disorder with depressed mood        Dose:  150 mg   Take 1 tablet (150 mg) by mouth daily (with breakfast)   Quantity:  90 each   Refills:  1            Where to get your medicines      These medications  were sent to [facility] - [place], [place] - [address]  [address], [facility] [address]     Phone:  [phone]     amoxicillin-clavulanate 875-125 MG per tablet                Protect others around you: Learn how to safely use, store and throw away your medicines at www.disposemymeds.org.             Medication List: This is a list of all your medications and when to take them. Check marks below indicate your daily home schedule. Keep this list as a reference.      Medications           Morning Afternoon Evening Bedtime As Needed    acetaminophen 325 MG tablet   Commonly known as:  TYLENOL   Take 3 tablets (975 mg) by mouth every 6 hours as needed for mild pain                                amoxicillin-clavulanate 875-125 MG per tablet   Commonly known as:  AUGMENTIN   Take 1 tablet by mouth 2 times daily                                aspirin 81 MG chewable tablet   Take 1 tablet (81 mg) by mouth daily                                atorvastatin 40 MG tablet   Commonly known as:  LIPITOR   Take 1 tablet (40 mg) by mouth daily                                blood glucose monitoring meter device kit                                ferrous sulfate 325 (65 FE) MG tablet   Commonly known as:  IRON SUPPLEMENT   Take 1 tablet (325 mg) by mouth daily (with breakfast)                                fondaparinux 7.5MG/0.6ML injection   Commonly known as:  ARIXTRA   Inject 0.6 mLs (7.5 mg) Subcutaneous every morning *Do not take Saturday, May 27, 2017 *Do not take Adolfo, May 28, 2017 *May resuming taking on Monday, May 29, 2017                                furosemide 40 MG tablet   Commonly known as:  LASIX   Take 1 tablet (40 mg) by mouth 2 times daily                                gabapentin 100 MG capsule   Commonly known as:  NEURONTIN   Take 1 capsule (100 mg) by mouth 2 times daily                                HYDROcodone-acetaminophen 5-325 MG per tablet   Commonly known as:   NORCO   Take 1-2 tablets by mouth every 4 hours as needed for moderate to severe pain maximum 12 tablet(s) per day. Do not take more than 4, 000 mg of Tylenol (Acetaminophen) per day. Use caution if taking extra Tylenol (Acetaminophen)                                ONE TOUCH ULTRA test strip   USE AS DIRECTED TO TEST ONE TIME A DAY   Generic drug:  blood glucose monitoring                                ONETOUCH DELICA LANCETS 33G Misc   1 Device daily                                potassium chloride SA 10 MEQ CR tablet   Commonly known as:  K-DUR/KLOR-CON M   Take 1 tablet (10 mEq) by mouth 2 times daily                                traZODone 50 MG tablet   Commonly known as:  DESYREL   Take 0.5 tablets (25 mg) by mouth nightly as needed for sleep                                venlafaxine 150 MG Tb24 24 hr tablet   Commonly known as:  EFFEXOR-ER   Take 1 tablet (150 mg) by mouth daily (with breakfast)

## 2017-07-07 NOTE — OR NURSING
Home care with  to be determined at follow up appointment on July 11th. Home care not needed till post op day 11 per service.

## 2017-07-07 NOTE — ANESTHESIA CARE TRANSFER NOTE
Patient: Asenath I Fenton    Procedure(s):  Irrigation and Debridement Right Foot with Graphix  *Latex Allergy* - Wound Class: II-Clean Contaminated    Diagnosis: Right Foot Wound  Diagnosis Additional Information: No value filed.    Anesthesia Type:   MAC     Note:  Airway :Nasal Cannula  Patient transferred to:Phase II  Comments: Patient oxygenating and ventilating well on 3LNC.  Patient LAURA, following commands, and denies pain.  Report given to RN and VSS.       Vitals: (Last set prior to Anesthesia Care Transfer)    CRNA VITALS  7/7/2017 1633 - 7/7/2017 1710      7/7/2017             NIBP: 97/77    NIBP Mean: 91    Ht Rate: 57    SpO2: 96 %    Resp Rate (observed): 16    EKG: Sinus bradycardia                Electronically Signed By: CHACORTA Rose CRNA  July 7, 2017  5:10 PM

## 2017-07-07 NOTE — BRIEF OP NOTE
[facility] Brief Operative Note    Pre-operative diagnosis: Right Foot Wound   Post-operative diagnosis * No post-op diagnosis entered *   Procedure: Procedure(s):  Irrigation and Debridement Right Foot with Graphix  *Latex Allergy* - Wound Class: II-Clean Contaminated   Surgeon: Leah Santamaria MD   Assistants(s): NOne   Estimated blood loss: None    Specimens: None   Findings: grafix 5x5 placement.  Size of wound on R great toe ~ 3.9x4cm  R 2nd toe size 0.5x0.5cm    Leah Santamaria MD

## 2017-07-07 NOTE — OR NURSING
Dr. bose aware of glucose of 67. Does not want to treat it. INR tube was defective and did not fill properly per Lab. Attempting redraw. Patient very difficult stick. Notified Dr. Sexton. Will call Nurse Practitione and await instruction. Report given to Sara Jones RN

## 2017-07-07 NOTE — DISCHARGE INSTRUCTIONS
[facility]  Same-Day Surgery   Adult Discharge Orders & Instructions     For 24 hours after surgery    1. Get plenty of rest.  A responsible adult must stay with you for at least 24 hours after you leave the hospital.   2. Do not drive or use heavy equipment.  If you have weakness or tingling, don't drive or use heavy equipment until this feeling goes away.  3. Do not drink alcohol.  4. Avoid strenuous or risky activities.  Ask for help when climbing stairs.   5. You may feel lightheaded.  IF so, sit for a few minutes before standing.  Have someone help you get up.   6. If you have nausea (feel sick to your stomach): Drink only clear liquids such as apple juice, ginger ale, broth or 7-Up.  Rest may also help.  Be sure to drink enough fluids.  Move to a regular diet as you feel able.  7. You may have a slight fever. Call the doctor if your fever is over 100 F (37.7 C) (taken under the tongue) or lasts longer than 24 hours.  8. You may have a dry mouth, a sore throat, muscle aches or trouble sleeping.  These should go away after 24 hours.  9. Do not make important or legal decisions.   Call your doctor for any of the followin.  Signs of infection (fever, growing tenderness at the surgery site, a large amount of drainage or bleeding, severe pain, foul-smelling drainage, redness, swelling).    2. It has been over 8 to 10 hours since surgery and you are still not able to urinate (pass water).    3.  Headache for over 24 hours.    To contact a doctor, call ___Dr. Santamaria at [phone]_____________ or:        [phone] and ask for the resident on call for ___Vascular Surgery __ (answered 24 hours a day)      Emergency Department:     [facility]: [phone]

## 2017-07-07 NOTE — IP AVS SNAPSHOT
[facility] OR    [address] [address]    Phone:  [phone]                                       After Visit Summary   7/7/2017    Carlos Alberto Fenton    MRN: [id number]           After Visit Summary Signature Page     I have received my discharge instructions, and my questions have been answered. I have discussed any challenges I see with this plan with the nurse or doctor.    ..........................................................................................................................................  Patient/Patient Representative Signature      ..........................................................................................................................................  Patient Representative Print Name and Relationship to Patient    ..................................................               ................................................  Date                                            Time    ..........................................................................................................................................  Reviewed by Signature/Title    ...................................................              ..............................................  Date                                                            Time

## 2017-07-07 NOTE — ANESTHESIA PREPROCEDURE EVALUATION
ANESTHESIA PREOP EVALUATION    NPO Status: more then 6 hours    Procedure: Irrigation and Debridement Right Foot with Graphix      HPI: Right Foot Wound    PMHx/PSHx/ROS:  PAST MEDICAL HISTORY:   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 and application of Grafix;  Surgeon: Leah Santamaria MD;  Location: [facility] OR     BACK SURGERY       BYPASS GRAFT ARTERY CORONARY N/A 2/6/2017    Procedure: BYPASS GRAFT ARTERY CORONARY;  Surgeon: Mikhail Quiñones MD;  Location: [facility] OR     C APPENDECTOMY  1959     HYSTERECTOMY, PASTORA  1988     MAZE PROCEDURE N/A 2/6/2017    Procedure: MAZE PROCEDURE;  Surgeon: Mikhail Quiñones MD;  Location: [facility] OR     PICC INSERTION Left 02/25/2017    5fr TL Bard PICC, 47cm (3cm external) in the L basilic vein w/ tip in the SVC RA junction     TONSILLECTOMY  1942       FAMILY HISTORY:   Family History   Problem Relation Age of Onset     DIABETES Mother      C.A.D. Mother      DIABETES Father      C.A.D. Father      HEART DISEASE Sister      Arthritis Sister          Past Anes Hx: No personal or  family h/o anesthesia problems      Allergies:   Allergies   Allergen Reactions     Levaquin [Levofloxacin] Other (See Comments)     Tendon pain in legs, arms and shoulders.      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       Oxycodone      hallucinations     Adhesive Tape Itching and Rash     Diapers & Supplies Rash     Developed yeast infection from previous hospital stay       Meds:   Prescriptions Prior to Admission   Medication Sig Dispense Refill Last Dose     atorvastatin (LIPITOR) 40 MG tablet Take 1 tablet (40 mg) by mouth daily 90 tablet 1 7/6/2017 at 0900     furosemide (LASIX) 40 MG tablet Take 1 tablet (40 mg) by mouth 2 times daily 180 tablet 1 7/6/2017 at 1100     potassium chloride SA (K-DUR/KLOR-CON M) 10 MEQ CR tablet Take 1 tablet (10 mEq) by mouth 2 times daily 180 tablet 1 7/6/2017 at 2000     traZODone (DESYREL) 50 MG tablet Take 0.5 tablets (25 mg) by mouth nightly as needed for sleep 45 tablet 2 7/6/2017 at 2000     fondaparinux (ARIXTRA) 7.5MG/0.6ML injection Inject 0.6 mLs (7.5 mg) Subcutaneous every morning *Do not take Saturday, May 27, 2017  *Do not take Adolfo, May 28, 2017  *May resuming taking on Monday, May 29, 2017 10 Syringe 0 7/6/2017 at 0900     HYDROcodone-acetaminophen (NORCO) 5-325 MG per tablet Take 1-2 tablets by mouth every 4 hours as needed for moderate to severe pain maximum 12 tablet(s) per day. Do not take more than 4, 000 mg of Tylenol (Acetaminophen) per day. Use caution if taking extra Tylenol (Acetaminophen) 72 tablet 0 7/6/2017 at 0900     gabapentin (NEURONTIN) 100 MG capsule Take 1 capsule (100 mg) by mouth 2 times daily 180 capsule 1 7/6/2017 at 2000     venlafaxine (EFFEXOR-ER) 150 MG TB24 24 hr tablet Take 1 tablet (150 mg) by mouth daily (with breakfast) 90 each 1 7/6/2017 at 0900     aspirin 81 MG chewable tablet Take 1 tablet (81 mg) by  mouth daily (Patient taking differently: Take 81 mg by mouth every morning ) 30 tablet 0 7/6/2017 at 0900     ferrous sulfate (IRON SUPPLEMENT) 325 (65 FE) MG tablet Take 1 tablet (325 mg) by mouth daily (with breakfast) (Patient taking differently: Take 325 mg by mouth 2 times daily ) 100 tablet 1 7/6/2017 at 2000     acetaminophen (TYLENOL) 325 MG tablet Take 3 tablets (975 mg) by mouth every 6 hours as needed for mild pain 100 tablet 0 Unknown at Unknown time     ONE TOUCH ULTRA test strip USE AS DIRECTED TO TEST ONE TIME A  each 0 Unknown at Unknown time     ONETOUCH DELICA LANCETS 33G MISC 1 Device daily 100 each 0 Unknown at Unknown time     blood glucose monitoring (ONE TOUCH ULTRA 2) meter device kit    Unknown at Unknown time       No current outpatient prescriptions on file.       Physical Exam:  VS: T 97.7, P Data Unavailable, /77, R 16, SpO2 100%   MP2, TM distance >3FB, mouth opening adequate, full ROM, intact dentition.        BMP:  Lab Results   Component Value Date     05/26/2017      Lab Results   Component Value Date    POTASSIUM 4.0 07/07/2017     Lab Results   Component Value Date    CHLORIDE 102 05/26/2017     Lab Results   Component Value Date    CARLY 9.2 05/26/2017     Lab Results   Component Value Date    CO2 33 05/26/2017     Lab Results   Component Value Date    BUN 13 05/26/2017     Lab Results   Component Value Date    CR 0.74 07/07/2017     Lab Results   Component Value Date     05/26/2017        CBC:  Lab Results   Component Value Date    WBC 5.9 07/07/2017     Lab Results   Component Value Date    HGB 11.7 07/07/2017     Lab Results   Component Value Date    HCT 37.9 07/07/2017     Lab Results   Component Value Date     07/07/2017        Coags/Type and Screen  Lab Results   Component Value Date    INR 1.52 07/07/2017     No results found for: PT  Type and Screen:                         Anesthesia Plan      History & Physical Review  History and physical  reviewed and following examination; no interval change.    ASA Status:  3 .        Plan for MAC and Peripheral Nerve Block Reason for MAC:  Deep or markedly invasive procedure (G8)         Postoperative Care      Consents  Anesthetic plan, risks, benefits and alternatives discussed with:  Patient..            Kalyn Alejandra MD    7/7/2017  3:40 PM              .

## 2017-07-07 NOTE — OR NURSING
Paged Vascular surgery resident, unfamiliar with patient and unable to prescribe any norco per patient request.  Pt also wondering about DC instructions for left foot and whether she is to continue scrub she was doing pre-operatively.  Paged vascular fellow, no return call.  Paged Dr. Santamaria, patient does not need to do anything to left foot, continue normal hygiene cares.  Right foot instructions reinforced with patient, non-weight bearing status, dressing to stay intact and patient to f/u in clinic next Tuesday. No pain medications, as surgery did not involve any painful procedures.

## 2017-07-07 NOTE — ANESTHESIA POSTPROCEDURE EVALUATION
Patient: Asenath I Fenton    Procedure(s):  Irrigation and Debridement Right Foot with Graphix  *Latex Allergy* - Wound Class: II-Clean Contaminated    Diagnosis:Right Foot Wound  Diagnosis Additional Information: No value filed.    Anesthesia Type:  MAC    Note:  Anesthesia Post Evaluation    Patient location during evaluation: PACU  Patient participation: Able to fully participate in evaluation  Level of consciousness: awake  Pain management: adequate  Airway patency: patent  Cardiovascular status: acceptable  Respiratory status: acceptable  Hydration status: acceptable  PONV: none     Anesthetic complications: None          Last vitals:  Vitals:    07/07/17 1709 07/07/17 1710 07/07/17 1715   BP:  97/77    Resp: 18 16    Temp:  36.2  C (97.1  F) 35.8  C (96.4  F)   SpO2:            Electronically Signed By: Kalyn Alejandra MD  July 7, 2017  5:49 PM

## 2017-07-08 ENCOUNTER — DOCUMENTATION ONLY (OUTPATIENT)
Dept: CARE COORDINATION | Facility: CLINIC | Age: 77
End: 2017-07-08

## 2017-07-08 NOTE — PROGRESS NOTES
[facility] utilizes an encounter to take the place of a direct phone call to your office. Please take a moment to review the below request. Your reply to this encounter will act as a verbal OK of orders if requested below. Thank you.    ORDER    SN 1W1, 2W1, 3W4, with 3 prn  PT/OT to eval and treat  HA 2W5    Of note, client has onset of large intact blood blister (measures 2.1 by 2.1 and protruding out 0.3cm). Blister is hard to the touch and dark red/black in color. Client first observed in the evening of 7/7/17.  She believes the source of this blister was from her ACE wrap application post surgery on 7/7/17.     SOC on 7/8/17  SUMMARY TO MD PAYNE... 76 year old female with past medical history of HTN, HPL, CM, CVA (11/2016) CAD, HIT, paroxsymal a-fib, and status post Median Sternotomy, Cardiopulmonary Bypass, Coronary Artery Bypass Graft x3, Left Internal Mammary Artery Winn, Endoscopic Left Leg Saphenous Vein Winn, MAZE Procedure, Left Atrial Appendage Ligation (AtriClip 45), Patent Foramen Ovale Closure on 2/16/2017.  She was [facility] to [facility] on 2/24/17-3/3/17 for blackening toes and was diagnosed with DVT likely due to emboli with a supratherapeutic INR/HIT.  She is wearing DH2 shoes. She is being follow by Dr. Hatfield for weekly wound cares.   On 5/26/2017 she underwent right great toe amputation, debridement of 2 and 3rd toe soft tissue right foot and application of Grafix with Dr. Santamaria.  She went through irrigation and debridement with Grafix application again on 7/7 with Dr. Santamaria.  She recently moved back to live with her daughter in [place].  Client is non weight bearing post procedure on 7/7.  She has a nonremovable dressing place and a follow up OV with Dr. Mg scheduled for Tuesday 7/11.  Client did report onset of an intact blood blister measuring 2.1 by 2.1 by protruding 0.3 cm  to her left outer ankle. She first observed this in the evening of 7/7 post procedure.   Education provided on leaving blister intact and reporting to [facility] if site opens, enlarges, or starts to be painful. Blood blister was hard to the touch.  Client reports that her daughter is helping her with ADL/IADLs until she can start to bear weight.  Client reporting pain to her chest has mostly improved. This was present on the right side and she now reports pain is between a 0 and a 1 on the pain scale.  She denies pain to her feet/toes, but does have some sensation.  She reports appetite has been fair and she is ensuring adequate hydration.  She is tracking BG, BP, and HR daily.  Prior to NWB status, she was also obtaining a daily weight.  BG has been in the 100 to 130 range. She is checking this 1 x daily.  Nonpitting BLE edema is present.  Client is reporting fatigue post procedure and due to her NWB status to right LE, she plans to spend most of the next few days in bed recovering.  Client typically does her own meds set, but due to current mobility restraints she needs increased assistance. She is verbalizing concern on lasix use as this results in urinary frequency and it is not easy for her to get to the bathroom.  Education provided to take pills as directed.   VS...BP is 118/80, HR 59, RR 18, temp 98.2F, O2 sats 96 percent, weight 167lbs  BACKGROUND...hx of ULCER OF LLE, HX TOE GANGRENE/AMPUTATION, ADJUSTMENT DISORDER, DEPRESSION, INSOMNIA, URGE INCONTINENCE, HTN, AFIB, HX SEIZURES, HX CVA, HX DVT RUE  ANALYSIS...client is at risk for post op infection, non compliance with wound care procedure, medication mismanagment, falls, and difficulties with ADL/IADLs  RECOMMENDATION...SN for wound assessmet/cares, home safety assess, medication teaching/assessment of complianc, DM teach/asses, nutrition/hydration education and skin assessment.  PT to eval and treat falls, HEP, and balance/gait training. OT to eval and treat equipement needs and UE strengthening. HA to assist with bathing/personal cares.  For  RCT only, Estimated Length of Home Care Need 8 weeks     Ester Pineda, RN    [phone]  [email or web address]

## 2017-07-10 LAB — INTERPRETATION ECG - MUSE: NORMAL

## 2017-07-11 ENCOUNTER — ALLIED HEALTH/NURSE VISIT (OUTPATIENT)
Dept: VASCULAR SURGERY | Facility: CLINIC | Age: 77
End: 2017-07-11

## 2017-07-11 ENCOUNTER — OFFICE VISIT (OUTPATIENT)
Dept: VASCULAR SURGERY | Facility: CLINIC | Age: 77
End: 2017-07-11

## 2017-07-11 VITALS
DIASTOLIC BLOOD PRESSURE: 78 MMHG | SYSTOLIC BLOOD PRESSURE: 113 MMHG | OXYGEN SATURATION: 93 % | RESPIRATION RATE: 16 BRPM | HEART RATE: 57 BPM

## 2017-07-11 DIAGNOSIS — Z71.9 VISIT FOR COUNSELING: Primary | ICD-10-CM

## 2017-07-11 DIAGNOSIS — I96 GANGRENE OF TOE (H): Primary | ICD-10-CM

## 2017-07-11 ASSESSMENT — PAIN SCALES - GENERAL: PAINLEVEL: NO PAIN (0)

## 2017-07-11 NOTE — MR AVS SNAPSHOT
After Visit Summary   7/11/2017    Carlos Alberto Fenton    MRN: [id number]           Patient Information     Date Of Birth          1940        Visit Information        Provider Department      7/11/2017 1:30 PM Meena Cardoso APRN CNS [facility]        Care Instructions    With questions, concerns, or to request an appointment, please call either:    Edelmira Rodríguez, Care Coordinator RN, Vascular Surgery  [phone]    Vascular Call Center  [phone]    To contact someone after 5 pm, on a weekend, or on a Holiday, please call:  [facility]  [phone], option 4 to have a member of the Vascular Surgery Service paged.          Follow-ups after your visit        Follow-up notes from your care team     Return in about 3 days (around 7/14/2017).      Your next 10 appointments already scheduled     Jul 14, 2017  2:00 PM CDT   (Arrive by 1:45 PM)   Return Vascular Visit with CHACORTA Veliz CNP   [facility] ([facility])    [address] [place] [address]   [phone]            Jul 17, 2017  1:30 PM CDT   (Arrive by 1:15 PM)   Return Vascular Visit with CHACORTA Macias   [facility] ([facility])    [address] [place] [address]   [phone]            Jul 20, 2017  4:30 PM CDT   (Arrive by 4:15 PM)   Return Visit with Easton Torres DPM   [facility] ([facility])    [address] [place] [id number] [phone]            Sep 01, 2017  2:00 PM CDT   (Arrive by 1:45 PM)   Return Visit with Star Lobato MD   [facility] ([facility])    [address] [place] [id number] [phone]            Sep 11, 2017  2:00 PM CDT   (Arrive by 1:45 PM)   HOLTER MONITOR VISIT  with  Cvc Monitor Tech, TH   [facility] ([facility])    [address] [facility] [address] [phone]            Oct 19, 2017  2:30 PM CDT   (Arrive by 2:15 PM)   RETURN ATRIAL FIBULATION VISIT with CHACORTA Joshi CNP   [facility] ([facility])    [address] [address] [phone]              Who to contact     Please call your clinic at [phone] to:    Ask questions about your health    Make or cancel appointments    Discuss your medicines    Learn about your test results    Speak to your doctor   If you have compliments or concerns about an experience at your clinic, or if you wish to file a complaint, please contact [facility] Patient Relations at [phone] or email us at [email or web address]         Additional Information About Your Visit        [employer] Information     [employer] gives you secure access to your electronic health record. If you see a primary care provider, you can also send messages to your care team and make appointments. If you have questions, please call your primary care clinic.  If you do not have a primary care provider, please call [phone] and they will assist you.      [employer] is an electronic gateway that provides easy, online access to your medical records. With [employer], you can request a clinic appointment, read your test results, renew a prescription or communicate with your care team.     To access your existing account, please contact your [facility] or call [phone] for assistance.        Care EveryWhere ID     This is your Care EveryWhere ID. This could be used by other organizations to access your [place] medical records  [id number]        Your Vitals Were     Pulse Respirations Pulse Oximetry Breastfeeding?          57 16 93% No         Blood  Pressure from Last 3 Encounters:   07/11/17 113/78   07/07/17 116/74   06/27/17 117/68    Weight from Last 3 Encounters:   07/07/17 167 lb 8.8 oz   05/26/17 164 lb 10.9 oz   05/24/17 164 lb 14.4 oz              Today, you had the following     No orders found for display         Today's Medication Changes          These changes are accurate as of: 7/11/17  2:11 PM.  If you have any questions, ask your nurse or doctor.               These medicines have changed or have updated prescriptions.        Dose/Directions    aspirin 81 MG chewable tablet   This may have changed:  when to take this   Used for:  Coronary artery disease with angina pectoris, unspecified vessel or lesion type, unspecified whether native or transplanted heart (H)        Dose:  81 mg   Take 1 tablet (81 mg) by mouth daily   Quantity:  30 tablet   Refills:  0       ferrous sulfate 325 (65 FE) MG tablet   Commonly known as:  IRON SUPPLEMENT   This may have changed:  when to take this   Used for:  S/P CABG (coronary artery bypass graft)        Dose:  325 mg   Take 1 tablet (325 mg) by mouth daily (with breakfast)   Quantity:  100 tablet   Refills:  1                Primary Care Provider Office Phone # Fax #    Humberto Conner [phone]       [facility] [address] [place] [address]        Equal Access to Services     NATALIE HAYES AH: Hadii william wakefieldo Soedwige, waaxda luqadaha, qaybta kaalmada adeegyada, scott mattson. So [facility] [phone].    ATENCIÓN: Si habla español, tiene a espinoza disposición servicios gratuitos de asistencia lingüística. Ll[facility] [phone].    We comply with applicable federal civil rights laws and [place] laws. We do not discriminate on the basis of race, color, national origin, age, disability sex, sexual orientation or gender identity.            Thank you!     Thank you for choosing [facility]  for your care. Our goal is always to provide  you with excellent care. Hearing back from our patients is one way we can continue to improve our services. Please take a few minutes to complete the written survey that you may receive in the mail after your visit with us. Thank you!             Your Updated Medication List - Protect others around you: Learn how to safely use, store and throw away your medicines at www.disposemymeds.org.          This list is accurate as of: 7/11/17  2:11 PM.  Always use your most recent med list.                   Brand Name Dispense Instructions for use Diagnosis    acetaminophen 325 MG tablet    TYLENOL    100 tablet    Take 3 tablets (975 mg) by mouth every 6 hours as needed for mild pain    S/P CABG (coronary artery bypass graft)       amoxicillin-clavulanate 875-125 MG per tablet    AUGMENTIN    28 tablet    Take 1 tablet by mouth 2 times daily    Toe gangrene (H)       aspirin 81 MG chewable tablet     30 tablet    Take 1 tablet (81 mg) by mouth daily    Coronary artery disease with angina pectoris, unspecified vessel or lesion type, unspecified whether native or transplanted heart (H)       atorvastatin 40 MG tablet    LIPITOR    90 tablet    Take 1 tablet (40 mg) by mouth daily    Coronary artery disease with angina pectoris, unspecified vessel or lesion type, unspecified whether native or transplanted heart (H)       blood glucose monitoring meter device kit           ferrous sulfate 325 (65 FE) MG tablet    IRON SUPPLEMENT    100 tablet    Take 1 tablet (325 mg) by mouth daily (with breakfast)    S/P CABG (coronary artery bypass graft)       fondaparinux 7.5MG/0.6ML injection    ARIXTRA    10 Syringe    Inject 0.6 mLs (7.5 mg) Subcutaneous every morning *Do not take Saturday, May 27, 2017 *Do not take Adolfo, May 28, 2017 *May resuming taking on Monday, May 29, 2017    Long-term (current) use of anticoagulants       furosemide 40 MG tablet    LASIX    180 tablet    Take 1 tablet (40 mg) by mouth 2 times daily     Bilateral edema of lower extremity       gabapentin 100 MG capsule    NEURONTIN    180 capsule    Take 1 capsule (100 mg) by mouth 2 times daily    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       HYDROcodone-acetaminophen 5-325 MG per tablet    NORCO    72 tablet    Take 1-2 tablets by mouth every 4 hours as needed for moderate to severe pain maximum 12 tablet(s) per day. Do not take more than 4, 000 mg of Tylenol (Acetaminophen) per day. Use caution if taking extra Tylenol (Acetaminophen)    Ulcer of left lower extremity with fat layer exposed (H)       ONE TOUCH ULTRA test strip   Generic drug:  blood glucose monitoring     100 each    USE AS DIRECTED TO TEST ONE TIME A DAY    Type 2 diabetes mellitus without complication, without long-term current use of insulin (H)       ONETOUCH DELICA LANCETS 33G Misc     100 each    1 Device daily    Type 2 diabetes mellitus without complication, without long-term current use of insulin (H)       potassium chloride SA 10 MEQ CR tablet    K-DUR/KLOR-CON M    180 tablet    Take 1 tablet (10 mEq) by mouth 2 times daily    S/P CABG (coronary artery bypass graft)       traZODone 50 MG tablet    DESYREL    45 tablet    Take 0.5 tablets (25 mg) by mouth nightly as needed for sleep    Primary insomnia       venlafaxine 150 MG Tb24 24 hr tablet    EFFEXOR-ER    90 each    Take 1 tablet (150 mg) by mouth daily (with breakfast)    Adjustment disorder with depressed mood

## 2017-07-11 NOTE — LETTER
7/11/2017       RE: Carlos Alberto Fenton  [address]  [facility] [address]     Dear Colleague,    Thank you for referring your patient, Carlos Alberto Fenton, to the [facility] VASCULAR [facility]INIC at [facility]. Please see a copy of my visit note below.    VASCULAR SURGERY CLINIC ESTABLISHED PATIENT NOTE    HPI:    Carlos Alberto Fenton is a 76 year old female who underwent Right Great Toe amputation, debriedment of 2nd and 3rd toes and application of Grafix with Dr. Santamaria on 5/26/2017.  She underwent right foot irrigation and debridement with Grafix application with Dr. Santamaria on 7/7/2017.   She is wearing DH2 shoes. She is being followed by Dr. Hatfield for weekly wound cares for her left foot.  She returns to clinic today for outer dressing change.      SUBJECTIVE:  She denies any fever, chills, night sweats, HA or pain.  She offers no specific complaints.     OBJECTIVE:  Vital signs:  /78 (BP Location: Right arm)  Pulse 57  Resp 16  SpO2 93%  Breastfeeding? No        Prior to Admission medications    Medication Sig Start Date End Date Taking? Authorizing Provider   acetaminophen (TYLENOL) 325 MG tablet Take 3 tablets (975 mg) by mouth every 6 hours as needed for mild pain 5/26/17   Rosalinda Ron APRN CNP   fondaparinux (ARIXTRA) 7.5MG/0.6ML injection Inject 0.6 mLs (7.5 mg) Subcutaneous every morning *Do not take Saturday, May 27, 2017  *Do not take Adolfo, May 28, 2017  *May resuming taking on Monday, May 29, 2017 5/26/17   Rosalinda Ron APRN CNP   HYDROcodone-acetaminophen (NORCO) 5-325 MG per tablet Take 1-2 tablets by mouth every 4 hours as needed for moderate to severe pain maximum 12 tablet(s) per day. Do not take more than 4, 000 mg of Tylenol (Acetaminophen) per day. Use caution if taking extra Tylenol (Acetaminophen) 5/26/17   Rosalinda Ron APRN CNP   ONE TOUCH ULTRA test strip USE AS DIRECTED TO TEST ONE TIME A DAY 4/11/17   Nuha Bateman MD    atorvastatin (LIPITOR) 40 MG tablet Take 1 tablet (40 mg) by mouth daily  Patient taking differently: Take 40 mg by mouth every morning  3/31/17   Humberto Conner MD   furosemide (LASIX) 40 MG tablet Take 1 tablet (40 mg) by mouth 2 times daily 3/31/17   Humberto Conner MD   gabapentin (NEURONTIN) 100 MG capsule Take 1 capsule (100 mg) by mouth 2 times daily 3/31/17   Humberto Conner MD   traZODone (DESYREL) 50 MG tablet Take 0.5 tablets (25 mg) by mouth nightly as needed for sleep 3/31/17   Humberto Conner MD   venlafaxine (EFFEXOR-ER) 150 MG TB24 24 hr tablet Take 1 tablet (150 mg) by mouth daily (with breakfast) 3/31/17   Humberto Conner MD   potassium chloride SA (K-DUR/KLOR-CON M) 10 MEQ CR tablet Take 1 tablet (10 mEq) by mouth 2 times daily 3/31/17   Humberto Conner MD   aspirin 81 MG chewable tablet Take 1 tablet (81 mg) by mouth daily  Patient taking differently: Take 81 mg by mouth every morning  3/2/17   Britt Whelan MD   ferrous sulfate (IRON SUPPLEMENT) 325 (65 FE) MG tablet Take 1 tablet (325 mg) by mouth daily (with breakfast)  Patient taking differently: Take 325 mg by mouth 2 times daily  2/17/17   Jennifer Staley APRN CNP   ONETOUCH DELICA LANCETS 33G MISC 1 Device daily 1/16/17   Nuha Bateman MD   blood glucose monitoring (ONE TOUCH ULTRA 2) meter device kit  11/9/16   Reported, Patient       PHYSICAL EXAM:  NEURO/PSYCH: The patient is alert and oriented.  Appropriate.  Moves all extremities.   SKIN: Color appropriate for race, warm, dry.  PULMONARY: non-labored breathing   EXTREMITIES: right foot outer dressing removed, moderate amount of drainage, did not disrupt Grafix, 4X4 fluff, Kerlix and ace wrap applied    ASSESSMENT:  Patient Active Problem List   Diagnosis     History of skin cancer     Pelvic floor dysfunction     Urge incontinence of urine     Pulmonary nodule, right     Long-term (current) use of anticoagulants [Z79.01]     Adjustment disorder with depressed mood      Primary insomnia     Gangrene (H)     Toe gangrene (H)     Ulcer of left lower extremity with fat layer exposed (H)     PLAN:  - Keep right foot dressing intact. Plans for outer dressing change on 7/14/2017 with vascular APRN and Grafix wash off on 7/17/2017    - continue DH2 shoes and non-weight bearing to right leg     - Dr. Torres managing left foot wounds.    - continue Arixtra, ASA, Lipitor     - asked  to meet with patient and her daughter regarding options for home care and/or assisted living options.      Please do not hesistate to contact Dr. Santamaria's vascular surgery team with any questions.     Again, thank you for allowing me to participate in the care of your patient.      Sincerely,    CHACORTA Ruiz CNS

## 2017-07-11 NOTE — NURSING NOTE
Chief Complaint   Patient presents with     RECHECK     Follow up on right foot wound       Vitals:    07/11/17 1343   BP: 113/78   BP Location: Right arm   Pulse: 57   Resp: 16   SpO2: 93%       There is no height or weight on file to calculate BMI.         Jeannie Baker LPN

## 2017-07-11 NOTE — PATIENT INSTRUCTIONS
With questions, concerns, or to request an appointment, please call either:    Edelmira Rodríguez, Care Coordinator RN, Vascular Surgery  [phone]    Vascular Call Center  [phone]    To contact someone after 5 pm, on a weekend, or on a Holiday, please call:  [facility]  [phone], option 4 to have a member of the Vascular Surgery Service paged.

## 2017-07-11 NOTE — PROGRESS NOTES
VASCULAR SURGERY CLINIC ESTABLISHED PATIENT NOTE    HPI:    Carlos Alberto Fenton is a 76 year old female who underwent Right Great Toe amputation, debriedment of 2nd and 3rd toes and application of Grafix with Dr. Santamaria on 5/26/2017.  She underwent right foot irrigation and debridement with Grafix application with Dr. Santamaria on 7/7/2017.   She is wearing DH2 shoes. She is being followed by Dr. Hatfield for weekly wound cares for her left foot.  She returns to clinic today for outer dressing change.      SUBJECTIVE:  She denies any fever, chills, night sweats, HA or pain.  She offers no specific complaints.     OBJECTIVE:  Vital signs:  /78 (BP Location: Right arm)  Pulse 57  Resp 16  SpO2 93%  Breastfeeding? No        Prior to Admission medications    Medication Sig Start Date End Date Taking? Authorizing Provider   acetaminophen (TYLENOL) 325 MG tablet Take 3 tablets (975 mg) by mouth every 6 hours as needed for mild pain 5/26/17   Rosalinda Ron APRN CNP   fondaparinux (ARIXTRA) 7.5MG/0.6ML injection Inject 0.6 mLs (7.5 mg) Subcutaneous every morning *Do not take Saturday, May 27, 2017  *Do not take Adolfo, May 28, 2017  *May resuming taking on Monday, May 29, 2017 5/26/17   Rosalinda Ron APRN CNP   HYDROcodone-acetaminophen (NORCO) 5-325 MG per tablet Take 1-2 tablets by mouth every 4 hours as needed for moderate to severe pain maximum 12 tablet(s) per day. Do not take more than 4, 000 mg of Tylenol (Acetaminophen) per day. Use caution if taking extra Tylenol (Acetaminophen) 5/26/17   Rosalinda Ron APRN CNP   ONE TOUCH ULTRA test strip USE AS DIRECTED TO TEST ONE TIME A DAY 4/11/17   Nuha Bateman MD   atorvastatin (LIPITOR) 40 MG tablet Take 1 tablet (40 mg) by mouth daily  Patient taking differently: Take 40 mg by mouth every morning  3/31/17   Humberto Conner MD   furosemide (LASIX) 40 MG tablet Take 1 tablet (40 mg) by mouth 2 times daily 3/31/17   Humberto Conner MD   gabapentin  (NEURONTIN) 100 MG capsule Take 1 capsule (100 mg) by mouth 2 times daily 3/31/17   Humberto Conner MD   traZODone (DESYREL) 50 MG tablet Take 0.5 tablets (25 mg) by mouth nightly as needed for sleep 3/31/17   Humberto Connre MD   venlafaxine (EFFEXOR-ER) 150 MG TB24 24 hr tablet Take 1 tablet (150 mg) by mouth daily (with breakfast) 3/31/17   Humberto Conner MD   potassium chloride SA (K-DUR/KLOR-CON M) 10 MEQ CR tablet Take 1 tablet (10 mEq) by mouth 2 times daily 3/31/17   Humberto Conner MD   aspirin 81 MG chewable tablet Take 1 tablet (81 mg) by mouth daily  Patient taking differently: Take 81 mg by mouth every morning  3/2/17   Britt Whelan MD   ferrous sulfate (IRON SUPPLEMENT) 325 (65 FE) MG tablet Take 1 tablet (325 mg) by mouth daily (with breakfast)  Patient taking differently: Take 325 mg by mouth 2 times daily  2/17/17   Jennifer Staley, APRN CNP   ONETOUCH DELICA LANCETS 33G MISC 1 Device daily 1/16/17   Nuha Bateman MD   blood glucose monitoring (ONE TOUCH ULTRA 2) meter device kit  11/9/16   Reported, Patient       PHYSICAL EXAM:  NEURO/PSYCH: The patient is alert and oriented.  Appropriate.  Moves all extremities.   SKIN: Color appropriate for race, warm, dry.  PULMONARY: non-labored breathing   EXTREMITIES: right foot outer dressing removed, moderate amount of drainage, did not disrupt Grafix, 4X4 fluff, Kerlix and ace wrap applied        ASSESSMENT:  Patient Active Problem List   Diagnosis     History of skin cancer     Pelvic floor dysfunction     Urge incontinence of urine     Pulmonary nodule, right     Long-term (current) use of anticoagulants [Z79.01]     Adjustment disorder with depressed mood     Primary insomnia     Gangrene (H)     Toe gangrene (H)     Ulcer of left lower extremity with fat layer exposed (H)           PLAN:  - Keep right foot dressing intact. Plans for outer dressing change on 7/14/2017 with vascular APRN and Grafix wash off on 7/17/2017    - continue  DH2 shoes and non-weight bearing to right leg     - Dr. Torres managing left foot wounds.    - continue Arixtra, ASA, Lipitor     - asked  to meet with patient and her daughter regarding options for home care and/or assisted living options.      Please do not hesistate to contact Dr. Santamaria's vascular surgery team with any questions.     Meena WHATLEY, CNS  Division of Vascular Surgery  [facility]  Pager [phone]

## 2017-07-12 NOTE — OP NOTE
DATE OF SERVICE: 07/07/17     PREOPERATIVE DIAGNOSIS:  Right 1st and 2nd toe wounds and gangrene, embolic, after cardiac surgery        POSTOPERATIVE DIAGNOSIS:  Right 1st and 2nd toe wounds and gangrene, embolic, after cardiac surgery    PROCEDURES: Debridement of the right 1st and 2nd toe wounds and bone, placement of skin substitute      ATTENDING SURGEON: Leah Santamaria MD       FELLOW SURGEON:  Kalani    ANESTHESIA:  Block, MAC      COMPLICATIONS: None    INDICATIONS: This is a pleasant 76 year old female who had embolized to bilateral feet after cardiac surgery.  The right great toe had demarcated quite a bit, and she underwent previous washout and amputation of her distal toe.  She now comes back for a washout.  She tells me she is not ready for amputation of the right great toe, and would like to try another application of the skin subsitute prior to amputation.      PROCEDURE:  The patient was consented and taken to the operating room. Mac and block anesthesia was performed. The patient was prepped and draped in sterile fashion and timeout procedure was performed.   The right great toe was much improved since last procedure, with no bone being exposed.  The area was debrided back and healthy granulation tissue was identified throughout.  The toe was irrigated using 3L of pulse irrigation and brown volcano.  We then placed a 5x5 grafix core on the first toe and a small cut piece on the tip of the 2nd toe which is almost healed.  These were secured in place using steristripes and adaptic.  Dressing was placed.  She tolerated the procedure well and was transferred to recovery room in stable condition.  Size of wound on R great toe 3.9x4cm, and right 2nd toe 0.5x0.5cm.     Leah Santamaria MD

## 2017-07-13 ENCOUNTER — DOCUMENTATION ONLY (OUTPATIENT)
Dept: CARE COORDINATION | Facility: CLINIC | Age: 77
End: 2017-07-13

## 2017-07-13 NOTE — PROGRESS NOTES
[facility] utilizes an encounter to take the place of a direct phone call to your office. Please take a moment to review the below request. Your reply to this encounter will act as a verbal OK of orders if requested below. Thank you.    ORDER  Pt seen for in home PT eval 7/13/17, plan to continue 1w1 wk of 7/16/17 for gait training/home safety.

## 2017-07-14 ENCOUNTER — MYC MEDICAL ADVICE (OUTPATIENT)
Dept: INTERNAL MEDICINE | Facility: CLINIC | Age: 77
End: 2017-07-14

## 2017-07-14 ENCOUNTER — OFFICE VISIT (OUTPATIENT)
Dept: VASCULAR SURGERY | Facility: CLINIC | Age: 77
End: 2017-07-14

## 2017-07-14 VITALS
RESPIRATION RATE: 14 BRPM | SYSTOLIC BLOOD PRESSURE: 117 MMHG | OXYGEN SATURATION: 98 % | DIASTOLIC BLOOD PRESSURE: 67 MMHG | HEART RATE: 51 BPM

## 2017-07-14 DIAGNOSIS — S81.801D OPEN WOUND OF LOWER LIMB, RIGHT, SUBSEQUENT ENCOUNTER: Primary | ICD-10-CM

## 2017-07-14 DIAGNOSIS — I25.119 CORONARY ARTERY DISEASE WITH ANGINA PECTORIS, UNSPECIFIED VESSEL OR LESION TYPE, UNSPECIFIED WHETHER NATIVE OR TRANSPLANTED HEART (H): ICD-10-CM

## 2017-07-14 RX ORDER — GAUZE BANDAGE 4" X 4"
1 SPONGE TOPICAL 2 TIMES DAILY
Qty: 1 EACH | Refills: 3 | Status: SHIPPED | OUTPATIENT
Start: 2017-07-14 | End: 2017-07-14

## 2017-07-14 RX ORDER — GAUZE BANDAGE 3.4"X129"
1 BANDAGE TOPICAL 2 TIMES DAILY
Qty: 48 EACH | Refills: 3 | Status: SHIPPED | OUTPATIENT
Start: 2017-07-14 | End: 2017-08-04

## 2017-07-14 RX ORDER — ELASTIC BANDAGE 3"
1 BANDAGE TOPICAL 2 TIMES DAILY
Qty: 6 EACH | Refills: 3 | Status: SHIPPED | OUTPATIENT
Start: 2017-07-14 | End: 2017-08-04

## 2017-07-14 RX ORDER — GAUZE BANDAGE 4" X 4"
1 SPONGE TOPICAL 2 TIMES DAILY
Qty: 1 EACH | Refills: 3 | Status: ON HOLD | OUTPATIENT
Start: 2017-07-14 | End: 2018-04-04

## 2017-07-14 ASSESSMENT — PAIN SCALES - GENERAL: PAINLEVEL: NO PAIN (0)

## 2017-07-14 NOTE — MR AVS SNAPSHOT
After Visit Summary   7/14/2017    Carlos Alberto Fenton    MRN: [id number]           Patient Information     Date Of Birth          1940        Visit Information        Provider Department      7/14/2017 2:00 PM Rosalinda Ron APRN CNP [facility]        Today's Diagnoses     Open wound of lower limb, right, subsequent encounter    -  1       Follow-ups after your visit        Your next 10 appointments already scheduled     Jul 17, 2017  1:30 PM CDT   (Arrive by 1:15 PM)   Return Vascular Visit with CHACORTA Macias   [facility] ([facility])    [address] [place] [address]  3rd [facility] [address] [phone]            Jul 20, 2017  4:30 PM CDT   (Arrive by 4:15 PM)   Return Visit with Easton Torres DPM   [facility] ([facility])    [address] [place] [address]  [address] [facility] [address] [phone]            Sep 01, 2017  2:00 PM CDT   (Arrive by 1:45 PM)   Return Visit with Star Lobato MD   [facility] ([facility])    [address] [place] [address]  3rd [facility] [id number] [phone]            Sep 11, 2017  2:00 PM CDT   (Arrive by 1:45 PM)   HOLTER MONITOR VISIT with  Cvc Monitor [facility] ([facility])    [address] [place] [address]  3[address] [facility] [address] [phone]            Oct 19, 2017  2:30 PM CDT   (Arrive by 2:15 PM)   RETURN ATRIAL FIBULATION VISIT with CHACORTA Joshi CNP   [facility] ([facility])    [address] [place] [address]  [address] [facility] [id number] [phone]              Who to contact     Please call your clinic at [phone] to:    Ask questions about your health    Make or cancel appointments    Discuss your medicines    Learn about your test results    Speak to your doctor   If you have  compliments or concerns about an experience at your clinic, or if you wish to file a complaint, please contact [facility] Patient Relations at [phone] or email us at [email or web address]         Additional Information About Your Visit        MyChart Information     [employer] gives you secure access to your electronic health record. If you see a primary care provider, you can also send messages to your care team and make appointments. If you have questions, please call your primary care clinic.  If you do not have a primary care provider, please call [phone] and they will assist you.      [employer] is an electronic gateway that provides easy, online access to your medical records. With [employer], you can request a clinic appointment, read your test results, renew a prescription or communicate with your care team.     To access your existing account, please contact your [facility] Clinic or call [phone] for assistance.        Care EveryWhere ID     This is your Care EveryWhere ID. This could be used by other organizations to access your [place] medical records  [id number]        Your Vitals Were     Pulse Respirations Pulse Oximetry             51 14 98%          Blood Pressure from Last 3 Encounters:   07/14/17 117/67   07/11/17 113/78   07/07/17 116/74    Weight from Last 3 Encounters:   07/07/17 167 lb 8.8 oz   05/26/17 164 lb 10.9 oz   05/24/17 164 lb 14.4 oz              Today, you had the following     No orders found for display         Today's Medication Changes          These changes are accurate as of: 7/14/17  2:26 PM.  If you have any questions, ask your nurse or doctor.               Start taking these medicines.        Dose/Directions    ACE BANDAGE SELF-ADHERING Misc   Used for:  Open wound of lower limb, right, subsequent encounter        Dose:  1 each   1 each 2 times daily   Quantity:  6 each   Refills:  3       ADAPTIC  NON-ADHERING DRESSING Pads   Used for:  Open wound of lower limb, right, subsequent encounter        Dose:  1 each   Externally apply 1 each topically 2 times daily   Quantity:  1 each   Refills:  3       KERLIX GAUZE ROLL MEDIUM Misc   Used for:  Open wound of lower limb, right, subsequent encounter        Dose:  1 each   1 each 2 times daily   Quantity:  48 each   Refills:  3       order for DME   Used for:  Open wound of lower limb, right, subsequent encounter        Sterile 4x4 gauze; Use gauze twice daily for dressing changes.   Quantity:  1 Box   Refills:  3         These medicines have changed or have updated prescriptions.        Dose/Directions    aspirin 81 MG chewable tablet   This may have changed:  when to take this   Used for:  Coronary artery disease with angina pectoris, unspecified vessel or lesion type, unspecified whether native or transplanted heart (H)        Dose:  81 mg   Take 1 tablet (81 mg) by mouth daily   Quantity:  30 tablet   Refills:  0       ferrous sulfate 325 (65 FE) MG tablet   Commonly known as:  IRON SUPPLEMENT   This may have changed:  when to take this   Used for:  S/P CABG (coronary artery bypass graft)        Dose:  325 mg   Take 1 tablet (325 mg) by mouth daily (with breakfast)   Quantity:  100 tablet   Refills:  1            Where to get your medicines      These medications were sent to [facility] [address] [place] [address]     Phone:  [phone]     ADAPTIC NON-ADHERING DRESSING Pads         Some of these will need a paper prescription and others can be bought over the counter.  Ask your nurse if you have questions.     Bring a paper prescription for each of these medications     ACE BANDAGE SELF-ADHERING Misc    KERLIX GAUZE ROLL MEDIUM Misc    order for DME                Primary Care Provider Office Phone # Fax #    Humberto Conner [phone]       [address]  [place] [address]        Equal Access to Services     [facility]: Hadii william flores saurabh Soedwige, waaxda luqadaha, qaybta kaalmagucci main, scott newberrympcinthya mattson. So [facility] [phone].    ATENCIÓN: Si habla español, tiene a espinoza disposición servicios gratuitos de asistencia lingüística. Jose Albertoame al [phone].    We comply with applicable federal civil rights laws and [place] laws. We do not discriminate on the basis of race, color, national origin, age, disability sex, sexual orientation or gender identity.            Thank you!     Thank you for choosing [facility] VASCULAR CLINIC  for your care. Our goal is always to provide you with excellent care. Hearing back from our patients is one way we can continue to improve our services. Please take a few minutes to complete the written survey that you may receive in the mail after your visit with us. Thank you!             Your Updated Medication List - Protect others around you: Learn how to safely use, store and throw away your medicines at www.disposemymeds.org.          This list is accurate as of: 7/14/17  2:26 PM.  Always use your most recent med list.                   Brand Name Dispense Instructions for use Diagnosis    ACE BANDAGE SELF-ADHERING Misc     6 each    1 each 2 times daily    Open wound of lower limb, right, subsequent encounter       acetaminophen 325 MG tablet    TYLENOL    100 tablet    Take 3 tablets (975 mg) by mouth every 6 hours as needed for mild pain    S/P CABG (coronary artery bypass graft)       ADAPTIC NON-ADHERING DRESSING Pads     1 each    Externally apply 1 each topically 2 times daily    Open wound of lower limb, right, subsequent encounter       amoxicillin-clavulanate 875-125 MG per tablet    AUGMENTIN    28 tablet    Take 1 tablet by mouth 2 times daily    Toe gangrene (H)       aspirin 81 MG chewable tablet     30 tablet    Take 1 tablet (81 mg) by mouth daily    Coronary artery disease with angina  pectoris, unspecified vessel or lesion type, unspecified whether native or transplanted heart (H)       atorvastatin 40 MG tablet    LIPITOR    90 tablet    Take 1 tablet (40 mg) by mouth daily    Coronary artery disease with angina pectoris, unspecified vessel or lesion type, unspecified whether native or transplanted heart (H)       blood glucose monitoring meter device kit           ferrous sulfate 325 (65 FE) MG tablet    IRON SUPPLEMENT    100 tablet    Take 1 tablet (325 mg) by mouth daily (with breakfast)    S/P CABG (coronary artery bypass graft)       fondaparinux 7.5MG/0.6ML injection    ARIXTRA    10 Syringe    Inject 0.6 mLs (7.5 mg) Subcutaneous every morning *Do not take Saturday, May 27, 2017 *Do not take Adolfo, May 28, 2017 *May resuming taking on Monday, May 29, 2017    Long-term (current) use of anticoagulants       furosemide 40 MG tablet    LASIX    180 tablet    Take 1 tablet (40 mg) by mouth 2 times daily    Bilateral edema of lower extremity       gabapentin 100 MG capsule    NEURONTIN    180 capsule    Take 1 capsule (100 mg) by mouth 2 times daily    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       HYDROcodone-acetaminophen 5-325 MG per tablet    NORCO    72 tablet    Take 1-2 tablets by mouth every 4 hours as needed for moderate to severe pain maximum 12 tablet(s) per day. Do not take more than 4, 000 mg of Tylenol (Acetaminophen) per day. Use caution if taking extra Tylenol (Acetaminophen)    Ulcer of left lower extremity with fat layer exposed (H)       KERLIX GAUZE ROLL MEDIUM Misc     48 each    1 each 2 times daily    Open wound of lower limb, right, subsequent encounter       ONE TOUCH ULTRA test strip   Generic drug:  blood glucose monitoring     100 each    USE AS DIRECTED TO TEST ONE TIME A DAY    Type 2 diabetes mellitus without complication, without long-term current use of insulin (H)       ONETOUCH DELICA LANCETS 33G Misc     100 each    1 Device daily    Type 2 diabetes  mellitus without complication, without long-term current use of insulin (H)       order for DME     1 Box    Sterile 4x4 gauze; Use gauze twice daily for dressing changes.    Open wound of lower limb, right, subsequent encounter       potassium chloride SA 10 MEQ CR tablet    K-DUR/KLOR-CON M    180 tablet    Take 1 tablet (10 mEq) by mouth 2 times daily    S/P CABG (coronary artery bypass graft)       traZODone 50 MG tablet    DESYREL    45 tablet    Take 0.5 tablets (25 mg) by mouth nightly as needed for sleep    Primary insomnia       venlafaxine 150 MG Tb24 24 hr tablet    EFFEXOR-ER    90 each    Take 1 tablet (150 mg) by mouth daily (with breakfast)    Adjustment disorder with depressed mood

## 2017-07-14 NOTE — NURSING NOTE
"Chief Complaint   Patient presents with     RECHECK      Post op recheck.       Initial /67  Pulse 51  Resp 14  SpO2 98% Estimated body mass index is 30.65 kg/(m^2) as calculated from the following:    Height as of 7/7/17: 5' 2\".    Weight as of 7/7/17: 167 lb 8.8 oz.  Medication Reconciliation: complete     Aziza Thomas CMA    "

## 2017-07-14 NOTE — LETTER
7/14/2017       RE: Carlos Alberto Fenton  [address]  [facility] [address]     Dear Colleague,    Thank you for referring your patient, Carlos Alberto Fenton, to the [facility]ASCULAR [facility] at [facility]. Please see a copy of my visit note below.    VASCULAR SURGERY CLINIC ESTABLISHED PATIENT NOTE    HPI:  Carlos Alberto Fenton is a 76 year old female who underwent Right Great Toe amputation, debriedment of 2nd and 3rd toes and application of Grafix with Dr. Santamaria on 5/26/2017.  She underwent right foot irrigation and debridement with Grafix application with Dr. Santamaria on 7/7/2017. She is wearing DH2 shoes. She is being followed by Dr. Hatfield for weekly wound cares for her left foot.  She returns to clinic today for outer dressing change on the right foot.    SUBJECTIVE: Voice doing well, offering no specific complaints.     OBJECTIVE:  Vital signs:  117/67  51  14    Prior to Admission medications    Medication Sig Start Date End Date Taking? Authorizing Provider   Wound Dressings (ADAPTIC NON-ADHERING DRESSING) PADS Externally apply 1 each topically 2 times daily 7/14/17  Yes Rosalinda Ron APRN CNP   order for DME Sterile 4x4 gauze; Use gauze twice daily for dressing changes. 7/14/17  Yes Rosalinda Ron APRN CNP   Gauze Pads & Dressings (KERLIX GAUZE ROLL MEDIUM) MISC 1 each 2 times daily 7/14/17  Yes Rosalinda Ron APRN CNP   Elastic Bandages & Supports (ACE BANDAGE SELF-ADHERING) MISC 1 each 2 times daily 7/14/17  Yes Rosalinda Ron APRN CNP   atorvastatin (LIPITOR) 40 MG tablet Take 1 tablet (40 mg) by mouth daily 6/28/17  Yes Humberto Conner MD   furosemide (LASIX) 40 MG tablet Take 1 tablet (40 mg) by mouth 2 times daily 6/28/17  Yes Humberto Conner MD   potassium chloride SA (K-DUR/KLOR-CON M) 10 MEQ CR tablet Take 1 tablet (10 mEq) by mouth 2 times daily 6/28/17  Yes Humberto Conner MD   traZODone (DESYREL) 50 MG tablet Take 0.5 tablets (25 mg) by mouth nightly  as needed for sleep 6/28/17  Yes Humberto Conner MD   acetaminophen (TYLENOL) 325 MG tablet Take 3 tablets (975 mg) by mouth every 6 hours as needed for mild pain 5/26/17  Yes Rosalinda Ron APRN CNP   fondaparinux (ARIXTRA) 7.5MG/0.6ML injection Inject 0.6 mLs (7.5 mg) Subcutaneous every morning *Do not take Saturday, May 27, 2017  *Do not take Adolfo, May 28, 2017  *May resuming taking on Monday, May 29, 2017 5/26/17  Yes Rosalinda Ron APRN CNP   ONE TOUCH ULTRA test strip USE AS DIRECTED TO TEST ONE TIME A DAY 4/11/17  Yes Nuha Bateman MD   gabapentin (NEURONTIN) 100 MG capsule Take 1 capsule (100 mg) by mouth 2 times daily 3/31/17  Yes Humberto Conner MD   venlafaxine (EFFEXOR-ER) 150 MG TB24 24 hr tablet Take 1 tablet (150 mg) by mouth daily (with breakfast) 3/31/17  Yes Humberto Conner MD   aspirin 81 MG chewable tablet Take 1 tablet (81 mg) by mouth daily  Patient taking differently: Take 81 mg by mouth every morning  3/2/17  Yes Britt Whelan MD   ferrous sulfate (IRON SUPPLEMENT) 325 (65 FE) MG tablet Take 1 tablet (325 mg) by mouth daily (with breakfast)  Patient taking differently: Take 325 mg by mouth 2 times daily  2/17/17  Yes Jennifer Staley APRN CNP   ONETOUCH DELICA LANCETS 33G MISC 1 Device daily 1/16/17  Yes Nuha Bateman MD   blood glucose monitoring (ONE TOUCH ULTRA 2) meter device kit  11/9/16  Yes Reported, Patient   amoxicillin-clavulanate (AUGMENTIN) 875-125 MG per tablet Take 1 tablet by mouth 2 times daily  Patient not taking: Reported on 7/14/2017 7/7/17   Rosalinda Ron APRN CNP   HYDROcodone-acetaminophen (NORCO) 5-325 MG per tablet Take 1-2 tablets by mouth every 4 hours as needed for moderate to severe pain maximum 12 tablet(s) per day. Do not take more than 4, 000 mg of Tylenol (Acetaminophen) per day. Use caution if taking extra Tylenol (Acetaminophen)  Patient not taking: Reported on 7/14/2017 5/26/17   Rosalinda Ron, CHACORTA CNP       PHYSICAL  EXAM:  NEURO/PSYCH: The patient is alert and oriented.  Appropriate.  Moves all extremities.   SKIN: Color appropriate for race, warm, dry.  PULMONARY: Does not require supplemental oxygen; no acute distress.    EXTREMITIES: Outer dressing change right foot. Non-malodorous. No obvious signs of infection    ASSESSMENT/PLAN:  #1 Dry gangrene, right foot great and second toe, secondary to microembolization or Heparin- Induce Thrombocytopenia  #2 Status post Right Great Toe Amputation, Debridement of Toes 2 and 3, and application of Grafix with Dr. Santamaria on 5/26/2017  #3 Irrigation and debridement right foot, Grafix application, 7/7/2017  - Meena will wash off Grafix on 7/17/2017  - continue non-weight bearing  - left foot wound care per Dr. Torres    ANTI-PLATELET: Aspirin  ANTI-COAGULANT: Arixtra  STATIN: Atorvastatin  DIABETES MEDICATIONS: Not-diabetic  TOBACCO CESSATION: Non-smoker     Please contact Dr. Santamaria's Vascular Surgery Service with questions or concerns.     Rosalinda Ron, APRN, CNP  Division of Vascular Surgery  [facility]  Pager: [phone]

## 2017-07-14 NOTE — PROGRESS NOTES
VASCULAR SURGERY CLINIC ESTABLISHED PATIENT NOTE    HPI:  Carlos Alberto Fenton is a 76 year old female who underwent Right Great Toe amputation, debriedment of 2nd and 3rd toes and application of Grafix with Dr. Santamaria on 5/26/2017.  She underwent right foot irrigation and debridement with Grafix application with Dr. Santamaria on 7/7/2017. She is wearing DH2 shoes. She is being followed by Dr. Hatfield for weekly wound cares for her left foot.  She returns to clinic today for outer dressing change on the right foot.    SUBJECTIVE: Voice doing well, offering no specific complaints.     OBJECTIVE:  Vital signs:  117/67  51  14    Prior to Admission medications    Medication Sig Start Date End Date Taking? Authorizing Provider   Wound Dressings (ADAPTIC NON-ADHERING DRESSING) PADS Externally apply 1 each topically 2 times daily 7/14/17  Yes Rosalinda Ron APRN CNP   order for DME Sterile 4x4 gauze; Use gauze twice daily for dressing changes. 7/14/17  Yes Rosalinda Ron APRN CNP   Gauze Pads & Dressings (KERLIX GAUZE ROLL MEDIUM) MISC 1 each 2 times daily 7/14/17  Yes Rosalinda Ron APRN CNP   Elastic Bandages & Supports (ACE BANDAGE SELF-ADHERING) MISC 1 each 2 times daily 7/14/17  Yes Rosalinda Ron APRN CNP   atorvastatin (LIPITOR) 40 MG tablet Take 1 tablet (40 mg) by mouth daily 6/28/17  Yes Humberto Conner MD   furosemide (LASIX) 40 MG tablet Take 1 tablet (40 mg) by mouth 2 times daily 6/28/17  Yes Humberto Conner MD   potassium chloride SA (K-DUR/KLOR-CON M) 10 MEQ CR tablet Take 1 tablet (10 mEq) by mouth 2 times daily 6/28/17  Yes Humberto Conner MD   traZODone (DESYREL) 50 MG tablet Take 0.5 tablets (25 mg) by mouth nightly as needed for sleep 6/28/17  Yes Humberto Conner MD   acetaminophen (TYLENOL) 325 MG tablet Take 3 tablets (975 mg) by mouth every 6 hours as needed for mild pain 5/26/17  Yes Ron, Rosalinda J, APRN CNP   fondaparinux (ARIXTRA) 7.5MG/0.6ML injection Inject 0.6 mLs (7.5 mg)  Subcutaneous every morning *Do not take Saturday, May 27, 2017  *Do not take Adolfo, May 28, 2017  *May resuming taking on Monday, May 29, 2017 5/26/17  Yes Rosalinda Ron APRN CNP   ONE TOUCH ULTRA test strip USE AS DIRECTED TO TEST ONE TIME A DAY 4/11/17  Yes Nuha Bateman MD   gabapentin (NEURONTIN) 100 MG capsule Take 1 capsule (100 mg) by mouth 2 times daily 3/31/17  Yes Humberto Conner MD   venlafaxine (EFFEXOR-ER) 150 MG TB24 24 hr tablet Take 1 tablet (150 mg) by mouth daily (with breakfast) 3/31/17  Yes Humberto Conner MD   aspirin 81 MG chewable tablet Take 1 tablet (81 mg) by mouth daily  Patient taking differently: Take 81 mg by mouth every morning  3/2/17  Yes Britt Whelan MD   ferrous sulfate (IRON SUPPLEMENT) 325 (65 FE) MG tablet Take 1 tablet (325 mg) by mouth daily (with breakfast)  Patient taking differently: Take 325 mg by mouth 2 times daily  2/17/17  Yes Jennifer Staley APRN CNP   ONETOUCH DELICA LANCETS 33G MISC 1 Device daily 1/16/17  Yes Nhua Bateman MD   blood glucose monitoring (ONE TOUCH ULTRA 2) meter device kit  11/9/16  Yes Reported, Patient   amoxicillin-clavulanate (AUGMENTIN) 875-125 MG per tablet Take 1 tablet by mouth 2 times daily  Patient not taking: Reported on 7/14/2017 7/7/17   Rosalinda Ron APRN CNP   HYDROcodone-acetaminophen (NORCO) 5-325 MG per tablet Take 1-2 tablets by mouth every 4 hours as needed for moderate to severe pain maximum 12 tablet(s) per day. Do not take more than 4, 000 mg of Tylenol (Acetaminophen) per day. Use caution if taking extra Tylenol (Acetaminophen)  Patient not taking: Reported on 7/14/2017 5/26/17   Rosalinda Ron APRN CNP       PHYSICAL EXAM:  NEURO/PSYCH: The patient is alert and oriented.  Appropriate.  Moves all extremities.   SKIN: Color appropriate for race, warm, dry.  PULMONARY: Does not require supplemental oxygen; no acute distress.    EXTREMITIES: Outer dressing change right foot. Non-malodorous.  No obvious signs of infection    ASSESSMENT/PLAN:  #1 Dry gangrene, right foot great and second toe, secondary to microembolization or Heparin- Induce Thrombocytopenia  #2 Status post Right Great Toe Amputation, Debridement of Toes 2 and 3, and application of Grafix with Dr. Santamaria on 5/26/2017  #3 Irrigation and debridement right foot, Grafix application, 7/7/2017  - Meena will wash off Grafix on 7/17/2017  - continue non-weight bearing  - left foot wound care per Dr. Torres    ANTI-PLATELET: Aspirin  ANTI-COAGULANT: Arixtra  STATIN: Atorvastatin  DIABETES MEDICATIONS: Not-diabetic  TOBACCO CESSATION: Non-smoker     Please contact Dr. Santamaria's Vascular Surgery Service with questions or concerns.     CHACORTA Lewis, CNP  Division of Vascular Surgery  [facility]  Pager: [phone]

## 2017-07-17 ENCOUNTER — OFFICE VISIT (OUTPATIENT)
Dept: VASCULAR SURGERY | Facility: CLINIC | Age: 77
End: 2017-07-17

## 2017-07-17 VITALS
HEART RATE: 65 BPM | SYSTOLIC BLOOD PRESSURE: 122 MMHG | DIASTOLIC BLOOD PRESSURE: 74 MMHG | OXYGEN SATURATION: 94 % | RESPIRATION RATE: 18 BRPM

## 2017-07-17 DIAGNOSIS — E11.9 TYPE 2 DIABETES MELLITUS WITHOUT COMPLICATION, WITHOUT LONG-TERM CURRENT USE OF INSULIN (H): ICD-10-CM

## 2017-07-17 DIAGNOSIS — I96 TOE GANGRENE (H): ICD-10-CM

## 2017-07-17 RX ORDER — PANTOPRAZOLE SODIUM 40 MG/1
40 TABLET, DELAYED RELEASE ORAL EVERY MORNING
Qty: 90 TABLET | Refills: 1 | Status: SHIPPED | OUTPATIENT
Start: 2017-07-17 | End: 2017-08-19

## 2017-07-17 ASSESSMENT — PAIN SCALES - GENERAL: PAINLEVEL: NO PAIN (0)

## 2017-07-17 NOTE — PROGRESS NOTES
VASCULAR SURGERY PROGRESS NOTE    HPI:    Carlos Alberto Fenton is a 76 year old female who underwent Right Great Toe amputation, debriedment of 2nd and 3rd toes and application of Grafix with Dr. Santamaria on 5/26/2017.  She underwent right foot irrigation and debridement with Grafix application with Dr. Santamaria on 7/7/2017. She is wearing DH2 shoes. She is being followed by Dr. Hatfield for weekly wound cares for her left foot.  She returns to clinic today for dressing change and Grafix washout on the right foot.    SUBJECTIVE:  She denies any fever, chills, night sweats or pain.  Offers no specific complaints.    OBJECTIVE:  Vital signs:      BP: 122/74 Pulse: 65   Resp: 18 SpO2: 94 %        PHYSICAL EXAM:  NEURO/PSYCH: The patient is alert and oriented.  Appropriate.  Moves all extremities.    SKIN: Color appropriate for race, warm, dry.  PULMONARY: no acute distress  EXTREMITIES: right foot Grafix washed off, non-malodorous drainage, granulation tissue present, Adaptic, 4x4 fluff, Kerlix and ace wrap applied.    Current Outpatient Prescriptions   Medication Sig Dispense Refill     amoxicillin-clavulanate (AUGMENTIN) 875-125 MG per tablet Take 1 tablet by mouth 2 times daily 28 tablet 3     pantoprazole (PROTONIX) 40 MG EC tablet Take 1 tablet (40 mg) by mouth every morning 90 tablet 1     Wound Dressings (ADAPTIC NON-ADHERING DRESSING) PADS Externally apply 1 each topically 2 times daily 1 each 3     order for DME Sterile 4x4 gauze; Use gauze twice daily for dressing changes. 1 Box 3     Gauze Pads & Dressings (KERLIX GAUZE ROLL MEDIUM) MISC 1 each 2 times daily 48 each 3     Elastic Bandages & Supports (ACE BANDAGE SELF-ADHERING) MISC 1 each 2 times daily 6 each 3     atorvastatin (LIPITOR) 40 MG tablet Take 1 tablet (40 mg) by mouth daily 90 tablet 1     furosemide (LASIX) 40 MG tablet Take 1 tablet (40 mg) by mouth 2 times daily 180 tablet 1     potassium chloride SA (K-DUR/KLOR-CON M) 10 MEQ CR tablet Take 1 tablet (10  mEq) by mouth 2 times daily 180 tablet 1     traZODone (DESYREL) 50 MG tablet Take 0.5 tablets (25 mg) by mouth nightly as needed for sleep 45 tablet 2     acetaminophen (TYLENOL) 325 MG tablet Take 3 tablets (975 mg) by mouth every 6 hours as needed for mild pain 100 tablet 0     fondaparinux (ARIXTRA) 7.5MG/0.6ML injection Inject 0.6 mLs (7.5 mg) Subcutaneous every morning *Do not take Saturday, May 27, 2017  *Do not take Adolfo, May 28, 2017  *May resuming taking on Monday, May 29, 2017 10 Syringe 0     HYDROcodone-acetaminophen (NORCO) 5-325 MG per tablet Take 1-2 tablets by mouth every 4 hours as needed for moderate to severe pain maximum 12 tablet(s) per day. Do not take more than 4, 000 mg of Tylenol (Acetaminophen) per day. Use caution if taking extra Tylenol (Acetaminophen) (Patient not taking: Reported on 7/14/2017) 72 tablet 0     ONE TOUCH ULTRA test strip USE AS DIRECTED TO TEST ONE TIME A  each 0     gabapentin (NEURONTIN) 100 MG capsule Take 1 capsule (100 mg) by mouth 2 times daily 180 capsule 1     venlafaxine (EFFEXOR-ER) 150 MG TB24 24 hr tablet Take 1 tablet (150 mg) by mouth daily (with breakfast) 90 each 1     aspirin 81 MG chewable tablet Take 1 tablet (81 mg) by mouth daily (Patient taking differently: Take 81 mg by mouth every morning ) 30 tablet 0     ferrous sulfate (IRON SUPPLEMENT) 325 (65 FE) MG tablet Take 1 tablet (325 mg) by mouth daily (with breakfast) (Patient taking differently: Take 325 mg by mouth 2 times daily ) 100 tablet 1     ONETOUCH DELICA LANCETS 33G MISC 1 Device daily 100 each 0     blood glucose monitoring (ONE TOUCH ULTRA 2) meter device kit              ASSESSMENT/PLAN:  #1 Dry gangrene, right foot great and second toe, secondary to microembolization or Heparin- Induce Thrombocytopenia  #2 Status post Right Great Toe Amputation, Debridement of Toes 2 and 3, and application of Grafix with Dr. Santamaria on 5/26/2017  #3 Irrigation and debridement right foot, Grafix  application, 7/7/2017  - right foot dressing changes BID with Adaptic, 4 X 4's, Kerlix and ace wrap    - okay for weight bearing    - follow up with Dr. Santamaria in 2 weeks.    - continue antibiotics    - left foot wound care per Dr. Torres    Please do not hesitate to contact Dr. Santamaria's vascular surgery team with any questions.    Meena WHATLEY, CNS  Division of Vascular Surgery  [facility]  Pager [phone]

## 2017-07-17 NOTE — TELEPHONE ENCOUNTER
pantoprazole      Last Written Prescription Date: 3/31/17  Last Fill Quantity: 90,  # refills: 1   Last Office Visit with [facility], [facility] or [facility] prescribing provider: 3/22/17    Routing refill request to provider for review/approval because:  Drug not active on patient's medication list

## 2017-07-17 NOTE — PATIENT INSTRUCTIONS
Please change right foot dressing twice a day with adaptic, 4 X 4's, Kerlix and ace wrap          With questions, concerns, or to request an appointment, please call either:    Edelmira Rodríguez, Care Coordinator RN, Vascular Surgery  [phone]    Vascular Call Center  [phone]    To contact someone after 5 pm, on a weekend, or on a Holiday, please call:  [facility]  [phone], option 4 to have a member of the Vascular Surgery Service paged.

## 2017-07-17 NOTE — MR AVS SNAPSHOT
After Visit Summary   7/17/2017    Carlos Alberto Fenton    MRN: [id number]           Patient Information     Date Of Birth          1940        Visit Information        Provider Department      7/17/2017 1:30 PM Meena Cardoso APRN [facility]        Today's Diagnoses     Toe gangrene (H)          Care Instructions    Please change right foot dressing twice a day with adaptic, 4 X 4's, Kerlix and ace wrap          With questions, concerns, or to request an appointment, please call either:    Edelmira Rodríguez, Care Coordinator RN, Vascular Surgery  [phone]    Vascular Call Center  [phone]    To contact someone after 5 pm, on a weekend, or on a Holiday, please call:  [employer]  [phone], option 4 to have a member of the Vascular Surgery Service paged.          Follow-ups after your visit        Your next 10 appointments already scheduled     Jul 20, 2017  4:30 PM CDT   (Arrive by 4:15 PM)   Return Visit with Easton Torres DPM   [facility] ([facility])    [address] [place] [id number] [phone]            Jul 31, 2017  1:15 PM CDT   (Arrive by 1:00 PM)   Return Vascular Visit with Leah Santamaria MD   [facility] ([facility])    [address] [place] [id number] [phone]            Sep 01, 2017  2:00 PM CDT   (Arrive by 1:45 PM)   Return Visit with Star Lobato MD   [facility] ([facility])    [address] [place] [address]  3rd [facility] [id number] [phone]            Sep 11, 2017  2:00 PM CDT   (Arrive by 1:45 PM)   HOLTER MONITOR VISIT with  Cvc Monitor Tech, [facility] ([facility])    [address] [place] [id number] [phone]            Oct 19, 2017  2:30 PM CDT    (Arrive by 2:15 PM)   RETURN ATRIAL FIBULATION VISIT with CHACORTA Joshi [facility] ([facility])    [address] [address] [phone]              Who to contact     Please call your clinic at [phone] to:    Ask questions about your health    Make or cancel appointments    Discuss your medicines    Learn about your test results    Speak to your doctor   If you have compliments or concerns about an experience at your clinic, or if you wish to file a complaint, please contact [facility] Patient Relations at [phone] or email us at [email or web address]         Additional Information About Your Visit        [email or web address] Information     [employer] gives you secure access to your electronic health record. If you see a primary care provider, you can also send messages to your care team and make appointments. If you have questions, please call your primary care clinic.  If you do not have a primary care provider, please call [phone] and they will assist you.      "[employer] is an electronic gateway that provides easy, online access to your medical records. With "[employer] you can request a clinic appointment, read your test results, renew a prescription or communicate with your care team.     To access your existing account, please contact your [facility] or call [phone] for assistance.        Care EveryWhere ID     This is your Care EveryWhere ID. This could be used by other organizations to access your [facility] medical records  [id number]        Your Vitals Were     Pulse Respirations Pulse Oximetry Breastfeeding?          65 18 94% No         Blood Pressure from Last 3 Encounters:   07/17/17 122/74   07/14/17 117/67   07/11/17 113/78    Weight from Last 3 Encounters:   07/07/17 76 kg (167 lb 8.8 oz)   05/26/17 74.7 kg (164 lb 10.9 oz)   05/24/17  74.8 kg (164 lb 14.4 oz)              Today, you had the following     No orders found for display         Today's Medication Changes          These changes are accurate as of: 7/17/17  2:02 PM.  If you have any questions, ask your nurse or doctor.               These medicines have changed or have updated prescriptions.        Dose/Directions    aspirin 81 MG chewable tablet   This may have changed:  when to take this   Used for:  Coronary artery disease with angina pectoris, unspecified vessel or lesion type, unspecified whether native or transplanted heart (H)        Dose:  81 mg   Take 1 tablet (81 mg) by mouth daily   Quantity:  30 tablet   Refills:  0       ferrous sulfate 325 (65 FE) MG tablet   Commonly known as:  IRON SUPPLEMENT   This may have changed:  when to take this   Used for:  S/P CABG (coronary artery bypass graft)        Dose:  325 mg   Take 1 tablet (325 mg) by mouth daily (with breakfast)   Quantity:  100 tablet   Refills:  1            Where to get your medicines      These medications were sent to [address] [address]     Phone:  [phone]     amoxicillin-clavulanate 875-125 MG per tablet                Primary Care Provider Office Phone # Fax #    Humberto Conner [phone]       [place] [address] [place] [address]        Equal Access to Services     NATALIE HAYES AH: Elise flores hadasho Soomaali, waaxda luqadaha, qaybta kaalmada adeegyada, scott toro hayerin mattson. So [facility] [phone].    ATENCIÓN: Si habla español, tiene a espinoza disposición servicios gratuitos de asistencia lingüística. Zaid al [phone].    We comply with applicable federal civil rights laws and [place] laws. We do not discriminate on the basis of race, color, national origin, age, disability sex, sexual orientation or gender identity.            Thank you!     Thank you for  [facility]  for your care. Our goal is always to provide you with excellent care. Hearing back from our patients is one way we can continue to improve our services. Please take a few minutes to complete the written survey that you may receive in the mail after your visit with us. Thank you!             Your Updated Medication List - Protect others around you: Learn how to safely use, store and throw away your medicines at www.disposemymeds.org.          This list is accurate as of: 7/17/17  2:02 PM.  Always use your most recent med list.                   Brand Name Dispense Instructions for use Diagnosis    ACE BANDAGE SELF-ADHERING Misc     6 each    1 each 2 times daily    Open wound of lower limb, right, subsequent encounter       acetaminophen 325 MG tablet    TYLENOL    100 tablet    Take 3 tablets (975 mg) by mouth every 6 hours as needed for mild pain    S/P CABG (coronary artery bypass graft)       ADAPTIC NON-ADHERING DRESSING Pads     1 each    Externally apply 1 each topically 2 times daily    Open wound of lower limb, right, subsequent encounter       amoxicillin-clavulanate 875-125 MG per tablet    AUGMENTIN    28 tablet    Take 1 tablet by mouth 2 times daily    Toe gangrene (H)       aspirin 81 MG chewable tablet     30 tablet    Take 1 tablet (81 mg) by mouth daily    Coronary artery disease with angina pectoris, unspecified vessel or lesion type, unspecified whether native or transplanted heart (H)       atorvastatin 40 MG tablet    LIPITOR    90 tablet    Take 1 tablet (40 mg) by mouth daily    Coronary artery disease with angina pectoris, unspecified vessel or lesion type, unspecified whether native or transplanted heart (H)       blood glucose monitoring meter device kit           ferrous sulfate 325 (65 FE) MG tablet    IRON SUPPLEMENT    100 tablet    Take 1 tablet (325 mg) by mouth daily (with breakfast)    S/P CABG (coronary artery bypass graft)       fondaparinux  7.5MG/0.6ML injection    ARIXTRA    10 Syringe    Inject 0.6 mLs (7.5 mg) Subcutaneous every morning *Do not take Saturday, May 27, 2017 *Do not take Adolfo, May 28, 2017 *May resuming taking on Monday, May 29, 2017    Long-term (current) use of anticoagulants       furosemide 40 MG tablet    LASIX    180 tablet    Take 1 tablet (40 mg) by mouth 2 times daily    Bilateral edema of lower extremity       gabapentin 100 MG capsule    NEURONTIN    180 capsule    Take 1 capsule (100 mg) by mouth 2 times daily    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       HYDROcodone-acetaminophen 5-325 MG per tablet    NORCO    72 tablet    Take 1-2 tablets by mouth every 4 hours as needed for moderate to severe pain maximum 12 tablet(s) per day. Do not take more than 4, 000 mg of Tylenol (Acetaminophen) per day. Use caution if taking extra Tylenol (Acetaminophen)    Ulcer of left lower extremity with fat layer exposed (H)       KERLIX GAUZE ROLL MEDIUM Misc     48 each    1 each 2 times daily    Open wound of lower limb, right, subsequent encounter       ONE TOUCH ULTRA test strip   Generic drug:  blood glucose monitoring     100 each    USE AS DIRECTED TO TEST ONE TIME A DAY    Type 2 diabetes mellitus without complication, without long-term current use of insulin (H)       ONETOUCH DELICA LANCETS 33G Misc     100 each    1 Device daily    Type 2 diabetes mellitus without complication, without long-term current use of insulin (H)       order for DME     1 Box    Sterile 4x4 gauze; Use gauze twice daily for dressing changes.    Open wound of lower limb, right, subsequent encounter       pantoprazole 40 MG EC tablet    PROTONIX    90 tablet    Take 1 tablet (40 mg) by mouth every morning    Coronary artery disease with angina pectoris, unspecified vessel or lesion type, unspecified whether native or transplanted heart (H)       potassium chloride SA 10 MEQ CR tablet    K-DUR/KLOR-CON M    180 tablet    Take 1 tablet (10 mEq) by mouth  2 times daily    S/P CABG (coronary artery bypass graft)       traZODone 50 MG tablet    DESYREL    45 tablet    Take 0.5 tablets (25 mg) by mouth nightly as needed for sleep    Primary insomnia       venlafaxine 150 MG Tb24 24 hr tablet    EFFEXOR-ER    90 each    Take 1 tablet (150 mg) by mouth daily (with breakfast)    Adjustment disorder with depressed mood

## 2017-07-17 NOTE — NURSING NOTE
Chief Complaint   Patient presents with     RECHECK     follow up right foot wound        Vitals:    07/17/17 1340   BP: 122/74   BP Location: Right arm   Pulse: 65   Resp: 18   SpO2: 94%       There is no height or weight on file to calculate BMI.                 Jeannie Baker LPN

## 2017-07-17 NOTE — LETTER
7/17/2017       RE: Carlos Alberto Fenton  [address]  [place] [facility] [address]     Dear Colleague,    Thank you for referring your patient, Carlos Alberto Fenton, to the [facility] at [facility]. Please see a copy of my visit note below.    VASCULAR SURGERY PROGRESS NOTE    HPI:    Carlos Alberto Fenton is a 76 year old female who underwent Right Great Toe amputation, debriedment of 2nd and 3rd toes and application of Grafix with Dr. Santamaria on 5/26/2017.  She underwent right foot irrigation and debridement with Grafix application with Dr. Santamaria on 7/7/2017. She is wearing DH2 shoes. She is being followed by Dr. Hatfield for weekly wound cares for her left foot.  She returns to clinic today for dressing change and Grafix washout on the right foot.    SUBJECTIVE:  She denies any fever, chills, night sweats or pain.  Offers no specific complaints.    OBJECTIVE:  Vital signs:      BP: 122/74 Pulse: 65   Resp: 18 SpO2: 94 %        PHYSICAL EXAM:  NEURO/PSYCH: The patient is alert and oriented.  Appropriate.  Moves all extremities.    SKIN: Color appropriate for race, warm, dry.  PULMONARY: no acute distress  EXTREMITIES: right foot Grafix washed off, non-malodorous drainage, granulation tissue present, Adaptic, 4x4 fluff, Kerlix and ace wrap applied.    Current Outpatient Prescriptions   Medication Sig Dispense Refill     amoxicillin-clavulanate (AUGMENTIN) 875-125 MG per tablet Take 1 tablet by mouth 2 times daily 28 tablet 3     pantoprazole (PROTONIX) 40 MG EC tablet Take 1 tablet (40 mg) by mouth every morning 90 tablet 1     Wound Dressings (ADAPTIC NON-ADHERING DRESSING) PADS Externally apply 1 each topically 2 times daily 1 each 3     order for DME Sterile 4x4 gauze; Use gauze twice daily for dressing changes. 1 Box 3     Gauze Pads & Dressings (KERLIX GAUZE ROLL MEDIUM) MISC 1 each 2 times daily 48 each 3     Elastic Bandages & Supports (ACE BANDAGE SELF-ADHERING) MISC 1 each  2 times daily 6 each 3     atorvastatin (LIPITOR) 40 MG tablet Take 1 tablet (40 mg) by mouth daily 90 tablet 1     furosemide (LASIX) 40 MG tablet Take 1 tablet (40 mg) by mouth 2 times daily 180 tablet 1     potassium chloride SA (K-DUR/KLOR-CON M) 10 MEQ CR tablet Take 1 tablet (10 mEq) by mouth 2 times daily 180 tablet 1     traZODone (DESYREL) 50 MG tablet Take 0.5 tablets (25 mg) by mouth nightly as needed for sleep 45 tablet 2     acetaminophen (TYLENOL) 325 MG tablet Take 3 tablets (975 mg) by mouth every 6 hours as needed for mild pain 100 tablet 0     fondaparinux (ARIXTRA) 7.5MG/0.6ML injection Inject 0.6 mLs (7.5 mg) Subcutaneous every morning *Do not take Saturday, May 27, 2017  *Do not take Adolfo, May 28, 2017  *May resuming taking on Monday, May 29, 2017 10 Syringe 0     HYDROcodone-acetaminophen (NORCO) 5-325 MG per tablet Take 1-2 tablets by mouth every 4 hours as needed for moderate to severe pain maximum 12 tablet(s) per day. Do not take more than 4, 000 mg of Tylenol (Acetaminophen) per day. Use caution if taking extra Tylenol (Acetaminophen) (Patient not taking: Reported on 7/14/2017) 72 tablet 0     ONE TOUCH ULTRA test strip USE AS DIRECTED TO TEST ONE TIME A  each 0     gabapentin (NEURONTIN) 100 MG capsule Take 1 capsule (100 mg) by mouth 2 times daily 180 capsule 1     venlafaxine (EFFEXOR-ER) 150 MG TB24 24 hr tablet Take 1 tablet (150 mg) by mouth daily (with breakfast) 90 each 1     aspirin 81 MG chewable tablet Take 1 tablet (81 mg) by mouth daily (Patient taking differently: Take 81 mg by mouth every morning ) 30 tablet 0     ferrous sulfate (IRON SUPPLEMENT) 325 (65 FE) MG tablet Take 1 tablet (325 mg) by mouth daily (with breakfast) (Patient taking differently: Take 325 mg by mouth 2 times daily ) 100 tablet 1     ONETOUCH DELICA LANCETS 33G MISC 1 Device daily 100 each 0     blood glucose monitoring (ONE TOUCH ULTRA 2) meter device kit        ASSESSMENT/PLAN:  #1 Dry  gangrene, right foot great and second toe, secondary to microembolization or Heparin- Induce Thrombocytopenia  #2 Status post Right Great Toe Amputation, Debridement of Toes 2 and 3, and application of Grafix with Dr. Santamaria on 5/26/2017  #3 Irrigation and debridement right foot, Grafix application, 7/7/2017  - right foot dressing changes BID with Adaptic, 4 X 4's, Kerlix and ace wrap    - okay for weight bearing    - follow up with Dr. Santamaria in 2 weeks.    - continue antibiotics    - left foot wound care per Dr. Torres    Please do not hesitate to contact Dr. Santamaria's vascular surgery team with any questions.    Again, thank you for allowing me to participate in the care of your patient.      Sincerely,    CHACORTA Ruiz CNS

## 2017-07-18 NOTE — TELEPHONE ENCOUNTER
Pending Prescriptions:                       Disp   Refills    ONE TOUCH ULTRA test strip [Pharmacy Med *100 ea*0            Sig: USE AS DIRECTED TO TEST ONE TIME A DAY             Last Written Prescription Date: 4.11.17  Last Fill Quantity: 100, # refills: 0  Last Office Visit with [facility], P or [facility] prescribing provider:  3.22.17        BP Readings from Last 3 Encounters:   07/17/17 122/74   07/14/17 117/67   07/11/17 113/78     No results found for: MICROL  No results found for: UMALCR  Creatinine   Date Value Ref Range Status   07/07/2017 0.74 0.52 - 1.04 mg/dL Final   ]  GFR Estimate   Date Value Ref Range Status   07/07/2017 76 >60 mL/min/1.7m2 Final     Comment:     Non  GFR Calc   05/26/2017 >90  Non  GFR Calc   >60 mL/min/1.7m2 Final   04/23/2017 89 >60 mL/min/1.7m2 Final     Comment:     Non  GFR Calc     GFR Estimate If Black   Date Value Ref Range Status   07/07/2017 >90   GFR Calc   >60 mL/min/1.7m2 Final   05/26/2017 >90   GFR Calc   >60 mL/min/1.7m2 Final   04/23/2017 >90   GFR Calc   >60 mL/min/1.7m2 Final     Lab Results   Component Value Date    CHOL 273 01/20/2017     Lab Results   Component Value Date    HDL 58 01/20/2017     Lab Results   Component Value Date     01/20/2017     Lab Results   Component Value Date    TRIG 236 01/20/2017     No results found for: CHOLHDLRATIO  Lab Results   Component Value Date    AST 36 04/17/2017     Lab Results   Component Value Date    ALT 28 04/17/2017     Lab Results   Component Value Date    A1C 6.3 02/02/2017    A1C 5.9 10/17/2016     Potassium   Date Value Ref Range Status   07/07/2017 4.0 3.4 - 5.3 mmol/L Final

## 2017-07-19 ENCOUNTER — MYC REFILL (OUTPATIENT)
Dept: INTERNAL MEDICINE | Facility: CLINIC | Age: 77
End: 2017-07-19

## 2017-07-19 DIAGNOSIS — I25.119 CORONARY ARTERY DISEASE WITH ANGINA PECTORIS, UNSPECIFIED VESSEL OR LESION TYPE, UNSPECIFIED WHETHER NATIVE OR TRANSPLANTED HEART (H): ICD-10-CM

## 2017-07-19 RX ORDER — PANTOPRAZOLE SODIUM 40 MG/1
40 TABLET, DELAYED RELEASE ORAL EVERY MORNING
Qty: 90 TABLET | Refills: 1 | Status: CANCELLED | OUTPATIENT
Start: 2017-07-19

## 2017-07-19 NOTE — TELEPHONE ENCOUNTER
Prescription approved per [facility] Refill Protocol.  Diana Pace, RN, BSN

## 2017-07-20 ENCOUNTER — OFFICE VISIT (OUTPATIENT)
Dept: WOUND CARE | Facility: CLINIC | Age: 77
End: 2017-07-20

## 2017-07-20 DIAGNOSIS — I96 GANGRENE (H): ICD-10-CM

## 2017-07-20 DIAGNOSIS — Z79.01 LONG-TERM (CURRENT) USE OF ANTICOAGULANTS: ICD-10-CM

## 2017-07-20 DIAGNOSIS — L97.922 ULCER OF LEFT LOWER EXTREMITY WITH FAT LAYER EXPOSED (H): Primary | ICD-10-CM

## 2017-07-20 NOTE — MR AVS SNAPSHOT
After Visit Summary   7/20/2017    Carlos Alberto Fenton    MRN: [id number]           Patient Information     Date Of Birth          1940        Visit Information        Provider Department      7/20/2017 4:30 PM Easton Torres DPM [facility]         Follow-ups after your visit        Your next 10 appointments already scheduled     Jul 28, 2017  8:30 AM CDT   Office Visit with Humberto Conner MD   [facility]    [address] [place] [id number] [phone]           Bring a current list of meds and any records pertaining to this visit.  For Physicals, please bring immunization records and any forms needing to be filled out.  Please arrive 10 minutes early to complete paperwork.            Jul 31, 2017  1:15 PM CDT   (Arrive by 1:00 PM)   Return Vascular Visit with Leah Santamaria MD   [facility] ([facility])    [address] [place] [address]  [facility] [id number] [phone]            Jul 31, 2017  2:00 PM CDT   (Arrive by 1:45 PM)   RETURN FOOT/ANKLE with Easton Torres DPM   [facility] ([facility])    [address] [place] [address]  [facility] [id number] [phone]            Sep 01, 2017  2:00 PM CDT   (Arrive by 1:45 PM)   Return Visit with Star Lobato MD   [facility] ([facility])    [address] [place] [address]  [facility] [id number] [phone]            Sep 11, 2017  2:00 PM CDT   (Arrive by 1:45 PM)   HOLTER MONITOR VISIT with  Cvc Monitor Tech, Critical access hospital [facility] ([facility])    [address] [facility]  [facility] [id number] [phone]            Oct 19, 2017  2:30 PM CDT   (Arrive by 2:15 PM)   RETURN ATRIAL FIBULATION VISIT with CHACORTA Joshi [facility] ([facility])    [address] [facility]  [facility] [address] [phone]              Who to contact     Please call your clinic at [phone] to:    Ask questions about your health    Make or cancel appointments    Discuss your medicines    Learn about your test results    Speak to your doctor   If you have compliments or concerns about an experience at your clinic, or if you wish to file a complaint, please contact [facility] Patient Relations at [phone] or email us at [email or web address]         Additional Information About Your Visit        Rainhart Information     [employer] gives you secure access to your electronic health record. If you see a primary care provider, you can also send messages to your care team and make appointments. If you have questions, please call your primary care clinic.  If you do not have a primary care provider, please call [phone] and they will assist you.      [employer] is an electronic gateway that provides easy, online access to your medical records. With [employer], you can request a clinic appointment, read your test results, renew a prescription or communicate with your care team.     To access your existing account, please contact your [facility] or call [phone] for assistance.        Care EveryWhere ID     This is your Care EveryWhere ID. This could be used by other organizations to access your [place] medical records  [id number]         Blood Pressure from Last 3 Encounters:   07/17/17 122/74   07/14/17 117/67   07/11/17 113/78    Weight from Last 3 Encounters:   07/07/17 167 lb 8.8 oz   05/26/17 164 lb 10.9 oz   05/24/17 164 lb 14.4 oz              Today, you had the following     No orders found for display         Today's Medication Changes          These changes are accurate as of: 7/20/17  5:00 PM.  If you have any questions, ask your nurse or doctor.               These  medicines have changed or have updated prescriptions.        Dose/Directions    aspirin 81 MG chewable tablet   This may have changed:  when to take this   Used for:  Coronary artery disease with angina pectoris, unspecified vessel or lesion type, unspecified whether native or transplanted heart (H)        Dose:  81 mg   Take 1 tablet (81 mg) by mouth daily   Quantity:  30 tablet   Refills:  0       ferrous sulfate 325 (65 FE) MG tablet   Commonly known as:  IRON SUPPLEMENT   This may have changed:  when to take this   Used for:  S/P CABG (coronary artery bypass graft)        Dose:  325 mg   Take 1 tablet (325 mg) by mouth daily (with breakfast)   Quantity:  100 tablet   Refills:  1                Primary Care Provider Office Phone # Fax #    Humberto Conner [phone]       [facility] [address] [place] [address]        Equal Access to Services     NATALIE HAYES : Elise wiley Soedwige, waaxda luqadaha, qaybta kaalmada jose david, scott mccollum . So [facility] [phone].    ATENCIÓN: Si habla español, tiene a espinoza disposición servicios gratuitos de asistencia lingüística. Jose Albertodiya al [phone].    We comply with applicable federal civil rights laws and [place] laws. We do not discriminate on the basis of race, color, national origin, age, disability sex, sexual orientation or gender identity.            Thank you!     Thank you for choosing [facility]  for your care. Our goal is always to provide you with excellent care. Hearing back from our patients is one way we can continue to improve our services. Please take a few minutes to complete the written survey that you may receive in the mail after your visit with us. Thank you!             Your Updated Medication List - Protect others around you: Learn how to safely use, store and throw away your medicines at www.disposemymeds.org.          This list is accurate as of: 7/20/17  5:00 PM.   Always use your most recent med list.                   Brand Name Dispense Instructions for use Diagnosis    ACE BANDAGE SELF-ADHERING Misc     6 each    1 each 2 times daily    Open wound of lower limb, right, subsequent encounter       acetaminophen 325 MG tablet    TYLENOL    100 tablet    Take 3 tablets (975 mg) by mouth every 6 hours as needed for mild pain    S/P CABG (coronary artery bypass graft)       ADAPTIC NON-ADHERING DRESSING Pads     1 each    Externally apply 1 each topically 2 times daily    Open wound of lower limb, right, subsequent encounter       amoxicillin-clavulanate 875-125 MG per tablet    AUGMENTIN    28 tablet    Take 1 tablet by mouth 2 times daily    Toe gangrene (H)       aspirin 81 MG chewable tablet     30 tablet    Take 1 tablet (81 mg) by mouth daily    Coronary artery disease with angina pectoris, unspecified vessel or lesion type, unspecified whether native or transplanted heart (H)       atorvastatin 40 MG tablet    LIPITOR    90 tablet    Take 1 tablet (40 mg) by mouth daily    Coronary artery disease with angina pectoris, unspecified vessel or lesion type, unspecified whether native or transplanted heart (H)       blood glucose monitoring meter device kit           ferrous sulfate 325 (65 FE) MG tablet    IRON SUPPLEMENT    100 tablet    Take 1 tablet (325 mg) by mouth daily (with breakfast)    S/P CABG (coronary artery bypass graft)       fondaparinux 7.5MG/0.6ML injection    ARIXTRA    10 Syringe    Inject 0.6 mLs (7.5 mg) Subcutaneous every morning *Do not take Saturday, May 27, 2017 *Do not take Adolfo, May 28, 2017 *May resuming taking on Monday, May 29, 2017    Long-term (current) use of anticoagulants       furosemide 40 MG tablet    LASIX    180 tablet    Take 1 tablet (40 mg) by mouth 2 times daily    Bilateral edema of lower extremity       gabapentin 100 MG capsule    NEURONTIN    180 capsule    Take 1 capsule (100 mg) by mouth 2 times daily    Mononeuropathy due  to underlying disease       HYDROcodone-acetaminophen 5-325 MG per tablet    NORCO    72 tablet    Take 1-2 tablets by mouth every 4 hours as needed for moderate to severe pain maximum 12 tablet(s) per day. Do not take more than 4, 000 mg of Tylenol (Acetaminophen) per day. Use caution if taking extra Tylenol (Acetaminophen)    Ulcer of left lower extremity with fat layer exposed (H)       KERLIX GAUZE ROLL MEDIUM Misc     48 each    1 each 2 times daily    Open wound of lower limb, right, subsequent encounter       ONE TOUCH ULTRA test strip   Generic drug:  blood glucose monitoring     100 each    USE AS DIRECTED TO TEST ONE TIME A DAY    Type 2 diabetes mellitus without complication, without long-term current use of insulin (H)       ONETOUCH DELICA LANCETS 33G Misc     100 each    1 Device daily    Type 2 diabetes mellitus without complication, without long-term current use of insulin (H)       order for DME     1 Box    Sterile 4x4 gauze; Use gauze twice daily for dressing changes.    Open wound of lower limb, right, subsequent encounter       pantoprazole 40 MG EC tablet    PROTONIX    90 tablet    Take 1 tablet (40 mg) by mouth every morning    Coronary artery disease with angina pectoris, unspecified vessel or lesion type, unspecified whether native or transplanted heart (H)       potassium chloride SA 10 MEQ CR tablet    K-DUR/KLOR-CON M    180 tablet    Take 1 tablet (10 mEq) by mouth 2 times daily    S/P CABG (coronary artery bypass graft)       traZODone 50 MG tablet    DESYREL    45 tablet    Take 0.5 tablets (25 mg) by mouth nightly as needed for sleep    Primary insomnia       venlafaxine 150 MG Tb24 24 hr tablet    EFFEXOR-ER    90 each    Take 1 tablet (150 mg) by mouth daily (with breakfast)    Adjustment disorder with depressed mood

## 2017-07-20 NOTE — TELEPHONE ENCOUNTER
Message from [employer]  Original authorizing provider: MD Rama Connellyjackson BISHOPFernie Fenton would like a refill of the following medications:  pantoprazole (PROTONIX) 40 MG EC tablet [Humberto Conner MD]    Preferred pharmacy: [facility] - [place], [place] - [facility] [address]     Comment:      Medication renewals requested in this message routed to other providers:  Gauze Pads & Dressings (KERLIX GAUZE ROLL MEDIUM) MISC [Rosalinda Ron, CHACORTA CNP]  Elastic Bandages & Supports (ACE BANDAGE SELF-ADHERING) MISC [Rosalinda Ron, CHACORTA CNP]

## 2017-07-20 NOTE — LETTER
7/20/2017       RE: Carlos Alberto Fenton  [address]  [facility] [address]     Dear Colleague,    Thank you for referring your patient, Carlos Alberto Fenton, to the [facility] WOUND [facility] at [facility]. Please see a copy of my visit note below.    Chief Complaints and History of Present Illnesses   Patient presents with     WOUND CARE     Left 2nd toe wound     Allergies   Allergen Reactions     Levaquin [Levofloxacin] Other (See Comments)     Tendon pain in legs, arms and shoulders.      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       Oxycodone      hallucinations     Adhesive Tape Itching and Rash     Diapers & Supplies Rash     Developed yeast infection from previous hospital stay         Subjective: Carlos Alberto is a 76 year old female who presents to the clinic today for a follow up of left second and third toe wounds and gangrene. She presents today with her daughter. She relates that the areas on her toes has significantly improved. There are no more areas of blackness and necrosis on the left toes. She relates that the wound started to callus over over the last few weeks and she has left it alone. She has not been dressing the foot. The right foot with the amputation just currently being treated by vascular. She relates that she has no new lower extremity complaints.    Objective  The left second and third digits were noted to have hyperkeratotic lesions at the distal aspects. There were no areas of black necrotic tissue noted on the left foot. With debridement of the second digit hyperkeratotic lesion, the wound was noted as described directly below:    A wound is noted at left  distal 2nd digit measuring 4mm x 4mm x 2mm.    Yousif Classification: II    Wound base: [place]/Granulation    Edges: hyerkeratotic    Drainage: none/none    Odor: no    Undermining:  no    Bone Exposure: No    Clinical Signs of Infection: No    After obtaining patient consent, the wound was irrigated with copious amounts of saline. A scalpel was then used to debride the wound into the subcutaneous tissue. The wound edges were debrided to healthy, bleeding tissue. Given the patient's lack of sensation, no anesthesia was necessary for the procedure.   With debridement of the third digit hyperkeratotic lesion there was no open wound noted underneath this tissue.  The right foot is currently being managed by vascular and this dressing was not removed today.  The digits on the left foot are cool today however she does have palpable pedal pulses. Capillary refill is about 5 seconds.    Assessment: left 2nd digit wound 2/2 previous thrombotic event. No new areas of necrosis.   Right foot currently being managed by vascular.    Plan:   - Pt seen and evaluated  - The left 2nd digit wound was debrided as described. Go to Iodosorb dressing changes daily with a DSD.  - Pt to return to clinic in 2 weeks.     Again, thank you for allowing me to participate in the care of your patient.    Sincerely,  Easton Torres DPM

## 2017-07-21 ENCOUNTER — MYC REFILL (OUTPATIENT)
Dept: INTERNAL MEDICINE | Facility: CLINIC | Age: 77
End: 2017-07-21

## 2017-07-21 DIAGNOSIS — F43.21 ADJUSTMENT DISORDER WITH DEPRESSED MOOD: ICD-10-CM

## 2017-07-21 NOTE — PROGRESS NOTES
Chief Complaints and History of Present Illnesses   Patient presents with     WOUND CARE     Left 2nd toe wound            Allergies   Allergen Reactions     Levaquin [Levofloxacin] Other (See Comments)     Tendon pain in legs, arms and shoulders.      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       Oxycodone      hallucinations     Adhesive Tape Itching and Rash     Diapers & Supplies Rash     Developed yeast infection from previous hospital stay         Subjective: Carlos Alberto is a 76 year old female who presents to the clinic today for a follow up of left second and third toe wounds and gangrene. She presents today with her daughter. She relates that the areas on her toes has significantly improved. There are no more areas of blackness and necrosis on the left toes. She relates that the wound started to callus over over the last few weeks and she has left it alone. She has not been dressing the foot. The right foot with the amputation just currently being treated by vascular. She relates that she has no new lower extremity complaints.    Objective  The left second and third digits were noted to have hyperkeratotic lesions at the distal aspects. There were no areas of black necrotic tissue noted on the left foot. With debridement of the second digit hyperkeratotic lesion, the wound was noted as described directly below:    A wound is noted at left  distal 2nd digit measuring 4mm x 4mm x 2mm.    Yousif Classification: II    Wound base: Post/Granulation    Edges: hyerkeratotic    Drainage: none/none    Odor: no    Undermining: no    Bone Exposure: No    Clinical Signs of Infection: No    After obtaining patient consent, the wound was irrigated with copious amounts of saline. A scalpel was then used to debride the wound into the subcutaneous tissue. The wound edges were debrided to healthy, bleeding  tissue. Given the patient's lack of sensation, no anesthesia was necessary for the procedure.   With debridement of the third digit hyperkeratotic lesion there was no open wound noted underneath this tissue.  The right foot is currently being managed by vascular and this dressing was not removed today.  The digits on the left foot are cool today however she does have palpable pedal pulses. Capillary refill is about 5 seconds.    Assessment: left 2nd digit wound 2/2 previous thrombotic event. No new areas of necrosis.   Right foot currently being managed by vascular.    Plan:   - Pt seen and evaluated  - The left 2nd digit wound was debrided as described. Go to Iodosorb dressing changes daily with a DSD.  - Pt to return to clinic in 2 weeks.

## 2017-07-21 NOTE — PROGRESS NOTES
was asked to meet with Patient and daughter in exam room today. Patient was sitting in a wheelchair and daughter was sitting on exam table. Patient's daughter was asking if there was any kind of services in the home that Patient could qualify for so that daughter can get sleep. Patient reported she makes frequent trips to the bathroom at night and needs help as she is non weight bearing and is a fall risk. Patient also asked about going to short term rehab.  explained the Medicare requirement of qualifying hospital stay, which Patient does not have. Patient and daughter were not able to pay out of pocket for SNF stay. Patient's daughter asked about respite during the night so she can sleep.  offered resources for home care nurse aides for hire during these hours (insurance does not pay for this service). Patient and her daughter were upset that there were no resources for free that would help in this situation.  offered the idea of getting a bedside commode for night time. Patient and her daughter did not like this idea. Patient and daughter left exam room upset that exceptions could not be made with Medicare to waive the qualifying hospital stay requirement. They then left the clinic.     will follow in the future as needed.    Tamela Harvey [facility]  Pager:  [phone]

## 2017-07-24 RX ORDER — VENLAFAXINE HYDROCHLORIDE 150 MG/1
150 TABLET, EXTENDED RELEASE ORAL
Qty: 90 EACH | Refills: 1 | Status: SHIPPED | OUTPATIENT
Start: 2017-07-24 | End: 2017-11-01

## 2017-07-24 NOTE — TELEPHONE ENCOUNTER
EFFEXOR-ER      Last Written Prescription Date:  3/31/17  Last Fill Quantity: 90,   # refills: 1  Last Office Visit with [facility], [facility] or [facility] prescribing provider: 7/20/17  Future Office visit:    Next 5 appointments (look out 90 days)     Jul 28, 2017  8:30 AM CDT   Office Visit with Humberto Conner MD   [facility]    [address] [place] [id number] [phone]                   Routing refill request to provider for review/approval because:  Drug not on the [facility], [facility] or [facility] refill protocol or controlled substance

## 2017-07-24 NOTE — TELEPHONE ENCOUNTER
Message from [employer]:  Original authorizing provider: Humberto Conner MD    Carlos Alberto Davilamp would like a refill of the following medications:  venlafaxine (EFFEXOR-ER) 150 MG TB24 24 hr tablet [Humberto Conner MD]    Preferred pharmacy: [facility] - [place], [place] - [address]    Comment:      Medication renewals requested in this message routed to other providers:  order for DME [CHACORTA Dodge CNP]  Gauze Pads & Dressings (KERLIX GAUZE ROLL MEDIUM) MISC [CHACORTA Dodge CNP]  Elastic Bandages & Supports (ACE BANDAGE SELF-ADHERING) MISC [CHACORTA Dodge CNP]

## 2017-07-28 ENCOUNTER — OFFICE VISIT (OUTPATIENT)
Dept: INTERNAL MEDICINE | Facility: CLINIC | Age: 77
End: 2017-07-28
Payer: COMMERCIAL

## 2017-07-28 VITALS
DIASTOLIC BLOOD PRESSURE: 66 MMHG | TEMPERATURE: 97.1 F | OXYGEN SATURATION: 94 % | SYSTOLIC BLOOD PRESSURE: 106 MMHG | RESPIRATION RATE: 16 BRPM | WEIGHT: 171 LBS | BODY MASS INDEX: 31.28 KG/M2 | HEART RATE: 84 BPM

## 2017-07-28 DIAGNOSIS — I50.9 CHRONIC CONGESTIVE HEART FAILURE, UNSPECIFIED CONGESTIVE HEART FAILURE TYPE: Primary | ICD-10-CM

## 2017-07-28 DIAGNOSIS — D75.829 HEPARIN INDUCED THROMBOCYTOPENIA (H): ICD-10-CM

## 2017-07-28 DIAGNOSIS — I48.20 CHRONIC ATRIAL FIBRILLATION (H): ICD-10-CM

## 2017-07-28 DIAGNOSIS — Z79.01 LONG-TERM (CURRENT) USE OF ANTICOAGULANTS: ICD-10-CM

## 2017-07-28 DIAGNOSIS — I96 TOE GANGRENE (H): ICD-10-CM

## 2017-07-28 PROCEDURE — 99214 OFFICE O/P EST MOD 30 MIN: CPT | Performed by: INTERNAL MEDICINE

## 2017-07-28 RX ORDER — FONDAPARINUX SODIUM 7.5 MG/.6ML
7.5 INJECTION SUBCUTANEOUS EVERY MORNING
Qty: 10 SYRINGE | Refills: 0 | Status: CANCELLED | OUTPATIENT
Start: 2017-07-28

## 2017-07-28 ASSESSMENT — PAIN SCALES - GENERAL: PAINLEVEL: NO PAIN (0)

## 2017-07-28 NOTE — PROGRESS NOTES
SUBJECTIVE:                                                    Carlos Alberto Fenton is a 76 year old female who presents to clinic today for the following health issues:    Chief Complaint   Patient presents with     face to face visit     needs visit to continue home care     She is living in [place] with her . Doing ok.    Having some sob and stops at times to catch up.      Urine is changing color and can be clear and then concentrated.      Seeing podiatry and vascular for her feet, slowing getting better. Left is nearly healed and didn't need amputation.  Can't walk very much, limited ability to be on her feet.  She can't do much for her ADLs needs homecare. She is homebound with limited ability to walk.      Weight is up 7 pounds and some leg swelling.  Lasix is 40 mg twice a day,     Taking Fondaparinux for the a fib and clots and HIT.      Incision is well healed from surgery and bypass.      homecare is helping with wound care.     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     Hyperlipidemia LDL goal <130 10/31/2010     Hypertension      Panic attacks      Seizures (H) 10/19/2016     Sleep apnea     Uses CPAP     Current Outpatient Prescriptions   Medication     venlafaxine (EFFEXOR-ER) 150 MG TB24 24 hr tablet     pantoprazole (PROTONIX) 40 MG EC tablet     amoxicillin-clavulanate (AUGMENTIN) 875-125 MG per tablet     atorvastatin (LIPITOR) 40 MG tablet      furosemide (LASIX) 40 MG tablet     potassium chloride SA (K-DUR/KLOR-CON M) 10 MEQ CR tablet     traZODone (DESYREL) 50 MG tablet     acetaminophen (TYLENOL) 325 MG tablet     fondaparinux (ARIXTRA) 7.5MG/0.6ML injection     gabapentin (NEURONTIN) 100 MG capsule     aspirin 81 MG chewable tablet     ferrous sulfate (IRON SUPPLEMENT) 325 (65 FE) MG tablet     ONE TOUCH ULTRA test strip     Wound Dressings (ADAPTIC NON-ADHERING DRESSING) PADS     [place] [facility]     Gauze Pads & Dressings (KERLIX GAUZE ROLL MEDIUM) MISC     Elastic Bandages & Supports (ACE BANDAGE SELF-ADHERING) MISC     ONETOUCH DELICA LANCETS 33G MISC     blood glucose monitoring (ONE TOUCH ULTRA 2) meter device kit     No current facility-administered medications for this visit.      Social History   Substance Use Topics     Smoking status: Former Smoker     Types: Cigarettes     Smokeless tobacco: Never Used      Comment: Quit in 1976     Alcohol use Yes      Comment: Socially     Review of Systems  Constitutional-Weight gain.  ENT-No earpain, sore throat, voice changes or rhinitis.  Cardiac-No chest pain or palpitations.  Respiratory-SOB.  Skin-feet are improving,.    Physical Exam  /66  Pulse 84  Temp 97.1  F (36.2  C) (Temporal)  Resp 16  Wt 171 lb (77.6 kg)  SpO2 94%  BMI 31.28 kg/m2  General Appearance-healthy, alert, no distress  Cardiac-irregularly irregular rhythm  Lungs-clear to auscultation  Extremities-2+ pitting edema both lower legs, feet are wrapped up today      ASSESSMENT:  Toe wounds-getting better, continues to see vascular surgery and needs help with wound care.      HIT-has fondaparinux and needs to continue this for now, will see cardiology in October and consider a change then.    CHF-weight is up 7#, swelling in her legs, usually in dependent position.  Will try extra lasix for the weight gain.  We will try to get her to weigh herself daily, and if her weight is over 168 pounds she should take an extra 40 mg of  Lasix. Her creatinine and potassium are good in the beginning of the month, her CBC was normal. She needs no labs today.    ASCVD-stable after surgery, no chest pains.      Patient continues to need home care as she needs wound care, his goal therapy to get her stronger and nursing to help with medication management and congestive heart failure management    Electronically signed by Humberto Conner MD

## 2017-07-28 NOTE — TELEPHONE ENCOUNTER
fondaparinux (ARIXTRA) 7.5MG/0.6ML injection      Last Written Prescription Date:  5/26/2017  Last Fill Quantity: 10,   # refills: 0  Last Office Visit with [facility], [facility] or [facility] prescribing provider: 7/28/2017  Future Office visit:       Routing refill request to provider for review/approval because:  Drug not on the [facility], [facility] or [facility] refill protocol or controlled substance

## 2017-07-28 NOTE — MR AVS SNAPSHOT
After Visit Summary   7/28/2017    Carlos Alberto Fenton    MRN: [id number]           Patient Information     Date Of Birth          1940        Visit Information        Provider Department      7/28/2017 8:30 AM Humberto Conner MD [facility]         Follow-ups after your visit        Your next 10 appointments already scheduled     Jul 31, 2017  1:15 PM CDT   (Arrive by 1:00 PM)   Return Vascular Visit with Leah Santamaria MD   [facility] ([facility])    [address] [place] [id number]   [phone]            Jul 31, 2017  2:00 PM CDT   (Arrive by 1:45 PM)   RETURN FOOT/ANKLE with Easton Torres DPM   [facility] ([facility])    [address] [place] [id number] [phone]            Sep 01, 2017  2:00 PM CDT   (Arrive by 1:45 PM)   Return Visit with Star Lobato MD   [facility] ([facility])    [address] [place] [id number] [phone]            Sep 11, 2017  2:00 PM CDT   (Arrive by 1:45 PM)   HOLTER MONITOR VISIT with  [facility] ([facility])    [address] [place] [id number] [phone]            Oct 19, 2017  2:30 PM CDT   (Arrive by 2:15 PM)   RETURN ATRIAL FIBULATION VISIT with CHACORTA [facility])    [address] [place] [id number] [phone]              Who to contact     If you have questions or need follow up information about today's clinic visit or your schedule please contact [facility] directly at [phone].  Normal or non-critical lab and imaging results will be communicated to you by MyChart, letter or phone within 4 business days after  the clinic has received the results. If you do not hear from us within 7 days, please contact the clinic through WebPT or phone. If you have a critical or abnormal lab result, we will notify you by phone as soon as possible.  Submit refill requests through WebPT or call your pharmacy and they will forward the refill request to us. Please allow 3 business days for your refill to be completed.          Additional Information About Your Visit        WebPT Information     WebPT gives you secure access to your electronic health record. If you see a primary care provider, you can also send messages to your care team and make appointments. If you have questions, please call your primary care clinic.  If you do not have a primary care provider, please call [phone] and they will assist you.        Care EveryWhere ID     This is your Care EveryWhere ID. This could be used by other organizations to access your [facility] medical records  [id number]        Your Vitals Were     Pulse Temperature Respirations Pulse Oximetry BMI (Body Mass Index)       84 97.1  F (36.2  C) (Temporal) 16 94% 31.28 kg/m2        Blood Pressure from Last 3 Encounters:   07/28/17 106/66   07/17/17 122/74   07/14/17 117/67    Weight from Last 3 Encounters:   07/28/17 171 lb (77.6 kg)   07/07/17 167 lb 8.8 oz (76 kg)   05/26/17 164 lb 10.9 oz (74.7 kg)              Today, you had the following     No orders found for display         Today's Medication Changes          These changes are accurate as of: 7/28/17  9:10 AM.  If you have any questions, ask your nurse or doctor.               These medicines have changed or have updated prescriptions.        Dose/Directions    aspirin 81 MG chewable tablet   This may have changed:  when to take this   Used for:  Coronary artery disease with angina pectoris, unspecified vessel or lesion type, unspecified whether native or transplanted heart (H)        Dose:  81 mg   Take 1 tablet (81 mg) by mouth  daily   Quantity:  30 tablet   Refills:  0       ferrous sulfate 325 (65 FE) MG tablet   Commonly known as:  IRON SUPPLEMENT   This may have changed:  when to take this   Used for:  S/P CABG (coronary artery bypass graft)        Dose:  325 mg   Take 1 tablet (325 mg) by mouth daily (with breakfast)   Quantity:  100 tablet   Refills:  1                Primary Care Provider Office Phone # Fax #    Humberto Conner [phone]       [facility] [address] [place] [address]        Equal Access to Services     NATALIE HAYES : Hadii william ku hadasho Soomaali, waaxda luqadaha, qaybta kaalmada adeegyada, waxay paigein hayerin mccollum . So [facility] [phone].    ATENCIÓN: Si habla español, tiene a espinoza disposición servicios gratuitos de asistencia lingüística. [facility] [phone].    We comply with applicable federal civil rights laws and [place] laws. We do not discriminate on the basis of race, color, national origin, age, disability sex, sexual orientation or gender identity.            Thank you!     Thank you for choosing [facility]  for your care. Our goal is always to provide you with excellent care. Hearing back from our patients is one way we can continue to improve our services. Please take a few minutes to complete the written survey that you may receive in the mail after your visit with us. Thank you!             Your Updated Medication List - Protect others around you: Learn how to safely use, store and throw away your medicines at www.disposemymeds.org.          This list is accurate as of: 7/28/17  9:10 AM.  Always use your most recent med list.                   Brand Name Dispense Instructions for use Diagnosis    ACE BANDAGE SELF-ADHERING Misc     6 each    1 each 2 times daily    Open wound of lower limb, right, subsequent encounter       acetaminophen 325 MG tablet    TYLENOL    100 tablet    Take 3 tablets (975 mg) by mouth every 6 hours as  needed for mild pain    S/P CABG (coronary artery bypass graft)       ADAPTIC NON-ADHERING DRESSING Pads     1 each    Externally apply 1 each topically 2 times daily    Open wound of lower limb, right, subsequent encounter       amoxicillin-clavulanate 875-125 MG per tablet    AUGMENTIN    28 tablet    Take 1 tablet by mouth 2 times daily    Toe gangrene (H)       aspirin 81 MG chewable tablet     30 tablet    Take 1 tablet (81 mg) by mouth daily    Coronary artery disease with angina pectoris, unspecified vessel or lesion type, unspecified whether native or transplanted heart (H)       atorvastatin 40 MG tablet    LIPITOR    90 tablet    Take 1 tablet (40 mg) by mouth daily    Coronary artery disease with angina pectoris, unspecified vessel or lesion type, unspecified whether native or transplanted heart (H)       blood glucose monitoring meter device kit           ferrous sulfate 325 (65 FE) MG tablet    IRON SUPPLEMENT    100 tablet    Take 1 tablet (325 mg) by mouth daily (with breakfast)    S/P CABG (coronary artery bypass graft)       fondaparinux 7.5MG/0.6ML injection    ARIXTRA    10 Syringe    Inject 0.6 mLs (7.5 mg) Subcutaneous every morning *Do not take Saturday, May 27, 2017 *Do not take Adolfo, May 28, 2017 *May resuming taking on Monday, May 29, 2017    Long-term (current) use of anticoagulants       furosemide 40 MG tablet    LASIX    180 tablet    Take 1 tablet (40 mg) by mouth 2 times daily    Bilateral edema of lower extremity       gabapentin 100 MG capsule    NEURONTIN    180 capsule    Take 1 capsule (100 mg) by mouth 2 times daily    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       HYDROcodone-acetaminophen 5-325 MG per tablet    NORCO    72 tablet    Take 1-2 tablets by mouth every 4 hours as needed for moderate to severe pain maximum 12 tablet(s) per day. Do not take more than 4, 000 mg of Tylenol (Acetaminophen) per day. Use caution if taking extra Tylenol (Acetaminophen)    Ulcer of left  lower extremity with fat layer exposed (H)       KERLIX GAUZE ROLL MEDIUM Misc     48 each    1 each 2 times daily    Open wound of lower limb, right, subsequent encounter       ONE TOUCH ULTRA test strip   Generic drug:  blood glucose monitoring     100 each    USE AS DIRECTED TO TEST ONE TIME A DAY    Type 2 diabetes mellitus without complication, without long-term current use of insulin (H)       ONETOUCH DELICA LANCETS 33G Misc     100 each    1 Device daily    Type 2 diabetes mellitus without complication, without long-term current use of insulin (H)       order for DME     1 Box    Sterile 4x4 gauze; Use gauze twice daily for dressing changes.    Open wound of lower limb, right, subsequent encounter       pantoprazole 40 MG EC tablet    PROTONIX    90 tablet    Take 1 tablet (40 mg) by mouth every morning    Coronary artery disease with angina pectoris, unspecified vessel or lesion type, unspecified whether native or transplanted heart (H)       potassium chloride SA 10 MEQ CR tablet    K-DUR/KLOR-CON M    180 tablet    Take 1 tablet (10 mEq) by mouth 2 times daily    S/P CABG (coronary artery bypass graft)       traZODone 50 MG tablet    DESYREL    45 tablet    Take 0.5 tablets (25 mg) by mouth nightly as needed for sleep    Primary insomnia       venlafaxine 150 MG Tb24 24 hr tablet    EFFEXOR-ER    90 each    Take 1 tablet (150 mg) by mouth daily (with breakfast)    Adjustment disorder with depressed mood

## 2017-07-28 NOTE — NURSING NOTE
"Chief Complaint   Patient presents with     face to face visit     needs visit to continue home care       Initial /66  Pulse 84  Temp 97.1  F (36.2  C) (Temporal)  Resp 16  Wt 171 lb (77.6 kg)  SpO2 94%  BMI 31.28 kg/m2 Estimated body mass index is 31.28 kg/(m^2) as calculated from the following:    Height as of 7/7/17: 5' 2\" (1.575 m).    Weight as of this encounter: 171 lb (77.6 kg).  Medication Reconciliation: complete    "

## 2017-07-31 ENCOUNTER — OFFICE VISIT (OUTPATIENT)
Dept: VASCULAR SURGERY | Facility: CLINIC | Age: 77
End: 2017-07-31

## 2017-07-31 VITALS
HEART RATE: 90 BPM | OXYGEN SATURATION: 95 % | DIASTOLIC BLOOD PRESSURE: 83 MMHG | RESPIRATION RATE: 18 BRPM | SYSTOLIC BLOOD PRESSURE: 113 MMHG

## 2017-07-31 DIAGNOSIS — I96 TOE GANGRENE (H): ICD-10-CM

## 2017-07-31 ASSESSMENT — PAIN SCALES - GENERAL: PAINLEVEL: NO PAIN (0)

## 2017-07-31 NOTE — NURSING NOTE
Chief Complaint   Patient presents with     RECHECK     Follow up right foot check        Vitals:    07/31/17 1358   BP: 113/83   Pulse: 90   Resp: 18   SpO2: 95%       There is no height or weight on file to calculate BMI.              Jeannie Baker LPN

## 2017-07-31 NOTE — LETTER
7/31/2017       RE: Carlos Alberto Fenton  [address]  [place] [address]     Dear Colleague,    Thank you for referring your patient, Carlos Alberto Fenton, to the [facility] at [facility]. Please see a copy of my visit note below.    VASCULAR SURGERY PROGRESS NOTE    HPI:    Carlos Alberto Fenton is a 76 year old female who underwent Right Great Toe amputation, debriedment of 2nd and 3rd toes and application of Grafix with Dr. Santamaria on 5/26/2017.  She underwent right foot irrigation and debridement with Grafix application with Dr. Santamaria on 7/7/2017. She is wearing DH2 shoes. She is being followed by Dr. Hatfield for weekly wound cares for her left foot.  She returns to clinic today for dressing change and Grafix washout on the right foot.    SUBJECTIVE:  She denies any fever, chills, night sweats or pain.  Offers no specific complaints. Foot Feels good.  Denies order, pain, swelling, erythema, or rash.    OBJECTIVE:  Vital signs:  /83  Pulse 90  Resp 18  SpO2 95%  Breastfeeding? No    PHYSICAL EXAM:  NEURO/PSYCH: The patient is alert and oriented.  Appropriate.  Moves all extremities.    SKIN: Color appropriate for race, warm, dry.  PULMONARY: no acute distress  EXTREMITIES: right foot Grafix washed off, non-malodorous drainage, granulation tissue present, Adaptic, 4x4 fluff, Kerlix and ace wrap applied.    Current Outpatient Prescriptions   Medication Sig Dispense Refill     venlafaxine (EFFEXOR-ER) 150 MG TB24 24 hr tablet Take 1 tablet (150 mg) by mouth daily (with breakfast) 90 each 1     ONE TOUCH ULTRA test strip USE AS DIRECTED TO TEST ONE TIME A  each 2     pantoprazole (PROTONIX) 40 MG EC tablet Take 1 tablet (40 mg) by mouth every morning 90 tablet 1     amoxicillin-clavulanate (AUGMENTIN) 875-125 MG per tablet Take 1 tablet by mouth 2 times daily 28 tablet 3     Wound Dressings (ADAPTIC NON-ADHERING DRESSING) PADS Externally apply 1 each  topically 2 times daily 1 each 3     order for DME Sterile 4x4 gauze; Use gauze twice daily for dressing changes. 1 Box 3     Gauze Pads & Dressings (KERLIX GAUZE ROLL MEDIUM) MISC 1 each 2 times daily 48 each 3     Elastic Bandages & Supports (ACE BANDAGE SELF-ADHERING) MISC 1 each 2 times daily 6 each 3     atorvastatin (LIPITOR) 40 MG tablet Take 1 tablet (40 mg) by mouth daily 90 tablet 1     furosemide (LASIX) 40 MG tablet Take 1 tablet (40 mg) by mouth 2 times daily 180 tablet 1     potassium chloride SA (K-DUR/KLOR-CON M) 10 MEQ CR tablet Take 1 tablet (10 mEq) by mouth 2 times daily 180 tablet 1     traZODone (DESYREL) 50 MG tablet Take 0.5 tablets (25 mg) by mouth nightly as needed for sleep 45 tablet 2     acetaminophen (TYLENOL) 325 MG tablet Take 3 tablets (975 mg) by mouth every 6 hours as needed for mild pain 100 tablet 0     fondaparinux (ARIXTRA) 7.5MG/0.6ML injection Inject 0.6 mLs (7.5 mg) Subcutaneous every morning *Do not take Saturday, May 27, 2017  *Do not take Adolfo, May 28, 2017  *May resuming taking on Monday, May 29, 2017 10 Syringe 0     gabapentin (NEURONTIN) 100 MG capsule Take 1 capsule (100 mg) by mouth 2 times daily 180 capsule 1     aspirin 81 MG chewable tablet Take 1 tablet (81 mg) by mouth daily (Patient taking differently: Take 81 mg by mouth every morning ) 30 tablet 0     ferrous sulfate (IRON SUPPLEMENT) 325 (65 FE) MG tablet Take 1 tablet (325 mg) by mouth daily (with breakfast) (Patient taking differently: Take 325 mg by mouth 2 times daily ) 100 tablet 1     ONETOUCH DELICA LANCETS 33G MISC 1 Device daily 100 each 0     blood glucose monitoring (ONE TOUCH ULTRA 2) meter device kit        ASSESSMENT/PLAN:  #1 Dry gangrene, right foot great and second toe, secondary to microembolization or Heparin- Induce Thrombocytopenia  #2 Status post Right Great Toe Amputation, Debridement of Toes 2 and 3, and application of Grafix with Dr. Santamaria on 5/26/2017  #3 Irrigation and  debridement right foot, Grafix application, 7/7/2017    - right foot dressing changes BID with Adaptic, 4 X 4's, Kerlix and ace wrap    - okay for weight bearing    - follow up with NP Meena Cardoso in 4 weeks    - continue antibiotics    - left foot wound care per Dr. Torres    Seen and discussed with Dr. Haritha Morgan MD PGY4  Cardiology Fellow  [phone]    I personally examined the patient and agree with the findings above.  I discussed the patient with the resident / fellow and care team, and agree with the assessment and plan of care as documented in the note.  Vascular surgery staff  R great toe improving. Cleansed and dressed.  Continue po abx and follow up in 1 month.  Leah Santamaria MD

## 2017-07-31 NOTE — PROGRESS NOTES
VASCULAR SURGERY PROGRESS NOTE    HPI:    Carlos Alberto Fenton is a 76 year old female who underwent Right Great Toe amputation, debriedment of 2nd and 3rd toes and application of Grafix with Dr. Santamaria on 5/26/2017.  She underwent right foot irrigation and debridement with Grafix application with Dr. Santamaria on 7/7/2017. She is wearing DH2 shoes. She is being followed by Dr. Hatfield for weekly wound cares for her left foot.  She returns to clinic today for dressing change and Grafix washout on the right foot.    SUBJECTIVE:  She denies any fever, chills, night sweats or pain.  Offers no specific complaints. Foot Feels good.  Denies order, pain, swelling, erythema, or rash.    OBJECTIVE:  Vital signs:  /83  Pulse 90  Resp 18  SpO2 95%  Breastfeeding? No    PHYSICAL EXAM:  NEURO/PSYCH: The patient is alert and oriented.  Appropriate.  Moves all extremities.    SKIN: Color appropriate for race, warm, dry.  PULMONARY: no acute distress  EXTREMITIES: right foot Grafix washed off, non-malodorous drainage, granulation tissue present, Adaptic, 4x4 fluff, Kerlix and ace wrap applied.    Current Outpatient Prescriptions   Medication Sig Dispense Refill     venlafaxine (EFFEXOR-ER) 150 MG TB24 24 hr tablet Take 1 tablet (150 mg) by mouth daily (with breakfast) 90 each 1     ONE TOUCH ULTRA test strip USE AS DIRECTED TO TEST ONE TIME A  each 2     pantoprazole (PROTONIX) 40 MG EC tablet Take 1 tablet (40 mg) by mouth every morning 90 tablet 1     amoxicillin-clavulanate (AUGMENTIN) 875-125 MG per tablet Take 1 tablet by mouth 2 times daily 28 tablet 3     Wound Dressings (ADAPTIC NON-ADHERING DRESSING) PADS Externally apply 1 each topically 2 times daily 1 each 3     order for DME Sterile 4x4 gauze; Use gauze twice daily for dressing changes. 1 Box 3     Gauze Pads & Dressings (KERLIX GAUZE ROLL MEDIUM) MISC 1 each 2 times daily 48 each 3     Elastic Bandages & Supports (ACE BANDAGE SELF-ADHERING) MISC 1 each 2 times  daily 6 each 3     atorvastatin (LIPITOR) 40 MG tablet Take 1 tablet (40 mg) by mouth daily 90 tablet 1     furosemide (LASIX) 40 MG tablet Take 1 tablet (40 mg) by mouth 2 times daily 180 tablet 1     potassium chloride SA (K-DUR/KLOR-CON M) 10 MEQ CR tablet Take 1 tablet (10 mEq) by mouth 2 times daily 180 tablet 1     traZODone (DESYREL) 50 MG tablet Take 0.5 tablets (25 mg) by mouth nightly as needed for sleep 45 tablet 2     acetaminophen (TYLENOL) 325 MG tablet Take 3 tablets (975 mg) by mouth every 6 hours as needed for mild pain 100 tablet 0     fondaparinux (ARIXTRA) 7.5MG/0.6ML injection Inject 0.6 mLs (7.5 mg) Subcutaneous every morning *Do not take Saturday, May 27, 2017  *Do not take Adolfo, May 28, 2017  *May resuming taking on Monday, May 29, 2017 10 Syringe 0     gabapentin (NEURONTIN) 100 MG capsule Take 1 capsule (100 mg) by mouth 2 times daily 180 capsule 1     aspirin 81 MG chewable tablet Take 1 tablet (81 mg) by mouth daily (Patient taking differently: Take 81 mg by mouth every morning ) 30 tablet 0     ferrous sulfate (IRON SUPPLEMENT) 325 (65 FE) MG tablet Take 1 tablet (325 mg) by mouth daily (with breakfast) (Patient taking differently: Take 325 mg by mouth 2 times daily ) 100 tablet 1     ONETOUCH DELICA LANCETS 33G MISC 1 Device daily 100 each 0     blood glucose monitoring (ONE TOUCH ULTRA 2) meter device kit              ASSESSMENT/PLAN:  #1 Dry gangrene, right foot great and second toe, secondary to microembolization or Heparin- Induce Thrombocytopenia  #2 Status post Right Great Toe Amputation, Debridement of Toes 2 and 3, and application of Grafix with Dr. Santamaria on 5/26/2017  #3 Irrigation and debridement right foot, Grafix application, 7/7/2017    - right foot dressing changes BID with Adaptic, 4 X 4's, Kerlix and ace wrap    - okay for weight bearing    - follow up with NP Meena Cardoso in 4 weeks    - continue antibiotics    - left foot wound care per Dr. Torres    Seen and  discussed with Dr. Haritha Morgan MD PGY4  Cardiology Fellow  [phone]          I personally examined the patient and agree with the findings above.  I discussed the patient with the resident / fellow and care team, and agree with the assessment and plan of care as documented in the note.  Vascular surgery staff    R great toe improving. Cleansed and dressed.  Continue po abx and follow up in 1 month.  Leah Santamaria MD

## 2017-07-31 NOTE — MR AVS SNAPSHOT
After Visit Summary   7/31/2017    Carlos Alberto Fenton    MRN: [id number]           Patient Information     Date Of Birth          1940        Visit Information        Provider Department      7/31/2017 1:15 PM Leah Santamaria MD [facility]        Today's Diagnoses     Toe gangrene (H)           Follow-ups after your visit        Your next 10 appointments already scheduled     Sep 01, 2017  2:00 PM CDT   (Arrive by 1:45 PM)   Return Visit with Star Lobato MD   [facility])    [address] [place] [address]   [phone]            Sep 05, 2017 11:30 AM CDT   (Arrive by 11:15 AM)   Return Vascular Visit with CHACORTA Macias [facility] ([facility])    [address] [place] [id number] [phone]            Sep 11, 2017  2:00 PM CDT   (Arrive by 1:45 PM)   HOLTER MONITOR VISIT with  Cvc Monitor [facility] ([facility])    [address] [place] [id number] [phone]            Oct 19, 2017  2:30 PM CDT   (Arrive by 2:15 PM)   RETURN ATRIAL FIBULATION VISIT with CHACORTA Joshi [facility] ([facility])    [address] [place] [id number] [phone]              Who to contact     Please call your clinic at [phone] to:    Ask questions about your health    Make or cancel appointments    Discuss your medicines    Learn about your test results    Speak to your doctor   If you have compliments or concerns about an experience at your clinic, or if you wish to file a complaint, please contact [facility] Patient Relations at [phone] or email us at [email or web address]         Additional Information About Your Visit         [employer] Information     [employer] gives you secure access to your electronic health record. If you see a primary care provider, you can also send messages to your care team and make appointments. If you have questions, please call your primary care clinic.  If you do not have a primary care provider, please call [phone] and they will assist you.      [employer] is an electronic gateway that provides easy, online access to your medical records. With [employer], you can request a clinic appointment, read your test results, renew a prescription or communicate with your care team.     To access your existing account, please contact your [facility] or call [phone] for assistance.        Care EveryWhere ID     This is your Care EveryWhere ID. This could be used by other organizations to access your [facility] medical records  [id number]        Your Vitals Were     Pulse Respirations Pulse Oximetry Breastfeeding?          90 18 95% No         Blood Pressure from Last 3 Encounters:   07/31/17 113/83   07/28/17 106/66   07/17/17 122/74    Weight from Last 3 Encounters:   07/28/17 171 lb   07/07/17 167 lb 8.8 oz   05/26/17 164 lb 10.9 oz              Today, you had the following     No orders found for display         Today's Medication Changes          These changes are accurate as of: 7/31/17  3:10 PM.  If you have any questions, ask your nurse or doctor.               These medicines have changed or have updated prescriptions.        Dose/Directions    aspirin 81 MG chewable tablet   This may have changed:  when to take this   Used for:  Coronary artery disease with angina pectoris, unspecified vessel or lesion type, unspecified whether native or transplanted heart (H)        Dose:  81 mg   Take 1 tablet (81 mg) by mouth daily   Quantity:  30 tablet   Refills:  0       ferrous sulfate 325 (65 FE) MG tablet   Commonly known as:  IRON SUPPLEMENT   This may have changed:  when to take this    Used for:  S/P CABG (coronary artery bypass graft)        Dose:  325 mg   Take 1 tablet (325 mg) by mouth daily (with breakfast)   Quantity:  100 tablet   Refills:  1            Where to get your medicines      These medications were sent to [id number] - [place], [place] - [address]   [address] [place] [address]     Phone:  [phone]     amoxicillin-clavulanate 875-125 MG per tablet                Primary Care Provider Office Phone # Fax #    Humberto Conner [phone]       [facility] [address] [place] [address]        Equal Access to Services     [facility]: Hadii aad sandra hadasho Soomaali, waaxda luqadaha, qaybta kaalmada adeanilayada, scott mccollum . So [facility] [phone].    ATENCIÓN: Si habla español, tiene a espinoza disposición servicios gratuitos de asistencia lingüística. [facility] [phone].    We comply with applicable federal civil rights laws and [place] laws. We do not discriminate on the basis of race, color, national origin, age, disability sex, sexual orientation or gender identity.            Thank you!     Thank you for choosing [facility]  for your care. Our goal is always to provide you with excellent care. Hearing back from our patients is one way we can continue to improve our services. Please take a few minutes to complete the written survey that you may receive in the mail after your visit with us. Thank you!             Your Updated Medication List - Protect others around you: Learn how to safely use, store and throw away your medicines at www.disposemymeds.org.          This list is accurate as of: 7/31/17  3:10 PM.  Always use your most recent med list.                   Brand Name Dispense Instructions for use Diagnosis    ACE BANDAGE SELF-ADHERING Misc     6 each    1 each 2 times daily    Open wound of lower limb, right, subsequent encounter       acetaminophen 325 MG tablet     TYLENOL    100 tablet    Take 3 tablets (975 mg) by mouth every 6 hours as needed for mild pain    S/P CABG (coronary artery bypass graft)       ADAPTIC NON-ADHERING DRESSING Pads     1 each    Externally apply 1 each topically 2 times daily    Open wound of lower limb, right, subsequent encounter       amoxicillin-clavulanate 875-125 MG per tablet    AUGMENTIN    28 tablet    Take 1 tablet by mouth 2 times daily    Toe gangrene (H)       aspirin 81 MG chewable tablet     30 tablet    Take 1 tablet (81 mg) by mouth daily    Coronary artery disease with angina pectoris, unspecified vessel or lesion type, unspecified whether native or transplanted heart (H)       atorvastatin 40 MG tablet    LIPITOR    90 tablet    Take 1 tablet (40 mg) by mouth daily    Coronary artery disease with angina pectoris, unspecified vessel or lesion type, unspecified whether native or transplanted heart (H)       blood glucose monitoring meter device kit           ferrous sulfate 325 (65 FE) MG tablet    IRON SUPPLEMENT    100 tablet    Take 1 tablet (325 mg) by mouth daily (with breakfast)    S/P CABG (coronary artery bypass graft)       fondaparinux 7.5MG/0.6ML injection    ARIXTRA    10 Syringe    Inject 0.6 mLs (7.5 mg) Subcutaneous every morning *Do not take Saturday, May 27, 2017 *Do not take Adolfo, May 28, 2017 *May resuming taking on Monday, May 29, 2017    Long-term (current) use of anticoagulants       furosemide 40 MG tablet    LASIX    180 tablet    Take 1 tablet (40 mg) by mouth 2 times daily    Bilateral edema of lower extremity       gabapentin 100 MG capsule    NEURONTIN    180 capsule    Take 1 capsule (100 mg) by mouth 2 times daily    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       KERLIX GAUZE ROLL MEDIUM Misc     48 each    1 each 2 times daily    Open wound of lower limb, right, subsequent encounter       ONE TOUCH ULTRA test strip   Generic drug:  blood glucose monitoring     100 each    USE AS DIRECTED TO TEST ONE  TIME A DAY    Type 2 diabetes mellitus without complication, without long-term current use of insulin (H)       ONETOUCH DELICA LANCETS 33G Misc     100 each    1 Device daily    Type 2 diabetes mellitus without complication, without long-term current use of insulin (H)       order for DME     1 Box    Sterile 4x4 gauze; Use gauze twice daily for dressing changes.    Open wound of lower limb, right, subsequent encounter       pantoprazole 40 MG EC tablet    PROTONIX    90 tablet    Take 1 tablet (40 mg) by mouth every morning    Coronary artery disease with angina pectoris, unspecified vessel or lesion type, unspecified whether native or transplanted heart (H)       potassium chloride SA 10 MEQ CR tablet    K-DUR/KLOR-CON M    180 tablet    Take 1 tablet (10 mEq) by mouth 2 times daily    S/P CABG (coronary artery bypass graft)       traZODone 50 MG tablet    DESYREL    45 tablet    Take 0.5 tablets (25 mg) by mouth nightly as needed for sleep    Primary insomnia       venlafaxine 150 MG Tb24 24 hr tablet    EFFEXOR-ER    90 each    Take 1 tablet (150 mg) by mouth daily (with breakfast)    Adjustment disorder with depressed mood

## 2017-08-03 ENCOUNTER — MYC REFILL (OUTPATIENT)
Dept: INTERNAL MEDICINE | Facility: CLINIC | Age: 77
End: 2017-08-03

## 2017-08-03 DIAGNOSIS — F43.21 ADJUSTMENT DISORDER WITH DEPRESSED MOOD: ICD-10-CM

## 2017-08-04 DIAGNOSIS — S81.801D OPEN WOUND OF LOWER LIMB, RIGHT, SUBSEQUENT ENCOUNTER: ICD-10-CM

## 2017-08-04 RX ORDER — ELASTIC BANDAGE 3"
1 BANDAGE TOPICAL 2 TIMES DAILY
Qty: 6 EACH | Refills: 3 | Status: SHIPPED | OUTPATIENT
Start: 2017-08-04

## 2017-08-04 RX ORDER — GAUZE BANDAGE 3.4"X129"
1 BANDAGE TOPICAL 2 TIMES DAILY
Qty: 48 EACH | Refills: 3 | Status: SHIPPED | OUTPATIENT
Start: 2017-08-04

## 2017-08-04 NOTE — TELEPHONE ENCOUNTER
EFFEXOR-ER      Last Written Prescription Date:  7/24/17  Last Fill Quantity: 90,   # refills: 1  Last Office Visit with [facility], [facility] or [facility] prescribing provider: 7/28/17  Future Office visit:

## 2017-08-04 NOTE — TELEPHONE ENCOUNTER
Message from MyChart:  Original authorizing provider: MD Carlos Alberto Connelly would like a refill of the following medications:  venlafaxine (EFFEXOR-ER) 150 MG TB24 24 hr tablet [Humberto Conner MD]    Preferred pharmacy: [facility] - [place], [place] - [address]    Comment:

## 2017-08-07 ENCOUNTER — TELEPHONE (OUTPATIENT)
Dept: FAMILY MEDICINE | Facility: CLINIC | Age: 77
End: 2017-08-07

## 2017-08-07 DIAGNOSIS — G47.33 OSA (OBSTRUCTIVE SLEEP APNEA): Primary | ICD-10-CM

## 2017-08-07 NOTE — TELEPHONE ENCOUNTER
I have contacted pt to find out which supplies she needs. She stated she needs a new prescription for the cushion, mask, head strap, heated tubing, filter, humidifier. ResMed is her brand. Suad Guan CMA ([facility])

## 2017-08-07 NOTE — TELEPHONE ENCOUNTER
Reason for Call:  Other cpap supplies    Detailed comments: patient is needing more cpap supplies.  She gets them from [facility]    Phone Number Patient can be reached at: Home number on file [phone] (home)    Best Time: any    Can we leave a detailed message on this number? YES    Call taken on 8/7/2017 at 1:18 PM by Octavio Ling

## 2017-08-08 RX ORDER — VENLAFAXINE HYDROCHLORIDE 150 MG/1
150 TABLET, EXTENDED RELEASE ORAL
Qty: 90 EACH | Refills: 1 | OUTPATIENT
Start: 2017-08-08

## 2017-08-08 NOTE — TELEPHONE ENCOUNTER
Orders have been placed.    Please fax the copy (which is sitting on the printer and Sync[employer]) to the proper recipient.    Thank you.    Jaci

## 2017-08-08 NOTE — TELEPHONE ENCOUNTER
Pt returned call and would like order sent to her at [address]; [place] [address]. I have placed order in the mail. Suad Guan CMA ([facility])

## 2017-08-08 NOTE — TELEPHONE ENCOUNTER
Patient should have refills until 1/2018.  Declined  Closing this encounter.  Erna Boateng RN        BP Readings from Last 3 Encounters:   07/31/17 113/83   07/28/17 106/66   07/17/17 122/74     Pulse: (for Fetzima)  Creatinine   Date Value Ref Range Status   07/07/2017 0.74 0.52 - 1.04 mg/dL Final   ]    Last PHQ-9 score on record= No flowsheet data found.

## 2017-08-11 ENCOUNTER — TELEPHONE (OUTPATIENT)
Dept: INTERNAL MEDICINE | Facility: CLINIC | Age: 77
End: 2017-08-11

## 2017-08-19 ENCOUNTER — MYC REFILL (OUTPATIENT)
Dept: INTERNAL MEDICINE | Facility: CLINIC | Age: 77
End: 2017-08-19

## 2017-08-19 DIAGNOSIS — F51.01 PRIMARY INSOMNIA: ICD-10-CM

## 2017-08-19 DIAGNOSIS — G59 MONONEUROPATHY DUE TO UNDERLYING DISEASE: ICD-10-CM

## 2017-08-19 DIAGNOSIS — I25.119 CORONARY ARTERY DISEASE WITH ANGINA PECTORIS, UNSPECIFIED VESSEL OR LESION TYPE, UNSPECIFIED WHETHER NATIVE OR TRANSPLANTED HEART (H): ICD-10-CM

## 2017-08-21 RX ORDER — GABAPENTIN 100 MG/1
100 CAPSULE ORAL 2 TIMES DAILY
Qty: 180 CAPSULE | Refills: 1 | Status: SHIPPED | OUTPATIENT
Start: 2017-08-21 | End: 2017-10-03

## 2017-08-21 RX ORDER — PANTOPRAZOLE SODIUM 40 MG/1
40 TABLET, DELAYED RELEASE ORAL EVERY MORNING
Qty: 90 TABLET | Refills: 1 | Status: SHIPPED | OUTPATIENT
Start: 2017-08-21 | End: 2017-10-03

## 2017-08-21 RX ORDER — TRAZODONE HYDROCHLORIDE 50 MG/1
25 TABLET, FILM COATED ORAL
Qty: 45 TABLET | Refills: 2 | Status: SHIPPED | OUTPATIENT
Start: 2017-08-21 | End: 2017-10-03

## 2017-08-21 NOTE — TELEPHONE ENCOUNTER
Message from MyChart:  Original authorizing provider: MD Carlos Alberto Connelly would like a refill of the following medications:  gabapentin (NEURONTIN) 100 MG capsule [Humberto Conner MD]  traZODone (DESYREL) 50 MG tablet [Humberto Conner MD]  pantoprazole (PROTONIX) 40 MG EC tablet [Humberto Conner MD]    Preferred pharmacy: [facility] - [place], [place] - [address]    Comment:

## 2017-08-21 NOTE — TELEPHONE ENCOUNTER
gabapentin      Last Written Prescription Date:  3/31/17  Last Fill Quantity: 180,   # refills: 1  Last Office Visit with [facility], [facility] or  [facility] prescribing provider: 7/28/17  Future Office visit:       Routing refill request to provider for review/approval because:  Drug not on the [facility], [facility] or  [facility] refill protocol or controlled substance    trazodone       Last Written Prescription Date: 6/28/17  Last Fill Quantity: 45; # refills: 2  Last Office Visit with [facility], [facility] or  [facility] prescribing provider:  7/28/17        Last PHQ-9 score on record= No flowsheet data found.    Lab Results   Component Value Date    AST 36 04/17/2017     Lab Results   Component Value Date    ALT 28 04/17/2017     pantoprazole      Last Written Prescription Date: 7/17/17  Last Fill Quantity: 90,  # refills: 1   Last Office Visit with [facility], [facility] or  [facility] prescribing provider: 7/28/17

## 2017-08-27 ENCOUNTER — TELEPHONE (OUTPATIENT)
Dept: FAMILY MEDICINE | Facility: CLINIC | Age: 77
End: 2017-08-27

## 2017-08-27 DIAGNOSIS — Z79.01 LONG-TERM (CURRENT) USE OF ANTICOAGULANTS: ICD-10-CM

## 2017-08-27 DIAGNOSIS — I96 TOE GANGRENE (H): Primary | ICD-10-CM

## 2017-08-27 RX ORDER — FONDAPARINUX SODIUM 7.5 MG/.6ML
7.5 INJECTION SUBCUTANEOUS EVERY MORNING
Qty: 7 SYRINGE | Refills: 0 | Status: SHIPPED | OUTPATIENT
Start: 2017-08-27 | End: 2017-09-01

## 2017-08-27 NOTE — TELEPHONE ENCOUNTER
[facility] called asking for refill on Arixta. I spoke with on call resident for Dr. Santamaria who verbally approved refill and will request further refills on Monday from vascular surgery/anti-coagulation clinic.

## 2017-08-28 ENCOUNTER — TELEPHONE (OUTPATIENT)
Dept: VASCULAR SURGERY | Facility: CLINIC | Age: 77
End: 2017-08-28

## 2017-08-28 ENCOUNTER — PRE VISIT (OUTPATIENT)
Dept: CARDIOLOGY | Facility: CLINIC | Age: 77
End: 2017-08-28

## 2017-08-28 NOTE — TELEPHONE ENCOUNTER
Patient called over the weekend requesting Arixtra refill.  Reviewed patient's chart and primary physician Dr. Conner has been managing Arixtra.  Spoke with patient and advised her to contact Dr. Conner's office regarding Arixtra.  Advised patient to contact vascular surgery if any questions or concerns develop.    Meena WHATLEY, CNS  Division of Vascular Surgery  [facility]  Pager [phone]

## 2017-08-28 NOTE — TELEPHONE ENCOUNTER
3/1/17 Transesophageal Echo  Interpretation Summary  H/o ABDIEL ligation. No LA clot seen.  H/o PFO closure. The atrial septum is intact as assessed by color Doppler.  No LV thrombus seen. Mildly (EF 45-50%) reduced left ventricular function is  present.  The right ventricle is normal size. Global right ventricular function is  Normal.  2/26/17 Echo limited with opisition  Interpretation Summary  No LV throbus noted.  2/6/17 Echo  Interpretation Summary  Mildly (EF 45-50%) reduced left ventricular function is present.  Right ventricular function, chamber size, wall motion, and thickness are  normal.  Trivial pericardial effusion is present.     No change from prior.  1/30/17 Coronary Angiogram  Summary  -- Diagnostic angiography shows two vessel coronary artery disease (mid LAD and mid RCA) with LM disease.  -- No intervention performed at this time.  PLAN:   --Referral to CV surgery for consideration of coronary artery bypass grafting, suitable targets in dLAD,Diagonal,RPDA/RPL. Will arrange consultation on an outpatient basis.  --If patient is averse to surgery would be reasonable to schedule revascularization with percutaneous coronary intervention as lesions appear amenable.  --Continue optimal medical therapy with BB, Asa, Statin  --Resume coumadin as previously planned  --Hold metformin for 48 hours  --Discharge home tonight     The attending interventional cardiologist was present and supervised all critical aspects the procedure.     Findings were discussed with CV surgery Dr Quiñones and primary cardiologist Dr Lobato.     Zack Hannon MD   Cardiology Fellow

## 2017-08-29 ENCOUNTER — TELEPHONE (OUTPATIENT)
Dept: INTERNAL MEDICINE | Facility: CLINIC | Age: 77
End: 2017-08-29

## 2017-08-29 NOTE — TELEPHONE ENCOUNTER
Spoke with patient.  I told her that it looks like something was mailed to her on 8/8/17. When I went over the address, she said it was right except the [address] number, it's [address] not [address].  She is going to check the desk at her building to see if it's there.  I cannot find what was sent in epic.

## 2017-08-29 NOTE — TELEPHONE ENCOUNTER
Reason for Call:  Medication or medication refill:    Do you use a [facility]?  Name of the pharmacy and phone number for the current request:     Name of the medication requested: all cpap supplies     Other request: patient states that she gets this from the Cpap clinic at the [facility]-doesn't have fax info for this     Can we leave a detailed message on this number? YES    Phone number patient can be reached at: Home number on file [phone] (home)    Best Time: any    Call taken on 8/29/2017 at 11:09 AM by Ghada Schulz

## 2017-08-31 ENCOUNTER — MYC REFILL (OUTPATIENT)
Dept: INTERNAL MEDICINE | Facility: CLINIC | Age: 77
End: 2017-08-31

## 2017-08-31 DIAGNOSIS — F51.01 PRIMARY INSOMNIA: ICD-10-CM

## 2017-09-01 ENCOUNTER — OFFICE VISIT (OUTPATIENT)
Dept: CARDIOLOGY | Facility: CLINIC | Age: 77
End: 2017-09-01
Attending: INTERNAL MEDICINE
Payer: COMMERCIAL

## 2017-09-01 ENCOUNTER — ANTICOAGULATION THERAPY VISIT (OUTPATIENT)
Dept: ANTICOAGULATION | Facility: CLINIC | Age: 77
End: 2017-09-01

## 2017-09-01 VITALS
WEIGHT: 189 LBS | DIASTOLIC BLOOD PRESSURE: 73 MMHG | SYSTOLIC BLOOD PRESSURE: 131 MMHG | BODY MASS INDEX: 34.78 KG/M2 | HEART RATE: 58 BPM | HEIGHT: 62 IN | OXYGEN SATURATION: 91 %

## 2017-09-01 DIAGNOSIS — I48.0 PAROXYSMAL ATRIAL FIBRILLATION (H): ICD-10-CM

## 2017-09-01 DIAGNOSIS — R06.02 SOB (SHORTNESS OF BREATH): ICD-10-CM

## 2017-09-01 DIAGNOSIS — I48.0 PAROXYSMAL ATRIAL FIBRILLATION (H): Primary | ICD-10-CM

## 2017-09-01 DIAGNOSIS — I42.9 CARDIOMYOPATHY, UNSPECIFIED (H): Primary | ICD-10-CM

## 2017-09-01 DIAGNOSIS — Z79.01 LONG-TERM (CURRENT) USE OF ANTICOAGULANTS: ICD-10-CM

## 2017-09-01 DIAGNOSIS — I48.91 ATRIAL FIBRILLATION, UNSPECIFIED TYPE (H): ICD-10-CM

## 2017-09-01 PROCEDURE — 99214 OFFICE O/P EST MOD 30 MIN: CPT | Mod: ZF

## 2017-09-01 PROCEDURE — 99212 OFFICE O/P EST SF 10 MIN: CPT | Mod: ZF

## 2017-09-01 PROCEDURE — 99214 OFFICE O/P EST MOD 30 MIN: CPT | Mod: GC | Performed by: INTERNAL MEDICINE

## 2017-09-01 RX ORDER — METOLAZONE 2.5 MG/1
TABLET ORAL
Qty: 30 TABLET | Refills: 1 | Status: SHIPPED | OUTPATIENT
Start: 2017-09-01 | End: 2017-11-17

## 2017-09-01 RX ORDER — WARFARIN SODIUM 5 MG/1
5 TABLET ORAL DAILY
Qty: 90 TABLET | Refills: 3 | Status: SHIPPED | OUTPATIENT
Start: 2017-09-01 | End: 2017-11-21

## 2017-09-01 RX ORDER — BUMETANIDE 2 MG/1
2 TABLET ORAL DAILY
Qty: 180 TABLET | Refills: 3 | Status: SHIPPED | OUTPATIENT
Start: 2017-09-01 | End: 2017-09-07

## 2017-09-01 RX ORDER — TRAZODONE HYDROCHLORIDE 50 MG/1
25 TABLET, FILM COATED ORAL
Qty: 45 TABLET | Refills: 2 | OUTPATIENT
Start: 2017-09-01

## 2017-09-01 ASSESSMENT — PAIN SCALES - GENERAL: PAINLEVEL: NO PAIN (0)

## 2017-09-01 NOTE — LETTER
9/1/2017      RE: Carlos Alberto Fenton  [address] [place] [address]       Dear Colleague,    Thank you for the opportunity to participate in the care of your patient, Carlos Alberto Fenton, at the [facility] HEART Formerly [facility]. Please see a copy of my visit note below.    HPI:   76F, PMH of HTN, HPL, DM, CVA (11/2016), CAD (s/p 3v CABG: LIMA-LAD, SVG-D1, SVG-dRCA and modified MAZE, ABDIEL ligation - 2/2016), paroxysmal AFib (on amio, [id number] - 8), HIT (on Fondaparinux), RUE DVT who is here for follow up.    She has two major complaints today. Firstly she mentions easy bleeding and bruising. Ever since she has been placed on Fondaparinux, she has noticed even with minimal trauma she bleeds and bruises. Denies any melena or hematochezia, denies any hemoptysis or hematemesis.    Secondly she complains of SOB on minimal exertion, barely able to walk 50 feet or so before she becomes SOB. This has been progressively worsening over the last 6 months or so. Associated with it, she has also noticed swelling in both legs, swelling in her abdomen, and 2 pillow orthopnea. She denies any nausea, emesis or poor appetite.         PAST MEDICAL HISTORY:  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     Hyperlipidemia LDL goal <130 10/31/2010     Hypertension      Panic attacks      Seizures (H) 10/19/2016     Sleep apnea     Uses CPAP       CURRENT  MEDICATIONS:  Current Outpatient Prescriptions   Medication Sig Dispense Refill     bumetanide (BUMEX) 2 MG tablet Take 1 tablet (2 mg) by mouth daily 180 tablet 3     metolazone (ZAROXOLYN) 2.5 MG tablet Please take on Saturday 9/2, Monday 9/4, and Wednesday 9/6. 30 tablet 1     warfarin (COUMADIN) 5 MG tablet Take 1 tablet (5 mg) by mouth daily 90 tablet 3     gabapentin (NEURONTIN) 100 MG capsule Take 1 capsule (100 mg) by mouth 2 times daily 180 capsule 1     traZODone (DESYREL) 50 MG tablet Take 0.5 tablets (25 mg) by mouth nightly as needed for sleep 45 tablet 2     pantoprazole (PROTONIX) 40 MG EC tablet Take 1 tablet (40 mg) by mouth every morning 90 tablet 1     Elastic Bandages & Supports (ACE BANDAGE SELF-ADHERING) MISC 1 each 2 times daily 6 each 3     Gauze Pads & Dressings (KERLIX GAUZE ROLL MEDIUM) MISC 1 each 2 times daily 48 each 3     venlafaxine (EFFEXOR-ER) 150 MG TB24 24 hr tablet Take 1 tablet (150 mg) by mouth daily (with breakfast) 90 each 1     ONE TOUCH ULTRA test strip USE AS DIRECTED TO TEST ONE TIME A  each 2     Wound Dressings (ADAPTIC NON-ADHERING DRESSING) PADS Externally apply 1 each topically 2 times daily 1 each 3     order for DME Sterile 4x4 gauze; Use gauze twice daily for dressing changes. 1 Box 3     atorvastatin (LIPITOR) 40 MG tablet Take 1 tablet (40 mg) by mouth daily 90 tablet 1     furosemide (LASIX) 40 MG tablet Take 1 tablet (40 mg) by mouth 2 times daily 180 tablet 1     potassium chloride SA (K-DUR/KLOR-CON M) 10 MEQ CR tablet Take 1 tablet (10 mEq) by mouth 2 times daily 180 tablet 1     acetaminophen (TYLENOL) 325 MG tablet Take 3 tablets (975 mg) by mouth every 6 hours as needed for mild pain 100 tablet 0     aspirin 81 MG chewable tablet Take 1 tablet (81 mg) by mouth daily (Patient taking differently: Take 81 mg by mouth every morning ) 30 tablet 0     ferrous sulfate (IRON SUPPLEMENT) 325 (65 FE) MG tablet Take 1 tablet (325 mg) by mouth daily (with  breakfast) (Patient taking differently: Take 325 mg by mouth 2 times daily ) 100 tablet 1     ONETOUCH DELICA LANCETS 33G MISC 1 Device daily 100 each 0     blood glucose monitoring (ONE TOUCH ULTRA 2) meter device kit        amoxicillin-clavulanate (AUGMENTIN) 875-125 MG per tablet Take 1 tablet by mouth 2 times daily (Patient not taking: Reported on 9/1/2017) 28 tablet 3       PAST SURGICAL HISTORY:  Past Surgical History:   Procedure Laterality Date     AMPUTATE TOE(S) Right 5/26/2017    Procedure: AMPUTATE TOE(S);  Right Great Toe amputation, debriedment of 2 and 3rd toe soft tissu right foot. and application of Grafix;  Surgeon: Leah Santamaria MD;  Location: [facility] OR     BACK SURGERY       BYPASS GRAFT ARTERY CORONARY N/A 2/6/2017    Procedure: BYPASS GRAFT ARTERY CORONARY;  Surgeon: Mikhail Quiñones MD;  Location: [facility] OR     C APPENDECTOMY  1959     HYSTERECTOMY, PASTORA  1988     IRRIGATION AND DEBRIDEMENT FOOT, COMBINED Right 7/7/2017    Procedure: COMBINED IRRIGATION AND DEBRIDEMENT FOOT;  Irrigation and Debridement Right Foot with Graphix  *Latex Allergy*;  Surgeon: Leah Santamaria MD;  Location: [facility] OR     MAZE PROCEDURE N/A 2/6/2017    Procedure: MAZE PROCEDURE;  Surgeon: Mikhail Quiñones MD;  Location: [facility] OR     PICC INSERTION Left 02/25/2017    5fr TL Bard PICC, 47cm (3cm external) in the L basilic vein w/ tip in the SVC RA junction     TONSILLECTOMY  1942       ALLERGIES:     Allergies   Allergen Reactions     Levaquin [Levofloxacin] Other (See Comments)     Tendon pain in legs, arms and shoulders.      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       Oxycodone      hallucinations     Adhesive Tape Itching and Rash     Diapers & Supplies Rash     Developed yeast infection from previous hospital stay       SOCIAL HISTORY:  Social History   Substance Use Topics     Smoking status:  "Former Smoker     Types: Cigarettes     Smokeless tobacco: Never Used      Comment: Quit in 1976     Alcohol use Yes      Comment: Socially       ROS:   Constitutional: No fever, chills, or sweats. Weight stable.   ENT: No visual disturbance, ear ache, epistaxis, sore throat.   Cardiovascular: As per HPI.   Respiratory: No cough, hemoptysis.    GI: No nausea, vomiting, hematemesis, melena, or hematochezia.   : No hematuria.   Integument: Negative.   Psychiatric: Negative.   Neuro: Negative.   Endocrinology: No significant heat or cold intolerance   Musculoskeletal: No myalgia.      Exam:  /73 (BP Location: Left arm, Patient Position: Chair, Cuff Size: Adult Regular)  Pulse 58  Ht 1.575 m (5' 2\")  Wt 85.7 kg (189 lb)  SpO2 91%  BMI 34.57 kg/m2  GEN: aao x 3, NAD  Neck: JVD elevated to the ear lobes sitting upright  LUNGS: No wheezing. +rales half way up both lungs  HEART: S1S2 audible, no murmurs or rubs. Regular rhythm  ABDOMEN: Soft, nt. +ascites  EXTREMITIES: Warm calves, +DPs, tense 2-3+ bilateral LE edema  NEURO: aao x 3, no focal deficits        Labs:  CBC RESULTS:   Lab Results   Component Value Date    WBC 5.9 07/07/2017    RBC 3.86 07/07/2017    HGB 11.7 07/07/2017    HCT 37.9 07/07/2017    MCV 98 07/07/2017    MCH 30.3 07/07/2017    MCHC 30.9 (L) 07/07/2017    RDW 19.9 (H) 07/07/2017     07/07/2017       BMP RESULTS:  Lab Results   Component Value Date     05/26/2017    POTASSIUM 4.0 07/07/2017    CHLORIDE 102 05/26/2017    CO2 33 (H) 05/26/2017    ANIONGAP 6 05/26/2017     (H) 05/26/2017    BUN 13 05/26/2017    CR 0.74 07/07/2017    GFRESTIMATED 76 07/07/2017    GFRESTBLACK >90   GFR Calc   07/07/2017    CARLY 9.2 05/26/2017        INR RESULTS:  Lab Results   Component Value Date    INR 1.52 (H) 07/07/2017    INR  07/07/2017     Canceled, Test credited   Unsatisfactory specimen - tube underfilled  Notified REESE Gonzalez at 1337 by JUSTINO 7/7/17      INR 1.44 (H) " 05/26/2017    INR 1.31 (H) 04/23/2017       Procedures:  ECHO - 2/26/17  No LV throbus noted.      Left Ventricle:  Left ventricular size is normal. The Ejection Fraction is estimated at 55-60%.  Diastolic function not assessed due to atrial fibrillation. No LV throbus  noted.     Right Ventricle:  Right ventricular function, chamber size, wall motion, and thickness are normal.      TAVO - 3/1/17   H/o ABDIEL ligation. No LA clot seen.  H/o PFO closure. The atrial septum is intact as assessed by color Doppler.  No LV thrombus seen. Mildly (EF 45-50%) reduced left ventricular function is present.  The right ventricle is normal size. Global right ventricular function is normal.    Assessment and Plan:   - 76F, PMH of HTN, HPL, DM, CVA (11/2016), CAD (s/p 3v CABG: LIMA-LAD, SVG-D1, SVG-dRCA and modified MAZE, ABDIEL ligation - 2/2016), paroxysmal AFib (on amio, [id number] - 8), HIT (on Fondaparinux), RUE DVT who is here for follow up.    - Will recommend stopping her Fondaparinux given her Plt count is > 200, and she is more than 3 month out from her VTE episode. Will start her on coumadin again and establish follow up with INR clinic.  - In terms of her heart failure, her symptoms of MADDEN, orthopnea, and her anasarca on exam show that she is very volume up. She has about 25 lbs of extra weight on board at this time. We will hold her Lasix 40 mg BID, and start her on Bumex 2 mg BID starting tonight. Will recommend Metolazone 2.5 mg for three doses (to be taken 30 mins before the morning dose of Bumex). She will restrict her fluid intake to 1L daily for now and will check her weight every morning and bring the log to her follow up visit.  - We will obtain a BMP on Thursday (9/7) and will have her follow up in [facility] in 2 weeks and follow up with us four weeks from now.     Ben Monae MD  Cardiovascular Disease Fellow  Pager: [phone]    Attending Attestation:  Patient seen and examined by me with the Fellow,   Dov. I have performed all pertinent elements of the physical examination and reviewed the note above. I have reviewed pertinent laboratory, echocardiographic, imaging, and cardiac catheterization results. I agree with the plan of care as described in this note.    Star Lobato MD, PhD      CC  Patient Care Team:  Humberto Conner MD as PCP - General (Internal Medicine)  ALLEN Wilson MD as MD (Cardiology)  Jessica Aguillon, RN as Nurse Coordinator (Clinical Cardiac Electrophysiology)  Paige Santos MD as MD (Cardiology)  Leah Santamaria MD as MD (Vascular Surgery)  Angelina Bernal MD as MD (Hematology & Oncology)  Rosalinda Choi, REESE as Nurse Coordinator (Hematology & Oncology)  Diana Dyson LPN as Easton Mcadams DPM as MD (Podiatry)  BEREKET GARCIA

## 2017-09-01 NOTE — NURSING NOTE
Labs: INR next Tuesday, BMP and nt pro BNP next Thursday.  Patient demonstrated understanding of this information and agreed to call with further questions or concerns.   Med Reconcile: Reviewed and verified all current medications with the patient. The updated medication list was printed and given to the patient.  New Medication: Bumex 2 MG twice daily. Metolazone 2.5 MG on Saturday, Monday and Wednesday for one week.Coumadin 5 MG once daily.  Patient was educated regarding newly prescribed medication, including discussion of  the indication, administration, side effects, and when to report to MD or RN. Patient demonstrated understanding of this information and agreed to call with further questions or concerns.  Return Appointment: 2 weeks with [facility] and one month with Dr. Lobato. Patient given instructions regarding scheduling next clinic visit. Patient demonstrated understanding of this information and agreed to call with further questions or concerns.  Medication Change: Discontinue lasix and Arixtra. Patient was educated regarding prescribed medication change, including discussion of the indication, administration, side effects, and when to report to MD or RN. Patient demonstrated understanding of this information and agreed to call with further questions or concerns.  Patient stated she understood all health information given and agreed to call with further questions or concerns.

## 2017-09-01 NOTE — MR AVS SNAPSHOT
Carlos Alberto Fenton   9/1/2017   Anticoagulation Therapy Visit    Description:  76 year old female   Provider:  Yani Delgado, RN   Department:  [facility]           INR as of 9/1/2017     Today's INR No new INR was available at the time of this encounter.      Anticoagulation Summary as of 9/1/2017     INR goal 2.0-3.0   Today's INR No new INR was available at the time of this encounter.   Full instructions 9/2: 5 mg; 9/3: 5 mg; 9/4: 5 mg   Next INR check 9/5/2017    Indications   Long-term (current) use of anticoagulants [Z79.01] [Z79.01]  New onset atrial fibrillation (H) (Resolved) [I48.91]  Atrial fibrillation (H) [I48.91] [I48.91]         September 2017 Details    Sun Mon Tue Wed Thu Fri Sat          1         See details      2      5 mg           3      5 mg         4      5 mg         5            6               7               8               9                 10               11               12               13               14               15               16                 17               18               19               20               21               22               23                 24               25               26               27               28               29               30                Date Details   09/01 This INR check       Date of next INR:  9/5/2017         How to take your warfarin dose     To take:  5 mg Take 1 of the 5 mg tablets.

## 2017-09-01 NOTE — TELEPHONE ENCOUNTER
Message from [employer]:  Original authorizing provider: Humberto Conner MD    Carlos Alberto BISHOPFernie Fenton would like a refill of the following medications:  traZODone (DESYREL) 50 MG tablet [Humberto Conner MD]    Preferred pharmacy: [facility] - [place], [place] - [facility] [address]    Comment:      Medication renewals requested in this message routed to other providers:  Gauze Pads & Dressings (KERLIX GAUZE ROLL MEDIUM) MISC [Rosalinda Ron, APRN CNP]

## 2017-09-01 NOTE — TELEPHONE ENCOUNTER
HERSON      Last Written Prescription Date: 8/21/17  Last Quantity: 45, # refills: 2  Last Office Visit with [facility] or [facility] prescribing provider: 7/28/17       Creatinine   Date Value Ref Range Status   07/07/2017 0.74 0.52 - 1.04 mg/dL Final     Lab Results   Component Value Date    AST 36 04/17/2017     Lab Results   Component Value Date    ALT 28 04/17/2017     BP Readings from Last 3 Encounters:   07/31/17 113/83   07/28/17 106/66   07/17/17 122/74

## 2017-09-01 NOTE — PATIENT INSTRUCTIONS
Please stop taking Lasix.  Please start taking Bumex 2 MG twice daily, start taking tonight.  Please take Metolazone 2.5 MG on Saturday, Monday and Wednesday. Take the Metolazone 30 minutes before your morning Bumex dose.  Please have labs drawn on Thursday of next week.      Please stop taking your Arixtra.  Please start taking Coumadin 5 MG daily starting on Saturday, tomorrow.  Please have a lab test on Tuesday, an INR, to help adjust the dosage of coumadin.    Thank you for your visit today.  Please call me with any questions or concerns.   Carloz Wilson RN  Cardiology Care Coordinator  [phone], press option 1 then option 3

## 2017-09-01 NOTE — PROGRESS NOTES
HPI:   76F, PMH of HTN, HPL, DM, CVA (11/2016), CAD (s/p 3v CABG: LIMA-LAD, SVG-D1, SVG-dRCA and modified MAZE, ABDIEL ligation - 2/2016), paroxysmal AFib (on amio, [id number] - 8), HIT (on Fondaparinux), RUE DVT who is here for follow up.    She has two major complaints today. Firstly she mentions easy bleeding and bruising. Ever since she has been placed on Fondaparinux, she has noticed even with minimal trauma she bleeds and bruises. Denies any melena or hematochezia, denies any hemoptysis or hematemesis.    Secondly she complains of SOB on minimal exertion, barely able to walk 50 feet or so before she becomes SOB. This has been progressively worsening over the last 6 months or so. Associated with it, she has also noticed swelling in both legs, swelling in her abdomen, and 2 pillow orthopnea. She denies any nausea, emesis or poor appetite.         PAST MEDICAL HISTORY:  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     Hyperlipidemia LDL goal <130 10/31/2010     Hypertension      Panic attacks      Seizures (H) 10/19/2016     Sleep apnea     Uses CPAP       CURRENT MEDICATIONS:  Current Outpatient Prescriptions   Medication Sig Dispense Refill     bumetanide (BUMEX) 2 MG tablet Take 1 tablet (2 mg) by mouth daily 180 tablet 3     metolazone (ZAROXOLYN) 2.5 MG tablet Please take on Saturday 9/2, Monday 9/4, and Wednesday 9/6. 30 tablet 1     warfarin (COUMADIN) 5 MG tablet Take 1  tablet (5 mg) by mouth daily 90 tablet 3     gabapentin (NEURONTIN) 100 MG capsule Take 1 capsule (100 mg) by mouth 2 times daily 180 capsule 1     traZODone (DESYREL) 50 MG tablet Take 0.5 tablets (25 mg) by mouth nightly as needed for sleep 45 tablet 2     pantoprazole (PROTONIX) 40 MG EC tablet Take 1 tablet (40 mg) by mouth every morning 90 tablet 1     Elastic Bandages & Supports (ACE BANDAGE SELF-ADHERING) MISC 1 each 2 times daily 6 each 3     Gauze Pads & Dressings (KERLIX GAUZE ROLL MEDIUM) MISC 1 each 2 times daily 48 each 3     venlafaxine (EFFEXOR-ER) 150 MG TB24 24 hr tablet Take 1 tablet (150 mg) by mouth daily (with breakfast) 90 each 1     ONE TOUCH ULTRA test strip USE AS DIRECTED TO TEST ONE TIME A  each 2     Wound Dressings (ADAPTIC NON-ADHERING DRESSING) PADS Externally apply 1 each topically 2 times daily 1 each 3     order for DME Sterile 4x4 gauze; Use gauze twice daily for dressing changes. 1 Box 3     atorvastatin (LIPITOR) 40 MG tablet Take 1 tablet (40 mg) by mouth daily 90 tablet 1     furosemide (LASIX) 40 MG tablet Take 1 tablet (40 mg) by mouth 2 times daily 180 tablet 1     potassium chloride SA (K-DUR/KLOR-CON M) 10 MEQ CR tablet Take 1 tablet (10 mEq) by mouth 2 times daily 180 tablet 1     acetaminophen (TYLENOL) 325 MG tablet Take 3 tablets (975 mg) by mouth every 6 hours as needed for mild pain 100 tablet 0     aspirin 81 MG chewable tablet Take 1 tablet (81 mg) by mouth daily (Patient taking differently: Take 81 mg by mouth every morning ) 30 tablet 0     ferrous sulfate (IRON SUPPLEMENT) 325 (65 FE) MG tablet Take 1 tablet (325 mg) by mouth daily (with breakfast) (Patient taking differently: Take 325 mg by mouth 2 times daily ) 100 tablet 1     ONETOUCH DELICA LANCETS 33G MISC 1 Device daily 100 each 0     blood glucose monitoring (ONE TOUCH ULTRA 2) meter device kit        amoxicillin-clavulanate (AUGMENTIN) 875-125 MG per tablet Take 1 tablet by mouth 2 times daily  (Patient not taking: Reported on 9/1/2017) 28 tablet 3       PAST SURGICAL HISTORY:  Past Surgical History:   Procedure Laterality Date     AMPUTATE TOE(S) Right 5/26/2017    Procedure: AMPUTATE TOE(S);  Right Great Toe amputation, debriedment of 2 and 3rd toe soft tissu right foot. and application of Grafix;  Surgeon: Leah Santamaria MD;  Location: [facility] OR     BACK SURGERY       BYPASS GRAFT ARTERY CORONARY N/A 2/6/2017    Procedure: BYPASS GRAFT ARTERY CORONARY;  Surgeon: Mikhail Quiñones MD;  Location: [facility] OR     C APPENDECTOMY  1959     HYSTERECTOMY, PASTORA  1988     IRRIGATION AND DEBRIDEMENT FOOT, COMBINED Right 7/7/2017    Procedure: COMBINED IRRIGATION AND DEBRIDEMENT FOOT;  Irrigation and Debridement Right Foot with Graphix  *Latex Allergy*;  Surgeon: Leah Santamaria MD;  Location: [facility] OR     MAZE PROCEDURE N/A 2/6/2017    Procedure: MAZE PROCEDURE;  Surgeon: Mikhail Quiñones MD;  Location: [facility] OR     PICC INSERTION Left 02/25/2017    5fr TL Bard PICC, 47cm (3cm external) in the L basilic vein w/ tip in the SVC RA junction     TONSILLECTOMY  1942       ALLERGIES:     Allergies   Allergen Reactions     Levaquin [Levofloxacin] Other (See Comments)     Tendon pain in legs, arms and shoulders.      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       Oxycodone      hallucinations     Adhesive Tape Itching and Rash     Diapers & Supplies Rash     Developed yeast infection from previous hospital stay       SOCIAL HISTORY:  Social History   Substance Use Topics     Smoking status: Former Smoker     Types: Cigarettes     Smokeless tobacco: Never Used      Comment: Quit in 1976     Alcohol use Yes      Comment: Socially       ROS:   Constitutional: No fever, chills, or sweats. Weight stable.   ENT: No visual disturbance, ear ache, epistaxis, sore throat.   Cardiovascular: As per HPI.   Respiratory: No  "cough, hemoptysis.    GI: No nausea, vomiting, hematemesis, melena, or hematochezia.   : No hematuria.   Integument: Negative.   Psychiatric: Negative.   Neuro: Negative.   Endocrinology: No significant heat or cold intolerance   Musculoskeletal: No myalgia.      Exam:  /73 (BP Location: Left arm, Patient Position: Chair, Cuff Size: Adult Regular)  Pulse 58  Ht 1.575 m (5' 2\")  Wt 85.7 kg (189 lb)  SpO2 91%  BMI 34.57 kg/m2  GEN: aao x 3, NAD  Neck: JVD elevated to the ear lobes sitting upright  LUNGS: No wheezing. +rales half way up both lungs  HEART: S1S2 audible, no murmurs or rubs. Regular rhythm  ABDOMEN: Soft, nt. +ascites  EXTREMITIES: Warm calves, +DPs, tense 2-3+ bilateral LE edema  NEURO: aao x 3, no focal deficits        Labs:  CBC RESULTS:   Lab Results   Component Value Date    WBC 5.9 07/07/2017    RBC 3.86 07/07/2017    HGB 11.7 07/07/2017    HCT 37.9 07/07/2017    MCV 98 07/07/2017    MCH 30.3 07/07/2017    MCHC 30.9 (L) 07/07/2017    RDW 19.9 (H) 07/07/2017     07/07/2017       BMP RESULTS:  Lab Results   Component Value Date     05/26/2017    POTASSIUM 4.0 07/07/2017    CHLORIDE 102 05/26/2017    CO2 33 (H) 05/26/2017    ANIONGAP 6 05/26/2017     (H) 05/26/2017    BUN 13 05/26/2017    CR 0.74 07/07/2017    GFRESTIMATED 76 07/07/2017    GFRESTBLACK >90   GFR Calc   07/07/2017    CARLY 9.2 05/26/2017        INR RESULTS:  Lab Results   Component Value Date    INR 1.52 (H) 07/07/2017    INR  07/07/2017     Canceled, Test credited   Unsatisfactory specimen - tube underfilled  Notified REESE Gonzalez at 1337 by JUSTINO 7/7/17      INR 1.44 (H) 05/26/2017    INR 1.31 (H) 04/23/2017       Procedures:  ECHO - 2/26/17  No LV throbus noted.      Left Ventricle:  Left ventricular size is normal. The Ejection Fraction is estimated at 55-60%.  Diastolic function not assessed due to atrial fibrillation. No LV throbus  noted.     Right Ventricle:  Right ventricular " function, chamber size, wall motion, and thickness are normal.      TAVO - 3/1/17   H/o ABDIEL ligation. No LA clot seen.  H/o PFO closure. The atrial septum is intact as assessed by color Doppler.  No LV thrombus seen. Mildly (EF 45-50%) reduced left ventricular function is present.  The right ventricle is normal size. Global right ventricular function is normal.    Assessment and Plan:   - 76F, PMH of HTN, HPL, DM, CVA (11/2016), CAD (s/p 3v CABG: LIMA-LAD, SVG-D1, SVG-dRCA and modified MAZE, ABDIEL ligation - 2/2016), paroxysmal AFib (on amio, [id number] - 8), HIT (on Fondaparinux), RUE DVT who is here for follow up.    - Will recommend stopping her Fondaparinux given her Plt count is > 200, and she is more than 3 month out from her VTE episode. Will start her on coumadin again and establish follow up with INR clinic.  - In terms of her heart failure, her symptoms of MADDEN, orthopnea, and her anasarca on exam show that she is very volume up. She has about 25 lbs of extra weight on board at this time. We will hold her Lasix 40 mg BID, and start her on Bumex 2 mg BID starting tonight. Will recommend Metolazone 2.5 mg for three doses (to be taken 30 mins before the morning dose of Bumex). She will restrict her fluid intake to 1L daily for now and will check her weight every morning and bring the log to her follow up visit.  - We will obtain a BMP on Thursday (9/7) and will have her follow up in [facility] in 2 weeks and follow up with us four weeks from now.     Ben Monae MD  Cardiovascular Disease Fellow  Pager: [phone]    Attending Attestation:  Patient seen and examined by me with the Fellow, Dr. Monae. I have performed all pertinent elements of the physical examination and reviewed the note above. I have reviewed pertinent laboratory, echocardiographic, imaging, and cardiac catheterization results. I agree with the plan of care as described in this note.    Star Lobato MD, PhD      CC  Patient Care  Team:  Humberto Conner MD as PCP - General (Internal Medicine)  ALLEN Wilson MD as MD (Cardiology)  Jessica Aguillon, RN as Nurse Coordinator (Clinical Cardiac Electrophysiology)  Paige Santos MD as MD (Cardiology)  Leah Santamaria MD as MD (Vascular Surgery)  Angelina Bernal MD as MD (Hematology & Oncology)  Rosalinda Choi, REESE as Nurse Coordinator (Hematology & Oncology)  Diana Dyson LPN as Easton Mcadams, WILMERM as MD (Podiatry)  BEREKET GARCIA

## 2017-09-01 NOTE — MR AVS SNAPSHOT
After Visit Summary   9/1/2017    Carlos Alberto Fenton    MRN: [id number]           Patient Information     Date Of Birth          1940        Visit Information        Provider Department      9/1/2017 2:00 PM Star Lobato MD M [facility]        Today's Diagnoses     Cardiomyopathy, unspecified (H)    -  1    Atrial fibrillation (H)          Care Instructions    Please stop taking Lasix.  Please start taking Bumex 2 MG twice daily, start taking tonight.  Please take Metolazone 2.5 MG on Saturday, Monday and Wednesday. Take the Metolazone 30 minutes before your morning Bumex dose.  Please have labs drawn on Thursday of next week.      Please stop taking your Arixtra.  Please start taking Coumadin 5 MG daily starting on Saturday, tomorrow.  Please have a lab test on Tuesday, an INR, to help adjust the dosage of coumadin.    Thank you for your visit today.  Please call me with any questions or concerns.   Carloz Wilson RN  Cardiology Care Coordinator  [phone], press option 1 then option 3            Follow-ups after your visit        Additional Services     INR CLINIC REFERRAL       Indication for Anticoagulation: Atrial Fibrillation  Range:  2.0 - 3.0  If nonstandard INR desired, range desired/explain: n/a  Expected duration of therapy: Lifetime    Please be aware that coverage of these services is subject to the terms and limitations of your health insurance plan.  Call member services at your health plan with any benefit or coverage questions.            INR CLINIC REFERRAL       Afib, range 2-3, taking 5 mg coumadin            INR/ANTICOAG REFERRAL       Indication for Anticoagulation: Atrial Fibrillation  Range:  2.0 - 3.0  If nonstandard INR desired, range desired/explain: n/a  Expected duration of therapy: Lifetime    Patient starting to take 5 MG coumadin on Saturday, 9/2/17.  Patient will have INR drawn on Tuesday.   Please be aware that coverage of these services is subject  to the terms and limitations of your health insurance plan.  Call member services at your health plan with any benefit or coverage questions.                  Follow-up notes from your care team     Return in about 1 month (around 10/1/2017), or LE LIPSCOMB Dr. Missov.      Your next 10 appointments already scheduled     Sep 05, 2017 11:30 AM CDT   (Arrive by 11:15 AM)   Return Vascular Visit with CHACORTA Macias   [facility] ([facility])    [address] [place] [address]  3rd [facility] [address]   [phone]            Sep 05, 2017  1:00 PM CDT   (Arrive by 12:45 PM)   RETURN FOOT/ANKLE with Easton Torres DPM   [facility] ([facility])    [address] [place] [address]  4th [facility] [address] [phone]            Sep 11, 2017  2:00 PM CDT   (Arrive by 1:45 PM)   HOLTER MONITOR VISIT with  Cvc Monitor Tech, [facility] ([facility])    [address] [place] [address] [phone]            Sep 20, 2017  2:00 PM CDT   Lab with  LAB    [facility] ([facility])    [address] [place] [id number] [phone]            Sep 20, 2017  2:30 PM CDT   (Arrive by 2:15 PM)   CORE NEW with CHACORTA Quinteros CNP   [facility] ([facility])    [address] [place] [address] [phone]            Oct 13, 2017 10:00 AM CDT   (Arrive by 9:45 AM)   Return Visit with Star Lobato MD   [facility] ([facility])    [address] [place] [address] [phone]            Oct 19, 2017  2:30 PM CDT   (Arrive by 2:15 PM)   RETURN ATRIAL FIBULATION VISIT with CHACORTA Joshi CNP   [facility] ([facility])    [address] [place] [address]  "[address]  3rd Floor  [facility] [id number] [phone]              Future tests that were ordered for you today     Open Standing Orders        Priority Remaining Interval Expires Ordered    INR Routine 99/99  9/1/2018 9/1/2017    INR Routine 99/99  9/2/2017 9/1/2017            Who to contact     If you have questions or need follow up information about today's clinic visit or your schedule please contact [facility] directly at [phone].  Normal or non-critical lab and imaging results will be communicated to you by [email or web address], letter or phone within 4 business days after the clinic has received the results. If you do not hear from us within 7 days, please contact the clinic through Advaxist or phone. If you have a critical or abnormal lab result, we will notify you by phone as soon as possible.  Submit refill requests through Enjoi or call your pharmacy and they will forward the refill request to us. Please allow 3 business days for your refill to be completed.          Additional Information About Your Visit        Enjoi Information     Enjoi gives you secure access to your electronic health record. If you see a primary care provider, you can also send messages to your care team and make appointments. If you have questions, please call your primary care clinic.  If you do not have a primary care provider, please call [phone] and they will assist you.        Care EveryWhere ID     This is your Care EveryWhere ID. This could be used by other organizations to access your [facility] medical records  [id number]        Your Vitals Were     Pulse Height Pulse Oximetry BMI (Body Mass Index)          58 1.575 m (5' 2\") 91% 34.57 kg/m2         Blood Pressure from Last 3 Encounters:   09/01/17 131/73   07/31/17 113/83   07/28/17 106/66    Weight from Last 3 Encounters:   09/01/17 85.7 kg (189 lb)   07/28/17 77.6 kg (171 lb)   07/07/17 76 kg (167 lb 8.8 oz)              We Performed the Following  "    INR CLINIC REFERRAL     INR CLINIC REFERRAL     INR/ANTICOAG REFERRAL          Today's Medication Changes          These changes are accurate as of: 9/1/17  3:58 PM.  If you have any questions, ask your nurse or doctor.               Start taking these medicines.        Dose/Directions    bumetanide 2 MG tablet   Commonly known as:  BUMEX   Used for:  Cardiomyopathy, unspecified (H)   Started by:  Star Lobato MD        Dose:  2 mg   Take 1 tablet (2 mg) by mouth daily   Quantity:  180 tablet   Refills:  3       metolazone 2.5 MG tablet   Commonly known as:  ZAROXOLYN   Used for:  Cardiomyopathy, unspecified (H)   Started by:  Star Lobato MD        Please take on Saturday 9/2, Monday 9/4, and Wednesday 9/6.   Quantity:  30 tablet   Refills:  1       warfarin 5 MG tablet   Commonly known as:  COUMADIN   Used for:  Atrial fibrillation (H)   Started by:  Star Lobato MD        Dose:  5 mg   Take 1 tablet (5 mg) by mouth daily   Quantity:  90 tablet   Refills:  3         These medicines have changed or have updated prescriptions.        Dose/Directions    aspirin 81 MG chewable tablet   This may have changed:  when to take this   Used for:  Coronary artery disease with angina pectoris, unspecified vessel or lesion type, unspecified whether native or transplanted heart (H)        Dose:  81 mg   Take 1 tablet (81 mg) by mouth daily   Quantity:  30 tablet   Refills:  0       ferrous sulfate 325 (65 FE) MG tablet   Commonly known as:  IRON SUPPLEMENT   This may have changed:  when to take this   Used for:  S/P CABG (coronary artery bypass graft)        Dose:  325 mg   Take 1 tablet (325 mg) by mouth daily (with breakfast)   Quantity:  100 tablet   Refills:  1            Where to get your medicines      These medications were sent to [id number] - [place], [place] - [address]   [address] [place] [address]     Phone:  [phone]     bumetanide 2 MG tablet    metolazone  2.5 MG tablet    warfarin 5 MG tablet                Primary Care Provider Office Phone # Fax #    Humberto Conner [phone]       [facility] [address] [place] [address]        Equal Access to Services     NATALIE HAYES : Hadii aad ku hadlotuso Soomaali, waaxda luqadaha, qaybta kaalmada adeegyada, waxkishore quezadan janette sagejewel twila. So [facility] [phone].    ATENCIÓN: Si habla español, tiene a espinoza disposición servicios gratuitos de asistencia lingüística. [facility] [phone].    We comply with applicable federal civil rights laws and [place] laws. We do not discriminate on the basis of race, color, national origin, age, disability sex, sexual orientation or gender identity.            Thank you!     Thank you for choosing [facility]  for your care. Our goal is always to provide you with excellent care. Hearing back from our patients is one way we can continue to improve our services. Please take a few minutes to complete the written survey that you may receive in the mail after your visit with us. Thank you!             Your Updated Medication List - Protect others around you: Learn how to safely use, store and throw away your medicines at www.disposemymeds.org.          This list is accurate as of: 9/1/17  3:58 PM.  Always use your most recent med list.                   Brand Name Dispense Instructions for use Diagnosis    ACE BANDAGE SELF-ADHERING Misc     6 each    1 each 2 times daily    Open wound of lower limb, right, subsequent encounter       acetaminophen 325 MG tablet    TYLENOL    100 tablet    Take 3 tablets (975 mg) by mouth every 6 hours as needed for mild pain    S/P CABG (coronary artery bypass graft)       ADAPTIC NON-ADHERING DRESSING Pads     1 each    Externally apply 1 each topically 2 times daily    Open wound of lower limb, right, subsequent encounter       amoxicillin-clavulanate 875-125 MG per tablet    AUGMENTIN    28 tablet    Take  1 tablet by mouth 2 times daily    Toe gangrene (H)       aspirin 81 MG chewable tablet     30 tablet    Take 1 tablet (81 mg) by mouth daily    Coronary artery disease with angina pectoris, unspecified vessel or lesion type, unspecified whether native or transplanted heart (H)       atorvastatin 40 MG tablet    LIPITOR    90 tablet    Take 1 tablet (40 mg) by mouth daily    Coronary artery disease with angina pectoris, unspecified vessel or lesion type, unspecified whether native or transplanted heart (H)       blood glucose monitoring meter device kit           bumetanide 2 MG tablet    BUMEX    180 tablet    Take 1 tablet (2 mg) by mouth daily    Cardiomyopathy, unspecified (H)       ferrous sulfate 325 (65 FE) MG tablet    IRON SUPPLEMENT    100 tablet    Take 1 tablet (325 mg) by mouth daily (with breakfast)    S/P CABG (coronary artery bypass graft)       furosemide 40 MG tablet    LASIX    180 tablet    Take 1 tablet (40 mg) by mouth 2 times daily    Bilateral edema of lower extremity       gabapentin 100 MG capsule    NEURONTIN    180 capsule    Take 1 capsule (100 mg) by mouth 2 times daily    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       KERLIX GAUZE ROLL MEDIUM Misc     48 each    1 each 2 times daily    Open wound of lower limb, right, subsequent encounter       metolazone 2.5 MG tablet    ZAROXOLYN    30 tablet    Please take on Saturday 9/2, Monday 9/4, and Wednesday 9/6.    Cardiomyopathy, unspecified (H)       ONE TOUCH ULTRA test strip   Generic drug:  blood glucose monitoring     100 each    USE AS DIRECTED TO TEST ONE TIME A DAY    Type 2 diabetes mellitus without complication, without long-term current use of insulin (H)       ONETOUCH DELICA LANCETS 33G Misc     100 each    1 Device daily    Type 2 diabetes mellitus without complication, without long-term current use of insulin (H)       order for DME     1 Box    Sterile 4x4 gauze; Use gauze twice daily for dressing changes.    Open wound of  lower limb, right, subsequent encounter       pantoprazole 40 MG EC tablet    PROTONIX    90 tablet    Take 1 tablet (40 mg) by mouth every morning    Coronary artery disease with angina pectoris, unspecified vessel or lesion type, unspecified whether native or transplanted heart (H)       potassium chloride SA 10 MEQ CR tablet    K-DUR/KLOR-CON M    180 tablet    Take 1 tablet (10 mEq) by mouth 2 times daily    S/P CABG (coronary artery bypass graft)       traZODone 50 MG tablet    DESYREL    45 tablet    Take 0.5 tablets (25 mg) by mouth nightly as needed for sleep    Primary insomnia       venlafaxine 150 MG Tb24 24 hr tablet    EFFEXOR-ER    90 each    Take 1 tablet (150 mg) by mouth daily (with breakfast)    Adjustment disorder with depressed mood       warfarin 5 MG tablet    COUMADIN    90 tablet    Take 1 tablet (5 mg) by mouth daily    Atrial fibrillation (H)

## 2017-09-01 NOTE — PROGRESS NOTES
New referral for this pt due to atrial fib.  Pt plans to get INRs done at a  facility, per Carloz LEIGH from cardiology.  Coumadin to start on 9/2, pt will take 5 mg x 3 days then get her first INR on 9/5.  Carloz reports teaching on this medication is required - will plan for this to be done on 9/5.  Tim LEIGH

## 2017-09-01 NOTE — NURSING NOTE
Chief Complaint   Patient presents with     Follow Up For     CAD, s/p CABG   Vitals were taken and medications were reconciled.     Kirsten Brito MA    1:48 PM

## 2017-09-02 ENCOUNTER — MYC REFILL (OUTPATIENT)
Dept: INTERNAL MEDICINE | Facility: CLINIC | Age: 77
End: 2017-09-02

## 2017-09-02 DIAGNOSIS — I25.119 CORONARY ARTERY DISEASE WITH ANGINA PECTORIS, UNSPECIFIED VESSEL OR LESION TYPE, UNSPECIFIED WHETHER NATIVE OR TRANSPLANTED HEART (H): ICD-10-CM

## 2017-09-02 DIAGNOSIS — F43.21 ADJUSTMENT DISORDER WITH DEPRESSED MOOD: ICD-10-CM

## 2017-09-02 DIAGNOSIS — G59 MONONEUROPATHY DUE TO UNDERLYING DISEASE: ICD-10-CM

## 2017-09-02 DIAGNOSIS — F51.01 PRIMARY INSOMNIA: ICD-10-CM

## 2017-09-02 RX ORDER — GABAPENTIN 100 MG/1
100 CAPSULE ORAL 2 TIMES DAILY
Qty: 180 CAPSULE | Refills: 1 | Status: CANCELLED | OUTPATIENT
Start: 2017-09-02

## 2017-09-02 RX ORDER — VENLAFAXINE HYDROCHLORIDE 150 MG/1
150 TABLET, EXTENDED RELEASE ORAL
Qty: 90 EACH | Refills: 1 | Status: CANCELLED | OUTPATIENT
Start: 2017-09-02

## 2017-09-02 RX ORDER — TRAZODONE HYDROCHLORIDE 50 MG/1
25 TABLET, FILM COATED ORAL
Qty: 45 TABLET | Refills: 2 | Status: CANCELLED | OUTPATIENT
Start: 2017-09-02

## 2017-09-02 RX ORDER — ATORVASTATIN CALCIUM 40 MG/1
40 TABLET, FILM COATED ORAL DAILY
Qty: 90 TABLET | Refills: 1 | Status: CANCELLED | OUTPATIENT
Start: 2017-09-02

## 2017-09-05 ENCOUNTER — CARE COORDINATION (OUTPATIENT)
Dept: CARDIOLOGY | Facility: CLINIC | Age: 77
End: 2017-09-05

## 2017-09-05 ENCOUNTER — OFFICE VISIT (OUTPATIENT)
Dept: ORTHOPEDICS | Facility: CLINIC | Age: 77
End: 2017-09-05

## 2017-09-05 ENCOUNTER — OFFICE VISIT (OUTPATIENT)
Dept: VASCULAR SURGERY | Facility: CLINIC | Age: 77
End: 2017-09-05

## 2017-09-05 ENCOUNTER — HOSPITAL ENCOUNTER (EMERGENCY)
Facility: CLINIC | Age: 77
Discharge: HOME | End: 2017-09-05
Attending: EMERGENCY MEDICINE | Admitting: EMERGENCY MEDICINE
Payer: MEDICARE

## 2017-09-05 ENCOUNTER — ANTICOAGULATION THERAPY VISIT (OUTPATIENT)
Dept: ANTICOAGULATION | Facility: CLINIC | Age: 77
End: 2017-09-05

## 2017-09-05 VITALS
OXYGEN SATURATION: 91 % | RESPIRATION RATE: 19 BRPM | DIASTOLIC BLOOD PRESSURE: 74 MMHG | HEART RATE: 109 BPM | BODY MASS INDEX: 31.48 KG/M2 | SYSTOLIC BLOOD PRESSURE: 109 MMHG | WEIGHT: 172.1 LBS

## 2017-09-05 VITALS
SYSTOLIC BLOOD PRESSURE: 109 MMHG | DIASTOLIC BLOOD PRESSURE: 86 MMHG | HEART RATE: 106 BPM | TEMPERATURE: 97.8 F | RESPIRATION RATE: 16 BRPM | OXYGEN SATURATION: 95 %

## 2017-09-05 DIAGNOSIS — I48.91 ATRIAL FIBRILLATION, UNSPECIFIED TYPE (H): ICD-10-CM

## 2017-09-05 DIAGNOSIS — E87.6 HYPOKALEMIA: ICD-10-CM

## 2017-09-05 DIAGNOSIS — I96 TOE GANGRENE (H): Primary | ICD-10-CM

## 2017-09-05 DIAGNOSIS — L97.922 ULCER OF LEFT LOWER EXTREMITY WITH FAT LAYER EXPOSED (H): ICD-10-CM

## 2017-09-05 DIAGNOSIS — Z79.01 LONG-TERM (CURRENT) USE OF ANTICOAGULANTS: ICD-10-CM

## 2017-09-05 DIAGNOSIS — I48.0 PAROXYSMAL ATRIAL FIBRILLATION (H): ICD-10-CM

## 2017-09-05 DIAGNOSIS — I96 TOE GANGRENE (H): ICD-10-CM

## 2017-09-05 DIAGNOSIS — R06.02 SOB (SHORTNESS OF BREATH): ICD-10-CM

## 2017-09-05 DIAGNOSIS — I42.9 CARDIOMYOPATHY, UNSPECIFIED (H): ICD-10-CM

## 2017-09-05 LAB
ANION GAP SERPL CALCULATED.3IONS-SCNC: 5 MMOL/L (ref 3–14)
ANION GAP SERPL CALCULATED.3IONS-SCNC: ABNORMAL MMOL/L (ref 3–14)
BUN SERPL-MCNC: 14 MG/DL (ref 7–30)
BUN SERPL-MCNC: 15 MG/DL (ref 7–30)
CALCIUM SERPL-MCNC: 9.5 MG/DL (ref 8.5–10.1)
CALCIUM SERPL-MCNC: 9.5 MG/DL (ref 8.5–10.1)
CHLORIDE SERPL-SCNC: 87 MMOL/L (ref 94–109)
CHLORIDE SERPL-SCNC: 88 MMOL/L (ref 94–109)
CO2 SERPL-SCNC: 43 MMOL/L (ref 20–32)
CO2 SERPL-SCNC: >45 MMOL/L (ref 20–32)
CREAT SERPL-MCNC: 0.7 MG/DL (ref 0.52–1.04)
CREAT SERPL-MCNC: 0.81 MG/DL (ref 0.52–1.04)
GFR SERPL CREATININE-BSD FRML MDRD: 69 ML/MIN/1.7M2
GFR SERPL CREATININE-BSD FRML MDRD: 81 ML/MIN/1.7M2
GLUCOSE SERPL-MCNC: 76 MG/DL (ref 70–99)
GLUCOSE SERPL-MCNC: 81 MG/DL (ref 70–99)
INR PPP: 1.77 (ref 0.86–1.14)
NT-PROBNP SERPL-MCNC: 1560 PG/ML (ref 0–450)
POTASSIUM SERPL-SCNC: 2.6 MMOL/L (ref 3.4–5.3)
POTASSIUM SERPL-SCNC: 2.7 MMOL/L (ref 3.4–5.3)
POTASSIUM SERPL-SCNC: 3.3 MMOL/L (ref 3.4–5.3)
SODIUM SERPL-SCNC: 135 MMOL/L (ref 133–144)
SODIUM SERPL-SCNC: 139 MMOL/L (ref 133–144)

## 2017-09-05 PROCEDURE — A9270 NON-COVERED ITEM OR SERVICE: HCPCS | Mod: GY | Performed by: EMERGENCY MEDICINE

## 2017-09-05 PROCEDURE — 84132 ASSAY OF SERUM POTASSIUM: CPT | Performed by: EMERGENCY MEDICINE

## 2017-09-05 PROCEDURE — 99284 EMERGENCY DEPT VISIT MOD MDM: CPT | Mod: 25 | Performed by: EMERGENCY MEDICINE

## 2017-09-05 PROCEDURE — 99283 EMERGENCY DEPT VISIT LOW MDM: CPT | Mod: Z6 | Performed by: EMERGENCY MEDICINE

## 2017-09-05 PROCEDURE — 25000128 H RX IP 250 OP 636: Performed by: EMERGENCY MEDICINE

## 2017-09-05 PROCEDURE — 96365 THER/PROPH/DIAG IV INF INIT: CPT | Performed by: EMERGENCY MEDICINE

## 2017-09-05 PROCEDURE — 96366 THER/PROPH/DIAG IV INF ADDON: CPT | Performed by: EMERGENCY MEDICINE

## 2017-09-05 PROCEDURE — 80048 BASIC METABOLIC PNL TOTAL CA: CPT | Performed by: EMERGENCY MEDICINE

## 2017-09-05 PROCEDURE — 25000132 ZZH RX MED GY IP 250 OP 250 PS 637: Mod: GY | Performed by: EMERGENCY MEDICINE

## 2017-09-05 RX ORDER — SODIUM CHLORIDE 9 MG/ML
INJECTION, SOLUTION INTRAVENOUS CONTINUOUS
Status: DISCONTINUED | OUTPATIENT
Start: 2017-09-05 | End: 2017-09-05 | Stop reason: HOSPADM

## 2017-09-05 RX ORDER — POTASSIUM CHLORIDE 20MEQ/15ML
40 LIQUID (ML) ORAL ONCE
Status: COMPLETED | OUTPATIENT
Start: 2017-09-05 | End: 2017-09-05

## 2017-09-05 RX ORDER — POTASSIUM CL/LIDO/0.9 % NACL 10MEQ/0.1L
10 INTRAVENOUS SOLUTION, PIGGYBACK (ML) INTRAVENOUS ONCE
Status: COMPLETED | OUTPATIENT
Start: 2017-09-05 | End: 2017-09-05

## 2017-09-05 RX ORDER — FONDAPARINUX SODIUM 7.5 MG/.6ML
INJECTION SUBCUTANEOUS
Refills: 0 | COMMUNITY
Start: 2017-08-29 | End: 2017-09-20

## 2017-09-05 RX ADMIN — POTASSIUM CHLORIDE 40 MEQ: 20 SOLUTION ORAL at 16:33

## 2017-09-05 RX ADMIN — Medication 10 MEQ: at 18:54

## 2017-09-05 RX ADMIN — Medication 10 MEQ: at 15:50

## 2017-09-05 RX ADMIN — SODIUM CHLORIDE: 9 INJECTION, SOLUTION INTRAVENOUS at 15:49

## 2017-09-05 ASSESSMENT — PAIN SCALES - GENERAL: PAINLEVEL: SEVERE PAIN (6)

## 2017-09-05 ASSESSMENT — ENCOUNTER SYMPTOMS
ABDOMINAL PAIN: 0
FEVER: 0
SHORTNESS OF BREATH: 0

## 2017-09-05 NOTE — ED AVS SNAPSHOT
[facility], Emergency Department    [address] [facility] [address]    Phone:  [phone]                                       Carlos Alberto Fenton   MRN: [id number]    Department:  [facility], Emergency Department   Date of Visit:  9/5/2017           Patient Information     Date Of Birth          1940        Your diagnoses for this visit were:     Hypokalemia        You were seen by Darrin Foote MD.        Discharge Instructions       Have your potassium rechecked later in the week    Future Appointments        Provider Department Dept Phone Center    9/11/2017 2:00 PM Cardiovascular Monitor [facility], [place] [phone] [facility]    9/20/2017 1:00 PM Easton Torres [facility] [place] [address] [phone] [facility]    9/20/2017 2:00 PM Lab  Health Lab [phone] [facility]    9/20/2017 2:30 PM Isabel Pinto [place] [phone] [facility]    10/3/2017 11:30 AM CHACORTA Ruiz Counts include 234 beds at the [facility] [phone] [facility]    10/13/2017 10:00 AM Star Lobato MD [place] [phone] [facility]    10/19/2017 2:30 PM Tracey Taylor [place] [phone] [facility]      24 Hour Appointment Hotline       To make an appointment at any [facility], call [phone] ([phone]). If you don't have a family doctor or clinic, we will help you find one. [facility] clinics are conveniently located to serve the needs of you and your family.             Review of your medicines      Our records show that you are taking the medicines listed below. If these are incorrect, please call your family doctor or clinic.        Dose / Directions Last dose taken    ACE BANDAGE SELF-ADHERING Misc   Dose:  1 each   Quantity:  6 each        1 each 2 times daily   Refills:  3        acetaminophen 325 MG tablet   Commonly known as:  TYLENOL   Dose:  975 mg   Quantity:  100 tablet        Take 3 tablets (975 mg) by mouth every 6 hours  as needed for mild pain   Refills:  0        ADAPTIC NON-ADHERING DRESSING Pads   Dose:  1 each   Quantity:  1 each        Externally apply 1 each topically 2 times daily   Refills:  3        amoxicillin-clavulanate 875-125 MG per tablet   Commonly known as:  AUGMENTIN   Dose:  1 tablet   Quantity:  28 tablet        Take 1 tablet by mouth 2 times daily   Refills:  3        aspirin 81 MG chewable tablet   Dose:  81 mg   Quantity:  30 tablet        Take 1 tablet (81 mg) by mouth daily   Refills:  0        atorvastatin 40 MG tablet   Commonly known as:  LIPITOR   Dose:  40 mg   Quantity:  90 tablet        Take 1 tablet (40 mg) by mouth daily   Refills:  1        blood glucose monitoring meter device kit        Refills:  0        bumetanide 2 MG tablet   Commonly known as:  BUMEX   Dose:  2 mg   Quantity:  180 tablet        Take 1 tablet (2 mg) by mouth daily   Refills:  3        ferrous sulfate 325 (65 FE) MG tablet   Commonly known as:  IRON SUPPLEMENT   Dose:  325 mg   Quantity:  100 tablet        Take 1 tablet (325 mg) by mouth daily (with breakfast)   Refills:  1        fondaparinux 7.5MG/0.6ML injection   Commonly known as:  ARIXTRA        Refills:  0        furosemide 40 MG tablet   Commonly known as:  LASIX   Dose:  40 mg   Quantity:  180 tablet        Take 1 tablet (40 mg) by mouth 2 times daily   Refills:  1        gabapentin 100 MG capsule   Commonly known as:  NEURONTIN   Dose:  100 mg   Quantity:  180 capsule        Take 1 capsule (100 mg) by mouth 2 times daily   Refills:  1        KERLIX GAUZE ROLL MEDIUM Misc   Dose:  1 each   Quantity:  48 each        1 each 2 times daily   Refills:  3        metolazone 2.5 MG tablet   Commonly known as:  ZAROXOLYN   Quantity:  30 tablet        Please take on Saturday 9/2, Monday 9/4, and Wednesday 9/6.   Refills:  1        ONE TOUCH ULTRA test strip   Quantity:  100 each   Generic drug:  blood glucose monitoring        USE AS DIRECTED TO TEST ONE TIME A DAY   Refills:   2        ONETOUCH DELICA LANCETS 33G Misc   Dose:  1 Device   Quantity:  100 each        1 Device daily   Refills:  0        order for DME   Quantity:  1 Box        Sterile 4x4 gauze; Use gauze twice daily for dressing changes.   Refills:  3        pantoprazole 40 MG EC tablet   Commonly known as:  PROTONIX   Dose:  40 mg   Quantity:  90 tablet        Take 1 tablet (40 mg) by mouth every morning   Refills:  1        potassium chloride SA 10 MEQ CR tablet   Commonly known as:  K-DUR/KLOR-CON M   Dose:  10 mEq   Quantity:  180 tablet        Take 1 tablet (10 mEq) by mouth 2 times daily   Refills:  1        traZODone 50 MG tablet   Commonly known as:  DESYREL   Dose:  25 mg   Quantity:  45 tablet        Take 0.5 tablets (25 mg) by mouth nightly as needed for sleep   Refills:  2        venlafaxine 150 MG Tb24 24 hr tablet   Commonly known as:  EFFEXOR-ER   Dose:  150 mg   Quantity:  90 each        Take 1 tablet (150 mg) by mouth daily (with breakfast)   Refills:  1        warfarin 5 MG tablet   Commonly known as:  COUMADIN   Dose:  5 mg   Quantity:  90 tablet        Take 1 tablet (5 mg) by mouth daily   Refills:  3                Procedures and tests performed during your visit     Basic metabolic panel    Potassium      Orders Needing Specimen Collection     None      Pending Results     No orders found from 9/3/2017 to 9/6/2017.            Pending Culture Results     No orders found from 9/3/2017 to 9/6/2017.            Pending Results Instructions     If you had any lab results that were not finalized at the time of your Discharge, you can call the ED Lab Result RN at [phone]. You will be contacted by this team for any positive Lab results or changes in treatment. The nurses are available 7 days a week from 10A to 6:30P.  You can leave a message 24 hours per day and they will return your call.        Thank you for choosing [facility]       Thank you for choosing [facility] for your care. Our goal is always to  provide you with excellent care. Hearing back from our patients is one way we can continue to improve our services. Please take a few minutes to complete the written survey that you may receive in the mail after you visit with us. Thank you!        "[employer]" Information     "[employer] gives you secure access to your electronic health record. If you see a primary care provider, you can also send messages to your care team and make appointments. If you have questions, please call your primary care clinic.  If you do not have a primary care provider, please call [phone] and they will assist you.        Care EveryWhere ID     This is your Care EveryWhere ID. This could be used by other organizations to access your [facility] medical records  [id number]        Equal Access to Services     NATALIE HAYES : Elise Vasquez, charlotte salazar, cristopher main, scott mattson. So [facility] [phone].    ATENCIÓN: Si habla español, tiene a espinoza disposición servicios gratuitos de asistencia lingüística. Llame al [phone].    We comply with applicable federal civil rights laws and [place] laws. We do not discriminate on the basis of race, color, national origin, age, disability sex, sexual orientation or gender identity.            After Visit Summary       This is your record. Keep this with you and show to your community pharmacist(s) and doctor(s) at your next visit.

## 2017-09-05 NOTE — LETTER
9/5/2017       RE: Carlos Alberto Fenton  [address] [place] [address]     Dear Colleague,    Thank you for referring your patient, Carlos Alberto Fenton, to the [facility] at [facility]. Please see a copy of my visit note below.    Chief Complaint:   Chief Complaint   Patient presents with     Wound Check     Wounds, Left 2nd and 3rd toe. Wounds, Right 1st and 2nd toes. Pt also has wounds on both of her lower legs. Pt stated that these wounds come spontaneously. Pt also stated that one wound on her left leg is from her dogs leash.          Allergies   Allergen Reactions     Levaquin [Levofloxacin] Other (See Comments)     Tendon pain in legs, arms and shoulders.      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       Oxycodone      hallucinations     Adhesive Tape Itching and Rash     Diapers & Supplies Rash     Developed yeast infection from previous hospital stay         Subjective: Carlos Alberto is a 76 year old female who presents to the clinic today for a follow up of left toe wound. She relates that she is feeling well. She has just seen vascular, who have continued her wound cares for the right hallux amputation. She continues to use the Iodosorb on the left foot.     Objective    A wound is noted at left  distal 2nd digit measuring 0.5cm x 0.5cm x 0.2cm.    Yousif Classification: II    Wound base: Miner/Granulation    Edges: hyperkeratotic    Drainage: scant/purulent    Odor: no    Undermining: Around the entire toe.     Bone Exposure: No    Clinical Signs of Infection: Local abscess with no cellulitis and no deep tracking.     After obtaining patient consent, the wound was irrigated with copious amounts of saline. A scalpel was then used to debride the wound into the subcutaneous tissue. The wound edges were debrided to healthy, bleeding tissue. Given  the patient's lack of sensation, no anesthesia was necessary for the procedure.   Right hallux amputation site is healing well. No new signs of necrosis noted to either foot.  Left 3rd digit is healthy with no necrosis.     Assessment: Left 2nd digit wound.  Right hallux amp - currently being managed by vascular.     Plan:   - Pt seen and evaluated  - Left 2nd digit wound was debrided. Local, small amount of purulence was noted. No PO abx today. All of this was evacuated and the area thoroughly lavaged with NSS.   - Continue with vascular orders for the right foot and continue with the Iodosorb on the left 2nd digit.    - Pt to return to clinic in 2 weeks.      Sincerely,    Easton Torres DPM

## 2017-09-05 NOTE — ED PROVIDER NOTES
History     Chief Complaint   Patient presents with     Abnormal Labs     HPI  Carlos Alberto Fenton is a 76 year old female with a history of CAD, HTN, atrial fibrillation, seizures, HLD, DVT, cerebral infarction, anxiety, depression, and skin cancer who presents to the Emergency Department for evaluation of abnormal labs. The patient was seen in clinic today for ongoing cardiac care follow up for CABG x3 last winter. Her labs were notable for abnormally low potassium and was sent to the ED for IV potassium replacement. She states she is asymptomatic and feeling fine.    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    parietral lesions bilateral       Antiplatelet or antithrombotic long-term use      Anxiety      Atrial fibrillation (H)      CAD (coronary artery disease)     2 vessel     Cancer (H) 1990    periodically have cancer on the skin removed     Cerebral artery occlusion with cerebral infarction (H) 10/2016    Cardioembolic strokes related to atrial fibrillation     Deep vein thrombosis (DVT) of axillary vein of right upper extremity (H) 2/25/2017     Depression      Depressive disorder 2001     Diabetes (H)      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 and application of Grafix;  Surgeon: Leah Santamaria MD;  Location: [facility] OR     BACK SURGERY       BYPASS GRAFT ARTERY CORONARY N/A 2/6/2017    Procedure: BYPASS GRAFT ARTERY CORONARY;  Surgeon: Mikhail Quiñones MD;  Location: [facility] OR     C APPENDECTOMY  1959     C CARDIAC SURG PROCEDURE UNLIST       C HAND/FINGER  SURGERY UNLISTED       C STOMACH SURGERY PROCEDURE UNLISTED       HC SACROPLASTY       HC VASCULAR SURGERY PROCEDURE UNLIST       HYSTERECTOMY, PASTORA  1988     IRRIGATION AND DEBRIDEMENT FOOT, COMBINED Right 7/7/2017    Procedure: COMBINED IRRIGATION AND DEBRIDEMENT FOOT;  Irrigation and Debridement Right Foot with Graphix  *Latex Allergy*;  Surgeon: Leah Santamaria MD;  Location: [facility] OR     MAZE PROCEDURE N/A 2/6/2017    Procedure: MAZE PROCEDURE;  Surgeon: Mikhail Quiñones MD;  Location: [facility] OR     PICC INSERTION Left 02/25/2017    5fr TL Bard PICC, 47cm (3cm external) in the L basilic vein w/ tip in the SVC RA junction     TONSILLECTOMY  1942       Family History   Problem Relation Age of Onset     DIABETES Mother      C.A.D. Mother      Hypertension Mother      CEREBROVASCULAR DISEASE Mother      Mini Strokes     Other Cancer Mother      Skin Cancer     Hyperlipidemia Mother      Coronary Artery Disease Mother      DIABETES Father      C.A.D. Father      Hypertension Father      Other Cancer Father      Hyperlipidemia Father      Coronary Artery Disease Father      HEART DISEASE Sister      Arthritis Sister      Other Cancer Other      Skin Cancer              Social History   Substance Use Topics     Smoking status: Former Smoker     Packs/day: 1.00     Years: 10.00     Types: Cigarettes     Start date: 10/3/1958     Quit date: 7/4/1976     Smokeless tobacco: Never Used      Comment: Quit in 1976     Alcohol use Yes      Comment: Socially       Current Facility-Administered Medications   Medication     0.9% sodium chloride infusion     Current Outpatient Prescriptions   Medication     fondaparinux (ARIXTRA) 7.5MG/0.6ML injection     amoxicillin-clavulanate (AUGMENTIN) 875-125 MG per tablet     bumetanide (BUMEX) 2 MG tablet     metolazone (ZAROXOLYN) 2.5 MG tablet     warfarin (COUMADIN) 5 MG tablet     gabapentin (NEURONTIN) 100 MG capsule     traZODone (DESYREL) 50 MG tablet     pantoprazole (PROTONIX) 40 MG  EC tablet     Elastic Bandages & Supports (ACE BANDAGE SELF-ADHERING) MISC     Gauze Pads & Dressings (KERLIX GAUZE ROLL MEDIUM) MISC     venlafaxine (EFFEXOR-ER) 150 MG TB24 24 hr tablet     ONE TOUCH ULTRA test strip     Wound Dressings (ADAPTIC NON-ADHERING DRESSING) PADS     order for DME     atorvastatin (LIPITOR) 40 MG tablet     furosemide (LASIX) 40 MG tablet     potassium chloride SA (K-DUR/KLOR-CON M) 10 MEQ CR tablet     acetaminophen (TYLENOL) 325 MG tablet     aspirin 81 MG chewable tablet     ferrous sulfate (IRON SUPPLEMENT) 325 (65 FE) MG tablet     ONETOUCH DELICA LANCETS 33G MISC     blood glucose monitoring (ONE TOUCH ULTRA 2) meter device kit        Allergies   Allergen Reactions     Levaquin [Levofloxacin] Other (See Comments)     Tendon pain in legs, arms and shoulders.      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       Oxycodone      hallucinations     Adhesive Tape Itching and Rash     Diapers & Supplies Rash     Developed yeast infection from previous hospital stay           I have reviewed the Medications, Allergies, Past Medical and Surgical History, and Social History in the Epic system.    Review of Systems   Constitutional: Negative for fever.   Respiratory: Negative for shortness of breath.    Cardiovascular: Negative for chest pain.   Gastrointestinal: Negative for abdominal pain.   All other systems reviewed and are negative.      Physical Exam   BP: 115/71  Pulse: 106  Resp: 16  SpO2: 94 %  Physical Exam   Constitutional: She is oriented to person, place, and time. She appears well-developed and well-nourished. No distress.   Cardiovascular: Normal rate, regular rhythm and normal heart sounds.    Pulmonary/Chest: Effort normal and breath sounds normal.   Neurological: She is alert and oriented to person, place, and time.   Psychiatric: She has a normal mood and  affect. Her behavior is normal.   Nursing note and vitals reviewed.      ED Course     ED Course     Procedures        Medications   0.9% sodium chloride infusion ( Intravenous Stopped 9/5/17 1955)   potassium chloride (KAYCIEL) solution 40 mEq (40 mEq Oral Given 9/5/17 1633)   potassium chloride 10 mEq in 100 mL intermittent infusion with 10 mg lidocaine (0 mEq Intravenous Stopped 9/5/17 1955)   potassium chloride 10 mEq in 100 mL intermittent infusion with 10 mg lidocaine (0 mEq Intravenous Stopped 9/5/17 1955)            Labs Ordered and Resulted from Time of ED Arrival Up to the Time of Departure from the ED   BASIC METABOLIC PANEL - Abnormal; Notable for the following:        Result Value    Potassium 2.6 (*)     Chloride 88 (*)     Carbon Dioxide >45 (*)     All other components within normal limits   POTASSIUM - Abnormal; Notable for the following:     Potassium 3.3 (*)     All other components within normal limits            Assessments & Plan (with Medical Decision Making)   76-year-old female with asymptomatic hypokalemia.  Sent here from a clinic for replacement, she has been on Bumex recently for postoperative edema.  Her potassium was 2.7, she was given 40 mEq orally and 2 10 mEq IV boluses, her potassium is now at least 3.2 and she will be discharged home. She ll follow up in clinic and have this rechecked.    This part of the document was transcribed by Gracie Wayne Medical Scribe.      I have reviewed the nursing notes.    I have reviewed the findings, diagnosis, plan and need for follow up with the patient.    Discharge Medication List as of 9/5/2017  7:55 PM          Final diagnoses:   Hypokalemia   I, Michoacano Tee, am serving as a trained medical scribe to document services personally performed by Reji Foote MD, based on the provider's statements to me.      IReji MD, was physically present and have reviewed and verified the accuracy of this note documented by Michoacano Tee.        9/5/2017   [facility], [place], EMERGENCY DEPARTMENT     Darrin Foote MD  09/05/17 [id number]

## 2017-09-05 NOTE — MR AVS SNAPSHOT
After Visit Summary   9/5/2017    Carlos Alberto Fenton    MRN: [id number]           Patient Information     Date Of Birth          1940        Visit Information        Provider Department      9/5/2017 11:30 AM Meena Cardoso APRN Pending sale to [facility]        Today's Diagnoses     Toe gangrene (H)          Care Instructions    Continue twice daily adaptic right foot dressing changes.  Return to clinic in one month.    With questions, concerns, or to request an appointment, please call either:    Edelmira Rodríguez, Care Coordinator RN, Vascular Surgery  [phone]    Vascular Call Center  [phone]    To contact someone after 5 pm, on a weekend, or on a Holiday, please call:  [facility]  [phone], option 4 to have a member of the Vascular Surgery Service paged.          Follow-ups after your visit        Follow-up notes from your care team     Return in about 1 month (around 10/5/2017).      Your next 10 appointments already scheduled     Sep 05, 2017  1:00 PM CDT   (Arrive by 12:45 PM)   RETURN FOOT/ANKLE with Easton Torres DPM   [facility] ([facility] [place])    [address] [place] [address]  [address] [facility] [id number] [phone]            Sep 11, 2017  2:00 PM CDT   (Arrive by 1:45 PM)   HOLTER MONITOR VISIT with  Cvc Monitor [facility], [facility] ([facility])    [address] [place] [address]  [address] [facility] [id number] [phone]            Sep 20, 2017  2:00 PM CDT   Lab with  LAB    [facility] ([facility])    [address] [place] [address]  [address] [facility] [id number] [phone]            Sep 20, 2017  2:30 PM CDT   (Arrive by 2:15 PM)   CORE NEW with CHACORTA Quinteros [facility] ([facility])    [address] [place] [address]  [address] [facility] [id number] [phone]            Oct  03, 2017 11:30 AM CDT   (Arrive by 11:15 AM)   Return Vascular Visit with CHACORTA Macias [facility] ([facility])    [address] [place] [id number] [phone]            Oct 13, 2017 10:00 AM CDT   (Arrive by 9:45 AM)   Return Visit with MD FAB Ellsworth [facility] ([facility])    [address] [place] [id number] [phone]            Oct 19, 2017  2:30 PM CDT   (Arrive by 2:15 PM)   RETURN ATRIAL FIBULATION VISIT with CHACORTA Joshi [facility])    [address] [place] [address] [phone]              Who to contact     Please call your clinic at [phone] to:    Ask questions about your health    Make or cancel appointments    Discuss your medicines    Learn about your test results    Speak to your doctor   If you have compliments or concerns about an experience at your clinic, or if you wish to file a complaint, please contact [facility] Patient Relations at [phone] or email us at [email or web address]         Additional Information About Your Visit        MyChart Information     Secured Mailhart gives you secure access to your electronic health record. If you see a primary care provider, you can also send messages to your care team and make appointments. If you have questions, please call your primary care clinic.  If you do not have a primary care provider, please call [phone] and they will assist you.      Accredible is an electronic gateway that provides easy, online access to your medical records. With [employer]redible, you can request a clinic appointment, read your test results, renew a prescription or communicate with your care team.     To access your existing account, please contact your [facility] or call [phone] for assistance.        Care EveryWhere ID     This is your Care EveryWhere ID. This could be used by other organizations to access your [place] medical records  [id number]        Your Vitals Were     Pulse Respirations Pulse Oximetry Breastfeeding? BMI (Body Mass Index)       109 19 91% No 31.48 kg/m2        Blood Pressure from Last 3 Encounters:   09/05/17 109/74   09/01/17 131/73   07/31/17 113/83    Weight from Last 3 Encounters:   09/05/17 172 lb 1.6 oz   09/01/17 189 lb   07/28/17 171 lb              Today, you had the following     No orders found for display         Today's Medication Changes          These changes are accurate as of: 9/5/17 12:12 PM.  If you have any questions, ask your nurse or doctor.               These medicines have changed or have updated prescriptions.        Dose/Directions    aspirin 81 MG chewable tablet   This may have changed:  when to take this   Used for:  Coronary artery disease with angina pectoris, unspecified vessel or lesion type, unspecified whether native or transplanted heart (H)        Dose:  81 mg   Take 1 tablet (81 mg) by mouth daily   Quantity:  30 tablet   Refills:  0       ferrous sulfate 325 (65 FE) MG tablet   Commonly known as:  IRON SUPPLEMENT   This may have changed:  when to take this   Used for:  S/P CABG (coronary artery bypass graft)        Dose:  325 mg   Take 1 tablet (325 mg) by mouth daily (with breakfast)   Quantity:  100 tablet   Refills:  1            Where to get your medicines      These medications were sent to [place] [id number] [facility] [address]  [address] [place] [address] [address] [place] [id number]    Hours:  TRANSPLANT PHONE NUMBER [phone] Phone:  [phone]     amoxicillin-clavulanate 875-125 MG per tablet                Primary Care Provider Office Phone # Fax #    Humberto Conner [phone]       [facility] [address] [place] [address]        Equal Access to Services     NATALIE HAYES : Hadii william flores ashushan Sogeovannyali, waaxda luqadaha, qaybta kapilargucci main, scott newberrympcinthya mattson. So [facility] [phone].    ATENCIÓN: Si habla español, tiene a espinoza disposición servicios gratuitos de asistencia lingüística. Llame al [phone].    We comply with applicable federal civil rights laws and [place] laws. We do not discriminate on the basis of race, color, national origin, age, disability sex, sexual orientation or gender identity.            Thank you!     Thank you for choosing [facility]  for your care. Our goal is always to provide you with excellent care. Hearing back from our patients is one way we can continue to improve our services. Please take a few minutes to complete the written survey that you may receive in the mail after your visit with us. Thank you!             Your Updated Medication List - Protect others around you: Learn how to safely use, store and throw away your medicines at www.disposemymeds.org.          This list is accurate as of: 9/5/17 12:12 PM.  Always use your most recent med list.                   Brand Name Dispense Instructions for use Diagnosis    ACE BANDAGE SELF-ADHERING Misc     6 each    1 each 2 times daily    Open wound of lower limb, right, subsequent encounter       acetaminophen 325 MG tablet    TYLENOL    100 tablet    Take 3 tablets (975 mg) by mouth every 6 hours as needed for mild pain    S/P CABG (coronary artery bypass graft)       ADAPTIC NON-ADHERING DRESSING Pads     1 each    Externally apply 1 each topically 2 times daily    Open wound of lower limb, right, subsequent encounter       amoxicillin-clavulanate 875-125 MG per tablet    AUGMENTIN    28 tablet    Take 1 tablet by mouth 2 times daily    Toe gangrene (H)       aspirin 81 MG chewable tablet     30 tablet    Take 1 tablet (81 mg) by mouth daily    Coronary artery disease with  angina pectoris, unspecified vessel or lesion type, unspecified whether native or transplanted heart (H)       atorvastatin 40 MG tablet    LIPITOR    90 tablet    Take 1 tablet (40 mg) by mouth daily    Coronary artery disease with angina pectoris, unspecified vessel or lesion type, unspecified whether native or transplanted heart (H)       blood glucose monitoring meter device kit           bumetanide 2 MG tablet    BUMEX    180 tablet    Take 1 tablet (2 mg) by mouth daily    Cardiomyopathy, unspecified (H)       ferrous sulfate 325 (65 FE) MG tablet    IRON SUPPLEMENT    100 tablet    Take 1 tablet (325 mg) by mouth daily (with breakfast)    S/P CABG (coronary artery bypass graft)       fondaparinux 7.5MG/0.6ML injection    ARIXTRA          furosemide 40 MG tablet    LASIX    180 tablet    Take 1 tablet (40 mg) by mouth 2 times daily    Bilateral edema of lower extremity       gabapentin 100 MG capsule    NEURONTIN    180 capsule    Take 1 capsule (100 mg) by mouth 2 times daily    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       KERLIX GAUZE ROLL MEDIUM Misc     48 each    1 each 2 times daily    Open wound of lower limb, right, subsequent encounter       metolazone 2.5 MG tablet    ZAROXOLYN    30 tablet    Please take on Saturday 9/2, Monday 9/4, and Wednesday 9/6.    Cardiomyopathy, unspecified (H)       ONE TOUCH ULTRA test strip   Generic drug:  blood glucose monitoring     100 each    USE AS DIRECTED TO TEST ONE TIME A DAY    Type 2 diabetes mellitus without complication, without long-term current use of insulin (H)       ONETOUCH DELICA LANCETS 33G Misc     100 each    1 Device daily    Type 2 diabetes mellitus without complication, without long-term current use of insulin (H)       order for DME     1 Box    Sterile 4x4 gauze; Use gauze twice daily for dressing changes.    Open wound of lower limb, right, subsequent encounter       pantoprazole 40 MG EC tablet    PROTONIX    90 tablet    Take 1 tablet (40  mg) by mouth every morning    Coronary artery disease with angina pectoris, unspecified vessel or lesion type, unspecified whether native or transplanted heart (H)       potassium chloride SA 10 MEQ CR tablet    K-DUR/KLOR-CON M    180 tablet    Take 1 tablet (10 mEq) by mouth 2 times daily    S/P CABG (coronary artery bypass graft)       traZODone 50 MG tablet    DESYREL    45 tablet    Take 0.5 tablets (25 mg) by mouth nightly as needed for sleep    Primary insomnia       venlafaxine 150 MG Tb24 24 hr tablet    EFFEXOR-ER    90 each    Take 1 tablet (150 mg) by mouth daily (with breakfast)    Adjustment disorder with depressed mood       warfarin 5 MG tablet    COUMADIN    90 tablet    Take 1 tablet (5 mg) by mouth daily    Paroxysmal atrial fibrillation (H)

## 2017-09-05 NOTE — MR AVS SNAPSHOT
Carlos Alberto Fenton   9/5/2017   Anticoagulation Therapy Visit    Description:  76 year old female   Provider:  Amairani Henson, RN   Department:  [facility]           INR as of 9/5/2017     Today's INR 1.77!      Anticoagulation Summary as of 9/5/2017     INR goal 2.0-3.0   Today's INR 1.77!   Full instructions 9/5: 5 mg; 9/6: 2.5 mg; 9/7: 2.5 mg   Next INR check 9/8/2017    Indications   Long-term (current) use of anticoagulants [Z79.01] [Z79.01]  New onset atrial fibrillation (H) (Resolved) [I48.91]  Atrial fibrillation (H) [I48.91] [I48.91]         September 2017 Details    Sun Mon Tue Wed Thu Fri Sat          1               2                 3               4               5      5 mg   See details      6      2.5 mg         7      2.5 mg         8            9                 10               11               12               13               14               15               16                 17               18               19               20               21               22               23                 24               25               26               27               28               29               30                Date Details   09/05 This INR check       Date of next INR:  9/8/2017         How to take your warfarin dose     To take:  2.5 mg Take 0.5 of a 5 mg tablet.    To take:  5 mg Take 1 of the 5 mg tablets.

## 2017-09-05 NOTE — TELEPHONE ENCOUNTER
[facility]  Original authorizing provider: Humberto Conner MD    Carlos Alberto BISHOPFernie Fenton would like a refill of the following medications:  atorvastatin (LIPITOR) 40 MG tablet [Humberto Conner MD]  venlafaxine (EFFEXOR-ER) 150 MG TB24 24 hr tablet [Humberto Conner MD]  gabapentin (NEURONTIN) 100 MG capsule [Humberto Conner MD]  traZODone (DESYREL) 50 MG tablet [Humberto Conner MD]    Preferred pharmacy: [facility] - [place], [place] - [address] [place] [address] [facility]    Comment:      Medication renewals requested in this message routed to other providers:  order for DME [Rosalinda Ron, APRN CNP]

## 2017-09-05 NOTE — TELEPHONE ENCOUNTER
All of these meds were either recently refilled and sent to the pharmacy or have additional refills remaining on them. So you just need to contact the pharmacy to have them process them for you.

## 2017-09-05 NOTE — NURSING NOTE
Reason For Visit:   Chief Complaint   Patient presents with     Wound Check     Wounds, Left 2nd and 3rd toe. Wounds, Right 1st and 2nd toes. Pt also has wounds on both of her lower legs. Pt stated that these wounds come spontaneously. Pt also stated that one wound on her left leg is from her dogs leash.       Pain Assessment  Patient Currently in Pain: Denies              Current Outpatient Prescriptions   Medication Sig Dispense Refill     fondaparinux (ARIXTRA) 7.5MG/0.6ML injection   0     amoxicillin-clavulanate (AUGMENTIN) 875-125 MG per tablet Take 1 tablet by mouth 2 times daily 28 tablet 3     bumetanide (BUMEX) 2 MG tablet Take 1 tablet (2 mg) by mouth daily 180 tablet 3     metolazone (ZAROXOLYN) 2.5 MG tablet Please take on Saturday 9/2, Monday 9/4, and Wednesday 9/6. 30 tablet 1     warfarin (COUMADIN) 5 MG tablet Take 1 tablet (5 mg) by mouth daily 90 tablet 3     gabapentin (NEURONTIN) 100 MG capsule Take 1 capsule (100 mg) by mouth 2 times daily 180 capsule 1     traZODone (DESYREL) 50 MG tablet Take 0.5 tablets (25 mg) by mouth nightly as needed for sleep 45 tablet 2     pantoprazole (PROTONIX) 40 MG EC tablet Take 1 tablet (40 mg) by mouth every morning 90 tablet 1     Elastic Bandages & Supports (ACE BANDAGE SELF-ADHERING) MISC 1 each 2 times daily 6 each 3     Gauze Pads & Dressings (KERLIX GAUZE ROLL MEDIUM) MISC 1 each 2 times daily 48 each 3     venlafaxine (EFFEXOR-ER) 150 MG TB24 24 hr tablet Take 1 tablet (150 mg) by mouth daily (with breakfast) 90 each 1     ONE TOUCH ULTRA test strip USE AS DIRECTED TO TEST ONE TIME A  each 2     Wound Dressings (ADAPTIC NON-ADHERING DRESSING) PADS Externally apply 1 each topically 2 times daily 1 each 3     order for DME Sterile 4x4 gauze; Use gauze twice daily for dressing changes. 1 Box 3     atorvastatin (LIPITOR) 40 MG tablet Take 1 tablet (40 mg) by mouth daily 90 tablet 1     furosemide (LASIX) 40 MG tablet Take 1 tablet (40 mg) by mouth 2  times daily 180 tablet 1     potassium chloride SA (K-DUR/KLOR-CON M) 10 MEQ CR tablet Take 1 tablet (10 mEq) by mouth 2 times daily 180 tablet 1     acetaminophen (TYLENOL) 325 MG tablet Take 3 tablets (975 mg) by mouth every 6 hours as needed for mild pain 100 tablet 0     aspirin 81 MG chewable tablet Take 1 tablet (81 mg) by mouth daily (Patient taking differently: Take 81 mg by mouth every morning ) 30 tablet 0     ferrous sulfate (IRON SUPPLEMENT) 325 (65 FE) MG tablet Take 1 tablet (325 mg) by mouth daily (with breakfast) (Patient taking differently: Take 325 mg by mouth 2 times daily ) 100 tablet 1     ONETOUCH DELICA LANCETS 33G MISC 1 Device daily 100 each 0     blood glucose monitoring (ONE TOUCH ULTRA 2) meter device kit             Allergies   Allergen Reactions     Levaquin [Levofloxacin] Other (See Comments)     Tendon pain in legs, arms and shoulders.      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       Oxycodone      hallucinations     Adhesive Tape Itching and Rash     Diapers & Supplies Rash     Developed yeast infection from previous hospital stay

## 2017-09-05 NOTE — MR AVS SNAPSHOT
After Visit Summary   9/5/2017    Carlos Alberto Fenton    MRN: [id number]           Patient Information     Date Of Birth          1940        Visit Information        Provider Department      9/5/2017 1:00 PM Easton Torres DPM [facility]         Follow-ups after your visit        Your next 10 appointments already scheduled     Sep 11, 2017  2:00 PM CDT   (Arrive by 1:45 PM)   HOLTER MONITOR VISIT with  Cvc Monitor [facility] ([facility])    [address] [place] [address]  3rd [facility] [address]   [phone]            Sep 20, 2017  1:00 PM CDT   (Arrive by 12:45 PM)   RETURN FOOT/ANKLE with Easton Torres DPM   [facility] ([facility])    [address] [place] [address]  4th [facility] [id number] [phone]            Sep 20, 2017  2:00 PM CDT   Lab with ILYA [facility]    [facility] ([facility])    [address] [place] [address]  1st [facility] [address] [phone]            Sep 20, 2017  2:30 PM CDT   (Arrive by 2:15 PM)   CORE NEW with CHACORTA Quinteros CNP   [facility] ([facility])    [address] [place] [address] [phone]            Oct 03, 2017 11:30 AM CDT   (Arrive by 11:15 AM)   Return Vascular Visit with CHACORTA Macias   [facility] ([facility])    [address] [place] [address]  3rd [facility] [address] [phone]            Oct 13, 2017 10:00 AM CDT   (Arrive by 9:45 AM)   Return Visit with Star Lobato MD   [facility] ([facility])    [address] [place] [address] [phone]            Oct 19, 2017  2:30 PM CDT   (Arrive by 2:15 PM)   RETURN ATRIAL FIBULATION VISIT with CHACORTA Joshi CNP   [facility] (  [facility])    [address] [facility]  3rd [facility] [place] [id number] [phone]              Who to contact     Please call your clinic at [phone] to:    Ask questions about your health    Make or cancel appointments    Discuss your medicines    Learn about your test results    Speak to your doctor   If you have compliments or concerns about an experience at your clinic, or if you wish to file a complaint, please contact [facility] Patient Relations at [phone] or email us at [email or web address]         Additional Information About Your Visit        ADITU SAShart Information     Loop [employer] gives you secure access to your electronic health record. If you see a primary care provider, you can also send messages to your care team and make appointments. If you have questions, please call your primary care clinic.  If you do not have a primary care provider, please call [phone] and they will assist you.      [email or web address] is an electronic gateway that provides easy, online access to your medical records. With [email or web address], you can request a clinic appointment, read your test results, renew a prescription or communicate with your care team.     To access your existing account, please contact your [facility] Clinic or call [phone] for assistance.        Care EveryWhere ID     This is your Care EveryWhere ID. This could be used by other organizations to access your [facility] medical records  [id number]         Blood Pressure from Last 3 Encounters:   09/05/17 109/74   09/01/17 131/73   07/31/17 113/83    Weight from Last 3 Encounters:   09/05/17 78.1 kg (172 lb 1.6 oz)   09/01/17 85.7 kg (189 lb)   07/28/17 77.6 kg (171 lb)              Today, you had the following     No orders found for display         Today's Medication Changes          These changes are accurate as of: 9/5/17  1:48 PM.  If you have any questions, ask your nurse  or doctor.               These medicines have changed or have updated prescriptions.        Dose/Directions    aspirin 81 MG chewable tablet   This may have changed:  when to take this   Used for:  Coronary artery disease with angina pectoris, unspecified vessel or lesion type, unspecified whether native or transplanted heart (H)        Dose:  81 mg   Take 1 tablet (81 mg) by mouth daily   Quantity:  30 tablet   Refills:  0       ferrous sulfate 325 (65 FE) MG tablet   Commonly known as:  IRON SUPPLEMENT   This may have changed:  when to take this   Used for:  S/P CABG (coronary artery bypass graft)        Dose:  325 mg   Take 1 tablet (325 mg) by mouth daily (with breakfast)   Quantity:  100 tablet   Refills:  1            Where to get your medicines      These medications were sent to [facility] [address] [facility] [address]  [address] [place] [address] [address] [address]    Hours:  TRANSPLANT PHONE NUMBER [phone] Phone:  [phone]     amoxicillin-clavulanate 875-125 MG per tablet                Primary Care Provider Office Phone # Fax #    Humberto Conner [phone]       [facility] [address] [place] [address]        Equal Access to Services     HO HAYES AH: Hadii william flores hadasho Sogeovannyali, waaxda luqadaha, qaybta kaalmada adeegyada, scott toro hayerin mattson. So [facility] [phone].    ATENCIÓN: Si habla español, tiene a espinoza disposición servicios gratuitos de asistencia lingüística. Zaid al [phone].    We comply with applicable federal civil rights laws and [place] laws. We do not discriminate on the basis of race, color, national origin, age, disability sex, sexual orientation or gender identity.            Thank you!     Thank you for choosing [facility]  for your care. Our goal is always to provide you with excellent care. Hearing back from our patients is one way we can continue  to improve our services. Please take a few minutes to complete the written survey that you may receive in the mail after your visit with us. Thank you!             Your Updated Medication List - Protect others around you: Learn how to safely use, store and throw away your medicines at www.disposemymeds.org.          This list is accurate as of: 9/5/17  1:48 PM.  Always use your most recent med list.                   Brand Name Dispense Instructions for use Diagnosis    ACE BANDAGE SELF-ADHERING Misc     6 each    1 each 2 times daily    Open wound of lower limb, right, subsequent encounter       acetaminophen 325 MG tablet    TYLENOL    100 tablet    Take 3 tablets (975 mg) by mouth every 6 hours as needed for mild pain    S/P CABG (coronary artery bypass graft)       ADAPTIC NON-ADHERING DRESSING Pads     1 each    Externally apply 1 each topically 2 times daily    Open wound of lower limb, right, subsequent encounter       amoxicillin-clavulanate 875-125 MG per tablet    AUGMENTIN    28 tablet    Take 1 tablet by mouth 2 times daily    Toe gangrene (H)       aspirin 81 MG chewable tablet     30 tablet    Take 1 tablet (81 mg) by mouth daily    Coronary artery disease with angina pectoris, unspecified vessel or lesion type, unspecified whether native or transplanted heart (H)       atorvastatin 40 MG tablet    LIPITOR    90 tablet    Take 1 tablet (40 mg) by mouth daily    Coronary artery disease with angina pectoris, unspecified vessel or lesion type, unspecified whether native or transplanted heart (H)       blood glucose monitoring meter device kit           bumetanide 2 MG tablet    BUMEX    180 tablet    Take 1 tablet (2 mg) by mouth daily    Cardiomyopathy, unspecified (H)       ferrous sulfate 325 (65 FE) MG tablet    IRON SUPPLEMENT    100 tablet    Take 1 tablet (325 mg) by mouth daily (with breakfast)    S/P CABG (coronary artery bypass graft)       fondaparinux 7.5MG/0.6ML injection    ARIXTRA           furosemide 40 MG tablet    LASIX    180 tablet    Take 1 tablet (40 mg) by mouth 2 times daily    Bilateral edema of lower extremity       gabapentin 100 MG capsule    NEURONTIN    180 capsule    Take 1 capsule (100 mg) by mouth 2 times daily    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       KERLIX GAUZE ROLL MEDIUM Misc     48 each    1 each 2 times daily    Open wound of lower limb, right, subsequent encounter       metolazone 2.5 MG tablet    ZAROXOLYN    30 tablet    Please take on Saturday 9/2, Monday 9/4, and Wednesday 9/6.    Cardiomyopathy, unspecified (H)       ONE TOUCH ULTRA test strip   Generic drug:  blood glucose monitoring     100 each    USE AS DIRECTED TO TEST ONE TIME A DAY    Type 2 diabetes mellitus without complication, without long-term current use of insulin (H)       ONETOUCH DELICA LANCETS 33G Misc     100 each    1 Device daily    Type 2 diabetes mellitus without complication, without long-term current use of insulin (H)       order for DME     1 Box    Sterile 4x4 gauze; Use gauze twice daily for dressing changes.    Open wound of lower limb, right, subsequent encounter       pantoprazole 40 MG EC tablet    PROTONIX    90 tablet    Take 1 tablet (40 mg) by mouth every morning    Coronary artery disease with angina pectoris, unspecified vessel or lesion type, unspecified whether native or transplanted heart (H)       potassium chloride SA 10 MEQ CR tablet    K-DUR/KLOR-CON M    180 tablet    Take 1 tablet (10 mEq) by mouth 2 times daily    S/P CABG (coronary artery bypass graft)       traZODone 50 MG tablet    DESYREL    45 tablet    Take 0.5 tablets (25 mg) by mouth nightly as needed for sleep    Primary insomnia       venlafaxine 150 MG Tb24 24 hr tablet    EFFEXOR-ER    90 each    Take 1 tablet (150 mg) by mouth daily (with breakfast)    Adjustment disorder with depressed mood       warfarin 5 MG tablet    COUMADIN    90 tablet    Take 1 tablet (5 mg) by mouth daily    Paroxysmal atrial  fibrillation (H)

## 2017-09-05 NOTE — ED NOTES
Pt was sent here from clinic for a potassium of 2.7. Has recently lost 12 pounds over the past 4 days. Stopped taking Lasix on Friday, continues to take Bumex.

## 2017-09-05 NOTE — PATIENT INSTRUCTIONS
Continue twice daily adaptic right foot dressing changes.  Return to clinic in one month.    With questions, concerns, or to request an appointment, please call either:    Edelmira Rodríguez, Care Coordinator RN, Vascular Surgery  [phone]    Vascular Call Center  [phone]    To contact someone after 5 pm, on a weekend, or on a Holiday, please call:  [facility]  [phone], option 4 to have a member of the Vascular Surgery Service paged.

## 2017-09-05 NOTE — PROGRESS NOTES
ANTICOAGULATION FOLLOW-UP CLINIC VISIT    Patient Name:  Carlos Alberto Fenton  Date:  9/5/2017  Contact Type:  Telephone    SUBJECTIVE:     Patient Findings     Comments Patient has already taken warfarin today.           OBJECTIVE    INR   Date Value Ref Range Status   09/05/2017 1.77 (H) 0.86 - 1.14 Final     Chromogenic Factor 10   Date Value Ref Range Status   03/02/2017 65 (L) 70 - 130 % Final     Comment:     Therapeutic Range:  A Chromogenic Factor 10 level of approximately 20-40%   inversely correlates with an INR of 2-3 for patients receiving Warfarin.   Chromogenic Factor 10 levels below 20% indicate an INR greater than 3 and   levels above 40% indicate an INR less than 2.       Factor 2 Assay   Date Value Ref Range Status   02/27/2017 37 (L) 60 - 140 % Final       ASSESSMENT / PLAN  INR assessment SUB    Recheck INR In: 3 DAYS    INR Location Clinic      Anticoagulation Summary as of 9/5/2017     INR goal 2.0-3.0   Today's INR 1.77!   Maintenance plan No maintenance plan   Full instructions 9/5: 5 mg; 9/6: 2.5 mg; 9/7: 2.5 mg   Plan last modified Yani Delgado RN (9/1/2017)   Next INR check 9/8/2017   Target end date Indefinite    Indications   Long-term (current) use of anticoagulants [Z79.01] [Z79.01]  New onset atrial fibrillation (H) (Resolved) [I48.91]  Atrial fibrillation (H) [I48.91] [I48.91]         Anticoagulation Episode Summary     INR check location     Preferred lab     Send INR reminders to [facility]    Comments first INR to be done on 9/5 - NOTE:  teaching on coumadin required on 9/5 for this new pt.      Anticoagulation Care Providers     Provider Role Specialty Phone number    arabellaStar MD Responsible Cardiology [phone]            See the Encounter Report to view Anticoagulation Flowsheet and Dosing Calendar (Go to Encounters tab in chart review, and find the Anticoagulation Therapy Visit)    Spoke with Carlos Alberto.    Amairani Henson, RN

## 2017-09-05 NOTE — PROGRESS NOTES
VASCULAR SURGERY PROGRESS NOTE    HPI:    Carlos Alberto Fenton is a 76 year old female who underwent Right Great Toe amputation, debriedment of 2nd and 3rd toes and application of Grafix with Dr. Santamaria on 5/26/2017.  She underwent right foot irrigation and debridement with Grafix application with Dr. Santamaria on 7/7/2017. She is wearing DH2 shoes. She is being followed by Dr. Hatfield for weekly wound cares for her left foot.  She returns to clinic today for right foot wound assessment.     SUBJECTIVE:  She denies any fever, chills, night sweats or pain.  Offers no specific complaints. Right foot feels good.  Denies right foot order, pain, swelling, erythema, or rash. Reports she has stopped Arixtra and is taking coumadin.     OBJECTIVE:  Vital signs:  /74 (BP Location: Right arm)  Pulse 109  Resp 19  Wt 78.1 kg (172 lb 1.6 oz)  SpO2 91%  Breastfeeding? No  BMI 31.48 kg/m2      PHYSICAL EXAM:  NEURO/PSYCH: The patient is alert and oriented.  Appropriate.  Moves all extremities.    SKIN: Color appropriate for race, warm, dry.  PULMONARY: no acute distress  EXTREMITIES: right foot wound non-malodorous drainage, granulation tissue present, Adaptic, 4x4 fluff, Kerlix and ace wrap applied.    Current Outpatient Prescriptions   Medication Sig Dispense Refill     fondaparinux (ARIXTRA) 7.5MG/0.6ML injection   0     bumetanide (BUMEX) 2 MG tablet Take 1 tablet (2 mg) by mouth daily 180 tablet 3     metolazone (ZAROXOLYN) 2.5 MG tablet Please take on Saturday 9/2, Monday 9/4, and Wednesday 9/6. 30 tablet 1     warfarin (COUMADIN) 5 MG tablet Take 1 tablet (5 mg) by mouth daily 90 tablet 3     gabapentin (NEURONTIN) 100 MG capsule Take 1 capsule (100 mg) by mouth 2 times daily 180 capsule 1     traZODone (DESYREL) 50 MG tablet Take 0.5 tablets (25 mg) by mouth nightly as needed for sleep 45 tablet 2     pantoprazole (PROTONIX) 40 MG EC tablet Take 1 tablet (40 mg) by mouth every morning 90 tablet 1     Elastic Bandages &  Supports (ACE BANDAGE SELF-ADHERING) MISC 1 each 2 times daily 6 each 3     Gauze Pads & Dressings (KERLIX GAUZE ROLL MEDIUM) MISC 1 each 2 times daily 48 each 3     amoxicillin-clavulanate (AUGMENTIN) 875-125 MG per tablet Take 1 tablet by mouth 2 times daily (Patient not taking: Reported on 9/1/2017) 28 tablet 3     venlafaxine (EFFEXOR-ER) 150 MG TB24 24 hr tablet Take 1 tablet (150 mg) by mouth daily (with breakfast) 90 each 1     ONE TOUCH ULTRA test strip USE AS DIRECTED TO TEST ONE TIME A  each 2     Wound Dressings (ADAPTIC NON-ADHERING DRESSING) PADS Externally apply 1 each topically 2 times daily 1 each 3     order for DME Sterile 4x4 gauze; Use gauze twice daily for dressing changes. 1 Box 3     atorvastatin (LIPITOR) 40 MG tablet Take 1 tablet (40 mg) by mouth daily 90 tablet 1     furosemide (LASIX) 40 MG tablet Take 1 tablet (40 mg) by mouth 2 times daily 180 tablet 1     potassium chloride SA (K-DUR/KLOR-CON M) 10 MEQ CR tablet Take 1 tablet (10 mEq) by mouth 2 times daily 180 tablet 1     acetaminophen (TYLENOL) 325 MG tablet Take 3 tablets (975 mg) by mouth every 6 hours as needed for mild pain 100 tablet 0     aspirin 81 MG chewable tablet Take 1 tablet (81 mg) by mouth daily (Patient taking differently: Take 81 mg by mouth every morning ) 30 tablet 0     ferrous sulfate (IRON SUPPLEMENT) 325 (65 FE) MG tablet Take 1 tablet (325 mg) by mouth daily (with breakfast) (Patient taking differently: Take 325 mg by mouth 2 times daily ) 100 tablet 1     ONETOUCH DELICA LANCETS 33G MISC 1 Device daily 100 each 0     blood glucose monitoring (ONE TOUCH ULTRA 2) meter device kit              ASSESSMENT/PLAN:  #1 Dry gangrene, right foot great and second toe, secondary to microembolization or Heparin- Induce Thrombocytopenia  #2 Status post Right Great Toe Amputation, Debridement of Toes 2 and 3, and application of Grafix with Dr. Santamaria on 5/26/2017  #3 Irrigation and debridement right foot, Grafix  application, 7/7/2017    - continue right foot dressing changes BID with Adaptic, 4 X 4's, Kerlix and ace wrap    - okay for weight bearing    - follow up with ERIK Mariee in 4 weeks    - continue antibiotics, Augmentin refill sent in    - continue coumadin     - continue ASA and Atorvastatin     - left foot wound care per Dr. Torres    Discussed with Dr. Mg above plan              Meena WHATLEY, CNS  Division of Vascular Surgery  [facility]  Pager [phone]

## 2017-09-05 NOTE — NURSING NOTE
Chief Complaint   Patient presents with     RECHECK     Follow up wound on feet        Vitals:    09/05/17 1143   BP: 109/74   BP Location: Right arm   Pulse: 109   Resp: 19   SpO2: 91%   Weight: 172 lb 1.6 oz       Body mass index is 31.48 kg/(m^2).               Jeannie Baker LPN

## 2017-09-05 NOTE — PROGRESS NOTES
Dr. Lobato was paged and called and is recommending that patient go to the ER for potassium replacement.  Patient called and notified and is agreeable to plan.  She will go to the ER here at [facility].

## 2017-09-05 NOTE — LETTER
9/5/2017     RE: Carlos Alberto Fenton  [address] [place] [address]     Dear Colleague,    Thank you for referring your patient, Carlos Alberto Fenton, to the [facility] Please see a copy of my visit note below.    VASCULAR SURGERY PROGRESS NOTE    HPI:    Carlos Alberto Fenton is a 76 year old female who underwent Right Great Toe amputation, debriedment of 2nd and 3rd toes and application of Grafix with Dr. Santamaria on 5/26/2017.  She underwent right foot irrigation and debridement with Grafix application with Dr. Santamaria on 7/7/2017. She is wearing DH2 shoes. She is being followed by Dr. Hatfield for weekly wound cares for her left foot.  She returns to clinic today for right foot wound assessment.     SUBJECTIVE:  She denies any fever, chills, night sweats or pain.  Offers no specific complaints. Right foot feels good.  Denies right foot order, pain, swelling, erythema, or rash. Reports she has stopped Arixtra and is taking coumadin.     OBJECTIVE:  Vital signs:  /74 (BP Location: Right arm)  Pulse 109  Resp 19  Wt 78.1 kg (172 lb 1.6 oz)  SpO2 91%  Breastfeeding? No  BMI 31.48 kg/m2    PHYSICAL EXAM:  NEURO/PSYCH: The patient is alert and oriented.  Appropriate.  Moves all extremities.    SKIN: Color appropriate for race, warm, dry.  PULMONARY: no acute distress  EXTREMITIES: right foot wound non-malodorous drainage, granulation tissue present, Adaptic, 4x4 fluff, Kerlix and ace wrap applied.      Current Outpatient Prescriptions   Medication Sig Dispense Refill     fondaparinux (ARIXTRA) 7.5MG/0.6ML injection   0     bumetanide (BUMEX) 2 MG tablet Take 1 tablet (2 mg) by mouth daily 180 tablet 3     metolazone (ZAROXOLYN) 2.5 MG tablet Please take on Saturday 9/2, Monday 9/4, and Wednesday 9/6. 30 tablet 1     warfarin (COUMADIN) 5 MG tablet Take 1 tablet (5 mg) by mouth daily 90 tablet 3     gabapentin (NEURONTIN) 100 MG capsule Take 1 capsule (100 mg)  by mouth 2 times daily 180 capsule 1     traZODone (DESYREL) 50 MG tablet Take 0.5 tablets (25 mg) by mouth nightly as needed for sleep 45 tablet 2     pantoprazole (PROTONIX) 40 MG EC tablet Take 1 tablet (40 mg) by mouth every morning 90 tablet 1     Elastic Bandages & Supports (ACE BANDAGE SELF-ADHERING) MISC 1 each 2 times daily 6 each 3     Gauze Pads & Dressings (KERLIX GAUZE ROLL MEDIUM) MISC 1 each 2 times daily 48 each 3     amoxicillin-clavulanate (AUGMENTIN) 875-125 MG per tablet Take 1 tablet by mouth 2 times daily (Patient not taking: Reported on 9/1/2017) 28 tablet 3     venlafaxine (EFFEXOR-ER) 150 MG TB24 24 hr tablet Take 1 tablet (150 mg) by mouth daily (with breakfast) 90 each 1     ONE TOUCH ULTRA test strip USE AS DIRECTED TO TEST ONE TIME A  each 2     Wound Dressings (ADAPTIC NON-ADHERING DRESSING) PADS Externally apply 1 each topically 2 times daily 1 each 3     order for DME Sterile 4x4 gauze; Use gauze twice daily for dressing changes. 1 Box 3     atorvastatin (LIPITOR) 40 MG tablet Take 1 tablet (40 mg) by mouth daily 90 tablet 1     furosemide (LASIX) 40 MG tablet Take 1 tablet (40 mg) by mouth 2 times daily 180 tablet 1     potassium chloride SA (K-DUR/KLOR-CON M) 10 MEQ CR tablet Take 1 tablet (10 mEq) by mouth 2 times daily 180 tablet 1     acetaminophen (TYLENOL) 325 MG tablet Take 3 tablets (975 mg) by mouth every 6 hours as needed for mild pain 100 tablet 0     aspirin 81 MG chewable tablet Take 1 tablet (81 mg) by mouth daily (Patient taking differently: Take 81 mg by mouth every morning ) 30 tablet 0     ferrous sulfate (IRON SUPPLEMENT) 325 (65 FE) MG tablet Take 1 tablet (325 mg) by mouth daily (with breakfast) (Patient taking differently: Take 325 mg by mouth 2 times daily ) 100 tablet 1     ONETOUCH DELICA LANCETS 33G MISC 1 Device daily 100 each 0     blood glucose monitoring (ONE TOUCH ULTRA 2) meter device kit        ASSESSMENT/PLAN:  #1 Dry gangrene, right foot  great and second toe, secondary to microembolization or Heparin- Induce Thrombocytopenia  #2 Status post Right Great Toe Amputation, Debridement of Toes 2 and 3, and application of Grafix with Dr. Santamaria on 5/26/2017  #3 Irrigation and debridement right foot, Grafix application, 7/7/2017    - continue right foot dressing changes BID with Adaptic, 4 X 4's, Kerlix and ace wrap    - okay for weight bearing    - follow up with ERIK Mariee in 4 weeks    - continue antibiotics, Augmentin refill sent in    - continue coumadin     - continue ASA and Atorvastatin     - left foot wound care per Dr. Torres    Discussed with Dr. Mg above plan      Meena WHATLEY, CNS  Division of Vascular Surgery  [facility]  Pager [phone]

## 2017-09-05 NOTE — ED AVS SNAPSHOT
[facility] [place], Emergency Department    [address] [place] [address]    Phone:  [phone]                                       Carlos Alberto Fenton   MRN: [id number]    Department:  [facility], Emergency Department   Date of Visit:  9/5/2017           After Visit Summary Signature Page     I have received my discharge instructions, and my questions have been answered. I have discussed any challenges I see with this plan with the nurse or doctor.    ..........................................................................................................................................  Patient/Patient Representative Signature      ..........................................................................................................................................  Patient Representative Print Name and Relationship to Patient    ..................................................               ................................................  Date                                            Time    ..........................................................................................................................................  Reviewed by Signature/Title    ...................................................              ..............................................  Date                                                            Time

## 2017-09-06 NOTE — PROGRESS NOTES
9/6/17 Pt seen in the ED for abnormal labs. Pt was hypokaliemia (2.7) and showed no s/sx of this. Was given 40 mEq PO and two 10mEq IV boluses of potassium. Upon discharge pt's potassium labs were 3.20. Pt is D/C home and request to f/u with MD. INR was done yesterday with a call made to pt with recommendations and return date. No calls made to pt today. Chantal Garcia RN

## 2017-09-06 NOTE — PROGRESS NOTES
Chief Complaint:   Chief Complaint   Patient presents with     Wound Check     Wounds, Left 2nd and 3rd toe. Wounds, Right 1st and 2nd toes. Pt also has wounds on both of her lower legs. Pt stated that these wounds come spontaneously. Pt also stated that one wound on her left leg is from her dogs leash.          Allergies   Allergen Reactions     Levaquin [Levofloxacin] Other (See Comments)     Tendon pain in legs, arms and shoulders.      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       Oxycodone      hallucinations     Adhesive Tape Itching and Rash     Diapers & Supplies Rash     Developed yeast infection from previous hospital stay         Subjective: Carlos Alberto is a 76 year old female who presents to the clinic today for a follow up of left toe wound. She relates that she is feeling well. She has just seen vascular, who have continued her wound cares for the right hallux amputation. She continues to use the Iodosorb on the left foot.     Objective      A wound is noted at left  distal 2nd digit measuring 0.5cm x 0.5cm x 0.2cm.    Yousif Classification: II    Wound base: [place]/Granulation    Edges: hyperkeratotic    Drainage: scant/purulent    Odor: no    Undermining: Around the entire toe.     Bone Exposure: No    Clinical Signs of Infection: Local abscess with no cellulitis and no deep tracking.     After obtaining patient consent, the wound was irrigated with copious amounts of saline. A scalpel was then used to debride the wound into the subcutaneous tissue. The wound edges were debrided to healthy, bleeding tissue. Given the patient's lack of sensation, no anesthesia was necessary for the procedure.   Right hallux amputation site is healing well. No new signs of necrosis noted to either foot.  Left 3rd digit is healthy with no necrosis.     Assessment: Left 2nd digit wound.  Right hallux amp  - currently being managed by vascular.     Plan:   - Pt seen and evaluated  - Left 2nd digit wound was debrided. Local, small amount of purulence was noted. No PO abx today. All of this was evacuated and the area thoroughly lavaged with NSS.   - Continue with vascular orders for the right foot and continue with the Iodosorb on the left 2nd digit.    - Pt to return to clinic in 2 weeks.

## 2017-09-07 ENCOUNTER — TELEPHONE (OUTPATIENT)
Dept: CARDIOLOGY | Facility: CLINIC | Age: 77
End: 2017-09-07

## 2017-09-07 DIAGNOSIS — I42.9 CARDIOMYOPATHY, UNSPECIFIED (H): ICD-10-CM

## 2017-09-07 RX ORDER — BUMETANIDE 2 MG/1
2 TABLET ORAL 2 TIMES DAILY
Qty: 180 TABLET | Refills: 3 | Status: SHIPPED | OUTPATIENT
Start: 2017-09-07 | End: 2017-10-05

## 2017-09-07 NOTE — TELEPHONE ENCOUNTER
Patient saw Dr. Lobato on 9/1 and her Bumex was increased to BID per his note.  The prescription sent to pharmacy reads take 1 daily.  Please send an updated prescription to pharmacy listed in patient's chart - [facility] in [place].  Please call patient to let her know when this has been sent.  [phone].

## 2017-09-08 ENCOUNTER — ANTICOAGULATION THERAPY VISIT (OUTPATIENT)
Dept: ANTICOAGULATION | Facility: CLINIC | Age: 77
End: 2017-09-08

## 2017-09-08 DIAGNOSIS — Z79.01 LONG-TERM (CURRENT) USE OF ANTICOAGULANTS: ICD-10-CM

## 2017-09-08 DIAGNOSIS — I48.0 PAROXYSMAL ATRIAL FIBRILLATION (H): ICD-10-CM

## 2017-09-08 DIAGNOSIS — I48.91 ATRIAL FIBRILLATION, UNSPECIFIED TYPE (H): Primary | ICD-10-CM

## 2017-09-08 DIAGNOSIS — I48.91 ATRIAL FIBRILLATION, UNSPECIFIED TYPE (H): ICD-10-CM

## 2017-09-08 LAB — INR PPP: 2.1 (ref 0.86–1.14)

## 2017-09-08 PROCEDURE — 85610 PROTHROMBIN TIME: CPT | Performed by: INTERNAL MEDICINE

## 2017-09-08 PROCEDURE — 36416 COLLJ CAPILLARY BLOOD SPEC: CPT | Performed by: INTERNAL MEDICINE

## 2017-09-08 NOTE — PROGRESS NOTES
ANTICOAGULATION FOLLOW-UP CLINIC VISIT    Patient Name:  Carlos Alberto Fenton  Date:  9/8/2017  Contact Type:  Telephone    SUBJECTIVE:     Patient Findings     Positives No Problem Findings    Comments New to Coumadin.  Reviewed recommendations and future lab tests.  Carlos Alberto takes her Coumadin in the morning.           OBJECTIVE    INR   Date Value Ref Range Status   09/08/2017 2.10 (H) 0.86 - 1.14 Final     Comment:     This test is intended for monitoring Coumadin therapy.  Results are not   accurate in patients with prolonged INR due to factor deficiency.       Chromogenic Factor 10   Date Value Ref Range Status   03/02/2017 65 (L) 70 - 130 % Final     Comment:     Therapeutic Range:  A Chromogenic Factor 10 level of approximately 20-40%   inversely correlates with an INR of 2-3 for patients receiving Warfarin.   Chromogenic Factor 10 levels below 20% indicate an INR greater than 3 and   levels above 40% indicate an INR less than 2.       Factor 2 Assay   Date Value Ref Range Status   02/27/2017 37 (L) 60 - 140 % Final       ASSESSMENT / PLAN  INR assessment THER    Recheck INR In: 3 DAYS    INR Location Clinic      Anticoagulation Summary as of 9/8/2017     INR goal 2.0-3.0   Today's INR 2.10   Maintenance plan No maintenance plan   Full instructions 9/8: 5 mg; 9/9: 2.5 mg; 9/10: 5 mg   Plan last modified Yani Delgado, RN (9/1/2017)   Next INR check 9/11/2017   Target end date Indefinite    Indications   Long-term (current) use of anticoagulants [Z79.01] [Z79.01]  New onset atrial fibrillation (H) (Resolved) [I48.91]  Atrial fibrillation (H) [I48.91] [I48.91]         Anticoagulation Episode Summary     INR check location     Preferred lab     Send INR reminders to [facility]    Comments first INR to be done on 9/5 - NOTE:  teaching on coumadin required on 9/5 for this new pt.      Anticoagulation Care Providers     Provider Role Specialty Phone number    Star Lobato MD Responsible Cardiology  [phone]            See the Encounter Report to view Anticoagulation Flowsheet and Dosing Calendar (Go to Encounters tab in chart review, and find the Anticoagulation Therapy Visit)    Spoke with patient. Gave them their lab results and new warfarin recommendation.  No changes in health, medication, or diet. No missed doses, no falls. No signs or symptoms of bleed or clotting.  Patient takes her Coumadin in the morning. Antibiotic has been discontinued.    Edy Molina RN

## 2017-09-08 NOTE — MR AVS SNAPSHOT
Carlos Alberto Fenton   9/8/2017   Anticoagulation Therapy Visit    Description:  76 year old female   Provider:  Edy Molina, RN   Department:  [facility]           INR as of 9/8/2017     Today's INR 2.10      Anticoagulation Summary as of 9/8/2017     INR goal 2.0-3.0   Today's INR 2.10   Full instructions 9/8: 5 mg; 9/9: 2.5 mg; 9/10: 5 mg   Next INR check 9/11/2017    Indications   Long-term (current) use of anticoagulants [Z79.01] [Z79.01]  New onset atrial fibrillation (H) (Resolved) [I48.91]  Atrial fibrillation (H) [I48.91] [I48.91]         September 2017 Details    Sun Mon Tue Wed Thu Fri Sat          1               2                 3               4               5               6               7               8      5 mg   See details      9      2.5 mg           10      5 mg         11            12               13               14               15               16                 17               18               19               20               21               22               23                 24               25               26               27               28               29               30                Date Details   09/08 This INR check       Date of next INR:  9/11/2017         How to take your warfarin dose     To take:  2.5 mg Take 0.5 of a 5 mg tablet.    To take:  5 mg Take 1 of the 5 mg tablets.

## 2017-09-11 ENCOUNTER — ALLIED HEALTH/NURSE VISIT (OUTPATIENT)
Dept: CARDIOLOGY | Facility: CLINIC | Age: 77
End: 2017-09-11
Attending: NURSE PRACTITIONER
Payer: MEDICARE

## 2017-09-11 ENCOUNTER — ANTICOAGULATION THERAPY VISIT (OUTPATIENT)
Dept: ANTICOAGULATION | Facility: CLINIC | Age: 77
End: 2017-09-11

## 2017-09-11 DIAGNOSIS — I48.0 PAROXYSMAL ATRIAL FIBRILLATION (H): ICD-10-CM

## 2017-09-11 DIAGNOSIS — I48.91 ATRIAL FIBRILLATION, UNSPECIFIED TYPE (H): ICD-10-CM

## 2017-09-11 DIAGNOSIS — Z79.01 LONG-TERM (CURRENT) USE OF ANTICOAGULANTS: ICD-10-CM

## 2017-09-11 LAB — INR PPP: 2.49 (ref 0.86–1.14)

## 2017-09-11 PROCEDURE — 0296T ZIO PATCH HOLTER: CPT | Mod: ZF

## 2017-09-11 PROCEDURE — 0298T ZZC EXT ECG > 48HR TO 21 DAY REVIEW AND INTERPRETATN: CPT | Performed by: INTERNAL MEDICINE

## 2017-09-11 NOTE — MR AVS SNAPSHOT
After Visit Summary   9/11/2017    Carlos Alberto Fenton    MRN: [id number]           Patient Information     Date Of Birth          1940        Visit Information        Provider Department      9/11/2017 2:00 PM Tech, Uc Cvc Monitor, [facility]        Today's Diagnoses     Atrial fibrillation, unspecified type (H)           Follow-ups after your visit        Your next 10 appointments already scheduled     Sep 20, 2017  1:00 PM CDT   (Arrive by 12:45 PM)   RETURN FOOT/ANKLE with Easton Torres DPM   [facility] ([facility])    [address] [place] [address]  4th [facility] [address] [phone]            Sep 20, 2017  2:00 PM CDT   Lab with ILYA LAB   [facility])    [address] [place] [address]  1st [facility] [id number] [phone]            Sep 20, 2017  2:30 PM CDT   (Arrive by 2:15 PM)   CORE NEW with CHACORTA Quinteros [facility])    [address] [place] [address]  3rd [facility] [id number] [phone]            Oct 03, 2017 11:30 AM CDT   (Arrive by 11:15 AM)   Return Vascular Visit with CHACORTA Macias [facility] ([facility])    [address] [place] [address]  3rd [facility] [address]   [phone]            Oct 13, 2017 10:00 AM CDT   (Arrive by 9:45 AM)   Return Visit with Star Lobato MD   [facility] ([facility])    [address] [place] [address]  3rd [facility] [address] [phone]            Oct 19, 2017  2:30 PM CDT   (Arrive by 2:15 PM)   RETURN ATRIAL FIBULATION VISIT with CHACORTA Joshi [facility])    [address] [place] [id number] [phone]              Who to contact     If you have questions or need follow up  information about today's clinic visit or your schedule please contact [facility] directly at [phone].  Normal or non-critical lab and imaging results will be communicated to you by MyChart, letter or phone within 4 business days after the clinic has received the results. If you do not hear from us within 7 days, please contact the clinic through [employer] or phone. If you have a critical or abnormal lab result, we will notify you by phone as soon as possible.  Submit refill requests through [employer] or call your pharmacy and they will forward the refill request to us. Please allow 3 business days for your refill to be completed.          Additional Information About Your Visit        MyChart Information     [employer] gives you secure access to your electronic health record. If you see a primary care provider, you can also send messages to your care team and make appointments. If you have questions, please call your primary care clinic.  If you do not have a primary care provider, please call [phone] and they will assist you.        Care EveryWhere ID     This is your Care EveryWhere ID. This could be used by other organizations to access your [place] medical records  [id number]         Blood Pressure from Last 3 Encounters:   09/05/17 109/86   09/05/17 109/74   09/01/17 131/73    Weight from Last 3 Encounters:   09/05/17 78.1 kg (172 lb 1.6 oz)   09/01/17 85.7 kg (189 lb)   07/28/17 77.6 kg (171 lb)              We Performed the Following     Ziopatch Holter Monitor - Adult          Today's Medication Changes          These changes are accurate as of: 9/11/17 11:59 PM.  If you have any questions, ask your nurse or doctor.               These medicines have changed or have updated prescriptions.        Dose/Directions    aspirin 81 MG chewable tablet   This may have changed:  when to take this   Used for:  Coronary artery disease with angina pectoris, unspecified vessel or lesion type, unspecified  whether native or transplanted heart (H)        Dose:  81 mg   Take 1 tablet (81 mg) by mouth daily   Quantity:  30 tablet   Refills:  0       ferrous sulfate 325 (65 FE) MG tablet   Commonly known as:  IRON SUPPLEMENT   This may have changed:  when to take this   Used for:  S/P CABG (coronary artery bypass graft)        Dose:  325 mg   Take 1 tablet (325 mg) by mouth daily (with breakfast)   Quantity:  100 tablet   Refills:  1                Primary Care Provider Office Phone # Fax #    Humberto Conner [phone]       [facility] [address] [place] [address]        Equal Access to Services     [facility] Center[facility] : Hadii william flores hadasho Soedwige, waaxda luqtutu, qaybta kaalmada jose david, csott mccollum . So [facility] [phone].    ATENCIÓN: Si habla español, tiene a espinoza disposición servicios gratuitos de asistencia lingüística. [facility] [phone].    We comply with applicable federal civil rights laws and [place] laws. We do not discriminate on the basis of race, color, national origin, age, disability sex, sexual orientation or gender identity.            Thank you!     Thank you for choosing [facility]  for your care. Our goal is always to provide you with excellent care. Hearing back from our patients is one way we can continue to improve our services. Please take a few minutes to complete the written survey that you may receive in the mail after your visit with us. Thank you!             Your Updated Medication List - Protect others around you: Learn how to safely use, store and throw away your medicines at www.disposemymeds.org.          This list is accurate as of: 9/11/17 11:59 PM.  Always use your most recent med list.                   Brand Name Dispense Instructions for use Diagnosis    ACE BANDAGE SELF-ADHERING Misc     6 each    1 each 2 times daily    Open wound of lower limb, right, subsequent encounter       acetaminophen 325 MG  tablet    TYLENOL    100 tablet    Take 3 tablets (975 mg) by mouth every 6 hours as needed for mild pain    S/P CABG (coronary artery bypass graft)       ADAPTIC NON-ADHERING DRESSING Pads     1 each    Externally apply 1 each topically 2 times daily    Open wound of lower limb, right, subsequent encounter       amoxicillin-clavulanate 875-125 MG per tablet    AUGMENTIN    28 tablet    Take 1 tablet by mouth 2 times daily    Toe gangrene (H)       aspirin 81 MG chewable tablet     30 tablet    Take 1 tablet (81 mg) by mouth daily    Coronary artery disease with angina pectoris, unspecified vessel or lesion type, unspecified whether native or transplanted heart (H)       atorvastatin 40 MG tablet    LIPITOR    90 tablet    Take 1 tablet (40 mg) by mouth daily    Coronary artery disease with angina pectoris, unspecified vessel or lesion type, unspecified whether native or transplanted heart (H)       blood glucose monitoring meter device kit           bumetanide 2 MG tablet    BUMEX    180 tablet    Take 1 tablet (2 mg) by mouth 2 times daily    Cardiomyopathy, unspecified (H)       ferrous sulfate 325 (65 FE) MG tablet    IRON SUPPLEMENT    100 tablet    Take 1 tablet (325 mg) by mouth daily (with breakfast)    S/P CABG (coronary artery bypass graft)       fondaparinux 7.5MG/0.6ML injection    ARIXTRA          furosemide 40 MG tablet    LASIX    180 tablet    Take 1 tablet (40 mg) by mouth 2 times daily    Bilateral edema of lower extremity       gabapentin 100 MG capsule    NEURONTIN    180 capsule    Take 1 capsule (100 mg) by mouth 2 times daily    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       KERLIX GAUZE ROLL MEDIUM Misc     48 each    1 each 2 times daily    Open wound of lower limb, right, subsequent encounter       metolazone 2.5 MG tablet    ZAROXOLYN    30 tablet    Please take on Saturday 9/2, Monday 9/4, and Wednesday 9/6.    Cardiomyopathy, unspecified (H)       ONE TOUCH ULTRA test strip   Generic  drug:  blood glucose monitoring     100 each    USE AS DIRECTED TO TEST ONE TIME A DAY    Type 2 diabetes mellitus without complication, without long-term current use of insulin (H)       ONETOUCH DELICA LANCETS 33G Misc     100 each    1 Device daily    Type 2 diabetes mellitus without complication, without long-term current use of insulin (H)       order for DME     1 Box    Sterile 4x4 gauze; Use gauze twice daily for dressing changes.    Open wound of lower limb, right, subsequent encounter       pantoprazole 40 MG EC tablet    PROTONIX    90 tablet    Take 1 tablet (40 mg) by mouth every morning    Coronary artery disease with angina pectoris, unspecified vessel or lesion type, unspecified whether native or transplanted heart (H)       potassium chloride SA 10 MEQ CR tablet    K-DUR/KLOR-CON M    180 tablet    Take 1 tablet (10 mEq) by mouth 2 times daily    S/P CABG (coronary artery bypass graft)       traZODone 50 MG tablet    DESYREL    45 tablet    Take 0.5 tablets (25 mg) by mouth nightly as needed for sleep    Primary insomnia       venlafaxine 150 MG Tb24 24 hr tablet    EFFEXOR-ER    90 each    Take 1 tablet (150 mg) by mouth daily (with breakfast)    Adjustment disorder with depressed mood       warfarin 5 MG tablet    COUMADIN    90 tablet    Take 1 tablet (5 mg) by mouth daily    Paroxysmal atrial fibrillation (H)

## 2017-09-11 NOTE — MR AVS SNAPSHOT
Carlos Alberto Fenton   9/11/2017   Anticoagulation Therapy Visit    Description:  76 year old female   Provider:  Amairani Henson, RN   Department:  [facility]           INR as of 9/11/2017     Today's INR 2.49      Anticoagulation Summary as of 9/11/2017     INR goal 2.0-3.0   Today's INR 2.49   Full instructions 9/11: 2.5 mg; 9/12: 5 mg; 9/13: 2.5 mg   Next INR check 9/14/2017    Indications   Long-term (current) use of anticoagulants [Z79.01] [Z79.01]  New onset atrial fibrillation (H) (Resolved) [I48.91]  Atrial fibrillation (H) [I48.91] [I48.91]         September 2017 Details    Sun Mon Tue Wed Thu Fri Sat          1               2                 3               4               5               6               7               8               9                 10               11      2.5 mg   See details      12      5 mg         13      2.5 mg         14            15               16                 17               18               19               20               21               22               23                 24               25               26               27               28               29               30                Date Details   09/11 This INR check       Date of next INR:  9/14/2017         How to take your warfarin dose     To take:  2.5 mg Take 0.5 of a 5 mg tablet.    To take:  5 mg Take 1 of the 5 mg tablets.

## 2017-09-11 NOTE — NURSING NOTE
Per Dr. Paige Santos, patient to have 14 day zio patch monitor placed.  Diagnosis: AFib  Monitor placed: Yes  Patient Instructed: Yes  Patient verbalized understanding: Yes

## 2017-09-11 NOTE — PROGRESS NOTES
ANTICOAGULATION FOLLOW-UP CLINIC VISIT    Patient Name:  Carlos Alberto Fenton  Date:  9/11/2017  Contact Type:  Telephone    SUBJECTIVE:        OBJECTIVE    INR   Date Value Ref Range Status   09/11/2017 2.49 (H) 0.86 - 1.14 Final     Chromogenic Factor 10   Date Value Ref Range Status   03/02/2017 65 (L) 70 - 130 % Final     Comment:     Therapeutic Range:  A Chromogenic Factor 10 level of approximately 20-40%   inversely correlates with an INR of 2-3 for patients receiving Warfarin.   Chromogenic Factor 10 levels below 20% indicate an INR greater than 3 and   levels above 40% indicate an INR less than 2.       Factor 2 Assay   Date Value Ref Range Status   02/27/2017 37 (L) 60 - 140 % Final       ASSESSMENT / PLAN  INR assessment THER    Recheck INR In: 3 DAYS    INR Location Clinic      Anticoagulation Summary as of 9/11/2017     INR goal 2.0-3.0   Today's INR 2.49   Maintenance plan No maintenance plan   Full instructions 9/11: 2.5 mg; 9/12: 5 mg; 9/13: 2.5 mg   Plan last modified Yani Delgado, RN (9/1/2017)   Next INR check 9/14/2017   Target end date Indefinite    Indications   Long-term (current) use of anticoagulants [Z79.01] [Z79.01]  New onset atrial fibrillation (H) (Resolved) [I48.91]  Atrial fibrillation (H) [I48.91] [I48.91]         Anticoagulation Episode Summary     INR check location     Preferred lab     Send INR reminders to [facility]    Comments first INR to be done on 9/5 - NOTE:  teaching on coumadin required on 9/5 for this new pt.      Anticoagulation Care Providers     Provider Role Specialty Phone number    Star Lobato MD Responsible Cardiology [phone]            See the Encounter Report to view Anticoagulation Flowsheet and Dosing Calendar (Go to Encounters tab in chart review, and find the Anticoagulation Therapy Visit)    Left message for patient with results and dosing recommendations. Asked patient to call back to report any missed doses, falls, signs and symptoms  of bleeding or clotting, any changes in health, medication, or diet. Asked patient to call back with any questions or concerns.     Amairani Henson RN

## 2017-09-12 NOTE — PROGRESS NOTES
"9/12/17 Pt left a vm after hours (1820) on clinic phone reporting she didn't hear from us about her results and what dose to take. On the vm pt reports holding her Warfarin until she hears back from us. Writer called pt and left a voice message about the dosing we wanted and when to call back. Pt again called writer and was confused about who we were and called writer \"Carloz.\" Writer identified self and pt reported missing dose and was unaware of voice messages left. Since pt missed dose having her take 5mg 9/12 and 5mg 9/13. Pt repeated this dose twice. Writer explained that if after hours and she is missing her doses to call the on-call MD for these instruction so pt doesn't miss her doses. Pt communicated understanding. Chantal Garcia RN    "

## 2017-09-13 ENCOUNTER — PRE VISIT (OUTPATIENT)
Dept: CARDIOLOGY | Facility: CLINIC | Age: 77
End: 2017-09-13

## 2017-09-13 DIAGNOSIS — I50.32 CHRONIC DIASTOLIC HEART FAILURE (H): Primary | ICD-10-CM

## 2017-09-14 ENCOUNTER — ANTICOAGULATION THERAPY VISIT (OUTPATIENT)
Dept: ANTICOAGULATION | Facility: CLINIC | Age: 77
End: 2017-09-14

## 2017-09-14 DIAGNOSIS — Z79.01 LONG-TERM (CURRENT) USE OF ANTICOAGULANTS: ICD-10-CM

## 2017-09-14 DIAGNOSIS — I48.91 ATRIAL FIBRILLATION, UNSPECIFIED TYPE (H): ICD-10-CM

## 2017-09-14 DIAGNOSIS — I48.0 PAROXYSMAL ATRIAL FIBRILLATION (H): ICD-10-CM

## 2017-09-14 LAB — INR PPP: 4.2 (ref 0.86–1.14)

## 2017-09-14 PROCEDURE — 36416 COLLJ CAPILLARY BLOOD SPEC: CPT | Performed by: INTERNAL MEDICINE

## 2017-09-14 PROCEDURE — 85610 PROTHROMBIN TIME: CPT | Performed by: INTERNAL MEDICINE

## 2017-09-14 NOTE — MR AVS SNAPSHOT
Carlos Alberto Fenton   9/14/2017   Anticoagulation Therapy Visit    Description:  76 year old female   Provider:  Amairani Henson, RN   Department:  [facility]           INR as of 9/14/2017     Today's INR 4.20!      Anticoagulation Summary as of 9/14/2017     INR goal 2.0-3.0   Today's INR 4.20!   Full instructions 9/14: Hold; 9/15: 2.5 mg; 9/16: 5 mg; 9/17: 2.5 mg   Next INR check 9/18/2017    Indications   Long-term (current) use of anticoagulants [Z79.01] [Z79.01]  New onset atrial fibrillation (H) (Resolved) [I48.91]  Atrial fibrillation (H) [I48.91] [I48.91]         September 2017 Details    Sun Mon Tue Wed Thu Fri Sat          1               2                 3               4               5               6               7               8               9                 10               11               12               13               14      Hold   See details      15      2.5 mg         16      5 mg           17      2.5 mg         18            19               20               21               22               23                 24               25               26               27               28               29               30                Date Details   09/14 This INR check       Date of next INR:  9/18/2017         How to take your warfarin dose     To take:  2.5 mg Take 0.5 of a 5 mg tablet.    To take:  5 mg Take 1 of the 5 mg tablets.    Hold Do not take your warfarin dose. See the Details table to the right for additional instructions.

## 2017-09-14 NOTE — PROGRESS NOTES
ANTICOAGULATION FOLLOW-UP CLINIC VISIT    Patient Name:  Carlos Alberto Fenton  Date:  9/14/2017  Contact Type:  Telephone    SUBJECTIVE:     Patient Findings     Comments Patient reports that she does NOT remember what dose of warfarin she took over the last few days.  Spoke with patients  to reinforce dosing.           OBJECTIVE    INR   Date Value Ref Range Status   09/14/2017 4.20 (H) 0.86 - 1.14 Final     Comment:     This test is intended for monitoring Coumadin therapy.  Results are not   accurate in patients with prolonged INR due to factor deficiency.       Chromogenic Factor 10   Date Value Ref Range Status   03/02/2017 65 (L) 70 - 130 % Final     Comment:     Therapeutic Range:  A Chromogenic Factor 10 level of approximately 20-40%   inversely correlates with an INR of 2-3 for patients receiving Warfarin.   Chromogenic Factor 10 levels below 20% indicate an INR greater than 3 and   levels above 40% indicate an INR less than 2.       Factor 2 Assay   Date Value Ref Range Status   02/27/2017 37 (L) 60 - 140 % Final       ASSESSMENT / PLAN  INR assessment SUPRA    Recheck INR In: 4 DAYS    INR Location Clinic      Anticoagulation Summary as of 9/14/2017     INR goal 2.0-3.0   Today's INR 4.20!   Maintenance plan No maintenance plan   Full instructions 9/14: Hold; 9/15: 2.5 mg; 9/16: 5 mg; 9/17: 2.5 mg   Plan last modified Yani Delgaod RN (9/1/2017)   Next INR check 9/18/2017   Target end date Indefinite    Indications   Long-term (current) use of anticoagulants [Z79.01] [Z79.01]  New onset atrial fibrillation (H) (Resolved) [I48.91]  Atrial fibrillation (H) [I48.91] [I48.91]         Anticoagulation Episode Summary     INR check location     Preferred lab     Send INR reminders to [facility]    Comments Speak with patients  Darrin in addition to patient.  Hx of 2 strokes.      Anticoagulation Care Providers     Provider Role Specialty Phone number    Star Lobato MD Responsible  Cardiology [phone]            See the Encounter Report to view Anticoagulation Flowsheet and Dosing Calendar (Go to Encounters tab in chart review, and find the Anticoagulation Therapy Visit)    Spoke with patient and .    Amairani Henson RN

## 2017-09-18 ENCOUNTER — ANTICOAGULATION THERAPY VISIT (OUTPATIENT)
Dept: ANTICOAGULATION | Facility: CLINIC | Age: 77
End: 2017-09-18

## 2017-09-18 DIAGNOSIS — I48.0 PAROXYSMAL ATRIAL FIBRILLATION (H): ICD-10-CM

## 2017-09-18 DIAGNOSIS — I48.91 ATRIAL FIBRILLATION, UNSPECIFIED TYPE (H): ICD-10-CM

## 2017-09-18 DIAGNOSIS — Z79.01 LONG-TERM (CURRENT) USE OF ANTICOAGULANTS: ICD-10-CM

## 2017-09-18 LAB — INR PPP: 3.6 (ref 0.86–1.14)

## 2017-09-18 PROCEDURE — 36416 COLLJ CAPILLARY BLOOD SPEC: CPT | Performed by: INTERNAL MEDICINE

## 2017-09-18 PROCEDURE — 85610 PROTHROMBIN TIME: CPT | Performed by: INTERNAL MEDICINE

## 2017-09-18 NOTE — MR AVS SNAPSHOT
Carlos Alberto Fenton   9/18/2017   Anticoagulation Therapy Visit    Description:  76 year old female   Provider:  Edy Molina, RN   Department:  [facility]           INR as of 9/18/2017     Today's INR 3.60!      Anticoagulation Summary as of 9/18/2017     INR goal 2.0-3.0   Today's INR 3.60!   Full instructions 9/18: 2.5 mg; 9/19: 2.5 mg   Next INR check 9/20/2017    Indications   Long-term (current) use of anticoagulants [Z79.01] [Z79.01]  New onset atrial fibrillation (H) (Resolved) [I48.91]  Atrial fibrillation (H) [I48.91] [I48.91]         September 2017 Details    Sun Mon Tue Wed Thu Fri Sat          1               2                 3               4               5               6               7               8               9                 10               11               12               13               14               15               16                 17               18      2.5 mg   See details      19      2.5 mg         20            21               22               23                 24               25               26               27               28               29               30                Date Details   09/18 This INR check       Date of next INR:  9/20/2017         How to take your warfarin dose     To take:  2.5 mg Take 0.5 of a 5 mg tablet.

## 2017-09-18 NOTE — PROGRESS NOTES
ANTICOAGULATION FOLLOW-UP CLINIC VISIT    Patient Name:  Carlos Alberto Fenton  Date:  9/18/2017  Contact Type:  Telephone    SUBJECTIVE:     Patient Findings     Positives Unexplained INR or factor level change    Comments Spoke to Carlos Alberto, and left a message for Lindsay.   was unavailable.  Instructed patient to repeat recommendations and write out coumadin dosing for the next two nights.  Will recheck INR at her appointment on Wednesday.           OBJECTIVE    INR   Date Value Ref Range Status   09/18/2017 3.60 (H) 0.86 - 1.14 Final     Comment:     This test is intended for monitoring Coumadin therapy.  Results are not   accurate in patients with prolonged INR due to factor deficiency.       Chromogenic Factor 10   Date Value Ref Range Status   03/02/2017 65 (L) 70 - 130 % Final     Comment:     Therapeutic Range:  A Chromogenic Factor 10 level of approximately 20-40%   inversely correlates with an INR of 2-3 for patients receiving Warfarin.   Chromogenic Factor 10 levels below 20% indicate an INR greater than 3 and   levels above 40% indicate an INR less than 2.       Factor 2 Assay   Date Value Ref Range Status   02/27/2017 37 (L) 60 - 140 % Final       ASSESSMENT / PLAN  INR assessment SUPRA    Recheck INR In: 2 DAYS    INR Location Clinic      Anticoagulation Summary as of 9/18/2017     INR goal 2.0-3.0   Today's INR 3.60!   Maintenance plan No maintenance plan   Full instructions 9/18: 2.5 mg; 9/19: 2.5 mg   Plan last modified Yani Delgado, RN (9/1/2017)   Next INR check 9/20/2017   Target end date Indefinite    Indications   Long-term (current) use of anticoagulants [Z79.01] [Z79.01]  New onset atrial fibrillation (H) (Resolved) [I48.91]  Atrial fibrillation (H) [I48.91] [I48.91]         Anticoagulation Episode Summary     INR check location     Preferred lab     Send INR reminders to [facility]    Comments Speak with patients  Darrin in addition to patient.  Hx of 2 strokes.       Anticoagulation Care Providers     Provider Role Specialty Phone number    Cheryle Star Vargas MD Responsible Cardiology [phone]            See the Encounter Report to view Anticoagulation Flowsheet and Dosing Calendar (Go to Encounters tab in chart review, and find the Anticoagulation Therapy Visit)    Spoke with patient. Gave them their lab results and new warfarin recommendation.  No changes in health, medication, or diet. No missed doses, no falls. No signs or symptoms of bleed or clotting.  Asked Carlos Alberto to write out recommendations and repeat back the instructions given to her.  She stated appropriately the directions given to her.  Left a message for her daughter to return a call to the Anticoagulation clinic.    Edy Molina, RN

## 2017-09-20 ENCOUNTER — OFFICE VISIT (OUTPATIENT)
Dept: ORTHOPEDICS | Facility: CLINIC | Age: 77
End: 2017-09-20

## 2017-09-20 ENCOUNTER — OFFICE VISIT (OUTPATIENT)
Dept: CARDIOLOGY | Facility: CLINIC | Age: 77
End: 2017-09-20
Attending: NURSE PRACTITIONER
Payer: MEDICARE

## 2017-09-20 VITALS
OXYGEN SATURATION: 95 % | HEIGHT: 62 IN | BODY MASS INDEX: 31.56 KG/M2 | HEART RATE: 65 BPM | SYSTOLIC BLOOD PRESSURE: 118 MMHG | WEIGHT: 171.5 LBS | DIASTOLIC BLOOD PRESSURE: 83 MMHG

## 2017-09-20 DIAGNOSIS — I96 TOE GANGRENE (H): Primary | ICD-10-CM

## 2017-09-20 DIAGNOSIS — I50.32 CHRONIC DIASTOLIC HEART FAILURE (H): ICD-10-CM

## 2017-09-20 DIAGNOSIS — I50.22 CHRONIC SYSTOLIC HEART FAILURE (H): Primary | ICD-10-CM

## 2017-09-20 LAB
ANION GAP SERPL CALCULATED.3IONS-SCNC: 5 MMOL/L (ref 3–14)
BUN SERPL-MCNC: 24 MG/DL (ref 7–30)
CALCIUM SERPL-MCNC: 9.2 MG/DL (ref 8.5–10.1)
CHLORIDE SERPL-SCNC: 95 MMOL/L (ref 94–109)
CO2 SERPL-SCNC: 35 MMOL/L (ref 20–32)
CREAT SERPL-MCNC: 0.95 MG/DL (ref 0.52–1.04)
GFR SERPL CREATININE-BSD FRML MDRD: 57 ML/MIN/1.7M2
GLUCOSE SERPL-MCNC: 82 MG/DL (ref 70–99)
NT-PROBNP SERPL-MCNC: 1352 PG/ML (ref 0–450)
POTASSIUM SERPL-SCNC: 3.9 MMOL/L (ref 3.4–5.3)
SODIUM SERPL-SCNC: 135 MMOL/L (ref 133–144)

## 2017-09-20 PROCEDURE — 99215 OFFICE O/P EST HI 40 MIN: CPT | Mod: ZP | Performed by: NURSE PRACTITIONER

## 2017-09-20 PROCEDURE — 99213 OFFICE O/P EST LOW 20 MIN: CPT | Mod: ZF

## 2017-09-20 PROCEDURE — 83880 ASSAY OF NATRIURETIC PEPTIDE: CPT | Performed by: NURSE PRACTITIONER

## 2017-09-20 PROCEDURE — 36415 COLL VENOUS BLD VENIPUNCTURE: CPT | Performed by: NURSE PRACTITIONER

## 2017-09-20 PROCEDURE — 80048 BASIC METABOLIC PNL TOTAL CA: CPT | Performed by: NURSE PRACTITIONER

## 2017-09-20 RX ORDER — SPIRONOLACTONE 25 MG/1
25 TABLET ORAL DAILY
Qty: 30 TABLET | Refills: 0 | Status: SHIPPED | OUTPATIENT
Start: 2017-09-20 | End: 2017-10-05

## 2017-09-20 ASSESSMENT — PAIN SCALES - GENERAL: PAINLEVEL: NO PAIN (0)

## 2017-09-20 NOTE — LETTER
9/20/2017      RE: Carlos Alberto Fenton  [address] [place] [address]       Dear Colleague,    Thank you for the opportunity to participate in the care of your patient, Carlos Alberto Fenton, at the [facility]. Please see a copy of my visit note below.    HPI:   Ms. Fenton is a 76 year old female with a past medical history including SCHF with preserved EF, DCHF, HTN, Hyperlipidemia, DM Type II, CVA 10/16 complicated by seizures, CAD s/p CABG times 3 (LIMA-LAD, SVG-D1, SVG-dRCA and modified MAZE, ABDIEL ligation - 2/2017), PFO closure, Paroxysmal Afib, HIT, and RUE DVT. He presents to [facility] for initial evaluation.     She is a very poor historian likely due to history of CVA. The documentation indicates that an echocardiogram was obtained due to CVA on 10/16/16 indicates mild MR, mild-moderate TR, and EF 50-55%. Repeat echocardiogram obtained 10/17/16 secondary to Afib noted EF 35-40%, mildly reduced RV function, mild MI, and mild-moderate TR. Repeat Echo 10/25/16 noted mildly improved EF at 40-45%, attributed to stress CM. She underwent angiogram 1/30/17 consistent with 2 vessel CAD to RCA and LAD with LM disease. CVTS consult was recommended at that time. She underwent CABG times three with MAZE and ABDIEL ligation per Dr. Quiñones on 2/17. She has since been followed by Dr. Lobato with Cardiology with medication titration.     Her family history is posiitve for CAD and HTN in her mother, CAD and HTN in her father, and CAD in her sister. She denies excessive ETOH or illicit drug use. Low risk for HIV. TSH and Ferritin WNL at time of diagnosis.     She states she has noted continued weight gain since she stopped taking her Metolazone about 2 weeks ago. She was awaiting a return call regarding results to prompt her next dose and did not receive this. She feels as though she has gained about 10 lbs since that time, which she describes as abdominal distention and LE edema.  She notes chronic unsteady gait and weakness due to her CVA limiting her ambulation. However, she was able to walk up for her Uber taxi today. She denies fever, chills, lightheadedness, dizziness, chest pain, palpitations, SOB, MADDEN, PND, orthopnea, nausea, vomiting, and diarrhea. She continues to follow a low sodium diet at home. She states her  has recently started doing the cooking after 60 years of marriage.     PAST MEDICAL HISTORY:  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    parietral lesions bilateral       Antiplatelet or antithrombotic long-term use      Anxiety      Atrial fibrillation (H)      CAD (coronary artery disease)     2 vessel     Cancer (H) 1990    periodically have cancer on the skin removed     Cerebral artery occlusion with cerebral infarction (H) 10/2016    Cardioembolic strokes related to atrial fibrillation     Deep vein thrombosis (DVT) of axillary vein of right upper extremity (H) 2/25/2017     Depression      Depressive disorder 2001     Diabetes (H)      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     Hyperlipidemia      Hyperlipidemia LDL goal <130 10/31/2010     Hypertension      Panic attacks      Seizures (H) 10/19/2016     Sleep apnea     Uses CPAP       FAMILY HISTORY:  Family History   Problem Relation Age of Onset     DIABETES Mother      C.A.D. Mother      Hypertension Mother      CEREBROVASCULAR DISEASE Mother      Mini Strokes     Other Cancer Mother      Skin Cancer     Hyperlipidemia Mother      Coronary Artery Disease Mother      DIABETES Father      C.A.D. Father      Hypertension Father      Other Cancer Father      Hyperlipidemia Father      Coronary Artery Disease Father      HEART DISEASE Sister      Arthritis Sister      Other Cancer Other      Skin Cancer       SOCIAL HISTORY:  Social History     Social History     Marital status:      Spouse name: N/A     Number  of children: N/A     Years of education: N/A     Social History Main Topics     Smoking status: Former Smoker     Packs/day: 1.00     Years: 10.00     Types: Cigarettes     Start date: 10/3/1958     Quit date: 7/4/1976     Smokeless tobacco: Never Used      Comment: Quit in 1976     Alcohol use Yes      Comment: Socially     Drug use: Yes     Special: Marijuana      Comment: Briefly used marijuana for peripheral neuropathy     Sexual activity: Not Currently     Partners: Male     Birth control/ protection: Post-menopausal     Other Topics Concern     Parent/Sibling W/ Cabg, Mi Or Angioplasty Before 65f 55m? Yes     Social History Narrative       CURRENT MEDICATIONS:  Outpatient Medications Prior to Visit   Medication Sig Dispense Refill     bumetanide (BUMEX) 2 MG tablet Take 1 tablet (2 mg) by mouth 2 times daily 180 tablet 3     warfarin (COUMADIN) 5 MG tablet Take 1 tablet (5 mg) by mouth daily 90 tablet 3     gabapentin (NEURONTIN) 100 MG capsule Take 1 capsule (100 mg) by mouth 2 times daily 180 capsule 1     traZODone (DESYREL) 50 MG tablet Take 0.5 tablets (25 mg) by mouth nightly as needed for sleep 45 tablet 2     pantoprazole (PROTONIX) 40 MG EC tablet Take 1 tablet (40 mg) by mouth every morning 90 tablet 1     venlafaxine (EFFEXOR-ER) 150 MG TB24 24 hr tablet Take 1 tablet (150 mg) by mouth daily (with breakfast) 90 each 1     ONE TOUCH ULTRA test strip USE AS DIRECTED TO TEST ONE TIME A  each 2     Wound Dressings (ADAPTIC NON-ADHERING DRESSING) PADS Externally apply 1 each topically 2 times daily 1 each 3     atorvastatin (LIPITOR) 40 MG tablet Take 1 tablet (40 mg) by mouth daily 90 tablet 1     potassium chloride SA (K-DUR/KLOR-CON M) 10 MEQ CR tablet Take 1 tablet (10 mEq) by mouth 2 times daily 180 tablet 1     acetaminophen (TYLENOL) 325 MG tablet Take 3 tablets (975 mg) by mouth every 6 hours as needed for mild pain 100 tablet 0     aspirin 81 MG chewable tablet Take 1 tablet (81 mg) by  mouth daily (Patient taking differently: Take 81 mg by mouth every morning ) 30 tablet 0     ferrous sulfate (IRON SUPPLEMENT) 325 (65 FE) MG tablet Take 1 tablet (325 mg) by mouth daily (with breakfast) (Patient taking differently: Take 325 mg by mouth 2 times daily ) 100 tablet 1     ONETOUCH DELICA LANCETS 33G MISC 1 Device daily 100 each 0     blood glucose monitoring (ONE TOUCH ULTRA 2) meter device kit        amoxicillin-clavulanate (AUGMENTIN) 875-125 MG per tablet Take 1 tablet by mouth 2 times daily (Patient not taking: Reported on 9/20/2017) 28 tablet 3     fondaparinux (ARIXTRA) 7.5MG/0.6ML injection   0     metolazone (ZAROXOLYN) 2.5 MG tablet Please take on Saturday 9/2, Monday 9/4, and Wednesday 9/6. (Patient not taking: Reported on 9/20/2017) 30 tablet 1     Elastic Bandages & Supports (ACE BANDAGE SELF-ADHERING) MISC 1 each 2 times daily (Patient not taking: Reported on 9/20/2017) 6 each 3     Gauze Pads & Dressings (KERLIX GAUZE ROLL MEDIUM) MISC 1 each 2 times daily (Patient not taking: Reported on 9/20/2017) 48 each 3     order for DME Sterile 4x4 gauze; Use gauze twice daily for dressing changes. (Patient not taking: Reported on 9/20/2017) 1 Box 3     furosemide (LASIX) 40 MG tablet Take 1 tablet (40 mg) by mouth 2 times daily (Patient not taking: Reported on 9/20/2017) 180 tablet 1     No facility-administered medications prior to visit.        ROS:   CONSTITUTIONAL: Denies fever, chills, or fatigue. She complains of weight gain.   HEENT: Denies headache, vision changes, and changes in speech.   CV: Refer to HPI.   PULMONARY:Denies shortness of breath, cough, or previous TB exposure.   GI:Denies nausea, vomiting, diarrhea, and abdominal pain. Bowel movements are regular.   :Denies urinary alterations, dysuria, urinary frequency, hematuria, and abnormal drainage.   EXT: She complains of LE edema.   SKIN:Denies abnormal rashes or lesions.   MUSCULOSKELETAL:Denies upper or lower extremity  "weakness and pain.   NEUROLOGIC:Denies lightheadedness, dizziness, seizures, or upper or lower extremity paresthesia.     EXAM:  /83 (BP Location: Left arm, Patient Position: Chair, Cuff Size: Adult Regular)  Pulse 65  Ht 1.575 m (5' 2\")  Wt 77.8 kg (171 lb 8 oz)  SpO2 95%  BMI 31.37 kg/m2  GENERAL: Appears alert and oriented times three.   HEENT: Eye symmetrical and free of discharge bilaterally. Mucous membranes moist and without lesions.  NECK: Supple and without lymphadenopathy. JVD above ear lobe.   CV: Irregular, controlled, S1S2 present without murmur, rub, or gallop.   RESPIRATORY: Respirations regular, even, and unlabored. Lungs CTA throughout.   GI: Soft and mildly distended with normoactive bowel sounds present in all quadrants. No tenderness, rebound, guarding. No organomegaly.   EXTREMITIES: +1-2 bilateral LE edema above knees. 2+ bilateral pedal pulses.   NEUROLOGIC: Alert and orientated x 3. CN II-XII grossly intact. No focal deficits.   MUSCULOSKELETAL: No joint swelling or tenderness.   SKIN: No jaundice. Open lacerations covered on her legs and left arm. Feet covered.     Labs:  CBC RESULTS:  Lab Results   Component Value Date    WBC 5.9 07/07/2017    RBC 3.86 07/07/2017    HGB 11.7 07/07/2017    HCT 37.9 07/07/2017    MCV 98 07/07/2017    MCH 30.3 07/07/2017    MCHC 30.9 (L) 07/07/2017    RDW 19.9 (H) 07/07/2017     07/07/2017       CMP RESULTS:  Lab Results   Component Value Date     09/20/2017    POTASSIUM 3.9 09/20/2017    CHLORIDE 95 09/20/2017    CO2 35 (H) 09/20/2017    ANIONGAP 5 09/20/2017    GLC 82 09/20/2017    BUN 24 09/20/2017    CR 0.95 09/20/2017    GFRESTIMATED 57 (L) 09/20/2017    GFRESTBLACK 69 09/20/2017    CARLY 9.2 09/20/2017    BILITOTAL 0.5 04/17/2017    ALBUMIN 3.1 (L) 04/17/2017    ALKPHOS 137 04/17/2017    ALT 28 04/17/2017    AST 36 04/17/2017        INR RESULTS:  Lab Results   Component Value Date    INR 3.60 (H) 09/18/2017       Lab Results "   Component Value Date    MAG 2.1 02/28/2017     Lab Results   Component Value Date    NTBNPI 2570 (H) 02/24/2017     Lab Results   Component Value Date    NTBNP 1352 (H) 09/20/2017     Echocardiogram 10/25/16:  Interpretation Summary  Limited echocardiogram study.  Mildly (EF 40-45%) reduced left ventricular function is present. Mild diffuse  hypokinesis is present.  Global right ventricular function is normal.  Compared to the previous study dated 10/18/2016, there has been some interval  improvement in LV systolic function.  [facility] 1/30/17:  Two vessel coronary artery disease (mid LAD and mid RCA) with left main disease.    Assessment and Plan:   Ms. Fenton is a 76 year old female with a past medical history including SCHF with preserved EF, DCHF, HTN, Hyperlipidemia, DM Type II, CVA 10/16 complicated by seizures, CAD s/p CABG times 3 (LIMA-LAD, SVG-D1, SVG-dRCA and modified MAZE, ABDIEL ligation - 2/2017), PFO closure, Paroxysmal Afib, HIT, and RUE DVT. He presents to [facility] for initial evaluation and is hypervolemic.     Chronic systolic and diastolic heart failure secondary to NICM with mildly preserved EF. Attributed to Stress CM at time of diagnosis, may be ischemic component given CAD s/p CABG.   Stage C, NYHA Class II  ACEi/ARB Plan for initiation pending repeat BMP.   BB Defer initiation given current decompensated state with hypervolemia. Note HR 65.   Aldosterone antagonist Initiate Aldactone 25 mg po daily given prior hypokalemia on Metolazone.   SCD prophylaxis EF 40-45%.  Fluid status Hypervolemic. Continue Bumex 2 mg po BID. Take 2.5 mg po Metolazone today. Continue KCL at add Aldactone as above. Repeat BMP on Friday.   Sleep Apnea Evaluation: Denies history of NOAH.     CAD s/p CABG.   - ASA and Lipitor. Not currently on BB, note HR 65.     Paroxysmal Afib. S/p ABDIEL ligation 2/17. CHADSVASC-8.  - AC per Coumadin Clinic. Will discuss long term anticoagulation with primary cardiologist given s/p ABDIEL  ligation. Note history of CVA, DVT, and HIT.   - Rate controlled off medication.      HTN.   - BP controlled off medication.     Hyperlipidemia.   - Continue Lipitor. Management per PCP.     RUE DVT. Right internal IJ DVT diagnosed 2/24/17. Likely provoked s/p CABG.   - Coumadin as above.     Follow-up BMP in 2 days with plan to optimize medication. Follow up in [facility] clinic in 2 weeks.     Isabel Pinto  9/19/2017          CC  ANNMARIE PADILLA      Please do not hesitate to contact me if you have any questions/concerns.     Sincerely,     CHACORTA Quinteros CNP

## 2017-09-20 NOTE — NURSING NOTE
Reason For Visit:   Chief Complaint   Patient presents with     Wound Check     Follow up. Wounds, bilateral feet. Pt stated that she is having a little pain in her feet.        Pain Assessment  Patient Currently in Pain: Yes (Pt stated that she is having a littl pain in her feet. )  Primary Pain Location: Foot  Pain Orientation: Right, Left               Current Outpatient Prescriptions   Medication Sig Dispense Refill     bumetanide (BUMEX) 2 MG tablet Take 1 tablet (2 mg) by mouth 2 times daily 180 tablet 3     fondaparinux (ARIXTRA) 7.5MG/0.6ML injection   0     amoxicillin-clavulanate (AUGMENTIN) 875-125 MG per tablet Take 1 tablet by mouth 2 times daily 28 tablet 3     metolazone (ZAROXOLYN) 2.5 MG tablet Please take on Saturday 9/2, Monday 9/4, and Wednesday 9/6. 30 tablet 1     warfarin (COUMADIN) 5 MG tablet Take 1 tablet (5 mg) by mouth daily 90 tablet 3     gabapentin (NEURONTIN) 100 MG capsule Take 1 capsule (100 mg) by mouth 2 times daily 180 capsule 1     traZODone (DESYREL) 50 MG tablet Take 0.5 tablets (25 mg) by mouth nightly as needed for sleep 45 tablet 2     pantoprazole (PROTONIX) 40 MG EC tablet Take 1 tablet (40 mg) by mouth every morning 90 tablet 1     Elastic Bandages & Supports (ACE BANDAGE SELF-ADHERING) MISC 1 each 2 times daily 6 each 3     Gauze Pads & Dressings (KERLIX GAUZE ROLL MEDIUM) MISC 1 each 2 times daily 48 each 3     venlafaxine (EFFEXOR-ER) 150 MG TB24 24 hr tablet Take 1 tablet (150 mg) by mouth daily (with breakfast) 90 each 1     ONE TOUCH ULTRA test strip USE AS DIRECTED TO TEST ONE TIME A  each 2     Wound Dressings (ADAPTIC NON-ADHERING DRESSING) PADS Externally apply 1 each topically 2 times daily 1 each 3     order for DME Sterile 4x4 gauze; Use gauze twice daily for dressing changes. 1 Box 3     atorvastatin (LIPITOR) 40 MG tablet Take 1 tablet (40 mg) by mouth daily 90 tablet 1     furosemide (LASIX) 40 MG tablet Take 1 tablet (40 mg) by mouth 2 times daily  180 tablet 1     potassium chloride SA (K-DUR/KLOR-CON M) 10 MEQ CR tablet Take 1 tablet (10 mEq) by mouth 2 times daily 180 tablet 1     acetaminophen (TYLENOL) 325 MG tablet Take 3 tablets (975 mg) by mouth every 6 hours as needed for mild pain 100 tablet 0     aspirin 81 MG chewable tablet Take 1 tablet (81 mg) by mouth daily (Patient taking differently: Take 81 mg by mouth every morning ) 30 tablet 0     ferrous sulfate (IRON SUPPLEMENT) 325 (65 FE) MG tablet Take 1 tablet (325 mg) by mouth daily (with breakfast) (Patient taking differently: Take 325 mg by mouth 2 times daily ) 100 tablet 1     ONETOUCH DELICA LANCETS 33G MISC 1 Device daily 100 each 0     blood glucose monitoring (ONE TOUCH ULTRA 2) meter device kit             Allergies   Allergen Reactions     Levaquin [Levofloxacin] Other (See Comments)     Tendon pain in legs, arms and shoulders.      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       Oxycodone      hallucinations     Adhesive Tape Itching and Rash     Diapers & Supplies Rash     Developed yeast infection from previous hospital stay

## 2017-09-20 NOTE — NURSING NOTE
Diet: Patient instructed regarding a heart failure healthy diet, including discussion of reduced fat and 2000 mg daily sodium restriction, daily weights, medication purpose and compliance, fluid restrictions and resources for patient and family to access for assistance with heart failure management.       Labs: Patient was given results of the laboratory testing obtained today and patient was instructed about when to return for the next laboratory testing.    Med Reconcile: Reviewed and verified all current medications with the patient. The updated medication list was printed and given to the patient.    Return Appointment: Patient given instructions regarding scheduling next clinic visit.     Patient stated that she understood all health information given and agreed to call with further questions or concerns.    Patient is [facility] and a HF brochure was reviewed with her highlighting the need to keep in contact with [facility] r/t fluid and symptom management.  She has concerns over INR and requested information on regulating.  Writer provided print out of coumadin dietary guidelines including vitamin K foods. Writer has written her a [employer] message requesting her to call PCP or notify anticoagulation clinic with derm issue on lower legs, especially if it worsens.

## 2017-09-20 NOTE — MR AVS SNAPSHOT
After Visit Summary   9/20/2017    Carlos Alberto Fenton    MRN: [id number]           Patient Information     Date Of Birth          1940        Visit Information        Provider Department      9/20/2017 1:00 PM Easton Torres DPM [facility]         Follow-ups after your visit        Your next 10 appointments already scheduled     Sep 20, 2017  2:00 PM CDT   Lab with [facility]    Health Lab ([facility])    [address] [place] [address]  1st [facility] [id number] [phone]            Sep 20, 2017  2:30 PM CDT   (Arrive by 2:15 PM)   CORE NEW with CHACORTA Quinteros CNP   [facility] ([facility])    [address] [place] [address]  3rd [facility] [id number] [phone]            Oct 03, 2017 11:30 AM CDT   (Arrive by 11:15 AM)   Return Vascular Visit with CHACORTA Macias   [facility] ([facility])    [address] [place] [address]  3rd [facility] [id number] [phone]            Oct 11, 2017  1:00 PM CDT   (Arrive by 12:45 PM)   RETURN FOOT/ANKLE with Easton Torres DPM   [facility] ([facility])    [address] [place] [address]  4th [facility] [id number] [phone]            Oct 13, 2017 10:00 AM CDT   (Arrive by 9:45 AM)   Return Visit with Star Lobato MD   [facility] ([facility])    [address] [place] [address]  3rd [facility] [id number] [phone]            Oct 23, 2017  2:30 PM CDT   (Arrive by 2:15 PM)   RETURN ATRIAL FIBULATION VISIT with CHACORTA Joshi CNP   [facility] ([facility])    [address] [place] [id number] [phone]              Who to contact     Please call your clinic at [phone] to:    Ask questions about your health    Make or cancel  appointments    Discuss your medicines    Learn about your test results    Speak to your doctor   If you have compliments or concerns about an experience at your clinic, or if you wish to file a complaint, please contact [facility] Patient Relations at [phone] or email us at [email or web address]         Additional Information About Your Visit        Decorative Hardware Inchart Information     [employer] Inchart gives you secure access to your electronic health record. If you see a primary care provider, you can also send messages to your care team and make appointments. If you have questions, please call your primary care clinic.  If you do not have a primary care provider, please call [phone] and they will assist you.      Quofore is an electronic gateway that provides easy, online access to your medical records. With Quofore, you can request a clinic appointment, read your test results, renew a prescription or communicate with your care team.     To access your existing account, please contact your [facility] or call [phone] for assistance.        Care EveryWhere ID     This is your Care EveryWhere ID. This could be used by other organizations to access your [facility] medical records  [id number]         Blood Pressure from Last 3 Encounters:   09/05/17 109/86   09/05/17 109/74   09/01/17 131/73    Weight from Last 3 Encounters:   09/05/17 78.1 kg (172 lb 1.6 oz)   09/01/17 85.7 kg (189 lb)   07/28/17 77.6 kg (171 lb)              Today, you had the following     No orders found for display         Today's Medication Changes          These changes are accurate as of: 9/20/17  1:32 PM.  If you have any questions, ask your nurse or doctor.               These medicines have changed or have updated prescriptions.        Dose/Directions    aspirin 81 MG chewable tablet   This may have changed:  when to take this   Used for:  Coronary artery disease with angina  pectoris, unspecified vessel or lesion type, unspecified whether native or transplanted heart (H)        Dose:  81 mg   Take 1 tablet (81 mg) by mouth daily   Quantity:  30 tablet   Refills:  0       ferrous sulfate 325 (65 FE) MG tablet   Commonly known as:  IRON SUPPLEMENT   This may have changed:  when to take this   Used for:  S/P CABG (coronary artery bypass graft)        Dose:  325 mg   Take 1 tablet (325 mg) by mouth daily (with breakfast)   Quantity:  100 tablet   Refills:  1                Primary Care Provider Office Phone # Fax #    Humberto Conner [phone]       [facility] [address] [place] [address]        Equal Access to Services     [facility] : Hadii william Vasquez, waaxda luangela, qaybta kaalmada jose david, scott mattson. So [facility] [phone].    ATENCIÓN: Si habla español, tiene a espinoza disposición servicios gratuitos de asistencia lingüística. [facility] [phone].    We comply with applicable federal civil rights laws and [place] laws. We do not discriminate on the basis of race, color, national origin, age, disability sex, sexual orientation or gender identity.            Thank you!     Thank you for choosing [facility]  for your care. Our goal is always to provide you with excellent care. Hearing back from our patients is one way we can continue to improve our services. Please take a few minutes to complete the written survey that you may receive in the mail after your visit with us. Thank you!             Your Updated Medication List - Protect others around you: Learn how to safely use, store and throw away your medicines at www.disposemymeds.org.          This list is accurate as of: 9/20/17  1:32 PM.  Always use your most recent med list.                   Brand Name Dispense Instructions for use Diagnosis    ACE BANDAGE SELF-ADHERING Misc     6 each    1 each 2 times daily    Open wound of  lower limb, right, subsequent encounter       acetaminophen 325 MG tablet    TYLENOL    100 tablet    Take 3 tablets (975 mg) by mouth every 6 hours as needed for mild pain    S/P CABG (coronary artery bypass graft)       ADAPTIC NON-ADHERING DRESSING Pads     1 each    Externally apply 1 each topically 2 times daily    Open wound of lower limb, right, subsequent encounter       amoxicillin-clavulanate 875-125 MG per tablet    AUGMENTIN    28 tablet    Take 1 tablet by mouth 2 times daily    Toe gangrene (H)       aspirin 81 MG chewable tablet     30 tablet    Take 1 tablet (81 mg) by mouth daily    Coronary artery disease with angina pectoris, unspecified vessel or lesion type, unspecified whether native or transplanted heart (H)       atorvastatin 40 MG tablet    LIPITOR    90 tablet    Take 1 tablet (40 mg) by mouth daily    Coronary artery disease with angina pectoris, unspecified vessel or lesion type, unspecified whether native or transplanted heart (H)       blood glucose monitoring meter device kit           bumetanide 2 MG tablet    BUMEX    180 tablet    Take 1 tablet (2 mg) by mouth 2 times daily    Cardiomyopathy, unspecified (H)       ferrous sulfate 325 (65 FE) MG tablet    IRON SUPPLEMENT    100 tablet    Take 1 tablet (325 mg) by mouth daily (with breakfast)    S/P CABG (coronary artery bypass graft)       fondaparinux 7.5MG/0.6ML injection    ARIXTRA          furosemide 40 MG tablet    LASIX    180 tablet    Take 1 tablet (40 mg) by mouth 2 times daily    Bilateral edema of lower extremity       gabapentin 100 MG capsule    NEURONTIN    180 capsule    Take 1 capsule (100 mg) by mouth 2 times daily    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       KERLIX GAUZE ROLL MEDIUM Misc     48 each    1 each 2 times daily    Open wound of lower limb, right, subsequent encounter       metolazone 2.5 MG tablet    ZAROXOLYN    30 tablet    Please take on Saturday 9/2, Monday 9/4, and Wednesday 9/6.     Cardiomyopathy, unspecified (H)       ONE TOUCH ULTRA test strip   Generic drug:  blood glucose monitoring     100 each    USE AS DIRECTED TO TEST ONE TIME A DAY    Type 2 diabetes mellitus without complication, without long-term current use of insulin (H)       ONETOUCH DELICA LANCETS 33G Misc     100 each    1 Device daily    Type 2 diabetes mellitus without complication, without long-term current use of insulin (H)       order for DME     1 Box    Sterile 4x4 gauze; Use gauze twice daily for dressing changes.    Open wound of lower limb, right, subsequent encounter       pantoprazole 40 MG EC tablet    PROTONIX    90 tablet    Take 1 tablet (40 mg) by mouth every morning    Coronary artery disease with angina pectoris, unspecified vessel or lesion type, unspecified whether native or transplanted heart (H)       potassium chloride SA 10 MEQ CR tablet    K-DUR/KLOR-CON M    180 tablet    Take 1 tablet (10 mEq) by mouth 2 times daily    S/P CABG (coronary artery bypass graft)       traZODone 50 MG tablet    DESYREL    45 tablet    Take 0.5 tablets (25 mg) by mouth nightly as needed for sleep    Primary insomnia       venlafaxine 150 MG Tb24 24 hr tablet    EFFEXOR-ER    90 each    Take 1 tablet (150 mg) by mouth daily (with breakfast)    Adjustment disorder with depressed mood       warfarin 5 MG tablet    COUMADIN    90 tablet    Take 1 tablet (5 mg) by mouth daily    Paroxysmal atrial fibrillation (H)

## 2017-09-20 NOTE — PROGRESS NOTES
HPI:   Ms. Fenton is a 76 year old female with a past medical history including SCHF with preserved EF, DCHF, HTN, Hyperlipidemia, DM Type II, CVA 10/16 complicated by seizures, CAD s/p CABG times 3 (LIMA-LAD, SVG-D1, SVG-dRCA and modified MAZE, ABDIEL ligation - 2/2017), PFO closure, Paroxysmal Afib, HIT, and RUE DVT. He presents to [facility] clinic for initial evaluation.     She is a very poor historian likely due to history of CVA. The documentation indicates that an echocardiogram was obtained due to CVA on 10/16/16 indicates mild MR, mild-moderate TR, and EF 50-55%. Repeat echocardiogram obtained 10/17/16 secondary to Afib noted EF 35-40%, mildly reduced RV function, mild MI, and mild-moderate TR. Repeat Echo 10/25/16 noted mildly improved EF at 40-45%, attributed to stress CM. She underwent angiogram 1/30/17 consistent with 2 vessel CAD to RCA and LAD with LM disease. CVTS consult was recommended at that time. She underwent CABG times three with MAZE and ABDIEL ligation per Dr. Quiñones on 2/17. She has since been followed by Dr. Lobato with Cardiology with medication titration.     Her family history is posiitve for CAD and HTN in her mother, CAD and HTN in her father, and CAD in her sister. She denies excessive ETOH or illicit drug use. Low risk for HIV. TSH and Ferritin WNL at time of diagnosis.     She states she has noted continued weight gain since she stopped taking her Metolazone about 2 weeks ago. She was awaiting a return call regarding results to prompt her next dose and did not receive this. She feels as though she has gained about 10 lbs since that time, which she describes as abdominal distention and LE edema. She notes chronic unsteady gait and weakness due to her CVA limiting her ambulation. However, she was able to walk up for her Uber taxi today. She denies fever, chills, lightheadedness, dizziness, chest pain, palpitations, SOB, MADDEN, PND, orthopnea, nausea, vomiting, and diarrhea. She continues to follow a  low sodium diet at home. She states her  has recently started doing the cooking after 60 years of marriage.     PAST MEDICAL HISTORY:  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    parietral lesions bilateral       Antiplatelet or antithrombotic long-term use      Anxiety      Atrial fibrillation (H)      CAD (coronary artery disease)     2 vessel     Cancer (H) 1990    periodically have cancer on the skin removed     Cerebral artery occlusion with cerebral infarction (H) 10/2016    Cardioembolic strokes related to atrial fibrillation     Deep vein thrombosis (DVT) of axillary vein of right upper extremity (H) 2/25/2017     Depression      Depressive disorder 2001     Diabetes (H)      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     Hyperlipidemia      Hyperlipidemia LDL goal <130 10/31/2010     Hypertension      Panic attacks      Seizures (H) 10/19/2016     Sleep apnea     Uses CPAP       FAMILY HISTORY:  Family History   Problem Relation Age of Onset     DIABETES Mother      C.A.D. Mother      Hypertension Mother      CEREBROVASCULAR DISEASE Mother      Mini Strokes     Other Cancer Mother      Skin Cancer     Hyperlipidemia Mother      Coronary Artery Disease Mother      DIABETES Father      C.A.D. Father      Hypertension Father      Other Cancer Father      Hyperlipidemia Father      Coronary Artery Disease Father      HEART DISEASE Sister      Arthritis Sister      Other Cancer Other      Skin Cancer       SOCIAL HISTORY:  Social History     Social History     Marital status:      Spouse name: N/A     Number of children: N/A     Years of education: N/A     Social History Main Topics     Smoking status: Former Smoker     Packs/day: 1.00     Years: 10.00     Types: Cigarettes     Start date: 10/3/1958     Quit date: 7/4/1976     Smokeless tobacco: Never Used      Comment: Quit in 1976     Alcohol use Yes       Comment: Socially     Drug use: Yes     Special: Marijuana      Comment: Briefly used marijuana for peripheral neuropathy     Sexual activity: Not Currently     Partners: Male     Birth control/ protection: Post-menopausal     Other Topics Concern     Parent/Sibling W/ Cabg, Mi Or Angioplasty Before 65f 55m? Yes     Social History Narrative       CURRENT MEDICATIONS:  Outpatient Medications Prior to Visit   Medication Sig Dispense Refill     bumetanide (BUMEX) 2 MG tablet Take 1 tablet (2 mg) by mouth 2 times daily 180 tablet 3     warfarin (COUMADIN) 5 MG tablet Take 1 tablet (5 mg) by mouth daily 90 tablet 3     gabapentin (NEURONTIN) 100 MG capsule Take 1 capsule (100 mg) by mouth 2 times daily 180 capsule 1     traZODone (DESYREL) 50 MG tablet Take 0.5 tablets (25 mg) by mouth nightly as needed for sleep 45 tablet 2     pantoprazole (PROTONIX) 40 MG EC tablet Take 1 tablet (40 mg) by mouth every morning 90 tablet 1     venlafaxine (EFFEXOR-ER) 150 MG TB24 24 hr tablet Take 1 tablet (150 mg) by mouth daily (with breakfast) 90 each 1     ONE TOUCH ULTRA test strip USE AS DIRECTED TO TEST ONE TIME A  each 2     Wound Dressings (ADAPTIC NON-ADHERING DRESSING) PADS Externally apply 1 each topically 2 times daily 1 each 3     atorvastatin (LIPITOR) 40 MG tablet Take 1 tablet (40 mg) by mouth daily 90 tablet 1     potassium chloride SA (K-DUR/KLOR-CON M) 10 MEQ CR tablet Take 1 tablet (10 mEq) by mouth 2 times daily 180 tablet 1     acetaminophen (TYLENOL) 325 MG tablet Take 3 tablets (975 mg) by mouth every 6 hours as needed for mild pain 100 tablet 0     aspirin 81 MG chewable tablet Take 1 tablet (81 mg) by mouth daily (Patient taking differently: Take 81 mg by mouth every morning ) 30 tablet 0     ferrous sulfate (IRON SUPPLEMENT) 325 (65 FE) MG tablet Take 1 tablet (325 mg) by mouth daily (with breakfast) (Patient taking differently: Take 325 mg by mouth 2 times daily ) 100 tablet 1     ONETOUCH DELICA  "LANCETS 33G MISC 1 Device daily 100 each 0     blood glucose monitoring (ONE TOUCH ULTRA 2) meter device kit        amoxicillin-clavulanate (AUGMENTIN) 875-125 MG per tablet Take 1 tablet by mouth 2 times daily (Patient not taking: Reported on 9/20/2017) 28 tablet 3     fondaparinux (ARIXTRA) 7.5MG/0.6ML injection   0     metolazone (ZAROXOLYN) 2.5 MG tablet Please take on Saturday 9/2, Monday 9/4, and Wednesday 9/6. (Patient not taking: Reported on 9/20/2017) 30 tablet 1     Elastic Bandages & Supports (ACE BANDAGE SELF-ADHERING) MISC 1 each 2 times daily (Patient not taking: Reported on 9/20/2017) 6 each 3     Gauze Pads & Dressings (KERLIX GAUZE ROLL MEDIUM) MISC 1 each 2 times daily (Patient not taking: Reported on 9/20/2017) 48 each 3     order for DME Sterile 4x4 gauze; Use gauze twice daily for dressing changes. (Patient not taking: Reported on 9/20/2017) 1 Box 3     furosemide (LASIX) 40 MG tablet Take 1 tablet (40 mg) by mouth 2 times daily (Patient not taking: Reported on 9/20/2017) 180 tablet 1     No facility-administered medications prior to visit.        ROS:   CONSTITUTIONAL: Denies fever, chills, or fatigue. She complains of weight gain.   HEENT: Denies headache, vision changes, and changes in speech.   CV: Refer to HPI.   PULMONARY:Denies shortness of breath, cough, or previous TB exposure.   GI:Denies nausea, vomiting, diarrhea, and abdominal pain. Bowel movements are regular.   :Denies urinary alterations, dysuria, urinary frequency, hematuria, and abnormal drainage.   EXT: She complains of LE edema.   SKIN:Denies abnormal rashes or lesions.   MUSCULOSKELETAL:Denies upper or lower extremity weakness and pain.   NEUROLOGIC:Denies lightheadedness, dizziness, seizures, or upper or lower extremity paresthesia.     EXAM:  /83 (BP Location: Left arm, Patient Position: Chair, Cuff Size: Adult Regular)  Pulse 65  Ht 1.575 m (5' 2\")  Wt 77.8 kg (171 lb 8 oz)  SpO2 95%  BMI 31.37 " kg/m2  GENERAL: Appears alert and oriented times three.   HEENT: Eye symmetrical and free of discharge bilaterally. Mucous membranes moist and without lesions.  NECK: Supple and without lymphadenopathy. JVD above ear lobe.   CV: Irregular, controlled, S1S2 present without murmur, rub, or gallop.   RESPIRATORY: Respirations regular, even, and unlabored. Lungs CTA throughout.   GI: Soft and mildly distended with normoactive bowel sounds present in all quadrants. No tenderness, rebound, guarding. No organomegaly.   EXTREMITIES: +1-2 bilateral LE edema above knees. 2+ bilateral pedal pulses.   NEUROLOGIC: Alert and orientated x 3. CN II-XII grossly intact. No focal deficits.   MUSCULOSKELETAL: No joint swelling or tenderness.   SKIN: No jaundice. Open lacerations covered on her legs and left arm. Feet covered.     Labs:  CBC RESULTS:  Lab Results   Component Value Date    WBC 5.9 07/07/2017    RBC 3.86 07/07/2017    HGB 11.7 07/07/2017    HCT 37.9 07/07/2017    MCV 98 07/07/2017    MCH 30.3 07/07/2017    MCHC 30.9 (L) 07/07/2017    RDW 19.9 (H) 07/07/2017     07/07/2017       CMP RESULTS:  Lab Results   Component Value Date     09/20/2017    POTASSIUM 3.9 09/20/2017    CHLORIDE 95 09/20/2017    CO2 35 (H) 09/20/2017    ANIONGAP 5 09/20/2017    GLC 82 09/20/2017    BUN 24 09/20/2017    CR 0.95 09/20/2017    GFRESTIMATED 57 (L) 09/20/2017    GFRESTBLACK 69 09/20/2017    CARLY 9.2 09/20/2017    BILITOTAL 0.5 04/17/2017    ALBUMIN 3.1 (L) 04/17/2017    ALKPHOS 137 04/17/2017    ALT 28 04/17/2017    AST 36 04/17/2017        INR RESULTS:  Lab Results   Component Value Date    INR 3.60 (H) 09/18/2017       Lab Results   Component Value Date    MAG 2.1 02/28/2017     Lab Results   Component Value Date    NTBNPI 2570 (H) 02/24/2017     Lab Results   Component Value Date    NTBNP 1352 (H) 09/20/2017     Echocardiogram 10/25/16:  Interpretation Summary  Limited echocardiogram study.  Mildly (EF 40-45%) reduced left  ventricular function is present. Mild diffuse  hypokinesis is present.  Global right ventricular function is normal.  Compared to the previous study dated 10/18/2016, there has been some interval  improvement in LV systolic function.  [facility] 1/30/17:  Two vessel coronary artery disease (mid LAD and mid RCA) with left main disease.    Assessment and Plan:   Ms. Fenton is a 76 year old female with a past medical history including SCHF with preserved EF, DCHF, HTN, Hyperlipidemia, DM Type II, CVA 10/16 complicated by seizures, CAD s/p CABG times 3 (LIMA-LAD, SVG-D1, SVG-dRCA and modified MAZE, ABDIEL ligation - 2/2017), PFO closure, Paroxysmal Afib, HIT, and RUE DVT. He presents to [facility] clinic for initial evaluation and is hypervolemic.     Chronic systolic and diastolic heart failure secondary to NICM with mildly preserved EF. Attributed to Stress CM at time of diagnosis, may be ischemic component given CAD s/p CABG.   Stage C, NYHA Class II  ACEi/ARB Plan for initiation pending repeat BMP.   BB Defer initiation given current decompensated state with hypervolemia. Note HR 65.   Aldosterone antagonist Initiate Aldactone 25 mg po daily given prior hypokalemia on Metolazone.   SCD prophylaxis EF 40-45%.  Fluid status Hypervolemic. Continue Bumex 2 mg po BID. Take 2.5 mg po Metolazone today. Continue KCL at add Aldactone as above. Repeat BMP on Friday.   Sleep Apnea Evaluation: Denies history of NOAH.     CAD s/p CABG.   - ASA and Lipitor. Not currently on BB, note HR 65.     Paroxysmal Afib. S/p ABDIEL ligation 2/17. CHADSVASC-8.  - AC per [facility]. Will discuss long term anticoagulation with primary cardiologist given s/p ABDIEL ligation. Note history of CVA, DVT, and HIT.   - Rate controlled off medication.      HTN.   - BP controlled off medication.     Hyperlipidemia.   - Continue Lipitor. Management per PCP.     RUE DVT. Right internal IJ DVT diagnosed 2/24/17. Likely provoked s/p CABG.   - Coumadin as above.     Follow-up  BMP in 2 days with plan to optimize medication. Follow up in [facility] clinic in 2 weeks.     Isabel Pinto  9/19/2017          CC  ANNMARIE PADILLA

## 2017-09-20 NOTE — PATIENT INSTRUCTIONS
"You were seen today in the Cardiovascular Clinic at the [facility].     Cardiology Providers you saw during your visit: Isabel Pinto NP       1. Please begin aldactone (spironolactone) at 25 mg daily.    2. Please take metolazone 2.5 mg one time only.    3. Please have repeat labs this  in [facility].    4. Please return to CORE clinic in 2 weeks with labs prior.    Results for MENDOZA GREENBERG (MRN [id number]) as of 2017 14:32   Ref. Range 2017 13:54   Sodium Latest Ref Range: 133 - 144 mmol/L 135   Potassium Latest Ref Range: 3.4 - 5.3 mmol/L 3.9   Chloride Latest Ref Range: 94 - 109 mmol/L 95   Carbon Dioxide Latest Ref Range: 20 - 32 mmol/L 35 (H)   Urea Nitrogen Latest Ref Range: 7 - 30 mg/dL 24   Creatinine Latest Ref Range: 0.52 - 1.04 mg/dL 0.95   GFR Estimate Latest Ref Range: >60 mL/min/1.7m2 57 (L)   GFR Estimate If Black Latest Ref Range: >60 mL/min/1.7m2 69   Calcium Latest Ref Range: 8.5 - 10.1 mg/dL 9.2   Anion Gap Latest Ref Range: 3 - 14 mmol/L 5   N-Terminal Pro Bnp Latest Ref Range: 0 - 450 pg/mL 1352 (H)   Glucose Latest Ref Range: 70 - 99 mg/dL 82       Please limit your fluid intake to 2 L (64 ounces) daily.  2 Liters a day = 8.5 cups, or 72 ounces.  Please limit your salt intake to 2 grams a day or less.    If you gain 2# in 24 hours or 5# in one week call Zaira Harman RN so we can adjust your medications as needed over the phone.    Please feel free to call me with any questions or concerns.      Zaira Harman RN BSN [facility]  Cardiology Care Coordinator-Heart Failure Clinic    Questions and [phone].   First press #1 for the University and then press #3 for \"Medical Questions\" to reach the Cardiology Nurses.     On Call Cardiologist for after hours or on weekends: [phone]   option #4 and ask to speak to the on-call Cardiologist. Inform them you are a CORE/heart failure patient at the " [place].        If you need a medication refill please contact your pharmacy.  Please allow 3 business days for your refill to be completed.  _______________________________________________________  C.O.R.E. CLINIC Cardiomyopathy, Optimization, Rehabilitation, Education   The C.O.R.E. CLINIC is a heart failure specialty clinic within the [facility] where you will work with specialized nurse practitioners dedicated to helping patients with heart failure carefully adjust medications, receive education, and learn who and when to call if symptoms develop. They specialize in helping you better understand your condition, slow the progression of your disease, improve the length and quality of your life, help you detect future heart problems before they become life threatening, and avoid hospitalizations.  As always, thank you for trusting us with your health care needs!

## 2017-09-20 NOTE — LETTER
9/20/2017       RE: Carlos Alberto Fenton  [address] [place] [address]     Dear Colleague,    Thank you for referring your patient, Carlos Alberto Fenton, to the [facility] ORTHOPAED[facility]INIC at [facility]. Please see a copy of my visit note below.    Chief Complaint:   Chief Complaint   Patient presents with     Wound Check     Follow up. Wounds, bilateral feet. Pt stated that she is having a little pain in her feet.           Allergies   Allergen Reactions     Levaquin [Levofloxacin] Other (See Comments)     Tendon pain in legs, arms and shoulders.      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       Oxycodone      hallucinations     Adhesive Tape Itching and Rash     Diapers & Supplies Rash     Developed yeast infection from previous hospital stay         Subjective: Carlos Alberto is a 76 year old female who presents to the clinic today for a follow up of left foot wound. She relates that after this visit she has a visit with the vascular team, who are managing her wound at the right hallux partial amputation site. She relates that she feels that she is slowly getting her strength back. She can walk for increased distances. She relates that her  helps her with the dressing changes on both feet. She relates she is not having any pain to either wound. She is pleased with her recovery so far, however it is taking some time.    Objective    Left distal 2nd digit is scabbed over with some hyperkeratosis, and has no s/s of infection noted. There are no new areas of gangrene noted to either foot. Right hallux amp suite wound was noted today, however this is under the care of the vascular service and no treatment was performed to this digit today. The wound appears smaller than the last visit. There are no s/s of infection that I noted to the digit today.     Assessment:  Healed left 2nd digit wound.  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    Plan:   - Pt seen and evaluated  - No debridement was performed on the left digit today. Will leave the scab intact and reassess at the next visit. She can cover the digit with a DSD and continue with the DH shoes.  - Defer to vascular for the right hallux.   - Pt to return to clinic in 3 weeks.       Again, thank you for allowing me to participate in the care of your patient.      Sincerely,    Easton Torres DPM

## 2017-09-20 NOTE — PROGRESS NOTES
Chief Complaint:   Chief Complaint   Patient presents with     Wound Check     Follow up. Wounds, bilateral feet. Pt stated that she is having a little pain in her feet.           Allergies   Allergen Reactions     Levaquin [Levofloxacin] Other (See Comments)     Tendon pain in legs, arms and shoulders.      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       Oxycodone      hallucinations     Adhesive Tape Itching and Rash     Diapers & Supplies Rash     Developed yeast infection from previous hospital stay         Subjective: Carlos Alberto is a 76 year old female who presents to the clinic today for a follow up of left foot wound. She relates that after this visit she has a visit with the vascular team, who are managing her wound at the right hallux partial amputation site. She relates that she feels that she is slowly getting her strength back. She can walk for increased distances. She relates that her  helps her with the dressing changes on both feet. She relates she is not having any pain to either wound. She is pleased with her recovery so far, however it is taking some time.    Objective    Left distal 2nd digit is scabbed over with some hyperkeratosis, and has no s/s of infection noted. There are no new areas of gangrene noted to either foot. Right hallux amp suite wound was noted today, however this is under the care of the vascular service and no treatment was performed to this digit today. The wound appears smaller than the last visit. There are no s/s of infection that I noted to the digit today.     Assessment: Healed left 2nd digit wound.  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    Plan:   - Pt seen and evaluated  - No debridement was performed on the left digit today. Will leave the scab intact and reassess at the next visit. She can cover the digit with a DSD and continue with  the  shoes.  - Defer to vascular for the right hallux.   - Pt to return to clinic in 3 weeks.

## 2017-09-20 NOTE — NURSING NOTE
Chief Complaint   Patient presents with     New Patient     76 year old NEW CORE; Chronic diastolic HF presenting for CORE eval and labs per Dr. Lobato referral     Vitals were taken and medications were reconciled.     Kirsten Brito MA    2:10 PM

## 2017-09-21 NOTE — PROGRESS NOTES
9/21 Writer received an email from Zaira POSADA clinic RN, about patient today.  We are sending [facility] another packet about Warfarin and diet.  Zaira stated that the patient is in need of education about diet and anticoagulation. With next recommendation of dosing, the Anticoagulation clinic could please follow up with patient to see if she has questions about dietary management.

## 2017-09-22 ENCOUNTER — ANTICOAGULATION THERAPY VISIT (OUTPATIENT)
Dept: ANTICOAGULATION | Facility: CLINIC | Age: 77
End: 2017-09-22

## 2017-09-22 DIAGNOSIS — I50.22 CHRONIC SYSTOLIC HEART FAILURE (H): ICD-10-CM

## 2017-09-22 DIAGNOSIS — I48.91 ATRIAL FIBRILLATION, UNSPECIFIED TYPE (H): ICD-10-CM

## 2017-09-22 DIAGNOSIS — I48.0 PAROXYSMAL ATRIAL FIBRILLATION (H): ICD-10-CM

## 2017-09-22 DIAGNOSIS — Z79.01 LONG-TERM (CURRENT) USE OF ANTICOAGULANTS: ICD-10-CM

## 2017-09-22 LAB — INR PPP: 2.7 (ref 0.86–1.14)

## 2017-09-22 PROCEDURE — 80048 BASIC METABOLIC PNL TOTAL CA: CPT | Performed by: INTERNAL MEDICINE

## 2017-09-22 PROCEDURE — 85610 PROTHROMBIN TIME: CPT | Performed by: INTERNAL MEDICINE

## 2017-09-22 PROCEDURE — 36415 COLL VENOUS BLD VENIPUNCTURE: CPT | Performed by: INTERNAL MEDICINE

## 2017-09-22 NOTE — PROGRESS NOTES
ANTICOAGULATION FOLLOW-UP CLINIC VISIT    Patient Name:  Carlos Alberto Fenton  Date:  9/22/2017  Contact Type:  Telephone    SUBJECTIVE:     Patient Findings     Comments Carlos Alberto reports that she had an INR done on 9/20 and was instructed to take 5mg for the past 2 days.  There is no documented lab for this day and no documentation of this in the provider notes.  Writer had patient put pills in her pill box while on the phone.  Dietary concerns addressed.           OBJECTIVE    INR   Date Value Ref Range Status   09/22/2017 2.70 (H) 0.86 - 1.14 Final     Comment:     This test is intended for monitoring Coumadin therapy.  Results are not   accurate in patients with prolonged INR due to factor deficiency.       Chromogenic Factor 10   Date Value Ref Range Status   03/02/2017 65 (L) 70 - 130 % Final     Comment:     Therapeutic Range:  A Chromogenic Factor 10 level of approximately 20-40%   inversely correlates with an INR of 2-3 for patients receiving Warfarin.   Chromogenic Factor 10 levels below 20% indicate an INR greater than 3 and   levels above 40% indicate an INR less than 2.       Factor 2 Assay   Date Value Ref Range Status   02/27/2017 37 (L) 60 - 140 % Final       ASSESSMENT / PLAN  INR assessment THER    Recheck INR In: 4 DAYS    INR Location Clinic      Anticoagulation Summary as of 9/22/2017     INR goal 2.0-3.0   Today's INR 2.70   Maintenance plan No maintenance plan   Full instructions 9/22: 2.5 mg; 9/23: 2.5 mg; 9/24: 2.5 mg; 9/25: 2.5 mg   Plan last modified Yani Delgado RN (9/1/2017)   Next INR check 9/26/2017   Target end date Indefinite    Indications   Long-term (current) use of anticoagulants [Z79.01] [Z79.01]  New onset atrial fibrillation (H) (Resolved) [I48.91]  Atrial fibrillation (H) [I48.91] [I48.91]         Anticoagulation Episode Summary     INR check location     Preferred lab     Send INR reminders to [facility]    Comments Speak with patients  Darrin in addition to  patient.  Hx of 2 strokes.      Anticoagulation Care Providers     Provider Role Specialty Phone number    Star Lobato MD Responsible Cardiology [phone]            See the Encounter Report to view Anticoagulation Flowsheet and Dosing Calendar (Go to Encounters tab in chart review, and find the Anticoagulation Therapy Visit)    Spoke with Carlos Alberto.    Amairani Henson RN

## 2017-09-22 NOTE — MR AVS SNAPSHOT
Carlos Alberto Fenton   9/22/2017   Anticoagulation Therapy Visit    Description:  76 year old female   Provider:  Amairani Henson, RN   Department:  [facility]           INR as of 9/22/2017     Today's INR 2.70      Anticoagulation Summary as of 9/22/2017     INR goal 2.0-3.0   Today's INR 2.70   Full instructions 9/22: 2.5 mg; 9/23: 2.5 mg; 9/24: 2.5 mg; 9/25: 2.5 mg   Next INR check 9/26/2017    Indications   Long-term (current) use of anticoagulants [Z79.01] [Z79.01]  New onset atrial fibrillation (H) (Resolved) [I48.91]  Atrial fibrillation (H) [I48.91] [I48.91]         September 2017 Details    Sun Mon Tue Wed Thu Fri Sat          1               2                 3               4               5               6               7               8               9                 10               11               12               13               14               15               16                 17               18               19               20               21               22      2.5 mg   See details      23      2.5 mg           24      2.5 mg         25      2.5 mg         26            27               28               29               30                Date Details   09/22 This INR check       Date of next INR:  9/26/2017         How to take your warfarin dose     To take:  2.5 mg Take 0.5 of a 5 mg tablet.

## 2017-09-23 ENCOUNTER — MYC REFILL (OUTPATIENT)
Dept: INTERNAL MEDICINE | Facility: CLINIC | Age: 77
End: 2017-09-23

## 2017-09-23 DIAGNOSIS — F51.01 PRIMARY INSOMNIA: ICD-10-CM

## 2017-09-23 DIAGNOSIS — I25.119 CORONARY ARTERY DISEASE WITH ANGINA PECTORIS, UNSPECIFIED VESSEL OR LESION TYPE, UNSPECIFIED WHETHER NATIVE OR TRANSPLANTED HEART (H): ICD-10-CM

## 2017-09-23 DIAGNOSIS — G59 MONONEUROPATHY DUE TO UNDERLYING DISEASE: ICD-10-CM

## 2017-09-23 LAB
ANION GAP SERPL CALCULATED.3IONS-SCNC: 10 MMOL/L (ref 3–14)
BUN SERPL-MCNC: 29 MG/DL (ref 7–30)
CALCIUM SERPL-MCNC: 9.8 MG/DL (ref 8.5–10.1)
CHLORIDE SERPL-SCNC: 92 MMOL/L (ref 94–109)
CO2 SERPL-SCNC: 35 MMOL/L (ref 20–32)
CREAT SERPL-MCNC: 0.9 MG/DL (ref 0.52–1.04)
GFR SERPL CREATININE-BSD FRML MDRD: 60 ML/MIN/1.7M2
GLUCOSE SERPL-MCNC: 91 MG/DL (ref 70–99)
POTASSIUM SERPL-SCNC: 3.1 MMOL/L (ref 3.4–5.3)
SODIUM SERPL-SCNC: 137 MMOL/L (ref 133–144)

## 2017-09-23 RX ORDER — GABAPENTIN 100 MG/1
100 CAPSULE ORAL 2 TIMES DAILY
Qty: 180 CAPSULE | Refills: 1 | Status: CANCELLED | OUTPATIENT
Start: 2017-09-23

## 2017-09-23 RX ORDER — PANTOPRAZOLE SODIUM 40 MG/1
40 TABLET, DELAYED RELEASE ORAL EVERY MORNING
Qty: 90 TABLET | Refills: 1 | Status: CANCELLED | OUTPATIENT
Start: 2017-09-23

## 2017-09-23 RX ORDER — TRAZODONE HYDROCHLORIDE 50 MG/1
25 TABLET, FILM COATED ORAL
Qty: 45 TABLET | Refills: 2 | Status: CANCELLED | OUTPATIENT
Start: 2017-09-23

## 2017-09-25 ENCOUNTER — CARE COORDINATION (OUTPATIENT)
Dept: CARDIOLOGY | Facility: CLINIC | Age: 77
End: 2017-09-25

## 2017-09-25 DIAGNOSIS — I50.32 CHRONIC DIASTOLIC HEART FAILURE (H): Primary | ICD-10-CM

## 2017-09-25 DIAGNOSIS — E87.6 HYPOPOTASSEMIA: ICD-10-CM

## 2017-09-25 DIAGNOSIS — Z95.1 S/P CABG (CORONARY ARTERY BYPASS GRAFT): ICD-10-CM

## 2017-09-25 RX ORDER — POTASSIUM CHLORIDE 1500 MG/1
20 TABLET, EXTENDED RELEASE ORAL 2 TIMES DAILY
Qty: 120 TABLET | Refills: 1 | Status: SHIPPED | OUTPATIENT
Start: 2017-09-25 | End: 2018-01-22

## 2017-09-25 NOTE — TELEPHONE ENCOUNTER
Message from MyChart:  Original authorizing provider: MD Carlos Alberto Connelly would like a refill of the following medications:  gabapentin (NEURONTIN) 100 MG capsule [Humberto Conner MD]  traZODone (DESYREL) 50 MG tablet [Humberto Conner MD]  pantoprazole (PROTONIX) 40 MG EC tablet [Humberto Conner MD]    Preferred pharmacy: [facility] - [place], [place] - [facility] [address]    Comment:

## 2017-09-25 NOTE — PROGRESS NOTES
Date: 9/25/2017    Time of Call: 6:30 PM     Diagnosis:  Diastolic HF     [ VORB ] Ordering provider: Kim Cueva NP  Order:   1. Increase potassium to 20 mEq BID.  2. Tonight only, take an extra 40 mEq of potassium  3. Repeat labs 1 week.     Order received by: Zaira Harman RN, BSN     Follow-up/additional notes: Writer called patient r/t above orders, and she wrote this down. [employer] message sent as well. Medication ordered at 20 mEq tabs per patient request and labs will be down at [facility].

## 2017-09-26 ENCOUNTER — ANTICOAGULATION THERAPY VISIT (OUTPATIENT)
Dept: ANTICOAGULATION | Facility: CLINIC | Age: 77
End: 2017-09-26

## 2017-09-26 DIAGNOSIS — I50.32 CHRONIC DIASTOLIC HEART FAILURE (H): ICD-10-CM

## 2017-09-26 DIAGNOSIS — I48.91 ATRIAL FIBRILLATION, UNSPECIFIED TYPE (H): ICD-10-CM

## 2017-09-26 DIAGNOSIS — E87.6 HYPOPOTASSEMIA: ICD-10-CM

## 2017-09-26 DIAGNOSIS — Z79.01 LONG-TERM (CURRENT) USE OF ANTICOAGULANTS: ICD-10-CM

## 2017-09-26 DIAGNOSIS — I48.0 PAROXYSMAL ATRIAL FIBRILLATION (H): ICD-10-CM

## 2017-09-26 LAB — INR PPP: 2.2 (ref 0.86–1.14)

## 2017-09-26 PROCEDURE — 85610 PROTHROMBIN TIME: CPT | Performed by: INTERNAL MEDICINE

## 2017-09-26 PROCEDURE — 36415 COLL VENOUS BLD VENIPUNCTURE: CPT | Performed by: INTERNAL MEDICINE

## 2017-09-26 PROCEDURE — 80048 BASIC METABOLIC PNL TOTAL CA: CPT | Performed by: INTERNAL MEDICINE

## 2017-09-26 NOTE — PROGRESS NOTES
ANTICOAGULATION FOLLOW-UP CLINIC VISIT    Patient Name:  Carlos Alberto Fenton  Date:  9/26/2017  Contact Type:  Telephone    SUBJECTIVE:        OBJECTIVE    INR   Date Value Ref Range Status   09/26/2017 2.20 (H) 0.86 - 1.14 Final     Comment:     This test is intended for monitoring Coumadin therapy.  Results are not   accurate in patients with prolonged INR due to factor deficiency.       Chromogenic Factor 10   Date Value Ref Range Status   03/02/2017 65 (L) 70 - 130 % Final     Comment:     Therapeutic Range:  A Chromogenic Factor 10 level of approximately 20-40%   inversely correlates with an INR of 2-3 for patients receiving Warfarin.   Chromogenic Factor 10 levels below 20% indicate an INR greater than 3 and   levels above 40% indicate an INR less than 2.       Factor 2 Assay   Date Value Ref Range Status   02/27/2017 37 (L) 60 - 140 % Final       ASSESSMENT / PLAN  INR assessment THER    Recheck INR In: 3 DAYS    INR Location Clinic      Anticoagulation Summary as of 9/26/2017     INR goal 2.0-3.0   Today's INR 2.20   Maintenance plan No maintenance plan   Full instructions 9/26: 5 mg; 9/27: 2.5 mg; 9/28: 2.5 mg   Plan last modified Yani Delgado, RN (9/1/2017)   Next INR check 9/29/2017   Priority INR   Target end date Indefinite    Indications   Long-term (current) use of anticoagulants [Z79.01] [Z79.01]  New onset atrial fibrillation (H) (Resolved) [I48.91]  Atrial fibrillation (H) [I48.91] [I48.91]         Anticoagulation Episode Summary     INR check location     Preferred lab     Send INR reminders to [facility]    Comments Speak with patients  Darrin in addition to patient.  Hx of 2 strokes.  Pt is confused. Please speak in tablets only (5mg tablets)      Anticoagulation Care Providers     Provider Role Specialty Phone number    Star Lobato MD Responsible Cardiology [phone]            See the Encounter Report to view Anticoagulation Flowsheet and Dosing Calendar (Go to  Encounters tab in chart review, and find the Anticoagulation Therapy Visit)    Left message for patient with results and dosing recommendations. Asked patient to call back to report any missed doses, falls, signs and symptoms of bleeding or clotting, any changes in health, medication, or diet. Asked patient to call back with any questions or concerns.     Unable to speak with pt to have her fill her pill box. Did send a [employer] message     Chantal Garcia RN

## 2017-09-26 NOTE — MR AVS SNAPSHOT
Carlos Alberto Fenton   9/26/2017   Anticoagulation Therapy Visit    Description:  76 year old female   Provider:  Chantal Garcia, RN   Department:  [facility]           INR as of 9/26/2017     Today's INR 2.20      Anticoagulation Summary as of 9/26/2017     INR goal 2.0-3.0   Today's INR 2.20   Full instructions 9/26: 5 mg; 9/27: 2.5 mg; 9/28: 2.5 mg   Next INR check 9/29/2017    Indications   Long-term (current) use of anticoagulants [Z79.01] [Z79.01]  New onset atrial fibrillation (H) (Resolved) [I48.91]  Atrial fibrillation (H) [I48.91] [I48.91]         September 2017 Details    Sun Mon Tue Wed Thu Fri Sat          1               2                 3               4               5               6               7               8               9                 10               11               12               13               14               15               16                 17               18               19               20               21               22               23                 24               25               26      5 mg   See details      27      2.5 mg         28      2.5 mg         29            30                Date Details   09/26 This INR check       Date of next INR:  9/29/2017         How to take your warfarin dose     To take:  2.5 mg Take 0.5 of a 5 mg tablet.    To take:  5 mg Take 1 of the 5 mg tablets.

## 2017-09-26 NOTE — PROGRESS NOTES
Pt called back and didn't get voice message (didn't check). Writer went over recommendation and when to come back. Writer had pt repeat what writer had said. Pt was not home to go over pillbox. Pt reports she does check MyChart and thanks us for sending the message today. Chantal Garcia RN

## 2017-09-27 LAB
ANION GAP SERPL CALCULATED.3IONS-SCNC: 10 MMOL/L (ref 3–14)
BUN SERPL-MCNC: 23 MG/DL (ref 7–30)
CALCIUM SERPL-MCNC: 10 MG/DL (ref 8.5–10.1)
CHLORIDE SERPL-SCNC: 90 MMOL/L (ref 94–109)
CO2 SERPL-SCNC: 37 MMOL/L (ref 20–32)
CREAT SERPL-MCNC: 0.8 MG/DL (ref 0.52–1.04)
GFR SERPL CREATININE-BSD FRML MDRD: 69 ML/MIN/1.7M2
GLUCOSE SERPL-MCNC: 99 MG/DL (ref 70–99)
POTASSIUM SERPL-SCNC: 3.8 MMOL/L (ref 3.4–5.3)
SODIUM SERPL-SCNC: 137 MMOL/L (ref 133–144)

## 2017-09-29 ENCOUNTER — CARE COORDINATION (OUTPATIENT)
Dept: CARDIOLOGY | Facility: CLINIC | Age: 77
End: 2017-09-29

## 2017-09-29 ENCOUNTER — ANTICOAGULATION THERAPY VISIT (OUTPATIENT)
Dept: ANTICOAGULATION | Facility: CLINIC | Age: 77
End: 2017-09-29

## 2017-09-29 DIAGNOSIS — I48.91 ATRIAL FIBRILLATION, UNSPECIFIED TYPE (H): ICD-10-CM

## 2017-09-29 DIAGNOSIS — I48.0 PAROXYSMAL ATRIAL FIBRILLATION (H): ICD-10-CM

## 2017-09-29 DIAGNOSIS — Z79.01 LONG-TERM (CURRENT) USE OF ANTICOAGULANTS: ICD-10-CM

## 2017-09-29 LAB — INR PPP: 2.8 (ref 0.86–1.14)

## 2017-09-29 PROCEDURE — 36416 COLLJ CAPILLARY BLOOD SPEC: CPT | Performed by: INTERNAL MEDICINE

## 2017-09-29 PROCEDURE — 85610 PROTHROMBIN TIME: CPT | Performed by: INTERNAL MEDICINE

## 2017-09-29 NOTE — MR AVS SNAPSHOT
Carlos Alberto Fenton   9/29/2017   Anticoagulation Therapy Visit    Description:  76 year old female   Provider:  Chantal Garcia, RN   Department:  [facility]           INR as of 9/29/2017     Today's INR 2.80      Anticoagulation Summary as of 9/29/2017     INR goal 2.0-3.0   Today's INR 2.80   Full instructions 9/29: 2.5 mg; 9/30: 2.5 mg; 10/1: 2.5 mg; 10/2: 2.5 mg   Next INR check 10/3/2017    Indications   Long-term (current) use of anticoagulants [Z79.01] [Z79.01]  New onset atrial fibrillation (H) (Resolved) [I48.91]  Atrial fibrillation (H) [I48.91] [I48.91]         September 2017 Details    Sun Mon Tue Wed Thu Fri Sat          1               2                 3               4               5               6               7               8               9                 10               11               12               13               14               15               16                 17               18               19               20               21               22               23                 24               25               26               27               28               29      2.5 mg   See details      30      2.5 mg          Date Details   09/29 This INR check               How to take your warfarin dose     To take:  2.5 mg Take 0.5 of a 5 mg tablet.           October 2017 Details    Sun Mon Tue Wed Thu Fri Sat     1      2.5 mg         2      2.5 mg         3            4               5               6               7                 8               9               10               11               12               13               14                 15               16               17               18               19               20               21                 22               23               24               25               26               27               28                 29               30               31                    Date Details   No additional  details    Date of next INR:  10/3/2017         How to take your warfarin dose     To take:  2.5 mg Take 0.5 of a 5 mg tablet.

## 2017-09-29 NOTE — PROGRESS NOTES
ANTICOAGULATION FOLLOW-UP CLINIC VISIT    Patient Name:  Carlos Alberto Fenton  Date:  9/29/2017  Contact Type:  Telephone    SUBJECTIVE:     Patient Findings     Positives Change in medications    Comments Potassium increased from 10mg BID to 20mg BID           OBJECTIVE    INR   Date Value Ref Range Status   09/29/2017 2.80 (H) 0.86 - 1.14 Final     Comment:     This test is intended for monitoring Coumadin therapy.  Results are not   accurate in patients with prolonged INR due to factor deficiency.       Chromogenic Factor 10   Date Value Ref Range Status   03/02/2017 65 (L) 70 - 130 % Final     Comment:     Therapeutic Range:  A Chromogenic Factor 10 level of approximately 20-40%   inversely correlates with an INR of 2-3 for patients receiving Warfarin.   Chromogenic Factor 10 levels below 20% indicate an INR greater than 3 and   levels above 40% indicate an INR less than 2.       Factor 2 Assay   Date Value Ref Range Status   02/27/2017 37 (L) 60 - 140 % Final       ASSESSMENT / PLAN  INR assessment THER    Recheck INR In: 4 DAYS    INR Location Clinic      Anticoagulation Summary as of 9/29/2017     INR goal 2.0-3.0   Today's INR 2.80   Maintenance plan No maintenance plan   Full instructions 9/29: 2.5 mg; 9/30: 2.5 mg; 10/1: 2.5 mg; 10/2: 2.5 mg   Plan last modified Yani Delgado, RN (9/1/2017)   Next INR check 10/3/2017   Priority INR   Target end date Indefinite    Indications   Long-term (current) use of anticoagulants [Z79.01] [Z79.01]  New onset atrial fibrillation (H) (Resolved) [I48.91]  Atrial fibrillation (H) [I48.91] [I48.91]         Anticoagulation Episode Summary     INR check location     Preferred lab     Send INR reminders to [facility]    Comments Prounounced A-seenath  Speak with patients  Darrin in addition to patient.  Hx of 2 strokes.  Pt is confused. Please speak in tablets only (5mg tablets)/Send MyChart message      Anticoagulation Care Providers     Provider Role Specialty  Phone number    Star Lobato MD Sam Responsible Cardiology [phone]            See the Encounter Report to view Anticoagulation Flowsheet and Dosing Calendar (Go to Encounters tab in chart review, and find the Anticoagulation Therapy Visit)    Spoke with patient. Gave them their lab results and new warfarin recommendation.  No changes in health, medication, or diet. No missed doses, no falls. No signs or symptoms of bleed or clotting.     Pt reports that writer doesn't have to send a [employer] message since speaking. Thinking pt could benefit from 5mg once a week and 2.5mg ROW, but since pt is very confused not sure if I should implement this maintenance dose.     Chantal Garcia, RN

## 2017-09-30 NOTE — PROGRESS NOTES
"Writer called patient per provider request to check on weights and symptoms (labs fairly stable ).  She reports that she was down as low as 160 pounds from clinic weight of 171.  However, as of today, she has climbed to 163.  She states that she feels good and is doing some walking of short distances.  She states that her stomach is soft and edema has decreased in lower extremities; however, her feet and ankles still show some swelling.  Writer told her no changes for now but information will be relayed to provider for possible changes prior to 10/9 CORE return.    Zaira Harman, RN BSN [facility]  Cardiology Care Coordinator - C.O.R.E. [facility]  Questions and [phone]  First press #1 for the [employer] and then press #3 for \"Medical Advise\" to reach a Cardiology Nurse.       "

## 2017-10-03 ENCOUNTER — MYC REFILL (OUTPATIENT)
Dept: INTERNAL MEDICINE | Facility: CLINIC | Age: 77
End: 2017-10-03

## 2017-10-03 ENCOUNTER — CARE COORDINATION (OUTPATIENT)
Dept: CARDIOLOGY | Facility: CLINIC | Age: 77
End: 2017-10-03

## 2017-10-03 ENCOUNTER — ANTICOAGULATION THERAPY VISIT (OUTPATIENT)
Dept: ANTICOAGULATION | Facility: CLINIC | Age: 77
End: 2017-10-03

## 2017-10-03 ENCOUNTER — OFFICE VISIT (OUTPATIENT)
Dept: VASCULAR SURGERY | Facility: CLINIC | Age: 77
End: 2017-10-03

## 2017-10-03 VITALS
OXYGEN SATURATION: 95 % | SYSTOLIC BLOOD PRESSURE: 107 MMHG | HEART RATE: 69 BPM | RESPIRATION RATE: 19 BRPM | DIASTOLIC BLOOD PRESSURE: 63 MMHG

## 2017-10-03 DIAGNOSIS — I48.91 ATRIAL FIBRILLATION, UNSPECIFIED TYPE (H): ICD-10-CM

## 2017-10-03 DIAGNOSIS — I96 TOE GANGRENE (H): Primary | ICD-10-CM

## 2017-10-03 DIAGNOSIS — I48.0 PAROXYSMAL ATRIAL FIBRILLATION (H): ICD-10-CM

## 2017-10-03 DIAGNOSIS — I50.42 CHRONIC COMBINED SYSTOLIC AND DIASTOLIC HRT FAIL (H): Primary | ICD-10-CM

## 2017-10-03 DIAGNOSIS — Z79.01 LONG-TERM (CURRENT) USE OF ANTICOAGULANTS: ICD-10-CM

## 2017-10-03 DIAGNOSIS — G59 MONONEUROPATHY DUE TO UNDERLYING DISEASE: ICD-10-CM

## 2017-10-03 DIAGNOSIS — I25.119 CORONARY ARTERY DISEASE WITH ANGINA PECTORIS, UNSPECIFIED VESSEL OR LESION TYPE, UNSPECIFIED WHETHER NATIVE OR TRANSPLANTED HEART (H): ICD-10-CM

## 2017-10-03 DIAGNOSIS — F51.01 PRIMARY INSOMNIA: ICD-10-CM

## 2017-10-03 LAB — INR PPP: 2.05 (ref 0.86–1.14)

## 2017-10-03 ASSESSMENT — PAIN SCALES - GENERAL: PAINLEVEL: NO PAIN (0)

## 2017-10-03 NOTE — LETTER
10/3/2017       RE: Carlos Alberto Fenton  [address] [place] [address]     Dear Colleague,    Thank you for referring your patient, Carlos Alberto Fenton, to the [facility] VASCULAR [facility]IC at [facility]. Please see a copy of my visit note below.    VASCULAR SURGERY PROGRESS NOTE    HPI:    Carlos Alberto Fenton is a 76 year old female who underwent Right Great Toe amputation, debriedment of 2nd and 3rd toes and application of Grafix with Dr. Santamaria on 5/26/2017.  She underwent right foot irrigation and debridement with Grafix application with Dr. Santamaria on 7/7/2017. She is wearing DH2 shoes. She is being followed by Dr. Hatfield for weekly wound cares for her left foot.  She returns to clinic today for right foot wound assessment.     SUBJECTIVE:  She denies any fever, chills, night sweats or pain.  Offers no specific complaints. Right foot feels good.  Denies right foot odor, pain, swelling, erythema, or rash. Reports she has stopped Arixtra and is taking coumadin.     OBJECTIVE:  /63 (BP Location: Right arm)  Pulse 69  Resp 19  SpO2 95%  Breastfeeding? No      PHYSICAL EXAM:  NEURO/PSYCH: The patient is alert and oriented.  Appropriate.  Moves all extremities.    SKIN: Color appropriate for race, warm, dry.  PULMONARY: no acute distress  EXTREMITIES: right great toe wound scant amount non-malodorous drainage, granulation tissue present, right second toe wound completely, adaptic, lambs wool between toes, 4x4 fluff, Kerlix applied.  Bilateral lower extremity edema 2+ present    Current Outpatient Prescriptions   Medication Sig Dispense Refill     potassium chloride SA (K-DUR/KLOR-CON M) 20 MEQ CR tablet Take 1 tablet (20 mEq) by mouth 2 times daily 120 tablet 1     spironolactone (ALDACTONE) 25 MG tablet Take 1 tablet (25 mg) by mouth daily 30 tablet 0     bumetanide (BUMEX) 2 MG tablet Take 1 tablet (2 mg) by mouth 2 times daily 180 tablet 3     amoxicillin-clavulanate (AUGMENTIN)  875-125 MG per tablet Take 1 tablet by mouth 2 times daily (Patient not taking: Reported on 9/20/2017) 28 tablet 3     metolazone (ZAROXOLYN) 2.5 MG tablet Please take on Saturday 9/2, Monday 9/4, and Wednesday 9/6. (Patient not taking: Reported on 9/20/2017) 30 tablet 1     warfarin (COUMADIN) 5 MG tablet Take 1 tablet (5 mg) by mouth daily 90 tablet 3     gabapentin (NEURONTIN) 100 MG capsule Take 1 capsule (100 mg) by mouth 2 times daily 180 capsule 1     traZODone (DESYREL) 50 MG tablet Take 0.5 tablets (25 mg) by mouth nightly as needed for sleep 45 tablet 2     pantoprazole (PROTONIX) 40 MG EC tablet Take 1 tablet (40 mg) by mouth every morning 90 tablet 1     Elastic Bandages & Supports (ACE BANDAGE SELF-ADHERING) MISC 1 each 2 times daily (Patient not taking: Reported on 9/20/2017) 6 each 3     Gauze Pads & Dressings (KERLIX GAUZE ROLL MEDIUM) MISC 1 each 2 times daily (Patient not taking: Reported on 9/20/2017) 48 each 3     venlafaxine (EFFEXOR-ER) 150 MG TB24 24 hr tablet Take 1 tablet (150 mg) by mouth daily (with breakfast) 90 each 1     ONE TOUCH ULTRA test strip USE AS DIRECTED TO TEST ONE TIME A  each 2     Wound Dressings (ADAPTIC NON-ADHERING DRESSING) PADS Externally apply 1 each topically 2 times daily 1 each 3     order for DME Sterile 4x4 gauze; Use gauze twice daily for dressing changes. (Patient not taking: Reported on 9/20/2017) 1 Box 3     atorvastatin (LIPITOR) 40 MG tablet Take 1 tablet (40 mg) by mouth daily 90 tablet 1     acetaminophen (TYLENOL) 325 MG tablet Take 3 tablets (975 mg) by mouth every 6 hours as needed for mild pain 100 tablet 0     aspirin 81 MG chewable tablet Take 1 tablet (81 mg) by mouth daily (Patient taking differently: Take 81 mg by mouth every morning ) 30 tablet 0     ferrous sulfate (IRON SUPPLEMENT) 325 (65 FE) MG tablet Take 1 tablet (325 mg) by mouth daily (with breakfast) (Patient taking differently: Take 325 mg by mouth 2 times daily ) 100 tablet 1      ONETOUCH DELICA LANCETS 33G MISC 1 Device daily 100 each 0     blood glucose monitoring (ONE TOUCH ULTRA 2) meter device kit              ASSESSMENT/PLAN:  #1 Dry gangrene, right foot great and second toe, secondary to microembolization or Heparin- Induce Thrombocytopenia  #2 Status post Right Great Toe Amputation, Debridement of Toes 2 and 3, and application of Grafix with Dr. Santamaria on 5/26/2017  #3 Irrigation and debridement right foot, Grafix application, 7/7/2017    - continue right foot dressing changes BID with Adaptic, lambs wool between toes,  4 X 4's, and Kerlix    - okay for weight bearing    - follow up with Dr. Santamaria in 3-4 weeks to determine if additional Grafix should be applied     - continue coumadin     - continue ASA and Atorvastatin     - left foot wound care per Dr. Torres    Discussed with Dr. Mg above [facility]      Meena WHATLEY, CNS  Division of Vascular Surgery  [facility]  Pager [phone]

## 2017-10-03 NOTE — PATIENT INSTRUCTIONS
Continue right foot dressing changes BID with Adaptic, 4 x 4 fluff, Kerlix and Ace wrap.    Left foot wound cares per Dr. Torres    Follow up with Dr. Santamaria in one month    With questions, concerns, or to request an appointment, please call either:    Edelmira Rodríguez, Care Coordinator RN, Vascular Surgery  [phone]    Vascular Call Center  [phone]    To contact someone after 5 pm, on a weekend, or on a Holiday, please call:  [facility]  [phone], option 4 to have a member of the Vascular Surgery Service paged.

## 2017-10-03 NOTE — NURSING NOTE
Chief Complaint   Patient presents with     RECHECK     1 Month follow up right foot        Vitals:    10/03/17 1125   BP: 107/63   BP Location: Right arm   Pulse: 69   Resp: 19   SpO2: 95%       There is no height or weight on file to calculate BMI.              Jeannie Baker LPN

## 2017-10-03 NOTE — MR AVS SNAPSHOT
Carlos Alberto Fenton   10/3/2017   Anticoagulation Therapy Visit    Description:  77 year old female   Provider:  Edy Molina, RN   Department:  [facility]           INR as of 10/3/2017     Today's INR 2.05      Anticoagulation Summary as of 10/3/2017     INR goal 2.0-3.0   Today's INR 2.05   Full instructions 10/3: 5 mg; 10/4: 2.5 mg; 10/5: 2.5 mg   Next INR check 10/6/2017    Indications   Long-term (current) use of anticoagulants [Z79.01] [Z79.01]  New onset atrial fibrillation (H) (Resolved) [I48.91]  Atrial fibrillation (H) [I48.91] [I48.91]         October 2017 Details    Sun Mon Tue Wed Thu Fri Sat     1               2               3      5 mg   See details      4      2.5 mg         5      2.5 mg         6            7                 8               9               10               11               12               13               14                 15               16               17               18               19               20               21                 22               23               24               25               26               27               28                 29               30               31                    Date Details   10/03 This INR check       Date of next INR:  10/6/2017         How to take your warfarin dose     To take:  2.5 mg Take 0.5 of a 5 mg tablet.    To take:  5 mg Take 1 of the 5 mg tablets.

## 2017-10-03 NOTE — PROGRESS NOTES
VASCULAR SURGERY PROGRESS NOTE    HPI:    Carlos Alberto Fenton is a 76 year old female who underwent Right Great Toe amputation, debriedment of 2nd and 3rd toes and application of Grafix with Dr. Santamaria on 5/26/2017.  She underwent right foot irrigation and debridement with Grafix application with Dr. Santamaria on 7/7/2017. She is wearing DH2 shoes. She is being followed by Dr. Hatfield for weekly wound cares for her left foot.  She returns to clinic today for right foot wound assessment.     SUBJECTIVE:  She denies any fever, chills, night sweats or pain.  Offers no specific complaints. Right foot feels good.  Denies right foot odor, pain, swelling, erythema, or rash. Reports she has stopped Arixtra and is taking coumadin.     OBJECTIVE:  /63 (BP Location: Right arm)  Pulse 69  Resp 19  SpO2 95%  Breastfeeding? No      PHYSICAL EXAM:  NEURO/PSYCH: The patient is alert and oriented.  Appropriate.  Moves all extremities.    SKIN: Color appropriate for race, warm, dry.  PULMONARY: no acute distress  EXTREMITIES: right great toe wound scant amount non-malodorous drainage, granulation tissue present, right second toe wound completely, adaptic, lambs wool between toes, 4x4 fluff, Kerlix applied.  Bilateral lower extremity edema 2+ present    Current Outpatient Prescriptions   Medication Sig Dispense Refill     potassium chloride SA (K-DUR/KLOR-CON M) 20 MEQ CR tablet Take 1 tablet (20 mEq) by mouth 2 times daily 120 tablet 1     spironolactone (ALDACTONE) 25 MG tablet Take 1 tablet (25 mg) by mouth daily 30 tablet 0     bumetanide (BUMEX) 2 MG tablet Take 1 tablet (2 mg) by mouth 2 times daily 180 tablet 3     amoxicillin-clavulanate (AUGMENTIN) 875-125 MG per tablet Take 1 tablet by mouth 2 times daily (Patient not taking: Reported on 9/20/2017) 28 tablet 3     metolazone (ZAROXOLYN) 2.5 MG tablet Please take on Saturday 9/2, Monday 9/4, and Wednesday 9/6. (Patient not taking: Reported on 9/20/2017) 30 tablet 1      warfarin (COUMADIN) 5 MG tablet Take 1 tablet (5 mg) by mouth daily 90 tablet 3     gabapentin (NEURONTIN) 100 MG capsule Take 1 capsule (100 mg) by mouth 2 times daily 180 capsule 1     traZODone (DESYREL) 50 MG tablet Take 0.5 tablets (25 mg) by mouth nightly as needed for sleep 45 tablet 2     pantoprazole (PROTONIX) 40 MG EC tablet Take 1 tablet (40 mg) by mouth every morning 90 tablet 1     Elastic Bandages & Supports (ACE BANDAGE SELF-ADHERING) MISC 1 each 2 times daily (Patient not taking: Reported on 9/20/2017) 6 each 3     Gauze Pads & Dressings (KERLIX GAUZE ROLL MEDIUM) MISC 1 each 2 times daily (Patient not taking: Reported on 9/20/2017) 48 each 3     venlafaxine (EFFEXOR-ER) 150 MG TB24 24 hr tablet Take 1 tablet (150 mg) by mouth daily (with breakfast) 90 each 1     ONE TOUCH ULTRA test strip USE AS DIRECTED TO TEST ONE TIME A  each 2     Wound Dressings (ADAPTIC NON-ADHERING DRESSING) PADS Externally apply 1 each topically 2 times daily 1 each 3     order for DME Sterile 4x4 gauze; Use gauze twice daily for dressing changes. (Patient not taking: Reported on 9/20/2017) 1 Box 3     atorvastatin (LIPITOR) 40 MG tablet Take 1 tablet (40 mg) by mouth daily 90 tablet 1     acetaminophen (TYLENOL) 325 MG tablet Take 3 tablets (975 mg) by mouth every 6 hours as needed for mild pain 100 tablet 0     aspirin 81 MG chewable tablet Take 1 tablet (81 mg) by mouth daily (Patient taking differently: Take 81 mg by mouth every morning ) 30 tablet 0     ferrous sulfate (IRON SUPPLEMENT) 325 (65 FE) MG tablet Take 1 tablet (325 mg) by mouth daily (with breakfast) (Patient taking differently: Take 325 mg by mouth 2 times daily ) 100 tablet 1     ONETOUCH DELICA LANCETS 33G MISC 1 Device daily 100 each 0     blood glucose monitoring (ONE TOUCH ULTRA 2) meter device kit              ASSESSMENT/PLAN:  #1 Dry gangrene, right foot great and second toe, secondary to microembolization or Heparin- Induce  Thrombocytopenia  #2 Status post Right Great Toe Amputation, Debridement of Toes 2 and 3, and application of Grafix with Dr. Santamaria on 5/26/2017  #3 Irrigation and debridement right foot, Grafix application, 7/7/2017    - continue right foot dressing changes BID with Adaptic, lambs wool between toes,  4 X 4's, and Kerlix    - okay for weight bearing    - follow up with Dr. Santamaria in 3-4 weeks to determine if additional Grafix should be applied     - continue coumadin     - continue ASA and Atorvastatin     - left foot wound care per Dr. Torres    Discussed with Dr. Mg above plan              Meena WHATLEY, CNS  Division of Vascular Surgery  [facility]  Pager [phone]

## 2017-10-03 NOTE — MR AVS SNAPSHOT
After Visit Summary   10/3/2017    Carlos Alberto Fenton    MRN: [id number]           Patient Information     Date Of Birth          1940        Visit Information        Provider Department      10/3/2017 11:30 AM Meena Cardoso APRN CNS [facility]        Care Instructions    Continue right foot dressing changes BID with Adaptic, 4 x 4 fluff, Kerlix and Ace wrap.    Left foot wound cares per Dr. Torres    Follow up with Dr. Santamaria in one month    With questions, concerns, or to request an appointment, please call either:    Edelmira Rodríguez, Care Coordinator RN, Vascular Surgery  [phone]    Vascular Call Center  [phone]    To contact someone after 5 pm, on a weekend, or on a Holiday, please call:  [facility]  [phone], option 4 to have a member of the Vascular Surgery Service paged.          Follow-ups after your visit        Follow-up notes from your care team     Return in about 1 month (around 11/3/2017).      Your next 10 appointments already scheduled     Oct 09, 2017  1:30 PM CDT   Lab with [facility] LAB   [facility] ([facility] [place])    [address] [place] [address]  1st [facility] [place] [address] [phone]            Oct 09, 2017  2:00 PM CDT   (Arrive by 1:45 PM)   CORE RETURN with CHACORTA Goldman CNP   [facility] ([facility] [place])    [address] [place] [address]  3[address] [facility] [place] [address] [phone]            Oct 11, 2017  1:00 PM CDT   (Arrive by 12:45 PM)   RETURN FOOT/ANKLE with Easton Torres DPM   [facility] ([facility] [place])    [address] [place] [address]  4[address] [facility] [place] [address] [phone]            Oct 13, 2017 10:00 AM CDT   (Arrive by 9:45 AM)   Return Visit with Star Lobato MD   [facility] ([facility] [place])    [address] [place] [address]  [address] [facility] [place] [address]    [phone]            Oct 23, 2017 12:45 PM CDT   (Arrive by 12:30 PM)   Return Vascular Visit with MD FAB Perez [facility] ([facility])    [address] [place] [address] [phone]            Oct 23, 2017  2:30 PM CDT   (Arrive by 2:15 PM)   RETURN ATRIAL FIBULATION VISIT with CHACORTA Joshi CNP   [facility] ([facility])    [address] [place] [address] [phone]              Who to contact     Please call your clinic at [phone] to:    Ask questions about your health    Make or cancel appointments    Discuss your medicines    Learn about your test results    Speak to your doctor   If you have compliments or concerns about an experience at your clinic, or if you wish to file a complaint, please contact [facility] Patient Relations at [phone] or email us at [email or web address]         Additional Information About Your Visit        [employer] Information     [employer] gives you secure access to your electronic health record. If you see a primary care provider, you can also send messages to your care team and make appointments. If you have questions, please call your primary care clinic.  If you do not have a primary care provider, please call [phone] and they will assist you.      [employer] is an electronic gateway that provides easy, online access to your medical records. With [employer], you can request a clinic appointment, read your test results, renew a prescription or communicate with your care team.     To access your existing account, please contact your [facility] or call [phone] for assistance.        Care EveryWhere ID     This is your Care EveryWhere ID. This could be used by other organizations to access your [place] medical records  [id number]         Your Vitals Were     Pulse Respirations Pulse Oximetry Breastfeeding?          69 19 95% No         Blood Pressure from Last 3 Encounters:   10/03/17 107/63   09/20/17 118/83   09/05/17 109/86    Weight from Last 3 Encounters:   09/20/17 171 lb 8 oz   09/05/17 172 lb 1.6 oz   09/01/17 189 lb              Today, you had the following     No orders found for display         Today's Medication Changes          These changes are accurate as of: 10/3/17 11:54 AM.  If you have any questions, ask your nurse or doctor.               These medicines have changed or have updated prescriptions.        Dose/Directions    aspirin 81 MG chewable tablet   This may have changed:  when to take this   Used for:  Coronary artery disease with angina pectoris, unspecified vessel or lesion type, unspecified whether native or transplanted heart (H)        Dose:  81 mg   Take 1 tablet (81 mg) by mouth daily   Quantity:  30 tablet   Refills:  0       ferrous sulfate 325 (65 FE) MG tablet   Commonly known as:  IRON SUPPLEMENT   This may have changed:  when to take this   Used for:  S/P CABG (coronary artery bypass graft)        Dose:  325 mg   Take 1 tablet (325 mg) by mouth daily (with breakfast)   Quantity:  100 tablet   Refills:  1                Primary Care Provider Office Phone # Fax #    Humberto Conner [phone]       [facility] [address] [place] [address]        Equal Access to Services     NATALIE HAYES : Hadii william flores hadasho Soomaali, waaxda luqadaha, qaybta kaalmada adeanilayada, scott mattson. So [facility] [phone].    ATENCIÓN: Si habla español, tiene a espinoza disposición servicios gratuitos de asistencia lingüística. Zaid al [phone].    We comply with applicable federal civil rights laws and [place] laws. We do not discriminate on the basis of race, color, national origin, age, disability, sex, sexual orientation, or gender identity.            Thank you!      Thank you for choosing [facility]  for your care. Our goal is always to provide you with excellent care. Hearing back from our patients is one way we can continue to improve our services. Please take a few minutes to complete the written survey that you may receive in the mail after your visit with us. Thank you!             Your Updated Medication List - Protect others around you: Learn how to safely use, store and throw away your medicines at www.disposemymeds.org.          This list is accurate as of: 10/3/17 11:54 AM.  Always use your most recent med list.                   Brand Name Dispense Instructions for use Diagnosis    ACE BANDAGE SELF-ADHERING Misc     6 each    1 each 2 times daily    Open wound of lower limb, right, subsequent encounter       acetaminophen 325 MG tablet    TYLENOL    100 tablet    Take 3 tablets (975 mg) by mouth every 6 hours as needed for mild pain    S/P CABG (coronary artery bypass graft)       ADAPTIC NON-ADHERING DRESSING Pads     1 each    Externally apply 1 each topically 2 times daily    Open wound of lower limb, right, subsequent encounter       amoxicillin-clavulanate 875-125 MG per tablet    AUGMENTIN    28 tablet    Take 1 tablet by mouth 2 times daily    Toe gangrene (H)       aspirin 81 MG chewable tablet     30 tablet    Take 1 tablet (81 mg) by mouth daily    Coronary artery disease with angina pectoris, unspecified vessel or lesion type, unspecified whether native or transplanted heart (H)       atorvastatin 40 MG tablet    LIPITOR    90 tablet    Take 1 tablet (40 mg) by mouth daily    Coronary artery disease with angina pectoris, unspecified vessel or lesion type, unspecified whether native or transplanted heart (H)       blood glucose monitoring meter device kit           bumetanide 2 MG tablet    BUMEX    180 tablet    Take 1 tablet (2 mg) by mouth 2 times daily    Cardiomyopathy, unspecified       ferrous sulfate 325 (65 FE) MG tablet    IRON  SUPPLEMENT    100 tablet    Take 1 tablet (325 mg) by mouth daily (with breakfast)    S/P CABG (coronary artery bypass graft)       gabapentin 100 MG capsule    NEURONTIN    180 capsule    Take 1 capsule (100 mg) by mouth 2 times daily    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       KERLIX GAUZE ROLL MEDIUM Misc     48 each    1 each 2 times daily    Open wound of lower limb, right, subsequent encounter       metolazone 2.5 MG tablet    ZAROXOLYN    30 tablet    Please take on Saturday 9/2, Monday 9/4, and Wednesday 9/6.    Cardiomyopathy, unspecified       ONE TOUCH ULTRA test strip   Generic drug:  blood glucose monitoring     100 each    USE AS DIRECTED TO TEST ONE TIME A DAY    Type 2 diabetes mellitus without complication, without long-term current use of insulin (H)       ONETOUCH DELICA LANCETS 33G Misc     100 each    1 Device daily    Type 2 diabetes mellitus without complication, without long-term current use of insulin (H)       order for DME     1 Box    Sterile 4x4 gauze; Use gauze twice daily for dressing changes.    Open wound of lower limb, right, subsequent encounter       pantoprazole 40 MG EC tablet    PROTONIX    90 tablet    Take 1 tablet (40 mg) by mouth every morning    Coronary artery disease with angina pectoris, unspecified vessel or lesion type, unspecified whether native or transplanted heart (H)       potassium chloride SA 20 MEQ CR tablet    K-DUR/KLOR-CON M    120 tablet    Take 1 tablet (20 mEq) by mouth 2 times daily    S/P CABG (coronary artery bypass graft)       spironolactone 25 MG tablet    ALDACTONE    30 tablet    Take 1 tablet (25 mg) by mouth daily    Chronic systolic heart failure (H)       traZODone 50 MG tablet    DESYREL    45 tablet    Take 0.5 tablets (25 mg) by mouth nightly as needed for sleep    Primary insomnia       venlafaxine 150 MG Tb24 24 hr tablet    EFFEXOR-ER    90 each    Take 1 tablet (150 mg) by mouth daily (with breakfast)    Adjustment disorder with  depressed mood       warfarin 5 MG tablet    COUMADIN    90 tablet    Take 1 tablet (5 mg) by mouth daily    Paroxysmal atrial fibrillation (H)

## 2017-10-03 NOTE — PROGRESS NOTES
ANTICOAGULATION FOLLOW-UP CLINIC VISIT    Patient Name:  Carlos Alberto Fenton  Date:  10/3/2017  Contact Type:  Telephone    SUBJECTIVE:     Patient Findings     Positives No Problem Findings    Comments Spoke to patient.  Had Carlos Alberto repeat back recommendations for Coumadin today, tomorrow and Thursday.  Had patient repeat next INR appointment on Friday.           OBJECTIVE    INR   Date Value Ref Range Status   10/03/2017 2.05 (H) 0.86 - 1.14 Final     Chromogenic Factor 10   Date Value Ref Range Status   03/02/2017 65 (L) 70 - 130 % Final     Comment:     Therapeutic Range:  A Chromogenic Factor 10 level of approximately 20-40%   inversely correlates with an INR of 2-3 for patients receiving Warfarin.   Chromogenic Factor 10 levels below 20% indicate an INR greater than 3 and   levels above 40% indicate an INR less than 2.       Factor 2 Assay   Date Value Ref Range Status   02/27/2017 37 (L) 60 - 140 % Final       ASSESSMENT / PLAN  INR assessment THER    Recheck INR In: 3 DAYS    INR Location Clinic      Anticoagulation Summary as of 10/3/2017     INR goal 2.0-3.0   Today's INR 2.05   Maintenance plan No maintenance plan   Full instructions 10/3: 5 mg; 10/4: 2.5 mg; 10/5: 2.5 mg   Plan last modified Yani Delgado, RN (9/1/2017)   Next INR check 10/6/2017   Priority INR   Target end date Indefinite    Indications   Long-term (current) use of anticoagulants [Z79.01] [Z79.01]  New onset atrial fibrillation (H) (Resolved) [I48.91]  Atrial fibrillation (H) [I48.91] [I48.91]         Anticoagulation Episode Summary     INR check location     Preferred lab     Send INR reminders to [facility]    Comments Prounounced A-seenath  Speak with patients  Darrin in addition to patient.  Hx of 2 strokes.  Pt is confused. Please speak in tablets only (5mg tablets)/Send [employer] message      Anticoagulation Care Providers     Provider Role Specialty Phone number    Star Lobato MD Responsible Cardiology  [phone]            See the Encounter Report to view Anticoagulation Flowsheet and Dosing Calendar (Go to Encounters tab in chart review, and find the Anticoagulation Therapy Visit)    Spoke with patient. Had Carlos Alberto repeat instructions and recommendations back to writer.  She stated she is unable to access [employer].  No changes in health, medication, or diet. No missed doses, no falls. No signs or symptoms of bleed or clotting.    Edy Molina, RN

## 2017-10-04 RX ORDER — TRAZODONE HYDROCHLORIDE 50 MG/1
25 TABLET, FILM COATED ORAL
Qty: 45 TABLET | Refills: 2 | Status: ON HOLD | OUTPATIENT
Start: 2017-10-04 | End: 2018-04-04

## 2017-10-04 RX ORDER — PANTOPRAZOLE SODIUM 40 MG/1
40 TABLET, DELAYED RELEASE ORAL EVERY MORNING
Qty: 90 TABLET | Refills: 1 | Status: ON HOLD | OUTPATIENT
Start: 2017-10-04 | End: 2018-04-04

## 2017-10-04 RX ORDER — GABAPENTIN 100 MG/1
100 CAPSULE ORAL 2 TIMES DAILY
Qty: 180 CAPSULE | Refills: 1 | Status: SHIPPED | OUTPATIENT
Start: 2017-10-04 | End: 2018-03-15

## 2017-10-04 RX ORDER — ATORVASTATIN CALCIUM 40 MG/1
40 TABLET, FILM COATED ORAL DAILY
Qty: 90 TABLET | Refills: 1 | Status: ON HOLD | OUTPATIENT
Start: 2017-10-04 | End: 2018-04-04

## 2017-10-04 NOTE — TELEPHONE ENCOUNTER
Atorvastatin     Last Written Prescription Date: 6/28/2017  Last Fill Quantity: 90, # refills: 1  Last Office Visit with [facility] prescribing provider: 7/28/2017       Lab Results   Component Value Date    CHOL 273 01/20/2017     Lab Results   Component Value Date    HDL 58 01/20/2017     Lab Results   Component Value Date     01/20/2017     Lab Results   Component Value Date    TRIG 236 01/20/2017     No results found for: CHOLHDLRATIO    Gabapentin      Last Written Prescription Date:  8/21/2017  Last Fill Quantity: 180,   # refills: 1  Last Office Visit with [facility] prescribing provider: 7/28/2017  Future Office visit:       Routing refill request to provider for review/approval because:  Drug not on the [facility] refill protocol or controlled substance    Trazodone       Last Written Prescription Date: 8/21/2017  Last Fill Quantity: 45; # refills: 2  Last Office Visit with [facility] prescribing provider:  7/28/2017        Last PHQ-9 score on record= No flowsheet data found.    Lab Results   Component Value Date    AST 36 04/17/2017     Lab Results   Component Value Date    ALT 28 04/17/2017     Pantoprazole      Last Written Prescription Date: 8/21/2017  Last Fill Quantity: 90,  # refills: 1   Last Office Visit with [facility] prescribing provider: 7/28/2017

## 2017-10-04 NOTE — TELEPHONE ENCOUNTER
Message from [employer]:  Original authorizing provider: Humberto Conner MD    Carlos Alberto BISHOPFernie Fenton would like a refill of the following medications:  atorvastatin (LIPITOR) 40 MG tablet [Humberto Conner MD]  gabapentin (NEURONTIN) 100 MG capsule [Humberto Conner MD]  traZODone (DESYREL) 50 MG tablet [Humberto Conner MD]  pantoprazole (PROTONIX) 40 MG EC tablet [Humberto Conner MD]    Preferred pharmacy: [facility] - [place], [place] - [address] [place] [address] [facility]    Comment:      Medication renewals requested in this message routed to other providers:  bumetanide (BUMEX) 2 MG tablet [Star Lobato MD]  spironolactone (ALDACTONE) 25 MG tablet [CHACORTA Quinteros CNP]

## 2017-10-05 DIAGNOSIS — I50.22 CHRONIC SYSTOLIC HEART FAILURE (H): ICD-10-CM

## 2017-10-05 RX ORDER — BUMETANIDE 2 MG/1
2 TABLET ORAL 2 TIMES DAILY
Qty: 360 TABLET | Refills: 3 | Status: SHIPPED | OUTPATIENT
Start: 2017-10-05 | End: 2017-10-13

## 2017-10-05 RX ORDER — SPIRONOLACTONE 25 MG/1
25 TABLET ORAL DAILY
Qty: 90 TABLET | Refills: 3 | Status: SHIPPED | OUTPATIENT
Start: 2017-10-05 | End: 2017-10-24

## 2017-10-06 ENCOUNTER — ANTICOAGULATION THERAPY VISIT (OUTPATIENT)
Dept: ANTICOAGULATION | Facility: CLINIC | Age: 77
End: 2017-10-06

## 2017-10-06 DIAGNOSIS — Z79.01 LONG-TERM (CURRENT) USE OF ANTICOAGULANTS: ICD-10-CM

## 2017-10-06 DIAGNOSIS — I48.91 ATRIAL FIBRILLATION, UNSPECIFIED TYPE (H): ICD-10-CM

## 2017-10-06 DIAGNOSIS — I48.0 PAROXYSMAL ATRIAL FIBRILLATION (H): ICD-10-CM

## 2017-10-06 LAB — INR PPP: 3.5 (ref 0.86–1.14)

## 2017-10-06 PROCEDURE — 36416 COLLJ CAPILLARY BLOOD SPEC: CPT | Performed by: INTERNAL MEDICINE

## 2017-10-06 PROCEDURE — 85610 PROTHROMBIN TIME: CPT | Performed by: INTERNAL MEDICINE

## 2017-10-06 NOTE — PROGRESS NOTES
ANTICOAGULATION FOLLOW-UP CLINIC VISIT    Patient Name:  Carlos Alberto Fenton  Date:  10/6/2017  Contact Type:  Telephone    SUBJECTIVE:        OBJECTIVE    INR   Date Value Ref Range Status   10/06/2017 3.50 (H) 0.86 - 1.14 Final     Comment:     This test is intended for monitoring Coumadin therapy.  Results are not   accurate in patients with prolonged INR due to factor deficiency.       Chromogenic Factor 10   Date Value Ref Range Status   03/02/2017 65 (L) 70 - 130 % Final     Comment:     Therapeutic Range:  A Chromogenic Factor 10 level of approximately 20-40%   inversely correlates with an INR of 2-3 for patients receiving Warfarin.   Chromogenic Factor 10 levels below 20% indicate an INR greater than 3 and   levels above 40% indicate an INR less than 2.       Factor 2 Assay   Date Value Ref Range Status   02/27/2017 37 (L) 60 - 140 % Final       ASSESSMENT / PLAN  INR assessment SUPRA    Recheck INR In: 1 WEEK    INR Location Clinic      Anticoagulation Summary as of 10/6/2017     INR goal 2.0-3.0   Today's INR 3.50!   Maintenance plan No maintenance plan   Full instructions 10/6: 2.5 mg; 10/7: 2.5 mg; 10/8: 2.5 mg; 10/9: 2.5 mg; 10/10: 2.5 mg; 10/11: 2.5 mg; 10/12: 2.5 mg   Plan last modified Yani Deglado RN (9/1/2017)   Next INR check 10/13/2017   Priority INR   Target end date Indefinite    Indications   Long-term (current) use of anticoagulants [Z79.01] [Z79.01]  New onset atrial fibrillation (H) (Resolved) [I48.91]  Atrial fibrillation (H) [I48.91] [I48.91]         Anticoagulation Episode Summary     INR check location     Preferred lab     Send INR reminders to [facility]    Comments Prounounced A-seenath  Speak with patients  Darrin in addition to patient.  Hx of 2 strokes.  Pt is confused. Please speak in tablets only (5mg tablets)/Send MyChart message      Anticoagulation Care Providers     Provider Role Specialty Phone number    Star Lobato MD Responsible Cardiology  [phone]            See the Encounter Report to view Anticoagulation Flowsheet and Dosing Calendar (Go to Encounters tab in chart review, and find the Anticoagulation Therapy Visit)    Left message for patient with results and dosing recommendations. Asked patient to call back to report any missed doses, falls, signs and symptoms of bleeding or clotting, any changes in health, medication, or diet. Asked patient to call back with any questions or concerns.     Amairani Henson RN

## 2017-10-06 NOTE — MR AVS SNAPSHOT
Carlos Alberto Fenton   10/6/2017   Anticoagulation Therapy Visit    Description:  77 year old female   Provider:  Amairani Henson, RN   Department:  [facility]           INR as of 10/6/2017     Today's INR 3.50!      Anticoagulation Summary as of 10/6/2017     INR goal 2.0-3.0   Today's INR 3.50!   Full instructions 10/6: 2.5 mg; 10/7: 2.5 mg; 10/8: 2.5 mg; 10/9: 2.5 mg; 10/10: 2.5 mg; 10/11: 2.5 mg; 10/12: 2.5 mg   Next INR check 10/13/2017    Indications   Long-term (current) use of anticoagulants [Z79.01] [Z79.01]  New onset atrial fibrillation (H) (Resolved) [I48.91]  Atrial fibrillation (H) [I48.91] [I48.91]         October 2017 Details    Sun Mon Tue Wed Thu Fri Sat     1               2               3               4               5               6      2.5 mg   See details      7      2.5 mg           8      2.5 mg         9      2.5 mg         10      2.5 mg         11      2.5 mg         12      2.5 mg         13            14                 15               16               17               18               19               20               21                 22               23               24               25               26               27               28                 29               30               31                    Date Details   10/06 This INR check       Date of next INR:  10/13/2017         How to take your warfarin dose     To take:  2.5 mg Take 0.5 of a 5 mg tablet.

## 2017-10-09 ENCOUNTER — OFFICE VISIT (OUTPATIENT)
Dept: CARDIOLOGY | Facility: CLINIC | Age: 77
End: 2017-10-09
Attending: NURSE PRACTITIONER
Payer: MEDICARE

## 2017-10-09 ENCOUNTER — ANTICOAGULATION THERAPY VISIT (OUTPATIENT)
Dept: ANTICOAGULATION | Facility: CLINIC | Age: 77
End: 2017-10-09

## 2017-10-09 VITALS
HEIGHT: 62 IN | SYSTOLIC BLOOD PRESSURE: 116 MMHG | OXYGEN SATURATION: 95 % | HEART RATE: 62 BPM | DIASTOLIC BLOOD PRESSURE: 67 MMHG | BODY MASS INDEX: 31.65 KG/M2 | WEIGHT: 172 LBS

## 2017-10-09 DIAGNOSIS — R06.02 SHORTNESS OF BREATH: ICD-10-CM

## 2017-10-09 DIAGNOSIS — I50.33 ACUTE ON CHRONIC DIASTOLIC HEART FAILURE (H): Primary | ICD-10-CM

## 2017-10-09 DIAGNOSIS — Z79.01 LONG-TERM (CURRENT) USE OF ANTICOAGULANTS: ICD-10-CM

## 2017-10-09 DIAGNOSIS — R53.83 FATIGUE, UNSPECIFIED TYPE: ICD-10-CM

## 2017-10-09 DIAGNOSIS — I50.42 CHRONIC COMBINED SYSTOLIC AND DIASTOLIC HRT FAIL (H): ICD-10-CM

## 2017-10-09 DIAGNOSIS — E61.1 IRON DEFICIENCY: ICD-10-CM

## 2017-10-09 DIAGNOSIS — I25.10 CORONARY ARTERY DISEASE INVOLVING NATIVE CORONARY ARTERY OF NATIVE HEART WITHOUT ANGINA PECTORIS: ICD-10-CM

## 2017-10-09 DIAGNOSIS — I48.91 ATRIAL FIBRILLATION (H): ICD-10-CM

## 2017-10-09 DIAGNOSIS — I48.91 ATRIAL FIBRILLATION, UNSPECIFIED TYPE (H): ICD-10-CM

## 2017-10-09 DIAGNOSIS — I48.0 PAROXYSMAL ATRIAL FIBRILLATION (H): ICD-10-CM

## 2017-10-09 DIAGNOSIS — Z95.1 S/P CABG (CORONARY ARTERY BYPASS GRAFT): ICD-10-CM

## 2017-10-09 LAB
ANION GAP SERPL CALCULATED.3IONS-SCNC: 4 MMOL/L (ref 3–14)
BUN SERPL-MCNC: 26 MG/DL (ref 7–30)
CALCIUM SERPL-MCNC: 9.1 MG/DL (ref 8.5–10.1)
CHLORIDE SERPL-SCNC: 95 MMOL/L (ref 94–109)
CO2 SERPL-SCNC: 34 MMOL/L (ref 20–32)
CREAT SERPL-MCNC: 0.93 MG/DL (ref 0.52–1.04)
FERRITIN SERPL-MCNC: 107 NG/ML (ref 8–252)
GFR SERPL CREATININE-BSD FRML MDRD: 59 ML/MIN/1.7M2
GLUCOSE SERPL-MCNC: 145 MG/DL (ref 70–99)
INR PPP: 3.27 (ref 0.86–1.14)
IRON SATN MFR SERPL: 21 % (ref 15–46)
IRON SERPL-MCNC: 92 UG/DL (ref 35–180)
POTASSIUM SERPL-SCNC: 5.1 MMOL/L (ref 3.4–5.3)
SODIUM SERPL-SCNC: 134 MMOL/L (ref 133–144)
TIBC SERPL-MCNC: 431 UG/DL (ref 240–430)

## 2017-10-09 PROCEDURE — 80048 BASIC METABOLIC PNL TOTAL CA: CPT | Performed by: INTERNAL MEDICINE

## 2017-10-09 PROCEDURE — 99214 OFFICE O/P EST MOD 30 MIN: CPT | Mod: ZP | Performed by: NURSE PRACTITIONER

## 2017-10-09 PROCEDURE — 83550 IRON BINDING TEST: CPT | Performed by: INTERNAL MEDICINE

## 2017-10-09 PROCEDURE — 82728 ASSAY OF FERRITIN: CPT | Performed by: INTERNAL MEDICINE

## 2017-10-09 PROCEDURE — 82728 ASSAY OF FERRITIN: CPT | Performed by: NURSE PRACTITIONER

## 2017-10-09 PROCEDURE — 83540 ASSAY OF IRON: CPT | Performed by: INTERNAL MEDICINE

## 2017-10-09 PROCEDURE — 85610 PROTHROMBIN TIME: CPT | Performed by: INTERNAL MEDICINE

## 2017-10-09 PROCEDURE — 36415 COLL VENOUS BLD VENIPUNCTURE: CPT | Performed by: INTERNAL MEDICINE

## 2017-10-09 PROCEDURE — 99213 OFFICE O/P EST LOW 20 MIN: CPT | Mod: ZF

## 2017-10-09 ASSESSMENT — PAIN SCALES - GENERAL: PAINLEVEL: NO PAIN (0)

## 2017-10-09 NOTE — PROGRESS NOTES
ANTICOAGULATION FOLLOW-UP CLINIC VISIT    Patient Name:  Carlos Alberto Fenton  Date:  10/9/2017  Contact Type:  Telephone    SUBJECTIVE:     Patient Findings     Positives Change in medications (Now taking bumex bid )           OBJECTIVE    INR   Date Value Ref Range Status   10/09/2017 3.27 (H) 0.86 - 1.14 Final     Chromogenic Factor 10   Date Value Ref Range Status   03/02/2017 65 (L) 70 - 130 % Final     Comment:     Therapeutic Range:  A Chromogenic Factor 10 level of approximately 20-40%   inversely correlates with an INR of 2-3 for patients receiving Warfarin.   Chromogenic Factor 10 levels below 20% indicate an INR greater than 3 and   levels above 40% indicate an INR less than 2.       Factor 2 Assay   Date Value Ref Range Status   02/27/2017 37 (L) 60 - 140 % Final       ASSESSMENT / PLAN  INR assessment SUPRA    Recheck INR In: 1 WEEK    INR Location Clinic      Anticoagulation Summary as of 10/9/2017     INR goal 2.0-3.0   Today's INR 3.27!   Maintenance plan No maintenance plan   Full instructions 10/9: 2.5 mg; 10/10: 2.5 mg; 10/11: 2.5 mg; 10/12: 2.5 mg   Plan last modified Yani Delgado, RN (9/1/2017)   Next INR check 10/16/2017   Priority INR   Target end date Indefinite    Indications   Long-term (current) use of anticoagulants [Z79.01] [Z79.01]  New onset atrial fibrillation (H) (Resolved) [I48.91]  Atrial fibrillation (H) [I48.91] [I48.91]         Anticoagulation Episode Summary     INR check location     Preferred lab     Send INR reminders to [facility]    Comments Prounounced A-seenath  Speak with patients  Darrin in addition to patient.  Hx of 2 strokes.  Pt is confused. Please speak in tablets only (5mg tablets)/Send MyChart message      Anticoagulation Care Providers     Provider Role Specialty Phone number    Star Lobtao MD Responsible Cardiology [phone]            See the Encounter Report to view Anticoagulation Flowsheet and Dosing Calendar (Go to Encounters tab  in chart review, and find the Anticoagulation Therapy Visit)  Spoke with patient, and sent a MY CHART message to patient.  I did change the next INR result from 10/23 to 10/16 from the MY CHART message , as patient was started on Bumex bid.    Ester Meraz RN

## 2017-10-09 NOTE — MR AVS SNAPSHOT
After Visit Summary   10/9/2017    Carlos Alberto Fenton    MRN: [id number]           Patient Information     Date Of Birth          1940        Visit Information        Provider Department      10/9/2017 2:00 PM Tamela Weinberg, APRN CNP [facility]        Today's Diagnoses     Shortness of breath    -  1    Fatigue, unspecified type        Iron deficiency        Acute on chronic diastolic heart failure (H)          Care Instructions    You were seen today in the Cardiovascular Clinic at the [facility].     Cardiology Providers you saw during your visit: Mackenzie WHATLEY CNP       1. Take one pill of metolazone this afternoon. Start taking Bumex (water pill) at breakfast then around 2pm instead of 11am. Please get labs on Wednesday (blood counts and repeat kidney function). Continue to take your weight every day - if you are at 170 lb, take one pill of metolazone then call us to let us know.  2. We are checking iron and blood counts to see if there is another reason you are feeling fatigued   3. Please make a follow-up CORE/heart failure appt in 3 weeks with labs prior. Dr. Lobato will see you in six weeks.   4. Lab work to be drawn on Wednesday 10/11.     Results for CARLOS ALBERTO FENTON (MRN [id number]) as of 10/9/2017 13:33   Ref. Range 10/9/2017 13:03   Sodium Latest Ref Range: 133 - 144 mmol/L 134   Potassium Latest Ref Range: 3.4 - 5.3 mmol/L 5.1   Chloride Latest Ref Range: 94 - 109 mmol/L 95   Carbon Dioxide Latest Ref Range: 20 - 32 mmol/L 34 (H)   Urea Nitrogen Latest Ref Range: 7 - 30 mg/dL 26   Creatinine Latest Ref Range: 0.52 - 1.04 mg/dL 0.93   GFR Estimate Latest Ref Range: >60 mL/min/1.7m2 59 (L)   GFR Estimate If Black Latest Ref Range: >60 mL/min/1.7m2 71   Calcium Latest Ref Range: 8.5 - 10.1 mg/dL 9.1   Anion Gap Latest Ref Range: 3 - 14 mmol/L 4   Glucose Latest Ref Range: 70 - 99 mg/dL 145 (H)         Please limit your fluid intake to 2 L (64 ounces) daily.  2  "Liters a day = 8.5 cups, or 72 ounces.  Please limit your salt intake to 2 grams a day or less.    If you gain 2# in 24 hours or 5# in one week call Maren Lucas RN so we can adjust your medications as needed over the phone.    Please feel free to call me with any questions or concerns.      REESE SylvesterN   [facility]  Cardiology Care Coordinator-Heart Failure Clinic    Questions and [phone].   First press #1 for the [employer] and then press #3 for \"Medical Questions\" to reach us Cardiology Nurses.     On Call Cardiologist for after hours or on weekends: [phone]   option #4 and ask to speak to the on-call Cardiologist. Inform them you are a CORE/heart failure patient at the [facility].        If you need a medication refill please contact your pharmacy.  Please allow 3 business days for your refill to be completed.  _______________________________________________________  C.O.R.E. CLINIC Cardiomyopathy, Optimization, Rehabilitation, Education   The C.O.R.E. CLINIC is a heart failure specialty clinic within the [facility] where you will work with specialized nurse practitioners dedicated to helping patients with heart failure carefully adjust medications, receive education, and learn who and when to call if symptoms develop. They specialize in helping you better understand your condition, slow the progression of your disease, improve the length and quality of your life, help you detect future heart problems before they become life threatening, and avoid hospitalizations.  As always, thank you for trusting us with your health care needs!                  Follow-ups after your visit        Follow-up notes from your care team     Return in about 3 weeks (around 10/30/2017).      Your next 10 appointments already scheduled     Oct 11, 2017  1:00 PM CDT   (Arrive by 12:45 PM)   RETURN FOOT/ANKLE with Easton Torres DPM   [facility]    [address]  4th Floor  [facility] [address]   [phone]            Oct 23, 2017 12:45 PM CDT   (Arrive by 12:30 PM)   Return Vascular Visit with Leah Santamaria MD   [facility] ([facility])    [address] [place] [address]  [address] [facility] [address]   [phone]            Oct 23, 2017  2:30 PM CDT   (Arrive by 2:15 PM)   RETURN ATRIAL FIBULATION VISIT with CHACORTA Joshi CNP   [facility] ([facility])    [address] [place] [address]  [address] [facility] [address] [phone]            Nov 06, 2017  1:30 PM CST   Lab with  [facility]    [facility] ([facility])    [address] [place] [address]  [address] [facility] [address] [phone]            Nov 06, 2017  2:00 PM CST   (Arrive by 1:45 PM)   CORE RETURN with CHACORTA Goldman CNP   [facility] ([facility])    [address] [place] [address]  [address] [facility] [id number] [phone]            Nov 24, 2017  1:30 PM CST   (Arrive by 1:15 PM)   Return Visit with Star Lobato MD   [facility] ([facility])    [address] [place] [id number] [phone]              Future tests that were ordered for you today     Open Future Orders        Priority Expected Expires Ordered    CBC with platelets Routine 10/11/2017 10/9/2018 10/9/2017    Basic metabolic panel Routine 10/11/2017 10/9/2018 10/9/2017            Who to contact     If you have questions or need follow up information about today's clinic visit or your schedule please contact [facility] directly at [phone].  Normal or non-critical lab and imaging results will be communicated to you by MyChart, letter or phone within 4 business days after the clinic has received the results. If you do not  "hear from us within 7 days, please contact the clinic through [employer] or phone. If you have a critical or abnormal lab result, we will notify you by phone as soon as possible.  Submit refill requests through [employer] or call your pharmacy and they will forward the refill request to us. Please allow 3 business days for your refill to be completed.          Additional Information About Your Visit        [employer] Information     [employer] gives you secure access to your electronic health record. If you see a primary care provider, you can also send messages to your care team and make appointments. If you have questions, please call your primary care clinic.  If you do not have a primary care provider, please call [phone] and they will assist you.        Care EveryWhere ID     This is your Care EveryWhere ID. This could be used by other organizations to access your [place] medical records  [id number]        Your Vitals Were     Pulse Height Pulse Oximetry BMI (Body Mass Index)          62 1.575 m (5' 2\") 95% 31.46 kg/m2         Blood Pressure from Last 3 Encounters:   10/09/17 116/67   10/03/17 107/63   09/20/17 118/83    Weight from Last 3 Encounters:   10/09/17 78 kg (172 lb)   09/20/17 77.8 kg (171 lb 8 oz)   09/05/17 78.1 kg (172 lb 1.6 oz)              We Performed the Following     Ferritin     Iron and iron binding capacity          Today's Medication Changes          These changes are accurate as of: 10/9/17  2:50 PM.  If you have any questions, ask your nurse or doctor.               These medicines have changed or have updated prescriptions.        Dose/Directions    aspirin 81 MG chewable tablet   This may have changed:  when to take this   Used for:  Coronary artery disease with angina pectoris, unspecified vessel or lesion type, unspecified whether native or transplanted heart (H)        Dose:  81 mg   Take 1 tablet (81 mg) by mouth daily   Quantity:  30 tablet   Refills:  0       ferrous sulfate 325 " (65 FE) MG tablet   Commonly known as:  IRON SUPPLEMENT   This may have changed:  when to take this   Used for:  S/P CABG (coronary artery bypass graft)        Dose:  325 mg   Take 1 tablet (325 mg) by mouth daily (with breakfast)   Quantity:  100 tablet   Refills:  1                Primary Care Provider Office Phone # Fax #    Humberto Conner [phone]       [facility] [address] [place] [address]        Equal Access to Services     NATALIE HAYES AH: Hadii aad ku hadasho Soomaali, waaxda luqadaha, qaybta kaalmada adeegyada, waxay idiin hayaan adeeg kharash la'luisn . So [facility] [phone].    ATENCIÓN: Si habla español, tiene a espinoza disposición servicios gratuitos de asistencia lingüística. [facility] [phone].    We comply with applicable federal civil rights laws and [place] laws. We do not discriminate on the basis of race, color, national origin, age, disability, sex, sexual orientation, or gender identity.            Thank you!     Thank you for choosing [facility]  for your care. Our goal is always to provide you with excellent care. Hearing back from our patients is one way we can continue to improve our services. Please take a few minutes to complete the written survey that you may receive in the mail after your visit with us. Thank you!             Your Updated Medication List - Protect others around you: Learn how to safely use, store and throw away your medicines at www.disposemymeds.org.          This list is accurate as of: 10/9/17  2:50 PM.  Always use your most recent med list.                   Brand Name Dispense Instructions for use Diagnosis    ACE BANDAGE SELF-ADHERING Misc     6 each    1 each 2 times daily    Open wound of lower limb, right, subsequent encounter       acetaminophen 325 MG tablet    TYLENOL    100 tablet    Take 3 tablets (975 mg) by mouth every 6 hours as needed for mild pain    S/P CABG (coronary artery bypass graft)        ADAPTIC NON-ADHERING DRESSING Pads     1 each    Externally apply 1 each topically 2 times daily    Open wound of lower limb, right, subsequent encounter       amoxicillin-clavulanate 875-125 MG per tablet    AUGMENTIN    28 tablet    Take 1 tablet by mouth 2 times daily    Toe gangrene (H)       aspirin 81 MG chewable tablet     30 tablet    Take 1 tablet (81 mg) by mouth daily    Coronary artery disease with angina pectoris, unspecified vessel or lesion type, unspecified whether native or transplanted heart (H)       atorvastatin 40 MG tablet    LIPITOR    90 tablet    Take 1 tablet (40 mg) by mouth daily    Coronary artery disease with angina pectoris, unspecified vessel or lesion type, unspecified whether native or transplanted heart (H)       blood glucose monitoring meter device kit           bumetanide 2 MG tablet    BUMEX    360 tablet    Take 1 tablet (2 mg) by mouth 2 times daily    Chronic systolic heart failure (H)       ferrous sulfate 325 (65 FE) MG tablet    IRON SUPPLEMENT    100 tablet    Take 1 tablet (325 mg) by mouth daily (with breakfast)    S/P CABG (coronary artery bypass graft)       gabapentin 100 MG capsule    NEURONTIN    180 capsule    Take 1 capsule (100 mg) by mouth 2 times daily    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       KERLIX GAUZE ROLL MEDIUM Misc     48 each    1 each 2 times daily    Open wound of lower limb, right, subsequent encounter       metolazone 2.5 MG tablet    ZAROXOLYN    30 tablet    Please take on Saturday 9/2, Monday 9/4, and Wednesday 9/6.    Cardiomyopathy, unspecified       ONE TOUCH ULTRA test strip   Generic drug:  blood glucose monitoring     100 each    USE AS DIRECTED TO TEST ONE TIME A DAY    Type 2 diabetes mellitus without complication, without long-term current use of insulin (H)       ONETOUCH DELICA LANCETS 33G Misc     100 each    1 Device daily    Type 2 diabetes mellitus without complication, without long-term current use of insulin (H)        order for DME     1 Box    Sterile 4x4 gauze; Use gauze twice daily for dressing changes.    Open wound of lower limb, right, subsequent encounter       pantoprazole 40 MG EC tablet    PROTONIX    90 tablet    Take 1 tablet (40 mg) by mouth every morning    Coronary artery disease with angina pectoris, unspecified vessel or lesion type, unspecified whether native or transplanted heart (H)       potassium chloride SA 20 MEQ CR tablet    K-DUR/KLOR-CON M    120 tablet    Take 1 tablet (20 mEq) by mouth 2 times daily    S/P CABG (coronary artery bypass graft)       spironolactone 25 MG tablet    ALDACTONE    90 tablet    Take 1 tablet (25 mg) by mouth daily    Chronic systolic heart failure (H)       traZODone 50 MG tablet    DESYREL    45 tablet    Take 0.5 tablets (25 mg) by mouth nightly as needed for sleep    Primary insomnia       venlafaxine 150 MG Tb24 24 hr tablet    EFFEXOR-ER    90 each    Take 1 tablet (150 mg) by mouth daily (with breakfast)    Adjustment disorder with depressed mood       warfarin 5 MG tablet    COUMADIN    90 tablet    Take 1 tablet (5 mg) by mouth daily    Paroxysmal atrial fibrillation (H)

## 2017-10-09 NOTE — PATIENT INSTRUCTIONS
You were seen today in the Cardiovascular Clinic at the [facility].     Cardiology Providers you saw during your visit: Mackenzie WHATLEY CNP       1. Take one pill of metolazone this afternoon. Start taking Bumex (water pill) at breakfast then around 2pm instead of 11am. Please get labs on Wednesday (blood counts and repeat kidney function). Continue to take your weight every day - if you are at 170 lb, take one pill of metolazone then call us to let us know.  2. We are checking iron and blood counts to see if there is another reason you are feeling fatigued   3. Please make a follow-up CORE/heart failure appt in 3 weeks with labs prior. Dr. Lobato will see you in six weeks.   4. Lab work to be drawn on Wednesday 10/11.     Results for MENDOZA GREENBERG (MRN [id number]) as of 10/9/2017 13:33   Ref. Range 10/9/2017 13:03   Sodium Latest Ref Range: 133 - 144 mmol/L 134   Potassium Latest Ref Range: 3.4 - 5.3 mmol/L 5.1   Chloride Latest Ref Range: 94 - 109 mmol/L 95   Carbon Dioxide Latest Ref Range: 20 - 32 mmol/L 34 (H)   Urea Nitrogen Latest Ref Range: 7 - 30 mg/dL 26   Creatinine Latest Ref Range: 0.52 - 1.04 mg/dL 0.93   GFR Estimate Latest Ref Range: >60 mL/min/1.7m2 59 (L)   GFR Estimate If Black Latest Ref Range: >60 mL/min/1.7m2 71   Calcium Latest Ref Range: 8.5 - 10.1 mg/dL 9.1   Anion Gap Latest Ref Range: 3 - 14 mmol/L 4   Glucose Latest Ref Range: 70 - 99 mg/dL 145 (H)         Please limit your fluid intake to 2 L (64 ounces) daily.  2 Liters a day = 8.5 cups, or 72 ounces.  Please limit your salt intake to 2 grams a day or less.    If you gain 2# in 24 hours or 5# in one week call Maren Lucas RN so we can adjust your medications as needed over the phone.    Please feel free to call me with any questions or concerns.      Maren Lucas RN BSN   [facility]  Cardiology Care Coordinator-Heart Failure Clinic    Questions and [phone].   First press #1 for the  "[facility] and then press #3 for \"Medical Questions\" to reach us Cardiology Nurses.     On Call Cardiologist for after hours or on weekends: [phone]   option #4 and ask to speak to the on-call Cardiologist. Inform them you are a CORE/heart failure patient at the [facility].        If you need a medication refill please contact your pharmacy.  Please allow 3 business days for your refill to be completed.  _______________________________________________________  C.O.R.E. CLINIC Cardiomyopathy, Optimization, Rehabilitation, Education   The C.O.R.E. CLINIC is a heart failure specialty clinic within the [facility] where you will work with specialized nurse practitioners dedicated to helping patients with heart failure carefully adjust medications, receive education, and learn who and when to call if symptoms develop. They specialize in helping you better understand your condition, slow the progression of your disease, improve the length and quality of your life, help you detect future heart problems before they become life threatening, and avoid hospitalizations.  As always, thank you for trusting us with your health care needs!          "

## 2017-10-09 NOTE — NURSING NOTE
Diet: Patient instructed regarding a heart failure healthy diet, including discussion of reduced fat and 2000 mg daily sodium restriction, daily weights, medication purpose and compliance, fluid restrictions and resources for patient and family to access for assistance with heart failure management.       Labs: Patient was given results of the laboratory testing obtained today and patient was instructed about when to return for the next laboratory testing.    Med Reconcile: Reviewed and verified all current medications with the patient. The updated medication list was printed and given to the patient.    Return Appointment: Patient given instructions regarding scheduling next clinic visit.     Patient stated she understood all health information given and agreed to call with further questions or concerns.    This clinic visit:     Pt to take metolazone this afternoon.  F/u with us in 3 weeks and dr. Ray in 6 weeks  Have iron, cbc, and bmp drawn on Wednesday when you return for wound appt.   Primary physician Dr. Conner notified via email for a home health aide order.   Pt asking about diabetes. Recent HgbA1C states prediabetic per Mackenzie Weinberg. Pt must go through attending for further orders re: diabetes.     Start taking Bumex (water pill) at breakfast then around 2pm instead of 11am. Continue to take your weight every day - if you are at 170 lb, take one pill of metolazone then call us to let us know.

## 2017-10-09 NOTE — MR AVS SNAPSHOT
Carlos Alberto Fenton   10/9/2017   Anticoagulation Therapy Visit    Description:  77 year old female   Provider:  Ester Meraz RN   Department:  [facility]           INR as of 10/9/2017     Today's INR 3.27!      Anticoagulation Summary as of 10/9/2017     INR goal 2.0-3.0   Today's INR 3.27!   Full instructions 10/9: 2.5 mg; 10/10: 2.5 mg; 10/11: 2.5 mg; 10/12: 2.5 mg   Next INR check 10/16/2017    Indications   Long-term (current) use of anticoagulants [Z79.01] [Z79.01]  New onset atrial fibrillation (H) (Resolved) [I48.91]  Atrial fibrillation (H) [I48.91] [I48.91]         October 2017 Details    Sun Mon Tue Wed Thu Fri Sat     1               2               3               4               5               6               7                 8               9      2.5 mg   See details      10      2.5 mg         11      2.5 mg         12      2.5 mg         13               14                 15               16            17               18               19               20               21                 22               23               24               25               26               27               28                 29               30               31                    Date Details   10/09 This INR check       Date of next INR:  10/16/2017         How to take your warfarin dose     To take:  2.5 mg Take 0.5 of a 5 mg tablet.

## 2017-10-09 NOTE — PROGRESS NOTES
HPI:   Ms. Fenton is a 77 year old female with a past medical history including HFpEF, HTN, hyperlipidemia, DM Type II, CVA 10/16 complicated by seizures, CAD s/p CABG x3 (LIMA-LAD, SVG-D1, SVG-dRCA) and modified MAZE, ABDIEL ligation - 2/2017), PFO closure, paroxysmal Afib, HIT, and RUE DVT. She presents [facility] for follow-up. Her heart failure history is as follows: an echocardiogram was obtained due to CVA on 10/16/16 and showed mild MR, mild-moderate TR, and EF 50-55%. Repeat echocardiogram 10/17/16 secondary to Afib noted EF 35-40%, mildly reduced RV function, mild MI, and mild-moderate TR. Repeat echo 10/25/16 noted mildly improved EF at 40-45%, attributed to stress CM. Unknown details. She underwent angiogram 1/30/17 consistent with 2 vessel CAD to RCA and LAD with LM disease. She underwent 3 vessel CABG with MAZE and ABDIEL ligation per Dr. Quiñones on 2/17. We have been following her in [facility]. She was recently changed from lasix to Bumex. Last visit, she required one dose of metolazone which was effective. Her sx improved and she lost 10 pounds over one week of fluid. A repeat BMP showed stable Cr.     She felt well for some time after losing the fluid but now notes a two day hx of MADDEN. Yesterday, she was able to walk to the elevator at her apartment with mild shortness of breath. Today, she felt dyspneic with that walk. She admits it feels a little difficult to take a deep breath currently. She takes daily weights - her baseline appears ~166 lb but has increased to 171 lb since 10/3. She denies orthopnea, PND, palpitations, abdominal fullness. LE edema is persistent, maybe slightly worse. Wears nightly CPAP. Denies chest pain or pressure with exertion. No lightheadedness or syncope. She denies cough, fever, chills, sick contacts, N/V. Endorses ongoing fatigue, needing frequent naps. No blood in urine or stool, no bruising.     PAST MEDICAL HISTORY:  Past Medical History:   Diagnosis Date     Acute  bilateral cerebral infarction in a watershed distribution (H) 10/16/2016    parietral lesions bilateral       Antiplatelet or antithrombotic long-term use      Anxiety      Atrial fibrillation (H)      CAD (coronary artery disease)     2 vessel     Cancer (H) 1990    periodically have cancer on the skin removed     Cerebral artery occlusion with cerebral infarction (H) 10/2016    Cardioembolic strokes related to atrial fibrillation     Deep vein thrombosis (DVT) of axillary vein of right upper extremity (H) 2/25/2017     Depression      Depressive disorder 2001     Diabetes (H)      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     Hyperlipidemia      Hyperlipidemia LDL goal <130 10/31/2010     Hypertension      Panic attacks      Seizures (H) 10/19/2016     Sleep apnea     Uses CPAP       FAMILY HISTORY:  Family History   Problem Relation Age of Onset     DIABETES Mother      C.A.D. Mother      Hypertension Mother      CEREBROVASCULAR DISEASE Mother      Mini Strokes     Other Cancer Mother      Skin Cancer     Hyperlipidemia Mother      Coronary Artery Disease Mother      DIABETES Father      C.A.D. Father      Hypertension Father      Other Cancer Father      Hyperlipidemia Father      Coronary Artery Disease Father      HEART DISEASE Sister      Arthritis Sister      Other Cancer Other      Skin Cancer       SOCIAL HISTORY:  Social History     Social History     Marital status:      Spouse name: N/A     Number of children: N/A     Years of education: N/A     Social History Main Topics     Smoking status: Former Smoker     Packs/day: 1.00     Years: 10.00     Types: Cigarettes     Start date: 10/3/1958     Quit date: 7/4/1976     Smokeless tobacco: Never Used      Comment: Quit in 1976     Alcohol use Yes      Comment: Socially     Drug use: Yes     Special: Marijuana      Comment: Briefly used marijuana for peripheral neuropathy     Sexual activity: Not Currently     Partners: Male     Birth  control/ protection: Post-menopausal     Other Topics Concern     Parent/Sibling W/ Cabg, Mi Or Angioplasty Before 65f 55m? Yes     Social History Narrative       CURRENT MEDICATIONS:    Current Outpatient Prescriptions on File Prior to Visit:  bumetanide (BUMEX) 2 MG tablet Take 1 tablet (2 mg) by mouth 2 times daily   spironolactone (ALDACTONE) 25 MG tablet Take 1 tablet (25 mg) by mouth daily   atorvastatin (LIPITOR) 40 MG tablet Take 1 tablet (40 mg) by mouth daily   gabapentin (NEURONTIN) 100 MG capsule Take 1 capsule (100 mg) by mouth 2 times daily   traZODone (DESYREL) 50 MG tablet Take 0.5 tablets (25 mg) by mouth nightly as needed for sleep   pantoprazole (PROTONIX) 40 MG EC tablet Take 1 tablet (40 mg) by mouth every morning   potassium chloride SA (K-DUR/KLOR-CON M) 20 MEQ CR tablet Take 1 tablet (20 mEq) by mouth 2 times daily   amoxicillin-clavulanate (AUGMENTIN) 875-125 MG per tablet Take 1 tablet by mouth 2 times daily (Patient not taking: Reported on 9/20/2017)   metolazone (ZAROXOLYN) 2.5 MG tablet Please take on Saturday 9/2, Monday 9/4, and Wednesday 9/6. (Patient not taking: Reported on 9/20/2017)   warfarin (COUMADIN) 5 MG tablet Take 1 tablet (5 mg) by mouth daily   Elastic Bandages & Supports (ACE BANDAGE SELF-ADHERING) MISC 1 each 2 times daily (Patient not taking: Reported on 9/20/2017)   Gauze Pads & Dressings (KERLIX GAUZE ROLL MEDIUM) MISC 1 each 2 times daily (Patient not taking: Reported on 9/20/2017)   venlafaxine (EFFEXOR-ER) 150 MG TB24 24 hr tablet Take 1 tablet (150 mg) by mouth daily (with breakfast)   ONE TOUCH ULTRA test strip USE AS DIRECTED TO TEST ONE TIME A DAY   Wound Dressings (ADAPTIC NON-ADHERING DRESSING) PADS Externally apply 1 each topically 2 times daily   order for DME Sterile 4x4 gauze; Use gauze twice daily for dressing changes. (Patient not taking: Reported on 9/20/2017)   acetaminophen (TYLENOL) 325 MG tablet Take 3 tablets (975 mg) by mouth every 6 hours as  "needed for mild pain   aspirin 81 MG chewable tablet Take 1 tablet (81 mg) by mouth daily (Patient taking differently: Take 81 mg by mouth every morning )   ferrous sulfate (IRON SUPPLEMENT) 325 (65 FE) MG tablet Take 1 tablet (325 mg) by mouth daily (with breakfast) (Patient taking differently: Take 325 mg by mouth 2 times daily )   ONETOUCH DELICA LANCETS 33G MISC 1 Device daily   blood glucose monitoring (ONE TOUCH ULTRA 2) meter device kit      No current facility-administered medications on file prior to visit.     ROS:   CONSTITUTIONAL: Denies fever, chills, or weight fluctuations.   HEENT: Denies headache, vision changes, and changes in speech.   CV: Refer to HPI.   PULMONARY:Refer to HPI.  GI:Denies nausea, vomiting, diarrhea, and abdominal pain. Bowel movements are regular. No melena.  :Denies urinary alterations, dysuria, urinary frequency, hematuria, and abnormal drainage.   EXT:Chronic lower extremity edema.   SKIN:Denies abnormal rashes or lesions.   MUSCULOSKELETAL:Denies upper or lower extremity weakness and pain.   NEUROLOGIC:Denies lightheadedness, dizziness, seizures, or upper or lower extremity paresthesia.     EXAM:  /67 (BP Location: Left arm, Patient Position: Chair, Cuff Size: Adult Regular)  Pulse 62  Ht 1.575 m (5' 2\")  Wt 78 kg (172 lb)  SpO2 95%  BMI 31.46 kg/m2  GENERAL: Appears comfortable, in no acute distress.   HEENT: Eye symmetrical, no discharge or icterus bilaterally. Mucous membranes moist and without lesions.  CV: Irregularly irregular, +S1S2, no murmur, rub, or gallop. JVP elevated but difficult to appreciate.   RESPIRATORY: Respirations regular, even, and unlabored. Lungs CTA throughout.   GI: Soft and non distended with normoactive bowel sounds present in all quadrants. No tenderness, rebound, guarding.   EXTREMITIES: 2+ bilat peripheral edema. 2+ bilateral pedal pulses.   NEUROLOGIC: Alert and oriented x 3. No focal deficits.   MUSCULOSKELETAL: No joint swelling " or tenderness.   SKIN: No jaundice. No rashes or lesions. Skin changes consistent with chronic PVD. Warm to touch.      Labs, reviewed with patient in clinic today:  CBC RESULTS:  Lab Results   Component Value Date    WBC 5.9 07/07/2017    RBC 3.86 07/07/2017    HGB 11.7 07/07/2017    HCT 37.9 07/07/2017    MCV 98 07/07/2017    MCH 30.3 07/07/2017    MCHC 30.9 (L) 07/07/2017    RDW 19.9 (H) 07/07/2017     07/07/2017       CMP RESULTS:  Lab Results   Component Value Date     10/09/2017    POTASSIUM 5.1 10/09/2017    CHLORIDE 95 10/09/2017    CO2 34 (H) 10/09/2017    ANIONGAP 4 10/09/2017     (H) 10/09/2017    BUN 26 10/09/2017    CR 0.93 10/09/2017    GFRESTIMATED 59 (L) 10/09/2017    GFRESTBLACK 71 10/09/2017    CARLY 9.1 10/09/2017    BILITOTAL 0.5 04/17/2017    ALBUMIN 3.1 (L) 04/17/2017    ALKPHOS 137 04/17/2017    ALT 28 04/17/2017    AST 36 04/17/2017        INR RESULTS:  Lab Results   Component Value Date    INR 3.50 (H) 10/06/2017       Lab Results   Component Value Date    MAG 2.1 02/28/2017     Lab Results   Component Value Date    NTBNPI 2570 (H) 02/24/2017     Lab Results   Component Value Date    NTBNP 1352 (H) 09/20/2017       Diagnostics:  TAVO 3/1/17  H/o ABDIEL ligation. No LA clot seen.  H/o PFO closure. The atrial septum is intact as assessed by color Doppler.  No LV thrombus seen. Mildly (EF 45-50%) reduced left ventricular function is  present.  The right ventricle is normal size. Global right ventricular function is  normal.    TTE 2/26/17  Left Ventricle  Left ventricular size is normal. The Ejection Fraction is estimated at 55-60%.  Diastolic function not assessed due to atrial fibrillation. No LV throbus  noted.     Right Ventricle  Right ventricular function, chamber size, wall motion, and thickness are  normal.     Vessels  The inferior vena cava is normal.        Pericardium  No pericardial effusion is present.    [place] 9/11/17  Atrial fibrillation with rare  PVCs    Assessment and Plan:   Ms. Fenton is a pleasant 77 year old female with a past medical history HFpEF, HTN, dyslipidemia, DM2, CVA 10/16 complicated by seizures, CAD s/p CABGx3 with MAZE and ABDIEL ligation, PFO closure, PAF, HIT and RUE DVT who presents for heart failure follow-up. She is volume overloaded today, difficult exam, but sx and weight correlate. One dose metolazone was effective last time so we will repeat that and see her in three weeks.     # Chronic HFpEF borderline secondary to ?NICM +/- ICM (possibly stress CM but with CAD)  Stage C. NYHA Class IIIB. Last EF 45-50%.    Fluid status: hypervolemic with sx, recommend she take one metolazone tab today and continue Bumex 2 mg bid (lost 10 lb of fluid and sx resolved last time she took at dose without changing in BMP), she will use metolazone prn for weight gain >170 lb and call [facility] if she does this  ACEi/ARB/ARNI: no indication, EF>40%  BB: will plan to start Toprol 12.5 mg daily at next visit if compensated  Aldosterone antagonist: yung 25 mg daily  SCD prophylaxis: does not meet criteria for implant  NSAID use: reviewed  Sleep apnea evaluation: reports nightly CPAP use    # Fatigue, chronic  # Iron deficiency anemia  Sx of fatigue likely r/t heart failure in part but she does have ongoing anemia with low iron sats in February. Has been taking BID iron supplements.   -check CBC and iron studies     # Coronary artery disease s/p 3v CABG 2/2017  No sx of angina.   -continue asa 81 mg and atorvastatin 40 mg (defer dose increase to high intensity to primary cardiologist)  -plan to start BB next visit    # Paroxysmal atrial fibrillation s/p MAZE and left atrial appendage ligation   Recent Zio patch shows persistent AF. Rate controlled without Rx. [id number].  -continue warfarin per [facility], ?long term duration now that s/p ABDIEL Ligation, does have hx of CVA, DVT, HIT    # HTN   - BP controlled, not on Rx     # Hyperlipidemia  - continue atorvastatin  40 mg     # RUE DVT  Right internal IJ DVT diagnosed 2/24/17. Likely provoked s/p CABG.   - warfarin per [facility]    Follow up with me in [facility] in three weeks and Dr. Lobato in six weeks.       25 minutes spent face-to-face with patient, >50% in counseling and/or coordination of care as described above    Mackenzie Weinberg, BETHANY, NP-C  10/9/2017          ANNMARIE RAMÍREZ

## 2017-10-09 NOTE — LETTER
10/9/2017      RE: Carlos Alberto Fenton  [address] [place] [address]       Dear Colleague,    Thank you for the opportunity to participate in the care of your patient, Carlos Alberto Fenton, at the [facility] at [facility]. Please see a copy of my visit note below.    HPI:   Ms. Fentno is a 77 year old female with a past medical history including HFpEF, HTN, hyperlipidemia, DM Type II, CVA 10/16 complicated by seizures, CAD s/p CABG x3 (LIMA-LAD, SVG-D1, SVG-dRCA) and modified MAZE, ABDIEL ligation - 2/2017), PFO closure, paroxysmal Afib, HIT, and RUE DVT. She presents to [facility] clinic for follow-up. Her heart failure history is as follows: an echocardiogram was obtained due to CVA on 10/16/16 and showed mild MR, mild-moderate TR, and EF 50-55%. Repeat echocardiogram 10/17/16 secondary to Afib noted EF 35-40%, mildly reduced RV function, mild MI, and mild-moderate TR. Repeat echo 10/25/16 noted mildly improved EF at 40-45%, attributed to stress CM. Unknown details. She underwent angiogram 1/30/17 consistent with 2 vessel CAD to RCA and LAD with LM disease. She underwent 3 vessel CABG with MAZE and ABDIEL ligation per Dr. Quiñones on 2/17. We have been following her in [facility] clinic. She was recently changed from lasix to Bumex. Last visit, she required one dose of metolazone which was effective. Her sx improved and she lost 10 pounds over one week of fluid. A repeat BMP showed stable Cr.     She felt well for some time after losing the fluid but now notes a two day hx of MADDEN. Yesterday, she was able to walk to the elevator at her apartment with mild shortness of breath. Today, she felt dyspneic with that walk. She admits it feels a little difficult to take a deep breath currently. She takes daily weights - her baseline appears ~166 lb but has increased to 171 lb since 10/3. She denies orthopnea, PND, palpitations, abdominal fullness. LE edema is persistent, maybe slightly worse. Wears nightly  CPAP. Denies chest pain or pressure with exertion. No lightheadedness or syncope. She denies cough, fever, chills, sick contacts, N/V. Endorses ongoing fatigue, needing frequent naps. No blood in urine or stool, no bruising.     PAST MEDICAL HISTORY:  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    parietral lesions bilateral       Antiplatelet or antithrombotic long-term use      Anxiety      Atrial fibrillation (H)      CAD (coronary artery disease)     2 vessel     Cancer (H) 1990    periodically have cancer on the skin removed     Cerebral artery occlusion with cerebral infarction (H) 10/2016    Cardioembolic strokes related to atrial fibrillation     Deep vein thrombosis (DVT) of axillary vein of right upper extremity (H) 2/25/2017     Depression      Depressive disorder 2001     Diabetes (H)      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     Hyperlipidemia      Hyperlipidemia LDL goal <130 10/31/2010     Hypertension      Panic attacks      Seizures (H) 10/19/2016     Sleep apnea     Uses CPAP       FAMILY HISTORY:  Family History   Problem Relation Age of Onset     DIABETES Mother      C.A.D. Mother      Hypertension Mother      CEREBROVASCULAR DISEASE Mother      Mini Strokes     Other Cancer Mother      Skin Cancer     Hyperlipidemia Mother      Coronary Artery Disease Mother      DIABETES Father      C.A.D. Father      Hypertension Father      Other Cancer Father      Hyperlipidemia Father      Coronary Artery Disease Father      HEART DISEASE Sister      Arthritis Sister      Other Cancer Other      Skin Cancer       SOCIAL HISTORY:  Social History     Social History     Marital status:      Spouse name: N/A     Number of children: N/A     Years of education: N/A     Social History Main Topics     Smoking status: Former Smoker     Packs/day: 1.00     Years: 10.00     Types: Cigarettes     Start date: 10/3/1958      Quit date: 7/4/1976     Smokeless tobacco: Never Used      Comment: Quit in 1976     Alcohol use Yes      Comment: Socially     Drug use: Yes     Special: Marijuana      Comment: Briefly used marijuana for peripheral neuropathy     Sexual activity: Not Currently     Partners: Male     Birth control/ protection: Post-menopausal     Other Topics Concern     Parent/Sibling W/ Cabg, Mi Or Angioplasty Before 65f 55m? Yes     Social History Narrative       CURRENT MEDICATIONS:    Current Outpatient Prescriptions on File Prior to Visit:  bumetanide (BUMEX) 2 MG tablet Take 1 tablet (2 mg) by mouth 2 times daily   spironolactone (ALDACTONE) 25 MG tablet Take 1 tablet (25 mg) by mouth daily   atorvastatin (LIPITOR) 40 MG tablet Take 1 tablet (40 mg) by mouth daily   gabapentin (NEURONTIN) 100 MG capsule Take 1 capsule (100 mg) by mouth 2 times daily   traZODone (DESYREL) 50 MG tablet Take 0.5 tablets (25 mg) by mouth nightly as needed for sleep   pantoprazole (PROTONIX) 40 MG EC tablet Take 1 tablet (40 mg) by mouth every morning   potassium chloride SA (K-DUR/KLOR-CON M) 20 MEQ CR tablet Take 1 tablet (20 mEq) by mouth 2 times daily   amoxicillin-clavulanate (AUGMENTIN) 875-125 MG per tablet Take 1 tablet by mouth 2 times daily (Patient not taking: Reported on 9/20/2017)   metolazone (ZAROXOLYN) 2.5 MG tablet Please take on Saturday 9/2, Monday 9/4, and Wednesday 9/6. (Patient not taking: Reported on 9/20/2017)   warfarin (COUMADIN) 5 MG tablet Take 1 tablet (5 mg) by mouth daily   Elastic Bandages & Supports (ACE BANDAGE SELF-ADHERING) MISC 1 each 2 times daily (Patient not taking: Reported on 9/20/2017)   Gauze Pads & Dressings (KERLIX GAUZE ROLL MEDIUM) MISC 1 each 2 times daily (Patient not taking: Reported on 9/20/2017)   venlafaxine (EFFEXOR-ER) 150 MG TB24 24 hr tablet Take 1 tablet (150 mg) by mouth daily (with breakfast)   ONE TOUCH ULTRA test strip USE AS DIRECTED TO TEST ONE TIME A DAY   Wound Dressings (ADAPTIC  "NON-ADHERING DRESSING) PADS Externally apply 1 each topically 2 times daily   order for DME Sterile 4x4 gauze; Use gauze twice daily for dressing changes. (Patient not taking: Reported on 9/20/2017)   acetaminophen (TYLENOL) 325 MG tablet Take 3 tablets (975 mg) by mouth every 6 hours as needed for mild pain   aspirin 81 MG chewable tablet Take 1 tablet (81 mg) by mouth daily (Patient taking differently: Take 81 mg by mouth every morning )   ferrous sulfate (IRON SUPPLEMENT) 325 (65 FE) MG tablet Take 1 tablet (325 mg) by mouth daily (with breakfast) (Patient taking differently: Take 325 mg by mouth 2 times daily )   ONETOUCH DELICA LANCETS 33G MISC 1 Device daily   blood glucose monitoring (ONE TOUCH ULTRA 2) meter device kit      No current facility-administered medications on file prior to visit.     ROS:   CONSTITUTIONAL: Denies fever, chills, or weight fluctuations.   HEENT: Denies headache, vision changes, and changes in speech.   CV: Refer to HPI.   PULMONARY:Refer to HPI.  GI:Denies nausea, vomiting, diarrhea, and abdominal pain. Bowel movements are regular. No melena.  :Denies urinary alterations, dysuria, urinary frequency, hematuria, and abnormal drainage.   EXT:Chronic lower extremity edema.   SKIN:Denies abnormal rashes or lesions.   MUSCULOSKELETAL:Denies upper or lower extremity weakness and pain.   NEUROLOGIC:Denies lightheadedness, dizziness, seizures, or upper or lower extremity paresthesia.     EXAM:  /67 (BP Location: Left arm, Patient Position: Chair, Cuff Size: Adult Regular)  Pulse 62  Ht 1.575 m (5' 2\")  Wt 78 kg (172 lb)  SpO2 95%  BMI 31.46 kg/m2  GENERAL: Appears comfortable, in no acute distress.   HEENT: Eye symmetrical, no discharge or icterus bilaterally. Mucous membranes moist and without lesions.  CV: Irregularly irregular, +S1S2, no murmur, rub, or gallop. JVP elevated but difficult to appreciate.   RESPIRATORY: Respirations regular, even, and unlabored. Lungs CTA " throughout.   GI: Soft and non distended with normoactive bowel sounds present in all quadrants. No tenderness, rebound, guarding.   EXTREMITIES: 2+ bilat peripheral edema. 2+ bilateral pedal pulses.   NEUROLOGIC: Alert and oriented x 3. No focal deficits.   MUSCULOSKELETAL: No joint swelling or tenderness.   SKIN: No jaundice. No rashes or lesions. Skin changes consistent with chronic PVD. Warm to touch.      Labs, reviewed with patient in clinic today:  CBC RESULTS:  Lab Results   Component Value Date    WBC 5.9 07/07/2017    RBC 3.86 07/07/2017    HGB 11.7 07/07/2017    HCT 37.9 07/07/2017    MCV 98 07/07/2017    MCH 30.3 07/07/2017    MCHC 30.9 (L) 07/07/2017    RDW 19.9 (H) 07/07/2017     07/07/2017       CMP RESULTS:  Lab Results   Component Value Date     10/09/2017    POTASSIUM 5.1 10/09/2017    CHLORIDE 95 10/09/2017    CO2 34 (H) 10/09/2017    ANIONGAP 4 10/09/2017     (H) 10/09/2017    BUN 26 10/09/2017    CR 0.93 10/09/2017    GFRESTIMATED 59 (L) 10/09/2017    GFRESTBLACK 71 10/09/2017    CARLY 9.1 10/09/2017    BILITOTAL 0.5 04/17/2017    ALBUMIN 3.1 (L) 04/17/2017    ALKPHOS 137 04/17/2017    ALT 28 04/17/2017    AST 36 04/17/2017        INR RESULTS:  Lab Results   Component Value Date    INR 3.50 (H) 10/06/2017       Lab Results   Component Value Date    MAG 2.1 02/28/2017     Lab Results   Component Value Date    NTBNPI 2570 (H) 02/24/2017     Lab Results   Component Value Date    NTBNP 1352 (H) 09/20/2017       Diagnostics:  TAVO 3/1/17  H/o ABDIEL ligation. No LA clot seen.  H/o PFO closure. The atrial septum is intact as assessed by color Doppler.  No LV thrombus seen. Mildly (EF 45-50%) reduced left ventricular function is  present.  The right ventricle is normal size. Global right ventricular function is  normal.    TTE 2/26/17  Left Ventricle  Left ventricular size is normal. The Ejection Fraction is estimated at 55-60%.  Diastolic function not assessed due to atrial fibrillation.  No LV throbus  noted.     Right Ventricle  Right ventricular function, chamber size, wall motion, and thickness are  normal.     Vessels  The inferior vena cava is normal.        Pericardium  No pericardial effusion is present.    Zioptach 9/11/17  Atrial fibrillation with rare PVCs    Assessment and Plan:   Ms. Fenton is a pleasant 77 year old female with a past medical history HFpEF, HTN, dyslipidemia, DM2, CVA 10/16 complicated by seizures, CAD s/p CABGx3 with MAZE and ABDIEL ligation, PFO closure, PAF, HIT and RUE DVT who presents for heart failure follow-up. She is volume overloaded today, difficult exam, but sx and weight correlate. One dose metolazone was effective last time so we will repeat that and see her in three weeks.     # Chronic HFpEF borderline secondary to ?NICM +/- ICM (possibly stress CM but with CAD)  Stage C. NYHA Class IIIB. Last EF 45-50%.    Fluid status: hypervolemic with sx, recommend she take one metolazone tab today and continue Bumex 2 mg bid (lost 10 lb of fluid and sx resolved last time she took at dose without changing in BMP), she will use metolazone prn for weight gain >170 lb and call [facility] clinic if she does this  ACEi/ARB/ARNI: no indication, EF>40%  BB: will plan to start Toprol 12.5 mg daily at next visit if compensated  Aldosterone antagonist: yung 25 mg daily  SCD prophylaxis: does not meet criteria for implant  NSAID use: reviewed  Sleep apnea evaluation: reports nightly CPAP use    # Fatigue, chronic  # Iron deficiency anemia  Sx of fatigue likely r/t heart failure in part but she does have ongoing anemia with low iron sats in February. Has been taking BID iron supplements.   -check CBC and iron studies     # Coronary artery disease s/p 3v CABG 2/2017  No sx of angina.   -continue asa 81 mg and atorvastatin 40 mg (defer dose increase to high intensity to primary cardiologist)  -plan to start BB next visit    # Paroxysmal atrial fibrillation s/p MAZE and left atrial appendage  ligation   Recent Zio patch shows persistent AF. Rate controlled without Rx. [id number].  -continue warfarin per [facility], ?long term duration now that s/p ABDIEL Ligation, does have hx of CVA, DVT, HIT    # HTN   - BP controlled, not on Rx     # Hyperlipidemia  - continue atorvastatin 40 mg     # RUE DVT  Right internal IJ DVT diagnosed 2/24/17. Likely provoked s/p CABG.   - warfarin per [facility]    Follow up with me in [facility] in three weeks and Dr. Padilla in six weeks.       25 minutes spent face-to-face with patient, >50% in counseling and/or coordination of care as described above    Mackenzie Weinberg, BETHANY, NP-C  10/9/2017          CC  ANNMARIE PADILLA

## 2017-10-09 NOTE — NURSING NOTE
Chief Complaint   Patient presents with     Follow Up For     Return core labs prior     Vitals were taken and medications were reconciled.    Mei Caraballo CMA     1:43 PM

## 2017-10-11 ENCOUNTER — OFFICE VISIT (OUTPATIENT)
Dept: ORTHOPEDICS | Facility: CLINIC | Age: 77
End: 2017-10-11

## 2017-10-11 ENCOUNTER — ANTICOAGULATION THERAPY VISIT (OUTPATIENT)
Dept: ANTICOAGULATION | Facility: CLINIC | Age: 77
End: 2017-10-11

## 2017-10-11 DIAGNOSIS — I48.91 ATRIAL FIBRILLATION (H): ICD-10-CM

## 2017-10-11 DIAGNOSIS — Z79.01 LONG-TERM (CURRENT) USE OF ANTICOAGULANTS: ICD-10-CM

## 2017-10-11 DIAGNOSIS — B35.1 DERMATOPHYTOSIS OF NAIL: ICD-10-CM

## 2017-10-11 DIAGNOSIS — E61.1 IRON DEFICIENCY: ICD-10-CM

## 2017-10-11 DIAGNOSIS — L97.922 ULCER OF LEFT LOWER EXTREMITY WITH FAT LAYER EXPOSED (H): Primary | ICD-10-CM

## 2017-10-11 DIAGNOSIS — R53.83 FATIGUE, UNSPECIFIED TYPE: ICD-10-CM

## 2017-10-11 DIAGNOSIS — R06.02 SHORTNESS OF BREATH: ICD-10-CM

## 2017-10-11 DIAGNOSIS — I48.0 PAROXYSMAL ATRIAL FIBRILLATION (H): ICD-10-CM

## 2017-10-11 DIAGNOSIS — I50.33 ACUTE ON CHRONIC DIASTOLIC HEART FAILURE (H): ICD-10-CM

## 2017-10-11 DIAGNOSIS — I73.9 PERIPHERAL ARTERIAL DISEASE (H): ICD-10-CM

## 2017-10-11 LAB
ANION GAP SERPL CALCULATED.3IONS-SCNC: 4 MMOL/L (ref 3–14)
BUN SERPL-MCNC: 29 MG/DL (ref 7–30)
CALCIUM SERPL-MCNC: 9.7 MG/DL (ref 8.5–10.1)
CHLORIDE SERPL-SCNC: 96 MMOL/L (ref 94–109)
CO2 SERPL-SCNC: 36 MMOL/L (ref 20–32)
CREAT SERPL-MCNC: 0.82 MG/DL (ref 0.52–1.04)
ERYTHROCYTE [DISTWIDTH] IN BLOOD BY AUTOMATED COUNT: 16.5 % (ref 10–15)
GFR SERPL CREATININE-BSD FRML MDRD: 67 ML/MIN/1.7M2
GLUCOSE SERPL-MCNC: 56 MG/DL (ref 70–99)
HCT VFR BLD AUTO: 41.3 % (ref 35–47)
HGB BLD-MCNC: 12.8 G/DL (ref 11.7–15.7)
INR PPP: 2.48 (ref 0.86–1.14)
MCH RBC QN AUTO: 32.1 PG (ref 26.5–33)
MCHC RBC AUTO-ENTMCNC: 31 G/DL (ref 31.5–36.5)
MCV RBC AUTO: 104 FL (ref 78–100)
PLATELET # BLD AUTO: 216 10E9/L (ref 150–450)
POTASSIUM SERPL-SCNC: 3.6 MMOL/L (ref 3.4–5.3)
RBC # BLD AUTO: 3.99 10E12/L (ref 3.8–5.2)
SODIUM SERPL-SCNC: 136 MMOL/L (ref 133–144)
WBC # BLD AUTO: 6.4 10E9/L (ref 4–11)

## 2017-10-11 NOTE — LETTER
10/11/2017       RE: Carlos Alberto Fenton  [address] [place] [address]     Dear Colleague,    Thank you for referring your patient, Carlos Alberto Fenton, to the [facility] at [facility]. Please see a copy of my visit note below.    Chief Complaint: No chief complaint on file.     Allergies   Allergen Reactions     Heparin      HIT/ thrombocytopenia     Oxycodone      hallucinations     Adhesive Tape Itching and Rash     Diapers & Supplies Rash     Developed yeast infection from previous hospital stay     Subjective: Carlos Alberto is a 76 year old female who presents to the clinic today for a follow up of left foot wound. Carlos Alberto had an appointment with Dr. Santamaria's team on 10/3 who recommended continuation of dry gauze dressing to right foot and continuation of coumadin, aspirin and atorvastatin. She is to follow up with Dr. Santamaria again in 3-4 weeks to discuss additional Grafix application. She relates that there is still some pain to the left second digit. She would also like her toenails trimmed.      Objective  Toenails are thickened, discolored and elongated to varying degrees bilaterally.  Left distal 2nd distal digit is hyperkeratotic. Upon debridement, there is a pinpoint open lesion that is deep to dermis. No drainage, erythema, edema, calor or odor. There are no new areas of gangrene noted to either foot. Right hallux amp suite wound was noted today, however this is under the care of the vascular service and no treatment was performed to this digit today. The hallux wound appears smaller than the last visit. There are no s/s of infection that I noted to the digit today.      Assessment: Healed left 2nd digit wound.  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   Onychomycosis  Peripheral arterial disease     Plan:   - Pt seen and evaluated  - Continue DH shoes  - Continue either betadine or iodosorb to left second digit - wound was slightly open today beneath  callus.  - Right hallux wound under care of Dr. Santamaria and team  - Toenails trimmed x 9  - Pt to return to clinic in 2 weeks when she returns to see Dr. Santamaria.      Again, thank you for allowing me to participate in the care of your patient.      Sincerely,    Easton Torres DPM

## 2017-10-11 NOTE — MR AVS SNAPSHOT
Carlos Alberto Fenton   10/11/2017   Anticoagulation Therapy Visit    Description:  77 year old female   Provider:  Diya Mancilla, RN   Department:  [facility]           INR as of 10/11/2017     Today's INR 2.48      Anticoagulation Summary as of 10/11/2017     INR goal 2.0-3.0   Today's INR 2.48   Full instructions 10/11: 2.5 mg; 10/12: 2.5 mg; 10/13: 2.5 mg; 10/14: 2.5 mg; 10/15: 2.5 mg; 10/16: 2.5 mg   Next INR check 10/17/2017    Indications   Long-term (current) use of anticoagulants [Z79.01] [Z79.01]  New onset atrial fibrillation (H) (Resolved) [I48.91]  Atrial fibrillation (H) [I48.91] [I48.91]         October 2017 Details    Sun Mon Tue Wed Thu Fri Sat     1               2               3               4               5               6               7                 8               9               10               11      2.5 mg   See details      12      2.5 mg         13      2.5 mg         14      2.5 mg           15      2.5 mg         16      2.5 mg         17            18               19               20               21                 22               23               24               25               26               27               28                 29               30               31                    Date Details   10/11 This INR check       Date of next INR:  10/17/2017         How to take your warfarin dose     To take:  2.5 mg Take 0.5 of a 5 mg tablet.

## 2017-10-11 NOTE — PROGRESS NOTES
ANTICOAGULATION FOLLOW-UP CLINIC VISIT    Patient Name:  Carlos Alberto Fenton  Date:  10/11/2017  Contact Type:  Telephone    SUBJECTIVE:     Patient Findings     Positives No Problem Findings           OBJECTIVE    INR   Date Value Ref Range Status   10/11/2017 2.48 (H) 0.86 - 1.14 Final     Chromogenic Factor 10   Date Value Ref Range Status   03/02/2017 65 (L) 70 - 130 % Final     Comment:     Therapeutic Range:  A Chromogenic Factor 10 level of approximately 20-40%   inversely correlates with an INR of 2-3 for patients receiving Warfarin.   Chromogenic Factor 10 levels below 20% indicate an INR greater than 3 and   levels above 40% indicate an INR less than 2.       Factor 2 Assay   Date Value Ref Range Status   02/27/2017 37 (L) 60 - 140 % Final       ASSESSMENT / PLAN  INR assessment THER    Recheck INR In: 6 DAYS    INR Location Clinic      Anticoagulation Summary as of 10/11/2017     INR goal 2.0-3.0   Today's INR 2.48   Maintenance plan No maintenance plan   Full instructions 10/11: 2.5 mg; 10/12: 2.5 mg; 10/13: 2.5 mg; 10/14: 2.5 mg; 10/15: 2.5 mg; 10/16: 2.5 mg   Plan last modified Yani Delgado, RN (9/1/2017)   Next INR check 10/17/2017   Priority INR   Target end date Indefinite    Indications   Long-term (current) use of anticoagulants [Z79.01] [Z79.01]  New onset atrial fibrillation (H) (Resolved) [I48.91]  Atrial fibrillation (H) [I48.91] [I48.91]         Anticoagulation Episode Summary     INR check location     Preferred lab     Send INR reminders to [facility]    Comments Prounounced A-seenath  Speak with patients  Darrin in addition to patient.  Hx of 2 strokes.  Pt is confused. Please speak in tablets only (5mg tablets)/Send GlycoVaxynhart message      Anticoagulation Care Providers     Provider Role Specialty Phone number    Star Lobato MD Responsible Cardiology [phone]            See the Encounter Report to view Anticoagulation Flowsheet and Dosing Calendar (Go to  Encounters tab in chart review, and find the Anticoagulation Therapy Visit)    Spoke with Dominic Dutton sent to patient.    Diya Mancilla RN

## 2017-10-11 NOTE — NURSING NOTE
Reason For Visit:   Chief Complaint   Patient presents with     Wound Check     3 week follow up. Wounds, bilateral feet.        Pain Assessment  Patient Currently in Pain: Yes (Pt stated that she has enough pain that she feels the need to take advil. )  Primary Pain Location: Foot  Pain Orientation: Left         Current Outpatient Prescriptions   Medication Sig Dispense Refill     bumetanide (BUMEX) 2 MG tablet Take 1 tablet (2 mg) by mouth 2 times daily 360 tablet 3     spironolactone (ALDACTONE) 25 MG tablet Take 1 tablet (25 mg) by mouth daily 90 tablet 3     atorvastatin (LIPITOR) 40 MG tablet Take 1 tablet (40 mg) by mouth daily 90 tablet 1     gabapentin (NEURONTIN) 100 MG capsule Take 1 capsule (100 mg) by mouth 2 times daily 180 capsule 1     traZODone (DESYREL) 50 MG tablet Take 0.5 tablets (25 mg) by mouth nightly as needed for sleep 45 tablet 2     pantoprazole (PROTONIX) 40 MG EC tablet Take 1 tablet (40 mg) by mouth every morning 90 tablet 1     potassium chloride SA (K-DUR/KLOR-CON M) 20 MEQ CR tablet Take 1 tablet (20 mEq) by mouth 2 times daily 120 tablet 1     amoxicillin-clavulanate (AUGMENTIN) 875-125 MG per tablet Take 1 tablet by mouth 2 times daily (Patient not taking: Reported on 9/20/2017) 28 tablet 3     metolazone (ZAROXOLYN) 2.5 MG tablet Please take on Saturday 9/2, Monday 9/4, and Wednesday 9/6. (Patient not taking: Reported on 9/20/2017) 30 tablet 1     warfarin (COUMADIN) 5 MG tablet Take 1 tablet (5 mg) by mouth daily 90 tablet 3     Elastic Bandages & Supports (ACE BANDAGE SELF-ADHERING) MISC 1 each 2 times daily 6 each 3     Gauze Pads & Dressings (KERLIX GAUZE ROLL MEDIUM) MISC 1 each 2 times daily 48 each 3     venlafaxine (EFFEXOR-ER) 150 MG TB24 24 hr tablet Take 1 tablet (150 mg) by mouth daily (with breakfast) 90 each 1     ONE TOUCH ULTRA test strip USE AS DIRECTED TO TEST ONE TIME A  each 2     Wound Dressings (ADAPTIC NON-ADHERING DRESSING) PADS Externally apply 1  each topically 2 times daily 1 each 3     order for DME Sterile 4x4 gauze; Use gauze twice daily for dressing changes. 1 Box 3     acetaminophen (TYLENOL) 325 MG tablet Take 3 tablets (975 mg) by mouth every 6 hours as needed for mild pain 100 tablet 0     aspirin 81 MG chewable tablet Take 1 tablet (81 mg) by mouth daily (Patient taking differently: Take 81 mg by mouth every morning ) 30 tablet 0     ferrous sulfate (IRON SUPPLEMENT) 325 (65 FE) MG tablet Take 1 tablet (325 mg) by mouth daily (with breakfast) (Patient taking differently: Take 325 mg by mouth 2 times daily ) 100 tablet 1     ONETOUCH DELICA LANCETS 33G MISC 1 Device daily 100 each 0     blood glucose monitoring (ONE TOUCH ULTRA 2) meter device kit             Allergies   Allergen Reactions     Heparin      HIT/ thrombocytopenia     Oxycodone      hallucinations     Adhesive Tape Itching and Rash     Diapers & Supplies Rash     Developed yeast infection from previous hospital stay

## 2017-10-12 ENCOUNTER — MYC REFILL (OUTPATIENT)
Dept: CARDIOLOGY | Facility: CLINIC | Age: 77
End: 2017-10-12

## 2017-10-12 DIAGNOSIS — I50.22 CHRONIC SYSTOLIC HEART FAILURE (H): ICD-10-CM

## 2017-10-12 RX ORDER — BUMETANIDE 2 MG/1
2 TABLET ORAL 2 TIMES DAILY
Qty: 360 TABLET | Refills: 3 | Status: CANCELLED | OUTPATIENT
Start: 2017-10-12

## 2017-10-12 RX ORDER — SPIRONOLACTONE 25 MG/1
25 TABLET ORAL DAILY
Qty: 90 TABLET | Refills: 3 | Status: CANCELLED | OUTPATIENT
Start: 2017-10-12

## 2017-10-12 NOTE — TELEPHONE ENCOUNTER
Message from MyChart:  Original authorizing provider: MD Carlos Alberto Ratliff would like a refill of the following medications:  bumetanide (BUMEX) 2 MG tablet [Star Lobato MD]  spironolactone (ALDACTONE) 25 MG tablet [Star Lobato MD]    Preferred pharmacy: [facility] - [place], [place] - [address]    Comment:

## 2017-10-13 DIAGNOSIS — I50.22 CHRONIC SYSTOLIC HEART FAILURE (H): Primary | ICD-10-CM

## 2017-10-13 RX ORDER — BUMETANIDE 2 MG/1
2 TABLET ORAL 2 TIMES DAILY
Qty: 180 TABLET | Refills: 3 | Status: ON HOLD | OUTPATIENT
Start: 2017-10-13 | End: 2018-02-22

## 2017-10-17 DIAGNOSIS — I48.0 PAROXYSMAL ATRIAL FIBRILLATION (H): ICD-10-CM

## 2017-10-17 LAB — INR PPP: NORMAL (ref 0.86–1.14)

## 2017-10-17 PROCEDURE — 85610 PROTHROMBIN TIME: CPT | Performed by: INTERNAL MEDICINE

## 2017-10-17 PROCEDURE — 36416 COLLJ CAPILLARY BLOOD SPEC: CPT | Performed by: INTERNAL MEDICINE

## 2017-10-18 ENCOUNTER — ANTICOAGULATION THERAPY VISIT (OUTPATIENT)
Dept: ANTICOAGULATION | Facility: CLINIC | Age: 77
End: 2017-10-18

## 2017-10-18 DIAGNOSIS — I48.91 ATRIAL FIBRILLATION (H): ICD-10-CM

## 2017-10-18 DIAGNOSIS — I48.0 PAROXYSMAL ATRIAL FIBRILLATION (H): ICD-10-CM

## 2017-10-18 DIAGNOSIS — Z79.01 LONG-TERM (CURRENT) USE OF ANTICOAGULANTS: ICD-10-CM

## 2017-10-18 LAB — INR PPP: 1.3 (ref 0.86–1.14)

## 2017-10-18 PROCEDURE — 85610 PROTHROMBIN TIME: CPT | Performed by: INTERNAL MEDICINE

## 2017-10-18 PROCEDURE — 36416 COLLJ CAPILLARY BLOOD SPEC: CPT | Performed by: INTERNAL MEDICINE

## 2017-10-18 NOTE — MR AVS SNAPSHOT
Carlos Alberto Fenton   10/18/2017   Anticoagulation Therapy Visit    Description:  77 year old female   Provider:  Edy Molina, RN   Department:  [facility]           INR as of 10/18/2017     Today's INR 1.30!      Anticoagulation Summary as of 10/18/2017     INR goal 2.0-3.0   Today's INR 1.30!   Full instructions 10/18: 5 mg; 10/19: 5 mg   Next INR check 10/20/2017    Indications   Long-term (current) use of anticoagulants [Z79.01] [Z79.01]  New onset atrial fibrillation (H) (Resolved) [I48.91]  Atrial fibrillation (H) [I48.91] [I48.91]         October 2017 Details    Sun Mon Tue Wed Thu Fri Sat     1               2               3               4               5               6               7                 8               9               10               11               12               13               14                 15               16               17               18      5 mg   See details      19      5 mg         20            21                 22               23               24               25               26               27               28                 29               30               31                    Date Details   10/18 This INR check       Date of next INR:  10/20/2017         How to take your warfarin dose     To take:  5 mg Take 1 of the 5 mg tablets.

## 2017-10-18 NOTE — PROGRESS NOTES
ANTICOAGULATION FOLLOW-UP CLINIC VISIT    Patient Name:  Carlos Alberto Fenton  Date:  10/18/2017  Contact Type:  Telephone    SUBJECTIVE:     Patient Findings     Positives Unexplained INR or factor level change    Comments Patient repeated instructions from the coumadin clinic back to writer.  Unable to explain subtherapeutic INR.  Recommended patient be followed by a face to face Anticoagulation clinic.  Will follow up with suggestion.  Patient is moving to [place] with her daughter and will be getting labs drawn there.           OBJECTIVE    INR   Date Value Ref Range Status   10/18/2017 1.30 (H) 0.86 - 1.14 Final     Comment:     This test is intended for monitoring Coumadin therapy.  Results are not   accurate in patients with prolonged INR due to factor deficiency.       Chromogenic Factor 10   Date Value Ref Range Status   03/02/2017 65 (L) 70 - 130 % Final     Comment:     Therapeutic Range:  A Chromogenic Factor 10 level of approximately 20-40%   inversely correlates with an INR of 2-3 for patients receiving Warfarin.   Chromogenic Factor 10 levels below 20% indicate an INR greater than 3 and   levels above 40% indicate an INR less than 2.       Factor 2 Assay   Date Value Ref Range Status   02/27/2017 37 (L) 60 - 140 % Final       ASSESSMENT / PLAN  INR assessment SUB    Recheck INR In: 2 DAYS    INR Location Clinic      Anticoagulation Summary as of 10/18/2017     INR goal 2.0-3.0   Today's INR 1.30!   Maintenance plan No maintenance plan   Full instructions 10/18: 5 mg; 10/19: 5 mg   Plan last modified Yani Delgado, RN (9/1/2017)   Next INR check 10/20/2017   Priority INR   Target end date Indefinite    Indications   Long-term (current) use of anticoagulants [Z79.01] [Z79.01]  New onset atrial fibrillation (H) (Resolved) [I48.91]  Atrial fibrillation (H) [I48.91] [I48.91]         Anticoagulation Episode Summary     INR check location     Preferred lab     Send INR reminders to [facility]     Comments Prounounced A-seenath  Speak with patients  Darrin in addition to patient.  Hx of 2 strokes.  Pt is confused. Please speak in tablets only (5mg tablets)/Send MyChart message      Anticoagulation Care Providers     Provider Role Specialty Phone number    Cheryle Star Vargas MD Responsible Cardiology [phone]            See the Encounter Report to view Anticoagulation Flowsheet and Dosing Calendar (Go to Encounters tab in chart review, and find the Anticoagulation Therapy Visit)    Spoke with patient. Gave them their lab results and new warfarin recommendation.  No changes in health, medication, or diet. No missed doses, no falls. No signs or symptoms of bleed or clotting.    Edy Molina, RN

## 2017-10-18 NOTE — PROGRESS NOTES
10/18/17 Writer called pt to have her be aware of INR being cancelled yesterday due to unsatisfactory specimen. Pt reports she will try to get an INR drawn today. Pt took a 2.5mg dose last night and writer updated calendar to reflect this. Will f/u with pt today once INR received. Chantal Garcia RN

## 2017-10-19 ENCOUNTER — PRE VISIT (OUTPATIENT)
Dept: CARDIOLOGY | Facility: CLINIC | Age: 77
End: 2017-10-19

## 2017-10-19 DIAGNOSIS — I48.0 PAROXYSMAL ATRIAL FIBRILLATION (H): Primary | ICD-10-CM

## 2017-10-20 ENCOUNTER — ANTICOAGULATION THERAPY VISIT (OUTPATIENT)
Dept: ANTICOAGULATION | Facility: CLINIC | Age: 77
End: 2017-10-20

## 2017-10-20 DIAGNOSIS — Z79.01 LONG-TERM (CURRENT) USE OF ANTICOAGULANTS: ICD-10-CM

## 2017-10-20 DIAGNOSIS — I48.0 PAROXYSMAL ATRIAL FIBRILLATION (H): ICD-10-CM

## 2017-10-20 LAB — INR PPP: 1.5 (ref 0.86–1.14)

## 2017-10-20 PROCEDURE — 85610 PROTHROMBIN TIME: CPT | Performed by: INTERNAL MEDICINE

## 2017-10-20 PROCEDURE — 36416 COLLJ CAPILLARY BLOOD SPEC: CPT | Performed by: INTERNAL MEDICINE

## 2017-10-20 NOTE — MR AVS SNAPSHOT
Carlos Alberto Fenton   10/20/2017   Anticoagulation Therapy Visit    Description:  77 year old female   Provider:  Amairani Henson, RN   Department:  [facility]           INR as of 10/20/2017     Today's INR 1.50!      Anticoagulation Summary as of 10/20/2017     INR goal 2.0-3.0   Today's INR 1.50!   Full instructions 10/20: 5 mg; 10/21: 2.5 mg; 10/22: 2.5 mg; 10/23: 2.5 mg   Next INR check 10/24/2017    Indications   Long-term (current) use of anticoagulants [Z79.01] [Z79.01]  New onset atrial fibrillation (H) (Resolved) [I48.91]  Atrial fibrillation (H) [I48.91] [I48.91]         October 2017 Details    Sun Mon Tue Wed Thu Fri Sat     1               2               3               4               5               6               7                 8               9               10               11               12               13               14                 15               16               17               18               19               20      5 mg   See details      21      2.5 mg           22      2.5 mg         23      2.5 mg         24            25               26               27               28                 29               30               31                    Date Details   10/20 This INR check       Date of next INR:  10/24/2017         How to take your warfarin dose     To take:  2.5 mg Take 0.5 of a 5 mg tablet.    To take:  5 mg Take 1 of the 5 mg tablets.

## 2017-10-20 NOTE — PROGRESS NOTES
ANTICOAGULATION FOLLOW-UP CLINIC VISIT    Patient Name:  Carlos Alberto Fenton  Date:  10/20/2017  Contact Type:  Telephone    SUBJECTIVE:     Patient Findings     Comments Carlos Alberto did not talk with [place] coumadin clinic.           OBJECTIVE    INR   Date Value Ref Range Status   10/20/2017 1.50 (H) 0.86 - 1.14 Final     Comment:     This test is intended for monitoring Coumadin therapy.  Results are not   accurate in patients with prolonged INR due to factor deficiency.       Chromogenic Factor 10   Date Value Ref Range Status   03/02/2017 65 (L) 70 - 130 % Final     Comment:     Therapeutic Range:  A Chromogenic Factor 10 level of approximately 20-40%   inversely correlates with an INR of 2-3 for patients receiving Warfarin.   Chromogenic Factor 10 levels below 20% indicate an INR greater than 3 and   levels above 40% indicate an INR less than 2.       Factor 2 Assay   Date Value Ref Range Status   02/27/2017 37 (L) 60 - 140 % Final       ASSESSMENT / PLAN  INR assessment SUB    Recheck INR In: 4 DAYS    INR Location Clinic      Anticoagulation Summary as of 10/20/2017     INR goal 2.0-3.0   Today's INR 1.50!   Maintenance plan No maintenance plan   Full instructions 10/20: 5 mg; 10/21: 2.5 mg; 10/22: 2.5 mg; 10/23: 2.5 mg   Plan last modified Yani Delgado, RN (9/1/2017)   Next INR check 10/24/2017   Priority INR   Target end date Indefinite    Indications   Long-term (current) use of anticoagulants [Z79.01] [Z79.01]  New onset atrial fibrillation (H) (Resolved) [I48.91]  Atrial fibrillation (H) [I48.91] [I48.91]         Anticoagulation Episode Summary     INR check location     Preferred lab     Send INR reminders to [facility]    Comments Prounounced A-seenath  Speak with patients  Darrin in addition to patient.  Hx of 2 strokes.  Pt is confused. Please speak in tablets only (5mg tablets)/Send Matchboxhart message      Anticoagulation Care Providers     Provider Role Specialty Phone number    Cheryle,  Star Vargas MD [facility] Cardiology [phone]            See the Encounter Report to view Anticoagulation Flowsheet and Dosing Calendar (Go to Encounters tab in chart review, and find the Anticoagulation Therapy Visit)    Spoke with Dominic Henson RN

## 2017-10-24 ENCOUNTER — TELEPHONE (OUTPATIENT)
Dept: ANTICOAGULATION | Facility: OTHER | Age: 77
End: 2017-10-24

## 2017-10-24 ENCOUNTER — ANTICOAGULATION THERAPY VISIT (OUTPATIENT)
Dept: ANTICOAGULATION | Facility: CLINIC | Age: 77
End: 2017-10-24

## 2017-10-24 ENCOUNTER — MYC REFILL (OUTPATIENT)
Dept: CARDIOLOGY | Facility: CLINIC | Age: 77
End: 2017-10-24

## 2017-10-24 DIAGNOSIS — I63.40 CEREBROVASCULAR ACCIDENT (CVA) DUE TO EMBOLISM OF CEREBRAL ARTERY (H): ICD-10-CM

## 2017-10-24 DIAGNOSIS — I48.91 NEW ONSET ATRIAL FIBRILLATION (H): ICD-10-CM

## 2017-10-24 DIAGNOSIS — Z79.01 LONG-TERM (CURRENT) USE OF ANTICOAGULANTS: ICD-10-CM

## 2017-10-24 DIAGNOSIS — I50.22 CHRONIC SYSTOLIC HEART FAILURE (H): ICD-10-CM

## 2017-10-24 DIAGNOSIS — I48.0 PAROXYSMAL ATRIAL FIBRILLATION (H): ICD-10-CM

## 2017-10-24 LAB — INR BLD: 1.7 (ref 0.86–1.14)

## 2017-10-24 PROCEDURE — 36416 COLLJ CAPILLARY BLOOD SPEC: CPT | Performed by: FAMILY MEDICINE

## 2017-10-24 PROCEDURE — 85610 PROTHROMBIN TIME: CPT | Mod: QW | Performed by: FAMILY MEDICINE

## 2017-10-24 NOTE — PROGRESS NOTES
ANTICOAGULATION FOLLOW-UP CLINIC VISIT    Patient Name:  Carlos Alberto Fenton  Date:  10/24/2017  Contact Type:  Telephone    SUBJECTIVE:     Patient Findings     Positives Unexplained INR or factor level change    Comments Pt communicates dosing of Warfarin and denies any missed doses. Writer had a lengthy conversation with pt about seeing a face-to-face nurse at [place] since [facility] has a coumadin clinic. Pt reports she will give them a call, but in previous note pt forgot so writer sent an in-basket message to the nurse at that clinic explaining the situation. Pt could benefit from a face-to-face visit due to getting a print out of dosing and have nurse go over dosing more lengthy.            OBJECTIVE    INR Point of Care   Date Value Ref Range Status   10/24/2017 1.7 (H) 0.86 - 1.14 Final     Comment:     This test is intended for monitoring Coumadin therapy.  Results are not   accurate in patients with prolonged INR due to factor deficiency.       Chromogenic Factor 10   Date Value Ref Range Status   03/02/2017 65 (L) 70 - 130 % Final     Comment:     Therapeutic Range:  A Chromogenic Factor 10 level of approximately 20-40%   inversely correlates with an INR of 2-3 for patients receiving Warfarin.   Chromogenic Factor 10 levels below 20% indicate an INR greater than 3 and   levels above 40% indicate an INR less than 2.       Factor 2 Assay   Date Value Ref Range Status   02/27/2017 37 (L) 60 - 140 % Final       ASSESSMENT / PLAN  INR assessment SUB    Recheck INR In: 3 DAYS    INR Location Clinic      Anticoagulation Summary as of 10/24/2017     INR goal 2.0-3.0   Today's INR 1.7!   Maintenance plan No maintenance plan   Full instructions 10/24: 5 mg; 10/25: 5 mg; 10/26: 2.5 mg   Plan last modified Yani Delgado, RN (9/1/2017)   Next INR check 10/27/2017   Priority INR   Target end date Indefinite    Indications   Long-term (current) use of anticoagulants [Z79.01] [Z79.01]  New onset atrial fibrillation (H)  (Resolved) [I48.91]  Atrial fibrillation (H) [I48.91] [I48.91]         Anticoagulation Episode Summary     INR check location     Preferred lab     Send INR reminders to [facility]    Comments Prounounced A-seenath 4/17/17 Allergice to Heparin/Lovenon per Dr. Angelina Bernal's note  Speak with patients  Darrin in addition to patient.  Hx of 2 strokes.  Pt is confused. Please speak in tablets only (5mg tablets)/Send Modern Messagehart message      Anticoagulation Care Providers     Provider Role Specialty Phone number    Star Lobato MD Responsible Cardiology [phone]            See the Encounter Report to view Anticoagulation Flowsheet and Dosing Calendar (Go to Encounters tab in chart review, and find the Anticoagulation Therapy Visit)    Spoke with patient. Gave them their lab results and new warfarin recommendation.  No changes in health, medication, or diet. No missed doses, no falls. No signs or symptoms of bleed or clotting.     Chantal Garcia RN

## 2017-10-24 NOTE — TELEPHONE ENCOUNTER
Received message form coumadin nurse at the  who has been managing pt's INR's:  Pt is really confused and it has been challenging to dose pt over the phone. Pt is living with her daughter in [place] and we were thinking maybe seeing someone face-to-face might help with the confusion of her Warfarin dosing. I gave pt your ph# to make an appt face-to-face. Please let me know if there is anything else you need from us. Thank you for your time.    Reached out to pt and scheduled her for INR f/u on Thurs this week for INR recheck since im not in ER clinic on Friday. She agreed and verbalized the correct dosing for TU, Wed and appt Thurs.     Catrachita Lacy, RN- Coumadin Clinic RN

## 2017-10-24 NOTE — MR AVS SNAPSHOT
Carlos Alberto Fenton   10/24/2017   Anticoagulation Therapy Visit    Description:  77 year old female   Provider:  Chantal Garcia, RN   Department:  [facility]           INR as of 10/24/2017     Today's INR 1.7!      Anticoagulation Summary as of 10/24/2017     INR goal 2.0-3.0   Today's INR 1.7!   Full instructions 10/24: 5 mg; 10/25: 5 mg; 10/26: 2.5 mg   Next INR check 10/27/2017    Indications   Long-term (current) use of anticoagulants [Z79.01] [Z79.01]  New onset atrial fibrillation (H) (Resolved) [I48.91]  Atrial fibrillation (H) [I48.91] [I48.91]         October 2017 Details    Sun Mon Tue Wed Thu Fri Sat     1               2               3               4               5               6               7                 8               9               10               11               12               13               14                 15               16               17               18               19               20               21                 22               23               24      5 mg   See details      25      5 mg         26      2.5 mg         27            28                 29               30               31                    Date Details   10/24 This INR check       Date of next INR:  10/27/2017         How to take your warfarin dose     To take:  2.5 mg Take 0.5 of a 5 mg tablet.    To take:  5 mg Take 1 of the 5 mg tablets.

## 2017-10-25 RX ORDER — SPIRONOLACTONE 25 MG/1
25 TABLET ORAL DAILY
Qty: 90 TABLET | Refills: 3 | Status: ON HOLD | OUTPATIENT
Start: 2017-10-25 | End: 2018-04-04

## 2017-10-25 NOTE — TELEPHONE ENCOUNTER
Message from [facility]:  Margi Baker RN Wed Oct 25, 2017 11:29 AM        ----- Message -----   From: Carlos Alberto Fenton   Sent: 10/24/2017 10:36 PM   To: Cardiology [facility]  Subject: Medication Renewal Request     Original authorizing provider: MD Carlos Alberto Ratliff would like a refill of the following medications:  spironolactone (ALDACTONE) 25 MG tablet [Star Lobato MD]    Preferred pharmacy: [facility] [place] [address]    Comment:  Can this Rx from Star Ortiz go to the mail order pharmacy for a 3 month supply. Thank you Carlos Alberto Fenton, 1940

## 2017-10-26 ENCOUNTER — OFFICE VISIT (OUTPATIENT)
Dept: CARDIOLOGY | Facility: CLINIC | Age: 77
End: 2017-10-26
Attending: NURSE PRACTITIONER
Payer: MEDICARE

## 2017-10-26 VITALS
BODY MASS INDEX: 30.91 KG/M2 | SYSTOLIC BLOOD PRESSURE: 133 MMHG | HEIGHT: 62 IN | HEART RATE: 73 BPM | WEIGHT: 168 LBS | OXYGEN SATURATION: 91 % | DIASTOLIC BLOOD PRESSURE: 81 MMHG

## 2017-10-26 DIAGNOSIS — Z79.01 ON WARFARIN THERAPY: ICD-10-CM

## 2017-10-26 DIAGNOSIS — I50.32 CHRONIC DIASTOLIC HEART FAILURE (H): ICD-10-CM

## 2017-10-26 DIAGNOSIS — I48.0 PAROXYSMAL ATRIAL FIBRILLATION (H): Primary | ICD-10-CM

## 2017-10-26 DIAGNOSIS — I10 ESSENTIAL HYPERTENSION: ICD-10-CM

## 2017-10-26 DIAGNOSIS — I25.10 CORONARY ARTERY DISEASE INVOLVING NATIVE HEART WITHOUT ANGINA PECTORIS, UNSPECIFIED VESSEL OR LESION TYPE: ICD-10-CM

## 2017-10-26 DIAGNOSIS — I48.0 PAROXYSMAL ATRIAL FIBRILLATION (H): ICD-10-CM

## 2017-10-26 LAB — INR PPP: 1.79 (ref 0.86–1.14)

## 2017-10-26 PROCEDURE — 93005 ELECTROCARDIOGRAM TRACING: CPT | Mod: ZF

## 2017-10-26 PROCEDURE — 99215 OFFICE O/P EST HI 40 MIN: CPT | Mod: ZP | Performed by: NURSE PRACTITIONER

## 2017-10-26 PROCEDURE — 93010 ELECTROCARDIOGRAM REPORT: CPT | Mod: ZP | Performed by: INTERNAL MEDICINE

## 2017-10-26 PROCEDURE — 99213 OFFICE O/P EST LOW 20 MIN: CPT | Mod: ZF

## 2017-10-26 RX ORDER — METOPROLOL SUCCINATE 25 MG/1
12.5 TABLET, EXTENDED RELEASE ORAL DAILY
Qty: 45 TABLET | Refills: 3 | Status: SHIPPED | OUTPATIENT
Start: 2017-10-26 | End: 2017-11-14

## 2017-10-26 ASSESSMENT — PAIN SCALES - GENERAL: PAINLEVEL: NO PAIN (0)

## 2017-10-26 NOTE — NURSING NOTE
Chief Complaint   Patient presents with     Follow Up For     EKG- afib, 6MFU (Danielle 4/17/17), PAF. also follows with [facility] (10/9/17), Cheryle-- Seeing [facility] today also     Vitals were taken and medications were reconciled. EKG was performed.  VIBHA Castellnao  5:37 PM

## 2017-10-26 NOTE — LETTER
10/26/2017      RE: Carlos Alberto Fenton  [address]  [facility] [address]       Dear Colleague,    Thank you for the opportunity to participate in the care of your patient, Carlos Alberto Fenton, at the [facility] at [facility]. Please see a copy of my visit note below.    Clinical Cardiac Electrophysiology        HPI:     76F, PMH of HTN, HPL, DM, CVA (11/2016), CAD (s/p 3v CABG: LIMA-LAD, SVG-D1, SVG-dRCA and modified MAZE, ABDIEL ligation - 2/2016), paroxysmal AFib (on amio, [id number] - 8), HIT (was on Fondaparinux), RUE DVT who is here for AFib follow up.  She is a patient of Dr. Lobato, [facility] and Dr. Santos's    4/17/2017 After her CABG/ABDIEL ligation/MAZE procedure, within a couple of weeks she was readmitted to the hospital with toe gangrene and was found to have AFib during the hospitalization. During the 4/17 visit her Amiodarone stopped.      She saw Dr. Lobato on 9/2017 and her Fondaparinux was stopped as her  Plt count is > 200, and her warfarin was restarted.  She was also fluid up and started on bumex with metolazone    10/25/2017  Today she presents with her daughter who she is living with as her  went back to [facility]. She is in atrial fibrillation and was in atrial fibrillation when she wore the zio patch for a day in September 2017.  She is unaware she is in atrial fibrillation. Her daughter is upset that she is in atrial fibrillation and education was provided on the diagnosis.  Much attention was discussed on patient's subtherapuetic INRs, per the INR clinic note there is difficulty with patient adherence and comprehension. She continues to see vascular for open wounds.  Only takes metolazone when weight up to 169-170 but her weight has been 168.   States she walked up to the clinic today and stops because of back pain.  Sleeps with 2 pillows which her usual and wears CPAP. Oxygen saturations is 95% in clinic today. Home BP is 110s and HRs in  80-90s.  Does have edema on LE.   Denies headaches, dizziness, syncope, angina, dyspnea at rest or with exertion, palpitations, orthopnea, PND, abdominal pain, or any new numbness/weakness, hematuria, hematochezia, and epistaxis.    Today's EKG atrial fibrillation with ventricular rate of 95 bpm    Current cardiac medications include spironolactone 25 mg daily, bumex 2 mg daily, atrovastatin 40 mg daily, K dur 40 mg daily, metoazone 2.5 mg (intermittently taking), warfarin, aspirin,     PAST MEDICAL HISTORY:  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    parietral lesions bilateral       Antiplatelet or antithrombotic long-term use      Anxiety      Atrial fibrillation (H)      CAD (coronary artery disease)     2 vessel     Cancer (H) 1990    periodically have cancer on the skin removed     Cerebral artery occlusion with cerebral infarction (H) 10/2016    Cardioembolic strokes related to atrial fibrillation     Deep vein thrombosis (DVT) of axillary vein of right upper extremity (H) 2/25/2017     Depression      Depressive disorder 2001     Diabetes (H)      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     Hyperlipidemia      Hyperlipidemia LDL goal <130 10/31/2010     Hypertension      Panic attacks      Seizures (H) 10/19/2016     Sleep apnea     Uses CPAP       CURRENT MEDICATIONS:  Current Outpatient Prescriptions   Medication Sig Dispense Refill     spironolactone (ALDACTONE) 25 MG tablet Take 1 tablet (25 mg) by mouth daily 90 tablet 3     bumetanide (BUMEX) 2 MG tablet Take 1 tablet (2 mg) by mouth 2 times daily 180 tablet 3     atorvastatin (LIPITOR) 40 MG tablet Take 1 tablet (40 mg) by mouth daily 90 tablet 1     gabapentin (NEURONTIN) 100 MG capsule Take 1 capsule (100 mg) by mouth 2 times daily 180 capsule 1     traZODone (DESYREL) 50 MG tablet Take 0.5 tablets (25 mg) by mouth nightly as needed for sleep 45 tablet 2      pantoprazole (PROTONIX) 40 MG EC tablet Take 1 tablet (40 mg) by mouth every morning 90 tablet 1     potassium chloride SA (K-DUR/KLOR-CON M) 20 MEQ CR tablet Take 1 tablet (20 mEq) by mouth 2 times daily 120 tablet 1     amoxicillin-clavulanate (AUGMENTIN) 875-125 MG per tablet Take 1 tablet by mouth 2 times daily (Patient not taking: Reported on 9/20/2017) 28 tablet 3     metolazone (ZAROXOLYN) 2.5 MG tablet Please take on Saturday 9/2, Monday 9/4, and Wednesday 9/6. (Patient not taking: Reported on 9/20/2017) 30 tablet 1     warfarin (COUMADIN) 5 MG tablet Take 1 tablet (5 mg) by mouth daily 90 tablet 3     Elastic Bandages & Supports (ACE BANDAGE SELF-ADHERING) MISC 1 each 2 times daily 6 each 3     Gauze Pads & Dressings (KERLIX GAUZE ROLL MEDIUM) MISC 1 each 2 times daily 48 each 3     venlafaxine (EFFEXOR-ER) 150 MG TB24 24 hr tablet Take 1 tablet (150 mg) by mouth daily (with breakfast) 90 each 1     ONE TOUCH ULTRA test strip USE AS DIRECTED TO TEST ONE TIME A  each 2     Wound Dressings (ADAPTIC NON-ADHERING DRESSING) PADS Externally apply 1 each topically 2 times daily 1 each 3     order for DME Sterile 4x4 gauze; Use gauze twice daily for dressing changes. 1 Box 3     acetaminophen (TYLENOL) 325 MG tablet Take 3 tablets (975 mg) by mouth every 6 hours as needed for mild pain 100 tablet 0     aspirin 81 MG chewable tablet Take 1 tablet (81 mg) by mouth daily (Patient taking differently: Take 81 mg by mouth every morning ) 30 tablet 0     ferrous sulfate (IRON SUPPLEMENT) 325 (65 FE) MG tablet Take 1 tablet (325 mg) by mouth daily (with breakfast) (Patient taking differently: Take 325 mg by mouth 2 times daily ) 100 tablet 1     ONETOUCH DELICA LANCETS 33G MISC 1 Device daily 100 each 0     blood glucose monitoring (ONE TOUCH ULTRA 2) meter device kit          PAST SURGICAL HISTORY:  Past Surgical History:   Procedure Laterality Date     AMPUTATE TOE(S) Right 5/26/2017    Procedure: AMPUTATE  TOE(S);  Right Great Toe amputation, debriedment of 2 and 3rd toe soft tissu right foot. and application of Grafix;  Surgeon: Leah Santamaria MD;  Location: [facility] OR     BACK SURGERY       BYPASS GRAFT ARTERY CORONARY N/A 2/6/2017    Procedure: BYPASS GRAFT ARTERY CORONARY;  Surgeon: Mikhail Quiñones MD;  Location: [facility] OR     C APPENDECTOMY  1959     C CARDIAC SURG PROCEDURE UNLIST       C HAND/FINGER SURGERY UNLISTED       C STOMACH SURGERY PROCEDURE UNLISTED       HC SACROPLASTY       HC VASCULAR SURGERY PROCEDURE UNLIST       HYSTERECTOMY, PASTORA  1988     IRRIGATION AND DEBRIDEMENT FOOT, COMBINED Right 7/7/2017    Procedure: COMBINED IRRIGATION AND DEBRIDEMENT FOOT;  Irrigation and Debridement Right Foot with Graphix  *Latex Allergy*;  Surgeon: Leah Santamaria MD;  Location: [facility] OR     MAZE PROCEDURE N/A 2/6/2017    Procedure: MAZE PROCEDURE;  Surgeon: Mikhail Quiñones MD;  Location: [facility] OR     PICC INSERTION Left 02/25/2017    5fr TL Bard PICC, 47cm (3cm external) in the L basilic vein w/ tip in the SVC RA junction     TONSILLECTOMY  1942       ALLERGIES:     Allergies   Allergen Reactions     Heparin      HIT/ thrombocytopenia     Lovenox [Enoxaparin] Other (See Comments)     Thrombocytopenia      Oxycodone      hallucinations     Adhesive Tape Itching and Rash     Diapers & Supplies Rash     Developed yeast infection from previous hospital stay       FAMILY HISTORY:  Family History   Problem Relation Age of Onset     DIABETES Mother      C.A.D. Mother      Hypertension Mother      CEREBROVASCULAR DISEASE Mother      Mini Strokes     Other Cancer Mother      Skin Cancer     Hyperlipidemia Mother      Coronary Artery Disease Mother      DIABETES Father      C.A.D. Father      Hypertension Father      Other Cancer Father      Hyperlipidemia Father      Coronary Artery Disease Father      HEART DISEASE Sister      Arthritis Sister      Other Cancer Other      Skin Cancer       SOCIAL HISTORY:  Social History  "  Substance Use Topics     Smoking status: Former Smoker     Packs/day: 1.00     Years: 10.00     Types: Cigarettes     Start date: 10/3/1958     Quit date: 7/4/1976     Smokeless tobacco: Never Used      Comment: Quit in 1976     Alcohol use Yes      Comment: Socially       ROS:   CONSTITUTIONAL:No report of fever, chills, or change in weight  RESPIRATORY: No cough, wheezing, SOB, or hemoptysis  CARDIOVASCULAR: see HPI  MUSCULO-SKELETAL: No joint pain/swelling, no muscle pain  NEURO: No paresthesias, syncope, pre-syncope, light headness, dizziness or vertigo  ENDOCRINE: No temperature intolerance, no skin/hair changes  PSYCHIATRIC: No change in mood, feeling down/anxious, no change in sleep or appetite  GI: no melena or hematochezia, no change in bowel habits  : no hematuria or dysurea, no hesitancy, dribbling or incontinence  HEME: no easy bruising or bleeding, no history of anemia, no history of blood clots  SKIN: no rashes or sores, no unusual itching      Exam:  /81 (BP Location: Left arm, Patient Position: Chair, Cuff Size: Adult Regular)  Pulse 73  Ht 1.575 m (5' 2\")  Wt 76.2 kg (168 lb)  SpO2 91%  BMI 30.73 kg/m2  GENERAL APPEARANCE: elderly female, alert and no distress  HEENT: no icterus, no xanthelasmas, normal pupil size and reaction, normal palate, mucosa moist, no central cyanosis  NECK: no adenopathy, no asymmetry, masses, or scars,  no bruits, JVP not elevated  RESPIRATORY: lungs clear to auscultation - no rales, rhonchi or wheezes, no use of accessory muscles, no retractions, respirations are unlabored, normal respiratory rate  CARDIOVASCULAR: irregularly irregular rhythm, normal S1 with physiologic split S2, no S3 or S4 and no murmur, click or rub, precordium quiet with normal PMI.  ABDOMEN: obese, soft, non tender,  bowel sounds normal,no abdominal bruits  EXTREMITIES: peripheral pulses normal, no edema, no bruits  NEURO: alert and oriented to person/place/time, normal speech, gait " and affect  VASC: Radial,  dorsalis pedis and posterior tibialis pulses are normal in volumes and symmetric bilaterally.   SKIN: no ecchymoses, no rashes    Labs:  CBC RESULTS:   Lab Results   Component Value Date    WBC 6.4 10/11/2017    RBC 3.99 10/11/2017    HGB 12.8 10/11/2017    HCT 41.3 10/11/2017     (H) 10/11/2017    MCH 32.1 10/11/2017    MCHC 31.0 (L) 10/11/2017    RDW 16.5 (H) 10/11/2017     10/11/2017       BMP RESULTS:  Lab Results   Component Value Date     10/11/2017    POTASSIUM 3.6 10/11/2017    CHLORIDE 96 10/11/2017    CO2 36 (H) 10/11/2017    ANIONGAP 4 10/11/2017    GLC 56 (L) 10/11/2017    BUN 29 10/11/2017    CR 0.82 10/11/2017    GFRESTIMATED 67 10/11/2017    GFRESTBLACK 82 10/11/2017    CARLY 9.7 10/11/2017        INR RESULTS:  Lab Results   Component Value Date    INR 1.7 (H) 10/24/2017    INR 1.50 (H) 10/20/2017    INR 1.30 (H) 10/18/2017    INR Canceled, Test credited 10/17/2017    INR 2.48 (H) 10/11/2017       Procedures:  Echocardiogram:   Recent Results (from the past 8760 hour(s))   ECHO LIMITED WITH OPTISON    Narrative    [id number]  [id number]  [id number]  [id number]^PERRY^ROSENDO^[facility],[place]  Echocardiography Laboratory  [address] [place] [id number]     Name: MENDOZA GREENBERG  MRN: [id number]  : 1940  Study Date: 2017 08:37 AM  Age: 76 yrs  Gender: Female  Patient Location: [facility]  Reason For Study: Afib  Ordering Physician: ROSENDO AMADOR  Performed By: BRANDAN Mims     BSA: 1.8 m2  Height: 62 in  Weight: 172 lb  BP: 131/77 mmHg  _____________________________________________________________________________  __        Procedure  Limited Portable Echo Adult. Contrast Optison. Technically difficult study.  Optison (NDC #7785-0905-41) given intravenously. Patient was given 6 ml  mixture of 3 ml Optison and 6 ml saline. 3 ml wasted. IV start location L  Upper arm  .  _____________________________________________________________________________  __        Interpretation Summary  No LV throbus noted.  _____________________________________________________________________________  __        Left Ventricle  Left ventricular size is normal. The Ejection Fraction is estimated at 55-60%.  Diastolic function not assessed due to atrial fibrillation. No LV throbus  noted.     Right Ventricle  Right ventricular function, chamber size, wall motion, and thickness are  normal.     Vessels  The inferior vena cava is normal.        Pericardium  No pericardial effusion is present.  _____________________________________________________________________________  __  MMode/2D Measurements & Calculations     EF(MOD-bp): 44.0 %     TAPSE: 0.96 cm        Doppler Measurements & Calculations  TV max P.8 mmHg  TR max venkata: 311.4 cm/sec  TR max P.8 mmHg           _____________________________________________________________________________  __           Report approved by: Young Kong 2017 10:11 AM      ECHO COMPLETE WITH OPTISON    Narrative    [id number]  [id number]  [id number]  [id number]^RIKKI^RAVINDER^FAB           [facility],[place]  Echocardiography Laboratory  [address] [place] [address]     Name: MENDOZA GREENBERG  MRN: [id number]  : 1940  Study Date: 2017 12:47 PM  Age: 76 yrs  Gender: Female  Patient Location: [facility]  Reason For Study: CABG  Ordering Physician: RAVINDER BRANDT  Performed By: TEZ Cardoso     BSA: 1.8 m2  Height: 62 in  Weight: 167 lb  BP: 97/59 mmHg  _____________________________________________________________________________  __        Procedure  Echocardiogram with two-dimensional, color and spectral Doppler performed.  Contrast Optison. Optison (NDC #5532-7753-63) given intravenously. Patient was  given 6.0 ml mixture of 3 ml Optison and 6 ml saline. 3.0 ml  wasted.  _____________________________________________________________________________  __        Interpretation Summary  Mildly (EF 45-50%) reduced left ventricular function is present.  Right ventricular function, chamber size, wall motion, and thickness are  normal.  Trivial pericardial effusion is present.     No change from prior.  _____________________________________________________________________________  __        Left Ventricle  Left ventricular wall thickness is normal. Mildly (EF 45-50%) reduced left  ventricular function is present. Left ventricular diastolic function is  indeterminate. Regional wall motion is probably normal.     Right Ventricle  Right ventricular function, chamber size, wall motion, and thickness are  normal. A right heart catheter is noted in the right ventricle.     Mitral Valve  Trace to mild mitral insufficiency is present.        Aortic Valve  Aortic valve is normal in structure and function.     Tricuspid Valve  Mild tricuspid insufficiency is present. The right ventricular systolic  pressure is approximated at 30.9 mmHg plus the right atrial pressure.     Pulmonic Valve  Trace to mild pulmonic insufficiency is present.     Vessels  The aorta root is normal. The inferior vena cava cannot be assessed.     Pericardium  Trivial pericardial effusion is present.     _____________________________________________________________________________  __  MMode/2D Measurements & Calculations  RVDd: 3.4 cm  IVSd: 0.95 cm  LVIDd: 4.4 cm  LVIDs: 3.5 cm  LVPWd: 0.93 cm  FS: 19.4 %  EDV(Teich): 87.7 ml  ESV(Teich): 52.6 ml  LV mass(C)d: 135.7 grams  Ao root diam: 3.0 cm           Doppler Measurements & Calculations  MV E max venkata: 75.6 cm/sec  TR max venkata: 278.0 cm/sec  TR max P.9 mmHg  Lateral E/e': 9.6  Medial E/e': 15.9           _____________________________________________________________________________  __           Report approved by: Young Alarcon 2017 04:11 PM         maryo patch 2017     patch    Assessment and Plan:     Atrial fibrillation  We discussed in detail with the patient management/treatment options for Misael includin. Stroke Prophylaxis:  CHADSVASC=CAD, Female, age++, HTN, HF, CVA 7, corresponding to a 9.6% annual [facility]. indicating need for long term oral anticoagulation. She is appropriately on warfarin, however is having subtherapuetic INRs.  Asked patient and her daughter to set up a face to face appointment with coumadin clinic RN per the RN's note.   If a face to face meeting does not improve INR, will need to discuss a NOAC.    2. Rate Control: Her rate is modestly controlled and is not currently taking a BB.  Will start metoprolol XL 12.5 mg.  This was also the plan of the [facility] clinic for next visit.  Discussed the medication to Carlos Alberto Fenton and her daughter.     3. Rhythm Control: Cardioversion, Antiarrhythmics and/or ablation are options for rhythm control.  Carlos Alberto Fenton is asymptomatic in atrial fibrillation. However would still recommend DCCV with a TAVO as her INR is subtherapuetic.  Would prefer her to meet with coumadin RN, have her INRs become therapuetic and then pursue the TAVO and DCCV.  Would like the DCCV to occur prior to meeting Dr. Lobato on .       HFpEF  - Last EF 45-50%.  - Following in [facility]  - weight at 168  - continue with bumex 2 mg twice a day  - continue with spironolactone 25 mg daily  - continue to weigh self daily and use metolazone 2.5 as needed based on weight.   - will start Toprol XL 12. 5 mg today     Coronary artery disease s/p 3v CABG 2017  -continue asa 81 mg and  - continue atorvastatin 40 mg  -Start Toprol XL 12.5 mg today        # HTN   - controlled      # Hyperlipidemia  - continue atorvastatin 40 mg        45 minutes was spent with patient and her daughter; over 50% of this time was spent in counseling patient and daughter. If symptoms get worse, please return to clinic.   They are agreeable to plan.        CHACORTA Scott, CNP    CC  HANSA HUGHES

## 2017-10-26 NOTE — PATIENT INSTRUCTIONS
Cardiology Provider you saw in clinic today: CHACORTA Scott, CNP    Medication Changes:  Start taking metoprolol XL (toprol XL) 12.5 mg     Labs/Tests needed:  We will call you to schedule a cardioversion     Follow-up Visit:  With [facility] and Dr. Lobato        You will receive all normal lab and testing results via [facility] or mail if not reviewed in clinic today. Please contact our office if you need assistance with setting up MyChart.    If you need a medication refill please contact your pharmacy. Please allow 3 business days for your refill to be completed.     As always, thank you for trusting us with your health care needs!    If you have any questions regarding your visit please contact your care team:   Cardiology  Telephone Number    EP RN  Electrophysiology Nurse Coordinator [phone]     Call for EP procedure scheduling concerns  [facility]  [phone]           [facility] (Pacemakers, ICDs, Loop)   RN's : Meena Barlow Connie, Dawn During business hours: [phone]    After business hours:   [phone]- select option 4 and ask for job code [id number].          Living with Atrial Fibrillation: Preventing Stroke  Atrial fibrillation (AFib) is the most common abnormal heart rhythm in the world. The heart has 2 upper chambers called atria and 2 lower chambers called ventricles. AFib causes the atria to quiver (fibrillate) instead of pumping normally. Blood can then pool in the heart instead of moving in and out as usual. This can cause blood clots to form inside the heart. A clot can break free, travel to the brain and cause a stroke. A stroke can cause brain damage very quickly.    Taking medicine to prevent stroke  Your healthcare provider may prescribe a medicine to help prevent blood clots. This type of medicine is called a blood thinner. Blood thinners include:    Antiplatelet medicines, such as aspirin or clopidrogrel    Anticoagulation medicines, such as warfarin,  dabigatran, rivaroxaban, apixaban, or edoxaban  Risks of blood thinner medicine  Blood thinners increase your risk of bleeding. If you take certain blood thinners, you may need to take extra steps to stay healthy. You may need regular blood tests to check the levels of medicine in your blood. You ll need to be careful not to injure yourself. And you may need to watch your diet for foods that affect blood clotting.  If your blood is too thin, you may have symptoms of excess bleeding, such as:    Unusual bruising    Bleeding from the gums    Blood in the urine or stool    Black stools    Vomiting blood    Nosebleeds    An unusual or severe headache  Taking the right dose  You ll need to make sure to take the medicine exactly as directed by your healthcare provider. Take it at the same time each day. If you miss a dose, call your provider right away to find out how much to take. Never take a double dose. If you take too much, it can cause too much bleeding. It can cause bleeding you can see, on the outside of your body. And it can cause bleeding on the inside of your body that you may not be aware of.  Getting your blood tested  Depending on which blood thinner you take, you may need to have your blood tested on a regular schedule. This is to make sure you don t have too much or too little of the medicine in your blood. Too much can cause excess bleeding. Too little may not prevent blood clots from harming you.  You may need to visit a hospital or clinic every week to have your blood tested. Or a nurse may come to your home and test your blood. In some cases, you may be able to test your blood at home with a small machine. Talk with your healthcare provider to find out what s best for you. After the blood test, your healthcare provider may tell you to change your dose of medicine.  Watching your diet  Some foods can affect how certain blood thinners work. In particular, warfarin levels are sensitive to your diet. For  example, many foods contain vitamin K. Vitamin K is a substance that helps your blood clot. You don t need to avoid foods that have vitamin K. But you do need to keep the amount of them you eat steady as possible from day to day. Examples of foods high in vitamin K are asparagus, avocado, broccoli, cabbage, kale, spinach, and some other leafy green vegetables. Oils, such as soybean, canola, and olive, are also high in vitamin K.  Other foods and drinks can affect the way blood thinners work in your body. These include:    Grapefruit and grapefruit juice    Cranberries and cranberry juice    Fish oil supplements    Garlic, karen, licorice, and turmeric    Herbs used in herbal teas or supplements    Alcohol  If any of these items are part of your regular diet, continue using them as you normally would. Don t make any major changes in your diet without first talking with your healthcare provider.  You may also need to limit fats in your diet to 2 to 4 tablespoons a day.  Preventing injury  Because blood thinners make you bleed more, you ll need to protect yourself from breaks in the skin. Follow these guidelines:    Don't go barefoot   always wear shoes.    Don't trim corns or calluses yourself.    Consider using an electric razor instead of a manual one.    Use a soft-bristled toothbrush and waxed dental floss.  You ll also need to avoid any activities that may cause injury. If you fall or are injured, you could be bleeding inside your body and not know it. Make sure to get medical attention right away if you fall, hit your head, or have any other kind of injury.  Other safety tips  While on your medicine, be sure to:    Tell all of your healthcare providers that you take a blood thinner for AFib. This includes all of your doctors, dental care providers, and your pharmacist.    Ask your doctor before taking any new medicines, vitamins, or other supplements. Any of these can cause problems when you take a blood  thinner.    Wear a medical alert bracelet or carry an ID card in your wallet if you will be taking blood thinners for months or longer.    Keep all appointments for your blood tests.  Procedures to prevent stroke  Most blood clots that form in the heart occur in a pouch of the left atrium called the appendage. This pouch can often be large and have multiple lobes which can permit blood pooling and clot formation. Left atrial appendage closure is a nonsurgical procedure in which a self-expanding plug is placed at the opening of the left atrial appendage to close off the appendage from the rest of the heart. Once the plug has fully sealed, no blood can enter or leave the appendage. This reduces blood clot formation and stroke risk. Ask your doctor if you qualify for this type of procedure.  Other ways to help prevent stroke  Your healthcare provider might give you other advice about how to lower your risk for stroke, such as:    Lowering your cholesterol with lifestyle changes or medicine    Not smoking    Getting physical activity    Losing weight if needed    Eating a heart-healthy diet    Not drinking too much alcohol     When to call your healthcare provider  Call your healthcare provider right away if you have any of these:    Unusual or severe headache    Confusion, weakness, or numbness    Loss of vision    Difficulty with speech    Bleeding that won t stop    Coughing or vomiting blood    Bright red blood in the stool    Fall or injury to the head    Symptoms of atrial fibrillation that are new or getting worse   Date Last Reviewed: 5/1/2016 2000-2017 [employer]. [address] [place] [id number]. All rights reserved. This information is not intended as a substitute for professional medical care. Always follow your healthcare professional's instructions.

## 2017-10-26 NOTE — MR AVS SNAPSHOT
After Visit Summary   10/26/2017    Carlos Alberto Fenton    MRN: [id number]           Patient Information     Date Of Birth          1940        Visit Information        Provider Department      10/26/2017 5:30 PM Tracey Taylor APRN CNP [facility]        Today's Diagnoses     Paroxysmal atrial fibrillation (H)          Care Instructions    Cardiology Provider you saw in clinic today: CHACORTA Scott, CNP    Medication Changes:  Start taking metoprolol XL (toprol XL) 12.5 mg     Labs/Tests needed:  We will call you to schedule a cardioversion     Follow-up Visit:  With [facility] and Dr. Lobato        You will receive all normal lab and testing results via [employer] or mail if not reviewed in clinic today. Please contact our office if you need assistance with setting up MyChart.    If you need a medication refill please contact your pharmacy. Please allow 3 business days for your refill to be completed.     As always, thank you for trusting us with your health care needs!    If you have any questions regarding your visit please contact your care team:   Cardiology  Telephone Number    EP RN  Electrophysiology Nurse Coordinator [phone]     Call for EP procedure scheduling concerns  SPIKE Blanco  [phone]           Device Clinic (Pacemakers, ICDs, Loop)   RN's : Meena Barlow Connie, Dawn During business hours: [phone]    After business hours:   [phone]- select option 4 and ask for job code [phone].          Living with Atrial Fibrillation: Preventing Stroke  Atrial fibrillation (AFib) is the most common abnormal heart rhythm in the world. The heart has 2 upper chambers called atria and 2 lower chambers called ventricles. AFib causes the atria to quiver (fibrillate) instead of pumping normally. Blood can then pool in the heart instead of moving in and out as usual. This can cause blood clots to form inside the heart. A clot can break free, travel to  the brain and cause a stroke. A stroke can cause brain damage very quickly.    Taking medicine to prevent stroke  Your healthcare provider may prescribe a medicine to help prevent blood clots. This type of medicine is called a blood thinner. Blood thinners include:    Antiplatelet medicines, such as aspirin or clopidrogrel    Anticoagulation medicines, such as warfarin, dabigatran, rivaroxaban, apixaban, or edoxaban  Risks of blood thinner medicine  Blood thinners increase your risk of bleeding. If you take certain blood thinners, you may need to take extra steps to stay healthy. You may need regular blood tests to check the levels of medicine in your blood. You ll need to be careful not to injure yourself. And you may need to watch your diet for foods that affect blood clotting.  If your blood is too thin, you may have symptoms of excess bleeding, such as:    Unusual bruising    Bleeding from the gums    Blood in the urine or stool    Black stools    Vomiting blood    Nosebleeds    An unusual or severe headache  Taking the right dose  You ll need to make sure to take the medicine exactly as directed by your healthcare provider. Take it at the same time each day. If you miss a dose, call your provider right away to find out how much to take. Never take a double dose. If you take too much, it can cause too much bleeding. It can cause bleeding you can see, on the outside of your body. And it can cause bleeding on the inside of your body that you may not be aware of.  Getting your blood tested  Depending on which blood thinner you take, you may need to have your blood tested on a regular schedule. This is to make sure you don t have too much or too little of the medicine in your blood. Too much can cause excess bleeding. Too little may not prevent blood clots from harming you.  You may need to visit a hospital or clinic every week to have your blood tested. Or a nurse may come to your home and test your blood. In some  cases, you may be able to test your blood at home with a small machine. Talk with your healthcare provider to find out what s best for you. After the blood test, your healthcare provider may tell you to change your dose of medicine.  Watching your diet  Some foods can affect how certain blood thinners work. In particular, warfarin levels are sensitive to your diet. For example, many foods contain vitamin K. Vitamin K is a substance that helps your blood clot. You don t need to avoid foods that have vitamin K. But you do need to keep the amount of them you eat steady as possible from day to day. Examples of foods high in vitamin K are asparagus, avocado, broccoli, cabbage, kale, spinach, and some other leafy green vegetables. Oils, such as soybean, canola, and olive, are also high in vitamin K.  Other foods and drinks can affect the way blood thinners work in your body. These include:    Grapefruit and grapefruit juice    Cranberries and cranberry juice    Fish oil supplements    Garlic, karen, licorice, and turmeric    Herbs used in herbal teas or supplements    Alcohol  If any of these items are part of your regular diet, continue using them as you normally would. Don t make any major changes in your diet without first talking with your healthcare provider.  You may also need to limit fats in your diet to 2 to 4 tablespoons a day.  Preventing injury  Because blood thinners make you bleed more, you ll need to protect yourself from breaks in the skin. Follow these guidelines:    Don't go barefoot - always wear shoes.    Don't trim corns or calluses yourself.    Consider using an electric razor instead of a manual one.    Use a soft-bristled toothbrush and waxed dental floss.  You ll also need to avoid any activities that may cause injury. If you fall or are injured, you could be bleeding inside your body and not know it. Make sure to get medical attention right away if you fall, hit your head, or have any other kind  of injury.  Other safety tips  While on your medicine, be sure to:    Tell all of your healthcare providers that you take a blood thinner for AFib. This includes all of your doctors, dental care providers, and your pharmacist.    Ask your doctor before taking any new medicines, vitamins, or other supplements. Any of these can cause problems when you take a blood thinner.    Wear a medical alert bracelet or carry an ID card in your wallet if you will be taking blood thinners for months or longer.    Keep all appointments for your blood tests.  Procedures to prevent stroke  Most blood clots that form in the heart occur in a pouch of the left atrium called the appendage. This pouch can often be large and have multiple lobes which can permit blood pooling and clot formation. Left atrial appendage closure is a nonsurgical procedure in which a self-expanding plug is placed at the opening of the left atrial appendage to close off the appendage from the rest of the heart. Once the plug has fully sealed, no blood can enter or leave the appendage. This reduces blood clot formation and stroke risk. Ask your doctor if you qualify for this type of procedure.  Other ways to help prevent stroke  Your healthcare provider might give you other advice about how to lower your risk for stroke, such as:    Lowering your cholesterol with lifestyle changes or medicine    Not smoking    Getting physical activity    Losing weight if needed    Eating a heart-healthy diet    Not drinking too much alcohol     When to call your healthcare provider  Call your healthcare provider right away if you have any of these:    Unusual or severe headache    Confusion, weakness, or numbness    Loss of vision    Difficulty with speech    Bleeding that won t stop    Coughing or vomiting blood    Bright red blood in the stool    Fall or injury to the head    Symptoms of atrial fibrillation that are new or getting worse   Date Last Reviewed: 5/1/2016     2543-7909 [employer]. [address] [place] [address]. All rights reserved. This information is not intended as a substitute for professional medical care. Always follow your healthcare professional's instructions.                Follow-ups after your visit        Your next 10 appointments already scheduled     Nov 06, 2017 12:00 PM CST   (Arrive by 11:45 AM)   RETURN FOOT/ANKLE with Easton Torres DPM   [facility] ([facility])    [address] [place] [address]  4th [facility] [id number] [phone]            Nov 06, 2017  1:30 PM CST   Lab with  LAB    [facility] ([facility])    [address] [place] [address]  1st [facility] [id number] [phone]            Nov 06, 2017  2:00 PM CST   (Arrive by 1:45 PM)   CORE RETURN with CHACORTA Goldman CNP   [facility] ([facility])    [address] [place] [address]  3rd [facility] [id number] [phone]            Nov 20, 2017  3:15 PM CST   (Arrive by 3:00 PM)   Return Vascular Visit with Leah Santamaria MD   [facility] ([facility])    [address] [place] [id number] [phone]            Nov 24, 2017  1:30 PM CST   (Arrive by 1:15 PM)   Return Visit with Star Lobato MD   [facility] ([facility])    [address] [place] [id number] [phone]              Who to contact     If you have questions or need follow up information about today's clinic visit or your schedule please contact [facility] directly at [phone].  Normal or non-critical lab and imaging results will be communicated to you by MyChart, letter or phone within 4 business days after the clinic has received the results. If you do not hear from us within 7 days, please contact the clinic through MyChart or  "phone. If you have a critical or abnormal lab result, we will notify you by phone as soon as possible.  Submit refill requests through [employer] or call your pharmacy and they will forward the refill request to us. Please allow 3 business days for your refill to be completed.          Additional Information About Your Visit        [employer] Information     [employer] gives you secure access to your electronic health record. If you see a primary care provider, you can also send messages to your care team and make appointments. If you have questions, please call your primary care clinic.  If you do not have a primary care provider, please call [phone] and they will assist you.        Care EveryWhere ID     This is your Care EveryWhere ID. This could be used by other organizations to access your [place] medical records  [id number]        Your Vitals Were     Pulse Height Pulse Oximetry BMI (Body Mass Index)          73 1.575 m (5' 2\") 91% 30.73 kg/m2         Blood Pressure from Last 3 Encounters:   10/26/17 133/81   10/09/17 116/67   10/03/17 107/63    Weight from Last 3 Encounters:   10/26/17 76.2 kg (168 lb)   10/09/17 78 kg (172 lb)   09/20/17 77.8 kg (171 lb 8 oz)              We Performed the Following     EKG 12-lead, tracing only (Future)          Today's Medication Changes          These changes are accurate as of: 10/26/17  6:29 PM.  If you have any questions, ask your nurse or doctor.               Start taking these medicines.        Dose/Directions    metoprolol 25 MG 24 hr tablet   Commonly known as:  TOPROL-XL   Used for:  Paroxysmal atrial fibrillation (H)   Started by:  Tracey Taylor APRN CNP        Dose:  12.5 mg   Take 0.5 tablets (12.5 mg) by mouth daily   Quantity:  45 tablet   Refills:  3         These medicines have changed or have updated prescriptions.        Dose/Directions    aspirin 81 MG chewable tablet   This may have changed:  when to take this   Used for:  Coronary artery " disease with angina pectoris, unspecified vessel or lesion type, unspecified whether native or transplanted heart (H)        Dose:  81 mg   Take 1 tablet (81 mg) by mouth daily   Quantity:  30 tablet   Refills:  0       ferrous sulfate 325 (65 FE) MG tablet   Commonly known as:  IRON SUPPLEMENT   This may have changed:  when to take this   Used for:  S/P CABG (coronary artery bypass graft)        Dose:  325 mg   Take 1 tablet (325 mg) by mouth daily (with breakfast)   Quantity:  100 tablet   Refills:  1            Where to get your medicines      These medications were sent to [facility] [address] [place] [address] [facility]  [address] [facility] [address]     Phone:  [phone]     metoprolol 25 MG 24 hr tablet                Primary Care Provider Office Phone # Fax #    Humberto Conner [phone]       [facility] [address] [place] [address]        Equal Access to Services     [place] campusLORAINE AH: Hadii william flores hadasho Soomaali, waaxda luqadaha, qaybta kaalmada adeegyada, waxay paigein abimael mattson. So [facility] [phone].    ATENCIÓN: Si habla español, tiene a espinoza disposición servicios gratuitos de asistencia lingüística. Zaid al [phone].    We comply with applicable federal civil rights laws and [place] laws. We do not discriminate on the basis of race, color, national origin, age, disability, sex, sexual orientation, or gender identity.            Thank you!     Thank you for choosing [facility]  for your care. Our goal is always to provide you with excellent care. Hearing back from our patients is one way we can continue to improve our services. Please take a few minutes to complete the written survey that you may receive in the mail after your visit with us. Thank you!             Your Updated Medication List - Protect others around you: Learn how to safely use, store and throw away your medicines at www.disposemymeds.org.           This list is accurate as of: 10/26/17  6:29 PM.  Always use your most recent med list.                   Brand Name Dispense Instructions for use Diagnosis    ACE BANDAGE SELF-ADHERING Misc     6 each    1 each 2 times daily    Open wound of lower limb, right, subsequent encounter       acetaminophen 325 MG tablet    TYLENOL    100 tablet    Take 3 tablets (975 mg) by mouth every 6 hours as needed for mild pain    S/P CABG (coronary artery bypass graft)       ADAPTIC NON-ADHERING DRESSING Pads     1 each    Externally apply 1 each topically 2 times daily    Open wound of lower limb, right, subsequent encounter       amoxicillin-clavulanate 875-125 MG per tablet    AUGMENTIN    28 tablet    Take 1 tablet by mouth 2 times daily    Toe gangrene (H)       aspirin 81 MG chewable tablet     30 tablet    Take 1 tablet (81 mg) by mouth daily    Coronary artery disease with angina pectoris, unspecified vessel or lesion type, unspecified whether native or transplanted heart (H)       atorvastatin 40 MG tablet    LIPITOR    90 tablet    Take 1 tablet (40 mg) by mouth daily    Coronary artery disease with angina pectoris, unspecified vessel or lesion type, unspecified whether native or transplanted heart (H)       blood glucose monitoring meter device kit           bumetanide 2 MG tablet    BUMEX    180 tablet    Take 1 tablet (2 mg) by mouth 2 times daily    Chronic systolic heart failure (H)       ferrous sulfate 325 (65 FE) MG tablet    IRON SUPPLEMENT    100 tablet    Take 1 tablet (325 mg) by mouth daily (with breakfast)    S/P CABG (coronary artery bypass graft)       gabapentin 100 MG capsule    NEURONTIN    180 capsule    Take 1 capsule (100 mg) by mouth 2 times daily    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       KERLIX GAUZE ROLL MEDIUM Misc     48 each    1 each 2 times daily    Open wound of lower limb, right, subsequent encounter       metolazone 2.5 MG tablet    ZAROXOLYN    30 tablet    Please take on  Saturday 9/2, Monday 9/4, and Wednesday 9/6.    Cardiomyopathy, unspecified       metoprolol 25 MG 24 hr tablet    TOPROL-XL    45 tablet    Take 0.5 tablets (12.5 mg) by mouth daily    Paroxysmal atrial fibrillation (H)       ONE TOUCH ULTRA test strip   Generic drug:  blood glucose monitoring     100 each    USE AS DIRECTED TO TEST ONE TIME A DAY    Type 2 diabetes mellitus without complication, without long-term current use of insulin (H)       ONETOUCH DELICA LANCETS 33G Misc     100 each    1 Device daily    Type 2 diabetes mellitus without complication, without long-term current use of insulin (H)       order for DME     1 Box    Sterile 4x4 gauze; Use gauze twice daily for dressing changes.    Open wound of lower limb, right, subsequent encounter       pantoprazole 40 MG EC tablet    PROTONIX    90 tablet    Take 1 tablet (40 mg) by mouth every morning    Coronary artery disease with angina pectoris, unspecified vessel or lesion type, unspecified whether native or transplanted heart (H)       potassium chloride SA 20 MEQ CR tablet    K-DUR/KLOR-CON M    120 tablet    Take 1 tablet (20 mEq) by mouth 2 times daily    S/P CABG (coronary artery bypass graft)       spironolactone 25 MG tablet    ALDACTONE    90 tablet    Take 1 tablet (25 mg) by mouth daily    Chronic systolic heart failure (H)       traZODone 50 MG tablet    DESYREL    45 tablet    Take 0.5 tablets (25 mg) by mouth nightly as needed for sleep    Primary insomnia       venlafaxine 150 MG Tb24 24 hr tablet    EFFEXOR-ER    90 each    Take 1 tablet (150 mg) by mouth daily (with breakfast)    Adjustment disorder with depressed mood       warfarin 5 MG tablet    COUMADIN    90 tablet    Take 1 tablet (5 mg) by mouth daily    Paroxysmal atrial fibrillation (H)

## 2017-10-26 NOTE — PROGRESS NOTES
Clinical Cardiac Electrophysiology        HPI:     76F, PMH of HTN, HPL, DM, CVA (11/2016), CAD (s/p 3v CABG: LIMA-LAD, SVG-D1, SVG-dRCA and modified MAZE, ABDIEL ligation - 2/2016), paroxysmal AFib (on amio, [id number] - 8), HIT (was on Fondaparinux), RUE DVT who is here for AFib follow up.  She is a patient of Dr. Lobato, [facility] and Dr. Santos's    4/17/2017 After her CABG/ABDIEL ligation/MAZE procedure, within a couple of weeks she was readmitted to the hospital with toe gangrene and was found to have AFib during the hospitalization. During the 4/17 visit her Amiodarone stopped.      She saw Dr. Lobato on 9/2017 and her Fondaparinux was stopped as her  Plt count is > 200, and her warfarin was restarted.  She was also fluid up and started on bumex with metolazone    10/25/2017  Today she presents with her daughter who she is living with as her  went back to [facility]. She is in atrial fibrillation and was in atrial fibrillation when she wore the zio patch for a day in September 2017.  She is unaware she is in atrial fibrillation. Her daughter is upset that she is in atrial fibrillation and education was provided on the diagnosis.  Much attention was discussed on patient's subtherapuetic INRs, per the INR clinic note there is difficulty with patient adherence and comprehension. She continues to see vascular for open wounds.  Only takes metolazone when weight up to 169-170 but her weight has been 168.   States she walked up to the clinic today and stops because of back pain.  Sleeps with 2 pillows which her usual and wears CPAP. Oxygen saturations is 95% in clinic today. Home BP is 110s and HRs in 80-90s.  Does have edema on LE.   Denies headaches, dizziness, syncope, angina, dyspnea at rest or with exertion, palpitations, orthopnea, PND, abdominal pain, or any new numbness/weakness, hematuria, hematochezia, and epistaxis.    Today's EKG atrial fibrillation with ventricular rate of 95 bpm    Current  cardiac medications include spironolactone 25 mg daily, bumex 2 mg daily, atrovastatin 40 mg daily, K dur 40 mg daily, metoazone 2.5 mg (intermittently taking), warfarin, aspirin,     PAST MEDICAL HISTORY:  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    parietral lesions bilateral       Antiplatelet or antithrombotic long-term use      Anxiety      Atrial fibrillation (H)      CAD (coronary artery disease)     2 vessel     Cancer (H) 1990    periodically have cancer on the skin removed     Cerebral artery occlusion with cerebral infarction (H) 10/2016    Cardioembolic strokes related to atrial fibrillation     Deep vein thrombosis (DVT) of axillary vein of right upper extremity (H) 2/25/2017     Depression      Depressive disorder 2001     Diabetes (H)      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     Hyperlipidemia      Hyperlipidemia LDL goal <130 10/31/2010     Hypertension      Panic attacks      Seizures (H) 10/19/2016     Sleep apnea     Uses CPAP       CURRENT MEDICATIONS:  Current Outpatient Prescriptions   Medication Sig Dispense Refill     spironolactone (ALDACTONE) 25 MG tablet Take 1 tablet (25 mg) by mouth daily 90 tablet 3     bumetanide (BUMEX) 2 MG tablet Take 1 tablet (2 mg) by mouth 2 times daily 180 tablet 3     atorvastatin (LIPITOR) 40 MG tablet Take 1 tablet (40 mg) by mouth daily 90 tablet 1     gabapentin (NEURONTIN) 100 MG capsule Take 1 capsule (100 mg) by mouth 2 times daily 180 capsule 1     traZODone (DESYREL) 50 MG tablet Take 0.5 tablets (25 mg) by mouth nightly as needed for sleep 45 tablet 2     pantoprazole (PROTONIX) 40 MG EC tablet Take 1 tablet (40 mg) by mouth every morning 90 tablet 1     potassium chloride SA (K-DUR/KLOR-CON M) 20 MEQ CR tablet Take 1 tablet (20 mEq) by mouth 2 times daily 120 tablet 1     amoxicillin-clavulanate (AUGMENTIN) 875-125 MG per tablet Take 1 tablet by mouth 2  times daily (Patient not taking: Reported on 9/20/2017) 28 tablet 3     metolazone (ZAROXOLYN) 2.5 MG tablet Please take on Saturday 9/2, Monday 9/4, and Wednesday 9/6. (Patient not taking: Reported on 9/20/2017) 30 tablet 1     warfarin (COUMADIN) 5 MG tablet Take 1 tablet (5 mg) by mouth daily 90 tablet 3     Elastic Bandages & Supports (ACE BANDAGE SELF-ADHERING) MISC 1 each 2 times daily 6 each 3     Gauze Pads & Dressings (KERLIX GAUZE ROLL MEDIUM) MISC 1 each 2 times daily 48 each 3     venlafaxine (EFFEXOR-ER) 150 MG TB24 24 hr tablet Take 1 tablet (150 mg) by mouth daily (with breakfast) 90 each 1     ONE TOUCH ULTRA test strip USE AS DIRECTED TO TEST ONE TIME A  each 2     Wound Dressings (ADAPTIC NON-ADHERING DRESSING) PADS Externally apply 1 each topically 2 times daily 1 each 3     order for DME Sterile 4x4 gauze; Use gauze twice daily for dressing changes. 1 Box 3     acetaminophen (TYLENOL) 325 MG tablet Take 3 tablets (975 mg) by mouth every 6 hours as needed for mild pain 100 tablet 0     aspirin 81 MG chewable tablet Take 1 tablet (81 mg) by mouth daily (Patient taking differently: Take 81 mg by mouth every morning ) 30 tablet 0     ferrous sulfate (IRON SUPPLEMENT) 325 (65 FE) MG tablet Take 1 tablet (325 mg) by mouth daily (with breakfast) (Patient taking differently: Take 325 mg by mouth 2 times daily ) 100 tablet 1     ONETOUCH DELICA LANCETS 33G MISC 1 Device daily 100 each 0     blood glucose monitoring (ONE TOUCH ULTRA 2) meter device kit          PAST SURGICAL HISTORY:  Past Surgical History:   Procedure Laterality Date     AMPUTATE TOE(S) Right 5/26/2017    Procedure: AMPUTATE TOE(S);  Right Great Toe amputation, debriedment of 2 and 3rd toe soft tissu right foot. and application of Grafix;  Surgeon: Leah Santamaria MD;  Location: [facility] OR     BACK SURGERY       BYPASS GRAFT ARTERY CORONARY N/A 2/6/2017    Procedure: BYPASS GRAFT ARTERY CORONARY;  Surgeon: Mikhail Quiñones MD;   Location: [facility] OR     C APPENDECTOMY  1959     C CARDIAC SURG PROCEDURE UNLIST       C HAND/FINGER SURGERY UNLISTED       C STOMACH SURGERY PROCEDURE UNLISTED       HC SACROPLASTY       HC VASCULAR SURGERY PROCEDURE UNLIST       HYSTERECTOMY, PASTORA  1988     IRRIGATION AND DEBRIDEMENT FOOT, COMBINED Right 7/7/2017    Procedure: COMBINED IRRIGATION AND DEBRIDEMENT FOOT;  Irrigation and Debridement Right Foot with Graphix  *Latex Allergy*;  Surgeon: Leah Santamaria MD;  Location: [facility] OR     MAZE PROCEDURE N/A 2/6/2017    Procedure: MAZE PROCEDURE;  Surgeon: Mikhail Quiñones MD;  Location: [facility] OR     PICC INSERTION Left 02/25/2017    5fr TL Bard PICC, 47cm (3cm external) in the L basilic vein w/ tip in the SVC RA junction     TONSILLECTOMY  1942       ALLERGIES:     Allergies   Allergen Reactions     Heparin      HIT/ thrombocytopenia     Lovenox [Enoxaparin] Other (See Comments)     Thrombocytopenia      Oxycodone      hallucinations     Adhesive Tape Itching and Rash     Diapers & Supplies Rash     Developed yeast infection from previous hospital stay       FAMILY HISTORY:  Family History   Problem Relation Age of Onset     DIABETES Mother      C.A.D. Mother      Hypertension Mother      CEREBROVASCULAR DISEASE Mother      Mini Strokes     Other Cancer Mother      Skin Cancer     Hyperlipidemia Mother      Coronary Artery Disease Mother      DIABETES Father      C.A.D. Father      Hypertension Father      Other Cancer Father      Hyperlipidemia Father      Coronary Artery Disease Father      HEART DISEASE Sister      Arthritis Sister      Other Cancer Other      Skin Cancer       SOCIAL HISTORY:  Social History   Substance Use Topics     Smoking status: Former Smoker     Packs/day: 1.00     Years: 10.00     Types: Cigarettes     Start date: 10/3/1958     Quit date: 7/4/1976     Smokeless tobacco: Never Used      Comment: Quit in 1976     Alcohol use Yes      Comment: Socially       ROS:   CONSTITUTIONAL:No report of  "fever, chills, or change in weight  RESPIRATORY: No cough, wheezing, SOB, or hemoptysis  CARDIOVASCULAR: see HPI  MUSCULO-SKELETAL: No joint pain/swelling, no muscle pain  NEURO: No paresthesias, syncope, pre-syncope, light headness, dizziness or vertigo  ENDOCRINE: No temperature intolerance, no skin/hair changes  PSYCHIATRIC: No change in mood, feeling down/anxious, no change in sleep or appetite  GI: no melena or hematochezia, no change in bowel habits  : no hematuria or dysurea, no hesitancy, dribbling or incontinence  HEME: no easy bruising or bleeding, no history of anemia, no history of blood clots  SKIN: no rashes or sores, no unusual itching      Exam:  /81 (BP Location: Left arm, Patient Position: Chair, Cuff Size: Adult Regular)  Pulse 73  Ht 1.575 m (5' 2\")  Wt 76.2 kg (168 lb)  SpO2 91%  BMI 30.73 kg/m2  GENERAL APPEARANCE: elderly female, alert and no distress  HEENT: no icterus, no xanthelasmas, normal pupil size and reaction, normal palate, mucosa moist, no central cyanosis  NECK: no adenopathy, no asymmetry, masses, or scars,  no bruits, JVP not elevated  RESPIRATORY: lungs clear to auscultation - no rales, rhonchi or wheezes, no use of accessory muscles, no retractions, respirations are unlabored, normal respiratory rate  CARDIOVASCULAR: irregularly irregular rhythm, normal S1 with physiologic split S2, no S3 or S4 and no murmur, click or rub, precordium quiet with normal PMI.  ABDOMEN: obese, soft, non tender,  bowel sounds normal,no abdominal bruits  EXTREMITIES: peripheral pulses normal, no edema, no bruits  NEURO: alert and oriented to person/place/time, normal speech, gait and affect  VASC: Radial,  dorsalis pedis and posterior tibialis pulses are normal in volumes and symmetric bilaterally.   SKIN: no ecchymoses, no rashes    Labs:  CBC RESULTS:   Lab Results   Component Value Date    WBC 6.4 10/11/2017    RBC 3.99 10/11/2017    HGB 12.8 10/11/2017    HCT 41.3 10/11/2017    MCV " 104 (H) 10/11/2017    MCH 32.1 10/11/2017    MCHC 31.0 (L) 10/11/2017    RDW 16.5 (H) 10/11/2017     10/11/2017       BMP RESULTS:  Lab Results   Component Value Date     10/11/2017    POTASSIUM 3.6 10/11/2017    CHLORIDE 96 10/11/2017    CO2 36 (H) 10/11/2017    ANIONGAP 4 10/11/2017    GLC 56 (L) 10/11/2017    BUN 29 10/11/2017    CR 0.82 10/11/2017    GFRESTIMATED 67 10/11/2017    GFRESTBLACK 82 10/11/2017    CARLY 9.7 10/11/2017        INR RESULTS:  Lab Results   Component Value Date    INR 1.7 (H) 10/24/2017    INR 1.50 (H) 10/20/2017    INR 1.30 (H) 10/18/2017    INR Canceled, Test credited 10/17/2017    INR 2.48 (H) 10/11/2017       Procedures:  Echocardiogram:   Recent Results (from the past 8760 hour(s))   ECHO LIMITED WITH OPTISON    Narrative    [id number]  [id number]  [id number]  [id number]^PERRY^ROSENDO^[facility],[place]  Echocardiography Laboratory  [address] [place] [address]     Name: MENDOZA GREENBERG  MRN: [id number]  : 1940  Study Date: 2017 08:37 AM  Age: 76 yrs  Gender: Female  Patient Location: [facility]  Reason For Study: Afib  Ordering Physician: ROSENDO AMADOR  Performed By: TEZ Mims     BSA: 1.8 m2  Height: 62 in  Weight: 172 lb  BP: 131/77 mmHg  _____________________________________________________________________________  __        Procedure  Limited Portable Echo Adult. Contrast Optison. Technically difficult study.  Optison (NDC #0096-3054-84) given intravenously. Patient was given 6 ml  mixture of 3 ml Optison and 6 ml saline. 3 ml wasted. IV start location L  Upper arm .  _____________________________________________________________________________  __        Interpretation Summary  No LV throbus noted.  _____________________________________________________________________________  __        Left Ventricle  Left ventricular size is normal. The Ejection Fraction is estimated at 55-60%.  Diastolic function not  assessed due to atrial fibrillation. No LV throbus  noted.     Right Ventricle  Right ventricular function, chamber size, wall motion, and thickness are  normal.     Vessels  The inferior vena cava is normal.        Pericardium  No pericardial effusion is present.  _____________________________________________________________________________  __  MMode/2D Measurements & Calculations     EF(MOD-bp): 44.0 %     TAPSE: 0.96 cm        Doppler Measurements & Calculations  TV max P.8 mmHg  TR max venkata: 311.4 cm/sec  TR max P.8 mmHg           _____________________________________________________________________________  __           Report approved by: Young Kong 2017 10:11 AM      ECHO COMPLETE WITH OPTISON    Narrative    [id number]  [id number]  [id number]  [id number]^RIKKI^RAVINDER^M           [facility],[place]  Echocardiography Laboratory  [address] [place] [id number]     Name: MENDOZA GREENBERG  MRN: [id number]  : 1940  Study Date: 2017 12:47 PM  Age: 76 yrs  Gender: Female  Patient Location: [facility]  Reason For Study: CABG  Ordering Physician: RAVINDER BRANDT  Performed By: BRANDAN Cardoso     BSA: 1.8 m2  Height: 62 in  Weight: 167 lb  BP: 97/59 mmHg  _____________________________________________________________________________  __        Procedure  Echocardiogram with two-dimensional, color and spectral Doppler performed.  Contrast Optison. Optison (NDC #3721-6616-13) given intravenously. Patient was  given 6.0 ml mixture of 3 ml Optison and 6 ml saline. 3.0 ml wasted.  _____________________________________________________________________________  __        Interpretation Summary  Mildly (EF 45-50%) reduced left ventricular function is present.  Right ventricular function, chamber size, wall motion, and thickness are  normal.  Trivial pericardial effusion is present.     No change from  prior.  _____________________________________________________________________________  __        Left Ventricle  Left ventricular wall thickness is normal. Mildly (EF 45-50%) reduced left  ventricular function is present. Left ventricular diastolic function is  indeterminate. Regional wall motion is probably normal.     Right Ventricle  Right ventricular function, chamber size, wall motion, and thickness are  normal. A right heart catheter is noted in the right ventricle.     Mitral Valve  Trace to mild mitral insufficiency is present.        Aortic Valve  Aortic valve is normal in structure and function.     Tricuspid Valve  Mild tricuspid insufficiency is present. The right ventricular systolic  pressure is approximated at 30.9 mmHg plus the right atrial pressure.     Pulmonic Valve  Trace to mild pulmonic insufficiency is present.     Vessels  The aorta root is normal. The inferior vena cava cannot be assessed.     Pericardium  Trivial pericardial effusion is present.     _____________________________________________________________________________  __  MMode/2D Measurements & Calculations  RVDd: 3.4 cm  IVSd: 0.95 cm  LVIDd: 4.4 cm  LVIDs: 3.5 cm  LVPWd: 0.93 cm  FS: 19.4 %  EDV(Teich): 87.7 ml  ESV(Teich): 52.6 ml  LV mass(C)d: 135.7 grams  Ao root diam: 3.0 cm           Doppler Measurements & Calculations  MV E max venkata: 75.6 cm/sec  TR max venkata: 278.0 cm/sec  TR max P.9 mmHg  Lateral E/e': 9.6  Medial E/e': 15.9           _____________________________________________________________________________  __           Report approved by: Young Alarcon 2017 04:11 PM        zio patch 2017     patch    Assessment and Plan:     Atrial fibrillation  We discussed in detail with the patient management/treatment options for A.fib includin. Stroke Prophylaxis:  CHADSVASC=CAD, Female, age++, HTN, HF, CVA 7, corresponding to a 9.6% annual stroke / systemic emolism event rate. indicating need for long  term oral anticoagulation. She is appropriately on warfarin, however is having subtherapuetic INRs.  Asked patient and her daughter to set up a face to face appointment with coumadin clinic RN per the RN's note.   If a face to face meeting does not improve INR, will need to discuss a NOAC.    2. Rate Control: Her rate is modestly controlled and is not currently taking a BB.  Will start metoprolol XL 12.5 mg.  This was also the plan of the [facility] clinic for next visit.  Discussed the medication to Carlos Alberto Fenton and her daughter.     3. Rhythm Control: Cardioversion, Antiarrhythmics and/or ablation are options for rhythm control.  Carlos Alberto Fenton is asymptomatic in atrial fibrillation. However would still recommend DCCV with a TAVO as her INR is subtherapuetic.  Would prefer her to meet with coumadin RN, have her INRs become therapuetic and then pursue the TAVO and DCCV.  Would like the DCCV to occur prior to meeting Dr. Lobato on 11/24.       HFpEF  - Last EF 45-50%.  - Following in [facility]  - weight at 168  - continue with bumex 2 mg twice a day  - continue with spironolactone 25 mg daily  - continue to weigh self daily and use metolazone 2.5 as needed based on weight.   - will start Toprol XL 12. 5 mg today     Coronary artery disease s/p 3v CABG 2/2017  -continue asa 81 mg and  - continue atorvastatin 40 mg  -Start Toprol XL 12.5 mg today        # HTN   - controlled      # Hyperlipidemia  - continue atorvastatin 40 mg        45 minutes was spent with patient and her daughter; over 50% of this time was spent in counseling patient and daughter. If symptoms get worse, please return to clinic.  They are agreeable to plan.        Tracey Taylor, APRN, CNP    CC  [facility]

## 2017-10-27 ENCOUNTER — ANTICOAGULATION THERAPY VISIT (OUTPATIENT)
Dept: ANTICOAGULATION | Facility: CLINIC | Age: 77
End: 2017-10-27

## 2017-10-27 DIAGNOSIS — I48.0 PAROXYSMAL ATRIAL FIBRILLATION (H): ICD-10-CM

## 2017-10-27 DIAGNOSIS — Z79.01 LONG-TERM (CURRENT) USE OF ANTICOAGULANTS: ICD-10-CM

## 2017-10-27 RX ORDER — POTASSIUM CHLORIDE 1500 MG/1
20 TABLET, EXTENDED RELEASE ORAL
Status: CANCELLED | OUTPATIENT
Start: 2017-10-27

## 2017-10-27 RX ORDER — MAGNESIUM SULFATE HEPTAHYDRATE 40 MG/ML
2 INJECTION, SOLUTION INTRAVENOUS
Status: CANCELLED | OUTPATIENT
Start: 2017-10-27

## 2017-10-27 RX ORDER — LIDOCAINE 40 MG/G
CREAM TOPICAL
Status: CANCELLED | OUTPATIENT
Start: 2017-10-27

## 2017-10-27 RX ORDER — POTASSIUM CHLORIDE 1500 MG/1
40 TABLET, EXTENDED RELEASE ORAL
Status: CANCELLED | OUTPATIENT
Start: 2017-10-27

## 2017-10-27 NOTE — PROGRESS NOTES
ANTICOAGULATION FOLLOW-UP CLINIC VISIT    Patient Name:  Carlos Alberto Fenton  Date:  10/27/2017  Contact Type:  Telephone    SUBJECTIVE:     Patient Findings     Positives Change in medications (she will be starting metoprolol XL)    Comments She was going to start seeing the [place] INR nurse, but had INR checked at the [facility] yesterday and she cancelled appt with [place].  She plans to call [place] INR clinic today to see if she can get an appointment for Monday, 10/30/17           OBJECTIVE    INR   Date Value Ref Range Status   10/26/2017 1.79 (H) 0.86 - 1.14 Final     Chromogenic Factor 10   Date Value Ref Range Status   03/02/2017 65 (L) 70 - 130 % Final     Comment:     Therapeutic Range:  A Chromogenic Factor 10 level of approximately 20-40%   inversely correlates with an INR of 2-3 for patients receiving Warfarin.   Chromogenic Factor 10 levels below 20% indicate an INR greater than 3 and   levels above 40% indicate an INR less than 2.       Factor 2 Assay   Date Value Ref Range Status   02/27/2017 37 (L) 60 - 140 % Final       ASSESSMENT / PLAN  INR assessment SUB    Recheck INR In: 3 DAYS    INR Location Clinic      Anticoagulation Summary as of 10/27/2017     INR goal 2.0-3.0   Today's INR 1.79! (10/26/2017)   Maintenance plan No maintenance plan   Full instructions 10/27: 5 mg; 10/28: 5 mg; 10/29: 2.5 mg   Plan last modified Yani Delgado, RN (9/1/2017)   Next INR check 10/30/2017   Priority INR   Target end date Indefinite    Indications   Long-term (current) use of anticoagulants [Z79.01] [Z79.01]  New onset atrial fibrillation (H) (Resolved) [I48.91]  Atrial fibrillation (H) [I48.91] [I48.91]         Anticoagulation Episode Summary     INR check location     Preferred lab     Send INR reminders to [facility]    Comments Prounounced A-seenath 4/17/17 Allergice to Heparin/Lovenon per Dr. Angelina Bernal's note  Speak with patients  Darrin in addition to patient.  Hx of 2 strokes.  Pt is  confused. Please speak in tablets only (5mg tablets)/Send MyChart message      Anticoagulation Care Providers     Provider Role Specialty Phone number    arabella Star Vargas MD Responsible Cardiology [phone]            See the Encounter Report to view Anticoagulation Flowsheet and Dosing Calendar (Go to Encounters tab in chart review, and find the Anticoagulation Therapy Visit)    Spoke with Carlos Alberto.  She was seen at the [facility] 10/26/17 and had her INR checked.  She cancelled appt with the INR nurse in [place].  She is going to try to schedule with them on 10/30/17.      Diya Mancilla RN

## 2017-10-27 NOTE — MR AVS SNAPSHOT
Carlos Alberto Fenton   10/27/2017   Anticoagulation Therapy Visit    Description:  77 year old female   Provider:  Diya Mancilla, RN   Department:  [facility]           INR as of 10/27/2017     Today's INR 1.79! (10/26/2017)      Anticoagulation Summary as of 10/27/2017     INR goal 2.0-3.0   Today's INR 1.79! (10/26/2017)   Full instructions 10/27: 5 mg; 10/28: 5 mg; 10/29: 2.5 mg   Next INR check 10/30/2017    Indications   Long-term (current) use of anticoagulants [Z79.01] [Z79.01]  New onset atrial fibrillation (H) (Resolved) [I48.91]  Atrial fibrillation (H) [I48.91] [I48.91]         October 2017 Details    Sun Mon Tue Wed Thu Fri Sat     1               2               3               4               5               6               7                 8               9               10               11               12               13               14                 15               16               17               18               19               20               21                 22               23               24               25               26               27      5 mg   See details      28      5 mg           29      2.5 mg         30            31                    Date Details   10/27 This INR check       Date of next INR:  10/30/2017         How to take your warfarin dose     To take:  2.5 mg Take 0.5 of a 5 mg tablet.    To take:  5 mg Take 1 of the 5 mg tablets.

## 2017-10-30 ENCOUNTER — MYC MEDICAL ADVICE (OUTPATIENT)
Dept: INTERNAL MEDICINE | Facility: CLINIC | Age: 77
End: 2017-10-30

## 2017-10-30 ENCOUNTER — ANTICOAGULATION THERAPY VISIT (OUTPATIENT)
Dept: ANTICOAGULATION | Facility: OTHER | Age: 77
End: 2017-10-30
Payer: COMMERCIAL

## 2017-10-30 ENCOUNTER — ANTICOAGULATION THERAPY VISIT (OUTPATIENT)
Dept: ANTICOAGULATION | Facility: CLINIC | Age: 77
End: 2017-10-30

## 2017-10-30 ENCOUNTER — PRE VISIT (OUTPATIENT)
Dept: CARDIOLOGY | Facility: CLINIC | Age: 77
End: 2017-10-30

## 2017-10-30 DIAGNOSIS — I50.22 CHRONIC SYSTOLIC HEART FAILURE (H): Primary | ICD-10-CM

## 2017-10-30 DIAGNOSIS — Z79.01 LONG-TERM (CURRENT) USE OF ANTICOAGULANTS: ICD-10-CM

## 2017-10-30 DIAGNOSIS — I48.0 PAROXYSMAL ATRIAL FIBRILLATION (H): ICD-10-CM

## 2017-10-30 LAB
INR POINT OF CARE: 2.8 (ref 0.86–1.14)
INTERPRETATION ECG - MUSE: NORMAL

## 2017-10-30 PROCEDURE — 85610 PROTHROMBIN TIME: CPT | Mod: QW

## 2017-10-30 PROCEDURE — 99207 ZZC NO CHARGE NURSE ONLY: CPT

## 2017-10-30 PROCEDURE — 36416 COLLJ CAPILLARY BLOOD SPEC: CPT

## 2017-10-30 NOTE — TELEPHONE ENCOUNTER
If pt calls back, also please schedule with INR appt with INR nurse.  Thanks!!    Gordo Brunner RN, BSN

## 2017-10-30 NOTE — MR AVS SNAPSHOT
Carlos Alberto Fenton   10/30/2017 3:15 PM   Anticoagulation Therapy Visit    Description:  77 year old female   Provider:  [facility] ANTI COAG   Department:  [facility]           INR as of 10/30/2017     Today's INR 2.8      Anticoagulation Summary as of 10/30/2017     INR goal 2.0-3.0   Today's INR 2.8   Full instructions 2.5 mg on Mon, Wed, Fri; 5 mg all other days   Next INR check 11/7/2017    Indications   Long-term (current) use of anticoagulants [Z79.01] [Z79.01]  New onset atrial fibrillation (H) (Resolved) [I48.91]  Atrial fibrillation (H) [I48.91] [I48.91]         Your next Anticoagulation Clinic appointment(s)     Nov 07, 2017  4:15 PM CST   Anticoagulation Visit with [facility]   [facility]    [address] [facility] [address]   [phone]              Contact Numbers     Clinic Number:         October 2017 Details    Sun Mon Tue Wed Thu Fri Sat     1               2               3               4               5               6               7                 8               9               10               11               12               13               14                 15               16               17               18               19               20               21                 22               23               24               25               26               27               28                 29               30      2.5 mg   See details      31      5 mg              Date Details   10/30 This INR check               How to take your warfarin dose     To take:  2.5 mg Take 0.5 of a 5 mg tablet.    To take:  5 mg Take 1 of the 5 mg tablets.           November 2017 Details    Sun Mon Tue Wed Thu Fri Sat        1      2.5 mg         2      5 mg         3      2.5 mg         4      5 mg           5      5 mg         6      2.5 mg         7            8               9               10               11                 12               13                14               15               16               17               18                 19               20               21               22               23               24               25                 26               27               28               29               30                  Date Details   No additional details    Date of next INR:  11/7/2017         How to take your warfarin dose     To take:  2.5 mg Take 0.5 of a 5 mg tablet.    To take:  5 mg Take 1 of the 5 mg tablets.

## 2017-10-30 NOTE — MR AVS SNAPSHOT
Carlos Alberto Fenton   10/30/2017   Anticoagulation Therapy Visit    Description:  77 year old female   Provider:  Chantal Garcia, RN   Department:  [facility]           INR as of 10/30/2017     Today's INR       Anticoagulation Summary as of 10/30/2017     INR goal 2.0-3.0   Today's INR    Next INR check     Indications   Long-term (current) use of anticoagulants [Z79.01] [Z79.01]  New onset atrial fibrillation (H) (Resolved) [I48.91]  Atrial fibrillation (H) [I48.91] [I48.91]         Your next Anticoagulation Clinic appointment(s)     Nov 07, 2017  4:15 PM CST   [facility] with ER [facility]   [facility]    [address] [facility] [address]   [phone]              October 2017 Details    Sun Mon Tue Wed Thu Fri Sat     1               2               3               4               5               6               7                 8               9               10               11               12               13               14                 15               16               17               18               19               20               21                 22               23               24               25               26               27               28                 29               30      2.5 mg   See details      31      5 mg              Date Details   10/30 This INR check               How to take your warfarin dose     To take:  2.5 mg Take 0.5 of a 5 mg tablet.    To take:  5 mg Take 1 of the 5 mg tablets.           November 2017 Details    Sun Mon Tue Wed Thu Fri Sat        1      2.5 mg         2      5 mg         3      2.5 mg         4      5 mg           5      5 mg         6      2.5 mg         7            8               9               10               11                 12               13               14               15               16               17               18                 19               20                21               22               23               24               25                 26               27               28               29               30                  Date Details   No additional details    Date of next INR:  11/7/2017         How to take your warfarin dose     To take:  2.5 mg Take 0.5 of a 5 mg tablet.    To take:  5 mg Take 1 of the 5 mg tablets.

## 2017-10-30 NOTE — TELEPHONE ENCOUNTER
Left message for patient to call back and speak with any .    Thank you,  Scarlet Ortega   for [facility]

## 2017-10-31 NOTE — TELEPHONE ENCOUNTER
Appointment has been scheduled for patient on 11/13/17.      Thank you,  Shani Garcia  Patient Representative, [facility]

## 2017-11-01 ENCOUNTER — MYC REFILL (OUTPATIENT)
Dept: INTERNAL MEDICINE | Facility: CLINIC | Age: 77
End: 2017-11-01

## 2017-11-01 DIAGNOSIS — F43.21 ADJUSTMENT DISORDER WITH DEPRESSED MOOD: ICD-10-CM

## 2017-11-03 RX ORDER — VENLAFAXINE HYDROCHLORIDE 150 MG/1
150 TABLET, EXTENDED RELEASE ORAL
Qty: 90 EACH | Refills: 1 | Status: SHIPPED | OUTPATIENT
Start: 2017-11-03 | End: 2017-11-21

## 2017-11-03 NOTE — TELEPHONE ENCOUNTER
Prescription approved per [facility].  Mariela Baker RN

## 2017-11-03 NOTE — TELEPHONE ENCOUNTER
Effexor       Last Written Prescription Date: 7/24/17  Last Fill Quantity: 90; # refills: 1  Last Office Visit with [facility], [facility] or  Health prescribing provider:  11/17/16   Next 5 appointments (look out 90 days)     Nov 13, 2017  1:00 PM CST   Office Visit with Humberto Conner MD   [facility]    [address] [place] [id number] [phone]                   Last PHQ-9 score on record= No flowsheet data found.    Lab Results   Component Value Date    AST 36 04/17/2017     Lab Results   Component Value Date    ALT 28 04/17/2017

## 2017-11-06 ENCOUNTER — OFFICE VISIT (OUTPATIENT)
Dept: CARDIOLOGY | Facility: CLINIC | Age: 77
End: 2017-11-06
Attending: NURSE PRACTITIONER
Payer: MEDICARE

## 2017-11-06 ENCOUNTER — CARE COORDINATION (OUTPATIENT)
Dept: CARDIOLOGY | Facility: CLINIC | Age: 77
End: 2017-11-06

## 2017-11-06 VITALS
SYSTOLIC BLOOD PRESSURE: 133 MMHG | HEART RATE: 93 BPM | BODY MASS INDEX: 30.31 KG/M2 | HEIGHT: 62 IN | DIASTOLIC BLOOD PRESSURE: 82 MMHG | OXYGEN SATURATION: 98 % | WEIGHT: 164.7 LBS

## 2017-11-06 DIAGNOSIS — I10 ESSENTIAL HYPERTENSION: ICD-10-CM

## 2017-11-06 DIAGNOSIS — I25.10 CORONARY ARTERY DISEASE INVOLVING NATIVE HEART WITHOUT ANGINA PECTORIS, UNSPECIFIED VESSEL OR LESION TYPE: ICD-10-CM

## 2017-11-06 DIAGNOSIS — R53.83 FATIGUE, UNSPECIFIED TYPE: ICD-10-CM

## 2017-11-06 DIAGNOSIS — I48.91 ATRIAL FIBRILLATION, UNSPECIFIED TYPE (H): ICD-10-CM

## 2017-11-06 DIAGNOSIS — I50.32 CHRONIC DIASTOLIC HEART FAILURE (H): Primary | ICD-10-CM

## 2017-11-06 DIAGNOSIS — I48.91 ATRIAL FIBRILLATION, UNSPECIFIED TYPE (H): Primary | ICD-10-CM

## 2017-11-06 DIAGNOSIS — I50.22 CHRONIC SYSTOLIC HEART FAILURE (H): ICD-10-CM

## 2017-11-06 DIAGNOSIS — I48.0 PAROXYSMAL ATRIAL FIBRILLATION (H): ICD-10-CM

## 2017-11-06 DIAGNOSIS — Z79.01 ON WARFARIN THERAPY: ICD-10-CM

## 2017-11-06 LAB
ANION GAP SERPL CALCULATED.3IONS-SCNC: 4 MMOL/L (ref 3–14)
BUN SERPL-MCNC: 27 MG/DL (ref 7–30)
CALCIUM SERPL-MCNC: 10 MG/DL (ref 8.5–10.1)
CHLORIDE SERPL-SCNC: 88 MMOL/L (ref 94–109)
CO2 SERPL-SCNC: 40 MMOL/L (ref 20–32)
CREAT SERPL-MCNC: 0.88 MG/DL (ref 0.52–1.04)
GFR SERPL CREATININE-BSD FRML MDRD: 63 ML/MIN/1.7M2
GLUCOSE SERPL-MCNC: 112 MG/DL (ref 70–99)
INR PPP: 2.71 (ref 0.86–1.14)
MAGNESIUM SERPL-MCNC: 1.8 MG/DL (ref 1.6–2.3)
POTASSIUM SERPL-SCNC: 3.3 MMOL/L (ref 3.4–5.3)
SODIUM SERPL-SCNC: 133 MMOL/L (ref 133–144)

## 2017-11-06 PROCEDURE — 80048 BASIC METABOLIC PNL TOTAL CA: CPT | Performed by: NURSE PRACTITIONER

## 2017-11-06 PROCEDURE — 83735 ASSAY OF MAGNESIUM: CPT | Performed by: NURSE PRACTITIONER

## 2017-11-06 PROCEDURE — 99212 OFFICE O/P EST SF 10 MIN: CPT | Mod: ZF

## 2017-11-06 PROCEDURE — 99214 OFFICE O/P EST MOD 30 MIN: CPT | Mod: ZP | Performed by: NURSE PRACTITIONER

## 2017-11-06 PROCEDURE — 85610 PROTHROMBIN TIME: CPT | Performed by: NURSE PRACTITIONER

## 2017-11-06 PROCEDURE — 36415 COLL VENOUS BLD VENIPUNCTURE: CPT | Performed by: NURSE PRACTITIONER

## 2017-11-06 RX ORDER — CARVEDILOL 3.12 MG/1
3.12 TABLET ORAL 2 TIMES DAILY WITH MEALS
Qty: 60 TABLET | Refills: 3 | Status: SHIPPED | OUTPATIENT
Start: 2017-11-06 | End: 2017-11-29

## 2017-11-06 ASSESSMENT — PAIN SCALES - GENERAL: PAINLEVEL: NO PAIN (0)

## 2017-11-06 NOTE — NURSING NOTE
Chief Complaint   Patient presents with     Follow Up For     Return Core: 77yo female ; Chronic diastolic HF presenting for f/u with labs     Vitals were taken and medications were reconciled.     Medardo Farah MA  2:18 PM

## 2017-11-06 NOTE — LETTER
11/6/2017      RE: Carlos Alberto Fenton  [address]  [facility] [address]       Dear Colleague,    Thank you for the opportunity to participate in the care of your patient, Carlos Alberto Fenton, at the [facility]. Please see a copy of my visit note below.    HPI:   Ms. Fenton is a 77 year old female with a past medical history including HFpEF, HTN, hyperlipidemia, DMII, CVA 10/16 complicated by seizures, CAD s/p CABG x3 (LIMA-LAD, SVG-D1, SVG-dRCA) and modified MAZE, ABDIEL ligation - 2/2017), PFO closure, paroxysmal Afib, HIT, and RUE DVT. She presents to [facility] for follow-up. Her heart failure history is as follows: an echocardiogram was obtained due to CVA on 10/16/16 and showed mild MR, mild-moderate TR, and EF 50-55%. Repeat echocardiogram 10/17/16 secondary to Afib noted EF 35-40%, mildly reduced RV function, mild MI, and mild-moderate TR. Repeat echo 10/25/16 noted mildly improved EF at 40-45%, attributed to stress CM but unknown details. She underwent angiogram 1/30/17 consistent with 2 vessel CAD to RCA and LAD with LM disease. She underwent 3 vessel CABG with MAZE and ABDIEL ligation per Dr. Quiñones on 2/17. We have been following her in [facility] clinic. Last visit, we recommended prn metolazone when weight >170 lb which she has used a few times. Baseline/dry weight likely 165 lb. Recently seen by EP for afib with uncontrolled rate, Toprol was prescribed and she was to be scheduled for DCCV.    Patient took the Toprol once with immediate N/V afterwards so hasn't been taking since. Overall, she feels well. LE is stable and mild. She reports significant improvement in breathing when weight is around 165 lb. Weight today at home 163 lb and reports dry mouth for a few days. Her energy level has improved slightly though only walking 1/2 block at a time. Denies pnd, orthopnea, abd bloating, lightheadedness, presyncope, syncope, or chest pain with current level of  activity. BP at home 100-125/70s-80s.       PAST MEDICAL HISTORY:  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    parietral lesions bilateral       Antiplatelet or antithrombotic long-term use      Anxiety      Atrial fibrillation (H)      CAD (coronary artery disease)     2 vessel     Cancer (H) 1990    periodically have cancer on the skin removed     Cerebral artery occlusion with cerebral infarction (H) 10/2016    Cardioembolic strokes related to atrial fibrillation     Deep vein thrombosis (DVT) of axillary vein of right upper extremity (H) 2/25/2017     Depression      Depressive disorder 2001     Diabetes (H)      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     Hyperlipidemia      Hyperlipidemia LDL goal <130 10/31/2010     Hypertension      Panic attacks      Seizures (H) 10/19/2016     Sleep apnea     Uses CPAP       FAMILY HISTORY:  Family History   Problem Relation Age of Onset     DIABETES Mother      C.A.D. Mother      Hypertension Mother      CEREBROVASCULAR DISEASE Mother      Mini Strokes     Other Cancer Mother      Skin Cancer     Hyperlipidemia Mother      Coronary Artery Disease Mother      DIABETES Father      C.A.D. Father      Hypertension Father      Other Cancer Father      Hyperlipidemia Father      Coronary Artery Disease Father      HEART DISEASE Sister      Arthritis Sister      Other Cancer Other      Skin Cancer       SOCIAL HISTORY:  Social History     Social History     Marital status:      Spouse name: N/A     Number of children: N/A     Years of education: N/A     Social History Main Topics     Smoking status: Former Smoker     Packs/day: 1.00     Years: 10.00     Types: Cigarettes     Start date: 10/3/1958     Quit date: 7/4/1976     Smokeless tobacco: Never Used      Comment: Quit in 1976     Alcohol use Yes      Comment: Socially     Drug use: Yes     Special: Marijuana      Comment:  Briefly used marijuana for peripheral neuropathy     Sexual activity: Not Currently     Partners: Male     Birth control/ protection: Post-menopausal     Other Topics Concern     Parent/Sibling W/ Cabg, Mi Or Angioplasty Before 65f 55m? Yes     Social History Narrative       CURRENT MEDICATIONS:    Current Outpatient Prescriptions on File Prior to Visit:  venlafaxine (EFFEXOR-ER) 150 MG TB24 24 hr tablet Take 1 tablet (150 mg) by mouth daily (with breakfast)   spironolactone (ALDACTONE) 25 MG tablet Take 1 tablet (25 mg) by mouth daily   bumetanide (BUMEX) 2 MG tablet Take 1 tablet (2 mg) by mouth 2 times daily   atorvastatin (LIPITOR) 40 MG tablet Take 1 tablet (40 mg) by mouth daily   gabapentin (NEURONTIN) 100 MG capsule Take 1 capsule (100 mg) by mouth 2 times daily   traZODone (DESYREL) 50 MG tablet Take 0.5 tablets (25 mg) by mouth nightly as needed for sleep   pantoprazole (PROTONIX) 40 MG EC tablet Take 1 tablet (40 mg) by mouth every morning   potassium chloride SA (K-DUR/KLOR-CON M) 20 MEQ CR tablet Take 1 tablet (20 mEq) by mouth 2 times daily   metolazone (ZAROXOLYN) 2.5 MG tablet Please take on Saturday 9/2, Monday 9/4, and Wednesday 9/6.   warfarin (COUMADIN) 5 MG tablet Take 1 tablet (5 mg) by mouth daily   Elastic Bandages & Supports (ACE BANDAGE SELF-ADHERING) MISC 1 each 2 times daily   Gauze Pads & Dressings (KERLIX GAUZE ROLL MEDIUM) MISC 1 each 2 times daily   ONE TOUCH ULTRA test strip USE AS DIRECTED TO TEST ONE TIME A DAY   Wound Dressings (ADAPTIC NON-ADHERING DRESSING) PADS Externally apply 1 each topically 2 times daily   order for DME Sterile 4x4 gauze; Use gauze twice daily for dressing changes.   acetaminophen (TYLENOL) 325 MG tablet Take 3 tablets (975 mg) by mouth every 6 hours as needed for mild pain   aspirin 81 MG chewable tablet Take 1 tablet (81 mg) by mouth daily (Patient taking differently: Take 81 mg by mouth every morning )   ferrous sulfate (IRON SUPPLEMENT) 325 (65 FE) MG  "tablet Take 1 tablet (325 mg) by mouth daily (with breakfast) (Patient taking differently: Take 325 mg by mouth 2 times daily )   ONETOUCH DELICA LANCETS 33G MISC 1 Device daily   blood glucose monitoring (ONE TOUCH ULTRA 2) meter device kit    metoprolol (TOPROL-XL) 25 MG 24 hr tablet Take 0.5 tablets (12.5 mg) by mouth daily (Patient not taking: Reported on 11/6/2017)   amoxicillin-clavulanate (AUGMENTIN) 875-125 MG per tablet Take 1 tablet by mouth 2 times daily (Patient not taking: Reported on 9/20/2017)     No current facility-administered medications on file prior to visit.     ROS:   CONSTITUTIONAL: Denies fever, chills, or weight fluctuations.   HEENT: Denies headache, vision changes, and changes in speech.   CV: Refer to HPI.   PULMONARY:Refer to HPI.  GI:Denies nausea, vomiting, diarrhea, and abdominal pain. Bowel movements are regular. No melena.  :Denies urinary alterations, dysuria, urinary frequency, hematuria, and abnormal drainage.   EXT:Chronic lower extremity edema.   SKIN:Denies abnormal rashes or lesions.   MUSCULOSKELETAL:Denies upper or lower extremity weakness and pain.   NEUROLOGIC:Denies lightheadedness, dizziness, seizures, or upper or lower extremity paresthesia.     EXAM:  /82 (BP Location: Left arm, Cuff Size: Adult Regular)  Pulse 93  Ht 1.575 m (5' 2\")  Wt 74.7 kg (164 lb 11.2 oz)  SpO2 98%  BMI 30.12 kg/m2     GENERAL: Appears comfortable, in no acute distress.   HEENT: Eye symmetrical, no discharge or icterus bilaterally. Mucous membranes moist and without lesions.  CV: Irregularly irregular, tachycardiac, +S1S2, no murmur, rub, or gallop. JVP not appreciable.   RESPIRATORY: Respirations regular, even, and unlabored. Lungs CTA throughout.   GI: Soft and non distended with normoactive bowel sounds present in all quadrants. No tenderness, rebound, guarding.   EXTREMITIES: 1+ bilat peripheral edema. 2+ bilateral pedal pulses.   NEUROLOGIC: Alert and oriented x 3. No focal " deficits.   MUSCULOSKELETAL: No joint swelling or tenderness.   SKIN: No jaundice. No rashes or lesions. Skin changes consistent with chronic PVD. Warm to touch. Normal cap refill.    Labs, reviewed with patient in clinic today:  CBC RESULTS:  Lab Results   Component Value Date    WBC 6.4 10/11/2017    RBC 3.99 10/11/2017    HGB 12.8 10/11/2017    HCT 41.3 10/11/2017     (H) 10/11/2017    MCH 32.1 10/11/2017    MCHC 31.0 (L) 10/11/2017    RDW 16.5 (H) 10/11/2017     10/11/2017       CMP RESULTS:  Lab Results   Component Value Date     11/06/2017    POTASSIUM 3.3 (L) 11/06/2017    CHLORIDE 88 (L) 11/06/2017    CO2 40 (H) 11/06/2017    ANIONGAP 4 11/06/2017     (H) 11/06/2017    BUN 27 11/06/2017    CR 0.88 11/06/2017    GFRESTIMATED 63 11/06/2017    GFRESTBLACK 76 11/06/2017    CARLY 10.0 11/06/2017    BILITOTAL 0.5 04/17/2017    ALBUMIN 3.1 (L) 04/17/2017    ALKPHOS 137 04/17/2017    ALT 28 04/17/2017    AST 36 04/17/2017        INR RESULTS:  Lab Results   Component Value Date    INR 2.71 (H) 11/06/2017       Lab Results   Component Value Date    MAG 2.1 02/28/2017     Lab Results   Component Value Date    NTBNPI 2570 (H) 02/24/2017     Lab Results   Component Value Date    NTBNP 1352 (H) 09/20/2017       Diagnostics:  TAVO 3/1/17  H/o ABDIEL ligation. No LA clot seen.  H/o PFO closure. The atrial septum is intact as assessed by color Doppler.  No LV thrombus seen. Mildly (EF 45-50%) reduced left ventricular function is  present.  The right ventricle is normal size. Global right ventricular function is  normal.    TTE 2/26/17  Left Ventricle  Left ventricular size is normal. The Ejection Fraction is estimated at 55-60%.  Diastolic function not assessed due to atrial fibrillation. No LV throbus  noted.     Right Ventricle  Right ventricular function, chamber size, wall motion, and thickness are  normal.     Vessels  The inferior vena cava is normal.        Pericardium  No pericardial effusion is  present.    [place] 9/11/17  Atrial fibrillation with rare PVCs    Assessment and Plan:   Ms. Fenton is a pleasant 77 year old female with a past medical history HFpEF, HTN, dyslipidemia, DM2, CVA 10/16 complicated by seizures, CAD s/p CABGx3 with MAZE and ABDIEL ligation, PFO closure, PAF, HIT and RUE DVT who presents for heart failure follow-up. Today she appears compensated to dry on exam, she is using prn metolazone appropriately. EP recently attempted to start Toprol which she did not tolerate due to N/V so will try carvedilol today.     # Chronic HFpEF borderline secondary to ?NICM +/- ICM (possibly stress CM but with CAD)  Stage C. NYHA Class IIIB. Last EF 45-50%.    Fluid status: euvolemic to dry today, weight at anne, hold Bumex this PM and tomorrow if weight <164 lb, then resume, continue metolazone prn for weight >170 lb and call [facility] clinic if she does this  ACEi/ARB/ARNI: no indication, EF>40%  BB: start carvedilol 3.125 mg bid for rate control (see below)  Aldosterone antagonist: yung 25 mg daily   SCD prophylaxis: does not meet criteria for implant  Sleep apnea evaluation: reports nightly CPAP use    # Paroxysmal atrial fibrillation s/p MAZE and left atrial appendage ligation  # Chronic anticoagulation   Noted to be in afib by ECG last clinic visit. [id number].  -continue warfarin per [facility]  -HR 100s in Afib today, did not tolerate Toprol d/t N/V, try carvedilol 3.125 mg bid  -plan is for outpatient DCCV, EP to arrange    # Coronary artery disease s/p 3v CABG 2/2017  -continue asa 81 mg, atorvastatin 40 mg (defer dose increase to high intensity to primary cardiologist), starting BB    # HTN At goal      # Hyperlipidemia  - continue atorvastatin 40 mg     # RUE DVT  Right internal IJ DVT diagnosed 2/24/17. Likely provoked s/p CABG.   - warfarin per [facility]    Follow up with Dr. Lobato as scheduled - offered to push back appt, patient declined       25 minutes spent face-to-face with patient, >50% in  counseling and/or coordination of care as described above    Mackenzie Weinberg DNP, NP-C  11/6/2017            CC  ANNMARIE PADILLA

## 2017-11-06 NOTE — MR AVS SNAPSHOT
After Visit Summary   11/6/2017    Mendoza Fenton    MRN: [id number]           Patient Information     Date Of Birth          1940        Visit Information        Provider Department      11/6/2017 2:00 PM Tamela Weinberg APRN CNP [facility]        Today's Diagnoses     Chronic diastolic heart failure (H)    -  1    Atrial fibrillation, unspecified type (H)          Care Instructions    You were seen today in the Cardiovascular Clinic at the [facility].     Cardiology Providers you saw during your visit: Mackenzie WHATLEY CNP       1. Continue with follow up appointment with Dr. Lobato  2. Carvedilol 3.125mg twice a day  3. Call if you experience any side effects.   4. Hold afternoon dose of bumex today  5. If your weight is 164 lbs or less Hold tomorrow mornings dose of bumex   6. Then resume bumex twice a day  7. Take 40MEQ of potassium today and in am.  Then resume potassium 20MEQ twice a day  8. Continue Cardioversion as planned    Results for MENDOZA FENTON (MRN [id number]) as of 11/6/2017 13:42   Ref. Range 11/6/2017 13:49   Sodium Latest Ref Range: 133 - 144 mmol/L 133   Potassium Latest Ref Range: 3.4 - 5.3 mmol/L 3.3 (L)   Chloride Latest Ref Range: 94 - 109 mmol/L 88 (L)   Carbon Dioxide Latest Ref Range: 20 - 32 mmol/L 40 (H)   Urea Nitrogen Latest Ref Range: 7 - 30 mg/dL 27   Creatinine Latest Ref Range: 0.52 - 1.04 mg/dL 0.88   GFR Estimate Latest Ref Range: >60 mL/min/1.7m2 63   GFR Estimate If Black Latest Ref Range: >60 mL/min/1.7m2 76   Calcium Latest Ref Range: 8.5 - 10.1 mg/dL 10.0   Anion Gap Latest Ref Range: 3 - 14 mmol/L 4         Please limit your fluid intake to 2 L (64 ounces) daily.  2 Liters a day = 8.5 cups, or 72 ounces.  Please limit your salt intake to 2 grams a day or less.    If you gain 2# in 24 hours or 5# in one week call Maren Lucas RN so we can adjust your medications as needed over the phone.    Please feel free to call me with any  "questions or concerns.      Maren Lucas RN BSN   [facility]  Cardiology Care Coordinator-Heart Failure Clinic    Questions and [phone].   First press #1 for the University and then press #3 for \"Medical Questions\" to reach us Cardiology Nurses.     On Call Cardiologist for after hours or on weekends: [phone]   option #4 and ask to speak to the on-call Cardiologist. Inform them you are a CORE/heart failure patient at the [facility].        If you need a medication refill please contact your pharmacy.  Please allow 3 business days for your refill to be completed.  _______________________________________________________  C.O.R.E. CLINIC Cardiomyopathy, Optimization, Rehabilitation, Education   The C.O.R.E. CLINIC is a heart failure specialty clinic within the [facility] where you will work with specialized nurse practitioners dedicated to helping patients with heart failure carefully adjust medications, receive education, and learn who and when to call if symptoms develop. They specialize in helping you better understand your condition, slow the progression of your disease, improve the length and quality of your life, help you detect future heart problems before they become life threatening, and avoid hospitalizations.  As always, thank you for trusting us with your health care needs!                Follow-ups after your visit        Your next 10 appointments already scheduled     2017  4:15 PM CST   Anticoagulation Visit with ER ANTI COAG   [facility]    [address] [facility] [id number] [phone]            2017  1:00 PM CST   Office Visit with Humberto Conner MD   [facility]    [address] [address] [phone]           Bring a current list of meds and any records pertaining to this visit. For " Physicals, please bring immunization records and any forms needing to be filled out. Please arrive 10 minutes early to complete paperwork.            Nov 20, 2017  3:15 PM CST   (Arrive by 3:00 PM)   Return Vascular Visit with Leah Santamaria MD   [facility] ([facility])    [address] [place] [id number] [phone]            Nov 24, 2017  1:30 PM CST   (Arrive by 1:15 PM)   Return Visit with Star Lobato MD   [facility] ([facility])    [address] [place] [id number] [phone]              Who to contact     If you have questions or need follow up information about today's clinic visit or your schedule please contact [facility] directly at [phone].  Normal or non-critical lab and imaging results will be communicated to you by MyChart, letter or phone within 4 business days after the clinic has received the results. If you do not hear from us within 7 days, please contact the clinic through [employer] or phone. If you have a critical or abnormal lab result, we will notify you by phone as soon as possible.  Submit refill requests through [email or web address] or call your pharmacy and they will forward the refill request to us. Please allow 3 business days for your refill to be completed.          Additional Information About Your Visit        MyChart Information     [email or web address] gives you secure access to your electronic health record. If you see a primary care provider, you can also send messages to your care team and make appointments. If you have questions, please call your primary care clinic.  If you do not have a primary care provider, please call [phone] and they will assist you.        Care EveryWhere ID     This is your Care EveryWhere ID. This could be used by other organizations to access your [facility] medical records  [id number]        Your Vitals Were      "Pulse Height Pulse Oximetry BMI (Body Mass Index)          93 1.575 m (5' 2\") 98% 30.12 kg/m2         Blood Pressure from Last 3 Encounters:   11/06/17 133/82   10/26/17 133/81   10/09/17 116/67    Weight from Last 3 Encounters:   11/06/17 74.7 kg (164 lb 11.2 oz)   10/26/17 76.2 kg (168 lb)   10/09/17 78 kg (172 lb)              Today, you had the following     No orders found for display         Today's Medication Changes          These changes are accurate as of: 11/6/17  3:27 PM.  If you have any questions, ask your nurse or doctor.               Start taking these medicines.        Dose/Directions    carvedilol 3.125 MG tablet   Commonly known as:  COREG   Used for:  Chronic diastolic heart failure (H)   Started by:  Tamela Weinberg APRN CNP        Dose:  3.125 mg   Take 1 tablet (3.125 mg) by mouth 2 times daily (with meals)   Quantity:  60 tablet   Refills:  3         These medicines have changed or have updated prescriptions.        Dose/Directions    aspirin 81 MG chewable tablet   This may have changed:  when to take this   Used for:  Coronary artery disease with angina pectoris, unspecified vessel or lesion type, unspecified whether native or transplanted heart (H)        Dose:  81 mg   Take 1 tablet (81 mg) by mouth daily   Quantity:  30 tablet   Refills:  0       ferrous sulfate 325 (65 FE) MG tablet   Commonly known as:  IRON SUPPLEMENT   This may have changed:  when to take this   Used for:  S/P CABG (coronary artery bypass graft)        Dose:  325 mg   Take 1 tablet (325 mg) by mouth daily (with breakfast)   Quantity:  100 tablet   Refills:  1            Where to get your medicines      These medications were sent to [facility] [place], [place] - [address] [address]  [address] [facility] [address]     Phone:  [phone]     carvedilol 3.125 MG tablet                Primary Care Provider Office Phone # Fax #    Humberto Conner [phone]       [facility] " CLINIC [address] [place] [address]        Equal Access to Services     NATALIE HAYES : Hadii aad ku hadlotuso Sogeovannyali, waaxda luqadaha, qaybta kaalmada memeashlyngucci, waxay idiin hayluisjewel newberrympcinthya mattson. So [facility] [phone].    ATENCIÓN: Si habla español, tiene a espinoza disposición servicios gratuitos de asistencia lingüística. Llame al [phone].    We comply with applicable federal civil rights laws and [place] laws. We do not discriminate on the basis of race, color, national origin, age, disability, sex, sexual orientation, or gender identity.            Thank you!     Thank you for choosing [facility]  for your care. Our goal is always to provide you with excellent care. Hearing back from our patients is one way we can continue to improve our services. Please take a few minutes to complete the written survey that you may receive in the mail after your visit with us. Thank you!             Your Updated Medication List - Protect others around you: Learn how to safely use, store and throw away your medicines at www.disposemymeds.org.          This list is accurate as of: 11/6/17  3:27 PM.  Always use your most recent med list.                   Brand Name Dispense Instructions for use Diagnosis    ACE BANDAGE SELF-ADHERING Misc     6 each    1 each 2 times daily    Open wound of lower limb, right, subsequent encounter       acetaminophen 325 MG tablet    TYLENOL    100 tablet    Take 3 tablets (975 mg) by mouth every 6 hours as needed for mild pain    S/P CABG (coronary artery bypass graft)       ADAPTIC NON-ADHERING DRESSING Pads     1 each    Externally apply 1 each topically 2 times daily    Open wound of lower limb, right, subsequent encounter       amoxicillin-clavulanate 875-125 MG per tablet    AUGMENTIN    28 tablet    Take 1 tablet by mouth 2 times daily    Toe gangrene (H)       aspirin 81 MG chewable tablet     30 tablet    Take 1 tablet (81 mg) by mouth daily    Coronary artery  disease with angina pectoris, unspecified vessel or lesion type, unspecified whether native or transplanted heart (H)       atorvastatin 40 MG tablet    LIPITOR    90 tablet    Take 1 tablet (40 mg) by mouth daily    Coronary artery disease with angina pectoris, unspecified vessel or lesion type, unspecified whether native or transplanted heart (H)       blood glucose monitoring meter device kit           bumetanide 2 MG tablet    BUMEX    180 tablet    Take 1 tablet (2 mg) by mouth 2 times daily    Chronic systolic heart failure (H)       carvedilol 3.125 MG tablet    COREG    60 tablet    Take 1 tablet (3.125 mg) by mouth 2 times daily (with meals)    Chronic diastolic heart failure (H)       ferrous sulfate 325 (65 FE) MG tablet    IRON SUPPLEMENT    100 tablet    Take 1 tablet (325 mg) by mouth daily (with breakfast)    S/P CABG (coronary artery bypass graft)       gabapentin 100 MG capsule    NEURONTIN    180 capsule    Take 1 capsule (100 mg) by mouth 2 times daily    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       KERLIX GAUZE ROLL MEDIUM Misc     48 each    1 each 2 times daily    Open wound of lower limb, right, subsequent encounter       metolazone 2.5 MG tablet    ZAROXOLYN    30 tablet    Please take on Saturday 9/2, Monday 9/4, and Wednesday 9/6.    Cardiomyopathy, unspecified       metoprolol 25 MG 24 hr tablet    TOPROL-XL    45 tablet    Take 0.5 tablets (12.5 mg) by mouth daily    Paroxysmal atrial fibrillation (H)       ONETOUCH DELICA LANCETS 33G Misc     100 each    1 Device daily    Type 2 diabetes mellitus without complication, without long-term current use of insulin (H)       ONETOUCH ULTRA test strip   Generic drug:  blood glucose monitoring     100 each    USE AS DIRECTED TO TEST ONE TIME A DAY    Type 2 diabetes mellitus without complication, without long-term current use of insulin (H)       order for DME     1 Box    Sterile 4x4 gauze; Use gauze twice daily for dressing changes.    Open  wound of lower limb, right, subsequent encounter       pantoprazole 40 MG EC tablet    PROTONIX    90 tablet    Take 1 tablet (40 mg) by mouth every morning    Coronary artery disease with angina pectoris, unspecified vessel or lesion type, unspecified whether native or transplanted heart (H)       potassium chloride SA 20 MEQ CR tablet    K-DUR/KLOR-CON M    120 tablet    Take 1 tablet (20 mEq) by mouth 2 times daily    S/P CABG (coronary artery bypass graft)       spironolactone 25 MG tablet    ALDACTONE    90 tablet    Take 1 tablet (25 mg) by mouth daily    Chronic systolic heart failure (H)       traZODone 50 MG tablet    DESYREL    45 tablet    Take 0.5 tablets (25 mg) by mouth nightly as needed for sleep    Primary insomnia       venlafaxine 150 MG Tb24 24 hr tablet    EFFEXOR-ER    90 each    Take 1 tablet (150 mg) by mouth daily (with breakfast)    Adjustment disorder with depressed mood       warfarin 5 MG tablet    COUMADIN    90 tablet    Take 1 tablet (5 mg) by mouth daily    Paroxysmal atrial fibrillation (H)

## 2017-11-06 NOTE — PATIENT INSTRUCTIONS
"You were seen today in the Cardiovascular Clinic at the [facility].     Cardiology Providers you saw during your visit: Mackenzie WHATLEY CNP       1. Continue with follow up appointment with Dr. Lobato  2. Carvedilol 3.125mg twice a day  3. Call if you experience any side effects.   4. Hold afternoon dose of bumex today  5. If your weight is 164 lbs or less Hold tomorrow mornings dose of bumex   6. Then resume bumex twice a day  7. Take 40MEQ of potassium today and in am.  Then resume potassium 20MEQ twice a day  8. Continue Cardioversion as planned    Results for MENDOZA GREENBERG (MRN [id number]) as of 2017 13:42   Ref. Range 2017 13:49   Sodium Latest Ref Range: 133 - 144 mmol/L 133   Potassium Latest Ref Range: 3.4 - 5.3 mmol/L 3.3 (L)   Chloride Latest Ref Range: 94 - 109 mmol/L 88 (L)   Carbon Dioxide Latest Ref Range: 20 - 32 mmol/L 40 (H)   Urea Nitrogen Latest Ref Range: 7 - 30 mg/dL 27   Creatinine Latest Ref Range: 0.52 - 1.04 mg/dL 0.88   GFR Estimate Latest Ref Range: >60 mL/min/1.7m2 63   GFR Estimate If Black Latest Ref Range: >60 mL/min/1.7m2 76   Calcium Latest Ref Range: 8.5 - 10.1 mg/dL 10.0   Anion Gap Latest Ref Range: 3 - 14 mmol/L 4         Please limit your fluid intake to 2 L (64 ounces) daily.  2 Liters a day = 8.5 cups, or 72 ounces.  Please limit your salt intake to 2 grams a day or less.    If you gain 2# in 24 hours or 5# in one week call Maren Lucas RN so we can adjust your medications as needed over the phone.    Please feel free to call me with any questions or concerns.      Maren Lucas RN BSN   [facility]  Cardiology Care Coordinator-Heart Failure Clinic    Questions and [phone].   First press #1 for the University and then press #3 for \"Medical Questions\" to reach us Cardiology Nurses.     On Call Cardiologist for after hours or on weekends: [phone]   option #4 and ask to speak to the on-call Cardiologist. Inform " them you are a CORE/heart failure patient at the [facility].        If you need a medication refill please contact your pharmacy.  Please allow 3 business days for your refill to be completed.  _______________________________________________________  C.O.R.E. CLINIC Cardiomyopathy, Optimization, Rehabilitation, Education   The C.O.R.E. CLINIC is a heart failure specialty clinic within the [facility] where you will work with specialized nurse practitioners dedicated to helping patients with heart failure carefully adjust medications, receive education, and learn who and when to call if symptoms develop. They specialize in helping you better understand your condition, slow the progression of your disease, improve the length and quality of your life, help you detect future heart problems before they become life threatening, and avoid hospitalizations.  As always, thank you for trusting us with your health care needs!

## 2017-11-06 NOTE — NURSING NOTE
Diet: Patient instructed regarding a heart failure healthy diet, including discussion of reduced fat and 2000 mg daily sodium restriction, daily weights, medication purpose and compliance, fluid restrictions and resources for patient and family to access for assistance with heart failure management.       Labs: Patient was given results of the laboratory testing obtained today and patient was instructed about when to return for the next laboratory testing.    Med Reconcile: Reviewed and verified all current medications with the patient. The updated medication list was printed and given to the patient.    Return Appointment: Patient given instructions regarding scheduling next clinic visit.     Patient stated she understood all health information given and agreed to call with further questions or concerns.

## 2017-11-07 ENCOUNTER — TELEPHONE (OUTPATIENT)
Dept: ANTICOAGULATION | Facility: CLINIC | Age: 77
End: 2017-11-07

## 2017-11-07 ENCOUNTER — ANTICOAGULATION THERAPY VISIT (OUTPATIENT)
Dept: ANTICOAGULATION | Facility: OTHER | Age: 77
End: 2017-11-07
Payer: COMMERCIAL

## 2017-11-07 DIAGNOSIS — Z79.01 LONG-TERM (CURRENT) USE OF ANTICOAGULANTS: ICD-10-CM

## 2017-11-07 DIAGNOSIS — I48.0 PAROXYSMAL ATRIAL FIBRILLATION (H): ICD-10-CM

## 2017-11-07 PROCEDURE — 99207 ZZC NO CHARGE NURSE ONLY: CPT | Performed by: INTERNAL MEDICINE

## 2017-11-07 RX ORDER — POTASSIUM CHLORIDE 1500 MG/1
40 TABLET, EXTENDED RELEASE ORAL
Status: CANCELLED | OUTPATIENT
Start: 2017-11-07

## 2017-11-07 RX ORDER — POTASSIUM CHLORIDE 1500 MG/1
20 TABLET, EXTENDED RELEASE ORAL
Status: CANCELLED | OUTPATIENT
Start: 2017-11-07

## 2017-11-07 RX ORDER — LIDOCAINE 40 MG/G
CREAM TOPICAL
Status: CANCELLED | OUTPATIENT
Start: 2017-11-07

## 2017-11-07 RX ORDER — MAGNESIUM SULFATE HEPTAHYDRATE 40 MG/ML
2 INJECTION, SOLUTION INTRAVENOUS
Status: CANCELLED | OUTPATIENT
Start: 2017-11-07

## 2017-11-07 NOTE — PROGRESS NOTES
Pt was in clinic with her daughter.  Scheduled an TAVO and DCCV.   She has had subtherapuetic INRs therefore will need a TAVO.   Hoping this can be completed prior to seeing Dr. Lobato.  Tracey Taylor, CHACORTA, CNP

## 2017-11-07 NOTE — MR AVS SNAPSHOT
Carlos Alberto Fenton   11/7/2017   Anticoagulation Therapy Visit    Description:  77 year old female   Provider:  Humberto Conner MD   Department:  [facility] Anticoag           INR as of 11/7/2017     Today's INR 2.71 (11/6/2017)      Anticoagulation Summary as of 11/7/2017     INR goal 2.0-3.0   Today's INR 2.71 (11/6/2017)   Full instructions 2.5 mg on Mon, Wed, Fri; 5 mg all other days   Next INR check 11/14/2017    Indications   Long-term (current) use of anticoagulants [Z79.01] [Z79.01]  New onset atrial fibrillation (H) (Resolved) [I48.91]  Atrial fibrillation (H) [I48.91] [I48.91]         Your next Anticoagulation Clinic appointment(s)     Nov 14, 2017  4:00 PM CST   [facility] ER [facility]AG   [facility]    [address] [address]   [phone]              Contact Numbers     Clinic Number:         November 2017 Details    Sun Mon Tue Wed Thu Fri Sat        1               2               3               4                 5               6               7      5 mg   See details      8      2.5 mg         9      5 mg         10      2.5 mg         11      5 mg           12      5 mg         13      2.5 mg         14            15               16               17               18                 19               20               21               22               23               24               25                 26               27               28               29               30                  Date Details   11/07 This INR check       Date of next INR:  11/14/2017         How to take your warfarin dose     To take:  2.5 mg Take 0.5 of a 5 mg tablet.    To take:  5 mg Take 1 of the 5 mg tablets.

## 2017-11-07 NOTE — TELEPHONE ENCOUNTER
Pt had INR done at cardiology appt yesterday and it was 2.71.  Per chart review pt has a cardioversion scheduled for 11/21/17.  Telephone call attempted to pt, no answer.  Left message for pt to call [facility].  Also tried calling daughter, Lindsay, no answer and unable to leave message.  Will need to try again later.    Gordo Brunner RN, BSN

## 2017-11-07 NOTE — PROGRESS NOTES
ANTICOAGULATION FOLLOW-UP CLINIC VISIT    Patient Name:  Carlos Alberto Fenton  Date:  11/7/2017  Contact Type:  Telephone    SUBJECTIVE:     Patient Findings     Positives No Problem Findings    Comments S/O:  Pt was called with the following orders: INR yesterday of 2.71.  Pt is on Coumadin for A. Fib.  Current Coumadin dose is 2.5 mg Mon, Wed, Fri and 5 mg ROW=27.5 mg.   Concerns today are: Pt is has a cardioversion scheduled for 11/21/17, so will most likely need weekly INR until then.  Advised pt to call if anything changes.    A/P: Pt's INR is Therapeutic  for his/her goal range of 2 - 3.  Reasons why INR is out of range may include:NA.  Recommended to have pt remain on the same Coumadin dose and to have his/her INR rechecked in 1 week on 11/14/17.      Gordo Brunner RN, BSN                 OBJECTIVE    INR   Date Value Ref Range Status   11/06/2017 2.71 (H) 0.86 - 1.14 Final     Chromogenic Factor 10   Date Value Ref Range Status   03/02/2017 65 (L) 70 - 130 % Final     Comment:     Therapeutic Range:  A Chromogenic Factor 10 level of approximately 20-40%   inversely correlates with an INR of 2-3 for patients receiving Warfarin.   Chromogenic Factor 10 levels below 20% indicate an INR greater than 3 and   levels above 40% indicate an INR less than 2.       Factor 2 Assay   Date Value Ref Range Status   02/27/2017 37 (L) 60 - 140 % Final       ASSESSMENT / PLAN  INR assessment THER    Recheck INR In: 1 WEEK    INR Location Clinic      Anticoagulation Summary as of 11/7/2017     INR goal 2.0-3.0   Today's INR 2.71 (11/6/2017)   Maintenance plan 2.5 mg (5 mg x 0.5) on Mon, Wed, Fri; 5 mg (5 mg x 1) all other days   Full instructions 2.5 mg on Mon, Wed, Fri; 5 mg all other days   Weekly total 27.5 mg   No change documented Gordo Brunner RN   Plan last modified Gordo Brunner RN (10/30/2017)   Next INR check 11/14/2017   Priority INR   Target end date Indefinite    Indications   Long-term (current) use of  anticoagulants [Z79.01] [Z79.01]  New onset atrial fibrillation (H) (Resolved) [I48.91]  Atrial fibrillation (H) [I48.91] [I48.91]         Anticoagulation Episode Summary     INR check location     Preferred lab     Send INR reminders to OCTAVIO NEGRETE    Comments Prounounced A-seenath 4/17/17 Allergice to Heparin/Lovenon per Dr. Angelina Bernal's note  Speak with  Darrin in addition to patient Hx of 2 strokes. HIPPA OK to leave messag  Pt is confused. Please speak in tablets only (5mg tablets)/Send MyChart message      Anticoagulation Care Providers     Provider Role Specialty Phone number    Star Lobato MD Responsible Cardiology [phone]            See the Encounter Report to view Anticoagulation Flowsheet and Dosing Calendar (Go to Encounters tab in chart review, and find the Anticoagulation Therapy Visit)    Dosage adjustment made based on physician directed care plan.    Pt states that daughter does not need to be called, and she wrote dosing down in calendar day by day with me over the phone.  Will call if any further questions.    Gordo Brunner RN

## 2017-11-09 ENCOUNTER — MYC REFILL (OUTPATIENT)
Dept: INTERNAL MEDICINE | Facility: CLINIC | Age: 77
End: 2017-11-09

## 2017-11-09 DIAGNOSIS — F43.21 ADJUSTMENT DISORDER WITH DEPRESSED MOOD: ICD-10-CM

## 2017-11-09 NOTE — TELEPHONE ENCOUNTER
Message from MyChart:  Original authorizing provider: MD Carlos Alberto Connelly would like a refill of the following medications:  venlafaxine (EFFEXOR-ER) 150 MG TB24 24 hr tablet [Humberto Conner MD]    Preferred pharmacy: [facility] - [place], [place] - [address]    Comment:

## 2017-11-10 ENCOUNTER — TELEPHONE (OUTPATIENT)
Dept: FAMILY MEDICINE | Facility: OTHER | Age: 77
End: 2017-11-10

## 2017-11-10 NOTE — TELEPHONE ENCOUNTER
I spoke with the other two [facility] nurses here and neither one of them tried to call pt. I dont see any notes either. Pt notified  Amanda Frausto RN

## 2017-11-13 ENCOUNTER — TELEPHONE (OUTPATIENT)
Dept: INTERNAL MEDICINE | Facility: CLINIC | Age: 77
End: 2017-11-13

## 2017-11-13 NOTE — TELEPHONE ENCOUNTER
Reason for Call:  Same Day Appointment, Requested Provider:  Humberto Conner MD    PCP: Humberto Conner    Reason for visit: discuss home care orders    Duration of symptoms:     Have you been treated for this in the past? No    Additional comments: Pt had a 1pm and lost track of time. Wondering if you can work pt in later today? Or maybe another day?     Can we leave a detailed message on this number? NO    Phone number patient can be reached at: Other phone number:      Best Time: anytime    Call taken on 11/13/2017 at 1:06 PM by Moon Flanagan

## 2017-11-14 ENCOUNTER — ANTICOAGULATION THERAPY VISIT (OUTPATIENT)
Dept: ANTICOAGULATION | Facility: CLINIC | Age: 77
End: 2017-11-14
Payer: COMMERCIAL

## 2017-11-14 ENCOUNTER — TELEPHONE (OUTPATIENT)
Dept: ANTICOAGULATION | Facility: CLINIC | Age: 77
End: 2017-11-14

## 2017-11-14 ENCOUNTER — OFFICE VISIT (OUTPATIENT)
Dept: INTERNAL MEDICINE | Facility: CLINIC | Age: 77
End: 2017-11-14
Payer: COMMERCIAL

## 2017-11-14 VITALS
HEART RATE: 60 BPM | BODY MASS INDEX: 31.46 KG/M2 | DIASTOLIC BLOOD PRESSURE: 64 MMHG | WEIGHT: 172 LBS | TEMPERATURE: 97 F | RESPIRATION RATE: 16 BRPM | SYSTOLIC BLOOD PRESSURE: 122 MMHG

## 2017-11-14 DIAGNOSIS — L97.922 ULCER OF LEFT LOWER EXTREMITY WITH FAT LAYER EXPOSED (H): Primary | ICD-10-CM

## 2017-11-14 DIAGNOSIS — I48.20 CHRONIC ATRIAL FIBRILLATION (H): ICD-10-CM

## 2017-11-14 DIAGNOSIS — Z79.01 LONG-TERM (CURRENT) USE OF ANTICOAGULANTS: ICD-10-CM

## 2017-11-14 DIAGNOSIS — I50.32 CHRONIC DIASTOLIC HEART FAILURE (H): ICD-10-CM

## 2017-11-14 DIAGNOSIS — I48.20 CHRONIC ATRIAL FIBRILLATION (H): Primary | ICD-10-CM

## 2017-11-14 DIAGNOSIS — I48.0 PAROXYSMAL ATRIAL FIBRILLATION (H): ICD-10-CM

## 2017-11-14 DIAGNOSIS — Z79.01 LONG TERM CURRENT USE OF ANTICOAGULANT THERAPY: ICD-10-CM

## 2017-11-14 DIAGNOSIS — D75.829 HEPARIN INDUCED THROMBOCYTOPENIA (H): ICD-10-CM

## 2017-11-14 LAB — INR BLD: 3.3 (ref 0.86–1.14)

## 2017-11-14 PROCEDURE — 99207 ZZC NO CHARGE NURSE ONLY: CPT | Performed by: INTERNAL MEDICINE

## 2017-11-14 PROCEDURE — 85610 PROTHROMBIN TIME: CPT | Mod: QW | Performed by: INTERNAL MEDICINE

## 2017-11-14 PROCEDURE — 36416 COLLJ CAPILLARY BLOOD SPEC: CPT | Performed by: INTERNAL MEDICINE

## 2017-11-14 PROCEDURE — 99214 OFFICE O/P EST MOD 30 MIN: CPT | Performed by: INTERNAL MEDICINE

## 2017-11-14 RX ORDER — VENLAFAXINE HYDROCHLORIDE 150 MG/1
150 TABLET, EXTENDED RELEASE ORAL
Qty: 90 EACH | Refills: 1 | OUTPATIENT
Start: 2017-11-14

## 2017-11-14 ASSESSMENT — PAIN SCALES - GENERAL: PAINLEVEL: NO PAIN (0)

## 2017-11-14 NOTE — PROGRESS NOTES
Chief Complaint   Patient presents with     Referral     would like home health care     Foot Problems     right foot/toe     Wondering about home health care. She is very limited due to vascular injuries to the feet, left is healing and right is not as good.  HIT caused the sores and feet issues.   Some memory issues and past strokes and a fib.      Could therapy for walking and a home health aid.     Has a cardioversion coming up and then vascular visit as well.         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    parietral lesions bilateral       Antiplatelet or antithrombotic long-term use      Anxiety      Atrial fibrillation (H)      CAD (coronary artery disease)     2 vessel     Cancer (H) 1990    periodically have cancer on the skin removed     Cerebral artery occlusion with cerebral infarction (H) 10/2016    Cardioembolic strokes related to atrial fibrillation     Deep vein thrombosis (DVT) of axillary vein of right upper extremity (H) 2/25/2017     Depression      Depressive disorder 2001     Diabetes (H)      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     Hyperlipidemia      Hyperlipidemia LDL goal <130 10/31/2010     Hypertension      Panic attacks      Seizures (H) 10/19/2016     Sleep apnea     Uses CPAP     Current Outpatient Prescriptions   Medication     carvedilol (COREG) 3.125 MG tablet     venlafaxine (EFFEXOR-ER) 150 MG TB24 24 hr tablet     spironolactone (ALDACTONE) 25 MG tablet     bumetanide (BUMEX) 2 MG tablet     atorvastatin (LIPITOR) 40 MG tablet     gabapentin (NEURONTIN) 100 MG capsule     potassium chloride SA (K-DUR/KLOR-CON M) 20 MEQ CR tablet     metolazone (ZAROXOLYN) 2.5 MG tablet     warfarin (COUMADIN) 5 MG tablet     acetaminophen (TYLENOL) 325 MG tablet     aspirin 81 MG chewable tablet     ferrous sulfate (IRON SUPPLEMENT) 325 (65 FE) MG tablet     traZODone (DESYREL) 50 MG tablet      pantoprazole (PROTONIX) 40 MG EC tablet     Elastic Bandages & Supports (ACE BANDAGE SELF-ADHERING) MISC     Gauze Pads & Dressings (KERLIX GAUZE ROLL MEDIUM) [facility]     ONE TOUCH ULTRA test strip     Wound Dressings (ADAPTIC NON-ADHERING DRESSING) PADS     order for DME     ONETOUCH DELICA LANCETS 33G MISC     blood glucose monitoring (ONE TOUCH ULTRA 2) meter device kit     No current facility-administered medications for this visit.      Social History   Substance Use Topics     Smoking status: Former Smoker     Packs/day: 1.00     Years: 10.00     Types: Cigarettes     Start date: 10/3/1958     Quit date: 7/4/1976     Smokeless tobacco: Never Used      Comment: Quit in 1976     Alcohol use Yes      Comment: Socially     Review of Systems  Constitutional-No fevers, chills, or weight changes..  Cardiac-No chest pain or palpitations.  Respiratory-SOB.  GI-No nausea, vomitting, diarrhea, constipation, or blood in the stool.  Musculoskeletal-right foot ulcerations.    Physical Exam  /64  Pulse 60  Temp 97  F (36.1  C) (Temporal)  Resp 16  Wt 172 lb (78 kg)  BMI 31.46 kg/m2  General Appearance-alert, no distress  Cardiac-irregularly irregular rhythm  Lungs-clear to auscultation  Extremities-left foot is wrapped in a barn shoe, right foot we did unwrap and on the big toe has a large ulceration after partial amputation or gangrene. 10 seconds. Back and re-bandaged, second toe as a small pinhole which is draining    ASSESSMENT: This is a patient who has had atrial fibrillation, strokes, HIT with leg ulcerations after heparin. She is now on Coumadin, she has wound care has been followed by vascular and podiatry for her foot wounds. She is also followed by cardiology for atrial fibrillation and needs a cardioversion. She has some chronic congestive heart failure. She is doing okay on her medications with a blood pressure 122/64, no peripheral edema. Her wounds are slowly healing she does have some dried gangrene.   Patient does need home care as her daughter cannot provide care. She has homebound and she cannot go outside walking her go to places for entertainment. She needs wound care, physical therapy, and nursing assessment. She may need a home health aide to help with bathing and other ADLs.    Electronically signed by Humberto Conner MD      Electronically signed by Humberto Conner MD

## 2017-11-14 NOTE — PROGRESS NOTES
ANTICOAGULATION FOLLOW-UP CLINIC VISIT    Patient Name:  Carlos Alberto Fenton  Date:  11/14/2017  Contact Type:  Telephone/ miguel Montoya    SUBJECTIVE:     Patient Findings     Positives Unexplained INR or factor level change           OBJECTIVE    INR Point of Care   Date Value Ref Range Status   11/14/2017 3.3 (H) 0.86 - 1.14 Final     Comment:     This test is intended for monitoring Coumadin therapy.  Results are not   accurate in patients with prolonged INR due to factor deficiency.       Chromogenic Factor 10   Date Value Ref Range Status   03/02/2017 65 (L) 70 - 130 % Final     Comment:     Therapeutic Range:  A Chromogenic Factor 10 level of approximately 20-40%   inversely correlates with an INR of 2-3 for patients receiving Warfarin.   Chromogenic Factor 10 levels below 20% indicate an INR greater than 3 and   levels above 40% indicate an INR less than 2.       Factor 2 Assay   Date Value Ref Range Status   02/27/2017 37 (L) 60 - 140 % Final       ASSESSMENT / PLAN  INR assessment THER    Recheck INR In: 1 WEEK    INR Location Outside lab      Anticoagulation Summary as of 11/14/2017     INR goal 2.0-3.0   Today's INR 3.3!   Maintenance plan 2.5 mg (5 mg x 0.5) on Mon, Wed, Fri; 5 mg (5 mg x 1) all other days   Full instructions 11/14: 2.5 mg; Otherwise 2.5 mg on Mon, Wed, Fri; 5 mg all other days   Weekly total 27.5 mg   Plan last modified Gordo Brunner RN (10/30/2017)   Next INR check 11/21/2017   Priority INR   Target end date Indefinite    Indications   Long-term (current) use of anticoagulants [Z79.01] [Z79.01]  New onset atrial fibrillation (H) (Resolved) [I48.91]  Atrial fibrillation (H) [I48.91] [I48.91]         Anticoagulation Episode Summary     INR check location     Preferred lab     Send INR reminders to OCTAVIO NEGRETE    Comments Prounounced A-seenath 4/17/17 Allergice to Heparin/Lovenon per Dr. Angelina Bernal's note  Speak with  Darrin in addition to patient Hx of 2 strokes. HIPPA OK to  leave messag  Pt is confused. Please speak in tablets only (5mg tablets)/Send MyChart message      Anticoagulation Care Providers     Provider Role Specialty Phone number    Star Lobato MD Responsible Cardiology [phone]            See the Encounter Report to view Anticoagulation Flowsheet and Dosing Calendar (Go to Encounters tab in chart review, and find the Anticoagulation Therapy Visit)    Dosage adjustment made based on physician directed care plan.        Amanda Frausto RN

## 2017-11-14 NOTE — MR AVS SNAPSHOT
After Visit Summary   11/14/2017    Carlos Alberto Fenton    MRN: [id number]           Patient Information     Date Of Birth          1940        Visit Information        Provider Department      11/14/2017 2:15 PM Humberto Conner MD [facility]         Follow-ups after your visit        Your next 10 appointments already scheduled     Nov 14, 2017  4:00 PM CST   [facility]agulation Visit with ER ANTI COAG   [facility]    [address]  [facility] [address]   [phone]            Nov 20, 2017  3:15 PM CST   (Arrive by 3:00 PM)   Return Vascular Visit with Leah Santamaria MD   [facility] ([facility])    [address]  3rd Floor  [facility] [address]   [phone]            Nov 21, 2017   Procedure with GENERIC ANESTHESIA PROVIDER   [facility], Same Day Surgery (--)    [address] [address]   [phone]            Nov 21, 2017 12:00 PM CST   Procedure 4 hour with U2A ROOM 1   [address] [facility] ([facility])    [address] [address]               Nov 21, 2017  1:00 PM CST   Ech Eleuterio with UUETEER1   [facility],  Echocardiography ([facility])    [address] [address]   [phone]           1.  Please bring or wear a comfortable two-piece outfit. 2.  Arrival time: -   [facility]:  arrive 75 minutes prior to examination time. -   [facility]:  arrive 90 minutes prior to examination time. -   [facility]:   arrive 15 minutes prior to examination time. 3.  Plan to have someone here to drive you home after the test. -   Someone should stay with you for 6 hours after your test. 4.  No food or drink: -   6 hours before the test 5.  If you take antacids or water pills (diuretics): Do not take them until after your test. You may take blood pressure  medicine with a few sips of water. 6.  If you have diabetes: -   Morning slots preferred -   If you take insulin, call your diabetes care team. Ask if you should take a   dose the morning of your test. -   If you take diabetes medicine by mouth, don't take it on the morning of your test. Bring it with you to take after the test. (If you have questions, call your diabetes care team.) 7.  Bring a list of any medicines you are taking. 8.  Do not drive for 24 hours after the test. 9.   A responsible adult must stay with you for 24 hours after the test.  10.  For any questions that cannot be answered, please contact the ordering physician            Nov 21, 2017  1:30 PM CST   Cardioversion with UUETEER1   [facility] [place],  [facility] ([facility])    [address] [id number] [phone]           1) NPO for 8 hours prior to the procedure except for sips of water with medications. 2) Hold insulin or oral diabetic medications morning of procedure. May take   dose long acting insulin. Follow further instructions of PMD. 3) Continue daily Coumadin. ~ If taking Digoxin, hold AM of procedure. ~ Plan to have a responsible adult take you home after the exam. You may not drive, take a bus or taxi by yourself. ~ A responsible adult must stay with you for 24 hours after the test.            Nov 24, 2017  1:30 PM CST   (Arrive by 1:15 PM)   Return Visit with Star Lobato MD   [facility] ([facility])    [address]  3rd [facility] [address] [phone]              Who to contact     If you have questions or need follow up information about today's clinic visit or your schedule please contact [facility] directly at [phone].  Normal or non-critical lab and imaging results will be communicated to you by MyChart, letter or phone within 4 business days after the clinic has received the  results. If you do not hear from us within 7 days, please contact the clinic through [facility] or phone. If you have a critical or abnormal lab result, we will notify you by phone as soon as possible.  Submit refill requests through [facility] or call your pharmacy and they will forward the refill request to us. Please allow 3 business days for your refill to be completed.          Additional Information About Your Visit        [facility] Information     [facility] gives you secure access to your electronic health record. If you see a primary care provider, you can also send messages to your care team and make appointments. If you have questions, please call your primary care clinic.  If you do not have a primary care provider, please call [phone] and they will assist you.        Care EveryWhere ID     This is your Care EveryWhere ID. This could be used by other organizations to access your [place] medical records  [id number]        Your Vitals Were     Pulse Temperature Respirations BMI (Body Mass Index)          60 97  F (36.1  C) (Temporal) 16 31.46 kg/m2         Blood Pressure from Last 3 Encounters:   11/14/17 122/64   11/06/17 133/82   10/26/17 133/81    Weight from Last 3 Encounters:   11/14/17 172 lb (78 kg)   11/06/17 164 lb 11.2 oz (74.7 kg)   10/26/17 168 lb (76.2 kg)              Today, you had the following     No orders found for display         Today's Medication Changes          These changes are accurate as of: 11/14/17  3:16 PM.  If you have any questions, ask your nurse or doctor.               These medicines have changed or have updated prescriptions.        Dose/Directions    aspirin 81 MG chewable tablet   This may have changed:  when to take this   Used for:  Coronary artery disease with angina pectoris, unspecified vessel or lesion type, unspecified whether native or transplanted heart (H)        Dose:  81 mg   Take 1 tablet (81 mg) by mouth daily   Quantity:  30 tablet   Refills:  0        ferrous sulfate 325 (65 FE) MG tablet   Commonly known as:  IRON SUPPLEMENT   This may have changed:  when to take this   Used for:  S/P CABG (coronary artery bypass graft)        Dose:  325 mg   Take 1 tablet (325 mg) by mouth daily (with breakfast)   Quantity:  100 tablet   Refills:  1                Primary Care Provider Office Phone # Fax #    Humberto Conner [phone]       [facility] [address] [place] [address]        Equal Access to Services     NATALIE HAYES : Hadii aad ku hadasho Soomaali, waaxda luqadaha, qaybta kaalmada adeegyada, waxay idiin hayaan adeeg coniaracinthya laedwina mattson. So [facility] [phone].    ATENCIÓN: Si habla español, tiene a espinoza disposición servicios gratuitos de asistencia lingüística. [facility] [phone].    We comply with applicable federal civil rights laws and [place] laws. We do not discriminate on the basis of race, color, national origin, age, disability, sex, sexual orientation, or gender identity.            Thank you!     Thank you for choosing [facility]  for your care. Our goal is always to provide you with excellent care. Hearing back from our patients is one way we can continue to improve our services. Please take a few minutes to complete the written survey that you may receive in the mail after your visit with us. Thank you!             Your Updated Medication List - Protect others around you: Learn how to safely use, store and throw away your medicines at www.disposemymeds.org.          This list is accurate as of: 11/14/17  3:16 PM.  Always use your most recent med list.                   Brand Name Dispense Instructions for use Diagnosis    ACE BANDAGE SELF-ADHERING Misc     6 each    1 each 2 times daily    Open wound of lower limb, right, subsequent encounter       acetaminophen 325 MG tablet    TYLENOL    100 tablet    Take 3 tablets (975 mg) by mouth every 6 hours as needed for mild pain    S/P CABG (coronary  artery bypass graft)       ADAPTIC NON-ADHERING DRESSING Pads     1 each    Externally apply 1 each topically 2 times daily    Open wound of lower limb, right, subsequent encounter       aspirin 81 MG chewable tablet     30 tablet    Take 1 tablet (81 mg) by mouth daily    Coronary artery disease with angina pectoris, unspecified vessel or lesion type, unspecified whether native or transplanted heart (H)       atorvastatin 40 MG tablet    LIPITOR    90 tablet    Take 1 tablet (40 mg) by mouth daily    Coronary artery disease with angina pectoris, unspecified vessel or lesion type, unspecified whether native or transplanted heart (H)       blood glucose monitoring meter device kit           bumetanide 2 MG tablet    BUMEX    180 tablet    Take 1 tablet (2 mg) by mouth 2 times daily    Chronic systolic heart failure (H)       carvedilol 3.125 MG tablet    COREG    60 tablet    Take 1 tablet (3.125 mg) by mouth 2 times daily (with meals)    Chronic diastolic heart failure (H)       ferrous sulfate 325 (65 FE) MG tablet    IRON SUPPLEMENT    100 tablet    Take 1 tablet (325 mg) by mouth daily (with breakfast)    S/P CABG (coronary artery bypass graft)       gabapentin 100 MG capsule    NEURONTIN    180 capsule    Take 1 capsule (100 mg) by mouth 2 times daily    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       KERLIX GAUZE ROLL MEDIUM Misc     48 each    1 each 2 times daily    Open wound of lower limb, right, subsequent encounter       metolazone 2.5 MG tablet    ZAROXOLYN    30 tablet    Please take on Saturday 9/2, Monday 9/4, and Wednesday 9/6.    Cardiomyopathy, unspecified       metoprolol 25 MG 24 hr tablet    TOPROL-XL    45 tablet    Take 0.5 tablets (12.5 mg) by mouth daily    Paroxysmal atrial fibrillation (H)       ONETOUCH DELICA LANCETS 33G Misc     100 each    1 Device daily    Type 2 diabetes mellitus without complication, without long-term current use of insulin (H)       ONETOUCH ULTRA test strip    Generic drug:  blood glucose monitoring     100 each    USE AS DIRECTED TO TEST ONE TIME A DAY    Type 2 diabetes mellitus without complication, without long-term current use of insulin (H)       order for DME     1 Box    Sterile 4x4 gauze; Use gauze twice daily for dressing changes.    Open wound of lower limb, right, subsequent encounter       pantoprazole 40 MG EC tablet    PROTONIX    90 tablet    Take 1 tablet (40 mg) by mouth every morning    Coronary artery disease with angina pectoris, unspecified vessel or lesion type, unspecified whether native or transplanted heart (H)       potassium chloride SA 20 MEQ CR tablet    K-DUR/KLOR-CON M    120 tablet    Take 1 tablet (20 mEq) by mouth 2 times daily    S/P CABG (coronary artery bypass graft)       spironolactone 25 MG tablet    ALDACTONE    90 tablet    Take 1 tablet (25 mg) by mouth daily    Chronic systolic heart failure (H)       traZODone 50 MG tablet    DESYREL    45 tablet    Take 0.5 tablets (25 mg) by mouth nightly as needed for sleep    Primary insomnia       venlafaxine 150 MG Tb24 24 hr tablet    EFFEXOR-ER    90 each    Take 1 tablet (150 mg) by mouth daily (with breakfast)    Adjustment disorder with depressed mood       warfarin 5 MG tablet    COUMADIN    90 tablet    Take 1 tablet (5 mg) by mouth daily    Paroxysmal atrial fibrillation (H)

## 2017-11-14 NOTE — NURSING NOTE
"Chief Complaint   Patient presents with     Referral     would like home health care     Foot Problems     right foot/toe       Initial /64  Pulse 60  Temp 97  F (36.1  C) (Temporal)  Resp 16  Wt 172 lb (78 kg)  BMI 31.46 kg/m2 Estimated body mass index is 31.46 kg/(m^2) as calculated from the following:    Height as of 11/6/17: 5' 2\" (1.575 m).    Weight as of this encounter: 172 lb (78 kg).  Medication Reconciliation: complete    "

## 2017-11-17 DIAGNOSIS — I50.32 CHRONIC DIASTOLIC HEART FAILURE (H): Primary | ICD-10-CM

## 2017-11-17 RX ORDER — METOLAZONE 2.5 MG/1
2.5 TABLET ORAL DAILY PRN
Qty: 30 TABLET | Refills: 1 | Status: SHIPPED | OUTPATIENT
Start: 2017-11-17 | End: 2017-11-28

## 2017-11-21 ENCOUNTER — TELEPHONE (OUTPATIENT)
Dept: INTERNAL MEDICINE | Facility: CLINIC | Age: 77
End: 2017-11-21

## 2017-11-21 ENCOUNTER — MYC REFILL (OUTPATIENT)
Dept: INTERNAL MEDICINE | Facility: CLINIC | Age: 77
End: 2017-11-21

## 2017-11-21 ENCOUNTER — MYC REFILL (OUTPATIENT)
Dept: CARDIOLOGY | Facility: CLINIC | Age: 77
End: 2017-11-21

## 2017-11-21 ENCOUNTER — APPOINTMENT (OUTPATIENT)
Dept: MEDSURG UNIT | Facility: CLINIC | Age: 77
End: 2017-11-21
Attending: INTERNAL MEDICINE
Payer: MEDICARE

## 2017-11-21 ENCOUNTER — HOSPITAL ENCOUNTER (OUTPATIENT)
Facility: CLINIC | Age: 77
Discharge: HOME | End: 2017-11-21
Attending: INTERNAL MEDICINE | Admitting: INTERNAL MEDICINE
Payer: MEDICARE

## 2017-11-21 ENCOUNTER — HOSPITAL ENCOUNTER (OUTPATIENT)
Dept: CARDIOLOGY | Facility: CLINIC | Age: 77
End: 2017-11-21
Attending: NURSE PRACTITIONER | Admitting: INTERNAL MEDICINE
Payer: MEDICARE

## 2017-11-21 ENCOUNTER — SURGERY (OUTPATIENT)
Age: 77
End: 2017-11-21

## 2017-11-21 ENCOUNTER — ANTICOAGULATION THERAPY VISIT (OUTPATIENT)
Dept: ANTICOAGULATION | Facility: CLINIC | Age: 77
End: 2017-11-21
Payer: COMMERCIAL

## 2017-11-21 VITALS
TEMPERATURE: 97.4 F | DIASTOLIC BLOOD PRESSURE: 52 MMHG | SYSTOLIC BLOOD PRESSURE: 104 MMHG | HEIGHT: 62 IN | BODY MASS INDEX: 30.18 KG/M2 | RESPIRATION RATE: 16 BRPM | WEIGHT: 164 LBS | OXYGEN SATURATION: 97 % | HEART RATE: 54 BPM

## 2017-11-21 DIAGNOSIS — F43.21 ADJUSTMENT DISORDER WITH DEPRESSED MOOD: ICD-10-CM

## 2017-11-21 DIAGNOSIS — I48.0 PAROXYSMAL ATRIAL FIBRILLATION (H): ICD-10-CM

## 2017-11-21 DIAGNOSIS — I48.91 ATRIAL FIBRILLATION, UNSPECIFIED TYPE (H): ICD-10-CM

## 2017-11-21 DIAGNOSIS — Z79.01 LONG-TERM (CURRENT) USE OF ANTICOAGULANTS: ICD-10-CM

## 2017-11-21 LAB
GLUCOSE SERPL-MCNC: 104 MG/DL (ref 70–99)
INR PPP: 1.43 (ref 0.86–1.14)
MAGNESIUM SERPL-MCNC: 2.1 MG/DL (ref 1.6–2.3)
POTASSIUM SERPL-SCNC: 3.6 MMOL/L (ref 3.4–5.3)

## 2017-11-21 PROCEDURE — 85610 PROTHROMBIN TIME: CPT | Performed by: INTERNAL MEDICINE

## 2017-11-21 PROCEDURE — 84132 ASSAY OF SERUM POTASSIUM: CPT | Performed by: INTERNAL MEDICINE

## 2017-11-21 PROCEDURE — 82947 ASSAY GLUCOSE BLOOD QUANT: CPT | Performed by: INTERNAL MEDICINE

## 2017-11-21 PROCEDURE — 99207 ZZC NO CHARGE NURSE ONLY: CPT | Performed by: INTERNAL MEDICINE

## 2017-11-21 PROCEDURE — 93005 ELECTROCARDIOGRAM TRACING: CPT

## 2017-11-21 PROCEDURE — 93010 ELECTROCARDIOGRAM REPORT: CPT | Performed by: INTERNAL MEDICINE

## 2017-11-21 PROCEDURE — 40000141 ZZH STATISTIC PERIPHERAL IV START W/O US GUIDANCE

## 2017-11-21 PROCEDURE — 83735 ASSAY OF MAGNESIUM: CPT | Performed by: INTERNAL MEDICINE

## 2017-11-21 PROCEDURE — 40000172 ZZH STATISTIC PROCEDURE PREP ONLY

## 2017-11-21 RX ORDER — LIDOCAINE 40 MG/G
CREAM TOPICAL
Status: DISCONTINUED | OUTPATIENT
Start: 2017-11-21 | End: 2017-11-21 | Stop reason: HOSPADM

## 2017-11-21 RX ORDER — POTASSIUM CHLORIDE 750 MG/1
20 TABLET, EXTENDED RELEASE ORAL
Status: DISCONTINUED | OUTPATIENT
Start: 2017-11-21 | End: 2017-11-21 | Stop reason: HOSPADM

## 2017-11-21 RX ORDER — POTASSIUM CHLORIDE 750 MG/1
40 TABLET, EXTENDED RELEASE ORAL
Status: DISCONTINUED | OUTPATIENT
Start: 2017-11-21 | End: 2017-11-21 | Stop reason: HOSPADM

## 2017-11-21 NOTE — PROGRESS NOTES
Arrived from home for a cardioversion.  EKG shows atrial fibrillation.  PIV placed and labs sent.  Needs consent.  H&P current.  Resting in bed.

## 2017-11-21 NOTE — PROGRESS NOTES
INR sub therapeutic.  Procedure cancelled.  PIV removed.  Reviewed instructions with patient to continue 5 mg of Coumadin and to have INR rechecked on the 24th.  Patient has an appointment with Dr. Lobato on the 24th as well.  Discharged to home with daughter.

## 2017-11-21 NOTE — TELEPHONE ENCOUNTER
Reason for Call:  INR    Who is calling?  Home Care: [facility]     Phone number:  [phone]     Fax number:  na    Name of caller: Chantal    INR Value:  N/a Chantal is calling to see when a recheck would be necessary    Are there any other concerns:  No    Can we leave a detailed message on this number? YES    Phone number patient can be reached at: Home number on file [phone] (home)      Call taken on 11/21/2017 at 3:31 PM by Mello Patricio

## 2017-11-21 NOTE — PROGRESS NOTES
Electrophysiology Brief Progress Note:    Ms. Fenton is a 77 year old female who has a past medical history significant for CAD s/p CABG X3 (LIMA-LAD, SVG-D1, SVG-dRCA) with concomitant modified MAZE and ABDIEL ligation 2/2016, pAF (CHADSVASC 8 on warfarin), DM2, HTN, HLD, CVA, RUE DVT, depression,HIT, panic attacks, seizures, and NOAH. She was found to be in AF and presented today for scheduled TAVO/DCCV. She was noted to have sub therapeutic INR. Given history of HIT, we cannot bridge with lovenox. Discussed options with patient. We will have to cancel DCCV today and reschedule TAVO/DCCV when INR is therapeutic again. If INRs continue to be a problem, she may be a candidate for DOAC. Update ordering provider with recommendations. Patient states understanding and is agreeable with the plan.     CHACORTA Emery CNP  Electrophysiology Consult Service  Pager: [phone]

## 2017-11-21 NOTE — DISCHARGE INSTRUCTIONS
Going Home after Sedation        Take 5 mg Warfarin daily. Please have INR draw on 11/24/17     We will reschedule your cardioversion when INR is back to goal range (>2)      For 24 hours:    An adult should stay with you.    Relax and take it easy.    DO NOT make any important legal decisions.    DO NOT drive or operate machines at home or at work.    Resume your regular diet and drink plenty of fluids.    If you have any redness/skin sorness where the patches were placed, you may use aloe vera gel as needed to help relieve local pain.     CALL THE PHYSICIAN IF:    You develop nausea or vomiting    You develop hives or a rash or any unexplained itching    ADDITIONAL INFORMATION:  Cardiovascular Clinic:   [address] [place] [address]. [place] [address]  Your Care Team:  EP Cardiology   Telephone Number     EP Provider  [phone]   Jessica Aguillon RN  Yani Spring RN  [phone]     For scheduling appts or procedures:    Luz Marina Rodas   [phone]   For the Device Clinic (Pacemakers and ICD's)   RN's :   Yen Magana  During business hours: [phone]     After business hours:   [phone]- select option 4 and ask for job code [id number].       As always, Thank you for trusting us with your health care needs!

## 2017-11-21 NOTE — TELEPHONE ENCOUNTER
Reason for Call:  Other request to hold PT    Detailed comments: Horace from  PT states patient is requesting to hold therapy for the week. Please advise. Call Horace with questions.    Phone Number Patient can be reached at: Other phone number:  [phone] Horace    Best Time:     Can we leave a detailed message on this number? YES    Call taken on 11/21/2017 at 9:57 AM by Ester Velasco

## 2017-11-21 NOTE — PROGRESS NOTES
ANTICOAGULATION FOLLOW-UP CLINIC VISIT    Patient Name:  Carlos Alberto Fenton  Date:  11/21/2017  Contact Type:  Telephone/ VM left and MessageMehart message sent as asll    SUBJECTIVE:     Patient Findings     Positives Unexplained INR or factor level change    Comments Reason for Call:  INR     Who is calling?  Home Care: [facility]                        Phone number:  [phone]      Fax number:  na     Name of caller: Chantal     INR Value:  N/a Chantal is calling to see when a recheck would be necessary     Are there any other concerns:  No     Can we leave a detailed message on this number? YES     Phone number patient can be reached at: Home number on file [phone] (home)        Call taken on 11/21/2017 at 3:31 PM by Mello Patricio           OBJECTIVE    INR   Date Value Ref Range Status   11/21/2017 1.43 (H) 0.86 - 1.14 Final     Chromogenic Factor 10   Date Value Ref Range Status   03/02/2017 65 (L) 70 - 130 % Final     Comment:     Therapeutic Range:  A Chromogenic Factor 10 level of approximately 20-40%   inversely correlates with an INR of 2-3 for patients receiving Warfarin.   Chromogenic Factor 10 levels below 20% indicate an INR greater than 3 and   levels above 40% indicate an INR less than 2.       Factor 2 Assay   Date Value Ref Range Status   02/27/2017 37 (L) 60 - 140 % Final       ASSESSMENT / PLAN  INR assessment SUB    Recheck INR In: 3 DAYS    INR Location Outside lab      Anticoagulation Summary as of 11/21/2017     INR goal 2.0-3.0   Today's INR 1.43!   Maintenance plan 2.5 mg (5 mg x 0.5) on Mon, Wed, Fri; 5 mg (5 mg x 1) all other days   Full instructions 11/22: 5 mg; 11/24: 5 mg; Otherwise 2.5 mg on Mon, Wed, Fri; 5 mg all other days   Weekly total 27.5 mg   Plan last modified Gordo Brunner RN (10/30/2017)   Next INR check 11/24/2017   Priority INR   Target end date Indefinite    Indications   Long-term (current) use of anticoagulants [Z79.01] [Z79.01]  New onset atrial fibrillation (H)  (Resolved) [I48.91]  Atrial fibrillation (H) [I48.91] [I48.91]         Anticoagulation Episode Summary     INR check location     Preferred lab     Send INR reminders to OCTAVIO NEGRETE    Comments Prounounced A-seenath 4/17/17 Allergice to Heparin/Lovenon per Dr. Angelina Bernal's note  Speak with  Darrin in addition to patient Hx of 2 strokes. HIPPA OK to leave messag  Pt is confused. Please speak in tablets only (5mg tablets)/Send MyChart message      Anticoagulation Care Providers     Provider Role Specialty Phone number    Star Lobato MD Responsible Cardiology [phone]            See the Encounter Report to view Anticoagulation Flowsheet and Dosing Calendar (Go to Encounters tab in chart review, and find the Anticoagulation Therapy Visit)    Dosage adjustment made based on physician directed care plan.      I left a detailed voicemail with the orders reflected in flowsheet. I have also requested a call back if there have been any missed doses, concerns, illness, fever, or if there have been any changes in medications, activity level, or diet    Pt had INR checked today during appt. Will have HC visit on Friday for another check.     Amanda Frausto RN

## 2017-11-21 NOTE — IP AVS SNAPSHOT
[address] [address]                                       After Visit Summary   11/21/2017    Carlos Alberto Fenton    MRN: [id number]           After Visit Summary Signature Page     I have received my discharge instructions, and my questions have been answered. I have discussed any challenges I see with this plan with the nurse or doctor.    ..........................................................................................................................................  Patient/Patient Representative Signature      ..........................................................................................................................................  Patient Representative Print Name and Relationship to Patient    ..................................................               ................................................  Date                                            Time    ..........................................................................................................................................  Reviewed by Signature/Title    ...................................................              ..............................................  Date                                                            Time

## 2017-11-21 NOTE — IP AVS SNAPSHOT
MRN:[id number]                      After Visit Summary   11/21/2017    Carlos Alberto Fenton    MRN: [id number]           Visit Information        Department      11/21/2017 11:33 AM [address] [facility]          Review of your medicines      CONTINUE these medicines which may have CHANGED, or have new prescriptions. If we are uncertain of the size of tablets/capsules you have at home, strength may be listed as something that might have changed.        Dose / Directions    aspirin 81 MG chewable tablet   This may have changed:  when to take this   Used for:  Coronary artery disease with angina pectoris, unspecified vessel or lesion type, unspecified whether native or transplanted heart (H)        Dose:  81 mg   Take 1 tablet (81 mg) by mouth daily   Quantity:  30 tablet   Refills:  0       ferrous sulfate 325 (65 FE) MG tablet   Commonly known as:  IRON SUPPLEMENT   This may have changed:  when to take this   Used for:  S/P CABG (coronary artery bypass graft)        Dose:  325 mg   Take 1 tablet (325 mg) by mouth daily (with breakfast)   Quantity:  100 tablet   Refills:  1         CONTINUE these medicines which have NOT CHANGED        Dose / Directions    ACE BANDAGE SELF-ADHERING Misc   Used for:  Open wound of lower limb, right, subsequent encounter        Dose:  1 each   1 each 2 times daily   Quantity:  6 each   Refills:  3       acetaminophen 325 MG tablet   Commonly known as:  TYLENOL   Used for:  S/P CABG (coronary artery bypass graft)        Dose:  975 mg   Take 3 tablets (975 mg) by mouth every 6 hours as needed for mild pain   Quantity:  100 tablet   Refills:  0       ADAPTIC NON-ADHERING DRESSING Pads   Used for:  Open wound of lower limb, right, subsequent encounter        Dose:  1 each   Externally apply 1 each topically 2 times daily   Quantity:  1 each   Refills:  3       atorvastatin 40 MG tablet   Commonly known as:  LIPITOR   Used for:  Coronary artery disease with angina pectoris,  unspecified vessel or lesion type, unspecified whether native or transplanted heart (H)        Dose:  40 mg   Take 1 tablet (40 mg) by mouth daily   Quantity:  90 tablet   Refills:  1       blood glucose monitoring meter device kit        Refills:  0       bumetanide 2 MG tablet   Commonly known as:  BUMEX   Used for:  Chronic systolic heart failure (H)        Dose:  2 mg   Take 1 tablet (2 mg) by mouth 2 times daily   Quantity:  180 tablet   Refills:  3       carvedilol 3.125 MG tablet   Commonly known as:  COREG   Used for:  Chronic diastolic heart failure (H)        Dose:  3.125 mg   Take 1 tablet (3.125 mg) by mouth 2 times daily (with meals)   Quantity:  60 tablet   Refills:  3       gabapentin 100 MG capsule   Commonly known as:  NEURONTIN   Used for:  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        Dose:  100 mg   Take 1 capsule (100 mg) by mouth 2 times daily   Quantity:  180 capsule   Refills:  1       KERLIX GAUZE ROLL MEDIUM Misc   Used for:  Open wound of lower limb, right, subsequent encounter        Dose:  1 each   1 each 2 times daily   Quantity:  48 each   Refills:  3       metolazone 2.5 MG tablet   Commonly known as:  ZAROXOLYN   Used for:  Chronic diastolic heart failure (H)        Dose:  2.5 mg   Take 1 tablet (2.5 mg) by mouth daily as needed For weight over 170 Lbs.   Quantity:  30 tablet   Refills:  1       ONETOUCH DELICA LANCETS 33G Misc   Used for:  Type 2 diabetes mellitus without complication, without long-term current use of insulin (H)        Dose:  1 Device   1 Device daily   Quantity:  100 each   Refills:  0       ONETOUCH ULTRA test strip   Used for:  Type 2 diabetes mellitus without complication, without long-term current use of insulin (H)   Generic drug:  blood glucose monitoring        USE AS DIRECTED TO TEST ONE TIME A DAY   Quantity:  100 each   Refills:  2       order for DME   Used for:  Open wound of lower limb, right, subsequent encounter        Sterile 4x4 gauze; Use gauze  twice daily for dressing changes.   Quantity:  1 Box   Refills:  3       pantoprazole 40 MG EC tablet   Commonly known as:  PROTONIX   Used for:  Coronary artery disease with angina pectoris, unspecified vessel or lesion type, unspecified whether native or transplanted heart (H)        Dose:  40 mg   Take 1 tablet (40 mg) by mouth every morning   Quantity:  90 tablet   Refills:  1       potassium chloride SA 20 MEQ CR tablet   Commonly known as:  K-DUR/KLOR-CON M   Used for:  S/P CABG (coronary artery bypass graft)        Dose:  20 mEq   Take 1 tablet (20 mEq) by mouth 2 times daily   Quantity:  120 tablet   Refills:  1       spironolactone 25 MG tablet   Commonly known as:  ALDACTONE   Used for:  Chronic systolic heart failure (H)        Dose:  25 mg   Take 1 tablet (25 mg) by mouth daily   Quantity:  90 tablet   Refills:  3       traZODone 50 MG tablet   Commonly known as:  DESYREL   Used for:  Primary insomnia        Dose:  25 mg   Take 0.5 tablets (25 mg) by mouth nightly as needed for sleep   Quantity:  45 tablet   Refills:  2       venlafaxine 150 MG Tb24 24 hr tablet   Commonly known as:  EFFEXOR-ER   Used for:  Adjustment disorder with depressed mood        Dose:  150 mg   Take 1 tablet (150 mg) by mouth daily (with breakfast)   Quantity:  90 each   Refills:  1       warfarin 5 MG tablet   Commonly known as:  COUMADIN   Used for:  Paroxysmal atrial fibrillation (H)        Dose:  5 mg   Take 1 tablet (5 mg) by mouth daily   Quantity:  90 tablet   Refills:  3                Protect others around you: Learn how to safely use, store and throw away your medicines at www.disposemymeds.org.         Follow-ups after your visit        Your next 10 appointments already scheduled     Nov 24, 2017  1:30 PM CST   (Arrive by 1:15 PM)   Return Visit with Star Lobato MD   [facility] ([facility])    [address] [address] [phone]             Nov 27, 2017 12:30 PM CST   (Arrive by 12:15 PM)   Return Vascular Visit with Leah Santamaria MD   [facility] ([facility])    [address] [place] [address]  3rd [facility] [address] [phone]               Care Instructions        Further instructions from your care team           Going Home after Sedation        Take 5 mg Warfarin daily. Please have INR draw on 11/24/17     We will reschedule your cardioversion when INR is back to goal range (>2)      For 24 hours:    An adult should stay with you.    Relax and take it easy.    DO NOT make any important legal decisions.    DO NOT drive or operate machines at home or at work.    Resume your regular diet and drink plenty of fluids.    If you have any redness/skin sorness where the patches were placed, you may use aloe vera gel as needed to help relieve local pain.     CALL THE PHYSICIAN IF:    You develop nausea or vomiting    You develop hives or a rash or any unexplained itching    ADDITIONAL INFORMATION:  Cardiovascular Clinic:   [address] [place] [address]. [place] [address]  Your Care Team:  EP Cardiology   Telephone Number     EP Provider  [phone]   Jessica Aguillon RN  Yani Spring RN  [phone]     For scheduling appts or procedures:    Luz Marina Rodas   [phone]   For the Device Clinic (Pacemakers and ICD's)   RN's :   Yen Magana  During business hours: [phone]     After business hours:   [phone]- select option 4 and ask for job code [id number].       As always, Thank you for trusting us with your health care needs!           Additional Information About Your Visit        MyChart Information     [employer] gives you secure access to your electronic health record. If you see a primary care provider, you can also send messages to your care team and make appointments. If you have questions, please call your primary care clinic.  If you do not have a primary care provider, please call  "[phone] and they will assist you.        Care EveryWhere ID     This is your Care EveryWhere ID. This could be used by other organizations to access your [facility] medical records  [id number]        Your Vitals Were     Blood Pressure Pulse Temperature Respirations Height Weight    104/52 (BP Location: Right arm) 54 97.4  F (36.3  C) (Oral) 16 1.575 m (5' 2\") 74.4 kg (164 lb)    Pulse Oximetry BMI (Body Mass Index)                97% 30 kg/m2           Primary Care Provider Office Phone # Fax #    Humberto Conner [phone]      Equal Access to Services     [facility] FREDDY : Hadii william flores hadasho Sogeovannyali, waaxda luqadaha, qaybta kaalmada adeegyada, scott mccollum . So [facility] [phone].    ATENCIÓN: Si habla español, tiene a espinoza disposición servicios gratuitos de asistencia lingüística. Llame al [phone].    We comply with applicable federal civil rights laws and [place] laws. We do not discriminate on the basis of race, color, national origin, age, disability, sex, sexual orientation, or gender identity.            Thank you!     Thank you for choosing [facility] for your care. Our goal is always to provide you with excellent care. Hearing back from our patients is one way we can continue to improve our services. Please take a few minutes to complete the written survey that you may receive in the mail after you visit with us. Thank you!             Medication List: This is a list of all your medications and when to take them. Check marks below indicate your daily home schedule. Keep this list as a reference.      Medications           Morning Afternoon Evening Bedtime As Needed    ACE BANDAGE SELF-ADHERING Misc   1 each 2 times daily                                acetaminophen 325 MG tablet   Commonly known as:  TYLENOL   Take 3 tablets (975 mg) by mouth every 6 hours as needed for mild pain                                ADAPTIC NON-ADHERING DRESSING Pads "   Externally apply 1 each topically 2 times daily                                aspirin 81 MG chewable tablet   Take 1 tablet (81 mg) by mouth daily                                atorvastatin 40 MG tablet   Commonly known as:  LIPITOR   Take 1 tablet (40 mg) by mouth daily                                blood glucose monitoring meter device kit                                bumetanide 2 MG tablet   Commonly known as:  BUMEX   Take 1 tablet (2 mg) by mouth 2 times daily                                carvedilol 3.125 MG tablet   Commonly known as:  COREG   Take 1 tablet (3.125 mg) by mouth 2 times daily (with meals)                                ferrous sulfate 325 (65 FE) MG tablet   Commonly known as:  IRON SUPPLEMENT   Take 1 tablet (325 mg) by mouth daily (with breakfast)                                gabapentin 100 MG capsule   Commonly known as:  NEURONTIN   Take 1 capsule (100 mg) by mouth 2 times daily                                KERLIX GAUZE ROLL MEDIUM Misc   1 each 2 times daily                                metolazone 2.5 MG tablet   Commonly known as:  ZAROXOLYN   Take 1 tablet (2.5 mg) by mouth daily as needed For weight over 170 Lbs.                                [employer] DELICA LANCETS 33G Misc   1 Device daily                                ONETOUCH ULTRA test strip   USE AS DIRECTED TO TEST ONE TIME A DAY   Generic drug:  blood glucose monitoring                                order for DME   Sterile 4x4 gauze; Use gauze twice daily for dressing changes.                                pantoprazole 40 MG EC tablet   Commonly known as:  PROTONIX   Take 1 tablet (40 mg) by mouth every morning                                potassium chloride SA 20 MEQ CR tablet   Commonly known as:  K-DUR/KLOR-CON M   Take 1 tablet (20 mEq) by mouth 2 times daily                                spironolactone 25 MG tablet   Commonly known as:  ALDACTONE   Take 1 tablet (25 mg) by mouth daily                                 traZODone 50 MG tablet   Commonly known as:  DESYREL   Take 0.5 tablets (25 mg) by mouth nightly as needed for sleep                                venlafaxine 150 MG Tb24 24 hr tablet   Commonly known as:  EFFEXOR-ER   Take 1 tablet (150 mg) by mouth daily (with breakfast)                                warfarin 5 MG tablet   Commonly known as:  COUMADIN   Take 1 tablet (5 mg) by mouth daily

## 2017-11-21 NOTE — MR AVS SNAPSHOT
Carlos Alberto Fenton   11/21/2017   Anticoagulation Therapy Visit    Description:  77 year old female   Provider:  Humberto Conner MD   Department:  Ph Anticoag           INR as of 11/21/2017     Today's INR 1.43!      Anticoagulation Summary as of 11/21/2017     INR goal 2.0-3.0   Today's INR 1.43!   Full instructions 11/22: 5 mg; 11/24: 5 mg; Otherwise 2.5 mg on Mon, Wed, Fri; 5 mg all other days   Next INR check 11/24/2017    Indications   Long-term (current) use of anticoagulants [Z79.01] [Z79.01]  New onset atrial fibrillation (H) (Resolved) [I48.91]  Atrial fibrillation (H) [I48.91] [I48.91]         Contact Numbers     Clinic Number:         November 2017 Details    Sun Mon Tue Wed Thu Fri Sat        1               2               3               4                 5               6               7               8               9               10               11                 12               13               14               15               16               17               18                 19               20               21      5 mg   See details      22      5 mg         23      5 mg         24            25                 26               27               28               29               30                  Date Details   11/21 This INR check       Date of next INR:  11/24/2017         How to take your warfarin dose     To take:  5 mg Take 1 of the 5 mg tablets.

## 2017-11-22 ENCOUNTER — PRE VISIT (OUTPATIENT)
Dept: CARDIOLOGY | Facility: CLINIC | Age: 77
End: 2017-11-22

## 2017-11-22 LAB — INTERPRETATION ECG - MUSE: NORMAL

## 2017-11-22 RX ORDER — VENLAFAXINE HYDROCHLORIDE 150 MG/1
150 TABLET, EXTENDED RELEASE ORAL
Qty: 90 EACH | Refills: 1 | Status: SHIPPED | OUTPATIENT
Start: 2017-11-22 | End: 2017-11-29

## 2017-11-22 RX ORDER — WARFARIN SODIUM 5 MG/1
5 TABLET ORAL DAILY
Qty: 90 TABLET | Refills: 3 | Status: SHIPPED | OUTPATIENT
Start: 2017-11-22 | End: 2017-12-06

## 2017-11-22 NOTE — TELEPHONE ENCOUNTER
effexor      Last Written Prescription Date:  11/03/17  Last Fill Quantity: 90,   # refills: 1  Future Office visit:       Routing refill request to provider for review/approval because:  Drug not on the [facility], P or [facility] refill protocol or controlled substance

## 2017-11-22 NOTE — TELEPHONE ENCOUNTER
3/1/17--TAVO  Interpretation Summary  H/o ABDIEL ligation. No LA clot seen.  H/o PFO closure. The atrial septum is intact as assessed by color Doppler.  No LV thrombus seen. Mildly (EF 45-50%) reduced left ventricular function is  present.  The right ventricle is normal size. Global right ventricular function is  Normal.    2/8/17--Echocardiogram  Interpretation Summary  Mildly (EF 45-50%) reduced left ventricular function is present.  Right ventricular function, chamber size, wall motion, and thickness are  normal.  Trivial pericardial effusion is present.     No change from prior.    1/30/17--Coronary angiogram  CORONARY ANGIOGRAM:   1. Both coronary arteries arise from their respective cusps.  2. Right dominant.  3. LM has ostial 50-60% stenosis  4. LAD is heavily calcified. It supplies the entire apex (type 3 vessel) and gives rise to septal perforators, trivial high D1 and D2, and large D3. mLAD has focal 90% stenosis just distal to the D3 take off. There is a long segment 50% stenosis in the more proximal portion of the mLAD (between D1 and D2, and D2 and D3).  5. LCX gives rise to OM1, OM2, and OM3 vessels. There are luminal irregularities throughout the LCx system, but no focal stenosis.    6. RCA has anterior take off and was suboptimally engaged with the 3DRC catheter. Despite this, images were diagnostic for mRCA 80% stenosis, just distal to the take off a large acute RV marginal branch. The dRCA has <25% stenosis. Tthe remainder of the RCA has luminal irregularities, and it gives RPDA and RPL branches.     Sheath Removal:  The 4Fr arterial sheath will be removed after the patient has been transferred to the unit.     Contrast: Isovue, 47 ml      Fluoroscopy Time: 2.0 min     COMPLICATIONS:  1. No immediate complications     SUMMARY:   -- Diagnostic angiography shows two vessel coronary artery disease (mid LAD and mid RCA) with LM disease.  -- No intervention performed at this time.     PLAN:   --Referral to  CV surgery for consideration of coronary artery bypass grafting, suitable targets in dLAD,Diagonal,RPDA/RPL. Will arrange consultation on an outpatient basis.  --If patient is averse to surgery would be reasonable to schedule revascularization with percutaneous coronary intervention as lesions appear amenable.  --Continue optimal medical therapy with BB, Asa, Statin  --Resume coumadin as previously planned  --Hold metformin for 48 hours  --Discharge home tonight

## 2017-11-22 NOTE — TELEPHONE ENCOUNTER
Message from [employer]:  Original authorizing provider: Star Lobato MD    Carlos Alberto BISHOPFernie Fenton would like a refill of the following medications:  warfarin (COUMADIN) 5 MG tablet [Star Lobato MD]    Preferred pharmacy: [facility] - [place], [place] - [address]     Comment:      Medication renewals requested in this message routed to other providers:  venlafaxine (EFFEXOR-ER) 150 MG TB24 24 hr tablet [Humberto Conner MD]

## 2017-11-22 NOTE — TELEPHONE ENCOUNTER
Message from [employer]:  Original authorizing provider: Humberto Conner MD    Carlos Alberto Fenton would like a refill of the following medications:  venlafaxine (EFFEXOR-ER) 150 MG TB24 24 hr tablet [Humberto Conner MD]    Preferred pharmacy: [facility] - [place], [place] - [address]    Comment:      Medication renewals requested in this message routed to other providers:  warfarin (COUMADIN) 5 MG tablet [Star Lobato MD]

## 2017-11-24 ENCOUNTER — OFFICE VISIT (OUTPATIENT)
Dept: CARDIOLOGY | Facility: CLINIC | Age: 77
End: 2017-11-24
Attending: INTERNAL MEDICINE
Payer: COMMERCIAL

## 2017-11-24 VITALS
OXYGEN SATURATION: 94 % | HEIGHT: 62 IN | SYSTOLIC BLOOD PRESSURE: 114 MMHG | DIASTOLIC BLOOD PRESSURE: 75 MMHG | WEIGHT: 167.2 LBS | BODY MASS INDEX: 30.77 KG/M2 | HEART RATE: 74 BPM

## 2017-11-24 DIAGNOSIS — I50.32 CHRONIC DIASTOLIC HEART FAILURE (H): ICD-10-CM

## 2017-11-24 DIAGNOSIS — I48.0 PAROXYSMAL ATRIAL FIBRILLATION (H): Primary | ICD-10-CM

## 2017-11-24 DIAGNOSIS — I48.0 PAROXYSMAL ATRIAL FIBRILLATION (H): ICD-10-CM

## 2017-11-24 DIAGNOSIS — I25.10 CORONARY ARTERY DISEASE INVOLVING NATIVE HEART WITHOUT ANGINA PECTORIS, UNSPECIFIED VESSEL OR LESION TYPE: ICD-10-CM

## 2017-11-24 LAB — INR PPP: 1.41 (ref 0.86–1.14)

## 2017-11-24 PROCEDURE — 99214 OFFICE O/P EST MOD 30 MIN: CPT | Mod: GC | Performed by: INTERNAL MEDICINE

## 2017-11-24 PROCEDURE — 99212 OFFICE O/P EST SF 10 MIN: CPT | Mod: ZF

## 2017-11-24 ASSESSMENT — PAIN SCALES - GENERAL: PAINLEVEL: NO PAIN (0)

## 2017-11-24 NOTE — LETTER
11/24/2017      RE: Carlos Alberto Fenton  [address]  [facility] [address]       Dear Colleague,    Thank you for the opportunity to participate in the care of your patient, Carlos Alberto Fenton, at the [facility] at [facility]. Please see a copy of my visit note below.    HPI:   76F, PMH of HTN, HPL, DM, CVA (11/2016), CAD (s/p 3v CABG: LIMA-LAD, SVG-D1, SVG-dRCA and modified MAZE, ABDIEL ligation - 2/2016), paroxysmal AFib (on amio, [id number] - 8), HIT, RUE DVT who is here for follow up.    She was last seen on 9/1/17. At that time she was in significant volume overload and had been noticing easy bruising (had been on Fondaparinux at the time). We had held Fondaparinux and initiated coumadin, and had started her on Bumex in place of her Lasix. She has had visits with [facility] and EP since then has lost about 20 lbs. She was planned to undergo a TAVO/DCCV on 11/21 however that could not be performed since she has not had a reliably therapeutic INR. Her INR today in 1.41.     She says her breathing is improved significantly. She is however still limited in terms of physical capacity primarily by her back pain. Her daughter is concerned about worsening short term memory, she describes the decline as gradual but noticeable. Per daughter, lately the patient has been more sad/depressed as well.    PAST MEDICAL HISTORY:  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    parietral lesions bilateral       Antiplatelet or antithrombotic long-term use      Anxiety      Atrial fibrillation (H)      CAD (coronary artery disease)     2 vessel     Cancer (H) 1990    periodically have cancer on the skin removed     Cerebral artery occlusion with cerebral infarction (H) 10/2016    Cardioembolic strokes related to atrial fibrillation     Deep vein thrombosis (DVT) of axillary vein of right upper extremity (H) 2/25/2017      Depression      Depressive disorder 2001     Diabetes (H)      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     Hyperlipidemia      Hyperlipidemia LDL goal <130 10/31/2010     Hypertension      Panic attacks      Seizures (H) 10/19/2016     Sleep apnea     Uses CPAP       CURRENT MEDICATIONS:  Current Outpatient Prescriptions   Medication Sig Dispense Refill     warfarin (COUMADIN) 5 MG tablet Take 1 tablet (5 mg) by mouth daily 90 tablet 3     venlafaxine (EFFEXOR-ER) 150 MG TB24 24 hr tablet Take 1 tablet (150 mg) by mouth daily (with breakfast) 90 each 1     metolazone (ZAROXOLYN) 2.5 MG tablet Take 1 tablet (2.5 mg) by mouth daily as needed For weight over 170 Lbs. 30 tablet 1     carvedilol (COREG) 3.125 MG tablet Take 1 tablet (3.125 mg) by mouth 2 times daily (with meals) 60 tablet 3     spironolactone (ALDACTONE) 25 MG tablet Take 1 tablet (25 mg) by mouth daily 90 tablet 3     bumetanide (BUMEX) 2 MG tablet Take 1 tablet (2 mg) by mouth 2 times daily 180 tablet 3     atorvastatin (LIPITOR) 40 MG tablet Take 1 tablet (40 mg) by mouth daily 90 tablet 1     gabapentin (NEURONTIN) 100 MG capsule Take 1 capsule (100 mg) by mouth 2 times daily 180 capsule 1     traZODone (DESYREL) 50 MG tablet Take 0.5 tablets (25 mg) by mouth nightly as needed for sleep 45 tablet 2     pantoprazole (PROTONIX) 40 MG EC tablet Take 1 tablet (40 mg) by mouth every morning 90 tablet 1     potassium chloride SA (K-DUR/KLOR-CON M) 20 MEQ CR tablet Take 1 tablet (20 mEq) by mouth 2 times daily 120 tablet 1     Elastic Bandages & Supports (ACE BANDAGE SELF-ADHERING) MISC 1 each 2 times daily 6 each 3     Gauze Pads & Dressings (KERLIX GAUZE ROLL MEDIUM) MISC 1 each 2 times daily 48 each 3     ONE TOUCH ULTRA test strip USE AS DIRECTED TO TEST ONE TIME A  each 2     Wound Dressings (ADAPTIC NON-ADHERING DRESSING) PADS Externally apply 1 each topically 2 times daily 1 each 3     order for DME Sterile 4x4 gauze; Use gauze  twice daily for dressing changes. 1 Box 3     acetaminophen (TYLENOL) 325 MG tablet Take 3 tablets (975 mg) by mouth every 6 hours as needed for mild pain 100 tablet 0     aspirin 81 MG chewable tablet Take 1 tablet (81 mg) by mouth daily (Patient taking differently: Take 81 mg by mouth every morning ) 30 tablet 0     ferrous sulfate (IRON SUPPLEMENT) 325 (65 FE) MG tablet Take 1 tablet (325 mg) by mouth daily (with breakfast) (Patient taking differently: Take 325 mg by mouth 2 times daily ) 100 tablet 1     ONETOUCH DELICA LANCETS 33G MISC 1 Device daily 100 each 0     blood glucose monitoring (ONE TOUCH ULTRA 2) meter device kit          PAST SURGICAL HISTORY:  Past Surgical History:   Procedure Laterality Date     AMPUTATE TOE(S) Right 5/26/2017    Procedure: AMPUTATE TOE(S);  Right Great Toe amputation, debriedment of 2 and 3rd toe soft tissu right foot. and application of Grafix;  Surgeon: Leah Santamaria MD;  Location: [facility] OR     BACK SURGERY       BYPASS GRAFT ARTERY CORONARY N/A 2/6/2017    Procedure: BYPASS GRAFT ARTERY CORONARY;  Surgeon: Mikhail Quiñones MD;  Location: [facility] OR     C APPENDECTOMY  1959     C CARDIAC SURG PROCEDURE UNLIST       C HAND/FINGER SURGERY UNLISTED       C STOMACH SURGERY PROCEDURE UNLISTED       HC SACROPLASTY       HC VASCULAR SURGERY PROCEDURE UNLIST       HYSTERECTOMY, PASTORA  1988     IRRIGATION AND DEBRIDEMENT FOOT, COMBINED Right 7/7/2017    Procedure: COMBINED IRRIGATION AND DEBRIDEMENT FOOT;  Irrigation and Debridement Right Foot with Graphix  *Latex Allergy*;  Surgeon: Leah Santamaria MD;  Location: [facility] OR     MAZE PROCEDURE N/A 2/6/2017    Procedure: MAZE PROCEDURE;  Surgeon: Mikhail Quiñones MD;  Location: [facility] OR     PICC INSERTION Left 02/25/2017    5fr TL Bard PICC, 47cm (3cm external) in the L basilic vein w/ tip in the SVC RA junction     TONSILLECTOMY  1942       ALLERGIES:     Allergies   Allergen Reactions     Heparin      HIT/ thrombocytopenia     Lovenox  "[Enoxaparin] Other (See Comments)     Thrombocytopenia      Oxycodone      hallucinations     Toprol Xl [Metoprolol] Nausea and Vomiting     Adhesive Tape Itching and Rash     Diapers & Supplies Rash     Developed yeast infection from previous hospital stay       SOCIAL HISTORY:  Social History   Substance Use Topics     Smoking status: Former Smoker     Packs/day: 1.00     Years: 10.00     Types: Cigarettes     Start date: 10/3/1958     Quit date: 7/4/1976     Smokeless tobacco: Never Used      Comment: Quit in 1976     Alcohol use Yes      Comment: Socially       ROS:   Constitutional: No fever, chills, or sweats. Weight stable.   ENT: No visual disturbance, ear ache, epistaxis, sore throat.   Cardiovascular: As per HPI.   Respiratory: No cough, hemoptysis.    GI: No nausea, vomiting, hematemesis, melena, or hematochezia.   : No hematuria.   Integument: Negative.   Psychiatric: Negative.   Neuro: Negative.   Endocrinology: No significant heat or cold intolerance   Musculoskeletal: No myalgia.      Exam:  /75 (BP Location: Right arm, Patient Position: Chair, Cuff Size: Adult Regular)  Pulse 74  Ht 1.575 m (5' 2\")  Wt 75.8 kg (167 lb 3.2 oz)  SpO2 94%  BMI 30.58 kg/m2  GEN: aao x 3, NAD  Neck: JVD at the base of the neck  LUNGS: No wheezing. +rales at the bases  HEART: S1S2 audible, no murmurs or rubs. Regular rhythm  ABDOMEN: Soft, nt.  EXTREMITIES: Warm calves, +DPs, trace to 1+ bilateral LE edema  NEURO: aao x 3, no focal deficits        Labs:  CBC RESULTS:   Lab Results   Component Value Date    WBC 6.4 10/11/2017    RBC 3.99 10/11/2017    HGB 12.8 10/11/2017    HCT 41.3 10/11/2017     (H) 10/11/2017    MCH 32.1 10/11/2017    MCHC 31.0 (L) 10/11/2017    RDW 16.5 (H) 10/11/2017     10/11/2017       BMP RESULTS:  Lab Results   Component Value Date     11/06/2017    POTASSIUM 3.6 11/21/2017    CHLORIDE 88 (L) 11/06/2017    CO2 40 (H) 11/06/2017    ANIONGAP 4 11/06/2017     " (H) 11/21/2017    BUN 27 11/06/2017    CR 0.88 11/06/2017    GFRESTIMATED 63 11/06/2017    GFRESTBLACK 76 11/06/2017    CARLY 10.0 11/06/2017        INR RESULTS:  Lab Results   Component Value Date    INR 1.43 (H) 11/21/2017    INR 3.3 (H) 11/14/2017    INR 2.71 (H) 11/06/2017    INR 2.8 (A) 10/30/2017    INR 1.79 (H) 10/26/2017    INR 1.7 (H) 10/24/2017    INR 1.50 (H) 10/20/2017       Procedures:  ECHO - 2/26/17  No LV throbus noted.      Left Ventricle:  Left ventricular size is normal. The Ejection Fraction is estimated at 55-60%.  Diastolic function not assessed due to atrial fibrillation. No LV throbus  noted.     Right Ventricle:  Right ventricular function, chamber size, wall motion, and thickness are normal.      TAVO - 3/1/17   H/o ABDIEL ligation. No LA clot seen.  H/o PFO closure. The atrial septum is intact as assessed by color Doppler.  No LV thrombus seen. Mildly (EF 45-50%) reduced left ventricular function is present.  The right ventricle is normal size. Global right ventricular function is normal.    Assessment and Plan:   - 76F, PMH of HTN, HPL, DM, CVA (11/2016), CAD (s/p 3v CABG: LIMA-LAD, SVG-D1, SVG-dRCA and modified MAZE, ABDIEL ligation - 2/2016), paroxysmal AFib (on amio, [id number] - 8), HIT, RUE DVT who is here for follow up.    - From a HF standpoint, she is pretty well optimized. Her weight is pretty much at baseline and she has no symptoms of HF. We will continue her current medical regimen and will recommend continued [facility] clinic follow up.  - In terms of her AFib, the plan is to perform a DCCV, however her INRs have not been therapeutic. I suspect, and the patient's daughter agrees, that this may be secondary to the patient forgetting to take her coumadin. Patient's daughter will from now on, cross check the medications including coumadin. Agree with DCCV when able.  - RTC in 3 months    Ben Monae MD  Cardiovascular Disease Fellow  Pager: [phone]    Attending Attestation:  Patient  seen and examined by me with the Fellow, Dr. Monae. I have performed all pertinent elements of the physical examination and reviewed the note above. I have reviewed pertinent laboratory, echocardiographic, imaging, and cardiac catheterization results. I agree with the plan of care as described in this note.    Star Lobato MD, PhD    CC  Patient Care Team:  Humberto Conner MD as PCP - General (Internal Medicine)  ALLEN Wilson MD as MD (Cardiology)  Jessica Aguillon RN as Nurse Coordinator (Clinical Cardiac Electrophysiology)  Paige Santos MD as MD (Cardiology)  Leah Santamaria MD as MD (Vascular Surgery)  Angelina Bernal MD as MD (Hematology & Oncology)  Diana Dyson LPN as Easton Mcadams DPM as MD (Podiatry)  Carloz Wilson, RN as Nurse Coordinator (Cardiology)  Zaira Harman, REESE as Nurse Coordinator (Cardiology)  Isabel Pinto, CHACORTA CNP as Nurse Practitioner (Cardiology)  BEREKET GARCIA

## 2017-11-24 NOTE — NURSING NOTE
Chief Complaint   Patient presents with     Follow Up For     HTN, HPL, DM, CVA (11/2016), CAD (s/p 3v CABG: LIMA-LAD, SVG-D1, SVG-dRCA and modified MAZE, ABDIEL ligation - 2/2016), paroxysmal AFib      Vitals were taken and medications were reconciled.   Kirsten Brito MA    1:55 PM

## 2017-11-24 NOTE — MR AVS SNAPSHOT
After Visit Summary   11/24/2017    Carlos Alberto Fenton    MRN: [id number]           Patient Information     Date Of Birth          1940        Visit Information        Provider Department      11/24/2017 1:30 PM Star Lobato MD [facility]        Care Instructions    Here is the number for the coumadin clinic:  [phone].    Thank you for your visit today.  Please call me with any questions or concerns.   Carloz Wilson  RN  Cardiology Care Coordinator  [phone], press option 1 then option 3            Follow-ups after your visit        Follow-up notes from your care team     Return in about 3 months (around 2/24/2018) for afib, Dr. Lobato.      Your next 10 appointments already scheduled     Nov 27, 2017 12:30 PM CST   (Arrive by 12:15 PM)   Return Vascular Visit with Leah Santamaria MD   [facility] Vascular [facility] ([facility])    [address] [place] [id number] [phone]            Mar 02, 2018  4:00 PM CST   (Arrive by 3:45 PM)   Return Visit with Star Lobato MD   [facility] Heart [facility] ([facility])    [address] [place] [id number] [phone]              Who to contact     If you have questions or need follow up information about today's clinic visit or your schedule please contact [facility] directly at [phone].  Normal or non-critical lab and imaging results will be communicated to you by MyChart, letter or phone within 4 business days after the clinic has received the results. If you do not hear from us within 7 days, please contact the clinic through MyChart or phone. If you have a critical or abnormal lab result, we will notify you by phone as soon as possible.  Submit refill requests through [employer] or call your pharmacy and they will forward the refill request to us. Please allow 3 business days for your refill to be completed.     "      Additional Information About Your Visit        [employer] Information     pickrset gives you secure access to your electronic health record. If you see a primary care provider, you can also send messages to your care team and make appointments. If you have questions, please call your primary care clinic.  If you do not have a primary care provider, please call [phone] and they will assist you.        Care EveryWhere ID     This is your Care EveryWhere ID. This could be used by other organizations to access your [facility] medical records  [id number]        Your Vitals Were     Pulse Height Pulse Oximetry BMI (Body Mass Index)          74 1.575 m (5' 2\") 94% 30.58 kg/m2         Blood Pressure from Last 3 Encounters:   11/24/17 114/75   11/21/17 104/52   11/14/17 122/64    Weight from Last 3 Encounters:   11/24/17 75.8 kg (167 lb 3.2 oz)   11/21/17 74.4 kg (164 lb)   11/14/17 78 kg (172 lb)              Today, you had the following     No orders found for display         Today's Medication Changes          These changes are accurate as of: 11/24/17  2:53 PM.  If you have any questions, ask your nurse or doctor.               These medicines have changed or have updated prescriptions.        Dose/Directions    aspirin 81 MG chewable tablet   This may have changed:  when to take this   Used for:  Coronary artery disease with angina pectoris, unspecified vessel or lesion type, unspecified whether native or transplanted heart (H)        Dose:  81 mg   Take 1 tablet (81 mg) by mouth daily   Quantity:  30 tablet   Refills:  0       ferrous sulfate 325 (65 FE) MG tablet   Commonly known as:  IRON SUPPLEMENT   This may have changed:  when to take this   Used for:  S/P CABG (coronary artery bypass graft)        Dose:  325 mg   Take 1 tablet (325 mg) by mouth daily (with breakfast)   Quantity:  100 tablet   Refills:  1       potassium chloride SA 20 MEQ CR tablet   Commonly known as:  K-DUR/KLOR-CON M   This may have " changed:  how much to take   Used for:  S/P CABG (coronary artery bypass graft)        Dose:  20 mEq   Take 1 tablet (20 mEq) by mouth 2 times daily   Quantity:  120 tablet   Refills:  1                Primary Care Provider Office Phone # Fax #    Humberto Conner [phone]       [facility] [address] [place] [address]        Equal Access to Services     [facility]: Hadii aad ku hadasho Soomaali, waaxda luqadaha, qaybta kaalmada adeegyada, waxay idiin hayaan adeeg kharash la'aan ah. So [facility] [phone].    ATENCIÓN: Si habla español, tiene a espinoza disposición servicios gratuitos de asistencia lingüística. Zaid al [phone].    We comply with applicable federal civil rights laws and [place] laws. We do not discriminate on the basis of race, color, national origin, age, disability, sex, sexual orientation, or gender identity.            Thank you!     Thank you for choosing [facility]  for your care. Our goal is always to provide you with excellent care. Hearing back from our patients is one way we can continue to improve our services. Please take a few minutes to complete the written survey that you may receive in the mail after your visit with us. Thank you!             Your Updated Medication List - Protect others around you: Learn how to safely use, store and throw away your medicines at www.disposemymeds.org.          This list is accurate as of: 11/24/17  2:53 PM.  Always use your most recent med list.                   Brand Name Dispense Instructions for use Diagnosis    ACE BANDAGE SELF-ADHERING Misc     6 each    1 each 2 times daily    Open wound of lower limb, right, subsequent encounter       acetaminophen 325 MG tablet    TYLENOL    100 tablet    Take 3 tablets (975 mg) by mouth every 6 hours as needed for mild pain    S/P CABG (coronary artery bypass graft)       ADAPTIC NON-ADHERING DRESSING Pads     1 each    Externally apply 1 each topically 2  times daily    Open wound of lower limb, right, subsequent encounter       aspirin 81 MG chewable tablet     30 tablet    Take 1 tablet (81 mg) by mouth daily    Coronary artery disease with angina pectoris, unspecified vessel or lesion type, unspecified whether native or transplanted heart (H)       atorvastatin 40 MG tablet    LIPITOR    90 tablet    Take 1 tablet (40 mg) by mouth daily    Coronary artery disease with angina pectoris, unspecified vessel or lesion type, unspecified whether native or transplanted heart (H)       blood glucose monitoring meter device kit           bumetanide 2 MG tablet    BUMEX    180 tablet    Take 1 tablet (2 mg) by mouth 2 times daily    Chronic systolic heart failure (H)       carvedilol 3.125 MG tablet    COREG    60 tablet    Take 1 tablet (3.125 mg) by mouth 2 times daily (with meals)    Chronic diastolic heart failure (H)       ferrous sulfate 325 (65 FE) MG tablet    IRON SUPPLEMENT    100 tablet    Take 1 tablet (325 mg) by mouth daily (with breakfast)    S/P CABG (coronary artery bypass graft)       gabapentin 100 MG capsule    NEURONTIN    180 capsule    Take 1 capsule (100 mg) by mouth 2 times daily    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       KERLIX GAUZE ROLL MEDIUM Misc     48 each    1 each 2 times daily    Open wound of lower limb, right, subsequent encounter       metolazone 2.5 MG tablet    ZAROXOLYN    30 tablet    Take 1 tablet (2.5 mg) by mouth daily as needed For weight over 170 Lbs.    Chronic diastolic heart failure (H)       ONETOUCH DELICA LANCETS 33G Misc     100 each    1 Device daily    Type 2 diabetes mellitus without complication, without long-term current use of insulin (H)       ONETOUCH ULTRA test strip   Generic drug:  blood glucose monitoring     100 each    USE AS DIRECTED TO TEST ONE TIME A DAY    Type 2 diabetes mellitus without complication, without long-term current use of insulin (H)       order for DME     1 Box    Sterile 4x4 gauze;  Use gauze twice daily for dressing changes.    Open wound of lower limb, right, subsequent encounter       pantoprazole 40 MG EC tablet    PROTONIX    90 tablet    Take 1 tablet (40 mg) by mouth every morning    Coronary artery disease with angina pectoris, unspecified vessel or lesion type, unspecified whether native or transplanted heart (H)       potassium chloride SA 20 MEQ CR tablet    K-DUR/KLOR-CON M    120 tablet    Take 1 tablet (20 mEq) by mouth 2 times daily    S/P CABG (coronary artery bypass graft)       spironolactone 25 MG tablet    ALDACTONE    90 tablet    Take 1 tablet (25 mg) by mouth daily    Chronic systolic heart failure (H)       traZODone 50 MG tablet    DESYREL    45 tablet    Take 0.5 tablets (25 mg) by mouth nightly as needed for sleep    Primary insomnia       venlafaxine 150 MG Tb24 24 hr tablet    EFFEXOR-ER    90 each    Take 1 tablet (150 mg) by mouth daily (with breakfast)    Adjustment disorder with depressed mood       warfarin 5 MG tablet    COUMADIN    90 tablet    Take 1 tablet (5 mg) by mouth daily    Paroxysmal atrial fibrillation (H)

## 2017-11-24 NOTE — PROGRESS NOTES
HPI:   76F, PMH of HTN, HPL, DM, CVA (11/2016), CAD (s/p 3v CABG: LIMA-LAD, SVG-D1, SVG-dRCA and modified MAZE, ABDIEL ligation - 2/2016), paroxysmal AFib (on amio, [id number] - 8), HIT, RUE DVT who is here for follow up.    She was last seen on 9/1/17. At that time she was in significant volume overload and had been noticing easy bruising (had been on Fondaparinux at the time). We had held Fondaparinux and initiated coumadin, and had started her on Bumex in place of her Lasix. She has had visits with [facility] and EP since then has lost about 20 lbs. She was planned to undergo a TAVO/DCCV on 11/21 however that could not be performed since she has not had a reliably therapeutic INR. Her INR today in 1.41.     She says her breathing is improved significantly. She is however still limited in terms of physical capacity primarily by her back pain. Her daughter is concerned about worsening short term memory, she describes the decline as gradual but noticeable. Per daughter, lately the patient has been more sad/depressed as well.    PAST MEDICAL HISTORY:  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    parietral lesions bilateral       Antiplatelet or antithrombotic long-term use      Anxiety      Atrial fibrillation (H)      CAD (coronary artery disease)     2 vessel     Cancer (H) 1990    periodically have cancer on the skin removed     Cerebral artery occlusion with cerebral infarction (H) 10/2016    Cardioembolic strokes related to atrial fibrillation     Deep vein thrombosis (DVT) of axillary vein of right upper extremity (H) 2/25/2017     Depression      Depressive disorder 2001     Diabetes (H)      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     Hyperlipidemia      Hyperlipidemia LDL goal <130 10/31/2010     Hypertension      Panic attacks      Seizures (H) 10/19/2016     Sleep apnea     Uses CPAP       CURRENT MEDICATIONS:  Current  Outpatient Prescriptions   Medication Sig Dispense Refill     warfarin (COUMADIN) 5 MG tablet Take 1 tablet (5 mg) by mouth daily 90 tablet 3     venlafaxine (EFFEXOR-ER) 150 MG TB24 24 hr tablet Take 1 tablet (150 mg) by mouth daily (with breakfast) 90 each 1     metolazone (ZAROXOLYN) 2.5 MG tablet Take 1 tablet (2.5 mg) by mouth daily as needed For weight over 170 Lbs. 30 tablet 1     carvedilol (COREG) 3.125 MG tablet Take 1 tablet (3.125 mg) by mouth 2 times daily (with meals) 60 tablet 3     spironolactone (ALDACTONE) 25 MG tablet Take 1 tablet (25 mg) by mouth daily 90 tablet 3     bumetanide (BUMEX) 2 MG tablet Take 1 tablet (2 mg) by mouth 2 times daily 180 tablet 3     atorvastatin (LIPITOR) 40 MG tablet Take 1 tablet (40 mg) by mouth daily 90 tablet 1     gabapentin (NEURONTIN) 100 MG capsule Take 1 capsule (100 mg) by mouth 2 times daily 180 capsule 1     traZODone (DESYREL) 50 MG tablet Take 0.5 tablets (25 mg) by mouth nightly as needed for sleep 45 tablet 2     pantoprazole (PROTONIX) 40 MG EC tablet Take 1 tablet (40 mg) by mouth every morning 90 tablet 1     potassium chloride SA (K-DUR/KLOR-CON M) 20 MEQ CR tablet Take 1 tablet (20 mEq) by mouth 2 times daily 120 tablet 1     Elastic Bandages & Supports (ACE BANDAGE SELF-ADHERING) MISC 1 each 2 times daily 6 each 3     Gauze Pads & Dressings (KERLIX GAUZE ROLL MEDIUM) MISC 1 each 2 times daily 48 each 3     ONE TOUCH ULTRA test strip USE AS DIRECTED TO TEST ONE TIME A  each 2     Wound Dressings (ADAPTIC NON-ADHERING DRESSING) PADS Externally apply 1 each topically 2 times daily 1 each 3     order for DME Sterile 4x4 gauze; Use gauze twice daily for dressing changes. 1 Box 3     acetaminophen (TYLENOL) 325 MG tablet Take 3 tablets (975 mg) by mouth every 6 hours as needed for mild pain 100 tablet 0     aspirin 81 MG chewable tablet Take 1 tablet (81 mg) by mouth daily (Patient taking differently: Take 81 mg by mouth every morning ) 30 tablet  0     ferrous sulfate (IRON SUPPLEMENT) 325 (65 FE) MG tablet Take 1 tablet (325 mg) by mouth daily (with breakfast) (Patient taking differently: Take 325 mg by mouth 2 times daily ) 100 tablet 1     ONETOUCH DELICA LANCETS 33G MISC 1 Device daily 100 each 0     blood glucose monitoring (ONE TOUCH ULTRA 2) meter device kit          PAST SURGICAL HISTORY:  Past Surgical History:   Procedure Laterality Date     AMPUTATE TOE(S) Right 5/26/2017    Procedure: AMPUTATE TOE(S);  Right Great Toe amputation, debriedment of 2 and 3rd toe soft tissu right foot. and application of Grafix;  Surgeon: Leah Santamaria MD;  Location: [facility] OR     BACK SURGERY       BYPASS GRAFT ARTERY CORONARY N/A 2/6/2017    Procedure: BYPASS GRAFT ARTERY CORONARY;  Surgeon: Mikhail Quiñones MD;  Location: [facility] OR     C APPENDECTOMY  1959     C CARDIAC SURG PROCEDURE UNLIST       C HAND/FINGER SURGERY UNLISTED       C STOMACH SURGERY PROCEDURE UNLISTED       HC SACROPLASTY       HC VASCULAR SURGERY PROCEDURE UNLIST       HYSTERECTOMY, PASTORA  1988     IRRIGATION AND DEBRIDEMENT FOOT, COMBINED Right 7/7/2017    Procedure: COMBINED IRRIGATION AND DEBRIDEMENT FOOT;  Irrigation and Debridement Right Foot with Graphix  *Latex Allergy*;  Surgeon: Leah Santamaria MD;  Location: [facility] OR     MAZE PROCEDURE N/A 2/6/2017    Procedure: MAZE PROCEDURE;  Surgeon: Mikhail Quiñones MD;  Location: [facility] OR     PICC INSERTION Left 02/25/2017    5fr TL Bard PICC, 47cm (3cm external) in the L basilic vein w/ tip in the SVC RA junction     TONSILLECTOMY  1942       ALLERGIES:     Allergies   Allergen Reactions     Heparin      HIT/ thrombocytopenia     Lovenox [Enoxaparin] Other (See Comments)     Thrombocytopenia      Oxycodone      hallucinations     Toprol Xl [Metoprolol] Nausea and Vomiting     Adhesive Tape Itching and Rash     Diapers & Supplies Rash     Developed yeast infection from previous hospital stay       SOCIAL HISTORY:  Social History   Substance Use Topics  "    Smoking status: Former Smoker     Packs/day: 1.00     Years: 10.00     Types: Cigarettes     Start date: 10/3/1958     Quit date: 7/4/1976     Smokeless tobacco: Never Used      Comment: Quit in 1976     Alcohol use Yes      Comment: Socially       ROS:   Constitutional: No fever, chills, or sweats. Weight stable.   ENT: No visual disturbance, ear ache, epistaxis, sore throat.   Cardiovascular: As per HPI.   Respiratory: No cough, hemoptysis.    GI: No nausea, vomiting, hematemesis, melena, or hematochezia.   : No hematuria.   Integument: Negative.   Psychiatric: Negative.   Neuro: Negative.   Endocrinology: No significant heat or cold intolerance   Musculoskeletal: No myalgia.      Exam:  /75 (BP Location: Right arm, Patient Position: Chair, Cuff Size: Adult Regular)  Pulse 74  Ht 1.575 m (5' 2\")  Wt 75.8 kg (167 lb 3.2 oz)  SpO2 94%  BMI 30.58 kg/m2  GEN: aao x 3, NAD  Neck: JVD at the base of the neck  LUNGS: No wheezing. +rales at the bases  HEART: S1S2 audible, no murmurs or rubs. Regular rhythm  ABDOMEN: Soft, nt.  EXTREMITIES: Warm calves, +DPs, trace to 1+ bilateral LE edema  NEURO: aao x 3, no focal deficits        Labs:  CBC RESULTS:   Lab Results   Component Value Date    WBC 6.4 10/11/2017    RBC 3.99 10/11/2017    HGB 12.8 10/11/2017    HCT 41.3 10/11/2017     (H) 10/11/2017    MCH 32.1 10/11/2017    MCHC 31.0 (L) 10/11/2017    RDW 16.5 (H) 10/11/2017     10/11/2017       BMP RESULTS:  Lab Results   Component Value Date     11/06/2017    POTASSIUM 3.6 11/21/2017    CHLORIDE 88 (L) 11/06/2017    CO2 40 (H) 11/06/2017    ANIONGAP 4 11/06/2017     (H) 11/21/2017    BUN 27 11/06/2017    CR 0.88 11/06/2017    GFRESTIMATED 63 11/06/2017    GFRESTBLACK 76 11/06/2017    CARLY 10.0 11/06/2017        INR RESULTS:  Lab Results   Component Value Date    INR 1.43 (H) 11/21/2017    INR 3.3 (H) 11/14/2017    INR 2.71 (H) 11/06/2017    INR 2.8 (A) 10/30/2017    INR 1.79 (H) " 10/26/2017    INR 1.7 (H) 10/24/2017    INR 1.50 (H) 10/20/2017       Procedures:  ECHO - 2/26/17  No LV throbus noted.      Left Ventricle:  Left ventricular size is normal. The Ejection Fraction is estimated at 55-60%.  Diastolic function not assessed due to atrial fibrillation. No LV throbus  noted.     Right Ventricle:  Right ventricular function, chamber size, wall motion, and thickness are normal.      TAVO - 3/1/17   H/o ABDIEL ligation. No LA clot seen.  H/o PFO closure. The atrial septum is intact as assessed by color Doppler.  No LV thrombus seen. Mildly (EF 45-50%) reduced left ventricular function is present.  The right ventricle is normal size. Global right ventricular function is normal.    Assessment and Plan:   - 76F, PMH of HTN, HPL, DM, CVA (11/2016), CAD (s/p 3v CABG: LIMA-LAD, SVG-D1, SVG-dRCA and modified MAZE, ABDIEL ligation - 2/2016), paroxysmal AFib (on amio, [id number] - 8), HIT, RUE DVT who is here for follow up.    - From a HF standpoint, she is pretty well optimized. Her weight is pretty much at baseline and she has no symptoms of HF. We will continue her current medical regimen and will recommend continued [facility] follow up.  - In terms of her AFib, the plan is to perform a DCCV, however her INRs have not been therapeutic. I suspect, and the patient's daughter agrees, that this may be secondary to the patient forgetting to take her coumadin. Patient's daughter will from now on, cross check the medications including coumadin. Agree with DCCV when able.  - RTC in 3 months    Ben Monae MD  Cardiovascular Disease Fellow  Pager: [phone]    Attending Attestation:  Patient seen and examined by me with the Fellow, Dr. Monae. I have performed all pertinent elements of the physical examination and reviewed the note above. I have reviewed pertinent laboratory, echocardiographic, imaging, and cardiac catheterization results. I agree with the plan of care as described in this note.    Star  MD Cheryle, PhD    CC  Patient Care Team:  Humberto Conner MD as PCP - General (Internal Medicine)  ALLEN Wilson MD as MD (Cardiology)  Jessica Aguillon, REESE as Nurse Coordinator (Clinical Cardiac Electrophysiology)  Paige Santos MD as MD (Cardiology)  Leah Santamaria MD as MD (Vascular Surgery)  Angelina Bernal MD as MD (Hematology & Oncology)  Diana Dyson LPN as Easton Mcadams DPM as MD (Podiatry)  Carloz Wilson, REESE as Nurse Coordinator (Cardiology)  Zaira Harman, REESE as Nurse Coordinator (Cardiology)  Isabel Pinto, APRN CNP as Nurse Practitioner (Cardiology)  BEREKET GARCIA

## 2017-11-27 ENCOUNTER — OFFICE VISIT (OUTPATIENT)
Dept: VASCULAR SURGERY | Facility: CLINIC | Age: 77
End: 2017-11-27

## 2017-11-27 ENCOUNTER — TELEPHONE (OUTPATIENT)
Dept: INTERNAL MEDICINE | Facility: CLINIC | Age: 77
End: 2017-11-27

## 2017-11-27 ENCOUNTER — ANTICOAGULATION THERAPY VISIT (OUTPATIENT)
Dept: ANTICOAGULATION | Facility: CLINIC | Age: 77
End: 2017-11-27
Payer: COMMERCIAL

## 2017-11-27 ENCOUNTER — DOCUMENTATION ONLY (OUTPATIENT)
Dept: CARE COORDINATION | Facility: CLINIC | Age: 77
End: 2017-11-27

## 2017-11-27 VITALS
SYSTOLIC BLOOD PRESSURE: 104 MMHG | DIASTOLIC BLOOD PRESSURE: 58 MMHG | RESPIRATION RATE: 16 BRPM | OXYGEN SATURATION: 91 % | HEART RATE: 54 BPM

## 2017-11-27 DIAGNOSIS — I96 TOE GANGRENE (H): Primary | ICD-10-CM

## 2017-11-27 DIAGNOSIS — I48.91 ATRIAL FIBRILLATION, UNSPECIFIED TYPE (H): ICD-10-CM

## 2017-11-27 DIAGNOSIS — Z79.01 LONG-TERM (CURRENT) USE OF ANTICOAGULANTS: ICD-10-CM

## 2017-11-27 DIAGNOSIS — I48.20 CHRONIC ATRIAL FIBRILLATION (H): ICD-10-CM

## 2017-11-27 DIAGNOSIS — Z79.01 LONG TERM CURRENT USE OF ANTICOAGULANT THERAPY: ICD-10-CM

## 2017-11-27 DIAGNOSIS — I48.0 PAROXYSMAL ATRIAL FIBRILLATION (H): ICD-10-CM

## 2017-11-27 LAB — INR PPP: 1.9 (ref 0.86–1.14)

## 2017-11-27 PROCEDURE — 99207 ZZC NO CHARGE NURSE ONLY: CPT | Performed by: INTERNAL MEDICINE

## 2017-11-27 ASSESSMENT — PAIN SCALES - GENERAL: PAINLEVEL: NO PAIN (0)

## 2017-11-27 NOTE — MR AVS SNAPSHOT
Ramasaundrah I Eliceo   11/27/2017   Anticoagulation Therapy Visit    Description:  77 year old female   Provider:  Humberto Conner MD   Department:  Ph Anticoag           INR as of 11/27/2017     Today's INR 1.90!      Anticoagulation Summary as of 11/27/2017     INR goal 2.0-3.0   Today's INR 1.90!   Full instructions 11/27: 5 mg; 11/29: 5 mg; Otherwise 2.5 mg on Mon, Wed, Fri; 5 mg all other days   Next INR check 12/1/2017    Indications   Long-term (current) use of anticoagulants [Z79.01] [Z79.01]  New onset atrial fibrillation (H) (Resolved) [I48.91]  Atrial fibrillation (H) [I48.91] [I48.91]         Contact Numbers     Clinic Number:         November 2017 Details    Sun Mon Tue Wed Thu Fri Sat        1               2               3               4                 5               6               7               8               9               10               11                 12               13               14               15               16               17               18                 19               20               21               22               23               24               25                 26               27      5 mg   See details      28      5 mg         29      5 mg         30      5 mg            Date Details   11/27 This INR check               How to take your warfarin dose     To take:  5 mg Take 1 of the 5 mg tablets.           December 2017 Details    Sun Mon Tue Wed Thu Fri Sat          1            2                 3               4               5               6               7               8               9                 10               11               12               13               14               15               16                 17               18               19               20               21               22               23                 24               25               26               27               28               29               30                  31                      Date Details   No additional details    Date of next INR:  12/1/2017         How to take your warfarin dose     To take:  2.5 mg Take 0.5 of a 5 mg tablet.

## 2017-11-27 NOTE — LETTER
11/27/2017       RE: Carlos Alberto Fenton  [address]  [place] [address]     Dear Colleague,    Thank you for referring your patient, Carlos Alberto Fenton, to the [facility] at [facility]. Please see a copy of my visit note below.    VASCULAR SURGERY PROGRESS NOTE    HPI:    Carlos Alberto Fenton is a 77 year old female who underwent Right Great Toe amputation, debriedment of 2nd and 3rd toes and application of Grafix with Dr. Santamaria on 5/26/2017.  She underwent right foot irrigation and debridement with Grafix application with Dr. Santamaria on 7/7/2017. She is wearing DH2 shoes. She is being followed by Dr. Hatfield for weekly wound cares for her left foot.  She returns to clinic today for right foot wound assessment.     SUBJECTIVE:  She denies any fever, chills, night sweats or pain.  Offers no specific complaints.  Denies right foot odor, pain, swelling, erythema, or rash.     OBJECTIVE:  /58 (BP Location: Left arm)  Pulse 54  Resp 16  SpO2 91%  Breastfeeding? No      PHYSICAL EXAM:  NEURO/PSYCH: The patient is alert and oriented.  Appropriate.  Moves all extremities.    SKIN: Color appropriate for race, warm, dry.  PULMONARY: no acute distress  EXTREMITIES: right foot wound cleansed and debrided by Dr. Santamaria. Good granulation tissue, no signs of active infection     Current Outpatient Prescriptions   Medication Sig Dispense Refill     warfarin (COUMADIN) 5 MG tablet Take 1 tablet (5 mg) by mouth daily 90 tablet 3     venlafaxine (EFFEXOR-ER) 150 MG TB24 24 hr tablet Take 1 tablet (150 mg) by mouth daily (with breakfast) 90 each 1     metolazone (ZAROXOLYN) 2.5 MG tablet Take 1 tablet (2.5 mg) by mouth daily as needed For weight over 170 Lbs. 30 tablet 1     carvedilol (COREG) 3.125 MG tablet Take 1 tablet (3.125 mg) by mouth 2 times daily (with meals) 60 tablet 3     spironolactone (ALDACTONE) 25 MG tablet Take 1 tablet (25 mg) by mouth daily 90 tablet 3      bumetanide (BUMEX) 2 MG tablet Take 1 tablet (2 mg) by mouth 2 times daily 180 tablet 3     atorvastatin (LIPITOR) 40 MG tablet Take 1 tablet (40 mg) by mouth daily 90 tablet 1     gabapentin (NEURONTIN) 100 MG capsule Take 1 capsule (100 mg) by mouth 2 times daily 180 capsule 1     traZODone (DESYREL) 50 MG tablet Take 0.5 tablets (25 mg) by mouth nightly as needed for sleep 45 tablet 2     pantoprazole (PROTONIX) 40 MG EC tablet Take 1 tablet (40 mg) by mouth every morning 90 tablet 1     potassium chloride SA (K-DUR/KLOR-CON M) 20 MEQ CR tablet Take 1 tablet (20 mEq) by mouth 2 times daily (Patient taking differently: Take 40 mEq by mouth 2 times daily ) 120 tablet 1     Elastic Bandages & Supports (ACE BANDAGE SELF-ADHERING) MISC 1 each 2 times daily 6 each 3     Gauze Pads & Dressings (KERLIX GAUZE ROLL MEDIUM) MISC 1 each 2 times daily 48 each 3     ONE TOUCH ULTRA test strip USE AS DIRECTED TO TEST ONE TIME A DAY (Patient not taking: Reported on 11/24/2017) 100 each 2     Wound Dressings (ADAPTIC NON-ADHERING DRESSING) PADS Externally apply 1 each topically 2 times daily 1 each 3     order for DME Sterile 4x4 gauze; Use gauze twice daily for dressing changes. (Patient not taking: Reported on 11/24/2017) 1 Box 3     acetaminophen (TYLENOL) 325 MG tablet Take 3 tablets (975 mg) by mouth every 6 hours as needed for mild pain 100 tablet 0     aspirin 81 MG chewable tablet Take 1 tablet (81 mg) by mouth daily (Patient taking differently: Take 81 mg by mouth every morning ) 30 tablet 0     ferrous sulfate (IRON SUPPLEMENT) 325 (65 FE) MG tablet Take 1 tablet (325 mg) by mouth daily (with breakfast) (Patient taking differently: Take 325 mg by mouth daily ) 100 tablet 1     ONETOUCH DELICA LANCETS 33G MISC 1 Device daily (Patient not taking: Reported on 11/24/2017) 100 each 0     blood glucose monitoring (ONE TOUCH ULTRA 2) meter device kit              ASSESSMENT/PLAN:  #1 Dry gangrene, right foot great and second  toe, secondary to microembolization or Heparin- Induce Thrombocytopenia  #2 Status post Right Great Toe Amputation, Debridement of Toes 2 and 3, and application of Grafix with Dr. Santamaria on 5/26/2017  #3 Irrigation and debridement right foot, Grafix application, 7/7/2017    - wash right foot with Hibiclens twice daily.  Place Adaptic on first and second toe wounds, 4x4's and kerlix wrap.  Change twice daily.    - follow up with Dr. Vu Eden in one month for wound assessment and timing of additional Grafix application      - continue coumadin     - continue ASA and Atorvastatin     - left foot wound care per Dr. Melissa WHATLEY, CNS  Division of Vascular Surgery  [facility]  Pager [phone]    I personally examined the patient and agree with the findings above.  I discussed the patient with the resident / fellow and care team, and agree with the assessment and plan of care as documented in the note.  Vascular surgery staff    Looks great  Toe on the right foot healing, 2nd toe with small amount of drainage and the great toe much improved.  May need another grafix placement in the future on the right great toe  Follow up with Dr. Eden.  Leah Santamaria MD

## 2017-11-27 NOTE — TELEPHONE ENCOUNTER
Carlos Alberto calls for her INR results and what her medication should be.  Please call and advise.

## 2017-11-27 NOTE — PROGRESS NOTES
[facility] utilizes an encounter to take the place of a direct phone call to your office. Please take a moment to review the below request. Your reply to this encounter will act as a verbal OK of orders if requested below. Thank you.    ORDER  PT in home eval completed today 11/27/17, plan to continued 1w1, 2w2 for gait/balance training, hep instruction.  Horace Gan PT  [phone]

## 2017-11-27 NOTE — TELEPHONE ENCOUNTER
Chantal from [facility] requesting to speak with Gordo again with a follow up question.  Please advise [phone]  Thank you,  Yolanda Martinez  Patient Representative

## 2017-11-27 NOTE — TELEPHONE ENCOUNTER
Chantal with [facility] has a INR question regarding [place], please call her back at [phone].    She is wondering when she needs to have her INR rechecked, she last had it checked on Friday 11/24/17?  She has a doctors office appt today so she could have it checked today there or she could have it checked tomorrow during a home care visit.    Thank you,  Toya SMITH

## 2017-11-27 NOTE — PATIENT INSTRUCTIONS
Wash right foot with Hibiclens twice daily.  Place Adaptic on first and second toe wounds, 4x4's and kerlix wrap.  Change twice daily.    Left foot wound care per Dr. Torres.    Follow up with Dr. Eden in one month at [facility]

## 2017-11-27 NOTE — MR AVS SNAPSHOT
After Visit Summary   11/27/2017    Carlos Alberto Fenton    MRN: [id number]           Patient Information     Date Of Birth          1940        Visit Information        Provider Department      11/27/2017 12:30 PM Leah Santamaria MD [facility]        Care Instructions      Wash right foot with Hibiclens twice daily.  Place Adaptic on first and second toe wounds, 4x4's and kerlix wrap.  Change twice daily.    Left foot wound care per Dr. Torres.    Follow up with Dr. Eden in one month at [facility]          Follow-ups after your visit        Your next 10 appointments already scheduled     Mar 02, 2018  4:00 PM CST   (Arrive by 3:45 PM)   Return Visit with Star Lobato MD   [facility] ([facility])    [address] [place] [id number] [phone]              Who to contact     Please call your clinic at [phone] to:    Ask questions about your health    Make or cancel appointments    Discuss your medicines    Learn about your test results    Speak to your doctor   If you have compliments or concerns about an experience at your clinic, or if you wish to file a complaint, please contact [facility] Patient Relations at [phone] or email us at [email or web address]         Additional Information About Your Visit        MyChart Information     InstyBookhart gives you secure access to your electronic health record. If you see a primary care provider, you can also send messages to your care team and make appointments. If you have questions, please call your primary care clinic.  If you do not have a primary care provider, please call [phone] and they will assist you.      Herrenschmiede is an electronic gateway that provides easy, online access to your medical records. With Herrenschmiede, you can request a clinic appointment, read your test results, renew a prescription or  communicate with your care team.     To access your existing account, please contact your [facility] or call [phone] for assistance.        Care EveryWhere ID     This is your Care EveryWhere ID. This could be used by other organizations to access your [place] medical records  [id number]        Your Vitals Were     Pulse Respirations Pulse Oximetry Breastfeeding?          54 16 91% No         Blood Pressure from Last 3 Encounters:   11/27/17 104/58   11/24/17 114/75   11/21/17 104/52    Weight from Last 3 Encounters:   11/24/17 75.8 kg (167 lb 3.2 oz)   11/21/17 74.4 kg (164 lb)   11/14/17 78 kg (172 lb)              Today, you had the following     No orders found for display         Today's Medication Changes          These changes are accurate as of: 11/27/17  1:33 PM.  If you have any questions, ask your nurse or doctor.               These medicines have changed or have updated prescriptions.        Dose/Directions    aspirin 81 MG chewable tablet   This may have changed:  when to take this   Used for:  Coronary artery disease with angina pectoris, unspecified vessel or lesion type, unspecified whether native or transplanted heart (H)        Dose:  81 mg   Take 1 tablet (81 mg) by mouth daily   Quantity:  30 tablet   Refills:  0       ferrous sulfate 325 (65 FE) MG tablet   Commonly known as:  IRON SUPPLEMENT   This may have changed:  when to take this   Used for:  S/P CABG (coronary artery bypass graft)        Dose:  325 mg   Take 1 tablet (325 mg) by mouth daily (with breakfast)   Quantity:  100 tablet   Refills:  1       potassium chloride SA 20 MEQ CR tablet   Commonly known as:  K-DUR/KLOR-CON M   This may have changed:  how much to take   Used for:  S/P CABG (coronary artery bypass graft)        Dose:  20 mEq   Take 1 tablet (20 mEq) by mouth 2 times daily   Quantity:  120 tablet   Refills:  1                Primary Care Provider Office Phone # Fax #    Humberto SMITH  MD Ubaldo [phone]       [facility] [address] [place] [address]        Equal Access to Services     NATALIE HAYES : Elise aad ku hadlotusshan Pebblesedwige, bessiegucci rollenimishaha, cristopher kapilarda jose david, scott sagejewel twila. So [facility] [phone].    ATENCIÓN: Si habla español, tiene a espinoza disposición servicios gratuitos de asistencia lingüística. Llame al [phone].    We comply with applicable federal civil rights laws and [place] laws. We do not discriminate on the basis of race, color, national origin, age, disability, sex, sexual orientation, or gender identity.            Thank you!     Thank you for choosing [facility]  for your care. Our goal is always to provide you with excellent care. Hearing back from our patients is one way we can continue to improve our services. Please take a few minutes to complete the written survey that you may receive in the mail after your visit with us. Thank you!             Your Updated Medication List - Protect others around you: Learn how to safely use, store and throw away your medicines at www.disposemymeds.org.          This list is accurate as of: 11/27/17  1:33 PM.  Always use your most recent med list.                   Brand Name Dispense Instructions for use Diagnosis    ACE BANDAGE SELF-ADHERING Misc     6 each    1 each 2 times daily    Open wound of lower limb, right, subsequent encounter       acetaminophen 325 MG tablet    TYLENOL    100 tablet    Take 3 tablets (975 mg) by mouth every 6 hours as needed for mild pain    S/P CABG (coronary artery bypass graft)       ADAPTIC NON-ADHERING DRESSING Pads     1 each    Externally apply 1 each topically 2 times daily    Open wound of lower limb, right, subsequent encounter       aspirin 81 MG chewable tablet     30 tablet    Take 1 tablet (81 mg) by mouth daily    Coronary artery disease with angina pectoris, unspecified vessel or lesion type,  unspecified whether native or transplanted heart (H)       atorvastatin 40 MG tablet    LIPITOR    90 tablet    Take 1 tablet (40 mg) by mouth daily    Coronary artery disease with angina pectoris, unspecified vessel or lesion type, unspecified whether native or transplanted heart (H)       blood glucose monitoring meter device kit           bumetanide 2 MG tablet    BUMEX    180 tablet    Take 1 tablet (2 mg) by mouth 2 times daily    Chronic systolic heart failure (H)       carvedilol 3.125 MG tablet    COREG    60 tablet    Take 1 tablet (3.125 mg) by mouth 2 times daily (with meals)    Chronic diastolic heart failure (H)       ferrous sulfate 325 (65 FE) MG tablet    IRON SUPPLEMENT    100 tablet    Take 1 tablet (325 mg) by mouth daily (with breakfast)    S/P CABG (coronary artery bypass graft)       gabapentin 100 MG capsule    NEURONTIN    180 capsule    Take 1 capsule (100 mg) by mouth 2 times daily    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       KERLIX GAUZE ROLL MEDIUM Misc     48 each    1 each 2 times daily    Open wound of lower limb, right, subsequent encounter       metolazone 2.5 MG tablet    ZAROXOLYN    30 tablet    Take 1 tablet (2.5 mg) by mouth daily as needed For weight over 170 Lbs.    Chronic diastolic heart failure (H)       ONETOUCH DELICA LANCETS 33G Misc     100 each    1 Device daily    Type 2 diabetes mellitus without complication, without long-term current use of insulin (H)       ONETOUCH ULTRA test strip   Generic drug:  blood glucose monitoring     100 each    USE AS DIRECTED TO TEST ONE TIME A DAY    Type 2 diabetes mellitus without complication, without long-term current use of insulin (H)       order for DME     1 Box    Sterile 4x4 gauze; Use gauze twice daily for dressing changes.    Open wound of lower limb, right, subsequent encounter       pantoprazole 40 MG EC tablet    PROTONIX    90 tablet    Take 1 tablet (40 mg) by mouth every morning    Coronary artery disease with  angina pectoris, unspecified vessel or lesion type, unspecified whether native or transplanted heart (H)       potassium chloride SA 20 MEQ CR tablet    K-DUR/KLOR-CON M    120 tablet    Take 1 tablet (20 mEq) by mouth 2 times daily    S/P CABG (coronary artery bypass graft)       spironolactone 25 MG tablet    ALDACTONE    90 tablet    Take 1 tablet (25 mg) by mouth daily    Chronic systolic heart failure (H)       traZODone 50 MG tablet    DESYREL    45 tablet    Take 0.5 tablets (25 mg) by mouth nightly as needed for sleep    Primary insomnia       venlafaxine 150 MG Tb24 24 hr tablet    EFFEXOR-ER    90 each    Take 1 tablet (150 mg) by mouth daily (with breakfast)    Adjustment disorder with depressed mood       warfarin 5 MG tablet    COUMADIN    90 tablet    Take 1 tablet (5 mg) by mouth daily    Paroxysmal atrial fibrillation (H)

## 2017-11-27 NOTE — NURSING NOTE
Chief Complaint   Patient presents with     RECHECK     Wound check to feet         Vitals:    11/27/17 1246   BP: 104/58   BP Location: Left arm   Pulse: 54   Resp: 16   SpO2: 91%       There is no height or weight on file to calculate BMI.                 Jeannie Baker LPN

## 2017-11-27 NOTE — TELEPHONE ENCOUNTER
Telephone call back to Chantal, HC nurse.  Discussed plan and advised that with pt's INR today to just have pt rechecked when HC nurse is out there on Friday.  States she is unsure of what changes pt might have made to affect INR, as she has only seen pt once.  States pt called after hour triage line on Friday since INR was drawn under cardiology and results were not sent to [facility].   States that pt was advised to take 5 mg daily -- so need to confirm that pt took 5 mg Fri, Sat and Sun.  Pt has been subtherapeutic and INRs have been all over the place.  It appears that cardioversion was cancelled last week due to subtherapeutic INRs.    Telephone call attempted to pt, no answer.  Left message to call [facility] back to discuss.  Per chart review and presumed dosing above: pt would have had 2.5 mg Mon and 5 mg ROW=32.5, but need to confirm.    Gordo Brunner RN, BSN

## 2017-11-27 NOTE — PROGRESS NOTES
VASCULAR SURGERY PROGRESS NOTE    HPI:    Carlos Alberto Fenton is a 77 year old female who underwent Right Great Toe amputation, debriedment of 2nd and 3rd toes and application of Grafix with Dr. Santamaria on 5/26/2017.  She underwent right foot irrigation and debridement with Grafix application with Dr. Santamaria on 7/7/2017. She is wearing DH2 shoes. She is being followed by Dr. Hatfield for weekly wound cares for her left foot.  She returns to clinic today for right foot wound assessment.     SUBJECTIVE:  She denies any fever, chills, night sweats or pain.  Offers no specific complaints.  Denies right foot odor, pain, swelling, erythema, or rash.     OBJECTIVE:  /58 (BP Location: Left arm)  Pulse 54  Resp 16  SpO2 91%  Breastfeeding? No      PHYSICAL EXAM:  NEURO/PSYCH: The patient is alert and oriented.  Appropriate.  Moves all extremities.    SKIN: Color appropriate for race, warm, dry.  PULMONARY: no acute distress  EXTREMITIES: right foot wound cleansed and debrided by Dr. Santamaria. Good granulation tissue, no signs of active infection     Current Outpatient Prescriptions   Medication Sig Dispense Refill     warfarin (COUMADIN) 5 MG tablet Take 1 tablet (5 mg) by mouth daily 90 tablet 3     venlafaxine (EFFEXOR-ER) 150 MG TB24 24 hr tablet Take 1 tablet (150 mg) by mouth daily (with breakfast) 90 each 1     metolazone (ZAROXOLYN) 2.5 MG tablet Take 1 tablet (2.5 mg) by mouth daily as needed For weight over 170 Lbs. 30 tablet 1     carvedilol (COREG) 3.125 MG tablet Take 1 tablet (3.125 mg) by mouth 2 times daily (with meals) 60 tablet 3     spironolactone (ALDACTONE) 25 MG tablet Take 1 tablet (25 mg) by mouth daily 90 tablet 3     bumetanide (BUMEX) 2 MG tablet Take 1 tablet (2 mg) by mouth 2 times daily 180 tablet 3     atorvastatin (LIPITOR) 40 MG tablet Take 1 tablet (40 mg) by mouth daily 90 tablet 1     gabapentin (NEURONTIN) 100 MG capsule Take 1 capsule (100 mg) by mouth 2 times daily 180 capsule 1      traZODone (DESYREL) 50 MG tablet Take 0.5 tablets (25 mg) by mouth nightly as needed for sleep 45 tablet 2     pantoprazole (PROTONIX) 40 MG EC tablet Take 1 tablet (40 mg) by mouth every morning 90 tablet 1     potassium chloride SA (K-DUR/KLOR-CON M) 20 MEQ CR tablet Take 1 tablet (20 mEq) by mouth 2 times daily (Patient taking differently: Take 40 mEq by mouth 2 times daily ) 120 tablet 1     Elastic Bandages & Supports (ACE BANDAGE SELF-ADHERING) MISC 1 each 2 times daily 6 each 3     Gauze Pads & Dressings (KERLIX GAUZE ROLL MEDIUM) MISC 1 each 2 times daily 48 each 3     ONE TOUCH ULTRA test strip USE AS DIRECTED TO TEST ONE TIME A DAY (Patient not taking: Reported on 11/24/2017) 100 each 2     Wound Dressings (ADAPTIC NON-ADHERING DRESSING) PADS Externally apply 1 each topically 2 times daily 1 each 3     order for DME Sterile 4x4 gauze; Use gauze twice daily for dressing changes. (Patient not taking: Reported on 11/24/2017) 1 Box 3     acetaminophen (TYLENOL) 325 MG tablet Take 3 tablets (975 mg) by mouth every 6 hours as needed for mild pain 100 tablet 0     aspirin 81 MG chewable tablet Take 1 tablet (81 mg) by mouth daily (Patient taking differently: Take 81 mg by mouth every morning ) 30 tablet 0     ferrous sulfate (IRON SUPPLEMENT) 325 (65 FE) MG tablet Take 1 tablet (325 mg) by mouth daily (with breakfast) (Patient taking differently: Take 325 mg by mouth daily ) 100 tablet 1     ONETOUCH DELICA LANCETS 33G MISC 1 Device daily (Patient not taking: Reported on 11/24/2017) 100 each 0     blood glucose monitoring (ONE TOUCH ULTRA 2) meter device kit              ASSESSMENT/PLAN:  #1 Dry gangrene, right foot great and second toe, secondary to microembolization or Heparin- Induce Thrombocytopenia  #2 Status post Right Great Toe Amputation, Debridement of Toes 2 and 3, and application of Grafix with Dr. Santamaria on 5/26/2017  #3 Irrigation and debridement right foot, Grafix application, 7/7/2017    - wash  right foot with Hibiclens twice daily.  Place Adaptic on first and second toe wounds, 4x4's and kerlix wrap.  Change twice daily.    - follow up with Dr. Vu Eden in one month for wound assessment and timing of additional Grafix application      - continue coumadin     - continue ASA and Atorvastatin     - left foot wound care per Dr. Melissa WHATLEY, CNS  Division of Vascular Surgery  [facility]  Pager [phone]    I personally examined the patient and agree with the findings above.  I discussed the patient with the resident / fellow and care team, and agree with the assessment and plan of care as documented in the note.  Vascular surgery staff    Looks great  Toe on the right foot healing, 2nd toe with small amount of drainage and the great toe much improved.  May need another grafix placement in the future on the right great toe  Follow up with Dr. Eden.  Leah Santamaria MD

## 2017-11-27 NOTE — PROGRESS NOTES
ANTICOAGULATION FOLLOW-UP CLINIC VISIT    Patient Name:  Carlos Alberto Fenton  Date:  11/27/2017  Contact Type:  Telephone/ Chantal and patient    SUBJECTIVE:     Patient Findings     Positives No Problem Findings    Comments Telephone call back to Chantal HC nurse.  Discussed plan and advised that with pt's INR today to just have pt rechecked when HC nurse is out there on Friday.  States she is unsure of what changes pt might have made to affect INR, as she has only seen pt once.  States pt called after hour triage line on Friday since INR was drawn under cardiology and results were not sent to [facility].   States that pt was advised to take 5 mg daily -- so need to confirm that pt took 5 mg Fri, Sat and Sun.  Pt has been subtherapeutic and INRs have been all over the place.  It appears that cardioversion was cancelled last week due to subtherapeutic INRs.     Telephone call attempted to pt, no answer.  Left message to call [facility] back to discuss.  Per chart review and presumed dosing above: pt would have had 2.5 mg Mon and 5 mg ROW=32.5, but need to confirm.     Gordo Brunner RN, BSN           OBJECTIVE    INR   Date Value Ref Range Status   11/27/2017 1.90 (H) 0.86 - 1.14 Final     Chromogenic Factor 10   Date Value Ref Range Status   03/02/2017 65 (L) 70 - 130 % Final     Comment:     Therapeutic Range:  A Chromogenic Factor 10 level of approximately 20-40%   inversely correlates with an INR of 2-3 for patients receiving Warfarin.   Chromogenic Factor 10 levels below 20% indicate an INR greater than 3 and   levels above 40% indicate an INR less than 2.       Factor 2 Assay   Date Value Ref Range Status   02/27/2017 37 (L) 60 - 140 % Final       ASSESSMENT / PLAN  INR assessment THER    Recheck INR In: 4 DAYS    INR Location Outside lab      Anticoagulation Summary as of 11/27/2017     INR goal 2.0-3.0   Today's INR 1.90!   Maintenance plan 2.5 mg (5 mg x 0.5) on Mon, Wed, Fri; 5 mg (5 mg x 1) all other days   Full  instructions 11/27: 5 mg; 11/29: 5 mg; Otherwise 2.5 mg on Mon, Wed, Fri; 5 mg all other days   Weekly total 27.5 mg   Plan last modified Gordo Brunner RN (10/30/2017)   Next INR check 12/1/2017   Priority INR   Target end date Indefinite    Indications   Long-term (current) use of anticoagulants [Z79.01] [Z79.01]  New onset atrial fibrillation (H) (Resolved) [I48.91]  Atrial fibrillation (H) [I48.91] [I48.91]         Anticoagulation Episode Summary     INR check location     Preferred lab     Send INR reminders to [facility]    Comments Prounounced A-seenath 4/17/17 Allergice to Heparin/Lovenon per Dr. Angelina Bernal's note  Speak with  Darrin in addition to patient Hx of 2 strokes. HIPPA OK to leave messag  Pt is confused. Please speak in tablets only (5mg tablets)/Send [email or web address] message      Anticoagulation Care Providers     Provider Role Specialty Phone number    Star Lobato MD Responsible Cardiology [phone]            See the Encounter Report to view Anticoagulation Flowsheet and Dosing Calendar (Go to Encounters tab in chart review, and find the Anticoagulation Therapy Visit)    Dosage adjustment made based on physician directed care plan.    Jono Guerrero RN

## 2017-11-27 NOTE — TELEPHONE ENCOUNTER
Telephone call back to Chantal HC nurse, advised that pt should have INR done today at appt.  HC nurse states that she will be out to home on Tuesday and Thursday or Friday, and would like a call back regarding INR. Asked that INR nurse call pt to let her know to have INR done today. Telephone call attempted to pt, no answer.  Left message for pt to have INR drawn at appt today, and that we will call with instructions later today.  Also added to pt's appt note.  Pt has appt at 1230 pm.  Leaving open to check for INR.    Gordo Brunner RN, BSN

## 2017-11-28 ENCOUNTER — TELEPHONE (OUTPATIENT)
Dept: INTERNAL MEDICINE | Facility: CLINIC | Age: 77
End: 2017-11-28

## 2017-11-28 ENCOUNTER — MYC REFILL (OUTPATIENT)
Dept: CARDIOLOGY | Facility: CLINIC | Age: 77
End: 2017-11-28

## 2017-11-28 DIAGNOSIS — I50.32 CHRONIC DIASTOLIC HEART FAILURE (H): ICD-10-CM

## 2017-11-28 NOTE — TELEPHONE ENCOUNTER
Reason for Call:  Other call back    Detailed comments: Home care nurse is calling to let  know that her heart rate today was 115. She candice any symptoms. She had already taken her carvedilol this morning.     Phone Number Patient can be reached at: Other phone number:  [phone]    Best Time: any    Can we leave a detailed message on this number? YES    Call taken on 11/28/2017 at 3:08 PM by Jody Ortega

## 2017-11-28 NOTE — TELEPHONE ENCOUNTER
Message from TapCrowdhart:  Original authorizing provider: MD Carlos Alberto Ratliff would like a refill of the following medications:  metolazone (ZAROXOLYN) 2.5 MG tablet [Star Lobato MD]    Preferred pharmacy: [facility] - [place], [place] - [address]    Comment:

## 2017-11-29 ENCOUNTER — MYC REFILL (OUTPATIENT)
Dept: INTERNAL MEDICINE | Facility: CLINIC | Age: 77
End: 2017-11-29

## 2017-11-29 DIAGNOSIS — F43.21 ADJUSTMENT DISORDER WITH DEPRESSED MOOD: ICD-10-CM

## 2017-11-29 RX ORDER — METOLAZONE 2.5 MG/1
2.5 TABLET ORAL DAILY PRN
Qty: 30 TABLET | Refills: 1 | Status: ON HOLD | OUTPATIENT
Start: 2017-11-29 | End: 2018-04-04

## 2017-11-29 RX ORDER — CARVEDILOL 3.12 MG/1
3.12 TABLET ORAL 2 TIMES DAILY WITH MEALS
Qty: 180 TABLET | Refills: 3 | Status: SHIPPED | OUTPATIENT
Start: 2017-11-29 | End: 2017-12-06

## 2017-11-29 NOTE — TELEPHONE ENCOUNTER
Message from DAXKOt:  Original authorizing provider: MD Carlos Alberto Connelly would like a refill of the following medications:  venlafaxine (EFFEXOR-ER) 150 MG TB24 24 hr tablet [Humberto Conner MD]    Preferred pharmacy: [facility] - [place], [place] - [address]    Comment:      Medication renewals requested in this message routed to other providers:  carvedilol (COREG) 3.125 MG tablet [Tamela Weinberg, APRN CNP]

## 2017-11-30 ENCOUNTER — MEDICAL CORRESPONDENCE (OUTPATIENT)
Dept: HEALTH INFORMATION MANAGEMENT | Facility: CLINIC | Age: 77
End: 2017-11-30

## 2017-11-30 ENCOUNTER — TELEPHONE (OUTPATIENT)
Dept: INTERNAL MEDICINE | Facility: CLINIC | Age: 77
End: 2017-11-30

## 2017-11-30 ENCOUNTER — TELEPHONE (OUTPATIENT)
Dept: FAMILY MEDICINE | Facility: OTHER | Age: 77
End: 2017-11-30

## 2017-11-30 NOTE — TELEPHONE ENCOUNTER
Pt home care was advised of the recommendations from Dr. Beatty, and verbalized understanding. On recheck the pt pulse was 120. Home care will contact clinic tomorrow with an update.

## 2017-11-30 NOTE — TELEPHONE ENCOUNTER
Reason for Call:  Form, our goal is to have forms completed with 72 hours, however, some forms may require a visit or additional information.    Type of letter, form or note:  medical    Who is the form from?: [employer] (if other please explain)    Where did the form come from: form was faxed in    What clinic location was the form placed at?: [facility] - [phone]    Where the form was placed: 's Box    What number is listed as a contact on the form?: [phone]       Additional comments: sign fax back    Call taken on 11/30/2017 at 4:40 PM by Ester Logan

## 2017-11-30 NOTE — TELEPHONE ENCOUNTER
Given the patient's relatively low blood pressure, the addition of medications for heart rate control could lead to orthostatic hypotension.  If her pulse is still rapid on recheck, would recommend taking an additional carvidiol today and notifying Dr. Conner of pulse tomorrow.  If patient becomes symptomatic, should present immediately to the emergency department.  Jaci

## 2017-11-30 NOTE — TELEPHONE ENCOUNTER
S-(situation): Rapid heart rate    B-(background): Home care calls stating the pt still continues to have rapid heart rate. This morning her pulse was at 122 (regular). Pt has no dizziness or headaches. BP stable and weight.     A-(assessment): Home care is questioning if the pt should continue to monitor the pulse or should she be seen in ED.     R-(recommendations): Routed to Dr. eBatty to review and advise. Dr. Conner out and home care wants an answer today.

## 2017-12-01 ENCOUNTER — ANTICOAGULATION THERAPY VISIT (OUTPATIENT)
Dept: ANTICOAGULATION | Facility: CLINIC | Age: 77
End: 2017-12-01
Payer: COMMERCIAL

## 2017-12-01 ENCOUNTER — DOCUMENTATION ONLY (OUTPATIENT)
Dept: CARE COORDINATION | Facility: CLINIC | Age: 77
End: 2017-12-01

## 2017-12-01 ENCOUNTER — TELEPHONE (OUTPATIENT)
Dept: FAMILY MEDICINE | Facility: CLINIC | Age: 77
End: 2017-12-01

## 2017-12-01 DIAGNOSIS — Z79.01 LONG-TERM (CURRENT) USE OF ANTICOAGULANTS: ICD-10-CM

## 2017-12-01 DIAGNOSIS — I48.91 ATRIAL FIBRILLATION, UNSPECIFIED TYPE (H): ICD-10-CM

## 2017-12-01 LAB — INR PPP: 2.3

## 2017-12-01 PROCEDURE — 99207 ZZC NO CHARGE NURSE ONLY: CPT | Performed by: INTERNAL MEDICINE

## 2017-12-01 NOTE — PROGRESS NOTES
ANTICOAGULATION FOLLOW-UP CLINIC VISIT    Patient Name:  Carlos Alberto Fenton  Date:  12/1/2017  Contact Type:  Telephone/ AMIRA Cornejo nurse- YEISON left    SUBJECTIVE:     Patient Findings     Positives No Problem Findings    Comments Reason for call:  INR Reason for Call:  INR     Who is calling?  Home Care:      Phone number:  [phone]     Fax number:  none     Name of caller: Chantal                          INR Value:  2.3     Are there any other concerns:  No     Can we leave a detailed message on this number? YES     Phone number patient can be reached at: Other phone number: [phone]         Call taken on 12/1/2017 at 10:03 AM by Lee Card              OBJECTIVE    INR   Date Value Ref Range Status   12/01/2017 2.3  Final     Chromogenic Factor 10   Date Value Ref Range Status   03/02/2017 65 (L) 70 - 130 % Final     Comment:     Therapeutic Range:  A Chromogenic Factor 10 level of approximately 20-40%   inversely correlates with an INR of 2-3 for patients receiving Warfarin.   Chromogenic Factor 10 levels below 20% indicate an INR greater than 3 and   levels above 40% indicate an INR less than 2.       Factor 2 Assay   Date Value Ref Range Status   02/27/2017 37 (L) 60 - 140 % Final       ASSESSMENT / PLAN  INR assessment THER    Recheck INR In: 4 DAYS    INR Location Home[facility] INR      Anticoagulation Summary as of 12/1/2017     INR goal 2.0-3.0   Today's INR 2.3   Maintenance plan 5 mg (5 mg x 1) every day   Full instructions 5 mg every day   Weekly total 35 mg   Plan last modified Amanda Frausto RN (12/1/2017)   Next INR check 12/5/2017   Priority INR   Target end date Indefinite    Indications   Long-term (current) use of anticoagulants [Z79.01] [Z79.01]  New onset atrial fibrillation (H) (Resolved) [I48.91]  Atrial fibrillation (H) [I48.91] [I48.91]         Anticoagulation Episode Summary     INR check location     Preferred lab     Send INR reminders to [facility]    Comments Prounounced A-seenjunito  4/17/17 Allergice to Heparin/Lovenon per Dr. Angelina Bernal's note  Speak with  Darrin in addition to patient Hx of 2 strokes. HIPPA OK to leave messag  Pt is confused. Please speak in tablets only (5mg tablets)/Send MyChart message      Anticoagulation Care Providers     Provider Role Specialty Phone number    arabellaStar MD Responsible Cardiology [phone]            See the Encounter Report to view Anticoagulation Flowsheet and Dosing Calendar (Go to Encounters tab in chart review, and find the Anticoagulation Therapy Visit)    Dosage adjustment made based on physician directed care plan.    I left a detailed voicemail with the orders reflected in flowsheet. I have also requested a call back if there have been any missed doses, concerns, illness, fever, or if there have been any changes in medications, activity level, or diet      Amanda Frausto RN

## 2017-12-01 NOTE — TELEPHONE ENCOUNTER
Home care nurse calls back, wondering if she can see pt Wed instead of Tues? Ok Wed, she should take 5 mg daily until then.     Catrachita Lacy RN- [facility] RN

## 2017-12-01 NOTE — TELEPHONE ENCOUNTER
Forms faxed to Dr. Conner. Please review/sign and fax back to [facility].  Lolis Molina, JOSEPH

## 2017-12-01 NOTE — TELEPHONE ENCOUNTER
Reason for call:  INR Reason for Call:  INR    Who is calling?  Home Care:     Phone number:  [phone]    Fax number:  none    Name of caller: Chantal     INR Value:  2.3    Are there any other concerns:  No    Can we leave a detailed message on this number? YES    Phone number patient can be reached at: Other phone number: [phone]       Call taken on 12/1/2017 at 10:03 AM by Lee Card

## 2017-12-01 NOTE — PROGRESS NOTES
[facility] utilizes an encounter to take the place of a direct phone call to your office. Please take a moment to review the below request. Your reply to this encounter will act as a verbal OK of orders if requested below. Thank you.    Patient Update:    Client HR is 106 this AM. She reports last night HR was up to 118. Client has been taking coreg BID as directed. Should she continue to follow the parameter of taking an additional Coreg in the evening if HR is greater than 120?    Please advise    Ester Pineda RN    [phone]  [email or web address]

## 2017-12-01 NOTE — MR AVS SNAPSHOT
Carlos Alberto Fenton   12/1/2017   Anticoagulation Therapy Visit    Description:  77 year old female   Provider:  Humberto Conner MD   Department:  Ph Anticoag           INR as of 12/1/2017     Today's INR 2.3      Anticoagulation Summary as of 12/1/2017     INR goal 2.0-3.0   Today's INR 2.3   Full instructions 5 mg every day   Next INR check 12/5/2017    Indications   Long-term (current) use of anticoagulants [Z79.01] [Z79.01]  New onset atrial fibrillation (H) (Resolved) [I48.91]  Atrial fibrillation (H) [I48.91] [I48.91]         Contact Numbers     Clinic Number:         December 2017 Details    Sun Mon Tue Wed Thu Fri Sat          1      5 mg   See details      2      5 mg           3      5 mg         4      5 mg         5            6               7               8               9                 10               11               12               13               14               15               16                 17               18               19               20               21               22               23                 24               25               26               27               28               29               30                 31                      Date Details   12/01 This INR check       Date of next INR:  12/5/2017         How to take your warfarin dose     To take:  5 mg Take 1 of the 5 mg tablets.

## 2017-12-01 NOTE — TELEPHONE ENCOUNTER
I have not seen this pt since 12/16. She is following Dr. Humberto Conner. Please forward forms to him

## 2017-12-01 NOTE — TELEPHONE ENCOUNTER
Chantla from [facility] called to speak with Amanda regarding this patient. Please give Chantal a call back to discuss - [phone].

## 2017-12-04 RX ORDER — VENLAFAXINE HYDROCHLORIDE 150 MG/1
150 TABLET, EXTENDED RELEASE ORAL
Qty: 90 EACH | Refills: 1 | Status: ON HOLD | OUTPATIENT
Start: 2017-12-04 | End: 2018-04-04

## 2017-12-04 NOTE — TELEPHONE ENCOUNTER
Routing refill request to provider for review/approval because:  NO PHQ9 on file.  Mariela Baker RN

## 2017-12-05 ENCOUNTER — TELEPHONE (OUTPATIENT)
Dept: CARDIOLOGY | Facility: CLINIC | Age: 77
End: 2017-12-05

## 2017-12-05 NOTE — TELEPHONE ENCOUNTER
Called daughter to schedule a cardioversion. Writer got voicemail and left a message for the daughter to call back to schedule the cardioversion now that the patient is therapeutic.     Luz Marina Rodas  [facility] Electrophysiology   [phone]

## 2017-12-06 ENCOUNTER — HOSPITAL ENCOUNTER (OUTPATIENT)
Facility: CLINIC | Age: 77
End: 2017-12-06
Attending: INTERNAL MEDICINE
Payer: MEDICARE

## 2017-12-06 ENCOUNTER — MYC REFILL (OUTPATIENT)
Dept: CARDIOLOGY | Facility: CLINIC | Age: 77
End: 2017-12-06

## 2017-12-06 ENCOUNTER — TELEPHONE (OUTPATIENT)
Dept: INTERNAL MEDICINE | Facility: CLINIC | Age: 77
End: 2017-12-06

## 2017-12-06 ENCOUNTER — MYC REFILL (OUTPATIENT)
Dept: INTERNAL MEDICINE | Facility: CLINIC | Age: 77
End: 2017-12-06

## 2017-12-06 ENCOUNTER — ANTICOAGULATION THERAPY VISIT (OUTPATIENT)
Dept: ANTICOAGULATION | Facility: CLINIC | Age: 77
End: 2017-12-06
Payer: COMMERCIAL

## 2017-12-06 DIAGNOSIS — I48.91 ATRIAL FIBRILLATION, UNSPECIFIED TYPE (H): ICD-10-CM

## 2017-12-06 DIAGNOSIS — I50.32 CHRONIC DIASTOLIC HEART FAILURE (H): ICD-10-CM

## 2017-12-06 DIAGNOSIS — F43.21 ADJUSTMENT DISORDER WITH DEPRESSED MOOD: ICD-10-CM

## 2017-12-06 DIAGNOSIS — Z79.01 LONG-TERM (CURRENT) USE OF ANTICOAGULANTS: ICD-10-CM

## 2017-12-06 DIAGNOSIS — I48.0 PAROXYSMAL ATRIAL FIBRILLATION (H): Primary | ICD-10-CM

## 2017-12-06 DIAGNOSIS — I48.0 PAROXYSMAL ATRIAL FIBRILLATION (H): ICD-10-CM

## 2017-12-06 LAB — INR PPP: 3.8

## 2017-12-06 PROCEDURE — 99207 ZZC NO CHARGE NURSE ONLY: CPT | Performed by: INTERNAL MEDICINE

## 2017-12-06 RX ORDER — POTASSIUM CHLORIDE 1500 MG/1
20 TABLET, EXTENDED RELEASE ORAL
Status: CANCELLED | OUTPATIENT
Start: 2017-12-06

## 2017-12-06 RX ORDER — LIDOCAINE 40 MG/G
CREAM TOPICAL
Status: CANCELLED | OUTPATIENT
Start: 2017-12-06

## 2017-12-06 RX ORDER — POTASSIUM CHLORIDE 1500 MG/1
40 TABLET, EXTENDED RELEASE ORAL
Status: CANCELLED | OUTPATIENT
Start: 2017-12-06

## 2017-12-06 NOTE — TELEPHONE ENCOUNTER
Another attempt made to schedule the patient for her cardioversion. A message was left on Lindsay's voicemail with the call back number.     Luz Marina Rodas  [facility]   [phone]     Called to discuss pathology results showing condyloma. Recommend wart check in 6 months. Left a message and sent path letter.    VANESA Mosquera, NP-C  Colon and Rectal Surgery  [facility]

## 2017-12-06 NOTE — MR AVS SNAPSHOT
Carlos Alberto Fenton   12/6/2017   Anticoagulation Therapy Visit    Description:  77 year old female   Provider:  Humberto Conner MD   Department:  Ph Anticoag           INR as of 12/6/2017     Today's INR 3.8!      Anticoagulation Summary as of 12/6/2017     INR goal 2.0-3.0   Today's INR 3.8!   Full instructions 12/6: 2.5 mg; 12/7: 5 mg; 12/8: 5 mg; 12/9: 5 mg; 12/10: 5 mg; 12/11: 5 mg   Next INR check 12/12/2017    Indications   Long-term (current) use of anticoagulants [Z79.01] [Z79.01]  New onset atrial fibrillation (H) (Resolved) [I48.91]  Atrial fibrillation (H) [I48.91] [I48.91]         Contact Numbers     Clinic Number:         December 2017 Details    Sun Mon Tue Wed Thu Fri Sat          1               2                 3               4               5               6      2.5 mg   See details      7      5 mg         8      5 mg         9      5 mg           10      5 mg         11      5 mg         12            13               14               15               16                 17               18               19               20               21               22               23                 24               25               26               27               28               29               30                 31                      Date Details   12/06 This INR check       Date of next INR:  12/12/2017         How to take your warfarin dose     To take:  2.5 mg Take 0.5 of a 5 mg tablet.    To take:  5 mg Take 1 of the 5 mg tablets.

## 2017-12-06 NOTE — PROGRESS NOTES
ANTICOAGULATION FOLLOW-UP CLINIC VISIT    Patient Name:  Carlos Alberto Fenton  Date:  12/6/2017  Contact Type:  Telephone/ AMIRA Cornejo nurse    SUBJECTIVE:     Patient Findings     Positives Unexplained INR or factor level change    Comments Reason for Call:  INR     Who is calling?  Home Care: [facility]     Phone number:  [phone]     Fax number:  n/a     Name of caller: Chantal      INR Value:  3.8     Are there any other concerns:  No- is waiting in home for a call back for medication set up     Can we leave a detailed message on this number? YES     Phone number patient can be reached at: Other phone number:  [phone]        Call taken on 12/6/2017 at 9:08 AM by Osito Richard           OBJECTIVE    INR   Date Value Ref Range Status   12/06/2017 3.8  Final     Chromogenic Factor 10   Date Value Ref Range Status   03/02/2017 65 (L) 70 - 130 % Final     Comment:     Therapeutic Range:  A Chromogenic Factor 10 level of approximately 20-40%   inversely correlates with an INR of 2-3 for patients receiving Warfarin.   Chromogenic Factor 10 levels below 20% indicate an INR greater than 3 and   levels above 40% indicate an INR less than 2.       Factor 2 Assay   Date Value Ref Range Status   02/27/2017 37 (L) 60 - 140 % Final       ASSESSMENT / PLAN  INR assessment SUPRA    Recheck INR In: 6 DAYS    INR Location [facility] INR      Anticoagulation Summary as of 12/6/2017     INR goal 2.0-3.0   Today's INR 3.8!   Maintenance plan No maintenance plan   Full instructions 12/6: 2.5 mg; 12/7: 5 mg; 12/8: 5 mg; 12/9: 5 mg; 12/10: 5 mg; 12/11: 5 mg   Plan last modified Amanda Frausto RN (12/6/2017)   Next INR check 12/12/2017   Priority INR   Target end date Indefinite    Indications   Long-term (current) use of anticoagulants [Z79.01] [Z79.01]  New onset atrial fibrillation (H) (Resolved) [I48.91]  Atrial fibrillation (H) [I48.91] [I48.91]         Anticoagulation Episode Summary     INR check location      Preferred lab     Send INR reminders to [facility]    Comments Prounounced A-seenath 4/17/17 Allergice to Heparin/Lovenon per Dr. Angelina Bernal's note  Speak with  Darrin in addition to patient Hx of 2 strokes. HIPPA OK to leave messag  Pt is confused. Please speak in tablets only (5mg tablets)/Send MyChart message      Anticoagulation Care Providers     Provider Role Specialty Phone number    arabellaStar MD Responsible Cardiology [phone]            See the Encounter Report to view Anticoagulation Flowsheet and Dosing Calendar (Go to Encounters tab in chart review, and find the Anticoagulation Therapy Visit)    Dosage adjustment made based on physician directed care plan.    Pt may be scheduling a cardioversion soon, which requires INR to be >2.0 after 4 weekly INR checks.   Amanda Frausto RN

## 2017-12-06 NOTE — TELEPHONE ENCOUNTER
Chantal returns call and states the Effexor is NOT new and there are no other issues.  Chantal also states her next visit to [facility] is Tuesday 12/12 unless a visit is needed sooner.

## 2017-12-06 NOTE — TELEPHONE ENCOUNTER
VM left for Chantal regarding INR of 3.8 from today.   Need to know of the Effexor is new, as this can increase INR, per [employer].   Amanda Frausto RN

## 2017-12-07 RX ORDER — WARFARIN SODIUM 5 MG/1
5 TABLET ORAL DAILY
Qty: 90 TABLET | Refills: 3 | Status: SHIPPED | OUTPATIENT
Start: 2017-12-07 | End: 2018-01-13 | Stop reason: ALTCHOICE

## 2017-12-07 RX ORDER — CARVEDILOL 3.12 MG/1
3.12 TABLET ORAL 2 TIMES DAILY WITH MEALS
Qty: 180 TABLET | Refills: 3 | Status: SHIPPED | OUTPATIENT
Start: 2017-12-07 | End: 2018-01-15

## 2017-12-07 NOTE — TELEPHONE ENCOUNTER
Message from "[employer]:  Original authorizing provider: Star Lobato MD    Carlos Alberto BISHOPFernie Fenton would like a refill of the following medications:  warfarin (COUMADIN) 5 MG tablet [Star Lobato MD]  carvedilol (COREG) 3.125 MG tablet [Star Lobato MD]    Preferred pharmacy: [facility] - [place] [address]     Comment:      Medication renewals requested in this message routed to other providers:  venlafaxine (EFFEXOR-ER) 150 MG TB24 24 hr tablet [Humberto Conner MD]

## 2017-12-07 NOTE — TELEPHONE ENCOUNTER
Message from [employer]):  Original authorizing provider: Humberto Conner MD    Carlos Alberto Fenton would like a refill of the following medications:  venlafaxine (EFFEXOR-ER) 150 MG TB24 24 hr tablet [Humberto Conner MD]    Preferred pharmacy: [facility] - [place], [place] - [address]    Comment:      Medication renewals requested in this message routed to other providers:  warfarin (COUMADIN) 5 MG tablet [Star Lobato MD]  carvedilol (COREG) 3.125 MG tablet [Star Lobato MD]

## 2017-12-11 ENCOUNTER — ANTICOAGULATION THERAPY VISIT (OUTPATIENT)
Dept: ANTICOAGULATION | Facility: OTHER | Age: 77
End: 2017-12-11
Payer: COMMERCIAL

## 2017-12-11 DIAGNOSIS — Z79.01 LONG-TERM (CURRENT) USE OF ANTICOAGULANTS: ICD-10-CM

## 2017-12-11 DIAGNOSIS — I48.91 ATRIAL FIBRILLATION, UNSPECIFIED TYPE (H): ICD-10-CM

## 2017-12-11 LAB — INR POINT OF CARE: 4.2 (ref 0.86–1.14)

## 2017-12-11 PROCEDURE — 36416 COLLJ CAPILLARY BLOOD SPEC: CPT

## 2017-12-11 PROCEDURE — 85610 PROTHROMBIN TIME: CPT | Mod: QW

## 2017-12-11 PROCEDURE — 99207 ZZC NO CHARGE NURSE ONLY: CPT

## 2017-12-11 RX ORDER — VENLAFAXINE HYDROCHLORIDE 150 MG/1
150 TABLET, EXTENDED RELEASE ORAL
Qty: 90 EACH | Refills: 1 | OUTPATIENT
Start: 2017-12-11

## 2017-12-11 NOTE — MR AVS SNAPSHOT
Ramasaundrah I Eliceo   12/11/2017 3:45 PM   Anticoagulation Therapy Visit    Description:  77 year old female   Provider:  [facility]   Department:  Er Anticoag           INR as of 12/11/2017     Today's INR 4.2!      Anticoagulation Summary as of 12/11/2017     INR goal 2.0-3.0   Today's INR 4.2!   Full instructions 12/11: Hold; Otherwise 2.5 mg on Mon, Fri; 5 mg all other days   Next INR check 12/18/2017    Indications   Long-term (current) use of anticoagulants [Z79.01] [Z79.01]  New onset atrial fibrillation (H) (Resolved) [I48.91]  Atrial fibrillation (H) [I48.91] [I48.91]         Contact Numbers     Clinic Number:         December 2017 Details    Sun Mon Tue Wed Thu Fri Sat          1               2                 3               4               5               6               7               8               9                 10               11      Hold   See details      12      5 mg         13      5 mg         14      5 mg         15      2.5 mg         16      5 mg           17      5 mg         18            19               20               21               22               23                 24               25               26               27               28               29               30                 31                      Date Details   12/11 This INR check       Date of next INR:  12/18/2017         How to take your warfarin dose     To take:  2.5 mg Take 0.5 of a 5 mg tablet.    To take:  5 mg Take 1 of the 5 mg tablets.    Hold Do not take your warfarin dose. See the Details table to the right for additional instructions.

## 2017-12-11 NOTE — PROGRESS NOTES
ANTICOAGULATION FOLLOW-UP CLINIC VISIT    Patient Name:  Carlos Alberto Fenton  Date:  12/11/2017  Contact Type:  Face to Face    SUBJECTIVE:     Patient Findings     Positives Unexplained INR or factor level change    Comments Pt is having a cardioversion tomorrow with TAVO. I called and spoke with cardiology Luz Marina explaining her INR today. They want it between 2-4. Will have her hold tonite and it should be ok for tomorrow.            OBJECTIVE    INR Protime   Date Value Ref Range Status   12/11/2017 4.2 (A) 0.86 - 1.14 Final     Chromogenic Factor 10   Date Value Ref Range Status   03/02/2017 65 (L) 70 - 130 % Final     Comment:     Therapeutic Range:  A Chromogenic Factor 10 level of approximately 20-40%   inversely correlates with an INR of 2-3 for patients receiving Warfarin.   Chromogenic Factor 10 levels below 20% indicate an INR greater than 3 and   levels above 40% indicate an INR less than 2.       Factor 2 Assay   Date Value Ref Range Status   02/27/2017 37 (L) 60 - 140 % Final       ASSESSMENT / PLAN  INR assessment SUPRA    Recheck INR In: 1 WEEK    INR Location Clinic      Anticoagulation Summary as of 12/11/2017     INR goal 2.0-3.0   Today's INR 4.2!   Maintenance plan 2.5 mg (5 mg x 0.5) on Mon, Fri; 5 mg (5 mg x 1) all other days   Full instructions 12/11: Hold; Otherwise 2.5 mg on Mon, Fri; 5 mg all other days   Weekly total 30 mg   Plan last modified Catrachita Lacy RN (12/11/2017)   Next INR check 12/18/2017   Priority INR   Target end date Indefinite    Indications   Long-term (current) use of anticoagulants [Z79.01] [Z79.01]  New onset atrial fibrillation (H) (Resolved) [I48.91]  Atrial fibrillation (H) [I48.91] [I48.91]         Anticoagulation Episode Summary     INR check location     Preferred lab     Send INR reminders to [facility]    Comments Prounounced A-seenath 4/17/17 Allergice to Heparin/Lovenon per Dr. Angelina Bernal's note  Speak with  Darrin in addition to patient Hx of 2 strokes.  HIPPA OK to leave messag  Pt is confused. Please speak in tablets only (5mg tablets)/Send MyChart message      Anticoagulation Care Providers     Provider Role Specialty Phone number    Star Lobato MD Responsible Cardiology [phone]            See the Encounter Report to view Anticoagulation Flowsheet and Dosing Calendar (Go to Encounters tab in chart review, and find the Anticoagulation Therapy Visit)    Dosage adjustment made based on physician directed care plan.    Catrachita Lacy RN

## 2017-12-11 NOTE — TELEPHONE ENCOUNTER
EFFEXOR RX declinied as duplicate. Dr. Conner approved on 12/4/17 for 6 months.  Will close this encounter..............................REESE Prather

## 2017-12-12 ENCOUNTER — HOSPITAL ENCOUNTER (OUTPATIENT)
Dept: CARDIOLOGY | Facility: CLINIC | Age: 77
End: 2017-12-12
Attending: NURSE PRACTITIONER
Payer: MEDICARE

## 2017-12-12 ENCOUNTER — APPOINTMENT (OUTPATIENT)
Dept: LAB | Facility: CLINIC | Age: 77
End: 2017-12-12
Attending: INTERNAL MEDICINE
Payer: MEDICARE

## 2017-12-12 ENCOUNTER — ANESTHESIA EVENT (OUTPATIENT)
Dept: SURGERY | Facility: CLINIC | Age: 77
End: 2017-12-12
Payer: MEDICARE

## 2017-12-12 ENCOUNTER — HOSPITAL ENCOUNTER (OUTPATIENT)
Facility: CLINIC | Age: 77
Discharge: HOME | End: 2017-12-12
Attending: INTERNAL MEDICINE | Admitting: INTERNAL MEDICINE
Payer: MEDICARE

## 2017-12-12 ENCOUNTER — SURGERY (OUTPATIENT)
Age: 77
End: 2017-12-12

## 2017-12-12 ENCOUNTER — ANESTHESIA (OUTPATIENT)
Dept: SURGERY | Facility: CLINIC | Age: 77
End: 2017-12-12
Payer: MEDICARE

## 2017-12-12 ENCOUNTER — APPOINTMENT (OUTPATIENT)
Dept: MEDSURG UNIT | Facility: CLINIC | Age: 77
End: 2017-12-12
Attending: INTERNAL MEDICINE
Payer: MEDICARE

## 2017-12-12 ENCOUNTER — TELEPHONE (OUTPATIENT)
Dept: INTERNAL MEDICINE | Facility: CLINIC | Age: 77
End: 2017-12-12

## 2017-12-12 VITALS
DIASTOLIC BLOOD PRESSURE: 82 MMHG | RESPIRATION RATE: 12 BRPM | HEART RATE: 59 BPM | SYSTOLIC BLOOD PRESSURE: 114 MMHG | OXYGEN SATURATION: 100 %

## 2017-12-12 VITALS
OXYGEN SATURATION: 96 % | RESPIRATION RATE: 16 BRPM | HEART RATE: 60 BPM | SYSTOLIC BLOOD PRESSURE: 113 MMHG | DIASTOLIC BLOOD PRESSURE: 78 MMHG

## 2017-12-12 DIAGNOSIS — I48.0 PAROXYSMAL ATRIAL FIBRILLATION (H): ICD-10-CM

## 2017-12-12 LAB
INR PPP: 3.18 (ref 0.86–1.14)
MAGNESIUM SERPL-MCNC: 2.2 MG/DL (ref 1.6–2.3)
POTASSIUM SERPL-SCNC: 4.2 MMOL/L (ref 3.4–5.3)

## 2017-12-12 PROCEDURE — 93010 ELECTROCARDIOGRAM REPORT: CPT | Mod: 76 | Performed by: INTERNAL MEDICINE

## 2017-12-12 PROCEDURE — 85610 PROTHROMBIN TIME: CPT | Performed by: NURSE PRACTITIONER

## 2017-12-12 PROCEDURE — 40000065 ZZH STATISTIC EKG NON-CHARGEABLE

## 2017-12-12 PROCEDURE — 93320 DOPPLER ECHO COMPLETE: CPT | Mod: 26 | Performed by: INTERNAL MEDICINE

## 2017-12-12 PROCEDURE — 92960 CARDIOVERSION ELECTRIC EXT: CPT | Performed by: NURSE PRACTITIONER

## 2017-12-12 PROCEDURE — 93312 ECHO TRANSESOPHAGEAL: CPT | Mod: 26 | Performed by: INTERNAL MEDICINE

## 2017-12-12 PROCEDURE — 93325 DOPPLER ECHO COLOR FLOW MAPG: CPT | Mod: 26 | Performed by: INTERNAL MEDICINE

## 2017-12-12 PROCEDURE — 84132 ASSAY OF SERUM POTASSIUM: CPT | Performed by: NURSE PRACTITIONER

## 2017-12-12 PROCEDURE — 93005 ELECTROCARDIOGRAM TRACING: CPT

## 2017-12-12 PROCEDURE — 99152 MOD SED SAME PHYS/QHP 5/>YRS: CPT

## 2017-12-12 PROCEDURE — 25000125 ZZHC RX 250: Performed by: INTERNAL MEDICINE

## 2017-12-12 PROCEDURE — 92960 CARDIOVERSION ELECTRIC EXT: CPT

## 2017-12-12 PROCEDURE — 36415 COLL VENOUS BLD VENIPUNCTURE: CPT | Performed by: NURSE PRACTITIONER

## 2017-12-12 PROCEDURE — 99153 MOD SED SAME PHYS/QHP EA: CPT

## 2017-12-12 PROCEDURE — 93325 DOPPLER ECHO COLOR FLOW MAPG: CPT

## 2017-12-12 PROCEDURE — 25000125 ZZHC RX 250: Performed by: NURSE ANESTHETIST, CERTIFIED REGISTERED

## 2017-12-12 PROCEDURE — 83735 ASSAY OF MAGNESIUM: CPT | Performed by: NURSE PRACTITIONER

## 2017-12-12 PROCEDURE — 25000128 H RX IP 250 OP 636: Performed by: INTERNAL MEDICINE

## 2017-12-12 PROCEDURE — 37000008 ZZH ANESTHESIA TECHNICAL FEE, 1ST 30 MIN

## 2017-12-12 PROCEDURE — 40000166 ZZH STATISTIC PP CARE STAGE 1

## 2017-12-12 RX ORDER — ATROPINE SULFATE 0.1 MG/ML
.5-1 INJECTION INTRAVENOUS
Status: DISCONTINUED | OUTPATIENT
Start: 2017-12-12 | End: 2017-12-13 | Stop reason: HOSPADM

## 2017-12-12 RX ORDER — POTASSIUM CHLORIDE 1500 MG/1
20 TABLET, EXTENDED RELEASE ORAL
Status: DISCONTINUED | OUTPATIENT
Start: 2017-12-12 | End: 2017-12-13 | Stop reason: HOSPADM

## 2017-12-12 RX ORDER — SODIUM CHLORIDE 9 MG/ML
1000 INJECTION, SOLUTION INTRAVENOUS CONTINUOUS
Status: DISCONTINUED | OUTPATIENT
Start: 2017-12-12 | End: 2017-12-13 | Stop reason: HOSPADM

## 2017-12-12 RX ORDER — FLUMAZENIL 0.1 MG/ML
0.2 INJECTION, SOLUTION INTRAVENOUS
Status: DISCONTINUED | OUTPATIENT
Start: 2017-12-12 | End: 2017-12-13 | Stop reason: HOSPADM

## 2017-12-12 RX ORDER — NALOXONE HYDROCHLORIDE 0.4 MG/ML
.1-.4 INJECTION, SOLUTION INTRAMUSCULAR; INTRAVENOUS; SUBCUTANEOUS
Status: DISCONTINUED | OUTPATIENT
Start: 2017-12-12 | End: 2017-12-13 | Stop reason: HOSPADM

## 2017-12-12 RX ORDER — FENTANYL CITRATE 50 UG/ML
25 INJECTION, SOLUTION INTRAMUSCULAR; INTRAVENOUS
Status: DISCONTINUED | OUTPATIENT
Start: 2017-12-12 | End: 2017-12-13 | Stop reason: HOSPADM

## 2017-12-12 RX ORDER — POTASSIUM CHLORIDE 1500 MG/1
40 TABLET, EXTENDED RELEASE ORAL
Status: DISCONTINUED | OUTPATIENT
Start: 2017-12-12 | End: 2017-12-13 | Stop reason: HOSPADM

## 2017-12-12 RX ORDER — LIDOCAINE 40 MG/G
CREAM TOPICAL
Status: DISCONTINUED | OUTPATIENT
Start: 2017-12-12 | End: 2017-12-12 | Stop reason: HOSPADM

## 2017-12-12 RX ORDER — POTASSIUM CHLORIDE 750 MG/1
40 TABLET, EXTENDED RELEASE ORAL
Status: DISCONTINUED | OUTPATIENT
Start: 2017-12-12 | End: 2017-12-12 | Stop reason: HOSPADM

## 2017-12-12 RX ORDER — FENTANYL CITRATE 50 UG/ML
25-50 INJECTION, SOLUTION INTRAMUSCULAR; INTRAVENOUS
Status: DISCONTINUED | OUTPATIENT
Start: 2017-12-12 | End: 2017-12-13 | Stop reason: HOSPADM

## 2017-12-12 RX ORDER — POTASSIUM CHLORIDE 750 MG/1
20 TABLET, EXTENDED RELEASE ORAL
Status: DISCONTINUED | OUTPATIENT
Start: 2017-12-12 | End: 2017-12-12 | Stop reason: HOSPADM

## 2017-12-12 RX ADMIN — MIDAZOLAM 2.5 MG: 1 INJECTION INTRAMUSCULAR; INTRAVENOUS at 14:01

## 2017-12-12 RX ADMIN — FENTANYL CITRATE 37.5 MCG: 50 INJECTION INTRAMUSCULAR; INTRAVENOUS at 14:25

## 2017-12-12 RX ADMIN — LIDOCAINE HYDROCHLORIDE 30 ML: 20 SOLUTION ORAL; TOPICAL at 13:44

## 2017-12-12 RX ADMIN — METHOHEXITAL SODIUM 40 MG: 500 INJECTION, POWDER, LYOPHILIZED, FOR SOLUTION INTRAMUSCULAR; INTRAVENOUS; RECTAL at 14:57

## 2017-12-12 RX ADMIN — BENZOCAINE 0.5 APPLICATOR: 220 SPRAY, METERED PERIODONTAL at 13:44

## 2017-12-12 NOTE — ANESTHESIA POSTPROCEDURE EVALUATION
Patient: Carlos Alberto Fenton    Procedure(s):  Anesthesia Cardioversion     Diagnosis:Atrial Fibrillation   Diagnosis Additional Information: No value filed.    Anesthesia Type:  General    Note:  Anesthesia Post Evaluation    Patient location during evaluation: PACU  Patient participation: Able to fully participate in evaluation  Level of consciousness: awake  Pain management: adequate  Airway patency: patent  Cardiovascular status: acceptable  Respiratory status: acceptable  PONV: none     Anesthetic complications: None          Last vitals:  Vitals:    12/12/17 1535   BP: 109/78   Pulse: 60   Resp: 14   SpO2: 96%         Electronically Signed By: Raul Vilchis MD  December 12, 2017  3:47 PM

## 2017-12-12 NOTE — ANESTHESIA PREPROCEDURE EVALUATION
Anesthesia Evaluation         Anesthesia Plan      History & Physical Review  History and physical reviewed and following examination; no interval change.    ASA Status:  3 .    NPO Status:  > 6 hours    Plan for General with Intravenous induction. Maintenance will be TIVA.           Postoperative Care      Consents  Anesthetic plan, risks, benefits and alternatives discussed with:  Patient..          ANESTHESIA PREOP EVALUATION    NPO Status: NPO per Anesthesia Guidelines for Procedure/Surgery Except for: Power, NPO    Procedure: Procedure(s):  Anesthesia Cardioversion     HPI: Presents for cardioversion     PMHx/PSHx/ROS:  PAST MEDICAL HISTORY:   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    parietral lesions bilateral       Antiplatelet or antithrombotic long-term use      Anxiety      Atrial fibrillation (H)      CAD (coronary artery disease)     2 vessel     Cancer (H) 1990    periodically have cancer on the skin removed     Cerebral artery occlusion with cerebral infarction (H) 10/2016    Cardioembolic strokes related to atrial fibrillation     Deep vein thrombosis (DVT) of axillary vein of right upper extremity (H) 2/25/2017     Depression      Depressive disorder 2001     Diabetes (H)      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     Hyperlipidemia      Hyperlipidemia LDL goal <130 10/31/2010     Hypertension      Panic attacks      Seizures (H) 10/19/2016     Sleep apnea     Uses CPAP       PAST SURGICAL HISTORY:   Past Surgical History:   Procedure Laterality Date     AMPUTATE TOE(S) Right 5/26/2017    Procedure: AMPUTATE TOE(S);  Right Great Toe amputation, debriedment of 2 and 3rd toe soft tissu right foot. and application of Grafix;  Surgeon: Leah Santamaria MD;  Location: [facility] OR     BACK SURGERY       BYPASS GRAFT ARTERY CORONARY N/A 2/6/2017    Procedure: BYPASS GRAFT ARTERY CORONARY;  Surgeon: Ishmael  MD Mikhail;  Location: [facility] OR     C APPENDECTOMY  1959     C CARDIAC SURG PROCEDURE UNLIST       C HAND/FINGER SURGERY UNLISTED       C STOMACH SURGERY PROCEDURE UNLISTED       HC SACROPLASTY       HC VASCULAR SURGERY PROCEDURE UNLIST       HYSTERECTOMY, PASTORA  1988     IRRIGATION AND DEBRIDEMENT FOOT, COMBINED Right 7/7/2017    Procedure: COMBINED IRRIGATION AND DEBRIDEMENT FOOT;  Irrigation and Debridement Right Foot with Graphix  *Latex Allergy*;  Surgeon: Leah Santamaria MD;  Location: [facility] OR     MAZE PROCEDURE N/A 2/6/2017    Procedure: MAZE PROCEDURE;  Surgeon: Mikhail Quiñones MD;  Location: [facility] OR     PICC INSERTION Left 02/25/2017    5fr TL Bard PICC, 47cm (3cm external) in the L basilic vein w/ tip in the SVC RA junction     TONSILLECTOMY  1942       FAMILY HISTORY:   Family History   Problem Relation Age of Onset     DIABETES Mother      C.A.D. Mother      Hypertension Mother      CEREBROVASCULAR DISEASE Mother      Mini Strokes     Other Cancer Mother      Skin Cancer     Hyperlipidemia Mother      Coronary Artery Disease Mother      DIABETES Father      C.A.D. Father      Hypertension Father      Other Cancer Father      Hyperlipidemia Father      Coronary Artery Disease Father      HEART DISEASE Sister      Arthritis Sister      Other Cancer Other      Skin Cancer         Past Anes Hx: No personal or family h/o anesthesia problems    Soc Hx:   Tobacco:   EtOH:     Allergies:   Allergies   Allergen Reactions     Heparin      HIT/ thrombocytopenia     Lovenox [Enoxaparin] Other (See Comments)     Thrombocytopenia      Oxycodone      hallucinations     Toprol Xl [Metoprolol] Nausea and Vomiting     Adhesive Tape Itching and Rash     Diapers & Supplies Rash     Developed yeast infection from previous hospital stay       Meds:   Prescriptions Prior to Admission   Medication Sig Dispense Refill Last Dose     warfarin (COUMADIN) 5 MG tablet Take 1 tablet (5 mg) by mouth daily 90 tablet 3 Past Week at Unknown  time     carvedilol (COREG) 3.125 MG tablet Take 1 tablet (3.125 mg) by mouth 2 times daily (with meals) 180 tablet 3 12/11/2017 at 1400     venlafaxine (EFFEXOR-ER) 150 MG TB24 24 hr tablet Take 1 tablet (150 mg) by mouth daily (with breakfast) 90 each 1 12/11/2017 at 0800     spironolactone (ALDACTONE) 25 MG tablet Take 1 tablet (25 mg) by mouth daily 90 tablet 3 12/11/2017 at 0800     bumetanide (BUMEX) 2 MG tablet Take 1 tablet (2 mg) by mouth 2 times daily 180 tablet 3 12/11/2017 at 1400     atorvastatin (LIPITOR) 40 MG tablet Take 1 tablet (40 mg) by mouth daily 90 tablet 1 12/11/2017 at 0800     gabapentin (NEURONTIN) 100 MG capsule Take 1 capsule (100 mg) by mouth 2 times daily 180 capsule 1 12/11/2017 at 2000     Wound Dressings (ADAPTIC NON-ADHERING DRESSING) PADS Externally apply 1 each topically 2 times daily 1 each 3 Past Week at Unknown time     aspirin 81 MG chewable tablet Take 1 tablet (81 mg) by mouth daily (Patient taking differently: Take 81 mg by mouth every morning ) 30 tablet 0 12/11/2017 at 0800     ferrous sulfate (IRON SUPPLEMENT) 325 (65 FE) MG tablet Take 1 tablet (325 mg) by mouth daily (with breakfast) (Patient taking differently: Take 325 mg by mouth daily ) 100 tablet 1 12/11/2017 at 0800     metolazone (ZAROXOLYN) 2.5 MG tablet Take 1 tablet (2.5 mg) by mouth daily as needed For weight over 170 Lbs. 30 tablet 1      traZODone (DESYREL) 50 MG tablet Take 0.5 tablets (25 mg) by mouth nightly as needed for sleep 45 tablet 2 Taking     pantoprazole (PROTONIX) 40 MG EC tablet Take 1 tablet (40 mg) by mouth every morning 90 tablet 1 Taking     potassium chloride SA (K-DUR/KLOR-CON M) 20 MEQ CR tablet Take 1 tablet (20 mEq) by mouth 2 times daily (Patient taking differently: Take 40 mEq by mouth 2 times daily ) 120 tablet 1 Taking     Elastic Bandages & Supports (ACE BANDAGE SELF-ADHERING) MISC 1 each 2 times daily 6 each 3 Taking     Gauze Pads & Dressings (KERLIX GAUZE ROLL MEDIUM) MISC 1  each 2 times daily 48 each 3 Unknown     ONE TOUCH ULTRA test strip USE AS DIRECTED TO TEST ONE TIME A DAY (Patient not taking: Reported on 11/24/2017) 100 each 2 Not Taking     order for DME Sterile 4x4 gauze; Use gauze twice daily for dressing changes. (Patient not taking: Reported on 11/24/2017) 1 Box 3 Unknown     acetaminophen (TYLENOL) 325 MG tablet Take 3 tablets (975 mg) by mouth every 6 hours as needed for mild pain 100 tablet 0 Taking     ONETOUCH DELICA LANCETS 33G MISC 1 Device daily (Patient not taking: Reported on 11/24/2017) 100 each 0 Not Taking     blood glucose monitoring (ONE TOUCH ULTRA 2) meter device kit    Not Taking       No current outpatient prescriptions on file.       Physical Exam:  VS: T Data Unavailable, P 60, /78, R 14, SpO2 96% Weight   Wt Readings from Last 2 Encounters:   11/24/17 75.8 kg (167 lb 3.2 oz)   11/21/17 74.4 kg (164 lb)         Airway: MP 2, TM>3FB, Neck full ROM  Dentition: no loose teeth  Heart: atrial fibrillation  Lungs: CTAB      BMP:  Lab Results   Component Value Date     11/06/2017      Lab Results   Component Value Date    POTASSIUM 4.2 12/12/2017     Lab Results   Component Value Date    CHLORIDE 88 11/06/2017     Lab Results   Component Value Date    CARLY 10.0 11/06/2017     Lab Results   Component Value Date    CO2 40 11/06/2017     Lab Results   Component Value Date    BUN 27 11/06/2017     Lab Results   Component Value Date    CR 0.88 11/06/2017     Lab Results   Component Value Date     11/21/2017        CBC:  Lab Results   Component Value Date    WBC 6.4 10/11/2017     Lab Results   Component Value Date    HGB 12.8 10/11/2017     Lab Results   Component Value Date    HCT 41.3 10/11/2017     Lab Results   Component Value Date     10/11/2017        Coags/Type and Screen  Lab Results   Component Value Date    INR 3.18 12/12/2017     No results found for: PT  Type and Screen:      Assessment/Plan:  - ASA 3- GETA with standard ASA  monitors, IV induction, balanced anesthetic  - PIV  - Antibiotics per surgery  - PONV prophylaxis  - Blood products available, possible administration discussed with patient    Raul Vilchis MD    12/12/2017  3:45 PM

## 2017-12-12 NOTE — OP NOTE
CARDIOVERSION    PROCEDURE:  direct current cardioversion  PROCEDURE DATE: 12/12/2017     Pre-procedure diagnosis:  Atrial tachycardia   Post-procedure diagnosis: s/p direct current cardioversion  Complications:  none    BRIEF CLINICAL HISTORY:  Ms. Fenton is a 77 year old female who has a past medical history significant for CAD s/p CABG X3 (LIMA-LAD, SVG-D1, SVG-dRCA) with concomitant modified MAZE and ABDIEL ligation 2/2016, pAF (CHADSVASC 8 on warfarin), DM2, HTN, HLD, CVA, RUE DVT, depression,HIT, panic attacks, seizures, and NOAH. She was found to be in AF and scheduled TAVO/DCCV on 11/21/17. She was noted to have sub therapeutic INR. Given history of HIT, we cannot bridge with lovenox. Therefore, we cancelled DCCV and rescheduled for today. INR therapeutic now.       PROCEDURE:  The patient arrived at the Echo Laboratory in a fasting, non-sedated state.  Informed consent was previously obtained from patient, who understood indications, risks, and benefits of the procedure.  INR > 2.0 was verified for at least the last month.  Transesophageal echo was performed by the echo lab team to rule out intracardiac thrombus.  With help from Anesthesia, patient was deeply sedated.  120J of synchronized biphasic shock was delivered through the cardiac monitor, and the patient was successfully converted to normal sinus rhythm.  After sedation wore off, patient awoke and remained neurologically intact.  The patient tolerated the procedure well from a hemodynamic and respiratory standpoint without any complications.  She was observed in the Echo Laboratory and then discharged to home after sedation wore off and she was ambulatory.    IMPRESSION:  1.  Successful direct current cardioversion with 120J biphasic shock.    RECOMMENDATIONS/PLANS:  1.   Follow-up with Cardiology   2.   Continue warfarin    CHACORTA Emery CNP  Electrophysiology Consult Service  Pager: [phone]

## 2017-12-12 NOTE — ANESTHESIA CARE TRANSFER NOTE
Patient: Carlos Alberto Fenton    Procedure(s):  Anesthesia Cardioversion     Diagnosis: Atrial Fibrillation   Diagnosis Additional Information: No value filed.    Anesthesia Type:   No value filed.     Note:    Patient transferred to:Telemetry/Step Down Unit  Handoff Report: Identifed the Patient, Identified the Reponsible Provider, Reviewed the pertinent medical history, Discussed the surgical course, Reviewed Intra-OP anesthesia mangement and issues during anesthesia, Set expectations for post-procedure period and Allowed opportunity for questions and acknowledgement of understanding      Vitals: (Last set prior to Anesthesia Care Transfer)    CRNA VITALS  12/12/2017 1437 - 12/12/2017 1507      12/12/2017             NIBP: (!)  86/60    Ht Rate: 88    EKG: Sinus bradycardia                Electronically Signed By: CHACORTA Marroquin CRNA  December 12, 2017  3:07 PM

## 2017-12-12 NOTE — TELEPHONE ENCOUNTER
Reason for Call:  INR    Who is calling?  Home Care: [facility]    Phone number:  [phone]    Name of caller: Chantal    INR Value:  3.18    Are there any other concerns:   INR was checked at the hospital today. Please call with order and to let home care know when to recheck this. Also please call patient with dosing.    Can we leave a detailed message on this number? YES    Phone number patient can be reached at: Home number on file [phone] (home)      Call taken on 12/12/2017 at 2:18 PM by Ester Logan

## 2017-12-12 NOTE — TELEPHONE ENCOUNTER
Pt had INR done today. HC will be seeing pt on Tues 12/19 next week. I have updated flow sheet from 12/11 episode  Amanda Frausto RN

## 2017-12-12 NOTE — PROGRESS NOTES
Pt here for TAVO and Cardioversion. Procedures were explained to the pt and the consent was signed. Discharge instructions were reviewed and a copy was given to the pt. Pt was given Fentanyl 37.5 mcg IV and Versed 2.5 mg IV for the TAVO. Pt tolerated the TAVO without any adverse effects. Pt to remain NPO until 3:45pm. Probe #54 was used for the procedure. Per Dr. Byrd pt may proceed with the cardioversion. Pt was given Brevitol 40 mg IV per anesthesia. Pt was shocked with 120 Joules back to NSR. Pt denies any pain or discomfort. Report called to 2A RN. Pt transported back to 2A on a stretcher for recovery.

## 2017-12-12 NOTE — PROGRESS NOTES
1535 Pt arrived on 2a post TAVO and cardioversion. VSS RA. No pain. Daughter at BS. Pt remains NPO. 1550 Gag reflex checked at this time - positive. 1600 Pt eating and drinking. Post EKG read by Meena Wells NP. 1630 Pt luisito po intake. Pt amb, luisito well. Voided. Dc instructions reviewed with pt and pt's daughter, copy given to pt. PIv dc'd. 1645 Pt dc'd home acc by daughter.

## 2017-12-13 ENCOUNTER — TELEPHONE (OUTPATIENT)
Dept: FAMILY MEDICINE | Facility: CLINIC | Age: 77
End: 2017-12-13

## 2017-12-13 ENCOUNTER — CARE COORDINATION (OUTPATIENT)
Dept: CARDIOLOGY | Facility: CLINIC | Age: 77
End: 2017-12-13

## 2017-12-13 ENCOUNTER — TELEPHONE (OUTPATIENT)
Dept: INTERNAL MEDICINE | Facility: CLINIC | Age: 77
End: 2017-12-13

## 2017-12-13 DIAGNOSIS — F43.22 ADJUSTMENT DISORDER WITH ANXIOUS MOOD: Primary | ICD-10-CM

## 2017-12-13 LAB
INTERPRETATION ECG - MUSE: NORMAL
INTERPRETATION ECG - MUSE: NORMAL

## 2017-12-13 RX ORDER — ALPRAZOLAM 0.25 MG
0.25 TABLET ORAL 3 TIMES DAILY PRN
Qty: 10 TABLET | Refills: 0 | Status: ON HOLD | OUTPATIENT
Start: 2017-12-13 | End: 2018-04-04

## 2017-12-13 NOTE — TELEPHONE ENCOUNTER
Pt has not had HC yet and is wondering what she should take for coumadin until they are able to set up her meds.   I have advised her to take 2.5 mg Mon, Wed, Fri and 5 mg ROW.   Amanda Frausto RN

## 2017-12-13 NOTE — PROGRESS NOTES
Patient was recently in the hospital for a cardioversion.  Called and spoke to patient.  She states that she is feeling better physically but feels a little anxious in general and a little emotional these days.  Of note patient has a history of adjustment disorder and depressed mood.  Patient encouraged to call primary care provider to discuss these concerns.  Upcoming appointments reviewed with patient.  All questions answered to patient's satisfaction. Patient c/o some GI upset with taking morning medicines on an empty stomach.  Patient encouraged to have a little food prior to morning meds.  Patient is agreeable to plan. Patient has an appointment with cardiologist on 3/2/17.

## 2017-12-13 NOTE — TELEPHONE ENCOUNTER
MiguelinaCarlos Alberto's daughter is calling for her today.  Carlos Alberto had a cardioversion done yesterday. Today she is having anxiety attacks.    Her daughter is wondering if Dr mora can prescribe her something today to help.     Mariela Baker RN

## 2017-12-14 ENCOUNTER — TELEPHONE (OUTPATIENT)
Dept: INTERNAL MEDICINE | Facility: CLINIC | Age: 77
End: 2017-12-14

## 2017-12-14 ENCOUNTER — ALLIED HEALTH/NURSE VISIT (OUTPATIENT)
Dept: FAMILY MEDICINE | Facility: OTHER | Age: 77
End: 2017-12-14
Payer: COMMERCIAL

## 2017-12-14 ENCOUNTER — OFFICE VISIT (OUTPATIENT)
Dept: FAMILY MEDICINE | Facility: OTHER | Age: 77
End: 2017-12-14
Payer: COMMERCIAL

## 2017-12-14 ENCOUNTER — TELEPHONE (OUTPATIENT)
Dept: CARDIOLOGY | Facility: CLINIC | Age: 77
End: 2017-12-14

## 2017-12-14 VITALS — DIASTOLIC BLOOD PRESSURE: 66 MMHG | OXYGEN SATURATION: 96 % | SYSTOLIC BLOOD PRESSURE: 115 MMHG

## 2017-12-14 DIAGNOSIS — I50.32 CHRONIC DIASTOLIC HEART FAILURE (H): ICD-10-CM

## 2017-12-14 DIAGNOSIS — I48.91 ATRIAL FIBRILLATION, UNSPECIFIED TYPE (H): Primary | ICD-10-CM

## 2017-12-14 DIAGNOSIS — R00.0 SINUS TACHYCARDIA: ICD-10-CM

## 2017-12-14 DIAGNOSIS — F43.21 ADJUSTMENT DISORDER WITH DEPRESSED MOOD: Primary | ICD-10-CM

## 2017-12-14 DIAGNOSIS — I48.0 PAROXYSMAL ATRIAL FIBRILLATION (H): Primary | ICD-10-CM

## 2017-12-14 DIAGNOSIS — G47.33 OSA (OBSTRUCTIVE SLEEP APNEA): Primary | ICD-10-CM

## 2017-12-14 PROCEDURE — 93000 ELECTROCARDIOGRAM COMPLETE: CPT

## 2017-12-14 PROCEDURE — 99214 OFFICE O/P EST MOD 30 MIN: CPT | Performed by: NURSE PRACTITIONER

## 2017-12-14 ASSESSMENT — PAIN SCALES - GENERAL: PAINLEVEL: NO PAIN (0)

## 2017-12-14 NOTE — MR AVS SNAPSHOT
After Visit Summary   12/14/2017    Carlos Alberto Fenton    MRN: [id number]           Patient Information     Date Of Birth          1940        Visit Information        Provider Department      12/14/2017 3:30 PM India Pond APRN CNP [facility]        Today's Diagnoses     Paroxysmal atrial fibrillation (H)    -  1    Chronic diastolic heart failure (H)           Follow-ups after your visit        Your next 10 appointments already scheduled     Jan 24, 2018  2:40 PM CST   (Arrive by 2:25 PM)   RETURN FOOT/ANKLE with Easton Torres DPM   [facility] ([facility])    [address]  4th Floor  [facility] [id number] [phone]            Mar 02, 2018  4:00 PM CST   (Arrive by 3:45 PM)   Return Visit with Star Lobato MD   [facility]    [address] [place] [address]  [address]  [facility] [id number] [phone]              Who to contact     If you have questions or need follow up information about today's clinic visit or your schedule please contact [facility] directly at [phone].  Normal or non-critical lab and imaging results will be communicated to you by MyChart, letter or phone within 4 business days after the clinic has received the results. If you do not hear from us within 7 days, please contact the clinic through MyChart or phone. If you have a critical or abnormal lab result, we will notify you by phone as soon as possible.  Submit refill requests through [employer] or call your pharmacy and they will forward the refill request to us. Please allow 3 business days for your refill to be completed.          Additional Information About Your Visit        MyChart Information     [employer] gives you secure access to your electronic health record. If you see a primary care provider, you can also send messages to your care team and make  appointments. If you have questions, please call your primary care clinic.  If you do not have a primary care provider, please call [phone] and they will assist you.        Care EveryWhere ID     This is your Care EveryWhere ID. This could be used by other organizations to access your [place] medical records  [id number]        Your Vitals Were     Pulse Oximetry                   96%            Blood Pressure from Last 3 Encounters:   12/18/17 94/64   12/14/17 115/66   12/12/17 113/78    Weight from Last 3 Encounters:   12/18/17 168 lb 3.2 oz (76.3 kg)   12/15/17 166 lb 3.2 oz (75.4 kg)   11/24/17 167 lb 3.2 oz (75.8 kg)              Today, you had the following     No orders found for display         Today's Medication Changes          These changes are accurate as of: 12/14/17 11:59 PM.  If you have any questions, ask your nurse or doctor.               These medicines have changed or have updated prescriptions.        Dose/Directions    aspirin 81 MG chewable tablet   This may have changed:  when to take this   Used for:  Coronary artery disease with angina pectoris, unspecified vessel or lesion type, unspecified whether native or transplanted heart (H)        Dose:  81 mg   Take 1 tablet (81 mg) by mouth daily   Quantity:  30 tablet   Refills:  0       ferrous sulfate 325 (65 FE) MG tablet   Commonly known as:  IRON SUPPLEMENT   This may have changed:  when to take this   Used for:  S/P CABG (coronary artery bypass graft)        Dose:  325 mg   Take 1 tablet (325 mg) by mouth daily (with breakfast)   Quantity:  100 tablet   Refills:  1       potassium chloride SA 20 MEQ CR tablet   Commonly known as:  K-DUR/KLOR-CON M   This may have changed:  how much to take   Used for:  S/P CABG (coronary artery bypass graft)        Dose:  20 mEq   Take 1 tablet (20 mEq) by mouth 2 times daily   Quantity:  120 tablet   Refills:  1       * venlafaxine 150 MG Tb24 24 hr tablet   Commonly known as:  EFFEXOR-ER   This may  have changed:  Another medication with the same name was added. Make sure you understand how and when to take each.   Used for:  Adjustment disorder with depressed mood   Changed by:  Humberto Conner MD        Dose:  150 mg   Take 1 tablet (150 mg) by mouth daily (with breakfast)   Quantity:  90 each   Refills:  1       * venlafaxine 150 MG 24 hr capsule   Commonly known as:  EFFEXOR-XR   This may have changed:  You were already taking a medication with the same name, and this prescription was added. Make sure you understand how and when to take each.   Used for:  Adjustment disorder with depressed mood   Changed by:  Humberto Conner MD        Dose:  150 mg   Take 1 capsule (150 mg) by mouth daily   Quantity:  90 capsule   Refills:  3       * Notice:  This list has 2 medication(s) that are the same as other medications prescribed for you. Read the directions carefully, and ask your doctor or other care provider to review them with you.         Where to get your medicines      These medications were sent to [address] [address]     Phone:  [phone]     venlafaxine 150 MG 24 hr capsule                Primary Care Provider Office Phone # Fax #    Humbreto Conner [phone]       [address] [place] [address]        Equal Access to Services     [facility]: Hadii william flores hadasho Soomaali, waaxda luqadaha, qaybta kaalmada adeegyada, waxay paigein hayaan janette mattson. So [facility] [phone].    ATENCIÓN: Si habla español, tiene a espinoza disposición servicios gratuitos de asistencia lingüística. Llame al [phone].    We comply with applicable federal civil rights laws and [place] laws. We do not discriminate on the basis of race, color, national origin, age, disability, sex, sexual orientation, or gender identity.            Thank you!     Thank you for choosing [facility]  [place]  for your care. Our goal is always to provide you with excellent care. Hearing back from our patients is one way we can continue to improve our services. Please take a few minutes to complete the written survey that you may receive in the mail after your visit with us. Thank you!             Your Updated Medication List - Protect others around you: Learn how to safely use, store and throw away your medicines at www.disposemymeds.org.          This list is accurate as of: 12/14/17 11:59 PM.  Always use your most recent med list.                   Brand Name Dispense Instructions for use Diagnosis    ACE BANDAGE SELF-ADHERING Misc     6 each    1 each 2 times daily    Open wound of lower limb, right, subsequent encounter       acetaminophen 325 MG tablet    TYLENOL    100 tablet    Take 3 tablets (975 mg) by mouth every 6 hours as needed for mild pain    S/P CABG (coronary artery bypass graft)       ADAPTIC NON-ADHERING DRESSING Pads     1 each    Externally apply 1 each topically 2 times daily    Open wound of lower limb, right, subsequent encounter       ALPRAZolam 0.25 MG tablet    XANAX    10 tablet    Take 1 tablet (0.25 mg) by mouth 3 times daily as needed for anxiety    Adjustment disorder with anxious mood       aspirin 81 MG chewable tablet     30 tablet    Take 1 tablet (81 mg) by mouth daily    Coronary artery disease with angina pectoris, unspecified vessel or lesion type, unspecified whether native or transplanted heart (H)       atorvastatin 40 MG tablet    LIPITOR    90 tablet    Take 1 tablet (40 mg) by mouth daily    Coronary artery disease with angina pectoris, unspecified vessel or lesion type, unspecified whether native or transplanted heart (H)       blood glucose monitoring meter device kit           bumetanide 2 MG tablet    BUMEX    180 tablet    Take 1 tablet (2 mg) by mouth 2 times daily    Chronic systolic heart failure (H)       carvedilol 3.125 MG tablet    COREG    180 tablet     Take 1 tablet (3.125 mg) by mouth 2 times daily (with meals)    Chronic diastolic heart failure (H)       ferrous sulfate 325 (65 FE) MG tablet    IRON SUPPLEMENT    100 tablet    Take 1 tablet (325 mg) by mouth daily (with breakfast)    S/P CABG (coronary artery bypass graft)       gabapentin 100 MG capsule    NEURONTIN    180 capsule    Take 1 capsule (100 mg) by mouth 2 times daily    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       KERLIX GAUZE ROLL MEDIUM Misc     48 each    1 each 2 times daily    Open wound of lower limb, right, subsequent encounter       metolazone 2.5 MG tablet    ZAROXOLYN    30 tablet    Take 1 tablet (2.5 mg) by mouth daily as needed For weight over 170 Lbs.    Chronic diastolic heart failure (H)       ONETOUCH DELICA LANCETS 33G Misc     100 each    1 Device daily    Type 2 diabetes mellitus without complication, without long-term current use of insulin (H)       ONETOUCH ULTRA test strip   Generic drug:  blood glucose monitoring     100 each    USE AS DIRECTED TO TEST ONE TIME A DAY    Type 2 diabetes mellitus without complication, without long-term current use of insulin (H)       order for DME     1 Box    Sterile 4x4 gauze; Use gauze twice daily for dressing changes.    Open wound of lower limb, right, subsequent encounter       pantoprazole 40 MG EC tablet    PROTONIX    90 tablet    Take 1 tablet (40 mg) by mouth every morning    Coronary artery disease with angina pectoris, unspecified vessel or lesion type, unspecified whether native or transplanted heart (H)       potassium chloride SA 20 MEQ CR tablet    K-DUR/KLOR-CON M    120 tablet    Take 1 tablet (20 mEq) by mouth 2 times daily    S/P CABG (coronary artery bypass graft)       spironolactone 25 MG tablet    ALDACTONE    90 tablet    Take 1 tablet (25 mg) by mouth daily    Chronic systolic heart failure (H)       traZODone 50 MG tablet    DESYREL    45 tablet    Take 0.5 tablets (25 mg) by mouth nightly as needed for sleep     Primary insomnia       * venlafaxine 150 MG Tb24 24 hr tablet    EFFEXOR-ER    90 each    Take 1 tablet (150 mg) by mouth daily (with breakfast)    Adjustment disorder with depressed mood       * venlafaxine 150 MG 24 hr capsule    EFFEXOR-XR    90 capsule    Take 1 capsule (150 mg) by mouth daily    Adjustment disorder with depressed mood       warfarin 5 MG tablet    COUMADIN    90 tablet    Take 1 tablet (5 mg) by mouth daily    Paroxysmal atrial fibrillation (H)       * Notice:  This list has 2 medication(s) that are the same as other medications prescribed for you. Read the directions carefully, and ask your doctor or other care provider to review them with you.

## 2017-12-14 NOTE — TELEPHONE ENCOUNTER
Reason for Call:  Other call back    Detailed comments: [facility] needs a new prescription for C pap and the prescription needs to have this on it:  needing mask, head gear, filters, tubing, cushion, water chamber. Patient is out of several of the supplies. Please call her back and advise. [facility] store phone # [phone].    Phone Number Patient can be reached at: Home number on file [phone] (home)    Best Time: anytime    Can we leave a detailed message on this number? YES    Call taken on 12/14/2017 at 10:23 AM by Moon Flanagan

## 2017-12-14 NOTE — MR AVS SNAPSHOT
After Visit Summary   12/14/2017    Carlos Alberto Fenton    MRN: [id number]           Patient Information     Date Of Birth          1940        Visit Information        Provider Department      12/14/2017 3:00 PM EMILI CANO TEAM B, [facility]        Today's Diagnoses     Sinus tachycardia           Follow-ups after your visit        Your next 10 appointments already scheduled     Dec 15, 2017  2:00 PM CST   (Arrive by 1:45 PM)   RETURN FOOT/ANKLE with Easton Torres DPM   [facility] ([facility])    [address] [place] [address]  [address] [facility] [id number] [phone]            Lee 15, 2018  4:40 PM CST   (Arrive by 4:25 PM)   RETURN ARRHYTHMIA with Paige Santos MD   [facility])    [address] [place] [id number] [phone]            Mar 02, 2018  4:00 PM CST   (Arrive by 3:45 PM)   Return Visit with Star Lobato MD   [facility] ([facility])    [address] [place] [id number] [phone]              Future tests that were ordered for you today     Open Future Orders        Priority Expected Expires Ordered    Follow-Up with Cardiologist Routine 12/21/2017 3/21/2018 12/14/2017    Cardiac Event Monitor - (Zio Patch) Routine 12/21/2017 3/21/2018 12/14/2017            Who to contact     If you have questions or need follow up information about today's clinic visit or your schedule please contact [facility] directly at [phone].  Normal or non-critical lab and imaging results will be communicated to you by MyChart, letter or phone within 4 business days after the clinic has received the results. If you do not hear from us within 7 days, please contact the clinic through MyChart or phone. If you have a critical or abnormal lab result, we will notify you by phone as  soon as possible.  Submit refill requests through [employer] or call your pharmacy and they will forward the refill request to us. Please allow 3 business days for your refill to be completed.          Additional Information About Your Visit        [employer] Information     [employer] gives you secure access to your electronic health record. If you see a primary care provider, you can also send messages to your care team and make appointments. If you have questions, please call your primary care clinic.  If you do not have a primary care provider, please call [phone] and they will assist you.        Care EveryWhere ID     This is your Care EveryWhere ID. This could be used by other organizations to access your [place] medical records  [id number]         Blood Pressure from Last 3 Encounters:   12/12/17 113/78   12/12/17 114/82   11/27/17 104/58    Weight from Last 3 Encounters:   11/24/17 167 lb 3.2 oz (75.8 kg)   11/21/17 164 lb (74.4 kg)   11/14/17 172 lb (78 kg)              We Performed the Following     EKG 12-lead, tracing only (Future)          Today's Medication Changes          These changes are accurate as of: 12/14/17  4:26 PM.  If you have any questions, ask your nurse or doctor.               These medicines have changed or have updated prescriptions.        Dose/Directions    aspirin 81 MG chewable tablet   This may have changed:  when to take this   Used for:  Coronary artery disease with angina pectoris, unspecified vessel or lesion type, unspecified whether native or transplanted heart (H)        Dose:  81 mg   Take 1 tablet (81 mg) by mouth daily   Quantity:  30 tablet   Refills:  0       ferrous sulfate 325 (65 FE) MG tablet   Commonly known as:  IRON SUPPLEMENT   This may have changed:  when to take this   Used for:  S/P CABG (coronary artery bypass graft)        Dose:  325 mg   Take 1 tablet (325 mg) by mouth daily (with breakfast)   Quantity:  100 tablet   Refills:  1       potassium chloride  SA 20 MEQ CR tablet   Commonly known as:  K-DUR/KLOR-CON M   This may have changed:  how much to take   Used for:  S/P CABG (coronary artery bypass graft)        Dose:  20 mEq   Take 1 tablet (20 mEq) by mouth 2 times daily   Quantity:  120 tablet   Refills:  1                Primary Care Provider Office Phone # Fax #    Humberto Conner [phone]       [facility] [address] [place] [address]        Equal Access to Services     HO HAYES : Hadii aad ku hadasho Soomaali, waaxda luqadaha, qaybta kaalmada adeegyada, waxay idiin hayaan adeeg kharash la'aan ah. So [facility] [phone].    ATENCIÓN: Si habla español, tiene a espinoza disposición servicios gratuitos de asistencia lingüística. [facility] [phone].    We comply with applicable federal civil rights laws and [place] laws. We do not discriminate on the basis of race, color, national origin, age, disability, sex, sexual orientation, or gender identity.            Thank you!     Thank you for choosing [facility]  for your care. Our goal is always to provide you with excellent care. Hearing back from our patients is one way we can continue to improve our services. Please take a few minutes to complete the written survey that you may receive in the mail after your visit with us. Thank you!             Your Updated Medication List - Protect others around you: Learn how to safely use, store and throw away your medicines at www.disposemymeds.org.          This list is accurate as of: 12/14/17  4:26 PM.  Always use your most recent med list.                   Brand Name Dispense Instructions for use Diagnosis    ACE BANDAGE SELF-ADHERING Misc     6 each    1 each 2 times daily    Open wound of lower limb, right, subsequent encounter       acetaminophen 325 MG tablet    TYLENOL    100 tablet    Take 3 tablets (975 mg) by mouth every 6 hours as needed for mild pain    S/P CABG (coronary artery bypass graft)        ADAPTIC NON-ADHERING DRESSING Pads     1 each    Externally apply 1 each topically 2 times daily    Open wound of lower limb, right, subsequent encounter       ALPRAZolam 0.25 MG tablet    XANAX    10 tablet    Take 1 tablet (0.25 mg) by mouth 3 times daily as needed for anxiety    Adjustment disorder with anxious mood       aspirin 81 MG chewable tablet     30 tablet    Take 1 tablet (81 mg) by mouth daily    Coronary artery disease with angina pectoris, unspecified vessel or lesion type, unspecified whether native or transplanted heart (H)       atorvastatin 40 MG tablet    LIPITOR    90 tablet    Take 1 tablet (40 mg) by mouth daily    Coronary artery disease with angina pectoris, unspecified vessel or lesion type, unspecified whether native or transplanted heart (H)       blood glucose monitoring meter device kit           bumetanide 2 MG tablet    BUMEX    180 tablet    Take 1 tablet (2 mg) by mouth 2 times daily    Chronic systolic heart failure (H)       carvedilol 3.125 MG tablet    COREG    180 tablet    Take 1 tablet (3.125 mg) by mouth 2 times daily (with meals)    Chronic diastolic heart failure (H)       ferrous sulfate 325 (65 FE) MG tablet    IRON SUPPLEMENT    100 tablet    Take 1 tablet (325 mg) by mouth daily (with breakfast)    S/P CABG (coronary artery bypass graft)       gabapentin 100 MG capsule    NEURONTIN    180 capsule    Take 1 capsule (100 mg) by mouth 2 times daily    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       KERLIX GAUZE ROLL MEDIUM Misc     48 each    1 each 2 times daily    Open wound of lower limb, right, subsequent encounter       metolazone 2.5 MG tablet    ZAROXOLYN    30 tablet    Take 1 tablet (2.5 mg) by mouth daily as needed For weight over 170 Lbs.    Chronic diastolic heart failure (H)       ONETOUCH DELICA LANCETS 33G Misc     100 each    1 Device daily    Type 2 diabetes mellitus without complication, without long-term current use of insulin (H)       ONETOUCH ULTRA  test strip   Generic drug:  blood glucose monitoring     100 each    USE AS DIRECTED TO TEST ONE TIME A DAY    Type 2 diabetes mellitus without complication, without long-term current use of insulin (H)       order for DME     1 Box    Sterile 4x4 gauze; Use gauze twice daily for dressing changes.    Open wound of lower limb, right, subsequent encounter       pantoprazole 40 MG EC tablet    PROTONIX    90 tablet    Take 1 tablet (40 mg) by mouth every morning    Coronary artery disease with angina pectoris, unspecified vessel or lesion type, unspecified whether native or transplanted heart (H)       potassium chloride SA 20 MEQ CR tablet    K-DUR/KLOR-CON M    120 tablet    Take 1 tablet (20 mEq) by mouth 2 times daily    S/P CABG (coronary artery bypass graft)       spironolactone 25 MG tablet    ALDACTONE    90 tablet    Take 1 tablet (25 mg) by mouth daily    Chronic systolic heart failure (H)       traZODone 50 MG tablet    DESYREL    45 tablet    Take 0.5 tablets (25 mg) by mouth nightly as needed for sleep    Primary insomnia       venlafaxine 150 MG Tb24 24 hr tablet    EFFEXOR-ER    90 each    Take 1 tablet (150 mg) by mouth daily (with breakfast)    Adjustment disorder with depressed mood       warfarin 5 MG tablet    COUMADIN    90 tablet    Take 1 tablet (5 mg) by mouth daily    Paroxysmal atrial fibrillation (H)

## 2017-12-14 NOTE — TELEPHONE ENCOUNTER
Spoke with India NAVA and Melinda LEIGH at [facility].  Patient's EKG is atrial fibrillation with rate of 68 bpm.  Patient was recently cardioverted on 12/12 and did not maintain sinus rhythm, also has a history of not maintaining INR between 2-3.  Recommend zio patch to evaluate rate control and then follow up with Dr. Paige Santos to discuss possible DOAC vs warfarin vs watchman vs AV node ablation.   REESE Montemayor spoke with this writer and the patient about the above plan will ask Luz Marina Rodas to call patient about the zio patch and schedule appointment with Dr. Paige Taylor, APRN, CNP

## 2017-12-14 NOTE — PROGRESS NOTES
SUBJECTIVE:   Carlos Alberto Fenton is a 77 year old female who presents to clinic today for the following health issues:      Walk in Patient  Triage Note  Patient was in clinic for EKG to be completed for Cardiology. Patient had a cardioversion 12/12/2017. EKG showed A-fib. Provider KV reviewed and reached out to Tracey Taylor. RN evaluated patient. Vitals taken. Listened to heart which was irregular. Pulse ranged between . Patient stated she has notice feeling warmer than her normal since the Cardioversion, slight fluttering in the chest, and heart burn this morning. Stated prior to the cardioversion had not noticed the fluttering in the chest, but is able to now. Denies chest pain, shortness of breath, sweating episodes, lightheaded, dizzy, weakness, numbness, tingling, headache or pressure.  RN spoke with Tracey as provider evaluated the patient (see telephone note dated 12/14/2017 by Cardiology) plan is for Cardiology team to contact the patient to discuss Zio Patch to monitor heart and determine if her rate is controlled. Stated heart rate is OKAY to be between  if not symptomatic. Cardiologist Dr Santos will start to see the patient to aid in treatment for A Fib and INR. Patient is okay to take her anxiety medication when she gets home as she is feeling more anxious. Patient repeated herself several times and contraindicated herself multiple times. While RN was relaying the plan to the patient (and daughter) cardiology team contact the patient regarding the Zio Patch.     Provider Note:  Patient here today to have EKG recommended by cardiology. As noted above. I spoke with cardiology clinic. They have reviewed her EKG, likely she will need to follow up with cardiac electrophysiology clinic. I examined patient to make sure she was stable to go home. Her cardiologist will be calling her tomorrow to discuss follow up plans. She does not have any swelling, chest pain, shortness of breath, sweating  episodes, dizziness or weakness. Her EKG's the past several months have shown mostly AF with some intervals of sinus rhythm. Intermittent periods of fluttering, not new for her.  Currently on anticoagulation therapy.     Problem list and histories reviewed & adjusted, as indicated.  Additional history: as documented    Current Outpatient Prescriptions   Medication Sig Dispense Refill     ALPRAZolam (XANAX) 0.25 MG tablet Take 1 tablet (0.25 mg) by mouth 3 times daily as needed for anxiety 10 tablet 0     warfarin (COUMADIN) 5 MG tablet Take 1 tablet (5 mg) by mouth daily 90 tablet 3     carvedilol (COREG) 3.125 MG tablet Take 1 tablet (3.125 mg) by mouth 2 times daily (with meals) 180 tablet 3     venlafaxine (EFFEXOR-ER) 150 MG TB24 24 hr tablet Take 1 tablet (150 mg) by mouth daily (with breakfast) 90 each 1     metolazone (ZAROXOLYN) 2.5 MG tablet Take 1 tablet (2.5 mg) by mouth daily as needed For weight over 170 Lbs. 30 tablet 1     spironolactone (ALDACTONE) 25 MG tablet Take 1 tablet (25 mg) by mouth daily 90 tablet 3     bumetanide (BUMEX) 2 MG tablet Take 1 tablet (2 mg) by mouth 2 times daily 180 tablet 3     atorvastatin (LIPITOR) 40 MG tablet Take 1 tablet (40 mg) by mouth daily 90 tablet 1     gabapentin (NEURONTIN) 100 MG capsule Take 1 capsule (100 mg) by mouth 2 times daily 180 capsule 1     traZODone (DESYREL) 50 MG tablet Take 0.5 tablets (25 mg) by mouth nightly as needed for sleep 45 tablet 2     pantoprazole (PROTONIX) 40 MG EC tablet Take 1 tablet (40 mg) by mouth every morning 90 tablet 1     potassium chloride SA (K-DUR/KLOR-CON M) 20 MEQ CR tablet Take 1 tablet (20 mEq) by mouth 2 times daily (Patient taking differently: Take 40 mEq by mouth 2 times daily ) 120 tablet 1     Elastic Bandages & Supports (ACE BANDAGE SELF-ADHERING) MISC 1 each 2 times daily 6 each 3     Gauze Pads & Dressings (KERLIX GAUZE ROLL MEDIUM) MISC 1 each 2 times daily 48 each 3     ONE TOUCH ULTRA test strip USE AS  DIRECTED TO TEST ONE TIME A  each 2     Wound Dressings (ADAPTIC NON-ADHERING DRESSING) PADS Externally apply 1 each topically 2 times daily 1 each 3     order for DME Sterile 4x4 gauze; Use gauze twice daily for dressing changes. 1 Box 3     acetaminophen (TYLENOL) 325 MG tablet Take 3 tablets (975 mg) by mouth every 6 hours as needed for mild pain 100 tablet 0     aspirin 81 MG chewable tablet Take 1 tablet (81 mg) by mouth daily (Patient taking differently: Take 81 mg by mouth every morning ) 30 tablet 0     ferrous sulfate (IRON SUPPLEMENT) 325 (65 FE) MG tablet Take 1 tablet (325 mg) by mouth daily (with breakfast) (Patient taking differently: Take 325 mg by mouth daily ) 100 tablet 1     ONETOUCH DELICA LANCETS 33G MISC 1 Device daily 100 each 0     blood glucose monitoring (ONE TOUCH ULTRA 2) meter device kit          Reviewed and updated as needed this visit by clinical staff  Tobacco  Allergies  Meds  Med Hx  Surg Hx  Fam Hx  Soc Hx      Reviewed and updated as needed this visit by Provider         ROS:    As noted above     OBJECTIVE:     /66 (BP Location: Right arm, Patient Position: Chair, Cuff Size: Adult Regular)  SpO2 96%  There is no height or weight on file to calculate BMI.  Physical Exam   Constitutional: Vital signs are normal.   Cardiovascular: Normal rate.  An irregular rhythm present.   Pulses:       Radial pulses are 2+ on the right side, and 2+ on the left side.   Pulse range   Bilateral lower leg edema non-pitting.    Pulmonary/Chest: Effort normal and breath sounds normal.   Skin: Skin is warm, dry and intact.         Diagnostic Test Results:  EKG - Possible atrial fibrillation   -Poor R-wave progression -may be secondary to pulmonary disease   consider old anterior infarct.    -  Diffuse nonspecific T-abnormality.    Low voltage -possible pulmonary disease.   Unchanged.     ASSESSMENT/PLAN:       1. Paroxysmal atrial fibrillation (H)  Follow up with  cardiovascular clinic tomorrow  - Discussed warning symptoms of when to go in to be evaluated in Emergency Room  - Take anxiety medication when getting home to help          The patient indicates understanding of these issues and agrees with the plan.    There are no Patient Instructions on file for this visit.    CHACORTA Brown [facility]

## 2017-12-14 NOTE — TELEPHONE ENCOUNTER
Reason for Call:  Other call back - venlafaxine (EFFEXOR-ER) 150 MG TB24 24 hr tablet    Detailed comments: [facility] called and states that they received a refill request for the venlafaxine. They state that the patient's insurance does not cover this medication for the extended release ER tablets. They are wondering if they can switch it to the extended release capsules. They said the request was originally sent to [facility] but they didn't know it wasn't covered by insurance and that was why it was sent to [facility] to be filled. They are aware that Dr. Conner is out of clinic today and are wondering if a covering provider can assist with this today. Please advise.     Phone Number Patient can be reached at: Other phone number:  [phone]    Best Time: any     Can we leave a detailed message on this number? YES    Call taken on 12/14/2017 at 10:32 AM by Osito Richard

## 2017-12-14 NOTE — TELEPHONE ENCOUNTER
Received a call from Ch[facility].  She is will Carlos Alberto today and states that her heart rate is 132.  She states that when listening and palpating for a pulse it feels a little irregular but is hard to tell.  Carlos Alberto denies any shortness of breath, dizziness or lightheadedness.  She states that she feels better then she did before the cardioversion.  She is currently on her Coumadin and Carvedilol 3.25 mg BID.  Instructed Carlos Alberto to have an EKG done, Chantal says she will help get her an appointment at the [facility] in [place].  Will update EP APRN and await EKG for further recommendations.      Osito Baker, RN  RN Care Coordinator  [facility]  [phone]

## 2017-12-14 NOTE — PROGRESS NOTES
EKG completed in clinic, order from Cardiology. Due to abnormal findings, patient was evaluated by KV (placed on her schedule), KV collaborated with Cardiologist and plan made for patient See appointment note and telephone note from Tracey Taylor for additional information. Albina Duke RN, BSN

## 2017-12-14 NOTE — TELEPHONE ENCOUNTER
Patient called requesting the status of this request. She was informed that Dr. Conner is out of clinic today and will get the message when he is back in clinic tomorrow.     Thank you  Osito Richard  Patient Representative

## 2017-12-15 ENCOUNTER — TELEPHONE (OUTPATIENT)
Dept: INTERNAL MEDICINE | Facility: CLINIC | Age: 77
End: 2017-12-15

## 2017-12-15 ENCOUNTER — OFFICE VISIT (OUTPATIENT)
Dept: ORTHOPEDICS | Facility: CLINIC | Age: 77
End: 2017-12-15
Payer: COMMERCIAL

## 2017-12-15 ENCOUNTER — DOCUMENTATION ONLY (OUTPATIENT)
Dept: CARE COORDINATION | Facility: CLINIC | Age: 77
End: 2017-12-15

## 2017-12-15 VITALS — WEIGHT: 166.2 LBS | BODY MASS INDEX: 31.38 KG/M2 | HEIGHT: 61 IN

## 2017-12-15 DIAGNOSIS — L84 TYLOMA: ICD-10-CM

## 2017-12-15 DIAGNOSIS — I73.9 PAD (PERIPHERAL ARTERY DISEASE) (H): Primary | ICD-10-CM

## 2017-12-15 DIAGNOSIS — L97.512 SKIN ULCER OF TOE OF RIGHT FOOT WITH FAT LAYER EXPOSED (H): ICD-10-CM

## 2017-12-15 DIAGNOSIS — B35.1 DERMATOPHYTOSIS OF NAIL: ICD-10-CM

## 2017-12-15 RX ORDER — VENLAFAXINE HYDROCHLORIDE 150 MG/1
150 CAPSULE, EXTENDED RELEASE ORAL DAILY
Qty: 90 CAPSULE | Refills: 3 | Status: SHIPPED | OUTPATIENT
Start: 2017-12-15 | End: 2017-12-18

## 2017-12-15 NOTE — MR AVS SNAPSHOT
After Visit Summary   12/15/2017    Carlos Alberto Fenton    MRN: [id number]           Patient Information     Date Of Birth          1940        Visit Information        Provider Department      12/15/2017 2:00 PM Easton Torres DPM [facility]         Follow-ups after your visit        Your next 10 appointments already scheduled     Lee 15, 2018  4:40 PM CST   (Arrive by 4:25 PM)   RETURN ARRHYTHMIA with Paige Santos MD   [facility] ([facility])    [address] [place] [id number] [phone]            Jan 24, 2018  2:40 PM CST   (Arrive by 2:25 PM)   RETURN FOOT/ANKLE with Easton Torres DPM   [facility] ([facility])    [address] [place] [address] [id number] [phone]            Mar 02, 2018  4:00 PM CST   (Arrive by 3:45 PM)   Return Visit with Star Lobato MD   [facility]    [address] [place] [address] [id number] [phone]              Future tests that were ordered for you today     Open Future Orders        Priority Expected Expires Ordered    Follow-Up with Cardiologist Routine 12/21/2017 3/21/2018 12/14/2017    Cardiac Event Monitor - (Zio Patch) Routine 12/21/2017 3/21/2018 12/14/2017            Who to contact     Please call your clinic at [phone] to:    Ask questions about your health    Make or cancel appointments    Discuss your medicines    Learn about your test results    Speak to your doctor   If you have compliments or concerns about an experience at your clinic, or if you wish to file a complaint, please contact [facility] Patient Relations at [phone] or email us at [email or web address]         Additional Information About Your Visit        MyChart Information     [employer] gives you secure access  "to your electronic health record. If you see a primary care provider, you can also send messages to your care team and make appointments. If you have questions, please call your primary care clinic.  If you do not have a primary care provider, please call [phone] and they will assist you.      [employer] is an electronic gateway that provides easy, online access to your medical records. With [employer], you can request a clinic appointment, read your test results, renew a prescription or communicate with your care team.     To access your existing account, please contact your [facility] or call [phone] for assistance.        Care EveryWhere ID     This is your Care EveryWhere ID. This could be used by other organizations to access your [facility] medical records  [id number]        Your Vitals Were     Height BMI (Body Mass Index)                1.537 m (5' 0.5\") 31.92 kg/m2           Blood Pressure from Last 3 Encounters:   12/14/17 115/66   12/12/17 113/78   12/12/17 114/82    Weight from Last 3 Encounters:   12/15/17 75.4 kg (166 lb 3.2 oz)   11/24/17 75.8 kg (167 lb 3.2 oz)   11/21/17 74.4 kg (164 lb)              Today, you had the following     No orders found for display         Today's Medication Changes          These changes are accurate as of: 12/15/17  2:33 PM.  If you have any questions, ask your nurse or doctor.               These medicines have changed or have updated prescriptions.        Dose/Directions    aspirin 81 MG chewable tablet   This may have changed:  when to take this   Used for:  Coronary artery disease with angina pectoris, unspecified vessel or lesion type, unspecified whether native or transplanted heart (H)        Dose:  81 mg   Take 1 tablet (81 mg) by mouth daily   Quantity:  30 tablet   Refills:  0       ferrous sulfate 325 (65 FE) MG tablet   Commonly known as:  IRON SUPPLEMENT   This may have changed:  when to take this   Used for:  S/P CABG " (coronary artery bypass graft)        Dose:  325 mg   Take 1 tablet (325 mg) by mouth daily (with breakfast)   Quantity:  100 tablet   Refills:  1       potassium chloride SA 20 MEQ CR tablet   Commonly known as:  K-DUR/KLOR-CON M   This may have changed:  how much to take   Used for:  S/P CABG (coronary artery bypass graft)        Dose:  20 mEq   Take 1 tablet (20 mEq) by mouth 2 times daily   Quantity:  120 tablet   Refills:  1                Primary Care Provider Office Phone # Fax #    Humberto Conner [phone]       [facility] [address] [place] [address]        Equal Access to Services     [facility]: Hadii aad ku hadasho Soomaali, waaxda luqadaha, qaybta kaalmada adeegyada, waxkishore toro hayaan janette mccollum . So [facility] [phone].    ATENCIÓN: Si habla español, tiene a espinoza disposición servicios gratuitos de asistencia lingüística. LlKindred Hospital Lima [phone].    We comply with applicable federal civil rights laws and [place] laws. We do not discriminate on the basis of race, color, national origin, age, disability, sex, sexual orientation, or gender identity.            Thank you!     Thank you for choosing [facility]  for your care. Our goal is always to provide you with excellent care. Hearing back from our patients is one way we can continue to improve our services. Please take a few minutes to complete the written survey that you may receive in the mail after your visit with us. Thank you!             Your Updated Medication List - Protect others around you: Learn how to safely use, store and throw away your medicines at www.disposemymeds.org.          This list is accurate as of: 12/15/17  2:33 PM.  Always use your most recent med list.                   Brand Name Dispense Instructions for use Diagnosis    ACE BANDAGE SELF-ADHERING Misc     6 each    1 each 2 times daily    Open wound of lower limb, right, subsequent encounter        acetaminophen 325 MG tablet    TYLENOL    100 tablet    Take 3 tablets (975 mg) by mouth every 6 hours as needed for mild pain    S/P CABG (coronary artery bypass graft)       ADAPTIC NON-ADHERING DRESSING Pads     1 each    Externally apply 1 each topically 2 times daily    Open wound of lower limb, right, subsequent encounter       ALPRAZolam 0.25 MG tablet    XANAX    10 tablet    Take 1 tablet (0.25 mg) by mouth 3 times daily as needed for anxiety    Adjustment disorder with anxious mood       aspirin 81 MG chewable tablet     30 tablet    Take 1 tablet (81 mg) by mouth daily    Coronary artery disease with angina pectoris, unspecified vessel or lesion type, unspecified whether native or transplanted heart (H)       atorvastatin 40 MG tablet    LIPITOR    90 tablet    Take 1 tablet (40 mg) by mouth daily    Coronary artery disease with angina pectoris, unspecified vessel or lesion type, unspecified whether native or transplanted heart (H)       blood glucose monitoring meter device kit           bumetanide 2 MG tablet    BUMEX    180 tablet    Take 1 tablet (2 mg) by mouth 2 times daily    Chronic systolic heart failure (H)       carvedilol 3.125 MG tablet    COREG    180 tablet    Take 1 tablet (3.125 mg) by mouth 2 times daily (with meals)    Chronic diastolic heart failure (H)       ferrous sulfate 325 (65 FE) MG tablet    IRON SUPPLEMENT    100 tablet    Take 1 tablet (325 mg) by mouth daily (with breakfast)    S/P CABG (coronary artery bypass graft)       gabapentin 100 MG capsule    NEURONTIN    180 capsule    Take 1 capsule (100 mg) by mouth 2 times daily    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       KERLIX GAUZE ROLL MEDIUM Misc     48 each    1 each 2 times daily    Open wound of lower limb, right, subsequent encounter       metolazone 2.5 MG tablet    ZAROXOLYN    30 tablet    Take 1 tablet (2.5 mg) by mouth daily as needed For weight over 170 Lbs.    Chronic diastolic heart failure (H)        ONETOUCH DELICA LANCETS 33G Misc     100 each    1 Device daily    Type 2 diabetes mellitus without complication, without long-term current use of insulin (H)       ONETOUCH ULTRA test strip   Generic drug:  blood glucose monitoring     100 each    USE AS DIRECTED TO TEST ONE TIME A DAY    Type 2 diabetes mellitus without complication, without long-term current use of insulin (H)       order for DME     1 Box    Sterile 4x4 gauze; Use gauze twice daily for dressing changes.    Open wound of lower limb, right, subsequent encounter       pantoprazole 40 MG EC tablet    PROTONIX    90 tablet    Take 1 tablet (40 mg) by mouth every morning    Coronary artery disease with angina pectoris, unspecified vessel or lesion type, unspecified whether native or transplanted heart (H)       potassium chloride SA 20 MEQ CR tablet    K-DUR/KLOR-CON M    120 tablet    Take 1 tablet (20 mEq) by mouth 2 times daily    S/P CABG (coronary artery bypass graft)       spironolactone 25 MG tablet    ALDACTONE    90 tablet    Take 1 tablet (25 mg) by mouth daily    Chronic systolic heart failure (H)       traZODone 50 MG tablet    DESYREL    45 tablet    Take 0.5 tablets (25 mg) by mouth nightly as needed for sleep    Primary insomnia       * venlafaxine 150 MG Tb24 24 hr tablet    EFFEXOR-ER    90 each    Take 1 tablet (150 mg) by mouth daily (with breakfast)    Adjustment disorder with depressed mood       * venlafaxine 150 MG 24 hr capsule    EFFEXOR-XR    90 capsule    Take 1 capsule (150 mg) by mouth daily    Adjustment disorder with depressed mood       warfarin 5 MG tablet    COUMADIN    90 tablet    Take 1 tablet (5 mg) by mouth daily    Paroxysmal atrial fibrillation (H)       * Notice:  This list has 2 medication(s) that are the same as other medications prescribed for you. Read the directions carefully, and ask your doctor or other care provider to review them with you.

## 2017-12-15 NOTE — PROGRESS NOTES
"Chief Complaint:   Chief Complaint   Patient presents with     Wound Check     last visit 10/11/17 - bilateral feet wounds, left foot wound is better, right foot still having draining          Allergies   Allergen Reactions     Heparin      HIT/ thrombocytopenia     Lovenox [Enoxaparin] Other (See Comments)     Thrombocytopenia      Oxycodone      hallucinations     Toprol Xl [Metoprolol] Nausea and Vomiting     Adhesive Tape Itching and Rash     Diapers & Supplies Rash     Developed yeast infection from previous hospital stay         Subjective: Carlos Albetro is a 77 year old female who presents to the clinic today for a follow up of BL foot wounds. She is with her daughter today. She relates that there is no drainage from the left foot, but still having some from the right wound. She is just covering with Adaptic and a DSD.     Objective   5' .5\" 166 lbs 3.2 oz    A wound is noted at right  hallux stump measuring 1.5cm x 1cm, x 0.1cm.    Yousif Classification: 2    Wound base: Pink/Granulation    Edges: hyperkeratotic    Drainage: light/serous    Odor: no    Underminin O'Clock, which was debrided away.     Bone Exposure: No    Clinical Signs of Infection: No    After obtaining patient consent, the wound was irrigated with copious amounts of saline. A scalpel was then used to debride the wound into the subcutaneous tissue. The wound edges were debrided to healthy, bleeding tissue. Given the patient's lack of sensation, no anesthesia was necessary for the procedure.   Tyloma noted on the left distal 2nd digit. This was debrided and no wound was encountered.   Thick, yellow nails with subungual debris x 10 consistent with onychomycosis on the leftt 5 digits and right 3rd, 4th, and 5th digits.    Assessment: Right hallux wound. Improving  Left tyloma.  PAD  Onychomycosis.    Plan:   - Pt seen and evaluated  - Nails debrided x 8.  - Tyloma debrided x 1.  - Wound debrided x 1. Cover this digit daily with MediHoney and " Tiny,   - Pt to return to clinic in 1 month.

## 2017-12-15 NOTE — NURSING NOTE
"Chief Complaint   Patient presents with     Wound Check     last visit 10/11/17 - bilateral feet wounds, left foot wound is better, right foot still having draining       Pain Assessment  Patient Currently in Pain: Denies               HEIGHT: 5' .5\", WEIGHT: 166 lbs 3.2 oz, BMI: Body mass index is 31.92 kg/(m^2).      Allergies   Allergen Reactions     Heparin      HIT/ thrombocytopenia     Lovenox [Enoxaparin] Other (See Comments)     Thrombocytopenia      Oxycodone      hallucinations     Toprol Xl [Metoprolol] Nausea and Vomiting     Adhesive Tape Itching and Rash     Diapers & Supplies Rash     Developed yeast infection from previous hospital stay       Current Outpatient Prescriptions   Medication Sig Dispense Refill     venlafaxine (EFFEXOR-XR) 150 MG 24 hr capsule Take 1 capsule (150 mg) by mouth daily 90 capsule 3     ALPRAZolam (XANAX) 0.25 MG tablet Take 1 tablet (0.25 mg) by mouth 3 times daily as needed for anxiety 10 tablet 0     warfarin (COUMADIN) 5 MG tablet Take 1 tablet (5 mg) by mouth daily 90 tablet 3     carvedilol (COREG) 3.125 MG tablet Take 1 tablet (3.125 mg) by mouth 2 times daily (with meals) 180 tablet 3     venlafaxine (EFFEXOR-ER) 150 MG TB24 24 hr tablet Take 1 tablet (150 mg) by mouth daily (with breakfast) 90 each 1     metolazone (ZAROXOLYN) 2.5 MG tablet Take 1 tablet (2.5 mg) by mouth daily as needed For weight over 170 Lbs. 30 tablet 1     spironolactone (ALDACTONE) 25 MG tablet Take 1 tablet (25 mg) by mouth daily 90 tablet 3     bumetanide (BUMEX) 2 MG tablet Take 1 tablet (2 mg) by mouth 2 times daily 180 tablet 3     atorvastatin (LIPITOR) 40 MG tablet Take 1 tablet (40 mg) by mouth daily 90 tablet 1     gabapentin (NEURONTIN) 100 MG capsule Take 1 capsule (100 mg) by mouth 2 times daily 180 capsule 1     traZODone (DESYREL) 50 MG tablet Take 0.5 tablets (25 mg) by mouth nightly as needed for sleep 45 tablet 2     pantoprazole (PROTONIX) 40 MG EC tablet Take 1 tablet (40 mg) " by mouth every morning 90 tablet 1     potassium chloride SA (K-DUR/KLOR-CON M) 20 MEQ CR tablet Take 1 tablet (20 mEq) by mouth 2 times daily (Patient taking differently: Take 40 mEq by mouth 2 times daily ) 120 tablet 1     Elastic Bandages & Supports (ACE BANDAGE SELF-ADHERING) MISC 1 each 2 times daily 6 each 3     Gauze Pads & Dressings (KERLIX GAUZE ROLL MEDIUM) MISC 1 each 2 times daily 48 each 3     Wound Dressings (ADAPTIC NON-ADHERING DRESSING) PADS Externally apply 1 each topically 2 times daily 1 each 3     acetaminophen (TYLENOL) 325 MG tablet Take 3 tablets (975 mg) by mouth every 6 hours as needed for mild pain 100 tablet 0     aspirin 81 MG chewable tablet Take 1 tablet (81 mg) by mouth daily (Patient taking differently: Take 81 mg by mouth every morning ) 30 tablet 0     ferrous sulfate (IRON SUPPLEMENT) 325 (65 FE) MG tablet Take 1 tablet (325 mg) by mouth daily (with breakfast) (Patient taking differently: Take 325 mg by mouth daily ) 100 tablet 1     blood glucose monitoring (ONE TOUCH ULTRA 2) meter device kit        ONE TOUCH ULTRA test strip USE AS DIRECTED TO TEST ONE TIME A DAY (Patient not taking: Reported on 11/24/2017) 100 each 2     order for DME Sterile 4x4 gauze; Use gauze twice daily for dressing changes. (Patient not taking: Reported on 11/24/2017) 1 Box 3     ONETOUCH DELICA LANCETS 33G MISC 1 Device daily (Patient not taking: Reported on 11/24/2017) 100 each 0       Hoda Curran  12/15/2017

## 2017-12-15 NOTE — LETTER
"12/15/2017       RE: Carlos Alberto Fenton  [address]  [facility] [address]     Dear Colleague,    Thank you for referring your patient, Carlos Alberto Fenton, to the [facility] at [facility]. Please see a copy of my visit note below.    Chief Complaint:   Chief Complaint   Patient presents with     Wound Check     last visit 10/11/17 - bilateral feet wounds, left foot wound is better, right foot still having draining          Allergies   Allergen Reactions     Heparin      HIT/ thrombocytopenia     Lovenox [Enoxaparin] Other (See Comments)     Thrombocytopenia      Oxycodone      hallucinations     Toprol Xl [Metoprolol] Nausea and Vomiting     Adhesive Tape Itching and Rash     Diapers & Supplies Rash     Developed yeast infection from previous hospital stay         Subjective: Carlos Alberto is a 77 year old female who presents to the clinic today for a follow up of BL foot wounds. She is with her daughter today. She relates that there is no drainage from the left foot, but still having some from the right wound. She is just covering with Adaptic and a DSD.     Objective   5' .5\" 166 lbs 3.2 oz    A wound is noted at right  hallux stump measuring 1.5cm x 1cm, x 0.1cm.    Yousif Classification: 2    Wound base: Pink/Granulation    Edges: hyperkeratotic    Drainage: light/serous    Odor: no    Underminin O'Clock, which was debrided away.     Bone Exposure: No    Clinical Signs of Infection: No    After obtaining patient consent, the wound was irrigated with copious amounts of saline. A scalpel was then used to debride the wound into the subcutaneous tissue. The wound edges were debrided to healthy, bleeding tissue. Given the patient's lack of sensation, no anesthesia was necessary for the procedure.   Tyloma noted on the left distal 2nd digit. This was debrided and no wound was encountered.   Thick, yellow nails with subungual debris x 10 consistent with onychomycosis on " the leftt 5 digits and right 3rd, 4th, and 5th digits.    Assessment: Right hallux wound. Improving  Left tyloma.  PAD  Onychomycosis.    Plan:   - Pt seen and evaluated  - Nails debrided x 8.  - Tyloma debrided x 1.  - Wound debrided x 1. Cover this digit daily with MediHoney and PrimaPore,   - Pt to return to clinic in 1 month.     Sincerely,    Easton Torres DPM

## 2017-12-15 NOTE — TELEPHONE ENCOUNTER
Reason for Call:  Medication or medication refill:    Do you use a [facility]?  Name of the pharmacy and phone number for the current request:  [facility] in [place]    Name of the medication requested: c pap machine supplies    Other request: pt would like to speak to PL nurse before sending rx. Stated it needs to be very specific.    Can we leave a detailed message on this number?     Phone number patient can be reached at:     Best Time:     Call taken on 12/15/2017 at 10:53 AM by Sis Gómez

## 2017-12-18 ENCOUNTER — TELEPHONE (OUTPATIENT)
Dept: FAMILY MEDICINE | Facility: CLINIC | Age: 77
End: 2017-12-18
Payer: COMMERCIAL

## 2017-12-18 ENCOUNTER — OFFICE VISIT (OUTPATIENT)
Dept: CARDIOLOGY | Facility: CLINIC | Age: 77
End: 2017-12-18
Attending: INTERNAL MEDICINE
Payer: MEDICARE

## 2017-12-18 VITALS
WEIGHT: 168.2 LBS | HEIGHT: 60 IN | OXYGEN SATURATION: 93 % | BODY MASS INDEX: 33.02 KG/M2 | SYSTOLIC BLOOD PRESSURE: 94 MMHG | DIASTOLIC BLOOD PRESSURE: 64 MMHG | HEART RATE: 123 BPM

## 2017-12-18 DIAGNOSIS — I48.0 PAROXYSMAL ATRIAL FIBRILLATION (H): Primary | ICD-10-CM

## 2017-12-18 DIAGNOSIS — I50.32 CHRONIC DIASTOLIC CONGESTIVE HEART FAILURE (H): Primary | ICD-10-CM

## 2017-12-18 PROCEDURE — 99214 OFFICE O/P EST MOD 30 MIN: CPT | Mod: GC | Performed by: INTERNAL MEDICINE

## 2017-12-18 PROCEDURE — 93005 ELECTROCARDIOGRAM TRACING: CPT | Mod: ZF

## 2017-12-18 PROCEDURE — 93010 ELECTROCARDIOGRAM REPORT: CPT | Mod: ZP | Performed by: INTERNAL MEDICINE

## 2017-12-18 PROCEDURE — 99212 OFFICE O/P EST SF 10 MIN: CPT | Mod: ZF

## 2017-12-18 ASSESSMENT — PAIN SCALES - GENERAL: PAINLEVEL: NO PAIN (0)

## 2017-12-18 NOTE — NURSING NOTE
Chief Complaint   Patient presents with     Follow Up For     evaluate pt back in a-fib can't wear monitoring system due to adhesive reaction      Vitals were taken and medications were reconciled.  Gordo Moraes, VIBHA  5:01 PM

## 2017-12-18 NOTE — TELEPHONE ENCOUNTER
Forms received from  [facility] on 12/18/17  Forms with RN to review medications.  Orders pended for provider to sign.    Forms given to Mariela PCP for signature on .

## 2017-12-18 NOTE — PROGRESS NOTES
Cardiac Electrophysiology Clinic    Chief Complaint:  Follow up atrial fibrillation    HPI:  Ms Fenton is a 76 yo female with CAD s/p CAB [LIMA-LAD, v-D1, v-RCA] + MAZE + ABDIEL ligation in 2/2016, heart failure with LV EF 45-50%, hypertension, type 2 diabetes, and history of stroke, DVT, and HIT who has had paroxysmal atrial fibrillation since her cardiac surgery.  She follows with Dr. Lobato in heart failure clinic.  She treated initially with amiodarone, which was discontinued when she saw Dr. Paige Santos in 4/2017, in the hopes that she her AF had been due to operative inflammation but AF recurred.  Intentions to cardiovert her were initially stymied by her inability to reliably take her anticoagulation (and all of her medications), placing her at risk of stroke post-cardioversion.  A skin reaction to adhesives impeded efforts to quantify her AF burden with event monitors.  EKGs in the past several months have shown mostly AF with some intervening sinus rhythm.    Today she reports feeling episodic dyspnea and fluttering sensations in her chest as well as intermittent fatigue.  For example she felt relatively well all last week but now for the past two days has felt quite fatigued.  EKG today shows atrial fibrillation with a ventricular rate in the 120s.    She is living with her daughter, who is providing support and is checking her administration of medications to ensure compliance.      Current Outpatient Prescriptions on File Prior to Visit:  ALPRAZolam (XANAX) 0.25 MG tablet Take 1 tablet (0.25 mg) by mouth 3 times daily as needed for anxiety   warfarin (COUMADIN) 5 MG tablet Take 1 tablet (5 mg) by mouth daily   carvedilol (COREG) 3.125 MG tablet Take 1 tablet (3.125 mg) by mouth 2 times daily (with meals)   venlafaxine (EFFEXOR-ER) 150 MG TB24 24 hr tablet Take 1 tablet (150 mg) by mouth daily (with breakfast)   metolazone (ZAROXOLYN) 2.5 MG tablet Take 1 tablet (2.5 mg) by mouth daily as needed For weight  over 170 Lbs.   spironolactone (ALDACTONE) 25 MG tablet Take 1 tablet (25 mg) by mouth daily   bumetanide (BUMEX) 2 MG tablet Take 1 tablet (2 mg) by mouth 2 times daily   atorvastatin (LIPITOR) 40 MG tablet Take 1 tablet (40 mg) by mouth daily   gabapentin (NEURONTIN) 100 MG capsule Take 1 capsule (100 mg) by mouth 2 times daily   traZODone (DESYREL) 50 MG tablet Take 0.5 tablets (25 mg) by mouth nightly as needed for sleep   pantoprazole (PROTONIX) 40 MG EC tablet Take 1 tablet (40 mg) by mouth every morning   potassium chloride SA (K-DUR/KLOR-CON M) 20 MEQ CR tablet Take 1 tablet (20 mEq) by mouth 2 times daily (Patient taking differently: Take 40 mEq by mouth 2 times daily )   Elastic Bandages & Supports (ACE BANDAGE SELF-ADHERING) MISC 1 each 2 times daily   Gauze Pads & Dressings (KERLIX GAUZE ROLL MEDIUM) MISC 1 each 2 times daily   Wound Dressings (ADAPTIC NON-ADHERING DRESSING) PADS Externally apply 1 each topically 2 times daily   acetaminophen (TYLENOL) 325 MG tablet Take 3 tablets (975 mg) by mouth every 6 hours as needed for mild pain   aspirin 81 MG chewable tablet Take 1 tablet (81 mg) by mouth daily (Patient taking differently: Take 81 mg by mouth every morning )   ferrous sulfate (IRON SUPPLEMENT) 325 (65 FE) MG tablet Take 1 tablet (325 mg) by mouth daily (with breakfast) (Patient taking differently: Take 325 mg by mouth daily )   blood glucose monitoring (ONE TOUCH ULTRA 2) meter device kit    [DISCONTINUED] venlafaxine (EFFEXOR-XR) 150 MG 24 hr capsule Take 1 capsule (150 mg) by mouth daily   ONE TOUCH ULTRA test strip USE AS DIRECTED TO TEST ONE TIME A DAY (Patient not taking: Reported on 11/24/2017)   order for DME Sterile 4x4 gauze; Use gauze twice daily for dressing changes. (Patient not taking: Reported on 11/24/2017)   ONETOUCH DELICA LANCETS 33G MISC 1 Device daily (Patient not taking: Reported on 11/24/2017)     No current facility-administered medications on file prior to visit.    Allergies   Allergen Reactions     Heparin      HIT/ thrombocytopenia     Lovenox [Enoxaparin] Other (See Comments)     Thrombocytopenia      Oxycodone      hallucinations     Toprol Xl [Metoprolol] Nausea and Vomiting     Adhesive Tape Itching and Rash     Diapers & Supplies Rash     Developed yeast infection from previous hospital stay       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    parietral lesions bilateral       Antiplatelet or antithrombotic long-term use      Anxiety      Atrial fibrillation (H)      CAD (coronary artery disease)     2 vessel     Cancer (H) 1990    periodically have cancer on the skin removed     Cerebral artery occlusion with cerebral infarction (H) 10/2016    Cardioembolic strokes related to atrial fibrillation     Deep vein thrombosis (DVT) of axillary vein of right upper extremity (H) 2/25/2017     Depression      Depressive disorder 2001     Diabetes (H)      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     Hyperlipidemia      Hyperlipidemia LDL goal <130 10/31/2010     Hypertension      Panic attacks      Seizures (H) 10/19/2016     Sleep apnea     Uses CPAP     Past Surgical History:   Procedure Laterality Date     AMPUTATE TOE(S) Right 5/26/2017    Procedure: AMPUTATE TOE(S);  Right Great Toe amputation, debriedment of 2 and 3rd toe soft tissu right foot. and application of Grafix;  Surgeon: Leah Santamaria MD;  Location: [facility] OR     ANESTHESIA CARDIOVERSION N/A 12/12/2017    Procedure: ANESTHESIA CARDIOVERSION;  Anesthesia Cardioversion ;  Surgeon: GENERIC ANESTHESIA PROVIDER;  Location: [facility] OR     BACK SURGERY       BYPASS GRAFT ARTERY CORONARY N/A 2/6/2017    Procedure: BYPASS GRAFT ARTERY CORONARY;  Surgeon: Mikhail Quiñones MD;  Location: [facility] OR     C APPENDECTOMY  1959     C CARDIAC SURG PROCEDURE UNLIST       C HAND/FINGER SURGERY UNLISTED       C STOMACH SURGERY PROCEDURE UNLISTED        HC SACROPLASTY       HC VASCULAR SURGERY PROCEDURE UNLIST       HYSTERECTOMY, PASTORA  1988     IRRIGATION AND DEBRIDEMENT FOOT, COMBINED Right 7/7/2017    Procedure: COMBINED IRRIGATION AND DEBRIDEMENT FOOT;  Irrigation and Debridement Right Foot with Graphix  *Latex Allergy*;  Surgeon: Leah Santamaria MD;  Location: [facility] OR     MAZE PROCEDURE N/A 2/6/2017    Procedure: MAZE PROCEDURE;  Surgeon: Mikhail Quiñones MD;  Location: [facility] OR     PICC INSERTION Left 02/25/2017    5fr TL Bard PICC, 47cm (3cm external) in the L basilic vein w/ tip in the SVC RA junction     TONSILLECTOMY  1942     Family History   Problem Relation Age of Onset     DIABETES Mother      C.A.D. Mother      Hypertension Mother      CEREBROVASCULAR DISEASE Mother      Mini Strokes     Other Cancer Mother      Skin Cancer     Hyperlipidemia Mother      Coronary Artery Disease Mother      DIABETES Father      C.A.D. Father      Hypertension Father      Other Cancer Father      Hyperlipidemia Father      Coronary Artery Disease Father      HEART DISEASE Sister      Arthritis Sister      Other Cancer Other      Skin Cancer     Social History     Social History     Marital status:      Spouse name: N/A     Number of children: N/A     Years of education: N/A     Occupational History     Not on file.     Social History Main Topics     Smoking status: Former Smoker     Packs/day: 1.00     Years: 10.00     Types: Cigarettes     Start date: 10/3/1958     Quit date: 7/4/1976     Smokeless tobacco: Never Used      Comment: Quit in 1976     Alcohol use Yes      Comment: Socially     Drug use: Yes     Special: Marijuana      Comment: Briefly used marijuana for peripheral neuropathy     Sexual activity: Not Currently     Partners: Male     Birth control/ protection: Post-menopausal     Other Topics Concern     Parent/Sibling W/ Cabg, Mi Or Angioplasty Before 65f 55m? Yes     Social History Narrative         A complete review of systems is negative except as  noted above in the HPI.      Physical Examination:  Vitals: BP 94/64 (BP Location: Left arm, Patient Position: Chair, Cuff Size: Adult Large)  Pulse 123  Ht 1.524 m (5')  Wt 76.3 kg (168 lb 3.2 oz)  SpO2 93%  BMI 32.85 kg/m2  GENERAL APPEARANCE:  Comfortable but frail appearing female sitting in a wheelchair with unlabored breathing.  HEENT: no scleral icterus  NECK:  JVP is not elevated.  CHEST: lungs clear to auscultation  CARDIOVASCULAR: regular rhythm, normal S1 and S2, no murmur or rub, warm extremities with 1+ edema in both lower legs.  ABDOMEN: soft, non-tender  NEURO: alert and fully oriented, answers questions appropriately.  PSYCH:  Pleasant & cooperative.  Normal affect.  SKIN: no ecchymoses, no rashes      Labs, imaging, and procedures were reviewed:  Recent Labs   Lab Test  11/06/17   1349  10/11/17   1346  10/09/17   1303  09/26/17   1312   02/28/17   0439   CR  0.88  0.82  0.93  0.80   < >  0.57   HGB   --   12.8   --    --    < >  6.9*   PLT   --   216   --    --    < >  210   TSH   --    --    --    --    --   3.04    < > = values in this interval not displayed.     12/12/17 TAVO showed an LV EF of 45-50% with a basal inferolateral aneurysm and wall thinning.  RV function was normal.  Mild to moderate MR and moderate to severe TR were present.      Assessment and recommendations:  1  Symptomatic paroxysmal atrial fibrillation.  CHADSVasc score is 7.    Stroke/thromboembolism prophylaxis:  We reviewed the risks & benefits of warfarin vs the NOACs and she elected to switch to a NOAC for the ease of administration and absent need for lab checks and alterations in dosing.  Now that we do not intend to perform a cardioversion, the ability to confirm that she has been therapeutic is no longer a reason to favor warfarin.  She will hold her warfarin for a few days prior to her ILR implantation and start rivaroxaban afterward.    Rate control:  Although her ventricular rates are elevated today in AF, her  BP will not tolerate any increase in her beta blockade.    Rhythm control:  We discussed DCCV (not ideal because she has paroxysmal AF and goes into it quite readily), AAT, and ablation.  Because she reacts to the adhesives on external wearable monitors we will place an ILR to measure her AF burden and HRs (to determine whether her AF may be a contributor to her LV systolic dysfunction) and then discuss resuming antiarrhythmic therapy vs pursuing PVI, leaving AVN ablation and pacing as a last resort.    2  Heart failure with LV EF of 45-50%; ischemic cardiomyopathy perhaps with some contribution of AF to systolic dysfunction.  She follows with Dr. Lobato.  She does not appear significantly more volume overloaded today.    3  History of stroke & DVT, anticoagulated with warfarin.    Follow up TBD pending date of ILR implantation.    I appreciate the chance to help with Ms. Fenton's care. Please let me know if you have any questions or concerns.    I discussed the patient with Dr. Bakari Azevedo.    Harjinder Goldman MD  Cardiovascular disease fellow    Attending: Patient seen and examined with Dr. Goldman. The history and physical findings are accurate as recorded. My additional findings, if any, have been incorporated into the body of the note. All labs, imaging studies, ECG and telemetry data have been reviewed personally. The assessment and recommendations outlined reflect our joint decision making.    Bakari Azevedo MD

## 2017-12-18 NOTE — MR AVS SNAPSHOT
After Visit Summary   12/18/2017    Carlos Alberto Fenton    MRN: [id number]           Patient Information     Date Of Birth          1940        Visit Information        Provider Department      12/18/2017 5:00 PM Bakari Azevedo MD [facility]        Today's Diagnoses     Paroxysmal atrial fibrillation (H)    -  1       Follow-ups after your visit        Your next 10 appointments already scheduled     Jan 24, 2018  2:40 PM CST   (Arrive by 2:25 PM)   RETURN FOOT/ANKLE with Easton Torres DPM   [facility] ([facility])    [address] [place] [address]  [address] [facility] [address] [phone]            Mar 02, 2018  4:00 PM CST   (Arrive by 3:45 PM)   Return Visit with Star Lobato MD   [facility] ([facility])    [address] [place] [address]  3[address] [facility] [address] [phone]              Who to contact     If you have questions or need follow up information about today's clinic visit or your schedule please contact [facility] directly at [phone].  Normal or non-critical lab and imaging results will be communicated to you by MyChart, letter or phone within 4 business days after the clinic has received the results. If you do not hear from us within 7 days, please contact the clinic through [facility]ABTh[facility] or phone. If you have a critical or abnormal lab result, we will notify you by phone as soon as possible.  Submit refill requests through [facility] or call your pharmacy and they will forward the refill request to us. Please allow 3 business days for your refill to be completed.          Additional Information About Your Visit        MyChart Information     [facility] gives you secure access to your electronic health record. If you see a primary care provider, you can also send messages to your care team and make appointments. If you have questions, please call your primary care  clinic.  If you do not have a primary care provider, please call [phone] and they will assist you.        Care EveryWhere ID     This is your Care EveryWhere ID. This could be used by other organizations to access your [facility] medical records  [id number]        Your Vitals Were     Pulse Height Pulse Oximetry BMI (Body Mass Index)          123 1.524 m (5') 93% 32.85 kg/m2         Blood Pressure from Last 3 Encounters:   12/18/17 94/64   12/14/17 115/66   12/12/17 113/78    Weight from Last 3 Encounters:   12/18/17 76.3 kg (168 lb 3.2 oz)   12/15/17 75.4 kg (166 lb 3.2 oz)   11/24/17 75.8 kg (167 lb 3.2 oz)              We Performed the Following     EKG 12-lead, tracing only (Same Day)     EKG 12-lead, tracing only (Same Day)          Today's Medication Changes          These changes are accurate as of: 12/18/17 11:59 PM.  If you have any questions, ask your nurse or doctor.               These medicines have changed or have updated prescriptions.        Dose/Directions    aspirin 81 MG chewable tablet   This may have changed:  when to take this   Used for:  Coronary artery disease with angina pectoris, unspecified vessel or lesion type, unspecified whether native or transplanted heart (H)        Dose:  81 mg   Take 1 tablet (81 mg) by mouth daily   Quantity:  30 tablet   Refills:  0       ferrous sulfate 325 (65 FE) MG tablet   Commonly known as:  IRON SUPPLEMENT   This may have changed:  when to take this   Used for:  S/P CABG (coronary artery bypass graft)        Dose:  325 mg   Take 1 tablet (325 mg) by mouth daily (with breakfast)   Quantity:  100 tablet   Refills:  1       potassium chloride SA 20 MEQ CR tablet   Commonly known as:  K-DUR/KLOR-CON M   This may have changed:  how much to take   Used for:  S/P CABG (coronary artery bypass graft)        Dose:  20 mEq   Take 1 tablet (20 mEq) by mouth 2 times daily   Quantity:  120 tablet   Refills:  1                Primary Care Provider Office Phone #  Fax #    Humberto Conner [phone]       [facility] [address] [place] [address]        Equal Access to Services     NATALIE HAYES : Hadaissatou william flores ashuo Sogeovannyali, waaxda luqadaha, qaybta kaalmada jose david, scott sagejewel twila. So [facility] [phone].    ATENCIÓN: Si habla español, tiene a espinoza disposición servicios gratuitos de asistencia lingüística. Llame al [phone].    We comply with applicable federal civil rights laws and [place] laws. We do not discriminate on the basis of race, color, national origin, age, disability, sex, sexual orientation, or gender identity.            Thank you!     Thank you for choosing [facility]  for your care. Our goal is always to provide you with excellent care. Hearing back from our patients is one way we can continue to improve our services. Please take a few minutes to complete the written survey that you may receive in the mail after your visit with us. Thank you!             Your Updated Medication List - Protect others around you: Learn how to safely use, store and throw away your medicines at www.disposemymeds.org.          This list is accurate as of: 12/18/17 11:59 PM.  Always use your most recent med list.                   Brand Name Dispense Instructions for use Diagnosis    ACE BANDAGE SELF-ADHERING Misc     6 each    1 each 2 times daily    Open wound of lower limb, right, subsequent encounter       acetaminophen 325 MG tablet    TYLENOL    100 tablet    Take 3 tablets (975 mg) by mouth every 6 hours as needed for mild pain    S/P CABG (coronary artery bypass graft)       ADAPTIC NON-ADHERING DRESSING Pads     1 each    Externally apply 1 each topically 2 times daily    Open wound of lower limb, right, subsequent encounter       ALPRAZolam 0.25 MG tablet    XANAX    10 tablet    Take 1 tablet (0.25 mg) by mouth 3 times daily as needed for anxiety    Adjustment disorder with anxious mood        aspirin 81 MG chewable tablet     30 tablet    Take 1 tablet (81 mg) by mouth daily    Coronary artery disease with angina pectoris, unspecified vessel or lesion type, unspecified whether native or transplanted heart (H)       atorvastatin 40 MG tablet    LIPITOR    90 tablet    Take 1 tablet (40 mg) by mouth daily    Coronary artery disease with angina pectoris, unspecified vessel or lesion type, unspecified whether native or transplanted heart (H)       blood glucose monitoring meter device kit           bumetanide 2 MG tablet    BUMEX    180 tablet    Take 1 tablet (2 mg) by mouth 2 times daily    Chronic systolic heart failure (H)       carvedilol 3.125 MG tablet    COREG    180 tablet    Take 1 tablet (3.125 mg) by mouth 2 times daily (with meals)    Chronic diastolic heart failure (H)       ferrous sulfate 325 (65 FE) MG tablet    IRON SUPPLEMENT    100 tablet    Take 1 tablet (325 mg) by mouth daily (with breakfast)    S/P CABG (coronary artery bypass graft)       gabapentin 100 MG capsule    NEURONTIN    180 capsule    Take 1 capsule (100 mg) by mouth 2 times daily    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       KERLIX GAUZE ROLL MEDIUM Misc     48 each    1 each 2 times daily    Open wound of lower limb, right, subsequent encounter       metolazone 2.5 MG tablet    ZAROXOLYN    30 tablet    Take 1 tablet (2.5 mg) by mouth daily as needed For weight over 170 Lbs.    Chronic diastolic heart failure (H)       ONETOUCH DELICA LANCETS 33G Misc     100 each    1 Device daily    Type 2 diabetes mellitus without complication, without long-term current use of insulin (H)       ONETOUCH ULTRA test strip   Generic drug:  blood glucose monitoring     100 each    USE AS DIRECTED TO TEST ONE TIME A DAY    Type 2 diabetes mellitus without complication, without long-term current use of insulin (H)       order for DME     1 Box    Sterile 4x4 gauze; Use gauze twice daily for dressing changes.    Open wound of lower limb,  right, subsequent encounter       pantoprazole 40 MG EC tablet    PROTONIX    90 tablet    Take 1 tablet (40 mg) by mouth every morning    Coronary artery disease with angina pectoris, unspecified vessel or lesion type, unspecified whether native or transplanted heart (H)       potassium chloride SA 20 MEQ CR tablet    K-DUR/KLOR-CON M    120 tablet    Take 1 tablet (20 mEq) by mouth 2 times daily    S/P CABG (coronary artery bypass graft)       spironolactone 25 MG tablet    ALDACTONE    90 tablet    Take 1 tablet (25 mg) by mouth daily    Chronic systolic heart failure (H)       traZODone 50 MG tablet    DESYREL    45 tablet    Take 0.5 tablets (25 mg) by mouth nightly as needed for sleep    Primary insomnia       venlafaxine 150 MG Tb24 24 hr tablet    EFFEXOR-ER    90 each    Take 1 tablet (150 mg) by mouth daily (with breakfast)    Adjustment disorder with depressed mood       warfarin 5 MG tablet    COUMADIN    90 tablet    Take 1 tablet (5 mg) by mouth daily    Paroxysmal atrial fibrillation (H)

## 2017-12-18 NOTE — LETTER
12/18/2017      RE: Carlos Alberto Fenton  [address]  [facility] [address]       Dear Colleague,    Thank you for the opportunity to participate in the care of your patient, Carlos Alberto Fenton, at the [facility]. Please see a copy of my visit note below.      Cardiac Electrophysiology Clinic    Chief Complaint:  Follow up atrial fibrillation    HPI:  Ms Fenton is a 78 yo female with CAD s/p CAB [LIMA-LAD, v-D1, v-RCA] + MAZE + ABDIEL ligation in 2/2016, heart failure with LV EF 45-50%, hypertension, type 2 diabetes, and history of stroke, DVT, and HIT who has had paroxysmal atrial fibrillation since her cardiac surgery.  She follows with Dr. Lobato in heart failure clinic.  She treated initially with amiodarone, which was discontinued when she saw Dr. Paige Santos in 4/2017, in the hopes that she her AF had been due to operative inflammation but AF recurred.  Intentions to cardiovert her were initially stymied by her inability to reliably take her anticoagulation (and all of her medications), placing her at risk of stroke post-cardioversion.  A skin reaction to adhesives impeded efforts to quantify her AF burden with event monitors.  EKGs in the past several months have shown mostly AF with some intervening sinus rhythm.    Today she reports feeling episodic dyspnea and fluttering sensations in her chest as well as intermittent fatigue.  For example she felt relatively well all last week but now for the past two days has felt quite fatigued.  EKG today shows atrial fibrillation with a ventricular rate in the 120s.    She is living with her daughter, who is providing support and is checking her administration of medications to ensure compliance.      Current Outpatient Prescriptions on File Prior to Visit:  ALPRAZolam (XANAX) 0.25 MG tablet Take 1 tablet (0.25 mg) by mouth 3 times daily as needed for anxiety   warfarin (COUMADIN) 5 MG tablet Take 1 tablet (5 mg) by mouth  daily   carvedilol (COREG) 3.125 MG tablet Take 1 tablet (3.125 mg) by mouth 2 times daily (with meals)   venlafaxine (EFFEXOR-ER) 150 MG TB24 24 hr tablet Take 1 tablet (150 mg) by mouth daily (with breakfast)   metolazone (ZAROXOLYN) 2.5 MG tablet Take 1 tablet (2.5 mg) by mouth daily as needed For weight over 170 Lbs.   spironolactone (ALDACTONE) 25 MG tablet Take 1 tablet (25 mg) by mouth daily   bumetanide (BUMEX) 2 MG tablet Take 1 tablet (2 mg) by mouth 2 times daily   atorvastatin (LIPITOR) 40 MG tablet Take 1 tablet (40 mg) by mouth daily   gabapentin (NEURONTIN) 100 MG capsule Take 1 capsule (100 mg) by mouth 2 times daily   traZODone (DESYREL) 50 MG tablet Take 0.5 tablets (25 mg) by mouth nightly as needed for sleep   pantoprazole (PROTONIX) 40 MG EC tablet Take 1 tablet (40 mg) by mouth every morning   potassium chloride SA (K-DUR/KLOR-CON M) 20 MEQ CR tablet Take 1 tablet (20 mEq) by mouth 2 times daily (Patient taking differently: Take 40 mEq by mouth 2 times daily )   Elastic Bandages & Supports (ACE BANDAGE SELF-ADHERING) MISC 1 each 2 times daily   Gauze Pads & Dressings (KERLIX GAUZE ROLL MEDIUM) MISC 1 each 2 times daily   Wound Dressings (ADAPTIC NON-ADHERING DRESSING) PADS Externally apply 1 each topically 2 times daily   acetaminophen (TYLENOL) 325 MG tablet Take 3 tablets (975 mg) by mouth every 6 hours as needed for mild pain   aspirin 81 MG chewable tablet Take 1 tablet (81 mg) by mouth daily (Patient taking differently: Take 81 mg by mouth every morning )   ferrous sulfate (IRON SUPPLEMENT) 325 (65 FE) MG tablet Take 1 tablet (325 mg) by mouth daily (with breakfast) (Patient taking differently: Take 325 mg by mouth daily )   blood glucose monitoring (ONE TOUCH ULTRA 2) meter device kit    [DISCONTINUED] venlafaxine (EFFEXOR-XR) 150 MG 24 hr capsule Take 1 capsule (150 mg) by mouth daily   ONE TOUCH ULTRA test strip USE AS DIRECTED TO TEST ONE TIME A DAY (Patient not taking: Reported on  11/24/2017)   order for DME Sterile 4x4 gauze; Use gauze twice daily for dressing changes. (Patient not taking: Reported on 11/24/2017)   ONETOUCH DELICA LANCETS 33G MISC 1 Device daily (Patient not taking: Reported on 11/24/2017)     No current facility-administered medications on file prior to visit.   Allergies   Allergen Reactions     Heparin      HIT/ thrombocytopenia     Lovenox [Enoxaparin] Other (See Comments)     Thrombocytopenia      Oxycodone      hallucinations     Toprol Xl [Metoprolol] Nausea and Vomiting     Adhesive Tape Itching and Rash     Diapers & Supplies Rash     Developed yeast infection from previous hospital stay       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    parietral lesions bilateral       Antiplatelet or antithrombotic long-term use      Anxiety      Atrial fibrillation (H)      CAD (coronary artery disease)     2 vessel     Cancer (H) 1990    periodically have cancer on the skin removed     Cerebral artery occlusion with cerebral infarction (H) 10/2016    Cardioembolic strokes related to atrial fibrillation     Deep vein thrombosis (DVT) of axillary vein of right upper extremity (H) 2/25/2017     Depression      Depressive disorder 2001     Diabetes (H)      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     Hyperlipidemia      Hyperlipidemia LDL goal <130 10/31/2010     Hypertension      Panic attacks      Seizures (H) 10/19/2016     Sleep apnea     Uses CPAP     Past Surgical History:   Procedure Laterality Date     AMPUTATE TOE(S) Right 5/26/2017    Procedure: AMPUTATE TOE(S);  Right Great Toe amputation, debriedment of 2 and 3rd toe soft tissu right foot. and application of Grafix;  Surgeon: Leah Santamaria MD;  Location: [facility] OR     ANESTHESIA CARDIOVERSION N/A 12/12/2017    Procedure: ANESTHESIA CARDIOVERSION;  Anesthesia Cardioversion ;  Surgeon: GENERIC ANESTHESIA PROVIDER;  Location:   OR     BACK SURGERY       BYPASS GRAFT ARTERY CORONARY N/A 2/6/2017    Procedure: BYPASS GRAFT ARTERY CORONARY;  Surgeon: Mikhail Quiñones MD;  Location: [facility] OR     C APPENDECTOMY  1959     C CARDIAC SURG PROCEDURE UNLIST       C HAND/FINGER SURGERY UNLISTED       C STOMACH SURGERY PROCEDURE UNLISTED       HC SACROPLASTY       HC VASCULAR SURGERY PROCEDURE UNLIST       HYSTERECTOMY, PASTORA  1988     IRRIGATION AND DEBRIDEMENT FOOT, COMBINED Right 7/7/2017    Procedure: COMBINED IRRIGATION AND DEBRIDEMENT FOOT;  Irrigation and Debridement Right Foot with Graphix  *Latex Allergy*;  Surgeon: Leah Santamaria MD;  Location: [facility] OR     MAZE PROCEDURE N/A 2/6/2017    Procedure: MAZE PROCEDURE;  Surgeon: Mikhail Quiñones MD;  Location: [facility] OR     PICC INSERTION Left 02/25/2017    5fr TL Bard PICC, 47cm (3cm external) in the L basilic vein w/ tip in the SVC RA junction     TONSILLECTOMY  1942     Family History   Problem Relation Age of Onset     DIABETES Mother      C.A.D. Mother      Hypertension Mother      CEREBROVASCULAR DISEASE Mother      Mini Strokes     Other Cancer Mother      Skin Cancer     Hyperlipidemia Mother      Coronary Artery Disease Mother      DIABETES Father      C.A.D. Father      Hypertension Father      Other Cancer Father      Hyperlipidemia Father      Coronary Artery Disease Father      HEART DISEASE Sister      Arthritis Sister      Other Cancer Other      Skin Cancer     Social History     Social History     Marital status:      Spouse name: N/A     Number of children: N/A     Years of education: N/A     Occupational History     Not on file.     Social History Main Topics     Smoking status: Former Smoker     Packs/day: 1.00     Years: 10.00     Types: Cigarettes     Start date: 10/3/1958     Quit date: 7/4/1976     Smokeless tobacco: Never Used      Comment: Quit in 1976     Alcohol use Yes      Comment: Socially     Drug use: Yes     Special: Marijuana      Comment: Briefly used marijuana for  peripheral neuropathy     Sexual activity: Not Currently     Partners: Male     Birth control/ protection: Post-menopausal     Other Topics Concern     Parent/Sibling W/ Cabg, Mi Or Angioplasty Before 65f 55m? Yes     Social History Narrative         A complete review of systems is negative except as noted above in the HPI.      Physical Examination:  Vitals: BP 94/64 (BP Location: Left arm, Patient Position: Chair, Cuff Size: Adult Large)  Pulse 123  Ht 1.524 m (5')  Wt 76.3 kg (168 lb 3.2 oz)  SpO2 93%  BMI 32.85 kg/m2  GENERAL APPEARANCE:  Comfortable but frail appearing female sitting in a wheelchair with unlabored breathing.  HEENT: no scleral icterus  NECK:  JVP is not elevated.  CHEST: lungs clear to auscultation  CARDIOVASCULAR: regular rhythm, normal S1 and S2, no murmur or rub, warm extremities with 1+ edema in both lower legs.  ABDOMEN: soft, non-tender  NEURO: alert and fully oriented, answers questions appropriately.  PSYCH:  Pleasant & cooperative.  Normal affect.  SKIN: no ecchymoses, no rashes      Labs, imaging, and procedures were reviewed:  Recent Labs   Lab Test  11/06/17   1349  10/11/17   1346  10/09/17   1303  09/26/17   1312   02/28/17   0439   CR  0.88  0.82  0.93  0.80   < >  0.57   HGB   --   12.8   --    --    < >  6.9*   PLT   --   216   --    --    < >  210   TSH   --    --    --    --    --   3.04    < > = values in this interval not displayed.     12/12/17 TAVO showed an LV EF of 45-50% with a basal inferolateral aneurysm and wall thinning.  RV function was normal.  Mild to moderate MR and moderate to severe TR were present.      Assessment and recommendations:  1  Symptomatic paroxysmal atrial fibrillation.  CHADSVasc score is 7.    Stroke/thromboembolism prophylaxis:  We reviewed the risks & benefits of warfarin vs the NOACs and she elected to switch to a NOAC for the ease of administration and absent need for lab checks and alterations in dosing.  Now that we do not intend to  perform a cardioversion, the ability to confirm that she has been therapeutic is no longer a reason to favor warfarin.  She will hold her warfarin for a few days prior to her ILR implantation and start rivaroxaban afterward.    Rate control:  Although her ventricular rates are elevated today in AF, her BP will not tolerate any increase in her beta blockade.    Rhythm control:  We discussed DCCV (not ideal because she has paroxysmal AF and goes into it quite readily), AAT, and ablation.  Because she reacts to the adhesives on external wearable monitors we will place an ILR to measure her AF burden and HRs (to determine whether her AF may be a contributor to her LV systolic dysfunction) and then discuss resuming antiarrhythmic therapy vs pursuing PVI, leaving AVN ablation and pacing as a last resort.    2  Heart failure with LV EF of 45-50%; ischemic cardiomyopathy perhaps with some contribution of AF to systolic dysfunction.  She follows with Dr. Lobato.  She does not appear significantly more volume overloaded today.    3  History of stroke & DVT, anticoagulated with warfarin.    Follow up TBD pending date of ILR implantation.    I appreciate the chance to help with Ms. Fenton's care. Please let me know if you have any questions or concerns.    I discussed the patient with Dr. Bakari Azevedo.    Harjinder Goldman MD  Cardiovascular disease fellow    Attending: Patient seen and examined with Dr. Goldman. The history and physical findings are accurate as recorded. My additional findings, if any, have been incorporated into the body of the note. All labs, imaging studies, ECG and telemetry data have been reviewed personally. The assessment and recommendations outlined reflect our joint decision making.    Bakari Azevedo MD

## 2017-12-19 ENCOUNTER — DOCUMENTATION ONLY (OUTPATIENT)
Dept: CARE COORDINATION | Facility: CLINIC | Age: 77
End: 2017-12-19

## 2017-12-19 ENCOUNTER — ANTICOAGULATION THERAPY VISIT (OUTPATIENT)
Dept: ANTICOAGULATION | Facility: CLINIC | Age: 77
End: 2017-12-19
Payer: COMMERCIAL

## 2017-12-19 ENCOUNTER — TELEPHONE (OUTPATIENT)
Dept: INTERNAL MEDICINE | Facility: CLINIC | Age: 77
End: 2017-12-19

## 2017-12-19 DIAGNOSIS — Z79.01 LONG-TERM (CURRENT) USE OF ANTICOAGULANTS: ICD-10-CM

## 2017-12-19 DIAGNOSIS — I48.0 PAROXYSMAL ATRIAL FIBRILLATION (H): ICD-10-CM

## 2017-12-19 LAB
INR PPP: 2.7
INTERPRETATION ECG - MUSE: NORMAL
INTERPRETATION ECG - MUSE: NORMAL

## 2017-12-19 PROCEDURE — 99207 ZZC NO CHARGE NURSE ONLY: CPT | Performed by: INTERNAL MEDICINE

## 2017-12-19 NOTE — PROGRESS NOTES
[facility] now requests orders and shares plan of care/discharge summaries for some patients through [facility].  Please REPLY TO THIS MESSAGE in order to give authorization for orders when needed.  This is considered a verbal order, you will still receive a faxed copy of orders for signature.  Thank you for your assistance in improving collaboration for our patients.    ORDER    New Order for Wound Care to Right [place]:  Clean with Wound Cleanser, Cover this digit daily with MediHoney and PrimaPore.  Pt to perform wound care on non nurse days.     All other toes to keep Clean and Dry.    Thank you  Aranza Gilbert RN   [phone]

## 2017-12-19 NOTE — TELEPHONE ENCOUNTER
Reason for Call:  INR    Who is calling?  Home Care:     Phone number:  [phone]    Fax number:      Name of caller: Trista    INR Value:  2.7    Are there any other concerns:  No    Can we leave a detailed message on this number? YES    Phone number patient can be reached at: Other phone number:        Call taken on 12/19/2017 at 1:08 PM by Yolanda Martinez

## 2017-12-19 NOTE — PROGRESS NOTES
ANTICOAGULATION FOLLOW-UP CLINIC VISIT    Patient Name:  Carlos Alberto Fenton  Date:  12/19/2017  Contact Type:  Telephone/ AMIRA Weston nurse    SUBJECTIVE:     Patient Findings     Positives No Problem Findings    Comments Reason for Call:  INR     Who is calling?  Home Care:      Phone number:  [phone]     Fax number:       Name of caller: Aranza Thomas     INR Value:  2.7     Are there any other concerns:  No     Can we leave a detailed message on this number? YES     Phone number patient can be reached at: Other phone number:          Call taken on 12/19/2017 at 1:08 PM by Yolanda Martinez           OBJECTIVE    INR   Date Value Ref Range Status   12/19/2017 2.7  Final     Chromogenic Factor 10   Date Value Ref Range Status   03/02/2017 65 (L) 70 - 130 % Final     Comment:     Therapeutic Range:  A Chromogenic Factor 10 level of approximately 20-40%   inversely correlates with an INR of 2-3 for patients receiving Warfarin.   Chromogenic Factor 10 levels below 20% indicate an INR greater than 3 and   levels above 40% indicate an INR less than 2.       Factor 2 Assay   Date Value Ref Range Status   02/27/2017 37 (L) 60 - 140 % Final       ASSESSMENT / PLAN  INR assessment THER    Recheck INR In: 1 WEEK    INR Location [facility] INR      Anticoagulation Summary as of 12/19/2017     INR goal 2.0-3.0   Today's INR 2.7   Maintenance plan 2.5 mg (5 mg x 0.5) on Mon, Wed, Fri; 5 mg (5 mg x 1) all other days   Full instructions 2.5 mg on Mon, Wed, Fri; 5 mg all other days   Weekly total 27.5 mg   No change documented Amanda Frausto RN   Plan last modified Amanda Frausto RN (12/12/2017)   Next INR check 12/26/2017   Priority INR   Target end date Indefinite    Indications   Long-term (current) use of anticoagulants [Z79.01] [Z79.01]  New onset atrial fibrillation (H) (Resolved) [I48.91]  Atrial fibrillation (H) [I48.91] [I48.91]         Anticoagulation Episode Summary     INR check location     Preferred lab     Send  INR reminders to [facility]    Comments Prounounced A-seenath 4/17/17 Allergice to Heparin/Lovenon per Dr. Angelina Bernal's note  Speak with  Darrin in addition to patient Hx of 2 strokes. HIPPA OK to leave messag  Pt is confused. Please speak in tablets only (5mg tablets)/Send MyChart message      Anticoagulation Care Providers     Provider Role Specialty Phone number    arabella Star Vargas MD Responsible Cardiology [phone]            See the Encounter Report to view Anticoagulation Flowsheet and Dosing Calendar (Go to Encounters tab in chart review, and find the Anticoagulation Therapy Visit)    Dosage adjustment made based on physician directed care plan.      Amanda Frausto RN

## 2017-12-19 NOTE — PROGRESS NOTES
[facility] utilizes an encounter to take the place of a direct phone call to your office. Please take a moment to review the below request. Your reply to this encounter will act as a verbal OK of orders if requested below. Thank you.    ORDER  Requesting extension of home PT treatment orders 1m1 for reassessment visit/home program instruction.  Horace Gan PT  [phone]

## 2017-12-19 NOTE — MR AVS SNAPSHOT
Carlos Alberto Fenton   12/19/2017   Anticoagulation Therapy Visit    Description:  77 year old female   Provider:  Humberto Conner MD   Department:  Ph Anticoag           INR as of 12/19/2017     Today's INR 2.7      Anticoagulation Summary as of 12/19/2017     INR goal 2.0-3.0   Today's INR 2.7   Full instructions 2.5 mg on Mon, Wed, Fri; 5 mg all other days   Next INR check 12/26/2017    Indications   Long-term (current) use of anticoagulants [Z79.01] [Z79.01]  New onset atrial fibrillation (H) (Resolved) [I48.91]  Atrial fibrillation (H) [I48.91] [I48.91]         Contact Numbers     Clinic Number:         December 2017 Details    Sun Mon Tue Wed Thu Fri Sat          1               2                 3               4               5               6               7               8               9                 10               11               12               13               14               15               16                 17               18               19      5 mg   See details      20      2.5 mg         21      5 mg         22      2.5 mg         23      5 mg           24      5 mg         25      2.5 mg         26            27               28               29               30                 31                      Date Details   12/19 This INR check       Date of next INR:  12/26/2017         How to take your warfarin dose     To take:  2.5 mg Take 0.5 of a 5 mg tablet.    To take:  5 mg Take 1 of the 5 mg tablets.

## 2017-12-20 PROCEDURE — G0179 MD RECERTIFICATION HHA PT: HCPCS | Performed by: INTERNAL MEDICINE

## 2017-12-26 ENCOUNTER — ANTICOAGULATION THERAPY VISIT (OUTPATIENT)
Dept: ANTICOAGULATION | Facility: CLINIC | Age: 77
End: 2017-12-26
Payer: COMMERCIAL

## 2017-12-26 ENCOUNTER — TELEPHONE (OUTPATIENT)
Dept: INTERNAL MEDICINE | Facility: CLINIC | Age: 77
End: 2017-12-26

## 2017-12-26 DIAGNOSIS — I48.0 PAROXYSMAL ATRIAL FIBRILLATION (H): ICD-10-CM

## 2017-12-26 DIAGNOSIS — Z79.01 LONG-TERM (CURRENT) USE OF ANTICOAGULANTS: ICD-10-CM

## 2017-12-26 LAB — INR PPP: 3.3

## 2017-12-26 PROCEDURE — 99207 ZZC NO CHARGE NURSE ONLY: CPT | Performed by: INTERNAL MEDICINE

## 2017-12-26 NOTE — TELEPHONE ENCOUNTER
Reason for Call:  INR    Who is calling?  Home Care:    Phone number:  [phone]    Fax number:      Name of caller: Renetta    INR Value:  3.3    Are there any other concerns:  No    Can we leave a detailed message on this number? YES    Phone number patient can be reached at: Home number on file [phone] (home)      Call taken on 12/26/2017 at 2:08 PM by Jody Ortega

## 2017-12-26 NOTE — MR AVS SNAPSHOT
Ramasaundrah I Eliceo   12/26/2017   Anticoagulation Therapy Visit    Description:  77 year old female   Provider:  Humberto Conner MD   Department:  Ph Anticoag           INR as of 12/26/2017     Today's INR 3.3!      Anticoagulation Summary as of 12/26/2017     INR goal 2.0-3.0   Today's INR 3.3!   Full instructions 2.5 mg on Mon, Wed, Fri; 5 mg all other days   Next INR check 1/2/2018    Indications   Long-term (current) use of anticoagulants [Z79.01] [Z79.01]  New onset atrial fibrillation (H) (Resolved) [I48.91]  Atrial fibrillation (H) [I48.91] [I48.91]         Contact Numbers     Clinic Number:         December 2017 Details    Sun Mon Tue Wed Thu Fri Sat          1               2                 3               4               5               6               7               8               9                 10               11               12               13               14               15               16                 17               18               19               20               21               22               23                 24               25               26      5 mg   See details      27      2.5 mg         28      5 mg         29      2.5 mg         30      5 mg           31      5 mg                Date Details   12/26 This INR check               How to take your warfarin dose     To take:  2.5 mg Take 0.5 of a 5 mg tablet.    To take:  5 mg Take 1 of the 5 mg tablets.           January 2018 Details    Sun Mon Tue Wed Thu Fri Sat      1      2.5 mg         2            3               4               5               6                 7               8               9               10               11               12               13                 14               15               16               17               18               19               20                 21               22               23               24               25               26               27                  28               29               30               31                   Date Details   No additional details    Date of next INR:  1/2/2018         How to take your warfarin dose     To take:  2.5 mg Take 0.5 of a 5 mg tablet.    To take:  5 mg Take 1 of the 5 mg tablets.

## 2017-12-26 NOTE — PROGRESS NOTES
ANTICOAGULATION FOLLOW-UP CLINIC VISIT    Patient Name:  Carlos Alberto Fenton  Date:  12/26/2017  Contact Type:  Telephone    SUBJECTIVE:     Patient Findings     Positives No Problem Findings    Comments Reason for Call:  INR     Who is calling?  Home Care:     Phone number:  [phone]     Fax number:       Name of caller: Renetta     INR Value:  3.3     Are there any other concerns:  No     Can we leave a detailed message on this number? YES     Phone number patient can be reached at: Home number on file [phone] (home)           OBJECTIVE    INR   Date Value Ref Range Status   12/26/2017 3.3  Final     Chromogenic Factor 10   Date Value Ref Range Status   03/02/2017 65 (L) 70 - 130 % Final     Comment:     Therapeutic Range:  A Chromogenic Factor 10 level of approximately 20-40%   inversely correlates with an INR of 2-3 for patients receiving Warfarin.   Chromogenic Factor 10 levels below 20% indicate an INR greater than 3 and   levels above 40% indicate an INR less than 2.       Factor 2 Assay   Date Value Ref Range Status   02/27/2017 37 (L) 60 - 140 % Final       ASSESSMENT / PLAN  INR assessment SUPRA    Recheck INR In: 1 WEEK    INR Location [facility] INR      Anticoagulation Summary as of 12/26/2017     INR goal 2.0-3.0   Today's INR 3.3!   Maintenance plan 2.5 mg (5 mg x 0.5) on Mon, Wed, Fri; 5 mg (5 mg x 1) all other days   Full instructions 2.5 mg on Mon, Wed, Fri; 5 mg all other days   Weekly total 27.5 mg   No change documented Catrachita Lacy, REESE   Plan last modified Amanda Frausto RN (12/12/2017)   Next INR check 1/2/2018   Priority INR   Target end date Indefinite    Indications   Long-term (current) use of anticoagulants [Z79.01] [Z79.01]  New onset atrial fibrillation (H) (Resolved) [I48.91]  Atrial fibrillation (H) [I48.91] [I48.91]         Anticoagulation Episode Summary     INR check location     Preferred lab     Send INR reminders to [facility]    Comments Prounounced A-seenath 4/17/17  Allergice to Heparin/Lovenon per Dr. Angelina Bernal's note  Speak with  Darrin in addition to patient Hx of 2 strokes. HIPPA OK to leave messag  Pt is confused. Please speak in tablets only (5mg tablets)/Send MyChart message      Anticoagulation Care Providers     Provider Role Specialty Phone number    Star Lobato MD Responsible Cardiology [phone]            See the Encounter Report to view Anticoagulation Flowsheet and Dosing Calendar (Go to Encounters tab in chart review, and find the Anticoagulation Therapy Visit)    Dosage adjustment made based on physician directed care plan.    Catrachita Lacy RN

## 2018-01-02 ENCOUNTER — CARE COORDINATION (OUTPATIENT)
Dept: CARDIOLOGY | Facility: CLINIC | Age: 78
End: 2018-01-02

## 2018-01-02 ENCOUNTER — TELEPHONE (OUTPATIENT)
Dept: FAMILY MEDICINE | Facility: CLINIC | Age: 78
End: 2018-01-02

## 2018-01-02 ENCOUNTER — ANTICOAGULATION THERAPY VISIT (OUTPATIENT)
Dept: ANTICOAGULATION | Facility: CLINIC | Age: 78
End: 2018-01-02
Payer: COMMERCIAL

## 2018-01-02 DIAGNOSIS — Z79.01 LONG-TERM (CURRENT) USE OF ANTICOAGULANTS: ICD-10-CM

## 2018-01-02 DIAGNOSIS — I48.0 PAROXYSMAL ATRIAL FIBRILLATION (H): ICD-10-CM

## 2018-01-02 LAB — INR PPP: 1.8

## 2018-01-02 PROCEDURE — 99207 ZZC NO CHARGE NURSE ONLY: CPT | Performed by: INTERNAL MEDICINE

## 2018-01-02 NOTE — PROGRESS NOTES
Home care RN, Chantal, called to discuss current plan of care.  She reports that the patient's heart rates have returned to normal in the 80s to low 90s.  Patient would like to know plan of care.  Chantal understands that I will review with Dr. Azevedo and return call.

## 2018-01-02 NOTE — TELEPHONE ENCOUNTER
Reason for Call:  INR    Who is calling?  [facility]       Name of caller: Chantal    INR Value:  1.8    Are there any other concerns:  Yes: Missed Medications last night, so that may be factor in the level, also should she recheck next week Monday instead of Tuesday? Chantal is out there Monday, not Tuesday     Can we leave a detailed message on this number? YES          Call taken on 1/2/2018 at 12:30 PM by Rosalee Casillas

## 2018-01-02 NOTE — MR AVS SNAPSHOT
Carlos Alberto Fenton   1/2/2018   Anticoagulation Therapy Visit    Description:  77 year old female   Provider:  Humberto Conner MD   Department:  Ph Anticoag           INR as of 1/2/2018     Today's INR 1.8!      Anticoagulation Summary as of 1/2/2018     INR goal 2.0-3.0   Today's INR 1.8!   Full instructions 2.5 mg on Mon, Wed, Fri; 5 mg all other days   Next INR check 1/8/2018    Indications   Long-term (current) use of anticoagulants [Z79.01] [Z79.01]  New onset atrial fibrillation (H) (Resolved) [I48.91]  Atrial fibrillation (H) [I48.91] [I48.91]         Contact Numbers     Clinic Number:         January 2018 Details    Sun Mon Tue Wed Thu Fri Sat      1               2      5 mg   See details      3      2.5 mg         4      5 mg         5      2.5 mg         6      5 mg           7      5 mg         8            9               10               11               12               13                 14               15               16               17               18               19               20                 21               22               23               24               25               26               27                 28               29               30               31                   Date Details   01/02 This INR check       Date of next INR:  1/8/2018         How to take your warfarin dose     To take:  2.5 mg Take 0.5 of a 5 mg tablet.    To take:  5 mg Take 1 of the 5 mg tablets.

## 2018-01-02 NOTE — PROGRESS NOTES
ANTICOAGULATION FOLLOW-UP CLINIC VISIT    Patient Name:  Carlos Alberto Fenton  Date:  1/2/2018  Contact Type:  Telephone/ AMIRA Cornejo nurse    SUBJECTIVE:     Patient Findings     Positives Missed doses (accidently missed 2.5 mg dose last night)    Comments Reason for Call:  INR     Who is calling?  [facility]         Name of caller: Chantal     INR Value:  1.8     Are there any other concerns:  Yes: Missed Medications last night, so that may be factor in the level, also should she recheck next week Monday instead of Tuesday? Chantal is out there Monday, not Tuesday      Can we leave a detailed message on this number? YES              Call taken on 1/2/2018 at 12:30 PM by Rosalee Casillas           OBJECTIVE    INR   Date Value Ref Range Status   01/02/2018 1.8  Final     Chromogenic Factor 10   Date Value Ref Range Status   03/02/2017 65 (L) 70 - 130 % Final     Comment:     Therapeutic Range:  A Chromogenic Factor 10 level of approximately 20-40%   inversely correlates with an INR of 2-3 for patients receiving Warfarin.   Chromogenic Factor 10 levels below 20% indicate an INR greater than 3 and   levels above 40% indicate an INR less than 2.       Factor 2 Assay   Date Value Ref Range Status   02/27/2017 37 (L) 60 - 140 % Final       ASSESSMENT / PLAN  INR assessment THER    Recheck INR In: 6 DAYS    INR Location [facility]R      Anticoagulation Summary as of 1/2/2018     INR goal 2.0-3.0   Today's INR 1.8!   Maintenance plan 2.5 mg (5 mg x 0.5) on Mon, Wed, Fri; 5 mg (5 mg x 1) all other days   Full instructions 2.5 mg on Mon, Wed, Fri; 5 mg all other days   Weekly total 27.5 mg   No change documented Amanda Frausto, RN   Plan last modified Amanda Frausto, RN (12/12/2017)   Next INR check 1/8/2018   Priority INR   Target end date Indefinite    Indications   Long-term (current) use of anticoagulants [Z79.01] [Z79.01]  New onset atrial fibrillation (H) (Resolved) [I48.91]  Atrial fibrillation (H) [I48.91] [I48.91]          Anticoagulation Episode Summary     INR check location     Preferred lab     Send INR reminders to [facility]    Comments Prounounced A-seenath 4/17/17 Allergice to Heparin/Lovenon per Dr. Angelina Bernal's note  Speak with  Darrin in addition to patient Hx of 2 strokes. HIPPA OK to leave messag  Pt is confused. Please speak in tablets only (5mg tablets)/Send MyChart message      Anticoagulation Care Providers     Provider Role Specialty Phone number    Star Lobato MD Responsible Cardiology [phone]            See the Encounter Report to view Anticoagulation Flowsheet and Dosing Calendar (Go to Encounters tab in chart review, and find the Anticoagulation Therapy Visit)    Dosage adjustment made based on physician directed care plan.      Amanda Frausto RN

## 2018-01-02 NOTE — PROGRESS NOTES
Returned call to home care RN to discuss further.  Patient does continue to feel very fatigued, specifically in the afternoon.  Her fatigue has not improved nor worsened since last clinic visit.  Patient's home record of heart rate listed from oldest to newest are as follows: 127, 49, 78, 120, 60, 51, 66, and it is 55 today.  Patient would like to proceed with ILR due to persistent fatigue.  The nurse will inform patient that she will receive return call before the end of the week with plan.

## 2018-01-03 ENCOUNTER — TELEPHONE (OUTPATIENT)
Dept: CARDIOLOGY | Facility: CLINIC | Age: 78
End: 2018-01-03

## 2018-01-03 ENCOUNTER — CARE COORDINATION (OUTPATIENT)
Dept: CARDIOLOGY | Facility: CLINIC | Age: 78
End: 2018-01-03

## 2018-01-03 DIAGNOSIS — Z91.048 ALLERGY TO ADHESIVE: ICD-10-CM

## 2018-01-03 DIAGNOSIS — I48.91 ATRIAL FIBRILLATION (H): Primary | ICD-10-CM

## 2018-01-03 RX ORDER — LIDOCAINE 40 MG/G
CREAM TOPICAL
Status: CANCELLED | OUTPATIENT
Start: 2018-01-03

## 2018-01-03 NOTE — TELEPHONE ENCOUNTER
Patient is booked for 1/16 at 3pm. I did talk to the daughter and to the Home health nurse and discussed with both that it appears the NOAC is going to be about $400 out of pocket if the patient decides to go that route. I encouraged the daughter to call the insurance company to confirm out of pocket to the patient.     The home health RN is going to do the incision check post implant.     This information was sent to Dr. Azevedo and Margi Baker RN on 1/3/18    Luz Marina Rodas  Periop Electrophysiology   [phone]

## 2018-01-03 NOTE — TELEPHONE ENCOUNTER
"Writer called Daughter Lindsay's number listed on the appointment page of the patient's chart at [phone] - this number was \"NOT IN SERVICE\" a call was then placed to a number found digging into the demographics page which is [phone].  A basic message was left at this number due to the fact that the owner of the phone did NOT identify where we were calling. The message just stated, \"this is Luz Marina from the [facility] please call me at [phone]\".    The call is intended to schedule the patient for her loop recorder placement and according to the notes on the appointment pages appointments must be made through Kade Rodas  Periop Electrophysiology   [phone]    "

## 2018-01-03 NOTE — PROGRESS NOTES
Date: 1/3/2018    Time of Call: 11:37 AM     Diagnosis:  Atrial fibrillation     [ TORB ] Ordering provider: Dr. Azevedo  Order: ILR implant soon.  Hold Warfarin 3 days before. Consider starting NOAC night of procedure vs. Resuming warfarin.      Order received by: Margi Baker RN, BSN     Follow-up/additional notes: Orders placed.   will contact patient's daughter to discuss further.

## 2018-01-08 ENCOUNTER — DOCUMENTATION ONLY (OUTPATIENT)
Dept: CARE COORDINATION | Facility: CLINIC | Age: 78
End: 2018-01-08

## 2018-01-08 ENCOUNTER — ANTICOAGULATION THERAPY VISIT (OUTPATIENT)
Dept: ANTICOAGULATION | Facility: OTHER | Age: 78
End: 2018-01-08
Payer: COMMERCIAL

## 2018-01-08 ENCOUNTER — TELEPHONE (OUTPATIENT)
Dept: FAMILY MEDICINE | Facility: OTHER | Age: 78
End: 2018-01-08

## 2018-01-08 DIAGNOSIS — Z79.01 LONG-TERM (CURRENT) USE OF ANTICOAGULANTS: ICD-10-CM

## 2018-01-08 DIAGNOSIS — I48.91 ATRIAL FIBRILLATION, UNSPECIFIED TYPE (H): ICD-10-CM

## 2018-01-08 LAB — INR PPP: 2.6

## 2018-01-08 PROCEDURE — 99207 ZZC NO CHARGE NURSE ONLY: CPT | Performed by: INTERNAL MEDICINE

## 2018-01-08 NOTE — TELEPHONE ENCOUNTER
Patient's daughter agreed to check in to NOACs further per . Patient will likely remain on Warfarin. Consideration may be given to home INR monitor in the future.

## 2018-01-08 NOTE — PROGRESS NOTES
[facility] utilizes an encounter to take the place of a direct phone call to your office. Please take a moment to review the below request. Your reply to this encounter will act as a verbal OK of orders if requested below. Thank you.    ORDER    [facility].    Ester Pineda RN Case Manager  [phone]  [email or web address]

## 2018-01-08 NOTE — PROGRESS NOTES
ANTICOAGULATION FOLLOW-UP CLINIC VISIT    Patient Name:  Carlos Alberto Fenton  Date:  1/8/2018  [facility]    SUBJECTIVE:     Patient Findings     Positives No Problem Findings    Comments Pt is scheduled for a LOOP recorder on 1/16/18 and will be taking her last coumadin on 1/12. She is then going to start on a different anticoagulate after this procedure. I have given Chantal orders for her coumadin up until the 12th but then advised her to contact us if anything changes and she does not change anticoagulants. Jono Guerrero, RN, BSN      Reason for call:  INR Reason for Call:  INR     Who is calling?  Nursing Home: [facility]     Phone number:  [phone]     Fax number:       Name of caller: Chantal     INR Value:  2.6     Are there any other concerns:  No, Chantal would like to know if we can give her coumadin through Jan 12th.     Can we leave a detailed message on this number? NO     Phone number patient can be reached at: Other phone number:          Call taken on 1/8/2018 at 12:08 PM by Meena Bhardwaj           OBJECTIVE    INR   Date Value Ref Range Status   01/08/2018 2.6  Final     Chromogenic Factor 10   Date Value Ref Range Status   03/02/2017 65 (L) 70 - 130 % Final     Comment:     Therapeutic Range:  A Chromogenic Factor 10 level of approximately 20-40%   inversely correlates with an INR of 2-3 for patients receiving Warfarin.   Chromogenic Factor 10 levels below 20% indicate an INR greater than 3 and   levels above 40% indicate an INR less than 2.       Factor 2 Assay   Date Value Ref Range Status   02/27/2017 37 (L) 60 - 140 % Final       ASSESSMENT / PLAN  INR assessment THER    Recheck INR In: 4 DAYS    INR Location [facility] INR      Anticoagulation Summary as of 1/8/2018     INR goal 2.0-3.0   Today's INR 2.6   Maintenance plan 2.5 mg (5 mg x 0.5) on Mon, Wed, Fri; 5 mg (5 mg x 1) all other days   Full instructions 2.5 mg on Mon, Wed, Fri; 5 mg all other days   Weekly total  27.5 mg   No change documented Jono Guerrero RN   Plan last modified Amanda Frausto RN (12/12/2017)   Next INR check 1/16/2018   Priority INR   Target end date Indefinite    Indications   Long-term (current) use of anticoagulants [Z79.01] [Z79.01]  New onset atrial fibrillation (H) (Resolved) [I48.91]  Atrial fibrillation (H) [I48.91] [I48.91]         Anticoagulation Episode Summary     INR check location     Preferred lab     Send INR reminders to [facility]    Comments Prounounced A-seenath 4/17/17 Allergice to Heparin/Lovenon per Dr. Angelina Bernal's note  Speak with  Darrin in addition to patient Hx of 2 strokes. HIPPA OK to leave messag  Pt is confused. Please speak in tablets only (5mg tablets)/Send MyChart message      Anticoagulation Care Providers     Provider Role Specialty Phone number    arabellaStar MD Responsible Cardiology [phone]            See the Encounter Report to view Anticoagulation Flowsheet and Dosing Calendar (Go to Encounters tab in chart review, and find the Anticoagulation Therapy Visit)    Dosage adjustment made based on physician directed care plan.    Jono Guerrero, RN

## 2018-01-08 NOTE — MR AVS SNAPSHOT
Carlos Alberto Fenton   1/8/2018   Anticoagulation Therapy Visit    Description:  77 year old female   Provider:  Humberto Conner MD   Department:  [facility]           INR as of 1/8/2018     Today's INR 2.6      Anticoagulation Summary as of 1/8/2018     INR goal 2.0-3.0   Today's INR 2.6   Full instructions 2.5 mg on Mon, Wed, Fri; 5 mg all other days   Next INR check 1/16/2018    Indications   Long-term (current) use of anticoagulants [Z79.01] [Z79.01]  New onset atrial fibrillation (H) (Resolved) [I48.91]  Atrial fibrillation (H) [I48.91] [I48.91]         Contact Numbers     Clinic Number:         January 2018 Details    Sun Mon Tue Wed Thu Fri Sat      1               2               3               4               5               6                 7               8      2.5 mg   See details      9      5 mg         10      2.5 mg         11      5 mg         12      2.5 mg         13      5 mg           14      5 mg         15      2.5 mg         16            17               18               19               20                 21               22               23               24               25               26               27                 28               29               30               31                   Date Details   01/08 This INR check       Date of next INR:  1/16/2018         How to take your warfarin dose     To take:  2.5 mg Take 0.5 of a 5 mg tablet.    To take:  5 mg Take 1 of the 5 mg tablets.

## 2018-01-08 NOTE — TELEPHONE ENCOUNTER
Reason for call:  INR Reason for Call:  INR    Who is calling?  [facility]ursing Home: [facility]    Phone number:  [phone]    Fax number:      Name of caller: Chantal    INR Value:  2.6    Are there any other concerns:  No, Chantal would like to know if we can give her coumadin through Jan 12th.    Can we leave a detailed message on this number? NO    Phone number patient can be reached at: Other phone number:        Call taken on 1/8/2018 at 12:08 PM by Meena Bhardwaj

## 2018-01-11 ENCOUNTER — CARE COORDINATION (OUTPATIENT)
Dept: CARDIOLOGY | Facility: CLINIC | Age: 78
End: 2018-01-11

## 2018-01-11 DIAGNOSIS — I48.91 ATRIAL FIBRILLATION, UNSPECIFIED TYPE (H): Primary | ICD-10-CM

## 2018-01-11 RX ORDER — LIDOCAINE 40 MG/G
CREAM TOPICAL
Status: CANCELLED | OUTPATIENT
Start: 2018-01-11

## 2018-01-11 NOTE — PROGRESS NOTES
Date: 1/11/2018    Time of Call: 3:41 PM     Diagnosis:  Atrial fibrillation     [ TORB ] Ordering provider: Dr. Azevedo   Order: Start Xarelto 20 mg once daily the evening of ILR implant.  Last dose of warfarin to be taken on Saturday.      Order received by: Margi Baker RN, BSN      Follow-up/additional notes: Patient spoke with insurance company and Xarelto is the preferred NOAC per her insurance company.  She will not take Warfarin on Sunday or thereafter. She will start Xarelto the evening of her procedure.  She is agreeable to this plan and denies questions.

## 2018-01-15 ENCOUNTER — MYC REFILL (OUTPATIENT)
Dept: CARDIOLOGY | Facility: CLINIC | Age: 78
End: 2018-01-15

## 2018-01-15 DIAGNOSIS — I50.32 CHRONIC DIASTOLIC HEART FAILURE (H): ICD-10-CM

## 2018-01-15 RX ORDER — CARVEDILOL 3.12 MG/1
3.12 TABLET ORAL 2 TIMES DAILY WITH MEALS
Qty: 180 TABLET | Refills: 3 | Status: ON HOLD | OUTPATIENT
Start: 2018-01-15 | End: 2018-03-19

## 2018-01-15 NOTE — TELEPHONE ENCOUNTER
Message from [facility]:  Margi Baker RN Mon Lee 15, 2018 10:57 AM        ----- Message -----   From: Carlos Alberto Fenton   Sent: 1/15/2018 10:26 AM   To: Cardiology [facility]  Subject: Medication Renewal Request     Original authorizing provider: MD Carlos Alberto Ratliff would like a refill of the following medications:  carvedilol (COREG) 3.125 MG tablet [Star Lobato MD]    Preferred pharmacy: [facility] - [place], [place] - [facility] [address]    Comment:

## 2018-01-16 ENCOUNTER — APPOINTMENT (OUTPATIENT)
Dept: CARDIOLOGY | Facility: CLINIC | Age: 78
End: 2018-01-16
Attending: INTERNAL MEDICINE
Payer: MEDICARE

## 2018-01-16 ENCOUNTER — HOSPITAL ENCOUNTER (OUTPATIENT)
Facility: CLINIC | Age: 78
Discharge: HOME | End: 2018-01-16
Attending: INTERNAL MEDICINE | Admitting: INTERNAL MEDICINE
Payer: MEDICARE

## 2018-01-16 ENCOUNTER — APPOINTMENT (OUTPATIENT)
Dept: MEDSURG UNIT | Facility: CLINIC | Age: 78
End: 2018-01-16
Attending: INTERNAL MEDICINE
Payer: MEDICARE

## 2018-01-16 VITALS
BODY MASS INDEX: 30.36 KG/M2 | WEIGHT: 165 LBS | RESPIRATION RATE: 16 BRPM | DIASTOLIC BLOOD PRESSURE: 65 MMHG | HEIGHT: 62 IN | HEART RATE: 56 BPM | SYSTOLIC BLOOD PRESSURE: 138 MMHG | TEMPERATURE: 97.7 F | OXYGEN SATURATION: 99 %

## 2018-01-16 DIAGNOSIS — I48.0 PAROXYSMAL ATRIAL FIBRILLATION (H): ICD-10-CM

## 2018-01-16 DIAGNOSIS — I48.91 ATRIAL FIBRILLATION (H): ICD-10-CM

## 2018-01-16 DIAGNOSIS — Z91.048 ALLERGY TO ADHESIVE: ICD-10-CM

## 2018-01-16 LAB
ANION GAP SERPL CALCULATED.3IONS-SCNC: 6 MMOL/L (ref 3–14)
BUN SERPL-MCNC: 36 MG/DL (ref 7–30)
CALCIUM SERPL-MCNC: 9 MG/DL (ref 8.5–10.1)
CHLORIDE SERPL-SCNC: 98 MMOL/L (ref 94–109)
CO2 SERPL-SCNC: 34 MMOL/L (ref 20–32)
CREAT SERPL-MCNC: 0.97 MG/DL (ref 0.52–1.04)
ERYTHROCYTE [DISTWIDTH] IN BLOOD BY AUTOMATED COUNT: 17.3 % (ref 10–15)
GFR SERPL CREATININE-BSD FRML MDRD: 56 ML/MIN/1.7M2
GLUCOSE SERPL-MCNC: 125 MG/DL (ref 70–99)
HCT VFR BLD AUTO: 40.4 % (ref 35–47)
HGB BLD-MCNC: 12.8 G/DL (ref 11.7–15.7)
INR PPP: 1.61 (ref 0.86–1.14)
MCH RBC QN AUTO: 34.7 PG (ref 26.5–33)
MCHC RBC AUTO-ENTMCNC: 31.7 G/DL (ref 31.5–36.5)
MCV RBC AUTO: 110 FL (ref 78–100)
PLATELET # BLD AUTO: 176 10E9/L (ref 150–450)
POTASSIUM SERPL-SCNC: 4.6 MMOL/L (ref 3.4–5.3)
RBC # BLD AUTO: 3.69 10E12/L (ref 3.8–5.2)
SODIUM SERPL-SCNC: 138 MMOL/L (ref 133–144)
WBC # BLD AUTO: 5 10E9/L (ref 4–11)

## 2018-01-16 PROCEDURE — 85027 COMPLETE CBC AUTOMATED: CPT | Performed by: INTERNAL MEDICINE

## 2018-01-16 PROCEDURE — 33282 ZZHC LOOP RECORDER IMPLANT: CPT

## 2018-01-16 PROCEDURE — 40000141 ZZH STATISTIC PERIPHERAL IV START W/O US GUIDANCE

## 2018-01-16 PROCEDURE — 25000125 ZZHC RX 250: Performed by: INTERNAL MEDICINE

## 2018-01-16 PROCEDURE — 40000065 ZZH STATISTIC EKG NON-CHARGEABLE

## 2018-01-16 PROCEDURE — 85610 PROTHROMBIN TIME: CPT | Performed by: INTERNAL MEDICINE

## 2018-01-16 PROCEDURE — 36415 COLL VENOUS BLD VENIPUNCTURE: CPT | Performed by: INTERNAL MEDICINE

## 2018-01-16 PROCEDURE — 27210957 ZZH ACCESS EP PROC CR5

## 2018-01-16 PROCEDURE — C1764 EVENT RECORDER, CARDIAC: HCPCS

## 2018-01-16 PROCEDURE — 40000166 ZZH STATISTIC PP CARE STAGE 1

## 2018-01-16 PROCEDURE — 80048 BASIC METABOLIC PNL TOTAL CA: CPT | Performed by: INTERNAL MEDICINE

## 2018-01-16 PROCEDURE — 33282 ZZHC LOOP RECORDER IMPLANT: CPT | Performed by: INTERNAL MEDICINE

## 2018-01-16 RX ORDER — CEFAZOLIN SODIUM 2 G/100ML
2 INJECTION, SOLUTION INTRAVENOUS
Status: DISCONTINUED | OUTPATIENT
Start: 2018-01-16 | End: 2018-01-17 | Stop reason: HOSPADM

## 2018-01-16 RX ORDER — LIDOCAINE HYDROCHLORIDE AND EPINEPHRINE 10; 10 MG/ML; UG/ML
10 INJECTION, SOLUTION INFILTRATION; PERINEURAL ONCE
Status: COMPLETED | OUTPATIENT
Start: 2018-01-16 | End: 2018-01-16

## 2018-01-16 RX ORDER — LIDOCAINE 40 MG/G
CREAM TOPICAL
Status: CANCELLED | OUTPATIENT
Start: 2018-01-16

## 2018-01-16 RX ORDER — LIDOCAINE 40 MG/G
CREAM TOPICAL
Status: DISCONTINUED | OUTPATIENT
Start: 2018-01-16 | End: 2018-01-17 | Stop reason: HOSPADM

## 2018-01-16 RX ADMIN — LIDOCAINE HYDROCHLORIDE,EPINEPHRINE BITARTRATE 10 ML: 10; .01 INJECTION, SOLUTION INFILTRATION; PERINEURAL at 15:13

## 2018-01-16 NOTE — IP AVS SNAPSHOT
[address] [address]                                       After Visit Summary   1/16/2018    Carlos Alberto Fenton    MRN: [id number]           After Visit Summary Signature Page     I have received my discharge instructions, and my questions have been answered. I have discussed any challenges I see with this plan with the nurse or doctor.    ..........................................................................................................................................  Patient/Patient Representative Signature      ..........................................................................................................................................  Patient Representative Print Name and Relationship to Patient    ..................................................               ................................................  Date                                            Time    ..........................................................................................................................................  Reviewed by Signature/Title    ...................................................              ..............................................  Date                                                            Time

## 2018-01-16 NOTE — DISCHARGE INSTRUCTIONS
Home Care after a Loop Recorder Implant  Wound care:  Check for signs of infection each day.  These include increased redness, swelling, drainage or a fever over 101 F (38.3 C).  Call us immediately if you see any of these signs.  You may shower 24 hours after the procedure.  Do not submerge the incision (in a bath tub, hot tub, or swimming pool) until fully healed.  Do not apply powder or lotion to the incision for 14 days.     Pain:   You may have mild pain for 3 to 5 days.  Take acetaminophen (Tylenol) or ibuprofen (Advil) for the pain.  Call us if the pain is severe or lasts more than 5 days.    Activity:  After 24 hours, slowly return to your normal activities.      Avoid anything that may cause rough contact or a hard hit to your chest.  This includes football, hockey, and other contact sports.    Follow Up Visits:  Return to the clinic in 7 to 10 days to have your loop recorder and wound checked.      Telling others about your device:  Before you have any medical tests or treatments, tell the doctors, dentists, and other care providers about your device.  There are a few tests and treatments that may interfere with your device.  Your care team may need to take special steps to keep you safe.  Before you leave the hospital, you will receive a temporary ID card.  A permanent card will be mailed to you about 6 to 8 weeks later.  Always carry the ID card with you.  It has important details about your device.  Safety near electrical equipment:  All of these are safe to use when in good repair -    Microwaves    Radios    Cordless phone    Remote controls    Small electrical tools  Security salvador: It is okay to walk through security salvador at the airports and department stores. Tell airport security that you have an implanted loop recorder.  Full-body scanners are safe.          Home Monitor: White and Blue (pictured below). Plug in at home. Use under device clinic direction.    Questions?  Please call [place]  [facility].    General inquiry: [phone]    Device Nurse:          Business Hours:  [phone]                         After Hours:  [phone]   Choose option 4, then ask for the                             on-call device nurse at job code [id number].    Your next device clinic appointment is scheduled on:     ___Thursday 1/25/18___ at __3 pm___________.          [facility]  [facility] and [facility] - Clinic [address]  [address]

## 2018-01-16 NOTE — IP AVS SNAPSHOT
MRN:[id number]                      After Visit Summary   1/16/2018    Carlos Alberto Fenton    MRN: [id number]           Visit Information        Department      1/16/2018  1:05 PM [address] [facility]          Review of your medicines      UNREVIEWED medicines. Ask your doctor about these medicines        Dose / Directions    acetaminophen 325 MG tablet   Commonly known as:  TYLENOL   Used for:  S/P CABG (coronary artery bypass graft)        Dose:  975 mg   Take 3 tablets (975 mg) by mouth every 6 hours as needed for mild pain   Quantity:  100 tablet   Refills:  0       ADAPTIC NON-ADHERING DRESSING Pads   Used for:  Open wound of lower limb, right, subsequent encounter        Dose:  1 each   Externally apply 1 each topically 2 times daily   Quantity:  1 each   Refills:  3       ALPRAZolam 0.25 MG tablet   Commonly known as:  XANAX   Used for:  Adjustment disorder with anxious mood        Dose:  0.25 mg   Take 1 tablet (0.25 mg) by mouth 3 times daily as needed for anxiety   Quantity:  10 tablet   Refills:  0       aspirin 81 MG chewable tablet   Used for:  Coronary artery disease with angina pectoris, unspecified vessel or lesion type, unspecified whether native or transplanted heart (H)        Dose:  81 mg   Take 1 tablet (81 mg) by mouth daily   Quantity:  30 tablet   Refills:  0       atorvastatin 40 MG tablet   Commonly known as:  LIPITOR   Used for:  Coronary artery disease with angina pectoris, unspecified vessel or lesion type, unspecified whether native or transplanted heart (H)        Dose:  40 mg   Take 1 tablet (40 mg) by mouth daily   Quantity:  90 tablet   Refills:  1       bumetanide 2 MG tablet   Commonly known as:  BUMEX   Used for:  Chronic systolic heart failure (H)        Dose:  2 mg   Take 1 tablet (2 mg) by mouth 2 times daily   Quantity:  180 tablet   Refills:  3       carvedilol 3.125 MG tablet   Commonly known as:  COREG   Used for:  Chronic diastolic heart failure (H)         Dose:  3.125 mg   Take 1 tablet (3.125 mg) by mouth 2 times daily (with meals)   Quantity:  180 tablet   Refills:  3       ferrous sulfate 325 (65 FE) MG tablet   Commonly known as:  IRON SUPPLEMENT   Used for:  S/P CABG (coronary artery bypass graft)        Dose:  325 mg   Take 1 tablet (325 mg) by mouth daily (with breakfast)   Quantity:  100 tablet   Refills:  1       gabapentin 100 MG capsule   Commonly known as:  NEURONTIN   Used for:  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        Dose:  100 mg   Take 1 capsule (100 mg) by mouth 2 times daily   Quantity:  180 capsule   Refills:  1       metolazone 2.5 MG tablet   Commonly known as:  ZAROXOLYN   Used for:  Chronic diastolic heart failure (H)        Dose:  2.5 mg   Take 1 tablet (2.5 mg) by mouth daily as needed For weight over 170 Lbs.   Quantity:  30 tablet   Refills:  1       pantoprazole 40 MG EC tablet   Commonly known as:  PROTONIX   Used for:  Coronary artery disease with angina pectoris, unspecified vessel or lesion type, unspecified whether native or transplanted heart (H)        Dose:  40 mg   Take 1 tablet (40 mg) by mouth every morning   Quantity:  90 tablet   Refills:  1       potassium chloride SA 20 MEQ CR tablet   Commonly known as:  K-DUR/KLOR-CON M   Used for:  S/P CABG (coronary artery bypass graft)        Dose:  20 mEq   Take 1 tablet (20 mEq) by mouth 2 times daily   Quantity:  120 tablet   Refills:  1       rivaroxaban ANTICOAGULANT 20 MG Tabs tablet   Commonly known as:  XARELTO   Used for:  Atrial fibrillation, unspecified type (H)        Dose:  20 mg   Take 1 tablet (20 mg) by mouth daily (with dinner) . DO NOT start until you have stopped the Warfarin.   Quantity:  90 tablet   Refills:  3       spironolactone 25 MG tablet   Commonly known as:  ALDACTONE   Used for:  Chronic systolic heart failure (H)        Dose:  25 mg   Take 1 tablet (25 mg) by mouth daily   Quantity:  90 tablet   Refills:  3       traZODone 50 MG tablet   Commonly  known as:  DESYREL   Used for:  Primary insomnia        Dose:  25 mg   Take 0.5 tablets (25 mg) by mouth nightly as needed for sleep   Quantity:  45 tablet   Refills:  2       venlafaxine 150 MG Tb24 24 hr tablet   Commonly known as:  EFFEXOR-ER   Used for:  Adjustment disorder with depressed mood        Dose:  150 mg   Take 1 tablet (150 mg) by mouth daily (with breakfast)   Quantity:  90 each   Refills:  1       warfarin 5 MG tablet   Commonly known as:  COUMADIN   Used for:  Paroxysmal atrial fibrillation (H)        Dose:  5 mg   Take 1 tablet (5 mg) by mouth daily   Quantity:  90 tablet   Refills:  3         CONTINUE these medicines which have NOT CHANGED        Dose / Directions    ACE BANDAGE SELF-ADHERING Misc   Used for:  Open wound of lower limb, right, subsequent encounter        Dose:  1 each   1 each 2 times daily   Quantity:  6 each   Refills:  3       blood glucose monitoring meter device kit        Refills:  0       KERLIX GAUZE ROLL MEDIUM Misc   Used for:  Open wound of lower limb, right, subsequent encounter        Dose:  1 each   1 each 2 times daily   Quantity:  48 each   Refills:  3       ONETOUCH DELICA LANCETS 33G Misc   Used for:  Type 2 diabetes mellitus without complication, without long-term current use of insulin (H)        Dose:  1 Device   1 Device daily   Quantity:  100 each   Refills:  0       ONETOUCH ULTRA test strip   Used for:  Type 2 diabetes mellitus without complication, without long-term current use of insulin (H)   Generic drug:  blood glucose monitoring        USE AS DIRECTED TO TEST ONE TIME A DAY   Quantity:  100 each   Refills:  2       order for DME   Used for:  Open wound of lower limb, right, subsequent encounter        Sterile 4x4 gauze; Use gauze twice daily for dressing changes.   Quantity:  1 Box   Refills:  3                Protect others around you: Learn how to safely use, store and throw away your medicines at www.disposemymeds.org.         Follow-ups after  your visit        Your next 10 appointments already scheduled     Jan 24, 2018  2:40 PM CST   (Arrive by 2:25 PM)   RETURN FOOT/ANKLE with Easton Torres DPM   [facility] ([facility] [place])    [address]  4th Floor  [facility] [address] [phone]            Mar 02, 2018  4:00 PM CST   (Arrive by 3:45 PM)   Return Visit with Star Lobato MD   [facility] ([facility])    [address]  [address]  [place] [address]   [phone]               Care Instructions        Further instructions from your care team         Home Care after a Loop Recorder Implant  Wound care:  Check for signs of infection each day.  These include increased redness, swelling, drainage or a fever over 101 F (38.3 C).  Call us immediately if you see any of these signs.  You may shower 24 hours after the procedure.  Do not submerge the incision (in a bath tub, hot tub, or swimming pool) until fully healed.  Do not apply powder or lotion to the incision for 14 days.     Pain:   You may have mild pain for 3 to 5 days.  Take acetaminophen (Tylenol) or ibuprofen (Advil) for the pain.  Call us if the pain is severe or lasts more than 5 days.    Activity:  After 24 hours, slowly return to your normal activities.      Avoid anything that may cause rough contact or a hard hit to your chest.  This includes football, hockey, and other contact sports.    Follow Up Visits:  Return to the clinic in 7 to 10 days to have your loop recorder and wound checked.      Telling others about your device:  Before you have any medical tests or treatments, tell the doctors, dentists, and other care providers about your device.  There are a few tests and treatments that may interfere with your device.  Your care team may need to take special steps to keep you safe.  Before you leave the hospital, you will receive a temporary ID card.  A permanent card  "will be mailed to you about 6 to 8 weeks later.  Always carry the ID card with you.  It has important details about your device.  Safety near electrical equipment:  All of these are safe to use when in good repair -    Microwaves    Radios    Cordless phone    Remote controls    Small electrical tools  Security salvador: It is okay to walk through security salvador at the airports and department stores. Tell airport security that you have an implanted loop recorder.  Full-body scanners are safe.          Home Monitor: White and Blue (pictured below). Plug in at home. Use under device clinic direction.    Questions?  Please call [facility].    General inquiry: [phone]    Device Nurse:          Business Hours:  [phone]                         After Hours:  [phone]   Choose option 4, then ask for the                             on-call device nurse at job code [id number].    Your next device clinic appointment is scheduled on:     ___Thursday 1/25/18___ at __3 pm___________.          [facility]  Clinics and Surgery Center - Clinic [address] [address]       Additional Information About Your Visit        [facility] Information     [employer] gives you secure access to your electronic health record. If you see a primary care provider, you can also send messages to your care team and make appointments. If you have questions, please call your primary care clinic.  If you do not have a primary care provider, please call [phone] and they will assist you.        Care EveryWhere ID     This is your Care EveryWhere ID. This could be used by other organizations to access your [place] medical records  [id number]        Your Vitals Were     Blood Pressure Pulse Temperature Respirations Height Weight    138/65 (BP Location: Right arm) 56 97.7  F (36.5  C) (Oral) 16 1.575 m (5' 2\") 74.8 kg (165 lb)    Pulse Oximetry BMI (Body Mass Index)                99% " 30.18 kg/m2           Primary Care Provider Office Phone # Fax #    Humberto Conner [phone]      Equal Access to Services     NATALIE HAYES : Elise william flores saurabh Vasquez, charlotte sariahtutu, cristopher kashiela main, scott varghese memeanila fuentes latristajewel twila. So [facility] [phone].    ATENCIÓN: Si habla español, tiene a espinoza disposición servicios gratuitos de asistencia lingüística. Llame al [phone].    We comply with applicable federal civil rights laws and [place] laws. We do not discriminate on the basis of race, color, national origin, age, disability, sex, sexual orientation, or gender identity.            Thank you!     Thank you for choosing [facility] for your care. Our goal is always to provide you with excellent care. Hearing back from our patients is one way we can continue to improve our services. Please take a few minutes to complete the written survey that you may receive in the mail after you visit with us. Thank you!             Medication List: This is a list of all your medications and when to take them. Check marks below indicate your daily home schedule. Keep this list as a reference.      Medications           Morning Afternoon Evening Bedtime As Needed    ACE BANDAGE SELF-ADHERING Misc   1 each 2 times daily                                acetaminophen 325 MG tablet   Commonly known as:  TYLENOL   Take 3 tablets (975 mg) by mouth every 6 hours as needed for mild pain                                ADAPTIC NON-ADHERING DRESSING Pads   Externally apply 1 each topically 2 times daily                                ALPRAZolam 0.25 MG tablet   Commonly known as:  XANAX   Take 1 tablet (0.25 mg) by mouth 3 times daily as needed for anxiety                                aspirin 81 MG chewable tablet   Take 1 tablet (81 mg) by mouth daily                                atorvastatin 40 MG tablet   Commonly known as:  LIPITOR   Take 1 tablet (40 mg) by mouth daily                                 blood glucose monitoring meter device kit                                bumetanide 2 MG tablet   Commonly known as:  BUMEX   Take 1 tablet (2 mg) by mouth 2 times daily                                carvedilol 3.125 MG tablet   Commonly known as:  COREG   Take 1 tablet (3.125 mg) by mouth 2 times daily (with meals)                                ferrous sulfate 325 (65 FE) MG tablet   Commonly known as:  IRON SUPPLEMENT   Take 1 tablet (325 mg) by mouth daily (with breakfast)                                gabapentin 100 MG capsule   Commonly known as:  NEURONTIN   Take 1 capsule (100 mg) by mouth 2 times daily                                KERLIX GAUZE ROLL MEDIUM Misc   1 each 2 times daily                                metolazone 2.5 MG tablet   Commonly known as:  ZAROXOLYN   Take 1 tablet (2.5 mg) by mouth daily as needed For weight over 170 Lbs.                                ONETOUCH DELICA LANCETS 33G Misc   1 Device daily                                ONETOUCH ULTRA test strip   USE AS DIRECTED TO TEST ONE TIME A DAY   Generic drug:  blood glucose monitoring                                order for DME   Sterile 4x4 gauze; Use gauze twice daily for dressing changes.                                pantoprazole 40 MG EC tablet   Commonly known as:  PROTONIX   Take 1 tablet (40 mg) by mouth every morning                                potassium chloride SA 20 MEQ CR tablet   Commonly known as:  K-DUR/KLOR-CON M   Take 1 tablet (20 mEq) by mouth 2 times daily                                rivaroxaban ANTICOAGULANT 20 MG Tabs tablet   Commonly known as:  XARELTO   Take 1 tablet (20 mg) by mouth daily (with dinner) . DO NOT start until you have stopped the Warfarin.                                spironolactone 25 MG tablet   Commonly known as:  ALDACTONE   Take 1 tablet (25 mg) by mouth daily                                traZODone 50 MG tablet   Commonly known as:  DESYREL   Take 0.5  tablets (25 mg) by mouth nightly as needed for sleep                                venlafaxine 150 MG Tb24 24 hr tablet   Commonly known as:  EFFEXOR-ER   Take 1 tablet (150 mg) by mouth daily (with breakfast)                                warfarin 5 MG tablet   Commonly known as:  COUMADIN   Take 1 tablet (5 mg) by mouth daily

## 2018-01-16 NOTE — PROGRESS NOTES
Returned from [facility] s/p loop recorder implant.  VSS.  Denies pain.  Device nurse at bedside and educated family on how to upload device.  Sternal site with dermabond, CDI.  Resting in bed.  Daughter at bedside.

## 2018-01-17 ENCOUNTER — DOCUMENTATION ONLY (OUTPATIENT)
Dept: CARE COORDINATION | Facility: CLINIC | Age: 78
End: 2018-01-17

## 2018-01-17 ENCOUNTER — CARE COORDINATION (OUTPATIENT)
Dept: CARDIOLOGY | Facility: CLINIC | Age: 78
End: 2018-01-17

## 2018-01-17 LAB — INTERPRETATION ECG - MUSE: NORMAL

## 2018-01-17 NOTE — PROGRESS NOTES
[facility] utilizes an encounter to take the place of a direct phone call to your office. Please take a moment to review the below request. Your reply to this encounter will act as a verbal OK of orders if requested below. Thank you.    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 She has been DCd from coumadin and started xarelto as of 1/16/18. She has wound care has been followed by podiatry for right foot. She is also followed by cardiology for atrial fibrillation and has had a cardioversion over this past CERT period. Post cardioversion, cleint continued to have tachycardia and reported increased fatigue. Yesterday she had a loop recorder implanted. Incision site is CDI.  Pt has some chronic congestive heart failure, BP controlled on current medicaitons.  No peripheral edema. Over this past CERT period wound on left second toe has healed. Home care continues to assess/treat wounds to right first and second toe.   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  WOund 2 Right hallux  1.0 x1.8x 0.2, 90  percent white/grey non viable  tissue, 10 percent red nongranular.   Wound 4 mid chest 0.3 by 0.3 by 0.1cm.  well approximated/dermabond used to close incision.  Pt lives with her daughter in a multilevel home. Pt uses furniture and walls, railings to move about the home. Pts daughter assists with wound care and assisting with  ADLs/IADLs as needed but daughter has health issues and can only do so much. Pt has urinary dribbling and urgency. Able to control bowels. Continues to  benefit from a HHA to assist with bathing. MACH 10 score  is 9, deneis falls. Educated on falls prevention. There are 2 dogs and 1 cat in the house. House is cluttered and in need of a good cleaning.  Client reports that house will be going up for sale and she plans to move to an apartment complex, but does notknow location as of yet.   Denies presence of pain at visit today.  Slight soreness present to chest post loop recorder implant from yesterday.   Nutrition has improved over this past CERT period. Cleint stating that she has been following more of a vegetarian diet. SW coming out today to assess for available CMTY resources, long term planning, transporation assist, and meal assist.    Wt 170 lbs. Edu provided on taking metolazone if weight goes above 170lbs.   Pt does not check her BG level.    Temp 98.0, pulse 56, resp 18, BP   113/76. SPO2 96 percent on RA. uses a cpap at night. No edema at RECERT today. Pt reports fatigue persists, but has not been worse. Sleep has been adequate at night.   Pt does her own med set up, RN verified meds to be appropriate. ults in urinary frequency and it is not easy for her to get to the bathroom.   Pt reports her depression is under control with Effexor. Uses melatonin or Trazadone for sleep.  BACKGROUND... Dx of ULCER LLE, Peripheral vascular disease, CHRONIC DIASTOLIC HEART FAILURE,   HEPARIN INDUCED THROMBOCYTOPENIA, DEPRESSION, AFIB  ANALYSIS...At  risk for wound  infection, non compliance with wound care procedure, medication mismanagment, falls, and difficulties with ADL/IADLs  RECOMMENDATION...SN for wound assessmet/cares, home   safety assess, medication teaching/assessment of complianc, DM teach/asses, nutrition/hydration education and skin assessment, wound care and assessment. HA to   assist with bathing/personal cares.     Ester Pineda RN Case Manager  [phone]  [email or web address]

## 2018-01-17 NOTE — PROGRESS NOTES
[facility] utilizes an encounter to take the place of a direct phone call to your office. Please take a moment to review the below request. Your reply to this encounter will act as a verbal OK of orders if requested below. Thank you.    ORDER      Wound Care to Right First and Second toe...  Clean with hibiclens, Cover daily with MediHoney, adaptic and PrimaPore or gauze/tape  Pt to perform wound care on non nurse days.     All other toes to keep Clean and Dry.

## 2018-01-17 NOTE — PROGRESS NOTES
Attempted to reach patient to ensure you stopped warfarin as instructed without success. Left detailed voice message encouraging return call to confirm warfarin was stopped and that she is only taking Xarelto.  Warfarin should be removed from medication list once it is confirmed that patient made this change as instructed on 01/11/18.

## 2018-01-17 NOTE — PROGRESS NOTES
Chantal from  [facility]care[facility] [place] called: is seeing pt later today and wanted to clarify if pt should be on warfarin or xarelto following ILR implant yesterday.    See THERESA 1/11- home care nurse will see pt later today. She will call if pt has not started xarelto.    RNCC updated

## 2018-01-22 ENCOUNTER — TELEPHONE (OUTPATIENT)
Dept: FAMILY MEDICINE | Facility: CLINIC | Age: 78
End: 2018-01-22
Payer: COMMERCIAL

## 2018-01-22 DIAGNOSIS — I50.32 CHRONIC DIASTOLIC HEART FAILURE (H): Primary | ICD-10-CM

## 2018-01-22 DIAGNOSIS — Z95.1 S/P CABG (CORONARY ARTERY BYPASS GRAFT): ICD-10-CM

## 2018-01-22 DIAGNOSIS — Z53.9 DIAGNOSIS NOT YET DEFINED: Primary | ICD-10-CM

## 2018-01-22 PROCEDURE — G0179 MD RECERTIFICATION HHA PT: HCPCS | Performed by: INTERNAL MEDICINE

## 2018-01-22 NOTE — TELEPHONE ENCOUNTER
Telephone call attempted to Chantal  nurse.  Left message for Chantal to call back to discuss.  See sheets in folder at Mariela's desk.    Gordo Brunner RN, BSN

## 2018-01-22 NOTE — TELEPHONE ENCOUNTER
Forms received from  [facility].  Forms with RN to review medications.  Orders pended for provider to sign.    Forms given to Mariela for PCP signature.

## 2018-01-24 RX ORDER — POTASSIUM CHLORIDE 1500 MG/1
20 TABLET, EXTENDED RELEASE ORAL 2 TIMES DAILY
Qty: 120 TABLET | Refills: 1 | Status: SHIPPED | OUTPATIENT
Start: 2018-01-24 | End: 2018-02-06 | Stop reason: SINTOL

## 2018-02-05 ENCOUNTER — TELEPHONE (OUTPATIENT)
Dept: CARDIOLOGY | Facility: CLINIC | Age: 78
End: 2018-02-05

## 2018-02-05 NOTE — TELEPHONE ENCOUNTER
Pharmacy calling reporting patient is requesting to switch from Potassium K-Dur to Potassium K-Tab.  Patient cannot swallow the K-Dur even when cutting in half and patient also reports it has an awful taste.  Please call Meena (pharmacist) @ [phone].

## 2018-02-06 DIAGNOSIS — I50.32 CHRONIC DIASTOLIC HEART FAILURE (H): Primary | ICD-10-CM

## 2018-02-06 RX ORDER — POTASSIUM CHLORIDE 1500 MG/1
20 TABLET, EXTENDED RELEASE ORAL 2 TIMES DAILY
Qty: 90 TABLET | Refills: 3 | Status: ON HOLD | OUTPATIENT
Start: 2018-02-06 | End: 2018-04-04

## 2018-02-07 ENCOUNTER — OFFICE VISIT (OUTPATIENT)
Dept: PODIATRY | Facility: CLINIC | Age: 78
End: 2018-02-07
Payer: COMMERCIAL

## 2018-02-07 ENCOUNTER — TELEPHONE (OUTPATIENT)
Dept: OTHER | Facility: CLINIC | Age: 78
End: 2018-02-07

## 2018-02-07 ENCOUNTER — TELEPHONE (OUTPATIENT)
Dept: PODIATRY | Facility: CLINIC | Age: 78
End: 2018-02-07

## 2018-02-07 VITALS — HEART RATE: 126 BPM | OXYGEN SATURATION: 96 % | DIASTOLIC BLOOD PRESSURE: 71 MMHG | SYSTOLIC BLOOD PRESSURE: 107 MMHG

## 2018-02-07 DIAGNOSIS — I73.9 PAD (PERIPHERAL ARTERY DISEASE) (H): ICD-10-CM

## 2018-02-07 DIAGNOSIS — I96 TOE GANGRENE (H): ICD-10-CM

## 2018-02-07 DIAGNOSIS — L97.514 SKIN ULCER OF TOE OF RIGHT FOOT WITH NECROSIS OF BONE (H): Primary | ICD-10-CM

## 2018-02-07 DIAGNOSIS — L97.509: Primary | ICD-10-CM

## 2018-02-07 PROCEDURE — 87070 CULTURE OTHR SPECIMN AEROBIC: CPT | Performed by: PODIATRIST

## 2018-02-07 PROCEDURE — 87076 CULTURE ANAEROBE IDENT EACH: CPT | Performed by: PODIATRIST

## 2018-02-07 PROCEDURE — 87075 CULTR BACTERIA EXCEPT BLOOD: CPT | Performed by: PODIATRIST

## 2018-02-07 PROCEDURE — 11044 DBRDMT BONE 1ST 20 SQ CM/<: CPT | Performed by: PODIATRIST

## 2018-02-07 PROCEDURE — 87077 CULTURE AEROBIC IDENTIFY: CPT | Performed by: PODIATRIST

## 2018-02-07 PROCEDURE — 87186 SC STD MICRODIL/AGAR DIL: CPT | Performed by: PODIATRIST

## 2018-02-07 NOTE — LETTER
2018         RE: Carlos Alberto Fenton  [address]  [facility] [address]        Dear Colleague,    Thank you for referring your patient, Carlos Alberto Fenton, to the [facility]. Please see a copy of my visit note below.    Chief Complaint:   Chief Complaint   Patient presents with     RECHECK     6 wk f/u bilateral foot wounds, patient reports new wounds on both feet          Allergies   Allergen Reactions     Heparin      HIT/ thrombocytopenia     Lovenox [Enoxaparin] Other (See Comments)     Thrombocytopenia      Oxycodone      hallucinations     Toprol Xl [Metoprolol] Nausea and Vomiting     Adhesive Tape Itching and Rash     Diapers & Supplies Rash     Developed yeast infection from previous hospital stay         Subjective: Carlos Alberto is a 77 year old female who presents to the clinic today for a follow up of right foot wounds.  She relates that she has been putting Iodosorb on the wounds.  She also has a new dark spot on the left medial aspect of the second digit nail.  She relates that she is unsure if she bumped this.  She does not have a follow-up with vascular yet.  She relates no nausea, vomiting, diarrhea, fever, chills, night sweats, shortness of breath, chest pain.  She has been wearing the  shoes as directed.    Objective  Data Unavailable 126 Data Unavailable 107/71 Data Unavailable 0 lbs 0 oz    A vascular wound is noted at right  hallux measuring 2cm x 2cm x 0.2cm.    Yousif Classification: 2    Wound base: Pink  Yellow/Granulation  Slough    Edges: hyperkeratotic with dried blood in the tissue    Drainage: light/serous    Odor: no    Underminin O'Clock     Bone Exposure: No    Clinical Signs of Infection: No    After obtaining patient consent, the wound was irrigated with copious amounts of saline. A scalpel was then used to debride the wound into the subcutaneous tissue. The wound edges were debrided to healthy, bleeding tissue. Given the patient's lack of sensation, no  anesthesia was necessary for the procedure.   ____________________  Wound #2    A wound is noted at right  distal 2nd digit measuring 1.5cm x 1cm x 0.3cm.    Yousif Classification: 3    Wound base: Yellow  White/Slough  bone    Edges: intact    Drainage: light/serous    Odor: no    Undermining: no    Bone Exposure: Yes: Likely distal end of the proximal phalanx. This was debrided out today.     Clinical Signs of Infection: Yes: osteolysis    After obtaining patient consent, the wound was irrigated with copious amounts of saline. A scalpel was then used to debride the wound into bone tissue. The wound edges were debrided back to healthy, bleeding tissue. The wound base exhibited healthy bleeding. Given the patient's lack of sensation, no anesthesia was necessary for the procedure.  Left second digit was then examined.  I noted a subungual hematoma at the proximal aspect of the medial nail fold.  This appears secondary to some kind of trauma.  No infection is noted to the area today.      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 She has tried multiple Graphix treatments with no resolution.     Plan:   - Pt seen and evaluated  - Wounds were debrided as described. There was a piece of bone that was sharply debrided. Cultures were taken. Will await culture results.  - Xrays ordered.  - I have recommended that she appoint with vascular. With the new exposed bone, I think that she would fare better with a more proximal amputation. Will discuss with the vascular team about them performing a more proximal amp, if possible.   - Continue with DH shoe. She should cleanse the area with Vashe and paint the areas with betadine. She should perform this daily.  - No treatment needed for the left foot.     - Pt to return to clinic in 1 week.       Again, thank you for allowing me to participate in the care of your patient.         Sincerely,        Easton Torres DPM

## 2018-02-07 NOTE — TELEPHONE ENCOUNTER
Rachel from the Vascular clinic is calling to clarify the referral that was sent over today for patient. Please call [phone].

## 2018-02-07 NOTE — NURSING NOTE
"Carlos Alberto Fenton's goals for this visit include: 6 wk f/u for bilateral foot wounds, also reports new wounds on both feet  She requests these members of her care team be copied on today's visit information: yes    PCP: Humberto Conner    Referring Provider:  ESTABLISHED PATIENT  No address on file    Chief Complaint   Patient presents with     RECHECK     6 wk f/u bilateral foot wounds, patient reports new wounds on both feet       Initial /71 (BP Location: Left arm)  Pulse 126  SpO2 96% Estimated body mass index is 30.18 kg/(m^2) as calculated from the following:    Height as of 1/16/18: 1.575 m (5' 2\").    Weight as of 1/16/18: 74.8 kg (165 lb).  Medication Reconciliation: complete    "

## 2018-02-07 NOTE — PATIENT INSTRUCTIONS
Thanks for coming today.  Ortho/Sports Medicine Clinic  [address] [place] [address]    To schedule future appointments in Ortho Clinic, you may call [phone].    To schedule ordered imaging by your provider:   Call Central Imaging [phone]    To schedule an injection ordered by your provider:  Call Central Imaging Injection scheduling line: [phone]  [email or web address] available online at:  [email or web address]    Please call if any further questions or concerns ([phone]).  Clinic hours 8 am to 5 pm.    Return to clinic (call) if symptoms worsen or fail to improve.

## 2018-02-07 NOTE — TELEPHONE ENCOUNTER
Pt referred to [facility] by  for vascular evaluation prior to toe amputation.    Per chart review, pt has seen  at [facility], LOV 11/27/17.  Per 's note, pt was to follow up with  in one month for possible grafix placement.    Need clarification; does pt need vascular evaluation for toe amputation (despite recent clinic visit with ) or should pt see  for possible grafix application?    LM with staff at 's office asking for clarification.    Rachel Molina RN BSN

## 2018-02-07 NOTE — MR AVS SNAPSHOT
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  Ortho/Sports Medicine Clinic  [address] [place] [address]    To schedule future appointments in Ortho Clinic, you may call [phone].    To schedule ordered imaging by your provider:   Call Central Imaging [phone]    To schedule an injection ordered by your provider:  Call Central Imaging Injection scheduling line: [phone]  Giant Realmhart available online at:  [email or web address]    Please call if any further questions or concerns ([phone]).  Clinic hours 8 am to 5 pm.    Return to clinic (call) if symptoms worsen or fail to improve.          Follow-ups after your visit        Additional Services     VASCULAR REFERRAL       Your provider has referred you to the [facility].    Reason for Referral: Vascular Medicine Evaluate vascular for possible toe amputation    Urgency of Referral: Next available    A referral has been sent to our Vascular  Services. If you do not receive a call from them within 24-48 hours you may reach them at [phone].    Please be aware that coverage of these services is subject to the terms and limitations of your health insurance plan.  Call member services at your health plan with any benefit or coverage questions.      Please bring the following with you to your appointment:  (1) Any X-Rays, CTs or MRIs which have been performed.  Contact the facility where they were done to arrange for  prior to your scheduled appointment.  Any new CT, MRI or other procedures ordered by your  specialist must be performed at a [facility] or coordinated by your clinic's referral office.    (2) List of current medications   (3) This referral request   (4) Any documents/labs given to you for this referral                  Follow-up notes from your care team     Return in about 1 week (around 2/14/2018).      Your next 10 appointments already scheduled     Feb 14, 2018  2:00 PM CST   Return Visit with Easton Torres DPM   [facility] ([facility])    [address] [place] [id number] [phone]            Mar 02, 2018  3:30 PM CST   (Arrive by 3:15 PM)   Implantable Loop Recorder with Uc Cv Device 1   [facility] ([facility])    [address] [place] [address]  [address] [place] [id number] [phone]            Mar 02, 2018  4:00 PM CST   (Arrive by 3:45 PM)   Return Visit with Star Lobato MD   [facility])    [address] [place] [address]  [address] [place] [address] [phone]              Who to contact     If you have questions or need follow up information about today's clinic visit or your schedule please contact [facility] directly at [phone].  Normal or non-critical lab and imaging results will be communicated to you by MyChart, letter or phone within 4 business days after the clinic has received the results. If you do not hear from us within 7 days, please contact the clinic through MyChart or phone. If you have a critical or abnormal lab result, we will notify you by phone as soon as possible.  Submit refill requests through [employer] or call your pharmacy and they will forward the refill request to us. Please allow 3 business days for your refill to be completed.          Additional Information About Your Visit        [employer] Information     [employer] gives you secure access to your electronic health record. If you see a  primary care provider, you can also send messages to your care team and make appointments. If you have questions, please call your primary care clinic.  If you do not have a primary care provider, please call [phone] and they will assist you.      [employer] is an electronic gateway that provides easy, online access to your medical records. With [employer], you can request a clinic appointment, read your test results, renew a prescription or communicate with your care team.     To access your existing account, please contact your [facility] or call [phone] for assistance.        Care EveryWhere ID     This is your Care EveryWhere ID. This could be used by other organizations to access your [facility] medical records  [id number]        Your Vitals Were     Pulse Pulse Oximetry                126 96%           Blood Pressure from Last 3 Encounters:   02/07/18 107/71   01/16/18 138/65   12/18/17 94/64    Weight from Last 3 Encounters:   01/16/18 74.8 kg (165 lb)   12/18/17 76.3 kg (168 lb 3.2 oz)   12/15/17 75.4 kg (166 lb 3.2 oz)              We Performed the Following     Anaerobic bacterial culture     Anaerobic bacterial culture     VASCULAR REFERRAL     Wound Culture Aerobic Bacterial     Wound Culture Aerobic Bacterial          Today's Medication Changes          These changes are accurate as of 2/7/18  3:04 PM.  If you have any questions, ask your nurse or doctor.               These medicines have changed or have updated prescriptions.        Dose/Directions    aspirin 81 MG chewable tablet   This may have changed:  when to take this   Used for:  Coronary artery disease with angina pectoris, unspecified vessel or lesion type, unspecified whether native or transplanted heart (H)        Dose:  81 mg   Take 1 tablet (81 mg) by mouth daily   Quantity:  30 tablet   Refills:  0       ferrous sulfate 325 (65 FE) MG tablet   Commonly known as:  IRON SUPPLEMENT   This may have changed:   when to take this   Used for:  S/P CABG (coronary artery bypass graft)        Dose:  325 mg   Take 1 tablet (325 mg) by mouth daily (with breakfast)   Quantity:  100 tablet   Refills:  1                Primary Care Provider Office Phone # Fax #    Humberto Conner [phone]       [facility] [address] [place] [address]        Equal Access to Services     [facility]: Hadii aad ku hadasho Soomaali, waaxda luqadaha, qaybta kaalmada adeegyada, waxay idiin hayaan adeeg kharash la'aan ah. So [facility] [phone].    ATENCIÓN: Si habla español, tiene a espinoza disposición servicios gratuitos de asistencia lingüística. Zaid al [phone].    We comply with applicable federal civil rights laws and [place] laws. We do not discriminate on the basis of race, color, national origin, age, disability, sex, sexual orientation, or gender identity.            Thank you!     Thank you for choosing [facility]  for your care. Our goal is always to provide you with excellent care. Hearing back from our patients is one way we can continue to improve our services. Please take a few minutes to complete the written survey that you may receive in the mail after your visit with us. Thank you!             Your Updated Medication List - Protect others around you: Learn how to safely use, store and throw away your medicines at www.disposemymeds.org.          This list is accurate as of 2/7/18  3:04 PM.  Always use your most recent med list.                   Brand Name Dispense Instructions for use Diagnosis    ACE BANDAGE SELF-ADHERING Misc     6 each    1 each 2 times daily    Open wound of lower limb, right, subsequent encounter       acetaminophen 325 MG tablet    TYLENOL    100 tablet    Take 3 tablets (975 mg) by mouth every 6 hours as needed for mild pain    S/P CABG (coronary artery bypass graft)       ADAPTIC NON-ADHERING DRESSING Pads     1 each    Externally apply 1 each  topically 2 times daily    Open wound of lower limb, right, subsequent encounter       ALPRAZolam 0.25 MG tablet    XANAX    10 tablet    Take 1 tablet (0.25 mg) by mouth 3 times daily as needed for anxiety    Adjustment disorder with anxious mood       aspirin 81 MG chewable tablet     30 tablet    Take 1 tablet (81 mg) by mouth daily    Coronary artery disease with angina pectoris, unspecified vessel or lesion type, unspecified whether native or transplanted heart (H)       atorvastatin 40 MG tablet    LIPITOR    90 tablet    Take 1 tablet (40 mg) by mouth daily    Coronary artery disease with angina pectoris, unspecified vessel or lesion type, unspecified whether native or transplanted heart (H)       blood glucose monitoring meter device kit           bumetanide 2 MG tablet    BUMEX    180 tablet    Take 1 tablet (2 mg) by mouth 2 times daily    Chronic systolic heart failure (H)       carvedilol 3.125 MG tablet    COREG    180 tablet    Take 1 tablet (3.125 mg) by mouth 2 times daily (with meals)    Chronic diastolic heart failure (H)       ferrous sulfate 325 (65 FE) MG tablet    IRON SUPPLEMENT    100 tablet    Take 1 tablet (325 mg) by mouth daily (with breakfast)    S/P CABG (coronary artery bypass graft)       gabapentin 100 MG capsule    NEURONTIN    180 capsule    Take 1 capsule (100 mg) by mouth 2 times daily    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       KERLIX GAUZE ROLL MEDIUM Misc     48 each    1 each 2 times daily    Open wound of lower limb, right, subsequent encounter       MELATONIN PO      Take 1 mg by mouth At Bedtime        metolazone 2.5 MG tablet    ZAROXOLYN    30 tablet    Take 1 tablet (2.5 mg) by mouth daily as needed For weight over 170 Lbs.    Chronic diastolic heart failure (H)       ONETOUCH DELICA LANCETS 33G Misc     100 each    1 Device daily    Type 2 diabetes mellitus without complication, without long-term current use of insulin (H)       ONETOUCH ULTRA test strip    Generic drug:  blood glucose monitoring     100 each    USE AS DIRECTED TO TEST ONE TIME A DAY    Type 2 diabetes mellitus without complication, without long-term current use of insulin (H)       order for DME     1 Box    Sterile 4x4 gauze; Use gauze twice daily for dressing changes.    Open wound of lower limb, right, subsequent encounter       pantoprazole 40 MG EC tablet    PROTONIX    90 tablet    Take 1 tablet (40 mg) by mouth every morning    Coronary artery disease with angina pectoris, unspecified vessel or lesion type, unspecified whether native or transplanted heart (H)       Potassium Chloride ER 20 MEQ Tbcr     90 tablet    Take 1 tablet (20 mEq) by mouth 2 times daily    Chronic diastolic heart failure (H)       rivaroxaban ANTICOAGULANT 20 MG Tabs tablet    XARELTO    90 tablet    Take 1 tablet (20 mg) by mouth daily (with dinner) . DO NOT start until you have stopped the Warfarin.    Atrial fibrillation, unspecified type (H)       spironolactone 25 MG tablet    ALDACTONE    90 tablet    Take 1 tablet (25 mg) by mouth daily    Chronic systolic heart failure (H)       traZODone 50 MG tablet    DESYREL    45 tablet    Take 0.5 tablets (25 mg) by mouth nightly as needed for sleep    Primary insomnia       venlafaxine 150 MG Tb24 24 hr tablet    EFFEXOR-ER    90 each    Take 1 tablet (150 mg) by mouth daily (with breakfast)    Adjustment disorder with depressed mood

## 2018-02-08 NOTE — TELEPHONE ENCOUNTER
Carried messaged back saying she will call patient to set up an appointment.    Sheyla Lam, [facility]

## 2018-02-08 NOTE — TELEPHONE ENCOUNTER
Discussed with .  He will see the pt and he would like an RADHA with the consult.  Will route to scheduling to coordinate.    EDUIN MckeonN, RN

## 2018-02-08 NOTE — TELEPHONE ENCOUNTER
Sent staff message to Rachel explaining what is needed for patient.  Voicemail was not working at number provided.     Sheyla Lam, [facility]

## 2018-02-08 NOTE — PROGRESS NOTES
Chief Complaint:   Chief Complaint   Patient presents with     RECHECK     6 wk f/u bilateral foot wounds, patient reports new wounds on both feet          Allergies   Allergen Reactions     Heparin      HIT/ thrombocytopenia     Lovenox [Enoxaparin] Other (See Comments)     Thrombocytopenia      Oxycodone      hallucinations     Toprol Xl [Metoprolol] Nausea and Vomiting     Adhesive Tape Itching and Rash     Diapers & Supplies Rash     Developed yeast infection from previous hospital stay         Subjective: Carlos Alberto is a 77 year old female who presents to the clinic today for a follow up of right foot wounds.  She relates that she has been putting Iodosorb on the wounds.  She also has a new dark spot on the left medial aspect of the second digit nail.  She relates that she is unsure if she bumped this.  She does not have a follow-up with vascular yet.  She relates no nausea, vomiting, diarrhea, fever, chills, night sweats, shortness of breath, chest pain.  She has been wearing the  shoes as directed.    Objective  Data Unavailable 126 Data Unavailable 107/71 Data Unavailable 0 lbs 0 oz    A vascular wound is noted at right  hallux measuring 2cm x 2cm x 0.2cm.    Yousif Classification: 2    Wound base: Pink  Yellow/Granulation  Slough    Edges: hyperkeratotic with dried blood in the tissue    Drainage: light/serous    Odor: no    Underminin O'Clock     Bone Exposure: No    Clinical Signs of Infection: No    After obtaining patient consent, the wound was irrigated with copious amounts of saline. A scalpel was then used to debride the wound into the subcutaneous tissue. The wound edges were debrided to healthy, bleeding tissue. Given the patient's lack of sensation, no anesthesia was necessary for the procedure.   ____________________  Wound #2    A wound is noted at right  distal 2nd digit measuring 1.5cm x 1cm x 0.3cm.    Yousif Classification: 3    Wound base: Yellow  White/Slough  bone    Edges:  intact    Drainage: light/serous    Odor: no    Undermining: no    Bone Exposure: Yes: Likely distal end of the proximal phalanx. This was debrided out today.     Clinical Signs of Infection: Yes: osteolysis    After obtaining patient consent, the wound was irrigated with copious amounts of saline. A scalpel was then used to debride the wound into bone tissue. The wound edges were debrided back to healthy, bleeding tissue. The wound base exhibited healthy bleeding. Given the patient's lack of sensation, no anesthesia was necessary for the procedure.  Left second digit was then examined.  I noted a subungual hematoma at the proximal aspect of the medial nail fold.  This appears secondary to some kind of trauma.  No infection is noted to the area today.      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 She has tried multiple Graphix treatments with no resolution.     Plan:   - Pt seen and evaluated  - Wounds were debrided as described. There was a piece of bone that was sharply debrided. Cultures were taken. Will await culture results.  - Xrays ordered.  - I have recommended that she appoint with vascular. With the new exposed bone, I think that she would fare better with a more proximal amputation. Will discuss with the vascular team about them performing a more proximal amp, if possible.   - Continue with DH shoe. She should cleanse the area with Vashe and paint the areas with betadine. She should perform this daily.  - No treatment needed for the left foot.     - Pt to return to clinic in 1 week.

## 2018-02-09 LAB
BACTERIA SPEC CULT: ABNORMAL
Lab: ABNORMAL
Lab: ABNORMAL
SPECIMEN SOURCE: ABNORMAL
SPECIMEN SOURCE: ABNORMAL

## 2018-02-12 DIAGNOSIS — L97.514 SKIN ULCER OF TOE OF RIGHT FOOT WITH NECROSIS OF BONE (H): Primary | ICD-10-CM

## 2018-02-12 RX ORDER — SULFAMETHOXAZOLE/TRIMETHOPRIM 800-160 MG
1 TABLET ORAL 2 TIMES DAILY
Qty: 28 TABLET | Refills: 0 | Status: ON HOLD | OUTPATIENT
Start: 2018-02-12 | End: 2018-02-22

## 2018-02-14 ENCOUNTER — RADIANT APPOINTMENT (OUTPATIENT)
Dept: GENERAL RADIOLOGY | Facility: CLINIC | Age: 78
End: 2018-02-14
Attending: PODIATRIST
Payer: COMMERCIAL

## 2018-02-14 ENCOUNTER — OFFICE VISIT (OUTPATIENT)
Dept: PODIATRY | Facility: CLINIC | Age: 78
End: 2018-02-14
Payer: COMMERCIAL

## 2018-02-14 VITALS — HEART RATE: 64 BPM | OXYGEN SATURATION: 94 % | SYSTOLIC BLOOD PRESSURE: 124 MMHG | DIASTOLIC BLOOD PRESSURE: 80 MMHG

## 2018-02-14 DIAGNOSIS — L97.922 ULCER OF LEFT LOWER EXTREMITY WITH FAT LAYER EXPOSED (H): ICD-10-CM

## 2018-02-14 DIAGNOSIS — L97.514 SKIN ULCER OF TOE OF RIGHT FOOT WITH NECROSIS OF BONE (H): ICD-10-CM

## 2018-02-14 DIAGNOSIS — L97.514 SKIN ULCER OF TOE OF RIGHT FOOT WITH NECROSIS OF BONE (H): Primary | ICD-10-CM

## 2018-02-14 PROCEDURE — 99213 OFFICE O/P EST LOW 20 MIN: CPT | Mod: 25 | Performed by: PODIATRIST

## 2018-02-14 PROCEDURE — 11042 DBRDMT SUBQ TIS 1ST 20SQCM/<: CPT | Performed by: PODIATRIST

## 2018-02-14 PROCEDURE — 73630 X-RAY EXAM OF FOOT: CPT | Mod: RT | Performed by: RADIOLOGY

## 2018-02-14 ASSESSMENT — PAIN SCALES - GENERAL: PAINLEVEL: SEVERE PAIN (6)

## 2018-02-14 NOTE — PATIENT INSTRUCTIONS
Thanks for coming today.  Ortho/Sports Medicine Clinic  [address] [place] [address]    To schedule future appointments in Ortho Clinic, you may call [phone].    To schedule ordered imaging by your provider:   Call Central Imaging [phone]    To schedule an injection ordered by your provider:  Call Central Imaging Injection scheduling line: [phone]  [email or web address] available online at:  [email or web address]    Please call if any further questions or concerns ([phone]).  Clinic hours 8 am to 5 pm.    Return to clinic (call) if symptoms worsen or fail to improve.

## 2018-02-14 NOTE — PROGRESS NOTES
Chief Complaint:   Chief Complaint   Patient presents with     RECHECK     Recheck right foot          Allergies   Allergen Reactions     Heparin      HIT/ thrombocytopenia     Lovenox [Enoxaparin] Other (See Comments)     Thrombocytopenia      Oxycodone      hallucinations     Toprol Xl [Metoprolol] Nausea and Vomiting     Adhesive Tape Itching and Rash     Diapers & Supplies Rash     Developed yeast infection from previous hospital stay         Subjective: Carlos Alberto is a 77 year old female who presents to the clinic today for a follow up of right foot wounds. She relates that she has continued to use the Vashe on the wounds daily. She continues to use the Iodosorb. She relates that she had a sore feeling on the bottom of the left foot and she picked at it. She picked so much skin away that a new wound is on the left heel. She thinks that the right wounds are looking better. She did not  the antibiotics.     Objective  A vascular wound is noted at right  hallux measuring 0.9cm x 0.5cm x 0.2cm.     Yousif Classification: 2     Wound base: Pink  Yellow/Granulation  Slough     Edges: hyperkeratotic with dried blood in the tissue     Drainage: light/serous     Odor: no     Underminin O'Clock      Bone Exposure: No     Clinical Signs of Infection: No     After obtaining patient consent, the wound was irrigated with copious amounts of saline. A scalpel was then used to debride the wound into the subcutaneous tissue. The wound edges were debrided to healthy, bleeding tissue. Given the patient's lack of sensation, no anesthesia was necessary for the procedure.   ____________________  Wound #2     A wound is noted at right  distal 2nd digit measuring 0.8cm x 0.4cm x 0.3cm.     Yousif Classification: 3     Wound base: Yellow  White/Slough  bone     Edges: intact     Drainage: light/serous     Odor: no     Undermining: no     Bone Exposure: No     Clinical Signs of Infection: No     After obtaining patient consent,  the wound was irrigated with copious amounts of saline. A scalpel was then used to debride the wound into subcutaneous tissue. The wound edges were debrided back to healthy, bleeding tissue. The wound base exhibited healthy bleeding. Given the patient's lack of sensation, no anesthesia was necessary for the procedure.  __________________  Wound #3    A wound is noted at left  plantar heel measuring 0.2cm x 0.2cm down to the dermis.    Yousif Classification: 2    Wound base: Red/Granulation    Edges: intact    Drainage: light/sanguinous    Odor: no    Undermining: no    Bone Exposure: No    Clinical Signs of Infection: No    After obtaining patient consent, the wound was irrigated with copious amounts of saline. No sharp debridement performed on this wound today.       Assessment: Bl wounds - right toes are healing some. New left heel.     Plan:   - Pt seen and evaluated  - Right toes are healing some. She should continue with Vashe and Iodosorb. If healing continues, may not need amp. Will continue to monitor. Stay connected with vascular.   - Iodosorb and mepilex to the left heel daily.   - Continue with DH shoes,  - 3 view xrays ordered, however only lateral was taken. Need 2 more views next week.   - Pt to return to clinic in 1 week.

## 2018-02-14 NOTE — NURSING NOTE
"Carlos Alberto Fenton's goals for this visit include:  Recheck right foot   She requests these members of her care team be copied on today's visit information: yes    PCP: Humberto Conner    Referring Provider:  No referring provider defined for this encounter.    Chief Complaint   Patient presents with     RECHECK     Recheck right foot       Initial Pulse 64  SpO2 94% Estimated body mass index is 30.18 kg/(m^2) as calculated from the following:    Height as of 1/16/18: 1.575 m (5' 2\").    Weight as of 1/16/18: 74.8 kg (165 lb).  Medication Reconciliation: complete    "

## 2018-02-14 NOTE — LETTER
2018         RE: Carlos Alberto Fenton  [address]  [facility] [address]        Dear Colleague,    Thank you for referring your patient, Carlos Alberto Fenton, to the [facility]. Please see a copy of my visit note below.    Chief Complaint:   Chief Complaint   Patient presents with     RECHECK     Recheck right foot          Allergies   Allergen Reactions     Heparin      HIT/ thrombocytopenia     Lovenox [Enoxaparin] Other (See Comments)     Thrombocytopenia      Oxycodone      hallucinations     Toprol Xl [Metoprolol] Nausea and Vomiting     Adhesive Tape Itching and Rash     Diapers & Supplies Rash     Developed yeast infection from previous hospital stay         Subjective: Carlos Alberto is a 77 year old female who presents to the clinic today for a follow up of right foot wounds. She relates that she has continued to use the Vashe on the wounds daily. She continues to use the Iodosorb. She relates that she had a sore feeling on the bottom of the left foot and she picked at it. She picked so much skin away that a new wound is on the left heel. She thinks that the right wounds are looking better. She did not  the antibiotics.     Objective  A vascular wound is noted at right  hallux measuring  0.9cm x 0.5cm x 0.2cm.     Yousif Classification: 2     Wound base: Pink  Yellow/Granulation  Slough     Edges: hyperkeratotic with dried blood in the tissue     Drainage: light/serous     Odor: no     Underminin O'Clock      Bone Exposure: No     Clinical Signs of Infection: No     After obtaining patient consent, the wound was irrigated with copious amounts of saline. A scalpel was then used to debride the wound into the subcutaneous tissue. The wound edges were debrided to healthy, bleeding tissue. Given the patient's lack of sensation, no anesthesia was necessary for the procedure.   ____________________  Wound #2     A wound is noted at right  distal 2nd digit measuring  0.8cm x 0.4cm x  0.3cm.     Yousif Classification: 3     Wound base: Yellow  White/Slough  bone     Edges: intact     Drainage: light/serous     Odor: no     Undermining: no     Bone Exposure: No     Clinical Signs of Infection: No     After obtaining patient consent, the wound was irrigated with copious amounts of saline. A scalpel was then used to debride the wound into subcutaneous tissue. The wound edges were debrided back to healthy, bleeding tissue. The wound base exhibited healthy bleeding. Given the patient's lack of sensation, no anesthesia was necessary for the procedure.  __________________  Wound #3    A wound is noted at left  plantar heel measuring 0.2cm x 0.2cm down to the dermis.    Yousif Classification: 2    Wound base: Red/Granulation    Edges: intact    Drainage: light/sanguinous    Odor: no    Undermining: no    Bone Exposure: No    Clinical Signs of Infection: No    After obtaining patient consent, the wound was irrigated with copious amounts of saline. No sharp debridement performed on this wound today.       Assessment: Bl wounds - right toes are healing some. New left heel.     Plan:   - Pt seen and evaluated  - Right toes are healing some. She should continue with Vashe and Iodosorb. If healing continues, may not need amp. Will continue to monitor. Stay connected with vascular.   - Iodosorb and mepilex to the left heel daily.   - Continue with DH shoes,  - 3 view xrays ordered, however only lateral was taken. Need 2 more views next week.   - Pt to return to clinic in 1 week.       Again, thank you for allowing me to participate in the care of your patient.        Sincerely,        Easton Torres DPM

## 2018-02-14 NOTE — MR AVS SNAPSHOT
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  Or[facility] Medicine [facility]  [address] [place] [address]    To schedule future appointments in Ortho Clinic, you may call [phone].    To schedule ordered imaging by your provider:   Call Central Imaging [phone]    To schedule an injection ordered by your provider:  Call Central Imaging Injection scheduling line: [phone]  MyChart available online at:  [email or web address]    Please call if any further questions or concerns ([phone]).  Clinic hours 8 am to 5 pm.    Return to clinic (call) if symptoms worsen or fail to improve.          Follow-ups after your visit        Follow-up notes from your care team     Return in about 1 week (around 2018).      Your next 10 appointments already scheduled     2018  3:00 PM CST   Return Visit with Easton Torres DPM   [facility] ([facility])    [address] [place] [id number] [phone]            2018  1:00 PM CST   US RADHA DOPPLER WITH EXERCISE BILATERAL with SHUS5   [facility] ([facility])    [address] [address] [phone]           Please bring a list of your medicines (including vitamins, minerals and over-the-counter drugs). Also, tell your doctor about any allergies you may have. Wear comfortable clothes and leave your valuables at home.  No caffeine or tobacco for 1 hour prior to exam.   Please call the Imaging Department at your exam site with any questions.            Feb 26, 2018  1:45 PM CST   New Visit with Vu Eden MD   [facility] ([facility])    [address]  [facility] [address] [phone]            Mar 02, 2018  3:30 PM CST   (Arrive by 3:15 PM)   Implantable Loop Recorder with Uc Cv Device 1   [facility] ([facility])    [address]  [address]  [place] [facility] [address] [phone]            Mar 02, 2018  4:00 PM CST   (Arrive by 3:45 PM)   Return Visit with Star Lobato MD   [facility] ([facility])    [address]  [address]  [facility] [facility] [address] [phone]              Who to contact     If you have questions or need follow up information about today's clinic visit or your schedule please contact [facility] directly at [phone].  Normal or non-critical lab and imaging results will be communicated to you by ColdWatthart, letter or phone within 4 business days after the clinic has received the results. If you do not hear from us within 7 days, please contact the clinic through Therma-Wavet or phone. If you have a critical or abnormal lab result, we will notify you by phone as soon as possible.  Submit refill requests through Say2me or call your pharmacy and they will forward the refill request to us. Please allow 3 business days for your refill to be completed.          Additional Information About Your Visit        Say2me Information     Say2me gives you secure access to your electronic health record. If you see a primary care provider, you can also send messages to your care team and make appointments. If you have questions, please call your primary care clinic.  If you do not have a primary care provider, please call [phone] and they will assist you.      Say2me  is an electronic gateway that provides easy, online access to your medical records. With DooBop, you can request a clinic appointment, read your test results, renew a prescription or communicate with your care team.     To access your existing account, please contact your [facility] or call [phone] for assistance.        Care EveryWhere ID     This is your Care EveryWhere ID. This could be used by other organizations to access your [facility] medical records  [id number]        Your Vitals Were     Pulse Pulse Oximetry                64 94%           Blood Pressure from Last 3 Encounters:   02/14/18 124/80   02/07/18 107/71   01/16/18 138/65    Weight from Last 3 Encounters:   01/16/18 74.8 kg (165 lb)   12/18/17 76.3 kg (168 lb 3.2 oz)   12/15/17 75.4 kg (166 lb 3.2 oz)              We Performed the Following     DEBRIDE SKIN/SUBQ TISSUE          Today's Medication Changes          These changes are accurate as of 2/14/18 11:59 PM.  If you have any questions, ask your nurse or doctor.               These medicines have changed or have updated prescriptions.        Dose/Directions    aspirin 81 MG chewable tablet   This may have changed:  when to take this   Used for:  Coronary artery disease with angina pectoris, unspecified vessel or lesion type, unspecified whether native or transplanted heart (H)        Dose:  81 mg   Take 1 tablet (81 mg) by mouth daily   Quantity:  30 tablet   Refills:  0       ferrous sulfate 325 (65 FE) MG tablet   Commonly known as:  IRON SUPPLEMENT   This may have changed:  when to take this   Used for:  S/P CABG (coronary artery bypass graft)        Dose:  325 mg   Take 1 tablet (325 mg) by mouth daily (with breakfast)   Quantity:  100 tablet   Refills:  1                Primary Care Provider Office Phone # Fax #    Humberto Conner [phone]       [facility] [address] [place] [address]        Equal Access to  Services     [facility]: Hadii aad ku hadlotusshan Soedwige, waaxda luqadaha, qaybta kaalmada jose david, scott mccollum . So [facility] [phone].    ATENCIÓN: Si habla justine, tiene a espinoza disposición servicios gratuitos de asistencia lingüística. Llame al [phone].    We comply with applicable federal civil rights laws and [place] laws. We do not discriminate on the basis of race, color, national origin, age, disability, sex, sexual orientation, or gender identity.            Thank you!     Thank you for choosing [facility]  for your care. Our goal is always to provide you with excellent care. Hearing back from our patients is one way we can continue to improve our services. Please take a few minutes to complete the written survey that you may receive in the mail after your visit with us. Thank you!             Your Updated Medication List - Protect others around you: Learn how to safely use, store and throw away your medicines at www.disposemymeds.org.          This list is accurate as of 2/14/18 11:59 PM.  Always use your most recent med list.                   Brand Name Dispense Instructions for use Diagnosis    ACE BANDAGE SELF-ADHERING Misc     6 each    1 each 2 times daily    Open wound of lower limb, right, subsequent encounter       acetaminophen 325 MG tablet    TYLENOL    100 tablet    Take 3 tablets (975 mg) by mouth every 6 hours as needed for mild pain    S/P CABG (coronary artery bypass graft)       ADAPTIC NON-ADHERING DRESSING Pads     1 each    Externally apply 1 each topically 2 times daily    Open wound of lower limb, right, subsequent encounter       ALPRAZolam 0.25 MG tablet    XANAX    10 tablet    Take 1 tablet (0.25 mg) by mouth 3 times daily as needed for anxiety    Adjustment disorder with anxious mood       aspirin 81 MG chewable tablet     30 tablet    Take 1 tablet (81 mg) by mouth daily    Coronary artery disease with angina pectoris,  unspecified vessel or lesion type, unspecified whether native or transplanted heart (H)       atorvastatin 40 MG tablet    LIPITOR    90 tablet    Take 1 tablet (40 mg) by mouth daily    Coronary artery disease with angina pectoris, unspecified vessel or lesion type, unspecified whether native or transplanted heart (H)       blood glucose monitoring meter device kit           bumetanide 2 MG tablet    BUMEX    180 tablet    Take 1 tablet (2 mg) by mouth 2 times daily    Chronic systolic heart failure (H)       carvedilol 3.125 MG tablet    COREG    180 tablet    Take 1 tablet (3.125 mg) by mouth 2 times daily (with meals)    Chronic diastolic heart failure (H)       ferrous sulfate 325 (65 FE) MG tablet    IRON SUPPLEMENT    100 tablet    Take 1 tablet (325 mg) by mouth daily (with breakfast)    S/P CABG (coronary artery bypass graft)       gabapentin 100 MG capsule    NEURONTIN    180 capsule    Take 1 capsule (100 mg) by mouth 2 times daily    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       KERLIX GAUZE ROLL MEDIUM Misc     48 each    1 each 2 times daily    Open wound of lower limb, right, subsequent encounter       MELATONIN PO      Take 1 mg by mouth At Bedtime        metolazone 2.5 MG tablet    ZAROXOLYN    30 tablet    Take 1 tablet (2.5 mg) by mouth daily as needed For weight over 170 Lbs.    Chronic diastolic heart failure (H)       ONETOUCH DELICA LANCETS 33G Misc     100 each    1 Device daily    Type 2 diabetes mellitus without complication, without long-term current use of insulin (H)       ONETOUCH ULTRA test strip   Generic drug:  blood glucose monitoring     100 each    USE AS DIRECTED TO TEST ONE TIME A DAY    Type 2 diabetes mellitus without complication, without long-term current use of insulin (H)       order for DME     1 Box    Sterile 4x4 gauze; Use gauze twice daily for dressing changes.    Open wound of lower limb, right, subsequent encounter       pantoprazole 40 MG EC tablet    PROTONIX     90 tablet    Take 1 tablet (40 mg) by mouth every morning    Coronary artery disease with angina pectoris, unspecified vessel or lesion type, unspecified whether native or transplanted heart (H)       Potassium Chloride ER 20 MEQ Tbcr     90 tablet    Take 1 tablet (20 mEq) by mouth 2 times daily    Chronic diastolic heart failure (H)       rivaroxaban ANTICOAGULANT 20 MG Tabs tablet    XARELTO    90 tablet    Take 1 tablet (20 mg) by mouth daily (with dinner) . DO NOT start until you have stopped the Warfarin.    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

## 2018-02-15 ENCOUNTER — TELEPHONE (OUTPATIENT)
Dept: ORTHOPEDICS | Facility: CLINIC | Age: 78
End: 2018-02-15

## 2018-02-15 NOTE — TELEPHONE ENCOUNTER
Naveen from  home care called today wanting to clarify wound care orders from yesterday.  Patient believes she needs to use Vashe and gauze on right toe wounds.  Notes states to use Vashe and Iodosorb.  Also for heel wound, can dressing be primapore instead of Mepilex due to cost of changing daily.  Will check with Dr. Torres and let Naveen know.  [phone]  Kaleigh Thompson RN

## 2018-02-20 ENCOUNTER — DOCUMENTATION ONLY (OUTPATIENT)
Dept: CARE COORDINATION | Facility: CLINIC | Age: 78
End: 2018-02-20

## 2018-02-20 NOTE — PROGRESS NOTES
[facility] utilizes an encounter to take the place of a direct phone call to your office. Please take a moment to review the below request. Your reply to this encounter will act as a verbal OK of orders if requested below. Thank you.    FYI:    Level 2 interaction exists between Bactrim DS and spironolactone as well as Bactrim DS and potassium chloride.      Ester Pineda RN Case Manager  [phone]  [email or web address]

## 2018-02-21 ENCOUNTER — APPOINTMENT (OUTPATIENT)
Dept: GENERAL RADIOLOGY | Facility: CLINIC | Age: 78
End: 2018-02-21
Attending: EMERGENCY MEDICINE
Payer: MEDICARE

## 2018-02-21 ENCOUNTER — APPOINTMENT (OUTPATIENT)
Dept: CT IMAGING | Facility: CLINIC | Age: 78
End: 2018-02-21
Attending: EMERGENCY MEDICINE
Payer: MEDICARE

## 2018-02-21 ENCOUNTER — HOSPITAL ENCOUNTER (OUTPATIENT)
Facility: CLINIC | Age: 78
Setting detail: OBSERVATION
Discharge: HOME | End: 2018-02-22
Attending: EMERGENCY MEDICINE | Admitting: EMERGENCY MEDICINE
Payer: MEDICARE

## 2018-02-21 ENCOUNTER — OFFICE VISIT (OUTPATIENT)
Dept: PODIATRY | Facility: CLINIC | Age: 78
End: 2018-02-21
Payer: COMMERCIAL

## 2018-02-21 VITALS
DIASTOLIC BLOOD PRESSURE: 90 MMHG | HEART RATE: 52 BPM | OXYGEN SATURATION: 95 % | SYSTOLIC BLOOD PRESSURE: 121 MMHG | TEMPERATURE: 97.3 F

## 2018-02-21 DIAGNOSIS — L97.529 ULCER OF LEFT FOOT, UNSPECIFIED ULCER STAGE (H): ICD-10-CM

## 2018-02-21 DIAGNOSIS — L97.512 SKIN ULCER OF TOE OF RIGHT FOOT WITH FAT LAYER EXPOSED (H): Primary | ICD-10-CM

## 2018-02-21 DIAGNOSIS — I50.22 CHRONIC SYSTOLIC HEART FAILURE (H): ICD-10-CM

## 2018-02-21 DIAGNOSIS — E03.9 PRIMARY HYPOTHYROIDISM: ICD-10-CM

## 2018-02-21 DIAGNOSIS — E86.0 DEHYDRATION: ICD-10-CM

## 2018-02-21 DIAGNOSIS — R42 DIZZINESS AND GIDDINESS: ICD-10-CM

## 2018-02-21 DIAGNOSIS — Z79.01 LONG-TERM (CURRENT) USE OF ANTICOAGULANTS: ICD-10-CM

## 2018-02-21 DIAGNOSIS — E11.621 DIABETIC ULCER OF LEFT HEEL ASSOCIATED WITH TYPE 2 DIABETES MELLITUS, UNSPECIFIED ULCER STAGE (H): ICD-10-CM

## 2018-02-21 DIAGNOSIS — E03.9 HYPOTHYROIDISM, UNSPECIFIED TYPE: Primary | ICD-10-CM

## 2018-02-21 DIAGNOSIS — I48.0 PAROXYSMAL ATRIAL FIBRILLATION (H): ICD-10-CM

## 2018-02-21 DIAGNOSIS — I48.91 ATRIAL FIBRILLATION, UNSPECIFIED TYPE (H): ICD-10-CM

## 2018-02-21 DIAGNOSIS — N17.9 ACUTE RENAL FAILURE, UNSPECIFIED ACUTE RENAL FAILURE TYPE (H): ICD-10-CM

## 2018-02-21 DIAGNOSIS — L97.429 DIABETIC ULCER OF LEFT HEEL ASSOCIATED WITH TYPE 2 DIABETES MELLITUS, UNSPECIFIED ULCER STAGE (H): ICD-10-CM

## 2018-02-21 DIAGNOSIS — R53.83 FATIGUE, UNSPECIFIED TYPE: ICD-10-CM

## 2018-02-21 LAB
ALBUMIN SERPL-MCNC: 4.3 G/DL (ref 3.4–5)
ALBUMIN UR-MCNC: NEGATIVE MG/DL
ALP SERPL-CCNC: 114 U/L (ref 40–150)
ALT SERPL W P-5'-P-CCNC: 24 U/L (ref 0–50)
ANION GAP SERPL CALCULATED.3IONS-SCNC: 9 MMOL/L (ref 3–14)
APPEARANCE UR: CLEAR
AST SERPL W P-5'-P-CCNC: 39 U/L (ref 0–45)
BASOPHILS # BLD AUTO: 0 10E9/L (ref 0–0.2)
BASOPHILS NFR BLD AUTO: 0.5 %
BILIRUB SERPL-MCNC: 0.7 MG/DL (ref 0.2–1.3)
BILIRUB UR QL STRIP: NEGATIVE
BUN SERPL-MCNC: 55 MG/DL (ref 7–30)
CALCIUM SERPL-MCNC: 10 MG/DL (ref 8.5–10.1)
CHLORIDE SERPL-SCNC: 91 MMOL/L (ref 94–109)
CO2 SERPL-SCNC: 34 MMOL/L (ref 20–32)
COLOR UR AUTO: ABNORMAL
CREAT SERPL-MCNC: 1.69 MG/DL (ref 0.52–1.04)
DIFFERENTIAL METHOD BLD: ABNORMAL
EOSINOPHIL # BLD AUTO: 0.2 10E9/L (ref 0–0.7)
EOSINOPHIL NFR BLD AUTO: 3 %
ERYTHROCYTE [DISTWIDTH] IN BLOOD BY AUTOMATED COUNT: 16.4 % (ref 10–15)
GFR SERPL CREATININE-BSD FRML MDRD: 29 ML/MIN/1.7M2
GLUCOSE BLDC GLUCOMTR-MCNC: 99 MG/DL (ref 70–99)
GLUCOSE SERPL-MCNC: 102 MG/DL (ref 70–99)
GLUCOSE UR STRIP-MCNC: NEGATIVE MG/DL
HCT VFR BLD AUTO: 37.2 % (ref 35–47)
HGB BLD-MCNC: 12.2 G/DL (ref 11.7–15.7)
HGB UR QL STRIP: NEGATIVE
IMM GRANULOCYTES # BLD: 0 10E9/L (ref 0–0.4)
IMM GRANULOCYTES NFR BLD: 0.4 %
KETONES UR STRIP-MCNC: NEGATIVE MG/DL
LEUKOCYTE ESTERASE UR QL STRIP: NEGATIVE
LYMPHOCYTES # BLD AUTO: 1.4 10E9/L (ref 0.8–5.3)
LYMPHOCYTES NFR BLD AUTO: 19.2 %
MCH RBC QN AUTO: 34.6 PG (ref 26.5–33)
MCHC RBC AUTO-ENTMCNC: 32.8 G/DL (ref 31.5–36.5)
MCV RBC AUTO: 105 FL (ref 78–100)
MONOCYTES # BLD AUTO: 0.7 10E9/L (ref 0–1.3)
MONOCYTES NFR BLD AUTO: 9.4 %
NEUTROPHILS # BLD AUTO: 5 10E9/L (ref 1.6–8.3)
NEUTROPHILS NFR BLD AUTO: 67.5 %
NITRATE UR QL: NEGATIVE
NRBC # BLD AUTO: 0 10*3/UL
NRBC BLD AUTO-RTO: 0 /100
PH UR STRIP: 7 PH (ref 5–7)
PLATELET # BLD AUTO: 216 10E9/L (ref 150–450)
POTASSIUM SERPL-SCNC: 3.5 MMOL/L (ref 3.4–5.3)
PROT SERPL-MCNC: 8.9 G/DL (ref 6.8–8.8)
RBC # BLD AUTO: 3.53 10E12/L (ref 3.8–5.2)
RBC #/AREA URNS AUTO: <1 /HPF (ref 0–2)
SODIUM SERPL-SCNC: 134 MMOL/L (ref 133–144)
SOURCE: ABNORMAL
SP GR UR STRIP: 1.01 (ref 1–1.03)
SQUAMOUS #/AREA URNS AUTO: 2 /HPF (ref 0–1)
T3 SERPL-MCNC: 58 NG/DL (ref 60–181)
T4 FREE SERPL-MCNC: 0.29 NG/DL (ref 0.76–1.46)
TROPONIN I SERPL-MCNC: 0.03 UG/L (ref 0–0.04)
TSH SERPL DL<=0.005 MIU/L-ACNC: 88 MU/L (ref 0.4–4)
UROBILINOGEN UR STRIP-MCNC: NORMAL MG/DL (ref 0–2)
WBC # BLD AUTO: 7.3 10E9/L (ref 4–11)
WBC #/AREA URNS AUTO: <1 /HPF (ref 0–2)

## 2018-02-21 PROCEDURE — 25000128 H RX IP 250 OP 636: Performed by: EMERGENCY MEDICINE

## 2018-02-21 PROCEDURE — 81001 URINALYSIS AUTO W/SCOPE: CPT | Performed by: EMERGENCY MEDICINE

## 2018-02-21 PROCEDURE — 96360 HYDRATION IV INFUSION INIT: CPT | Performed by: EMERGENCY MEDICINE

## 2018-02-21 PROCEDURE — G0378 HOSPITAL OBSERVATION PER HR: HCPCS

## 2018-02-21 PROCEDURE — 84439 ASSAY OF FREE THYROXINE: CPT | Performed by: EMERGENCY MEDICINE

## 2018-02-21 PROCEDURE — 84480 ASSAY TRIIODOTHYRONINE (T3): CPT | Performed by: EMERGENCY MEDICINE

## 2018-02-21 PROCEDURE — 70450 CT HEAD/BRAIN W/O DYE: CPT

## 2018-02-21 PROCEDURE — 99285 EMERGENCY DEPT VISIT HI MDM: CPT | Mod: 25 | Performed by: EMERGENCY MEDICINE

## 2018-02-21 PROCEDURE — A9270 NON-COVERED ITEM OR SERVICE: HCPCS | Mod: GY | Performed by: PHYSICIAN ASSISTANT

## 2018-02-21 PROCEDURE — 71046 X-RAY EXAM CHEST 2 VIEWS: CPT

## 2018-02-21 PROCEDURE — 93005 ELECTROCARDIOGRAM TRACING: CPT | Performed by: EMERGENCY MEDICINE

## 2018-02-21 PROCEDURE — 96361 HYDRATE IV INFUSION ADD-ON: CPT | Performed by: EMERGENCY MEDICINE

## 2018-02-21 PROCEDURE — 97597 DBRDMT OPN WND 1ST 20 CM/<: CPT | Performed by: PODIATRIST

## 2018-02-21 PROCEDURE — 93010 ELECTROCARDIOGRAM REPORT: CPT | Mod: Z6 | Performed by: EMERGENCY MEDICINE

## 2018-02-21 PROCEDURE — 85025 COMPLETE CBC W/AUTO DIFF WBC: CPT | Performed by: EMERGENCY MEDICINE

## 2018-02-21 PROCEDURE — 84484 ASSAY OF TROPONIN QUANT: CPT | Performed by: EMERGENCY MEDICINE

## 2018-02-21 PROCEDURE — 84443 ASSAY THYROID STIM HORMONE: CPT | Performed by: EMERGENCY MEDICINE

## 2018-02-21 PROCEDURE — 80053 COMPREHEN METABOLIC PANEL: CPT | Performed by: EMERGENCY MEDICINE

## 2018-02-21 PROCEDURE — 25000132 ZZH RX MED GY IP 250 OP 250 PS 637: Mod: GY | Performed by: PHYSICIAN ASSISTANT

## 2018-02-21 PROCEDURE — 84439 ASSAY OF FREE THYROXINE: CPT | Performed by: PHYSICIAN ASSISTANT

## 2018-02-21 RX ORDER — LIDOCAINE 40 MG/G
CREAM TOPICAL
Status: DISCONTINUED | OUTPATIENT
Start: 2018-02-21 | End: 2018-02-22 | Stop reason: HOSPADM

## 2018-02-21 RX ORDER — PANTOPRAZOLE SODIUM 40 MG/1
40 TABLET, DELAYED RELEASE ORAL EVERY MORNING
Status: DISCONTINUED | OUTPATIENT
Start: 2018-02-22 | End: 2018-02-22 | Stop reason: HOSPADM

## 2018-02-21 RX ORDER — LEVOTHYROXINE SODIUM 25 UG/1
25 TABLET ORAL
Status: DISCONTINUED | OUTPATIENT
Start: 2018-02-22 | End: 2018-02-22 | Stop reason: HOSPADM

## 2018-02-21 RX ORDER — ONDANSETRON 2 MG/ML
4 INJECTION INTRAMUSCULAR; INTRAVENOUS EVERY 6 HOURS PRN
Status: DISCONTINUED | OUTPATIENT
Start: 2018-02-21 | End: 2018-02-22 | Stop reason: HOSPADM

## 2018-02-21 RX ORDER — ONDANSETRON 4 MG/1
4 TABLET, ORALLY DISINTEGRATING ORAL EVERY 6 HOURS PRN
Status: DISCONTINUED | OUTPATIENT
Start: 2018-02-21 | End: 2018-02-22 | Stop reason: HOSPADM

## 2018-02-21 RX ORDER — CARVEDILOL 3.12 MG/1
3.12 TABLET ORAL 2 TIMES DAILY WITH MEALS
Status: DISCONTINUED | OUTPATIENT
Start: 2018-02-21 | End: 2018-02-22 | Stop reason: HOSPADM

## 2018-02-21 RX ORDER — AMOXICILLIN 250 MG
2 CAPSULE ORAL 2 TIMES DAILY PRN
Status: DISCONTINUED | OUTPATIENT
Start: 2018-02-21 | End: 2018-02-22 | Stop reason: HOSPADM

## 2018-02-21 RX ORDER — ASPIRIN 81 MG/1
81 TABLET, CHEWABLE ORAL EVERY MORNING
Status: DISCONTINUED | OUTPATIENT
Start: 2018-02-22 | End: 2018-02-22 | Stop reason: HOSPADM

## 2018-02-21 RX ORDER — ALPRAZOLAM 0.25 MG
0.25 TABLET ORAL DAILY PRN
Status: DISCONTINUED | OUTPATIENT
Start: 2018-02-21 | End: 2018-02-22 | Stop reason: HOSPADM

## 2018-02-21 RX ORDER — POLYETHYLENE GLYCOL 3350 17 G/17G
17 POWDER, FOR SOLUTION ORAL DAILY PRN
Status: DISCONTINUED | OUTPATIENT
Start: 2018-02-21 | End: 2018-02-22 | Stop reason: HOSPADM

## 2018-02-21 RX ORDER — ACETAMINOPHEN 325 MG/1
975 TABLET ORAL EVERY 6 HOURS PRN
Status: DISCONTINUED | OUTPATIENT
Start: 2018-02-21 | End: 2018-02-22 | Stop reason: HOSPADM

## 2018-02-21 RX ORDER — FERROUS SULFATE 325(65) MG
325 TABLET ORAL DAILY
Status: DISCONTINUED | OUTPATIENT
Start: 2018-02-22 | End: 2018-02-22 | Stop reason: HOSPADM

## 2018-02-21 RX ORDER — GABAPENTIN 100 MG/1
100 CAPSULE ORAL 2 TIMES DAILY
Status: DISCONTINUED | OUTPATIENT
Start: 2018-02-21 | End: 2018-02-22 | Stop reason: HOSPADM

## 2018-02-21 RX ORDER — NALOXONE HYDROCHLORIDE 0.4 MG/ML
.1-.4 INJECTION, SOLUTION INTRAMUSCULAR; INTRAVENOUS; SUBCUTANEOUS
Status: DISCONTINUED | OUTPATIENT
Start: 2018-02-21 | End: 2018-02-22 | Stop reason: HOSPADM

## 2018-02-21 RX ORDER — ATORVASTATIN CALCIUM 40 MG/1
40 TABLET, FILM COATED ORAL DAILY
Status: DISCONTINUED | OUTPATIENT
Start: 2018-02-22 | End: 2018-02-22 | Stop reason: HOSPADM

## 2018-02-21 RX ORDER — VENLAFAXINE HYDROCHLORIDE 150 MG/1
150 TABLET, EXTENDED RELEASE ORAL
Status: DISCONTINUED | OUTPATIENT
Start: 2018-02-22 | End: 2018-02-21

## 2018-02-21 RX ORDER — VENLAFAXINE HYDROCHLORIDE 150 MG/1
150 CAPSULE, EXTENDED RELEASE ORAL
Status: DISCONTINUED | OUTPATIENT
Start: 2018-02-22 | End: 2018-02-22 | Stop reason: HOSPADM

## 2018-02-21 RX ORDER — AMOXICILLIN 250 MG
1 CAPSULE ORAL 2 TIMES DAILY PRN
Status: DISCONTINUED | OUTPATIENT
Start: 2018-02-21 | End: 2018-02-22 | Stop reason: HOSPADM

## 2018-02-21 RX ADMIN — SODIUM CHLORIDE, POTASSIUM CHLORIDE, SODIUM LACTATE AND CALCIUM CHLORIDE 1000 ML: 600; 310; 30; 20 INJECTION, SOLUTION INTRAVENOUS at 18:49

## 2018-02-21 RX ADMIN — GABAPENTIN 100 MG: 100 CAPSULE ORAL at 22:16

## 2018-02-21 RX ADMIN — Medication 1 MG: at 23:10

## 2018-02-21 RX ADMIN — RIVAROXABAN 15 MG: 15 TABLET, FILM COATED ORAL at 22:16

## 2018-02-21 ASSESSMENT — ENCOUNTER SYMPTOMS
SHORTNESS OF BREATH: 0
BRUISES/BLEEDS EASILY: 0
NECK PAIN: 0
DIFFICULTY URINATING: 0
POLYDIPSIA: 0
DIZZINESS: 0
NECK STIFFNESS: 0
BACK PAIN: 0
CHILLS: 0
SORE THROAT: 1
AGITATION: 0
FATIGUE: 1
HEADACHES: 0
VOMITING: 0
NAUSEA: 0
COLOR CHANGE: 0
LIGHT-HEADEDNESS: 1
ADENOPATHY: 0
FEVER: 0
WOUND: 1

## 2018-02-21 ASSESSMENT — ACTIVITIES OF DAILY LIVING (ADL)
COGNITION: 0 - NO COGNITION ISSUES REPORTED
AMBULATION: 0-->INDEPENDENT
TOILETING: 0-->INDEPENDENT
RETIRED_COMMUNICATION: 0-->UNDERSTANDS/COMMUNICATES WITHOUT DIFFICULTY
TRANSFERRING: 0-->INDEPENDENT
RETIRED_EATING: 0-->INDEPENDENT
SWALLOWING: 0-->SWALLOWS FOODS/LIQUIDS WITHOUT DIFFICULTY
DRESS: 0-->INDEPENDENT
BATHING: 0-->INDEPENDENT
FALL_HISTORY_WITHIN_LAST_SIX_MONTHS: NO

## 2018-02-21 NOTE — LETTER
2018         RE: Carlos Alberto Fenton  [address]  [place] [facility] [address]        Dear Colleague,    Thank you for referring your patient, Carlos Alberto Fenton, to the [facility]. Please see a copy of my visit note below.    Chief Complaints and History of Present Illnesses   Patient presents with     WOUND CARE     Right foot wounds            Allergies   Allergen Reactions     Heparin      HIT/ thrombocytopenia     Lovenox [Enoxaparin] Other (See Comments)     Thrombocytopenia      Oxycodone      hallucinations     Toprol Xl [Metoprolol] Nausea and Vomiting     Adhesive Tape Itching and Rash     Diapers & Supplies Rash     Developed yeast infection from previous hospital stay         Subjective: Carlos Alberto is a 77 year old female who presents to the clinic today for a follow up of right hallux and 2nd digit wounds. She presents today with complaints of short term memory loss and tremor of her upper extremities, especially the left. She relates to not feeling well overall. She is alone today, as her daughter had a root canal. She relates that these new symptoms started about a day ago. She does have a history of CVA 2016 and paroxsymal A. Fib.She is having some trouble finding words today in clinic. She continues to take the Bactrim.     ROS: No n/v/d/f/c/ns/sob/cp     Objective  97.3 52 Data Unavailable 121/90 Data Unavailable 0 lbs 0 oz  A vascular wound is noted at right  hallux measuring 0.9cm x 0.5cm x 0.2cm.      Yousif Classification: 2      Wound base: Pink  Yellow/Granulation  Slough      Edges: hyperkeratotic with dried blood in the tissue      Drainage: light/serous      Odor: no      Underminin O'Clock       Bone Exposure: No      Clinical Signs of Infection: No      After obtaining patient consent, the wound was irrigated with copious amounts of saline. A scalpel was then used to debride the wound into the dermal tissue. The wound edges were debrided to healthy, bleeding tissue.  Given the patient's lack of sensation, no anesthesia was necessary for the procedure.   ____________________  Wound #2      A wound is noted at right  distal 2nd digit measuring 0.2cm x 0.2cm x 0.1cm.      Yousif Classification: 2      Wound base: Red granulation      Edges: intact      Drainage: light/serous      Odor: no      Undermining: no      Bone Exposure: No      Clinical Signs of Infection: No      After obtaining patient consent, the wound was irrigated with copious amounts of saline. A scalpel was then used to debride the wound into dermal tissue. The wound edges were debrided back to healthy, bleeding tissue. The wound base exhibited healthy bleeding. Given the patient's lack of sensation, no anesthesia was necessary for the procedure.  No signs of infection are noted to these areas today.   She has symmetrical eyebrow raise, eye closing, smile. No tongue deviation. Normal extremity strength that is symmetrical with hand squeeze.       Assessment: 78 YO with right foot wounds with slightly altered MS. I don't think that she has an underlying infection in the toes that would precipitate altered MS. Will send to ED for evaluation because of her history of CVA and A.fib.     Plan:   - Pt seen and evaluated  - Wounds were debrided today to ensure that there was no underlying infection that could possibly contribute to altered MS. No deep abscess was found. Actually, after the removal of bone at the last visit, the 2nd digit would is healing well. Hallux wound is stable.   - She did get the other 2 views for the right foot. No active osteolysis is noted today, however xray is not sensitive for OM for about 2 weeks. However, the wounds are healing some, which is less likely with underlying, active osteomyelitis.  - Asenath was admitted yesterday for observation. Bactrim was stopped, which clinically, seems appropriate. Can continue to monitor for adequate wound closure and follow with serial radiographs.   -  Pt to return to clinic in 1 week.       Again, thank you for allowing me to participate in the care of your patient.        Sincerely,        Easton Torres DPM

## 2018-02-21 NOTE — IP AVS SNAPSHOT
[address] Observation [address] [address]    Phone:  [phone]    Fax:  [phone]                                       After Visit Summary   2/21/2018    Carlos Alberto Fenton    MRN: [id number]           After Visit Summary Signature Page     I have received my discharge instructions, and my questions have been answered. I have discussed any challenges I see with this plan with the nurse or doctor.    ..........................................................................................................................................  Patient/Patient Representative Signature      ..........................................................................................................................................  Patient Representative Print Name and Relationship to Patient    ..................................................               ................................................  Date                                            Time    ..........................................................................................................................................  Reviewed by Signature/Title    ...................................................              ..............................................  Date                                                            Time

## 2018-02-21 NOTE — ED PROVIDER NOTES
"    [place] EMERGENCY DEPARTMENT ([facility])  2/21/18  ED 15 5:14 PM   History     Chief Complaint   Patient presents with     Fatigue     The history is provided by the patient and medical records.     Carlos Alberto Fenton is a 77 year old female who presents from [facility] for further evaluation of fatigue and lightheadedness since yesterday. She has a history of coronary artery disease with MI status post CABG on 2/16/17 as well as PFO repair and paroxysmal A. fib status post MAZE.  This was complicated by distal DVTs in her toes and resultant wounds and gangrene.  She is currently on Xarelto anticoagulation. Patient resides in [place], [place] with her daughter. Patient had taken a cab out to [facility] for routine podiatry appointment with Dr. Torres of Podiatry. Patient states she had been feeling \"yucky\" since yesterday, and felt generally weak to point where she has to hold onto onto furniture and walls to stay upright. She noted feeling fatigued and weak with Dr. Torres who became concerned for CVA vs cardiac etiology of her symptoms. An EKG was performed at clinic showing varying heartrate from 30s to 130s and she was sent in via ambulance for further evaluation. Here she continues to feel \"yucky\" and generally weak with sore throat. No chest pain or shortness of breath. No fevers. No vomiting. No new or unusual pain. No difficulty with urination. She is taking her blood thinners.     As for patient's feet she states her left toe is 99% healed, but her right foot has  quite a ways to go  with recovery from gangrene and hasn't been responding to Dr. Torres s interventions. Dr. Torres did dress her wounds prior to sending her to the ER. She is followed by Dr. Azevedo of Cardiology.    I have reviewed the Medications, Allergies, Past Medical and Surgical History, and Social History in the [email or web address] system.  PAST MEDICAL HISTORY:   Past Medical History: "   Diagnosis Date     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) 10/16/2016    parietral lesions bilateral       Antiplatelet or antithrombotic long-term use      Anxiety      Atrial fibrillation (H)      CAD (coronary artery disease)     2 vessel     Cancer (H) 1990    periodically have cancer on the skin removed     Cerebral artery occlusion with cerebral infarction (H) 10/2016    Cardioembolic strokes related to atrial fibrillation     Deep vein thrombosis (DVT) of axillary vein of right upper extremity (H) 2/25/2017     Depressive disorder 2001     Diabetes (H)      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 and application of Grafix;  Surgeon: Leah Santamaria MD;  Location: [facility] OR     ANESTHESIA CARDIOVERSION N/A 12/12/2017    Procedure: ANESTHESIA CARDIOVERSION;  Anesthesia Cardioversion ;  Surgeon: GENERIC ANESTHESIA PROVIDER;  Location: [facility] OR     BACK SURGERY       BYPASS GRAFT ARTERY CORONARY N/A 2/6/2017    Procedure: BYPASS GRAFT ARTERY CORONARY;  Surgeon: Mikhail Quiñones MD;  Location: [facility] OR     C APPENDECTOMY  1959     C CARDIAC SURG PROCEDURE UNLIST       C HAND/FINGER SURGERY UNLISTED       C STOMACH SURGERY PROCEDURE UNLISTED       HC SACROPLASTY       HC VASCULAR SURGERY PROCEDURE UNLIST       HYSTERECTOMY, PASTORA  1988     IRRIGATION AND DEBRIDEMENT FOOT, COMBINED Right 7/7/2017    Procedure: COMBINED IRRIGATION AND DEBRIDEMENT FOOT;  Irrigation and Debridement Right Foot with Graphix  *Latex Allergy*;  Surgeon: Leah Santamaria MD;  Location: [facility] OR     MAZE PROCEDURE N/A 2/6/2017    Procedure: MAZE  PROCEDURE;  Surgeon: Mikhail Quiñones MD;  Location: [facility] OR     PICC INSERTION Left 02/25/2017    5fr TL Bard PICC, 47cm (3cm external) in the L basilic vein w/ tip in the SVC RA junction     TONSILLECTOMY  1942       FAMILY HISTORY:   Family History   Problem Relation Age of Onset     DIABETES Mother      C.A.D. Mother      Hypertension Mother      CEREBROVASCULAR DISEASE Mother      Mini Strokes     Other Cancer Mother      Skin Cancer     Hyperlipidemia Mother      Coronary Artery Disease Mother      DIABETES Father      C.A.D. Father      Hypertension Father      Other Cancer Father      Hyperlipidemia Father      Coronary Artery Disease Father      HEART DISEASE Sister      Arthritis Sister      Other Cancer Other      Skin Cancer       SOCIAL HISTORY:   Social History   Substance Use Topics     Smoking status: Former Smoker     Packs/day: 1.00     Years: 10.00     Types: Cigarettes     Start date: 10/3/1958     Quit date: 7/4/1976     Smokeless tobacco: Never Used      Comment: Quit in 1976     Alcohol use Yes      Comment: Socially       Current Discharge Medication List      CONTINUE these medications which have NOT CHANGED    Details   sulfamethoxazole-trimethoprim (BACTRIM DS/SEPTRA DS) 800-160 MG per tablet Take 1 tablet by mouth 2 times daily  Qty: 28 tablet, Refills: 0    Associated Diagnoses: 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 DO NOT start until you have stopped the  Warfarin.  Qty: 90 tablet, Refills: 3    Comments: First dose Tuesday January 16th. Last dose of Warfarin 1/13.  Patient aware  Associated Diagnoses: Atrial fibrillation, unspecified type (H)      ALPRAZolam (XANAX) 0.25 MG tablet Take 1 tablet (0.25 mg) by mouth 3 times daily as needed for anxiety  Qty: 10 tablet, Refills: 0    Associated Diagnoses: Adjustment disorder with anxious mood      venlafaxine (EFFEXOR-ER) 150 MG TB24 24 hr tablet Take 1 tablet (150 mg) by mouth daily (with breakfast)  Qty: 90 each, Refills: 1    Associated Diagnoses: Adjustment disorder with depressed mood      metolazone (ZAROXOLYN) 2.5 MG tablet Take 1 tablet (2.5 mg) by mouth daily as needed For weight over 170 Lbs.  Qty: 30 tablet, Refills: 1    Associated Diagnoses: Chronic diastolic heart failure (H)      spironolactone (ALDACTONE) 25 MG tablet Take 1 tablet (25 mg) by mouth daily  Qty: 90 tablet, Refills: 3    Associated Diagnoses: Chronic systolic heart failure (H)      bumetanide (BUMEX) 2 MG tablet Take 1 tablet (2 mg) by mouth 2 times daily  Qty: 180 tablet, Refills: 3    Associated Diagnoses: Chronic systolic heart failure (H)      atorvastatin (LIPITOR) 40 MG tablet Take 1 tablet (40 mg) by mouth daily  Qty: 90 tablet, Refills: 1    Associated Diagnoses: Coronary artery disease with angina pectoris, unspecified vessel or lesion type, unspecified whether native or transplanted heart (H)      gabapentin (NEURONTIN) 100 MG capsule Take 1 capsule (100 mg) by mouth 2 times daily  Qty: 180 capsule, Refills: 1    Associated Diagnoses: 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      traZODone (DESYREL) 50 MG tablet Take 0.5 tablets (25 mg) by mouth nightly as needed for sleep  Qty: 45 tablet, Refills: 2    Associated Diagnoses: Primary insomnia      pantoprazole (PROTONIX) 40 MG EC tablet Take 1 tablet (40 mg) by mouth every morning  Qty: 90 tablet, Refills: 1    Associated Diagnoses: Coronary artery disease with angina pectoris,  unspecified vessel or lesion type, unspecified whether native or transplanted heart (H)      Elastic Bandages & Supports (ACE BANDAGE SELF-ADHERING) MISC 1 each 2 times daily  Qty: 6 each, Refills: 3    Associated Diagnoses: Open wound of lower limb, right, subsequent encounter      Gauze Pads & Dressings (KERLIX GAUZE ROLL MEDIUM) MISC 1 each 2 times daily  Qty: 48 each, Refills: 3    Associated Diagnoses: Open wound of lower limb, right, subsequent encounter      ONE TOUCH ULTRA test strip USE AS DIRECTED TO TEST ONE TIME A DAY  Qty: 100 each, Refills: 2    Associated Diagnoses: Type 2 diabetes mellitus without complication, without long-term current use of insulin (H)      Wound Dressings (ADAPTIC NON-ADHERING DRESSING) PADS Externally apply 1 each topically 2 times daily  Qty: 1 each, Refills: 3    Associated Diagnoses: Open wound of lower limb, right, subsequent encounter      order for DME Sterile 4x4 gauze; Use gauze twice daily for dressing changes.  Qty: 1 Box, Refills: 3    Associated Diagnoses: Open wound of lower limb, right, subsequent encounter      acetaminophen (TYLENOL) 325 MG tablet Take 3 tablets (975 mg) by mouth every 6 hours as needed for mild pain  Qty: 100 tablet, Refills: 0    Associated Diagnoses: S/P CABG (coronary artery bypass graft)      aspirin 81 MG chewable tablet Take 1 tablet (81 mg) by mouth daily  Qty: 30 tablet, Refills: 0    Associated Diagnoses: Coronary artery disease with angina pectoris, unspecified vessel or lesion type, unspecified whether native or transplanted heart (H)      ferrous sulfate (IRON SUPPLEMENT) 325 (65 FE) MG tablet Take 1 tablet (325 mg) by mouth daily (with breakfast)  Qty: 100 tablet, Refills: 1    Associated Diagnoses: S/P CABG (coronary artery bypass graft)      ONETOUCH DELICA LANCETS 33G MISC 1 Device daily  Qty: 100 each, Refills: 0    Associated Diagnoses: Type 2 diabetes mellitus without complication, without long-term current use of insulin (H)       blood glucose monitoring (ONE TOUCH ULTRA 2) meter device kit                 Allergies   Allergen Reactions     Heparin      HIT/ thrombocytopenia     Lovenox [Enoxaparin] Other (See Comments)     Thrombocytopenia      Oxycodone      hallucinations     Toprol Xl [Metoprolol] Nausea and Vomiting     Adhesive Tape Itching and Rash     Diapers & Supplies Rash     Developed yeast infection from previous hospital stay        Review of Systems   Constitutional: Positive for fatigue. Negative for chills and fever.        Generalized malaise   HENT: Positive for sore throat. Negative for congestion.    Eyes: Negative for visual disturbance.   Respiratory: Negative for shortness of breath.    Cardiovascular: Negative for chest pain.   Gastrointestinal: Negative for nausea and vomiting.   Endocrine: Negative for polydipsia and polyuria.   Genitourinary: Negative for difficulty urinating.   Musculoskeletal: Negative for back pain, neck pain and neck stiffness.   Skin: Positive for wound. Negative for color change.   Allergic/Immunologic: Negative for immunocompromised state.   Neurological: Positive for light-headedness (feels unsteady, at risk of falling). Negative for dizziness and headaches.   Hematological: Negative for adenopathy. Does not bruise/bleed easily.   Psychiatric/Behavioral: Negative for agitation and behavioral problems.       Physical Exam   BP: 127/78  Pulse: 51  Heart Rate: 55  Temp: 98.1  F (36.7  C)  Resp: 18  SpO2: 96 %      Physical Exam   Constitutional: She is oriented to person, place, and time. She appears well-developed and well-nourished. No distress.   HENT:   Head: Normocephalic and atraumatic.   Mouth/Throat: Oropharynx is clear and moist. No oropharyngeal exudate.   Eyes: Conjunctivae and EOM are normal. Pupils are equal, round, and reactive to light. No scleral icterus.   Neck: Normal range of motion. Neck supple.   Cardiovascular: Normal heart sounds and intact distal pulses.     Bradycardia   Pulmonary/Chest: Effort normal and breath sounds normal. No respiratory distress. She has no wheezes. She has no rales.   Abdominal: Soft. Bowel sounds are normal. She exhibits no distension. There is no tenderness. There is no rebound and no guarding.   Musculoskeletal: Normal range of motion. She exhibits no edema or tenderness.   Ulcers on toes of right foot and left heel, healing.  No evidence of drainage, induration, or erythema.  No tenderness to palpation of the affected areas.  Please refer to the photographs attached to the medical record.   Neurological: She is alert and oriented to person, place, and time. She has normal strength and normal reflexes. She displays normal reflexes. No cranial nerve deficit or sensory deficit. She exhibits normal muscle tone. Coordination and gait normal. GCS eye subscore is 4. GCS verbal subscore is 5. GCS motor subscore is 6.   Reflex Scores:       Patellar reflexes are 2+ on the right side and 2+ on the left side.       Achilles reflexes are 2+ on the right side and 2+ on the left side.  Skin: Skin is warm. No rash noted. She is not diaphoretic. No erythema.   Ulcers on toes of right foot and left heel, healing.  No evidence of drainage, induration, or erythema.  No tenderness to palpation of the affected areas. Please refer to the photographs attached to the medical record.   Psychiatric: She has a normal mood and affect. Her behavior is normal. Judgment and thought content normal.   Nursing note and vitals reviewed.                           ED Course     ED Course     Procedures             EKG Interpretation:      Interpreted by Carloz Quijano  Time reviewed: 5:13 PM  Symptoms at time of EKG: Fatigue  Rhythm: atrial fibrillation - controlled  Rate: 62  Axis: Left Axis Deviation  Ectopy: none  Conduction: normal  ST Segments/ T Waves: No ST-T wave changes  Q Waves: III and aVf  Comparison to prior: Unchanged from old EKG done on January 16,  2018    Clinical Impression: Atrial fibrillation without acute ischemia                Critical Care time:  none             Labs Ordered and Resulted from Time of ED Arrival Up to the Time of Departure from the ED   CBC WITH PLATELETS DIFFERENTIAL - Abnormal; Notable for the following:        Result Value    RBC Count 3.53 (*)      (*)     MCH 34.6 (*)     RDW 16.4 (*)     All other components within normal limits   COMPREHENSIVE METABOLIC PANEL - Abnormal; Notable for the following:     Chloride 91 (*)     Carbon Dioxide 34 (*)     Glucose 102 (*)     Urea Nitrogen 55 (*)     Creatinine 1.69 (*)     GFR Estimate 29 (*)     GFR Estimate If Black 35 (*)     Protein Total 8.9 (*)     All other components within normal limits   ROUTINE UA WITH MICROSCOPIC - Abnormal; Notable for the following:     Squamous Epithelial /HPF Urine 2 (*)     All other components within normal limits   TSH - Abnormal; Notable for the following:     TSH 88.00 (*)     All other components within normal limits   T4 FREE - Abnormal; Notable for the following:     T4 Free 0.29 (*)     All other components within normal limits   TROPONIN I   PERIPHERAL IV CATHETER   CARDIAC CONTINUOUS MONITORING   PULSE OXIMETRY NURSING   EOSINOPHIL SMEAR            Assessments & Plan (with Medical Decision Making)     Carlos Alberto Fenton is a 77-year-old woman sent from Podiatry clinic for generalized weakness for 2 days.  Differential diagnosis: dehydration, electrolyte abnormalities, UTI, unlikely stroke, pneumonia.    After thorough history and physical exam the patient appears to be in no acute distress. I will obtain laboratory studies, chest x-ray, CT of the head, and EKG for further diagnostic evaluation.  I will hydrate the patient with a bolus of lactated Ringer's.  She has no obvious neurologic deficit on examination to make me suspect that she has an acute stroke.      Patient's laboratory studies returned with no evidence of leukocytosis, WBC is  normal at 7300.  There is no anemia, hemoglobin is normal at 12.2.  Electrolytes do show evidence of dehydration with elevated creatinine 1.69; it appears the patient's baseline is around 1.0.  GFR is also decreased from last month to 29.  LFTs are normal.  Troponin is within normal limits.  TSH is elevated to 88.  EKG showed no acute ischemia, however, patient is in atrial fibrillation which is not a new diagnosis for her.  Urinalysis shows no evidence of infection.  I reviewed her chest x-ray and I've read the Radiology report; there is concern that there may be atelectasis versus infection, however, patient has no fever, hypoxemia, or cough nor any shortness of breath and I do not suspect that she has acute pneumonia.  I reviewed her CT of the head and read the Radiology report; there is no evidence of acute stroke or intracranial hemorrhage.   She was hydrated with a bolus of lactated Ringer's and she will be admitted to the emergency department observation unit for further evaluation, management, and hydration.  She agrees with this plan.     This part of the document was transcribed by Lu Hassan Scribe.      I have reviewed the nursing notes.    I have reviewed the findings, diagnosis, plan and need for follow up with the patient.    Current Discharge Medication List          Final diagnoses:   Dehydration     Tomasa BISHOP, am serving as a trained medical scribe to document services personally performed by Pablo Quijano MD, based on the provider's statements to me.  This document has been checked and approved by the attending provider.     ICarloz MD, was physically present and have reviewed and verified the accuracy of this note documented by lu Auguste scribe.      2/21/2018   [facility] EMERGENCY DEPARTMENT     Carloz Quijano MD  02/22/18 0000

## 2018-02-21 NOTE — IP AVS SNAPSHOT
MRN:[id number]                      After Visit Summary   2/21/2018    Carlos Alberto Fenton    MRN: [id number]           Thank you!     Thank you for choosing [facility] for your care. Our goal is always to provide you with excellent care. Hearing back from our patients is one way we can continue to improve our services. Please take a few minutes to complete the written survey that you may receive in the mail after you visit with us. Thank you!        Patient Information     Date Of Birth          1940        Designated Caregiver       Most Recent Value    Caregiver    Will someone help with your care after discharge? yes    Name of designated caregiver Lindsay    Phone number of caregiver [phone]    [place]      About your hospital stay     You were admitted on:  February 21, 2018 You last received care in the:  Unit [facility] Observation [facility]    You were discharged on:  February 22, 2018        Reason for your hospital stay       Dehydration                  Who to Call     For medical emergencies, please call 911.  For non-urgent questions about your medical care, please call your primary care provider or clinic, [phone]          Attending Provider     Provider Specialty    Carloz Quijano MD Emergency Medicine    MolinaGagan MD Emergency Medicine       Primary Care Provider Office Phone # Fax #    Humberto Conner [phone]       When to contact your care team       When to see your healthcare provider  Call your doctor right away if you have any of these signs of worsening heart failure:    Sudden weight gain (more than 2 pounds in 1 day or 5 pounds in 1 week, or whatever weight gain you were told to report by your doctor)    Trouble breathing not related to being active    New or increased swelling of your legs or ankles    Swelling or pain in your abdomen    Breathing trouble at night (waking up short of breath, needing more pillows to  breathe)    Frequent coughing that doesn't go away    Feeling much more tired than usual  Call 911  Call 911 right away if you have:    Severe shortness of breath, such that you can't catch your breath even while resting    Severe chest pain that does not resolve with rest or nitroglycerin    Pink, foamy mucus with cough and shortness of breath    A continuous rapid or irregular heartbeat    Passing out or fainting    Stroke symptoms such as sudden numbness or weakness on one side of your face, arm, or leg or sudden confusion, trouble speaking or vision changes                  After Care Instructions     Activity       Your activity upon discharge: As tolerated            Diet       Follow this diet upon discharge:Low sodium diet            Discharge Instructions       Your heart rate was noted to be elevated during your stay. Cardiology was consulted and recommended you hold your Bumex  tonight and tomorrow morning (2/22 and am 2/23) Resume tomorrow evening 2/23 and follow up in the cardiology clinic as previously scheduled.      Start taking Synthroid as your thyroid levels were low. Continue taking 25 mcg daily and follow up with your primary care doctor in 3-6 weeks for further adjustments.    Your podiatrist recommended your Bactrim be stopped  as it may have been causing your kidney level to increase. Please follow up in the podiatry clinic in 1 week.    During your stay, your oxygen saturations decreased when you were sleeping. Please follow up in a sleep clinic for further adjustments of your Bipap.    Recommend Cardiac rehab for muscle strengthening. Phone number: [phone]                  Follow-up Appointments     Adult [facility] Follow-up and recommended labs and tests       Follow up with your primary care doctor in 3-6 weeks for thyroid level recheck and adjustment of your thyroid medication.     Follow up in cardiology clinic on March 2nd as previously scheduled.     Follow up in Podiatry clinic  as previously scheduled.     Follow up in sleep clinic as soon as possible for adjustment to your Bipap .     Cardiac rehab for muscle strengthening. Phone number: [phone]    Appointments on [place] and/or [facility] (with [facility] or [facility] provider or service). Call [phone] if you haven't heard regarding these appointments within 7 days of discharge.            Follow Up and recommended labs and tests       TSH ,T3                  Your next 10 appointments already scheduled     Feb 26, 2018  1:00 PM CST   US RADHA DOPPLER WITH EXERCISE BILATERAL with SHUS5   [facility] ([facility])    [address] [facility] [id number] [phone]           Please bring a list of your medicines (including vitamins, minerals and over-the-counter drugs). Also, tell your doctor about any allergies you may have. Wear comfortable clothes and leave your valuables at home.  No caffeine or tobacco for 1 hour prior to exam.  Please call the Imaging Department at your exam site with any questions.            Feb 26, 2018  1:45 PM CST   New Visit with Vu Eden MD   [facility] ([facility])    [address] [place] [id number] [phone]            Mar 02, 2018  3:30 PM CST   (Arrive by 3:15 PM)   Implantable Loop Recorder with Uc Cv Device 1   [facility] ([facility] [place])    [address] [place] [address]  [address] [place] [id number] [phone]            Mar 02, 2018  4:00 PM CST   (Arrive by 3:45 PM)   Return Visit with Star Lobato MD   [facility] ([facility] [place] [facility])    [address] [place] [address] Se  [address] [place] [id number] [phone]              Pending Results     No orders found for last 3 day(s).            Statement of Approval     Ordered          02/22/18 4178  I have reviewed and agree with all  the recommendations and orders detailed in this document.  EFFECTIVE NOW     Approved and electronically signed by:  Susan Cartagena APRN CNP             Admission Information     Date & Time Provider Department Dept. Phone    2/21/2018 Gagan Molina MD Unit 6D Observation [facility] [place] [phone]      Your Vitals Were     Blood Pressure Pulse Temperature Respirations Pulse Oximetry       109/69 58 97.7  F (36.5  C) (Oral) 14 96%       MyChart Information     [employer] gives you secure access to your electronic health record. If you see a primary care provider, you can also send messages to your care team and make appointments. If you have questions, please call your primary care clinic.  If you do not have a primary care provider, please call [phone] and they will assist you.        Care EveryWhere ID     This is your Care EveryWhere ID. This could be used by other organizations to access your [facility] medical records  [id number]        Equal Access to Services     NATALIE HAYES : Elise Vasquez, waaxgucci salazar, qaybcara kaalmagucci main, scott mattson. So [facility] [phone].    ATENCIÓN: Si habla español, tiene a espinoza disposición servicios gratuitos de asistencia lingüística. Llame al [phone].    We comply with applicable federal civil rights laws and [place] laws. We do not discriminate on the basis of race, color, national origin, age, disability, sex, sexual orientation, or gender identity.               Review of your medicines      START taking        Dose / Directions    levothyroxine 25 MCG tablet   Commonly known as:  SYNTHROID/LEVOTHROID   Used for:  Hypothyroidism, unspecified type        Dose:  25 mcg   Start taking on:  2/23/2018   Take 1 tablet (25 mcg) by mouth every morning (before breakfast)   Quantity:  30 tablet   Refills:  0         CONTINUE these medicines which may have CHANGED, or have new prescriptions. If we are uncertain  of the size of tablets/capsules you have at home, strength may be listed as something that might have changed.        Dose / Directions    aspirin 81 MG chewable tablet   This may have changed:  when to take this   Used for:  Coronary artery disease with angina pectoris, unspecified vessel or lesion type, unspecified whether native or transplanted heart (H)        Dose:  81 mg   Take 1 tablet (81 mg) by mouth daily   Quantity:  30 tablet   Refills:  0       ferrous sulfate 325 (65 FE) MG tablet   Commonly known as:  IRON SUPPLEMENT   This may have changed:  when to take this   Used for:  S/P CABG (coronary artery bypass graft)        Dose:  325 mg   Take 1 tablet (325 mg) by mouth daily (with breakfast)   Quantity:  100 tablet   Refills:  1         CONTINUE these medicines which have NOT CHANGED        Dose / Directions    ACE BANDAGE SELF-ADHERING Misc   Used for:  Open wound of lower limb, right, subsequent encounter        Dose:  1 each   1 each 2 times daily   Quantity:  6 each   Refills:  3       acetaminophen 325 MG tablet   Commonly known as:  TYLENOL   Used for:  S/P CABG (coronary artery bypass graft)        Dose:  975 mg   Take 3 tablets (975 mg) by mouth every 6 hours as needed for mild pain   Quantity:  100 tablet   Refills:  0       ADAPTIC NON-ADHERING DRESSING Pads   Used for:  Open wound of lower limb, right, subsequent encounter        Dose:  1 each   Externally apply 1 each topically 2 times daily   Quantity:  1 each   Refills:  3       ALPRAZolam 0.25 MG tablet   Commonly known as:  XANAX   Used for:  Adjustment disorder with anxious mood        Dose:  0.25 mg   Take 1 tablet (0.25 mg) by mouth 3 times daily as needed for anxiety   Quantity:  10 tablet   Refills:  0       atorvastatin 40 MG tablet   Commonly known as:  LIPITOR   Used for:  Coronary artery disease with angina pectoris, unspecified vessel or lesion type, unspecified whether native or transplanted heart (H)        Dose:  40 mg   Take  1 tablet (40 mg) by mouth daily   Quantity:  90 tablet   Refills:  1       blood glucose monitoring meter device kit        Refills:  0       bumetanide 2 MG tablet   Commonly known as:  BUMEX   Used for:  Chronic systolic heart failure (H)        Dose:  2 mg   Take 1 tablet (2 mg) by mouth 2 times daily   Quantity:  180 tablet   Refills:  3       carvedilol 3.125 MG tablet   Commonly known as:  COREG   Used for:  Chronic diastolic heart failure (H)        Dose:  3.125 mg   Take 1 tablet (3.125 mg) by mouth 2 times daily (with meals)   Quantity:  180 tablet   Refills:  3       gabapentin 100 MG capsule   Commonly known as:  NEURONTIN   Used for:  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        Dose:  100 mg   Take 1 capsule (100 mg) by mouth 2 times daily   Quantity:  180 capsule   Refills:  1       KERLIX GAUZE ROLL MEDIUM Misc   Used for:  Open wound of lower limb, right, subsequent encounter        Dose:  1 each   1 each 2 times daily   Quantity:  48 each   Refills:  3       MELATONIN PO        Dose:  1 mg   Take 1 mg by mouth At Bedtime   Refills:  0       metolazone 2.5 MG tablet   Commonly known as:  ZAROXOLYN   Used for:  Chronic diastolic heart failure (H)        Dose:  2.5 mg   Take 1 tablet (2.5 mg) by mouth daily as needed For weight over 170 Lbs.   Quantity:  30 tablet   Refills:  1       ONETOUCH DELICA LANCETS 33G Misc   Used for:  Type 2 diabetes mellitus without complication, without long-term current use of insulin (H)        Dose:  1 Device   1 Device daily   Quantity:  100 each   Refills:  0       ONETOUCH ULTRA test strip   Used for:  Type 2 diabetes mellitus without complication, without long-term current use of insulin (H)   Generic drug:  blood glucose monitoring        USE AS DIRECTED TO TEST ONE TIME A DAY   Quantity:  100 each   Refills:  2       order for DME   Used for:  Open wound of lower limb, right, subsequent encounter        Sterile 4x4 gauze; Use gauze twice daily for dressing  changes.   Quantity:  1 Box   Refills:  3       pantoprazole 40 MG EC tablet   Commonly known as:  PROTONIX   Used for:  Coronary artery disease with angina pectoris, unspecified vessel or lesion type, unspecified whether native or transplanted heart (H)        Dose:  40 mg   Take 1 tablet (40 mg) by mouth every morning   Quantity:  90 tablet   Refills:  1       Potassium Chloride ER 20 MEQ Tbcr   Used for:  Chronic diastolic heart failure (H)        Dose:  20 mEq   Take 1 tablet (20 mEq) by mouth 2 times daily   Quantity:  90 tablet   Refills:  3       rivaroxaban ANTICOAGULANT 20 MG Tabs tablet   Commonly known as:  XARELTO   Used for:  Atrial fibrillation, unspecified type (H)        Dose:  20 mg   Take 1 tablet (20 mg) by mouth daily (with dinner) . DO NOT start until you have stopped the Warfarin.   Quantity:  90 tablet   Refills:  3       spironolactone 25 MG tablet   Commonly known as:  ALDACTONE   Used for:  Chronic systolic heart failure (H)        Dose:  25 mg   Take 1 tablet (25 mg) by mouth daily   Quantity:  90 tablet   Refills:  3       traZODone 50 MG tablet   Commonly known as:  DESYREL   Used for:  Primary insomnia        Dose:  25 mg   Take 0.5 tablets (25 mg) by mouth nightly as needed for sleep   Quantity:  45 tablet   Refills:  2       venlafaxine 150 MG Tb24 24 hr tablet   Commonly known as:  EFFEXOR-ER   Used for:  Adjustment disorder with depressed mood        Dose:  150 mg   Take 1 tablet (150 mg) by mouth daily (with breakfast)   Quantity:  90 each   Refills:  1         STOP taking     sulfamethoxazole-trimethoprim 800-160 MG per tablet   Commonly known as:  BACTRIM DS/SEPTRA DS                Where to get your medicines      These medications were sent to [facility] [address] [place] - [address] [address] [address]     Phone:  [phone]     levothyroxine 25 MCG tablet                Protect others around you: Learn how to safely use, store and  throw away your medicines at www.disposemymeds.org.             Medication List: This is a list of all your medications and when to take them. Check marks below indicate your daily home schedule. Keep this list as a reference.      Medications           Morning Afternoon Evening Bedtime As Needed    ACE BANDAGE SELF-ADHERING Misc   1 each 2 times daily                                acetaminophen 325 MG tablet   Commonly known as:  TYLENOL   Take 3 tablets (975 mg) by mouth every 6 hours as needed for mild pain                                ADAPTIC NON-ADHERING DRESSING Pads   Externally apply 1 each topically 2 times daily                                ALPRAZolam 0.25 MG tablet   Commonly known as:  XANAX   Take 1 tablet (0.25 mg) by mouth 3 times daily as needed for anxiety                                aspirin 81 MG chewable tablet   Take 1 tablet (81 mg) by mouth daily   Last time this was given:  81 mg on 2/22/2018  8:31 AM                                atorvastatin 40 MG tablet   Commonly known as:  LIPITOR   Take 1 tablet (40 mg) by mouth daily   Last time this was given:  40 mg on 2/22/2018  8:31 AM                                blood glucose monitoring meter device kit                                bumetanide 2 MG tablet   Commonly known as:  BUMEX   Take 1 tablet (2 mg) by mouth 2 times daily                                carvedilol 3.125 MG tablet   Commonly known as:  COREG   Take 1 tablet (3.125 mg) by mouth 2 times daily (with meals)   Last time this was given:  3.125 mg on 2/22/2018  6:42 PM                                ferrous sulfate 325 (65 FE) MG tablet   Commonly known as:  IRON SUPPLEMENT   Take 1 tablet (325 mg) by mouth daily (with breakfast)   Last time this was given:  325 mg on 2/22/2018  8:31 AM                                gabapentin 100 MG capsule   Commonly known as:  NEURONTIN   Take 1 capsule (100 mg) by mouth 2 times daily   Last time this was given:  100 mg on 2/22/2018   8:31 AM                                KERLIX GAUZE ROLL MEDIUM Misc   1 each 2 times daily                                levothyroxine 25 MCG tablet   Commonly known as:  SYNTHROID/LEVOTHROID   Take 1 tablet (25 mcg) by mouth every morning (before breakfast)   Start taking on:  2/23/2018   Last time this was given:  25 mcg on 2/22/2018  8:31 AM                                MELATONIN PO   Take 1 mg by mouth At Bedtime   Last time this was given:  1 mg on 2/21/2018 11:10 PM                                metolazone 2.5 MG tablet   Commonly known as:  ZAROXOLYN   Take 1 tablet (2.5 mg) by mouth daily as needed For weight over 170 Lbs.                                ONETOUCH DELICA LANCETS 33G Misc   1 Device daily                                ONETOUCH ULTRA test strip   USE AS DIRECTED TO TEST ONE TIME A DAY   Generic drug:  blood glucose monitoring                                order for DME   Sterile 4x4 gauze; Use gauze twice daily for dressing changes.                                pantoprazole 40 MG EC tablet   Commonly known as:  PROTONIX   Take 1 tablet (40 mg) by mouth every morning   Last time this was given:  40 mg on 2/22/2018  8:31 AM                                Potassium Chloride ER 20 MEQ Tbcr   Take 1 tablet (20 mEq) by mouth 2 times daily                                rivaroxaban ANTICOAGULANT 20 MG Tabs tablet   Commonly known as:  XARELTO   Take 1 tablet (20 mg) by mouth daily (with dinner) . DO NOT start until you have stopped the Warfarin.   Last time this was given:  15 mg on 2/22/2018  6:42 PM                                spironolactone 25 MG tablet   Commonly known as:  ALDACTONE   Take 1 tablet (25 mg) by mouth daily                                traZODone 50 MG tablet   Commonly known as:  DESYREL   Take 0.5 tablets (25 mg) by mouth nightly as needed for sleep   Last time this was given:  25 mg on 2/22/2018  1:35 AM                                venlafaxine 150 MG Tb24 24 hr  tablet   Commonly known as:  EFFEXOR-ER   Take 1 tablet (150 mg) by mouth daily (with breakfast)

## 2018-02-21 NOTE — ED NOTES
Patient BIBA from [facility] clinic where she was being evaluated for weakness and CVA r/o. Upon work up at clinic, suspicion is that etiology may be cardiac. Patient with h/o afib and heart rate has been 30-130s per EKG.

## 2018-02-21 NOTE — MR AVS SNAPSHOT
After Visit Summary   2/21/2018    Carlos Alberto Fenton    MRN: [id number]           Patient Information     Date Of Birth          1940        Visit Information        Provider Department      2/21/2018 3:00 PM Easton Torres DPM [facility]        Today's Diagnoses     Skin ulcer of toe of right foot with fat layer exposed (H)    -  1       Follow-ups after your visit        Your next 10 appointments already scheduled     Feb 26, 2018  1:00 PM CST   US RADHA DOPPLER WITH EXERCISE BILATERAL with SHUS5   [facility] ([facility])    [address] [id number] [phone]           Please bring a list of your medicines (including vitamins, minerals and over-the-counter drugs). Also, tell your doctor about any allergies you may have. Wear comfortable clothes and leave your valuables at home.  No caffeine or tobacco for 1 hour prior to exam.  Please call the Imaging Department at your exam site with any questions.            Feb 26, 2018  1:45 PM CST   New Visit with Vu Eden MD   [facility] ([facility])    [address]  [place] [id number] [phone]            Mar 02, 2018  3:30 PM CST   (Arrive by 3:15 PM)   Implantable Loop Recorder with Uc Cv Device 1   [facility] ([facility])    [address] [place] [address] [address]  [address] [place] [id number] [phone]            Mar 02, 2018  4:00 PM CST   (Arrive by 3:45 PM)   Return Visit with Star Lobato MD   [facility] [place])    [address] [place] [address] [address]  [address] [place] [id number] [phone]              Future tests that were ordered for you today     Open Standing Orders        Priority Remaining Interval Expires Ordered    Potassium Routine 100/100 CONDITIONAL (SPECIFY)  2/22/2018     Magnesium Routine 100/100 CONDITIONAL (SPECIFY)  2/22/2018    CPAP for stable sleep apnea with home equipment settings Routine 15/16 AT BEDTIME  2/22/2018    Notify Provider -Pain Management Routine [id number] PRN  2/21/2018    Notify Physician to consider change to inpatient status IF Routine [id number] PRN  2/21/2018    Notify Physician to consider change to inpatient status IF Routine [id number] PRN  2/21/2018            Who to contact     If you have questions or need follow up information about today's clinic visit or your schedule please contact [facility] directly at [phone].  Normal or non-critical lab and imaging results will be communicated to you by [employer], letter or phone within 4 business days after the clinic has received the results. If you do not hear from us within 7 days, please contact the clinic through [employer] or phone. If you have a critical or abnormal lab result, we will notify you by phone as soon as possible.  Submit refill requests through [employer] or call your pharmacy and they will forward the refill request to us. Please allow 3 business days for your refill to be completed.          Additional Information About Your Visit        [employer] Information     [employer] gives you secure access to your electronic health record. If you see a primary care provider, you can also send messages to your care team and make appointments. If you have questions, please call your primary care clinic.  If you do not have a primary care provider, please call [phone] and they will assist you.      [employer] is an electronic gateway that provides easy, online access to your medical records. With [employer], you can request a clinic appointment, read your test results, renew a prescription or communicate with your care team.     To access your existing account, please contact your [facility] or call [phone] for assistance.        Care EveryWhere ID      This is your Care EveryWhere ID. This could be used by other organizations to access your [place] medical records  [id number]        Your Vitals Were     Pulse Temperature Pulse Oximetry             52 97.3  F (36.3  C) (Oral) 95%          Blood Pressure from Last 3 Encounters:   02/22/18 97/53   02/21/18 121/90   02/14/18 124/80    Weight from Last 3 Encounters:   01/16/18 74.8 kg (165 lb)   12/18/17 76.3 kg (168 lb 3.2 oz)   12/15/17 75.4 kg (166 lb 3.2 oz)              We Performed the Following     DEBRIDEMENT WOUND UP TO 20 SQ CM     Glucose by meter          Today's Medication Changes          These changes are accurate as of 2/21/18  4:59 PM.  If you have any questions, ask your nurse or doctor.               These medicines have changed or have updated prescriptions.        Dose/Directions    aspirin 81 MG chewable tablet   This may have changed:  when to take this   Used for:  Coronary artery disease with angina pectoris, unspecified vessel or lesion type, unspecified whether native or transplanted heart (H)        Dose:  81 mg   Take 1 tablet (81 mg) by mouth daily   Quantity:  30 tablet   Refills:  0       ferrous sulfate 325 (65 FE) MG tablet   Commonly known as:  IRON SUPPLEMENT   This may have changed:  when to take this   Used for:  S/P CABG (coronary artery bypass graft)        Dose:  325 mg   Take 1 tablet (325 mg) by mouth daily (with breakfast)   Quantity:  100 tablet   Refills:  1                Primary Care Provider Office Phone # Fax #    Humberto Conner [phone]       [facility] [address] [place] [address]        Equal Access to Services     [facility]: Hadii william wiley Soedwige, waaxda luqadaha, qaybta kaalmada scott [address] . So [facility] [phone].    ATENCIÓN: Si habla español, tiene a espinoza disposición servicios gratuitos de asistencia lingüística. Llame al [phone].    We comply with applicable  federal civil rights laws and [place] laws. We do not discriminate on the basis of race, color, national origin, age, disability, sex, sexual orientation, or gender identity.            Thank you!     Thank you for choosing [facility]  for your care. Our goal is always to provide you with excellent care. Hearing back from our patients is one way we can continue to improve our services. Please take a few minutes to complete the written survey that you may receive in the mail after your visit with us. Thank you!             Your Updated Medication List - Protect others around you: Learn how to safely use, store and throw away your medicines at www.disposemymeds.org.          This list is accurate as of 2/21/18  4:59 PM.  Always use your most recent med list.                   Brand Name Dispense Instructions for use Diagnosis    ACE BANDAGE SELF-ADHERING Misc     6 each    1 each 2 times daily    Open wound of lower limb, right, subsequent encounter       acetaminophen 325 MG tablet    TYLENOL    100 tablet    Take 3 tablets (975 mg) by mouth every 6 hours as needed for mild pain    S/P CABG (coronary artery bypass graft)       ADAPTIC NON-ADHERING DRESSING Pads     1 each    Externally apply 1 each topically 2 times daily    Open wound of lower limb, right, subsequent encounter       ALPRAZolam 0.25 MG tablet    XANAX    10 tablet    Take 1 tablet (0.25 mg) by mouth 3 times daily as needed for anxiety    Adjustment disorder with anxious mood       aspirin 81 MG chewable tablet     30 tablet    Take 1 tablet (81 mg) by mouth daily    Coronary artery disease with angina pectoris, unspecified vessel or lesion type, unspecified whether native or transplanted heart (H)       atorvastatin 40 MG tablet    LIPITOR    90 tablet    Take 1 tablet (40 mg) by mouth daily    Coronary artery disease with angina pectoris, unspecified vessel or lesion type, unspecified whether native or transplanted heart (H)        blood glucose monitoring meter device kit           bumetanide 2 MG tablet    BUMEX    180 tablet    Take 1 tablet (2 mg) by mouth 2 times daily    Chronic systolic heart failure (H)       carvedilol 3.125 MG tablet    COREG    180 tablet    Take 1 tablet (3.125 mg) by mouth 2 times daily (with meals)    Chronic diastolic heart failure (H)       ferrous sulfate 325 (65 FE) MG tablet    IRON SUPPLEMENT    100 tablet    Take 1 tablet (325 mg) by mouth daily (with breakfast)    S/P CABG (coronary artery bypass graft)       gabapentin 100 MG capsule    NEURONTIN    180 capsule    Take 1 capsule (100 mg) by mouth 2 times daily    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       KERLIX GAUZE ROLL MEDIUM Misc     48 each    1 each 2 times daily    Open wound of lower limb, right, subsequent encounter       MELATONIN PO      Take 1 mg by mouth At Bedtime        metolazone 2.5 MG tablet    ZAROXOLYN    30 tablet    Take 1 tablet (2.5 mg) by mouth daily as needed For weight over 170 Lbs.    Chronic diastolic heart failure (H)       ONETOUCH DELICA LANCETS 33G Misc     100 each    1 Device daily    Type 2 diabetes mellitus without complication, without long-term current use of insulin (H)       ONETOUCH ULTRA test strip   Generic drug:  blood glucose monitoring     100 each    USE AS DIRECTED TO TEST ONE TIME A DAY    Type 2 diabetes mellitus without complication, without long-term current use of insulin (H)       order for DME     1 Box    Sterile 4x4 gauze; Use gauze twice daily for dressing changes.    Open wound of lower limb, right, subsequent encounter       pantoprazole 40 MG EC tablet    PROTONIX    90 tablet    Take 1 tablet (40 mg) by mouth every morning    Coronary artery disease with angina pectoris, unspecified vessel or lesion type, unspecified whether native or transplanted heart (H)       Potassium Chloride ER 20 MEQ Tbcr     90 tablet    Take 1 tablet (20 mEq) by mouth 2 times daily    Chronic diastolic heart  failure (H)       rivaroxaban ANTICOAGULANT 20 MG Tabs tablet    XARELTO    90 tablet    Take 1 tablet (20 mg) by mouth daily (with dinner) . DO NOT start until you have stopped the Warfarin.    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

## 2018-02-22 VITALS
DIASTOLIC BLOOD PRESSURE: 69 MMHG | SYSTOLIC BLOOD PRESSURE: 109 MMHG | HEART RATE: 58 BPM | TEMPERATURE: 97.7 F | OXYGEN SATURATION: 96 % | RESPIRATION RATE: 14 BRPM

## 2018-02-22 LAB
ANION GAP SERPL CALCULATED.3IONS-SCNC: 6 MMOL/L (ref 3–14)
ANION GAP SERPL CALCULATED.3IONS-SCNC: 8 MMOL/L (ref 3–14)
BUN SERPL-MCNC: 43 MG/DL (ref 7–30)
BUN SERPL-MCNC: 50 MG/DL (ref 7–30)
CALCIUM SERPL-MCNC: 9.1 MG/DL (ref 8.5–10.1)
CALCIUM SERPL-MCNC: 9.8 MG/DL (ref 8.5–10.1)
CHLORIDE SERPL-SCNC: 95 MMOL/L (ref 94–109)
CHLORIDE SERPL-SCNC: 99 MMOL/L (ref 94–109)
CO2 SERPL-SCNC: 32 MMOL/L (ref 20–32)
CO2 SERPL-SCNC: 32 MMOL/L (ref 20–32)
CREAT SERPL-MCNC: 1.28 MG/DL (ref 0.52–1.04)
CREAT SERPL-MCNC: 1.41 MG/DL (ref 0.52–1.04)
ERYTHROCYTE [DISTWIDTH] IN BLOOD BY AUTOMATED COUNT: 16.7 % (ref 10–15)
GFR SERPL CREATININE-BSD FRML MDRD: 36 ML/MIN/1.7M2
GFR SERPL CREATININE-BSD FRML MDRD: 40 ML/MIN/1.7M2
GLUCOSE BLDC GLUCOMTR-MCNC: 146 MG/DL (ref 70–99)
GLUCOSE BLDC GLUCOMTR-MCNC: 94 MG/DL (ref 70–99)
GLUCOSE SERPL-MCNC: 108 MG/DL (ref 70–99)
GLUCOSE SERPL-MCNC: 123 MG/DL (ref 70–99)
HCT VFR BLD AUTO: 34.1 % (ref 35–47)
HGB BLD-MCNC: 11.2 G/DL (ref 11.7–15.7)
INTERPRETATION ECG - MUSE: NORMAL
MAGNESIUM SERPL-MCNC: 2.1 MG/DL (ref 1.6–2.3)
MCH RBC QN AUTO: 35.2 PG (ref 26.5–33)
MCHC RBC AUTO-ENTMCNC: 32.8 G/DL (ref 31.5–36.5)
MCV RBC AUTO: 107 FL (ref 78–100)
PLATELET # BLD AUTO: 189 10E9/L (ref 150–450)
POTASSIUM SERPL-SCNC: 3.3 MMOL/L (ref 3.4–5.3)
POTASSIUM SERPL-SCNC: 4.2 MMOL/L (ref 3.4–5.3)
RBC # BLD AUTO: 3.18 10E12/L (ref 3.8–5.2)
SODIUM SERPL-SCNC: 135 MMOL/L (ref 133–144)
SODIUM SERPL-SCNC: 137 MMOL/L (ref 133–144)
WBC # BLD AUTO: 6.8 10E9/L (ref 4–11)

## 2018-02-22 PROCEDURE — A9270 NON-COVERED ITEM OR SERVICE: HCPCS | Mod: GY | Performed by: NURSE PRACTITIONER

## 2018-02-22 PROCEDURE — 80048 BASIC METABOLIC PNL TOTAL CA: CPT | Mod: 91 | Performed by: PHYSICIAN ASSISTANT

## 2018-02-22 PROCEDURE — 85027 COMPLETE CBC AUTOMATED: CPT | Performed by: PHYSICIAN ASSISTANT

## 2018-02-22 PROCEDURE — 36415 COLL VENOUS BLD VENIPUNCTURE: CPT | Performed by: PHYSICIAN ASSISTANT

## 2018-02-22 PROCEDURE — 40000275 ZZH STATISTIC RCP TIME EA 10 MIN

## 2018-02-22 PROCEDURE — A9270 NON-COVERED ITEM OR SERVICE: HCPCS | Mod: GY | Performed by: PHYSICIAN ASSISTANT

## 2018-02-22 PROCEDURE — G0378 HOSPITAL OBSERVATION PER HR: HCPCS

## 2018-02-22 PROCEDURE — 99214 OFFICE O/P EST MOD 30 MIN: CPT | Mod: 24 | Performed by: INTERNAL MEDICINE

## 2018-02-22 PROCEDURE — 94660 CPAP INITIATION&MGMT: CPT

## 2018-02-22 PROCEDURE — 25000132 ZZH RX MED GY IP 250 OP 250 PS 637: Mod: GY | Performed by: PHYSICIAN ASSISTANT

## 2018-02-22 PROCEDURE — 83735 ASSAY OF MAGNESIUM: CPT | Performed by: PHYSICIAN ASSISTANT

## 2018-02-22 PROCEDURE — 80048 BASIC METABOLIC PNL TOTAL CA: CPT | Performed by: NURSE PRACTITIONER

## 2018-02-22 PROCEDURE — 36415 COLL VENOUS BLD VENIPUNCTURE: CPT | Performed by: NURSE PRACTITIONER

## 2018-02-22 PROCEDURE — 25000132 ZZH RX MED GY IP 250 OP 250 PS 637: Mod: GY | Performed by: EMERGENCY MEDICINE

## 2018-02-22 PROCEDURE — 25000132 ZZH RX MED GY IP 250 OP 250 PS 637: Mod: GY | Performed by: NURSE PRACTITIONER

## 2018-02-22 PROCEDURE — 96361 HYDRATE IV INFUSION ADD-ON: CPT

## 2018-02-22 PROCEDURE — 25000128 H RX IP 250 OP 636: Performed by: NURSE PRACTITIONER

## 2018-02-22 PROCEDURE — 00000146 ZZHCL STATISTIC GLUCOSE BY METER IP

## 2018-02-22 PROCEDURE — 99217 ZZC OBSERVATION CARE DISCHARGE: CPT | Mod: Z6 | Performed by: EMERGENCY MEDICINE

## 2018-02-22 PROCEDURE — A9270 NON-COVERED ITEM OR SERVICE: HCPCS | Mod: GY | Performed by: EMERGENCY MEDICINE

## 2018-02-22 RX ORDER — POTASSIUM CHLORIDE 1.5 G/1.58G
20-40 POWDER, FOR SOLUTION ORAL
Status: DISCONTINUED | OUTPATIENT
Start: 2018-02-22 | End: 2018-02-22 | Stop reason: HOSPADM

## 2018-02-22 RX ORDER — BUMETANIDE 2 MG/1
2 TABLET ORAL 2 TIMES DAILY
Qty: 180 TABLET | Refills: 3 | Status: ON HOLD | COMMUNITY
Start: 2018-02-22 | End: 2018-04-04

## 2018-02-22 RX ORDER — LEVOTHYROXINE SODIUM 25 UG/1
25 TABLET ORAL
Qty: 30 TABLET | Refills: 0 | Status: ON HOLD | OUTPATIENT
Start: 2018-02-23 | End: 2018-04-04

## 2018-02-22 RX ORDER — POTASSIUM CHLORIDE 7.45 MG/ML
10 INJECTION INTRAVENOUS
Status: DISCONTINUED | OUTPATIENT
Start: 2018-02-22 | End: 2018-02-22 | Stop reason: HOSPADM

## 2018-02-22 RX ORDER — POTASSIUM CHLORIDE 29.8 MG/ML
20 INJECTION INTRAVENOUS
Status: DISCONTINUED | OUTPATIENT
Start: 2018-02-22 | End: 2018-02-22 | Stop reason: HOSPADM

## 2018-02-22 RX ORDER — POTASSIUM CHLORIDE 750 MG/1
20-40 TABLET, EXTENDED RELEASE ORAL
Status: DISCONTINUED | OUTPATIENT
Start: 2018-02-22 | End: 2018-02-22 | Stop reason: HOSPADM

## 2018-02-22 RX ORDER — MAGNESIUM SULFATE HEPTAHYDRATE 40 MG/ML
4 INJECTION, SOLUTION INTRAVENOUS EVERY 4 HOURS PRN
Status: DISCONTINUED | OUTPATIENT
Start: 2018-02-22 | End: 2018-02-22 | Stop reason: HOSPADM

## 2018-02-22 RX ORDER — POTASSIUM CL/LIDO/0.9 % NACL 10MEQ/0.1L
10 INTRAVENOUS SOLUTION, PIGGYBACK (ML) INTRAVENOUS
Status: DISCONTINUED | OUTPATIENT
Start: 2018-02-22 | End: 2018-02-22 | Stop reason: HOSPADM

## 2018-02-22 RX ADMIN — VENLAFAXINE HYDROCHLORIDE 150 MG: 150 CAPSULE, EXTENDED RELEASE ORAL at 08:31

## 2018-02-22 RX ADMIN — CARVEDILOL 3.12 MG: 3.12 TABLET, FILM COATED ORAL at 18:42

## 2018-02-22 RX ADMIN — SODIUM CHLORIDE 1000 ML: 9 INJECTION, SOLUTION INTRAVENOUS at 08:41

## 2018-02-22 RX ADMIN — GABAPENTIN 100 MG: 100 CAPSULE ORAL at 08:31

## 2018-02-22 RX ADMIN — POTASSIUM CHLORIDE 40 MEQ: 750 TABLET, EXTENDED RELEASE ORAL at 08:40

## 2018-02-22 RX ADMIN — PANTOPRAZOLE SODIUM 40 MG: 40 TABLET, DELAYED RELEASE ORAL at 08:31

## 2018-02-22 RX ADMIN — TRAZODONE HYDROCHLORIDE 25 MG: 50 TABLET ORAL at 01:35

## 2018-02-22 RX ADMIN — ASPIRIN 81 MG CHEWABLE TABLET 81 MG: 81 TABLET CHEWABLE at 08:31

## 2018-02-22 RX ADMIN — SODIUM CHLORIDE 1000 ML: 9 INJECTION, SOLUTION INTRAVENOUS at 14:06

## 2018-02-22 RX ADMIN — CARVEDILOL 3.12 MG: 3.12 TABLET, FILM COATED ORAL at 08:32

## 2018-02-22 RX ADMIN — ATORVASTATIN CALCIUM 40 MG: 40 TABLET, FILM COATED ORAL at 08:31

## 2018-02-22 RX ADMIN — RIVAROXABAN 15 MG: 15 TABLET, FILM COATED ORAL at 18:42

## 2018-02-22 RX ADMIN — FERROUS SULFATE 325 MG: 325 TABLET, FILM COATED ORAL at 08:31

## 2018-02-22 RX ADMIN — LEVOTHYROXINE SODIUM 25 MCG: 25 TABLET ORAL at 08:31

## 2018-02-22 NOTE — PLAN OF CARE
Problem: Patient Care Overview  Goal: Plan of Care/Patient Progress Review  Outpatient/Observation goals to be met before discharge home:  -diagnostic tests and consults completed and resulted: Yes  -vital signs normal or at patient baseline: No, fluid bolus running to bring BP up.  -tolerating oral intake to maintain hydration: Yes   -tolerating oral antibiotics or has plans for home infusion setup: No antibiotics ordered during stay  -returns to baseline functional status: No   -safe disposition plan has been identified: Pending  -Renal function at baseline:No

## 2018-02-22 NOTE — PROGRESS NOTES
[facility]  Patient is currently open to home care services with [facility].  The patient is currently receiving RN/HA  services.  [facility]  and team have been notified that patient is under OBSERVATION STATUS. [facility] liaison will continue to follow patient during stay.  If patient is admitted to inpatient status please provide orders to resume home care at time of discharge if appropriate.    Thank you  Malena Leslie RN, BSN  [facility] Liaison  [phone]

## 2018-02-22 NOTE — PROGRESS NOTES
Chief Complaints and History of Present Illnesses   Patient presents with     WOUND CARE     Right foot wounds            Allergies   Allergen Reactions     Heparin      HIT/ thrombocytopenia     Lovenox [Enoxaparin] Other (See Comments)     Thrombocytopenia      Oxycodone      hallucinations     Toprol Xl [Metoprolol] Nausea and Vomiting     Adhesive Tape Itching and Rash     Diapers & Supplies Rash     Developed yeast infection from previous hospital stay         Subjective: Carlos Alberto is a 77 year old female who presents to the clinic today for a follow up of right hallux and 2nd digit wounds. She presents today with complaints of short term memory loss and tremor of her upper extremities, especially the left. She relates to not feeling well overall. She is alone today, as her daughter had a root canal. She relates that these new symptoms started about a day ago. She does have a history of CVA 2016 and paroxsymal A. Fib.She is having some trouble finding words today in clinic. She continues to take the Bactrim.     ROS: No n/v/d/f/c/ns/sob/cp     Objective  97.3 52 Data Unavailable 121/90 Data Unavailable 0 lbs 0 oz  A vascular wound is noted at right  hallux measuring 0.9cm x 0.5cm x 0.2cm.      Yousif Classification: 2      Wound base: Pink  Yellow/Granulation  Slough      Edges: hyperkeratotic with dried blood in the tissue      Drainage: light/serous      Odor: no      Underminin O'Clock       Bone Exposure: No      Clinical Signs of Infection: No      After obtaining patient consent, the wound was irrigated with copious amounts of saline. A scalpel was then used to debride the wound into the dermal tissue. The wound edges were debrided to healthy, bleeding tissue. Given the patient's lack of sensation, no anesthesia was necessary for the procedure.   ____________________  Wound #2      A wound is noted at right  distal 2nd digit measuring 0.2cm x 0.2cm x 0.1cm.      Yousif Classification: 2      Wound  base: Red granulation      Edges: intact      Drainage: light/serous      Odor: no      Undermining: no      Bone Exposure: No      Clinical Signs of Infection: No      After obtaining patient consent, the wound was irrigated with copious amounts of saline. A scalpel was then used to debride the wound into dermal tissue. The wound edges were debrided back to healthy, bleeding tissue. The wound base exhibited healthy bleeding. Given the patient's lack of sensation, no anesthesia was necessary for the procedure.  No signs of infection are noted to these areas today.   She has symmetrical eyebrow raise, eye closing, smile. No tongue deviation. Normal extremity strength that is symmetrical with hand squeeze.       Assessment: 78 YO with right foot wounds with slightly altered MS. I don't think that she has an underlying infection in the toes that would precipitate altered MS. Will send to ED for evaluation because of her history of CVA and A.fib.     Plan:   - Pt seen and evaluated  - Wounds were debrided today to ensure that there was no underlying infection that could possibly contribute to altered MS. No deep abscess was found. Actually, after the removal of bone at the last visit, the 2nd digit would is healing well. Hallux wound is stable.   - She did get the other 2 views for the right foot. No active osteolysis is noted today, however xray is not sensitive for OM for about 2 weeks. However, the wounds are healing some, which is less likely with underlying, active osteomyelitis.  - Asenath was admitted yesterday for observation. Bactrim was stopped, which clinically, seems appropriate. Can continue to monitor for adequate wound closure and follow with serial radiographs.   - Pt to return to clinic in 1 week.

## 2018-02-22 NOTE — PLAN OF CARE
Problem: Patient Care Overview  Goal: Individualization & Mutuality  Outpatient/Observation goals to be met before discharge home:  -diagnostic tests and consults completed and resulted: No  -vital signs normal or at patient baseline: Monitoring   -tolerating oral intake to maintain hydration: Yes   -tolerating oral antibiotics or has plans for home infusion setup: No antibiotics ordered   -returns to baseline functional status: No   -safe disposition plan has been identified: Pending  -Renal function at baseline:No

## 2018-02-22 NOTE — PLAN OF CARE
Problem: Patient Care Overview  Goal: Plan of Care/Patient Progress Review  Outpatient/Observation goals to be met before discharge home:  -diagnostic tests and consults completed and resulted: no  -vital signs normal or at patient baseline: No, BP low this AM, heart rate tachycardic, fluid bolus NS running at 1000ml/hr  -tolerating oral intake to maintain hydration: Yes   -tolerating oral antibiotics or has plans for home infusion setup: No antibiotics ordered during stay  -returns to baseline functional status: No   -safe disposition plan has been identified: Pending  -Renal function at baseline:No     Pt Potassium this morning was 3.3, K+ replacement protocol initiated, 40mEq oral tablets given, 20mEq to be given at 11am and Potassium recheck 2 hours after second dose. Pt noted to be in A-fib this morning, pt back to normal sinus rhythm currently.

## 2018-02-22 NOTE — PROGRESS NOTES
Care Coordinator Progress Note     Admission Date/Time:  2/21/2018  Attending MD:  Gagan Molina*     Data  Chart reviewed, discussed with interdisciplinary team.   Patient was admitted for:    Dehydration  Fatigue, unspecified type  Atrial fibrillation, unspecified type (H)  Diabetic ulcer of left heel associated with type 2 diabetes mellitus, unspecified ulcer stage (H)  Ulcer of left foot, unspecified ulcer stage (H)  Long-term (current) use of anticoagulants.    Concerns with insurance coverage for discharge needs: None.  Current Living Situation: Patient lives with adult child.  Support System: Supportive and Involved  Services Involved: Home Care  Transportation: Family or Friend will provide  Barriers to Discharge: chronically ill and physical impairment    Coordination of Care and Referrals: Provided patient/family with options for discussion of pt current living situation.       Assessment   At pt bedside to discuss RNCC role and pt current living situation. Pt states she lives with her daughter, Lindsay, who is able to help pt with most needs. Pt also has [facility] RN and a nurse aid visit once per week to assess wounds and to assist pt with bathing. Daughter Lindsay at bedside. Anticipate no needs at DC.      Plan  Anticipated Discharge Date: 02/22/18  Anticipated Discharge Plan: DC with provider recommendations.     Maren Juarez RN CC  [facility]/6D Obs  [phone]

## 2018-02-22 NOTE — PLAN OF CARE
Problem: Patient Care Overview  Goal: Plan of Care/Patient Progress Review  Outpatient/Observation goals to be met before discharge home:  -diagnostic tests and consults completed and resulted: No  -vital signs normal or at patient baseline: Yes  -tolerating oral intake to maintain hydration: Yes   -tolerating oral antibiotics or has plans for home infusion setup: No antibiotics ordered   -returns to baseline functional status: No   -safe disposition plan has been identified: Pending  -Renal function at baseline:No

## 2018-02-22 NOTE — CONSULTS
Reason for consultation:  Atrial fibrillation    HPI:  Ms Fenton is a 78 yo female with CAD s/p CAB [LIMA-LAD, v-D1, v-RCA] + MAZE + ABDIEL ligation in 2/2016, heart failure with LV EF 45-50%, hypertension, type 2 diabetes, and history of stroke, DVT, and HIT who has had paroxysmal atrial fibrillation since her cardiac surgery.  Her atrial fibrillation was initially with amiodarone, which was later discontinued in the hopes that her AF was just due to operative inflammation but AF recurred.  Intentions to cardiovert her were initially stymied by her inability to reliably take her anticoagulation (and all of her medications), placing her at risk of stroke post-cardioversion.  A skin reaction to adhesives impeded efforts to quantify her AF burden with event monitors.  EKGs in the past several months have shown sinus rhythm in the 60s or AF/AFL in the 110-120.    She was admitted yesterday with hypovolemia manifesting as dizziness, unsteadiness, and generally feeling unwell.  Labs showed an acute kidney injury.  Earlier in the week she had taken metolazone in addition to her scheduled bumetanide because her weight reached 170 lbs, per her pre-specified heart failure action plan.  With IV fluids and withholding of diuretics her symptoms have improved.    When I last saw her in clinic with Dr. Azevedo, we discussed placing an ILR to determine her rate control and whether she would need pharmacologic or ablative rhythm control (low BPs preclude increasing the carvedilol).  She had that placed on 1/16/18.    A complete review of systems was negative except as noted above in the HPI.    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    parietral lesions bilateral       Antiplatelet or antithrombotic long-term use      Anxiety      Atrial fibrillation (H)      CAD (coronary artery disease)     2 vessel     Cancer (H) 1990    periodically have cancer on  the skin removed     Cerebral artery occlusion with cerebral infarction (H) 10/2016    Cardioembolic strokes related to atrial fibrillation     Deep vein thrombosis (DVT) of axillary vein of right upper extremity (H) 2/25/2017     Depressive disorder 2001     Diabetes (H)      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     Hyperlipidemia LDL goal <130 10/31/2010     Hypertension      Panic attacks      Seizures (H) 10/19/2016     Sleep apnea     Uses CPAP     Past Surgical History:   Procedure Laterality Date     AMPUTATE TOE(S) Right 5/26/2017    Procedure: AMPUTATE TOE(S);  Right Great Toe amputation, debriedment of 2 and 3rd toe soft tissu right foot. and application of Grafix;  Surgeon: Leah Santamaria MD;  Location: [facility] OR     ANESTHESIA CARDIOVERSION N/A 12/12/2017    Procedure: ANESTHESIA CARDIOVERSION;  Anesthesia Cardioversion ;  Surgeon: GENERIC ANESTHESIA PROVIDER;  Location: [facility] OR     BACK SURGERY       BYPASS GRAFT ARTERY CORONARY N/A 2/6/2017    Procedure: BYPASS GRAFT ARTERY CORONARY;  Surgeon: Mikhail Quiñones MD;  Location: [facility] OR     C APPENDECTOMY  1959     C CARDIAC SURG PROCEDURE UNLIST       C HAND/FINGER SURGERY UNLISTED       C STOMACH SURGERY PROCEDURE UNLISTED       HC SACROPLASTY       HC VASCULAR SURGERY PROCEDURE UNLIST       HYSTERECTOMY, PASTORA  1988     IRRIGATION AND DEBRIDEMENT FOOT, COMBINED Right 7/7/2017    Procedure: COMBINED IRRIGATION AND DEBRIDEMENT FOOT;  Irrigation and Debridement Right Foot with Graphix  *Latex Allergy*;  Surgeon: Leah Santamaria MD;  Location: [facility] OR     MAZE PROCEDURE N/A 2/6/2017    Procedure: MAZE PROCEDURE;  Surgeon: Mikhail Quiñones MD;  Location: [facility] OR     PICC INSERTION Left 02/25/2017    5fr TL Bard PICC, 47cm (3cm external) in the L basilic vein w/ tip in the SVC RA junction     TONSILLECTOMY  1942     Family History   Problem Relation Age of Onset     DIABETES Mother      C.A.D. Mother      Hypertension Mother      CEREBROVASCULAR  DISEASE Mother      Mini Strokes     Other Cancer Mother      Skin Cancer     Hyperlipidemia Mother      Coronary Artery Disease Mother      DIABETES Father      C.A.D. Father      Hypertension Father      Other Cancer Father      Hyperlipidemia Father      Coronary Artery Disease Father      HEART DISEASE Sister      Arthritis Sister      Other Cancer Other      Skin Cancer     Social History     Social History     Marital status:      Spouse name: N/A     Number of children: N/A     Years of education: N/A     Occupational History     Not on file.     Social History Main Topics     Smoking status: Former Smoker     Packs/day: 1.00     Years: 10.00     Types: Cigarettes     Start date: 10/3/1958     Quit date: 7/4/1976     Smokeless tobacco: Never Used      Comment: Quit in 1976     Alcohol use Yes      Comment: Socially     Drug use: Yes     Special: Marijuana      Comment: Briefly used marijuana for peripheral neuropathy     Sexual activity: Not Currently     Partners: Male     Birth control/ protection: Post-menopausal     Other Topics Concern     Parent/Sibling W/ Cabg, Mi Or Angioplasty Before 65f 55m? Yes     Social History Narrative         No current facility-administered medications on file prior to encounter.   Current Outpatient Prescriptions on File Prior to Encounter:  sulfamethoxazole-trimethoprim (BACTRIM DS/SEPTRA DS) 800-160 MG per tablet Take 1 tablet by mouth 2 times daily   Potassium Chloride ER 20 MEQ TBCR Take 1 tablet (20 mEq) by mouth 2 times daily   MELATONIN PO Take 1 mg by mouth At Bedtime   carvedilol (COREG) 3.125 MG tablet Take 1 tablet (3.125 mg) by mouth 2 times daily (with meals)   rivaroxaban ANTICOAGULANT (XARELTO) 20 MG TABS tablet Take 1 tablet (20 mg) by mouth daily (with dinner) . DO NOT start until you have stopped the Warfarin.   ALPRAZolam (XANAX) 0.25 MG tablet Take 1 tablet (0.25 mg) by mouth 3 times daily as needed for anxiety   venlafaxine (EFFEXOR-ER) 150 MG  TB24 24 hr tablet Take 1 tablet (150 mg) by mouth daily (with breakfast)   metolazone (ZAROXOLYN) 2.5 MG tablet Take 1 tablet (2.5 mg) by mouth daily as needed For weight over 170 Lbs.   spironolactone (ALDACTONE) 25 MG tablet Take 1 tablet (25 mg) by mouth daily   bumetanide (BUMEX) 2 MG tablet Take 1 tablet (2 mg) by mouth 2 times daily   atorvastatin (LIPITOR) 40 MG tablet Take 1 tablet (40 mg) by mouth daily   gabapentin (NEURONTIN) 100 MG capsule Take 1 capsule (100 mg) by mouth 2 times daily   traZODone (DESYREL) 50 MG tablet Take 0.5 tablets (25 mg) by mouth nightly as needed for sleep   pantoprazole (PROTONIX) 40 MG EC tablet Take 1 tablet (40 mg) by mouth every morning   Elastic Bandages & Supports (ACE BANDAGE SELF-ADHERING) MISC 1 each 2 times daily   Gauze Pads & Dressings (KERLIX GAUZE ROLL MEDIUM) MISC 1 each 2 times daily   ONE TOUCH ULTRA test strip USE AS DIRECTED TO TEST ONE TIME A DAY   Wound Dressings (ADAPTIC NON-ADHERING DRESSING) PADS Externally apply 1 each topically 2 times daily   order for DME Sterile 4x4 gauze; Use gauze twice daily for dressing changes.   acetaminophen (TYLENOL) 325 MG tablet Take 3 tablets (975 mg) by mouth every 6 hours as needed for mild pain   aspirin 81 MG chewable tablet Take 1 tablet (81 mg) by mouth daily (Patient taking differently: Take 81 mg by mouth every morning )   ferrous sulfate (IRON SUPPLEMENT) 325 (65 FE) MG tablet Take 1 tablet (325 mg) by mouth daily (with breakfast) (Patient taking differently: Take 325 mg by mouth daily )   ONETOUCH DELICA LANCETS 33G MISC 1 Device daily   blood glucose monitoring (ONE TOUCH ULTRA 2) meter device kit         Allergies   Allergen Reactions     Heparin      HIT/ thrombocytopenia     Lovenox [Enoxaparin] Other (See Comments)     Thrombocytopenia      Oxycodone      hallucinations     Toprol Xl [Metoprolol] Nausea and Vomiting     Adhesive Tape Itching and Rash     Diapers & Supplies Rash     Developed yeast infection  from previous hospital stay         Examination:  /69  Pulse 58  Temp 97.7  F (36.5  C) (Oral)  Resp 14  SpO2 96%  General:  Frail, but comfortable appearing elderly female with unlabored breathing.  Neuro:  Alert & fully oriented.  Fluent speech.  CV:  Irregularly irregular & tachycardic.  Normal s1 and s2.  Warm extremities.  Lungs are clear on ascultation.  Abdomen is soft and non-tender.  Skin:  Not jaundiced.    Labs, Imaging and Procedures were reviewed.  Recent Labs   Lab Test  02/22/18   1348  02/22/18   0618  02/21/18   1736  01/16/18   1323   CR  1.28*  1.41*  1.69*  0.97   BUN  43*  50*  55*  36*   POTASSIUM  4.2  3.3*  3.5  4.6   MAG   --   2.1   --    --    NA  137  135  134  138   HGB   --   11.2*  12.2  12.8   PLT   --   189  216  176   WBC   --   6.8  7.3  5.0     12/12/17 TAVO showed an LV EF of 45-50% with a basal inferolateral aneurysm and wall thinning.  RV function was normal.  Mild to moderate MR and moderate to severe TR were present.        Assessment & Recommendations:  1  Paroxysmal atrial flutter vs fibrillation- her HR on telemetry is a steady 110 bpm, suggesting an organized atrial rhythm.  Having undergone the MAZE also predisposes her to atypical flutters.  We recommend making no changes to her rate control regimen of carvedilol 3.125 mg BID and having her follow up next week in clinic as planned to have the ILR interrogated.  If we feel that this is indeed a flutter based on the HR pattern, ablation would have a relatively higher success rate than if she has AF, in which case we would start antiarrhythmic therapy if her HR is insufficiently controlled with the current dose of carvedilol, further titration being limited by her low BPs.  She should remain on rivaroxaban for stroke/thromboembolism prophylaxis, given her CHADSVasc score of 7.    2  Heart failure with LV EF of 45-50%; ischemic cardiomyopathy perhaps with some contribution of AF to systolic dysfunction.  She  follows with Dr. Lobato.  She is probably still a little dry, given that her creatinine is not yet at baseline.    3  HARINI due to hypovolemia after taking metolazone, improving after IV fluids.  Would hold tonight's and tomorrow morning's dose of diuretic and resume bumetanide thereafter.  She should not take metolazone again until she meets with Dr. Lobato.    I discussed the patient with Dr. Yonatan Lazaro.    Harjinder Goldman MD  Cardiovascular disease fellow    .EP STAFF NOTE  Patient seen and examined and management plan personally reviewed with the patient. I agree with the note above by the CV/EP fellow.  Yonatan Lazaro MD [facility]  Cardiology - Electrophysiology

## 2018-02-22 NOTE — PLAN OF CARE
Problem: Patient Care Overview  Goal: Plan of Care/Patient Progress Review  Outpatient/Observation goals to be met before discharge home:  -diagnostic tests and consults completed and resulted: No  -vital signs normal or at patient baseline: No, pt bradycardic when sleeping.  -tolerating oral intake to maintain hydration: Yes   -tolerating oral antibiotics or has plans for home infusion setup: No antibiotics ordered   -returns to baseline functional status: No   -safe disposition plan has been identified: Pending  -Renal function at baseline:No

## 2018-02-22 NOTE — H&P
[facility] ED Observation Admission Note    Chief Complaint   Patient presents with     Fatigue       Assessment/Plan:  #HARINI:Creat 1.69 w/ BUN of 55, GFR of 29. BUN/Creat ratio 32.5. UA w/o ketones or protein, no e/o infection. S/P 1 L of IVF hydration in ED. Considering bactrim use (decreased creat secretion), pre renal etiology (BUN/creat ratio consistent, on diuretics, denies poor PO intake), also considering intra renal (less likely- but could be AIN from meds), post renal (denies).   -PO, can bolus PRN for tachycardia, soft BPs  -Urine eosinophils  -Hold bactrim, day team to contact primary podiatry office in AM to assess further need of antibiotics  -Discussed w/ pharmacy, Reduced dose of Xarelto tonight to 15 mg given renal function  -Strict Is/Os, daily weights  -Avoid nephrotoxics as able   -Bladder scan PRN    #Primary hyopthyroidism: Notes h/o hypothyroidism, unclear when meds DCed. TSH of 88, low T4. Notes depressed mood, low energy.   -Will initiate Synthroid at 25 mcg qday given age and h/o CAD  -Repeat TSH in 3-6 weeks w/ PCP for further dose modifications    #Dizziness: CT head w/ no acute intracranial pathology or CVA. Denies h/o vertigo. Denies lightheadedness, tunnel vision. Infx w/u w/ normal CBC, afebrile, UA w/o e/o infection, concern for left retrocardiac opacity but most likely atelectasis given absence of respiratory symptoms. Unclear etiology, sounds vertiginous in nature but now resolved w/ IVF which makes that less likely. Also considering dehydration given rapid improvement or arrhythmia given h/o a fib w RVR.  -Monitor on tele  -Bolus PRN for tachycardia, soft BPs  -Notify on call provider if symptoms return  -Consider MRI pending symptoms  -Consider PT eval for BPPV  -Reduce PRN xanax from TID PRN to qday PRN    #DVT c/b gangrenous toes: Follows with Dr. Torres, last visit today. On bactrim for presumed infection. Previous cultures from 02/07 w/ peptostreptococcus sunshine, right 2nd toe w/  "enterobacter cloacae (S to ceftriaxone, meropenem, zosyn, riki, bactrim) and enterococcus faecalis (pan sensitive), corynebacterium striatum.   -Hold bactrim w/ elevated creatinine  -Discussed w/ pharmacy, Reduced dose of Xarelto tonight to 15 mg given renal function  -Continue OP f/u with Dr. Torres  -Continue OP vascular surgery f/u w/ ABIs on 02/26 w/ Dr. Eden  -Message out to Dr. Torres to assess need for antibiotics and duration    #Paroxysmal a fib: Follows w/ EP. S/P MAZE, ABDIEL in 02/2017. PTA maintained on coreg 3.125 mg BID for rate control, Xarelto for anti coagulation. CHADS VASC of 8. Rate controlled in ED w/ stable BPs.   -Continue PTA medications w/ hold parameters  -Discussed w/ pharmacy, Reduced dose of Xarelto tonight to 15 mg given renal function    #Cardiomyopathy, likely ischemic  #HTN  #CAD s/ CAB (LIMA-LAD, v-D1, v-RCA)   TAVO 12/217 w/ mildy reduced EF of 45-50%, basal inferolateral segment aneurysmal w/ wall thinning and dyskinesis, moderate to severe tricuspid insufficiency present. Appears euvolemic. Denies chest pain. Rate controlled in ED w/ stable BPs. S/P 1 L of IVF bolus.  -Is/Os, daily weights  -Continue PTA aspirin, BB  -Hold PTA diuretics w/ elevated creatinine   -Will not run IVF given h/o cardiomyopathy, may bolus PRN  -Follow up with cardiology as scheduled 03/02/2018    #H/O CVA: Continue statin, aspirin, AC w/ xarelto (at reduced dose as above).   #Chronic back pain: On gabapentin  And PRN tylenol PTA. Continue.     HPI: Carlos Alberto Fenton is a 77 year old female with a history of DM2, HLD, CAD s/p CABG, A fib (on anticoagulation), HTN, cardiomyopathy (EF of 45-50%), CVA, DVT (on anticoagulation), chronic foot wounds who presented to the ED with weakness, dizziness.    The patient notes that she has been feeling \"yucky\" since yesterday. She notes that she has been feeling dizziness since yesterday, worse with movement but now improved with IVF. She isn't having it now, denied " feeling like room was spinning around her with sitting up or moving. She notes weakness, feeling like she needs to hold onto things since yesterday morning. She notes her only new medication is an antibiotic for her feet. She denies any change in urine output, dysuria. She notes dyspnea on exertion which is chronic for her. She denies chest pain, unilateral numbness/tingling or weakness, no new hearing/vision changes. She denies abdominal pain, nausea/vomiting or diarrhea. No fevers/chills. She notes that she takes her medications as prescribed and sets them up herself.     In the ED patient got IVF, dizziness improved, weakness okay.     On admission to the observation unit the patient was stable.    History:    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    parietral lesions bilateral       Antiplatelet or antithrombotic long-term use      Anxiety      Atrial fibrillation (H)      CAD (coronary artery disease)     2 vessel     Cancer (H) 1990    periodically have cancer on the skin removed     Cerebral artery occlusion with cerebral infarction (H) 10/2016    Cardioembolic strokes related to atrial fibrillation     Deep vein thrombosis (DVT) of axillary vein of right upper extremity (H) 2/25/2017     Depression      Depressive disorder 2001     Diabetes (H)      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 and application of Grafix;  Surgeon: Leah Santamaria MD;  Location: [facility] OR     ANESTHESIA CARDIOVERSION N/A 12/12/2017    Procedure: ANESTHESIA CARDIOVERSION;   Anesthesia Cardioversion ;  Surgeon: GENERIC ANESTHESIA PROVIDER;  Location: [facility] OR     BACK SURGERY       BYPASS GRAFT ARTERY CORONARY N/A 2/6/2017    Procedure: BYPASS GRAFT ARTERY CORONARY;  Surgeon: Mikhail Quiñones MD;  Location: [facility] OR     C APPENDECTOMY  1959     C CARDIAC SURG PROCEDURE UNLIST       C HAND/FINGER SURGERY UNLISTED       C STOMACH SURGERY PROCEDURE UNLISTED       HC SACROPLASTY       HC VASCULAR SURGERY PROCEDURE UNLIST       HYSTERECTOMY, PASTORA  1988     IRRIGATION AND DEBRIDEMENT FOOT, COMBINED Right 7/7/2017    Procedure: COMBINED IRRIGATION AND DEBRIDEMENT FOOT;  Irrigation and Debridement Right Foot with Graphix  *Latex Allergy*;  Surgeon: Leah Santamaria MD;  Location: [facility] OR     MAZE PROCEDURE N/A 2/6/2017    Procedure: MAZE PROCEDURE;  Surgeon: Mikhail Quiñones MD;  Location: [facility] OR     PICC INSERTION Left 02/25/2017    5fr TL Bard PICC, 47cm (3cm external) in the L basilic vein w/ tip in the SVC RA junction     TONSILLECTOMY  1942       Family History   Problem Relation Age of Onset     DIABETES Mother      C.A.D. Mother      Hypertension Mother      CEREBROVASCULAR DISEASE Mother      Mini Strokes     Other Cancer Mother      Skin Cancer     Hyperlipidemia Mother      Coronary Artery Disease Mother      DIABETES Father      C.A.D. Father      Hypertension Father      Other Cancer Father      Hyperlipidemia Father      Coronary Artery Disease Father      HEART DISEASE Sister      Arthritis Sister      Other Cancer Other      Skin Cancer       Social History     Social History     Marital status:      Spouse name: N/A     Number of children: N/A     Years of education: N/A     Occupational History     Not on file.     Social History Main Topics     Smoking status: Former Smoker     Packs/day: 1.00     Years: 10.00     Types: Cigarettes     Start date: 10/3/1958     Quit date: 7/4/1976     Smokeless tobacco: Never Used      Comment: Quit in 1976     Alcohol use Yes      Comment:  Socially     Drug use: Yes     Special: Marijuana      Comment: Briefly used marijuana for peripheral neuropathy     Sexual activity: Not Currently     Partners: Male     Birth control/ protection: Post-menopausal     Other Topics Concern     Parent/Sibling W/ Cabg, Mi Or Angioplasty Before 65f 55m? Yes     Social History Narrative         No current facility-administered medications on file prior to encounter.   Current Outpatient Prescriptions on File Prior to Encounter:  sulfamethoxazole-trimethoprim (BACTRIM DS/SEPTRA DS) 800-160 MG per tablet Take 1 tablet by mouth 2 times daily   Potassium Chloride ER 20 MEQ TBCR Take 1 tablet (20 mEq) by mouth 2 times daily   MELATONIN PO Take 1 mg by mouth At Bedtime   carvedilol (COREG) 3.125 MG tablet Take 1 tablet (3.125 mg) by mouth 2 times daily (with meals)   rivaroxaban ANTICOAGULANT (XARELTO) 20 MG TABS tablet Take 1 tablet (20 mg) by mouth daily (with dinner) . DO NOT start until you have stopped the Warfarin.   ALPRAZolam (XANAX) 0.25 MG tablet Take 1 tablet (0.25 mg) by mouth 3 times daily as needed for anxiety   venlafaxine (EFFEXOR-ER) 150 MG TB24 24 hr tablet Take 1 tablet (150 mg) by mouth daily (with breakfast)   metolazone (ZAROXOLYN) 2.5 MG tablet Take 1 tablet (2.5 mg) by mouth daily as needed For weight over 170 Lbs.   spironolactone (ALDACTONE) 25 MG tablet Take 1 tablet (25 mg) by mouth daily   bumetanide (BUMEX) 2 MG tablet Take 1 tablet (2 mg) by mouth 2 times daily   atorvastatin (LIPITOR) 40 MG tablet Take 1 tablet (40 mg) by mouth daily   gabapentin (NEURONTIN) 100 MG capsule Take 1 capsule (100 mg) by mouth 2 times daily   traZODone (DESYREL) 50 MG tablet Take 0.5 tablets (25 mg) by mouth nightly as needed for sleep   pantoprazole (PROTONIX) 40 MG EC tablet Take 1 tablet (40 mg) by mouth every morning   Elastic Bandages & Supports (ACE BANDAGE SELF-ADHERING) MISC 1 each 2 times daily   Gauze Pads & Dressings (KERLIX GAUZE ROLL MEDIUM) MISC 1 each  2 times daily   ONE TOUCH ULTRA test strip USE AS DIRECTED TO TEST ONE TIME A DAY   Wound Dressings (ADAPTIC NON-ADHERING DRESSING) PADS Externally apply 1 each topically 2 times daily   order for DME Sterile 4x4 gauze; Use gauze twice daily for dressing changes.   acetaminophen (TYLENOL) 325 MG tablet Take 3 tablets (975 mg) by mouth every 6 hours as needed for mild pain   aspirin 81 MG chewable tablet Take 1 tablet (81 mg) by mouth daily (Patient taking differently: Take 81 mg by mouth every morning )   ferrous sulfate (IRON SUPPLEMENT) 325 (65 FE) MG tablet Take 1 tablet (325 mg) by mouth daily (with breakfast) (Patient taking differently: Take 325 mg by mouth daily )   ONETOUCH DELICA LANCETS 33G MISC 1 Device daily   blood glucose monitoring (ONE TOUCH ULTRA 2) meter device kit        Data:    Results for orders placed or performed during the hospital encounter of 02/21/18   XR Chest 2 Views    Impression    Impression: The left hemidiaphragm and left heart border are obscured,  which may represent atelectasis and/or infection. No focal  consolidation.   CT Head w/o Contrast    Narrative    PRELIM  1. No acute intracranial pathology.  2. Patchy periventricular and subcortical white matter hypoattenuation  is nonspecific but most likely sequela of chronic small vessel  ischemic disease.   CBC with platelets differential   Result Value Ref Range    WBC 7.3 4.0 - 11.0 10e9/L    RBC Count 3.53 (L) 3.8 - 5.2 10e12/L    Hemoglobin 12.2 11.7 - 15.7 g/dL    Hematocrit 37.2 35.0 - 47.0 %     (H) 78 - 100 fl    MCH 34.6 (H) 26.5 - 33.0 pg    MCHC 32.8 31.5 - 36.5 g/dL    RDW 16.4 (H) 10.0 - 15.0 %    Platelet Count 216 150 - 450 10e9/L    Diff Method Automated Method     % Neutrophils 67.5 %    % Lymphocytes 19.2 %    % Monocytes 9.4 %    % Eosinophils 3.0 %    % Basophils 0.5 %    % Immature Granulocytes 0.4 %    Nucleated RBCs 0 0 /100    Absolute Neutrophil 5.0 1.6 - 8.3 10e9/L    Absolute Lymphocytes 1.4 0.8 -  5.3 10e9/L    Absolute Monocytes 0.7 0.0 - 1.3 10e9/L    Absolute Eosinophils 0.2 0.0 - 0.7 10e9/L    Absolute Basophils 0.0 0.0 - 0.2 10e9/L    Abs Immature Granulocytes 0.0 0 - 0.4 10e9/L    Absolute Nucleated RBC 0.0    Comprehensive metabolic panel   Result Value Ref Range    Sodium 134 133 - 144 mmol/L    Potassium 3.5 3.4 - 5.3 mmol/L    Chloride 91 (L) 94 - 109 mmol/L    Carbon Dioxide 34 (H) 20 - 32 mmol/L    Anion Gap 9 3 - 14 mmol/L    Glucose 102 (H) 70 - 99 mg/dL    Urea Nitrogen 55 (H) 7 - 30 mg/dL    Creatinine 1.69 (H) 0.52 - 1.04 mg/dL    GFR Estimate 29 (L) >60 mL/min/1.7m2    GFR Estimate If Black 35 (L) >60 mL/min/1.7m2    Calcium 10.0 8.5 - 10.1 mg/dL    Bilirubin Total 0.7 0.2 - 1.3 mg/dL    Albumin 4.3 3.4 - 5.0 g/dL    Protein Total 8.9 (H) 6.8 - 8.8 g/dL    Alkaline Phosphatase 114 40 - 150 U/L    ALT 24 0 - 50 U/L    AST 39 0 - 45 U/L   Troponin I   Result Value Ref Range    Troponin I ES 0.025 0.000 - 0.045 ug/L   UA with Microscopic   Result Value Ref Range    Color Urine Light Yellow     Appearance Urine Clear     Glucose Urine Negative NEG^Negative mg/dL    Bilirubin Urine Negative NEG^Negative    Ketones Urine Negative NEG^Negative mg/dL    Specific Gravity Urine 1.009 1.003 - 1.035    Blood Urine Negative NEG^Negative    pH Urine 7.0 5.0 - 7.0 pH    Protein Albumin Urine Negative NEG^Negative mg/dL    Urobilinogen mg/dL Normal 0.0 - 2.0 mg/dL    Nitrite Urine Negative NEG^Negative    Leukocyte Esterase Urine Negative NEG^Negative    Source Midstream Urine     WBC Urine <1 0 - 2 /HPF    RBC Urine <1 0 - 2 /HPF    Squamous Epithelial /HPF Urine 2 (H) 0 - 1 /HPF   TSH   Result Value Ref Range    TSH 88.00 (H) 0.40 - 4.00 mU/L   EKG 12 lead   Result Value Ref Range    Interpretation ECG Click View Image link to view waveform and result        ROS:  10 point ROS negative other than the symptoms noted above.    PCP: Dr. Conner    Exam:  Vitals:  B/P: 117/77, T: 98.1, P: 51, R: 18  Blood  pressure 117/77, pulse 51, temperature 98.1  F (36.7  C), temperature source Oral, resp. rate 18, SpO2 97 %, not currently breastfeeding.  GENERAL: Alert and oriented x 3. Lying comfortably in bed.   HEENT: Anicteric sclera. PERRL. Mucous membranes moist and without lesions, mild erythema of posterior pharynx, no tonsillar edema, exudates.  CV: Irregularly irregular. S1, S2. No murmurs appreciated.   RESPIRATORY: Effort normal on RA. Lungs with mild crackles in bilateral bases, no wheezing, rhonchi.   GI: Abdomen soft and non distended, bowel sounds present. No tenderness, rebound, guarding.   MUSCULOSKELETAL: No joint swelling or tenderness. Moves all extremities.   NEUROLOGICAL: No focal deficits.  EXTREMITIES: No peripheral edema. Intact bilateral pedal pulses.   SKIN: No jaundice. No rashes.       Consults: none  FEN: NS bolus PRN, will not run given CM.  DVT prophylaxis: Home AC  Code Status: FULL  Disposition: Likely home tomorrow vs. Friday pending renal response to IVF.    Signed:  HALLEY Graham  February 21, 2018 at 7:04 PM      Patient was seen and evaluated by ER Staff during the OBS Unit stay.  The medical decision making and plan of care was discussed with the OBS Care Team and was supervised by ER Staff.  The documentation above accurately reflects the patient's initial evaluation, care and disposition under my supervision in the OBS Unit.    Gagan Molina MD, FACEP  [facility] Staff Emergency Physician

## 2018-02-23 ENCOUNTER — TELEPHONE (OUTPATIENT)
Dept: INTERNAL MEDICINE | Facility: CLINIC | Age: 78
End: 2018-02-23

## 2018-02-23 NOTE — PLAN OF CARE
Problem: Patient Care Overview  Goal: Plan of Care/Patient Progress Review  Outpatient/Observation goals to be met before discharge home:  -diagnostic tests and consults completed and resulted: Pending-Cardiology to see pt soon.  -vital signs normal or at patient baseline: No, pt remains tachycardic- ED Obs ESTELA notified this AM, continues to be notified.  -tolerating oral intake to maintain hydration: Yes   -tolerating oral antibiotics or has plans for home infusion setup: No antibiotics ordered during stay  -returns to baseline functional status: No   -safe disposition plan has been identified: YES  -Renal function at baseline:No

## 2018-02-23 NOTE — PROVIDER NOTIFICATION
Discharge instructions reviewed, understood, and signed by patient. VSS, PIV removed, new medications reviewed and understood, patient has all belongings.

## 2018-02-23 NOTE — TELEPHONE ENCOUNTER
ED/ OUTREACH PROTOCOL    Patient Contact:  Follow up encounter documentation:    Reason for follow up: Carlos Alberto Fenton appeared on our list for recent discharge from an Emergency Room, inpatient admission, or TCU/nursing home facility.    Attempt # 1      Was call answered? No   Left message for patient to return call.  Sara Wiley RN

## 2018-02-23 NOTE — DISCHARGE SUMMARY
Discharge Summary    Carlos Alberto Fenton MRN# [id number]   YOB: 1940 Age: 77 year old     Date of Admission:  2/21/2018  Date of Discharge:  2/22/2018  Admitting Physician:  Gagan Molina MD  Discharge Physician:  LUIS MARIE MD  Discharging Service:  Emergency Department Observation Unit     Primary Provider: Humberto Conner          Discharge Diagnosis:     HARINI (acute kidney injury) (H)    * No resolved hospital problems. *               Discharge Disposition:   Discharged to home           Condition on Discharge:   Discharge condition: Stable   Code status on discharge: Full Code           Procedures:   CT head w/o contrast           Discharge Medications:     Current Discharge Medication List      START taking these medications    Details   levothyroxine (SYNTHROID/LEVOTHROID) 25 MCG tablet Take 1 tablet (25 mcg) by mouth every morning (before breakfast)  Qty: 30 tablet, Refills: 0    Comments: Repeat thyroid level in 3-6 and follow up with PCP at that time  Associated Diagnoses: Hypothyroidism, unspecified type         CONTINUE these medications which have CHANGED    Details   bumetanide (BUMEX) 2 MG tablet Take 1 tablet (2 mg) by mouth 2 times daily  Qty: 180 tablet, Refills: 3    Comments: Hold Bumex dose tonight 2/22 and tomorrow 2/23 am. Resume Bumex the evening of 2/23.  Associated Diagnoses: Chronic systolic heart failure (H)         CONTINUE these medications which have NOT CHANGED    Details   Potassium Chloride ER 20 MEQ TBCR Take 1 tablet (20 mEq) by mouth 2 times daily  Qty: 90 tablet, Refills: 3    Associated Diagnoses: Chronic diastolic heart failure (H)      MELATONIN PO Take 1 mg by mouth At Bedtime      carvedilol (COREG) 3.125 MG tablet Take 1 tablet (3.125 mg) by mouth 2 times daily (with meals)  Qty: 180 tablet, Refills: 3    Associated Diagnoses: Chronic diastolic heart failure (H)      rivaroxaban ANTICOAGULANT (XARELTO) 20 MG TABS tablet Take 1 tablet (20 mg) by  mouth daily (with dinner) . DO NOT start until you have stopped the Warfarin.  Qty: 90 tablet, Refills: 3    Comments: First dose Tuesday January 16th. Last dose of Warfarin 1/13.  Patient aware  Associated Diagnoses: Atrial fibrillation, unspecified type (H)      ALPRAZolam (XANAX) 0.25 MG tablet Take 1 tablet (0.25 mg) by mouth 3 times daily as needed for anxiety  Qty: 10 tablet, Refills: 0    Associated Diagnoses: Adjustment disorder with anxious mood      venlafaxine (EFFEXOR-ER) 150 MG TB24 24 hr tablet Take 1 tablet (150 mg) by mouth daily (with breakfast)  Qty: 90 each, Refills: 1    Associated Diagnoses: Adjustment disorder with depressed mood      metolazone (ZAROXOLYN) 2.5 MG tablet Take 1 tablet (2.5 mg) by mouth daily as needed For weight over 170 Lbs.  Qty: 30 tablet, Refills: 1    Associated Diagnoses: Chronic diastolic heart failure (H)      spironolactone (ALDACTONE) 25 MG tablet Take 1 tablet (25 mg) by mouth daily  Qty: 90 tablet, Refills: 3    Associated Diagnoses: Chronic systolic heart failure (H)      atorvastatin (LIPITOR) 40 MG tablet Take 1 tablet (40 mg) by mouth daily  Qty: 90 tablet, Refills: 1    Associated Diagnoses: Coronary artery disease with angina pectoris, unspecified vessel or lesion type, unspecified whether native or transplanted heart (H)      gabapentin (NEURONTIN) 100 MG capsule Take 1 capsule (100 mg) by mouth 2 times daily  Qty: 180 capsule, Refills: 1    Associated Diagnoses: 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      traZODone (DESYREL) 50 MG tablet Take 0.5 tablets (25 mg) by mouth nightly as needed for sleep  Qty: 45 tablet, Refills: 2    Associated Diagnoses: Primary insomnia      pantoprazole (PROTONIX) 40 MG EC tablet Take 1 tablet (40 mg) by mouth every morning  Qty: 90 tablet, Refills: 1    Associated Diagnoses: Coronary artery disease with angina pectoris, unspecified vessel or lesion type, unspecified whether native or transplanted heart (H)      Elastic  Bandages & Supports (ACE BANDAGE SELF-ADHERING) MISC 1 each 2 times daily  Qty: 6 each, Refills: 3    Associated Diagnoses: Open wound of lower limb, right, subsequent encounter      Gauze Pads & Dressings (KERLIX GAUZE ROLL MEDIUM) MISC 1 each 2 times daily  Qty: 48 each, Refills: 3    Associated Diagnoses: Open wound of lower limb, right, subsequent encounter      ONE TOUCH ULTRA test strip USE AS DIRECTED TO TEST ONE TIME A DAY  Qty: 100 each, Refills: 2    Associated Diagnoses: Type 2 diabetes mellitus without complication, without long-term current use of insulin (H)      Wound Dressings (ADAPTIC NON-ADHERING DRESSING) PADS Externally apply 1 each topically 2 times daily  Qty: 1 each, Refills: 3    Associated Diagnoses: Open wound of lower limb, right, subsequent encounter      order for DME Sterile 4x4 gauze; Use gauze twice daily for dressing changes.  Qty: 1 Box, Refills: 3    Associated Diagnoses: Open wound of lower limb, right, subsequent encounter      acetaminophen (TYLENOL) 325 MG tablet Take 3 tablets (975 mg) by mouth every 6 hours as needed for mild pain  Qty: 100 tablet, Refills: 0    Associated Diagnoses: S/P CABG (coronary artery bypass graft)      aspirin 81 MG chewable tablet Take 1 tablet (81 mg) by mouth daily  Qty: 30 tablet, Refills: 0    Associated Diagnoses: Coronary artery disease with angina pectoris, unspecified vessel or lesion type, unspecified whether native or transplanted heart (H)      ferrous sulfate (IRON SUPPLEMENT) 325 (65 FE) MG tablet Take 1 tablet (325 mg) by mouth daily (with breakfast)  Qty: 100 tablet, Refills: 1    Associated Diagnoses: S/P CABG (coronary artery bypass graft)      ONETOUCH DELICA LANCETS 33G MISC 1 Device daily  Qty: 100 each, Refills: 0    Associated Diagnoses: Type 2 diabetes mellitus without complication, without long-term current use of insulin (H)      blood glucose monitoring (ONE TOUCH ULTRA 2) meter device kit          STOP taking these  medications       sulfamethoxazole-trimethoprim (BACTRIM DS/SEPTRA DS) 800-160 MG per tablet Comments:   Reason for Stopping:                     Consultations:   Consultation during this admission received from cardiology             Brief History of Illness:   Carlos Alberto Fenton is a 77 year old female with a history of DM2, HLD, CAD s/p CABG, A fib (on anticoagulation), HTN, cardiomyopathy (EF of 45-50%), CVA, DVT (on anticoagulation), chronic foot wounds who presented to the ED with weakness, dizziness.             Hospital Course:   Assessment/Plan:  #HARINI:Creat  Decreased at tme of discharge. Most likely pre renal etiology (BUN/creat ratio consistent, on diuretics. Patient reports taking additional dose of Metolazone. Held Bactrim (per podiatry 's note) and patient will follow up in clinic.        #Primary hyopthyroidism: Notes h/o hypothyroidism, unclear when meds DCed. TSH of 88, low T4. Notes depressed mood, low energy.  Discharged on Synthroid at 25 mcg qday given age and h/o CAD  -Repeat TSH in 3-6 weeks w/ PCP for further dose modifications    #Dizziness: CT head w/ no acute intracranial pathology or CVA. Denies h/o vertigo. Denies lightheadedness, tunnel vision. Infx w/u w/ normal CBC, afebrile, UA w/o e/o infection, concern for left retrocardiac opacity but most likely atelectasis given absence of respiratory symptoms. Unclear etiology, sounds vertiginous in nature but now resolved w/ IVF which makes that less likely. Also considering dehydration given rapid improvement or arrhythmia given h/o a fib w RVR.     #DVT c/b gangrenous toes: Follows with Dr. Torres, last visit today. On bactrim for presumed infection. Previous cultures from 02/07 w/ peptostreptococcus sunshine, right 2nd toe w/ enterobacter cloacae (S to ceftriaxone, meropenem, zosyn, riki, bactrim) and enterococcus faecalis (pan sensitive), corynebacterium striatum. Continue OP vascular surgery f/u w/ ABIs on 02/26 w/ Dr. Eden       #Paroxysmal  a fib: Follows w/ EP. S/P MAZE, ABDIEL in 02/2017. PTA maintained on coreg 3.125 mg BID for rate control, Xarelto for anti coagulation. Continues to be in Afib. Cardiology consulted and recommended to hold two doses of Bumex. Patient will follow up in clinic to assess loop recorder. 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 Appears euvolemic. Denies chest pain. Rate controlled in ED w/ stable BPs. S/P 3 L of IVF bolus.  -Follow up with cardiology as scheduled 03/02/2018     #H/O CVA: Continue statin, aspirin, AC w/ xarelto (at reduced dose as above).   #Chronic back pain: On gabapentin  And PRN tylenol PTA. Continue            Final Day of Progress before Discharge:       Physical Exam:  Blood pressure 109/69, pulse 58, temperature 97.7  F (36.5  C), temperature source Oral, resp. rate 14, SpO2 96 %, not currently breastfeeding.    EXAM:  GENERAL: Alert and oriented x 3. Lying comfortably in bed.   HEENT: Anicteric sclera. PERRL. Mucous membranes moist and without lesions, mild erythema of posterior pharynx, no tonsillar edema, exudates.  CV: Irregularly irregular. S1, S2. No murmurs appreciated.   RESPIRATORY: Effort normal on RA. Lungs with mild crackles in bilateral bases, no wheezing, rhonchi.   GI: Abdomen soft and non distended, bowel sounds present. No tenderness, rebound, guarding.   MUSCULOSKELETAL: No joint swelling or tenderness. Moves all extremities.   NEUROLOGICAL: No focal deficits.  EXTREMITIES: No peripheral edema. Intact bilateral pedal pulses.   SKIN: No jaundice. No rashes.          Data:  All laboratory data reviewed             Significant Results:   None  Results for orders placed or performed during the hospital encounter of 02/21/18   XR Chest 2 Views    Narrative    Exam: XR CHEST 2 VW,  2/21/2018 5:55 PM    Indication: fatigue, concern for pneumonia;     Comparison: CT 4/20/2017, radiograph 4/23/2017    Findings:   PA and lateral views of the chest. Median sternotomy wires and  mediastinal surgical clips. Left atrial appendage exclusion device.  Implantable loop recorder. The trachea is midline. The  cardiomediastinal silhouette is stable. No focal airspace opacity. No  pneumothorax. No pleural effusion.      Impression    Impression: The left hemidiaphragm and left heart border are obscured  by left basilar/retrocardiac opacity, which may represent atelectasis  and/or infection.    I have personally reviewed the examination and initial interpretation  and I agree with the findings.    RUBEN DAY MD   CT Head w/o Contrast    Narrative    CT HEAD W/O CONTRAST 2/21/2018 6:16 PM    Provided History: fatigue, hx of A-fib and CVAs;     Comparison: 2/8/2017, 10/25/2016.    Technique: Using multidetector thin collimation helical acquisition  technique, axial, coronal and sagittal CT images from the skull base  to the vertex were obtained without intravenous contrast.     Findings:    No intracranial hemorrhage, mass effect, or midline shift. The  ventricles are proportionate to the cerebral sulci. The gray to white  matter differentiation of the cerebral hemispheres is preserved. There  is patchy periventricular and subcortical white matter hypoattenuation  which is nonspecific, most likely representing sequelae of chronic  small vessel ischemic disease. The basal cisterns are patent.    The visualized paranasal sinuses are clear. The mastoid air cells are  clear.       Impression    Impression:   1. No acute intracranial pathology.  2. Patchy periventricular and subcortical white matter hypoattenuation  is nonspecific but most likely sequela of chronic small vessel  ischemic disease.    I have personally reviewed the examination and initial interpretation  and I agree with the findings.    ROSIBEL  MD RACHEL   CBC with platelets differential   Result Value Ref Range    WBC 7.3 4.0 - 11.0 10e9/L    RBC Count 3.53 (L) 3.8 - 5.2 10e12/L    Hemoglobin 12.2 11.7 - 15.7 g/dL    Hematocrit 37.2 35.0 - 47.0 %     (H) 78 - 100 fl    MCH 34.6 (H) 26.5 - 33.0 pg    MCHC 32.8 31.5 - 36.5 g/dL    RDW 16.4 (H) 10.0 - 15.0 %    Platelet Count 216 150 - 450 10e9/L    Diff Method Automated Method     % Neutrophils 67.5 %    % Lymphocytes 19.2 %    % Monocytes 9.4 %    % Eosinophils 3.0 %    % Basophils 0.5 %    % Immature Granulocytes 0.4 %    Nucleated RBCs 0 0 /100    Absolute Neutrophil 5.0 1.6 - 8.3 10e9/L    Absolute Lymphocytes 1.4 0.8 - 5.3 10e9/L    Absolute Monocytes 0.7 0.0 - 1.3 10e9/L    Absolute Eosinophils 0.2 0.0 - 0.7 10e9/L    Absolute Basophils 0.0 0.0 - 0.2 10e9/L    Abs Immature Granulocytes 0.0 0 - 0.4 10e9/L    Absolute Nucleated RBC 0.0    Comprehensive metabolic panel   Result Value Ref Range    Sodium 134 133 - 144 mmol/L    Potassium 3.5 3.4 - 5.3 mmol/L    Chloride 91 (L) 94 - 109 mmol/L    Carbon Dioxide 34 (H) 20 - 32 mmol/L    Anion Gap 9 3 - 14 mmol/L    Glucose 102 (H) 70 - 99 mg/dL    Urea Nitrogen 55 (H) 7 - 30 mg/dL    Creatinine 1.69 (H) 0.52 - 1.04 mg/dL    GFR Estimate 29 (L) >60 mL/min/1.7m2    GFR Estimate If Black 35 (L) >60 mL/min/1.7m2    Calcium 10.0 8.5 - 10.1 mg/dL    Bilirubin Total 0.7 0.2 - 1.3 mg/dL    Albumin 4.3 3.4 - 5.0 g/dL    Protein Total 8.9 (H) 6.8 - 8.8 g/dL    Alkaline Phosphatase 114 40 - 150 U/L    ALT 24 0 - 50 U/L    AST 39 0 - 45 U/L   Troponin I   Result Value Ref Range    Troponin I ES 0.025 0.000 - 0.045 ug/L   UA with Microscopic   Result Value Ref Range    Color Urine Light Yellow     Appearance Urine Clear     Glucose Urine Negative NEG^Negative mg/dL    Bilirubin Urine Negative NEG^Negative    Ketones Urine Negative NEG^Negative mg/dL    Specific Gravity Urine 1.009 1.003 - 1.035    Blood Urine Negative NEG^Negative    pH Urine 7.0 5.0 - 7.0 pH     Protein Albumin Urine Negative NEG^Negative mg/dL    Urobilinogen mg/dL Normal 0.0 - 2.0 mg/dL    Nitrite Urine Negative NEG^Negative    Leukocyte Esterase Urine Negative NEG^Negative    Source Midstream Urine     WBC Urine <1 0 - 2 /HPF    RBC Urine <1 0 - 2 /HPF    Squamous Epithelial /HPF Urine 2 (H) 0 - 1 /HPF   TSH   Result Value Ref Range    TSH 88.00 (H) 0.40 - 4.00 mU/L   T4 free   Result Value Ref Range    T4 Free 0.29 (L) 0.76 - 1.46 ng/dL   T3 total   Result Value Ref Range    Triiodothyronine (T3) 58 (L) 60 - 181 ng/dL   Basic metabolic panel   Result Value Ref Range    Sodium 135 133 - 144 mmol/L    Potassium 3.3 (L) 3.4 - 5.3 mmol/L    Chloride 95 94 - 109 mmol/L    Carbon Dioxide 32 20 - 32 mmol/L    Anion Gap 8 3 - 14 mmol/L    Glucose 108 (H) 70 - 99 mg/dL    Urea Nitrogen 50 (H) 7 - 30 mg/dL    Creatinine 1.41 (H) 0.52 - 1.04 mg/dL    GFR Estimate 36 (L) >60 mL/min/1.7m2    GFR Estimate If Black 44 (L) >60 mL/min/1.7m2    Calcium 9.8 8.5 - 10.1 mg/dL   CBC with platelets   Result Value Ref Range    WBC 6.8 4.0 - 11.0 10e9/L    RBC Count 3.18 (L) 3.8 - 5.2 10e12/L    Hemoglobin 11.2 (L) 11.7 - 15.7 g/dL    Hematocrit 34.1 (L) 35.0 - 47.0 %     (H) 78 - 100 fl    MCH 35.2 (H) 26.5 - 33.0 pg    MCHC 32.8 31.5 - 36.5 g/dL    RDW 16.7 (H) 10.0 - 15.0 %    Platelet Count 189 150 - 450 10e9/L   Magnesium   Result Value Ref Range    Magnesium 2.1 1.6 - 2.3 mg/dL   Glucose by meter   Result Value Ref Range    Glucose 94 70 - 99 mg/dL   Basic metabolic panel   Result Value Ref Range    Sodium 137 133 - 144 mmol/L    Potassium 4.2 3.4 - 5.3 mmol/L    Chloride 99 94 - 109 mmol/L    Carbon Dioxide 32 20 - 32 mmol/L    Anion Gap 6 3 - 14 mmol/L    Glucose 123 (H) 70 - 99 mg/dL    Urea Nitrogen 43 (H) 7 - 30 mg/dL    Creatinine 1.28 (H) 0.52 - 1.04 mg/dL    GFR Estimate 40 (L) >60 mL/min/1.7m2    GFR Estimate If Black 49 (L) >60 mL/min/1.7m2    Calcium 9.1 8.5 - 10.1 mg/dL   Glucose by meter   Result Value  Ref Range    Glucose 146 (H) 70 - 99 mg/dL   EKG 12 lead   Result Value Ref Range    Interpretation ECG Click View Image link to view waveform and result       Recent Results (from the past 48 hour(s))   XR Chest 2 Views    Narrative    Exam: XR CHEST 2 VW, 2/21/2018 5:55 PM    Indication: fatigue, concern for pneumonia;     Comparison: CT 4/20/2017, radiograph 4/23/2017    Findings:   PA and lateral views of the chest. Median sternotomy wires and  mediastinal surgical clips. Left atrial appendage exclusion device.  Implantable loop recorder. The trachea is midline. The  cardiomediastinal silhouette is stable. No focal airspace opacity. No  pneumothorax. No pleural effusion.      Impression    Impression: The left hemidiaphragm and left heart border are obscured  by left basilar/retrocardiac opacity, which may represent atelectasis  and/or infection.    I have personally reviewed the examination and initial interpretation  and I agree with the findings.    RUBEN DAY MD   CT Head w/o Contrast    Narrative    CT HEAD W/O CONTRAST 2/21/2018 6:16 PM    Provided History: fatigue, hx of A-fib and CVAs;     Comparison: 2/8/2017, 10/25/2016.    Technique: Using multidetector thin collimation helical acquisition  technique, axial, coronal and sagittal CT images from the skull base  to the vertex were obtained without intravenous contrast.     Findings:    No intracranial hemorrhage, mass effect, or midline shift. The  ventricles are proportionate to the cerebral sulci. The gray to white  matter differentiation of the cerebral hemispheres is preserved. There  is patchy periventricular and subcortical white matter hypoattenuation  which is nonspecific, most likely representing sequelae of chronic  small vessel ischemic disease. The basal cisterns are patent.    The visualized paranasal sinuses are clear. The mastoid air cells are  clear.       Impression    Impression:   1. No acute intracranial pathology.  2. Patchy  periventricular and subcortical white matter hypoattenuation  is nonspecific but most likely sequela of chronic small vessel  ischemic disease.    I have personally reviewed the examination and initial interpretation  and I agree with the findings.    ROSIBEL RAMOS MD                Pending Results:   Unresulted Labs Ordered in the Past 30 Days of this Admission     No orders found for last 61 day(s).                  Discharge Instructions and Follow-Up:     Discharge Procedure Orders  Reason for your hospital stay   Order Comments: Dehydration     Adult [facility] Follow-up and recommended labs and tests   Order Comments: Follow up with your primary care doctor in 3-6 weeks for thyroid level recheck and adjustment of your thyroid medication.     Follow up in cardiology clinic on March 2nd as previously scheduled.     Follow up in Podiatry clinic as previously scheduled.     Follow up in sleep clinic as soon as possible for adjustment to your Bipap .     Cardiac rehab for muscle strengthening. Phone number: [phone]    Appointments on [place] and/or [facility] (with [facility] or [facility] provider or service). Call [phone] if you haven't heard regarding these appointments within 7 days of discharge.     Follow Up and recommended labs and tests   Order Comments: TSH ,T3     Activity   Order Comments: Your activity upon discharge: As tolerated   Order Specific Question Answer Comments   Is discharge order? Yes      When to contact your care team   Order Comments: When to see your healthcare provider  Call your doctor right away if you have any of these signs of worsening heart failure:    Sudden weight gain (more than 2 pounds in 1 day or 5 pounds in 1 week, or whatever weight gain you were told to report by your doctor)    Trouble breathing not related to being active    New or increased swelling of your legs or ankles    Swelling or pain in your abdomen    Breathing trouble at night (waking up short of  breath, needing more pillows to breathe)    Frequent coughing that doesn't go away    Feeling much more tired than usual  Call 911  Call 911 right away if you have:    Severe shortness of breath, such that you can't catch your breath even while resting    Severe chest pain that does not resolve with rest or nitroglycerin    Pink, foamy mucus with cough and shortness of breath    A continuous rapid or irregular heartbeat    Passing out or fainting    Stroke symptoms such as sudden numbness or weakness on one side of your face, arm, or leg or sudden confusion, trouble speaking or vision changes     Discharge Instructions   Order Comments: Your heart rate was noted to be elevated during your stay. Cardiology was consulted and recommended you hold your Bumex  tonight and tomorrow morning (2/22 and am 2/23) Resume tomorrow evening 2/23 and follow up in the cardiology clinic as previously scheduled.      Start taking Synthroid as your thyroid levels were low. Continue taking 25 mcg daily and follow up with your primary care doctor in 3-6 weeks for further adjustments.    Your podiatrist recommended your Bactrim be stopped  as it may have been causing your kidney level to increase. Please follow up in the podiatry clinic in 1 week.    During your stay, your oxygen saturations decreased when you were sleeping. Please follow up in a sleep clinic for further adjustments of your Bipap.    Recommend Cardiac rehab for muscle strengthening. Phone number: [phone]     Diet   Order Comments: Follow this diet upon discharge:Low sodium diet   Order Specific Question Answer Comments   Is discharge order? Yes             Attestation:  Susan Cartagena.

## 2018-02-23 NOTE — TELEPHONE ENCOUNTER
Inpatient Visit Date: 2/22/18  Diagnosis / Reason for Visit: Dehydration, Hypothyroidism, Unspecified Type

## 2018-02-26 NOTE — TELEPHONE ENCOUNTER
RN TRIAGE CALL:    Patient Contact    Attempt # 2    Was call answered?  No.  Left message on voicemail with information to call me back.  Mariela Baker RN

## 2018-02-27 NOTE — TELEPHONE ENCOUNTER
"Hospital/TCU/ED for chronic condition Discharge Protocol    \"Hi, my name is Mariela Baker, a registered nurse, and I am calling from [facility].  I am calling to follow up and see how things are going for you after your recent emergency visit/hospital/TCU stay.\"    Tell me how you are doing now that you are home?\" I'm fine.      Discharge Instructions    \"Let's review your discharge instructions.  What is/are the follow-up recommendations?  Pt. Response: Increase my fluids    \"Has an appointment with your primary care provider been scheduled?\"   No (schedule appointment)    \"When you see the provider, I would recommend that you bring your medications with you.\"    Medications    \"Tell me what changed about your medicines when you discharged?\"    Changes to chronic meds?    0-1    \"What questions do you have about your medications?\"    None     New diagnoses of heart failure, COPD, diabetes, or MI?    No              Medication reconciliation completed? No, due to Patient said no changes.  Was MTM referral placed (*Make sure to put transitions as reason for referral)?   No    Call Summary    \"What questions or concerns do you have about your recent visit and your follow-up care?\"     none    \"If you have questions or things don't continue to improve, we encourage you contact us through the main clinic number (give number).  Even if the clinic is not open, triage nurses are available 24/7 to help you.     We would like you to know that our clinic has extended hours (provide information).  We also have urgent care (provide details on closest location and hours/contact info)\"      \"Thank you for your time and take care!\"               "

## 2018-03-02 ENCOUNTER — ALLIED HEALTH/NURSE VISIT (OUTPATIENT)
Dept: CARDIOLOGY | Facility: CLINIC | Age: 78
End: 2018-03-02
Attending: INTERNAL MEDICINE
Payer: MEDICARE

## 2018-03-02 VITALS
DIASTOLIC BLOOD PRESSURE: 80 MMHG | SYSTOLIC BLOOD PRESSURE: 120 MMHG | HEART RATE: 54 BPM | HEIGHT: 62 IN | BODY MASS INDEX: 31.73 KG/M2 | WEIGHT: 172.4 LBS | OXYGEN SATURATION: 98 %

## 2018-03-02 DIAGNOSIS — I50.32 CHRONIC DIASTOLIC HEART FAILURE (H): ICD-10-CM

## 2018-03-02 DIAGNOSIS — I48.91 ATRIAL FIBRILLATION (H): Primary | ICD-10-CM

## 2018-03-02 DIAGNOSIS — I48.91 ATRIAL FIBRILLATION, UNSPECIFIED TYPE (H): Primary | ICD-10-CM

## 2018-03-02 PROCEDURE — 93291 INTERROG DEV EVAL SCRMS IP: CPT | Mod: ZF

## 2018-03-02 PROCEDURE — 99213 OFFICE O/P EST LOW 20 MIN: CPT | Mod: 24 | Performed by: INTERNAL MEDICINE

## 2018-03-02 PROCEDURE — G0463 HOSPITAL OUTPT CLINIC VISIT: HCPCS | Mod: 25,ZF

## 2018-03-02 PROCEDURE — 93291 INTERROG DEV EVAL SCRMS IP: CPT | Mod: 26 | Performed by: INTERNAL MEDICINE

## 2018-03-02 ASSESSMENT — PAIN SCALES - GENERAL: PAINLEVEL: NO PAIN (0)

## 2018-03-02 NOTE — PATIENT INSTRUCTIONS
It was a pleasure to see you in clinic today. Please do not hesitate to call with any questions or concerns. You are scheduled for a remote ILR transmission on 6/4/18. We will call in 1-2 business days to discuss the results with you. We look forward to seeing you in clinic at your next device check in 6 months.    Yen Alvarez, RN  Electrophysiology Nurse Clinician  [facility]  During business hours call:  [phone]  After business hours please call: [phone]- select option #[phone] and ask for job code [phone].

## 2018-03-02 NOTE — MR AVS SNAPSHOT
After Visit Summary   3/2/2018    Carlos Alberto Fenton    MRN: [id number]           Patient Information     Date Of Birth          1940        Visit Information        Provider Department      3/2/2018 4:00 PM Star Lobato MD [facility]        Today's Diagnoses     Atrial fibrillation, unspecified type (H)    -  1    Chronic diastolic heart failure (H)          Care Instructions    Here is the number for Medical records:  [phone]    Thank you for your visit today.  Please call me with any questions or concerns.   Carloz Wilson  RN  Cardiology Care Coordinator  [phone], press option 1 then option 3          Follow-ups after your visit        Additional Services     Follow-Up with Cardiologist       Please schedule a follow up visit in 6 months.                  Your next 10 appointments already scheduled     Mar 02, 2018  3:30 PM CST   (Arrive by 3:15 PM)   Implantable Loop Recorder with Uc Cv Device 1   [facility] ([facility])    [address] [place] [address]  [address] [place] [address] [id number] [phone]            Mar 02, 2018  4:00 PM CST   (Arrive by 3:45 PM)   Return Visit with Star Lobato MD   [facility] ([facility])    [address] [place] [address]  [address] [place] [address] [id number] [phone]            Mar 06, 2018 10:15 AM CST   US RADHA DOPPLER WITH EXERCISE BILATERAL with SHVUS2   [facility] ([facility])    [address]  [place] [address]   [phone]           Please bring a list of your medicines (including vitamins, minerals and over-the-counter drugs). Also, tell your doctor about any allergies you may have. Wear comfortable clothes and leave your valuables at home.  No caffeine or tobacco for 1 hour prior to exam.  Please call the Imaging Department at your exam site with any questions.            Mar 06, 2018  11:00 AM CST   New Visit with Vu Eden MD   [facility] ([facility])    [address]  [facility] [address]   [phone]            Mar 14, 2018  1:40 PM CDT   Return Visit with Easton Torres DPM   [facility] ([facility])    [address] [place] [address]   [phone]            Sep 07, 2018 11:00 AM CDT   (Arrive by 10:45 AM)   Implantable Loop Recorder with Uc Cv Device 1   [facility] ([facility])    [address] [place] [address]  [address] [place] [id number] [phone]            Sep 07, 2018 11:30 AM CDT   (Arrive by 11:15 AM)   Return Visit with Star Lobato MD   [facility] ([facility])    [address] [place] [address]  [address] [place] [id number] [phone]              Future tests that were ordered for you today     Open Future Orders        Priority Expected Expires Ordered    Follow-Up with Cardiologist Routine 8/29/2018 11/27/2018 3/2/2018    Follow-Up with Device Clinic-6 months Routine 8/29/2018 11/27/2018 3/2/2018            Who to contact     If you have questions or need follow up information about today's clinic visit or your schedule please contact [facility] directly at [phone].  Normal or non-critical lab and imaging results will be communicated to you by MyChart, letter or phone within 4 business days after the clinic has received the results. If you do not hear from us within 7 days, please contact the clinic through MyChart or phone. If you have a critical or abnormal lab result, we will notify you by phone as soon as possible.  Submit refill requests through [employer] or call your pharmacy and they will forward the refill request to us. Please allow 3 business days for your refill to be completed.          Additional Information About Your  "Visit        MyChart Information     BoardBookit gives you secure access to your electronic health record. If you see a primary care provider, you can also send messages to your care team and make appointments. If you have questions, please call your primary care clinic.  If you do not have a primary care provider, please call [phone] and they will assist you.        Care EveryWhere ID     This is your Care EveryWhere ID. This could be used by other organizations to access your [facility] medical records  [id number]        Your Vitals Were     Pulse Height Pulse Oximetry BMI (Body Mass Index)          54 1.575 m (5' 2\") 98% 31.53 kg/m2         Blood Pressure from Last 3 Encounters:   03/02/18 120/80   02/22/18 109/69   02/21/18 121/90    Weight from Last 3 Encounters:   03/02/18 78.2 kg (172 lb 6.4 oz)   01/16/18 74.8 kg (165 lb)   12/18/17 76.3 kg (168 lb 3.2 oz)                 Today's Medication Changes          These changes are accurate as of 3/2/18  1:13 PM.  If you have any questions, ask your nurse or doctor.               These medicines have changed or have updated prescriptions.        Dose/Directions    aspirin 81 MG chewable tablet   This may have changed:  when to take this   Used for:  Coronary artery disease with angina pectoris, unspecified vessel or lesion type, unspecified whether native or transplanted heart (H)        Dose:  81 mg   Take 1 tablet (81 mg) by mouth daily   Quantity:  30 tablet   Refills:  0       ferrous sulfate 325 (65 FE) MG tablet   Commonly known as:  IRON SUPPLEMENT   This may have changed:  when to take this   Used for:  S/P CABG (coronary artery bypass graft)        Dose:  325 mg   Take 1 tablet (325 mg) by mouth daily (with breakfast)   Quantity:  100 tablet   Refills:  1                Primary Care Provider Office Phone # Fax #    Humberto Conner [phone]       [facility] [address] [place] [address]        Equal Access to " Services     [facility]: Hadii william flores ashushan Christosali, waaxda luqadaha, qaybta kaalmada jose david, scott mccollum . So [facility] [phone].    ATENCIÓN: Si habla justine, tiene a espinoza disposición servicios gratuitos de asistencia lingüística. Llame al [phone].    We comply with applicable federal civil rights laws and [place] laws. We do not discriminate on the basis of race, color, national origin, age, disability, sex, sexual orientation, or gender identity.            Thank you!     Thank you for choosing [facility]  for your care. Our goal is always to provide you with excellent care. Hearing back from our patients is one way we can continue to improve our services. Please take a few minutes to complete the written survey that you may receive in the mail after your visit with us. Thank you!             Your Updated Medication List - Protect others around you: Learn how to safely use, store and throw away your medicines at www.disposemymeds.org.          This list is accurate as of 3/2/18  1:13 PM.  Always use your most recent med list.                   Brand Name Dispense Instructions for use Diagnosis    ACE BANDAGE SELF-ADHERING Misc     6 each    1 each 2 times daily    Open wound of lower limb, right, subsequent encounter       acetaminophen 325 MG tablet    TYLENOL    100 tablet    Take 3 tablets (975 mg) by mouth every 6 hours as needed for mild pain    S/P CABG (coronary artery bypass graft)       ADAPTIC NON-ADHERING DRESSING Pads     1 each    Externally apply 1 each topically 2 times daily    Open wound of lower limb, right, subsequent encounter       ALPRAZolam 0.25 MG tablet    XANAX    10 tablet    Take 1 tablet (0.25 mg) by mouth 3 times daily as needed for anxiety    Adjustment disorder with anxious mood       aspirin 81 MG chewable tablet     30 tablet    Take 1 tablet (81 mg) by mouth daily    Coronary artery disease with angina pectoris, unspecified  vessel or lesion type, unspecified whether native or transplanted heart (H)       atorvastatin 40 MG tablet    LIPITOR    90 tablet    Take 1 tablet (40 mg) by mouth daily    Coronary artery disease with angina pectoris, unspecified vessel or lesion type, unspecified whether native or transplanted heart (H)       blood glucose monitoring meter device kit           bumetanide 2 MG tablet    BUMEX    180 tablet    Take 1 tablet (2 mg) by mouth 2 times daily    Chronic systolic heart failure (H)       carvedilol 3.125 MG tablet    COREG    180 tablet    Take 1 tablet (3.125 mg) by mouth 2 times daily (with meals)    Chronic diastolic heart failure (H)       ferrous sulfate 325 (65 FE) MG tablet    IRON SUPPLEMENT    100 tablet    Take 1 tablet (325 mg) by mouth daily (with breakfast)    S/P CABG (coronary artery bypass graft)       gabapentin 100 MG capsule    NEURONTIN    180 capsule    Take 1 capsule (100 mg) by mouth 2 times daily    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       KERLIX GAUZE ROLL MEDIUM Misc     48 each    1 each 2 times daily    Open wound of lower limb, right, subsequent encounter       levothyroxine 25 MCG tablet    SYNTHROID/LEVOTHROID    30 tablet    Take 1 tablet (25 mcg) by mouth every morning (before breakfast)    Hypothyroidism, unspecified type       MELATONIN PO      Take 1 mg by mouth At Bedtime        metolazone 2.5 MG tablet    ZAROXOLYN    30 tablet    Take 1 tablet (2.5 mg) by mouth daily as needed For weight over 170 Lbs.    Chronic diastolic heart failure (H)       ONETOUCH DELICA LANCETS 33G Misc     100 each    1 Device daily    Type 2 diabetes mellitus without complication, without long-term current use of insulin (H)       ONETOUCH ULTRA test strip   Generic drug:  blood glucose monitoring     100 each    USE AS DIRECTED TO TEST ONE TIME A DAY    Type 2 diabetes mellitus without complication, without long-term current use of insulin (H)       order for DME     1 Box    Sterile  4x4 gauze; Use gauze twice daily for dressing changes.    Open wound of lower limb, right, subsequent encounter       pantoprazole 40 MG EC tablet    PROTONIX    90 tablet    Take 1 tablet (40 mg) by mouth every morning    Coronary artery disease with angina pectoris, unspecified vessel or lesion type, unspecified whether native or transplanted heart (H)       Potassium Chloride ER 20 MEQ Tbcr     90 tablet    Take 1 tablet (20 mEq) by mouth 2 times daily    Chronic diastolic heart failure (H)       rivaroxaban ANTICOAGULANT 20 MG Tabs tablet    XARELTO    90 tablet    Take 1 tablet (20 mg) by mouth daily (with dinner) . DO NOT start until you have stopped the Warfarin.    Atrial fibrillation, unspecified type (H)       spironolactone 25 MG tablet    ALDACTONE    90 tablet    Take 1 tablet (25 mg) by mouth daily    Chronic systolic heart failure (H)       traZODone 50 MG tablet    DESYREL    45 tablet    Take 0.5 tablets (25 mg) by mouth nightly as needed for sleep    Primary insomnia       venlafaxine 150 MG Tb24 24 hr tablet    EFFEXOR-ER    90 each    Take 1 tablet (150 mg) by mouth daily (with breakfast)    Adjustment disorder with depressed mood

## 2018-03-02 NOTE — MR AVS SNAPSHOT
After Visit Summary   3/2/2018    Carlos Alberto Fenton    MRN: [id number]           Patient Information     Date Of Birth          1940        Visit Information        Provider Department      3/2/2018 3:30 PM 1, Uc Cv Device [facility]        Today's Diagnoses     Atrial fibrillation (H)    -  1      Care Instructions    It was a pleasure to see you in clinic today. Please do not hesitate to call with any questions or concerns. You are scheduled for a remote ILR transmission on 6/4/18. We will call in 1-2 business days to discuss the results with you. We look forward to seeing you in clinic at your next device check in 6 months.    Yen Alvarez, RN  Electrophysiology Nurse Clinician  [facility]  During business hours call:  [phone]  After business hours please call: [phone]- select option #4 and ask for job code [phone].            Follow-ups after your visit        Additional Services     Follow-Up with Device Clinic-6 months                 Your next 10 appointments already scheduled     Mar 02, 2018  3:30 PM CST   (Arrive by 3:15 PM)   Implantable Loop Recorder with Uc Cv Device 1   [facility] ([facility])    [address]  [address] [address] [phone]            Mar 02, 2018  4:00 PM CST   (Arrive by 3:45 PM)   Return Visit with Star Lobato MD   [facility] ([facility])    [address]  [address] [id number] [phone]            Mar 06, 2018 10:15 AM CST   US RADHA DOPPLER WITH EXERCISE BILATERAL with SHVUS2   [facility] ([facility])    [address]  [place] [address]   [phone]           Please bring a list of your medicines (including vitamins, minerals and over-the-counter drugs). Also, tell your doctor about any allergies you may have.  Wear comfortable clothes and leave your valuables at home.  No caffeine or tobacco for 1 hour prior to exam.  Please call the Imaging Department at your exam site with any questions.            Mar 06, 2018 11:00 AM CST   New Visit with Vu Eden MD   [facility] ([facility])    [address]  [place] [address]   [phone]            Mar 14, 2018  1:40 PM CDT   Return Visit with Easton Torres DPM   [facility] ([facility])    [address] [place] [address] [phone]              Future tests that were ordered for you today     Open Future Orders        Priority Expected Expires Ordered    Follow-Up with Device Clinic-6 months Routine 8/29/2018 11/27/2018 3/2/2018            Who to contact     If you have questions or need follow up information about today's clinic visit or your schedule please contact [facility] directly at [phone].  Normal or non-critical lab and imaging results will be communicated to you by Memorighthart, letter or phone within 4 business days after the clinic has received the results. If you do not hear from us within 7 days, please contact the clinic through [employer] or phone. If you have a critical or abnormal lab result, we will notify you by phone as soon as possible.  Submit refill requests through [employer] or call your pharmacy and they will forward the refill request to us. Please allow 3 business days for your refill to be completed.          Additional Information About Your Visit        Skipo Information     [employer] gives you secure access to your electronic health record. If you see a primary care provider, you can also send messages to your care team and make appointments. If you have questions, please call your primary care clinic.  If you do not have a primary care provider, please call [phone] and they  will assist you.        Care EveryWhere ID     This is your Care EveryWhere ID. This could be used by other organizations to access your [facility] medical records  [id number]         Blood Pressure from Last 3 Encounters:   02/22/18 109/69   02/21/18 121/90   02/14/18 124/80    Weight from Last 3 Encounters:   01/16/18 74.8 kg (165 lb)   12/18/17 76.3 kg (168 lb 3.2 oz)   12/15/17 75.4 kg (166 lb 3.2 oz)              We Performed the Following     (82898) INTERR DEVICE EVAL IN PERSON, LOOP RECORDER          Today's Medication Changes          These changes are accurate as of 3/2/18 12:00 PM.  If you have any questions, ask your nurse or doctor.               These medicines have changed or have updated prescriptions.        Dose/Directions    aspirin 81 MG chewable tablet   This may have changed:  when to take this   Used for:  Coronary artery disease with angina pectoris, unspecified vessel or lesion type, unspecified whether native or transplanted heart (H)        Dose:  81 mg   Take 1 tablet (81 mg) by mouth daily   Quantity:  30 tablet   Refills:  0       ferrous sulfate 325 (65 FE) MG tablet   Commonly known as:  IRON SUPPLEMENT   This may have changed:  when to take this   Used for:  S/P CABG (coronary artery bypass graft)        Dose:  325 mg   Take 1 tablet (325 mg) by mouth daily (with breakfast)   Quantity:  100 tablet   Refills:  1                Primary Care Provider Office Phone # Fax #    Humberto Conner [phone]       [facility] [address] [place] [address]        Equal Access to Services     [facility]: Hadii william wakefieldo Soedwige, waaxda luqadaha, qaybta kaalmada jose david, scott mattson. So [facility] [phone].    ATENCIÓN: Si habla español, tiene a espinoza disposición servicios gratuitos de asistencia lingüística. Llame al [phone].    We comply with applicable federal civil rights laws and [place] laws. We do not  discriminate on the basis of race, color, national origin, age, disability, sex, sexual orientation, or gender identity.            Thank you!     Thank you for choosing [facility]  for your care. Our goal is always to provide you with excellent care. Hearing back from our patients is one way we can continue to improve our services. Please take a few minutes to complete the written survey that you may receive in the mail after your visit with us. Thank you!             Your Updated Medication List - Protect others around you: Learn how to safely use, store and throw away your medicines at www.disposemymeds.org.          This list is accurate as of 3/2/18 12:00 PM.  Always use your most recent med list.                   Brand Name Dispense Instructions for use Diagnosis    ACE BANDAGE SELF-ADHERING Misc     6 each    1 each 2 times daily    Open wound of lower limb, right, subsequent encounter       acetaminophen 325 MG tablet    TYLENOL    100 tablet    Take 3 tablets (975 mg) by mouth every 6 hours as needed for mild pain    S/P CABG (coronary artery bypass graft)       ADAPTIC NON-ADHERING DRESSING Pads     1 each    Externally apply 1 each topically 2 times daily    Open wound of lower limb, right, subsequent encounter       ALPRAZolam 0.25 MG tablet    XANAX    10 tablet    Take 1 tablet (0.25 mg) by mouth 3 times daily as needed for anxiety    Adjustment disorder with anxious mood       aspirin 81 MG chewable tablet     30 tablet    Take 1 tablet (81 mg) by mouth daily    Coronary artery disease with angina pectoris, unspecified vessel or lesion type, unspecified whether native or transplanted heart (H)       atorvastatin 40 MG tablet    LIPITOR    90 tablet    Take 1 tablet (40 mg) by mouth daily    Coronary artery disease with angina pectoris, unspecified vessel or lesion type, unspecified whether native or transplanted heart (H)       blood glucose monitoring meter device kit           bumetanide 2  MG tablet    BUMEX    180 tablet    Take 1 tablet (2 mg) by mouth 2 times daily    Chronic systolic heart failure (H)       carvedilol 3.125 MG tablet    COREG    180 tablet    Take 1 tablet (3.125 mg) by mouth 2 times daily (with meals)    Chronic diastolic heart failure (H)       ferrous sulfate 325 (65 FE) MG tablet    IRON SUPPLEMENT    100 tablet    Take 1 tablet (325 mg) by mouth daily (with breakfast)    S/P CABG (coronary artery bypass graft)       gabapentin 100 MG capsule    NEURONTIN    180 capsule    Take 1 capsule (100 mg) by mouth 2 times daily    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       KERLIX GAUZE ROLL MEDIUM Misc     48 each    1 each 2 times daily    Open wound of lower limb, right, subsequent encounter       levothyroxine 25 MCG tablet    SYNTHROID/LEVOTHROID    30 tablet    Take 1 tablet (25 mcg) by mouth every morning (before breakfast)    Hypothyroidism, unspecified type       MELATONIN PO      Take 1 mg by mouth At Bedtime        metolazone 2.5 MG tablet    ZAROXOLYN    30 tablet    Take 1 tablet (2.5 mg) by mouth daily as needed For weight over 170 Lbs.    Chronic diastolic heart failure (H)       ONETOUCH DELICA LANCETS 33G Misc     100 each    1 Device daily    Type 2 diabetes mellitus without complication, without long-term current use of insulin (H)       ONETOUCH ULTRA test strip   Generic drug:  blood glucose monitoring     100 each    USE AS DIRECTED TO TEST ONE TIME A DAY    Type 2 diabetes mellitus without complication, without long-term current use of insulin (H)       order for DME     1 Box    Sterile 4x4 gauze; Use gauze twice daily for dressing changes.    Open wound of lower limb, right, subsequent encounter       pantoprazole 40 MG EC tablet    PROTONIX    90 tablet    Take 1 tablet (40 mg) by mouth every morning    Coronary artery disease with angina pectoris, unspecified vessel or lesion type, unspecified whether native or transplanted heart (H)       Potassium  Chloride ER 20 MEQ Tbcr     90 tablet    Take 1 tablet (20 mEq) by mouth 2 times daily    Chronic diastolic heart failure (H)       rivaroxaban ANTICOAGULANT 20 MG Tabs tablet    XARELTO    90 tablet    Take 1 tablet (20 mg) by mouth daily (with dinner) . DO NOT start until you have stopped the Warfarin.    Atrial fibrillation, unspecified type (H)       spironolactone 25 MG tablet    ALDACTONE    90 tablet    Take 1 tablet (25 mg) by mouth daily    Chronic systolic heart failure (H)       traZODone 50 MG tablet    DESYREL    45 tablet    Take 0.5 tablets (25 mg) by mouth nightly as needed for sleep    Primary insomnia       venlafaxine 150 MG Tb24 24 hr tablet    EFFEXOR-ER    90 each    Take 1 tablet (150 mg) by mouth daily (with breakfast)    Adjustment disorder with depressed mood

## 2018-03-02 NOTE — PROGRESS NOTES
HPI:   Ms. Fenton is a 76yo female with PMH of HTN, HPL, DM, CVA (11/2016), CAD (s/p 3v CABG: LIMA-LAD, SVG-D1, SVG-dRCA and modified MAZE, ABDIEL ligation - 2/2016), paroxysmal AFib ([id number] - 8), HIT, RUE DVT who is here for follow up.    She was last seen in November 2017 and was being considered for a possible cardioversion for her atrial fibrillation. She was seen in the clinic by Dr. Azevedo with EP, who felt that with her paroxysmal atrial fibrillation, a DCCV was not the best option. They discussed rate-control, anti-arrhythmic therapy, PVI, and AVN ablation as possibilities. She has been continued on rate-control and has an loop recorder to monitor her atrial fibrillation.     She was hospitalized in February 2018 for dehydration and was treated with IV fluids. The episode of dehydration was the result of taking metolazone for a weight greater than 170 pounds. Her creatinine increased but improved with IV fluids. She feels improved. She feels improved since her discharge. Swelling in legs is increased and she feels that her weight is up four pounds. Sometimes needs to stop before reaching top of stairs. She feels like she is stable and at her baseline. She was also recently started on levothyroxine for her hypothyroidism.    PAST MEDICAL HISTORY:  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    parietral lesions bilateral       Antiplatelet or antithrombotic long-term use      Anxiety      Atrial fibrillation (H)      CAD (coronary artery disease)     2 vessel     Cancer (H) 1990    periodically have cancer on the skin removed     Cerebral artery occlusion with cerebral infarction (H) 10/2016    Cardioembolic strokes related to atrial fibrillation     Deep vein thrombosis (DVT) of axillary vein of right upper extremity (H) 2/25/2017     Depressive disorder 2001     Diabetes (H)      HIT (heparin-induced thrombocytopenia) (H)  3/8/2017     Hyperlipidemia LDL goal <130 10/31/2010     Hypertension      Panic attacks      Seizures (H) 10/19/2016     Sleep apnea     Uses CPAP       CURRENT MEDICATIONS:  Current Outpatient Prescriptions   Medication Sig Dispense Refill     bumetanide (BUMEX) 2 MG tablet Take 1 tablet (2 mg) by mouth 2 times daily 180 tablet 3     levothyroxine (SYNTHROID/LEVOTHROID) 25 MCG tablet Take 1 tablet (25 mcg) by mouth every morning (before breakfast) 30 tablet 0     Potassium Chloride ER 20 MEQ TBCR Take 1 tablet (20 mEq) by mouth 2 times daily 90 tablet 3     MELATONIN PO Take 1 mg by mouth At Bedtime       carvedilol (COREG) 3.125 MG tablet Take 1 tablet (3.125 mg) by mouth 2 times daily (with meals) 180 tablet 3     rivaroxaban ANTICOAGULANT (XARELTO) 20 MG TABS tablet Take 1 tablet (20 mg) by mouth daily (with dinner) . DO NOT start until you have stopped the Warfarin. 90 tablet 3     ALPRAZolam (XANAX) 0.25 MG tablet Take 1 tablet (0.25 mg) by mouth 3 times daily as needed for anxiety 10 tablet 0     venlafaxine (EFFEXOR-ER) 150 MG TB24 24 hr tablet Take 1 tablet (150 mg) by mouth daily (with breakfast) 90 each 1     metolazone (ZAROXOLYN) 2.5 MG tablet Take 1 tablet (2.5 mg) by mouth daily as needed For weight over 170 Lbs. 30 tablet 1     spironolactone (ALDACTONE) 25 MG tablet Take 1 tablet (25 mg) by mouth daily 90 tablet 3     atorvastatin (LIPITOR) 40 MG tablet Take 1 tablet (40 mg) by mouth daily 90 tablet 1     gabapentin (NEURONTIN) 100 MG capsule Take 1 capsule (100 mg) by mouth 2 times daily 180 capsule 1     traZODone (DESYREL) 50 MG tablet Take 0.5 tablets (25 mg) by mouth nightly as needed for sleep 45 tablet 2     pantoprazole (PROTONIX) 40 MG EC tablet Take 1 tablet (40 mg) by mouth every morning 90 tablet 1     Elastic Bandages & Supports (ACE BANDAGE SELF-ADHERING) MISC 1 each 2 times daily 6 each 3     Gauze Pads & Dressings (KERLIX GAUZE ROLL MEDIUM) MISC 1 each 2 times daily 48 each 3      ONE TOUCH ULTRA test strip USE AS DIRECTED TO TEST ONE TIME A  each 2     Wound Dressings (ADAPTIC NON-ADHERING DRESSING) PADS Externally apply 1 each topically 2 times daily 1 each 3     order for DME Sterile 4x4 gauze; Use gauze twice daily for dressing changes. 1 Box 3     acetaminophen (TYLENOL) 325 MG tablet Take 3 tablets (975 mg) by mouth every 6 hours as needed for mild pain 100 tablet 0     aspirin 81 MG chewable tablet Take 1 tablet (81 mg) by mouth daily (Patient taking differently: Take 81 mg by mouth every morning ) 30 tablet 0     ferrous sulfate (IRON SUPPLEMENT) 325 (65 FE) MG tablet Take 1 tablet (325 mg) by mouth daily (with breakfast) (Patient taking differently: Take 325 mg by mouth daily ) 100 tablet 1     ONETOUCH DELICA LANCETS 33G MISC 1 Device daily 100 each 0     blood glucose monitoring (ONE TOUCH ULTRA 2) meter device kit          PAST SURGICAL HISTORY:  Past Surgical History:   Procedure Laterality Date     AMPUTATE TOE(S) Right 5/26/2017    Procedure: AMPUTATE TOE(S);  Right Great Toe amputation, debriedment of 2 and 3rd toe soft tissu right foot. and application of Grafix;  Surgeon: Leah Santamaria MD;  Location: [facility] OR     ANESTHESIA CARDIOVERSION N/A 12/12/2017    Procedure: ANESTHESIA CARDIOVERSION;  Anesthesia Cardioversion ;  Surgeon: GENERIC ANESTHESIA PROVIDER;  Location: [facility] OR     BACK SURGERY       BYPASS GRAFT ARTERY CORONARY N/A 2/6/2017    Procedure: BYPASS GRAFT ARTERY CORONARY;  Surgeon: Mikhail Quiñones MD;  Location: [facility] OR     C APPENDECTOMY  1959     C CARDIAC SURG PROCEDURE UNLIST       C HAND/FINGER SURGERY UNLISTED       C STOMACH SURGERY PROCEDURE UNLISTED       HC SACROPLASTY       HC VASCULAR SURGERY PROCEDURE UNLIST       HYSTERECTOMY, PASTORA  1988     IRRIGATION AND DEBRIDEMENT FOOT, COMBINED Right 7/7/2017    Procedure: COMBINED IRRIGATION AND DEBRIDEMENT FOOT;  Irrigation and Debridement Right Foot with Graphix  *Latex Allergy*;  Surgeon: Leah Santamaria  "MD Nguyen;  Location: [facility] OR     MAZE PROCEDURE N/A 2/6/2017    Procedure: MAZE PROCEDURE;  Surgeon: Mikhail Quiñones MD;  Location: [facility] OR     PICC INSERTION Left 02/25/2017    5fr TL Bard PICC, 47cm (3cm external) in the L basilic vein w/ tip in the SVC RA junction     TONSILLECTOMY  1942       ALLERGIES:     Allergies   Allergen Reactions     Heparin      HIT/ thrombocytopenia     Lovenox [Enoxaparin] Other (See Comments)     Thrombocytopenia      Oxycodone      hallucinations     Toprol Xl [Metoprolol] Nausea and Vomiting     Adhesive Tape Itching and Rash     Diapers & Supplies Rash     Developed yeast infection from previous hospital stay       SOCIAL HISTORY:  Social History   Substance Use Topics     Smoking status: Former Smoker     Packs/day: 1.00     Years: 10.00     Types: Cigarettes     Start date: 10/3/1958     Quit date: 7/4/1976     Smokeless tobacco: Never Used      Comment: Quit in 1976     Alcohol use Yes      Comment: Socially       ROS:   Constitutional: No fever, chills, or sweats. Weight as above.  ENT: No visual disturbance, ear ache, epistaxis, sore throat.   Cardiovascular: As per HPI.   Respiratory: No cough, hemoptysis.    GI: No nausea, vomiting, hematemesis, melena, or hematochezia.   : No hematuria.   Integument: Negative.   Psychiatric: Negative.   Neuro: Negative.   Endocrinology: No significant heat or cold intolerance   Musculoskeletal: No myalgia.      Exam:  /80 (BP Location: Left arm, Patient Position: Chair, Cuff Size: Adult Regular)  Pulse 50  Ht 1.575 m (5' 2\")  Wt 78.2 kg (172 lb 6.4 oz)  SpO2 98%  BMI 31.53 kg/m2  GEN: aao x 3, NAD  Neck: JVD < 10cm H2O  LUNGS: No wheezing. Decreased breath sounds but no rales noted  HEART: S1S2 audible, no murmurs or rubs. Regular rhythm  ABDOMEN: Soft, nt.  EXTREMITIES: Warm calves, +DPs, trace to 1+ bilateral LE edema  NEURO: aao x 3, no focal deficits    Labs:  CBC RESULTS:   Lab Results   Component Value Date    WBC 6.8 " 02/22/2018    RBC 3.18 (L) 02/22/2018    HGB 11.2 (L) 02/22/2018    HCT 34.1 (L) 02/22/2018     (H) 02/22/2018    MCH 35.2 (H) 02/22/2018    MCHC 32.8 02/22/2018    RDW 16.7 (H) 02/22/2018     02/22/2018       BMP RESULTS:  Lab Results   Component Value Date     02/22/2018    POTASSIUM 4.2 02/22/2018    CHLORIDE 99 02/22/2018    CO2 32 02/22/2018    ANIONGAP 6 02/22/2018     (H) 02/22/2018    BUN 43 (H) 02/22/2018    CR 1.28 (H) 02/22/2018    GFRESTIMATED 40 (L) 02/22/2018    GFRESTBLACK 49 (L) 02/22/2018    CARLY 9.1 02/22/2018        INR RESULTS:  Lab Results   Component Value Date    INR 1.61 (H) 01/16/2018    INR 2.6 01/08/2018    INR 1.8 01/02/2018    INR 3.3 12/26/2017       Procedures:  ECHO - 2/26/17  No LV thrombus noted.  Left Ventricle:  Left ventricular size is normal. The Ejection Fraction is estimated at 55-60%.  Diastolic function not assessed due to atrial fibrillation. No LV thrombus noted.  Right Ventricle:  Right ventricular function, chamber size, wall motion, and thickness are normal.      TAVO - 3/1/17   H/o ABDIEL ligation. No LA clot seen.  H/o PFO closure. The atrial septum is intact as assessed by color Doppler.  No LV thrombus seen. Mildly (EF 45-50%) reduced left ventricular function is present.  The right ventricle is normal size. Global right ventricular function is normal.    TAVO 12/2017  Interpretation Summary  Prior ABDIEL ligation noted without remnant left atrial appendage or thrombus.  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  Moderate to severe tricuspid insufficiency is present.    Device Interrogation 3/2/18:  [employer] ILR, per MD orders. She also has an appointment with Dr. Lobato. Today her intrinsic rhythm is an irregular ventricular rhythm ~60 bpm. Normal device function. No symptom activations have been recorded. 875 AF episodes have been recorded  over the last 3 months. Pt states she is taking xarelto. AF burden= 21%      Assessment and Plan:   Ms. Fenton is a 78yo female with PMH of HTN, HLD, DM, CVA (11/2016), CAD (s/p 3v CABG: LIMA-LAD, SVG-D1, SVG-dRCA and modified MAZE, ABDIEL ligation - 2/2016), paroxysmal AFib ([id number] - 8), HIT, RUE DVT who is here for follow up.    - From a HF standpoint, she is on GDT, except for ACEi/ARB (significant renal dysfunction.) Her weight is pretty much at baseline and she has stable shortness of breath. We will continue her current medical regimen and will recommend continued [facility] clinic follow up. We will recheck her labs before starting on ACE/ARB.  - In terms of her AFib, she was seen by EP, and the plan is to assess her Afib burden and continue with rate control. If this presents more of a problem, can consider anti-arrhythmic drugs, PVI, or ablation. She will continue on rivaroxaban.  - For her shortness of breath. Unlikely to be from her CAD, but can consider stress test post-CABG. Likely deconditioning. Other possibilities are her mildly reduced EF (as seen on TAVO), symptomatic Afib, anemia (on iron supplements), or her newly diagnosed hypothyroidism (on levothyroxine treatment.)  - RTC in 6 months.    Discussed with Dr. Lobato.    Juan Pablo Alejandra MD  Advanced Heart Failure/Heart Transplant Fellow  [facility] Division of Cardiology   [phone]    Attending Attestation:  Patient seen and examined by me with the Fellow, Dr. Alejandra. I have performed all pertinent elements of the physical examination and reviewed the note above. I have reviewed pertinent laboratory, echocardiographic, imaging, and cardiac catheterization results. I agree with the plan of care as described in this note.    Star Lobato MD, PhD      CC  Patient Care Team:  Humberto Conner MD as PCP - General (Internal Medicine)  ALLEN Wilson MD as MD (Cardiology)  Jessica Aguillon, RN as Nurse Coordinator (Clinical Cardiac  Electrophysiology)  Paige Santos MD as MD (Cardiology)  Leah Santamaria MD as MD (Vascular Surgery)  Angelina Bernal MD as MD (Hematology & Oncology)  Diana Dyson LPN as Easton Mcadams DPM as MD (Podiatry)  Carloz Wilson, RN as Nurse Coordinator (Cardiology)  Zaira Harman, REESE as Nurse Coordinator (Cardiology)  Isabel Pinto, CHACORTA CNP as Nurse Practitioner (Cardiology)  BEREKET GARCIA

## 2018-03-02 NOTE — LETTER
3/2/2018      RE: Carlos Alberto Fenton  [address]  [facility] [address]       Dear Colleague,    Thank you for the opportunity to participate in the care of your patient, Carlos Alberto Fenton, at the [facility]. Please see a copy of my visit note below.    HPI:   Ms. Fenton is a 78yo female with PMH of HTN, HPL, DM, CVA (11/2016), CAD (s/p 3v CABG: LIMA-LAD, SVG-D1, SVG-dRCA and modified MAZE, ABDIEL ligation - 2/2016), paroxysmal AFib ([id number] - 8), HIT, RUE DVT who is here for follow up.    She was last seen in November 2017 and was being considered for a possible cardioversion for her atrial fibrillation. She was seen in the clinic by Dr. Azevedo with EP, who felt that with her paroxysmal atrial fibrillation, a DCCV was not the best option. They discussed rate-control, anti-arrhythmic therapy, PVI, and AVN ablation as possibilities. She has been continued on rate-control and has an loop recorder to monitor her atrial fibrillation.     She was hospitalized in February 2018 for dehydration and was treated with IV fluids. The episode of dehydration was the result of taking metolazone for a weight greater than 170 pounds. Her creatinine increased but improved with IV fluids. She feels improved. She feels improved since her discharge. Swelling in legs is increased and she feels that her weight is up four pounds. Sometimes needs to stop before reaching top of stairs. She feels like she is stable and at her baseline. She was also recently started on levothyroxine for her hypothyroidism.    PAST MEDICAL HISTORY:  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    parietral lesions bilateral       Antiplatelet or antithrombotic long-term use      Anxiety      Atrial fibrillation (H)      CAD (coronary artery disease)     2 vessel     Cancer (H) 1990    periodically have cancer on the skin removed      Cerebral artery occlusion with cerebral infarction (H) 10/2016    Cardioembolic strokes related to atrial fibrillation     Deep vein thrombosis (DVT) of axillary vein of right upper extremity (H) 2/25/2017     Depressive disorder 2001     Diabetes (H)      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     Hyperlipidemia LDL goal <130 10/31/2010     Hypertension      Panic attacks      Seizures (H) 10/19/2016     Sleep apnea     Uses CPAP       CURRENT MEDICATIONS:  Current Outpatient Prescriptions   Medication Sig Dispense Refill     bumetanide (BUMEX) 2 MG tablet Take 1 tablet (2 mg) by mouth 2 times daily 180 tablet 3     levothyroxine (SYNTHROID/LEVOTHROID) 25 MCG tablet Take 1 tablet (25 mcg) by mouth every morning (before breakfast) 30 tablet 0     Potassium Chloride ER 20 MEQ TBCR Take 1 tablet (20 mEq) by mouth 2 times daily 90 tablet 3     MELATONIN PO Take 1 mg by mouth At Bedtime       carvedilol (COREG) 3.125 MG tablet Take 1 tablet (3.125 mg) by mouth 2 times daily (with meals) 180 tablet 3     rivaroxaban ANTICOAGULANT (XARELTO) 20 MG TABS tablet Take 1 tablet (20 mg) by mouth daily (with dinner) . DO NOT start until you have stopped the Warfarin. 90 tablet 3     ALPRAZolam (XANAX) 0.25 MG tablet Take 1 tablet (0.25 mg) by mouth 3 times daily as needed for anxiety 10 tablet 0     venlafaxine (EFFEXOR-ER) 150 MG TB24 24 hr tablet Take 1 tablet (150 mg) by mouth daily (with breakfast) 90 each 1     metolazone (ZAROXOLYN) 2.5 MG tablet Take 1 tablet (2.5 mg) by mouth daily as needed For weight over 170 Lbs. 30 tablet 1     spironolactone (ALDACTONE) 25 MG tablet Take 1 tablet (25 mg) by mouth daily 90 tablet 3     atorvastatin (LIPITOR) 40 MG tablet Take 1 tablet (40 mg) by mouth daily 90 tablet 1     gabapentin (NEURONTIN) 100 MG capsule Take 1 capsule (100 mg) by mouth 2 times daily 180 capsule 1     traZODone (DESYREL) 50 MG tablet Take 0.5 tablets (25 mg) by mouth nightly as needed for sleep  45 tablet 2     pantoprazole (PROTONIX) 40 MG EC tablet Take 1 tablet (40 mg) by mouth every morning 90 tablet 1     Elastic Bandages & Supports (ACE BANDAGE SELF-ADHERING) MISC 1 each 2 times daily 6 each 3     Gauze Pads & Dressings (KERLIX GAUZE ROLL MEDIUM) MISC 1 each 2 times daily 48 each 3     ONE TOUCH ULTRA test strip USE AS DIRECTED TO TEST ONE TIME A  each 2     Wound Dressings (ADAPTIC NON-ADHERING DRESSING) PADS Externally apply 1 each topically 2 times daily 1 each 3     order for DME Sterile 4x4 gauze; Use gauze twice daily for dressing changes. 1 Box 3     acetaminophen (TYLENOL) 325 MG tablet Take 3 tablets (975 mg) by mouth every 6 hours as needed for mild pain 100 tablet 0     aspirin 81 MG chewable tablet Take 1 tablet (81 mg) by mouth daily (Patient taking differently: Take 81 mg by mouth every morning ) 30 tablet 0     ferrous sulfate (IRON SUPPLEMENT) 325 (65 FE) MG tablet Take 1 tablet (325 mg) by mouth daily (with breakfast) (Patient taking differently: Take 325 mg by mouth daily ) 100 tablet 1     ONETOUCH DELICA LANCETS 33G MISC 1 Device daily 100 each 0     blood glucose monitoring (ONE TOUCH ULTRA 2) meter device kit          PAST SURGICAL HISTORY:  Past Surgical History:   Procedure Laterality Date     AMPUTATE TOE(S) Right 5/26/2017    Procedure: AMPUTATE TOE(S);  Right Great Toe amputation, debriedment of 2 and 3rd toe soft tissu right foot. and application of Grafix;  Surgeon: Leah Santamaria MD;  Location: [facility] OR     ANESTHESIA CARDIOVERSION N/A 12/12/2017    Procedure: ANESTHESIA CARDIOVERSION;  Anesthesia Cardioversion ;  Surgeon: GENERIC ANESTHESIA PROVIDER;  Location: [facility] OR     BACK SURGERY       BYPASS GRAFT ARTERY CORONARY N/A 2/6/2017    Procedure: BYPASS GRAFT ARTERY CORONARY;  Surgeon: Mikhail Quiñones MD;  Location: [facility] OR     C APPENDECTOMY  1959     C CARDIAC SURG PROCEDURE UNLIST       C HAND/FINGER SURGERY UNLISTED       C STOMACH SURGERY PROCEDURE UNLISTED    "    HC SACROPLASTY       HC VASCULAR SURGERY PROCEDURE UNLIST       HYSTERECTOMY, PASTORA  1988     IRRIGATION AND DEBRIDEMENT FOOT, COMBINED Right 7/7/2017    Procedure: COMBINED IRRIGATION AND DEBRIDEMENT FOOT;  Irrigation and Debridement Right Foot with Graphix  *Latex Allergy*;  Surgeon: Leah Santamaria MD;  Location: [facility] OR     MAZE PROCEDURE N/A 2/6/2017    Procedure: MAZE PROCEDURE;  Surgeon: Mikhail Quiñones MD;  Location: [facility] OR     PICC INSERTION Left 02/25/2017    5fr TL Bard PICC, 47cm (3cm external) in the L basilic vein w/ tip in the SVC RA junction     TONSILLECTOMY  1942       ALLERGIES:     Allergies   Allergen Reactions     Heparin      HIT/ thrombocytopenia     Lovenox [Enoxaparin] Other (See Comments)     Thrombocytopenia      Oxycodone      hallucinations     Toprol Xl [Metoprolol] Nausea and Vomiting     Adhesive Tape Itching and Rash     Diapers & Supplies Rash     Developed yeast infection from previous hospital stay       SOCIAL HISTORY:  Social History   Substance Use Topics     Smoking status: Former Smoker     Packs/day: 1.00     Years: 10.00     Types: Cigarettes     Start date: 10/3/1958     Quit date: 7/4/1976     Smokeless tobacco: Never Used      Comment: Quit in 1976     Alcohol use Yes      Comment: Socially       ROS:   Constitutional: No fever, chills, or sweats. Weight as above.  ENT: No visual disturbance, ear ache, epistaxis, sore throat.   Cardiovascular: As per HPI.   Respiratory: No cough, hemoptysis.    GI: No nausea, vomiting, hematemesis, melena, or hematochezia.   : No hematuria.   Integument: Negative.   Psychiatric: Negative.   Neuro: Negative.   Endocrinology: No significant heat or cold intolerance   Musculoskeletal: No myalgia.      Exam:  /80 (BP Location: Left arm, Patient Position: Chair, Cuff Size: Adult Regular)  Pulse 50  Ht 1.575 m (5' 2\")  Wt 78.2 kg (172 lb 6.4 oz)  SpO2 98%  BMI 31.53 kg/m2  GEN: aao x 3, NAD  Neck: JVD < 10cm H2O  LUNGS: No " wheezing. Decreased breath sounds but no rales noted  HEART: S1S2 audible, no murmurs or rubs. Regular rhythm  ABDOMEN: Soft, nt.  EXTREMITIES: Warm calves, +DPs, trace to 1+ bilateral LE edema  NEURO: aao x 3, no focal deficits    Labs:  CBC RESULTS:   Lab Results   Component Value Date    WBC 6.8 02/22/2018    RBC 3.18 (L) 02/22/2018    HGB 11.2 (L) 02/22/2018    HCT 34.1 (L) 02/22/2018     (H) 02/22/2018    MCH 35.2 (H) 02/22/2018    MCHC 32.8 02/22/2018    RDW 16.7 (H) 02/22/2018     02/22/2018       BMP RESULTS:  Lab Results   Component Value Date     02/22/2018    POTASSIUM 4.2 02/22/2018    CHLORIDE 99 02/22/2018    CO2 32 02/22/2018    ANIONGAP 6 02/22/2018     (H) 02/22/2018    BUN 43 (H) 02/22/2018    CR 1.28 (H) 02/22/2018    GFRESTIMATED 40 (L) 02/22/2018    GFRESTBLACK 49 (L) 02/22/2018    CARLY 9.1 02/22/2018        INR RESULTS:  Lab Results   Component Value Date    INR 1.61 (H) 01/16/2018    INR 2.6 01/08/2018    INR 1.8 01/02/2018    INR 3.3 12/26/2017       Procedures:  ECHO - 2/26/17  No LV thrombus noted.  Left Ventricle:  Left ventricular size is normal. The Ejection Fraction is estimated at 55-60%.  Diastolic function not assessed due to atrial fibrillation. No LV thrombus noted.  Right Ventricle:  Right ventricular function, chamber size, wall motion, and thickness are normal.      TAVO - 3/1/17   H/o ABDIEL ligation. No LA clot seen.  H/o PFO closure. The atrial septum is intact as assessed by color Doppler.  No LV thrombus seen. Mildly (EF 45-50%) reduced left ventricular function is present.  The right ventricle is normal size. Global right ventricular function is normal.    TAVO 12/2017  Interpretation Summary  Prior ABDIEL ligation noted without remnant left atrial appendage or thrombus.  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  Moderate to severe tricuspid  insufficiency is present.    Device Interrogation 3/2/18:  Medtronic ILR, per MD orders. She also has an appointment with Dr. Lobato. Today her intrinsic rhythm is an irregular ventricular rhythm ~60 bpm. Normal device function. No symptom activations have been recorded. 875 AF episodes have been recorded over the last 3 months. Pt states she is taking xarelto. AF burden= 21%      Assessment and Plan:   Ms. Fenton is a 76yo female with PMH of HTN, HLD, DM, CVA (11/2016), CAD (s/p 3v CABG: LIMA-LAD, SVG-D1, SVG-dRCA and modified MAZE, ABDIEL ligation - 2/2016), paroxysmal AFib ([id number] - 8), HIT, RUE DVT who is here for follow up.    - From a HF standpoint, she is on GDT, except for ACEi/ARB (significant renal dysfunction.) Her weight is pretty much at baseline and she has stable shortness of breath. We will continue her current medical regimen and will recommend continued CORE clinic follow up. We will recheck her labs before starting on ACE/ARB.  - In terms of her AFib, she was seen by EP, and the plan is to assess her Afib burden and continue with rate control. If this presents more of a problem, can consider anti-arrhythmic drugs, PVI, or ablation. She will continue on rivaroxaban.  - For her shortness of breath. Unlikely to be from her CAD, but can consider stress test post-CABG. Likely deconditioning. Other possibilities are her mildly reduced EF (as seen on TAVO), symptomatic Afib, anemia (on iron supplements), or her newly diagnosed hypothyroidism (on levothyroxine treatment.)  - RTC in 6 months.    Discussed with Dr. Lobato.    Juan Pablo Alejandra MD  Advanced Heart Failure/Heart Transplant Fellow  [facility] Division of Cardiology   [phone]    Attending Attestation:  Patient seen and examined by me with the Fellow, Dr. Alejandra. I have performed all pertinent elements of the physical examination and reviewed the note above. I have reviewed pertinent laboratory, echocardiographic, imaging, and cardiac  catheterization results. I agree with the plan of care as described in this note.    Star Lobato MD, PhD      CC  Patient Care Team:  Humberto Conner MD as PCP - General (Internal Medicine)  ALLEN Wilson MD as MD (Cardiology)  Jessica Aguillon RN as Nurse Coordinator (Clinical Cardiac Electrophysiology)  Paige Santos MD as MD (Cardiology)  Leah Santamaria MD as MD (Vascular Surgery)  Angelina Bernal MD as MD (Hematology & Oncology)  Diana Dyson LPN as Easton Mcadams DPM as MD (Podiatry)  Carloz Wilson RN as Nurse Coordinator (Cardiology)  Zaira Harman, REESE as Nurse Coordinator (Cardiology)  Isabel Pinto APRN CNP as Nurse Practitioner (Cardiology)  BEREKET GARCIA

## 2018-03-02 NOTE — NURSING NOTE
Chief Complaint   Patient presents with     Follow Up For     CAD s/p CAB [LIMA-LAD, v-D1, v-RCA] + MAZE + ABDIEL ligation in 2/2016, heart failure with LV EF 45-50%, hypertension, type 2 diabetes, and history of stroke, DVT, and HIT who has had paroxysmal atrial fibrillation since her cardiac surgery     Vitals were taken and medications were reconciled.     Kirsten Brito MA    12:08 PM

## 2018-03-02 NOTE — NURSING NOTE
Med Reconcile: Reviewed and verified all current medications with the patient. The updated medication list was printed and given to the patient.  Return Appointment: Follow up in 6 months. Patient given instructions regarding scheduling next clinic visit. Patient demonstrated understanding of this information and agreed to call with further questions or concerns.  Patient stated she understood all health information given and agreed to call with further questions or concerns.

## 2018-03-02 NOTE — PROGRESS NOTES
Preliminary Device Interrogation Results.  Final physician signed paceart report to be scanned and attached.    Pt seen in the [facility] for evaluation and iterative programming of a Medtronic ILR, per MD orders. She also has an appointment with Dr. Lobato. Today her intrinsic rhythm is an irregular ventricular rhythm ~60 bpm. Normal device function. No symptom activations have been recorded. 875 AF episodes have been recorded over the last 3 months. Pt states she is taking xarelto. AF burden= 21%. Pt reports she is feeling well. Plan for pt to send a Carelink remote transmission on 6/4/18 and RTC in 6 months.  Implanted Loop Recorder

## 2018-03-02 NOTE — PATIENT INSTRUCTIONS
Here is the number for Medical records:  [phone]    Thank you for your visit today.  Please call me with any questions or concerns.   Carloz Wilson  RN  Cardiology Care Coordinator  [phone], press option 1 then option 3

## 2018-03-03 ENCOUNTER — MYC MEDICAL ADVICE (OUTPATIENT)
Dept: INTERNAL MEDICINE | Facility: CLINIC | Age: 78
End: 2018-03-03

## 2018-03-08 ENCOUNTER — DOCUMENTATION ONLY (OUTPATIENT)
Dept: CARE COORDINATION | Facility: CLINIC | Age: 78
End: 2018-03-08

## 2018-03-08 NOTE — PROGRESS NOTES
I would have her try one dose of metolazone and see how her weight does.  I should see her sometime unless moving soon.

## 2018-03-08 NOTE — PROGRESS NOTES
[facility] utilizes an encounter to take the place of a direct phone call to your office. Please take a moment to review the below request. Your reply to this encounter will act as a verbal OK of orders if requested below. Thank you.    Patients weight is up 5 lbs over the past week.  Current weight is 173lbs. She did have an overnight observation hospital stay at the [facility] a couple weeks ago for being dehydrated/over diuresed.  Prior to this hospital stay, she was having weights greater than 170lbs and was taking her PRN metolazone.  She developed symptoms of dehydration and was given IV fluids in the hospital.  She has been hesitant to take the metolazone since the hospital stay.      Of note...client does plan to be moving to [place] in the next couple months.     Please advise.    Ester Pineda RN Case Manager  [phone]  [email or web address]

## 2018-03-12 ENCOUNTER — HOSPITAL ENCOUNTER (OUTPATIENT)
Dept: ULTRASOUND IMAGING | Facility: CLINIC | Age: 78
Discharge: HOME | End: 2018-03-12
Attending: SURGERY | Admitting: SURGERY
Payer: MEDICARE

## 2018-03-12 DIAGNOSIS — L97.509: ICD-10-CM

## 2018-03-12 DIAGNOSIS — I73.9 PAD (PERIPHERAL ARTERY DISEASE) (H): ICD-10-CM

## 2018-03-12 PROCEDURE — 93922 UPR/L XTREMITY ART 2 LEVELS: CPT

## 2018-03-15 ENCOUNTER — DOCUMENTATION ONLY (OUTPATIENT)
Dept: CARE COORDINATION | Facility: CLINIC | Age: 78
End: 2018-03-15

## 2018-03-15 DIAGNOSIS — G59 MONONEUROPATHY DUE TO UNDERLYING DISEASE: ICD-10-CM

## 2018-03-15 RX ORDER — GABAPENTIN 100 MG/1
CAPSULE ORAL
Qty: 180 CAPSULE | Refills: 0 | Status: ON HOLD | OUTPATIENT
Start: 2018-03-15 | End: 2018-04-04

## 2018-03-15 NOTE — PROGRESS NOTES
[facility] utilizes an encounter to take the place of a direct phone call to your office. Please take a moment to review the below request. Your reply to this encounter will act as a verbal OK of orders if requested below. Thank you.    Orders     Skilled Nursing 1 x a week for 9 weeks with 2 prn  Health Aide 1 x a week for 9 weeks.    Wound Cares    Wound Care to right first and second toe.  Continue with DH shoe. Cleanse the area with Vashe and cover with iodosorb. Cover with guaze and secure with tape.  Perform this daily.    Wound care to left heel. Cleanse with NS/wound cleanser.  Apply iodosorb. Cover wtih mepilex/primipore dressing.  Change daily as needed.    SUMMARY TO MD   on 3/15/18  This  77 year old female with history atrial fibrillation, strokes, and HIT with leg ulcerations after heparin. Client is taking xarelto and tolerating well.  Wound care is being followed by podiatry/DR. Torres for right foot.  This week client has had RADHA testing completed and will follow up with Dr. Eden next week to discuss need for amputation.  She will then follow up with Dr. Torres to discuss POC for wounds.  OV with Dr. Conner is also scheduled for 3/23.  Over this past CERT period, she has also devloped an ulcer to her left heel which is almost healed/Dr. Torres has been following.  She is also followed by cardiology/Dr Lobato for atrial fibrillation.  Over this past CERT period a loop recorder has been implanted.  She has had several days where her HR continues to be in the 120s, but most days she is in the 60s.  Yesterday and today she has been in the 130s. She has been instructed to limit actvity on days HR is up.  Chest incision is healed.  Pt has chronic congestive heart failure, BP controlled on current medicaitons.  Minimal peripheral edema today.  Weight has been up over the past few weeks and she did have an overnight observation stay at the [facility] for being over diuresed which resulted in  dehydration.  Client continues to obtain a weight, HR, and BP daily. Home care continues to assess/treat wounds to right first and second toe as well as perform cardio/resp assessments.    Cause of right toe ulcers is HIT (heparin induced thrombocytopenia) with  leg ulcerations   Wound 1  Right foot tip of 2nd toe 0.2 by 0.2 by 0.1cm small drainage, scab present at  90 percent and 10 percent red nongranular tissue.  WOund 2 Right hallux 0.8 by 0.8 x 0.2 cm, 95 percent slough and 5 percent red nongranular   Wound 5 left heel 0.1 by 0.3 0.1 cm, granular wound bed    Pt currently lives with her daughter in a multilevel home. She has plans to move in the end of April with her Grandaughter or an snf in [place]. She has talked to Dr Lobato about this move and he recommend the [facility] for her medical care or the [facility] is also near by. Pt uses furniture and walls, railings to move about the home.  Client is performing wound care on days that nursing does not visit. She also has a HA to assist with ADLs/IADLs.  Daughter is able to assist wtih some IADLs, but does have her own health concerns. . Pt has urinary dribbling and urgency. Able to control bowels.  MACH 10 score is 9, deneis falls. Educated on falls prevention. There are 2 dogs and 1 cat in the house. House is cluttered and  in need of a good cleaning.    Denies presence of pain at visit today. Had has some minor pain to her right toes.   Nutrition has improved over this past CERT period.  Wt 169 lbs.  Edu on reporting to  if weight goes up beyond 170lbs. Client will discuss diuretic use with PMD at OV next week.   Pt does not check her BG level.    Temp 98.0, pulse 133, resp 18, BP   112/78, SPO2 93 percent on RA. uses a cpap  at night.  Pt reports fatigue persists, but has not been worse. Sleep has been adequate at night.   Pt does her own med set up, RN verified meds to be appropriate. Pt reports her  depression is under control with Effexor. Uses  melatonin or Trazadone for sleep.  BACKGROUND... Dx of ULCER LLE, Peripheral vascular disease, CHRONIC DIASTOLIC HEART FAILURE,   HEPARIN INDUCED THROMBOCYTOPENIA, DEPRESSION,  AFIB  ANALYSIS...At  risk for  wound  infection, non compliance with wound care procedure, medication mismanagment, falls, and difficulties with ADL/IADLs  RECOMMENDATION...SN for wound assessmet/cares, home   safety assess, medication  teaching/assessment of complianc, DM teach/asses,  nutrition/hydration education and skin assessment, wound care and assessment. HA to   assist with bathing/personal cares.   Estimated Length of home care is 6 to 8 weeks.    Ester Pineda RN Case Manager  [phone]  [email or web address]

## 2018-03-15 NOTE — TELEPHONE ENCOUNTER
Gabapentin 100 MG       Last Written Prescription Date:  10/4/17  Last Fill Quantity: 180,   # refills: 1  Last Office Visit: 12/14/17  Future Office visit:    Next 5 appointments (look out 90 days)     Mar 21, 2018  1:40 PM CDT   [facility]    [address] [place] [id number] [phone]                   Routing refill request to provider for review/approval because:  Drug not on the [facility], [facility] or [facility] refill protocol or controlled substance

## 2018-03-19 ENCOUNTER — TELEPHONE (OUTPATIENT)
Dept: FAMILY MEDICINE | Facility: CLINIC | Age: 78
End: 2018-03-19
Payer: COMMERCIAL

## 2018-03-19 DIAGNOSIS — D75.829 HEPARIN-INDUCED THROMBOCYTOPENIA (H): Primary | ICD-10-CM

## 2018-03-19 DIAGNOSIS — I50.32 CHRONIC DIASTOLIC HEART FAILURE (H): ICD-10-CM

## 2018-03-19 DIAGNOSIS — Z53.9 DIAGNOSIS NOT YET DEFINED: Primary | ICD-10-CM

## 2018-03-19 PROCEDURE — G0179 MD RECERTIFICATION HHA PT: HCPCS | Performed by: INTERNAL MEDICINE

## 2018-03-19 NOTE — TELEPHONE ENCOUNTER
Forms received from [facility] on 02/09/2018.  Forms with RN to review medications.  Orders pended for provider to sign.    Forms given to Mariela for PCP signature.

## 2018-03-19 NOTE — TELEPHONE ENCOUNTER
Form and chart forwarded to PCP for signatures.  RN completed med reconciliation.  Mariela Baker RN

## 2018-03-20 ENCOUNTER — APPOINTMENT (OUTPATIENT)
Dept: GENERAL RADIOLOGY | Facility: CLINIC | Age: 78
DRG: 813 | End: 2018-03-20
Attending: EMERGENCY MEDICINE
Payer: MEDICARE

## 2018-03-20 ENCOUNTER — APPOINTMENT (OUTPATIENT)
Dept: CT IMAGING | Facility: CLINIC | Age: 78
DRG: 813 | End: 2018-03-20
Attending: EMERGENCY MEDICINE
Payer: MEDICARE

## 2018-03-20 ENCOUNTER — HOSPITAL ENCOUNTER (INPATIENT)
Facility: CLINIC | Age: 78
LOS: 14 days | Discharge: NURSING, REHAB OR LONG-TERM CARE | DRG: 813 | End: 2018-04-04
Attending: EMERGENCY MEDICINE | Admitting: HOSPITALIST
Payer: MEDICARE

## 2018-03-20 DIAGNOSIS — I48.91 ATRIAL FIBRILLATION (H): ICD-10-CM

## 2018-03-20 DIAGNOSIS — Z95.1 S/P CABG (CORONARY ARTERY BYPASS GRAFT): ICD-10-CM

## 2018-03-20 DIAGNOSIS — K71.6: ICD-10-CM

## 2018-03-20 DIAGNOSIS — I48.0 PAROXYSMAL ATRIAL FIBRILLATION (H): ICD-10-CM

## 2018-03-20 DIAGNOSIS — R79.1 ELEVATED INR: Primary | ICD-10-CM

## 2018-03-20 DIAGNOSIS — D64.9 ANEMIA, UNSPECIFIED TYPE: ICD-10-CM

## 2018-03-20 DIAGNOSIS — I25.119 CORONARY ARTERY DISEASE WITH ANGINA PECTORIS, UNSPECIFIED VESSEL OR LESION TYPE, UNSPECIFIED WHETHER NATIVE OR TRANSPLANTED HEART (H): ICD-10-CM

## 2018-03-20 DIAGNOSIS — I49.5 SINOATRIAL NODE DYSFUNCTION (H): ICD-10-CM

## 2018-03-20 DIAGNOSIS — L97.922 ULCER OF LEFT LOWER EXTREMITY WITH FAT LAYER EXPOSED (H): ICD-10-CM

## 2018-03-20 DIAGNOSIS — Z95.0 CARDIAC PACEMAKER: ICD-10-CM

## 2018-03-20 DIAGNOSIS — F43.21 ADJUSTMENT DISORDER WITH DEPRESSED MOOD: ICD-10-CM

## 2018-03-20 DIAGNOSIS — I96 TOE GANGRENE (H): ICD-10-CM

## 2018-03-20 DIAGNOSIS — I50.32 CHRONIC DIASTOLIC HEART FAILURE (H): ICD-10-CM

## 2018-03-20 DIAGNOSIS — F51.01 PRIMARY INSOMNIA: ICD-10-CM

## 2018-03-20 DIAGNOSIS — D68.9 COAGULOPATHY (H): ICD-10-CM

## 2018-03-20 DIAGNOSIS — K92.2 GASTROINTESTINAL HEMORRHAGE, UNSPECIFIED GASTROINTESTINAL HEMORRHAGE TYPE: ICD-10-CM

## 2018-03-20 DIAGNOSIS — I50.22 CHRONIC SYSTOLIC HEART FAILURE (H): ICD-10-CM

## 2018-03-20 DIAGNOSIS — K59.00 CONSTIPATION, UNSPECIFIED CONSTIPATION TYPE: ICD-10-CM

## 2018-03-20 DIAGNOSIS — Z91.048 ALLERGY TO ADHESIVE: ICD-10-CM

## 2018-03-20 DIAGNOSIS — G59 MONONEUROPATHY DUE TO UNDERLYING DISEASE: ICD-10-CM

## 2018-03-20 DIAGNOSIS — G47.00 INSOMNIA, UNSPECIFIED TYPE: ICD-10-CM

## 2018-03-20 DIAGNOSIS — E03.9 HYPOTHYROIDISM, UNSPECIFIED TYPE: ICD-10-CM

## 2018-03-20 LAB
ALBUMIN SERPL-MCNC: 3.4 G/DL (ref 3.4–5)
ALBUMIN UR-MCNC: NEGATIVE MG/DL
ALP SERPL-CCNC: 84 U/L (ref 40–150)
ALT SERPL W P-5'-P-CCNC: 27 U/L (ref 0–50)
ANION GAP SERPL CALCULATED.3IONS-SCNC: 10 MMOL/L (ref 3–14)
ANISOCYTOSIS BLD QL SMEAR: ABNORMAL
APAP SERPL-MCNC: 120 MG/L (ref 10–20)
APPEARANCE UR: CLEAR
AST SERPL W P-5'-P-CCNC: 45 U/L (ref 0–45)
BASOPHILS # BLD AUTO: 0.1 10E9/L (ref 0–0.2)
BASOPHILS NFR BLD AUTO: 0.9 %
BILIRUB SERPL-MCNC: 0.7 MG/DL (ref 0.2–1.3)
BILIRUB UR QL STRIP: NEGATIVE
BUN SERPL-MCNC: 88 MG/DL (ref 7–30)
CALCIUM SERPL-MCNC: 8.7 MG/DL (ref 8.5–10.1)
CHLORIDE SERPL-SCNC: 95 MMOL/L (ref 94–109)
CO2 BLDCOV-SCNC: 27 MMOL/L (ref 21–28)
CO2 SERPL-SCNC: 27 MMOL/L (ref 20–32)
COLOR UR AUTO: ABNORMAL
CREAT BLD-MCNC: 2.1 MG/DL (ref 0.52–1.04)
CREAT SERPL-MCNC: 1.78 MG/DL (ref 0.52–1.04)
D DIMER PPP FEU-MCNC: 1.1 UG/ML FEU (ref 0–0.5)
DIFFERENTIAL METHOD BLD: ABNORMAL
EOSINOPHIL # BLD AUTO: 0 10E9/L (ref 0–0.7)
EOSINOPHIL NFR BLD AUTO: 0 %
ERYTHROCYTE [DISTWIDTH] IN BLOOD BY AUTOMATED COUNT: 21.9 % (ref 10–15)
GFR SERPL CREATININE-BSD FRML MDRD: 23 ML/MIN/1.7M2
GFR SERPL CREATININE-BSD FRML MDRD: 28 ML/MIN/1.7M2
GLUCOSE SERPL-MCNC: 109 MG/DL (ref 70–99)
GLUCOSE UR STRIP-MCNC: NEGATIVE MG/DL
HCT VFR BLD AUTO: 23.2 % (ref 35–47)
HGB BLD-MCNC: 7.2 G/DL (ref 11.7–15.7)
HGB UR QL STRIP: NEGATIVE
HYALINE CASTS #/AREA URNS LPF: 14 /LPF (ref 0–2)
INR PPP: 5.05 (ref 0.86–1.14)
KETONES UR STRIP-MCNC: NEGATIVE MG/DL
LACTATE BLD-SCNC: 1.6 MMOL/L (ref 0.7–2.1)
LEUKOCYTE ESTERASE UR QL STRIP: NEGATIVE
LIPASE SERPL-CCNC: 483 U/L (ref 73–393)
LYMPHOCYTES # BLD AUTO: 1.5 10E9/L (ref 0.8–5.3)
LYMPHOCYTES NFR BLD AUTO: 14.3 %
MAGNESIUM SERPL-MCNC: 2.7 MG/DL (ref 1.6–2.3)
MCH RBC QN AUTO: 36.9 PG (ref 26.5–33)
MCHC RBC AUTO-ENTMCNC: 31 G/DL (ref 31.5–36.5)
MCV RBC AUTO: 119 FL (ref 78–100)
MONOCYTES # BLD AUTO: 0.7 10E9/L (ref 0–1.3)
MONOCYTES NFR BLD AUTO: 7.1 %
MUCOUS THREADS #/AREA URNS LPF: PRESENT /LPF
MYELOCYTES # BLD: 0.1 10E9/L
MYELOCYTES NFR BLD MANUAL: 0.9 %
NEUTROPHILS # BLD AUTO: 7.9 10E9/L (ref 1.6–8.3)
NEUTROPHILS NFR BLD AUTO: 76.8 %
NITRATE UR QL: NEGATIVE
NRBC # BLD AUTO: 0.5 10*3/UL
NRBC BLD AUTO-RTO: 5 /100
NT-PROBNP SERPL-MCNC: 2410 PG/ML (ref 0–1800)
PCO2 BLDV: 50 MM HG (ref 40–50)
PH BLDV: 7.34 PH (ref 7.32–7.43)
PH UR STRIP: 5 PH (ref 5–7)
PLATELET # BLD AUTO: 282 10E9/L (ref 150–450)
PLATELET # BLD EST: ABNORMAL 10*3/UL
PO2 BLDV: 20 MM HG (ref 25–47)
POIKILOCYTOSIS BLD QL SMEAR: SLIGHT
POLYCHROMASIA BLD QL SMEAR: SLIGHT
POTASSIUM SERPL-SCNC: 4.5 MMOL/L (ref 3.4–5.3)
PROT SERPL-MCNC: 7.1 G/DL (ref 6.8–8.8)
RBC # BLD AUTO: 1.95 10E12/L (ref 3.8–5.2)
RBC #/AREA URNS AUTO: 1 /HPF (ref 0–2)
SAO2 % BLDV FROM PO2: 28 %
SODIUM SERPL-SCNC: 132 MMOL/L (ref 133–144)
SOURCE: ABNORMAL
SP GR UR STRIP: 1.02 (ref 1–1.03)
SQUAMOUS #/AREA URNS AUTO: 2 /HPF (ref 0–1)
T4 FREE SERPL-MCNC: 0.44 NG/DL (ref 0.76–1.46)
TRANS CELLS #/AREA URNS HPF: <1 /HPF (ref 0–1)
TROPONIN I SERPL-MCNC: 0.05 UG/L (ref 0–0.04)
TSH SERPL DL<=0.005 MIU/L-ACNC: 81.1 MU/L (ref 0.4–4)
UROBILINOGEN UR STRIP-MCNC: NORMAL MG/DL (ref 0–2)
WBC # BLD AUTO: 10.3 10E9/L (ref 4–11)
WBC #/AREA URNS AUTO: 1 /HPF (ref 0–5)

## 2018-03-20 PROCEDURE — 93010 ELECTROCARDIOGRAM REPORT: CPT | Mod: Z6 | Performed by: EMERGENCY MEDICINE

## 2018-03-20 PROCEDURE — 82746 ASSAY OF FOLIC ACID SERUM: CPT | Performed by: EMERGENCY MEDICINE

## 2018-03-20 PROCEDURE — 80329 ANALGESICS NON-OPIOID 1 OR 2: CPT | Performed by: EMERGENCY MEDICINE

## 2018-03-20 PROCEDURE — 93010 ELECTROCARDIOGRAM REPORT: CPT | Mod: 76 | Performed by: EMERGENCY MEDICINE

## 2018-03-20 PROCEDURE — 86901 BLOOD TYPING SEROLOGIC RH(D): CPT | Performed by: EMERGENCY MEDICINE

## 2018-03-20 PROCEDURE — 83735 ASSAY OF MAGNESIUM: CPT | Performed by: EMERGENCY MEDICINE

## 2018-03-20 PROCEDURE — 84484 ASSAY OF TROPONIN QUANT: CPT | Performed by: EMERGENCY MEDICINE

## 2018-03-20 PROCEDURE — 86850 RBC ANTIBODY SCREEN: CPT | Performed by: EMERGENCY MEDICINE

## 2018-03-20 PROCEDURE — 83880 ASSAY OF NATRIURETIC PEPTIDE: CPT | Performed by: EMERGENCY MEDICINE

## 2018-03-20 PROCEDURE — 99285 EMERGENCY DEPT VISIT HI MDM: CPT | Mod: 25

## 2018-03-20 PROCEDURE — 85379 FIBRIN DEGRADATION QUANT: CPT | Performed by: EMERGENCY MEDICINE

## 2018-03-20 PROCEDURE — 80053 COMPREHEN METABOLIC PANEL: CPT | Performed by: EMERGENCY MEDICINE

## 2018-03-20 PROCEDURE — 83550 IRON BINDING TEST: CPT | Performed by: EMERGENCY MEDICINE

## 2018-03-20 PROCEDURE — 82728 ASSAY OF FERRITIN: CPT | Performed by: EMERGENCY MEDICINE

## 2018-03-20 PROCEDURE — 71046 X-RAY EXAM CHEST 2 VIEWS: CPT

## 2018-03-20 PROCEDURE — 86900 BLOOD TYPING SEROLOGIC ABO: CPT | Performed by: EMERGENCY MEDICINE

## 2018-03-20 PROCEDURE — 86870 RBC ANTIBODY IDENTIFICATION: CPT | Performed by: EMERGENCY MEDICINE

## 2018-03-20 PROCEDURE — 86922 COMPATIBILITY TEST ANTIGLOB: CPT | Performed by: EMERGENCY MEDICINE

## 2018-03-20 PROCEDURE — 70450 CT HEAD/BRAIN W/O DYE: CPT

## 2018-03-20 PROCEDURE — 82803 BLOOD GASES ANY COMBINATION: CPT

## 2018-03-20 PROCEDURE — 84439 ASSAY OF FREE THYROXINE: CPT | Performed by: EMERGENCY MEDICINE

## 2018-03-20 PROCEDURE — 74176 CT ABD & PELVIS W/O CONTRAST: CPT

## 2018-03-20 PROCEDURE — 81001 URINALYSIS AUTO W/SCOPE: CPT | Performed by: EMERGENCY MEDICINE

## 2018-03-20 PROCEDURE — 86902 BLOOD TYPE ANTIGEN DONOR EA: CPT | Performed by: EMERGENCY MEDICINE

## 2018-03-20 PROCEDURE — 82607 VITAMIN B-12: CPT | Performed by: EMERGENCY MEDICINE

## 2018-03-20 PROCEDURE — 85730 THROMBOPLASTIN TIME PARTIAL: CPT | Performed by: EMERGENCY MEDICINE

## 2018-03-20 PROCEDURE — 83010 ASSAY OF HAPTOGLOBIN QUANT: CPT | Performed by: EMERGENCY MEDICINE

## 2018-03-20 PROCEDURE — 40000556 ZZH STATISTIC PERIPHERAL IV START W US GUIDANCE

## 2018-03-20 PROCEDURE — 99285 EMERGENCY DEPT VISIT HI MDM: CPT | Mod: 25 | Performed by: EMERGENCY MEDICINE

## 2018-03-20 PROCEDURE — 86905 BLOOD TYPING RBC ANTIGENS: CPT | Performed by: EMERGENCY MEDICINE

## 2018-03-20 PROCEDURE — 83540 ASSAY OF IRON: CPT | Performed by: EMERGENCY MEDICINE

## 2018-03-20 PROCEDURE — 83605 ASSAY OF LACTIC ACID: CPT

## 2018-03-20 PROCEDURE — 84466 ASSAY OF TRANSFERRIN: CPT | Performed by: EMERGENCY MEDICINE

## 2018-03-20 PROCEDURE — 85025 COMPLETE CBC W/AUTO DIFF WBC: CPT | Performed by: EMERGENCY MEDICINE

## 2018-03-20 PROCEDURE — 85610 PROTHROMBIN TIME: CPT | Performed by: EMERGENCY MEDICINE

## 2018-03-20 PROCEDURE — 84443 ASSAY THYROID STIM HORMONE: CPT | Performed by: EMERGENCY MEDICINE

## 2018-03-20 PROCEDURE — 83690 ASSAY OF LIPASE: CPT | Performed by: EMERGENCY MEDICINE

## 2018-03-20 PROCEDURE — 82565 ASSAY OF CREATININE: CPT

## 2018-03-20 PROCEDURE — 93005 ELECTROCARDIOGRAM TRACING: CPT

## 2018-03-20 NOTE — IP AVS SNAPSHOT
[address] [address]    Phone:  [phone]                                       After Visit Summary   3/20/2018    Carlos Alberto Fenton    MRN: [id number]           After Visit Summary Signature Page     I have received my discharge instructions, and my questions have been answered. I have discussed any challenges I see with this plan with the nurse or doctor.    ..........................................................................................................................................  Patient/Patient Representative Signature      ..........................................................................................................................................  Patient Representative Print Name and Relationship to Patient    ..................................................               ................................................  Date                                            Time    ..........................................................................................................................................  Reviewed by Signature/Title    ...................................................              ..............................................  Date                                                            Time

## 2018-03-20 NOTE — LETTER
Transition Communication Hand-off for Care Transitions to Next Level of Care Provider    Name: Carlos Alberto Fenton  : 1940  MRN #: [id number]  Primary Care Provider: Hmuberto Conner     Primary Clinic: [address] [place] [id number]     Reason for Hospitalization:  Gastrointestinal hemorrhage, unspecified gastrointestinal hemorrhage type [K92.2]  Anemia, unspecified type [D64.9]  Acute respiratory failure with hypoxia (H) [J96.01]  Admit Date/Time: 3/20/2018  8:26 PM  Payor Source: Payor: MEDICA / Plan: MEDICA PRIME SOLUTION / Product Type: Indemnity /        Anticipated Discharge Date:  2018     Discharge Disposition:   TCU:  [facility]     Additional Services/Equipment Arranged:  [employer] at 2pm wheelchair.  Oxygen arranged for transportation through [facility].     Patient / Family response to discharge plan:  In agreement with discharge plan.     Persons notified of above discharge plan:  Patient, daughter Erna Montoya in admission at [facility],  charge nurse, Guillermo Coordinator Mojgan     Staff Discharge Instructions:  Please fax discharge orders and signed hard scripts for any controlled substances.  Please print a packet and send with patient.      Discharge Needs Assessment:  Needs       Most Recent Value    Equipment Currently Used at Home none    Transportation Available family or friend will provide    Home Care [facility] [phone], Fax: [phone]        Follow-up plan:  Future Appointments  Date Time Provider Department Center   2018 11:00 AM 1, Uc Cv Device [facility]   2018 11:30 AM Star Lobato MD [facility]       Any outstanding tests or procedures:        Radiology & Cardiology Orders     Future Labs/Procedures Complete By Expires    EP ICD/Pacemaker/Loop Recorder  As directed 3/25/2019        Referrals     Future Labs/Procedures    Home care nursing referral     Comments:    [facility]  [facility] [phone]  Fax [phone]    RN skilled nursing visit. RN to assess vital signs and weight, respiratory and cardiac status, pain level and activity tolerance, hydration, nutrition and bowel status and home safety.  RN to teach medication management.    Your provider has ordered home care nursing services. If you have not been contacted within 2 days of your discharge please call the inpatient department phone number at [phone] .    Home Care OT Referral for Hospital Discharge     Comments:    [facility] [phone]  Fax [phone]    OT to eval and treat    Your provider has ordered home care - occupational therapy. If you have not been contacted within 2 days of your discharge please call the department phone number listed on the top of this document.    Home Care PT Referral for Hospital Discharge     Comments:    [facility] [phone]  Fax [phone]    PT to eval and treat    Your provider has ordered home care - physical therapy. If you have not been contacted within 2 days of your discharge please call the department phone number listed on the top of this document.            Georgie Maciel    AVS/Discharge Summary is the source of truth; this is a helpful guide for improved communication of patient story

## 2018-03-20 NOTE — IP AVS SNAPSHOT
"     Carlos Alberto Fenton #[id number] (CSN: [id number])  (77 year old F)  (Adm: 18)     [id number]               [address] [facility] BANK: [phone]            Patient Demographics     Patient Name Sex          Age SSN Address Phone    Carlos Alberto Fenton Female 1940 (77 year old) [id number]  [facility] [address] [phone] (Home)  [phone] (Mobile) *Preferred*      Emergency Contact(s)     Name Relation Home Work Mobile    YANNI GOYAL Daughter [phone] none none    Eliceo Darrin \"Pardeep\" Spouse   [phone]      Admission Information     Attending Provider Admitting Provider Admission Type Admission Date/Time    Suzy Dorantes MD Zewde, Ousmane PITTS MD Emergency 18    Discharge Date Hospital Service Auth/Cert Status Service Area     General [facility]    Unit Room/Bed Admission Status        U 7215/7215-02 Admission (Confirmed)       Admission     Complaint    GI bleed      Hospital Account     Name Acct ID Class Status Primary Coverage    Carlos Alberto Fenton [id number] Inpatient Open MEDICARE - MEDICARE FOR HB SUPPLEMENT            Guarantor Account (for Hospital Account #[id number])     Name Relation to Pt Service Area Active? Acct Type    Carlos Alberto Fenton  FCS Yes Personal/Family    Address [place] [address] [phone](H)              Coverage Information (for Hospital Account #[id number])     1. MEDICARE/MEDICARE FOR HB SUPPLEMENT     F/O Payor/Plan Precert #    MEDICARE/MEDICARE FOR HB SUPPLEMENT     Subscriber Subscriber #    Carlos Alberto Fenton [id number]    Address Phone    ATTN CLAIMS  [address]  [facility] [place] [address] [phone]          2. MEDICA/MEDICA PRIME SOLUTION     F/O Payor/Plan Precert #    MEDICA/MEDICA PRIME SOLUTION     Subscriber Subscriber #    Carlos Alberto Fenton [id number]    Address Phone    [address] [address]  [place] [address] [phone]               " "                                       INTERAGENCY TRANSFER FORM - PHYSICIAN ORDERS   3/20/2018                       [address] [facility] BANK: [phone]            Attending Provider: Suzy Dorantes MD     Allergies:  Blood Transfusion Related (Informational Only), Heparin, Lovenox [Enoxaparin], Oxycodone, Toprol Xl [Metoprolol], Adhesive Tape, Diapers & Supplies    Infection:  None   Service:  GENERAL MEDI    Ht:  1.575 m (5' 2\")   Wt:  84.3 kg (185 lb 14.4 oz)   Admission Wt:  78.2 kg (172 lb 6.4 oz)    BMI:  34 kg/m 2   BSA:  1.92 m 2            ED Clinical Impression     Diagnosis Description Comment Added By Time Added    Gastrointestinal hemorrhage, unspecified gastrointestinal hemorrhage type [K92.2] Gastrointestinal hemorrhage, unspecified gastrointestinal hemorrhage type [K92.2]  Susan Patrick MD 3/21/2018 12:44 AM    Anemia, unspecified type [D64.9] Anemia, unspecified type [D64.9]  Susan Patrick MD 3/21/2018 12:44 AM      Hospital Problems as of 4/4/2018              Priority Class Noted POA    GI bleed Medium  3/21/2018 Yes      Non-Hospital Problems as of 4/4/2018              Priority Class Noted    History of skin cancer Medium  Unknown    Pelvic floor dysfunction Medium  10/8/2016    Urge incontinence of urine Medium  10/8/2016    Pulmonary nodule, right Medium  10/15/2016    Long-term (current) use of anticoagulants [Z79.01] Medium  11/14/2016    Adjustment disorder with depressed mood Medium  11/27/2016    Primary insomnia Medium  11/27/2016    Gangrene (H) Medium  2/25/2017    Toe gangrene (H) Medium  2/25/2017    Ulcer of left lower extremity with fat layer exposed (H) Medium  4/7/2017    Heparin induced thrombocytopenia (H) Medium  7/28/2017    Atrial fibrillation (H) [I48.91] Medium  9/1/2017    Chronic diastolic heart failure (H) Medium  9/13/2017    HARINI (acute kidney injury) (H) Medium  2/21/2018    Cardiac pacemaker, Medtronic, Dual Chamber Medium  3/27/2018    Sinoatrial " node dysfunction (H) Medium  3/28/2018      Code Status History     Date Active Date Inactive Code Status Order ID Comments User Context    4/4/2018 11:48 AM  Full Code [id number]  Rosaura Streeter MD Outpatient    3/21/2018  5:44 AM 4/4/2018 11:48 AM Full Code [id number]  Naomy Anaya MD Inpatient    2/21/2018  8:28 PM 2/22/2018 10:01 PM Full Code [id number]  Heather Pozo PA-C Inpatient    7/7/2017  4:23 PM 2/21/2018  8:28 PM Full Code [id number]  Meena Cardoso, APRN CNS Outpatient    5/26/2017 12:40 PM 7/7/2017  4:23 PM Full Code [id number]  Rosalinda Ron, APRN CNP Outpatient    3/2/2017  7:50 PM 5/26/2017 12:40 PM Full Code [id number]  Chapincito Bill MD Outpatient    2/25/2017  2:29 AM 3/2/2017  7:50 PM Full Code [id number]  Wojciech Ashraf MD Inpatient    2/6/2017  2:41 PM 2/17/2017  7:47 PM Full Code [id number]  Praneeth Hills MD Inpatient    11/9/2016 10:13 AM 2/6/2017  2:41 PM DNR/DNI [id number]  Philly Chou MD Outpatient    10/29/2016  2:56 PM 11/9/2016 10:13 AM DNR/DNI [id number]  Laurent Nichols MD Inpatient    10/29/2016 11:51 AM 10/29/2016  2:55 PM Full Code [id number]  Crystal Rao MD Inpatient    10/28/2016  7:55 AM 10/29/2016 11:51 AM Full Code [id number]  Praneeth Yeager MD Outpatient    10/17/2016  9:38 PM 10/28/2016  7:55 AM Full Code [id number]  Momo Lopez MD Inpatient    10/17/2016  6:35 PM 10/17/2016  9:38 PM Full Code [id number]  Khadar Littlejohn MD Outpatient    10/15/2016  6:17 AM 10/17/2016  6:35 PM Full Code [id number]  Matt Miranda MD Inpatient      Current Code Status     Date Active Code Status Order ID Comments User Context       Prior      Summary of Visit     Reason for your hospital stay       You were admitted to the hospital due to tylenol toxicity that causing liver injury. Toxicology and GI were consulted. Liver injury is recovering. During hospitalization, there was a discrepancy of your oxygen saturation reading  and true oxygen level from blood gas due to poor vascular perfusion to the finger. Your oxygen level at room air is good, however, you can use oxygen as needed for comfort.     You also had episodes of slow-fast heart rate that required pacemaker placement. Due to liver injury, anticoagulation for afib option is limited. Hematology consulted and recommended Fondaparinux. You will continue this medication until follow up with hematology. You also found to have volume overloaded that required diuresis. This needs to be continued in TCU. Dry weight 175 lbs.                Medication Review      START taking        Dose / Directions Comments    fondaparinux 2.5 MG/0.5ML Soln injection   Commonly known as:  ARIXTRA   Used for:  Paroxysmal atrial fibrillation (H)        Dose:  2.5 mg   Inject 0.5 mLs (2.5 mg) Subcutaneous every 24 hours   Quantity:  3.5 Syringe   Refills:  11        metoprolol tartrate 25 MG tablet   Commonly known as:  LOPRESSOR   Used for:  Paroxysmal atrial fibrillation (H)        Dose:  12.5 mg   Take 0.5 tablets (12.5 mg) by mouth 2 times daily   Quantity:  60 tablet   Refills:  0        multivitamin, therapeutic Tabs tablet   Used for:  Toe gangrene (H)        Dose:  1 tablet   Take 1 tablet by mouth daily   Quantity:  30 tablet   Refills:  0        polyethylene glycol Packet   Commonly known as:  MIRALAX/GLYCOLAX   Used for:  Constipation, unspecified constipation type        Dose:  17 g   Take 17 g by mouth daily   Quantity:  7 packet   Refills:  0        povidone-iodine 10 % topical solution   Commonly known as:  BETADINE   Used for:  Toe gangrene (H)        Apply toe wound BID   Quantity:  118 mL   Refills:  0        senna-docusate 8.6-50 MG per tablet   Commonly known as:  SENOKOT-S;PERICOLACE   Used for:  Constipation, unspecified constipation type        Dose:  1 tablet   Take 1 tablet by mouth 2 times daily as needed for constipation   Quantity:  100 tablet   Refills:  0          CONTINUE  these medications which may have CHANGED, or have new prescriptions. If we are uncertain of the size of tablets/capsules you have at home, strength may be listed as something that might have changed.        Dose / Directions Comments    gabapentin 100 MG capsule   Commonly known as:  NEURONTIN   This may have changed:  See the new instructions.   Used for:  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        Dose:  100 mg   Take 1 capsule (100 mg) by mouth 2 times daily   Quantity:  180 capsule   Refills:  0        levothyroxine 25 MCG tablet   Commonly known as:  SYNTHROID/LEVOTHROID   This may have changed:  how much to take   Used for:  Hypothyroidism, unspecified type        Dose:  37.5 mcg   Take 1.5 tablets (37.5 mcg) by mouth every morning (before breakfast)   Quantity:  45 tablet   Refills:  0        melatonin 1 MG Caps   This may have changed:  medication strength   Used for:  Insomnia, unspecified type        Dose:  1 mg   Take 1 mg by mouth nightly as needed   Quantity:  30 capsule   Refills:  0        pantoprazole 40 MG EC tablet   Commonly known as:  PROTONIX   This may have changed:  when to take this   Used for:  Coronary artery disease with angina pectoris, unspecified vessel or lesion type, unspecified whether native or transplanted heart (H)        Dose:  40 mg   Take 1 tablet (40 mg) by mouth 2 times daily   Quantity:  90 tablet   Refills:  1          CONTINUE these medications which have NOT CHANGED        Dose / Directions Comments    ACE BANDAGE SELF-ADHERING Misc   Used for:  Open wound of lower limb, right, subsequent encounter        Dose:  1 each   1 each 2 times daily   Quantity:  6 each   Refills:  3        aspirin 81 MG chewable tablet   Used for:  Coronary artery disease with angina pectoris, unspecified vessel or lesion type, unspecified whether native or transplanted heart (H)        Dose:  81 mg   Take 1 tablet (81 mg) by mouth daily   Quantity:  30 tablet   Refills:  0         atorvastatin 40 MG tablet   Commonly known as:  LIPITOR   Used for:  Coronary artery disease with angina pectoris, unspecified vessel or lesion type, unspecified whether native or transplanted heart (H)        Dose:  40 mg   Take 1 tablet (40 mg) by mouth daily   Quantity:  90 tablet   Refills:  1        blood glucose monitoring meter device kit        Refills:  0        bumetanide 2 MG tablet   Commonly known as:  BUMEX   Used for:  Chronic systolic heart failure (H)        Dose:  2 mg   Take 1 tablet (2 mg) by mouth 2 times daily   Quantity:  180 tablet   Refills:  3        ferrous sulfate 325 (65 FE) MG tablet   Commonly known as:  IRON SUPPLEMENT   Used for:  S/P CABG (coronary artery bypass graft)        Dose:  325 mg   Take 1 tablet (325 mg) by mouth daily (with breakfast)   Quantity:  100 tablet   Refills:  1        KERLIX GAUZE ROLL MEDIUM Misc   Used for:  Open wound of lower limb, right, subsequent encounter        Dose:  1 each   1 each 2 times daily   Quantity:  48 each   Refills:  3        ONETOUCH DELICA LANCETS 33G Misc   Used for:  Type 2 diabetes mellitus without complication, without long-term current use of insulin (H)        Dose:  1 Device   1 Device daily   Quantity:  100 each   Refills:  0        ONETOUCH ULTRA test strip   Used for:  Type 2 diabetes mellitus without complication, without long-term current use of insulin (H)   Generic drug:  blood glucose monitoring        USE AS DIRECTED TO TEST ONE TIME A DAY   Quantity:  100 each   Refills:  2        order for DME   Used for:  Open wound of lower limb, right, subsequent encounter        Sterile 4x4 gauze; Use gauze twice daily for dressing changes.   Quantity:  1 Box   Refills:  3        spironolactone 25 MG tablet   Commonly known as:  ALDACTONE   Used for:  Chronic systolic heart failure (H)        Dose:  25 mg   Take 1 tablet (25 mg) by mouth daily   Quantity:  90 tablet   Refills:  3        venlafaxine 150 MG Tb24 24 hr tablet    Commonly known as:  EFFEXOR-ER   Used for:  Adjustment disorder with depressed mood        Dose:  150 mg   Take 1 tablet (150 mg) by mouth daily (with breakfast)   Quantity:  90 each   Refills:  1          STOP taking     ACETAMINOPHEN PO           ADAPTIC NON-ADHERING DRESSING Pads           ALPRAZolam 0.25 MG tablet   Commonly known as:  XANAX           carvedilol 3.125 MG tablet   Commonly known as:  COREG           metolazone 2.5 MG tablet   Commonly known as:  ZAROXOLYN           Potassium Chloride ER 20 MEQ Tbcr           rivaroxaban ANTICOAGULANT 20 MG Tabs tablet   Commonly known as:  XARELTO           SILICA PO           traZODone 50 MG tablet   Commonly known as:  DESYREL                   After Care     Activity - Up with nursing assistance           Advance Diet as Tolerated       Follow this diet upon discharge: Orders Placed This Encounter      Calorie Counts      Room Service      Fluid restriction 2000 ML FLUID      Snacks/Supplements Adult: Ensure Clear; Between Meals      Low Saturated Fat Na <2400 mg       Daily weights       Call Provider for weight gain of more than 2 pounds per day or 5 pounds per week.       Fall precautions           General info for SNF       Length of Stay Estimate: Short Term Care: Estimated # of Days <30  Condition at Discharge: Stable  Level of care:skilled   Rehabilitation Potential: Fair  Admission H&P remains valid and up-to-date: Yes  Recent Chemotherapy: N/A  Use Nursing Home Standing Orders: Yes       Glucose monitor nursing POCT       Before breakfast. If higher than 180, please notify provider.       Mantoux instructions       Give two-step Mantoux (PPD) Per Facility Policy Yes       Wound care (specify)       Site:   toe  Instructions:  Apply Betadine BID               Further instructions from your care team         Home Care after a Pacemaker Implant    Wound care:  Keep your incision (surgery wound) dry for 3 days.  After 3 days, you may remove the outer  bandage.  Keep the strips of tape on.  They will be removed at your clinic visit.  Check for signs of infection each day.  These include increased redness, swelling, drainage, bleeding or a fever over 101 F (38.3 C).  Call us immediately if you see any of these signs.  If there are no signs of infection, you may shower in 3 days.  Do not submerge the incision (in a bath tub, hot tub, or swimming pool) until fully healed.    Pain:   You may have mild to moderate pain for 3 to 5 days.  Take acetaminophen (Tylenol) or ibuprofen (Advil) for the pain.  Call us if the pain is severe or lasts more than 5 days.    Activity:  After 24 hours, slowly return to your normal activities.   Healing will take 4 to 6 weeks.  No driving for 3 days  Avoid climbing a ladder alone.  It is best to stay within 4 feet of the ground.  Avoid anything that may cause rough contact or a hard hit to your chest.  This includes football, hockey, and other contact sports.  For at least 4 weeks:  Do not raise your affected arm above your shoulder.  Do not use your affected arm to push, pull, or lift anything over 10 pounds.  Avoid repetitive upper body activities for 6 weeks (ie: golf, swimming, and weight lifting)    Follow Up Visits:  Return to the clinic in 7 to 10 days to have your device and wound checked.      Telling others about your device:  Before you have any medical tests or treatments, tell the doctors, dentists, and other care providers about your device.  There are a few tests and treatments that may interfere with your device.  (These include MRI, radiation therapy, electrocautery, and others.)  Your care team may need to take special steps to keep you safe.  Before you leave the hospital, you will receive a temporary ID card.  A permanent card will be mailed to you about 6 to 8 weeks later.  Always carry the ID card with you.  It has important details about your device.  You should also get a MedicAlert ID.  Please ask us for a  MedicAlert brochure, or go to [email or web address].    Safety near electrical equipment:  All of these are safe to use when in good repair:    Microwaves    Radios    Cordless phone    Remote controls    Small electrical tools  Cell phones: Keep cell phones at least 6 inches from your device.  Do not carry the phone in a pocket near your device.  Security salvador: It is okay to walk through security salvador at the airports and department stores.  Tell airport security that you have a pacemaker.  They should keep the screening wand at least 6 inches from your device.  Full-body scanners are safe.  Avoid the following:    MRI tests in the hospital unless you have a MRI safe pacemaker.           Arc welding, chain saws and high-powered industrial or commercial tools.    Power lines, power plants and large power generators.    Electric body fat scales.    Magnetic mattress pads or pillow.    Questions?  Please call [facility].    Device Nurse:          Business Hours:  [phone]                       After Hours:  [phone]   Choose option 4, then ask for the                                                  on-call device nurse at job code [id number].    Your next device clinic appointment is scheduled on:     ___________________________ at _____________.            [facility]  [facility] - Clinic [address]  [address]      Procedures     Oxygen - Nasal cannula       1-3 Lpm by nasal cannula to keep O2 sats 92% or greater or for comfort.    Please warm finger with heat pack before checking O2 sat due to poor vascular perfusion, may not get good wave form.  Do not check at ear lobe.       Oxygen Adult       [place] Oxygen Order 3 liter(s) by nasal cannula continuously with use of portable tank. Expected treatment length is indefinite (99 months).. Test on conserving device as applicable.    Patients who qualify for home O2 coverage  under the CMS guidelines require ABG tests or O2 sat readings obtained closest to, but no earlier than 2 days prior to the discharge, as evidence of the need for home oxygen therapy. Testing must be performed while patient is in the chronic stable state. See notes for O2 sats.    I certify that this patient, Carlos Alberto Fenton has been under my care and that I, or a nurse practitioner or physician's assistant working with me, had a face-to-face encounter that meets the face-to-face encounter requirements with this patient on 4/4/2018. The patient, Carlos Alberto Fenton was evaluated or treated in whole, or in part, for the following medical condition, which necessitates the use of the ordered oxygen. Treatment Diagnosis: CHF, acute hypoxic respiratory failure    Attending Provider: Dr. Dorantes  Physician signature: See electronic signature associated with these discharge orders  Date of Order: April 4, 2018             Radiology & Cardiology Orders     EP ICD/Pacemaker/Loop Recorder                 Referrals     Cardiology Eval Adult Referral       Preferred location:  [facility] [phone]   [email or web address]    Please be aware that coverage of these services is subject to the terms and limitations of your health insurance plan.  Call member services at your health plan with any benefit or coverage questions.      Please bring the following to your appointment:  Any x-rays, CTs or MRIs which have been performed. Contact the facility where they were done to arrange for  prior to your scheduled appointment.    List of current medications  This referral request   Any documents/labs given to you for this referral       GASTROENTEROLOGY ADULT REF CONSULT ONLY       Preferred Location: [facility] [place] [facility] [phone]      Please be aware that coverage of these services is subject to the terms and limitations of your health insurance plan.   Call member services at your health plan with any benefit or coverage questions.  Any procedures must be performed at a [place] facility OR coordinated by your clinic's referral office.    Please bring the following with you to your appointment:    (1) Any X-Rays, CTs or MRIs which have been performed.  Contact the facility where they were done to arrange for  prior to your scheduled appointment.    (2) List of current medications   (3) This referral request   (4) Any documents/labs given to you for this referral       Occupational Therapy Adult Consult       Evaluate and treat as clinically indicated.    Reason:  ADLs, weakness.       Onc/Heme Adult Referral           Physical Therapy Adult Consult       Evaluate and treat as clinically indicated.    Reason:  Weakness, deconditioning.       Podiatry/Foot & Ankle Surgery Referral                 Follow-Up Appointment Instructions     Follow Up and recommended labs and tests       Follow up with prison physician.  The following labs/tests are recommended: CMP, CBC, INR in 1 week.  Follow up with primary care provider in 2 weeks for post hospitalization follow up.  Follow up with GI in clinic in 2 months. Referral made.   Follow up with EP cardiology in clinic in 1 week. Referral made.  Follow up with hematology in clinic in 3 months. Referral made.  Follow up with podiatry in 2 weeks. Referral made.             Your next 10 appointments already scheduled     Sep 07, 2018 11:00 AM CDT   (Arrive by 10:45 AM)   Implantable Loop Recorder with Uc Cv Device 1   [facility])    [address] [place] [address]  [address] [place] [id number] [phone]            Sep 07, 2018 11:30 AM CDT   (Arrive by 11:15 AM)   Return Visit with Star Lobato MD   [facility])    [address] [place] [address]  [address] [place] [id number] [phone]              Statement of  "Approval     Ordered          04/04/18 1149  I have reviewed and agree with all the recommendations and orders detailed in this document.  EFFECTIVE NOW     Approved and electronically signed by:  Suzy Dorantes MD                                                 INTERAGENCY TRANSFER FORM - NURSING   3/20/2018                       [address] [facility] BANK: [phone]            Attending Provider: Suzy Dorantes MD     Allergies:  Blood Transfusion Related (Informational Only), Heparin, Lovenox [Enoxaparin], Oxycodone, Toprol Xl [Metoprolol], Adhesive Tape, Diapers & Supplies    Infection:  None   Service:  GENERAL MEDI    Ht:  1.575 m (5' 2\")   Wt:  84.3 kg (185 lb 14.4 oz)   Admission Wt:  78.2 kg (172 lb 6.4 oz)    BMI:  34 kg/m 2   BSA:  1.92 m 2            Advance Directives        Scanned docmt in ACP Activity?           Yes, scanned ACP docmt        Immunizations     Name Date      Influenza (High Dose) 3 valent vaccine 02/26/17     Influenza (High Dose) 3 valent vaccine 10/18/12       ASSESSMENT     Discharge Profile Flowsheet     EXPECTED DISCHARGE     Home Care  [facility] [phone], Fax: [phone] 03/21/18 0951    Expected Discharge Date  04/04/18 (TCU) 04/03/18 0855   SKIN      DISCHARGE NEEDS ASSESSMENT     Inspection of bony prominences  Full 04/04/18 0834    Equipment Currently Used at Home  none 03/25/18 1246   Inspection under devices  Full except (identify device(s) not inspected) 04/04/18 0834    Transportation Available  family or friend will provide 03/25/18 1246   Skin WDL  ex 04/04/18 0834    GASTROINTESTINAL (ADULT,PEDIATRIC,OB)     Skin Color/Characteristics  bruised (ecchymotic);cyanotic;dusky 04/04/18 0834    GI WDL  WDL 04/04/18 0834   Skin Temperature  cool 04/04/18 0834    Abdominal Appearance  obese 04/03/18 1635   Skin Moisture  dry 04/04/18 0834    All Quadrants Bowel Sounds  hypoactive 04/03/18 1635   Skin Elasticity  quick return to " "original state 04/03/18 0152    All Quadrants Palpation  soft/nontender 04/03/18 0919   Skin Integrity  bruise(s);drain/device(s);tattoo(s);wound(s) 04/04/18 0834    Last Bowel Movement  03/31/18 04/03/18 1635   Not Inspected under devices  Other (Pacemaker dressing) 04/04/18 0834    GI Signs/Symptoms  no gastrointestinal signs/symptoms 04/03/18 0152   Full except areas not inspected   Buttock, left;Buttock, right;Sacrum;Coccyx 04/02/18 0202    Passing flatus  yes 04/03/18 1635   SAFETY      COMMUNICATION ASSESSMENT     Safety WDL  WDL 04/04/18 0834    Patient's communication style  spoken language (English or Bilingual) 03/20/18 2020   All Alarms  none present 04/03/18 0152    FINAL RESOURCES     Aspiration Risk Screen  advanced age 04/03/18 0152    Resources List  Home Care 03/21/18 0951   Additional Documentation  Aspiration Risk Screen (Row) 03/26/18 0119    Other Resources  Other (see comment) ([facility]) 02/27/17 1307   Safety Equipment  oxygen flowmeter 04/03/18 0152                 Assessment WDL (Within Defined Limits) Definitions           Safety WDL     Effective: 09/28/15    Row Information: <b>WDL Definition:</b> Bed in low position, wheels locked; call light in reach; upper side rails up x 2; ID band on<br> <font color=\"gray\"><i>Item=AS safety wdl>>List=AS safety wdl>>Version=F14</i></font>      Skin WDL     Effective: 09/28/15    Row Information: <b>WDL Definition:</b> Warm; dry; intact; elastic; without discoloration; pressure points without redness<br> <font color=\"gray\"><i>Item=AS skin wdl>>List=AS skin wdl>>Version=F14</i></font>      Vitals     Vital Signs Flowsheet     VITAL SIGNS     Sleep  normal sleep 04/02/18 1235    Temp  97.4  F (36.3  C) 04/04/18 1147   ANALGESIA SIDE EFFECTS MONITORING      Temp src  Oral 04/04/18 1147   Side Effects Monitoring: Respiratory Quality  R 04/04/18 1155    Resp  18 04/04/18 1147   Side Effects Monitoring: Respiratory Depth  N 04/04/18 " "1155    Pulse  72 04/04/18 0809   Side Effects Monitoring: Sedation Level  1 04/04/18 1155    Heart Rate  67 04/04/18 0404   HEIGHT AND WEIGHT      Pulse/Heart Rate Source  Monitor 04/04/18 1147   Height  1.575 m (5' 2\") 03/20/18 2022    BP  109/87 04/04/18 1147   Height Method  Stated 03/20/18 2022    BP Location  Left arm 04/04/18 1147   Weight  84.3 kg (185 lb 14.4 oz) 04/03/18 1204    OXYGEN THERAPY     Weight Method  Standing scale 04/03/18 1204    SpO2  94 % 04/04/18 1147   Estimated Weight (From ED)  77.6 kg (171 lb) 03/20/18 2022    O2 Device  None (Room air) 04/04/18 1147   POSITIONING      FiO2 (%)  21 % 04/02/18 2259   Body Position  independently positioning 04/04/18 1155    Oxygen Delivery  3 LPM 04/03/18 2004   Head of Bed (HOB)  HOB at 45 degrees 04/03/18 0919    PACEMAKER     Chair  Upright in chair 04/01/18 1623    Pacemaker  Permanent 04/04/18 1155   Positioning/Transfer Devices  pillows 04/03/18 0919    Pacemaker Type  Transthoracic 04/02/18 0202   DAILY CARE      Pacer Mode  DDD 04/02/18 1600   Activity Management  activity adjusted per tolerance;activity encouraged 04/04/18 1155    Function  Capturing;Sensing 03/29/18 1154   Activity Assistance Provided  assistance, 1 person 04/04/18 0834    Paced Amount  Occasionally paced (50%) 03/29/18 1850   Symptoms Noted During/After Activity  fatigue 04/04/18 0834    Site Assessment  UTV 04/03/18 0152   ECG      Dressing Status  Normal: Clean, Dry & Intact 04/03/18 0152   ECG Rhythm  Atrial fibrillation 04/03/18 0138    PAIN/COMFORT     Ectopy  PVC 03/29/18 1051    Patient Currently in Pain  no 04/04/18 1155   Ectopy Frequency  Occasional 03/29/18 1051    Preferred Pain Scale  CAPA (Clinically Aligned Pain Assessment) ([facility] Ad[facility] Only) 04/04/18 1155   Lead Monitored  Lead II;V 1 03/29/18 1051    Pain Location  Back 04/03/18 1633   BELEN COMA SCALE      Pain Orientation  Lower 04/03/18 1633   Best Eye Response  4-->(E4) spontaneous " 04/04/18 1155    Pain Descriptors  Aching 04/03/18 1633   Best Motor Response  6-->(M6) obeys commands 04/04/18 1155    Pain Intervention(s)  Medication (See eMAR) (Lidocaine patches) 04/02/18 1235   Best Verbal Response  5-->(V5) oriented 04/04/18 1155    CLINICALLY ALIGNED PAIN ASSESSMENT (CAPA) ([facility] AD[facility] ONLY)     Janina Coma Scale Score  15 04/04/18 1155    Comfort  negligible pain 04/04/18 1155   POINT OF CARE TESTING      Change in Pain  about the same 04/02/18 1235   Puncture Site  fingertip 04/04/18 1155    Pain Control  partially effective 04/02/18 1235   Bedside Glucose (mg/dl )   131 mg/dl 04/04/18 1155    Functioning  can do most things, but pain gets in the way of some 04/02/18 1235                 Patient Lines/Drains/Airways Status    Active LINES/DRAINS/AIRWAYS     Name: Placement date: Placement time: Site: Days: Last dressing change:    Wound 03/23/18 Left Heel Puncture 03/23/18   0800   Heel   12     Incision/Surgical Site 02/13/17 Left Leg 02/13/17   1112    415             Patient Lines/Drains/Airways Status    Active PICC/CVC     None            Intake/Output Detail Report     Date Intake         Output   Net    Shift P.O. I.V. Other IV Piggyback Blood Components Total Urine Chest Tube Total       Day 04/03/18 0000 - 04/03/18 0659 50 -- -- -- -- 50 1125 -- 1125 -1075    Farideh 04/03/18 0700 - 04/03/18 1459 400 -- -- -- -- 400 300 -- 300 100    Noc 04/03/18 1500 - 04/03/18 2359 120 -- -- -- -- 120 400 -- 400 -280    Day 04/04/18 0000 - 04/04/18 0659 -- -- -- -- -- -- 700 -- 700 -700    Farideh 04/04/18 0700 - 04/04/18 1459 500 20 -- -- -- 520 500 -- 500 20      Last Void/BM       Most Recent Value    Urine Occurrence 1 at 04/03/2018 1200    Stool Occurrence 0 at 03/31/2018 2328      Case Management/Discharge Planning     Case Management/Discharge Planning Flowsheet     REFERRAL INFORMATION     Equipment Currently Used at Home  none 03/25/18 1246    Arrived From  admitted  as an inpatient 02/25/17 0534   Resources List  Home Care 03/21/18 0951    LIVING ENVIRONMENT     Other Resources  Other (see comment) ([facility]) 02/27/17 1307    Lives With  child(gris), adult 03/25/18 1246   Home Care  [facility] [phone], Fax: [phone] 03/21/18 0951    Living Arrangements  house 03/25/18 1246   ABUSE RISK SCREEN      COPING/STRESS     QUESTION TO PATIENT:  Has a member of your family or a partner(now or in the past) intimidated, hurt, manipulated, or controlled you in any way?  no 03/20/18 2027    Major Change/Loss/Stressor  hospitalization 03/21/18 0543   QUESTION TO PATIENT: Do you feel safe going back to the place where you are living?  yes 03/20/18 2027    EXPECTED DISCHARGE     OBSERVATION: Is there reason to believe there has been maltreatment of a vulnerable adult (ie. Physical/Sexual/Emotional abuse, self neglect, lack of adequate food, shelter, medical care, or financial exploitation)?  no 03/20/18 2027    Expected Discharge Date  04/04/18 (TCU) 04/03/18 0855   OTHER      DISCHARGE PLANNING     Are you depressed or being treated for depression?  Yes 03/21/18 0543    Transportation Available  family or friend will provide 03/25/18 1246   HOMICIDE RISK      FINAL RESOURCES     Feels Like Hurting Others  no 03/20/18 2027                  [address] [facility] BANK: [phone]            Medication Administration Report for Carlos Alberto Fenton as of 04/04/18 1346   Legend:    Given Hold Not Given Due Canceled Entry Other Actions    Time Time (Time) Time  Time-Action       Inactive    Active    Linked        Medications 03/29/18 03/30/18 03/31/18 04/01/18 04/02/18 04/03/18 04/04/18    aspirin chewable tablet 81 mg  Dose: 81 mg  Freq: EVERY MORNING Route: PO  Start: 03/30/18 0930     1034 (81 mg)-Given        0754 (81 mg)-Given        0813 (81 mg)-Given        1000 (81 mg)-Given        0851 (81 mg)-Given        0810 (81 mg)-Given            atorvastatin (LIPITOR) tablet 40 mg  Dose: 40 mg  Freq: AT BEDTIME Route: PO  Start: 03/24/18 2200    2141 (40 mg)-Given        2147 (40 mg)-Given        2149 (40 mg)-Given        2158 (40 mg)-Given        2113 (40 mg)-Given        2150 (40 mg)-Given        [ ] 2200           bumetanide (BUMEX) tablet 2 mg  Dose: 2 mg  Freq: 2 TIMES DAILY. Route: PO  Start: 04/04/18 0800          0810 (2 mg)-Given       [ ] 1600           ferrous sulfate (IRON) tablet 325 mg  Dose: 325 mg  Freq: DAILY WITH BREAKFAST Route: PO  Start: 03/21/18 1200   Admin Instructions: Absorbed best on an empty stomach. If stomach upset occurs, can take with meals.     1220 (325 mg)-Given        1303 (325 mg)-Given        1145 (325 mg)-Given        1111 (325 mg)-Given        1214 (325 mg)-Given        1220 (325 mg)-Given        1155 (325 mg)-Given           fondaparinux (ARIXTRA) injection 2.5 mg  Dose: 2.5 mg  Freq: EVERY 24 HOURS Route: SC  Start: 04/01/18 1700       1709 (2.5 mg)-Given        1732 (2.5 mg)-Given        1651 (2.5 mg)-Given        [ ] 1700           glucose 40 % gel 15-30 g  Dose: 15-30 g  Freq: EVERY 15 MIN PRN Route: PO  PRN Reason: low blood sugar  Start: 03/23/18 1115   Admin Instructions: Give 15 g for BG 51 to 69 mg/dL IF patient is conscious and able to swallow. Give 30 g for BG less than or equal to 50 mg/dL IF patient is conscious and able to swallow. Do NOT give glucose gel via enteral tube.  IF patient has enteral tube: give apple juice 120 mL (4 oz or 15 g of CHO) via enteral tube for BG 51 to 69 mg/dL.  Give apple juice 240 mL (8 oz or 30 g of CHO) via enteral tube for BG less than or equal to 50 mg/dL.    ~Oral gel is preferable for conscious and able to swallow patient.   ~IF gel unavailable or patient refuses may provide apple juice 120 mL (4 oz or 15 g of CHO). Document juice on I and O flowsheet.                     Or  dextrose 50 % injection 25-50 mL  Dose: 25-50 mL  Freq: EVERY 15 MIN PRN Route: IV  PRN Reason:  low blood sugar  Start: 03/23/18 1115   Admin Instructions: Use if have IV access, BG less than 70 mg/dL and meet dose criteria below:  Dose if conscious and alert (or disorientated) and NPO = 25 mL  Dose if unconscious / not alert = 50 mL  Vesicant. For ordered doses up to 25 g, give IV Push undiluted. Give each 5g over 1 minute.       0816 (25 mL)-Given              Or  glucagon injection 1 mg  Dose: 1 mg  Freq: EVERY 15 MIN PRN Route: SC  PRN Reason: low blood sugar  PRN Comment: May repeat x 1 only  Start: 03/23/18 1115   Admin Instructions: May give SQ or IM. ONLY use glucagon IF patient has NO IV access AND is UNABLE to swallow AND blood glucose is LESS than or EQUAL to 50 mg/dL.  Give IV Push over 1 minute. Reconstitute with 1mL sterile water.                      Injection Device for insulin (NOVOPEN ECHO) 1 each  Dose: 1 Device  Freq: DOES NOT GO TO MAR Route: XX  PRN Reason: other  Start: 03/23/18 1139              insulin aspart (NovoPen ECHO/NovoLOG) cartridge  Dose: 0.5-2.5 Units  Freq: AT BEDTIME Route: SC  Start: 03/23/18 2200   Admin Instructions: VERY LOW INSULIN RESISTANCE DOSING    Do Not give Bedtime Correction Insulin if BG less than 200.   For -274 give 0.5 unit.   For  - 349 give 1 units  For  - 424 give 1.5 units  For  - 499 give 2 units  For BG greater than or equal to  500 give 2.5 units   Notify provider if glucose greater than or equal to 350 mg/dL after administration of correction dose.  If given at mealtime, must be administered 5 min before meal or immediately after.     (2146)-Not Given        (2148)-Not Given [C]        (2149)-Not Given [C]        (2156)-Not Given [C]        (2114)-Not Given        (2152)-Not Given [C]        [ ] 2200           insulin aspart (NovoPen ECHO/NovoLOG) cartridge  Dose: 0.5-2.5 Units  Freq: 3 TIMES DAILY BEFORE MEALS Route: SC  Start: 03/23/18 1200   Admin Instructions: Correction Scale - VERY LOW INSULIN RESISTANCE DOSING     Do  Not give Correction Insulin if Pre-Meal BG less [id number].   For Pre-Meal  -214 give 0.5 unit.  For Pre-Meal  -289 give 1 unit  For Pre-Meal  -364 give 1.5 unit.  For Pre-Meal  - 439 give 2 unit.   For Pre-Meal BG greater than or equal to 440 give 2.5 units.   To be given with prandial insulin, and based on pre-meal blood glucose.   Notify provider if glucose greater than or equal to 350 mg/dL after administration of correction dose.  If given at mealtime, must be administered 5 min before meal or immediately after.     (0839)-Not Given [C]       1220 (0.5 Units)-Given       1800 (0.5 Units)-Given        0811 (0.5 Units)-Given [C]       1305 (0.5 Units)-Given [C]       1625 (0.5 Units)-Given [C]        (0822)-Not Given [C]       (1145)-Not Given [C]       (1753)-Not Given [C]        (0812)-Not Given [C]       (1346)-Not Given [C]       1709 (0.5 Units)-Given [C]        (0849)-Not Given       1220 (0.5 Units)-Given [C]       (1734)-Not Given        (0850)-Not Given       1220 (0.5 Units)-Given       1803 (0.5 Units)-Given        (0811)-Not Given       (1155)-Not Given       [ ] 1700           levothyroxine (SYNTHROID/LEVOTHROID) half-tab 37.5 mcg  Dose: 37.5 mcg  Freq: EVERY MORNING BEFORE BREAKFAST Route: PO  Start: 03/22/18 0730   Admin Instructions: Separate oral administration of iron- or calcium-containing products and levothyroxine by at least 4 hours.     0908 (37.5 mcg)-Given        0809 (37.5 mcg)-Given        0755 (37.5 mcg)-Given        0813 (37.5 mcg)-Given        1000 (37.5 mcg)-Given        0851 (37.5 mcg)-Given        0811 (37.5 mcg)-Given           Lidocaine (LIDOCARE) 4 % Patch 1 patch  Dose: 1 patch  Freq: EVERY 24 HOURS 0800 Route: TD  Start: 03/25/18 0800   Admin Instructions: Apply patch(s) to back. To prevent lidocaine toxicity, patient should be patch free for 12 hrs daily. Patches may be cut to smaller size prior to removing release liner.  NEVER APPLY HEAT OVER PATCH  which increases absorption and risk of local anesthetic toxicity. Do not apply over area where liposomal bupivacaine was injected for 96 hours post injection.     0849 (1 patch)-Given        0809 (1 patch)-Given [C]        0755 (1 patch)-Given [C]        0814 (1 patch)-Given [C]        1001 (1 patch)-Given        0852 (1 patch)-Given [C]        0810 (1 patch)-Given [C]           lidocaine patch in PLACE  Freq: EVERY 8 HOURS Route: TD  Start: 03/24/18 1315   Admin Instructions: Chart every shift, confirming that patch is still in place on patient (no barcode scan needed). See patch order for dose information.  NEVER APPLY HEAT OVER PATCH which will increase absorption and may lead to risk of local anesthetic toxicity. Do not apply over area where liposomal bupivacaine injected for 96 hours.     0850 ( )-Patch in Place       1655 ( )-Patch in Place        0035 ( )-Patch in Place       0810 ( )-Patch in Place       1513 ( )-Patch in Place               0755 ( )-Patch in Place       1608 ( )-Patch in Place               0814 ( )-Patch in Place                      1002 ( )-Patch in Place       1728 ( )-Patch in Place        (0128)-Not Given [C]       0853 ( )-Patch in Place       1651 ( )-Patch in Place        (0023)-Not Given [C]       0811 ( )-Given       [ ] 1600           lidocaine patch REMOVAL  Freq: EVERY 24 HOURS 2000 Route: TD  Start: 03/24/18 2000   Admin Instructions: Patient should have a 12 hour patch free interval     1929 ( )-Patch Removed        2032 ( )-Patch Removed        2001 ( )-Patch Removed        1418 ( )-Patch Removed [C]               2113 ( )-Patch Removed        2013 ( )-Patch Removed        [ ] 2000           magnesium sulfate 4 g in 100 mL sterile water (premade)  Dose: 4 g  Freq: EVERY 4 HOURS PRN Route: IV  PRN Reason: magnesium supplementation  Start: 03/29/18 0923   Admin Instructions: For serum Mg++ less than 1.6 mg/dL  Give 4 g and recheck magnesium level 2 hours after dose, and next  AM.        0819 (4 g)-New Bag              melatonin tablet 3 mg  Dose: 3 mg  Freq: AT BEDTIME Route: PO  Start: 03/25/18 1416   Admin Instructions: Do not give unless at least 6 hours of uninterrupted sleep is expected.     1929 (3 mg)-Given        2031 (3 mg)-Given        2000 (3 mg)-Given        1950 (3 mg)-Given        (2040)-Not Given        2011 (3 mg)-Given        [ ] 2000           metoprolol tartrate (LOPRESSOR) half-tab 12.5 mg  Dose: 12.5 mg  Freq: 2 TIMES DAILY Route: PO  Start: 03/27/18 1330    0848 (12.5 mg)-Given       1929 (12.5 mg)-Given        0809 (12.5 mg)-Given       2030 (12.5 mg)-Given        0754 (12.5 mg)-Given       2000 (12.5 mg)-Given        0813 (12.5 mg)-Given       1950 (12.5 mg)-Given        1000 (12.5 mg)-Given       2113 (12.5 mg)-Given        0851 (12.5 mg)-Given       2011 (12.5 mg)-Given        0810 (12.5 mg)-Given       [ ] 2000           multivitamin, therapeutic (THERA-VIT) tablet 1 tablet  Dose: 1 tablet  Freq: DAILY Route: PO  Start: 03/27/18 1115    0849 (1 tablet)-Given        0809 (1 tablet)-Given        0754 (1 tablet)-Given        0813 (1 tablet)-Given        1000 (1 tablet)-Given        0852 (1 tablet)-Given        0810 (1 tablet)-Given           naloxone (NARCAN) injection 0.1-0.4 mg  Dose: 0.1-0.4 mg  Freq: EVERY 2 MIN PRN Route: IV  PRN Reason: opioid reversal  Start: 03/21/18 0544   Admin Instructions: For respiratory rate LESS than or EQUAL to 8.  Partial reversal dose:  0.1 mg titrated q 2 minutes for Analgesia Side Effects Monitoring Sedation Level of 3 (frequently drowsy, arousable, drifts to sleep during conversation).Full reversal dose:  0.4 mg bolus for Analgesia Side Effects Monitoring Sedation Level of 4 (somnolent, minimal or no response to stimulation).  For ordered doses up to 2mg give IVP. Give each 0.4mg over 15 seconds in emergency situations. For non-emergent situations further dilute in 9mL of NS to facilitate titration of response.                pantoprazole (PROTONIX) EC tablet 40 mg  Dose: 40 mg  Freq: 2 TIMES DAILY BEFORE MEALS Route: PO  Start: 03/27/18 1730   Admin Instructions: DO NOT CRUSH.     0848 (40 mg)-Given       1742 (40 mg)-Given        0809 (40 mg)-Given       1623 (40 mg)-Given        0755 (40 mg)-Given       1636 (40 mg)-Given        0814 (40 mg)-Given       1539 (40 mg)-Given        1001 (40 mg)-Given       1732 (40 mg)-Given        0851 (40 mg)-Given       1651 (40 mg)-Given        0810 (40 mg)-Given       [ ] 1630           polyethylene glycol (MIRALAX/GLYCOLAX) Packet 17 g  Dose: 17 g  Freq: DAILY Route: PO  Start: 03/25/18 1015   Admin Instructions: 1 Packet = 17 grams. Mixed prescribed dose in 8 ounces of water. Follow with 8 oz. of water.     0907 (17 g)-Given        (0811)-Not Given        (0756)-Not Given        (0814)-Not Given        (1002)-Not Given        (0853)-Not Given        (0732)-Not Given           potassium chloride (KLOR-CON) Packet 20-40 mEq  Dose: 20-40 mEq  Freq: EVERY 2 HOURS PRN Route: ORAL OR FEED  PRN Reason: potassium supplementation  Start: 04/01/18 1607   Admin Instructions: Use if unable to tolerate tablets.    If Serum K+ 3.4-4.0, dose = 20 mEq x1. Recheck K+ level the next AM.  If Serum K+ 3.0-3.3, dose = 60 mEq po total dose (40 mEq x 1 followed in 2 hours by 20 mEq X1). Recheck K+ level 4 hours after dose and the next AM.  If Serum K+ 2.5-2.9, dose = 80 mEq po total dose (40 mEq Q2H x2). Recheck K+ level 4 hours after dose and the next AM.  If Serum K+ less than 2.5, See IV order.  Dissolve packet contents in 4-8 ounces of cold water or juice.               potassium chloride 10 mEq in 100 mL intermittent infusion with 10 mg lidocaine  Dose: 10 mEq  Freq: EVERY 1 HOUR PRN Route: IV  PRN Reason: potassium supplementation  Start: 04/01/18 1517   Admin Instructions: Infuse via PERIPHERAL LINE. Use potassium with lidocaine for pain with peripheral administration.  If Serum K+ 3.4-4.0, dose = 10 mEq/hr x2  doses. Recheck K+ level the next AM.  If Serum K+ 3.0-3.3, dose = 10 mEq/hr x4 doses (40 mEq IV total dose). Recheck K+ level 2 hours after dose and the next AM.  If Serum K+ less than 3.0, dose = 10 mEq/hr x6 doses (60 mEq IV total dose). Recheck K+ level 2 hours after dose and the next AM.               potassium chloride 10 mEq in 100 mL sterile water intermittent infusion (premix)  Dose: 10 mEq  Freq: EVERY 1 HOUR PRN Route: IV  PRN Reason: potassium supplementation  Start: 04/01/18 1517   Admin Instructions: Infuse via PERIPHERAL LINE or CENTRAL LINE. Use for central line replacement if patient weight less than 65 kg, if patient is on TPN with high potassium content or if unit does not stock 20 mEq bags.  If Serum K+ 3.4-4.0, dose = 10 mEq/hr x2 doses. Recheck K+ level the next AM.  If Serum K+ 3.0-3.3, dose = 10 mEq/hr x4 doses (40 mEq IV total dose). Recheck K+ level 2 hours after dose and the next AM.  If Serum K+ less than 3.0, dose = 10 mEq/hr x6 doses (60 mEq IV total dose). Recheck K+ level 2 hours after dose and the next AM.               potassium chloride 20 mEq in 50 mL intermittent infusion  Dose: 20 mEq  Freq: EVERY 1 HOUR PRN Route: IV  PRN Reason: potassium supplementation  Start: 04/01/18 1517   Admin Instructions: Infuse via CENTRAL LINE Only.  May need EKG if less than 65 kg or on TPN - Max rate is 0.3 mEq/kg/hr for patients not on EKG monitoring.    If Serum K+ 3.4-4.0, dose = 20 mEq/hr x1 doses. Recheck K+ level the next AM.  If Serum K+ 3.0-3.3, dose = 20 mEq/hr x2 doses (40 mEq IV total dose).  Recheck K+ level 2 hours after dose and the next AM.  If Serum K+ less than 3.0, dose = 20 mEq/hr x3 doses (60 mEq IV total dose). Recheck K+ level 2 hours after dose and the next AM.               potassium chloride SA (K-DUR/KLOR-CON M) CR tablet 20-40 mEq  Dose: 20-40 mEq  Freq: EVERY 2 HOURS PRN Route: PO  PRN Reason: potassium supplementation  Start: 04/01/18 1517   Admin Instructions: Use if  able to take PO.   If Serum K+ 3.4-4.0, dose = 20 mEq x1. Recheck K+ level the next AM.  If Serum K+ 3.0-3.3, dose = 60 mEq po total dose (40 mEq x1 followed in 2 hours by 20 mEq x1). Recheck K+ level 4 hours after dose and the next AM.  If Serum K+ 2.5-2.9, dose = 80 mEq po total dose (40 mEq Q2H x2). Recheck K+ level 4 hours after dose and the next AM.  If Serum K+ less than 2.5, See IV order.  DO NOT CRUSH.        1539 (20 mEq)-Given        0038 (20 mEq)-Given             potassium chloride SA (K-DUR/KLOR-CON M) CR tablet 40 mEq  Dose: 40 mEq  Freq: DAILY Route: PO  Start: 03/27/18 0800   Admin Instructions: Changed to tab per RN request  DO NOT CRUSH.     0848 (40 mEq)-Given        0809 (40 mEq)-Given        0755 (40 mEq)-Given        0813 (40 mEq)-Given        1001 (40 mEq)-Given        0850 (40 mEq)-Given        0811 (40 mEq)-Given           potassium phosphate 15 mmol in D5W 250 mL intermittent infusion  Dose: 15 mmol  Freq: DAILY PRN Route: IV  PRN Reason: phosphorous supplementation  Start: 03/29/18 0923   Admin Instructions: For serum phosphorus level 2-2.4  Do not infuse Phosphorus in the same line as TPN.   Give 15 mmol and recheck phosphorus level next AM.  Each mmol of phosphate provides 1.47 mEq of Potassium. Multiply the patient's phosphate dose by 1.47 to determine the amount of potassium in this dose.               potassium phosphate 20 mmol in D5W 250 mL intermittent infusion  Dose: 20 mmol  Freq: EVERY 6 HOURS PRN Route: IV  PRN Reason: phosphorous supplementation  Start: 03/29/18 0923   Admin Instructions: For serum phosphorus level 1.1-1.9  For CENTRAL Line ONLY  Do not infuse Phosphorus in the same line as TPN.   Give 20 mmol and recheck phosphorus level 2 hours after last dose and next AM.  Each mmol of phosphate provides 1.47 mEq of Potassium. Multiply the patient's phosphate dose by 1.47 to determine the amount of potassium in this dose.               potassium phosphate 20 mmol in D5W 500  mL intermittent infusion  Dose: 20 mmol  Freq: EVERY 6 HOURS PRN Route: IV  PRN Reason: phosphorous supplementation  Start: 03/29/18 0923   Admin Instructions: For serum phosphorus level 1.1-1.9  For Peripheral Line  Do not infuse Phosphorus in the same line as TPN.   Give 20 mmol and recheck phosphorus level 2 hours after last dose and next AM.  Each mmol of phosphate provides 1.47 mEq of Potassium. Multiply the patient's phosphate dose by 1.47 to determine the amount of potassium in this dose.               potassium phosphate 25 mmol in D5W 500 mL intermittent infusion  Dose: 25 mmol  Freq: EVERY 8 HOURS PRN Route: IV  PRN Reason: phosphorous supplementation  Start: 03/29/18 0923   Admin Instructions: For serum phosphorus level less than 1.1  Do not infuse Phosphorus in the same line as TPN.   Give 25 mmol and recheck phosphorus level 2 hours after last dose and next AM.  Each mmol of phosphate provides 1.47 mEq of Potassium. Multiply the patient's phosphate dose by 1.47 to determine the amount of potassium in this dose.               povidone-iodine (BETADINE) 10 % topical solution  Freq: 2 TIMES DAILY Route: Top  Start: 04/03/18 2000   Admin Instructions: Apply to toe wound          2015 ( )-Given        0812 ( )-Given       [ ] 2000           senna-docusate (SENOKOT-S;PERICOLACE) 8.6-50 MG per tablet 1 tablet  Dose: 1 tablet  Freq: 2 TIMES DAILY PRN Route: PO  PRN Reason: constipation  Start: 03/21/18 0544   Admin Instructions: If no bowel movement in 24 hours, increase to 2 tablets PO.  Hold for loose stools.              Or  senna-docusate (SENOKOT-S;PERICOLACE) 8.6-50 MG per tablet 2 tablet  Dose: 2 tablet  Freq: 2 TIMES DAILY PRN Route: PO  PRN Reason: constipation  Start: 03/21/18 0544   Admin Instructions: Hold for loose stools.               sodium chloride (PF) 0.9% PF flush 10 mL  Dose: 10 mL  Freq: EVERY 8 HOURS Route: IK  Start: 03/28/18 5703   Admin Instructions: To lock each peripheral midline IV  catheter dormant lumen. Recommended to flush Q8H each lumen even during infusions.     0649 (10 mL)-Given               0035 (10 mL)-Given       0812 (10 mL)-Given       1624 (10 mL)-Given        (0037)-Not Given       0756 (10 mL)-Given       1636 (10 mL)-Given       2345 (10 mL)-Given        0820 (10 mL)-Given       1540 (10 mL)-Given        0039 (10 mL)-Given       1003 (10 mL)-Given       1736 (10 mL)-Given        (0128)-Not Given       0854 (10 mL)-Given       1651 (10 mL)-Given        0030 (10 mL)-Given       0812 (10 mL)-Given       [ ] 1600           sodium chloride (PF) 0.9% PF flush 10-20 mL  Dose: 10-20 mL  Freq: EVERY 1 HOUR PRN Route: IK  PRN Reasons: line flush,post meds or blood draw  PRN Comment: to flush each peripheral midline IV catheter lumen.  Start: 03/28/18 2233   Admin Instructions: 10 mL post IV meds;   20 mL post blood draw.      0708 (20 mL)-Given        0744 (20 mL)-Given [C]       1831 (20 mL)-Given        0641 (20 mL)-Given       1455 (30 mL)-Given        0730 (20 mL)-Given       2106 (20 mL)-Given         0724 (20 mL)-Given           spironolactone (ALDACTONE) tablet 25 mg  Dose: 25 mg  Freq: DAILY Route: PO  Start: 03/30/18 1545     1623 (25 mg)-Given        0755 (25 mg)-Given        0814 (25 mg)-Given        1000 (25 mg)-Given        0851 (25 mg)-Given        0810 (25 mg)-Given           venlafaxine (EFFEXOR-XR) 24 hr capsule 150 mg  Dose: 150 mg  Freq: DAILY WITH BREAKFAST Route: PO  Start: 03/24/18 0800   Admin Instructions: DO NOT CRUSH.     0848 (150 mg)-Given        0809 (150 mg)-Given        0754 (150 mg)-Given        0814 (150 mg)-Given        1000 (150 mg)-Given        0851 (150 mg)-Given        0810 (150 mg)-Given          Completed Medications  Medications 03/29/18 03/30/18 03/31/18 04/01/18 04/02/18 04/03/18 04/04/18         Dose: 2 mg  Freq: ONCE Route: PO  Start: 04/03/18 1245   End: 04/03/18 1443         1443 (2 mg)-Given              Dose: 2 mg  Freq: ONCE Route:  PO  Start: 04/02/18 2100   End: 04/02/18 2113        2113 (2 mg)-Given            Discontinued Medications  Medications 03/29/18 03/30/18 03/31/18 04/01/18 04/02/18 04/03/18 04/04/18         Dose: 2 mg  Freq: EVERY MORNING Route: PO  Start: 04/02/18 0800   End: 04/02/18 0805        0805-Med Discontinued  (0848)-Not Given               Dose: 2 mg  Freq: EVERY 24 HOURS Route: PO  Start: 04/01/18 1600   End: 04/02/18 1125       1539 (2 mg)-Given        1125-Med Discontinued           Dose: 3 mg  Freq: EVERY MORNING Route: PO  Start: 04/02/18 0800   End: 04/02/18 0757        0757-Med Discontinued           Dose: 20-40 mEq  Freq: EVERY 2 HOURS PRN Route: ORAL OR FEED  PRN Reason: potassium supplementation  Start: 03/29/18 0923   End: 04/01/18 1528   Admin Instructions: Use if unable to tolerate tablets.  If Serum K+ 3.0-3.3, dose = 60 mEq po total dose (40 mEq x1 followed in 2 hours by 20 mEq x1). Recheck K+ level 4 hours after dose and the next AM.  If Serum K+ 2.5-2.9, dose = 80 mEq po total dose (40 mEq Q2H x2). Recheck K+ level 4 hours after dose and the next AM.  If Serum K+ less than 2.5, See IV order.  Dissolve packet contents in 4-8 ounces of cold water or juice.        1528-Med Discontinued            Dose: 10 mEq  Freq: EVERY 1 HOUR PRN Route: IV  PRN Reason: potassium supplementation  Start: 03/29/18 0923   End: 04/01/18 1528   Admin Instructions: Infuse via PERIPHERAL LINE. Use potassium with lidocaine for pain with peripheral administration.  If Serum K+ 3.0-3.3, dose = 10 mEq/hr x4 doses (40 mEq IV total dose). Recheck K+ level 2 hours after dose and the next AM.  If Serum K+ less than 3.0, dose = 10 mEq/hr x6 doses (60 mEq IV total dose). Recheck K+ level 2 hours after dose and the next AM.        1528-Med Discontinued            Dose: 10 mEq  Freq: EVERY 1 HOUR PRN Route: IV  PRN Reason: potassium supplementation  Start: 03/29/18 0923   End: 04/01/18 1528   Admin Instructions: Infuse via PERIPHERAL LINE  or CENTRAL LINE. Use for central line replacement if patient weight less than 65 kg, if patient is on TPN with high potassium content or if unit does not stock 20 mEq bags.   If Serum K+ 3.0-3.3, dose = 10 mEq/hr x4 doses (40 mEq IV total dose). Recheck K+ level 2 hours after dose and the next AM.   If Serum K+ less than 3.0, dose = 10 mEq/hr x6 doses (60 mEq IV total dose). Recheck K+ level 2 hours after dose and the next AM.        1528-Med Discontinued            Dose: 20 mEq  Freq: EVERY 1 HOUR PRN Route: IV  PRN Reason: potassium supplementation  Start: 03/29/18 0923   End: 04/01/18 1528   Admin Instructions: Infuse via CENTRAL LINE Only. May need EKG if less than 65 kg or on TPN - Max rate is 0.3 mEq/kg/hr for patients not on EKG monitoring.   If Serum K+ 3.0-3.3, dose = 20 mEq/hr x2 doses (40 mEq IV total dose). Recheck K+ level 2 hours after dose and the next AM.  If Serum K+ less than 3.0, dose = 20 mEq/hr x3 doses (60 mEq IV total dose). Recheck K+ level 2 hours after dose and the next AM.        1528-Med Discontinued            Dose: 20-40 mEq  Freq: EVERY 2 HOURS PRN Route: PO  PRN Reason: potassium supplementation  Start: 03/29/18 0923   End: 04/01/18 1528   Admin Instructions: Use if able to take PO.   If Serum K+ 3.0-3.3, dose = 60 mEq po total dose (40 mEq x1 followed in 2 hours by 20 mEq x1). Recheck K+ level 4 hours after dose and the next AM.  If Serum K+ 2.5-2.9, dose = 80 mEq po total dose (40 mEq Q2H x2). Recheck K+ level 4 hours after dose and the next AM.  If Serum K+ less than 2.5, See IV order.  DO NOT CRUSH.        1528-Med Discontinued       Medications 03/29/18 03/30/18 03/31/18 04/01/18 04/02/18 04/03/18 04/04/18               INTERAGENCY TRANSFER FORM - NOTES (H&P, Discharge Summary, Consults, Procedures, Therapies)   3/20/2018                       UNIT [facility] [facility] BANK: [phone]               History & Physicals      H&P by Ishmael Driver MD at 3/28/2018  6:47 PM      Author:  Ishmael Driver MD Service:  Medical ICU Author Type:  Resident    Filed:  3/28/2018  9:50 PM Date of Service:  3/28/2018  6:47 PM Creation Time:  3/28/2018  6:47 PM    Status:  Attested :  Ishmael Driver MD (Resident)    Cosigner:  Emily Jones MD at 3/28/2018 11:14 PM        Attestation signed by Emily Jones MD at 3/28/2018 11:14 PM        Attestation:  Physician Attestation   I, Emily Jones, saw this patient with the resident and agree with the resident s findings and plan of care as documented in the resident s note.      I personally reviewed vital signs, medications, labs and imaging.    Nunez findings: Ms Fenton is a 76 yo female with a complicated medical course since admission on 3/20/18.  She was transferred to ICU after having an episode of apparent shortness of breath.  A blood gas at that time was unable to be obtained.  I drew a blood gas when she arrived to ICU under ulttrasound guidance while she is on 100% FiO2 CPAP mask and her Pa02 = 409.  More likely than not the difficulty obtaining a saturation by pulse ox is related to her diffuse vascular disease. On exam she is in no distress - is not tachycardic.  Lactate which had been 4 earlier now 1.  Gentle diuresis.  Continue to monitor resp status.  No sedation.  I spent 40 min in exclusion of procedural time.    Emily Jones  Date of Service (when I saw the patient): 03/28/18                               [facility]      MICU History and Physicial  Carlos Alberto Fenton MRN: [id number]  1940  Date of Admission:3/20/2018  Primary care provider: Humberto Conner      Assessment and Plan:     Carlos Alberto Fenton is a 77 year old female with PMH of HTN, HLD, DM, CVA (11/2016), chronic diastolic HF (last ECHO 12/12/17 EF 45-50%), CAD (s/p 3v CABG: LIMA-LAD, SVG-D1, SVG-dRCA and modified MAZE, ABDIEL ligation - 2/2016), paroxysmal AFib ([id number] - 8 on Xarelto), HIT, RUE DVT, recently diagnosed  hypothyroidism, who presented to [facility] ED on 3/21/18 for generalized fatigue and weakness and dyspnea for 1-2 days. 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 Pt prev on warfarin, she was transitioned to xarelto in Jan 2018 because she likes leafy greens.  - Continue holding xarelto   - Hematology consulted earlier to determine safest anticoagulation for stroke prevention in setting of [id number] 8: needs to be on fondaporinox, heme f/u in 2-3 months  - Will talk with cards tomorrow to discuss timing of restarting fondaparinux   - Cont metoprolol 12.5 mg BID (started this admission)    # Prolonged QTc, resolved  ECG 3/28 was 400    # HFrEF  ECHO 3/25 shows EF 45-50%. Also showed Basal inferolareatl wall akinesis with aneurysm, and a dilated IVC[JS1.1]. Not grossly volume overloaded on exam, mildly elevated JVD, lungs sound clear. CXR with small pleural effusions  - Give lasix 40mg x2 today with good response[JS1.2]  - Continue holding PTA metolazone, spironolactone, bumex  - Started beta-blocker this admission  - Not on ACE-I or ARB    # CAD  - Holding ASA  - Cont statin    # HLD  - Cont statin    ===PULMONARY===  #Acute hypoxic respiratory failure 2/2 CHF exacerbation  Secondary to IVF boluses for hypotension episodes with tachy/israel syndrome. CXR 3/27 shows pulmonary edema, no clear edema today. Was diuresed 3/27 with good urine output. Today had increased respiratory distress with reported sats in 50-60%, however questionable O2 sat readings. Unable to get ABG. Now in MICU, still not able to get good O2 sats, however patient is otherwise vitally stable, not blue, and mentation is[JS1.1] mostly[JS1.2] intact[JS1.1]. Not grossly volume  overloaded on exam, mildly elevated JVD, lungs sound clear. CXR with small pleural effusions[JS1.2]  - Try to obtain ABG[JS1.1]  - s/p lasix x2 with good response[JS1.2], would consider additional lasix if needed[JS1.3]  - Low concern for PE at this time: not tachycardic and no chest pain when respiratory distress started[JS1.2]   - Stop augmentin, no clear infiltrate on CXR, no fevers, no leukocytosis[JS1.1]  - Bipap if ne[JS1.2]eded[JS1.3]    ===GASTROINTESTINAL===    # Acetaminophen toxicity (elevated acetaminophen levels)  # Transaminits, alk phos elevation  Pt state[JS1.1]d[JS1.3] she was taking 3-4g tylenol per day for the past month for chronic back pain.   Pt previously on warfarin for Afib. She started xarelto as of Jan 2018. She states she was not taking warfarin and xarelto simultaneously. It is possible that her coagulopathy is occurring in the setting of xarelto. Per Heme, factor 7/10 decreased c/w vit K deficiency, no evidence of hepatic synthetic dysfunction. Her INR has continued to improve.[JS1.1] Hep B and C negative.[JS1.3] F[JS1.1]-[JS1.3]actin weakly positive; anti-mitochondial joseph neg and AMPARO neg[JS1.1].[JS1.3] NAC infusion D/C on 3/26 per GI.  -[JS1.1] Trend hepatic panel and INR[JS1.3]    # Nutrition:   - NPO depressed mental status and concern for respiratory status    ===RENAL===  #Elevated Lactate[JS1.1], resolved[JS1.3]  Lactate 4.7[JS1.1] today,[JS1.3] but with normal blood pressure and benign abdominal exam.[JS1.1] Repeat was 1.0, so 4.7 was likely spurious[JS1.3]    # HARINI on CKD -- resolved  Cr at bsl ~1. On presentation 1.8. UA with hyaline casts, tachy, hypotensive. Likely prerenal in the setting of GIB and decreased PO intake/emesis. Cr improved with IVF. Currently 0.73. Has good urine output  - Trend BMP    ===HEME/ONC===  # Acute blood loss anemia[JS1.1]  B[JS1.3]aseline 11-12 1 month prior,[JS1.1] presented with Hgb ~7.5. Most likely[JS1.3] GI bleed. folate WNL, B12 WNL,  transferrin WNL, iron WNL, haptoglobin, reticulocyte index 2 indicating appropriate response.  Hgb has been stable since admission and no evidence of bleeding seen.   - GI consulted, they have not scoped patient yet. Tachycardia was barrier to scoping, which has since resolved. Will need re-consult to GI for further evaluation endoscopically, non-urgent currently  - Trend Hgb  - continue PPI BID   - Holding ASA and anticoagulation  - Hematology was consulted and want to see her in clinic in 2-3 months. If still macrocytic anemia, they will consider bone marrow biopsy    # Coagulopathy, INR to 5, elevated PT and PTT  INR 5 on admission, was[JS1.1] reportedly not[JS1.3] on warfarin. Improved with vitamin K. Continues to trend down, currently 1.47  - Trend INR     ===ENDOCRINE===  # Hypothyroidism  Recently diagnosed. On Levothyroxine. Last TSH 2/21 elevated to 88, T4 improved from 0.29 to 0.44.   - Cont levothyroxine 37.5[JS1.1]mcg per day[JS1.3] (increased on admission from PTA dose of 25 mcg)  - Recheck TSH and free T4 in 6 weeks     ===INFECTIOUS DISEASE===  No acute issues    ===SKIN/MSK===  No acute issues    Lines: 2 peripherals  Prophylaxis:  DVT: SCDs   GI: PPI  Disposition: MICU for monitoring  Code Status: full    Patient was seen and discussed with attending physician Dr. Jones, who agrees with above assessment and plan.    Ishmael Driver MD  PGY3  Pager: [phone]         Chief Complaint:   Dyspnea         History of Present Illness:   Carlos Alberto Fenton is a 77 year old female with PMH of HTN, HLD, DM, CVA (11/2016), chronic diastolic HF (last ECHO 12/12/17 EF 45-50%), CAD (s/p 3v CABG: LIMA-LAD, SVG-D1, SVG-dRCA and modified MAZE, ABDIEL ligation - 2/2016), paroxysmal AFib ([id number] - 8 on Xarelto), HIT, RUE DVT, recently diagnosed hypothyroidism, who presented to [facility] ED on 3/21/18 for generalized fatigue and weakness and dyspnea for 1-2 days.     Her tylenol level on admission was 120 on presentation, she had  "been taking \"2 red capsules\" every 3-4 hours for about a month. She was started on NAC. Her Hgb dropped from 11.2 on 2/22/18 to 7.2 on presentation. INR was 5.05, but she had switched from warfarin to xarelto in 01/2018, so the INR elevation was a surprise. Was given vitamin K and PPI. Her stool occult was positive. CT a/p also done, showing mild RUQ and pelvic low density free fluid and small amount of focal soft tissue stranding lateral and R to iliac crest, suggestive of small ecchymosis.    GI did not want to intervene on patient due to tachyarrhythmia. This was treated with pacemaker on 3/26/18. Her Hgb has since been stable ~8.0, so nothing has been done as of yet. She remained on NAC until 3/26/18. Her HARINI from admission resolved.  Her hypothyroidism slightly improved, but still low free T4, so her levothyroxine was increased. On 3/24, she was started on zosyn for concern for aspiration PNA. ABx stopped 3/28. No fevers this admission. No leukcytosis. Blood and urine cultures negative    Hematology has also been consulted given elevated INR and macrocytic anemia. Mixing studies normal, so no factor deficiency    Came down to MICU due to monitoring for increased shortness of breath with sats in 50s-60s%, unable to get ABG on multiple attempts.[JS1.1] Currently no[JS1.3] increased work of breathing currently, patient feels comfortable. States overall breathing improved since admission. Has no other complaints         Review of Systems:    12 points ROS otherwise negative  Unable to perform due to patient condition.          Past Medical History:   Medical History reviewed.   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    parietral lesions bilateral       Antiplatelet or antithrombotic long-term use      Anxiety      Atrial fibrillation (H)      CAD (coronary artery disease)     2 vessel     Cancer (H) 1990    periodically have cancer " on the skin removed     Cerebral artery occlusion with cerebral infarction (H) 10/2016    Cardioembolic strokes related to atrial fibrillation     Deep vein thrombosis (DVT) of axillary vein of right upper extremity (H) 2/25/2017     Depressive disorder 2001     Diabetes (H)      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     Hyperlipidemia LDL goal <130 10/31/2010     Hypertension      Panic attacks      Seizures (H) 10/19/2016     Sleep apnea     Uses CPAP             Past Surgical History:   Surgical History reviewed.   Past Surgical History:   Procedure Laterality Date     AMPUTATE TOE(S) Right 5/26/2017    Procedure: AMPUTATE TOE(S);  Right Great Toe amputation, debriedment of 2 and 3rd toe soft tissu right foot. and application of Grafix;  Surgeon: Leah Santamaria MD;  Location: [facility] OR     ANESTHESIA CARDIOVERSION N/A 12/12/2017    Procedure: ANESTHESIA CARDIOVERSION;  Anesthesia Cardioversion ;  Surgeon: GENERIC ANESTHESIA PROVIDER;  Location: [facility] OR     BACK SURGERY       BYPASS GRAFT ARTERY CORONARY N/A 2/6/2017    Procedure: BYPASS GRAFT ARTERY CORONARY;  Surgeon: Mikhail Quiñones MD;  Location: [facility] OR     C APPENDECTOMY  1959     C CARDIAC SURG PROCEDURE UNLIST       C HAND/FINGER SURGERY UNLISTED       C STOMACH SURGERY PROCEDURE UNLISTED       HC SACROPLASTY       HC VASCULAR SURGERY PROCEDURE UNLIST       HYSTERECTOMY, PASTORA  1988     IRRIGATION AND DEBRIDEMENT FOOT, COMBINED Right 7/7/2017    Procedure: COMBINED IRRIGATION AND DEBRIDEMENT FOOT;  Irrigation and Debridement Right Foot with Graphix  *Latex Allergy*;  Surgeon: Leah Santamaria MD;  Location: [facility] OR     MAZE PROCEDURE N/A 2/6/2017    Procedure: MAZE PROCEDURE;  Surgeon: Mikhail Quiñones MD;  Location: [facility] OR     PICC INSERTION Left 02/25/2017    5fr TL Bard PICC, 47cm (3cm external) in the L basilic vein w/ tip in the SVC RA junction     TONSILLECTOMY  1942             Social History:   Social History reviewed.  Social History    Substance Use Topics     Smoking status: Former Smoker     Packs/day: 1.00     Years: 10.00     Types: Cigarettes     Start date: 10/3/1958     Quit date: 7/4/1976     Smokeless tobacco: Never Used      Comment: Quit in 1976     Alcohol use No             Family History:   Family History reviewed.   Family History   Problem Relation Age of Onset     DIABETES Mother      C.A.D. Mother      Hypertension Mother      CEREBROVASCULAR DISEASE Mother      Mini Strokes     Other Cancer Mother      Skin Cancer     Hyperlipidemia Mother      Coronary Artery Disease Mother      DIABETES Father      C.A.D. Father      Hypertension Father      Other Cancer Father      Hyperlipidemia Father      Coronary Artery Disease Father      HEART DISEASE Sister      Arthritis Sister      Other Cancer Other      Skin Cancer             Allergies:     Allergies   Allergen Reactions     Blood Transfusion Related (Informational Only) Other (See Comments)     Patient has a history of a clinically significant antibody against RBC antigens.  A delay in compatible RBCs may occur.Patient has a history of a significant allergic transfusion reaction.  Consult with Blood Bank MD before ordering components.     Heparin      HIT/ thrombocytopenia     Lovenox [Enoxaparin] Other (See Comments)     Thrombocytopenia      Oxycodone      hallucinations     Toprol Xl [Metoprolol] Nausea and Vomiting     Adhesive Tape Itching and Rash     Diapers & Supplies Rash     Developed yeast infection from previous hospital stay             Medications:   Medications Reviewed.   Current Facility-Administered Medications   Medication     multivitamin, therapeutic (THERA-VIT) tablet 1 tablet     metoprolol tartrate (LOPRESSOR) half-tab 12.5 mg     pantoprazole (PROTONIX) EC tablet 40 mg     potassium chloride SA (K-DUR/KLOR-CON M) CR tablet 40 mEq     lidocaine 1 % 1 mL     lidocaine (LMX4) kit     sodium chloride (PF) 0.9% PF flush 3 mL     sodium chloride (PF) 0.9% PF  flush 3 mL     naloxone (NARCAN) injection 0.1-0.4 mg     HOLD: metformin and metformin containing medications on day of procedure and 48 hours after IV contrast given     HOLD: enoxaparin (LOVENOX) Post Procedure     HOLD: heparin (IV or Subcutaneous) Post Procedure     amoxicillin-clavulanate (AUGMENTIN) 875-125 MG per tablet 1 tablet     polyethylene glycol (MIRALAX/GLYCOLAX) Packet 17 g     sodium chloride (PF) 0.9% PF flush 3 mL     melatonin tablet 3 mg     Lidocaine (LIDOCARE) 4 % Patch 1 patch     lidocaine patch REMOVAL     lidocaine patch in PLACE     atorvastatin (LIPITOR) tablet 40 mg     glucose 40 % gel 15-30 g    Or     dextrose 50 % injection 25-50 mL    Or     glucagon injection 1 mg     insulin aspart (NovoPen ECHO/NovoLOG) cartridge     insulin aspart (NovoPen ECHO/NovoLOG) cartridge     Injection Device for insulin (NOVOPEN ECHO) 1 each     venlafaxine (EFFEXOR-XR) 24 hr capsule 150 mg     ferrous sulfate (IRON) tablet 325 mg     naloxone (NARCAN) injection 0.1-0.4 mg     senna-docusate (SENOKOT-S;PERICOLACE) 8.6-50 MG per tablet 1 tablet    Or     senna-docusate (SENOKOT-S;PERICOLACE) 8.6-50 MG per tablet 2 tablet     levothyroxine (SYNTHROID/LEVOTHROID) half-tab 37.5 mcg             Physical Exam:   Vitals were reviewed.  Temp:  [97.3  F (36.3  C)-97.8  F (36.6  C)] 97.5  F (36.4  C)  Heart Rate:  [57-74] 74  Resp:  [15-20] 18  BP: (111-130)/() 128/78  FiO2 (%):  [40 %-60 %] 60 %  SpO2:  [88 %-99 %] 88 %    General: sleeping but arouses to voice, laying in bed  Skin: scattered ecchymoses  HEENT: MMM, EOM intact  CV: RRR, +s1/s2  Resp: CTAB  Abd: Soft, non-tender, BS+, no masses appreciated  Extremities: WWP, LE edema bilaterally[JS1.1], cool hands and warm feet[JS1.4]  Neuro: drowsy but responds to voice, following commands, oriented, moving all extremities but has slow movement          Data:   I/O last 3 completed shifts:  In: 240 [P.O.:240]  Out: 1500 [Urine:1500]    ROUTINE LABS (Last  four results)  CMP  Recent Labs  Lab 03/28/18  0745 03/27/18  0605 03/26/18  0632 03/25/18  0608    137 142 142   POTASSIUM 4.2 3.9 3.6 4.1   CHLORIDE 104 102 108 107   CO2 24 26 26 22   ANIONGAP 9 9 8 13   * 151* 111* 94   BUN 12 11 14 23   CR 0.73 0.68 0.71 0.81   GFRESTIMATED 77 84 79 68   GFRESTBLACK >90 >90 >90 83   CARLY 9.0 8.7 8.7 8.2*   MAG 2.2 1.9 2.1 2.0   PROTTOTAL 6.2* 6.3* 6.1* 5.5*   ALBUMIN 2.8* 2.8* 2.7* 2.5*   BILITOTAL 1.5* 1.2 1.2 1.4*   ALKPHOS 195* 153* 151* 116   AST 63* 60* 65* 83*   ALT 41 54* 66* 78*     CBC  Recent Labs  Lab 03/28/18  0745 03/27/18  0605 03/26/18  0632 03/25/18  1754 03/25/18  0608   WBC 8.8 8.2 6.9  --  5.6   RBC 2.32* 2.17* 2.21*  --  2.10*   HGB 8.6* 7.7* 8.0* 7.8* 7.9*   HCT 27.7* 26.3* 26.8*  --  25.5*   * 121* 121*  --  121*   MCH 37.1* 35.5* 36.2*  --  37.6*   MCHC 31.0* 29.3* 29.9*  --  31.0*   RDW 23.2* 24.1* 24.9*  --  25.6*   * 162 169  --  172     INR  Recent Labs  Lab 03/28/18  0745 03/27/18  0605 03/26/18  0632 03/25/18  0608   INR Canceled, Test credited 1.47* 1.62* 1.68*     Arterial Blood Gas  Recent Labs  Lab 03/28/18  0145 03/24/18  0416 03/23/18  0356 03/23/18  0330   PH  --   --  7.37 7.30*   PCO2  --   --  35 46*   PO2  --   --  418* 20*   HCO3  --   --  20* 23   O2PER 60.0 21.0 90.0 100.0[JS1.1]            Revision History        User Key Date/Time User Provider Type Action    > JS1.3 3/28/2018  9:50 PM Ishmael Driver MD Resident Sign     JS1.2 3/28/2018  9:17 PM Ishmael Driver MD Resident      JS1.4 3/28/2018  8:49 PM Ishmael Driver MD Resident      JS1.1 3/28/2018  6:47 PM Ishmael Driver MD Resident             H&P by Naomy Anaya MD at 3/21/2018  4:44 AM     Author:  Naomy Anaya MD Service:  General Medicine Author Type:  Resident    Filed:  3/21/2018  8:17 AM Date of Service:  3/21/2018  4:44 AM Creation Time:  3/21/2018  4:44 AM    Status:  Attested :  Naomy Anaya MD (Resident)     Cosigner:  Gilbert Hopson MD at 3/21/2018 12:45 PM        Attestation signed by Gilbert Hopson MD at 3/21/2018 12:45 PM        Attestation:  Physician Attestation   IGilbert, personally examined and evaluated this patient.  I discussed the patient with the resident and care team, and agree with the assessment and plan of care as documented in the resident s note of March 21, 2018 [date].      I personally reviewed vital signs, medications, labs and imaging.    Nunez findings: Ms. Fenton is doing well today.  At home she has been having exacerbation of her chronic back and shoulder pain and thus was taking significant amounts of Tylenol.  She presents with weakness, drop in her hemoglobin, dyspnea (that is more chronic and evaluated by cardiology) as well as hypothyroidism.  We can repeat a TTE here. We will ask GI for help and trend her hemoglobin.  Her CT shows small ecchymosis around right iliac crest - she was on Xarelto previously with easy bleeding.  We will also get hemolysis labs.  Per toxicology we can continue NAC and trend Tylenol levels and LFTs.  We can increase her synthroid dose and monitor.  We will hold her diuretics and ASA.  We can continue her protonix IV.   Gilbert Hopson  Date of Service (when I saw the patient): 03/21/18                               General acute hospital, [place]    Internal Medicine History and Physical - [facility] Service       Date of Admission:  3/20/2018    Chief Complaint   Weakness, fatigue    History is obtained from the patient and supplemented by EMR    History of Present Illness[MY1.1]    Carlos Alberto Fenton is a 77 year old female with PMH of HTN, HLD, DM, CVA (11/2016), chronic diastolic HF (last ECHO 12/12/17 EF 45-50%), CAD (s/p 3v CABG: LIMA-LAD, SVG-D1, SVG-dRCA and modified MAZE, ABDIEL ligation - 2/2016), paroxysmal AFib ([id number] - 8 on Xarelto), HIT, RUE DVT, recently diagnosed hypothyroidism, who presents to [facility] ED today for  "generalized fatigue, weakness x1-2 days. Yesterday then she began noticing some nonspecific feeling in that she was not quite right and was feeling unwell. Reports she has some ongoing chronic back pain inc low back and across both shoulder blades present for last month.  She's been increasing the amount of tylenol she's been taking lately, stating she takes \"2 red-white capsules\" about every 3-4 hrs daily for the last month. She denies abdominal pain or chest pain, no palpitations, though does report she is somewhat short of breath.  She is having some nausea and has had an episode of nonbloody emesis, no change to stools.  She has a known history of urge incontinence and that is unchanged, no hematuria.  No bloody stools or melena.  No traumas or falls, no fevers or chills.  She has not had any syncope or near syncope, but is generally feeling much more fatigued and unwell.    She states she's also been bruising much more easily more recently. Has a large bruise on posterior upper R buttock of unknown cause. States minimal contact is causing her to bruise more recently.      Last seen for SOB with Cardiology on 3/2/18 at which time thought not to be related to her CAD, but more likely related to deconditioning, anemia, hypothyroidism, were considering repeat stress test.       In the ED she was AF, BP 98/59, HR 53, sating well >93% on RA. CMP notable for low Na 132, elevated BUN 88, Cr 1.8, Trop 0.049, TSH 81.10, low T4 0.44. Lactate 1.6. CBC with Hgb 7.2, WBC 10.3, plt 282. INR 5.05, PTT 38, D-Dimer elevate dto 1.1. Acetaminophen level to 120. VBG largely unremarkable. UA with hyaline casts. EKG with sinus tachycardia, CT head without acute intracranial pathology, CT A/P without hematoma, mild RUQ and pelvic low density free fluid and small amount of focal soft tissue stranding lateral and R to iliac crest, suggestive of small ecchymosis. She was treated with IV PPI gtt, NAC and IV vit K.[MY1.2]     Review of " Systems[MY1.1]   The 10 point Review of Systems is negative other than noted in the HPI or here.[MY1.2]     Past Medical History[MY1.1]    I have reviewed this patient's medical history and updated it with pertinent information if needed.[MY1.2]   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    parietral lesions bilateral       Antiplatelet or antithrombotic long-term use      Anxiety      Atrial fibrillation (H)      CAD (coronary artery disease)     2 vessel     Cancer (H) 1990    periodically have cancer on the skin removed     Cerebral artery occlusion with cerebral infarction (H) 10/2016    Cardioembolic strokes related to atrial fibrillation     Deep vein thrombosis (DVT) of axillary vein of right upper extremity (H) 2/25/2017     Depressive disorder 2001     Diabetes (H)      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     Hyperlipidemia LDL goal <130 10/31/2010     Hypertension      Panic attacks      Seizures (H) 10/19/2016     Sleep apnea     Uses CPAP[MY1.3]        Past Surgical History[MY1.1]   I have reviewed this patient's surgical history and updated it with pertinent information if needed.[MY1.2]  Past Surgical History:   Procedure Laterality Date     AMPUTATE TOE(S) Right 5/26/2017    Procedure: AMPUTATE TOE(S);  Right Great Toe amputation, debriedment of 2 and 3rd toe soft tissu right foot. and application of Grafix;  Surgeon: Leah Santamaria MD;  Location: [facility] OR     ANESTHESIA CARDIOVERSION N/A 12/12/2017    Procedure: ANESTHESIA CARDIOVERSION;  Anesthesia Cardioversion ;  Surgeon: GENERIC ANESTHESIA PROVIDER;  Location: [facility] OR     BACK SURGERY       BYPASS GRAFT ARTERY CORONARY N/A 2/6/2017    Procedure: BYPASS GRAFT ARTERY CORONARY;  Surgeon: Mikhail Quiñones MD;  Location: [facility] OR     C APPENDECTOMY  1959     C CARDIAC SURG PROCEDURE UNLIST       C HAND/FINGER SURGERY UNLISTED       C STOMACH SURGERY PROCEDURE  UNLISTED       HC SACROPLASTY       HC VASCULAR SURGERY PROCEDURE UNLIST       HYSTERECTOMY, PASTORA       IRRIGATION AND DEBRIDEMENT FOOT, COMBINED Right 2017    Procedure: COMBINED IRRIGATION AND DEBRIDEMENT FOOT;  Irrigation and Debridement Right Foot with Graphix  *Latex Allergy*;  Surgeon: Leah Santamaria MD;  Location: [facility] OR     MAZE PROCEDURE N/A 2017    Procedure: MAZE PROCEDURE;  Surgeon: Mikhail Quiñones MD;  Location: [facility] OR     PICC INSERTION Left 2017    5fr TL Bard PICC, 47cm (3cm external) in the L basilic vein w/ tip in the SVC RA junction     TONSILLECTOMY  [MY1.3]        Social History[MY1.1]   Social History   Substance Use Topics     Smoking status: Former Smoker     Packs/day: 1.00     Years: 10.00     Types: Cigarettes     Start date: 10/3/1958     Quit date: 1976     Smokeless tobacco: Never Used      Comment: Quit in      Alcohol use No[MY1.3]       Family History[MY1.1]   I have reviewed this patient's family history and updated it with pertinent information if needed.[MY1.2]   Family History   Problem Relation Age of Onset     DIABETES Mother      C.A.D. Mother      Hypertension Mother      CEREBROVASCULAR DISEASE Mother      Mini Strokes     Other Cancer Mother      Skin Cancer     Hyperlipidemia Mother      Coronary Artery Disease Mother      DIABETES Father      C.A.D. Father      Hypertension Father      Other Cancer Father      Hyperlipidemia Father      Coronary Artery Disease Father      HEART DISEASE Sister      Arthritis Sister      Other Cancer Other      Skin Cancer[MY1.3]       Prior to Admission Medications   Prior to Admission Medications   Prescriptions Last Dose Informant Patient Reported? Taking?   ALPRAZolam (XANAX) 0.25 MG tablet   No No   Sig: Take 1 tablet (0.25 mg) by mouth 3 times daily as needed for anxiety   Elastic Bandages & Supports (ACE BANDAGE SELF-ADHERING) MISC   No No   Si each 2 times daily   Gauze Pads & Dressings (KERLIX  GAUZE ROLL MEDIUM) MISC   No No   Si each 2 times daily   MELATONIN PO   Yes No   Sig: Take 1 mg by mouth At Bedtime   ONE TOUCH ULTRA test strip   No No   Sig: USE AS DIRECTED TO TEST ONE TIME A DAY   ONETOUCH DELICA LANCETS 33G MISC  Self No No   Si Device daily   Potassium Chloride ER 20 MEQ TBCR   No No   Sig: Take 1 tablet (20 mEq) by mouth 2 times daily   Wound Dressings (ADAPTIC NON-ADHERING DRESSING) PADS   No No   Sig: Externally apply 1 each topically 2 times daily   acetaminophen (TYLENOL) 325 MG tablet   No No   Sig: Take 3 tablets (975 mg) by mouth every 6 hours as needed for mild pain   aspirin 81 MG chewable tablet   No No   Sig: Take 1 tablet (81 mg) by mouth daily   Patient taking differently: Take 81 mg by mouth every morning    atorvastatin (LIPITOR) 40 MG tablet   No No   Sig: Take 1 tablet (40 mg) by mouth daily   blood glucose monitoring (ONE TOUCH ULTRA 2) meter device kit  Self Yes No   bumetanide (BUMEX) 2 MG tablet   Yes No   Sig: Take 1 tablet (2 mg) by mouth 2 times daily   carvedilol (COREG) 3.125 MG tablet   No No   Sig: Take 1 tablet (3.125 mg) by mouth 2 times daily (with meals)   ferrous sulfate (IRON SUPPLEMENT) 325 (65 FE) MG tablet   No No   Sig: Take 1 tablet (325 mg) by mouth daily (with breakfast)   Patient taking differently: Take 325 mg by mouth daily    gabapentin (NEURONTIN) 100 MG capsule   No No   Sig: TAKE ONE CAPSULE BY MOUTH TWICE DAILY    levothyroxine (SYNTHROID/LEVOTHROID) 25 MCG tablet   No No   Sig: Take 1 tablet (25 mcg) by mouth every morning (before breakfast)   metolazone (ZAROXOLYN) 2.5 MG tablet   No No   Sig: Take 1 tablet (2.5 mg) by mouth daily as needed For weight over 170 Lbs.   order for DME   No No   Sig: Sterile 4x4 gauze; Use gauze twice daily for dressing changes.   pantoprazole (PROTONIX) 40 MG EC tablet   No No   Sig: Take 1 tablet (40 mg) by mouth every morning   rivaroxaban ANTICOAGULANT (XARELTO) 20 MG TABS tablet   No No   Sig: Take 1  tablet (20 mg) by mouth daily (with dinner) . DO NOT start until you have stopped the Warfarin.   spironolactone (ALDACTONE) 25 MG tablet   No No   Sig: Take 1 tablet (25 mg) by mouth daily   traZODone (DESYREL) 50 MG tablet   No No   Sig: Take 0.5 tablets (25 mg) by mouth nightly as needed for sleep   venlafaxine (EFFEXOR-ER) 150 MG TB24 24 hr tablet   No No   Sig: Take 1 tablet (150 mg) by mouth daily (with breakfast)      Facility-Administered Medications: None     Allergies   Allergies   Allergen Reactions     Blood Transfusion Related (Informational Only) Other (See Comments)     Patient has a history of a clinically significant antibody against RBC antigens.  A delay in compatible RBCs may occur.Patient has a history of a significant allergic transfusion reaction.  Consult with Blood Bank MD before ordering components.     Heparin      HIT/ thrombocytopenia     Lovenox [Enoxaparin] Other (See Comments)     Thrombocytopenia      Oxycodone      hallucinations     Toprol Xl [Metoprolol] Nausea and Vomiting     Adhesive Tape Itching and Rash     Diapers & Supplies Rash     Developed yeast infection from previous hospital stay       Physical Exam   Vital Signs: Temp: 97.2  F (36.2  C) Temp src: Oral BP: 108/68 Pulse: 53 Heart Rate: 65 Resp: 15 SpO2: 95 % O2 Device: Oxymask Oxygen Delivery: 6 LPM  Weight: 0 lbs 0 oz    General Appearance:[MY1.1] fatigued appearing older woman laying in bed, in NAD[MY1.2]  Eyes:[MY1.1] no scleral icterus, PERRL[MY1.2]  HEENT:[MY1.1] NC/AT[MY1.2]  Respiratory:[MY1.1] CTAB[MY1.2]  Cardiovascular:[MY1.1] tachycardic, irregularly irregular, no m/r/g[MY1.2]  GI:[MY1.1] soft, ND, TTP in b/l lower quadrants[MY1.2]  Skin:[MY1.1] Large purple-red ecchymosis to superior R buttock, multiple circular ecchymoses on b/l upper extremities, multicolored  M[MY1.2]usculoskeletal:[MY1.1] full ROM of all extremities[MY1.2]  Neurologic:[MY1.1] AOx3, CN II-XII grossly intact[MY1.2]  Psychiatric:[MY1.1]  mood and affect normal[MY1.2]    Assessment & Plan[MY1.1]   Carlos Alberto Fenton is a 77 year old female with PMH of HTN, HLD, DM, CVA (11/2016), chronic diastolic HF (last ECHO 12/12/17 EF 45-50%), CAD (s/p 3v CABG: LIMA-LAD, SVG-D1, SVG-dRCA and modified MAZE, ABDIEL ligation - 2/2016), paroxysmal AFib ([id number] - 8 on Xarelto), HIT, RUE DVT, recently diagnosed hypothyroidism, admitted 3/21/2018 for acute GIB, coagulopathy and concern for acute LI 2/2 acetaminophen toxicity[MY1.2]     #[MY1.1] GIB  # Acute blood loss anemia  On chronic AC (xarelto). Hgb on presentation 7.2. Stool occult positive in ED. Elevated INR. S/p 1u PRBC in ED.   -- GI consult, appreciate recs  -- trend Hgb q6h, transfuse <7  -- continue protonix gtt  -- hold AC    # Coagulopathy  # Concern for tylenol toxicity  INR elevated to 5. Transitioned from coumadin to xarelto as of 01/2018 for PAF with elevated CHADsVASC of 8. Unclear etiology, thought possibly due to tylenol toxicity causing acute LI. However pt states she's been using tylenol chronically for the last month. Placed on NAC initially in ED. S/p IV vit k 10mg in ED. LFTS wnl. Unlikely acute LI. Tylenol level to 128 initially.  -- trend INR  -- consider hepatitis serologies  -- GI consult as above  -- will d/c NAC as unlikely acute toxicity    # HARINI on CKD  Cr at bsl ~1.1-4. On presentation 1.8. UA with hyaline casts, tachy, hypotensive. Likely prerenal in the setting of GIB and decreased PO intake/emesis  -- trend BMP  -- mIVF as below    # Hypothyroidism  Recently diagnosed. On Levothyroxine. Last TSH 2/21 elevated to 88, T4 improved from 0.29 to 0.44.   -- continue levo at PTA dosage    # Elevated troponin  # chronic diastolic HF  # CAD  last ECHO 12/12/17 EF 45-50%. Follows with Dr. Lobato. Reported SOB about a month ago, thought unlikely 2/2 CAD. Elevated trop to 0.049 likely 2/2 demand ischemia   -- trend trop q6h to peak  -- mIVF as below  -- consider cards consult  -- consider repeat  formal ECHO in AM  -- resume PTA aspirin, lipitor, bumex, carvedilol, aldactone     # PAF   CHADSVasc - 8  -- hold xarelto in the setting of acute GIB bleed.     # Depression  -- continue PTA venlafaxine    # HLD   -- continue PTA lipitor[MY1.2]    # Pain Assessment:   Current Pain Score 2/22/2018 2/22/2018 2/22/2018   Patient currently in pain? denies denies denies   Pain score (0-10) - - -   Pain location - - -   Pain descriptors - - -   CPOT pain score - - -[MY1.1]   Carlos Alberto sanabria pain level was assessed and she currently denies pain.[MY1.2]      Diet: NPO for Medical/Clinical Reasons Except for: No Exceptions  Fluids:[MY1.1] IV NS mIVF @ 100cc/hr[MY1.2]  DVT Prophylaxis:[MY1.1] VTE Prophylaxis contraindicated due to coagulopathy[MY1.2]  Code Status:[MY1.1] Full Code[MY1.2]    Disposition Plan   Expected discharge:[MY1.1] 2 - 3 days[MY1.2]; recommended to[MY1.1] prior living arrangement[MY1.2] once[MY1.1] hemoglobin stable[MY1.2].     Entered: Naomy Anaya 03/21/2018, 4:44 AM   Information in the above section will display in the discharge planner report.[MY1.1]    To be formally staffed in AM.     Naomy Anaya MD  IM PGY-1   [id number][MY1.2]    Data[MY1.1]   Data     Recent Labs  Lab 03/21/18  0640 03/20/18  2203   WBC  --  10.3   HGB  --  7.2*   MCV  --  119*   PLT  --  282   INR  --  5.05*    132*   POTASSIUM 4.0 4.5   CHLORIDE 99 95   CO2 25 27   BUN 79* 88*   CR 1.38* 1.78*   ANIONGAP 15* 10   CARLY 8.8 8.7   * 109*   ALBUMIN  --  3.4   PROTTOTAL  --  7.1   BILITOTAL  --  0.7   ALKPHOS  --  84   ALT  --  27   AST  --  45   LIPASE  --  483*   TROPI  --  0.049*     Recent Results (from the past 24 hour(s))   XR Chest 2 Views    Narrative    EXAM: XR CHEST 2 VW  3/20/2018 10:29 PM      HISTORY: shortness of breath, weakness, ? PNA;     COMPARISON: 2/21/2018    FINDINGS: PA and lateral views of the chest obtained. Median  sternotomy wires. Implantable loop recorder. Left atrial appendage  exclusion  device. The trachea is midline. The cardiac silhouette is  within normal limits. No pneumothorax or pleural effusion. Hazy left  retrocardiac opacities. Bilateral pulmonary vascular congestion.      Impression    IMPRESSION:   1. Hazy left basilar/retrocardiac airspace opacities, compatible with  atelectasis, aspiration, or infection.  2. Pulmonary vascular congestion.    I have personally reviewed the examination and initial interpretation  and I agree with the findings.    DUC SALGADO MD   CT Abdomen Pelvis w/o Contrast    Narrative    EXAMINATION: CT ABDOMEN PELVIS W/O CONTRAST, 3/20/2018 11:39 PM    TECHNIQUE:  Helical CT images from the lung bases through the  symphysis pubis were obtained without IV contrast.     COMPARISON: Abdominal radiograph 2/6/2017, CT chest 3/20/2017    HISTORY: ? Retroperitoneal hemorrhage, AAA or other cause for back  pain, acute anemia, etc.;     FINDINGS:  LIVER: Tiny calcifications, likely calcified granulomata.    BILIARY: Within normal limits.    PANCREAS: Within normal limits.    SPLEEN: Tiny calcifications, likely calcified granulomata.    ADRENAL GLANDS: Within normal limits.    URINARY TRACT: Within normal limits.    REPRODUCTIVE ORGANS: Hysterectomy.    GASTROINTESTINAL TRACT: Normal caliber of the small and large bowel.    PERITONEUM/FLUID: Mild right upper quadrant perihepatic ascites and  low density mild-to moderate free fluid in the low pelvis. No walled  off fluid collections.    VESSELS: Normal caliber of the abdominal aorta. Moderate aortoiliac  atherosclerotic plaque.    LYMPH NODES: No enlarged lymph nodes.    LUNG BASES/LOW CHEST: Moderate atelectasis, including cicatricial  atelectasis in the lingula. Postsurgical changes of heart stenting.  Visualized thoracic aorta is of normal caliber.    BONES/SOFT TISSUES: Median sternotomy wires. Unchanged multilevel  degenerative changes of the spine, greatest in the lumbar spine with  chronic slight leftward curvature  with apex at L3, and mild leftward  subluxation of L2 and L3 and grade 1 retrolisthesis L3-4. There is  severe multilevel facet arthropathy in the lower lumbar spine.  Left-sided L4-5 and bilateral L5-S1 pars defects. Small soft tissue  stranding adjacent to the right iliac crest (series 5, image 340).      Impression    IMPRESSION:   1. No retroperitoneal, abdominal or pelvic hematoma.  2. Normal caliber of the abdominal aorta and visualized thoracic  aorta.  3. Mild right upper quadrant and pelvic low density free fluid of  unclear etiology, possibly related to fluid resuscitation.  4. Moderate-to-advanced degenerative spondylosis of the lumbar spine,  greatest at L3-4 and L4-5.  5. Small amount of focal soft tissue stranding lateral to the right  iliac crest, may represent a small ecchymosis.       CT Head w/o Contrast    Narrative    CT HEAD W/O CONTRAST 3/20/2018 11:43 PM    Provided History: weakness, arm twitching;     Comparison: CT head 2/21/2018.    Technique: Using multidetector thin collimation helical acquisition  technique, axial, coronal and sagittal CT images from the skull base  to the vertex were obtained without intravenous contrast.     Findings:    No intracranial hemorrhage, mass effect, or midline shift. The  ventricles are proportionate to the cerebral sulci. Moderate global  cerebral volume loss. The gray to white matter differentiation of the  cerebral hemispheres is preserved. Patchy leukoaraiosis is unchanged.  The basal cisterns are patent.    The visualized paranasal sinuses are clear. The mastoid air cells are  clear.       Impression    Impression: No acute intracranial pathology.     I have personally reviewed the examination and initial interpretation  and I agree with the findings.    INO FOFANA MD[MY1.4]                  Revision History        User Key Date/Time User Provider Type Action    > MY1.4 3/21/2018  8:17 AM Naomy Anaya MD Resident Sign     MY1.3 3/21/2018  7:57  AM Naomy Anaya MD Resident      MY1.2 3/21/2018  6:56 AM Naomy Anaya MD Resident      MY1.1 3/21/2018  4:44 AM Naomy Anaya MD Resident                      Discharge Summaries      Discharge Summaries by Rosaura Streeter MD at 4/3/2018 10:52 PM     Author:  Rosaura Streeter MD Service:  General Medicine Author Type:  Resident    Filed:  4/4/2018  1:45 PM Date of Service:  4/3/2018 10:52 PM Creation Time:  4/3/2018 10:52 PM    Status:  Cosign Needed :  Rosaura Streeter MD (Resident)    Cosign Required:  Yes             [facility] [place]    Internal Medicine Discharge Summary- [facility] Service    Date of Admission:  3/20/2018  Date of Discharge:[DM1.1]  4/4/2018[JS1.1]  Discharging Attending Provider:[DM1.1] Suzy Dorantes MD[JS1.2]  Discharge Team: [facility][DM1.1] 1[JS1.2]    Discharge Diagnoses[DM1.1]   Liver injury 2/2 tylenol toxicity  Coagulopathy 2/2 liver injury  Tachy-israel arrhythmia  Acute on chronic systolic heart failure  Discrepancy of oxygen saturation reading and true arterial oxygen level  Acute on chronic hypoxic respiratory failure  Chronic toe gangrene  Raynaud phenomenon  Hypothyroidism  Presume acute blood loss anemia  HARINI on CKD  Troponemia  CAD s/p CABG  HLD  Depression  Insomnia[JS1.2]    Follow-ups Needed After Discharge[DM1.1]   Follow up with long term physician.  The following labs/tests are recommended: CMP, CBC, INR in 1 week.  Follow up with primary care provider in 2 weeks for post hospitalization follow up.  Follow up with GI in clinic in 2-3month. Referral made.   Follow up with EP cardiology in clinic in 1 week. Referral made.  Follow up with hematology in clinic in 3 months. Referral made.  Follow up with podiatry in 2 weeks. Referral made.[JS1.2]     Hospital Course       Carlos Alberto Fenton is a 77 year old female with PMH of HTN, HLD, DM, CVA (11/2016), CHF (last ECHO 12/12/17 EF 45-50%), CAD (s/p 3v CABG: LIMA-LAD, SVG-D1,  SVG-dRCA and modified MAZE, ABDIEL ligation - 2/2016), paroxysmal AFib ([id number] - 8 on Xarelto at admission), HIT, RUE DVT, recently diagnosed hypothyroidism, admitted 3/21/2018 for acute GI bleed, coagulopathy and concern for acute liver injury 2/2 acetaminophen toxicity with xarelto intake. Now with improved coagulopathy, liver tests, resolved GI bleed. 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 She is s/p permanent pacemaker for tachy-israel syndrome. She has intermittently elevated lactic acids that fluctuates without intervention. The patient was diuresed for decompensated HFrEF, which is improved.     # Coagulopathy, INR to 5, elevated PT and PTT  # Acetaminophen toxicity (elevated acetaminophen levels)  Pt states she was taking 3-4g tylenol per day for the past month for chronic back pain.   Pt previously on warfarin for Afib. She started xarelto as of Jan 2018. She states she was not taking warfarin and xarelto simultaneously. It is possible that her coagulopathy is occurring in the setting of xarelto. Per Heme, factor 7/10 decreased c/w vit K deficiency, no evidence of hepatic synthetic dysfunction. Her INR has continued to improve. She was treated with a N acetylcysteine infusion for six days. Other workup of her acute liver injury included an autoimmune workup (F actin, anti-mitochondrial antibody and AMPARO) that was negative. Hepatitis B and C panels were negative. On discharge, her INR was 1.6. LFTs remained slightly elevated at AST[DM1.1] 77[JS1.4], ALT 5[DM1.1]4[JS1.4], alkaline phosphatase 2[DM1.1]71[JS1.4], and T bili 1.9. 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 We held anticoagulation for much of the admission given the patient presented with coagulopathy and then had PPM placed. She is not a candidate for heparin, given her previous history of HIT. Therefore, hematology recommended fondaparinaux 2.5 mg daily. We recommend continuing this, until the patient can follow up with hematology in 2-3 months. To control her atrial fibrillation, we started the patient on metoprolol 12.5 mg BID. While, inpatient, following beta blockade for afib, the patient developed tachy-israel syndrome and had a permanent pacemaker placed[DM1.1] on 3/26/2018. EP cardiology follow up outpatient.[JS1.3]     # HFrEF, decompensated: TTE on 3/25: shows EF 40-45%  The patient developed volume overload on this admission. Admission wt was 78 kg and discharge weight was 84 kg. We are discharging the patient on her home diuretics (bumex 2mg BID, spironolactone 25 mg). Her TCU physician can start metolazone 2.5 mg prn (this was a PTA medication), if they believe it is needed. We recommend daily weights.      # Reynaud phenomenon:  concern for PAD in UEs given exam showing signs of distal UE hypoperfusion (bluish discoloration of finger tips (R>L) and cool distal UEs). Per vascular surgery consult obtaining ABIs and also DVT U/S of LE that did not show LE DVT. RADHA on 4/2 was normal. Pt's exam today shows improved bluish discoloration of finger tips.   - Podiatry referral outpatient per vascular surgery   - Vascular surgery sign off  - Wound care with Betadine BID[DM1.1]    #[JS1.4] Discrepancy of oxygen saturation reading and true arterial oxygen level[JS1.2]  Patient has been  having difficulty getting accurate reading of her oxygen saturation on the fingers, ear lobes, forehead throughout this admission. This is likely due to poor perfusion from Raynaud. Her PaO2 from ABG at room air was 91[JS1.4], while the reading from the probe was 80%[JS1.3].[JS1.4] She can be on oxygen NC as needed for comfort, but she is not hypoxic and does not need oxygen at baseline. Her oxygen measurement should be only if she is symptomatic, and needs to warm her hand before checking. Do not check O2 sat at earlobe.    [JS1.3]  # Hypothyroidism  Recently diagnosed. On Levothyroxine. Last TSH 2/21 elevated to 88, T4 improved from 0.29 to 0.44.   -- increase levothyroxine to 37.5 from PTA dose of 25 mcg  -- Recheck TSH in 6 weeks.       Resolved issues:     # Lactic Acidosis, Resolved  Pt has had fluctuating LA since admission. Likely 2/2 poor perfusion of extremities (cyanotic and cool fingers, cools hands) d/t suspected PVD.Low suspicion for sepsis  given neg blood cultures, improving clinical status, no SIRS and fluctuating downtrending lactate levels without intervention      #[DM1.1] Presumed a[JS1.6]cute blood loss anemia (baseline 11-12 1 month prior, current hgb ~7) - now stable Hgb[DM1.1]  She presented with Hgb dropped 4 g from baseline 2-3 mo prior. No signs of bleeding per[JS1.6] pa[DM1.1]tient. Differential includes acute GI bleed (most likely[JS1.6] in the setting of elevated INR). Hgb has been stable since admission and no evidence of bleeding seen. Workup included a folate WNL, B12 WNL, transferrin WNL, iron WNL, haptoglobin, reticulocyte index 2. We recommend continuing pantoprazole BID and also hematology f/u in 2-3 months to re-evaluate her[DM1.1] macrocytic[JS1.6] anemia[DM1.1]. May need bone marrow biopsy outpatient if no improvement.    [JS1.6]   # HARINI on CKD -- resolved  Cr at bsl ~1.1-4. On presentation 1.8. UA with hyaline casts, tachy, hypotensive. Likely prerenal in the setting of GIB  and decreased PO intake/emesis. Cr improved with IVF.       # Tropinemia, likely 2/2 demand ischemia 2/2 anemia, Resolved  EKG did not show ST elevation, ST segment depression or T wave inversions. Pt is not having CP. Likely demand ischemia.          Chronic problems  # CAD  # HLD  We continued the patient's ASA and atorvastatin.       # Depression  We continued the patient's PTA venlafaxine.      # Insomnia  We held the patient's trazodone in lieu of prolonged QTc (which resolved).     # Discharge Pain Plan:[DM1.1] - Patient currently has NO PAIN and is not being prescribed pain medications on discharge.[JS1.2]    Consultations This Hospital Stay[DM1.1]   GI LUMINAL ADULT IP CONSULT  MEDICATION HISTORY IP PHARMACY CONSULT  CARDIOLOGY ELECTROPHYSIOLOGY (EP) IP CONSULT  HEMATOLOGY IP CONSULT  PHARMACY IP CONSULT  VASCULAR ACCESS CARE ADULT IP CONSULT  NUTRITION SERVICES ADULT IP CONSULT  VASCULAR SURGERY ADULT IP CONSULT  PATIENT LEARNING CENTER IP CONSULT  PHYSICAL THERAPY ADULT IP CONSULT  OCCUPATIONAL THERAPY ADULT IP CONSULT[JS1.7]     Code Status[DM1.1]   Full Code[JS1.2]       The patient was discussed with [DM1.1] Jose E.[JS1.2]    Rosaura Streeter[JS1.8] MD[JS1.2]  [facility]  Pager:[DM1.1] [phone][JS1.2]  ______________________________________________________________________[DM1.1]    Physical Exam[JS1.9]   Vital Signs:[DM1.1] Temp: 97.4  F (36.3  C)[JS1.9] Temp src: Axillary[DM1.1] BP: 109/87 Pulse: 72 Heart Rate: 67 Resp: 18 SpO2: 94 % O2 Device: None (Room air) Oxygen Delivery: 3 LPM[JS1.9]  Weight:[DM1.1] 185 lbs 14.4 oz[JS1.9]    Gen: In no acute distress. Patient comfortably sitting in chair. Breathing roomair.  HEENT: No scleral icterus, Moist mucous membranes. No nasal discharge.  CV: Irregular rate, no murmurs or rubs detected  Resp: Clear to auscultation bilaterally. Without wheeze or crackles  Abdomen: Soft, non tender abdomen, normal active bowel sounds   Extremities: Pitting  edema 2+, bilaterally with anasarca -- improving  Skin: bruises on back do not show rebleeding  Neuro: mentation in tact, no deficits noted  Oriented to conversation[JS1.3]    Significant Results and Procedures[DM1.1]   Most Recent 3 CBC's:[JS1.2]  Recent Labs   Lab Test  04/04/18   0730  04/01/18   0642  03/31/18   0832   WBC  5.5  5.4  5.7   HGB  8.4*  7.7*  8.0*   MCV  120*  121*  119*   PLT  184  160  160[JS1.10]     Most Recent 3 BMP's:[JS1.2]  Recent Labs   Lab Test  04/04/18   0730  04/03/18   0803  04/02/18   2112   NA  136  138  135   POTASSIUM  4.0  4.7  5.0   CHLORIDE  95  96  95   CO2  35*  34*  31   BUN  21  18  18   CR  0.96  0.95  0.93   ANIONGAP  6  8  9   CARLY  9.2  9.4  9.1   GLC  91  101*  189*[JS1.10]     Most Recent 2 LFT's:[JS1.2]  Recent Labs   Lab Test  04/04/18   0730  03/30/18   0711   AST  77*  100*   ALT  54*  55*   ALKPHOS  271*  215*   BILITOTAL  1.9*  1.9*[JS1.10]     Most Recent 3 INR's:[JS1.2]  Recent Labs   Lab Test  04/04/18   0730  04/01/18   0642  03/31/18   0832   INR  1.61*  1.61*  1.61*[JS1.10]       Pending Results   These results will be followed up by[DM1.1] primary care doctor.[JS1.2]  Unresulted Labs Ordered in the Past 30 Days of this Admission     Date and Time Order Name Status Description    3/30/2018 1247 Blood culture Preliminary     3/30/2018 1247 Blood culture Preliminary     3/28/2018 2200 Blood culture Preliminary     3/28/2018 2200 Blood culture Preliminary              Primary Care Physician   Humberto Conner    Discharge Disposition[DM1.1]   Discharged to short-term care facility[JS1.2]  Condition at discharge:[DM1.1] Stable[JS1.2]    Discharge Orders[JS1.10]     Podiatry/Foot & Ankle Surgery Referral     Cardiology Eval Adult Referral     GASTROENTEROLOGY ADULT REF CONSULT ONLY     Onc/Heme Adult Referral     General info for SNF   Length of Stay Estimate: Short Term Care: Estimated # of Days <30  Condition at Discharge: Stable  Level of care:skilled    Rehabilitation Potential: Fair  Admission H&P remains valid and up-to-date: Yes  Recent Chemotherapy: N/A  Use Nursing Home Standing Orders: Yes     Mantoux instructions   Give two-step Mantoux (PPD) Per Facility Policy Yes     Reason for your hospital stay   You were admitted to the hospital due to tylenol toxicity that causing liver injury. Toxicology and GI were consulted. Liver injury is recovering. During hospitalization, there was a discrepancy of your oxygen saturation reading and true oxygen level from blood gas due to poor vascular perfusion to the finger. Your oxygen level at room air is good, however, you can use oxygen as needed for comfort.     You also had episodes of slow-fast heart rate that required pacemaker placement. Due to liver injury, anticoagulation for afib option is limited. Hematology consulted and recommended Fondaparinux. You will continue this medication until follow up with hematology. You also found to have volume overloaded that required diuresis. This needs to be continued in TCU. Dry weight 175 lbs.     Glucose monitor nursing POCT   Before breakfast. If higher than 180, please notify provider.     Daily weights   Call Provider for weight gain of more than 2 pounds per day or 5 pounds per week.     Wound care (specify)   Site:   toe  Instructions:  Apply Betadine BID     Activity - Up with nursing assistance     Follow Up and recommended labs and tests   Follow up with residential physician.  The following labs/tests are recommended: CMP, CBC, INR in 1 week.  Follow up with primary care provider in 2 weeks for post hospitalization follow up.  Follow up with GI in clinic in 2 months. Referral made.   Follow up with EP cardiology in clinic in 1 week. Referral made.  Follow up with hematology in clinic in 3 months. Referral made.  Follow up with podiatry in 2 weeks. Referral made.     Full Code     Physical Therapy Adult Consult   Evaluate and treat as clinically  indicated.    Reason:  Weakness, deconditioning.     Occupational Therapy Adult Consult   Evaluate and treat as clinically indicated.    Reason:  ADLs, weakness.     Oxygen - Nasal cannula   1-3 Lpm by nasal cannula to keep O2 sats 92% or greater or for comfort.    Please warm finger with heat pack before checking O2 sat due to poor vascular perfusion, may not get good wave form.  Do not check at ear lobe.     Oxygen Adult   [facility] Oxygen Order 3 liter(s) by nasal cannula continuously with use of portable tank. Expected treatment length is indefinite (99 months).. Test on conserving device as applicable.    Patients who qualify for home O2 coverage under the CMS guidelines require ABG tests or O2 sat readings obtained closest to, but no earlier than 2 days prior to the discharge, as evidence of the need for home oxygen therapy. Testing must be performed while patient is in the chronic stable state. See notes for O2 sats.    I certify that this patient, Carlos Alberto Fenton has been under my care and that I, or a nurse practitioner or physician's assistant working with me, had a face-to-face encounter that meets the face-to-face encounter requirements with this patient on 4/4/2018. The patient, Carlos Alberto Fenton was evaluated or treated in whole, or in part, for the following medical condition, which necessitates the use of the ordered oxygen. Treatment Diagnosis: CHF, acute hypoxic respiratory failure    Attending Provider: Dr. Dorantes  Physician signature: See electronic signature associated with these discharge orders  Date of Order: April 4, 2018     EP ICD/Pacemaker/Loop Recorder     Fall precautions     Advance Diet as Tolerated   Follow this diet upon discharge: Orders Placed This Encounter     Calorie Counts     Room Service     Fluid restriction 2000 ML FLUID     Snacks/Supplements Adult: Ensure Clear; Between Meals     Low Saturated Fat Na <2400 mg[JS1.4]       Discharge Medications   Current Discharge Medication  List      START taking these medications    Details   carvedilol (COREG) 3.125 MG tablet Take 1 tablet (3.125 mg) by mouth 2 times daily (with meals)  Qty: 180 tablet, Refills: 3    Associated Diagnoses: Chronic diastolic heart failure (H)         CONTINUE these medications which have NOT CHANGED    Details   ACETAMINOPHEN PO Take 500-1,000 mg by mouth every 6 hours as needed for pain      Nutritional Supplements (SILICA PO) Unknown strength. Patient takes daily for hair growth/health.      pantoprazole (PROTONIX) 40 MG EC tablet Take 1 tablet (40 mg) by mouth every morning  Qty: 90 tablet, Refills: 1    Associated Diagnoses: Coronary artery disease with angina pectoris, unspecified vessel or lesion type, unspecified whether native or transplanted heart (H)      gabapentin (NEURONTIN) 100 MG capsule TAKE ONE CAPSULE BY MOUTH TWICE DAILY   Qty: 180 capsule, Refills: 0    Associated Diagnoses: 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      bumetanide (BUMEX) 2 MG tablet Take 1 tablet (2 mg) by mouth 2 times daily  Qty: 180 tablet, Refills: 3    Comments: Hold Bumex dose tonight 2/22 and tomorrow 2/23 am. Resume Bumex the evening of 2/23.  Associated Diagnoses: Chronic systolic heart failure (H)      levothyroxine (SYNTHROID/LEVOTHROID) 25 MCG tablet Take 1 tablet (25 mcg) by mouth every morning (before breakfast)  Qty: 30 tablet, Refills: 0    Comments: Repeat thyroid level in 3-6 and follow up with PCP at that time  Associated Diagnoses: Hypothyroidism, unspecified type      Potassium Chloride ER 20 MEQ TBCR Take 1 tablet (20 mEq) by mouth 2 times daily  Qty: 90 tablet, Refills: 3    Associated Diagnoses: Chronic diastolic heart failure (H)      MELATONIN PO Take 1 mg by mouth nightly as needed       rivaroxaban ANTICOAGULANT (XARELTO) 20 MG TABS tablet Take 1 tablet (20 mg) by mouth daily (with dinner) . DO NOT start until you have stopped the Warfarin.  Qty: 90 tablet, Refills: 3    Comments: First dose Tuesday  January 16th. Last dose of Warfarin 1/13.  Patient aware  Associated Diagnoses: Atrial fibrillation, unspecified type (H)      ALPRAZolam (XANAX) 0.25 MG tablet Take 1 tablet (0.25 mg) by mouth 3 times daily as needed for anxiety  Qty: 10 tablet, Refills: 0    Associated Diagnoses: Adjustment disorder with anxious mood      venlafaxine (EFFEXOR-ER) 150 MG TB24 24 hr tablet Take 1 tablet (150 mg) by mouth daily (with breakfast)  Qty: 90 each, Refills: 1    Associated Diagnoses: Adjustment disorder with depressed mood      metolazone (ZAROXOLYN) 2.5 MG tablet Take 1 tablet (2.5 mg) by mouth daily as needed For weight over 170 Lbs.  Qty: 30 tablet, Refills: 1    Associated Diagnoses: Chronic diastolic heart failure (H)      spironolactone (ALDACTONE) 25 MG tablet Take 1 tablet (25 mg) by mouth daily  Qty: 90 tablet, Refills: 3    Associated Diagnoses: Chronic systolic heart failure (H)      atorvastatin (LIPITOR) 40 MG tablet Take 1 tablet (40 mg) by mouth daily  Qty: 90 tablet, Refills: 1    Associated Diagnoses: Coronary artery disease with angina pectoris, unspecified vessel or lesion type, unspecified whether native or transplanted heart (H)      traZODone (DESYREL) 50 MG tablet Take 0.5 tablets (25 mg) by mouth nightly as needed for sleep  Qty: 45 tablet, Refills: 2    Associated Diagnoses: Primary insomnia      Elastic Bandages & Supports (ACE BANDAGE SELF-ADHERING) MISC 1 each 2 times daily  Qty: 6 each, Refills: 3    Associated Diagnoses: Open wound of lower limb, right, subsequent encounter      Gauze Pads & Dressings (KERLIX GAUZE ROLL MEDIUM) MISC 1 each 2 times daily  Qty: 48 each, Refills: 3    Associated Diagnoses: Open wound of lower limb, right, subsequent encounter      ONE TOUCH ULTRA test strip USE AS DIRECTED TO TEST ONE TIME A DAY  Qty: 100 each, Refills: 2    Associated Diagnoses: Type 2 diabetes mellitus without complication, without long-term current use of insulin (H)      Wound Dressings  (ADAPTIC NON-ADHERING DRESSING) PADS Externally apply 1 each topically 2 times daily  Qty: 1 each, Refills: 3    Associated Diagnoses: Open wound of lower limb, right, subsequent encounter      order for DME Sterile 4x4 gauze; Use gauze twice daily for dressing changes.  Qty: 1 Box, Refills: 3    Associated Diagnoses: Open wound of lower limb, right, subsequent encounter      aspirin 81 MG chewable tablet Take 1 tablet (81 mg) by mouth daily  Qty: 30 tablet, Refills: 0    Associated Diagnoses: Coronary artery disease with angina pectoris, unspecified vessel or lesion type, unspecified whether native or transplanted heart (H)      ferrous sulfate (IRON SUPPLEMENT) 325 (65 FE) MG tablet Take 1 tablet (325 mg) by mouth daily (with breakfast)  Qty: 100 tablet, Refills: 1    Associated Diagnoses: S/P CABG (coronary artery bypass graft)      ONETOUCH DELICA LANCETS 33G MISC 1 Device daily  Qty: 100 each, Refills: 0    Associated Diagnoses: Type 2 diabetes mellitus without complication, without long-term current use of insulin (H)      blood glucose monitoring (ONE TOUCH ULTRA 2) meter device kit            Allergies   Allergies   Allergen Reactions     Blood Transfusion Related (Informational Only) Other (See Comments)     Patient has a history of a clinically significant antibody against RBC antigens.  A delay in compatible RBCs may occur.Patient has a history of a significant allergic transfusion reaction.  Consult with Blood Bank MD before ordering components.     Heparin      HIT/ thrombocytopenia     Lovenox [Enoxaparin] Other (See Comments)     Thrombocytopenia      Oxycodone      hallucinations     Toprol Xl [Metoprolol] Nausea and Vomiting     Adhesive Tape Itching and Rash     Diapers & Supplies Rash     Developed yeast infection from previous hospital stay[DM1.1]          Revision History        User Key Date/Time User Provider Type Action    > [N/A] 4/4/2018  1:45 PM Rosaura Streeter MD Resident Sign      JS1.6 4/4/2018  1:45 PM Rosaura Streeter MD Resident Sign     JS1.9 4/4/2018  1:42 PM Rosaura Streeter MD Resident Sign     JS1.3 4/4/2018  1:15 PM Rosaura Streeter MD Resident      JS1.4 4/4/2018 12:36 PM Rosaura Streeter MD Resident Share     JS1.5 4/4/2018 12:25 PM Rosaura Streeter MD Resident Share     JS1.10 4/4/2018 12:22 PM Rosaura Streeter MD Resident      JS1.8 4/4/2018 12:21 PM Rosaura Streeter MD Resident      JS1.7 4/4/2018 12:20 PM Rosaura Streeter MD Resident      JS1.1 4/4/2018 12:08 PM Rosaura Streeter MD Resident      JS1.2 4/4/2018 12:04 PM Rosaura Streeter MD Resident      DM1.1 4/4/2018 11:32 AM Ayaka Mclaughlin MD Resident Share                     Consult Notes      Consults by Klisch, Christine M, RN at 4/3/2018  1:30 PM     Author:  Klisch, Christine M, RN Service:  Patient Learning Author Type:  Registered Nurse    Filed:  4/3/2018  1:30 PM Date of Service:  4/3/2018  1:30 PM Creation Time:  4/3/2018  1:30 PM    Status:  Signed :  Klisch, Christine M, RN (Registered Nurse)     Consult Orders:    1. Patient Learning Center IP Consult [[id number]] ordered by Carloz Shane MD at 04/03/18 1229                Going to TCU, PLC order cancelled.[CK1.1]     Revision History        User Key Date/Time User Provider Type Action    > CK1.1 4/3/2018  1:30 PM Klisch, Christine M, RN Registered Nurse Sign            Consults by Mendoza Shah MD at 3/31/2018 11:40 PM     Author:  Mendoza Shah MD Service:  Vascular Surgery Author Type:  Resident    Filed:  3/31/2018 11:54 PM Date of Service:  3/31/2018 11:40 PM Creation Time:  3/31/2018 11:39 PM    Status:  Cosign Needed :  Mendoza Shah MD (Resident)    Cosign Required:  Yes         Consult Orders:    1. Vascular Surgery Adult IP Consult: Patient to be seen: Routine within 24 hrs; Call back #: [phone]; pt has been having elevated lactate levels possibly due to poor profusion in her distal  extremities which over the course of this admission have... [[id number]] ordered by Ayaka Mclaughlin MD at 03/31/18 1313                Vascular Surgery Consult    Carlos Alberto Fenton MRN# [id number]   YOB: 1940 Age: 77 year old      Date of Admission:  3/20/2018        Consult for:    Consulting physician/team: Medicine         Assessment:    Carlos Alberto Fenton is a 77 year old female with a complicated medical history. Vascular surgery has been consulted for evaluation of discoloration of distal extremities concerning for decreased perfusion. As per patient these changes are chronic and she has Dopplerable pulses without signs of sensorimotor deficits in BUE and BLE[KM1.1]. Last lactate 2.2[KM1.2]        Plan:        No indication for acute surgical intervention    Agree with US RADHA (ordered by primary)    If RADHA abnormal will recommend CT abdomen/pelvis angiogram with LE run off     Vascular surgery team will round on patient again on 4/1         History of Present Illness:     Carlos Alberto Fenton is a 77 year old female with PMH of HTN, HLD, DM, CVA (11/2016), CHF (last ECHO 12/12/17 EF 45-50%), CAD (s/p 3v CABG: LIMA-LAD, SVG-D1, SVG-dRCA and modified MAZE, ABDIEL ligation - 2/2016), paroxysmal AFib ([id number] - 8 on Xarelto), HIT, RUE DVT, recently diagnosed hypothyroidism, admitted 3/21/2018 for acute GIB, coagulopathy and concern for acute LI 2/2 acetaminophen toxicity with xarelto intake and 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 Vascular surgery has been consulted for evaluation of discoloration of distal extremities concerning for decreased perfusion    Ms Fenton reports that her toes and hands have been discolored ever since she had her CABG a year ago. She denies any pain in her extremities, any weakness or tingling /paresthesias, chest pain, abdominal pain, lightheadedness, nausea, emesis, fevers or chills. Of note vascular surgery was consulted in 2017 after she developed gangrene of her right  big and 2nd toes from emboli after her CABG ( her toes were debrided). At that time a CTA was performed which did not demonstrated any PAD (Mild aortobiiliac atherosclerosis without evidence of aneurysm, dissection, or significant stenosis in the chest, abdomen or pelvis. Bilateral patent lower extremity three-vessel runoff). She also had  US RADHA done ealrier this month which demonstrated WNL ABIs R 1.17/ L1.11 ( she does have DM).[KM1.1] Last lactate 2.2[KM1.2]      Past Medical History:[KM1.1]  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    parietral lesions bilateral       Antiplatelet or antithrombotic long-term use      Anxiety      Atrial fibrillation (H)      CAD (coronary artery disease)     2 vessel     Cancer (H) 1990    periodically have cancer on the skin removed     Cerebral artery occlusion with cerebral infarction (H) 10/2016    Cardioembolic strokes related to atrial fibrillation     Deep vein thrombosis (DVT) of axillary vein of right upper extremity (H) 2/25/2017     Depressive disorder 2001     Diabetes (H)      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 and application of Grafix;  Surgeon: Leah Santamaria MD;  Location: [facility] OR     ANESTHESIA CARDIOVERSION N/A 12/12/2017    Procedure: ANESTHESIA CARDIOVERSION;  Anesthesia Cardioversion ;  Surgeon: GENERIC ANESTHESIA PROVIDER;  Location: [facility] OR     BACK SURGERY       BYPASS GRAFT ARTERY CORONARY N/A 2/6/2017    Procedure: BYPASS GRAFT ARTERY CORONARY;  Surgeon: Mikhail Quiñones MD;   Location: [facility] OR     C APPENDECTOMY  1959     C CARDIAC SURG PROCEDURE UNLIST       C HAND/FINGER SURGERY UNLISTED       C STOMACH SURGERY PROCEDURE UNLISTED       HC SACROPLASTY       HC VASCULAR SURGERY PROCEDURE UNLIST       HYSTERECTOMY, PASTORA  1988     IRRIGATION AND DEBRIDEMENT FOOT, COMBINED Right 7/7/2017    Procedure: COMBINED IRRIGATION AND DEBRIDEMENT FOOT;  Irrigation and Debridement Right Foot with Graphix  *Latex Allergy*;  Surgeon: Leah Santamaria MD;  Location: [facility] OR     MAZE PROCEDURE N/A 2/6/2017    Procedure: MAZE PROCEDURE;  Surgeon: Mikhail Quiñones MD;  Location: [facility] OR     PICC INSERTION Left 02/25/2017    5fr TL Bard PICC, 47cm (3cm external) in the L basilic vein w/ tip in the SVC RA junction     TONSILLECTOMY  1942[KM1.3]       Allergies:[KM1.1]     Allergies   Allergen Reactions     Blood Transfusion Related (Informational Only) Other (See Comments)     Patient has a history of a clinically significant antibody against RBC antigens.  A delay in compatible RBCs may occur.Patient has a history of a significant allergic transfusion reaction.  Consult with Blood Bank MD before ordering components.     Heparin      HIT/ thrombocytopenia     Lovenox [Enoxaparin] Other (See Comments)     Thrombocytopenia      Oxycodone      hallucinations     Toprol Xl [Metoprolol] Nausea and Vomiting     Adhesive Tape Itching and Rash     Diapers & Supplies Rash     Developed yeast infection from previous hospital stay[KM1.3]       Medications:[KM1.1]    No current facility-administered medications on file prior to encounter.   Current Outpatient Prescriptions on File Prior to Encounter:  pantoprazole (PROTONIX) 40 MG EC tablet Take 1 tablet (40 mg) by mouth every morning   carvedilol (COREG) 3.125 MG tablet Take 1 tablet (3.125 mg) by mouth 2 times daily (with meals)   gabapentin (NEURONTIN) 100 MG capsule TAKE ONE CAPSULE BY MOUTH TWICE DAILY    bumetanide (BUMEX) 2 MG tablet Take 1 tablet (2 mg) by mouth 2  times daily   levothyroxine (SYNTHROID/LEVOTHROID) 25 MCG tablet Take 1 tablet (25 mcg) by mouth every morning (before breakfast)   Potassium Chloride ER 20 MEQ TBCR Take 1 tablet (20 mEq) by mouth 2 times daily   MELATONIN PO Take 1 mg by mouth nightly as needed    rivaroxaban ANTICOAGULANT (XARELTO) 20 MG TABS tablet Take 1 tablet (20 mg) by mouth daily (with dinner) . DO NOT start until you have stopped the Warfarin.   ALPRAZolam (XANAX) 0.25 MG tablet Take 1 tablet (0.25 mg) by mouth 3 times daily as needed for anxiety   venlafaxine (EFFEXOR-ER) 150 MG TB24 24 hr tablet Take 1 tablet (150 mg) by mouth daily (with breakfast)   metolazone (ZAROXOLYN) 2.5 MG tablet Take 1 tablet (2.5 mg) by mouth daily as needed For weight over 170 Lbs.   spironolactone (ALDACTONE) 25 MG tablet Take 1 tablet (25 mg) by mouth daily   atorvastatin (LIPITOR) 40 MG tablet Take 1 tablet (40 mg) by mouth daily   traZODone (DESYREL) 50 MG tablet Take 0.5 tablets (25 mg) by mouth nightly as needed for sleep   Elastic Bandages & Supports (ACE BANDAGE SELF-ADHERING) MISC 1 each 2 times daily   Gauze Pads & Dressings (KERLIX GAUZE ROLL MEDIUM) MISC 1 each 2 times daily   ONE TOUCH ULTRA test strip USE AS DIRECTED TO TEST ONE TIME A DAY   Wound Dressings (ADAPTIC NON-ADHERING DRESSING) PADS Externally apply 1 each topically 2 times daily   order for DME Sterile 4x4 gauze; Use gauze twice daily for dressing changes.   aspirin 81 MG chewable tablet Take 1 tablet (81 mg) by mouth daily   ferrous sulfate (IRON SUPPLEMENT) 325 (65 FE) MG tablet Take 1 tablet (325 mg) by mouth daily (with breakfast)   ONETOUCH DELICA LANCETS 33G MISC 1 Device daily   blood glucose monitoring (ONE TOUCH ULTRA 2) meter device kit[KM1.3]        Social History:[KM1.1]  Social History     Social History     Marital status:      Spouse name: N/A     Number of children: N/A     Years of education: N/A     Occupational History     Not on file.     Social History Main  "Topics     Smoking status: Former Smoker     Packs/day: 1.00     Years: 10.00     Types: Cigarettes     Start date: 10/3/1958     Quit date: 7/4/1976     Smokeless tobacco: Never Used      Comment: Quit in 1976     Alcohol use No     Drug use: No     Sexual activity: Not Currently     Partners: Male     Birth control/ protection: Post-menopausal     Other Topics Concern     Parent/Sibling W/ Cabg, Mi Or Angioplasty Before 65f 55m? Yes     Social History Narrative[KM1.3]       Family History:[KM1.1]  Family History   Problem Relation Age of Onset     DIABETES Mother      C.A.D. Mother      Hypertension Mother      CEREBROVASCULAR DISEASE Mother      Mini Strokes     Other Cancer Mother      Skin Cancer     Hyperlipidemia Mother      Coronary Artery Disease Mother      DIABETES Father      C.A.D. Father      Hypertension Father      Other Cancer Father      Hyperlipidemia Father      Coronary Artery Disease Father      HEART DISEASE Sister      Arthritis Sister      Other Cancer Other      Skin Cancer[KM1.3]       ROS:    The remainder of the complete ROS was negative unless noted in the HPI.    Exam:[KM1.1]  /69 (BP Location: Right arm)  Pulse 108  Temp 97.7  F (36.5  C) (Oral)  Resp 16  Ht 1.575 m (5' 2\")  Wt 92.2 kg (203 lb 4.8 oz)  SpO2 94%  BMI 37.18 kg/m2[KM1.3]  General: Alert and oriented with appropriate responses to questions, in NAD, on BIPAP  HEENT: NC/AT, sclera anicteric  Resp: NLB on BIPAP  Cardiac: regular rate and rhythm,  Abdomen: Soft, nontender, nondistended. No rebound or guarding. No palpable mass  Extremities:    BUE  Good capillary refill (<1s), intact motor strength and sensation tot touch, ROM intact in all joints, palpable or dopplerable bilateral radial pulses ( has some edema of BUE making palpation difficult)    BLE  Good capillary refill (<1s), intact motor strength and sensation tot touch, ROM intact in all joints, palpable or dopplerable bilateral PT pulses ( has some " edema  making palpation difficult)  Partially amputated R big and 2nd toe with necrotic changes , no evidence of infection   Skin: Warm and dry, no jaundice or rash  Neuro: Cn II-XII intact, moves all extremities equally    Labs:  Most Recent CBC:   Recent Labs   Lab Test  03/31/18   0832   WBC  5.7   RBC  2.27*   HGB  8.0*   HCT  27.1*   MCV  119*   MCH  35.2*   MCHC  29.5*   RDW  22.2*   PLT  160     Most Recent BMP:   Recent Labs   Lab Test  03/31/18   1834  03/31/18   0832   NA  138  138   POTASSIUM  4.2  3.6   CHLORIDE  100  100   CO2  32  31   BUN  12  12   CR  0.84  0.73   GLC  152*  89   MAG   --   1.6         Imaging:  No results found for this or any previous visit (from the past 24 hour(s)).    Assessment/ Plan: See above.     Mendoza Shah MD   General Surgery Resident PGY-2   Pager: [phone]    Pt reviewed with fellow, Dr. Lejeune[KM1.1]       Revision History        User Key Date/Time User Provider Type Action    > [N/A] 3/31/2018 11:54 PM Mendoza Shah MD Resident Sign     KM1.2 3/31/2018 11:54 PM Mendoza Shah MD Resident Sign     KM1.3 3/31/2018 11:53 PM Mendoza Shah MD Resident Sign     KM1.1 3/31/2018 11:39 PM Mendoza Shah MD Resident             Consults by Arnulfo Vazquez MD at 3/23/2018 10:44 AM     Author:  Arnulfo Vazquez MD Service:  Cardiology Author Type:  Physician    Filed:  3/26/2018 12:28 PM Date of Service:  3/23/2018 10:44 AM Creation Time:  3/23/2018 10:44 AM    Status:  Signed :  Arnulfo Vazquez MD (Physician)     Consult Orders:    1. Cardiology Electrophysiology (EP) IP Consult: Patient to be seen: Routine within 24 hrs; Call back #: [id number]; 78yo with PAF with RVR on AC, new bradycardia as of 3/23 am thought to be 2/2 inc beta blockade; Consultant may enter orders: Yes [[id number]] ordered by Naomy Anaya MD at 03/23/18 0311                [facility], [place]    Electrophysiology Consult        Assessment & Plan   Carlos Alberto Fenton is a 77 year old female w[KP1.1]ith history of pAF with [id number] score of[KP1.2] 8[KP1.3] on xarelto, CAD s/p 3 vessel CABG (LIMA-LAD, v-D1, v-RCA) + MAZE + ABDIEL ligation in 2/2016, heart failure with LV EF 45-50%, hypertension, type 2 diabetes, and history of stroke, DVT, and HIT who presented with blood loss anemia.  EP was consulted for a period of atrial fibrillation with slow response (HR in the 50s and 60s) while getting extra doses of metoprolol.[KP1.2]  However, it appears that she converted to sinus rhythm which is likely why her HR is so low.[KP1.4]  She[KP1.2] appears to be back in sinus[KP1.4].  She likely had too much beta-blocker and would benefit from a more relaxed heart rate target zone as outlined below.[KP1.2]    Recommendations[KP1.1]  -Agree with holding all beta-blocking agents while getting worked up for acute blood loss anemia.  -Agree with holding anticoagulation, would restart once we have a clear understanding of her blood loss anemia is from and pending final results from her GI work up[KP1.2]  -Agree with synthyroid increase for hypothyroidism[KP1.3]  -Would avoid QT prolonging agents[KP1.5]    Thank you for allowing us to participate in the care of Ms. Fenton[KP1.2]    Andrea Bishop MD, PhD  Cardiology Fellow    History of Present Illness   Carlos Alberto Fenton is a 77 year old female who present[KP1.1]ed to the general medicine service for acute blood loss anemia and body aches.  She was found to have a Hgb of 7.2 from a baseline over 11.  She developed some slower rapids in the early AM after receiving her normal dose of coreg with a total of 62.5 mg of metoprolol.  She was feeling poor with low oxygen saturations.  She was given one dose of  IV glucagon.      Since that time, she has had HRs in the 50s.  She had an EKG which showed[KP1.2] possible second degree heart block type 1.  She did get a repeat EKG this afternoon which showed normal sinus  rhythm.[KP1.6]    Past Medical History    I have reviewed this patient's medical history and updated it with pertinent information if needed.   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    parietral lesions bilateral       Antiplatelet or antithrombotic long-term use      Anxiety      Atrial fibrillation (H)      CAD (coronary artery disease)     2 vessel     Cancer (H) 1990    periodically have cancer on the skin removed     Cerebral artery occlusion with cerebral infarction (H) 10/2016    Cardioembolic strokes related to atrial fibrillation     Deep vein thrombosis (DVT) of axillary vein of right upper extremity (H) 2/25/2017     Depressive disorder 2001     Diabetes (H)      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     Hyperlipidemia LDL goal <130 10/31/2010     Hypertension      Panic attacks      Seizures (H) 10/19/2016     Sleep apnea     Uses CPAP       Past Surgical History   I have reviewed this patient's surgical history and updated it with pertinent information if needed.  Past Surgical History:   Procedure Laterality Date     AMPUTATE TOE(S) Right 5/26/2017    Procedure: AMPUTATE TOE(S);  Right Great Toe amputation, debriedment of 2 and 3rd toe soft tissu right foot. and application of Grafix;  Surgeon: Leah Santamaria MD;  Location: [facility] OR     ANESTHESIA CARDIOVERSION N/A 12/12/2017    Procedure: ANESTHESIA CARDIOVERSION;  Anesthesia Cardioversion ;  Surgeon: GENERIC ANESTHESIA PROVIDER;  Location: [facility] OR     BACK SURGERY       BYPASS GRAFT ARTERY CORONARY N/A 2/6/2017    Procedure: BYPASS GRAFT ARTERY CORONARY;  Surgeon: Mikhail Quiñones MD;  Location: [facility] OR     C APPENDECTOMY  1959     C CARDIAC SURG PROCEDURE UNLIST       C HAND/FINGER SURGERY UNLISTED       C STOMACH SURGERY PROCEDURE UNLISTED       HC SACROPLASTY       HC VASCULAR SURGERY PROCEDURE UNLIST       HYSTERECTOMY, PASTORA  1988     IRRIGATION AND  DEBRIDEMENT FOOT, COMBINED Right 2017    Procedure: COMBINED IRRIGATION AND DEBRIDEMENT FOOT;  Irrigation and Debridement Right Foot with Graphix  *Latex Allergy*;  Surgeon: Leah Santamaria MD;  Location: [facility] OR     MAZE PROCEDURE N/A 2017    Procedure: MAZE PROCEDURE;  Surgeon: Mikhail Quiñones MD;  Location: [facility] OR     PICC INSERTION Left 2017    5fr TL Bard PICC, 47cm (3cm external) in the L basilic vein w/ tip in the SVC RA junction     TONSILLECTOMY  194       Prior to Admission Medications   Prior to Admission Medications   Prescriptions Last Dose Informant Patient Reported? Taking?   ACETAMINOPHEN PO 3/20/2018  Yes Yes   Sig: Take 500-1,000 mg by mouth every 6 hours as needed for pain   ALPRAZolam (XANAX) 0.25 MG tablet 3/19/2018  No No   Sig: Take 1 tablet (0.25 mg) by mouth 3 times daily as needed for anxiety   Elastic Bandages & Supports (ACE BANDAGE SELF-ADHERING) MISC   No No   Si each 2 times daily   Gauze Pads & Dressings (KERLIX GAUZE ROLL MEDIUM) MISC   No No   Si each 2 times daily   MELATONIN PO Unknown at Unknown time  Yes No   Sig: Take 1 mg by mouth nightly as needed    Nutritional Supplements (SILICA PO) 3/20/2018  Yes Yes   Sig: Unknown strength. Patient takes daily for hair growth/health.   ONE TOUCH ULTRA test strip   No No   Sig: USE AS DIRECTED TO TEST ONE TIME A DAY   ONETOUCH DELICA LANCETS 33G MISC  Self No No   Si Device daily   Potassium Chloride ER 20 MEQ TBCR 3/20/2018  No No   Sig: Take 1 tablet (20 mEq) by mouth 2 times daily   Wound Dressings (ADAPTIC NON-ADHERING DRESSING) PADS   No No   Sig: Externally apply 1 each topically 2 times daily   aspirin 81 MG chewable tablet 3/20/2018  No No   Sig: Take 1 tablet (81 mg) by mouth daily   atorvastatin (LIPITOR) 40 MG tablet 3/20/2018  No No   Sig: Take 1 tablet (40 mg) by mouth daily   blood glucose monitoring (ONE TOUCH ULTRA 2) meter device kit  Self Yes No   bumetanide (BUMEX) 2 MG tablet 3/20/2018  Yes  No   Sig: Take 1 tablet (2 mg) by mouth 2 times daily   carvedilol (COREG) 3.125 MG tablet 3/20/2018  No No   Sig: Take 1 tablet (3.125 mg) by mouth 2 times daily (with meals)   ferrous sulfate (IRON SUPPLEMENT) 325 (65 FE) MG tablet 3/20/2018  No No   Sig: Take 1 tablet (325 mg) by mouth daily (with breakfast)   gabapentin (NEURONTIN) 100 MG capsule 3/20/2018  No No   Sig: TAKE ONE CAPSULE BY MOUTH TWICE DAILY    levothyroxine (SYNTHROID/LEVOTHROID) 25 MCG tablet 3/20/2018  No No   Sig: Take 1 tablet (25 mcg) by mouth every morning (before breakfast)   metolazone (ZAROXOLYN) 2.5 MG tablet 2/20/2018  No No   Sig: Take 1 tablet (2.5 mg) by mouth daily as needed For weight over 170 Lbs.   order for DME   No No   Sig: Sterile 4x4 gauze; Use gauze twice daily for dressing changes.   pantoprazole (PROTONIX) 40 MG EC tablet 3/20/2018  No Yes   Sig: Take 1 tablet (40 mg) by mouth every morning   rivaroxaban ANTICOAGULANT (XARELTO) 20 MG TABS tablet 3/20/2018  No No   Sig: Take 1 tablet (20 mg) by mouth daily (with dinner) . DO NOT start until you have stopped the Warfarin.   spironolactone (ALDACTONE) 25 MG tablet 3/20/2018  No No   Sig: Take 1 tablet (25 mg) by mouth daily   traZODone (DESYREL) 50 MG tablet Unknown at Unknown time  No No   Sig: Take 0.5 tablets (25 mg) by mouth nightly as needed for sleep   venlafaxine (EFFEXOR-ER) 150 MG TB24 24 hr tablet 3/20/2018  No No   Sig: Take 1 tablet (150 mg) by mouth daily (with breakfast)      Facility-Administered Medications: None     Allergies   Allergies   Allergen Reactions     Blood Transfusion Related (Informational Only) Other (See Comments)     Patient has a history of a clinically significant antibody against RBC antigens.  A delay in compatible RBCs may occur.Patient has a history of a significant allergic transfusion reaction.  Consult with Blood Bank MD before ordering components.     Heparin      HIT/ thrombocytopenia     Lovenox [Enoxaparin] Other (See Comments)      Thrombocytopenia      Oxycodone      hallucinations     Toprol Xl [Metoprolol] Nausea and Vomiting     Adhesive Tape Itching and Rash     Diapers & Supplies Rash     Developed yeast infection from previous hospital stay       Social History   I have reviewed this patient's social history and updated it with pertinent information if needed. Carlos Alberto Fenton  reports that she quit smoking about 41 years ago. Her smoking use included Cigarettes. She started smoking about 59 years ago. She has a 10.00 pack-year smoking history. She has never used smokeless tobacco. She reports that she does not drink alcohol or use illicit drugs.    Family History   I have reviewed this patient's family history and updated it with pertinent information if needed.   Family History   Problem Relation Age of Onset     DIABETES Mother      C.A.D. Mother      Hypertension Mother      CEREBROVASCULAR DISEASE Mother      Mini Strokes     Other Cancer Mother      Skin Cancer     Hyperlipidemia Mother      Coronary Artery Disease Mother      DIABETES Father      C.A.D. Father      Hypertension Father      Other Cancer Father      Hyperlipidemia Father      Coronary Artery Disease Father      HEART DISEASE Sister      Arthritis Sister      Other Cancer Other      Skin Cancer       Review of Systems   The 10 point Review of Systems is negative other than noted in the HPI or here.     Physical Exam   Temp: 97.6  F (36.4  C) Temp src: Axillary BP: 106/59   Heart Rate: 52 Resp: 16 SpO2: 99 % O2 Device: Nasal cannula Oxygen Delivery: 4 LPM  Vital Signs with Ranges  Temp:  [97.3  F (36.3  C)-98.5  F (36.9  C)] 97.6  F (36.4  C)  Heart Rate:  [] 52  Resp:  [16-20] 16  BP: ()/(44-82) 106/59  FiO2 (%):  [35 %-90 %] 60 %  SpO2:  [66 %-100 %] 99 %  186 lbs 4.62 oz    GEN:  Alert, oriented x 3, appears comfortable, NAD.  HEENT:  Normocephalic/atraumatic, no scleral icterus, no nasal discharge, mouth moist.  CV:[KP1.1]  Bradycardic but  regular[KP1.6]  LUNGS:  Clear to auscultation bilaterally   ABD:  Active bowel sounds, soft, non-tender/non-distended.    EXT:[KP1.1]  Trace edema[KP1.2]  SKIN:  Dry to touch, no exanthems noted in the visualized areas.  NEURO:  No new focal deficits appreciated.    Data     EKG:[KP1.1] Sinus bradycardia[KP1.6]      Recent Labs  Lab 03/23/18  0506 03/23/18  0251 03/22/18  2226 03/22/18 2002 03/22/18  1332  03/22/18  1011 03/22/18  0603  03/21/18  1133 03/21/18  0640 03/20/18  2203   WBC 9.9  --   --   --   --   --   --   --  8.9  --  12.0*  --  10.3   HGB 8.2* 8.5* 8.3*  --   < > 8.1*  < >  --  8.9*  < > 8.4*  --  7.2*   *  --   --   --   --   --   --   --  116*  --  114*  --  119*     --   --   --   --   --   --   --  256  --  256  --  282   INR 1.85*  --   --  1.75*  --   --   --  1.93*  --   --  4.15*  --  5.05*     --   --   --   --   --   --   --  141  --   --  138 132*   POTASSIUM 4.8 4.6  --   --   --   --   --   --  3.4  --   --  4.0 4.5   CHLORIDE 108  --   --   --   --   --   --   --  106  --   --  99 95   CO2 22  --   --   --   --   --   --   --  21  --   --  25 27   BUN 33*  --   --   --   --   --   --   --  58*  --   --  79* 88*   CR 0.90  --   --   --   --   --   --   --  1.04  --   --  1.38* 1.78*   ANIONGAP 10  --   --   --   --   --   --   --  15*  --   --  15* 10   CARLY 8.2*  --   --   --   --   --   --   --  8.4*  --   --  8.8 8.7   *  --   --   --   --   --   --   --  106*  --   --  118* 109*   ALBUMIN 2.9*  --   --   --   --   --   --   --  3.0*  --   --  3.2* 3.4   PROTTOTAL 6.3*  --   --   --   --   --   --   --  6.6*  --   --  6.9 7.1   BILITOTAL 1.0  --   --   --   --   --   --   --  1.2  --   --  0.8 0.7   ALKPHOS 91  --   --   --   --   --   --   --  71  --   --  82 84   ALT 67*  --   --   --   --   --   --   --  50  --   --  31 27   AST 90*  --   --   --   --   --   --   --  73*  --   --  43 45   LIPASE  --   --   --   --   --   --   --   --   --   --   --   --   483*   TROPI  --  0.054*  --   --   --  0.048*  --   --   --   --   --   --  0.049*   < > = values in this interval not displayed.[KP1.1]    I very much appreciated the opportunity to see and assess Carlos Alberto Fenton in the hospital with CV Fellow Dr Bishop and resident.     I agree with the note above which summarizes my findings and current recommendations.  Please do not hesitate to contact my office if you have any questions or concerns.      Arnulfo Vazquez MD  Cardiac Arrhythmia Service  [facility]  [phone][DB1.1]       Revision History        User Key Date/Time User Provider Type Action    > DB1.1 3/26/2018 12:28 PM Arnulfo Vazquez MD Physician Sign     KP1.5 3/23/2018  2:36 PM Andrea Bishop MD Physician Sign     KP1.4 3/23/2018  1:06 PM Andrea Bishop MD Physician      KP1.6 3/23/2018  1:02 PM Andrea Bishop MD Physician      KP1.3 3/23/2018 11:24 AM Andrea Bishop MD Physician      KP1.2 3/23/2018 11:06 AM Andrea Bishop MD Physician      KP1.1 3/23/2018 10:44 AM Andrea Bishop MD Physician             Consults by Carloz Shane MD at 3/23/2018  9:09 AM     Author:  Carloz Shane MD Service:  Hematology Author Type:  Physician    Filed:  3/24/2018  9:52 PM Date of Service:  3/23/2018  9:09 AM Creation Time:  3/23/2018  9:08 AM    Status:  Signed :  Carloz Shane MD (Physician)     Consult Orders:    1. Hematology IP Consult: Patient to be seen: Routine - within 24 hours; Call back #: [id number]; Pt with elevated INR on xarelto, on warfrin in past, suspect tylenol toxicity; wondering about factor deficiency or possibly other cause.; Consultant may ente... [[id number]] ordered by Ayaka Mclaughlin MD at 03/23/18 0659                   Hematology New Consult          Carlos Alberto Fenton MRN# [id number]   Age: 77 year old YOB: 1940   Date of Admission: 3/20/2018     Reason for consult: Elevated INR         Assessment and  Recommendations:     Assessment:  1. Paroxysmal atrial fibrillation, on chronic anticoagulation wi[ST1.1]th Xarelto ([MR1.1][id number][ST1.1] =[MR1.1] 8[ST1.1])[MR1.1]  2.  Prior history of CVA  3.  Coronary artery disease, status post CABG   4. History of dry gangrene of the toes, bilateral  5.  History of heparin-induced thrombocytopenia, LORAINE positive, 2017  6.  Acetaminophen toxicity  7.  Supratherapeutic INR while not on VKA  8.  Occult GI bleed  9.  Chronic progressive macrocytosis   10. Subacute anemia  11. Elevated lactic acid, mild transaminitis, hypoalbuminemia  12.  Decompensated hypothyroidism    Ms. Carlos Alberto Fenton is a  77-year old woman with a past medical history including hypertension, hyperlipidemia, CVA in 2016, chronic diastolic heart failure, CAD status post prior CABG, paroxysmal atrial fibrillation, with a chads 2 vasc[ST1.1] score of[MR1.1] 8, currently on Xarelto anticoagulation, positive history of heparin-induced thrombocytopenia and prior right upper extremity line-associated DVT was admitted now for a possible GI bleed, coagulopathy in the setting of recent high levels of acetaminophen, concurrent with Xarelto.    Macrocytic anemia may reflect some degree of liver injury and hypothryoidism which contribute to[ST1.1] abnormalities[MR1.1] in RBC membrane synthesis, as well as a possible occult GI bleed, although iron studies suggestive of only mild iron deficiency at this time.  Elevated LDH with normal-range haptoglobin argues against ongoing hemolysis.       Elevated INR at presentation likely mutifactorial, driven by liver injury and possible vit K deficiency, as well as Xarelto.  Xarelto and Effexor are both CY substrates;  prior LAC testing was negative (2017); no current indication for re-testing at this time[ST1.1]. Also, with recent HARINI, Xarelto clearance will be delayed.[ST1.2]      Recommendations:  --Please check homocysteine and methylmalonic acid  results due to patient's high MCV despite normal-range vitamin B12 level.   --Will consider outpatient BMBx if patients macrocytic anemia does not normalize following resolution of liver injury and correction of hypothyroidism, and GI bleed.    --[ST1.1]Mixing studies essentially normalized, isolated factor levels are sufficient (factor 7/10 decreased c/w vitamin K deficiency; normal factor 5 suggests hepatic synthetic function is reasonably intact)[ST1.2]   --Avoid UFH or LMWH due to history of HIT.    --Consider pharmacy consultation RE: Xarelto drug interactions leading to elevated INR   --When able to resume anticoagulation, prophylactic dose fondaparinux[ST1.1] (needs to be carefully renally dosed)[ST1.2] may be a reasonable option; will update reccs following completion of ongoing evaluations.      Pt seen and discussed with Dr. Shane who agrees with the plan.    Please don't hesitate to call us with any questions    Jonny Hernandez MD/PhD  Fellow, Division of Hematology/Oncology and Bone Marrow Transplantation  [facility]  [phone][ST1.1]      HEMATOLOGY STAFF:  Seen with fellow, whose note reflects our joint evaluation, assessment, and plan.      Carloz Shane MD  Associate Professor of Medicine  Division of Hematology, Oncology, and Transplantation  Director, [facility]      Total time 110 minutes.[MR1.1]          HPI:   Mr/sFernie Fenton is a  77-year old woman with a complex past medical history now admitted with an upper GI bleed in the setting of a supratherapeutic INR while on Xarelto and taking large amounts of acetaminophen on a daily basis for back pain.      Macrocytic anemia noted, with increasing MCV for several months; normal B12 levels.  Peripheral smear and normal-range haptoglobin argue against hemolysis.  No signs of myelodysplasia on smear.  Iron panel shows replete ferritin, increased iron binding capacity, decreased iron saturation. She  received 10 mg vitamin K IV as well as 10 mg po, with near-normalization of her INR.       With regard to her past medical history, she was admitted to the [facility] from February 24, 2017 to March 3, 2017 for dry gangrene of her toes in the setting of a supratherapeutic INR.  Hematology was consulted during this admission; they felt that her ischemia was due to heparin-induced thrombocytopenia probably compounded by a degree of protein C and S deficiency in the setting of Coumadin administration.  During that admission she was found to have a nonocclusive thrombus of the right IJ and right subclavian vein.  Hypercoagulable workup at that time was unrevealing.  She was initially started on unfractionated heparin but then due to the decreasing platelets was tested for heparin-induced thrombocytopenia; HIT antibody testing and LORAINE were positive.  She was transitioned to argatroban and then fondaparinux, completing 3 months of therapy.      Recent Labs   Lab Test  03/23/18   0506  03/23/18   0251  03/22/18   2226  03/22/18   1618  03/22/18   1332   03/22/18   0603   03/21/18   1133  03/20/18   2203  02/22/18   0618  02/21/18   1736   07/07/17   1240   WBC  9.9   --    --    --    --    --   8.9   --   12.0*  10.3  6.8  7.3   < >  5.9   NEUTROPHIL   --    --    --    --    --    --   72.3   --   77.4  76.8   --   67.5   --   59.4   HGB  8.2*  8.5*  8.3*  8.1*  8.1*   < >  8.9*   < >  8.4*  7.2*  11.2*  12.2   < >  11.7   PLT  210   --    --    --    --    --   256   --   256  282  189  216   < >  203    < > = values in this interval not displayed.     Recent Labs   Lab Test  03/23/18   0506  03/23/18   0251  03/22/18   0603  03/21/18   0640  03/20/18   2203  02/22/18   1348   NA  140   --   141  138  132*  137   POTASSIUM  4.8  4.6  3.4  4.0  4.5  4.2   CHLORIDE  108   --   106  99  95  99   CO2  22   --   21  25  27  32   ANIONGAP  10   --   15*  15*  10  6   BUN  33*   --   58*  79*  88*  43*   CR  0.90    --   1.04  1.38*  1.78*  1.28*   CARLY  8.2*   --   8.4*  8.8  8.7  9.1     Recent Labs   Lab Test  03/23/18   0506  03/23/18   0251  03/22/18   0603   02/28/17   0439   02/16/17   0811  02/15/17   1428  02/14/17   0833   MAG  2.1  2.1  2.3   < >   --    < >  1.8  1.8  2.2   PHOS   --    --    --    --    --    --   2.2*  2.6  2.3*   LDH   --    --   436*   --   264*   --    --    --    --     < > = values in this interval not displayed.     Recent Labs   Lab Test  03/23/18   0506  03/22/18   0603  03/21/18   0640   02/28/17   0439   BILITOTAL  1.0  1.2  0.8   < >   --    ALKPHOS  91  71  82   < >   --    ALT  67*  50  31   < >   --    AST  90*  73*  43   < >   --    ALBUMIN  2.9*  3.0*  3.2*   < >   --    LDH   --   436*   --    --   264*    < > = values in this interval not displayed.       Review of system: Complete ROS otherwise negative         Past Medical History:[ST1.1]     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    parietral lesions bilateral       Antiplatelet or antithrombotic long-term use      Anxiety      Atrial fibrillation (H)      CAD (coronary artery disease)     2 vessel     Cancer (H) 1990    periodically have cancer on the skin removed     Cerebral artery occlusion with cerebral infarction (H) 10/2016    Cardioembolic strokes related to atrial fibrillation     Deep vein thrombosis (DVT) of axillary vein of right upper extremity (H) 2/25/2017     Depressive disorder 2001     Diabetes (H)      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 and application of Grafix;  Surgeon: Leah Santamaria MD;  Location: [facility] OR     ANESTHESIA CARDIOVERSION N/A 12/12/2017    Procedure: ANESTHESIA CARDIOVERSION;  Anesthesia Cardioversion ;  Surgeon: GENERIC ANESTHESIA PROVIDER;  Location: [facility] OR     BACK SURGERY       BYPASS GRAFT ARTERY CORONARY N/A 2/6/2017    Procedure: BYPASS GRAFT ARTERY CORONARY;  Surgeon: Mikhail Quiñones MD;  Location: [facility] OR     C APPENDECTOMY  1959     C CARDIAC SURG PROCEDURE UNLIST       C HAND/FINGER SURGERY UNLISTED       C STOMACH SURGERY PROCEDURE UNLISTED       HC SACROPLASTY       HC VASCULAR SURGERY PROCEDURE UNLIST       HYSTERECTOMY, PASTORA  1988     IRRIGATION AND DEBRIDEMENT FOOT, COMBINED Right 7/7/2017    Procedure: COMBINED IRRIGATION AND DEBRIDEMENT FOOT;  Irrigation and Debridement Right Foot with Graphix  *Latex Allergy*;  Surgeon: Leah Santamaria MD;  Location: [facility] OR     MAZE PROCEDURE N/A 2/6/2017    Procedure: MAZE PROCEDURE;  Surgeon: Mikhail Quiñones MD;  Location: [facility] OR     PICC INSERTION Left 02/25/2017    5fr TL Bard PICC, 47cm (3cm external) in the L basilic vein w/ tip in the SVC RA junction     TONSILLECTOMY  1942[ST1.3]             Social History:[ST1.1]     Social History     Social History     Marital status:      Spouse name: N/A     Number of children: N/A     Years of education: N/A     Occupational History     Not on file.     Social History Main Topics     Smoking status: Former Smoker     Packs/day: 1.00     Years: 10.00     Types: Cigarettes     Start date: 10/3/1958     Quit date: 7/4/1976     Smokeless tobacco: Never Used      Comment: Quit in 1976     Alcohol use No     Drug use: No     Sexual activity: Not Currently     Partners: Male     Birth control/ protection: Post-menopausal     Other Topics Concern     Parent/Sibling W/ Cabg, Mi Or Angioplasty Before 65f 55m? Yes     Social History Narrative[ST1.3]             Family History:[ST1.1]     Family History   Problem Relation  Age of Onset     DIABETES Mother      C.A.D. Mother      Hypertension Mother      CEREBROVASCULAR DISEASE Mother      Mini Strokes     Other Cancer Mother      Skin Cancer     Hyperlipidemia Mother      Coronary Artery Disease Mother      DIABETES Father      C.A.D. Father      Hypertension Father      Other Cancer Father      Hyperlipidemia Father      Coronary Artery Disease Father      HEART DISEASE Sister      Arthritis Sister      Other Cancer Other      Skin Cancer[ST1.3]                Allergies:   .[ST1.1]   Allergies   Allergen Reactions     Blood Transfusion Related (Informational Only) Other (See Comments)     Patient has a history of a clinically significant antibody against RBC antigens.  A delay in compatible RBCs may occur.Patient has a history of a significant allergic transfusion reaction.  Consult with Blood Bank MD before ordering components.     Heparin      HIT/ thrombocytopenia     Lovenox [Enoxaparin] Other (See Comments)     Thrombocytopenia      Oxycodone      hallucinations     Toprol Xl [Metoprolol] Nausea and Vomiting     Adhesive Tape Itching and Rash     Diapers & Supplies Rash     Developed yeast infection from previous hospital stay[ST1.3]             Medications:[ST1.1]     Current Facility-Administered Medications   Medication     glucagon 1 MG injection     DOPamine 400 mg in dextrose 5% 250 mL (adult std) - premix     atropine injection 0.5 mg     atropine 1 MG/10ML injection     glucagon 20 mg in dextrose 5% 100 mL infusion ADULT     piperacillin-tazobactam (ZOSYN) 3.375 g vial to attach to  mL bag     pantoprazole (PROTONIX) 40 mg IV push injection     acetylcysteine (ACETADOTE) 6,000 mg in sodium chloride 0.9 % 500 mL STEP THREE infusion     ALPRAZolam (XANAX) tablet 0.25 mg     atorvastatin (LIPITOR) tablet 40 mg     ferrous sulfate (IRON) tablet 325 mg     traZODone (DESYREL) half-tab 25 mg     venlafaxine (EFFEXOR-XR) 24 hr capsule 150 mg     naloxone (NARCAN)  "injection 0.1-0.4 mg     melatonin tablet 1 mg     senna-docusate (SENOKOT-S;PERICOLACE) 8.6-50 MG per tablet 1 tablet    Or     senna-docusate (SENOKOT-S;PERICOLACE) 8.6-50 MG per tablet 2 tablet     levothyroxine (SYNTHROID/LEVOTHROID) half-tab 37.5 mcg[ST1.3]           Vital signs:[ST1.1]  Temp: 97.6  F (36.4  C) Temp src: Axillary BP: 92/59   Heart Rate: 52 Resp: 16 SpO2: 100 % O2 Device: Nasal cannula Oxygen Delivery: 6 LPM Height: 157.5 cm (5' 2\") Weight: 84.5 kg (186 lb 4.6 oz)  Estimated body mass index is 34.07 kg/(m^2) as calculated from the following:    Height as of this encounter: 1.575 m (5' 2\").    Weight as of this encounter: 84.5 kg (186 lb 4.6 oz).[ST1.3]    Physical exam:[ST1.1]  Resting comfortably  Detailed exam not performed[MR1.1]           Data:   The labs and imaging were reviewed.      .[ST1.1]  Recent Results (from the past 24 hour(s))   D dimer quantitative    Collection Time: 03/22/18 10:11 AM   Result Value Ref Range    D Dimer 4.8 (H) 0.0 - 0.50 ug/ml FEU   Haptoglobin    Collection Time: 03/22/18 10:11 AM   Result Value Ref Range    Haptoglobin 80 35 - 175 mg/dL   Fibrinogen activity    Collection Time: 03/22/18 10:11 AM   Result Value Ref Range    Fibrinogen 302 200 - 420 mg/dL   INR    Collection Time: 03/22/18 10:11 AM   Result Value Ref Range    INR 1.93 (H) 0.86 - 1.14   Hemoglobin    Collection Time: 03/22/18 10:49 AM   Result Value Ref Range    Hemoglobin 8.6 (L) 11.7 - 15.7 g/dL   EKG 12-lead, tracing only    Collection Time: 03/22/18 12:06 PM   Result Value Ref Range    Interpretation ECG Click View Image link to view waveform and result    Blood gas venous    Collection Time: 03/22/18  1:32 PM   Result Value Ref Range    Ph Venous 7.28 (L) 7.32 - 7.43 pH    PCO2 Venous 50 40 - 50 mm Hg    PO2 Venous 30 25 - 47 mm Hg    Bicarbonate Venous 24 21 - 28 mmol/L    Base Deficit Venous 3.0 mmol/L    FIO2 21    Ammonia    Collection Time: 03/22/18  1:32 PM   Result Value Ref Range    " Ammonia 42 10 - 50 umol/L   Troponin I    Collection Time: 03/22/18  1:32 PM   Result Value Ref Range    Troponin I ES 0.048 (H) 0.000 - 0.045 ug/L   Blood culture    Collection Time: 03/22/18  1:32 PM   Result Value Ref Range    Specimen Description Blood Left Arm     Culture Micro No growth after 4 hours    Hemoglobin    Collection Time: 03/22/18  1:32 PM   Result Value Ref Range    Hemoglobin 8.1 (L) 11.7 - 15.7 g/dL   Lactic acid whole blood    Collection Time: 03/22/18  1:32 PM   Result Value Ref Range    Lactic Acid 3.1 (H) 0.7 - 2.0 mmol/L   Blood culture    Collection Time: 03/22/18  1:40 PM   Result Value Ref Range    Specimen Description Blood Right Arm     Culture Micro No growth after 4 hours    Glucose by meter    Collection Time: 03/22/18  1:43 PM   Result Value Ref Range    Glucose 225 (H) 70 - 99 mg/dL   Hemoglobin    Collection Time: 03/22/18  4:18 PM   Result Value Ref Range    Hemoglobin 8.1 (L) 11.7 - 15.7 g/dL   INR    Collection Time: 03/22/18  8:02 PM   Result Value Ref Range    INR 1.75 (H) 0.86 - 1.14   Lactic acid whole blood    Collection Time: 03/22/18  8:02 PM   Result Value Ref Range    Lactic Acid Duplicate request 0.7 - 2.0 mmol/L   Lactic acid whole blood    Collection Time: 03/22/18  8:02 PM   Result Value Ref Range    Lactic Acid 2.6 (H) 0.7 - 2.0 mmol/L   Hemoglobin    Collection Time: 03/22/18 10:26 PM   Result Value Ref Range    Hemoglobin 8.3 (L) 11.7 - 15.7 g/dL   EKG 12-lead, tracing only    Collection Time: 03/23/18  2:28 AM   Result Value Ref Range    Interpretation ECG Click View Image link to view waveform and result    Glucose by meter    Collection Time: 03/23/18  2:39 AM   Result Value Ref Range    Glucose 148 (H) 70 - 99 mg/dL   Lactic acid whole blood    Collection Time: 03/23/18  2:50 AM   Result Value Ref Range    Lactic Acid 3.4 (H) 0.7 - 2.0 mmol/L   Troponin I    Collection Time: 03/23/18  2:51 AM   Result Value Ref Range    Troponin I ES 0.054 (H) 0.000 - 0.045  ug/L   Magnesium    Collection Time: 03/23/18  2:51 AM   Result Value Ref Range    Magnesium 2.1 1.6 - 2.3 mg/dL   Potassium    Collection Time: 03/23/18  2:51 AM   Result Value Ref Range    Potassium 4.6 3.4 - 5.3 mmol/L   Hemoglobin    Collection Time: 03/23/18  2:51 AM   Result Value Ref Range    Hemoglobin 8.5 (L) 11.7 - 15.7 g/dL   Blood gas arterial    Collection Time: 03/23/18  3:30 AM   Result Value Ref Range    pH Arterial 7.30 (L) 7.35 - 7.45 pH    pCO2 Arterial 46 (H) 35 - 45 mm Hg    pO2 Arterial 20 (LL) 80 - 105 mm Hg    Bicarbonate Arterial 23 21 - 28 mmol/L    Base Deficit Art 3.4 mmol/L    FIO2 100.0    Lactic acid whole blood    Collection Time: 03/23/18  3:30 AM   Result Value Ref Range    Lactic Acid 2.3 (H) 0.7 - 2.0 mmol/L   Blood gas arterial    Collection Time: 03/23/18  3:56 AM   Result Value Ref Range    pH Arterial 7.37 7.35 - 7.45 pH    pCO2 Arterial 35 35 - 45 mm Hg    pO2 Arterial 418 (H) 80 - 105 mm Hg    Bicarbonate Arterial 20 (L) 21 - 28 mmol/L    Base Deficit Art 4.6 mmol/L    FIO2 90.0    Comprehensive metabolic panel    Collection Time: 03/23/18  5:06 AM   Result Value Ref Range    Sodium 140 133 - 144 mmol/L    Potassium 4.8 3.4 - 5.3 mmol/L    Chloride 108 94 - 109 mmol/L    Carbon Dioxide 22 20 - 32 mmol/L    Anion Gap 10 3 - 14 mmol/L    Glucose 199 (H) 70 - 99 mg/dL    Urea Nitrogen 33 (H) 7 - 30 mg/dL    Creatinine 0.90 0.52 - 1.04 mg/dL    GFR Estimate 60 (L) >60 mL/min/1.7m2    GFR Estimate If Black 73 >60 mL/min/1.7m2    Calcium 8.2 (L) 8.5 - 10.1 mg/dL    Bilirubin Total 1.0 0.2 - 1.3 mg/dL    Albumin 2.9 (L) 3.4 - 5.0 g/dL    Protein Total 6.3 (L) 6.8 - 8.8 g/dL    Alkaline Phosphatase 91 40 - 150 U/L    ALT 67 (H) 0 - 50 U/L    AST 90 (H) 0 - 45 U/L   CBC with platelets    Collection Time: 03/23/18  5:06 AM   Result Value Ref Range    WBC 9.9 4.0 - 11.0 10e9/L    RBC Count 2.27 (L) 3.8 - 5.2 10e12/L    Hemoglobin 8.2 (L) 11.7 - 15.7 g/dL    Hematocrit 27.0 (L) 35.0 -  47.0 %     (H) 78 - 100 fl    MCH 36.1 (H) 26.5 - 33.0 pg    MCHC 30.4 (L) 31.5 - 36.5 g/dL    RDW 25.8 (H) 10.0 - 15.0 %    Platelet Count 210 150 - 450 10e9/L   INR    Collection Time: 03/23/18  5:06 AM   Result Value Ref Range    INR 1.85 (H) 0.86 - 1.14   Magnesium    Collection Time: 03/23/18  5:06 AM   Result Value Ref Range    Magnesium 2.1 1.6 - 2.3 mg/dL   Acetaminophen level    Collection Time: 03/23/18  5:06 AM   Result Value Ref Range    Acetaminophen Level <2 mg/L   Lactic acid whole blood    Collection Time: 03/23/18  5:06 AM   Result Value Ref Range    Lactic Acid 3.5 (H) 0.7 - 2.0 mmol/L   Lactic acid whole blood    Collection Time: 03/23/18  6:44 AM   Result Value Ref Range    Lactic Acid 3.6 (H) 0.7 - 2.0 mmol/L[ST1.3]           Results for orders placed or performed during the hospital encounter of 03/20/18   XR Chest 2 Views    Narrative    EXAM: XR CHEST 2 VW  3/20/2018 10:29 PM      HISTORY: shortness of breath, weakness, ? PNA;     COMPARISON: 2/21/2018    FINDINGS: PA and lateral views of the chest obtained. Median  sternotomy wires. Implantable loop recorder. Left atrial appendage  exclusion device. The trachea is midline. The cardiac silhouette is  within normal limits. No pneumothorax or pleural effusion. Hazy left  retrocardiac opacities. Bilateral pulmonary vascular congestion.      Impression    IMPRESSION:   1. Hazy left basilar/retrocardiac airspace opacities, compatible with  atelectasis, aspiration, or infection.  2. Pulmonary vascular congestion.    I have personally reviewed the examination and initial interpretation  and I agree with the findings.    DUC SALGADO MD   CT Head w/o Contrast    Narrative    CT HEAD W/O CONTRAST 3/20/2018 11:43 PM    Provided History: weakness, arm twitching;     Comparison: CT head 2/21/2018.    Technique: Using multidetector thin collimation helical acquisition  technique, axial, coronal and sagittal CT images from the skull base  to the  vertex were obtained without intravenous contrast.     Findings:    No intracranial hemorrhage, mass effect, or midline shift. The  ventricles are proportionate to the cerebral sulci. Moderate global  cerebral volume loss. The gray to white matter differentiation of the  cerebral hemispheres is preserved. Patchy leukoaraiosis is unchanged.  The basal cisterns are patent.    The visualized paranasal sinuses are clear. The mastoid air cells are  clear.       Impression    Impression: No acute intracranial pathology.     I have personally reviewed the examination and initial interpretation  and I agree with the findings.    INO FOFANA MD   CT Abdomen Pelvis w/o Contrast    Narrative    EXAMINATION: CT ABDOMEN PELVIS W/O CONTRAST, 3/20/2018 11:39 PM    TECHNIQUE:  Helical CT images from the lung bases through the  symphysis pubis were obtained without IV contrast.     COMPARISON: Abdominal radiograph 2/6/2017, CT chest 3/20/2017    HISTORY: ? Retroperitoneal hemorrhage, AAA or other cause for back  pain, acute anemia, etc.;     FINDINGS:  LIVER: Tiny calcifications, likely calcified granulomata.    BILIARY: Within normal limits.    PANCREAS: Within normal limits.    SPLEEN: Tiny calcifications, likely calcified granulomata.    ADRENAL GLANDS: Within normal limits.    URINARY TRACT: Within normal limits.    REPRODUCTIVE ORGANS: Hysterectomy.    GASTROINTESTINAL TRACT: Normal caliber of the small and large bowel.    PERITONEUM/FLUID: Mild right upper quadrant perihepatic ascites and  low density mild-to moderate free fluid in the low pelvis. No walled  off fluid collections.    VESSELS: Normal caliber of the abdominal aorta. Moderate aortoiliac  atherosclerotic plaque.    LYMPH NODES: No enlarged lymph nodes.    LUNG BASES/LOW CHEST: Moderate atelectasis, including cicatricial  atelectasis in the lingula. Postsurgical changes of heart stenting.  Visualized thoracic aorta is of normal caliber.    BONES/SOFT TISSUES:  Median sternotomy wires. Unchanged multilevel  degenerative changes of the spine, greatest in the lumbar spine with  chronic slight leftward curvature with apex at L3, and mild leftward  subluxation of L2 and L3 and grade 1 retrolisthesis L3-4. There is  severe multilevel facet arthropathy in the lower lumbar spine.  Left-sided L4-5 and bilateral L5-S1 pars defects. Small soft tissue  stranding adjacent to the right iliac crest (series 5, image 340).      Impression    IMPRESSION:   1. No retroperitoneal, abdominal or pelvic hematoma.  2. Normal caliber of the abdominal aorta and visualized thoracic  aorta.  3. Mild right upper quadrant and pelvic low density free fluid of  unclear etiology, possibly related to fluid resuscitation.  4. Moderate-to-advanced degenerative spondylosis of the lumbar spine,  greatest at L3-4 and L4-5.  5. Small amount of focal soft tissue stranding lateral to the right  iliac crest, may represent a small ecchymosis.      I have personally reviewed the examination and initial interpretation  and I agree with the findings.    DUC SALGADO MD   XR Chest Port 1 View    Narrative    EXAM: XR CHEST PORT 1 VW  3/23/2018 2:43 AM      HISTORY: acute desat to 80s, now in Afib with HR in 50s;     COMPARISON: 3/20/2018    FINDINGS: Portable AP view of the chest obtained. Unchanged left  atrial appendage exclusion device and loop recorder. Median sternotomy  wires. Trachea is midline. The cardiac silhouette is within normal  limits. Small bilateral pleural effusions. No significant change in  the hazy left basilar opacities. No pneumothorax.       Impression    IMPRESSION:   1. Small bilateral pleural effusions.  2. Unchanged left basilar airspace opacities, compatible with  atelectasis, aspiration, or infection.    I have personally reviewed the examination and initial interpretation  and I agree with the findings.    DUC SALGADO MD[ST1.1]         Jonny Hernandez MD[ST1.3]            Revision  History        User Key Date/Time User Provider Type Action    > MR1.1 3/24/2018  9:52 PM Carloz Shane MD Physician Sign     [N/A] 3/23/2018  5:26 PM Jonny Hernandez MD Fellow Sign     ST1.2 3/23/2018  5:17 PM Jonny Hernandez MD Fellow Sign     ST1.3 3/23/2018  9:09 AM Jonny Hernandez MD Fellow      ST1.1 3/23/2018  9:08 AM Jonny Hernandez MD Fellow             Consults by Dee Dee Stephens MD at 3/23/2018  3:03 AM     Author:  Dee Dee Stephens MD Service:  Cardiology Author Type:  Physician    Filed:  3/23/2018  3:04 AM Date of Service:  3/23/2018  3:03 AM Creation Time:  3/23/2018  3:01 AM    Status:  Addendum :  Dee Dee Stephens MD (Physician)         Briefly, discussed EKG with EP fellow on overnight. We believe this to be slow afib because of its irregularity. If this was complete heart block and junctional escape, we would see a very regular rhthym. We recommend using glucagon, atropine, and dopamine to better help AV madelyn conduction at this time.    Per the EP fellow, please put in a formal consult for EP to see the patient tomorrow.    Thank you for allowing me to care for this patient. This has been discussed with the[AK1.1] EP fellow.[AK1.2]    Dee Dee Stephens MD  Cardiology Fellow, PGY-4[AK1.1]     Revision History        User Key Date/Time User Provider Type Action    > [N/A] 3/23/2018  3:04 AM Dee Dee Stephens MD Physician Addend     AK1.2 3/23/2018  3:03 AM Dee Dee Stephens MD Physician Sign     AK1.1 3/23/2018  3:01 AM Dee Dee Stephens MD Physician             Consults by Momo Gee APRN CNP at 3/21/2018 10:06 AM     Author:  Momo Gee APRN CNP Service:  Gastroenterology Author Type:  Nurse Practitioner    Filed:  3/21/2018  4:38 PM Date of Service:  3/21/2018 10:06 AM Creation Time:  3/21/2018  3:05 PM    Status:  Signed :  Momo Gee APRN CNP (Nurse Practitioner)     Consult Orders:    1. GI LUMINAL ADULT IP CONSULT: Patient to be seen: Routine within 24  hrs; Call back #: [id number]; 76yo on xarelto p/w acute hgb drop, occult positive in ED; Consultant may enter orders: Yes [[id number]] ordered by Naomy Anaya MD at 03/21/18 0520                    GASTROENTEROLOGY CONSULTATION      Date of Admission: 3/20/2018       Date of Consult: 3/21/18          Chief Complaint:   We were asked by Naomy Anaya MD to evaluate this patient with anemia.          ASSESSMENT AND RECOMMENDATIONS:   Assessment:  Carlos Alberto Fenton is a 77 year old female with a history of acute bilateral cerebral infarction in November 2016, HIT in 2017, RUE DVT, seizures in 2016, HTN, DM, HLD, chronic diastolic HF (last ECHO 12/12/17 EF 45-50%), CAD (s/p 3v CABG: LIMA-LAD, SVG-D1, SVG-dRCA and modified MAZE, ABDIEL ligation - 2/2016), paroxysmal AFib ([id number] - 8 on Xarelto), appendectomy, recently diagnosed hypothyroidism, and now admitted with Acetaminophen toxicity, INR of 5.05 concerning for liver source, and anemia with positive stool occult that prompted GI consult.     #Macrocytic anemia  #Acetaminophen toxicity  #Coagulopathy   Patient presented with generalized fatigue, weakness and dyspnea x2 days in the setting of anemia (hgb of 7.2 with baseline of 11.7-12.8), and hypothyroidism (TSH 81.10, low T4 0.44). Acetaminophen level was at 128 and patient has been taking 3-4 doses of Tylenol/day chronically. LFTs are normal which might not be an acute toxicity (patient received NAC x3 doses and now 68). Warfarin was stopped in January 2018 and switched to Xarelto at that time but INR of 5.05 appears too high for not being on Warfarin.     Positive stool occult in the setting of high INR is not unusual but patient does not overt GI bleed. Patient also has bruising on right lower back and hip that appears superficial. Abd/pelvic CT from 3/20/18 did not show acute abdomen. Plan is to rule out causes of anemia without clinical evidence of bleed.      Recommendations  --Full liquid diet   --Anemia w/u  "including hemolysis and deficiency causes  --Optimize hypothyroidism   --Monitor CBC and transfuse if hgb is less than 7  --Continue Protonix drip  --Accurate documentation of output (color, consistency and amount)    Gastroenterology follow up recommendations: TBD    Patient care plan discussed with Dr. Wong, GI staff physician. Thank you for involving us in this patient's care. Please do not hesitate to contact the GI service with any questions or concerns.     Momo Gee CNP  Department of Gastroenterology   -------------------------------------------------------------------------------------------------------------------   History is obtained from the patient and the medical record.          History of Present Illness:   Carlos Alberto Fenton is a 77 year old female with a history of acute bilateral cerebral infarction in November 2016, HIT in 2017, RUE DVT, seizures in 2016, HTN, DM, HLD, chronic diastolic HF (last ECHO 12/12/17 EF 45-50%), CAD (s/p 3v CABG: LIMA-LAD, SVG-D1, SVG-dRCA and modified MAZE, ABDIEL ligation - 2/2016), paroxysmal AFib ([id number] - 8 on Xarelto), appendectomy, recently diagnosed hypothyroidism, and now admitted with Acetaminophen toxicity, INR of 5.05 concerning for liver source, and anemia with positive stool occult that prompted GI consult.     Patient complains of having generalized fatigue, weakness, shortness of breath on exertion and overall unwell x2-3 days. She has mild generalized abdominal tenderness when she presses her abdomen but otherwise no abdominal pain otr chest pain. She is having some nausea and has had one episode of nonbloody emesis 2 days ago, no black or bloody stools.S he does have ongoing chronic back pain/low back and across both shoulder blades present for last month.  She's been increasing the amount of tylenol she's been taking lately, stating she takes \"2 red-white capsules\" about every 3-4 hrs daily for the last month. She has a known history of " urge incontinence and that is unchanged, but no hematuria, vaginal bleed or epistaxis.  No traumas or falls, no fevers or chills.  She has not had any syncope or near syncope, but is generally feeling much more fatigued and unwell.     She states she's also been bruising much more easily more recently. Has a large bruise on posterior upper R buttock of unknown cause. States minimal contact is causing her to bruise more recently; and this was not present with Warfarin. No history of prior bleed. Her primary physician told her that she has to take oral iron supplement but has not started. She denies NSAID, ETOH, or tobacco use. She stopped smoking in 1976. She has history of skin cancer but no other cancer.         Past Medical History:   Reviewed and edited as appropriate[MH1.1]  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    parietral lesions bilateral       Antiplatelet or antithrombotic long-term use      Anxiety      Atrial fibrillation (H)      CAD (coronary artery disease)     2 vessel     Cancer (H) 1990    periodically have cancer on the skin removed     Cerebral artery occlusion with cerebral infarction (H) 10/2016    Cardioembolic strokes related to atrial fibrillation     Deep vein thrombosis (DVT) of axillary vein of right upper extremity (H) 2/25/2017     Depressive disorder 2001     Diabetes (H)      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 and application of Grafix;  Surgeon: Leah Santamaria,  MD;  Location: [facility] OR     ANESTHESIA CARDIOVERSION N/A 12/12/2017    Procedure: ANESTHESIA CARDIOVERSION;  Anesthesia Cardioversion ;  Surgeon: GENERIC ANESTHESIA PROVIDER;  Location: [facility] OR     BACK SURGERY       BYPASS GRAFT ARTERY CORONARY N/A 2/6/2017    Procedure: BYPASS GRAFT ARTERY CORONARY;  Surgeon: Mikhail Quiñones MD;  Location: [facility] OR     C APPENDECTOMY  1959     C CARDIAC SURG PROCEDURE UNLIST       C HAND/FINGER SURGERY UNLISTED       C STOMACH SURGERY PROCEDURE UNLISTED       HC SACROPLASTY       HC VASCULAR SURGERY PROCEDURE UNLIST       HYSTERECTOMY, PASTORA  1988     IRRIGATION AND DEBRIDEMENT FOOT, COMBINED Right 7/7/2017    Procedure: COMBINED IRRIGATION AND DEBRIDEMENT FOOT;  Irrigation and Debridement Right Foot with Graphix  *Latex Allergy*;  Surgeon: Leah Santamaria MD;  Location: [facility] OR     MAZE PROCEDURE N/A 2/6/2017    Procedure: MAZE PROCEDURE;  Surgeon: Mikhail Quiñones MD;  Location: [facility] OR     PICC INSERTION Left 02/25/2017    5fr TL Bard PICC, 47cm (3cm external) in the L basilic vein w/ tip in the SVC RA junction     TONSILLECTOMY  1942[MH1.2]            Previous Endoscopy:[MH1.1]   No results found for this or any previous visit.[MH1.2]         Social History:   Reviewed and edited as appropriate[MH1.1]  Social History     Social History     Marital status:      Spouse name: N/A     Number of children: N/A     Years of education: N/A     Occupational History     Not on file.     Social History Main Topics     Smoking status: Former Smoker     Packs/day: 1.00     Years: 10.00     Types: Cigarettes     Start date: 10/3/1958     Quit date: 7/4/1976     Smokeless tobacco: Never Used      Comment: Quit in 1976     Alcohol use No     Drug use: No     Sexual activity: Not Currently     Partners: Male     Birth control/ protection: Post-menopausal     Other Topics Concern     Parent/Sibling W/ Cabg, Mi Or Angioplasty Before 65f 55m? Yes     Social History Narrative[MH1.2]            Family  History:   Reviewed and edited as appropriate[MH1.1]  Family History   Problem Relation Age of Onset     DIABETES Mother      C.A.D. Mother      Hypertension Mother      CEREBROVASCULAR DISEASE Mother      Mini Strokes     Other Cancer Mother      Skin Cancer     Hyperlipidemia Mother      Coronary Artery Disease Mother      DIABETES Father      C.A.D. Father      Hypertension Father      Other Cancer Father      Hyperlipidemia Father      Coronary Artery Disease Father      HEART DISEASE Sister      Arthritis Sister      Other Cancer Other      Skin Cancer[MH1.2]           Allergies:   Reviewed and edited as appropriate[MH1.1]     Allergies   Allergen Reactions     Blood Transfusion Related (Informational Only) Other (See Comments)     Patient has a history of a clinically significant antibody against RBC antigens.  A delay in compatible RBCs may occur.Patient has a history of a significant allergic transfusion reaction.  Consult with Blood Bank MD before ordering components.     Heparin      HIT/ thrombocytopenia     Lovenox [Enoxaparin] Other (See Comments)     Thrombocytopenia      Oxycodone      hallucinations     Toprol Xl [Metoprolol] Nausea and Vomiting     Adhesive Tape Itching and Rash     Diapers & Supplies Rash     Developed yeast infection from previous hospital stay[MH1.2]            Medications:[MH1.1]     Current Facility-Administered Medications   Medication     pantoprazole (PROTONIX) 80 mg in sodium chloride 0.9 % 100 mL infusion     ALPRAZolam (XANAX) tablet 0.25 mg     atorvastatin (LIPITOR) tablet 40 mg     carvedilol (COREG) tablet 3.125 mg     ferrous sulfate (IRON) tablet 325 mg     potassium chloride SA (K-DUR/KLOR-CON M) CR tablet 20 mEq     traZODone (DESYREL) half-tab 25 mg     venlafaxine (EFFEXOR-XR) 24 hr capsule 150 mg     naloxone (NARCAN) injection 0.1-0.4 mg     melatonin tablet 1 mg     sodium chloride 0.9% infusion     senna-docusate (SENOKOT-S;PERICOLACE) 8.6-50 MG per tablet 1  "tablet    Or     senna-docusate (SENOKOT-S;PERICOLACE) 8.6-50 MG per tablet 2 tablet     acetylcysteine (ACETADOTE) 3.92 g in sodium chloride 0.9 % 500 mL STEP TWO infusion     acetylcysteine (ACETADOTE) 6,000 mg in sodium chloride 0.9 % 500 mL STEP THREE infusion     [START ON 3/22/2018] levothyroxine (SYNTHROID/LEVOTHROID) half-tab 37.5 mcg     levothyroxine (SYNTHROID/LEVOTHROID) half-tab 12.5 mcg[MH1.2]             Review of Systems:   A complete review of systems was performed and is negative except as noted in the HPI           Physical Exam:[MH1.1]   /62 (BP Location: Right arm)  Pulse 61  Temp 97.4  F (36.3  C) (Oral)  Resp 18  Ht 1.575 m (5' 2\")  Wt 78.2 kg (172 lb 6.4 oz)  SpO2 95%  BMI 31.53 kg/m2[MH1.2]  Wt:[MH1.1]   Wt Readings from Last 2 Encounters:   03/21/18 78.2 kg (172 lb 6.4 oz)   03/02/18 78.2 kg (172 lb 6.4 oz)[MH1.2]      Constitutional: cooperative, pleasant, not dyspneic/diaphoretic, no acute distress  Eyes: Sclera anicteric/injected  Ears/nose/mouth/throat: Normal oropharynx without ulcers or exudate, mucus membranes moist, hearing intact  Neck: supple, thyroid normal size  CV: RRR. No edema in LE  Respiratory: Unlabored breathing. CTA bilaterally   Lymph: No axillary, submandibular, supraclavicular or inguinal lymphadenopathy  Abd: Nondistended, +bs, no hepatosplenomegaly, nontender, no peritoneal signs  Skin: warm, perfused, no jaundice. R toe wound covered with dressing. R lower back and hip ecchymosis that is outlined   Neuro: AAO x 3, No asterixis  Psych: Normal affect  MSK: Normal gait         Data:   Labs and imaging below were independently reviewed and interpreted    BMP[MH1.1]  Recent Labs  Lab 03/21/18  0640 03/20/18  2203    132*   POTASSIUM 4.0 4.5   CHLORIDE 99 95   CARLY 8.8 8.7   CO2 25 27   BUN 79* 88*   CR 1.38* 1.78*   * 109*[MH1.2]     CBC[MH1.1]  Recent Labs  Lab 03/21/18  1133 03/20/18  2203   WBC 12.0* 10.3   RBC 2.34* 1.95*   HGB 8.4* 7.2* "   HCT 26.7* 23.2*   * 119*   MCH 35.9* 36.9*   MCHC 31.5 31.0*   RDW 24.2* 21.9*    282[MH1.2]     INR[MH1.1]  Recent Labs  Lab 03/21/18  1133 03/20/18  2203   INR 4.15* 5.05*[MH1.2]     LFTs[MH1.1]  Recent Labs  Lab 03/21/18  0640 03/20/18 2203   ALKPHOS 82 84   AST 43 45   ALT 31 27   BILITOTAL 0.8 0.7   PROTTOTAL 6.9 7.1   ALBUMIN 3.2* 3.4[MH1.2]      PANC[MH1.1]  Recent Labs  Lab 03/20/18  2203   LIPASE 483*[MH1.2]       Reviewed images:  CT ABDOMEN PELVIS W/O CONTRAST, 3/20/2018 11:39 PM read by DUC SALGADO MD     TECHNIQUE:  Helical CT images from the lung bases through the  symphysis pubis were obtained without IV contrast.      COMPARISON: Abdominal radiograph 2/6/2017, CT chest 3/20/2017     HISTORY: ? Retroperitoneal hemorrhage, AAA or other cause for back  pain, acute anemia, etc.;      FINDINGS:  LIVER: Tiny calcifications, likely calcified granulomata.     BILIARY: Within normal limits.     PANCREAS: Within normal limits.     SPLEEN: Tiny calcifications, likely calcified granulomata.     ADRENAL GLANDS: Within normal limits.     URINARY TRACT: Within normal limits.     REPRODUCTIVE ORGANS: Hysterectomy.     GASTROINTESTINAL TRACT: Normal caliber of the small and large bowel.     PERITONEUM/FLUID: Mild right upper quadrant perihepatic ascites and  low density mild-to moderate free fluid in the low pelvis. No walled  off fluid collections.     VESSELS: Normal caliber of the abdominal aorta. Moderate aortoiliac  atherosclerotic plaque.     LYMPH NODES: No enlarged lymph nodes.     LUNG BASES/LOW CHEST: Moderate atelectasis, including cicatricial  atelectasis in the lingula. Postsurgical changes of heart stenting.  Visualized thoracic aorta is of normal caliber.     BONES/SOFT TISSUES: Median sternotomy wires. Unchanged multilevel  degenerative changes of the spine, greatest in the lumbar spine with  chronic slight leftward curvature with apex at L3, and mild leftward  subluxation of L2  and L3 and grade 1 retrolisthesis L3-4. There is  severe multilevel facet arthropathy in the lower lumbar spine.  Left-sided L4-5 and bilateral L5-S1 pars defects. Small soft tissue  stranding adjacent to the right iliac crest (series 5, image 340).         IMPRESSION:   1. No retroperitoneal, abdominal or pelvic hematoma.  2. Normal caliber of the abdominal aorta and visualized thoracic  aorta.  3. Mild right upper quadrant and pelvic low density free fluid of  unclear etiology, possibly related to fluid resuscitation.  4. Moderate-to-advanced degenerative spondylosis of the lumbar spine,  greatest at L3-4 and L4-5.  5. Small amount of focal soft tissue stranding lateral to the right  iliac crest, may represent a small ecchymosis.     [MH1.1]     Revision History        User Key Date/Time User Provider Type Action    > MH1.2 3/21/2018  4:38 PM Momo Gee APRN CNP Nurse Practitioner Sign     MH1.1 3/21/2018  3:05 PM Momo Gee APRN CNP Nurse Practitioner                      Progress Notes - Physician (Notes for yesterday and today)      Progress Notes by Rosaura Streeter MD at 4/3/2018  4:30 PM     Author:  Rosaura Streeter MD Service:  General Medicine Author Type:  Resident    Filed:  4/3/2018  4:38 PM Date of Service:  4/3/2018  4:30 PM Creation Time:  4/3/2018  4:30 PM    Status:  Attested :  Rosaura Streeter MD (Resident)    Cosigner:  Suzy Dorantes MD at 4/3/2018  6:29 PM        Attestation signed by Suzy Dorantes MD at 4/3/2018  6:29 PM        Attestation:  Physician Attestation   I, Suzy Dorantes, saw this patient with the resident and agree with the resident s findings and plan of care as documented in the resident s note.      I personally reviewed vital signs, medications and labs.    Key findings: overall stable, getting diuresis. Pulse ox with inconsistent reading, attributed to PAD. When appropriately captured, no hypoxia noted. If  remained stable, anticipate dc to TCU in am.    Suzy Dorantes  Date of Service (when I saw the patient): 04/03/18                               [facility], [place]    Internal Medicine Progress Note - [place] Service    Main Plans for Today   - start Bumex 2 mg this afternoon (got 1 dose last night)      Assessment & Plan   Carlos Alberto Fenton is a 77 year old female with PMH of HTN, HLD, DM, CVA (11/2016), CHF (last ECHO 12/12/17 EF 45-50%), CAD (s/p 3v CABG: LIMA-LAD, SVG-D1, SVG-dRCA and modified MAZE, ABDIEL ligation - 2/2016), paroxysmal AFib ([id number] - 8 on Xarelto), HIT, RUE DVT, recently diagnosed hypothyroidism, admitted 3/21/2018 for acute GIB, coagulopathy and concern for acute LI 2/2 acetaminophen toxicity with xarelto intake. Now with improved coagulopathy, liver tests, resolved GIB. 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  She has intermittently elevated lactic acids that fluctuates without intervention. Now remains inpt for decompensated HFrEF, which is improving with diuresis.      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  --Stopped xarelto   --Hematology consult to determine safest anticoagulation for stroke prevention in setting of [id number] 8. Per Heme, need to be on fondaparinaux. Cardiology ok with starting Fondaparinaux on April 1. Needs Heme f/u in 2-3 months.  --Fondaparinox started on 4/1 for stroke prevention  --started metoprolol 12.5 mg BID for episodes of RVR - may increase given still intermittent RVR episodes   --D/C trazodone as prolongs QTc  --completed augmentin per EP 5 days s/p PPM (3/26-3/30)  --Restarted PTA aspirin 81mg daily   --Restart bumex 2 mg on 4/3 (got 1 dose at night)  plan resume PTA 2 mg BID at discharge  -BID BMP  --monitor I/Os      --Continue home spironolactone  --Needs afterload reduction (ACEinh), start as able  --fondiparinaux 2.5 mg daily    # Concern for PAD in UEs vs Reynaud phenomenon:  concern for PAD in UEs given exam showing signs of distal UE hypoperfusion (bluish discoloration of finger tips (R>L) and cool distal UEs). Per vascular surgery consult obtaining ABIs and also DVT U/S of LE that did not show LE DVT. RADHA on 4/2 was normal. Pt's exam today shows improved bluish discoloration of finger tips.   - Podiatry referral outpatient per vascular surgery   - Vascular surgery sign off  - Wound care with Betadine BID    # Hypothyroidism  Recently diagnosed. On Levothyroxine. Last TSH 2/21 elevated to 88, T4 improved from 0.29 to 0.44.   -- increase levothyroxine to 37.5 from PTA dose of 25 mcg  -- Recheck TSH in 6 weeks.       Resolved issues:    # Lactic Acidosis, Resolved  Pt has had fluctuating LA since admission. Likely 2/2 poor perfusion of extremities (cyanotic and cool fingers, cools hands) d/t suspected PVD.Low suspicion for sepsis  given neg blood cultures, improving clinical status, no SIRS and fluctuating downtrending lactate levels without intervention    #?Acute on chronic hypoxic respiratory failure  Rapid response was called on 3/28 due to hypoxia--- difficulty getting O2 sat reading with O2 sat ~70-80s% on CPAP FiO2 100%.Pt is clinically unchanged, no increased WOB, no SOB, and otherwise stable. Unable to get ABG due to soft tissue edema.  In the MICU an ABG was drawn that showed O2 sat at 409 (repeat on 3L of O2 was 166). So, low sats likely due to inaccurate reading.   --cont PTA bumex 2mg BID and spironolactone 25 mg daily  --BiPAP nightly  --If concern for hypoxia (based on low O2 sats and clinical evidence of respiratory distress), need to check ABG for confirmation.       # Coagulopathy, INR to 5, elevated PT and PTT  # Acetaminophen toxicity  (elevated acetaminophen levels)  Pt states she was taking 3-4g tylenol per day for the past month for chronic back pain.   Pt previously on warfarin for Afib. She started xarelto as of Jan 2018. She states she was not taking warfarin and xarelto simultaneously. It is possible that her coagulopathy is occurring in the setting of xarelto. Per Heme, factor 7/10 decreased c/w vit K deficiency, no evidence of hepatic synthetic dysfunction. Her INR has continued to improve.  - NAC infusion D/C on 3/26 per GI.  --trend INR  --trend liver enzymes every couple of days   --homocysteine low, methylmalonic acid high  --Hep B and C panels negative  --F actin weakly positive; anti-mitochondial joseph neg and AMPARO neg      # Acute blood loss anemia (baseline 11-12 1 month prior, current hgb ~7) - now stable Hgb  Differential includes acute GI bleed (most likely). Hgb has been stable since admission and no evidence of bleeding seen.   Plan:  -- trend daily, transfuse <7  -- continue PPI BID   -- folate WNL, B12 WNL, transferrin WNL, iron WNL, haptoglobin, reticulocyte index 2 indicating marrow appropriate response.  --Need Hematology f/u in 2-3 months to eval for macrocytic anemia        # HARINI on CKD -- resolved  Cr at bsl ~1.1-4. On presentation 1.8. UA with hyaline casts, tachy, hypotensive. Likely prerenal in the setting of GIB and decreased PO intake/emesis. Cr improved with IVF.   -- trend BMP      # Tropinemia, likely 2/2 demand ischemia 2/2 anemia  EKG did not show ST elevation, ST segment depression or T wave inversions. Pt is not having CP. Likely demand ischemia.          Chronic problems  # CAD  # HLD  Plan:  --cont ASA  --cont atorvastatin       # Depression  -- continue PTA venlafaxine      # Insominia  -holding trazodone in lieu of prolonged QTc      # Pain Assessment:  Current Pain Score 4/3/2018   Patient currently in pain? no   Pain score (0-10) -   Pain location -   Pain descriptors -   CPOT pain score -   Carlos Alberto sanabria  pain level was assessed and she currently denies pain.      Diet: Low Saturated Fat Na <2400 mg  Room Service  Fluid restriction 2000 ML FLUID  Snacks/Supplements Adult: Ensure Clear; Between Meals  Fluids: none  DVT Prophylaxis: Anti-embolisim stockings (TEDs)  Code Status: Full Code    Disposition Plan   Expected discharge: 1-2 days recommended to transitional care unit once pt had been adequately diuresed.   Entered: Rosaura Streeter 04/03/2018, 4:30 PM   Information in the above section will display in the discharge planner report.      The patient's care was discussed with the Attending Physician, Dr. Rome.    Rosaura Streeter  [facility]  Maroon: 1  Pager: [phone]  Please see sticky note for cross cover information      Interval History   Overnight, got 1 dose of bumex 2 mg due to increase WOB, but not with hypoxia per ABG. She reported felt good this morning. Denies chest pain, SOB, abdominal pain, legs pain. Continues to feel tight both legs.       Physical Exam   Vital Signs: Temp: (P) 98.1  F (36.7  C) Temp src: (P) Axillary BP: (P) 98/78 Pulse: 70 Heart Rate: (P) 72 Resp: (P) 20 SpO2: (P) 93 % O2 Device: (P) Nasal cannula Oxygen Delivery: (P) 3 LPM  Weight: 185 lbs 14.4 oz  Gen: In no acute distress. Patient comfortably lying in bed  HEENT: No scleral icterus, Moist mucous membranes. No nasal discharge.  CV: Irregular rate, no murmurs or rubs detected  Resp: Clear to auscultation bilaterally. Without wheeze or crackles  Abdomen: Soft, non tender abdomen, normal active bowel sounds   Extremities: Pitting edema 2+, bilaterally with anasarca -- improving  Skin: bruises on back do not show rebleeding  Neuro: mentation in tact, no deficits noted  Oriented to conversation      Intake/Output Summary (Last 24 hours) at 04/01/18 1643  Last data filed at 04/01/18 1600   Gross per 24 hour   Intake              870 ml   Output             2825 ml   Net            -1955 ml       Data   Medications      Injection Device for insulin         povidone-iodine   Topical BID     fondaparinux  2.5 mg Subcutaneous Q24H     aspirin  81 mg Oral QAM     spironolactone  25 mg Oral Daily     sodium chloride (PF)  10 mL Intracatheter Q8H     multivitamin, therapeutic  1 tablet Oral Daily     metoprolol tartrate  12.5 mg Oral BID     pantoprazole  40 mg Oral BID AC     potassium chloride  40 mEq Oral Daily     polyethylene glycol  17 g Oral Daily     melatonin  3 mg Oral At Bedtime     lidocaine  1 patch Transdermal Q24H     lidocaine   Transdermal Q24h     lidocaine   Transdermal Q8H     atorvastatin  40 mg Oral At Bedtime     insulin aspart  0.5-2.5 Units Subcutaneous TID AC     insulin aspart  0.5-2.5 Units Subcutaneous At Bedtime     venlafaxine  150 mg Oral Daily with breakfast     ferrous sulfate  325 mg Oral Daily with breakfast     levothyroxine  37.5 mcg Oral QAM AC     Data     Recent Labs  Lab 04/03/18  0803 04/02/18  2112 04/02/18  0734  04/01/18  0642  03/31/18  0832 03/30/18  1349 03/30/18  0711 03/29/18  0415   WBC  --   --   --   --  5.4  --  5.7  --  6.8 7.0   HGB  --   --   --   --  7.7*  --  8.0* 8.8* 8.1* 7.8*   MCV  --   --   --   --  121*  --  119*  --  120* 121*   PLT  --   --   --   --  160  --  160  --  154 131*   INR  --   --   --   --  1.61*  --  1.61*  --  1.59* 1.66*    135 137  < > 140  < > 138  --  138 139   POTASSIUM 4.7 5.0 4.1  < > 3.4  < > 3.6  --  4.0 3.6   CHLORIDE 96 95 94  < > 100  < > 100  --  103 103   CO2 34* 31 37*  < > 33*  < > 31  --  28 27   BUN 18 18 16  < > 13  < > 12  --  14 12   CR 0.95 0.93 0.86  < > 0.78  < > 0.73  --  0.73 0.74   ANIONGAP 8 9 5  < > 7  < > 7  --  7 10   CARLY 9.4 9.1 9.2  < > 8.0*  < > 8.6  --  9.3 8.8   * 189* 100*  < > 116*  < > 89  --  112* 91   ALBUMIN  --   --   --   --   --   --   --   --  2.7* 2.6*   PROTTOTAL  --   --   --   --   --   --   --   --  6.1* 5.6*   BILITOTAL  --   --   --   --   --   --   --   --  1.9* 1.8*   ALKPHOS  --   --   --    --   --   --   --   --  215* 176*   ALT  --   --   --   --   --   --   --   --  55* 57*   AST  --   --   --   --   --   --   --   --  100* 108*   < > = values in this interval not displayed.  Recent Results (from the past 24 hour(s))   XR Chest Port 1 View    Narrative    XR CHEST PORT 1 VW  4/2/2018 9:02 PM      HISTORY: worsening SOB;     COMPARISON: Same date at 1439 hrs.    FINDINGS: Single portable AP view of the chest semiupright. Support  devices in stable positions. Cardiac silhouette is enlarged, stable.  Pulmonary vasculature is indistinct. Perihilar and bibasilar opacities  are unchanged from prior. No significant pleural effusion, or  pneumothorax. Left atrial clip device. Cardiac monitoring device is  unchanged. Sternotomy wires.      Impression    IMPRESSION: Stable appearance of bibasilar subsegmental atelectasis  and/or consolidation. Mild perihilar opacities are stable.    I have personally reviewed the examination and initial interpretation  and I agree with the findings.    MANE LAWLER MD[JS1.1]         Revision History        User Key Date/Time User Provider Type Action    > JS1.1 4/3/2018  4:38 PM Rosaura Streeter MD Resident Sign            Progress Notes by Nevaeh Rome MD at 4/1/2018  4:38 PM     Author:  Nevaeh Rome MD Service:  General Medicine Author Type:  Physician    Filed:  4/3/2018  7:06 AM Date of Service:  4/1/2018  4:38 PM Creation Time:  4/1/2018  4:38 PM    Status:  Signed :  Nevaeh Rome MD (Physician)         [facility]    Internal Medicine Progress Note - [facility] Service    Main Plans for Today       Assessment & Plan   Carlos Alberto Fenton is a 77 year old female with PMH of HTN, HLD, DM, CVA (11/2016), CHF (last ECHO 12/12/17 EF 45-50%), CAD (s/p 3v CABG: LIMA-LAD, SVG-D1, SVG-dRCA and modified MAZE, ABDIEL ligation - 2/2016), paroxysmal AFib ([id number] - 8 on Xarelto), HIT, RUE DVT, recently diagnosed hypothyroidism, admitted 3/21/2018  for acute GIB, coagulopathy and concern for acute LI 2/2 acetaminophen toxicity with xarelto intake. Now with improved coagulopathy, liver tests, resolved GIB. 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  She has intermittently elevated lactic acids that fluctuates without intervention. Now remains inpt for decompensated HFrEF and concern for PAD in UEs given exam showing signs of distal UE hypoperfusion (bluish discoloration of finger tips (R>L) and cool distal UEs).       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  --Stopped xarelto   --Hematology consult to determine safest anticoagulation for stroke prevention in setting of [id number]. Per Heme, need to be on fondaparinaux. Cardiology ok with starting Fondaparinaux on April 1. Needs Heme f/u in 2-3 months.  --started metoprolol 12.5 mg BID for episodes of RVR - may increase given still intermittent RVR episodes   --D/C trazodone as prolongs QTc  --augmentin per EP 5 days s/p PPM (3/26-3/30)  --Restarted PTA aspirin 81mg daily   --PTA bumex 2 mg BID increased to 3 mg AM and 2 mg PM  -BID BMP  --monitor I/Os      --Continue home spironolactone  --Needs afterload reduction (ACEinh), start as able  --fondiparinaux 2.5 mg daily    # Concern for PAD in UEs vs Reynaud phenomenon:  concern for PAD in UEs given exam showing signs of distal UE hypoperfusion (bluish discoloration of finger tips (R>L) and cool distal UEs). Per vascular surgery consult obtaining ABIs and also DVT U/S of LE that did not show LE DVT.     # Lactic Acidosis, Resolved  Pt has had fluctuating LA since admission. Likely 2/2 poor perfusion of extremities (cyanotic and cool fingers, cools hands) d/t suspected PVD.Low suspicion for  sepsis  given neg blood cultures, improving clinical status, no SIRS and fluctuating downtrending lactate levels without intervention      # Hypothyroidism  Recently diagnosed. On Levothyroxine. Last TSH 2/21 elevated to 88, T4 improved from 0.29 to 0.44.   -- increase levothyroxine to 37.5 from PTA dose of 25 mcg  -- Recheck TSH in 6 weeks.       Resolved issues:    #?Acute on chronic hypoxic respiratory failure  Rapid response was called on 3/28 due to hypoxia--- difficulty getting O2 sat reading with O2 sat ~70-80s% on CPAP FiO2 100%.Pt is clinically unchanged, no increased WOB, no SOB, and otherwise stable. Unable to get ABG due to soft tissue edema.  In the MICU an ABG was drawn that showed O2 sat at 409 (repeat on 3L of O2 was 166). So, low sats likely due to inaccurate reading.   --cont PTA bumex 2mg BID and spironolactone 25 mg daily  --BiPAP nightly  --If concern for hypoxia (based on low O2 sats and clinical evidence of respiratory distress), need to check ABG for confirmation.       # Coagulopathy, INR to 5, elevated PT and PTT  # Acetaminophen toxicity (elevated acetaminophen levels)  Pt states she was taking 3-4g tylenol per day for the past month for chronic back pain.   Pt previously on warfarin for Afib. She started xarelto as of Jan 2018. She states she was not taking warfarin and xarelto simultaneously. It is possible that her coagulopathy is occurring in the setting of xarelto. Per Heme, factor 7/10 decreased c/w vit K deficiency, no evidence of hepatic synthetic dysfunction. Her INR has continued to improve.  - NAC infusion D/C on 3/26 per GI.  --trend INR  --trend liver enzymes every couple of days   --homocysteine low, methylmalonic acid high  --Hep B and C panels negative  --F actin weakly positive; anti-mitochondial joseph neg and AMPARO neg      # Acute blood loss anemia (baseline 11-12 1 month prior, current hgb ~7) - now stable Hgb  Differential includes acute GI bleed (most likely). Hgb has  been stable since admission and no evidence of bleeding seen.   Plan:  -- trend daily, transfuse <7  -- continue PPI BID   -- folate WNL, B12 WNL, transferrin WNL, iron WNL, haptoglobin, reticulocyte index 2 indicating marrow appropriate response.  --Need Hematology f/u in 2-3 months to eval for macrocytic anemia        # HARINI on CKD -- resolved  Cr at bsl ~1.1-4. On presentation 1.8. UA with hyaline casts, tachy, hypotensive. Likely prerenal in the setting of GIB and decreased PO intake/emesis. Cr improved with IVF.   -- trend BMP      # Tropinemia, likely 2/2 demand ischemia 2/2 anemia  EKG did not show ST elevation, ST segment depression or T wave inversions. Pt is not having CP. Likely demand ischemia.          Chronic problems  # CAD  # HLD  Plan:  --cont ASA  --cont atorvastatin       # Depression  -- continue PTA venlafaxine      # Insominia  -holding trazodone in lieu of prolonged QTc      # Pain Assessment:  Current Pain Score 4/1/2018   Patient currently in pain? denies   Pain score (0-10) -   Pain location -   Pain descriptors -   CPOT pain score -   Asenath s pain level was assessed and she currently denies pain.      Diet: Low Saturated Fat Na <2400 mg  Calorie Counts  Snacks/Supplements Adult: Ensure Clear; Between Meals  Room Service  Fluid restriction 2000 ML FLUID  Fluids: none  DVT Prophylaxis: Anti-embolisim stockings (TEDs)  Code Status: Full Code    Disposition Plan   Expected discharge: 3-4 days recommended to transitional care unit once pt had been adequately diuresed.   Entered: Ayaka Mclaughlin 04/01/2018, 4:38 PM   Information in the above section will display in the discharge planner report.      The patient's care was discussed with the Attending Physician, Dr. Rome.    Ayaka Mclaughlin  [facility]: 1  Pager: [phone]  Please see sticky note for cross cover information[DM1.1]    Physician Attestation  I, Nevaeh Rome MD, saw this patient with the resident and  agree with the resident s findings and plan of care as documented in the resident s note with my edits.     I personally reviewed vital signs, medications, labs and imaging.    Nevaeh Rome MD  Date of Service (when I saw the patient): 4/1/18[ES1.1]            Interval History   Nursing notes reviewed. No events overnight.     No CP, SOB, Abd pain, n/v or f/c.     Physical Exam   Vital Signs: Temp: 97.8  F (36.6  C) Temp src: Oral BP: 105/85 Pulse: 72 Heart Rate: 105 Resp: 16 SpO2: 97 % O2 Device: None (Room air) Oxygen Delivery: 4 LPM  Weight: 201 lbs 0 oz  Gen: In no acute distress. Patient comfortably lying in bed  HEENT: No scleral icterus, Moist mucous membranes. No nasal discharge.  CV: Irregular rate, no murmurs or rubs detected  Resp: Clear to auscultation bilaterally. Without wheeze or crackles  Abdomen: Soft, non tender abdomen, normal active bowel sounds   Extremities: Pitting edema 3+, bilaterally with anasarca   Skin: bruises on back do not show rebleeding  Neuro: mentation in tact, no deficits noted  Oriented to conversation[DM1.1]      Intake/Output Summary (Last 24 hours) at 04/01/18 1643  Last data filed at 04/01/18 1600   Gross per 24 hour   Intake              870 ml   Output             2825 ml   Net            -1955 ml[DM1.2]       Data   Medications     Injection Device for insulin         [START ON 4/2/2018] bumetanide  3 mg Oral QAM     bumetanide  2 mg Oral Q24H     fondaparinux  2.5 mg Subcutaneous Q24H     aspirin  81 mg Oral QAM     spironolactone  25 mg Oral Daily     sodium chloride (PF)  10 mL Intracatheter Q8H     multivitamin, therapeutic  1 tablet Oral Daily     metoprolol tartrate  12.5 mg Oral BID     pantoprazole  40 mg Oral BID AC     potassium chloride  40 mEq Oral Daily     polyethylene glycol  17 g Oral Daily     melatonin  3 mg Oral At Bedtime     lidocaine  1 patch Transdermal Q24H     lidocaine   Transdermal Q24h     lidocaine   Transdermal Q8H     atorvastatin  40 mg Oral At  Bedtime     insulin aspart  0.5-2.5 Units Subcutaneous TID AC     insulin aspart  0.5-2.5 Units Subcutaneous At Bedtime     venlafaxine  150 mg Oral Daily with breakfast     ferrous sulfate  325 mg Oral Daily with breakfast     levothyroxine  37.5 mcg Oral QAM AC     Data     Recent Labs  Lab 04/01/18  0642 03/31/18  1834 03/31/18  0832 03/30/18  1349 03/30/18  0711 03/29/18  0415   WBC 5.4  --  5.7  --  6.8 7.0   HGB 7.7*  --  8.0* 8.8* 8.1* 7.8*   *  --  119*  --  120* 121*     --  160  --  154 131*   INR 1.61*  --  1.61*  --  1.59* 1.66*    138 138  --  138 139   POTASSIUM 3.4 4.2 3.6  --  4.0 3.6   CHLORIDE 100 100 100  --  103 103   CO2 33* 32 31  --  28 27   BUN 13 12 12  --  14 12   CR 0.78 0.84 0.73  --  0.73 0.74   ANIONGAP 7 6 7  --  7 10   CARLY 8.0* 9.2 8.6  --  9.3 8.8   * 152* 89  --  112* 91   ALBUMIN  --   --   --   --  2.7* 2.6*   PROTTOTAL  --   --   --   --  6.1* 5.6*   BILITOTAL  --   --   --   --  1.9* 1.8*   ALKPHOS  --   --   --   --  215* 176*   ALT  --   --   --   --  55* 57*   AST  --   --   --   --  100* 108*     Recent Results (from the past 24 hour(s))   US Lower Extremity Venous Duplex Right    Narrative    EXAMINATION: DOPPLER VENOUS ULTRASOUND OF THE RIGHT LOWER EXTREMITY,  4/1/2018 1:51 PM     COMPARISON: 3/30/2017    HISTORY: Significant swelling, rule out DVT    TECHNIQUE:  Gray-scale evaluation with compression, spectral flow, and  color Doppler assessment of the deep venous system of the right leg  from groin to knee, and then at the ankle.    FINDINGS:  In the right lower extremity, the common femoral, femoral, popliteal  and posterior tibial veins demonstrate normal compressibility and  blood flow. Calf edema. Pulsatile flow throughout.        Impression    IMPRESSION: No evidence of right lower extremity deep venous  thrombosis.    I have personally reviewed the examination and initial interpretation  and I agree with the findings.    BEREKET  MD JEREMIAH[DM1.1]         Revision History        User Key Date/Time User Provider Type Action    > ES1.1 4/3/2018  7:06 AM Nevaeh Rome MD Physician Sign     DM1.2 4/1/2018  4:46 PM Ayaka Mclaughlin MD Resident Sign     DM1.1 4/1/2018  4:38 PM Ayaka Mclaughlin MD Resident             Progress Notes by Nevaeh Rome MD at 4/2/2018 12:24 PM     Author:  Nevaeh Rome MD Service:  General Medicine Author Type:  Physician    Filed:  4/3/2018  6:51 AM Date of Service:  4/2/2018 12:24 PM Creation Time:  4/2/2018 12:24 PM    Status:  Signed :  Nevaeh Rome MD (Physician)         [facility]    Internal Medicine Progress Note - [facility] Service    Main Plans for Today   - diuretics holiday today due to soft BP and worsening contraction alkalosis  -[JS1.1] rapid response was called in the afternoon due to concern for hypoxia. Patient told the nurse that she felt SOB. Her O2 sat on CPAP FiO2 30% was low 80%. Increased to BIPAP titrated FiO2 to 80% with O2 sat 88% from the  ear lobe probe. MD eval bedside. Patient was not in distress. Lungs--mild crackles BLL, no wheezing. After warming her hand--the finger probe got a good wave form with O2sat 100%. Able to wean down FiO2 to CPAP 30% and O2 sat 100%. VBG showed compensated metabolic alkalosis. CXR showed increased bibasilar opacities likely from atelectasis. Her low O2 reading this time is likely from poor wave form from poor peripheral perfusion. Will continue weaning her O2, keep O2 sat >92%.  - RADHA--->normal[JS1.2]    Assessment & Plan   Carlos Alberto Fenton is a 77 year old female with PMH of HTN, HLD, DM, CVA (11/2016), CHF (last ECHO 12/12/17 EF 45-50%), CAD (s/p 3v CABG: LIMA-LAD, SVG-D1, SVG-dRCA and modified MAZE, ABDIEL ligation - 2/2016), paroxysmal AFib ([id number] - 8 on Xarelto), HIT, RUE DVT, recently diagnosed hypothyroidism, admitted 3/21/2018 for acute GIB, coagulopathy and concern for acute LI 2/2 acetaminophen  toxicity with xarelto intake. Now with improved coagulopathy, liver tests, resolved GIB. 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  She has intermittently elevated lactic acids that fluctuates without intervention. 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  --Stopped xarelto   --Hematology consult to determine safest anticoagulation for stroke prevention in setting of [id number] 8. Per Heme, need to be on fondaparinaux. Cardiology ok with starting Fondaparinaux on April 1[JS1.1].[ES1.1] Needs Heme f/u in 2-3 months.[JS1.1]  --Fondaparinox started on 4/1 for stroke prevention[ES1.1]  --started metoprolol 12.5 mg BID for episodes of RVR - may increase given still intermittent RVR episodes   --D/C trazodone as prolongs QTc  --augmentin per EP 5 days s/p PPM (3/26-3/30)  --Restarted PTA aspirin 81mg daily   --Holding PTA bumex 2 mg BID on 4/2 due to soft BP and alkalosis after got extra 1 mg of bumex on 4/1  -BID BMP  --monitor I/Os      --Continue home spironolactone  --Needs afterload reduction (ACEinh), start as able  --fondiparinaux 2.5 mg daily    # Concern for PAD in UEs vs Reynaud phenomenon:  concern for PAD in UEs given exam showing signs of distal UE hypoperfusion (bluish discoloration of finger tips (R>L) and cool distal UEs). Per vascular surgery consult obtaining ABIs and also DVT U/S of LE that did not show LE DVT.[JS1.1] RADHA on 4/2 was normal.[JS1.2] Pt's exam today shows improved bluish discoloration of finger tips.[ES1.1]       # Hypothyroidism  Recently diagnosed. On Levothyroxine. Last TSH 2/21 elevated to 88, T4 improved from 0.29 to  0.44.   -- increase levothyroxine to 37.5 from PTA dose of 25 mcg  -- Recheck TSH in 6 weeks.       Resolved issues:    # Lactic Acidosis, Resolved  Pt has had fluctuating LA since admission. Likely 2/2 poor perfusion of extremities (cyanotic and cool fingers, cools hands) d/t suspected PVD.Low suspicion for sepsis  given neg blood cultures, improving clinical status, no SIRS and fluctuating downtrending lactate levels without intervention    #?Acute on chronic hypoxic respiratory failure  Rapid response was called on 3/28 due to hypoxia--- difficulty getting O2 sat reading with O2 sat ~70-80s% on CPAP FiO2 100%.Pt is clinically unchanged, no increased WOB, no SOB, and otherwise stable. Unable to get ABG due to soft tissue edema.  In the MICU an ABG was drawn that showed O2 sat at 409 (repeat on 3L of O2 was 166). So, low sats likely due to inaccurate reading.   --cont PTA bumex 2mg BID and spironolactone 25 mg daily  --BiPAP nightly  --If concern for hypoxia (based on low O2 sats and clinical evidence of respiratory distress), need to check ABG for confirmation.       # Coagulopathy, INR to 5, elevated PT and PTT  # Acetaminophen toxicity (elevated acetaminophen levels)  Pt states she was taking 3-4g tylenol per day for the past month for chronic back pain.   Pt previously on warfarin for Afib. She started xarelto as of Jan 2018. She states she was not taking warfarin and xarelto simultaneously. It is possible that her coagulopathy is occurring in the setting of xarelto. Per Heme, factor 7/10 decreased c/w vit K deficiency, no evidence of hepatic synthetic dysfunction. Her INR has continued to improve.  - NAC infusion D/C on 3/26 per GI.  --trend INR  --trend liver enzymes every couple of days   --homocysteine low, methylmalonic acid high  --Hep B and C panels negative  --F actin weakly positive; anti-mitochondial joseph neg and AMPARO neg      # Acute blood loss anemia (baseline 11-12 1 month prior, current hgb ~7) -  now stable Hgb  Differential includes acute GI bleed (most likely). Hgb has been stable since admission and no evidence of bleeding seen.   Plan:  -- trend daily, transfuse <7  -- continue PPI BID   -- folate WNL, B12 WNL, transferrin WNL, iron WNL, haptoglobin, reticulocyte index 2 indicating marrow appropriate response.  --Need Hematology f/u in 2-3 months to eval for macrocytic anemia        # HARINI on CKD -- resolved  Cr at bsl ~1.1-4. On presentation 1.8. UA with hyaline casts, tachy, hypotensive. Likely prerenal in the setting of GIB and decreased PO intake/emesis. Cr improved with IVF.   -- trend BMP      # Tropinemia, likely 2/2 demand ischemia 2/2 anemia  EKG did not show ST elevation, ST segment depression or T wave inversions. Pt is not having CP. Likely demand ischemia.          Chronic problems  # CAD  # HLD  Plan:  --cont ASA  --cont atorvastatin       # Depression  -- continue PTA venlafaxine      # Insominia  -holding trazodone in lieu of prolonged QTc      # Pain Assessment:  Current Pain Score 4/2/2018   Patient currently in pain? yes   Pain score (0-10) -   Pain location Back   Pain descriptors Aching   CPOT pain score -   Asenath s pain level was assessed and she currently denies pain.      Diet: Low Saturated Fat Na <2400 mg  Calorie Counts  Snacks/Supplements Adult: Ensure Clear; Between Meals  Room Service  Fluid restriction 2000 ML FLUID  Fluids: none  DVT Prophylaxis: Anti-embolisim stockings (TEDs)  Code Status: Full Code    Disposition Plan   Expected discharge: 1-2 days recommended to transitional care unit once pt had been adequately diuresed.   Entered: Rosaura Streeter 04/02/2018, 12:24 PM   Information in the above section will display in the discharge planner report.      The patient's care was discussed with the Attending Physician, Dr. Rome.    Rosaura Streeter  [facility]  [place]: 1  Pager: [phone]  Please see sticky note for cross cover  information[JS1.1]    Physician Attestation  I, Nevaeh Rome MD, saw this patient with the resident and agree with the resident s findings and plan of care as documented in the resident s note with my edits.     I personally reviewed vital signs, medications, labs and imaging.    Nevaeh Rome MD  Date of Service (when I saw the patient): 4/2/18[ES1.1]            Interval History   Nursing notes reviewed. BP overnight was softer to ~90/60s. Her urine output was net negative 3.4L after extra 1 mg of bumex yesterday. She feels overall good. Denies chest pain, SOB. Continues to feel tense both legs. Good appetite.       Physical Exam   Vital Signs: Temp: 97.6  F (36.4  C) Temp src: Oral BP: 90/68 Pulse: 75 Heart Rate: 70 Resp: 20 SpO2: 100 % O2 Device: Nasal cannula Oxygen Delivery: 3 LPM  Weight: 194 lbs 4.8 oz  Gen: In no acute distress. Patient comfortably lying in bed  HEENT: No scleral icterus, Moist mucous membranes. No nasal discharge.  CV: Irregular rate, no murmurs or rubs detected  Resp: Clear to auscultation bilaterally. Without wheeze or crackles  Abdomen: Soft, non tender abdomen, normal active bowel sounds   Extremities: Pitting edema 2+, bilaterally with anasarca -- improving  Skin: bruises on back do not show rebleeding  Neuro: mentation in tact, no deficits noted  Oriented to conversation      Intake/Output Summary (Last 24 hours) at 04/01/18 1643  Last data filed at 04/01/18 1600   Gross per 24 hour   Intake              870 ml   Output             2825 ml   Net            -1955 ml       Data   Medications     Injection Device for insulin         fondaparinux  2.5 mg Subcutaneous Q24H     aspirin  81 mg Oral QAM     spironolactone  25 mg Oral Daily     sodium chloride (PF)  10 mL Intracatheter Q8H     multivitamin, therapeutic  1 tablet Oral Daily     metoprolol tartrate  12.5 mg Oral BID     pantoprazole  40 mg Oral BID AC     potassium chloride  40 mEq Oral Daily     polyethylene glycol  17 g Oral Daily      melatonin  3 mg Oral At Bedtime     lidocaine  1 patch Transdermal Q24H     lidocaine   Transdermal Q24h     lidocaine   Transdermal Q8H     atorvastatin  40 mg Oral At Bedtime     insulin aspart  0.5-2.5 Units Subcutaneous TID AC     insulin aspart  0.5-2.5 Units Subcutaneous At Bedtime     venlafaxine  150 mg Oral Daily with breakfast     ferrous sulfate  325 mg Oral Daily with breakfast     levothyroxine  37.5 mcg Oral QAM AC     Data     Recent Labs  Lab 04/02/18  0734 04/01/18  2146 04/01/18  0642  03/31/18  0832 03/30/18  1349 03/30/18  0711 03/29/18  0415   WBC  --   --  5.4  --  5.7  --  6.8 7.0   HGB  --   --  7.7*  --  8.0* 8.8* 8.1* 7.8*   MCV  --   --  121*  --  119*  --  120* 121*   PLT  --   --  160  --  160  --  154 131*   INR  --   --  1.61*  --  1.61*  --  1.59* 1.66*    137 140  < > 138  --  138 139   POTASSIUM 4.1 3.8 3.4  < > 3.6  --  4.0 3.6   CHLORIDE 94 94 100  < > 100  --  103 103   CO2 37* 38* 33*  < > 31  --  28 27   BUN 16 14 13  < > 12  --  14 12   CR 0.86 0.80 0.78  < > 0.73  --  0.73 0.74   ANIONGAP 5 4 7  < > 7  --  7 10   CARLY 9.2 8.9 8.0*  < > 8.6  --  9.3 8.8   * 138* 116*  < > 89  --  112* 91   ALBUMIN  --   --   --   --   --   --  2.7* 2.6*   PROTTOTAL  --   --   --   --   --   --  6.1* 5.6*   BILITOTAL  --   --   --   --   --   --  1.9* 1.8*   ALKPHOS  --   --   --   --   --   --  215* 176*   ALT  --   --   --   --   --   --  55* 57*   AST  --   --   --   --   --   --  100* 108*   < > = values in this interval not displayed.  Recent Results (from the past 24 hour(s))   US Lower Extremity Venous Duplex Right    Narrative    EXAMINATION: DOPPLER VENOUS ULTRASOUND OF THE RIGHT LOWER EXTREMITY,  4/1/2018 1:51 PM     COMPARISON: 3/30/2017    HISTORY: Significant swelling, rule out DVT    TECHNIQUE:  Gray-scale evaluation with compression, spectral flow, and  color Doppler assessment of the deep venous system of the right leg  from groin to knee, and then at the  "ankle.    FINDINGS:  In the right lower extremity, the common femoral, femoral, popliteal  and posterior tibial veins demonstrate normal compressibility and  blood flow. Calf edema. Pulsatile flow throughout.        Impression    IMPRESSION: No evidence of right lower extremity deep venous  thrombosis.    I have personally reviewed the examination and initial interpretation  and I agree with the findings.    BEREKET DANIELS MD[JS1.1]         Revision History        User Key Date/Time User Provider Type Action    > ES1.1 4/3/2018  6:51 AM Nevaeh Rome MD Physician Sign     JS1.2 4/2/2018  3:08 PM Rosaura Streeter MD Resident Sign     [N/A] 4/2/2018 12:38 PM Rosaura Streeter MD Resident Sign     JS1.1 4/2/2018 12:38 PM Rosaura Streeter MD Resident Sign                  Procedure Notes     No notes of this type exist for this encounter.      Progress Notes - Therapies (Notes from 04/01/18 through 04/04/18)     No notes of this type exist for this encounter.                                          INTERAGENCY TRANSFER FORM - LAB / IMAGING / EKG / EMG RESULTS   3/20/2018                       [facility] [address] [facility] BANK: [phone]            Unresulted Labs (24h ago through future)    Start       Ordered    04/04/18 0530  Comprehensive metabolic panel  AM DRAW,   Routine      04/03/18 1638    04/01/18 2000  Magnesium  NOW THEN EVERY 24 HOURS,   Timed      04/01/18 1258    Unscheduled  Potassium  (Potassium Replacement - \"Standard\" - For K levels less than 3.4 mmol/L - UU,UR,UA,RH,SH,PH,WY )  CONDITIONAL (SPECIFY),   Routine     Comments:  Obtain Potassium Level for these conditions:  *IF no potassium result within 24 hours before initiation of order set, draw potassium level with next lab collect.    *2 HOURS AFTER last IV potassium replacement dose and 4 hours after an oral replacement dose.  *Next morning after potassium dose.     Repeat Potassium Replacement if necessary.    03/29/18 0924    Unscheduled  " "Magnesium  (Magnesium Replacement -  Adult - \"Standard\" - Replacement for all levels less than 1.6 mg/dL )  CONDITIONAL (SPECIFY),   Routine     Comments:  Obtain Magnesium Level for these conditions:  *IF no magnesium result within 24 hrs before initiation of order set, draw magnesium level with next lab collect.    *2 HOURS AFTER last magnesium replacement dose when magnesium replacement given for level less than 1.6   *Next morning after magnesium dose.     Repeat Magnesium Replacement if necessary.    03/29/18 0924    Unscheduled  Phosphorus  (POTASSIUM Phosphate - \"Standard\" - Replacement for levels less than or equal to 2.4 mg/dL )  CONDITIONAL (SPECIFY),   Routine     Comments:  Obtain Phosphorus Level for these conditions:  *IF no phosphorus result within 24 hrs before initiation of order set, draw phosphorus level with next lab collect.    *2 HOURS AFTER last phosphorus replacement dose for levels less than 2.0.  *Next morning after phosphorus dose.     Repeat Phosphorus Replacement if necessary.    03/29/18 0924    Unscheduled  Potassium  (Potassium Replacement - \"High\" - Replacement for all levels less than 4.1 mmol/L - UU,UR,UA,RH,SH,PH,WY )  CONDITIONAL (SPECIFY),   Routine     Comments:  Obtain Potassium Level for these conditions:  *IF no potassium result within 24 hrs before initiation of order set, draw potassium level with next lab collect.    *2 HOURS AFTER last IV potassium replacement dose and 4 hours after an oral replacement dose when potassium replacement given for level less than 3.4.  *Next morning after potassium dose.     Repeat Potassium Replacement if necessary.    04/01/18 1517         Lab Results - 3 Days      Glucose by meter [[id number]] (Abnormal)  Resulted: 04/04/18 1249, Result status: Final result    Ordering provider: Ousmane Randolph MD  04/04/18 1236 Resulting lab: POINT OF CARE TEST, GLUCOSE    Specimen Information    Type Source Collected On     04/04/18 1236          " Components       Value Reference Range Flag Lab   Glucose 132 70 - 99 mg/dL H 170            Glucose by meter [[id number]] (Abnormal)  Resulted: 04/04/18 1201, Result status: Final result    Ordering provider: Ousmane Randolph MD  04/04/18 1149 Resulting lab: POINT OF CARE TEST, GLUCOSE    Specimen Information    Type Source Collected On     04/04/18 1149          Components       Value Reference Range Flag Lab   Glucose 131 70 - 99 mg/dL H 170            Comprehensive metabolic panel [[id number]] (Abnormal)  Resulted: 04/04/18 0820, Result status: Final result    Ordering provider: Rosaura Streeter MD  04/03/18 2330 Resulting lab: [facility]    Specimen Information    Type Source Collected On   Blood  04/04/18 0730          Components       Value Reference Range Flag Lab   Sodium 136 133 - 144 mmol/L  51   Potassium 4.0 3.4 - 5.3 mmol/L  51   Chloride 95 94 - 109 mmol/L  51   Carbon Dioxide 35 20 - 32 mmol/L H 51   Anion Gap 6 3 - 14 mmol/L  51   Glucose 91 70 - 99 mg/dL  51   Urea Nitrogen 21 7 - 30 mg/dL  51   Creatinine 0.96 0.52 - 1.04 mg/dL  51   GFR Estimate 56 >60 mL/min/1.7m2 L 51   Comment:  Non  GFR Calc   GFR Estimate If Black 68 >60 mL/min/1.7m2  51   Comment:  African American GFR Calc   Calcium 9.2 8.5 - 10.1 mg/dL  51   Bilirubin Total 1.9 0.2 - 1.3 mg/dL H 51   Albumin 2.9 3.4 - 5.0 g/dL L 51   Protein Total 6.6 6.8 - 8.8 g/dL L 51   Alkaline Phosphatase 271 40 - 150 U/L H 51   ALT 54 0 - 50 U/L H 51   AST 77 0 - 45 U/L H 51            Blood culture [[id number]]  Resulted: 04/04/18 0806, Result status: Preliminary result    Ordering provider: Ayaka Mclaughlin MD  03/30/18 1247 Resulting lab: MICRO RAPID TESTING LAB    Specimen Information    Type Source Collected On   PICC  03/30/18 1349   Comment:  Blood          Components       Value Reference Range Flag Lab   Specimen Description PICC Blood      Culture Micro No growth after 5 days   226             Blood culture [[id number]]  Resulted: 04/04/18 0806, Result status: Preliminary result    Ordering provider: Ayaka Mclaughlin MD  03/30/18 1247 Resulting lab: MICRO RAPID TESTING LAB    Specimen Information    Type Source Collected On   Blood  03/30/18 2140   Comment:  Left Arm          Components       Value Reference Range Flag Lab   Specimen Description Blood Left Arm      Culture Micro No growth after 5 days   226            Blood culture [[id number]]  Resulted: 04/04/18 0803, Result status: Final result    Ordering provider: Nevaeh Rome MD  03/28/18 2200 Resulting lab: [facility]CRO RAPID TESTING LAB    Specimen Information    Type Source Collected On   Blood  03/29/18 0527   Comment:  Left Arm          Components       Value Reference Range Flag Lab   Specimen Description Blood Left Arm      Special Requests Aerobic and anaerobic bottles received   75   Culture Micro No growth   226            Blood culture [[id number]]  Resulted: 04/04/18 0803, Result status: Final result    Ordering provider: Nevaeh Rome MD  03/28/18 2200 Resulting lab: MICRO RAPID TESTING LAB    Specimen Information    Type Source Collected On   Blood  03/29/18 0401   Comment:  Unspecified Site          Components       Value Reference Range Flag Lab   Specimen Description Blood Unspecified Site      Culture Micro No growth   226            INR [[id number]] (Abnormal)  Resulted: 04/04/18 0757, Result status: Final result    Ordering provider: Rosaura Streeter MD  04/03/18 2330 Resulting lab: [facility]    Specimen Information    Type Source Collected On   Blood  04/04/18 0730          Components       Value Reference Range Flag Lab   INR 1.61 0.86 - 1.14 H 51            Glucose by meter [[id number]]  Resulted: 04/04/18 0749, Result status: Final result    Ordering provider: Ousmane Randolph MD  04/04/18 0738 Resulting lab: POINT OF CARE TEST, GLUCOSE    Specimen Information    Type Source Collected  On     04/04/18 0738          Components       Value Reference Range Flag Lab   Glucose 89 70 - 99 mg/dL  170            CBC with platelets [[id number]] (Abnormal)  Resulted: 04/04/18 0747, Result status: Final result    Ordering provider: Rosaura Streeter MD  04/03/18 2330 Resulting lab: [facility]    Specimen Information    Type Source Collected On   Blood  04/04/18 0730          Components       Value Reference Range Flag Lab   WBC 5.5 4.0 - 11.0 10e9/L  51   RBC Count 2.39 3.8 - 5.2 10e12/L L 51   Hemoglobin 8.4 11.7 - 15.7 g/dL L 51   Hematocrit 28.6 35.0 - 47.0 % L 51    78 - 100 fl H 51   MCH 35.1 26.5 - 33.0 pg H 51   MCHC 29.4 31.5 - 36.5 g/dL L 51   RDW 22.2 10.0 - 15.0 % H 51   Platelet Count 184 150 - 450 10e9/L  51            Glucose by meter [[id number]] (Abnormal)  Resulted: 04/04/18 0201, Result status: Final result    Ordering provider: Ousmane Randolph MD  04/04/18 0149 Resulting lab: POINT OF CARE TEST, GLUCOSE    Specimen Information    Type Source Collected On     04/04/18 0149          Components       Value Reference Range Flag Lab   Glucose 130 70 - 99 mg/dL H 170            Glucose by meter [[id number]] (Abnormal)  Resulted: 04/03/18 2234, Result status: Final result    Ordering provider: Ousmane Randolph MD  04/03/18 2152 Resulting lab: POINT OF CARE TEST, GLUCOSE    Specimen Information    Type Source Collected On     04/03/18 2152          Components       Value Reference Range Flag Lab   Glucose 135 70 - 99 mg/dL H 170            Magnesium [[id number]]  Resulted: 04/03/18 2149, Result status: Final result    Ordering provider: Ayaka Mclaughlin MD  04/03/18 1400 Resulting lab: [facility]    Specimen Information    Type Source Collected On   Blood  04/03/18 2123          Components       Value Reference Range Flag Lab   Magnesium 2.1 1.6 - 2.3 mg/dL  51            Glucose by meter [[id number]] (Abnormal)   Resulted: 04/03/18 1728, Result status: Final result    Ordering provider: Ousmane Randolph MD  04/03/18 1716 Resulting lab: POINT OF CARE TEST, GLUCOSE    Specimen Information    Type Source Collected On     04/03/18 1716          Components       Value Reference Range Flag Lab   Glucose 145 70 - 99 mg/dL H 170            Glucose by meter [[id number]] (Abnormal)  Resulted: 04/03/18 1215, Result status: Final result    Ordering provider: Ousmane Randolph MD  04/03/18 1202 Resulting lab: POINT OF CARE TEST, GLUCOSE    Specimen Information    Type Source Collected On     04/03/18 1202          Components       Value Reference Range Flag Lab   Glucose 164 70 - 99 mg/dL H 170            Basic metabolic panel [[id number]] (Abnormal)  Resulted: 04/03/18 0836, Result status: Final result    Ordering provider: Ayaka Mclaughlin MD  04/02/18 2330 Resulting lab: [facility]    Specimen Information    Type Source Collected On   Blood  04/03/18 0803          Components       Value Reference Range Flag Lab   Sodium 138 133 - 144 mmol/L  51   Potassium 4.7 3.4 - 5.3 mmol/L  51   Chloride 96 94 - 109 mmol/L  51   Carbon Dioxide 34 20 - 32 mmol/L H 51   Anion Gap 8 3 - 14 mmol/L  51   Glucose 101 70 - 99 mg/dL H 51   Urea Nitrogen 18 7 - 30 mg/dL  51   Creatinine 0.95 0.52 - 1.04 mg/dL  51   GFR Estimate 57 >60 mL/min/1.7m2 L 51   Comment:  Non  GFR Calc   GFR Estimate If Black 69 >60 mL/min/1.7m2  51   Comment:  African American GFR Calc   Calcium 9.4 8.5 - 10.1 mg/dL  51            Magnesium [[id number]]  Resulted: 04/03/18 0836, Result status: Final result    Ordering provider: Ayaka Mclaughlin MD  04/03/18 0542 Resulting lab: [facility]    Specimen Information    Type Source Collected On     04/03/18 0803          Components       Value Reference Range Flag Lab   Magnesium 2.2 1.6 - 2.3 mg/dL  51            Glucose by meter  [[id number]]  Resulted: 04/03/18 0825, Result status: Final result    Ordering provider: Ousmane Randolph MD  04/03/18 0814 Resulting lab: POINT OF CARE TEST, GLUCOSE    Specimen Information    Type Source Collected On     04/03/18 0814          Components       Value Reference Range Flag Lab   Glucose 99 70 - 99 mg/dL  170            Blood culture [[id number]]  Resulted: 04/03/18 0353, Result status: Final result    Ordering provider: Ayaka Mclaughlin MD  03/27/18 2200 Resulting lab: INFECTIOUS DISEASE DIAGNOSTIC LABORATORY    Specimen Information    Type Source Collected On   Blood  03/28/18 0745   Comment:  Right Hand          Components       Value Reference Range Flag Lab   Specimen Description Blood Right Hand      Culture Micro No growth   225            Blood gas arterial [[id number]] (Abnormal)  Resulted: 04/02/18 2209, Result status: Final result    Ordering provider: Brittaney Payne DO  04/02/18 2141 Resulting lab: [facility]    Specimen Information    Type Source Collected On   Blood  04/02/18 2153          Components       Value Reference Range Flag Lab   pH Arterial 7.53 7.35 - 7.45 pH H 51   pCO2 Arterial 38 35 - 45 mm Hg  51   pO2 Arterial 159 80 - 105 mm Hg H 51   Bicarbonate Arterial 32 21 - 28 mmol/L H 51   Base Excess Art 8.5 mmol/L  51   Comment:  Abnormal Result, Ref range: -9.0 to 1.8   FIO2 4L   51            Basic metabolic panel [[id number]] (Abnormal)  Resulted: 04/02/18 2141, Result status: Final result    Ordering provider: Ayaka Mclaughlin MD  04/02/18 1400 Resulting lab: [facility]    Specimen Information    Type Source Collected On   Blood  04/02/18 2112          Components       Value Reference Range Flag Lab   Sodium 135 133 - 144 mmol/L  51   Potassium 5.0 3.4 - 5.3 mmol/L  51   Chloride 95 94 - 109 mmol/L  51   Carbon Dioxide 31 20 - 32 mmol/L  51   Anion Gap 9 3 - 14 mmol/L  51   Glucose 189 70 -  99 mg/dL H 51   Urea Nitrogen 18 7 - 30 mg/dL  51   Creatinine 0.93 0.52 - 1.04 mg/dL  51   GFR Estimate 58 >60 mL/min/1.7m2 L 51   Comment:  Non  GFR Calc   GFR Estimate If Black 71 >60 mL/min/1.7m2  51   Comment:  African American GFR Calc   Calcium 9.1 8.5 - 10.1 mg/dL  51            Magnesium [[id number]]  Resulted: 04/02/18 2141, Result status: Final result    Ordering provider: Ayaka Mclaughlin MD  04/02/18 1400 Resulting lab: [facility]    Specimen Information    Type Source Collected On   Blood  04/02/18 2112          Components       Value Reference Range Flag Lab   Magnesium 2.2 1.6 - 2.3 mg/dL  51            Glucose by meter [[id number]] (Abnormal)  Resulted: 04/02/18 2124, Result status: Final result    Ordering provider: Ousmane Randolph MD  04/02/18 2111 Resulting lab: POINT OF CARE TEST, GLUCOSE    Specimen Information    Type Source Collected On     04/02/18 2111          Components       Value Reference Range Flag Lab   Glucose 176 70 - 99 mg/dL H 170            Blood gas venous [[id number]] (Abnormal)  Resulted: 04/02/18 2011, Result status: Final result    Ordering provider: Brittaney Payne DO  04/02/18 1953 Resulting lab: [facility]    Specimen Information    Type Source Collected On   Blood  04/02/18 1945          Components       Value Reference Range Flag Lab   Ph Venous 7.43 7.32 - 7.43 pH  51   PCO2 Venous 53 40 - 50 mm Hg H 51   PO2 Venous 36 25 - 47 mm Hg  51   Bicarbonate Venous 35 21 - 28 mmol/L H 51   Base Excess Venous 9.9 mmol/L  51   Comment:  Abnormal Result, Ref range: -7.7 to 1.9   FIO2 1L   51            Glucose by meter [[id number]] (Abnormal)  Resulted: 04/02/18 1745, Result status: Final result    Ordering provider: Ousmane Randolph MD  04/02/18 1734 Resulting lab: POINT OF CARE TEST, GLUCOSE    Specimen Information    Type Source Collected On     04/02/18 1734          Components        Value Reference Range Flag Lab   Glucose 109 70 - 99 mg/dL H 170            Lactic acid level STAT for sepsis protocol [[id number]]  Resulted: 04/02/18 1420, Result status: Final result    Ordering provider: Nevaeh Rome MD  04/02/18 1335 Resulting lab: [facility]    Specimen Information    Type Source Collected On   Blood  04/02/18 1402          Components       Value Reference Range Flag Lab   Lactate for Sepsis Protocol 1.7 0.4 - 1.9 mmol/L  51            Blood gas venous [[id number]] (Abnormal)  Resulted: 04/02/18 1419, Result status: Final result    Ordering provider: Rosaura Streeter MD  04/02/18 1338 Resulting lab: [facility]    Specimen Information    Type Source Collected On   Blood  04/02/18 1402          Components       Value Reference Range Flag Lab   Ph Venous 7.42 7.32 - 7.43 pH  51   PCO2 Venous 59 40 - 50 mm Hg H 51   PO2 Venous 18 25 - 47 mm Hg L 51   Bicarbonate Venous 38 21 - 28 mmol/L H 51   Base Excess Venous 12.3 mmol/L  51   Comment:  Abnormal Result, Ref range: -7.7 to 1.9   FIO2 30.0   51            Glucose by meter [[id number]] (Abnormal)  Resulted: 04/02/18 1222, Result status: Final result    Ordering provider: Ousmane Randolph MD  04/02/18 1210 Resulting lab: POINT OF CARE TEST, GLUCOSE    Specimen Information    Type Source Collected On     04/02/18 1210          Components       Value Reference Range Flag Lab   Glucose 140 70 - 99 mg/dL H 170            Glucose by meter [[id number]] (Abnormal)  Resulted: 04/02/18 0811, Result status: Final result    Ordering provider: Ousmane Randolph MD  04/02/18 0800 Resulting lab: POINT OF CARE TEST, GLUCOSE    Specimen Information    Type Source Collected On     04/02/18 0800          Components       Value Reference Range Flag Lab   Glucose 100 70 - 99 mg/dL H 170            Magnesium [[id number]]  Resulted: 04/02/18 0810, Result status: Final result    Ordering provider:  Ayaka Mclaughlin MD  04/02/18 0530 Resulting lab: [facility]    Specimen Information    Type Source Collected On     04/02/18 0734          Components       Value Reference Range Flag Lab   Magnesium 2.3 1.6 - 2.3 mg/dL  51            Basic metabolic panel [[id number]] (Abnormal)  Resulted: 04/02/18 0810, Result status: Final result    Ordering provider: Ayaka Mclaughlin MD  04/01/18 2330 Resulting lab: [facility]    Specimen Information    Type Source Collected On   Blood  04/02/18 0734          Components       Value Reference Range Flag Lab   Sodium 137 133 - 144 mmol/L  51   Potassium 4.1 3.4 - 5.3 mmol/L  51   Chloride 94 94 - 109 mmol/L  51   Carbon Dioxide 37 20 - 32 mmol/L H 51   Anion Gap 5 3 - 14 mmol/L  51   Glucose 100 70 - 99 mg/dL H 51   Urea Nitrogen 16 7 - 30 mg/dL  51   Creatinine 0.86 0.52 - 1.04 mg/dL  51   GFR Estimate 64 >60 mL/min/1.7m2  51   Comment:  Non  GFR Calc   GFR Estimate If Black 78 >60 mL/min/1.7m2  51   Comment:  African American GFR Calc   Calcium 9.2 8.5 - 10.1 mg/dL  51            Glucose by meter [[id number]] (Abnormal)  Resulted: 04/01/18 2255, Result status: Final result    Ordering provider: Ousmane Randolph MD  04/01/18 2150 Resulting lab: POINT OF CARE TEST, GLUCOSE    Specimen Information    Type Source Collected On     04/01/18 2150          Components       Value Reference Range Flag Lab   Glucose 140 70 - 99 mg/dL H 170            Magnesium [[id number]]  Resulted: 04/01/18 2216, Result status: Final result    Ordering provider: Ayaka Mclaughlin MD  04/01/18 1400 Resulting lab: [facility]    Specimen Information    Type Source Collected On   Blood  04/01/18 2146          Components       Value Reference Range Flag Lab   Magnesium 2.3 1.6 - 2.3 mg/dL  51            Basic metabolic panel [[id number]] (Abnormal)  Resulted: 04/01/18 2216,  Result status: Final result    Ordering provider: Ayaka Mclaughlin MD  04/01/18 1400 Resulting lab: [facility]    Specimen Information    Type Source Collected On   Blood  04/01/18 2146          Components       Value Reference Range Flag Lab   Sodium 137 133 - 144 mmol/L  51   Potassium 3.8 3.4 - 5.3 mmol/L  51   Chloride 94 94 - 109 mmol/L  51   Carbon Dioxide 38 20 - 32 mmol/L H 51   Anion Gap 4 3 - 14 mmol/L  51   Glucose 138 70 - 99 mg/dL H 51   Urea Nitrogen 14 7 - 30 mg/dL  51   Creatinine 0.80 0.52 - 1.04 mg/dL  51   GFR Estimate 69 >60 mL/min/1.7m2  51   Comment:  Non  GFR Calc   GFR Estimate If Black 84 >60 mL/min/1.7m2  51   Comment:  African American GFR Calc   Calcium 8.9 8.5 - 10.1 mg/dL  51            Glucose by meter [[id number]] (Abnormal)  Resulted: 04/01/18 1720, Result status: Final result    Ordering provider: Ousmane Randolph MD  04/01/18 1708 Resulting lab: POINT OF CARE TEST, GLUCOSE    Specimen Information    Type Source Collected On     04/01/18 1708          Components       Value Reference Range Flag Lab   Glucose 143 70 - 99 mg/dL H 170            Magnesium [[id number]] (Abnormal)  Resulted: 04/01/18 1521, Result status: Final result    Ordering provider: Carloz Shane MD  04/01/18 1256 Resulting lab: [facility]    Specimen Information    Type Source Collected On   Blood  04/01/18 1456          Components       Value Reference Range Flag Lab   Magnesium 2.6 1.6 - 2.3 mg/dL H 51            Glucose by meter [[id number]] (Abnormal)  Resulted: 04/01/18 1318, Result status: Final result    Ordering provider: Ousmane Randolph MD  04/01/18 1307 Resulting lab: POINT OF CARE TEST, GLUCOSE    Specimen Information    Type Source Collected On     04/01/18 1307          Components       Value Reference Range Flag Lab   Glucose 121 70 - 99 mg/dL H 170            Basic metabolic panel [[id number]] (Abnormal)   Resulted: 04/01/18 1255, Result status: Final result    Ordering provider: Ayaka Mclaughlin MD  04/01/18 0642 Resulting lab: [facility]    Specimen Information    Type Source Collected On     04/01/18 0642          Components       Value Reference Range Flag Lab   Sodium 140 133 - 144 mmol/L  51   Potassium 3.4 3.4 - 5.3 mmol/L  51   Chloride 100 94 - 109 mmol/L  51   Carbon Dioxide 33 20 - 32 mmol/L H 51   Anion Gap 7 3 - 14 mmol/L  51   Glucose 116 70 - 99 mg/dL H 51   Urea Nitrogen 13 7 - 30 mg/dL  51   Creatinine 0.78 0.52 - 1.04 mg/dL  51   GFR Estimate 72 >60 mL/min/1.7m2  51   Comment:  Non  GFR Calc   GFR Estimate If Black 87 >60 mL/min/1.7m2  51   Comment:  African American GFR Calc   Calcium 8.0 8.5 - 10.1 mg/dL L 51            Glucose by meter [[id number]] (Abnormal)  Resulted: 04/01/18 0814, Result status: Final result    Ordering provider: Ousmane Randolph MD  04/01/18 0803 Resulting lab: POINT OF CARE TEST, GLUCOSE    Specimen Information    Type Source Collected On     04/01/18 0803          Components       Value Reference Range Flag Lab   Glucose 109 70 - 99 mg/dL H 170            Magnesium [[id number]]  Resulted: 04/01/18 0721, Result status: Final result    Ordering provider: Ayaka Mclaughlin MD  03/31/18 2200 Resulting lab: [facility]    Specimen Information    Type Source Collected On   Blood  04/01/18 0642          Components       Value Reference Range Flag Lab   Magnesium 1.6 1.6 - 2.3 mg/dL  51            INR [[id number]] (Abnormal)  Resulted: 04/01/18 0719, Result status: Final result    Ordering provider: Nevaeh Rome MD  03/31/18 2200 Resulting lab: [facility]    Specimen Information    Type Source Collected On   Blood  04/01/18 0642          Components       Value Reference Range Flag Lab   INR 1.61 0.86 - 1.14 H 51            Lactic acid whole blood  [[id number]]  Resulted: 04/01/18 0711, Result status: Final result    Ordering provider: Beverley Cerna MD  03/31/18 2330 Resulting lab: [facility]    Specimen Information    Type Source Collected On   Blood  04/01/18 0642          Components       Value Reference Range Flag Lab   Lactic Acid 1.0 0.7 - 2.0 mmol/L  51            CBC with platelets [[id number]] (Abnormal)  Resulted: 04/01/18 0703, Result status: Final result    Ordering provider: Ayaka Mclaughlin MD  03/31/18 2200 Resulting lab: [facility]    Specimen Information    Type Source Collected On   Blood  04/01/18 0642          Components       Value Reference Range Flag Lab   WBC 5.4 4.0 - 11.0 10e9/L  51   RBC Count 2.16 3.8 - 5.2 10e12/L L 51   Hemoglobin 7.7 11.7 - 15.7 g/dL L 51   Hematocrit 26.2 35.0 - 47.0 % L 51    78 - 100 fl H 51   MCH 35.6 26.5 - 33.0 pg H 51   MCHC 29.4 31.5 - 36.5 g/dL L 51   RDW 22.0 10.0 - 15.0 % H 51   Platelet Count 160 150 - 450 10e9/L  51            Glucose by meter [[id number]] (Abnormal)  Resulted: 04/01/18 0215, Result status: Final result    Ordering provider: Ousmane Randolph MD  04/01/18 0159 Resulting lab: POINT OF CARE TEST, GLUCOSE    Specimen Information    Type Source Collected On     04/01/18 0159          Components       Value Reference Range Flag Lab   Glucose 134 70 - 99 mg/dL H 170            Testing Performed By     Lab - Abbreviation Name Director Address Valid Date Range    51 - Unknown [facility] Unknown [address] [facility] [id number] 12/31/14 1010 - Present    75 - Unknown [facility] Unknown [address] [facility] [id number] 01/15/15 1019 - Present    170 - Unknown POINT OF CARE TEST, GLUCOSE Unknown Unknown 10/31/11 1114 - Present    225 - Unknown INFECTI[facility] DISEASE [facility]IC LAB[facility] Unknown [address] [facility] [id number]  12/19/14 0954 - Present    226 - Unknown MICRO RAPID TESTING LAB Unknown [address] [facility] [address] 12/19/14 0955 - Present               Imaging Results - 3 Days      XR Chest Port 1 View [[id number]]  Resulted: 04/02/18 2116, Result status: Final result    Ordering provider: Brittaney Payne DO  04/02/18 2023 Resulted by: Esperanza Lawler MD Pontolillo, John L, MD    Performed: 04/02/18 2026 - 04/02/18 2102 Resulting lab: RADIOLOGY RESULTS    Narrative:       XR CHEST PORT 1 VW  4/2/2018 9:02 PM      HISTORY: worsening SOB;     COMPARISON: Same date at 1439 hrs.    FINDINGS: Single portable AP view of the chest semiupright. Support  devices in stable positions. Cardiac silhouette is enlarged, stable.  Pulmonary vasculature is indistinct. Perihilar and bibasilar opacities  are unchanged from prior. No significant pleural effusion, or  pneumothorax. Left atrial clip device. Cardiac monitoring device is  unchanged. Sternotomy wires.      Impression:       IMPRESSION: Stable appearance of bibasilar subsegmental atelectasis  and/or consolidation. Mild perihilar opacities are stable.    I have personally reviewed the examination and initial interpretation  and I agree with the findings.    ESPERANZA LAWLER MD      XR Chest Port 1 View [[id number]]  Resulted: 04/02/18 1623, Result status: Final result    Ordering provider: Rosaura Streeter MD  04/02/18 1339 Resulted by: Holly Bartlett MD Robinson, John Michael, MD    Performed: 04/02/18 1421 - 04/02/18 1444 Resulting lab: RADIOLOGY RESULTS    Narrative:       XR CHEST PORT 1 VW  4/2/2018 2:44 PM      HISTORY: SOB;     COMPARISON: 3/30/2018    FINDINGS: Stable appearance of left chest wall pacemaker leads. Stable  median sternotomy wiring. Stable cardiac monitoring device and left  atrial clip. Unchanged bibasilar opacities. Enlarged cardiac  silhouette. Streaky opacities over the left midlung and right base,  likely atelectasis.      Impression:        IMPRESSION:     1. Enlarged cardiac silhouette with increased bibasilar opacities.  2. streaky opacities over the left midlung and right base, atelectasis  versus infection.    I have personally reviewed the examination and initial interpretation  and I agree with the findings.    ISABELLA LAKE MD      US RADHA Doppler No Exercise [[id number]]  Resulted: 04/02/18 1537, Result status: Final result    Ordering provider: Lejeune, Stacey, MD  04/01/18 0745 Resulted by: Adrian Sahu MD Rivard, Marcel Helorian, MD    Performed: 04/02/18 0920 - 04/02/18 0951 Resulting lab: RADIOLOGY RESULTS    Narrative:       Exam: Bilateral lower extremity resting ankle brachial indices dated  4/2/2018 9:51 AM    Comparison study: 3/12/2018    Clinical history: Nonhealing lower extremity wounds.    Ordering provider: Dr. Lejeune    Technique: Bilateral lower extremity resting ankle brachial indices  obtained. The left arm pressure could not be measured due to the  presence of a PICC. The right second digit pressure was not able to be  acquired due to the inability to place both a blood pressure cuff and  PPG sensor on the toe at the same time.    Findings:    Right:      Arm: 108 mmHg   PT at ankle: 136 mmHg   DP at foot: 131 mmHg   Toe pressure: Unable to be obtained     RADHA: 1.26   TBI: Unable to be calculated     Second Digit PPG: Mildly abnormal    Left:     PT at ankle: 133 mmHg   DP at foot: 126 mmHg   Toe pressure 27 mmHg     RADHA: 1.23   TBI: 0.25    Left pulse volume recordings or VPR:    First Digit PPG: Moderately abnormal      Impression:       Impression:     1. Right leg: Resting RADHA is 1.26 which is normal (No observed  evidence of increased cardiovascular risk or peripheral arterial  disease.)    2. Left leg: Resting RADHA is 1.23 which is normal (No observed evidence  of increased cardiovascular risk or peripheral arterial disease.) The  left TBI is 0.25, which is reduced. The absolute toe pressure on the  left was 27  mmHg.      Guidelines:    RADHA Diagnostic Criteria (Based on criteria published in Circulation  2011; 124: 6331-4838):    > 1.4: Non compressible    1.00 - 1.40: Normal    0.91 - 0.99: Borderline    At or below 0.90: Abnormal    RADHA Diagnostic Criteria (Based on ACC/AHA guideline 2008):    >/=1.3 - non compressible vessels    1.00  -1.29 - Normal    0.91 - 0.99 - Borderline    0.41 - 0.90 - Mild to moderate PAD    0.00 - 0.40 - Severe PAD    I have personally reviewed the examination and initial interpretation  and I agree with the findings.    JEANCARLOS SANCHEZ MD      US Lower Extremity Venous Duplex Right [[id number]]  Resulted: 04/01/18 1523, Result status: Final result    Ordering provider: Lejeune, Stacey, MD  04/01/18 0751 Resulted by: Jerrell Daniels MD Brydone-Jack, Michael James, MD    Performed: 04/01/18 1330 - 04/01/18 1351 Resulting lab: RADIOLOGY RESULTS    Narrative:       EXAMINATION: DOPPLER VENOUS ULTRASOUND OF THE RIGHT LOWER EXTREMITY,  4/1/2018 1:51 PM     COMPARISON: 3/30/2017    HISTORY: Significant swelling, rule out DVT    TECHNIQUE:  Gray-scale evaluation with compression, spectral flow, and  color Doppler assessment of the deep venous system of the right leg  from groin to knee, and then at the ankle.    FINDINGS:  In the right lower extremity, the common femoral, femoral, popliteal  and posterior tibial veins demonstrate normal compressibility and  blood flow. Calf edema. Pulsatile flow throughout.        Impression:       IMPRESSION: No evidence of right lower extremity deep venous  thrombosis.    I have personally reviewed the examination and initial interpretation  and I agree with the findings.    JERRELL DANIELS MD      Testing Performed By     Lab - Abbreviation Name Director Address Valid Date Range    104 - Rad Rslts RADIOLOGY RESULTS Unknown Unknown 02/16/05 1553 - Present               ECG/EMG Results      Echo limited (Adults Only) [[id number]]  Resulted: 03/25/18 0903,  Result status: In process    Ordering provider: Jackson Augustine MD  18 1211 Performed: 18 09 - 18 0903    Resulting lab: RADIANT             ECHO LIMITED WITH OPTISON [[id number]]  Resulted: 18 1040, Result status: Edited Result - FINAL    Ordering provider: Jackson Augustine MD  18 1211 Resulted by: Guillaume Pascal MD    Performed: 18 09 - 18 1124 Resulting lab: RADIOLOGY RESULTS    Narrative:       [id number]  [facility]  [id number]  [id number]^FAWN^JACKSON^SARAH           [facility],[place]  Echocardiography Laboratory  [address] [place] [id number]     Name: MENDOZA GREENBERG  MRN: [id number]  : 1940  Study Date: 2018 10:40 AM  Age: 77 yrs  Gender: Female  Patient Location: [facility]  Reason For Study: Atrial Flutter  Ordering Physician: JACKSON AUGUSTINE  Performed By: Slime Salinas RDCS     BSA: 1.9 m2  Height: 62 in  Weight: 193 lb  BP: 111/66 mmHg  _____________________________________________________________________________  __        Procedure  Limited Echocardiogram with portions of two-dimensional, color and spectral  Doppler performed. Contrast Optison. Technically difficult study. Optison (NDC  #9977-6197-57) given intravenously. Patient was given 7 ml mixture of 3 ml  Optison and 6 ml saline. 3 ml wasted.  _____________________________________________________________________________  __        Interpretation Summary  Patient tachycardic during the study. Limited but technically difficult study.  Mildly (EF 45-50%) reduced left ventricular function is present (Visual  estimate).Basal inferolareatl wall akinesis with aneurysm.  Difficult to assess RV size. Global right ventricular function is normal.  Right ventricular systolic pressure is 34mmHg above the right atrial pressure.  The inferior vena cava was dilated at 2.3 cm without respiratory variability,  consistent with increased right atrial pressure.  Estimated mean right  atrial pressure is 15 mmHg.  Valves cannot be assessed.  No pericardial effusion is present.     This study was compared with the study from 2017 .There has been no  change.  _____________________________________________________________________________  __        Left Ventricle  Left ventricular wall thickness cannot evaluate. Left ventricular size cannot  evaluate. Mildly (EF 45-50%) reduced left ventricular function is present.  Basal inferolareatl wall akinesis with aneurysm.     Right Ventricle  Global right ventricular function is normal. Difficult to assess RV size.     Atria  The atria cannot be assessed.        Mitral Valve  The mitral valve cannot be assessed. Trace mitral insufficiency is present.     Aortic Valve  The aortic valve cannot be assessed.     Tricuspid Valve  The tricuspid valve cannot be assessed. Moderate tricuspid insufficiency is  present. The right ventricular systolic pressure is approximated at 33.8 mmHg  plus the right atrial pressure. Right ventricular systolic pressure is 34mmHg  above the right atrial pressure.     Pulmonic Valve  The pulmonic valve cannot be assessed.     Vessels  The inferior vena cava was dilated at 2.3 cm without respiratory variability,  consistent with increased right atrial pressure. Estimated mean right atrial  pressure is 15 mmHg.     Pericardium  No pericardial effusion is present.        Compared to Previous Study  This study was compared with the study from 2017 . There has been no  change.  _____________________________________________________________________________  __           Doppler Measurements & Calculations  TR max venkata: 290.0 cm/sec  TR max P.8 mmHg     _____________________________________________________________________________  __           Report approved by: Carlos AMIN 2018 04:34 PM       1    Type Source Collected On     18 1040          View Image (below)              Encounter-Level Documents:      There are no encounter-level documents.      Order-Level Documents:     There are no order-level documents.

## 2018-03-20 NOTE — IP AVS SNAPSHOT
MRN:[id number]                      After Visit Summary   3/20/2018    Carlos Alberto Fenton    MRN: [id number]           Thank you!     Thank you for choosing [facility] for your care. Our goal is always to provide you with excellent care. Hearing back from our patients is one way we can continue to improve our services. Please take a few minutes to complete the written survey that you may receive in the mail after you visit with us. Thank you!        Patient Information     Date Of Birth          1940        Designated Caregiver       Most Recent Value    Caregiver    Will someone help with your care after discharge? yes    Name of designated caregiver Lindsay    Phone number of caregiver see [facility]    Caregiver address see [facility]      About your hospital stay     You were admitted on:  March 21, 2018 You last received care in the:  [address] [facility]    You were discharged on:  April 4, 2018        Reason for your hospital stay       You were admitted to the hospital due to tylenol toxicity that causing liver injury. Toxicology and GI were consulted. Liver injury is recovering. During hospitalization, there was a discrepancy of your oxygen saturation reading and true oxygen level from blood gas due to poor vascular perfusion to the finger. Your oxygen level at room air is good, however, you can use oxygen as needed for comfort.     You also had episodes of slow-fast heart rate that required pacemaker placement. Due to liver injury, anticoagulation for afib option is limited. Hematology consulted and recommended Fondaparinux. You will continue this medication until follow up with hematology. You also found to have volume overloaded that required diuresis. This needs to be continued in TCU. Dry weight 175 lbs.                  Who to Call     For medical emergencies, please call 911.  For non-urgent questions about your medical care, please call your primary care provider or clinic, [phone]           Attending Provider     Provider Specialty    Susan Patrick MD Emergency Medicine    Tomasa, Ousmane PITTS MD Internal Medicine    Gilbert Hopson MD Internal Medicine    Nevaeh Rome MD Internal Medicine    Suzy Dorantes MD Internal Medicine       Primary Care Provider Office Phone # Fax #    Humberto Conner [phone]      After Care Instructions     Activity - Up with nursing assistance           Advance Diet as Tolerated       Follow this diet upon discharge: Orders Placed This Encounter      Calorie Counts      Room Service      Fluid restriction 2000 ML FLUID      Snacks/Supplements Adult: Ensure Clear; Between Meals      Low Saturated Fat Na <2400 mg            Daily weights       Call Provider for weight gain of more than 2 pounds per day or 5 pounds per week.            Fall precautions           General info for SNF       Length of Stay Estimate: Short Term Care: Estimated # of Days <30  Condition at Discharge: Stable  Level of care:skilled   Rehabilitation Potential: Fair  Admission H&P remains valid and up-to-date: Yes  Recent Chemotherapy: N/A  Use Nursing Home Standing Orders: Yes            Glucose monitor nursing POCT       Before breakfast. If higher than 180, please notify provider.            Mantoux instructions       Give two-step Mantoux (PPD) Per Facility Policy Yes            Oxygen - Nasal cannula       1-3 Lpm by nasal cannula to keep O2 sats 92% or greater or for comfort.    Please warm finger with heat pack before checking O2 sat due to poor vascular perfusion, may not get good wave form.  Do not check at ear lobe.            Oxygen Adult       [place] Oxygen Order 3 liter(s) by nasal cannula continuously with use of portable tank. Expected treatment length is indefinite (99 months).. Test on conserving device as applicable.    Patients who qualify for home O2 coverage under the CMS guidelines require ABG tests or O2 sat readings obtained closest to, but no  earlier than 2 days prior to the discharge, as evidence of the need for home oxygen therapy. Testing must be performed while patient is in the chronic stable state. See notes for O2 sats.    I certify that this patient, Carlos Alberto Fenton has been under my care and that I, or a nurse practitioner or physician's assistant working with me, had a face-to-face encounter that meets the face-to-face encounter requirements with this patient on 4/4/2018. The patient, Carlos Alberto Fenton was evaluated or treated in whole, or in part, for the following medical condition, which necessitates the use of the ordered oxygen. Treatment Diagnosis: CHF, acute hypoxic respiratory failure    Attending Provider: Dr. Dorantes  Physician signature: See electronic signature associated with these discharge orders  Date of Order: April 4, 2018            Wound care (specify)       Site:   toe  Instructions:  Apply Betadine BID                  Follow-up Appointments     Follow Up and recommended labs and tests       Follow up with assisted physician.  The following labs/tests are recommended: CMP, CBC, INR in 1 week.  Follow up with primary care provider in 2 weeks for post hospitalization follow up.  Follow up with GI in clinic in 2 months. Referral made.   Follow up with EP cardiology in clinic in 1 week. Referral made.  Follow up with hematology in clinic in 3 months. Referral made.  Follow up with podiatry in 2 weeks. Referral made.                  Your next 10 appointments already scheduled     Sep 07, 2018 11:00 AM CDT   (Arrive by 10:45 AM)   Implantable Loop Recorder with Uc Cv Device 1   [facility] ([facility])    [address] [place] [address]  [address] [place] [id number]   [phone]            Sep 07, 2018 11:30 AM CDT   (Arrive by 11:15 AM)   Return Visit with Star Lobato MD   [facility] ([facility])    [address] [place] [address]  [address] [place] [address]  [id number] [phone]              Additional Services     Cardiology Eval Adult Referral       Preferred location:  [facility] [phone]   [email or web address]    Please be aware that coverage of these services is subject to the terms and limitations of your health insurance plan.  Call member services at your health plan with any benefit or coverage questions.      Please bring the following to your appointment:  Any x-rays, CTs or MRIs which have been performed. Contact the facility where they were done to arrange for  prior to your scheduled appointment.    List of current medications  This referral request   Any documents/labs given to you for this referral            GASTROENTEROLOGY ADULT REF CONSULT ONLY       Preferred Location: [facility] [phone]      Please be aware that coverage of these services is subject to the terms and limitations of your health insurance plan.  Call member services at your health plan with any benefit or coverage questions.  Any procedures must be performed at a [facility] facility OR coordinated by your clinic's referral office.    Please bring the following with you to your appointment:    (1) Any X-Rays, CTs or MRIs which have been performed.  Contact the facility where they were done to arrange for  prior to your scheduled appointment.    (2) List of current medications   (3) This referral request   (4) Any documents/labs given to you for this referral            Occupational Therapy Adult Consult       Evaluate and treat as clinically indicated.    Reason:  ADLs, weakness.            Onc/Heme Adult Referral           Physical Therapy Adult Consult       Evaluate and treat as clinically indicated.    Reason:  Weakness, deconditioning.            Podiatry/Foot & Ankle Surgery Referral                 Future tests that were ordered for you     EP ICD/Pacemaker/Loop  Recorder                 Further instructions from your care team         Home Care after a Pacemaker Implant    Wound care:  Keep your incision (surgery wound) dry for 3 days.  After 3 days, you may remove the outer bandage.  Keep the strips of tape on.  They will be removed at your clinic visit.  Check for signs of infection each day.  These include increased redness, swelling, drainage, bleeding or a fever over 101 F (38.3 C).  Call us immediately if you see any of these signs.  If there are no signs of infection, you may shower in 3 days.  Do not submerge the incision (in a bath tub, hot tub, or swimming pool) until fully healed.    Pain:   You may have mild to moderate pain for 3 to 5 days.  Take acetaminophen (Tylenol) or ibuprofen (Advil) for the pain.  Call us if the pain is severe or lasts more than 5 days.    Activity:  After 24 hours, slowly return to your normal activities.   Healing will take 4 to 6 weeks.  No driving for 3 days  Avoid climbing a ladder alone.  It is best to stay within 4 feet of the ground.  Avoid anything that may cause rough contact or a hard hit to your chest.  This includes football, hockey, and other contact sports.  For at least 4 weeks:  Do not raise your affected arm above your shoulder.  Do not use your affected arm to push, pull, or lift anything over 10 pounds.  Avoid repetitive upper body activities for 6 weeks (ie: golf, swimming, and weight lifting)    Follow Up Visits:  Return to the clinic in 7 to 10 days to have your device and wound checked.      Telling others about your device:  Before you have any medical tests or treatments, tell the doctors, dentists, and other care providers about your device.  There are a few tests and treatments that may interfere with your device.  (These include MRI, radiation therapy, electrocautery, and others.)  Your care team may need to take special steps to keep you safe.  Before you leave the hospital, you will receive a temporary ID  card.  A permanent card will be mailed to you about 6 to 8 weeks later.  Always carry the ID card with you.  It has important details about your device.  You should also get a MedicAlert ID.  Please ask us for a MedicAlert brochure, or go to [email or web address].    Safety near electrical equipment:  All of these are safe to use when in good repair:    Microwaves    Radios    Cordless phone    Remote controls    Small electrical tools  Cell phones: Keep cell phones at least 6 inches from your device.  Do not carry the phone in a pocket near your device.  Security salvador: It is okay to walk through security salvador at the airports and department stores.  Tell airport security that you have a pacemaker.  They should keep the screening wand at least 6 inches from your device.  Full-body scanners are safe.  Avoid the following:    MRI tests in the hospital unless you have a MRI safe pacemaker.           Arc welding, chain saws and high-powered industrial or commercial tools.    Power lines, power plants and large power generators.    Electric body fat scales.    Magnetic mattress pads or pillow.    Questions?  Please call [facility].    Device Nurse:          Business Hours:  [phone]                       After Hours:  [phone]   Choose option 4, then ask for the                                                  on-call device nurse at job code [id number].    Your next device clinic appointment is scheduled on:     ___________________________ at _____________.            [facility]  [facility] - Clinic [address]  [address]      Pending Results     Date and Time Order Name Status Description    3/30/2018 1247 Blood culture Preliminary     3/30/2018 1247 Blood culture Preliminary             Statement of Approval     Ordered          04/04/18 1149  I have reviewed and agree with all the recommendations and orders detailed in this  "document.  EFFECTIVE NOW     Approved and electronically signed by:  Suzy Dorantes MD             Admission Information     Date & Time Provider Department Dept. Phone    3/20/2018 Suzy Dorantes MD [facility] [phone]      Your Vitals Were     Blood Pressure Pulse Temperature Respirations Height Weight    109/87 (BP Location: Left arm) 72 97.4  F (36.3  C) (Oral) 18 1.575 m (5' 2\") 84.3 kg (185 lb 14.4 oz)    Pulse Oximetry BMI (Body Mass Index)                94% 34 kg/m2          MyChart Information     [employer] gives you secure access to your electronic health record. If you see a primary care provider, you can also send messages to your care team and make appointments. If you have questions, please call your primary care clinic.  If you do not have a primary care provider, please call [phone] and they will assist you.        Care EveryWhere ID     This is your Care EveryWhere ID. This could be used by other organizations to access your [facility] medical records  [id number]        Equal Access to Services     NATALIE HAYES : Hadii william Vasquez, waaxda luqadaha, qaybta kaalmagucci main, scott mattson. So [facility] [phone].    ATENCIÓN: Si habla español, tiene a espinoza disposición servicios gratuitos de asistencia lingüística. Llame al [phone].    We comply with applicable federal civil rights laws and [place] laws. We do not discriminate on the basis of race, color, national origin, age, disability, sex, sexual orientation, or gender identity.               Review of your medicines      START taking        Dose / Directions    fondaparinux 2.5 MG/0.5ML Soln injection   Commonly known as:  ARIXTRA   Used for:  Paroxysmal atrial fibrillation (H)        Dose:  2.5 mg   Inject 0.5 mLs (2.5 mg) Subcutaneous every 24 hours   Quantity:  3.5 Syringe   Refills:  11       metoprolol tartrate 25 MG tablet   Commonly known as:  LOPRESSOR "   Used for:  Paroxysmal atrial fibrillation (H)        Dose:  12.5 mg   Take 0.5 tablets (12.5 mg) by mouth 2 times daily   Quantity:  60 tablet   Refills:  0       multivitamin, therapeutic Tabs tablet   Used for:  Toe gangrene (H)        Dose:  1 tablet   Take 1 tablet by mouth daily   Quantity:  30 tablet   Refills:  0       polyethylene glycol Packet   Commonly known as:  MIRALAX/GLYCOLAX   Used for:  Constipation, unspecified constipation type        Dose:  17 g   Take 17 g by mouth daily   Quantity:  7 packet   Refills:  0       povidone-iodine 10 % topical solution   Commonly known as:  BETADINE   Used for:  Toe gangrene (H)        Apply toe wound BID   Quantity:  118 mL   Refills:  0       senna-docusate 8.6-50 MG per tablet   Commonly known as:  SENOKOT-S;PERICOLACE   Used for:  Constipation, unspecified constipation type        Dose:  1 tablet   Take 1 tablet by mouth 2 times daily as needed for constipation   Quantity:  100 tablet   Refills:  0         CONTINUE these medicines which may have CHANGED, or have new prescriptions. If we are uncertain of the size of tablets/capsules you have at home, strength may be listed as something that might have changed.        Dose / Directions    gabapentin 100 MG capsule   Commonly known as:  NEURONTIN   This may have changed:  See the new instructions.   Used for:  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        Dose:  100 mg   Take 1 capsule (100 mg) by mouth 2 times daily   Quantity:  180 capsule   Refills:  0       levothyroxine 25 MCG tablet   Commonly known as:  SYNTHROID/LEVOTHROID   This may have changed:  how much to take   Used for:  Hypothyroidism, unspecified type        Dose:  37.5 mcg   Take 1.5 tablets (37.5 mcg) by mouth every morning (before breakfast)   Quantity:  45 tablet   Refills:  0       melatonin 1 MG Caps   This may have changed:  medication strength   Used for:  Insomnia, unspecified type        Dose:  1 mg   Take 1 mg by mouth nightly as  needed   Quantity:  30 capsule   Refills:  0       pantoprazole 40 MG EC tablet   Commonly known as:  PROTONIX   This may have changed:  when to take this   Used for:  Coronary artery disease with angina pectoris, unspecified vessel or lesion type, unspecified whether native or transplanted heart (H)        Dose:  40 mg   Take 1 tablet (40 mg) by mouth 2 times daily   Quantity:  90 tablet   Refills:  1         CONTINUE these medicines which have NOT CHANGED        Dose / Directions    ACE BANDAGE SELF-ADHERING Misc   Used for:  Open wound of lower limb, right, subsequent encounter        Dose:  1 each   1 each 2 times daily   Quantity:  6 each   Refills:  3       aspirin 81 MG chewable tablet   Used for:  Coronary artery disease with angina pectoris, unspecified vessel or lesion type, unspecified whether native or transplanted heart (H)        Dose:  81 mg   Take 1 tablet (81 mg) by mouth daily   Quantity:  30 tablet   Refills:  0       atorvastatin 40 MG tablet   Commonly known as:  LIPITOR   Used for:  Coronary artery disease with angina pectoris, unspecified vessel or lesion type, unspecified whether native or transplanted heart (H)        Dose:  40 mg   Take 1 tablet (40 mg) by mouth daily   Quantity:  90 tablet   Refills:  1       blood glucose monitoring meter device kit        Refills:  0       bumetanide 2 MG tablet   Commonly known as:  BUMEX   Used for:  Chronic systolic heart failure (H)        Dose:  2 mg   Take 1 tablet (2 mg) by mouth 2 times daily   Quantity:  180 tablet   Refills:  3       ferrous sulfate 325 (65 FE) MG tablet   Commonly known as:  IRON SUPPLEMENT   Used for:  S/P CABG (coronary artery bypass graft)        Dose:  325 mg   Take 1 tablet (325 mg) by mouth daily (with breakfast)   Quantity:  100 tablet   Refills:  1       KERLIX GAUZE ROLL MEDIUM Misc   Used for:  Open wound of lower limb, right, subsequent encounter        Dose:  1 each   1 each 2 times daily   Quantity:  48 each    Refills:  3       ONETOUCH DELICA LANCETS 33G Misc   Used for:  Type 2 diabetes mellitus without complication, without long-term current use of insulin (H)        Dose:  1 Device   1 Device daily   Quantity:  100 each   Refills:  0       ONETOUCH ULTRA test strip   Used for:  Type 2 diabetes mellitus without complication, without long-term current use of insulin (H)   Generic drug:  blood glucose monitoring        USE AS DIRECTED TO TEST ONE TIME A DAY   Quantity:  100 each   Refills:  2       order for DME   Used for:  Open wound of lower limb, right, subsequent encounter        Sterile 4x4 gauze; Use gauze twice daily for dressing changes.   Quantity:  1 Box   Refills:  3       spironolactone 25 MG tablet   Commonly known as:  ALDACTONE   Used for:  Chronic systolic heart failure (H)        Dose:  25 mg   Take 1 tablet (25 mg) by mouth daily   Quantity:  90 tablet   Refills:  3       venlafaxine 150 MG Tb24 24 hr tablet   Commonly known as:  EFFEXOR-ER   Used for:  Adjustment disorder with depressed mood        Dose:  150 mg   Take 1 tablet (150 mg) by mouth daily (with breakfast)   Quantity:  90 each   Refills:  1         STOP taking     ACETAMINOPHEN PO           ADAPTIC NON-ADHERING DRESSING Pads           ALPRAZolam 0.25 MG tablet   Commonly known as:  XANAX           carvedilol 3.125 MG tablet   Commonly known as:  COREG           metolazone 2.5 MG tablet   Commonly known as:  ZAROXOLYN           Potassium Chloride ER 20 MEQ Tbcr           rivaroxaban ANTICOAGULANT 20 MG Tabs tablet   Commonly known as:  XARELTO           SILICA PO           traZODone 50 MG tablet   Commonly known as:  DESYREL                Where to get your medicines      These medications were sent to [facility] - [place], [place] - [address]  [address] [address]     Phone:  [phone]     ferrous sulfate 325 (65 FE) MG tablet         Some of these will need a paper prescription and  others can be bought over the counter. Ask your nurse if you have questions.     You don't need a prescription for these medications     aspirin 81 MG chewable tablet    atorvastatin 40 MG tablet    bumetanide 2 MG tablet    fondaparinux 2.5 MG/0.5ML Soln injection    gabapentin 100 MG capsule    levothyroxine 25 MCG tablet    melatonin 1 MG Caps    metoprolol tartrate 25 MG tablet    multivitamin, therapeutic Tabs tablet    pantoprazole 40 MG EC tablet    polyethylene glycol Packet    povidone-iodine 10 % topical solution    senna-docusate 8.6-50 MG per tablet    spironolactone 25 MG tablet    venlafaxine 150 MG Tb24 24 hr tablet                Protect others around you: Learn how to safely use, store and throw away your medicines at www.disposemymeds.org.             Medication List: This is a list of all your medications and when to take them. Check marks below indicate your daily home schedule. Keep this list as a reference.      Medications           Morning Afternoon Evening Bedtime As Needed    ACE BANDAGE SELF-ADHERING Misc   1 each 2 times daily                                aspirin 81 MG chewable tablet   Take 1 tablet (81 mg) by mouth daily   Last time this was given:  81 mg on 4/4/2018  8:10 AM                                atorvastatin 40 MG tablet   Commonly known as:  LIPITOR   Take 1 tablet (40 mg) by mouth daily   Last time this was given:  40 mg on 4/3/2018  9:50 PM                                blood glucose monitoring meter device kit                                bumetanide 2 MG tablet   Commonly known as:  BUMEX   Take 1 tablet (2 mg) by mouth 2 times daily   Last time this was given:  2 mg on 4/4/2018  8:10 AM                                ferrous sulfate 325 (65 FE) MG tablet   Commonly known as:  IRON SUPPLEMENT   Take 1 tablet (325 mg) by mouth daily (with breakfast)   Last time this was given:  325 mg on 4/4/2018 11:55 AM                                fondaparinux 2.5 MG/0.5ML Soln  injection   Commonly known as:  ARIXTRA   Inject 0.5 mLs (2.5 mg) Subcutaneous every 24 hours   Last time this was given:  2.5 mg on 4/3/2018  4:51 PM                                gabapentin 100 MG capsule   Commonly known as:  NEURONTIN   Take 1 capsule (100 mg) by mouth 2 times daily                                KERLIX GAUZE ROLL MEDIUM Misc   1 each 2 times daily                                levothyroxine 25 MCG tablet   Commonly known as:  SYNTHROID/LEVOTHROID   Take 1.5 tablets (37.5 mcg) by mouth every morning (before breakfast)   Last time this was given:  37.5 mcg on 4/4/2018  8:11 AM                                melatonin 1 MG Caps   Take 1 mg by mouth nightly as needed                                metoprolol tartrate 25 MG tablet   Commonly known as:  LOPRESSOR   Take 0.5 tablets (12.5 mg) by mouth 2 times daily   Last time this was given:  12.5 mg on 4/4/2018  8:10 AM                                multivitamin, therapeutic Tabs tablet   Take 1 tablet by mouth daily   Last time this was given:  1 tablet on 4/4/2018  8:10 AM                                ONETOUCH DELICA LANCETS 33G Misc   1 Device daily                                ONETOUCH ULTRA test strip   USE AS DIRECTED TO TEST ONE TIME A DAY   Generic drug:  blood glucose monitoring                                order for DME   Sterile 4x4 gauze; Use gauze twice daily for dressing changes.                                pantoprazole 40 MG EC tablet   Commonly known as:  PROTONIX   Take 1 tablet (40 mg) by mouth 2 times daily   Last time this was given:  40 mg on 4/4/2018  8:10 AM                                polyethylene glycol Packet   Commonly known as:  MIRALAX/GLYCOLAX   Take 17 g by mouth daily   Last time this was given:  17 g on 3/29/2018  9:07 AM                                povidone-iodine 10 % topical solution   Commonly known as:  BETADINE   Apply toe wound BID   Last time this was given:  4/4/2018  8:12 AM                                 senna-docusate 8.6-50 MG per tablet   Commonly known as:  SENOKOT-S;PERICOLACE   Take 1 tablet by mouth 2 times daily as needed for constipation   Last time this was given:  2 tablets on 3/26/2018  3:27 AM                                spironolactone 25 MG tablet   Commonly known as:  ALDACTONE   Take 1 tablet (25 mg) by mouth daily   Last time this was given:  25 mg on 4/4/2018  8:10 AM                                venlafaxine 150 MG Tb24 24 hr tablet   Commonly known as:  EFFEXOR-ER   Take 1 tablet (150 mg) by mouth daily (with breakfast)

## 2018-03-21 PROBLEM — K92.2 GI BLEED: Status: ACTIVE | Noted: 2018-03-21

## 2018-03-21 LAB
ALBUMIN SERPL-MCNC: 3.2 G/DL (ref 3.4–5)
ALP SERPL-CCNC: 82 U/L (ref 40–150)
ALT SERPL W P-5'-P-CCNC: 31 U/L (ref 0–50)
AMMONIA PLAS-SCNC: 19 UMOL/L (ref 10–50)
ANION GAP SERPL CALCULATED.3IONS-SCNC: 15 MMOL/L (ref 3–14)
ANISOCYTOSIS BLD QL SMEAR: ABNORMAL
APAP SERPL-MCNC: 68 MG/L (ref 10–20)
APTT PPP: 38 SEC (ref 22–37)
AST SERPL W P-5'-P-CCNC: 43 U/L (ref 0–45)
BASOPHILS # BLD AUTO: 0.1 10E9/L (ref 0–0.2)
BASOPHILS NFR BLD AUTO: 0.9 %
BILIRUB DIRECT SERPL-MCNC: 0.4 MG/DL (ref 0–0.2)
BILIRUB SERPL-MCNC: 0.8 MG/DL (ref 0.2–1.3)
BLD PROD TYP BPU: NORMAL
BLD UNIT ID BPU: 0
BLOOD PRODUCT CODE: NORMAL
BPU ID: NORMAL
BUN SERPL-MCNC: 79 MG/DL (ref 7–30)
CALCIUM SERPL-MCNC: 8.8 MG/DL (ref 8.5–10.1)
CHLORIDE SERPL-SCNC: 99 MMOL/L (ref 94–109)
CO2 SERPL-SCNC: 25 MMOL/L (ref 20–32)
COPATH REPORT: NORMAL
CREAT SERPL-MCNC: 1.38 MG/DL (ref 0.52–1.04)
DACRYOCYTES BLD QL SMEAR: SLIGHT
DIFFERENTIAL METHOD BLD: ABNORMAL
EOSINOPHIL # BLD AUTO: 0.1 10E9/L (ref 0–0.7)
EOSINOPHIL NFR BLD AUTO: 0.8 %
ERYTHROCYTE [DISTWIDTH] IN BLOOD BY AUTOMATED COUNT: 24.2 % (ref 10–15)
FERRITIN SERPL-MCNC: 118 NG/ML (ref 8–252)
FOLATE SERPL-MCNC: 29.7 NG/ML
FOLATE SERPL-MCNC: 32.9 NG/ML
FOLATE SERPL-MCNC: 39.6 NG/ML
GFR SERPL CREATININE-BSD FRML MDRD: 37 ML/MIN/1.7M2
GLUCOSE BLDC GLUCOMTR-MCNC: 108 MG/DL (ref 70–99)
GLUCOSE BLDC GLUCOMTR-MCNC: 112 MG/DL (ref 70–99)
GLUCOSE BLDC GLUCOMTR-MCNC: 119 MG/DL (ref 70–99)
GLUCOSE BLDC GLUCOMTR-MCNC: 124 MG/DL (ref 70–99)
GLUCOSE SERPL-MCNC: 118 MG/DL (ref 70–99)
HAPTOGLOB SERPL-MCNC: 75 MG/DL (ref 35–175)
HCT VFR BLD AUTO: 26.7 % (ref 35–47)
HGB BLD-MCNC: 8.4 G/DL (ref 11.7–15.7)
HGB BLD-MCNC: 8.8 G/DL (ref 11.7–15.7)
HGB BLD-MCNC: 9.1 G/DL (ref 11.7–15.7)
INR PPP: 4.15 (ref 0.86–1.14)
INTERPRETATION ECG - MUSE: NORMAL
INTERPRETATION ECG - MUSE: NORMAL
IRON SATN MFR SERPL: 12 % (ref 15–46)
IRON SATN MFR SERPL: 14 % (ref 15–46)
IRON SATN MFR SERPL: 25 % (ref 15–46)
IRON SERPL-MCNC: 55 UG/DL (ref 35–180)
IRON SERPL-MCNC: 58 UG/DL (ref 35–180)
IRON SERPL-MCNC: 96 UG/DL (ref 35–180)
LYMPHOCYTES # BLD AUTO: 1.8 10E9/L (ref 0.8–5.3)
LYMPHOCYTES NFR BLD AUTO: 14.8 %
MACROCYTES BLD QL SMEAR: PRESENT
MAGNESIUM SERPL-MCNC: 2.5 MG/DL (ref 1.6–2.3)
MCH RBC QN AUTO: 35.9 PG (ref 26.5–33)
MCHC RBC AUTO-ENTMCNC: 31.5 G/DL (ref 31.5–36.5)
MCV RBC AUTO: 114 FL (ref 78–100)
MONOCYTES # BLD AUTO: 0.6 10E9/L (ref 0–1.3)
MONOCYTES NFR BLD AUTO: 5.2 %
NEUTROPHILS # BLD AUTO: 9.3 10E9/L (ref 1.6–8.3)
NEUTROPHILS NFR BLD AUTO: 77.4 %
NRBC # BLD AUTO: 0.8 10*3/UL
NRBC BLD AUTO-RTO: 7 /100
OVALOCYTES BLD QL SMEAR: SLIGHT
PLATELET # BLD AUTO: 256 10E9/L (ref 150–450)
PLATELET # BLD EST: ABNORMAL 10*3/UL
POIKILOCYTOSIS BLD QL SMEAR: ABNORMAL
POLYCHROMASIA BLD QL SMEAR: SLIGHT
POTASSIUM SERPL-SCNC: 4 MMOL/L (ref 3.4–5.3)
PROMYELOCYTES # BLD MANUAL: 0.1 10E9/L
PROMYELOCYTES NFR BLD MANUAL: 0.9 %
PROT SERPL-MCNC: 6.9 G/DL (ref 6.8–8.8)
RBC # BLD AUTO: 2.34 10E12/L (ref 3.8–5.2)
RETICS # AUTO: 194.7 10E9/L (ref 25–95)
RETICS/RBC NFR AUTO: 8.3 % (ref 0.5–2)
SODIUM SERPL-SCNC: 138 MMOL/L (ref 133–144)
TIBC SERPL-MCNC: 387 UG/DL (ref 240–430)
TIBC SERPL-MCNC: 411 UG/DL (ref 240–430)
TIBC SERPL-MCNC: 443 UG/DL (ref 240–430)
TRANSFERRIN SERPL-MCNC: 302 MG/DL (ref 210–360)
TRANSFERRIN SERPL-MCNC: 332 MG/DL (ref 210–360)
TRANSFERRIN SERPL-MCNC: 337 MG/DL (ref 210–360)
TRANSFUSION STATUS PATIENT QL: NORMAL
TRANSFUSION STATUS PATIENT QL: NORMAL
VIT B12 SERPL-MCNC: 344 PG/ML (ref 193–986)
VIT B12 SERPL-MCNC: 432 PG/ML (ref 193–986)
VIT B12 SERPL-MCNC: 438 PG/ML (ref 193–986)
WBC # BLD AUTO: 12 10E9/L (ref 4–11)

## 2018-03-21 PROCEDURE — 25000128 H RX IP 250 OP 636: Performed by: EMERGENCY MEDICINE

## 2018-03-21 PROCEDURE — 94660 CPAP INITIATION&MGMT: CPT

## 2018-03-21 PROCEDURE — 25000128 H RX IP 250 OP 636: Performed by: MARRIAGE & FAMILY THERAPIST

## 2018-03-21 PROCEDURE — 80048 BASIC METABOLIC PNL TOTAL CA: CPT | Performed by: MARRIAGE & FAMILY THERAPIST

## 2018-03-21 PROCEDURE — 40000556 ZZH STATISTIC PERIPHERAL IV START W US GUIDANCE

## 2018-03-21 PROCEDURE — 85018 HEMOGLOBIN: CPT | Performed by: STUDENT IN AN ORGANIZED HEALTH CARE EDUCATION/TRAINING PROGRAM

## 2018-03-21 PROCEDURE — 85610 PROTHROMBIN TIME: CPT | Performed by: STUDENT IN AN ORGANIZED HEALTH CARE EDUCATION/TRAINING PROGRAM

## 2018-03-21 PROCEDURE — 83735 ASSAY OF MAGNESIUM: CPT | Performed by: INTERNAL MEDICINE

## 2018-03-21 PROCEDURE — 25000128 H RX IP 250 OP 636: Performed by: STUDENT IN AN ORGANIZED HEALTH CARE EDUCATION/TRAINING PROGRAM

## 2018-03-21 PROCEDURE — 85025 COMPLETE CBC W/AUTO DIFF WBC: CPT | Performed by: STUDENT IN AN ORGANIZED HEALTH CARE EDUCATION/TRAINING PROGRAM

## 2018-03-21 PROCEDURE — 83010 ASSAY OF HAPTOGLOBIN QUANT: CPT | Performed by: STUDENT IN AN ORGANIZED HEALTH CARE EDUCATION/TRAINING PROGRAM

## 2018-03-21 PROCEDURE — 82746 ASSAY OF FOLIC ACID SERUM: CPT | Performed by: MARRIAGE & FAMILY THERAPIST

## 2018-03-21 PROCEDURE — 93010 ELECTROCARDIOGRAM REPORT: CPT | Performed by: INTERNAL MEDICINE

## 2018-03-21 PROCEDURE — 83540 ASSAY OF IRON: CPT | Performed by: STUDENT IN AN ORGANIZED HEALTH CARE EDUCATION/TRAINING PROGRAM

## 2018-03-21 PROCEDURE — 99223 1ST HOSP IP/OBS HIGH 75: CPT | Mod: AI | Performed by: HOSPITALIST

## 2018-03-21 PROCEDURE — 80329 ANALGESICS NON-OPIOID 1 OR 2: CPT | Performed by: EMERGENCY MEDICINE

## 2018-03-21 PROCEDURE — 82140 ASSAY OF AMMONIA: CPT | Performed by: EMERGENCY MEDICINE

## 2018-03-21 PROCEDURE — A9270 NON-COVERED ITEM OR SERVICE: HCPCS | Mod: GY | Performed by: STUDENT IN AN ORGANIZED HEALTH CARE EDUCATION/TRAINING PROGRAM

## 2018-03-21 PROCEDURE — 25000132 ZZH RX MED GY IP 250 OP 250 PS 637: Mod: GY | Performed by: STUDENT IN AN ORGANIZED HEALTH CARE EDUCATION/TRAINING PROGRAM

## 2018-03-21 PROCEDURE — 25000132 ZZH RX MED GY IP 250 OP 250 PS 637: Mod: GY | Performed by: MARRIAGE & FAMILY THERAPIST

## 2018-03-21 PROCEDURE — 83540 ASSAY OF IRON: CPT | Performed by: INTERNAL MEDICINE

## 2018-03-21 PROCEDURE — A9270 NON-COVERED ITEM OR SERVICE: HCPCS | Mod: GY | Performed by: MARRIAGE & FAMILY THERAPIST

## 2018-03-21 PROCEDURE — 36415 COLL VENOUS BLD VENIPUNCTURE: CPT | Performed by: MARRIAGE & FAMILY THERAPIST

## 2018-03-21 PROCEDURE — 82607 VITAMIN B-12: CPT | Performed by: INTERNAL MEDICINE

## 2018-03-21 PROCEDURE — 36415 COLL VENOUS BLD VENIPUNCTURE: CPT | Performed by: STUDENT IN AN ORGANIZED HEALTH CARE EDUCATION/TRAINING PROGRAM

## 2018-03-21 PROCEDURE — 85018 HEMOGLOBIN: CPT | Performed by: MARRIAGE & FAMILY THERAPIST

## 2018-03-21 PROCEDURE — 96367 TX/PROPH/DG ADDL SEQ IV INF: CPT

## 2018-03-21 PROCEDURE — 40000611 ZZHCL STATISTIC MORPHOLOGY W/INTERP HEMEPATH TC 85060: Performed by: STUDENT IN AN ORGANIZED HEALTH CARE EDUCATION/TRAINING PROGRAM

## 2018-03-21 PROCEDURE — P9016 RBC LEUKOCYTES REDUCED: HCPCS | Performed by: EMERGENCY MEDICINE

## 2018-03-21 PROCEDURE — 84466 ASSAY OF TRANSFERRIN: CPT | Performed by: MARRIAGE & FAMILY THERAPIST

## 2018-03-21 PROCEDURE — 83550 IRON BINDING TEST: CPT | Performed by: MARRIAGE & FAMILY THERAPIST

## 2018-03-21 PROCEDURE — 96365 THER/PROPH/DIAG IV INF INIT: CPT

## 2018-03-21 PROCEDURE — 25000125 ZZHC RX 250: Performed by: EMERGENCY MEDICINE

## 2018-03-21 PROCEDURE — 82746 ASSAY OF FOLIC ACID SERUM: CPT | Performed by: INTERNAL MEDICINE

## 2018-03-21 PROCEDURE — 00000146 ZZHCL STATISTIC GLUCOSE BY METER IP

## 2018-03-21 PROCEDURE — 85045 AUTOMATED RETICULOCYTE COUNT: CPT | Performed by: STUDENT IN AN ORGANIZED HEALTH CARE EDUCATION/TRAINING PROGRAM

## 2018-03-21 PROCEDURE — 93005 ELECTROCARDIOGRAM TRACING: CPT

## 2018-03-21 PROCEDURE — 84466 ASSAY OF TRANSFERRIN: CPT | Performed by: INTERNAL MEDICINE

## 2018-03-21 PROCEDURE — 82746 ASSAY OF FOLIC ACID SERUM: CPT | Performed by: NURSE PRACTITIONER

## 2018-03-21 PROCEDURE — 80076 HEPATIC FUNCTION PANEL: CPT | Performed by: MARRIAGE & FAMILY THERAPIST

## 2018-03-21 PROCEDURE — 82607 VITAMIN B-12: CPT | Performed by: MARRIAGE & FAMILY THERAPIST

## 2018-03-21 PROCEDURE — 83540 ASSAY OF IRON: CPT | Performed by: MARRIAGE & FAMILY THERAPIST

## 2018-03-21 PROCEDURE — 83550 IRON BINDING TEST: CPT | Performed by: INTERNAL MEDICINE

## 2018-03-21 PROCEDURE — 12000006 ZZH R&B IMCU INTERMEDIATE UMMC

## 2018-03-21 RX ORDER — POTASSIUM CHLORIDE 750 MG/1
20 TABLET, EXTENDED RELEASE ORAL 2 TIMES DAILY
Status: DISCONTINUED | OUTPATIENT
Start: 2018-03-21 | End: 2018-03-23

## 2018-03-21 RX ORDER — SODIUM CHLORIDE 9 MG/ML
INJECTION, SOLUTION INTRAVENOUS CONTINUOUS
Status: DISCONTINUED | OUTPATIENT
Start: 2018-03-21 | End: 2018-03-21

## 2018-03-21 RX ORDER — VENLAFAXINE HYDROCHLORIDE 150 MG/1
150 CAPSULE, EXTENDED RELEASE ORAL
Status: DISCONTINUED | OUTPATIENT
Start: 2018-03-21 | End: 2018-03-23

## 2018-03-21 RX ORDER — AMOXICILLIN 250 MG
2 CAPSULE ORAL 2 TIMES DAILY PRN
Status: DISCONTINUED | OUTPATIENT
Start: 2018-03-21 | End: 2018-04-04 | Stop reason: HOSPADM

## 2018-03-21 RX ORDER — BUMETANIDE 2 MG/1
2 TABLET ORAL
Status: DISCONTINUED | OUTPATIENT
Start: 2018-03-21 | End: 2018-03-21

## 2018-03-21 RX ORDER — SPIRONOLACTONE 25 MG/1
25 TABLET ORAL DAILY
Status: DISCONTINUED | OUTPATIENT
Start: 2018-03-21 | End: 2018-03-21

## 2018-03-21 RX ORDER — ALPRAZOLAM 0.25 MG
0.25 TABLET ORAL 3 TIMES DAILY PRN
Status: DISCONTINUED | OUTPATIENT
Start: 2018-03-21 | End: 2018-03-28

## 2018-03-21 RX ORDER — CARVEDILOL 3.12 MG/1
3.12 TABLET ORAL 2 TIMES DAILY WITH MEALS
Status: DISCONTINUED | OUTPATIENT
Start: 2018-03-21 | End: 2018-03-23

## 2018-03-21 RX ORDER — AMOXICILLIN 250 MG
1 CAPSULE ORAL 2 TIMES DAILY PRN
Status: DISCONTINUED | OUTPATIENT
Start: 2018-03-21 | End: 2018-04-04 | Stop reason: HOSPADM

## 2018-03-21 RX ORDER — NALOXONE HYDROCHLORIDE 0.4 MG/ML
.1-.4 INJECTION, SOLUTION INTRAMUSCULAR; INTRAVENOUS; SUBCUTANEOUS
Status: DISCONTINUED | OUTPATIENT
Start: 2018-03-21 | End: 2018-04-04 | Stop reason: HOSPADM

## 2018-03-21 RX ORDER — FERROUS SULFATE 325(65) MG
325 TABLET ORAL
Status: DISCONTINUED | OUTPATIENT
Start: 2018-03-21 | End: 2018-04-04 | Stop reason: HOSPADM

## 2018-03-21 RX ORDER — LEVOTHYROXINE SODIUM 25 UG/1
25 TABLET ORAL
Status: DISCONTINUED | OUTPATIENT
Start: 2018-03-21 | End: 2018-03-21

## 2018-03-21 RX ORDER — ATORVASTATIN CALCIUM 40 MG/1
40 TABLET, FILM COATED ORAL DAILY
Status: DISCONTINUED | OUTPATIENT
Start: 2018-03-21 | End: 2018-03-24

## 2018-03-21 RX ORDER — ASPIRIN 81 MG/1
81 TABLET, CHEWABLE ORAL EVERY MORNING
Status: DISCONTINUED | OUTPATIENT
Start: 2018-03-21 | End: 2018-03-21

## 2018-03-21 RX ORDER — PHYTONADIONE 5 MG/1
5 TABLET ORAL ONCE
Status: COMPLETED | OUTPATIENT
Start: 2018-03-21 | End: 2018-03-21

## 2018-03-21 RX ADMIN — ATORVASTATIN CALCIUM 40 MG: 40 TABLET, FILM COATED ORAL at 07:42

## 2018-03-21 RX ADMIN — SPIRONOLACTONE 25 MG: 25 TABLET ORAL at 07:42

## 2018-03-21 RX ADMIN — LEVOTHYROXINE SODIUM 12.5 MCG: 25 TABLET ORAL at 15:36

## 2018-03-21 RX ADMIN — LEVOTHYROXINE SODIUM 25 MCG: 25 TABLET ORAL at 07:42

## 2018-03-21 RX ADMIN — BUMETANIDE 2 MG: 2 TABLET ORAL at 07:42

## 2018-03-21 RX ADMIN — PHYTONADIONE 10 MG: 10 INJECTION, EMULSION INTRAMUSCULAR; INTRAVENOUS; SUBCUTANEOUS at 03:13

## 2018-03-21 RX ADMIN — ASPIRIN 81 MG CHEWABLE TABLET 81 MG: 81 TABLET CHEWABLE at 07:42

## 2018-03-21 RX ADMIN — ACETYLCYSTEINE 11.74 G: 200 INJECTION, SOLUTION INTRAVENOUS at 11:20

## 2018-03-21 RX ADMIN — FERROUS SULFATE 325 MG: 325 TABLET, FILM COATED ORAL at 11:31

## 2018-03-21 RX ADMIN — CARVEDILOL 3.12 MG: 3.12 TABLET, FILM COATED ORAL at 07:42

## 2018-03-21 RX ADMIN — VENLAFAXINE HYDROCHLORIDE 150 MG: 150 CAPSULE, EXTENDED RELEASE ORAL at 07:49

## 2018-03-21 RX ADMIN — SODIUM CHLORIDE 8 MG/HR: 9 INJECTION, SOLUTION INTRAVENOUS at 19:56

## 2018-03-21 RX ADMIN — POTASSIUM CHLORIDE 20 MEQ: 750 TABLET, EXTENDED RELEASE ORAL at 07:42

## 2018-03-21 RX ADMIN — CARVEDILOL 3.12 MG: 3.12 TABLET, FILM COATED ORAL at 17:57

## 2018-03-21 RX ADMIN — ACETYLCYSTEINE 11.74 G: 200 INJECTION, SOLUTION INTRAVENOUS at 01:50

## 2018-03-21 RX ADMIN — PHYTONADIONE 5 MG: 5 TABLET ORAL at 13:56

## 2018-03-21 RX ADMIN — POTASSIUM CHLORIDE 20 MEQ: 750 TABLET, EXTENDED RELEASE ORAL at 19:36

## 2018-03-21 RX ADMIN — ACETYLCYSTEINE 3.92 G: 200 INJECTION, SOLUTION INTRAVENOUS at 12:20

## 2018-03-21 RX ADMIN — ACETYLCYSTEINE 6.25 MG/KG/HR: 200 INJECTION, SOLUTION INTRAVENOUS at 16:58

## 2018-03-21 RX ADMIN — SODIUM CHLORIDE: 9 INJECTION, SOLUTION INTRAVENOUS at 06:57

## 2018-03-21 RX ADMIN — SODIUM CHLORIDE 8 MG/HR: 9 INJECTION, SOLUTION INTRAVENOUS at 02:12

## 2018-03-21 RX ADMIN — SODIUM CHLORIDE 80 MG: 9 INJECTION, SOLUTION INTRAVENOUS at 01:49

## 2018-03-21 RX ADMIN — SODIUM CHLORIDE 1000 ML: 9 INJECTION, SOLUTION INTRAVENOUS at 18:18

## 2018-03-21 RX ADMIN — SODIUM CHLORIDE 8 MG/HR: 9 INJECTION, SOLUTION INTRAVENOUS at 10:17

## 2018-03-21 RX ADMIN — SODIUM CHLORIDE: 9 INJECTION, SOLUTION INTRAVENOUS at 15:36

## 2018-03-21 ASSESSMENT — ACTIVITIES OF DAILY LIVING (ADL)
ADLS_ACUITY_SCORE: 15
ADLS_ACUITY_SCORE: 16
ADLS_ACUITY_SCORE: 15
ADLS_ACUITY_SCORE: 15

## 2018-03-21 NOTE — PLAN OF CARE
"Problem: Gastrointestinal Bleeding (Adult)  Goal: Signs and Symptoms of Listed Potential Problems Will be Absent, Minimized or Managed (Gastrointestinal Bleeding)  Signs and symptoms of listed potential problems will be absent, minimized or managed by discharge/transition of care (reference Gastrointestinal Bleeding (Adult) CPG).  /52  Pulse 53  Temp 97.5  F (36.4  C) (Oral)  Resp 14  Ht 1.575 m (5' 2\")  Wt 78.2 kg (172 lb 6.4 oz)  SpO2 93%  BMI 31.53 kg/m2    Neuro: A&Ox4. Weird statements. Forgetful.   Cardiac: SR 60s. VSS. Afebrile.    Respiratory: Sating 90-93% on 10L oxyplus. Naomy ALVAREZ notified that patient was requiring more O2 than in the ED. MD said she would pass it on to day team LS clear. No SOB reported.   GI/: Adequate urine output per commode. No BM.   Diet/appetite: NPO.   Activity:  Assist of 1 up to commode.  Pain: Denies any pain.   Skin: Large bruise on right hip. Outlined in skin marker.   LDA's: Bilateral PIV.   Protonix @ 8mg/hr  NS @ 100ml/hr  1 Unit RBCs infusing.     Plan: Continue with POC. Notify primary team with changes.        "

## 2018-03-21 NOTE — PLAN OF CARE
Problem: Patient Care Overview  Goal: Plan of Care/Patient Progress Review  Outcome: No Change  Assumed care of patient at 0700. A&Ox4, though forgetful at times. Denied pain. Afebrile. SB-SR, rates 50-70s. Lungs clear on RA. Frequent sat alarms r/t poor perfusion. Noted apneic periods while resting this am, RT to set up cpap for sleep tonight. Protonix gtt, NS @ 100 and acetylcysteine bag 2 of 3 infusing. R-posterior hip bruise, marked with no extension since this am. Some smaller areas noted and marked. Up to commode with SBA. Adequate UOP. No BM or other evidence of bleeding. 1 unit PRBCs with Hgb recheck at 8.4. GI consulting. Continue with current plan of care and notify MD of any questions or concerns.     Problem: Diabetes Comorbidity  Goal: Diabetes  Patient comorbidity will be monitored for signs and symptoms of hyperglycemia or hypoglycemia. Problems will be absent, minimized or managed by discharge/transition of care.   Outcome: No Change  Blood sugars being checked q4 hours. Stable 100s-120s without insulin.     Problem: Cardiac Disease Comorbidity  Goal: Cardiac Disease  Patient comorbidity will be monitored for signs and symptoms of Cardiac Disease.  Problems will be absent, minimized or managed by discharge/transition of care.   Outcome: No Change  SB-SR, rates 50-70s. No ectopy. Anticoagulant medication being held due to elevated INR.

## 2018-03-21 NOTE — ED NOTES
Pt presents to ED for generalized weakness. Pt states she has been nauseated for the last three days and has not eaten anything solid because of this. Pt also states she has been wearing her CPAP machine most of the day s/t SOB. Pt denies any dyspnea at this time and appears relaxed. Pt sats 93% on RA, placed on 2L via nasal cannula. Pt also states she is going to have her big toe on the left foot amputated due to poor wound healing.

## 2018-03-21 NOTE — PROGRESS NOTES
Admission          3/20/2018  8:26 PM  -----------------------------------------------------------  Reason for admission: GI bleed.   Primary team notified of pt arrival.  Admitted from: ED  Via: stretcher  Accompanied by: family  Belongings: Placed in closet; valuables sent home with family  Admission Profile: complete  Teaching: orientation to unit and call light- call light within reach, call don't fall, use of console, meal times, when to call for the RN, and enforced importance of safety   Access: 2 PIV  Telemetry:Placed on pt  Ht./Wt.: complete  2 RN Skin Assessment Completed By: Jada TURNER RN and Ashley LEIGH.   Pt status: On 6L Oxyplus. Alert and oriented. VSS.     Temp:  [97.2  F (36.2  C)-97.5  F (36.4  C)] 97.5  F (36.4  C)  Pulse:  [53] 53  Heart Rate:  [] 79  Resp:  [11-31] 16  BP: ()/(53-99) 109/62  SpO2:  [88 %-100 %] 95 %

## 2018-03-21 NOTE — PROGRESS NOTES
[facility]  Patient is currently open to home care services with [facility].  The patient is currently receiving RN/HA services.  [facility]  and team have been notified of patient admission.  [facility] liaison will continue to follow patient during stay.  If appropriate provide orders to resume home care at time of discharge.    Thank you  Malena Leslie RN, BSN  [facility] Liaison  [phone]

## 2018-03-21 NOTE — CONSULTS
GASTROENTEROLOGY CONSULTATION      Date of Admission: 3/20/2018       Date of Consult: 3/21/18          Chief Complaint:   We were asked by Naomy Anaya MD to evaluate this patient with anemia.          ASSESSMENT AND RECOMMENDATIONS:   Assessment:  Carlos Alberto Fenton is a 77 year old female with a history of acute bilateral cerebral infarction in November 2016, HIT in 2017, RUE DVT, seizures in 2016, HTN, DM, HLD, chronic diastolic HF (last ECHO 12/12/17 EF 45-50%), CAD (s/p 3v CABG: LIMA-LAD, SVG-D1, SVG-dRCA and modified MAZE, ABDIEL ligation - 2/2016), paroxysmal AFib ([id number] - 8 on Xarelto), appendectomy, recently diagnosed hypothyroidism, and now admitted with Acetaminophen toxicity, INR of 5.05 concerning for liver source, and anemia with positive stool occult that prompted GI consult.     #Macrocytic anemia  #Acetaminophen toxicity  #Coagulopathy   Patient presented with generalized fatigue, weakness and dyspnea x2 days in the setting of anemia (hgb of 7.2 with baseline of 11.7-12.8), and hypothyroidism (TSH 81.10, low T4 0.44). Acetaminophen level was at 128 and patient has been taking 3-4 doses of Tylenol/day chronically. LFTs are normal which might not be an acute toxicity (patient received NAC x3 doses and now 68). Warfarin was stopped in January 2018 and switched to Xarelto at that time but INR of 5.05 appears too high for not being on Warfarin.     Positive stool occult in the setting of high INR is not unusual but patient does not overt GI bleed. Patient also has bruising on right lower back and hip that appears superficial. Abd/pelvic CT from 3/20/18 did not show acute abdomen. Plan is to rule out causes of anemia without clinical evidence of bleed.      Recommendations  --Full liquid diet   --Anemia w/u including hemolysis and deficiency causes  --Optimize hypothyroidism   --Monitor CBC and transfuse if hgb is less than 7  --Continue Protonix drip  --Accurate documentation of output (color,  "consistency and amount)    Gastroenterology follow up recommendations: TBD    Patient care plan discussed with Dr. Wong, GI staff physician. Thank you for involving us in this patient's care. Please do not hesitate to contact the GI service with any questions or concerns.     Momo Gee CNP  Department of Gastroenterology   -------------------------------------------------------------------------------------------------------------------   History is obtained from the patient and the medical record.          History of Present Illness:   Carlos Alberto Fenton is a 77 year old female with a history of acute bilateral cerebral infarction in November 2016, HIT in 2017, RUE DVT, seizures in 2016, HTN, DM, HLD, chronic diastolic HF (last ECHO 12/12/17 EF 45-50%), CAD (s/p 3v CABG: LIMA-LAD, SVG-D1, SVG-dRCA and modified MAZE, ABDIEL ligation - 2/2016), paroxysmal AFib ([id number] - 8 on Xarelto), appendectomy, recently diagnosed hypothyroidism, and now admitted with Acetaminophen toxicity, INR of 5.05 concerning for liver source, and anemia with positive stool occult that prompted GI consult.     Patient complains of having generalized fatigue, weakness, shortness of breath on exertion and overall unwell x2-3 days. She has mild generalized abdominal tenderness when she presses her abdomen but otherwise no abdominal pain otr chest pain. She is having some nausea and has had one episode of nonbloody emesis 2 days ago, no black or bloody stools.S he does have ongoing chronic back pain/low back and across both shoulder blades present for last month.  She's been increasing the amount of tylenol she's been taking lately, stating she takes \"2 red-white capsules\" about every 3-4 hrs daily for the last month. She has a known history of urge incontinence and that is unchanged, but no hematuria, vaginal bleed or epistaxis.  No traumas or falls, no fevers or chills.  She has not had any syncope or near syncope, but is generally " feeling much more fatigued and unwell.     She states she's also been bruising much more easily more recently. Has a large bruise on posterior upper R buttock of unknown cause. States minimal contact is causing her to bruise more recently; and this was not present with Warfarin. No history of prior bleed. Her primary physician told her that she has to take oral iron supplement but has not started. She denies NSAID, ETOH, or tobacco use. She stopped smoking in 1976. She has history of skin cancer but no other cancer.         Past Medical History:   Reviewed and edited as appropriate  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    parietral lesions bilateral       Antiplatelet or antithrombotic long-term use      Anxiety      Atrial fibrillation (H)      CAD (coronary artery disease)     2 vessel     Cancer (H) 1990    periodically have cancer on the skin removed     Cerebral artery occlusion with cerebral infarction (H) 10/2016    Cardioembolic strokes related to atrial fibrillation     Deep vein thrombosis (DVT) of axillary vein of right upper extremity (H) 2/25/2017     Depressive disorder 2001     Diabetes (H)      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     Hyperlipidemia LDL goal <130 10/31/2010     Hypertension      Panic attacks      Seizures (H) 10/19/2016     Sleep apnea     Uses CPAP            Past Surgical History:   Reviewed and edited as appropriate   Past Surgical History:   Procedure Laterality Date     AMPUTATE TOE(S) Right 5/26/2017    Procedure: AMPUTATE TOE(S);  Right Great Toe amputation, debriedment of 2 and 3rd toe soft tissu right foot. and application of Grafix;  Surgeon: Leah Santamaria MD;  Location: [facility] OR     ANESTHESIA CARDIOVERSION N/A 12/12/2017    Procedure: ANESTHESIA CARDIOVERSION;  Anesthesia Cardioversion ;  Surgeon: GENERIC ANESTHESIA PROVIDER;  Location: [facility] OR     BACK SURGERY        BYPASS GRAFT ARTERY CORONARY N/A 2/6/2017    Procedure: BYPASS GRAFT ARTERY CORONARY;  Surgeon: Mikhail Quiñones MD;  Location: [facility] OR     C APPENDECTOMY  1959     C CARDIAC SURG PROCEDURE UNLIST       C HAND/FINGER SURGERY UNLISTED       C STOMACH SURGERY PROCEDURE UNLISTED       HC SACROPLASTY       HC VASCULAR SURGERY PROCEDURE UNLIST       HYSTERECTOMY, PASTORA  1988     IRRIGATION AND DEBRIDEMENT FOOT, COMBINED Right 7/7/2017    Procedure: COMBINED IRRIGATION AND DEBRIDEMENT FOOT;  Irrigation and Debridement Right Foot with Graphix  *Latex Allergy*;  Surgeon: Leah Santamaria MD;  Location: [facility] OR     MAZE PROCEDURE N/A 2/6/2017    Procedure: MAZE PROCEDURE;  Surgeon: Mikhail Quiñones MD;  Location: [facility] OR     PICC INSERTION Left 02/25/2017    5fr TL Bard PICC, 47cm (3cm external) in the L basilic vein w/ tip in the SVC RA junction     TONSILLECTOMY  1942            Previous Endoscopy:   No results found for this or any previous visit.         Social History:   Reviewed and edited as appropriate  Social History     Social History     Marital status:      Spouse name: N/A     Number of children: N/A     Years of education: N/A     Occupational History     Not on file.     Social History Main Topics     Smoking status: Former Smoker     Packs/day: 1.00     Years: 10.00     Types: Cigarettes     Start date: 10/3/1958     Quit date: 7/4/1976     Smokeless tobacco: Never Used      Comment: Quit in 1976     Alcohol use No     Drug use: No     Sexual activity: Not Currently     Partners: Male     Birth control/ protection: Post-menopausal     Other Topics Concern     Parent/Sibling W/ Cabg, Mi Or Angioplasty Before 65f 55m? Yes     Social History Narrative            Family History:   Reviewed and edited as appropriate  Family History   Problem Relation Age of Onset     DIABETES Mother      C.A.D. Mother      Hypertension Mother      CEREBROVASCULAR DISEASE Mother      Mini Strokes     Other Cancer Mother      Skin  Cancer     Hyperlipidemia Mother      Coronary Artery Disease Mother      DIABETES Father      C.A.D. Father      Hypertension Father      Other Cancer Father      Hyperlipidemia Father      Coronary Artery Disease Father      HEART DISEASE Sister      Arthritis Sister      Other Cancer Other      Skin Cancer           Allergies:   Reviewed and edited as appropriate     Allergies   Allergen Reactions     Blood Transfusion Related (Informational Only) Other (See Comments)     Patient has a history of a clinically significant antibody against RBC antigens.  A delay in compatible RBCs may occur.Patient has a history of a significant allergic transfusion reaction.  Consult with Blood Bank MD before ordering components.     Heparin      HIT/ thrombocytopenia     Lovenox [Enoxaparin] Other (See Comments)     Thrombocytopenia      Oxycodone      hallucinations     Toprol Xl [Metoprolol] Nausea and Vomiting     Adhesive Tape Itching and Rash     Diapers & Supplies Rash     Developed yeast infection from previous hospital stay            Medications:     Current Facility-Administered Medications   Medication     pantoprazole (PROTONIX) 80 mg in sodium chloride 0.9 % 100 mL infusion     ALPRAZolam (XANAX) tablet 0.25 mg     atorvastatin (LIPITOR) tablet 40 mg     carvedilol (COREG) tablet 3.125 mg     ferrous sulfate (IRON) tablet 325 mg     potassium chloride SA (K-DUR/KLOR-CON M) CR tablet 20 mEq     traZODone (DESYREL) half-tab 25 mg     venlafaxine (EFFEXOR-XR) 24 hr capsule 150 mg     naloxone (NARCAN) injection 0.1-0.4 mg     melatonin tablet 1 mg     sodium chloride 0.9% infusion     senna-docusate (SENOKOT-S;PERICOLACE) 8.6-50 MG per tablet 1 tablet    Or     senna-docusate (SENOKOT-S;PERICOLACE) 8.6-50 MG per tablet 2 tablet     acetylcysteine (ACETADOTE) 3.92 g in sodium chloride 0.9 % 500 mL STEP TWO infusion     acetylcysteine (ACETADOTE) 6,000 mg in sodium chloride 0.9 % 500 mL STEP THREE infusion     [START ON  "3/22/2018] levothyroxine (SYNTHROID/LEVOTHROID) half-tab 37.5 mcg     levothyroxine (SYNTHROID/LEVOTHROID) half-tab 12.5 mcg             Review of Systems:   A complete review of systems was performed and is negative except as noted in the HPI           Physical Exam:   /62 (BP Location: Right arm)  Pulse 61  Temp 97.4  F (36.3  C) (Oral)  Resp 18  Ht 1.575 m (5' 2\")  Wt 78.2 kg (172 lb 6.4 oz)  SpO2 95%  BMI 31.53 kg/m2  Wt:   Wt Readings from Last 2 Encounters:   03/21/18 78.2 kg (172 lb 6.4 oz)   03/02/18 78.2 kg (172 lb 6.4 oz)      Constitutional: cooperative, pleasant, not dyspneic/diaphoretic, no acute distress  Eyes: Sclera anicteric/injected  Ears/nose/mouth/throat: Normal oropharynx without ulcers or exudate, mucus membranes moist, hearing intact  Neck: supple, thyroid normal size  CV: RRR. No edema in LE  Respiratory: Unlabored breathing. CTA bilaterally   Lymph: No axillary, submandibular, supraclavicular or inguinal lymphadenopathy  Abd: Nondistended, +bs, no hepatosplenomegaly, nontender, no peritoneal signs  Skin: warm, perfused, no jaundice. R toe wound covered with dressing. R lower back and hip ecchymosis that is outlined   Neuro: AAO x 3, No asterixis  Psych: Normal affect  MSK: Normal gait         Data:   Labs and imaging below were independently reviewed and interpreted    BMP  Recent Labs  Lab 03/21/18  0640 03/20/18  2203    132*   POTASSIUM 4.0 4.5   CHLORIDE 99 95   CARLY 8.8 8.7   CO2 25 27   BUN 79* 88*   CR 1.38* 1.78*   * 109*     CBC  Recent Labs  Lab 03/21/18  1133 03/20/18  2203   WBC 12.0* 10.3   RBC 2.34* 1.95*   HGB 8.4* 7.2*   HCT 26.7* 23.2*   * 119*   MCH 35.9* 36.9*   MCHC 31.5 31.0*   RDW 24.2* 21.9*    282     INR  Recent Labs  Lab 03/21/18  1133 03/20/18  2203   INR 4.15* 5.05*     LFTs  Recent Labs  Lab 03/21/18  0640 03/20/18  2203   ALKPHOS 82 84   AST 43 45   ALT 31 27   BILITOTAL 0.8 0.7   PROTTOTAL 6.9 7.1   ALBUMIN 3.2* 3.4    "   PANC  Recent Labs  Lab 03/20/18  2203   LIPASE 483*       Reviewed images:  CT ABDOMEN PELVIS W/O CONTRAST, 3/20/2018 11:39 PM read by DUC SALGADO MD     TECHNIQUE:  Helical CT images from the lung bases through the  symphysis pubis were obtained without IV contrast.      COMPARISON: Abdominal radiograph 2/6/2017, CT chest 3/20/2017     HISTORY: ? Retroperitoneal hemorrhage, AAA or other cause for back  pain, acute anemia, etc.;      FINDINGS:  LIVER: Tiny calcifications, likely calcified granulomata.     BILIARY: Within normal limits.     PANCREAS: Within normal limits.     SPLEEN: Tiny calcifications, likely calcified granulomata.     ADRENAL GLANDS: Within normal limits.     URINARY TRACT: Within normal limits.     REPRODUCTIVE ORGANS: Hysterectomy.     GASTROINTESTINAL TRACT: Normal caliber of the small and large bowel.     PERITONEUM/FLUID: Mild right upper quadrant perihepatic ascites and  low density mild-to moderate free fluid in the low pelvis. No walled  off fluid collections.     VESSELS: Normal caliber of the abdominal aorta. Moderate aortoiliac  atherosclerotic plaque.     LYMPH NODES: No enlarged lymph nodes.     LUNG BASES/LOW CHEST: Moderate atelectasis, including cicatricial  atelectasis in the lingula. Postsurgical changes of heart stenting.  Visualized thoracic aorta is of normal caliber.     BONES/SOFT TISSUES: Median sternotomy wires. Unchanged multilevel  degenerative changes of the spine, greatest in the lumbar spine with  chronic slight leftward curvature with apex at L3, and mild leftward  subluxation of L2 and L3 and grade 1 retrolisthesis L3-4. There is  severe multilevel facet arthropathy in the lower lumbar spine.  Left-sided L4-5 and bilateral L5-S1 pars defects. Small soft tissue  stranding adjacent to the right iliac crest (series 5, image 340).         IMPRESSION:   1. No retroperitoneal, abdominal or pelvic hematoma.  2. Normal caliber of the abdominal aorta and visualized  thoracic  aorta.  3. Mild right upper quadrant and pelvic low density free fluid of  unclear etiology, possibly related to fluid resuscitation.  4. Moderate-to-advanced degenerative spondylosis of the lumbar spine,  greatest at L3-4 and L4-5.  5. Small amount of focal soft tissue stranding lateral to the right  iliac crest, may represent a small ecchymosis.

## 2018-03-21 NOTE — PROGRESS NOTES
[facility] - Internal Medicine Resident Progress Note  Date of Service: 03/21/2018      Patient: Carlos Alberto Fenton  MRN: [id number]  Admission Date: 3/20/2018  Hospital Day # 0    Changes Today 03/21/18 :  -trending Hgb, LD  -GI consulted, recs appreciated   -folate, B12, transferrin, iron, haptoglobin, reticulocyte count, blood smear all pending            Assessment and Plan:   Carlos Alberto Fenton is a 77 year old female with PMH of HTN, HLD, DM, CVA (11/2016), chronic diastolic HF (last ECHO 12/12/17 EF 45-50%), CAD (s/p 3v CABG: LIMA-LAD, SVG-D1, SVG-dRCA and modified MAZE, ABDIEL ligation - 2/2016), paroxysmal AFib ([id number] - 8 on Xarelto), HIT, RUE DVT, recently diagnosed hypothyroidism, admitted 3/21/2018 for acute GIB, coagulopathy and concern for acute LI 2/2 acetaminophen toxicity      # GIB  # Acute blood loss anemia  On chronic AC (xarelto). Hgb on presentation 7.2, macrocytic anemia (), awaiting reticulocyte count to see if that is increasing MCV vs macrocytosis. Stool occult positive in ED. Elevated INR. S/p 1u PRBC in ED.   -- GI consult, appreciate recs  -- trend Hgb q6h, transfuse <7  -- continue protonix gtt  -- hold AC  --trend acetaminophen level  --folate, B12, transferrin, iron, haptoglobin, reticulocyte count, blood smear pending     # Coagulopathy  # Concern for tylenol toxicity  INR elevated to 5. Transitioned from coumadin to xarelto as of 01/2018 for PAF with elevated CHADsVASC of 8. Etiology is likely due to xerelto. Unlikely due to tylenol toxicity with normal liver enzymes. However pt states she's been using tylenol chronically for the last month. Placed on NAC initially in ED. S/p IV vit k 10mg in ED. LFTS wnl. Unlikely acute LI. Tylenol level to 128 initially.  -- trend INR  -- GI consult as above  -- will d/c NAC as unlikely acute toxicity  --hold xerelto      # HARINI on CKD  Cr at bsl ~1.1-4. On presentation 1.8. UA with hyaline casts, tachy, hypotensive. Likely prerenal in  the setting of GIB and decreased PO intake/emesis  -- trend BMP  -- mIVF as below     # Hypothyroidism  Recently diagnosed. On Levothyroxine. Last TSH 2/21 elevated to 88, T4 improved from 0.29 to 0.44.   -- increase levothyroxine to 37.5     # Elevated troponin  # chronic diastolic HF  # CAD  last ECHO 12/12/17 EF 45-50%. Follows with Dr. Lobato. Reported SOB about a month ago, thought unlikely 2/2 CAD. Elevated trop to 0.049 likely 2/2 demand ischemia   -- trend trop q6h to peak  -- mIVF as below  -- consider cards consult  -- consider repeat formal ECHO in AM  -- resume PTA, lipitor, bumex, carvedilol, aldactone   --hold aspirin      # Depression  -- continue PTA venlafaxine     # HLD   -- continue PTA lipitor     Interval History:   Nursing notes reviewed. Was admitted last night and had no acute events overnight.        Medications:     Current Facility-Administered Medications   Medication     pantoprazole (PROTONIX) 80 mg in sodium chloride 0.9 % 100 mL infusion     ALPRAZolam (XANAX) tablet 0.25 mg     atorvastatin (LIPITOR) tablet 40 mg     carvedilol (COREG) tablet 3.125 mg     ferrous sulfate (IRON) tablet 325 mg     potassium chloride SA (K-DUR/KLOR-CON M) CR tablet 20 mEq     traZODone (DESYREL) half-tab 25 mg     venlafaxine (EFFEXOR-XR) 24 hr capsule 150 mg     naloxone (NARCAN) injection 0.1-0.4 mg     melatonin tablet 1 mg     sodium chloride 0.9% infusion     senna-docusate (SENOKOT-S;PERICOLACE) 8.6-50 MG per tablet 1 tablet    Or     senna-docusate (SENOKOT-S;PERICOLACE) 8.6-50 MG per tablet 2 tablet     acetylcysteine (ACETADOTE) 3.92 g in sodium chloride 0.9 % 500 mL STEP TWO infusion     acetylcysteine (ACETADOTE) 6,000 mg in sodium chloride 0.9 % 500 mL STEP THREE infusion     [START ON 3/22/2018] levothyroxine (SYNTHROID/LEVOTHROID) half-tab 37.5 mcg     levothyroxine (SYNTHROID/LEVOTHROID) half-tab 12.5 mcg     phytonadione (MEPHYTON) tablet 5 mg         Physical Exam:   Vitals were  "reviewed  Blood pressure 108/62, pulse 61, temperature 97.4  F (36.3  C), temperature source Oral, resp. rate 18, height 1.575 m (5' 2\"), weight 78.2 kg (172 lb 6.4 oz), SpO2 95 %, not currently breastfeeding.    Intake/Output Summary (Last 24 hours) at 03/21/18 1311  Last data filed at 03/21/18 1220   Gross per 24 hour   Intake          1377.62 ml   Output              950 ml   Net           427.62 ml     Vitals:    03/21/18 0505   Weight: 78.2 kg (172 lb 6.4 oz)       Physical Exam:   Gen: In no acute distress. Patient comfortably lying in bed  HEENT: No scleral icterus, Moist mucous membranes. No nasal discharge.  CV: Normal rate, regular rhythm. S1/S2 normal.  No murmurs, rubs or gallops   Resp: Clear to auscultation bilaterally. Without wheeze or crackles  Abdomen: Soft, non tender abdomen, normal active bowel sounds,   Extremities: Peripheral edema bilaterally 1+  Skin: ecchymosis on upper lateral quadrant of buttock, several small ecchymosis on b/l upper extremities, multicolored  Oriented to - conversation no deficits noted  Psychiatric:ascribes to her health making her feel down but denies self harm or suicidal ideation              Data:   ROUTINE LABS (Last four results)  CMP  Recent Labs  Lab 03/21/18  0640 03/20/18 2208 03/20/18 2203     --  132*   POTASSIUM 4.0  --  4.5   CHLORIDE 99  --  95   CO2 25  --  27   ANIONGAP 15*  --  10   *  --  109*   BUN 79*  --  88*   CR 1.38*  --  1.78*   GFRESTIMATED 37* 23* 28*   GFRESTBLACK 45* 28* 33*   CARLY 8.8  --  8.7   MAG  --   --  2.7*   PROTTOTAL 6.9  --  7.1   ALBUMIN 3.2*  --  3.4   BILITOTAL 0.8  --  0.7   ALKPHOS 82  --  84   AST 43  --  45   ALT 31  --  27     CBC  Recent Labs  Lab 03/21/18  1133 03/20/18  2203   WBC 12.0* 10.3   RBC 2.34* 1.95*   HGB 8.4* 7.2*   HCT 26.7* 23.2*   * 119*   MCH 35.9* 36.9*   MCHC 31.5 31.0*   RDW 24.2* 21.9*    282     INR  Recent Labs  Lab 03/21/18  1133 03/20/18  2203   INR 4.15* 5.05* "     Arterial Blood GasNo lab results found in last 7 days.  I&O's: I/O last 3 completed shifts:  In: -   Out: 400 [Urine:400]

## 2018-03-21 NOTE — PROGRESS NOTES
[facility], [place]    Internal Medicine Progress Note - [facility] Service    Main Plans for Today   -NAC treatment  -recheck APAP level tomorrow; continue NAC treatment until APAP<10; then check APAP levels q19 hrs per toxicology  -hemolysis labs and anemia work up pending  -increased levothyroxine from 25 to 37.5 mcg daily  -holding home diuretics  -cont coreg for afib  -hold xarelto and ASA  -got vit K 10 mg IV and 5 mg PO  -needs OP neurology F/U  -CPAP ordered for night  -wound nurse consulted for gangrenous toes  -trending hgb     Assessment & Plan   Carlos Alberto Fenton is a 77 year old female with PMH of HTN, HLD, DM, CVA (11/2016), chronic diastolic HF (last ECHO 12/12/17 EF 45-50%), CAD (s/p 3v CABG: LIMA-LAD, SVG-D1, SVG-dRCA and modified MAZE, ABDIEL ligation - 2/2016), paroxysmal AFib ([id number] - 8 on Xarelto), HIT, RUE DVT, recently diagnosed hypothyroidism, admitted 3/21/2018 for acute GIB, coagulopathy and concern for acute LI 2/2 acetaminophen toxicity with xarelto intake.    Pt does not have suicidal ideation, even though she has acetaminophen toxicity.     # Coagulopathy, INR to 5, elevated PT and PTT  # Concern for tylenol toxicity (elevated acetaminophen levels)  Pt states she was taking 3-4g tylenol per day for the past month for chronic back pain. Last dose was two days ag  Pt previously on warfarin for Afib. She started xarelto as of Jan 2018. She states she was not taking warfarin and xarelto simultaneously. It is possible that her coagulopathy is occurring in the setting of xarelto, tylenol, and acute liver failure. Her LFTs are WNL.     We re-initiated her NAC treatment this AM per toxicology. She is on the 3-step protocol. We will recheck her APAP level 19 hours after initiating NAC treatment.     Per toxicology: typically for NAC, it is given until INR<2, AST< 1000, and APAP level< 10. Given that she received vit K, and AST is not elevated, we will prescribe NAC based  on APAP level only.     -- trend INR  -- GI consult as above: will place on full liquid diet; get anemia/hemolysis w/u; and monitor for signs bleed  --cont NAC treatment        # Acute blood loss anemia (baseline 11-12 1 month prior, current hgb ~7)  Differential includes acute GI bleed (most likely), vs hemolysis, vs other reversible cause of anemia with rapid onset, given recent baseline one month ago showed hgb 11-12.  Plan:  -- GI consult, appreciate recs  -- trend Hgb q6h, transfuse <7  -- continue protonix gtt  -- hold AC (home ASA 81 and xarelto)  --folate, B12, transferrin, iron, haptoglobin, reticulocyte count, blood smear pending          # HARINI on CKD  Cr at bsl ~1.1-4. On presentation 1.8. UA with hyaline casts, tachy, hypotensive. Likely prerenal in the setting of GIB and decreased PO intake/emesis  -- trend BMP  -- mIVF as below      # Hypothyroidism  Recently diagnosed. On Levothyroxine. Last TSH 2/21 elevated to 88, T4 improved from 0.29 to 0.44.   -- increase levothyroxine to 37.5 from PTA dose of 25 mcg      # Tropinemia, likely 2/2 demand ischemia 2/2 anemia  EKG did not show ST elevation, ST segment depression or T wave inversions. Pt is not having CP. Likely demand ischemia.      Chronic problems  # HFpEF  # CAD  # HLD  ECHO shows EF 45-50%.   Plan:  --holding metolazone, spironolactone, and lasix  --cont atorvastatin   --holding ASA    # Depression  -- continue PTA venlafaxine    # Paroxysmal A fib  # [id number]  Pt prev on warfarin, she was transitioned to xarelto in Jan 2018 because she likes leafy greens.  --hold xarelto        # Pain Assessment:   Current Pain Score 3/21/2018 3/21/2018 3/21/2018   Patient currently in pain? denies denies denies   Pain score (0-10) - - -   Pain location - - -   Pain descriptors - - -   CPOT pain score - - -   Carlos Alberto sanabria pain level was assessed and she currently denies pain.      Diet: Full Liquid Diet  Fluids: none  DVT Prophylaxis: mechanical  Code Status:  Full Code    Disposition Plan   Expected discharge: 2 - 3 days, recommended to prior living arrangement once treatment completed..     Entered: Ayaka JOSE RAUL Mclaughlin 03/21/2018, 5:16 PM   Information in the above section will display in the discharge planner report.      The patient's care was discussed with the Attending Physician, Dr. Hopson and Patient.    Ayaka MFernie Mclaughlin  [facility]  Pager: [phone]  Please see sticky note for cross cover information    Interval History   Pt is having mild back pain and shoulder pain. She feels weak. Otherwise, no f/c, cp, or sob. No blood in stool.     Physical Exam   Vital Signs: Temp: 97.2  F (36.2  C) Temp src: Oral BP: 92/61 Pulse: 61 Heart Rate: 72 Resp: 16 SpO2: 92 % O2 Device: None (Room air) Oxygen Delivery: 11 LPM  Weight: 172 lbs 6.4 oz  Gen: In no acute distress. Patient comfortably lying in bed  HEENT: No scleral icterus, Moist mucous membranes. No nasal discharge.  CV: Normal rate, regular rhythm. S1/S2 normal.  No murmurs, rubs or gallops   Resp: Clear to auscultation bilaterally. Without wheeze or crackles  Abdomen: Soft, non tender abdomen, normal active bowel sounds,   Extremities: Peripheral edema bilaterally 1+  Skin: ecchymosis on upper lateral quadrant of buttock, several small ecchymosis on b/l upper extremities, multicolored  Oriented to - conversation no deficits noted  Psychiatric:ascribes to her health making her feel down but denies self harm or suicidal ideation          Data   Medications     pantoprazole (PROTONIX) infusion ADULT/PEDS GREATER than or EQUAL to 45 kg 8 mg/hr (03/21/18 1017)     sodium chloride 100 mL/hr at 03/21/18 1536     acetylcysteine (ACETADOTE) infusion *third dose - elevated AST* 6.25 mg/kg/hr (03/21/18 1658)       atorvastatin  40 mg Oral Daily     carvedilol  3.125 mg Oral BID w/meals     ferrous sulfate  325 mg Oral Daily with breakfast     potassium chloride SA  20 mEq Oral BID      venlafaxine  150 mg Oral Daily with breakfast     [START ON 3/22/2018] levothyroxine  37.5 mcg Oral QAM AC     Data     Recent Labs  Lab 03/21/18  1551 03/21/18  1133 03/21/18  0640 03/20/18  2203   WBC  --  12.0*  --  10.3   HGB 9.1* 8.4*  --  7.2*   MCV  --  114*  --  119*   PLT  --  256  --  282   INR  --  4.15*  --  5.05*   NA  --   --  138 132*   POTASSIUM  --   --  4.0 4.5   CHLORIDE  --   --  99 95   CO2  --   --  25 27   BUN  --   --  79* 88*   CR  --   --  1.38* 1.78*   ANIONGAP  --   --  15* 10   CARLY  --   --  8.8 8.7   GLC  --   --  118* 109*   ALBUMIN  --   --  3.2* 3.4   PROTTOTAL  --   --  6.9 7.1   BILITOTAL  --   --  0.8 0.7   ALKPHOS  --   --  82 84   ALT  --   --  31 27   AST  --   --  43 45   LIPASE  --   --   --  483*   TROPI  --   --   --  0.049*     Recent Results (from the past 24 hour(s))   XR Chest 2 Views    Narrative    EXAM: XR CHEST 2 VW  3/20/2018 10:29 PM      HISTORY: shortness of breath, weakness, ? PNA;     COMPARISON: 2/21/2018    FINDINGS: PA and lateral views of the chest obtained. Median  sternotomy wires. Implantable loop recorder. Left atrial appendage  exclusion device. The trachea is midline. The cardiac silhouette is  within normal limits. No pneumothorax or pleural effusion. Hazy left  retrocardiac opacities. Bilateral pulmonary vascular congestion.      Impression    IMPRESSION:   1. Hazy left basilar/retrocardiac airspace opacities, compatible with  atelectasis, aspiration, or infection.  2. Pulmonary vascular congestion.    I have personally reviewed the examination and initial interpretation  and I agree with the findings.    DUC SALGADO MD   CT Abdomen Pelvis w/o Contrast    Narrative    EXAMINATION: CT ABDOMEN PELVIS W/O CONTRAST, 3/20/2018 11:39 PM    TECHNIQUE:  Helical CT images from the lung bases through the  symphysis pubis were obtained without IV contrast.     COMPARISON: Abdominal radiograph 2/6/2017, CT chest 3/20/2017    HISTORY: ? Retroperitoneal  hemorrhage, AAA or other cause for back  pain, acute anemia, etc.;     FINDINGS:  LIVER: Tiny calcifications, likely calcified granulomata.    BILIARY: Within normal limits.    PANCREAS: Within normal limits.    SPLEEN: Tiny calcifications, likely calcified granulomata.    ADRENAL GLANDS: Within normal limits.    URINARY TRACT: Within normal limits.    REPRODUCTIVE ORGANS: Hysterectomy.    GASTROINTESTINAL TRACT: Normal caliber of the small and large bowel.    PERITONEUM/FLUID: Mild right upper quadrant perihepatic ascites and  low density mild-to moderate free fluid in the low pelvis. No walled  off fluid collections.    VESSELS: Normal caliber of the abdominal aorta. Moderate aortoiliac  atherosclerotic plaque.    LYMPH NODES: No enlarged lymph nodes.    LUNG BASES/LOW CHEST: Moderate atelectasis, including cicatricial  atelectasis in the lingula. Postsurgical changes of heart stenting.  Visualized thoracic aorta is of normal caliber.    BONES/SOFT TISSUES: Median sternotomy wires. Unchanged multilevel  degenerative changes of the spine, greatest in the lumbar spine with  chronic slight leftward curvature with apex at L3, and mild leftward  subluxation of L2 and L3 and grade 1 retrolisthesis L3-4. There is  severe multilevel facet arthropathy in the lower lumbar spine.  Left-sided L4-5 and bilateral L5-S1 pars defects. Small soft tissue  stranding adjacent to the right iliac crest (series 5, image 340).      Impression    IMPRESSION:   1. No retroperitoneal, abdominal or pelvic hematoma.  2. Normal caliber of the abdominal aorta and visualized thoracic  aorta.  3. Mild right upper quadrant and pelvic low density free fluid of  unclear etiology, possibly related to fluid resuscitation.  4. Moderate-to-advanced degenerative spondylosis of the lumbar spine,  greatest at L3-4 and L4-5.  5. Small amount of focal soft tissue stranding lateral to the right  iliac crest, may represent a small ecchymosis.      I have  personally reviewed the examination and initial interpretation  and I agree with the findings.    DUC SALGADO MD   CT Head w/o Contrast    Narrative    CT HEAD W/O CONTRAST 3/20/2018 11:43 PM    Provided History: weakness, arm twitching;     Comparison: CT head 2/21/2018.    Technique: Using multidetector thin collimation helical acquisition  technique, axial, coronal and sagittal CT images from the skull base  to the vertex were obtained without intravenous contrast.     Findings:    No intracranial hemorrhage, mass effect, or midline shift. The  ventricles are proportionate to the cerebral sulci. Moderate global  cerebral volume loss. The gray to white matter differentiation of the  cerebral hemispheres is preserved. Patchy leukoaraiosis is unchanged.  The basal cisterns are patent.    The visualized paranasal sinuses are clear. The mastoid air cells are  clear.       Impression    Impression: No acute intracranial pathology.     I have personally reviewed the examination and initial interpretation  and I agree with the findings.    INO FOFANA MD

## 2018-03-21 NOTE — PHARMACY-ADMISSION MEDICATION HISTORY
Admission medication history interview status for the 3/20/2018 admission is complete. See Epic admission navigator for allergy information, pharmacy, prior to admission medications and immunization status.     Medication history interview sources:  Interview with patient, recent refill history [facility], med list from recent office visit with Dr. Lobato 3/2/2018    Changes made to [facility] [place] medication list (reason)  Added: Silica supplement - patient does not know strength but takes daily for hair health  Deleted: none  Changed: Acetaminophen - changed dosing to 500-1000mg 4 times daily as needed     Additional medication history information (including reliability of information, actions taken by pharmacist): Patient understands which medicationss she is taking and how frequently, but does not know dosing; except for tylenol she said were extra strength 500mg tablets. Medication dosing was verified through refill history at [facility] via [facility] and from office visit med list on 3/2. She does no report any dose/medication changes since this visit. She has not taken metolazone since before her last hospital admission. She did get the flu shot 10/2017.      Prior to Admission medications    Medication Sig Last Dose Taking? Auth Provider   ACETAMINOPHEN PO Take 500-1,000 mg by mouth every 6 hours as needed for pain 3/20/2018 Yes Unknown, Entered By History   Nutritional Supplements (SILICA PO) Unknown strength. Patient takes daily for hair growth/health. 3/20/2018 Yes Unknown, Entered By History   pantoprazole (PROTONIX) 40 MG EC tablet Take 1 tablet (40 mg) by mouth every morning 3/20/2018 Yes Humberto Conner MD   gabapentin (NEURONTIN) 100 MG capsule TAKE ONE CAPSULE BY MOUTH TWICE DAILY  3/20/2018  Humberto Conner MD   bumetanide (BUMEX) 2 MG tablet Take 1 tablet (2 mg) by mouth 2 times daily 3/20/2018  Susan Cartagena APRN CNP   levothyroxine (SYNTHROID/LEVOTHROID) 25 MCG tablet Take 1 tablet (25 mcg)  by mouth every morning (before breakfast) 3/20/2018  Susan Cartagena APRN CNP   Potassium Chloride ER 20 MEQ TBCR Take 1 tablet (20 mEq) by mouth 2 times daily 3/20/2018  Tamela Weinberg APRN CNP   MELATONIN PO Take 1 mg by mouth nightly as needed  Unknown at Unknown time  Reported, Patient   carvedilol (COREG) 3.125 MG tablet Take 1 tablet (3.125 mg) by mouth 2 times daily (with meals) 3/20/2018  Star Lobato MD   rivaroxaban ANTICOAGULANT (XARELTO) 20 MG TABS tablet Take 1 tablet (20 mg) by mouth daily (with dinner) . DO NOT start until you have stopped the Warfarin. 3/20/2018  Bakari Azevedo MD   ALPRAZolam (XANAX) 0.25 MG tablet Take 1 tablet (0.25 mg) by mouth 3 times daily as needed for anxiety 3/19/2018  Humberto Conner MD   venlafaxine (EFFEXOR-ER) 150 MG TB24 24 hr tablet Take 1 tablet (150 mg) by mouth daily (with breakfast) 3/20/2018  Humberto Conner MD   metolazone (ZAROXOLYN) 2.5 MG tablet Take 1 tablet (2.5 mg) by mouth daily as needed For weight over 170 Lbs. 2/20/2018  Star Lobato MD   spironolactone (ALDACTONE) 25 MG tablet Take 1 tablet (25 mg) by mouth daily 3/20/2018  Star Lobato MD   atorvastatin (LIPITOR) 40 MG tablet Take 1 tablet (40 mg) by mouth daily 3/20/2018  Humberto Conner MD   traZODone (DESYREL) 50 MG tablet Take 0.5 tablets (25 mg) by mouth nightly as needed for sleep Unknown at Unknown time  Humberto Conner MD   Elastic Bandages & Supports (ACE BANDAGE SELF-ADHERING) MISC 1 each 2 times daily   Rosalinda Ron APRN CNP   Gauze Pads & Dressings (KERLIX GAUZE ROLL MEDIUM) MISC 1 each 2 times daily   Rosalinda Ron APRN CNP   ONE TOUCH ULTRA test strip USE AS DIRECTED TO TEST ONE TIME A DAY   Nuha Bateman MD   Wound Dressings (ADAPTIC NON-ADHERING DRESSING) PADS Externally apply 1 each topically 2 times daily   Rosalinda Ron APRN CNP   order for DME Sterile 4x4 gauze; Use gauze twice daily for dressing changes.   Rosalinda Ron  [facility] CNP   aspirin 81 MG chewable tablet Take 1 tablet (81 mg) by mouth daily 3/20/2018  Britt Whelan MD   ferrous sulfate (IRON SUPPLEMENT) 325 (65 FE) MG tablet Take 1 tablet (325 mg) by mouth daily (with breakfast) 3/20/2018  Jennifer Stlaey APRN CNP   ONETOUCH DELICA LANCETS 33G MISC 1 Device daily   Nuha Bateman MD   blood glucose monitoring (ONE TOUCH ULTRA 2) meter device kit    Reported, Patient         Medication history completed by: Carmen Montana, Fourth Year Pharmacy Student

## 2018-03-21 NOTE — PROGRESS NOTES
"  Care Coordinator Progress Note     Admission Date/Time:  3/20/2018  Attending MD:  Gilbert Hopson MD     Data  Chart reviewed, discussed with interdisciplinary team.   Patient was admitted for:    Gastrointestinal hemorrhage, unspecified gastrointestinal hemorrhage type  Anemia, unspecified type.    Concerns with insurance coverage for discharge needs: None.  Current Living Situation: Patient lives with adult child.  Support System: Supportive and Involved  Services Involved: Home Care  Transportation: Family or Friend will provide  Barriers to Discharge: medical course    Coordination of Care and Referrals: Provided patient/family with options for Home Care.        Assessment  Patient admitted for acute GI bleed. History of HTN, HLD, DM, CVA, chronic diastolic HF, CAD, afib, HIT, DVT, and hypothyroidism.   Met with patient, introduced self and RNCC role. Patient states she lives at home with her daughter. Is open to [facility] for RN services. Patient requesting to add PT and OT as she \"wants to exercise more.\" referral placed and AVS updated. Daughter able to transport at time of discharge. RNCC to continue to follow for discharge planning and needs.     Plan  Anticipated Discharge Date:  TBD  Anticipated Discharge Plan:  Home with home care    Kathi Porter RN          "

## 2018-03-21 NOTE — PROGRESS NOTES
"CLINICAL NUTRITION SERVICES - ASSESSMENT NOTE     Nutrition Prescription    RECOMMENDATIONS FOR MDs/PROVIDERS TO ORDER:  Die to adv in the next 2-3 days    Malnutrition Status:    Patient does not meet two of the above criteria necessary for diagnosing malnutrition but is at risk for malnutrition    Recommendations already ordered by Registered Dietitian (RD):  None at this time    Future/Additional Recommendations:  Pt may benefit from Boost Plus for wound healing.      REASON FOR ASSESSMENT  Carlos Alberto Fenton is a/an 77 year old female assessed by the dietitian for Admission Nutrition Risk Screen for reduced oral intake over the last month    NUTRITION HISTORY  Pt presents to ED for generalized weakness. Pt states she has been nauseated for the last three days and has not eaten anything solid because of this. She is having some nausea and has had an episode of nonbloody emesis, no change to stools.    CURRENT NUTRITION ORDERS  Diet: NPO  Intake/Tolerance: pt reports that she was eating well prior to the past 3 days when she was unable to eat d/t n/v. She does not report having a decreased appetite prior to this acute onset of symptoms. She reports making her own breakfast (oatmeal, cereal, fruit) and lunch (frozen Cristine meals) and then dinner she usually had with family or they get take out.     LABS  Labs reviewed    MEDICATIONS  Medications reviewed    ANTHROPOMETRICS  Height: 157.5 cm (5' 2\")  Most Recent Weight: 78.2 kg (172 lb 6.4 oz)    IBW: 50 kg  BMI: Obesity Grade I BMI 30-34.9  Weight History:   Wt Readings from Last 10 Encounters:   03/21/18 78.2 kg (172 lb 6.4 oz)   03/02/18 78.2 kg (172 lb 6.4 oz)   01/16/18 74.8 kg (165 lb)   12/18/17 76.3 kg (168 lb 3.2 oz)   12/15/17 75.4 kg (166 lb 3.2 oz)   11/24/17 75.8 kg (167 lb 3.2 oz)   11/21/17 74.4 kg (164 lb)   11/14/17 78 kg (172 lb)   11/06/17 74.7 kg (164 lb 11.2 oz)   10/26/17 76.2 kg (168 lb)     Dosing Weight: 57 kg (adjusted)    ASSESSED NUTRITION " NEEDS  Estimated Energy Needs: 9566-7006 kcals/day (25 - 30 kcals/kg)  Justification: Obese (lower end), Repletion and Wound healing (higher end)  Estimated Protein Needs: 68-86 grams protein/day (1.2 - 1.5 grams of pro/kg)  Justification: Increased needs  Estimated Fluid Needs: 1 mL/kcal/day  Justification: Maintenance    PHYSICAL FINDINGS  See malnutrition section below.  Pt also states she is going to have her big toe on the left foot amputated due to poor wound healing.    MALNUTRITION  % Intake: No decreased intake noted  % Weight Loss: None noted  Subcutaneous Fat Loss: None observed  Muscle Loss: Temporal:  mild  Fluid Accumulation/Edema: None noted  Malnutrition Diagnosis: Patient does not meet two of the above criteria necessary for diagnosing malnutrition but is at risk for malnutrition    NUTRITION DIAGNOSIS  Inadequate oral intakes related to 3 days of n/v and abdominal pain as evidenced by pt report.       INTERVENTIONS  Implementation  Nutrition Education: Provided education on role of RD and nutrition POC + reminding pt about a heart healthy diet and choosing lower sodium foods   Pt has had heart healthy education in th past 2/24/17.    Goals  Diet adv v nutrition support within 2-3 days.  Patient to consume % of nutritionally adequate meal trays TID, or the equivalent with supplements/snacks.     Monitoring/Evaluation  Progress toward goals will be monitored and evaluated per protocol.      Nazia Tran, VISHAL, MS, LD  6B- Pager: [phone]

## 2018-03-21 NOTE — ED NOTES
Pt HR was sinus irsael in the 50s. Pt suddenly developed low back pain and HR jumped to the 110s. Dr. Patrick notified who came to room and did fast exam on the heart with Ultrasound.

## 2018-03-21 NOTE — ED PROVIDER NOTES
"  History     Chief Complaint   Patient presents with     Nausea & Vomiting     Generalized Weakness     HPI  Carlos Alberto Fenton is a 77 year old female with PMH notable for A-fib (on chronic anticoagulation with Xarelto), chronic diastolic HF, generalized weakness, toe gangrene (plan for upcoming amputation), previous DVT, CAD, previous cardio-embolic strokes related to A-fib, HTN, HLP, panic attacks, etc. as listed further in EMR.  Patient presents today with generalized weakness, feeling \"not myself\" and generally unwell over the last 1-2 days.  She reports that 3 days ago she was her normal self.  Yesterday then she began noticing some nonspecific feeling in that she was not quite right and was feeling unwell.  She has some ongoing chronic back pain in the diffuse low back as well as across both shoulder blades that she reports has been present for at least one month.  It is slightly more consistent now in the low back area.  She denies any chest pain, no palpitations, though does report she is somewhat short of breath.  She is having some nausea and has had an episode of nonbloody emesis, no change to stools.  She has a known history of urge incontinence and that is unchanged, no hematuria.  No bloody stools or melena.  No traumas or falls, no fevers or chills.  She has not had any syncope or near syncope, but is generally feeling much more fatigued and unwell.    Patient has a loop recorder in place to monitor her A. fib and has been seen by Dr. Camarillo with EP as well as most recently Dr. Lobato with the Cardiology group, per the report she has a history of HTN, HLD, DM, CVA (2016), CAD (s/p three-vessel CABG, LIMA-LAD, SVG-D1, SVG-dRCA and modified MAZE, ABDIEL ligation, 2016), paroxysmal A. Fib ([id number]), HIT, RUE DVT.  She was mentioning shortness of breath at that time and they did not think this to be related to her CAD, but were considering repeat stress test and thought it would be more likely related " "to deconditioning, anemia, hypothyroidism, etc.  She is going to continue on with previous medical regimen for HF and continue rate control for A. fib.  Of note patient was seen and admitted to the ER was unit on 2/21-2/22 of this year for acute kidney injury who when she presented with weakness and dizziness.    The patient reports that for back pain that she has been having for at least the last 1 month or 2, worse in the low back, she reports that she has been taking 2 \"red and blue\" Tylenol up to every 3-4 hours every day for the past month.  She is not sure if they are the 500 mg but thought those were the white pills that she did not think are helping and so she switched to these pills and has been taking them as reported.  In review of her EMR it is written that she has 325 mg tablets and so if this is accurate that would be 650 mg every 3-4 hours when awake.  She is not having any right upper quadrant pain, but again does note that she has had nausea and some nonbloody emesis.    No other symptoms or complaints at this time. Please see ROS for further details.      This part of the document was transcribed by Judy Chicas, Medical Scribe.   I have reviewed the Medications, Allergies, Past Medical and Surgical History, and Social History in the [employer] system.  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    parietral lesions bilateral       Antiplatelet or antithrombotic long-term use      Anxiety      Atrial fibrillation (H)      CAD (coronary artery disease)     2 vessel     Cancer (H) 1990    periodically have cancer on the skin removed     Cerebral artery occlusion with cerebral infarction (H) 10/2016    Cardioembolic strokes related to atrial fibrillation     Deep vein thrombosis (DVT) of axillary vein of right upper extremity (H) 2/25/2017     Depressive disorder 2001     Diabetes (H)      HIT (heparin-induced " thrombocytopenia) (H) 3/8/2017     Hyperlipidemia LDL goal <130 10/31/2010     Hypertension      Panic attacks      Seizures (H) 10/19/2016     Sleep apnea     Uses CPAP       Past Surgical History:   Procedure Laterality Date     AMPUTATE TOE(S) Right 5/26/2017    Procedure: AMPUTATE TOE(S);  Right Great Toe amputation, debriedment of 2 and 3rd toe soft tissu right foot. and application of Grafix;  Surgeon: Leah Santamaria MD;  Location: [facility] OR     ANESTHESIA CARDIOVERSION N/A 12/12/2017    Procedure: ANESTHESIA CARDIOVERSION;  Anesthesia Cardioversion ;  Surgeon: GENERIC ANESTHESIA PROVIDER;  Location: [facility] OR     BACK SURGERY       BYPASS GRAFT ARTERY CORONARY N/A 2/6/2017    Procedure: BYPASS GRAFT ARTERY CORONARY;  Surgeon: Mikhail Quiñones MD;  Location: [facility] OR     C APPENDECTOMY  1959     C CARDIAC SURG PROCEDURE UNLIST       C HAND/FINGER SURGERY UNLISTED       C STOMACH SURGERY PROCEDURE UNLISTED       HC SACROPLASTY       HC VASCULAR SURGERY PROCEDURE UNLIST       HYSTERECTOMY, PASTORA  1988     IRRIGATION AND DEBRIDEMENT FOOT, COMBINED Right 7/7/2017    Procedure: COMBINED IRRIGATION AND DEBRIDEMENT FOOT;  Irrigation and Debridement Right Foot with Graphix  *Latex Allergy*;  Surgeon: Leah Santamaria MD;  Location: [facility] OR     MAZE PROCEDURE N/A 2/6/2017    Procedure: MAZE PROCEDURE;  Surgeon: Mikhail Quiñones MD;  Location: [facility] OR     PICC INSERTION Left 02/25/2017    5fr TL Bard PICC, 47cm (3cm external) in the L basilic vein w/ tip in the SVC RA junction     TONSILLECTOMY  1942       Family History   Problem Relation Age of Onset     DIABETES Mother      C.A.D. Mother      Hypertension Mother      CEREBROVASCULAR DISEASE Mother      Mini Strokes     Other Cancer Mother      Skin Cancer     Hyperlipidemia Mother      Coronary Artery Disease Mother      DIABETES Father      C.A.D. Father      Hypertension Father      Other Cancer Father      Hyperlipidemia Father      Coronary Artery Disease Father      HEART  DISEASE Sister      Arthritis Sister      Other Cancer Other      Skin Cancer       Social History   Substance Use Topics     Smoking status: Former Smoker     Packs/day: 1.00     Years: 10.00     Types: Cigarettes     Start date: 10/3/1958     Quit date: 7/4/1976     Smokeless tobacco: Never Used      Comment: Quit in 1976     Alcohol use No       No current facility-administered medications for this encounter.      Current Outpatient Prescriptions   Medication     gabapentin (NEURONTIN) 100 MG capsule     bumetanide (BUMEX) 2 MG tablet     levothyroxine (SYNTHROID/LEVOTHROID) 25 MCG tablet     Potassium Chloride ER 20 MEQ TBCR     MELATONIN PO     carvedilol (COREG) 3.125 MG tablet     rivaroxaban ANTICOAGULANT (XARELTO) 20 MG TABS tablet     ALPRAZolam (XANAX) 0.25 MG tablet     venlafaxine (EFFEXOR-ER) 150 MG TB24 24 hr tablet     metolazone (ZAROXOLYN) 2.5 MG tablet     spironolactone (ALDACTONE) 25 MG tablet     atorvastatin (LIPITOR) 40 MG tablet     traZODone (DESYREL) 50 MG tablet     pantoprazole (PROTONIX) 40 MG EC tablet     Elastic Bandages & Supports (ACE BANDAGE SELF-ADHERING) MISC     Gauze Pads & Dressings (KERLIX GAUZE ROLL MEDIUM) [facility]     ONE TOUCH ULTRA test strip     Wound Dressings (ADAPTIC NON-ADHERING DRESSING) PADS     order for DME     acetaminophen (TYLENOL) 325 MG tablet     aspirin 81 MG chewable tablet     ferrous sulfate (IRON SUPPLEMENT) 325 (65 FE) MG tablet     ONETOUCH DELICA LANCETS 33G MISC     blood glucose monitoring (ONE TOUCH ULTRA 2) meter device kit        Allergies   Allergen Reactions     Heparin      HIT/ thrombocytopenia     Lovenox [Enoxaparin] Other (See Comments)     Thrombocytopenia      Oxycodone      hallucinations     Toprol Xl [Metoprolol] Nausea and Vomiting     Adhesive Tape Itching and Rash     Diapers & Supplies Rash     Developed yeast infection from previous hospital stay       Review of Systems   Constitutional: Positive for fatigue. Negative for chills.  "  HENT: Negative for sore throat and trouble swallowing.    Respiratory: Negative for cough and shortness of breath.    Gastrointestinal: Positive for nausea and vomiting (nonbloody).   Musculoskeletal: Positive for back pain. Negative for neck pain and neck stiffness.   Skin: Negative for color change and rash.   Neurological: Positive for weakness (generalized). Negative for syncope.   All other systems reviewed and are negative.      Physical Exam   BP: 98/59  Pulse: 53  Heart Rate: 52  Temp: 97.2  F (36.2  C)  Resp: 18  Height: 157.5 cm (5' 2\")  SpO2: 93 %      Physical Exam  CONSTITUTIONAL: Well-developed and well-nourished. Awake and alert. Non-toxic appearance. No acute distress.   HENT:   - Head: Normocephalic and atraumatic.   - Ears: Hearing and external ear grossly normal.   - Nose: Nose normal. No rhinorrhea. No epistaxis.   - Mouth/Throat: MMM  EYES: Conjunctivae and lids are normal. No scleral icterus.   NECK: Normal range of motion and phonation normal. Neck supple.  No tracheal deviation, no stridor. No edema or erythema noted.  CARDIOVASCULAR: Normal rate, regular rhythm and no appreciable abnormal heart sounds.  PULMONARY/CHEST: Normal work of breathing. No accessory muscle usage or stridor. No respiratory distress.  No appreciable abnormal breath sounds.  ABDOMEN: Soft, non-distended. No tenderness. No rigidity, rebound or guarding.   MUSCULOSKELETAL: Extremities warm and seemingly well perfused. No edema or calf tenderness.  NEUROLOGIC: Awake, alert. Not disoriented. Normal tone. No seizure activity. GCS 15  SKIN: Skin is warm and dry. No rash noted. No diaphoresis. No pallor.   PSYCHIATRIC: Normal mood and affect. Speech and behavior normal. Thought processes linear. Cognition and memory are normal.     ED Course     ED Course   Value Comment Time    Patient reported some low back pain, HR noted to increase up to about 110 bpm, pain since resolved but increase in HR remains.  New ECG ordered.  " Has a history of A. fib and think that to be most likely but will evaluate further with ECG. 03/20 2211    That side echo performed, no clear pericardial effusion though does have somewhat poor contractility from what can be seen, but very limited secondary to poor windows 03/20 2212    Discussed case with Radiology given the patient's symptoms as well as a 4 g hemoglobin drop.  In the setting of anticoagulation, one of the things I would be concerned about is a retroperitoneal hemorrhage or intra-abdominal bleed.  Somewhat hesitant given her age and history kidney injury and now recurrent acute kidney injury about IV contrast.  He reports that with a 4 g drop he would expect to see a fairly large hematoma and that we did not need contrast and that we can start with a noncontrast CT and if that did not show a cause for her symptoms then we did a CT again with contrast.  Order placed. 03/20 2225   Hemoglobin: (!) 7.2 Down from 11-12 recently 03/20 2228    Called Poison Control regarding patient's elevated INR in setting of reported acetaminophen use.  Even if using every 3 hours when awake, if she is taking the 325 mg dosing, Jose she would not be exceeding 4 g in a day.  That said with her INR elevated they would recommend ordering NAC if her LFTs were elevated. If LFTs not elevated, would recommend monitoring. 03/20 2241   Troponin I ES: (!) 0.049 (Reviewed) 03/21 0024   N-Terminal Pro BNP Inpatient: (!) 2410 (Reviewed) 03/21 0024   TSH: (!) 81.10 (Reviewed) 03/21 0024     Procedures             EKG Interpretation:      Interpreted by Susan Patrick MD  Time reviewed: 21:37  Symptoms at time of EKG: generalized weakness   Rhythm: junctional rhythm  Rate: 52 bpm  Axis: Normal  Ectopy: none  Conduction: low voltage QRS; inferior infarct (noted previously); cannot rule out anterior infarct (noted previously)  ST Segments/ T Waves: No ST-T wave changes  Comparison to prior on 02/21/18: decreased voltage/magnitude from  previous           EKG Interpretation:      Interpreted by Susan Patrick MD  Time reviewed: 22:13    Symptoms at time of EKG: generalized weakness   Rhythm: Sinus tachycardia  Rate: 110 bpm  Comparison to prior @ 21:37: now sinus tachycardia from junctional rhythm           Labs Ordered and Resulted from Time of ED Arrival Up to the Time of Departure from the ED   CBC WITH PLATELETS DIFFERENTIAL - Abnormal; Notable for the following:        Result Value    RBC Count 1.95 (*)     Hemoglobin 7.2 (*)     Hematocrit 23.2 (*)      (*)     MCH 36.9 (*)     MCHC 31.0 (*)     RDW 21.9 (*)     Nucleated RBCs 5 (*)     Absolute Myelocytes 0.1 (*)     All other components within normal limits   INR - Abnormal; Notable for the following:     INR 5.05 (*)     All other components within normal limits   COMPREHENSIVE METABOLIC PANEL - Abnormal; Notable for the following:     Sodium 132 (*)     Glucose 109 (*)     Urea Nitrogen 88 (*)     Creatinine 1.78 (*)     GFR Estimate 28 (*)     GFR Estimate If Black 33 (*)     All other components within normal limits   LIPASE - Abnormal; Notable for the following:     Lipase 483 (*)     All other components within normal limits   TSH WITH FREE T4 REFLEX - Abnormal; Notable for the following:     TSH 81.10 (*)     All other components within normal limits   TROPONIN I - Abnormal; Notable for the following:     Troponin I ES 0.049 (*)     All other components within normal limits   NT PROBNP INPATIENT - Abnormal; Notable for the following:     N-Terminal Pro BNP Inpatient 2410 (*)     All other components within normal limits   ROUTINE UA WITH MICROSCOPIC - Abnormal; Notable for the following:     Squamous Epithelial /HPF Urine 2 (*)     Mucous Urine Present (*)     Hyaline Casts 14 (*)     All other components within normal limits   MAGNESIUM - Abnormal; Notable for the following:     Magnesium 2.7 (*)     All other components within normal limits   CREATININE POCT - Abnormal;  Notable for the following:     Creatinine 2.1 (*)     GFR Estimate 23 (*)     GFR Estimate If Black 28 (*)     All other components within normal limits   D DIMER QUANTITATIVE - Abnormal; Notable for the following:     D Dimer 1.1 (*)     All other components within normal limits   T4 FREE - Abnormal; Notable for the following:     T4 Free 0.44 (*)     All other components within normal limits   ISTAT  GASES LACTATE DARRELL POCT - Abnormal; Notable for the following:     PO2 Venous 20 (*)     All other components within normal limits   ABO/RH TYPE AND SCREEN - Abnormal; Notable for the following:     Antibody Screen Pos (*)     All other components within normal limits   ACETAMINOPHEN LEVEL   ACETAMINOPHEN LEVEL   ISTAT CG4 GASES LACTATE DARRELL NURSING POCT   ISTAT CREATININE NURSING POCT   NURSING DRAW AND HOLD            Assessments & Plan (with Medical Decision Making)   IMPRESSION: 77-year-old medically complicated female with PMH notable for HTN, HLP, chronic A. fib anticoagulated on Xarelto, DM, panic attacks,HIT, who is presenting with generalized weakness, some nausea and vomiting and ongoing upper and low back pain as described further above in HPI/ROS.  Given the patient's symptomatology there are many possible etiologies.  She could have an occult infection such as pneumonia, UTI, etc.  We could also consider dysrhythmia, electrolyte abnormalities, nothing with her low back pain though I think it to be less likely given that it is been ongoing for 1 month, but in the setting of her anticoagulation to be concerned for retroperitoneal hemorrhage/hematoma, if it is worsening here in the last 2 days could also consider something like AAA, GIB, or other bleeding cause to worsen her anemia and cause exacerbation of weakness, acute cardiac event, heart failure, pericardial effusion, etc.    PLAN: ECG, laboratory studies, urine studies, chest imaging, CT imaging of the abdomen/pelvis, head imaging, symptom  management    RESULTS:  See ED Course section above for particular pertinent findings and comments  - Labs: Hgb 7.2 (down from 11.2 on 2/22/18), INR 5.05 (up from 1.61), creatinine 1.78 (up from 1.28 on 2/22), BUN 88 (up from 43), lipase 483, TSH 81.1 (down from 88), troponin 0.049, BNP 2410 (down from previous), Mg 2.7, acetaminophen 120, d-dimer 1.1, free T4 0.44  - Urine:   - Imaging: Images and written preliminary reports reviewed by myself and revealed:  --- CXR: 1. Hazy left bibasilar/retrocardial airspace opacities, compatible with atelectasis, aspiration, or infection. 2.  Pulmonary vascular congestion  --- CT A/P:  1. No retroperitoneal, abdominal or pelvic hematoma.  2. Normal caliber of the abdominal aorta and visualized thoracic aorta. 3. Mild right upper quadrant and pelvic low density free fluid of unclear etiology, possibly related to fluid resuscitation. 4. Moderate-to-advanced degenerative spondylosis of the lumbar spine, greatest at L3-4 and L4-5. 5. Small amount of focal soft tissue stranding lateral to the right iliac crest, may represent a small ecchymosis.  Atelectasis noted on CT for lung findings    INTERVENTIONS:   - Step 1 of NAC (admitting team will order next steps)   - 1u PRBC   - Protonix bolus and drip    RE-EVALUATION:  See ED Course section above for particular pertinent findings and comments  - The patient's symptoms were grossly unchanged here in the ED.   - Pt continues to do well here in the ED, no acute issues or apparent concerning changes in vitals or clinical appearance.     DISCUSSIONS:  - w/ Radiology: Discussed imaging with radiology given concern for possible AAA, retroperitoneal hemorrhage, with the anticoagulation and hemoglobin drop.  They report without significant of a drop they should see what they need to without needing the contrast and therefore minimizing her risk for AKA in the setting of her other significant issues at the time.  We will start there but if  this is not conclusive we may need to add additional imaging and accept the risk of imaging w/ contrast at that time).   - w/ Poison Control: Initially discussed the case with Poison Control and they recommended that we could wait for laboratory studies before starting N-acetylcysteine.  I was then called by Poison Control and they did want to start the recommended NAC, which was ordered. Step1 to start here, admitting team will order the rest.    - w/ Patient: I have reviewed the available findings, plan with the patient and her loved one/family. They expressed understanding and agreement with this plan. All questions answered to the best of our ability at this time.     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    New Prescriptions    No medications on file       Final diagnoses:   None       3/20/2018   [facility], [place], EMERGENCY DEPARTMENT     Susan Patrick MD  03/23/18 [id number]

## 2018-03-21 NOTE — H&P
"[facility], [place]    Internal Medicine History and Physical - [facility] Service       Date of Admission:  3/20/2018    Chief Complaint   Weakness, fatigue    History is obtained from the patient and supplemented by EMR    History of Present Illness    Carlos Alberto Fenton is a 77 year old female with PMH of HTN, HLD, DM, CVA (11/2016), chronic diastolic HF (last ECHO 12/12/17 EF 45-50%), CAD (s/p 3v CABG: LIMA-LAD, SVG-D1, SVG-dRCA and modified MAZE, ABDIEL ligation - 2/2016), paroxysmal AFib ([id number] - 8 on Xarelto), HIT, RUE DVT, recently diagnosed hypothyroidism, who presents to [facility] ED today for generalized fatigue, weakness x1-2 days. Yesterday then she began noticing some nonspecific feeling in that she was not quite right and was feeling unwell. Reports she has some ongoing chronic back pain inc low back and across both shoulder blades present for last month.  She's been increasing the amount of tylenol she's been taking lately, stating she takes \"2 red-white capsules\" about every 3-4 hrs daily for the last month. She denies abdominal pain or chest pain, no palpitations, though does report she is somewhat short of breath.  She is having some nausea and has had an episode of nonbloody emesis, no change to stools.  She has a known history of urge incontinence and that is unchanged, no hematuria.  No bloody stools or melena.  No traumas or falls, no fevers or chills.  She has not had any syncope or near syncope, but is generally feeling much more fatigued and unwell.    She states she's also been bruising much more easily more recently. Has a large bruise on posterior upper R buttock of unknown cause. States minimal contact is causing her to bruise more recently.      Last seen for SOB with Cardiology on 3/2/18 at which time thought not to be related to her CAD, but more likely related to deconditioning, anemia, hypothyroidism, were considering repeat stress test.       In the ED she was " AF, BP 98/59, HR 53, sating well >93% on RA. CMP notable for low Na 132, elevated BUN 88, Cr 1.8, Trop 0.049, TSH 81.10, low T4 0.44. Lactate 1.6. CBC with Hgb 7.2, WBC 10.3, plt 282. INR 5.05, PTT 38, D-Dimer elevate dto 1.1. Acetaminophen level to 120. VBG largely unremarkable. UA with hyaline casts. EKG with sinus tachycardia, CT head without acute intracranial pathology, CT A/P without hematoma, mild RUQ and pelvic low density free fluid and small amount of focal soft tissue stranding lateral and R to iliac crest, suggestive of small ecchymosis. She was treated with IV PPI gtt, NAC and IV vit K.     Review of Systems   The 10 point Review of Systems is negative other than noted in the HPI or here.     Past Medical History    I have reviewed this patient's medical history and updated it with pertinent information if needed.   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    parietral lesions bilateral       Antiplatelet or antithrombotic long-term use      Anxiety      Atrial fibrillation (H)      CAD (coronary artery disease)     2 vessel     Cancer (H) 1990    periodically have cancer on the skin removed     Cerebral artery occlusion with cerebral infarction (H) 10/2016    Cardioembolic strokes related to atrial fibrillation     Deep vein thrombosis (DVT) of axillary vein of right upper extremity (H) 2/25/2017     Depressive disorder 2001     Diabetes (H)      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     Hyperlipidemia LDL goal <130 10/31/2010     Hypertension      Panic attacks      Seizures (H) 10/19/2016     Sleep apnea     Uses CPAP        Past Surgical History   I have reviewed this patient's surgical history and updated it with pertinent information if needed.  Past Surgical History:   Procedure Laterality Date     AMPUTATE TOE(S) Right 5/26/2017    Procedure: AMPUTATE TOE(S);  Right Great Toe amputation, debriedment of  2 and 3rd toe soft tissu right foot. and application of Grafix;  Surgeon: Leah Santamaria MD;  Location: [facility] OR     ANESTHESIA CARDIOVERSION N/A 12/12/2017    Procedure: ANESTHESIA CARDIOVERSION;  Anesthesia Cardioversion ;  Surgeon: GENERIC ANESTHESIA PROVIDER;  Location: [facility] OR     BACK SURGERY       BYPASS GRAFT ARTERY CORONARY N/A 2/6/2017    Procedure: BYPASS GRAFT ARTERY CORONARY;  Surgeon: Mikhail Quiñones MD;  Location: [facility] OR     C APPENDECTOMY  1959     C CARDIAC SURG PROCEDURE UNLIST       C HAND/FINGER SURGERY UNLISTED       C STOMACH SURGERY PROCEDURE UNLISTED       HC SACROPLASTY       HC VASCULAR SURGERY PROCEDURE UNLIST       HYSTERECTOMY, PASTORA  1988     IRRIGATION AND DEBRIDEMENT FOOT, COMBINED Right 7/7/2017    Procedure: COMBINED IRRIGATION AND DEBRIDEMENT FOOT;  Irrigation and Debridement Right Foot with Graphix  *Latex Allergy*;  Surgeon: Leah Santamaria MD;  Location: [facility] OR     MAZE PROCEDURE N/A 2/6/2017    Procedure: MAZE PROCEDURE;  Surgeon: Mkihail Quiñones MD;  Location: [facility] OR     PICC INSERTION Left 02/25/2017    5fr TL Bard PICC, 47cm (3cm external) in the L basilic vein w/ tip in the SVC RA junction     TONSILLECTOMY  1942        Social History   Social History   Substance Use Topics     Smoking status: Former Smoker     Packs/day: 1.00     Years: 10.00     Types: Cigarettes     Start date: 10/3/1958     Quit date: 7/4/1976     Smokeless tobacco: Never Used      Comment: Quit in 1976     Alcohol use No       Family History   I have reviewed this patient's family history and updated it with pertinent information if needed.   Family History   Problem Relation Age of Onset     DIABETES Mother      C.A.D. Mother      Hypertension Mother      CEREBROVASCULAR DISEASE Mother      Mini Strokes     Other Cancer Mother      Skin Cancer     Hyperlipidemia Mother      Coronary Artery Disease Mother      DIABETES Father      C.A.D. Father      Hypertension Father      Other Cancer Father       Hyperlipidemia Father      Coronary Artery Disease Father      HEART DISEASE Sister      Arthritis Sister      Other Cancer Other      Skin Cancer       Prior to Admission Medications   Prior to Admission Medications   Prescriptions Last Dose Informant Patient Reported? Taking?   ALPRAZolam (XANAX) 0.25 MG tablet   No No   Sig: Take 1 tablet (0.25 mg) by mouth 3 times daily as needed for anxiety   Elastic Bandages & Supports (ACE BANDAGE SELF-ADHERING) MISC   No No   Si each 2 times daily   Gauze Pads & Dressings (KERLIX GAUZE ROLL MEDIUM) MISC   No No   Si each 2 times daily   MELATONIN PO   Yes No   Sig: Take 1 mg by mouth At Bedtime   ONE TOUCH ULTRA test strip   No No   Sig: USE AS DIRECTED TO TEST ONE TIME A DAY   ONETOUCH DELICA LANCETS 33G MISC  Self No No   Si Device daily   Potassium Chloride ER 20 MEQ TBCR   No No   Sig: Take 1 tablet (20 mEq) by mouth 2 times daily   Wound Dressings (ADAPTIC NON-ADHERING DRESSING) PADS   No No   Sig: Externally apply 1 each topically 2 times daily   acetaminophen (TYLENOL) 325 MG tablet   No No   Sig: Take 3 tablets (975 mg) by mouth every 6 hours as needed for mild pain   aspirin 81 MG chewable tablet   No No   Sig: Take 1 tablet (81 mg) by mouth daily   Patient taking differently: Take 81 mg by mouth every morning    atorvastatin (LIPITOR) 40 MG tablet   No No   Sig: Take 1 tablet (40 mg) by mouth daily   blood glucose monitoring (ONE TOUCH ULTRA 2) meter device kit  Self Yes No   bumetanide (BUMEX) 2 MG tablet   Yes No   Sig: Take 1 tablet (2 mg) by mouth 2 times daily   carvedilol (COREG) 3.125 MG tablet   No No   Sig: Take 1 tablet (3.125 mg) by mouth 2 times daily (with meals)   ferrous sulfate (IRON SUPPLEMENT) 325 (65 FE) MG tablet   No No   Sig: Take 1 tablet (325 mg) by mouth daily (with breakfast)   Patient taking differently: Take 325 mg by mouth daily    gabapentin (NEURONTIN) 100 MG capsule   No No   Sig: TAKE ONE CAPSULE BY MOUTH TWICE DAILY     levothyroxine (SYNTHROID/LEVOTHROID) 25 MCG tablet   No No   Sig: Take 1 tablet (25 mcg) by mouth every morning (before breakfast)   metolazone (ZAROXOLYN) 2.5 MG tablet   No No   Sig: Take 1 tablet (2.5 mg) by mouth daily as needed For weight over 170 Lbs.   order for DME   No No   Sig: Sterile 4x4 gauze; Use gauze twice daily for dressing changes.   pantoprazole (PROTONIX) 40 MG EC tablet   No No   Sig: Take 1 tablet (40 mg) by mouth every morning   rivaroxaban ANTICOAGULANT (XARELTO) 20 MG TABS tablet   No No   Sig: Take 1 tablet (20 mg) by mouth daily (with dinner) . DO NOT start until you have stopped the Warfarin.   spironolactone (ALDACTONE) 25 MG tablet   No No   Sig: Take 1 tablet (25 mg) by mouth daily   traZODone (DESYREL) 50 MG tablet   No No   Sig: Take 0.5 tablets (25 mg) by mouth nightly as needed for sleep   venlafaxine (EFFEXOR-ER) 150 MG TB24 24 hr tablet   No No   Sig: Take 1 tablet (150 mg) by mouth daily (with breakfast)      Facility-Administered Medications: None     Allergies   Allergies   Allergen Reactions     Blood Transfusion Related (Informational Only) Other (See Comments)     Patient has a history of a clinically significant antibody against RBC antigens.  A delay in compatible RBCs may occur.Patient has a history of a significant allergic transfusion reaction.  Consult with Blood Bank MD before ordering components.     Heparin      HIT/ thrombocytopenia     Lovenox [Enoxaparin] Other (See Comments)     Thrombocytopenia      Oxycodone      hallucinations     Toprol Xl [Metoprolol] Nausea and Vomiting     Adhesive Tape Itching and Rash     Diapers & Supplies Rash     Developed yeast infection from previous hospital stay       Physical Exam   Vital Signs: Temp: 97.2  F (36.2  C) Temp src: Oral BP: 108/68 Pulse: 53 Heart Rate: 65 Resp: 15 SpO2: 95 % O2 Device: Oxymask Oxygen Delivery: 6 LPM  Weight: 0 lbs 0 oz    General Appearance: fatigued appearing older woman laying in bed, in  NAD  Eyes: no scleral icterus, PERRL  HEENT: NC/AT  Respiratory: CTAB  Cardiovascular: tachycardic, irregularly irregular, no m/r/g  GI: soft, ND, TTP in b/l lower quadrants  Skin: Large purple-red ecchymosis to superior R buttock, multiple circular ecchymoses on b/l upper extremities, multicolored  Musculoskeletal: full ROM of all extremities  Neurologic: AOx3, CN II-XII grossly intact  Psychiatric: mood and affect normal    Assessment & Plan   Carlos Alberto Fenton is a 77 year old female with PMH of HTN, HLD, DM, CVA (11/2016), chronic diastolic HF (last ECHO 12/12/17 EF 45-50%), CAD (s/p 3v CABG: LIMA-LAD, SVG-D1, SVG-dRCA and modified MAZE, ABDIEL ligation - 2/2016), paroxysmal AFib ([id number] - 8 on Xarelto), HIT, RUE DVT, recently diagnosed hypothyroidism, admitted 3/21/2018 for acute GIB, coagulopathy and concern for acute LI 2/2 acetaminophen toxicity     # GIB  # Acute blood loss anemia  On chronic AC (xarelto). Hgb on presentation 7.2. Stool occult positive in ED. Elevated INR. S/p 1u PRBC in ED.   -- GI consult, appreciate recs  -- trend Hgb q6h, transfuse <7  -- continue protonix gtt  -- hold AC    # Coagulopathy  # Concern for tylenol toxicity  INR elevated to 5. Transitioned from coumadin to xarelto as of 01/2018 for PAF with elevated CHADsVASC of 8. Unclear etiology, thought possibly due to tylenol toxicity causing acute LI. However pt states she's been using tylenol chronically for the last month. Placed on NAC initially in ED. S/p IV vit k 10mg in ED. LFTS wnl. Unlikely acute LI. Tylenol level to 128 initially.  -- trend INR  -- consider hepatitis serologies  -- GI consult as above  -- will d/c NAC as unlikely acute toxicity    # HARINI on CKD  Cr at bsl ~1.1-4. On presentation 1.8. UA with hyaline casts, tachy, hypotensive. Likely prerenal in the setting of GIB and decreased PO intake/emesis  -- trend BMP  -- mIVF as below    # Hypothyroidism  Recently diagnosed. On Levothyroxine. Last TSH 2/21 elevated to  88, T4 improved from 0.29 to 0.44.   -- continue levo at PTA dosage    # Elevated troponin  # chronic diastolic HF  # CAD  last ECHO 12/12/17 EF 45-50%. Follows with Dr. Lobato. Reported SOB about a month ago, thought unlikely 2/2 CAD. Elevated trop to 0.049 likely 2/2 demand ischemia   -- trend trop q6h to peak  -- mIVF as below  -- consider cards consult  -- consider repeat formal ECHO in AM  -- resume PTA aspirin, lipitor, bumex, carvedilol, aldactone     # PAF   CHADSVasc - 8  -- hold xarelto in the setting of acute GIB bleed.     # Depression  -- continue PTA venlafaxine    # HLD   -- continue PTA lipitor    # Pain Assessment:   Current Pain Score 2/22/2018 2/22/2018 2/22/2018   Patient currently in pain? denies denies denies   Pain score (0-10) - - -   Pain location - - -   Pain descriptors - - -   CPOT pain score - - -   Carlos Alberto sanabria pain level was assessed and she currently denies pain.      Diet: NPO for Medical/Clinical Reasons Except for: No Exceptions  Fluids: IV NS mIVF @ 100cc/hr  DVT Prophylaxis: VTE Prophylaxis contraindicated due to coagulopathy  Code Status: Full Code    Disposition Plan   Expected discharge: 2 - 3 days; recommended to prior living arrangement once hemoglobin stable.     Entered: Naomy Anaya 03/21/2018, 4:44 AM   Information in the above section will display in the discharge planner report.    To be formally staffed in AM.     Naomy Anaya MD  IM PGY-1   [id number]    Data   Data     Recent Labs  Lab 03/21/18  0640 03/20/18  2203   WBC  --  10.3   HGB  --  7.2*   MCV  --  119*   PLT  --  282   INR  --  5.05*    132*   POTASSIUM 4.0 4.5   CHLORIDE 99 95   CO2 25 27   BUN 79* 88*   CR 1.38* 1.78*   ANIONGAP 15* 10   CARLY 8.8 8.7   * 109*   ALBUMIN  --  3.4   PROTTOTAL  --  7.1   BILITOTAL  --  0.7   ALKPHOS  --  84   ALT  --  27   AST  --  45   LIPASE  --  483*   TROPI  --  0.049*     Recent Results (from the past 24 hour(s))   XR Chest 2 Views    Narrative    EXAM: XR  CHEST 2 VW  3/20/2018 10:29 PM      HISTORY: shortness of breath, weakness, ? PNA;     COMPARISON: 2/21/2018    FINDINGS: PA and lateral views of the chest obtained. Median  sternotomy wires. Implantable loop recorder. Left atrial appendage  exclusion device. The trachea is midline. The cardiac silhouette is  within normal limits. No pneumothorax or pleural effusion. Hazy left  retrocardiac opacities. Bilateral pulmonary vascular congestion.      Impression    IMPRESSION:   1. Hazy left basilar/retrocardiac airspace opacities, compatible with  atelectasis, aspiration, or infection.  2. Pulmonary vascular congestion.    I have personally reviewed the examination and initial interpretation  and I agree with the findings.    DUC SALGADO MD   CT Abdomen Pelvis w/o Contrast    Narrative    EXAMINATION: CT ABDOMEN PELVIS W/O CONTRAST, 3/20/2018 11:39 PM    TECHNIQUE:  Helical CT images from the lung bases through the  symphysis pubis were obtained without IV contrast.     COMPARISON: Abdominal radiograph 2/6/2017, CT chest 3/20/2017    HISTORY: ? Retroperitoneal hemorrhage, AAA or other cause for back  pain, acute anemia, etc.;     FINDINGS:  LIVER: Tiny calcifications, likely calcified granulomata.    BILIARY: Within normal limits.    PANCREAS: Within normal limits.    SPLEEN: Tiny calcifications, likely calcified granulomata.    ADRENAL GLANDS: Within normal limits.    URINARY TRACT: Within normal limits.    REPRODUCTIVE ORGANS: Hysterectomy.    GASTROINTESTINAL TRACT: Normal caliber of the small and large bowel.    PERITONEUM/FLUID: Mild right upper quadrant perihepatic ascites and  low density mild-to moderate free fluid in the low pelvis. No walled  off fluid collections.    VESSELS: Normal caliber of the abdominal aorta. Moderate aortoiliac  atherosclerotic plaque.    LYMPH NODES: No enlarged lymph nodes.    LUNG BASES/LOW CHEST: Moderate atelectasis, including cicatricial  atelectasis in the lingula.  Postsurgical changes of heart stenting.  Visualized thoracic aorta is of normal caliber.    BONES/SOFT TISSUES: Median sternotomy wires. Unchanged multilevel  degenerative changes of the spine, greatest in the lumbar spine with  chronic slight leftward curvature with apex at L3, and mild leftward  subluxation of L2 and L3 and grade 1 retrolisthesis L3-4. There is  severe multilevel facet arthropathy in the lower lumbar spine.  Left-sided L4-5 and bilateral L5-S1 pars defects. Small soft tissue  stranding adjacent to the right iliac crest (series 5, image 340).      Impression    IMPRESSION:   1. No retroperitoneal, abdominal or pelvic hematoma.  2. Normal caliber of the abdominal aorta and visualized thoracic  aorta.  3. Mild right upper quadrant and pelvic low density free fluid of  unclear etiology, possibly related to fluid resuscitation.  4. Moderate-to-advanced degenerative spondylosis of the lumbar spine,  greatest at L3-4 and L4-5.  5. Small amount of focal soft tissue stranding lateral to the right  iliac crest, may represent a small ecchymosis.       CT Head w/o Contrast    Narrative    CT HEAD W/O CONTRAST 3/20/2018 11:43 PM    Provided History: weakness, arm twitching;     Comparison: CT head 2/21/2018.    Technique: Using multidetector thin collimation helical acquisition  technique, axial, coronal and sagittal CT images from the skull base  to the vertex were obtained without intravenous contrast.     Findings:    No intracranial hemorrhage, mass effect, or midline shift. The  ventricles are proportionate to the cerebral sulci. Moderate global  cerebral volume loss. The gray to white matter differentiation of the  cerebral hemispheres is preserved. Patchy leukoaraiosis is unchanged.  The basal cisterns are patent.    The visualized paranasal sinuses are clear. The mastoid air cells are  clear.       Impression    Impression: No acute intracranial pathology.     I have personally reviewed the examination  and initial interpretation  and I agree with the findings.    INO FOFANA MD

## 2018-03-22 LAB
ALBUMIN SERPL-MCNC: 3 G/DL (ref 3.4–5)
ALP SERPL-CCNC: 71 U/L (ref 40–150)
ALT SERPL W P-5'-P-CCNC: 50 U/L (ref 0–50)
AMMONIA PLAS-SCNC: 42 UMOL/L (ref 10–50)
ANION GAP SERPL CALCULATED.3IONS-SCNC: 15 MMOL/L (ref 3–14)
ANISOCYTOSIS BLD QL SMEAR: ABNORMAL
APAP SERPL-MCNC: 4 MG/L (ref 10–20)
AST SERPL W P-5'-P-CCNC: 73 U/L (ref 0–45)
BASE DEFICIT BLDV-SCNC: 3 MMOL/L
BASOPHILS # BLD AUTO: 0 10E9/L (ref 0–0.2)
BASOPHILS NFR BLD AUTO: 0 %
BILIRUB DIRECT SERPL-MCNC: 0.6 MG/DL (ref 0–0.2)
BILIRUB SERPL-MCNC: 1.2 MG/DL (ref 0.2–1.3)
BUN SERPL-MCNC: 58 MG/DL (ref 7–30)
BURR CELLS BLD QL SMEAR: SLIGHT
CALCIUM SERPL-MCNC: 8.4 MG/DL (ref 8.5–10.1)
CHLORIDE SERPL-SCNC: 106 MMOL/L (ref 94–109)
CO2 SERPL-SCNC: 21 MMOL/L (ref 20–32)
CREAT SERPL-MCNC: 1.04 MG/DL (ref 0.52–1.04)
D DIMER PPP FEU-MCNC: 4.8 UG/ML FEU (ref 0–0.5)
DIFFERENTIAL METHOD BLD: ABNORMAL
EOSINOPHIL # BLD AUTO: 0.2 10E9/L (ref 0–0.7)
EOSINOPHIL NFR BLD AUTO: 1.7 %
ERYTHROCYTE [DISTWIDTH] IN BLOOD BY AUTOMATED COUNT: 25.5 % (ref 10–15)
FIBRINOGEN PPP-MCNC: 302 MG/DL (ref 200–420)
GFR SERPL CREATININE-BSD FRML MDRD: 51 ML/MIN/1.7M2
GLUCOSE BLDC GLUCOMTR-MCNC: 107 MG/DL (ref 70–99)
GLUCOSE BLDC GLUCOMTR-MCNC: 154 MG/DL (ref 70–99)
GLUCOSE BLDC GLUCOMTR-MCNC: 225 MG/DL (ref 70–99)
GLUCOSE SERPL-MCNC: 106 MG/DL (ref 70–99)
HAPTOGLOB SERPL-MCNC: 104 MG/DL (ref 35–175)
HAPTOGLOB SERPL-MCNC: 80 MG/DL (ref 35–175)
HCO3 BLDV-SCNC: 24 MMOL/L (ref 21–28)
HCT VFR BLD AUTO: 27.4 % (ref 35–47)
HGB BLD-MCNC: 8.1 G/DL (ref 11.7–15.7)
HGB BLD-MCNC: 8.1 G/DL (ref 11.7–15.7)
HGB BLD-MCNC: 8.3 G/DL (ref 11.7–15.7)
HGB BLD-MCNC: 8.6 G/DL (ref 11.7–15.7)
HGB BLD-MCNC: 8.9 G/DL (ref 11.7–15.7)
INR PPP: 1.75 (ref 0.86–1.14)
INR PPP: 1.93 (ref 0.86–1.14)
INTERPRETATION ECG - MUSE: NORMAL
INTERPRETATION ECG - MUSE: NORMAL
LACTATE BLD-SCNC: 2.6 MMOL/L (ref 0.7–2)
LACTATE BLD-SCNC: 3.1 MMOL/L (ref 0.7–2)
LACTATE BLD-SCNC: NORMAL MMOL/L (ref 0.7–2)
LDH SERPL L TO P-CCNC: 436 U/L (ref 81–234)
LYMPHOCYTES # BLD AUTO: 1.1 10E9/L (ref 0.8–5.3)
LYMPHOCYTES NFR BLD AUTO: 12.6 %
MACROCYTES BLD QL SMEAR: PRESENT
MAGNESIUM SERPL-MCNC: 2.3 MG/DL (ref 1.6–2.3)
MCH RBC QN AUTO: 37.6 PG (ref 26.5–33)
MCHC RBC AUTO-ENTMCNC: 32.5 G/DL (ref 31.5–36.5)
MCV RBC AUTO: 116 FL (ref 78–100)
METAMYELOCYTES # BLD: 0.1 10E9/L
METAMYELOCYTES NFR BLD MANUAL: 0.8 %
MICROCYTES BLD QL SMEAR: PRESENT
MONOCYTES # BLD AUTO: 1 10E9/L (ref 0–1.3)
MONOCYTES NFR BLD AUTO: 10.9 %
MYELOCYTES # BLD: 0.2 10E9/L
MYELOCYTES NFR BLD MANUAL: 1.7 %
NEUTROPHILS # BLD AUTO: 6.4 10E9/L (ref 1.6–8.3)
NEUTROPHILS NFR BLD AUTO: 72.3 %
NRBC # BLD AUTO: 1.7 10*3/UL
NRBC BLD AUTO-RTO: 19 /100
O2/TOTAL GAS SETTING VFR VENT: 21 %
OVALOCYTES BLD QL SMEAR: SLIGHT
PCO2 BLDV: 50 MM HG (ref 40–50)
PH BLDV: 7.28 PH (ref 7.32–7.43)
PLATELET # BLD AUTO: 256 10E9/L (ref 150–450)
PLATELET # BLD EST: ABNORMAL 10*3/UL
PO2 BLDV: 30 MM HG (ref 25–47)
POIKILOCYTOSIS BLD QL SMEAR: SLIGHT
POLYCHROMASIA BLD QL SMEAR: SLIGHT
POTASSIUM SERPL-SCNC: 3.4 MMOL/L (ref 3.4–5.3)
PROT SERPL-MCNC: 6.6 G/DL (ref 6.8–8.8)
RBC # BLD AUTO: 2.37 10E12/L (ref 3.8–5.2)
RETICS # AUTO: 199.1 10E9/L (ref 25–95)
RETICS/RBC NFR AUTO: 8.1 % (ref 0.5–2)
SODIUM SERPL-SCNC: 141 MMOL/L (ref 133–144)
TROPONIN I SERPL-MCNC: 0.05 UG/L (ref 0–0.04)
WBC # BLD AUTO: 8.9 10E9/L (ref 4–11)

## 2018-03-22 PROCEDURE — 80048 BASIC METABOLIC PNL TOTAL CA: CPT | Performed by: MARRIAGE & FAMILY THERAPIST

## 2018-03-22 PROCEDURE — 25000125 ZZHC RX 250: Performed by: HOSPITALIST

## 2018-03-22 PROCEDURE — 12000006 ZZH R&B IMCU INTERMEDIATE UMMC

## 2018-03-22 PROCEDURE — 85384 FIBRINOGEN ACTIVITY: CPT | Performed by: HOSPITALIST

## 2018-03-22 PROCEDURE — A9270 NON-COVERED ITEM OR SERVICE: HCPCS | Mod: GY | Performed by: HOSPITALIST

## 2018-03-22 PROCEDURE — 25000128 H RX IP 250 OP 636: Performed by: STUDENT IN AN ORGANIZED HEALTH CARE EDUCATION/TRAINING PROGRAM

## 2018-03-22 PROCEDURE — 82803 BLOOD GASES ANY COMBINATION: CPT | Performed by: STUDENT IN AN ORGANIZED HEALTH CARE EDUCATION/TRAINING PROGRAM

## 2018-03-22 PROCEDURE — 83735 ASSAY OF MAGNESIUM: CPT | Performed by: MARRIAGE & FAMILY THERAPIST

## 2018-03-22 PROCEDURE — 80076 HEPATIC FUNCTION PANEL: CPT | Performed by: MARRIAGE & FAMILY THERAPIST

## 2018-03-22 PROCEDURE — 36415 COLL VENOUS BLD VENIPUNCTURE: CPT | Performed by: MARRIAGE & FAMILY THERAPIST

## 2018-03-22 PROCEDURE — 36415 COLL VENOUS BLD VENIPUNCTURE: CPT | Performed by: INTERNAL MEDICINE

## 2018-03-22 PROCEDURE — 25000128 H RX IP 250 OP 636: Performed by: EMERGENCY MEDICINE

## 2018-03-22 PROCEDURE — 40000809 ZZH STATISTIC NO DOCUMENTATION TO SUPPORT CHARGE

## 2018-03-22 PROCEDURE — 25000125 ZZHC RX 250: Performed by: EMERGENCY MEDICINE

## 2018-03-22 PROCEDURE — 85045 AUTOMATED RETICULOCYTE COUNT: CPT | Performed by: STUDENT IN AN ORGANIZED HEALTH CARE EDUCATION/TRAINING PROGRAM

## 2018-03-22 PROCEDURE — 93010 ELECTROCARDIOGRAM REPORT: CPT | Performed by: INTERNAL MEDICINE

## 2018-03-22 PROCEDURE — A9270 NON-COVERED ITEM OR SERVICE: HCPCS | Mod: GY | Performed by: MARRIAGE & FAMILY THERAPIST

## 2018-03-22 PROCEDURE — 85018 HEMOGLOBIN: CPT | Performed by: STUDENT IN AN ORGANIZED HEALTH CARE EDUCATION/TRAINING PROGRAM

## 2018-03-22 PROCEDURE — 83010 ASSAY OF HAPTOGLOBIN QUANT: CPT | Performed by: HOSPITALIST

## 2018-03-22 PROCEDURE — 94660 CPAP INITIATION&MGMT: CPT

## 2018-03-22 PROCEDURE — 84484 ASSAY OF TROPONIN QUANT: CPT | Performed by: STUDENT IN AN ORGANIZED HEALTH CARE EDUCATION/TRAINING PROGRAM

## 2018-03-22 PROCEDURE — 85610 PROTHROMBIN TIME: CPT | Performed by: HOSPITALIST

## 2018-03-22 PROCEDURE — 82140 ASSAY OF AMMONIA: CPT | Performed by: STUDENT IN AN ORGANIZED HEALTH CARE EDUCATION/TRAINING PROGRAM

## 2018-03-22 PROCEDURE — 83605 ASSAY OF LACTIC ACID: CPT | Performed by: INTERNAL MEDICINE

## 2018-03-22 PROCEDURE — 25000132 ZZH RX MED GY IP 250 OP 250 PS 637: Mod: GY | Performed by: STUDENT IN AN ORGANIZED HEALTH CARE EDUCATION/TRAINING PROGRAM

## 2018-03-22 PROCEDURE — 25000132 ZZH RX MED GY IP 250 OP 250 PS 637: Mod: GY | Performed by: MARRIAGE & FAMILY THERAPIST

## 2018-03-22 PROCEDURE — 93005 ELECTROCARDIOGRAM TRACING: CPT

## 2018-03-22 PROCEDURE — 85025 COMPLETE CBC W/AUTO DIFF WBC: CPT | Performed by: STUDENT IN AN ORGANIZED HEALTH CARE EDUCATION/TRAINING PROGRAM

## 2018-03-22 PROCEDURE — 00000146 ZZHCL STATISTIC GLUCOSE BY METER IP

## 2018-03-22 PROCEDURE — 87040 BLOOD CULTURE FOR BACTERIA: CPT | Performed by: STUDENT IN AN ORGANIZED HEALTH CARE EDUCATION/TRAINING PROGRAM

## 2018-03-22 PROCEDURE — 85025 COMPLETE CBC W/AUTO DIFF WBC: CPT | Performed by: MARRIAGE & FAMILY THERAPIST

## 2018-03-22 PROCEDURE — 36415 COLL VENOUS BLD VENIPUNCTURE: CPT | Performed by: HOSPITALIST

## 2018-03-22 PROCEDURE — 36415 COLL VENOUS BLD VENIPUNCTURE: CPT | Performed by: STUDENT IN AN ORGANIZED HEALTH CARE EDUCATION/TRAINING PROGRAM

## 2018-03-22 PROCEDURE — 93010 ELECTROCARDIOGRAM REPORT: CPT | Mod: 77 | Performed by: INTERNAL MEDICINE

## 2018-03-22 PROCEDURE — 80329 ANALGESICS NON-OPIOID 1 OR 2: CPT | Performed by: STUDENT IN AN ORGANIZED HEALTH CARE EDUCATION/TRAINING PROGRAM

## 2018-03-22 PROCEDURE — 40000275 ZZH STATISTIC RCP TIME EA 10 MIN

## 2018-03-22 PROCEDURE — 25000132 ZZH RX MED GY IP 250 OP 250 PS 637: Mod: GY | Performed by: HOSPITALIST

## 2018-03-22 PROCEDURE — 83605 ASSAY OF LACTIC ACID: CPT | Performed by: STUDENT IN AN ORGANIZED HEALTH CARE EDUCATION/TRAINING PROGRAM

## 2018-03-22 PROCEDURE — 99233 SBSQ HOSP IP/OBS HIGH 50: CPT | Mod: GC | Performed by: HOSPITALIST

## 2018-03-22 PROCEDURE — 85610 PROTHROMBIN TIME: CPT | Performed by: STUDENT IN AN ORGANIZED HEALTH CARE EDUCATION/TRAINING PROGRAM

## 2018-03-22 PROCEDURE — 85379 FIBRIN DEGRADATION QUANT: CPT | Performed by: HOSPITALIST

## 2018-03-22 PROCEDURE — 83615 LACTATE (LD) (LDH) ENZYME: CPT | Performed by: MARRIAGE & FAMILY THERAPIST

## 2018-03-22 PROCEDURE — A9270 NON-COVERED ITEM OR SERVICE: HCPCS | Mod: GY | Performed by: STUDENT IN AN ORGANIZED HEALTH CARE EDUCATION/TRAINING PROGRAM

## 2018-03-22 RX ORDER — POTASSIUM CHLORIDE 750 MG/1
40 TABLET, EXTENDED RELEASE ORAL ONCE
Status: COMPLETED | OUTPATIENT
Start: 2018-03-22 | End: 2018-03-22

## 2018-03-22 RX ORDER — METOPROLOL TARTRATE 25 MG/1
25 TABLET, FILM COATED ORAL EVERY 6 HOURS PRN
Status: DISCONTINUED | OUTPATIENT
Start: 2018-03-22 | End: 2018-03-23

## 2018-03-22 RX ORDER — PHYTONADIONE 5 MG/1
5 TABLET ORAL ONCE
Status: COMPLETED | OUTPATIENT
Start: 2018-03-22 | End: 2018-03-22

## 2018-03-22 RX ORDER — SODIUM CHLORIDE, SODIUM LACTATE, POTASSIUM CHLORIDE, CALCIUM CHLORIDE 600; 310; 30; 20 MG/100ML; MG/100ML; MG/100ML; MG/100ML
INJECTION, SOLUTION INTRAVENOUS CONTINUOUS
Status: ACTIVE | OUTPATIENT
Start: 2018-03-22 | End: 2018-03-23

## 2018-03-22 RX ORDER — METOPROLOL TARTRATE 25 MG/1
25 TABLET, FILM COATED ORAL ONCE
Status: COMPLETED | OUTPATIENT
Start: 2018-03-22 | End: 2018-03-22

## 2018-03-22 RX ADMIN — FERROUS SULFATE 325 MG: 325 TABLET, FILM COATED ORAL at 12:13

## 2018-03-22 RX ADMIN — Medication 37.5 MCG: at 09:04

## 2018-03-22 RX ADMIN — POTASSIUM CHLORIDE 20 MEQ: 750 TABLET, EXTENDED RELEASE ORAL at 19:47

## 2018-03-22 RX ADMIN — SODIUM CHLORIDE 8 MG/HR: 9 INJECTION, SOLUTION INTRAVENOUS at 07:09

## 2018-03-22 RX ADMIN — ACETYLCYSTEINE 6.25 MG/KG/HR: 200 INJECTION, SOLUTION INTRAVENOUS at 05:31

## 2018-03-22 RX ADMIN — ATORVASTATIN CALCIUM 40 MG: 40 TABLET, FILM COATED ORAL at 09:05

## 2018-03-22 RX ADMIN — METOPROLOL TARTRATE 12.5 MG: 25 TABLET, FILM COATED ORAL at 09:46

## 2018-03-22 RX ADMIN — SODIUM CHLORIDE 1000 ML: 9 INJECTION, SOLUTION INTRAVENOUS at 13:32

## 2018-03-22 RX ADMIN — CARVEDILOL 3.12 MG: 3.12 TABLET, FILM COATED ORAL at 19:47

## 2018-03-22 RX ADMIN — PANTOPRAZOLE SODIUM 40 MG: 40 INJECTION, POWDER, FOR SOLUTION INTRAVENOUS at 19:47

## 2018-03-22 RX ADMIN — PHYTONADIONE 5 MG: 5 TABLET ORAL at 12:13

## 2018-03-22 RX ADMIN — SODIUM CHLORIDE, POTASSIUM CHLORIDE, SODIUM LACTATE AND CALCIUM CHLORIDE: 600; 310; 30; 20 INJECTION, SOLUTION INTRAVENOUS at 17:10

## 2018-03-22 RX ADMIN — POTASSIUM CHLORIDE 20 MEQ: 750 TABLET, EXTENDED RELEASE ORAL at 09:45

## 2018-03-22 RX ADMIN — POTASSIUM CHLORIDE 40 MEQ: 750 TABLET, EXTENDED RELEASE ORAL at 09:04

## 2018-03-22 RX ADMIN — CARVEDILOL 3.12 MG: 3.12 TABLET, FILM COATED ORAL at 09:04

## 2018-03-22 RX ADMIN — VENLAFAXINE HYDROCHLORIDE 150 MG: 150 CAPSULE, EXTENDED RELEASE ORAL at 09:05

## 2018-03-22 RX ADMIN — METOPROLOL TARTRATE 25 MG: 25 TABLET ORAL at 13:32

## 2018-03-22 ASSESSMENT — ACTIVITIES OF DAILY LIVING (ADL)
ADLS_ACUITY_SCORE: 15
ADLS_ACUITY_SCORE: 16
ADLS_ACUITY_SCORE: 16
ADLS_ACUITY_SCORE: 15

## 2018-03-22 NOTE — PROVIDER NOTIFICATION
M1 notified pt sustaining rates of 130-140's Afib, previously in the low 100's. Pt asymptomatic at this time, /77. No new orders received will continue to monitor.

## 2018-03-22 NOTE — PROVIDER NOTIFICATION
"Naomy Anaya MD notified that patient is not in AFIB with frequent PVC, PAC, and pauses. Rates between 50-100s. Patient lethargic otherwise asymptomatic.   /73  Pulse 61  Temp 98  F (36.7  C) (Oral)  Resp 18  Ht 1.575 m (5' 2\")  Wt 78.2 kg (172 lb 6.4 oz)  SpO2 100%  BMI 31.53 kg/m2  12 lead EKG ordered. AM labs in process. MD came to bedside to assess patient.    Addendum @ 0694  Patient now having rates in the 50-140s.  Still waiting on AM labs.   "

## 2018-03-22 NOTE — PLAN OF CARE
"Problem: Gastrointestinal Bleeding (Adult)  Goal: Signs and Symptoms of Listed Potential Problems Will be Absent, Minimized or Managed (Gastrointestinal Bleeding)  Signs and symptoms of listed potential problems will be absent, minimized or managed by discharge/transition of care (reference Gastrointestinal Bleeding (Adult) CPG).   /73  Pulse 61  Temp 98  F (36.7  C) (Oral)  Resp 18  Ht 1.575 m (5' 2\")  Wt 78.2 kg (172 lb 6.4 oz)  SpO2 100%  BMI 31.53 kg/m2    Neuro: Lethargic. Arouses to voice. Ox4. Weird statements. Forgetful.   Cardiac: Patient was in SR until 0430 when patient went into AFIB with rates . With frequent PACs, PVCs, and pauses. (see provider notification) VSS. Afebrile.              Respiratory: Sating 100% on CPAP 30%. RA while awake. No SOB reported.   GI/: Adequate urine output per commode. No BM.   Diet/appetite: Full liquid diet.   Activity:  SBA up to commode.  Pain: Denies any pain.   Skin: Large bruise on right hip. Outlined in skin marker no extension.   LDA's: Bilateral PIV.   Protonix @ 8mg/hr  Acetylcysteine @ 40.7ml/hr    Plan: Continue with POC. Notify primary team with changes.    Problem: Diabetes Comorbidity  Goal: Diabetes  Patient comorbidity will be monitored for signs and symptoms of hyperglycemia or hypoglycemia. Problems will be absent, minimized or managed by discharge/transition of care.   BS monitored Q4.     Problem: Cardiac Disease Comorbidity  Goal: Cardiac Disease  Patient comorbidity will be monitored for signs and symptoms of Cardiac Disease.  Problems will be absent, minimized or managed by discharge/transition of care.   Patient is not in AFIB with rates between 50-140s. 12 lead EKG and labs ordered.       "

## 2018-03-22 NOTE — PROGRESS NOTES
[facility], [place]    Internal Medicine Progress Note - [facility] Service    Main Plans for Today   -NAC treatment continues per poison control and GI  -recheck APAP level tomorrow  -hemolysis labs unconvincing for hemolysis  -HR improved to 115 (from 120s-140s) with bolus of normal saline  -giving LR O/N at 100 cc/hr  -given 5 mg vit K PO; will recheck INR and give additional 5 mg PO vit K if INR not less than 3  -trending hgb q8hr  -will consider formal consult for hematology in AM for possible mixing studies  -pt seemed confused to nursing today; appeared drowsy  -Obtained: troponin (not elevated), lactate (elevated 3.1), and blood cultures (pending)  -EKG shows sinus tachy, not afib  -Pt was A&Ox4 but could not remember the name of the president    Assessment & Plan   Carlos Alberto Fenton is a 77 year old female with PMH of HTN, HLD, DM, CVA (11/2016), chronic diastolic HF (last ECHO 12/12/17 EF 45-50%), CAD (s/p 3v CABG: LIMA-LAD, SVG-D1, SVG-dRCA and modified MAZE, ABDIEL ligation - 2/2016), paroxysmal AFib ([id number] - 8 on Xarelto), HIT, RUE DVT, recently diagnosed hypothyroidism, admitted 3/21/2018 for acute GIB, coagulopathy and concern for acute LI 2/2 acetaminophen toxicity with xarelto intake.    Pt does not have suicidal ideation, even though she has acetaminophen toxicity.     # Coagulopathy, INR to 5, elevated PT and PTT  # Concern for tylenol toxicity (elevated acetaminophen levels)  Pt states she was taking 3-4g tylenol per day for the past month for chronic back pain. Last dose was two days ago  Pt previously on warfarin for Afib. She started xarelto as of Jan 2018. She states she was not taking warfarin and xarelto simultaneously. It is possible that her coagulopathy is occurring in the setting of xarelto, tylenol, and acute liver failure. Her LFTs are WNL.     We are continuing her NAC infusion per toxicology and GI. Toxicology does not believe her LFTs are not reliable and  they increased slightly in the last 24 hours.     (Bili elevated 1.2 above 0.8, ALT elevated 50 above 31, and AST elevated 73 above 43).     -- trend INR  -- GI consult as above: will place on full liquid diet; get anemia/hemolysis w/u; and monitor for signs bleed  --cont NAC treatment  --recheck INR 3/22 PM; if not less than 3 give additional vit K 5 mg        # Acute blood loss anemia (baseline 11-12 1 month prior, current hgb ~7)  Differential includes acute GI bleed (most likely), vs hemolysis, vs other reversible cause of anemia with rapid onset, given recent baseline one month ago showed hgb 11-12.  Plan:  -- GI consult, appreciate recs  -- trend Hgb q6h, transfuse <7  -- continue IV PPI BID   -- hold AC (home ASA 81 and xarelto)  --folate, B12, transferrin, iron, haptoglobin, reticulocyte count, blood smear pending          # HARINI on CKD  Cr at bsl ~1.1-4. On presentation 1.8. UA with hyaline casts, tachy, hypotensive. Likely prerenal in the setting of GIB and decreased PO intake/emesis  -- trend BMP  -- mIVF as below      # Hypothyroidism  Recently diagnosed. On Levothyroxine. Last TSH 2/21 elevated to 88, T4 improved from 0.29 to 0.44.   -- increase levothyroxine to 37.5 from PTA dose of 25 mcg      # Tropinemia, likely 2/2 demand ischemia 2/2 anemia  EKG did not show ST elevation, ST segment depression or T wave inversions. Pt is not having CP. Likely demand ischemia.      Chronic problems  # HFpEF  # CAD  # HLD  ECHO shows EF 45-50%.   Plan:  --holding metolazone, spironolactone, and lasix  --cont atorvastatin   --holding ASA    # Depression  -- continue PTA venlafaxine    # Paroxysmal A fib  # [id number]  Pt prev on warfarin, she was transitioned to xarelto in Jan 2018 because she likes leafy greens.  --hold xarelto        # Pain Assessment:   Current Pain Score 3/22/2018 3/22/2018 3/22/2018   Patient currently in pain? denies sleeping: patient not able to self report denies   Pain score (0-10) - - -    Pain location - - -   Pain descriptors - - -   CPOT pain score - - -   Asenath s pain level was assessed and she currently denies pain.      Diet: Full Liquid Diet  NPO per Anesthesia Guidelines for Procedure/Surgery Except for: Meds, Ice Chips  Fluids: LR at 100 cc/hr  DVT Prophylaxis: mechanical  Code Status: Full Code    Disposition Plan   Expected discharge: 2 - 3 days, recommended to prior living arrangement once treatment completed..     Entered: Ayaka Mclaughlin 03/22/2018, 4:26 PM   Information in the above section will display in the discharge planner report.      The patient's care was discussed with the Attending Physician, Dr. Hopson and Patient.    Ayaka Mclaughlin  [facility]  Maroon: 1  Pager: [phone]  Please see sticky note for cross cover information    Interval History   Pt is having mild back pain and shoulder pain. She feels weak. Otherwise, no f/c, cp, or sob. No blood in stool.     Physical Exam   Vital Signs: Temp: 98  F (36.7  C) Temp src: Axillary BP: 127/82   Heart Rate: 117 Resp: 18 SpO2: 95 % O2 Device: None (Room air)    Weight: 172 lbs 6.4 oz  Gen: In no acute distress. Patient comfortably lying in bed  HEENT: No scleral icterus, Moist mucous membranes. No nasal discharge.  CV: Normal rate, regular rhythm. S1/S2 normal.  No murmurs, rubs or gallops   Resp: Clear to auscultation bilaterally. Without wheeze or crackles  Abdomen: Soft, non tender abdomen, normal active bowel sounds,   Extremities: Peripheral edema bilaterally 1+  Skin: ecchymosis on upper lateral quadrant of buttock, several small ecchymosis on b/l upper extremities, multicolored; gangrenous toes bilaterally R worse than L  Oriented to - conversation no deficits noted  Psychiatric:ascribes to her health making her feel down but denies self harm or suicidal ideation   Back: no expansion of hematoma         Data   Medications     acetylcysteine (ACETADOTE) infusion *third dose - elevated AST*          sodium chloride 0.9%  1,000 mL Intravenous Once     pantoprazole (PROTONIX) IV  40 mg Intravenous BID     atorvastatin  40 mg Oral Daily     carvedilol  3.125 mg Oral BID w/meals     ferrous sulfate  325 mg Oral Daily with breakfast     potassium chloride SA  20 mEq Oral BID     venlafaxine  150 mg Oral Daily with breakfast     levothyroxine  37.5 mcg Oral QAM AC     Data     Recent Labs  Lab 03/22/18  1332 03/22/18  1049 03/22/18  1011 03/22/18  0603  03/21/18  1133 03/21/18  0640 03/20/18  2203   WBC  --   --   --  8.9  --  12.0*  --  10.3   HGB 8.1* 8.6*  --  8.9*  < > 8.4*  --  7.2*   MCV  --   --   --  116*  --  114*  --  119*   PLT  --   --   --  256  --  256  --  282   INR  --   --  1.93*  --   --  4.15*  --  5.05*   NA  --   --   --  141  --   --  138 132*   POTASSIUM  --   --   --  3.4  --   --  4.0 4.5   CHLORIDE  --   --   --  106  --   --  99 95   CO2  --   --   --  21  --   --  25 27   BUN  --   --   --  58*  --   --  79* 88*   CR  --   --   --  1.04  --   --  1.38* 1.78*   ANIONGAP  --   --   --  15*  --   --  15* 10   CARLY  --   --   --  8.4*  --   --  8.8 8.7   GLC  --   --   --  106*  --   --  118* 109*   ALBUMIN  --   --   --  3.0*  --   --  3.2* 3.4   PROTTOTAL  --   --   --  6.6*  --   --  6.9 7.1   BILITOTAL  --   --   --  1.2  --   --  0.8 0.7   ALKPHOS  --   --   --  71  --   --  82 84   ALT  --   --   --  50  --   --  31 27   AST  --   --   --  73*  --   --  43 45   LIPASE  --   --   --   --   --   --   --  483*   TROPI 0.048*  --   --   --   --   --   --  0.049*   < > = values in this interval not displayed.  No results found for this or any previous visit (from the past 24 hour(s)).

## 2018-03-22 NOTE — PROGRESS NOTES
GASTROENTEROLOGY PROGRESS NOTE    ASSESSMENT:  Carlos Alberto Fenton is a 77 year old female with a history of acute bilateral cerebral infarction in November 2016, HIT in 2017, RUE DVT, seizures in 2016, HTN, DM, HLD, chronic diastolic HF (last ECHO 12/12/17 EF 45-50%), CAD (s/p 3v CABG: LIMA-LAD, SVG-D1, SVG-dRCA and modified MAZE, ABDIEL ligation - 2/2016), paroxysmal AFib ([id number] - 8 on Xarelto), appendectomy, recently diagnosed hypothyroidism, and now admitted with Acetaminophen toxicity, INR of 5.05 concerning for liver source, and anemia with positive stool occult that prompted GI consult.      #Macrocytic anemia  #Acetaminophen toxicity  #Coagulopathy   Patient presented with generalized fatigue, weakness and dyspnea x2 days in the setting of anemia (hgb of 7.2 with baseline of 11.7-12.8), and hypothyroidism (TSH 81.10, low T4 0.44). Acetaminophen level was at 128 and patient has been taking 3-4 doses of Tylenol/day chronically. LFTs were normal but now AST went up to 73, will continue with NAC. Warfarin was stopped in January 2018 and switched to Xarelto at that time but INR of 5.05 appears too high for not being on Warfarin.      Positive stool occult in the setting of high INR is not unusual but patient does not overt GI bleed. Patient also has bruising on right lower back and hip that appears superficial. Abd/pelvic CT from 3/20/18 did not show acute abdomen. Plan is to proceed with EGD and colonoscopy for anemia, once her tachycardia is resolved or controlled. Hgb is stable at 8.1, and INR is 1.93 (received 10mg of IV vitamin K and 5mg of oral vitamin K for possible nutritional deficiency). Plan is to trend LFTs for now and continue with NAC treatment.       Recommendations  --Full liquid diet   --Give additional vitamin K   --Continue with NAC  --Trend LFTs daily   --EGD and colonoscopy for anemia once patient's afib with RVR is stabilized   --Optimize hypothyroidism   --Monitor CBC and transfuse if  "hgb is less than 7  --Can switch to IV PPI twice daily   --Accurate documentation of output (color, consistency and amount)     Gastroenterology follow up recommendations: TBD     Patient care plan discussed with Dr. Wong, GI staff physician. Thank you for involving us in this patient's care. Please do not hesitate to contact the GI service with any questions or concerns.     Momo Gee  Gastroenterology Nurse Practitioner  _______________________________________________________________  S: Patient denies pain, n/v or diarrhea. No stools overnight and no s/s of bleeding.     O:  Blood pressure 127/82, pulse 61, temperature 98  F (36.7  C), temperature source Axillary, resp. rate 18, height 1.575 m (5' 2\"), weight 78.2 kg (172 lb 6.4 oz), SpO2 95 %, not currently breastfeeding.    Constitutional: cooperative, pleasant, not dyspneic/diaphoretic, no acute distress  Eyes: Sclera anicteric/injected  Ears/nose/mouth/throat: Normal oropharynx without ulcers or exudate, mucus membranes moist, hearing intact  Neck: supple, thyroid normal size  CV: RRR. No edema in LE  Respiratory: Unlabored breathing. CTA bilaterally   Lymph: No axillary, submandibular, supraclavicular or inguinal lymphadenopathy  Abd: Nondistended, +bs, no hepatosplenomegaly, nontender, no peritoneal signs  Skin: warm, perfused, no jaundice. R toe wound covered with dressing. R lower back and hip ecchymosis that is outlined   Neuro: AAO x 3, No asterixis  Psych: Normal affect  MSK: Normal gait    LABS:  BMP  Recent Labs  Lab 03/22/18  0603 03/21/18  0640 03/20/18  2203    138 132*   POTASSIUM 3.4 4.0 4.5   CHLORIDE 106 99 95   CARLY 8.4* 8.8 8.7   CO2 21 25 27   BUN 58* 79* 88*   CR 1.04 1.38* 1.78*   * 118* 109*     CBC  Recent Labs  Lab 03/22/18  1332  03/22/18  0603  03/21/18  1133 03/20/18  2203   WBC  --   --  8.9  --  12.0* 10.3   RBC  --   --  2.37*  --  2.34* 1.95*   HGB 8.1*  < > 8.9*  < > 8.4* 7.2*   HCT  --   --  27.4*  --  " "26.7* 23.2*   MCV  --   --  116*  --  114* 119*   MCH  --   --  37.6*  --  35.9* 36.9*   MCHC  --   --  32.5  --  31.5 31.0*   RDW  --   --  25.5*  --  24.2* 21.9*   PLT  --   --  256  --  256 282   < > = values in this interval not displayed.  INR  Recent Labs  Lab 03/22/18  1011 03/21/18  1133 03/20/18  2203   INR 1.93* 4.15* 5.05*     LFTs  Recent Labs  Lab 03/22/18  0603 03/21/18  0640 03/20/18  2203   ALKPHOS 71 82 84   AST 73* 43 45   ALT 50 31 27   BILITOTAL 1.2 0.8 0.7   PROTTOTAL 6.6* 6.9 7.1   ALBUMIN 3.0* 3.2* 3.4      PANC  Recent Labs  Lab 03/20/18  2203   LIPASE 483*       HPI:  Carlos Alberto Fenton is a 77 year old female with a history of acute bilateral cerebral infarction in November 2016, HIT in 2017, RUE DVT, seizures in 2016, HTN, DM, HLD, chronic diastolic HF (last ECHO 12/12/17 EF 45-50%), CAD (s/p 3v CABG: LIMA-LAD, SVG-D1, SVG-dRCA and modified MAZE, ABDIEL ligation - 2/2016), paroxysmal AFib ([id number] - 8 on Xarelto), appendectomy, recently diagnosed hypothyroidism, and now admitted with Acetaminophen toxicity, INR of 5.05 concerning for liver source, and anemia with positive stool occult that prompted GI consult.      Patient complains of having generalized fatigue, weakness, shortness of breath on exertion and overall unwell x2-3 days. She has mild generalized abdominal tenderness when she presses her abdomen but otherwise no abdominal pain otr chest pain. She is having some nausea and has had one episode of nonbloody emesis 2 days ago, no black or bloody stools.S he does have ongoing chronic back pain/low back and across both shoulder blades present for last month.  She's been increasing the amount of tylenol she's been taking lately, stating she takes \"2 red-white capsules\" about every 3-4 hrs daily for the last month. She has a known history of urge incontinence and that is unchanged, but no hematuria, vaginal bleed or epistaxis.  No traumas or falls, no fevers or chills.  She has not had any " syncope or near syncope, but is generally feeling much more fatigued and unwell.      She states she's also been bruising much more easily more recently. Has a large bruise on posterior upper R buttock of unknown cause. States minimal contact is causing her to bruise more recently; and this was not present with Warfarin. No history of prior bleed. Her primary physician told her that she has to take oral iron supplement but has not started. She denies NSAID, ETOH, or tobacco use. She stopped smoking in 1976. She has history of skin cancer but no other cancer.     ROS: A comprehensive Review of Systems was asked and answered in the negative unless specifically commented upon in the HPI    PMHx:  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    parietral lesions bilateral       Antiplatelet or antithrombotic long-term use      Anxiety      Atrial fibrillation (H)      CAD (coronary artery disease)     2 vessel     Cancer (H) 1990    periodically have cancer on the skin removed     Cerebral artery occlusion with cerebral infarction (H) 10/2016    Cardioembolic strokes related to atrial fibrillation     Deep vein thrombosis (DVT) of axillary vein of right upper extremity (H) 2/25/2017     Depressive disorder 2001     Diabetes (H)      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     Hyperlipidemia LDL goal <130 10/31/2010     Hypertension      Panic attacks      Seizures (H) 10/19/2016     Sleep apnea     Uses CPAP       PSurgHx:   Past Surgical History:   Procedure Laterality Date     AMPUTATE TOE(S) Right 5/26/2017    Procedure: AMPUTATE TOE(S);  Right Great Toe amputation, debriedment of 2 and 3rd toe soft tissu right foot. and application of Grafix;  Surgeon: Leah Santamaria MD;  Location: [facility] OR     ANESTHESIA CARDIOVERSION N/A 12/12/2017    Procedure: ANESTHESIA CARDIOVERSION;  Anesthesia Cardioversion ;  Surgeon: GENERIC  ANESTHESIA PROVIDER;  Location: [facility] OR     BACK SURGERY       BYPASS GRAFT ARTERY CORONARY N/A 2/6/2017    Procedure: BYPASS GRAFT ARTERY CORONARY;  Surgeon: Mikhail Quiñones MD;  Location: [facility] OR     C APPENDECTOMY  1959     C CARDIAC SURG PROCEDURE UNLIST       C HAND/FINGER SURGERY UNLISTED       C STOMACH SURGERY PROCEDURE UNLISTED       HC SACROPLASTY       HC VASCULAR SURGERY PROCEDURE UNLIST       HYSTERECTOMY, PASTORA  1988     IRRIGATION AND DEBRIDEMENT FOOT, COMBINED Right 7/7/2017    Procedure: COMBINED IRRIGATION AND DEBRIDEMENT FOOT;  Irrigation and Debridement Right Foot with Graphix  *Latex Allergy*;  Surgeon: Leah Santamaria MD;  Location: [facility] OR     MAZE PROCEDURE N/A 2/6/2017    Procedure: MAZE PROCEDURE;  Surgeon: Mikhail Quiñones MD;  Location: [facility] OR     PICC INSERTION Left 02/25/2017    5fr TL Bard PICC, 47cm (3cm external) in the L basilic vein w/ tip in the SVC RA junction     TONSILLECTOMY  1942       MEDS:    No current facility-administered medications on file prior to encounter.   Current Outpatient Prescriptions on File Prior to Encounter:  pantoprazole (PROTONIX) 40 MG EC tablet Take 1 tablet (40 mg) by mouth every morning   gabapentin (NEURONTIN) 100 MG capsule TAKE ONE CAPSULE BY MOUTH TWICE DAILY    bumetanide (BUMEX) 2 MG tablet Take 1 tablet (2 mg) by mouth 2 times daily   levothyroxine (SYNTHROID/LEVOTHROID) 25 MCG tablet Take 1 tablet (25 mcg) by mouth every morning (before breakfast)   Potassium Chloride ER 20 MEQ TBCR Take 1 tablet (20 mEq) by mouth 2 times daily   MELATONIN PO Take 1 mg by mouth nightly as needed    carvedilol (COREG) 3.125 MG tablet Take 1 tablet (3.125 mg) by mouth 2 times daily (with meals)   rivaroxaban ANTICOAGULANT (XARELTO) 20 MG TABS tablet Take 1 tablet (20 mg) by mouth daily (with dinner) . DO NOT start until you have stopped the Warfarin.   ALPRAZolam (XANAX) 0.25 MG tablet Take 1 tablet (0.25 mg) by mouth 3 times daily as needed for anxiety   venlafaxine  (EFFEXOR-ER) 150 MG TB24 24 hr tablet Take 1 tablet (150 mg) by mouth daily (with breakfast)   metolazone (ZAROXOLYN) 2.5 MG tablet Take 1 tablet (2.5 mg) by mouth daily as needed For weight over 170 Lbs.   spironolactone (ALDACTONE) 25 MG tablet Take 1 tablet (25 mg) by mouth daily   atorvastatin (LIPITOR) 40 MG tablet Take 1 tablet (40 mg) by mouth daily   traZODone (DESYREL) 50 MG tablet Take 0.5 tablets (25 mg) by mouth nightly as needed for sleep   Elastic Bandages & Supports (ACE BANDAGE SELF-ADHERING) MISC 1 each 2 times daily   Gauze Pads & Dressings (KERLIX GAUZE ROLL MEDIUM) MISC 1 each 2 times daily   ONE TOUCH ULTRA test strip USE AS DIRECTED TO TEST ONE TIME A DAY   Wound Dressings (ADAPTIC NON-ADHERING DRESSING) PADS Externally apply 1 each topically 2 times daily   order for DME Sterile 4x4 gauze; Use gauze twice daily for dressing changes.   aspirin 81 MG chewable tablet Take 1 tablet (81 mg) by mouth daily   ferrous sulfate (IRON SUPPLEMENT) 325 (65 FE) MG tablet Take 1 tablet (325 mg) by mouth daily (with breakfast)   ONETOUCH DELICA LANCETS 33G MISC 1 Device daily   blood glucose monitoring (ONE TOUCH ULTRA 2) meter device kit        ALLERGIES:    Allergies   Allergen Reactions     Blood Transfusion Related (Informational Only) Other (See Comments)     Patient has a history of a clinically significant antibody against RBC antigens.  A delay in compatible RBCs may occur.Patient has a history of a significant allergic transfusion reaction.  Consult with Blood Bank MD before ordering components.     Heparin      HIT/ thrombocytopenia     Lovenox [Enoxaparin] Other (See Comments)     Thrombocytopenia      Oxycodone      hallucinations     Toprol Xl [Metoprolol] Nausea and Vomiting     Adhesive Tape Itching and Rash     Diapers & Supplies Rash     Developed yeast infection from previous hospital stay       FHx:  Family History   Problem Relation Age of Onset     DIABETES Mother      C.A.D. Mother       Hypertension Mother      CEREBROVASCULAR DISEASE Mother      Mini Strokes     Other Cancer Mother      Skin Cancer     Hyperlipidemia Mother      Coronary Artery Disease Mother      DIABETES Father      C.A.D. Father      Hypertension Father      Other Cancer Father      Hyperlipidemia Father      Coronary Artery Disease Father      HEART DISEASE Sister      Arthritis Sister      Other Cancer Other      Skin Cancer       SOCIAL Hx:  Social History     Social History     Marital status:      Spouse name: N/A     Number of children: N/A     Years of education: N/A     Occupational History     Not on file.     Social History Main Topics     Smoking status: Former Smoker     Packs/day: 1.00     Years: 10.00     Types: Cigarettes     Start date: 10/3/1958     Quit date: 7/4/1976     Smokeless tobacco: Never Used      Comment: Quit in 1976     Alcohol use No     Drug use: No     Sexual activity: Not Currently     Partners: Male     Birth control/ protection: Post-menopausal     Other Topics Concern     Parent/Sibling W/ Cabg, Mi Or Angioplasty Before 65f 55m? Yes     Social History Narrative

## 2018-03-22 NOTE — PROVIDER NOTIFICATION
M1 notified 's, afib, after AM carvedilol and prn PO metoprolol. No new orders received at this time

## 2018-03-23 ENCOUNTER — APPOINTMENT (OUTPATIENT)
Dept: ULTRASOUND IMAGING | Facility: CLINIC | Age: 78
DRG: 813 | End: 2018-03-23
Attending: INTERNAL MEDICINE
Payer: MEDICARE

## 2018-03-23 ENCOUNTER — APPOINTMENT (OUTPATIENT)
Dept: GENERAL RADIOLOGY | Facility: CLINIC | Age: 78
DRG: 813 | End: 2018-03-23
Attending: INTERNAL MEDICINE
Payer: MEDICARE

## 2018-03-23 LAB
ALBUMIN SERPL-MCNC: 2.9 G/DL (ref 3.4–5)
ALP SERPL-CCNC: 91 U/L (ref 40–150)
ALT SERPL W P-5'-P-CCNC: 67 U/L (ref 0–50)
ANION GAP SERPL CALCULATED.3IONS-SCNC: 10 MMOL/L (ref 3–14)
APAP SERPL-MCNC: <2 MG/L (ref 10–20)
APTT IMM NP PPP: 33 SEC (ref 31–45)
APTT IMM NP PPP: 34 SEC (ref 31–45)
AST SERPL W P-5'-P-CCNC: 90 U/L (ref 0–45)
BASE DEFICIT BLDA-SCNC: 3.4 MMOL/L
BASE DEFICIT BLDA-SCNC: 4.6 MMOL/L
BILIRUB SERPL-MCNC: 1 MG/DL (ref 0.2–1.3)
BUN SERPL-MCNC: 33 MG/DL (ref 7–30)
CALCIUM SERPL-MCNC: 8.2 MG/DL (ref 8.5–10.1)
CHLORIDE SERPL-SCNC: 108 MMOL/L (ref 94–109)
CO2 SERPL-SCNC: 22 MMOL/L (ref 20–32)
CREAT SERPL-MCNC: 0.9 MG/DL (ref 0.52–1.04)
ERYTHROCYTE [DISTWIDTH] IN BLOOD BY AUTOMATED COUNT: 25.8 % (ref 10–15)
FACT V ACT/NOR PPP: 88 % (ref 60–140)
FACT VII ACT/NOR PPP: 26 % (ref 50–129)
FACT VIII ACT/NOR PPP: 598 % (ref 55–200)
FACT X ACT/NOR PPP: 50 % (ref 60–140)
GFR SERPL CREATININE-BSD FRML MDRD: 60 ML/MIN/1.7M2
GLUCOSE BLDC GLUCOMTR-MCNC: 131 MG/DL (ref 70–99)
GLUCOSE BLDC GLUCOMTR-MCNC: 148 MG/DL (ref 70–99)
GLUCOSE BLDC GLUCOMTR-MCNC: 186 MG/DL (ref 70–99)
GLUCOSE BLDC GLUCOMTR-MCNC: 192 MG/DL (ref 70–99)
GLUCOSE SERPL-MCNC: 199 MG/DL (ref 70–99)
HBA1C MFR BLD: 6.2 % (ref 4.3–6)
HCO3 BLD-SCNC: 20 MMOL/L (ref 21–28)
HCO3 BLD-SCNC: 23 MMOL/L (ref 21–28)
HCT VFR BLD AUTO: 27 % (ref 35–47)
HGB BLD-MCNC: 8 G/DL (ref 11.7–15.7)
HGB BLD-MCNC: 8.2 G/DL (ref 11.7–15.7)
HGB BLD-MCNC: 8.3 G/DL (ref 11.7–15.7)
HGB BLD-MCNC: 8.4 G/DL (ref 11.7–15.7)
HGB BLD-MCNC: 8.5 G/DL (ref 11.7–15.7)
INR PPP: 1.85 (ref 0.86–1.14)
INR PPP: 1.91 (ref 0.86–1.14)
INTERPRETATION ECG - MUSE: NORMAL
INTERPRETATION ECG - MUSE: NORMAL
LACTATE BLD-SCNC: 2.3 MMOL/L (ref 0.7–2)
LACTATE BLD-SCNC: 2.4 MMOL/L (ref 0.7–2)
LACTATE BLD-SCNC: 2.9 MMOL/L (ref 0.7–2)
LACTATE BLD-SCNC: 3.4 MMOL/L (ref 0.7–2)
LACTATE BLD-SCNC: 3.5 MMOL/L (ref 0.7–2)
LACTATE BLD-SCNC: 3.6 MMOL/L (ref 0.7–2)
MAGNESIUM SERPL-MCNC: 2.1 MG/DL (ref 1.6–2.3)
MAGNESIUM SERPL-MCNC: 2.1 MG/DL (ref 1.6–2.3)
MCH RBC QN AUTO: 36.1 PG (ref 26.5–33)
MCHC RBC AUTO-ENTMCNC: 30.4 G/DL (ref 31.5–36.5)
MCV RBC AUTO: 119 FL (ref 78–100)
MITOCHONDRIA M2 IGG SER-ACNC: 1 U/ML
O2/TOTAL GAS SETTING VFR VENT: 100 %
O2/TOTAL GAS SETTING VFR VENT: 90 %
PCO2 BLD: 35 MM HG (ref 35–45)
PCO2 BLD: 46 MM HG (ref 35–45)
PH BLD: 7.3 PH (ref 7.35–7.45)
PH BLD: 7.37 PH (ref 7.35–7.45)
PLATELET # BLD AUTO: 210 10E9/L (ref 150–450)
PO2 BLD: 20 MM HG (ref 80–105)
PO2 BLD: 418 MM HG (ref 80–105)
POTASSIUM SERPL-SCNC: 4.6 MMOL/L (ref 3.4–5.3)
POTASSIUM SERPL-SCNC: 4.8 MMOL/L (ref 3.4–5.3)
PROT SERPL-MCNC: 6.3 G/DL (ref 6.8–8.8)
PROTHROM ACT/NOR PPP: 70 % (ref 60–140)
PT IMM NP PPP: 1.22 S (ref 0.86–1.14)
PT IMM NP PPP: 15.7 S
PT IMM NP PPP: 22.6 S
RBC # BLD AUTO: 2.27 10E12/L (ref 3.8–5.2)
SODIUM SERPL-SCNC: 140 MMOL/L (ref 133–144)
TROPONIN I SERPL-MCNC: 0.05 UG/L (ref 0–0.04)
WBC # BLD AUTO: 9.9 10E9/L (ref 4–11)

## 2018-03-23 PROCEDURE — 84132 ASSAY OF SERUM POTASSIUM: CPT | Performed by: MARRIAGE & FAMILY THERAPIST

## 2018-03-23 PROCEDURE — 40000275 ZZH STATISTIC RCP TIME EA 10 MIN

## 2018-03-23 PROCEDURE — 36600 WITHDRAWAL OF ARTERIAL BLOOD: CPT

## 2018-03-23 PROCEDURE — 83090 ASSAY OF HOMOCYSTEINE: CPT | Performed by: INTERNAL MEDICINE

## 2018-03-23 PROCEDURE — 83735 ASSAY OF MAGNESIUM: CPT | Performed by: STUDENT IN AN ORGANIZED HEALTH CARE EDUCATION/TRAINING PROGRAM

## 2018-03-23 PROCEDURE — 93975 VASCULAR STUDY: CPT | Mod: TC

## 2018-03-23 PROCEDURE — 86803 HEPATITIS C AB TEST: CPT | Performed by: STUDENT IN AN ORGANIZED HEALTH CARE EDUCATION/TRAINING PROGRAM

## 2018-03-23 PROCEDURE — 93005 ELECTROCARDIOGRAM TRACING: CPT

## 2018-03-23 PROCEDURE — 00000146 ZZHCL STATISTIC GLUCOSE BY METER IP

## 2018-03-23 PROCEDURE — 25000132 ZZH RX MED GY IP 250 OP 250 PS 637: Mod: GY | Performed by: STUDENT IN AN ORGANIZED HEALTH CARE EDUCATION/TRAINING PROGRAM

## 2018-03-23 PROCEDURE — 85240 CLOT FACTOR VIII AHG 1 STAGE: CPT | Performed by: INTERNAL MEDICINE

## 2018-03-23 PROCEDURE — 80053 COMPREHEN METABOLIC PANEL: CPT | Performed by: STUDENT IN AN ORGANIZED HEALTH CARE EDUCATION/TRAINING PROGRAM

## 2018-03-23 PROCEDURE — 83605 ASSAY OF LACTIC ACID: CPT | Performed by: STUDENT IN AN ORGANIZED HEALTH CARE EDUCATION/TRAINING PROGRAM

## 2018-03-23 PROCEDURE — A9270 NON-COVERED ITEM OR SERVICE: HCPCS | Mod: GY | Performed by: MARRIAGE & FAMILY THERAPIST

## 2018-03-23 PROCEDURE — 87075 CULTR BACTERIA EXCEPT BLOOD: CPT | Performed by: HOSPITALIST

## 2018-03-23 PROCEDURE — 71045 X-RAY EXAM CHEST 1 VIEW: CPT

## 2018-03-23 PROCEDURE — A9270 NON-COVERED ITEM OR SERVICE: HCPCS | Mod: GY | Performed by: HOSPITALIST

## 2018-03-23 PROCEDURE — 83036 HEMOGLOBIN GLYCOSYLATED A1C: CPT | Performed by: STUDENT IN AN ORGANIZED HEALTH CARE EDUCATION/TRAINING PROGRAM

## 2018-03-23 PROCEDURE — 83605 ASSAY OF LACTIC ACID: CPT | Performed by: MARRIAGE & FAMILY THERAPIST

## 2018-03-23 PROCEDURE — 85018 HEMOGLOBIN: CPT | Performed by: MARRIAGE & FAMILY THERAPIST

## 2018-03-23 PROCEDURE — 85732 THROMBOPLASTIN TIME PARTIAL: CPT | Performed by: INTERNAL MEDICINE

## 2018-03-23 PROCEDURE — 80329 ANALGESICS NON-OPIOID 1 OR 2: CPT | Performed by: HOSPITALIST

## 2018-03-23 PROCEDURE — 87340 HEPATITIS B SURFACE AG IA: CPT | Performed by: STUDENT IN AN ORGANIZED HEALTH CARE EDUCATION/TRAINING PROGRAM

## 2018-03-23 PROCEDURE — 25000128 H RX IP 250 OP 636: Performed by: MARRIAGE & FAMILY THERAPIST

## 2018-03-23 PROCEDURE — 36415 COLL VENOUS BLD VENIPUNCTURE: CPT | Performed by: STUDENT IN AN ORGANIZED HEALTH CARE EDUCATION/TRAINING PROGRAM

## 2018-03-23 PROCEDURE — 99233 SBSQ HOSP IP/OBS HIGH 50: CPT | Mod: GC | Performed by: HOSPITALIST

## 2018-03-23 PROCEDURE — 25000128 H RX IP 250 OP 636: Performed by: STUDENT IN AN ORGANIZED HEALTH CARE EDUCATION/TRAINING PROGRAM

## 2018-03-23 PROCEDURE — 25000131 ZZH RX MED GY IP 250 OP 636 PS 637: Mod: GY | Performed by: STUDENT IN AN ORGANIZED HEALTH CARE EDUCATION/TRAINING PROGRAM

## 2018-03-23 PROCEDURE — 25000125 ZZHC RX 250: Performed by: HOSPITALIST

## 2018-03-23 PROCEDURE — 00000401 ZZHCL STATISTIC THROMBIN TIME NC: Performed by: INTERNAL MEDICINE

## 2018-03-23 PROCEDURE — 36415 COLL VENOUS BLD VENIPUNCTURE: CPT | Performed by: INTERNAL MEDICINE

## 2018-03-23 PROCEDURE — 85610 PROTHROMBIN TIME: CPT | Performed by: INTERNAL MEDICINE

## 2018-03-23 PROCEDURE — 25000132 ZZH RX MED GY IP 250 OP 250 PS 637: Mod: GY | Performed by: HOSPITALIST

## 2018-03-23 PROCEDURE — 99222 1ST HOSP IP/OBS MODERATE 55: CPT | Mod: 24 | Performed by: INTERNAL MEDICINE

## 2018-03-23 PROCEDURE — 82803 BLOOD GASES ANY COMBINATION: CPT | Performed by: MARRIAGE & FAMILY THERAPIST

## 2018-03-23 PROCEDURE — 86256 FLUORESCENT ANTIBODY TITER: CPT | Performed by: STUDENT IN AN ORGANIZED HEALTH CARE EDUCATION/TRAINING PROGRAM

## 2018-03-23 PROCEDURE — 12000006 ZZH R&B IMCU INTERMEDIATE UMMC

## 2018-03-23 PROCEDURE — 85210 CLOT FACTOR II PROTHROM SPEC: CPT | Performed by: INTERNAL MEDICINE

## 2018-03-23 PROCEDURE — 93010 ELECTROCARDIOGRAM REPORT: CPT | Performed by: INTERNAL MEDICINE

## 2018-03-23 PROCEDURE — 83605 ASSAY OF LACTIC ACID: CPT | Performed by: HOSPITALIST

## 2018-03-23 PROCEDURE — 85027 COMPLETE CBC AUTOMATED: CPT | Performed by: STUDENT IN AN ORGANIZED HEALTH CARE EDUCATION/TRAINING PROGRAM

## 2018-03-23 PROCEDURE — 83516 IMMUNOASSAY NONANTIBODY: CPT | Performed by: STUDENT IN AN ORGANIZED HEALTH CARE EDUCATION/TRAINING PROGRAM

## 2018-03-23 PROCEDURE — 86038 ANTINUCLEAR ANTIBODIES: CPT | Performed by: STUDENT IN AN ORGANIZED HEALTH CARE EDUCATION/TRAINING PROGRAM

## 2018-03-23 PROCEDURE — 85220 BLOOC CLOT FACTOR V TEST: CPT | Performed by: INTERNAL MEDICINE

## 2018-03-23 PROCEDURE — 85018 HEMOGLOBIN: CPT | Performed by: STUDENT IN AN ORGANIZED HEALTH CARE EDUCATION/TRAINING PROGRAM

## 2018-03-23 PROCEDURE — 83921 ORGANIC ACID SINGLE QUANT: CPT | Performed by: INTERNAL MEDICINE

## 2018-03-23 PROCEDURE — 94660 CPAP INITIATION&MGMT: CPT

## 2018-03-23 PROCEDURE — A9270 NON-COVERED ITEM OR SERVICE: HCPCS | Mod: GY | Performed by: STUDENT IN AN ORGANIZED HEALTH CARE EDUCATION/TRAINING PROGRAM

## 2018-03-23 PROCEDURE — 86704 HEP B CORE ANTIBODY TOTAL: CPT | Performed by: STUDENT IN AN ORGANIZED HEALTH CARE EDUCATION/TRAINING PROGRAM

## 2018-03-23 PROCEDURE — 25000128 H RX IP 250 OP 636: Performed by: HOSPITALIST

## 2018-03-23 PROCEDURE — 84484 ASSAY OF TROPONIN QUANT: CPT | Performed by: MARRIAGE & FAMILY THERAPIST

## 2018-03-23 PROCEDURE — 85230 CLOT FACTOR VII PROCONVERTIN: CPT | Performed by: INTERNAL MEDICINE

## 2018-03-23 PROCEDURE — 83735 ASSAY OF MAGNESIUM: CPT | Performed by: MARRIAGE & FAMILY THERAPIST

## 2018-03-23 PROCEDURE — 25000132 ZZH RX MED GY IP 250 OP 250 PS 637: Mod: GY | Performed by: MARRIAGE & FAMILY THERAPIST

## 2018-03-23 PROCEDURE — 86706 HEP B SURFACE ANTIBODY: CPT | Performed by: STUDENT IN AN ORGANIZED HEALTH CARE EDUCATION/TRAINING PROGRAM

## 2018-03-23 PROCEDURE — 85610 PROTHROMBIN TIME: CPT | Performed by: STUDENT IN AN ORGANIZED HEALTH CARE EDUCATION/TRAINING PROGRAM

## 2018-03-23 PROCEDURE — 85260 CLOT FACTOR X STUART-POWER: CPT | Performed by: INTERNAL MEDICINE

## 2018-03-23 PROCEDURE — 36415 COLL VENOUS BLD VENIPUNCTURE: CPT | Performed by: MARRIAGE & FAMILY THERAPIST

## 2018-03-23 RX ORDER — ATROPINE SULFATE 0.1 MG/ML
INJECTION INTRAVENOUS
Status: DISCONTINUED
Start: 2018-03-23 | End: 2018-03-26 | Stop reason: HOSPADM

## 2018-03-23 RX ORDER — LIDOCAINE 4 G/G
1 PATCH TOPICAL
Status: DISCONTINUED | OUTPATIENT
Start: 2018-03-24 | End: 2018-03-23

## 2018-03-23 RX ORDER — VENLAFAXINE HYDROCHLORIDE 150 MG/1
150 CAPSULE, EXTENDED RELEASE ORAL
Status: DISCONTINUED | OUTPATIENT
Start: 2018-03-24 | End: 2018-04-04 | Stop reason: HOSPADM

## 2018-03-23 RX ORDER — PIPERACILLIN SODIUM, TAZOBACTAM SODIUM 3; .375 G/15ML; G/15ML
3.38 INJECTION, POWDER, LYOPHILIZED, FOR SOLUTION INTRAVENOUS EVERY 6 HOURS
Status: DISCONTINUED | OUTPATIENT
Start: 2018-03-23 | End: 2018-03-25

## 2018-03-23 RX ORDER — ATROPINE SULFATE 0.1 MG/ML
0.5 INJECTION INTRAVENOUS ONCE
Status: DISCONTINUED | OUTPATIENT
Start: 2018-03-23 | End: 2018-03-24

## 2018-03-23 RX ORDER — LIDOCAINE 4 G/G
1 PATCH TOPICAL ONCE
Status: COMPLETED | OUTPATIENT
Start: 2018-03-23 | End: 2018-03-24

## 2018-03-23 RX ORDER — DOPAMINE HYDROCHLORIDE 160 MG/100ML
10 INJECTION, SOLUTION INTRAVENOUS CONTINUOUS
Status: DISCONTINUED | OUTPATIENT
Start: 2018-03-23 | End: 2018-03-23

## 2018-03-23 RX ORDER — CARVEDILOL 3.12 MG/1
3.12 TABLET ORAL 2 TIMES DAILY WITH MEALS
Qty: 180 TABLET | Refills: 3 | Status: SHIPPED | OUTPATIENT
Start: 2018-03-23 | End: 2018-04-04

## 2018-03-23 RX ORDER — NICOTINE POLACRILEX 4 MG
15-30 LOZENGE BUCCAL
Status: DISCONTINUED | OUTPATIENT
Start: 2018-03-23 | End: 2018-04-04 | Stop reason: HOSPADM

## 2018-03-23 RX ORDER — DEXTROSE MONOHYDRATE 25 G/50ML
25-50 INJECTION, SOLUTION INTRAVENOUS
Status: DISCONTINUED | OUTPATIENT
Start: 2018-03-23 | End: 2018-04-04 | Stop reason: HOSPADM

## 2018-03-23 RX ADMIN — FERROUS SULFATE 325 MG: 325 TABLET, FILM COATED ORAL at 12:55

## 2018-03-23 RX ADMIN — Medication 1 MG: at 00:07

## 2018-03-23 RX ADMIN — SODIUM CHLORIDE, POTASSIUM CHLORIDE, SODIUM LACTATE AND CALCIUM CHLORIDE 1000 ML: 600; 310; 30; 20 INJECTION, SOLUTION INTRAVENOUS at 17:49

## 2018-03-23 RX ADMIN — INSULIN ASPART 0.5 UNITS: 100 INJECTION, SOLUTION INTRAVENOUS; SUBCUTANEOUS at 16:58

## 2018-03-23 RX ADMIN — VENLAFAXINE HYDROCHLORIDE 150 MG: 150 CAPSULE, EXTENDED RELEASE ORAL at 08:43

## 2018-03-23 RX ADMIN — SODIUM CHLORIDE, POTASSIUM CHLORIDE, SODIUM LACTATE AND CALCIUM CHLORIDE 1000 ML: 600; 310; 30; 20 INJECTION, SOLUTION INTRAVENOUS at 02:37

## 2018-03-23 RX ADMIN — ACETYLCYSTEINE 6.25 MG/KG/HR: 200 INJECTION, SOLUTION INTRAVENOUS at 23:47

## 2018-03-23 RX ADMIN — GLUCAGON HYDROCHLORIDE 5 MG: 1 INJECTION, POWDER, FOR SOLUTION INTRAMUSCULAR; INTRAVENOUS; SUBCUTANEOUS at 03:05

## 2018-03-23 RX ADMIN — PANTOPRAZOLE SODIUM 40 MG: 40 INJECTION, POWDER, FOR SOLUTION INTRAVENOUS at 20:13

## 2018-03-23 RX ADMIN — ACETYLCYSTEINE 6.25 MG/KG/HR: 200 INJECTION, SOLUTION INTRAVENOUS at 08:40

## 2018-03-23 RX ADMIN — LIDOCAINE 1 PATCH: 560 PATCH PERCUTANEOUS; TOPICAL; TRANSDERMAL at 12:55

## 2018-03-23 RX ADMIN — INSULIN ASPART 0.5 UNITS: 100 INJECTION, SOLUTION INTRAVENOUS; SUBCUTANEOUS at 12:57

## 2018-03-23 RX ADMIN — PIPERACILLIN SODIUM AND TAZOBACTAM SODIUM 3.38 G: 3; .375 INJECTION, POWDER, LYOPHILIZED, FOR SOLUTION INTRAVENOUS at 09:50

## 2018-03-23 RX ADMIN — PIPERACILLIN SODIUM AND TAZOBACTAM SODIUM 3.38 G: 3; .375 INJECTION, POWDER, LYOPHILIZED, FOR SOLUTION INTRAVENOUS at 22:51

## 2018-03-23 RX ADMIN — SODIUM CHLORIDE, POTASSIUM CHLORIDE, SODIUM LACTATE AND CALCIUM CHLORIDE 1000 ML: 600; 310; 30; 20 INJECTION, SOLUTION INTRAVENOUS at 23:48

## 2018-03-23 RX ADMIN — PIPERACILLIN SODIUM AND TAZOBACTAM SODIUM 3.38 G: 3; .375 INJECTION, POWDER, LYOPHILIZED, FOR SOLUTION INTRAVENOUS at 15:28

## 2018-03-23 RX ADMIN — METOPROLOL TARTRATE 25 MG: 25 TABLET ORAL at 00:07

## 2018-03-23 RX ADMIN — Medication 37.5 MCG: at 08:41

## 2018-03-23 RX ADMIN — PANTOPRAZOLE SODIUM 40 MG: 40 INJECTION, POWDER, FOR SOLUTION INTRAVENOUS at 08:43

## 2018-03-23 ASSESSMENT — ACTIVITIES OF DAILY LIVING (ADL)
ADLS_ACUITY_SCORE: 16
ADLS_ACUITY_SCORE: 15

## 2018-03-23 ASSESSMENT — PAIN DESCRIPTION - DESCRIPTORS
DESCRIPTORS: SORE
DESCRIPTORS: CRAMPING;SORE

## 2018-03-23 ASSESSMENT — ENCOUNTER SYMPTOMS
NAUSEA: 1
VOMITING: 1
BACK PAIN: 1
NECK STIFFNESS: 0
FATIGUE: 1
NECK PAIN: 0
COUGH: 0
CHILLS: 0
COLOR CHANGE: 0
SHORTNESS OF BREATH: 0
SORE THROAT: 0
TROUBLE SWALLOWING: 0
WEAKNESS: 1

## 2018-03-23 NOTE — PLAN OF CARE
Problem: Patient Care Overview  Goal: Plan of Care/Patient Progress Review  Outcome: No Change  End Of Shift Progress Note:    Temp: 97.3  F (36.3  C) oral  BP: 105/64  Heart Rate: 118  Resp: 18  SpO2: 92 % on RA     Neuro: alert, oriented, occasionally forgetful, follows commands, pupils equal/reactive, bilateral equal strength  Cardiac: BP WNL, afib-sinus tach, denied chest pain, no edema noted  Respiratory: sating well on RA, uses CPAP at NOC/with naps, lungs clear with diminished bases  GI/: bowel sounds active, last BM 3/21/18, voiding well  Diet/appetite: tolerating full liquid diet  Activity: up with SBA  Pain: denied  Skin: necrotic toe-drsg changed, no concerns  LDA's: PIV x2-both infusing, no acute concerns    Plan: Continue with plan of care. Notify primary team with changes.        Katherine Calvillo RN  03/22/18  10:47 PM

## 2018-03-23 NOTE — PROGRESS NOTES
[facility], [place]    Internal Medicine Progress Note - Maroon Service    Main Plans for Today    -Has been having symptomatic bradycardia likely due to beta blockers  -DC beta blockers  -EP consult placed recs appreciated   -NAC treatment continues per poison control and GI, if liver enzymes are down trending in the AM okay to DC  -recheck APAP level   -hemolysis labs unconvincing for hemolysis  -giving LR O/N at 100 cc/hr  -given 5 mg vit K PO; INR has decreased to 1.85  -trending hgb q8hr   -will consider formal consult for hematology for possible mixing studies  -q two hour lactate checks   -start zosyn   -RUQ ultrasound today  -Hepatitis labs (hep b surface antigen, hep b core antibody, hep c antibody, hep b surface), AMPARO, F actin, mitochondrial M2 antibody ordered      Assessment & Plan   Carlos Alberto Fenton is a 77 year old female with PMH of HTN, HLD, DM, CVA (11/2016), chronic diastolic HF (last ECHO 12/12/17 EF 45-50%), CAD (s/p 3v CABG: LIMA-LAD, SVG-D1, SVG-dRCA and modified MAZE, ABDIEL ligation - 2/2016), paroxysmal AFib ([id number] - 8 on Xarelto), HIT, RUE DVT, recently diagnosed hypothyroidism, admitted 3/21/2018 for acute GIB, coagulopathy and concern for acute LI 2/2 acetaminophen toxicity with xarelto intake.    Pt does not have suicidal ideation, even though she has acetaminophen toxicity.     # Coagulopathy, INR to 5, elevated PT and PTT  # Concern for tylenol toxicity (elevated acetaminophen levels)  Pt states she was taking 3-4g tylenol per day for the past month for chronic back pain. Last dose was two days ago  Pt previously on warfarin for Afib. She started xarelto as of Jan 2018. She states she was not taking warfarin and xarelto simultaneously. It is possible that her coagulopathy is occurring in the setting of xarelto, tylenol, and acute liver failure. Her LFTs are WNL.  Last INR 3/23 was 1.85. Thinking not likely caused by tylenol, looking into other source for  increased liver enzymes.     We are continuing her NAC infusion per toxicology and GI. Toxicology does not believe her LFTs are reliable and they increased slightly in the last 24 hours.     (Bili elevated 1.2 above 0.8, ALT elevated 50 above 31, and AST elevated 73 above 43).     -- trend INR  -- GI consult as above: get anemia/hemolysis w/u; and monitor for signs bleed  --Per GI ordered hepatitis labs (hep b surface antigen, hep b core antibody, hep c antibody, hep b surface), AMPARO, F actin, mitochondrial M2 antibody   --cont NAC treatment    #Afib w/ Bradycardia  Likely due to beta blockers, got a total of 62.5mg of beta blockers yesterday due to afib. Has had increased lactate levels last one 3/23 was 2.4.   -q2 hr lactate checks  -DCed beta blockers  -Per cardiology use glucagon, atropine and dopamine PRN  -EP consulted, recs appreciated  -Stop K  -start zosyn for possible infection confounding clinical picture, if decompensates add vancomycin      # Acute blood loss anemia (baseline 11-12 1 month prior, current hgb ~7)  Differential includes acute GI bleed (most likely), vs hemolysis, vs other reversible cause of anemia with rapid onset, given recent baseline one month ago showed hgb 11-12. Reticulocyte count is elevated to 8.1 indicating robust bone marrow response to anemia and possibly explains the increased MCV (119). Fe levels are normal (96), however initially Fe binding capacity was increased to 443 and Fe saturation index was decreased to 12. Both of these values have normalized during her admission. Her lactate dehydrogenase was elevated to 436       Plan:  -- GI consult, appreciate recs  -- trend Hgb q6h, transfuse <7  -- continue IV PPI BID   -- hold AC (home ASA 81 and xarelto)  --No procedures today until she stabilizes, low fat/Na diet    # HARINI on CKD  Cr at bsl ~1.1-4. On presentation 1.8. UA with hyaline casts, tachy, hypotensive. Likely prerenal in the setting of GIB and decreased PO  intake/emesis  -- trend BMP  -- mIVF as below      # Hypothyroidism  Recently diagnosed. On Levothyroxine. Last TSH 2/21 elevated to 88, T4 improved from 0.29 to 0.44.   -- increase levothyroxine to 37.5 from PTA dose of 25 mcg      # Tropinemia, likely 2/2 demand ischemia 2/2 anemia  EKG did not show ST elevation, ST segment depression or T wave inversions. Pt is not having CP. Likely demand ischemia.      Chronic problems  # HFpEF  # CAD  # HLD  ECHO shows EF 45-50%.   Plan:  --holding metolazone, spironolactone, and lasix  --cont atorvastatin   --holding ASA    # Depression  -- continue PTA venlafaxine    # Paroxysmal A fib  # [id number]  Pt prev on warfarin, she was transitioned to xarelto in Jan 2018 because she likes leafy greens.  --hold xarelto        # Pain Assessment:   Current Pain Score 3/23/2018 3/22/2018 3/22/2018   Patient currently in pain? denies denies denies   Pain score (0-10) - - -   Pain location - - -   Pain descriptors - - -   CPOT pain score - - -   Asenath s pain level was assessed and she currently denies pain.      Diet: NPO per Anesthesia Guidelines for Procedure/Surgery Except for: Meds, Ice Chips  Fluids: LR at 100 cc/hr  DVT Prophylaxis: mechanical  Code Status: Full Code    Disposition Plan   Expected discharge: 2 - 3 days, recommended to prior living arrangement once treatment completed..     Entered: Palalvi Ruiz 03/23/2018, 7:56 AM   Information in the above section will display in the discharge planner report.      The patient's care was discussed with the Attending Physician, Dr. Hopson and Patient.    Pallavi Ruiz, MS3  [facility]  Maroon: 1      Interval History   Pt is having mild back pain and shoulder pain. She feels weak and unwell. Otherwise, no f/c, cp, or sob. No blood in stool.      Overnight transitioned to symptomatic is[place] with blood pressures to 93/56. Her beta blockers were stopped and cardiology was consulted. She was given 5mg  glucagon. Her LA was at 2.2 but she was not further bolused with fluids due to effusions seen on CXR and appearing to be fluid overloaded.    Physical Exam   Vital Signs: Temp: 98.1  F (36.7  C) Temp src: Oral BP: 103/64   Heart Rate: 51 Resp: 20 SpO2: 93 % O2 Device: BiPAP/CPAP Oxygen Delivery: 2 LPM  Weight: 186 lbs 4.62 oz  Gen:  Patient lying in recliner, appears tired   HEENT: No scleral icterus, Moist mucous membranes. No nasal discharge.  CV: Normal rate, regular rhythm. S1/S2 normal.  No murmurs, rubs or gallops   Resp: Clear to auscultation bilaterally. Without wheeze or crackles  Abdomen: Soft, non tender abdomen, normal active bowel sounds,   Extremities: Peripheral edema bilaterally 1+  Skin: ecchymosis on upper lateral quadrant of buttock, several small ecchymosis on b/l upper extremities, multicolored; gangrenous toes bilaterally R worse than L  Oriented to - conversation no deficits noted  Psychiatric:ascribes to her health making her feel down but denies self harm or suicidal ideation   Back: no expansion of hematoma         Data   Medications     DOPamine       glucagon INFUSION ADULT       acetylcysteine (ACETADOTE) infusion *third dose - elevated AST* 6.25 mg/kg/hr (03/22/18 2324)       glucagon         atropine  0.5 mg Intravenous Once     atropine         pantoprazole (PROTONIX) IV  40 mg Intravenous BID     atorvastatin  40 mg Oral Daily     ferrous sulfate  325 mg Oral Daily with breakfast     potassium chloride SA  20 mEq Oral BID     venlafaxine  150 mg Oral Daily with breakfast     levothyroxine  37.5 mcg Oral QAM AC     Data     Recent Labs  Lab 03/23/18  0506 03/23/18  0251 03/22/18  2226 03/22/18 2002 03/22/18  1332  03/22/18  1011 03/22/18  0603  03/21/18  1133 03/21/18  0640 03/20/18  2203   WBC 9.9  --   --   --   --   --   --   --  8.9  --  12.0*  --  10.3   HGB 8.2* 8.5* 8.3*  --   < > 8.1*  < >  --  8.9*  < > 8.4*  --  7.2*   *  --   --   --   --   --   --   --  116*  --   114*  --  119*     --   --   --   --   --   --   --  256  --  256  --  282   INR 1.85*  --   --  1.75*  --   --   --  1.93*  --   --  4.15*  --  5.05*     --   --   --   --   --   --   --  141  --   --  138 132*   POTASSIUM 4.8 4.6  --   --   --   --   --   --  3.4  --   --  4.0 4.5   CHLORIDE 108  --   --   --   --   --   --   --  106  --   --  99 95   CO2 22  --   --   --   --   --   --   --  21  --   --  25 27   BUN 33*  --   --   --   --   --   --   --  58*  --   --  79* 88*   CR 0.90  --   --   --   --   --   --   --  1.04  --   --  1.38* 1.78*   ANIONGAP 10  --   --   --   --   --   --   --  15*  --   --  15* 10   CARLY 8.2*  --   --   --   --   --   --   --  8.4*  --   --  8.8 8.7   *  --   --   --   --   --   --   --  106*  --   --  118* 109*   ALBUMIN 2.9*  --   --   --   --   --   --   --  3.0*  --   --  3.2* 3.4   PROTTOTAL 6.3*  --   --   --   --   --   --   --  6.6*  --   --  6.9 7.1   BILITOTAL 1.0  --   --   --   --   --   --   --  1.2  --   --  0.8 0.7   ALKPHOS 91  --   --   --   --   --   --   --  71  --   --  82 84   ALT 67*  --   --   --   --   --   --   --  50  --   --  31 27   AST 90*  --   --   --   --   --   --   --  73*  --   --  43 45   LIPASE  --   --   --   --   --   --   --   --   --   --   --   --  483*   TROPI  --  0.054*  --   --   --  0.048*  --   --   --   --   --   --  0.049*   < > = values in this interval not displayed.  Recent Results (from the past 24 hour(s))   XR Chest Port 1 View    Narrative    EXAM: XR CHEST PORT 1 VW  3/23/2018 2:43 AM      HISTORY: acute desat to 80s, now in Afib with HR in 50s;     COMPARISON: 3/20/2018    FINDINGS: Portable AP view of the chest obtained. Unchanged left  atrial appendage exclusion device and loop recorder. Median sternotomy  wires. Trachea is midline. The cardiac silhouette is within normal  limits. Small bilateral pleural effusions. No significant change in  the hazy left basilar opacities. No pneumothorax.        Impression    IMPRESSION:   1. Small bilateral pleural effusions.  2. Unchanged left basilar airspace opacities, compatible with  atelectasis, aspiration, or infection.    I have personally reviewed the examination and initial interpretation  and I agree with the findings.    DUC SALGADO MD

## 2018-03-23 NOTE — CONSULTS
Briefly, discussed EKG with EP fellow on overnight. We believe this to be slow afib because of its irregularity. If this was complete heart block and junctional escape, we would see a very regular rhthym. We recommend using glucagon, atropine, and dopamine to better help AV madelyn conduction at this time.    Per the EP fellow, please put in a formal consult for EP to see the patient tomorrow.    Thank you for allowing me to care for this patient. This has been discussed with the EP fellow.    Dee Dee Stephens MD  Cardiology Fellow, PGY-4

## 2018-03-23 NOTE — PROGRESS NOTES
03/23/18 0300   Post RRT Intervention Assessment   Post RRT Assessment Stable/Improved   Date Follow Up Done 03/23/18   Time Follow Up Done 0720   Comments Pt A+Ox4. Pt's BP stable (113/59), HR 47-50's.  Difficult to get oxygen sats because of poor/cold circulation.  Placed heat pack and warm blanket on fingers and pts sats went to %. Cpap at 60% and able to switch to 6L NC. Pt continues to be lethargic at times but easy to arouse. MD placed heme consult.  Continue to monitor

## 2018-03-23 NOTE — CONSULTS
[facility], [place]    Electrophysiology Consult       Assessment & Plan   Carlos Alberto Fenton is a 77 year old female with history of pAF with [id number] score of 8 on xarelto, CAD s/p 3 vessel CABG (LIMA-LAD, v-D1, v-RCA) + MAZE + ABDIEL ligation in 2/2016, heart failure with LV EF 45-50%, hypertension, type 2 diabetes, and history of stroke, DVT, and HIT who presented with blood loss anemia.  EP was consulted for a period of atrial fibrillation with slow response (HR in the 50s and 60s) while getting extra doses of metoprolol.  However, it appears that she converted to sinus rhythm which is likely why her HR is so low.  She appears to be back in sinus.  She likely had too much beta-blocker and would benefit from a more relaxed heart rate target zone as outlined below.    Recommendations  -Agree with holding all beta-blocking agents while getting worked up for acute blood loss anemia.  -Agree with holding anticoagulation, would restart once we have a clear understanding of her blood loss anemia is from and pending final results from her GI work up  -Agree with synthyroid increase for hypothyroidism  -Would avoid QT prolonging agents    Thank you for allowing us to participate in the care of Ms. Fenton    Andrea Bishop MD, PhD  Cardiology Fellow    History of Present Illness   Carlos Alberto Fenton is a 77 year old female who presented to the general medicine service for acute blood loss anemia and body aches.  She was found to have a Hgb of 7.2 from a baseline over 11.  She developed some slower rapids in the early AM after receiving her normal dose of coreg with a total of 62.5 mg of metoprolol.  She was feeling poor with low oxygen saturations.  She was given one dose of  IV glucagon.      Since that time, she has had HRs in the 50s.  She had an EKG which showed possible second degree heart block type 1.  She did get a repeat EKG this afternoon which showed normal sinus rhythm.    Past Medical History     I have reviewed this patient's medical history and updated it with pertinent information if needed.   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    parietral lesions bilateral       Antiplatelet or antithrombotic long-term use      Anxiety      Atrial fibrillation (H)      CAD (coronary artery disease)     2 vessel     Cancer (H) 1990    periodically have cancer on the skin removed     Cerebral artery occlusion with cerebral infarction (H) 10/2016    Cardioembolic strokes related to atrial fibrillation     Deep vein thrombosis (DVT) of axillary vein of right upper extremity (H) 2/25/2017     Depressive disorder 2001     Diabetes (H)      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     Hyperlipidemia LDL goal <130 10/31/2010     Hypertension      Panic attacks      Seizures (H) 10/19/2016     Sleep apnea     Uses CPAP       Past Surgical History   I have reviewed this patient's surgical history and updated it with pertinent information if needed.  Past Surgical History:   Procedure Laterality Date     AMPUTATE TOE(S) Right 5/26/2017    Procedure: AMPUTATE TOE(S);  Right Great Toe amputation, debriedment of 2 and 3rd toe soft tissu right foot. and application of Grafix;  Surgeon: Leah Santamaria MD;  Location: [facility] OR     ANESTHESIA CARDIOVERSION N/A 12/12/2017    Procedure: ANESTHESIA CARDIOVERSION;  Anesthesia Cardioversion ;  Surgeon: GENERIC ANESTHESIA PROVIDER;  Location: [facility] OR     BACK SURGERY       BYPASS GRAFT ARTERY CORONARY N/A 2/6/2017    Procedure: BYPASS GRAFT ARTERY CORONARY;  Surgeon: Mikhail Quiñones MD;  Location: [facility] OR     C APPENDECTOMY  1959     C CARDIAC SURG PROCEDURE UNLIST       C HAND/FINGER SURGERY UNLISTED       C STOMACH SURGERY PROCEDURE UNLISTED       HC SACROPLASTY       HC VASCULAR SURGERY PROCEDURE UNLIST       HYSTERECTOMY, PASTORA  1988     IRRIGATION AND DEBRIDEMENT FOOT, COMBINED Right 7/7/2017     Procedure: COMBINED IRRIGATION AND DEBRIDEMENT FOOT;  Irrigation and Debridement Right Foot with Graphix  *Latex Allergy*;  Surgeon: Leah Santamaria MD;  Location: [facility] OR     MAZE PROCEDURE N/A 2017    Procedure: MAZE PROCEDURE;  Surgeon: Mikhail Quiñones MD;  Location: [facility] OR     PICC INSERTION Left 2017    5fr TL Bard PICC, 47cm (3cm external) in the L basilic vein w/ tip in the SVC RA junction     TONSILLECTOMY         Prior to Admission Medications   Prior to Admission Medications   Prescriptions Last Dose Informant Patient Reported? Taking?   ACETAMINOPHEN PO 3/20/2018  Yes Yes   Sig: Take 500-1,000 mg by mouth every 6 hours as needed for pain   ALPRAZolam (XANAX) 0.25 MG tablet 3/19/2018  No No   Sig: Take 1 tablet (0.25 mg) by mouth 3 times daily as needed for anxiety   Elastic Bandages & Supports (ACE BANDAGE SELF-ADHERING) MISC   No No   Si each 2 times daily   Gauze Pads & Dressings (KERLIX GAUZE ROLL MEDIUM) MISC   No No   Si each 2 times daily   MELATONIN PO Unknown at Unknown time  Yes No   Sig: Take 1 mg by mouth nightly as needed    Nutritional Supplements (SILICA PO) 3/20/2018  Yes Yes   Sig: Unknown strength. Patient takes daily for hair growth/health.   ONE TOUCH ULTRA test strip   No No   Sig: USE AS DIRECTED TO TEST ONE TIME A DAY   ONETOUCH DELICA LANCETS 33G MISC  Self No No   Si Device daily   Potassium Chloride ER 20 MEQ TBCR 3/20/2018  No No   Sig: Take 1 tablet (20 mEq) by mouth 2 times daily   Wound Dressings (ADAPTIC NON-ADHERING DRESSING) PADS   No No   Sig: Externally apply 1 each topically 2 times daily   aspirin 81 MG chewable tablet 3/20/2018  No No   Sig: Take 1 tablet (81 mg) by mouth daily   atorvastatin (LIPITOR) 40 MG tablet 3/20/2018  No No   Sig: Take 1 tablet (40 mg) by mouth daily   blood glucose monitoring (ONE TOUCH ULTRA 2) meter device kit  Self Yes No   bumetanide (BUMEX) 2 MG tablet 3/20/2018  Yes No   Sig: Take 1 tablet (2 mg) by mouth 2  times daily   carvedilol (COREG) 3.125 MG tablet 3/20/2018  No No   Sig: Take 1 tablet (3.125 mg) by mouth 2 times daily (with meals)   ferrous sulfate (IRON SUPPLEMENT) 325 (65 FE) MG tablet 3/20/2018  No No   Sig: Take 1 tablet (325 mg) by mouth daily (with breakfast)   gabapentin (NEURONTIN) 100 MG capsule 3/20/2018  No No   Sig: TAKE ONE CAPSULE BY MOUTH TWICE DAILY    levothyroxine (SYNTHROID/LEVOTHROID) 25 MCG tablet 3/20/2018  No No   Sig: Take 1 tablet (25 mcg) by mouth every morning (before breakfast)   metolazone (ZAROXOLYN) 2.5 MG tablet 2/20/2018  No No   Sig: Take 1 tablet (2.5 mg) by mouth daily as needed For weight over 170 Lbs.   order for DME   No No   Sig: Sterile 4x4 gauze; Use gauze twice daily for dressing changes.   pantoprazole (PROTONIX) 40 MG EC tablet 3/20/2018  No Yes   Sig: Take 1 tablet (40 mg) by mouth every morning   rivaroxaban ANTICOAGULANT (XARELTO) 20 MG TABS tablet 3/20/2018  No No   Sig: Take 1 tablet (20 mg) by mouth daily (with dinner) . DO NOT start until you have stopped the Warfarin.   spironolactone (ALDACTONE) 25 MG tablet 3/20/2018  No No   Sig: Take 1 tablet (25 mg) by mouth daily   traZODone (DESYREL) 50 MG tablet Unknown at Unknown time  No No   Sig: Take 0.5 tablets (25 mg) by mouth nightly as needed for sleep   venlafaxine (EFFEXOR-ER) 150 MG TB24 24 hr tablet 3/20/2018  No No   Sig: Take 1 tablet (150 mg) by mouth daily (with breakfast)      Facility-Administered Medications: None     Allergies   Allergies   Allergen Reactions     Blood Transfusion Related (Informational Only) Other (See Comments)     Patient has a history of a clinically significant antibody against RBC antigens.  A delay in compatible RBCs may occur.Patient has a history of a significant allergic transfusion reaction.  Consult with Blood Bank MD before ordering components.     Heparin      HIT/ thrombocytopenia     Lovenox [Enoxaparin] Other (See Comments)     Thrombocytopenia      Oxycodone       hallucinations     Toprol Xl [Metoprolol] Nausea and Vomiting     Adhesive Tape Itching and Rash     Diapers & Supplies Rash     Developed yeast infection from previous hospital stay       Social History   I have reviewed this patient's social history and updated it with pertinent information if needed. Carlos Alberto Fenton  reports that she quit smoking about 41 years ago. Her smoking use included Cigarettes. She started smoking about 59 years ago. She has a 10.00 pack-year smoking history. She has never used smokeless tobacco. She reports that she does not drink alcohol or use illicit drugs.    Family History   I have reviewed this patient's family history and updated it with pertinent information if needed.   Family History   Problem Relation Age of Onset     DIABETES Mother      C.A.D. Mother      Hypertension Mother      CEREBROVASCULAR DISEASE Mother      Mini Strokes     Other Cancer Mother      Skin Cancer     Hyperlipidemia Mother      Coronary Artery Disease Mother      DIABETES Father      C.A.D. Father      Hypertension Father      Other Cancer Father      Hyperlipidemia Father      Coronary Artery Disease Father      HEART DISEASE Sister      Arthritis Sister      Other Cancer Other      Skin Cancer       Review of Systems   The 10 point Review of Systems is negative other than noted in the HPI or here.     Physical Exam   Temp: 97.6  F (36.4  C) Temp src: Axillary BP: 106/59   Heart Rate: 52 Resp: 16 SpO2: 99 % O2 Device: Nasal cannula Oxygen Delivery: 4 LPM  Vital Signs with Ranges  Temp:  [97.3  F (36.3  C)-98.5  F (36.9  C)] 97.6  F (36.4  C)  Heart Rate:  [] 52  Resp:  [16-20] 16  BP: ()/(44-82) 106/59  FiO2 (%):  [35 %-90 %] 60 %  SpO2:  [66 %-100 %] 99 %  186 lbs 4.62 oz    GEN:  Alert, oriented x 3, appears comfortable, NAD.  HEENT:  Normocephalic/atraumatic, no scleral icterus, no nasal discharge, mouth moist.  CV:  Bradycardic but regular  LUNGS:  Clear to auscultation bilaterally   ABD:   Active bowel sounds, soft, non-tender/non-distended.    EXT:  Trace edema  SKIN:  Dry to touch, no exanthems noted in the visualized areas.  NEURO:  No new focal deficits appreciated.    Data     EKG: Sinus bradycardia      Recent Labs  Lab 03/23/18  0506 03/23/18  0251 03/22/18  2226 03/22/18 2002 03/22/18  1332  03/22/18  1011 03/22/18  0603  03/21/18  1133 03/21/18  0640 03/20/18  2203   WBC 9.9  --   --   --   --   --   --   --  8.9  --  12.0*  --  10.3   HGB 8.2* 8.5* 8.3*  --   < > 8.1*  < >  --  8.9*  < > 8.4*  --  7.2*   *  --   --   --   --   --   --   --  116*  --  114*  --  119*     --   --   --   --   --   --   --  256  --  256  --  282   INR 1.85*  --   --  1.75*  --   --   --  1.93*  --   --  4.15*  --  5.05*     --   --   --   --   --   --   --  141  --   --  138 132*   POTASSIUM 4.8 4.6  --   --   --   --   --   --  3.4  --   --  4.0 4.5   CHLORIDE 108  --   --   --   --   --   --   --  106  --   --  99 95   CO2 22  --   --   --   --   --   --   --  21  --   --  25 27   BUN 33*  --   --   --   --   --   --   --  58*  --   --  79* 88*   CR 0.90  --   --   --   --   --   --   --  1.04  --   --  1.38* 1.78*   ANIONGAP 10  --   --   --   --   --   --   --  15*  --   --  15* 10   CARLY 8.2*  --   --   --   --   --   --   --  8.4*  --   --  8.8 8.7   *  --   --   --   --   --   --   --  106*  --   --  118* 109*   ALBUMIN 2.9*  --   --   --   --   --   --   --  3.0*  --   --  3.2* 3.4   PROTTOTAL 6.3*  --   --   --   --   --   --   --  6.6*  --   --  6.9 7.1   BILITOTAL 1.0  --   --   --   --   --   --   --  1.2  --   --  0.8 0.7   ALKPHOS 91  --   --   --   --   --   --   --  71  --   --  82 84   ALT 67*  --   --   --   --   --   --   --  50  --   --  31 27   AST 90*  --   --   --   --   --   --   --  73*  --   --  43 45   LIPASE  --   --   --   --   --   --   --   --   --   --   --   --  483*   TROPI  --  0.054*  --   --   --  0.048*  --   --   --   --   --   --  0.049*   < > =  values in this interval not displayed.    I very much appreciated the opportunity to see and assess Carlos Alberto Fenton in the hospital with CV Fellow Dr Bishop and resident.     I agree with the note above which summarizes my findings and current recommendations.  Please do not hesitate to contact my office if you have any questions or concerns.      Arnulfo Vazquez MD  Cardiac Arrhythmia Service  [facility]  [phone]

## 2018-03-23 NOTE — PROGRESS NOTES
GASTROENTEROLOGY PROGRESS NOTE    ASSESSMENT:  Carlos Alberto Fenton is a 77 year old female with a history of acute bilateral cerebral infarction in November 2016, HIT in 2017, RUE DVT, seizures in 2016, HTN, DM, HLD, chronic diastolic HF (last ECHO 12/12/17 EF 45-50%), CAD (s/p 3v CABG: LIMA-LAD, SVG-D1, SVG-dRCA and modified MAZE, ABDIEL ligation - 2/2016), paroxysmal AFib ([id number] - 8 on Xarelto), appendectomy, recently diagnosed hypothyroidism, and now admitted with Acetaminophen toxicity, INR of 5.05 concerning for liver source, and anemia with positive stool occult that prompted GI consult.      #Macrocytic anemia  #Acetaminophen toxicity  #Coagulopathy   Patient presented with generalized fatigue, weakness and dyspnea x2 days in the setting of anemia (hgb of 7.2 with baseline of 11.7-12.8), and hypothyroidism (TSH 81.10, low T4 0.44). Acetaminophen level was at 128 and patient has been taking 3-4 doses of Tylenol/day chronically. Warfarin was stopped in January 2018 and switched to Xarelto at that time but INR of 5.05 appears too high for not being on Warfarin.      Positive stool occult in the setting of high INR is not unusual but patient does not overt GI bleed. Patient also has bruising on right lower back and hip that appears superficial. Abd/pelvic CT from 3/20/18 did not show acute abdomen. Plan is to proceed with EGD and colonoscopy for anemia, once her tachycardia is resolved or controlled. Lactate is improving at 2.4, hgb is stable at 8.4, and INR is 1.85 (received total of 10mg of IV vitamin K and 10mg of oral vitamin K for possible nutritional deficiency). LFTs were normal on admission but now trending up AST 90 and ALT 67, will continue with NAC.       Recommendations  --Diet as tolerated   --Continue with NAC  --Trend LFTs daily   --Please obtain labs (AMPARO, AMA, F-actin, and hepatitis serology)  --US with doppler   --EGD and colonoscopy will be obtained for anemia once patient's cardiac  "arhythmia is resolved   --Optimize hypothyroidism   --Monitor CBC and transfuse if hgb is less than 7  --Can switch to IV PPI twice daily   --Accurate documentation of output (color, consistency and amount)     Gastroenterology follow up recommendations: TBD     Patient care plan discussed with Dr. Snyder, GI staff physician. Thank you for involving us in this patient's care. Please do not hesitate to contact the GI service with any questions or concerns.     Momo Gee  Gastroenterology Nurse Practitioner  _______________________________________________________________  S: Patient denies pain, n/v or diarrhea. Had 1 brown stool since yesterday, and no s/s of bleeding.     O:  Blood pressure 106/59, pulse 61, temperature 97.6  F (36.4  C), temperature source Axillary, resp. rate 16, height 1.575 m (5' 2\"), weight 84.5 kg (186 lb 4.6 oz), SpO2 99 %, not currently breastfeeding.    Constitutional: cooperative, pleasant, not dyspneic/diaphoretic, no acute distress  Eyes: Sclera anicteric/injected  Ears/nose/mouth/throat: Normal oropharynx without ulcers or exudate, mucus membranes moist, hearing intact  Neck: supple, thyroid normal size  CV: RRR. No edema in LE  Respiratory: Unlabored breathing. CTA bilaterally   Lymph: No axillary, submandibular, supraclavicular or inguinal lymphadenopathy  Abd: Nondistended, +bs, no hepatosplenomegaly, nontender, no peritoneal signs  Skin: warm, perfused, no jaundice. R toe wound covered with dressing. R lower back and hip ecchymosis that is outlined   Neuro: AAO x 3, No asterixis  Psych: Normal affect  MSK: Normal gait    LABS:  BMP    Recent Labs  Lab 03/23/18  0506 03/23/18  0251 03/22/18  0603 03/21/18  0640 03/20/18  2203     --  141 138 132*   POTASSIUM 4.8 4.6 3.4 4.0 4.5   CHLORIDE 108  --  106 99 95   CARLY 8.2*  --  8.4* 8.8 8.7   CO2 22  --  21 25 27   BUN 33*  --  58* 79* 88*   CR 0.90  --  1.04 1.38* 1.78*   *  --  106* 118* 109*     CBC  Recent " Labs  Lab 03/23/18  0506  03/22/18  0603  03/21/18  1133 03/20/18  2203   WBC 9.9  --  8.9  --  12.0* 10.3   RBC 2.27*  --  2.37*  --  2.34* 1.95*   HGB 8.2*  < > 8.9*  < > 8.4* 7.2*   HCT 27.0*  --  27.4*  --  26.7* 23.2*   *  --  116*  --  114* 119*   MCH 36.1*  --  37.6*  --  35.9* 36.9*   MCHC 30.4*  --  32.5  --  31.5 31.0*   RDW 25.8*  --  25.5*  --  24.2* 21.9*     --  256  --  256 282   < > = values in this interval not displayed.  INR    Recent Labs  Lab 03/23/18  0506 03/22/18  2002 03/22/18  1011 03/21/18  1133   INR 1.85* 1.75* 1.93* 4.15*     LFTs    Recent Labs  Lab 03/23/18  0506 03/22/18  0603 03/21/18  0640 03/20/18  2203   ALKPHOS 91 71 82 84   AST 90* 73* 43 45   ALT 67* 50 31 27   BILITOTAL 1.0 1.2 0.8 0.7   PROTTOTAL 6.3* 6.6* 6.9 7.1   ALBUMIN 2.9* 3.0* 3.2* 3.4      PANC    Recent Labs  Lab 03/20/18  2203   LIPASE 483*       HPI:  Carlos Alberto Fenton is a 77 year old female with a history of acute bilateral cerebral infarction in November 2016, HIT in 2017, RUE DVT, seizures in 2016, HTN, DM, HLD, chronic diastolic HF (last ECHO 12/12/17 EF 45-50%), CAD (s/p 3v CABG: LIMA-LAD, SVG-D1, SVG-dRCA and modified MAZE, ABDIEL ligation - 2/2016), paroxysmal AFib ([id number] - 8 on Xarelto), appendectomy, recently diagnosed hypothyroidism, and now admitted with Acetaminophen toxicity, INR of 5.05 concerning for liver source, and anemia with positive stool occult that prompted GI consult.      Patient complains of having generalized fatigue, weakness, shortness of breath on exertion and overall unwell x2-3 days. She has mild generalized abdominal tenderness when she presses her abdomen but otherwise no abdominal pain otr chest pain. She is having some nausea and has had one episode of nonbloody emesis 2 days ago, no black or bloody stools.S he does have ongoing chronic back pain/low back and across both shoulder blades present for last month.  She's been increasing the amount of tylenol she's been  "taking lately, stating she takes \"2 red-white capsules\" about every 3-4 hrs daily for the last month. She has a known history of urge incontinence and that is unchanged, but no hematuria, vaginal bleed or epistaxis.  No traumas or falls, no fevers or chills.  She has not had any syncope or near syncope, but is generally feeling much more fatigued and unwell.      She states she's also been bruising much more easily more recently. Has a large bruise on posterior upper R buttock of unknown cause. States minimal contact is causing her to bruise more recently; and this was not present with Warfarin. No history of prior bleed. Her primary physician told her that she has to take oral iron supplement but has not started. She denies NSAID, ETOH, or tobacco use. She stopped smoking in 1976. She has history of skin cancer but no other cancer.     ROS: A comprehensive Review of Systems was asked and answered in the negative unless specifically commented upon in the HPI    PMHx:  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    parietral lesions bilateral       Antiplatelet or antithrombotic long-term use      Anxiety      Atrial fibrillation (H)      CAD (coronary artery disease)     2 vessel     Cancer (H) 1990    periodically have cancer on the skin removed     Cerebral artery occlusion with cerebral infarction (H) 10/2016    Cardioembolic strokes related to atrial fibrillation     Deep vein thrombosis (DVT) of axillary vein of right upper extremity (H) 2/25/2017     Depressive disorder 2001     Diabetes (H)      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     Hyperlipidemia LDL goal <130 10/31/2010     Hypertension      Panic attacks      Seizures (H) 10/19/2016     Sleep apnea     Uses CPAP       PSurgHx:   Past Surgical History:   Procedure Laterality Date     AMPUTATE TOE(S) Right 5/26/2017    Procedure: AMPUTATE TOE(S);  Right Great Toe " amputation, debriedment of 2 and 3rd toe soft tissu right foot. and application of Grafix;  Surgeon: Leah Santamaria MD;  Location: [facility] OR     ANESTHESIA CARDIOVERSION N/A 12/12/2017    Procedure: ANESTHESIA CARDIOVERSION;  Anesthesia Cardioversion ;  Surgeon: GENERIC ANESTHESIA PROVIDER;  Location: [facility] OR     BACK SURGERY       BYPASS GRAFT ARTERY CORONARY N/A 2/6/2017    Procedure: BYPASS GRAFT ARTERY CORONARY;  Surgeon: Mikhail Quiñones MD;  Location: [facility] OR     C APPENDECTOMY  1959     C CARDIAC SURG PROCEDURE UNLIST       C HAND/FINGER SURGERY UNLISTED       C STOMACH SURGERY PROCEDURE UNLISTED       HC SACROPLASTY       HC VASCULAR SURGERY PROCEDURE UNLIST       HYSTERECTOMY, PASTORA  1988     IRRIGATION AND DEBRIDEMENT FOOT, COMBINED Right 7/7/2017    Procedure: COMBINED IRRIGATION AND DEBRIDEMENT FOOT;  Irrigation and Debridement Right Foot with Graphix  *Latex Allergy*;  Surgeon: Leah Santamaria MD;  Location: [facility] OR     MAZE PROCEDURE N/A 2/6/2017    Procedure: MAZE PROCEDURE;  Surgeon: Mikhail Quiñones MD;  Location: [facility] OR     PICC INSERTION Left 02/25/2017    5fr TL Bard PICC, 47cm (3cm external) in the L basilic vein w/ tip in the SVC RA junction     TONSILLECTOMY  1942       MEDS:    No current facility-administered medications on file prior to encounter.   Current Outpatient Prescriptions on File Prior to Encounter:  pantoprazole (PROTONIX) 40 MG EC tablet Take 1 tablet (40 mg) by mouth every morning   gabapentin (NEURONTIN) 100 MG capsule TAKE ONE CAPSULE BY MOUTH TWICE DAILY    bumetanide (BUMEX) 2 MG tablet Take 1 tablet (2 mg) by mouth 2 times daily   levothyroxine (SYNTHROID/LEVOTHROID) 25 MCG tablet Take 1 tablet (25 mcg) by mouth every morning (before breakfast)   Potassium Chloride ER 20 MEQ TBCR Take 1 tablet (20 mEq) by mouth 2 times daily   MELATONIN PO Take 1 mg by mouth nightly as needed    carvedilol (COREG) 3.125 MG tablet Take 1 tablet (3.125 mg) by mouth 2 times daily (with meals)    rivaroxaban ANTICOAGULANT (XARELTO) 20 MG TABS tablet Take 1 tablet (20 mg) by mouth daily (with dinner) . DO NOT start until you have stopped the Warfarin.   ALPRAZolam (XANAX) 0.25 MG tablet Take 1 tablet (0.25 mg) by mouth 3 times daily as needed for anxiety   venlafaxine (EFFEXOR-ER) 150 MG TB24 24 hr tablet Take 1 tablet (150 mg) by mouth daily (with breakfast)   metolazone (ZAROXOLYN) 2.5 MG tablet Take 1 tablet (2.5 mg) by mouth daily as needed For weight over 170 Lbs.   spironolactone (ALDACTONE) 25 MG tablet Take 1 tablet (25 mg) by mouth daily   atorvastatin (LIPITOR) 40 MG tablet Take 1 tablet (40 mg) by mouth daily   traZODone (DESYREL) 50 MG tablet Take 0.5 tablets (25 mg) by mouth nightly as needed for sleep   Elastic Bandages & Supports (ACE BANDAGE SELF-ADHERING) MISC 1 each 2 times daily   Gauze Pads & Dressings (KERLIX GAUZE ROLL MEDIUM) MISC 1 each 2 times daily   ONE TOUCH ULTRA test strip USE AS DIRECTED TO TEST ONE TIME A DAY   Wound Dressings (ADAPTIC NON-ADHERING DRESSING) PADS Externally apply 1 each topically 2 times daily   order for DME Sterile 4x4 gauze; Use gauze twice daily for dressing changes.   aspirin 81 MG chewable tablet Take 1 tablet (81 mg) by mouth daily   ferrous sulfate (IRON SUPPLEMENT) 325 (65 FE) MG tablet Take 1 tablet (325 mg) by mouth daily (with breakfast)   ONETOUCH DELICA LANCETS 33G MISC 1 Device daily   blood glucose monitoring (ONE TOUCH ULTRA 2) meter device kit        ALLERGIES:    Allergies   Allergen Reactions     Blood Transfusion Related (Informational Only) Other (See Comments)     Patient has a history of a clinically significant antibody against RBC antigens.  A delay in compatible RBCs may occur.Patient has a history of a significant allergic transfusion reaction.  Consult with Blood Bank MD before ordering components.     Heparin      HIT/ thrombocytopenia     Lovenox [Enoxaparin] Other (See Comments)     Thrombocytopenia      Oxycodone       hallucinations     Toprol Xl [Metoprolol] Nausea and Vomiting     Adhesive Tape Itching and Rash     Diapers & Supplies Rash     Developed yeast infection from previous hospital stay       FHx:  Family History   Problem Relation Age of Onset     DIABETES Mother      C.A.D. Mother      Hypertension Mother      CEREBROVASCULAR DISEASE Mother      Mini Strokes     Other Cancer Mother      Skin Cancer     Hyperlipidemia Mother      Coronary Artery Disease Mother      DIABETES Father      C.A.D. Father      Hypertension Father      Other Cancer Father      Hyperlipidemia Father      Coronary Artery Disease Father      HEART DISEASE Sister      Arthritis Sister      Other Cancer Other      Skin Cancer       SOCIAL Hx:  Social History     Social History     Marital status:      Spouse name: N/A     Number of children: N/A     Years of education: N/A     Occupational History     Not on file.     Social History Main Topics     Smoking status: Former Smoker     Packs/day: 1.00     Years: 10.00     Types: Cigarettes     Start date: 10/3/1958     Quit date: 7/4/1976     Smokeless tobacco: Never Used      Comment: Quit in 1976     Alcohol use No     Drug use: No     Sexual activity: Not Currently     Partners: Male     Birth control/ protection: Post-menopausal     Other Topics Concern     Parent/Sibling W/ Cabg, Mi Or Angioplasty Before 65f 55m? Yes     Social History Narrative

## 2018-03-23 NOTE — PROGRESS NOTES
"    Cross Cover Note     Subjective:  Called by nurse to see patient for change in vitals and pt discomfort. At around 0200, RN reports pt went up to commode and per tele, had some PVCs and transitioned into israel with HR in 50-60s. Pt wanted to sit at edge of bed, was pale, lethargic, and reported feeling \"crappy\". Denied CP, nausea, lightheadedness, dizziness. Spot O2 checked then with sat in 60s. Placed back on CPAP to and required up to FiO2 80% to sustain sats >90% (normally on 30%).     Of note, the pt today received addition of PO metoprolol 12.5mg this AM, an additional 25mg at 1332, and a third dose of PO metoprolol 25mg q6h prn for \"HR >110, hold if SBP <90\". Writer was not made known of additional prn dose given. She also had increased lactate of unknown etiology, was given a LR bolus and started on infusion of 100cc/hr.     Objective:  Blood pressure 90/55, pulse 61, temperature 97.8  F (36.6  C), temperature source Oral, resp. rate (P) 20, height 1.575 m (5' 2\"), weight 84.5 kg (186 lb 4.6 oz), SpO2 91 %, not currently breastfeeding.  Physical Exam:   General: pale, lethargic, diaphoretic  HEENT:PERRL  CV: israel, irregular   Resp: b/l crackles in posterior bases  Extremities: diffuse pitting edema, poorly palpable radial and DP pulses b/l.   Neuro: No lateralizing symptoms or focal neurologic deficits    Assessment and Plan:  # Afib with bradycardia  # Concern for increased beta-blockade  -- 12-lead  -- K, Mg, lactate, trop  -- VBG  -- CXR   -- discussed with Cards on call. Will give IV glucagon 5mg x1 now will reassess and add atropine, dopamine prn.   -- formal EP consult placed  -- d/c metoprolol 25mg q6h prn, hold PTA carvedilol BID    Naomy Anaya MD  Internal Medicine, PGY-1  [phone]      "

## 2018-03-23 NOTE — PLAN OF CARE
Problem: Patient Care Overview  Goal: Plan of Care/Patient Progress Review  OT-6B: Cancel Evaluation. Per discussion with RN, Pt very lethargic and fatigued today with rough night, bradycardic/hypotensive. Pt away at ultrasound. Will reschedule.

## 2018-03-23 NOTE — PROVIDER NOTIFICATION
Time of notification: 4:00 PM  MD notified: Ayaka on [place] 1  Patient status: heart rate increased to 116-119, no other change in patient status, VSS, see flow sheets  Orders received: stat EKG ordered, will continue to monitor closely

## 2018-03-23 NOTE — PLAN OF CARE
Problem: Patient Care Overview  Goal: Plan of Care/Patient Progress Review  Care Provided: [id number]    Neuro: A&Ox4.   Cardiac: See provider notification regarding change from STach 120s to slow Afib in 50s & now bradycardiac in 50's.   Temp: 98.1  F (36.7  C) Temp src: Oral BP: 90/82   Heart Rate: 51 Resp: 20 SpO2: 92 % O2 Device: Nasal cannula Oxygen Delivery: 2 LPM ;   Respiratory: Sating 90's on 6L NC this AM post CPAP removal; pt SOB & requesting CPAP back on. CPAP @35%. Lungs w/ fine crackles in bilateral bases.   GI/: Adequate urine output. No BM on shift.  Diet/appetite: NPO @ midnight for possible EGD  Pain: Pt denying pain; no PRNS.  Skin: No new deficits; refer to flowsheet for LDAs.  Lab:  Lactic 3.5  IV: Acetylcysteine drip @ 40.7mL/hr; MIV LR stopped overnight d/t fluid up on xray.    R: Will continue to monitor pt closely and notify primary team with any changes.

## 2018-03-23 NOTE — PLAN OF CARE
"Problem: Patient Care Overview  Goal: Plan of Care/Patient Progress Review  /80  Pulse 61  Temp 97.5  F (36.4  C) (Axillary)  Resp 16  Ht 1.575 m (5' 2\")  Wt 84.5 kg (186 lb 4.6 oz)  SpO2 92%  BMI 34.07 kg/m2    Neuro: Lethargic through much of the shift, arouses to voice, oriented x4 and making needs known.   Cardiac: Afebrile. HR bradycardic in 50s through much of the day, increased to 115-125 at 1600 and has remained elevated. EKG obtained, is showing accelerated juctional rhythm. Blood pressure was 100-110/50-60s through the day, increased to 130/80s since 1600. Patient non-symptomatic.  Respiratory: difficult to attain true sat reading without ABG, when able to get good reading is mid 90s while on 4L O2 nasal canula.  GI/: Passing gas, no BM today. Low UOP, MD aware, will need UA/UC. No plan for bolus at this time.  Diet/appetite: Tolerating low fat/low Na diet. Minimal appetite, no complaints of nausea.  Activity:  Assist of 1, up to chair and commode.  Pain: No complaints of pain.  Skin: Intact, no new deficits noted. Bruised areas on right hip appear better. Dressing changed on right great toe, scabbing and eschar present.    Plan: EGD eventually when heart issue resolved. Continue with POC. Notify primary team with changes.      Problem: Diabetes Comorbidity  Goal: Diabetes  Patient comorbidity will be monitored for signs and symptoms of hyperglycemia or hypoglycemia. Problems will be absent, minimized or managed by discharge/transition of care.   Blood glucose check AC/HS and 0200, coverage with SSI.    Problem: Cardiac Disease Comorbidity  Goal: Cardiac Disease  Patient comorbidity will be monitored for signs and symptoms of Cardiac Disease.  Problems will be absent, minimized or managed by discharge/transition of care.   Heart monitor in place, closely monitoring.      "

## 2018-03-23 NOTE — PLAN OF CARE
Problem: Patient Care Overview  Goal: Plan of Care/Patient Progress Review  PT 6B: PT orders received. Per discussion with RN, pt up all night with bradycardia and low blood pressures, very fatigued today and not appropriate for gait in [place]. PT evaluation will be rescheduled to improve pt's ability to fully participate.

## 2018-03-23 NOTE — SIGNIFICANT EVENT
"Time of notification: 0205  MD notified:  Guillermo CC *[id number] - JOSE RAUL Anaya     Patient status: Upon returning to bed from commode with NST, pt wanted to sit at bedside; ok'd by RN. Received tele alarm to phone for HR 51. Called tele to confirm if this was real (pt was previously sustaining 120's all day; PRN metoprolol 25mg given @ 0007) - they reported it was messy, so RN to bedside to assess.     Upon arriving at bedside - pt pale, diaphoretic & reporting \"feeling crappy\"; denied light headedness or SOB. Pt had been off O2 sensor per PCO ok to spot-check & had been on RA >95%. O2 at this time was 64% with a good waveform. Pt assisted back into bed (said needed to have BM; bedpan, no output), placed on CPAP (was set up in room for sleep) - required 80% FIO2 to maintain >88%. Pt still reporting asymptomatic, but BPs low with MAPs 63-70s; HR now was in the 50's (afib). MD paged to bedside.      0305 - 5mg glucagon given for beta-blocker reversal; no change in status.    RRT called @ 0312 with anticipation of needing to start BP drip. Atropine, glucagon drip & dopamine at bedside in case. Labs drawn, RT sent ABG. X-ray showed fluid up - MIV stopped.     0447 - Resource float still at bedside as part of RRT. MD's to bedside to assess. No change in vitals/mentation. At this time confirmed with tele that pt now in sinus bradycardia rhythm with rates 38-50's. No further interventions per MDs at this time - no atropine given, nor drips started.      Will continue to monitor closely & cycle BPs. Will call MD with any further changes.  "

## 2018-03-23 NOTE — CONSULTS
Hematology New Consult          Carlos Alberto Fenton MRN# [id number]   Age: 77 year old YOB: 1940   Date of Admission: 3/20/2018     Reason for consult: Elevated INR         Assessment and Recommendations:     Assessment:  1. Paroxysmal atrial fibrillation, on chronic anticoagulation with Xarelto ([id number] = 8)  2.  Prior history of CVA  3.  Coronary artery disease, status post CABG   4. History of dry gangrene of the toes, bilateral  5.  History of heparin-induced thrombocytopenia, LORAINE positive, 2017  6.  Acetaminophen toxicity  7.  Supratherapeutic INR while not on VKA  8.  Occult GI bleed  9.  Chronic progressive macrocytosis   10. Subacute anemia  11. Elevated lactic acid, mild transaminitis, hypoalbuminemia  12.  Decompensated hypothyroidism    Ms. Carlos Alberto Fenton is a  77-year old woman with a past medical history including hypertension, hyperlipidemia, CVA in 2016, chronic diastolic heart failure, CAD status post prior CABG, paroxysmal atrial fibrillation, with a chads 2 vasc score of 8, currently on Xarelto anticoagulation, positive history of heparin-induced thrombocytopenia and prior right upper extremity line-associated DVT was admitted now for a possible GI bleed, coagulopathy in the setting of recent high levels of acetaminophen, concurrent with Xarelto.    Macrocytic anemia may reflect some degree of liver injury and hypothryoidism which contribute to abnormalities in RBC membrane synthesis, as well as a possible occult GI bleed, although iron studies suggestive of only mild iron deficiency at this time.  Elevated LDH with normal-range haptoglobin argues against ongoing hemolysis.       Elevated INR at presentation likely mutifactorial, driven by liver injury and possible vit K deficiency, as well as Xarelto.  Xarelto and Effexor are both CY substrates;  prior LAC testing was negative (2017); no current indication for re-testing at this time. Also, with recent  HARINI, Xarelto clearance will be delayed.      Recommendations:  --Please check homocysteine and methylmalonic acid results due to patient's high MCV despite normal-range vitamin B12 level.   --Will consider outpatient BMBx if patients macrocytic anemia does not normalize following resolution of liver injury and correction of hypothyroidism, and GI bleed.    --Mixing studies essentially normalized, isolated factor levels are sufficient (factor 7/10 decreased c/w vitamin K deficiency; normal factor 5 suggests hepatic synthetic function is reasonably intact)   --Avoid UFH or LMWH due to history of HIT.    --Consider pharmacy consultation RE: Xarelto drug interactions leading to elevated INR   --When able to resume anticoagulation, prophylactic dose fondaparinux (needs to be carefully renally dosed) may be a reasonable option; will update reccs following completion of ongoing evaluations.      Pt seen and discussed with Dr. Shane who agrees with the plan.    Please don't hesitate to call us with any questions    Jonny Hernandez MD/PhD  Fellow, Division of Hematology/Oncology and Bone Marrow Transplantation  [facility]  [phone]      HEMATOLOGY STAFF:  Seen with fellow, whose note reflects our joint evaluation, assessment, and plan.      Carloz Shane MD  Associate Professor of Medicine  Division of Hematology, Oncology, and Transplantation  Director, [facility]      Total time 110 minutes.          HPI:   Mr/s. Carlos Alberto Fenton is a  77-year old woman with a complex past medical history now admitted with an upper GI bleed in the setting of a supratherapeutic INR while on Xarelto and taking large amounts of acetaminophen on a daily basis for back pain.      Macrocytic anemia noted, with increasing MCV for several months; normal B12 levels.  Peripheral smear and normal-range haptoglobin argue against hemolysis.  No signs of myelodysplasia on smear.  Iron panel shows replete  ferritin, increased iron binding capacity, decreased iron saturation. She received 10 mg vitamin K IV as well as 10 mg po, with near-normalization of her INR.       With regard to her past medical history, she was admitted to the [facility] from February 24, 2017 to March 3, 2017 for dry gangrene of her toes in the setting of a supratherapeutic INR.  Hematology was consulted during this admission; they felt that her ischemia was due to heparin-induced thrombocytopenia probably compounded by a degree of protein C and S deficiency in the setting of Coumadin administration.  During that admission she was found to have a nonocclusive thrombus of the right IJ and right subclavian vein.  Hypercoagulable workup at that time was unrevealing.  She was initially started on unfractionated heparin but then due to the decreasing platelets was tested for heparin-induced thrombocytopenia; HIT antibody testing and LORAINE were positive.  She was transitioned to argatroban and then fondaparinux, completing 3 months of therapy.      Recent Labs   Lab Test  03/23/18   0506  03/23/18   0251  03/22/18   2226  03/22/18   1618  03/22/18   1332   03/22/18   0603   03/21/18   1133  03/20/18 2203  02/22/18   0618  02/21/18   1736   07/07/17   1240   WBC  9.9   --    --    --    --    --   8.9   --   12.0*  10.3  6.8  7.3   < >  5.9   NEUTROPHIL   --    --    --    --    --    --   72.3   --   77.4  76.8   --   67.5   --   59.4   HGB  8.2*  8.5*  8.3*  8.1*  8.1*   < >  8.9*   < >  8.4*  7.2*  11.2*  12.2   < >  11.7   PLT  210   --    --    --    --    --   256   --   256  282  189  216   < >  203    < > = values in this interval not displayed.     Recent Labs   Lab Test  03/23/18   0506  03/23/18   0251  03/22/18   0603  03/21/18   0640  03/20/18   2203  02/22/18   1348   NA  140   --   141  138  132*  137   POTASSIUM  4.8  4.6  3.4  4.0  4.5  4.2   CHLORIDE  108   --   106  99  95  99   CO2  22   --   21  25  27  32   ANIONGAP  10    --   15*  15*  10  6   BUN  33*   --   58*  79*  88*  43*   CR  0.90   --   1.04  1.38*  1.78*  1.28*   CARLY  8.2*   --   8.4*  8.8  8.7  9.1     Recent Labs   Lab Test  03/23/18   0506  03/23/18   0251  03/22/18   0603   02/28/17   0439   02/16/17   0811  02/15/17   1428  02/14/17   0833   MAG  2.1  2.1  2.3   < >   --    < >  1.8  1.8  2.2   PHOS   --    --    --    --    --    --   2.2*  2.6  2.3*   LDH   --    --   436*   --   264*   --    --    --    --     < > = values in this interval not displayed.     Recent Labs   Lab Test  03/23/18   0506  03/22/18   0603  03/21/18   0640   02/28/17   0439   BILITOTAL  1.0  1.2  0.8   < >   --    ALKPHOS  91  71  82   < >   --    ALT  67*  50  31   < >   --    AST  90*  73*  43   < >   --    ALBUMIN  2.9*  3.0*  3.2*   < >   --    LDH   --   436*   --    --   264*    < > = values in this interval not displayed.       Review of system: Complete ROS otherwise negative         Past Medical History:     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    parietral lesions bilateral       Antiplatelet or antithrombotic long-term use      Anxiety      Atrial fibrillation (H)      CAD (coronary artery disease)     2 vessel     Cancer (H) 1990    periodically have cancer on the skin removed     Cerebral artery occlusion with cerebral infarction (H) 10/2016    Cardioembolic strokes related to atrial fibrillation     Deep vein thrombosis (DVT) of axillary vein of right upper extremity (H) 2/25/2017     Depressive disorder 2001     Diabetes (H)      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     Hyperlipidemia LDL goal <130 10/31/2010     Hypertension      Panic attacks      Seizures (H) 10/19/2016     Sleep apnea     Uses CPAP             Past Surgical History:     Past Surgical History:   Procedure Laterality Date     AMPUTATE TOE(S) Right 5/26/2017    Procedure: AMPUTATE TOE(S);  Right Great Toe  amputation, debriedment of 2 and 3rd toe soft tissu right foot. and application of Grafix;  Surgeon: Leah Santamaria MD;  Location: [facility] OR     ANESTHESIA CARDIOVERSION N/A 12/12/2017    Procedure: ANESTHESIA CARDIOVERSION;  Anesthesia Cardioversion ;  Surgeon: GENERIC ANESTHESIA PROVIDER;  Location: [facility] OR     BACK SURGERY       BYPASS GRAFT ARTERY CORONARY N/A 2/6/2017    Procedure: BYPASS GRAFT ARTERY CORONARY;  Surgeon: Mikhail Quiñones MD;  Location: [facility] OR     C APPENDECTOMY  1959     C CARDIAC SURG PROCEDURE UNLIST       C HAND/FINGER SURGERY UNLISTED       C STOMACH SURGERY PROCEDURE UNLISTED       HC SACROPLASTY       HC VASCULAR SURGERY PROCEDURE UNLIST       HYSTERECTOMY, PASTORA  1988     IRRIGATION AND DEBRIDEMENT FOOT, COMBINED Right 7/7/2017    Procedure: COMBINED IRRIGATION AND DEBRIDEMENT FOOT;  Irrigation and Debridement Right Foot with Graphix  *Latex Allergy*;  Surgeon: Leah Santamaria MD;  Location: [facility] OR     MAZE PROCEDURE N/A 2/6/2017    Procedure: MAZE PROCEDURE;  Surgeon: Mikhail Quiñones MD;  Location: [facility] OR     PICC INSERTION Left 02/25/2017    5fr TL Bard PICC, 47cm (3cm external) in the L basilic vein w/ tip in the SVC RA junction     TONSILLECTOMY  1942             Social History:     Social History     Social History     Marital status:      Spouse name: N/A     Number of children: N/A     Years of education: N/A     Occupational History     Not on file.     Social History Main Topics     Smoking status: Former Smoker     Packs/day: 1.00     Years: 10.00     Types: Cigarettes     Start date: 10/3/1958     Quit date: 7/4/1976     Smokeless tobacco: Never Used      Comment: Quit in 1976     Alcohol use No     Drug use: No     Sexual activity: Not Currently     Partners: Male     Birth control/ protection: Post-menopausal     Other Topics Concern     Parent/Sibling W/ Cabg, Mi Or Angioplasty Before 65f 55m? Yes     Social History Narrative             Family History:     Family  History   Problem Relation Age of Onset     DIABETES Mother      C.A.D. Mother      Hypertension Mother      CEREBROVASCULAR DISEASE Mother      Mini Strokes     Other Cancer Mother      Skin Cancer     Hyperlipidemia Mother      Coronary Artery Disease Mother      DIABETES Father      C.A.D. Father      Hypertension Father      Other Cancer Father      Hyperlipidemia Father      Coronary Artery Disease Father      HEART DISEASE Sister      Arthritis Sister      Other Cancer Other      Skin Cancer                Allergies:   .   Allergies   Allergen Reactions     Blood Transfusion Related (Informational Only) Other (See Comments)     Patient has a history of a clinically significant antibody against RBC antigens.  A delay in compatible RBCs may occur.Patient has a history of a significant allergic transfusion reaction.  Consult with Blood Bank MD before ordering components.     Heparin      HIT/ thrombocytopenia     Lovenox [Enoxaparin] Other (See Comments)     Thrombocytopenia      Oxycodone      hallucinations     Toprol Xl [Metoprolol] Nausea and Vomiting     Adhesive Tape Itching and Rash     Diapers & Supplies Rash     Developed yeast infection from previous hospital stay             Medications:     Current Facility-Administered Medications   Medication     glucagon 1 MG injection     DOPamine 400 mg in dextrose 5% 250 mL (adult std) - premix     atropine injection 0.5 mg     atropine 1 MG/10ML injection     glucagon 20 mg in dextrose 5% 100 mL infusion ADULT     piperacillin-tazobactam (ZOSYN) 3.375 g vial to attach to  mL bag     pantoprazole (PROTONIX) 40 mg IV push injection     acetylcysteine (ACETADOTE) 6,000 mg in sodium chloride 0.9 % 500 mL STEP THREE infusion     ALPRAZolam (XANAX) tablet 0.25 mg     atorvastatin (LIPITOR) tablet 40 mg     ferrous sulfate (IRON) tablet 325 mg     traZODone (DESYREL) half-tab 25 mg     venlafaxine (EFFEXOR-XR) 24 hr capsule 150 mg     naloxone (NARCAN) injection  "0.1-0.4 mg     melatonin tablet 1 mg     senna-docusate (SENOKOT-S;PERICOLACE) 8.6-50 MG per tablet 1 tablet    Or     senna-docusate (SENOKOT-S;PERICOLACE) 8.6-50 MG per tablet 2 tablet     levothyroxine (SYNTHROID/LEVOTHROID) half-tab 37.5 mcg           Vital signs:  Temp: 97.6  F (36.4  C) Temp src: Axillary BP: 92/59   Heart Rate: 52 Resp: 16 SpO2: 100 % O2 Device: Nasal cannula Oxygen Delivery: 6 LPM Height: 157.5 cm (5' 2\") Weight: 84.5 kg (186 lb 4.6 oz)  Estimated body mass index is 34.07 kg/(m^2) as calculated from the following:    Height as of this encounter: 1.575 m (5' 2\").    Weight as of this encounter: 84.5 kg (186 lb 4.6 oz).    Physical exam:  Resting comfortably  Detailed exam not performed           Data:   The labs and imaging were reviewed.      .  Recent Results (from the past 24 hour(s))   D dimer quantitative    Collection Time: 03/22/18 10:11 AM   Result Value Ref Range    D Dimer 4.8 (H) 0.0 - 0.50 ug/ml FEU   Haptoglobin    Collection Time: 03/22/18 10:11 AM   Result Value Ref Range    Haptoglobin 80 35 - 175 mg/dL   Fibrinogen activity    Collection Time: 03/22/18 10:11 AM   Result Value Ref Range    Fibrinogen 302 200 - 420 mg/dL   INR    Collection Time: 03/22/18 10:11 AM   Result Value Ref Range    INR 1.93 (H) 0.86 - 1.14   Hemoglobin    Collection Time: 03/22/18 10:49 AM   Result Value Ref Range    Hemoglobin 8.6 (L) 11.7 - 15.7 g/dL   EKG 12-lead, tracing only    Collection Time: 03/22/18 12:06 PM   Result Value Ref Range    Interpretation ECG Click View Image link to view waveform and result    Blood gas venous    Collection Time: 03/22/18  1:32 PM   Result Value Ref Range    Ph Venous 7.28 (L) 7.32 - 7.43 pH    PCO2 Venous 50 40 - 50 mm Hg    PO2 Venous 30 25 - 47 mm Hg    Bicarbonate Venous 24 21 - 28 mmol/L    Base Deficit Venous 3.0 mmol/L    FIO2 21    Ammonia    Collection Time: 03/22/18  1:32 PM   Result Value Ref Range    Ammonia 42 10 - 50 umol/L   Troponin I    " Collection Time: 03/22/18  1:32 PM   Result Value Ref Range    Troponin I ES 0.048 (H) 0.000 - 0.045 ug/L   Blood culture    Collection Time: 03/22/18  1:32 PM   Result Value Ref Range    Specimen Description Blood Left Arm     Culture Micro No growth after 4 hours    Hemoglobin    Collection Time: 03/22/18  1:32 PM   Result Value Ref Range    Hemoglobin 8.1 (L) 11.7 - 15.7 g/dL   Lactic acid whole blood    Collection Time: 03/22/18  1:32 PM   Result Value Ref Range    Lactic Acid 3.1 (H) 0.7 - 2.0 mmol/L   Blood culture    Collection Time: 03/22/18  1:40 PM   Result Value Ref Range    Specimen Description Blood Right Arm     Culture Micro No growth after 4 hours    Glucose by meter    Collection Time: 03/22/18  1:43 PM   Result Value Ref Range    Glucose 225 (H) 70 - 99 mg/dL   Hemoglobin    Collection Time: 03/22/18  4:18 PM   Result Value Ref Range    Hemoglobin 8.1 (L) 11.7 - 15.7 g/dL   INR    Collection Time: 03/22/18  8:02 PM   Result Value Ref Range    INR 1.75 (H) 0.86 - 1.14   Lactic acid whole blood    Collection Time: 03/22/18  8:02 PM   Result Value Ref Range    Lactic Acid Duplicate request 0.7 - 2.0 mmol/L   Lactic acid whole blood    Collection Time: 03/22/18  8:02 PM   Result Value Ref Range    Lactic Acid 2.6 (H) 0.7 - 2.0 mmol/L   Hemoglobin    Collection Time: 03/22/18 10:26 PM   Result Value Ref Range    Hemoglobin 8.3 (L) 11.7 - 15.7 g/dL   EKG 12-lead, tracing only    Collection Time: 03/23/18  2:28 AM   Result Value Ref Range    Interpretation ECG Click View Image link to view waveform and result    Glucose by meter    Collection Time: 03/23/18  2:39 AM   Result Value Ref Range    Glucose 148 (H) 70 - 99 mg/dL   Lactic acid whole blood    Collection Time: 03/23/18  2:50 AM   Result Value Ref Range    Lactic Acid 3.4 (H) 0.7 - 2.0 mmol/L   Troponin I    Collection Time: 03/23/18  2:51 AM   Result Value Ref Range    Troponin I ES 0.054 (H) 0.000 - 0.045 ug/L   Magnesium    Collection Time:  03/23/18  2:51 AM   Result Value Ref Range    Magnesium 2.1 1.6 - 2.3 mg/dL   Potassium    Collection Time: 03/23/18  2:51 AM   Result Value Ref Range    Potassium 4.6 3.4 - 5.3 mmol/L   Hemoglobin    Collection Time: 03/23/18  2:51 AM   Result Value Ref Range    Hemoglobin 8.5 (L) 11.7 - 15.7 g/dL   Blood gas arterial    Collection Time: 03/23/18  3:30 AM   Result Value Ref Range    pH Arterial 7.30 (L) 7.35 - 7.45 pH    pCO2 Arterial 46 (H) 35 - 45 mm Hg    pO2 Arterial 20 (LL) 80 - 105 mm Hg    Bicarbonate Arterial 23 21 - 28 mmol/L    Base Deficit Art 3.4 mmol/L    FIO2 100.0    Lactic acid whole blood    Collection Time: 03/23/18  3:30 AM   Result Value Ref Range    Lactic Acid 2.3 (H) 0.7 - 2.0 mmol/L   Blood gas arterial    Collection Time: 03/23/18  3:56 AM   Result Value Ref Range    pH Arterial 7.37 7.35 - 7.45 pH    pCO2 Arterial 35 35 - 45 mm Hg    pO2 Arterial 418 (H) 80 - 105 mm Hg    Bicarbonate Arterial 20 (L) 21 - 28 mmol/L    Base Deficit Art 4.6 mmol/L    FIO2 90.0    Comprehensive metabolic panel    Collection Time: 03/23/18  5:06 AM   Result Value Ref Range    Sodium 140 133 - 144 mmol/L    Potassium 4.8 3.4 - 5.3 mmol/L    Chloride 108 94 - 109 mmol/L    Carbon Dioxide 22 20 - 32 mmol/L    Anion Gap 10 3 - 14 mmol/L    Glucose 199 (H) 70 - 99 mg/dL    Urea Nitrogen 33 (H) 7 - 30 mg/dL    Creatinine 0.90 0.52 - 1.04 mg/dL    GFR Estimate 60 (L) >60 mL/min/1.7m2    GFR Estimate If Black 73 >60 mL/min/1.7m2    Calcium 8.2 (L) 8.5 - 10.1 mg/dL    Bilirubin Total 1.0 0.2 - 1.3 mg/dL    Albumin 2.9 (L) 3.4 - 5.0 g/dL    Protein Total 6.3 (L) 6.8 - 8.8 g/dL    Alkaline Phosphatase 91 40 - 150 U/L    ALT 67 (H) 0 - 50 U/L    AST 90 (H) 0 - 45 U/L   CBC with platelets    Collection Time: 03/23/18  5:06 AM   Result Value Ref Range    WBC 9.9 4.0 - 11.0 10e9/L    RBC Count 2.27 (L) 3.8 - 5.2 10e12/L    Hemoglobin 8.2 (L) 11.7 - 15.7 g/dL    Hematocrit 27.0 (L) 35.0 - 47.0 %     (H) 78 - 100 fl     MCH 36.1 (H) 26.5 - 33.0 pg    MCHC 30.4 (L) 31.5 - 36.5 g/dL    RDW 25.8 (H) 10.0 - 15.0 %    Platelet Count 210 150 - 450 10e9/L   INR    Collection Time: 03/23/18  5:06 AM   Result Value Ref Range    INR 1.85 (H) 0.86 - 1.14   Magnesium    Collection Time: 03/23/18  5:06 AM   Result Value Ref Range    Magnesium 2.1 1.6 - 2.3 mg/dL   Acetaminophen level    Collection Time: 03/23/18  5:06 AM   Result Value Ref Range    Acetaminophen Level <2 mg/L   Lactic acid whole blood    Collection Time: 03/23/18  5:06 AM   Result Value Ref Range    Lactic Acid 3.5 (H) 0.7 - 2.0 mmol/L   Lactic acid whole blood    Collection Time: 03/23/18  6:44 AM   Result Value Ref Range    Lactic Acid 3.6 (H) 0.7 - 2.0 mmol/L           Results for orders placed or performed during the hospital encounter of 03/20/18   XR Chest 2 Views    Narrative    EXAM: XR CHEST 2 VW  3/20/2018 10:29 PM      HISTORY: shortness of breath, weakness, ? PNA;     COMPARISON: 2/21/2018    FINDINGS: PA and lateral views of the chest obtained. Median  sternotomy wires. Implantable loop recorder. Left atrial appendage  exclusion device. The trachea is midline. The cardiac silhouette is  within normal limits. No pneumothorax or pleural effusion. Hazy left  retrocardiac opacities. Bilateral pulmonary vascular congestion.      Impression    IMPRESSION:   1. Hazy left basilar/retrocardiac airspace opacities, compatible with  atelectasis, aspiration, or infection.  2. Pulmonary vascular congestion.    I have personally reviewed the examination and initial interpretation  and I agree with the findings.    DUC SALGADO MD   CT Head w/o Contrast    Narrative    CT HEAD W/O CONTRAST 3/20/2018 11:43 PM    Provided History: weakness, arm twitching;     Comparison: CT head 2/21/2018.    Technique: Using multidetector thin collimation helical acquisition  technique, axial, coronal and sagittal CT images from the skull base  to the vertex were obtained without intravenous  contrast.     Findings:    No intracranial hemorrhage, mass effect, or midline shift. The  ventricles are proportionate to the cerebral sulci. Moderate global  cerebral volume loss. The gray to white matter differentiation of the  cerebral hemispheres is preserved. Patchy leukoaraiosis is unchanged.  The basal cisterns are patent.    The visualized paranasal sinuses are clear. The mastoid air cells are  clear.       Impression    Impression: No acute intracranial pathology.     I have personally reviewed the examination and initial interpretation  and I agree with the findings.    INO FOFANA MD   CT Abdomen Pelvis w/o Contrast    Narrative    EXAMINATION: CT ABDOMEN PELVIS W/O CONTRAST, 3/20/2018 11:39 PM    TECHNIQUE:  Helical CT images from the lung bases through the  symphysis pubis were obtained without IV contrast.     COMPARISON: Abdominal radiograph 2/6/2017, CT chest 3/20/2017    HISTORY: ? Retroperitoneal hemorrhage, AAA or other cause for back  pain, acute anemia, etc.;     FINDINGS:  LIVER: Tiny calcifications, likely calcified granulomata.    BILIARY: Within normal limits.    PANCREAS: Within normal limits.    SPLEEN: Tiny calcifications, likely calcified granulomata.    ADRENAL GLANDS: Within normal limits.    URINARY TRACT: Within normal limits.    REPRODUCTIVE ORGANS: Hysterectomy.    GASTROINTESTINAL TRACT: Normal caliber of the small and large bowel.    PERITONEUM/FLUID: Mild right upper quadrant perihepatic ascites and  low density mild-to moderate free fluid in the low pelvis. No walled  off fluid collections.    VESSELS: Normal caliber of the abdominal aorta. Moderate aortoiliac  atherosclerotic plaque.    LYMPH NODES: No enlarged lymph nodes.    LUNG BASES/LOW CHEST: Moderate atelectasis, including cicatricial  atelectasis in the lingula. Postsurgical changes of heart stenting.  Visualized thoracic aorta is of normal caliber.    BONES/SOFT TISSUES: Median sternotomy wires. Unchanged  multilevel  degenerative changes of the spine, greatest in the lumbar spine with  chronic slight leftward curvature with apex at L3, and mild leftward  subluxation of L2 and L3 and grade 1 retrolisthesis L3-4. There is  severe multilevel facet arthropathy in the lower lumbar spine.  Left-sided L4-5 and bilateral L5-S1 pars defects. Small soft tissue  stranding adjacent to the right iliac crest (series 5, image 340).      Impression    IMPRESSION:   1. No retroperitoneal, abdominal or pelvic hematoma.  2. Normal caliber of the abdominal aorta and visualized thoracic  aorta.  3. Mild right upper quadrant and pelvic low density free fluid of  unclear etiology, possibly related to fluid resuscitation.  4. Moderate-to-advanced degenerative spondylosis of the lumbar spine,  greatest at L3-4 and L4-5.  5. Small amount of focal soft tissue stranding lateral to the right  iliac crest, may represent a small ecchymosis.      I have personally reviewed the examination and initial interpretation  and I agree with the findings.    DUC SALGADO MD   XR Chest Port 1 View    Narrative    EXAM: XR CHEST PORT 1 VW  3/23/2018 2:43 AM      HISTORY: acute desat to 80s, now in Afib with HR in 50s;     COMPARISON: 3/20/2018    FINDINGS: Portable AP view of the chest obtained. Unchanged left  atrial appendage exclusion device and loop recorder. Median sternotomy  wires. Trachea is midline. The cardiac silhouette is within normal  limits. Small bilateral pleural effusions. No significant change in  the hazy left basilar opacities. No pneumothorax.       Impression    IMPRESSION:   1. Small bilateral pleural effusions.  2. Unchanged left basilar airspace opacities, compatible with  atelectasis, aspiration, or infection.    I have personally reviewed the examination and initial interpretation  and I agree with the findings.    MD Jonny HARE MD

## 2018-03-24 ENCOUNTER — APPOINTMENT (OUTPATIENT)
Dept: PHYSICAL THERAPY | Facility: CLINIC | Age: 78
DRG: 813 | End: 2018-03-24
Attending: INTERNAL MEDICINE
Payer: MEDICARE

## 2018-03-24 LAB
ABO + RH BLD: ABNORMAL
ABO + RH BLD: ABNORMAL
ALBUMIN SERPL-MCNC: 2.8 G/DL (ref 3.4–5)
ALBUMIN UR-MCNC: NEGATIVE MG/DL
ALP SERPL-CCNC: 77 U/L (ref 40–150)
ALT SERPL W P-5'-P-CCNC: 82 U/L (ref 0–50)
ANION GAP SERPL CALCULATED.3IONS-SCNC: 15 MMOL/L (ref 3–14)
APPEARANCE UR: ABNORMAL
AST SERPL W P-5'-P-CCNC: 100 U/L (ref 0–45)
BACTERIA #/AREA URNS HPF: ABNORMAL /HPF
BACTERIA SPEC CULT: NORMAL
BASE DEFICIT BLDV-SCNC: 2.8 MMOL/L
BILIRUB SERPL-MCNC: 1.4 MG/DL (ref 0.2–1.3)
BILIRUB UR QL STRIP: NEGATIVE
BLD GP AB INVEST PLASRBC-IMP: ABNORMAL
BLD GP AB SCN SERPL QL: ABNORMAL
BLD PROD TYP BPU: ABNORMAL
BLD PROD TYP BPU: NORMAL
BLD UNIT ID BPU: 0
BLOOD BANK CMNT PATIENT-IMP: ABNORMAL
BLOOD PRODUCT CODE: NORMAL
BPU ID: NORMAL
BUN SERPL-MCNC: 30 MG/DL (ref 7–30)
CALCIUM SERPL-MCNC: 8.6 MG/DL (ref 8.5–10.1)
CHLORIDE SERPL-SCNC: 105 MMOL/L (ref 94–109)
CO2 SERPL-SCNC: 19 MMOL/L (ref 20–32)
COLOR UR AUTO: YELLOW
CREAT SERPL-MCNC: 1.09 MG/DL (ref 0.52–1.04)
ERYTHROCYTE [DISTWIDTH] IN BLOOD BY AUTOMATED COUNT: 26 % (ref 10–15)
GFR SERPL CREATININE-BSD FRML MDRD: 49 ML/MIN/1.7M2
GLUCOSE BLDC GLUCOMTR-MCNC: 116 MG/DL (ref 70–99)
GLUCOSE BLDC GLUCOMTR-MCNC: 120 MG/DL (ref 70–99)
GLUCOSE BLDC GLUCOMTR-MCNC: 192 MG/DL (ref 70–99)
GLUCOSE BLDC GLUCOMTR-MCNC: 210 MG/DL (ref 70–99)
GLUCOSE BLDC GLUCOMTR-MCNC: 92 MG/DL (ref 70–99)
GLUCOSE SERPL-MCNC: 147 MG/DL (ref 70–99)
GLUCOSE UR STRIP-MCNC: NEGATIVE MG/DL
HCO3 BLDV-SCNC: 24 MMOL/L (ref 21–28)
HCT VFR BLD AUTO: 24.8 % (ref 35–47)
HGB BLD-MCNC: 8 G/DL (ref 11.7–15.7)
HGB UR QL STRIP: NEGATIVE
INR PPP: 1.74 (ref 0.86–1.14)
KETONES UR STRIP-MCNC: 10 MG/DL
LACTATE BLD-SCNC: 2 MMOL/L (ref 0.7–2)
LEUKOCYTE ESTERASE UR QL STRIP: ABNORMAL
MAGNESIUM SERPL-MCNC: 2 MG/DL (ref 1.6–2.3)
MCH RBC QN AUTO: 38.5 PG (ref 26.5–33)
MCHC RBC AUTO-ENTMCNC: 32.3 G/DL (ref 31.5–36.5)
MCV RBC AUTO: 119 FL (ref 78–100)
NITRATE UR QL: NEGATIVE
NUM BPU REQUESTED: 2
O2/TOTAL GAS SETTING VFR VENT: 21 %
OXYHGB MFR BLDV: 17 %
PCO2 BLDV: 49 MM HG (ref 40–50)
PH BLDV: 7.3 PH (ref 7.32–7.43)
PH UR STRIP: 5.5 PH (ref 5–7)
PLATELET # BLD AUTO: 178 10E9/L (ref 150–450)
PO2 BLDV: 17 MM HG (ref 25–47)
POTASSIUM SERPL-SCNC: 4.6 MMOL/L (ref 3.4–5.3)
PROT SERPL-MCNC: 6.1 G/DL (ref 6.8–8.8)
RBC # BLD AUTO: 2.08 10E12/L (ref 3.8–5.2)
RBC #/AREA URNS AUTO: 4 /HPF (ref 0–2)
SODIUM SERPL-SCNC: 139 MMOL/L (ref 133–144)
SOURCE: ABNORMAL
SP GR UR STRIP: 1.02 (ref 1–1.03)
SPECIMEN EXP DATE BLD: ABNORMAL
SPECIMEN SOURCE: NORMAL
SPECIMEN SOURCE: NORMAL
SQUAMOUS #/AREA URNS AUTO: 4 /HPF (ref 0–1)
TRANS CELLS #/AREA URNS HPF: <1 /HPF (ref 0–1)
TRANSFUSION STATUS PATIENT QL: NORMAL
TRANSFUSION STATUS PATIENT QL: NORMAL
URATE CRY #/AREA URNS HPF: ABNORMAL /HPF
UROBILINOGEN UR STRIP-MCNC: NORMAL MG/DL (ref 0–2)
WBC # BLD AUTO: 7.3 10E9/L (ref 4–11)
WBC #/AREA URNS AUTO: 4 /HPF (ref 0–5)
YEAST #/AREA URNS HPF: ABNORMAL /HPF

## 2018-03-24 PROCEDURE — 25000125 ZZHC RX 250: Performed by: HOSPITALIST

## 2018-03-24 PROCEDURE — 85027 COMPLETE CBC AUTOMATED: CPT | Performed by: STUDENT IN AN ORGANIZED HEALTH CARE EDUCATION/TRAINING PROGRAM

## 2018-03-24 PROCEDURE — A9270 NON-COVERED ITEM OR SERVICE: HCPCS | Mod: GY | Performed by: STUDENT IN AN ORGANIZED HEALTH CARE EDUCATION/TRAINING PROGRAM

## 2018-03-24 PROCEDURE — 97530 THERAPEUTIC ACTIVITIES: CPT | Mod: GP | Performed by: PHYSICAL THERAPIST

## 2018-03-24 PROCEDURE — 97162 PT EVAL MOD COMPLEX 30 MIN: CPT | Mod: GP | Performed by: PHYSICAL THERAPIST

## 2018-03-24 PROCEDURE — 99233 SBSQ HOSP IP/OBS HIGH 50: CPT | Mod: GC | Performed by: HOSPITALIST

## 2018-03-24 PROCEDURE — 83735 ASSAY OF MAGNESIUM: CPT | Performed by: STUDENT IN AN ORGANIZED HEALTH CARE EDUCATION/TRAINING PROGRAM

## 2018-03-24 PROCEDURE — 87086 URINE CULTURE/COLONY COUNT: CPT | Performed by: STUDENT IN AN ORGANIZED HEALTH CARE EDUCATION/TRAINING PROGRAM

## 2018-03-24 PROCEDURE — 80053 COMPREHEN METABOLIC PANEL: CPT | Performed by: STUDENT IN AN ORGANIZED HEALTH CARE EDUCATION/TRAINING PROGRAM

## 2018-03-24 PROCEDURE — 00000146 ZZHCL STATISTIC GLUCOSE BY METER IP

## 2018-03-24 PROCEDURE — 25000132 ZZH RX MED GY IP 250 OP 250 PS 637: Mod: GY | Performed by: STUDENT IN AN ORGANIZED HEALTH CARE EDUCATION/TRAINING PROGRAM

## 2018-03-24 PROCEDURE — 83605 ASSAY OF LACTIC ACID: CPT | Performed by: STUDENT IN AN ORGANIZED HEALTH CARE EDUCATION/TRAINING PROGRAM

## 2018-03-24 PROCEDURE — 12000006 ZZH R&B IMCU INTERMEDIATE UMMC

## 2018-03-24 PROCEDURE — 40000193 ZZH STATISTIC PT WARD VISIT: Performed by: PHYSICAL THERAPIST

## 2018-03-24 PROCEDURE — 36415 COLL VENOUS BLD VENIPUNCTURE: CPT | Performed by: STUDENT IN AN ORGANIZED HEALTH CARE EDUCATION/TRAINING PROGRAM

## 2018-03-24 PROCEDURE — 82805 BLOOD GASES W/O2 SATURATION: CPT | Performed by: MARRIAGE & FAMILY THERAPIST

## 2018-03-24 PROCEDURE — 40000556 ZZH STATISTIC PERIPHERAL IV START W US GUIDANCE

## 2018-03-24 PROCEDURE — 85610 PROTHROMBIN TIME: CPT | Performed by: STUDENT IN AN ORGANIZED HEALTH CARE EDUCATION/TRAINING PROGRAM

## 2018-03-24 PROCEDURE — 83921 ORGANIC ACID SINGLE QUANT: CPT | Performed by: INTERNAL MEDICINE

## 2018-03-24 PROCEDURE — 25000132 ZZH RX MED GY IP 250 OP 250 PS 637: Mod: GY | Performed by: MARRIAGE & FAMILY THERAPIST

## 2018-03-24 PROCEDURE — 83090 ASSAY OF HOMOCYSTEINE: CPT | Performed by: INTERNAL MEDICINE

## 2018-03-24 PROCEDURE — 81001 URINALYSIS AUTO W/SCOPE: CPT | Performed by: STUDENT IN AN ORGANIZED HEALTH CARE EDUCATION/TRAINING PROGRAM

## 2018-03-24 PROCEDURE — 99221 1ST HOSP IP/OBS SF/LOW 40: CPT | Mod: GC | Performed by: INTERNAL MEDICINE

## 2018-03-24 PROCEDURE — 97116 GAIT TRAINING THERAPY: CPT | Mod: GP | Performed by: PHYSICAL THERAPIST

## 2018-03-24 PROCEDURE — 25000128 H RX IP 250 OP 636: Performed by: STUDENT IN AN ORGANIZED HEALTH CARE EDUCATION/TRAINING PROGRAM

## 2018-03-24 RX ORDER — ATORVASTATIN CALCIUM 40 MG/1
40 TABLET, FILM COATED ORAL
Status: DISCONTINUED | OUTPATIENT
Start: 2018-03-24 | End: 2018-03-24

## 2018-03-24 RX ORDER — ATORVASTATIN CALCIUM 40 MG/1
40 TABLET, FILM COATED ORAL AT BEDTIME
Status: DISCONTINUED | OUTPATIENT
Start: 2018-03-24 | End: 2018-04-04 | Stop reason: HOSPADM

## 2018-03-24 RX ORDER — LIDOCAINE 4 G/G
1 PATCH TOPICAL
Status: DISCONTINUED | OUTPATIENT
Start: 2018-03-25 | End: 2018-04-04 | Stop reason: HOSPADM

## 2018-03-24 RX ADMIN — PIPERACILLIN SODIUM AND TAZOBACTAM SODIUM 3.38 G: 3; .375 INJECTION, POWDER, LYOPHILIZED, FOR SOLUTION INTRAVENOUS at 22:42

## 2018-03-24 RX ADMIN — VENLAFAXINE HYDROCHLORIDE 150 MG: 150 CAPSULE, EXTENDED RELEASE ORAL at 08:43

## 2018-03-24 RX ADMIN — Medication 37.5 MCG: at 08:42

## 2018-03-24 RX ADMIN — PANTOPRAZOLE SODIUM 40 MG: 40 INJECTION, POWDER, FOR SOLUTION INTRAVENOUS at 08:44

## 2018-03-24 RX ADMIN — ATORVASTATIN CALCIUM 40 MG: 40 TABLET, FILM COATED ORAL at 22:41

## 2018-03-24 RX ADMIN — FERROUS SULFATE 325 MG: 325 TABLET, FILM COATED ORAL at 13:32

## 2018-03-24 RX ADMIN — PIPERACILLIN SODIUM AND TAZOBACTAM SODIUM 3.38 G: 3; .375 INJECTION, POWDER, LYOPHILIZED, FOR SOLUTION INTRAVENOUS at 16:15

## 2018-03-24 RX ADMIN — PIPERACILLIN SODIUM AND TAZOBACTAM SODIUM 3.38 G: 3; .375 INJECTION, POWDER, LYOPHILIZED, FOR SOLUTION INTRAVENOUS at 10:18

## 2018-03-24 RX ADMIN — PANTOPRAZOLE SODIUM 40 MG: 40 INJECTION, POWDER, FOR SOLUTION INTRAVENOUS at 20:23

## 2018-03-24 RX ADMIN — ACETYLCYSTEINE 6.25 MG/KG/HR: 200 INJECTION, SOLUTION INTRAVENOUS at 22:41

## 2018-03-24 RX ADMIN — INSULIN ASPART 0.5 UNITS: 100 INJECTION, SOLUTION INTRAVENOUS; SUBCUTANEOUS at 17:24

## 2018-03-24 RX ADMIN — ACETYLCYSTEINE 6.25 MG/KG/HR: 200 INJECTION, SOLUTION INTRAVENOUS at 11:07

## 2018-03-24 RX ADMIN — PIPERACILLIN SODIUM AND TAZOBACTAM SODIUM 3.38 G: 3; .375 INJECTION, POWDER, LYOPHILIZED, FOR SOLUTION INTRAVENOUS at 04:25

## 2018-03-24 ASSESSMENT — ACTIVITIES OF DAILY LIVING (ADL)
ADLS_ACUITY_SCORE: 12
ADLS_ACUITY_SCORE: 12
ADLS_ACUITY_SCORE: 15
ADLS_ACUITY_SCORE: 12
ADLS_ACUITY_SCORE: 15
ADLS_ACUITY_SCORE: 15

## 2018-03-24 NOTE — PROGRESS NOTES
[facility], [place]    Internal Medicine Progress Note - [facility] Service    Main Plans for Today    -TTE per EP today  -EP consult placed recs appreciated   -Per GI continue NAC with liver enzymes trending up   -recheck APAP level   -hemolysis labs unconvincing for hemolysis  -trending hgb q8hr   -q two hour lactate checks   -continue zosyn   -Hepatitis labs (hep b surface antigen, hep b core antibody, hep c antibody, hep b surface), AMPARO, F actin pending   -plan to straight cath once post void to assess for retention     Assessment & Plan   Carlos Alberto Fenton is a 77 year old female with PMH of HTN, HLD, DM, CVA (11/2016), chronic diastolic HF (last ECHO 12/12/17 EF 45-50%), CAD (s/p 3v CABG: LIMA-LAD, SVG-D1, SVG-dRCA and modified MAZE, ABDIEL ligation - 2/2016), paroxysmal AFib ([id number] - 8 on Xarelto), HIT, RUE DVT, recently diagnosed hypothyroidism, admitted 3/21/2018 for acute GIB, coagulopathy and concern for acute LI 2/2 acetaminophen toxicity with xarelto intake.    Pt does not have suicidal ideation, even though she has acetaminophen toxicity.     # Coagulopathy, INR to 5, elevated PT and PTT  # Concern for tylenol toxicity (elevated acetaminophen levels)  Pt states she was taking 3-4g tylenol per day for the past month for chronic back pain. Last dose was two days ago  Pt previously on warfarin for Afib. She started xarelto as of Jan 2018. She states she was not taking warfarin and xarelto simultaneously. It is possible that her coagulopathy is occurring in the setting of xarelto, tylenol, and acute liver failure. Her LFTs were WNL on admission but have continued to trend up.  Last INR 3/24 was 1.74. Thinking not likely caused by tylenol, looking into other source for increased liver enzymes.     We are continuing her NAC infusion per toxicology and GI. Toxicology does not believe her LFTs are reliable and they increased slightly in the last 24 hours.     (Bili elevated 1.2 above  0.8, ALT elevated 50 above 31, and AST elevated 73 above 43).     -- trend INR  -- GI consult as above: get anemia/hemolysis w/u; and monitor for signs bleed  --Per GI ordered hepatitis labs (hep b surface antigen, hep b core antibody, hep c antibody, hep b surface), AMPARO, F actin, mitochondrial M2 antibody   --Per GI continue NAC with up trending liver enzymes      #Afib w/ Bradycardia  Likely due to beta blockers, got a total of 62.5mg of beta blockers yesterday due to afib. Has had increased lactate levels last one 3/23 was 2.4.   -TTE planned for this afternoon per EP to assess for EF  -q2 hr lactate checks  -DCed beta blockers  -Per cardiology use glucagon, atropine and dopamine PRN  -EP consulted, recs appreciated  -continue zosyn for possible infection confounding clinical picture, if decompensates add vancomycin      # Acute blood loss anemia (baseline 11-12 1 month prior, current hgb ~7)  Differential includes acute GI bleed (most likely), vs hemolysis, vs other reversible cause of anemia with rapid onset, given recent baseline one month ago showed hgb 11-12. Reticulocyte count is elevated to 8.1 indicating robust bone marrow response to anemia and possibly explains the increased MCV (119). Fe levels are normal (96), however initially Fe binding capacity was increased to 443 and Fe saturation index was decreased to 12. Both of these values have normalized during her admission. Her lactate dehydrogenase was elevated to 436       Plan:  -- GI consult, appreciate recs  -- trend Hgb q6h, transfuse <7  -- continue IV PPI BID   -- hold AC (home ASA 81 and xarelto)  --No procedures today until she stabilizes***, low fat/Na diet    # HARINI on CKD  Cr at bsl ~1.1-4. On presentation 1.8. UA with hyaline casts, tachy, hypotensive. Likely prerenal in the setting of GIB and decreased PO intake/emesis  -- trend BMP  -- mIVF as below  --plan for straight cath post void to assess for urinary retention        #  Hypothyroidism  Recently diagnosed. On Levothyroxine. Last TSH 2/21 elevated to 88, T4 improved from 0.29 to 0.44.   -- increase levothyroxine to 37.5 from PTA dose of 25 mcg      # Tropinemia, likely 2/2 demand ischemia 2/2 anemia  EKG did not show ST elevation, ST segment depression or T wave inversions. Pt is not having CP. Likely demand ischemia.      Chronic problems  # HFpEF  # CAD  # HLD  ECHO shows EF 45-50%.   Plan:  --holding metolazone, spironolactone, and lasix  --cont atorvastatin   --holding ASA    # Depression  -- continue PTA venlafaxine    # Paroxysmal A fib  # [id number]  Pt prev on warfarin, she was transitioned to xarelto in Jan 2018 because she likes leafy greens.  --hold xarelto        # Pain Assessment:   Current Pain Score 3/24/2018 3/23/2018 3/23/2018   Patient currently in pain? no no no   Pain score (0-10) - - -   Pain location - - -   Pain descriptors - - -   CPOT pain score - - -   Asenath s pain level was assessed and she currently denies pain.      Diet: Low Saturated Fat Na <2400 mg  Fluids: LR at 100 cc/hr  DVT Prophylaxis: mechanical  Code Status: Full Code    Disposition Plan   Expected discharge: 2 - 3 days, recommended to prior living arrangement once treatment completed..     Entered: Pallavi Ruiz 03/24/2018, 7:59 AM   Information in the above section will display in the discharge planner report.      The patient's care was discussed with the Attending Physician, Dr. Hopson and Patient.    Pallavi Ruiz, MS3  [facility]  [place]: 1      Interval History   Pt is having mild back pain and shoulder pain. She feels weak and unwell. Otherwise, no f/c, cp, or sob. No blood in stool.      Overnight transitioned to symptomatic [place] with blood pressures to 93/56. Her beta blockers were stopped and cardiology was consulted. She was given 5mg glucagon. Her LA was at 2.2 but she was not further bolused with fluids due to effusions seen on CXR and appearing  to be fluid overloaded.    Physical Exam   Vital Signs: Temp: 97.4  F (36.3  C) Temp src: Axillary BP: 116/61   Heart Rate: 61 Resp: 18 SpO2: 92 % O2 Device: Nasal cannula Oxygen Delivery: 2.5 LPM  Weight: 193 lbs 3.2 oz  Gen:  Patient lying in recliner, appears tired   HEENT: No scleral icterus, Moist mucous membranes. No nasal discharge.  CV: Normal rate, regular rhythm. S1/S2 normal.  No murmurs, rubs or gallops   Resp: Clear to auscultation bilaterally. Without wheeze or crackles  Abdomen: Soft, non tender abdomen, normal active bowel sounds,   Extremities: Peripheral edema bilaterally 1+  Skin: ecchymosis on upper lateral quadrant of buttock, several small ecchymosis on b/l upper extremities, multicolored; gangrenous toes bilaterally R worse than L  Oriented to - conversation no deficits noted  Psychiatric:ascribes to her health making her feel down but denies self harm or suicidal ideation   Back: no expansion of hematoma         Data   Medications     Injection Device for insulin       acetylcysteine (ACETADOTE) infusion *third dose - elevated AST* 6.25 mg/kg/hr (03/23/18 2347)       atropine  0.5 mg Intravenous Once     piperacillin-tazobactam  3.375 g Intravenous Q6H     insulin aspart  0.5-2.5 Units Subcutaneous TID AC     insulin aspart  0.5-2.5 Units Subcutaneous At Bedtime     lidocaine   Transdermal Q24h     lidocaine   Transdermal Q8H     venlafaxine  150 mg Oral Daily with breakfast     pantoprazole (PROTONIX) IV  40 mg Intravenous BID     atorvastatin  40 mg Oral Daily     ferrous sulfate  325 mg Oral Daily with breakfast     levothyroxine  37.5 mcg Oral QAM AC     Data     Recent Labs  Lab 03/24/18  0444 03/23/18  2241 03/23/18  1655  03/23/18  0506 03/23/18  0251  03/22/18  2002  03/22/18  1332  03/22/18  0603  03/20/18  2203   WBC 7.3  --   --   --  9.9  --   --   --   --   --   --  8.9  < > 10.3   HGB 8.0* 8.0* 8.3*  < > 8.2* 8.5*  < >  --   < > 8.1*  < > 8.9*  < > 7.2*   *  --   --   --   119*  --   --   --   --   --   --  116*  < > 119*     --   --   --  210  --   --   --   --   --   --  256  < > 282   INR 1.74*  --   --   --  1.85*  --   --  1.75*  --   --   < >  --   < > 5.05*     --   --   --  140  --   --   --   --   --   --  141  < > 132*   POTASSIUM 4.6  --   --   --  4.8 4.6  --   --   --   --   --  3.4  < > 4.5   CHLORIDE 105  --   --   --  108  --   --   --   --   --   --  106  < > 95   CO2 19*  --   --   --  22  --   --   --   --   --   --  21  < > 27   BUN 30  --   --   --  33*  --   --   --   --   --   --  58*  < > 88*   CR 1.09*  --   --   --  0.90  --   --   --   --   --   --  1.04  < > 1.78*   ANIONGAP 15*  --   --   --  10  --   --   --   --   --   --  15*  < > 10   CARLY 8.6  --   --   --  8.2*  --   --   --   --   --   --  8.4*  < > 8.7   *  --   --   --  199*  --   --   --   --   --   --  106*  < > 109*   ALBUMIN 2.8*  --   --   --  2.9*  --   --   --   --   --   --  3.0*  < > 3.4   PROTTOTAL 6.1*  --   --   --  6.3*  --   --   --   --   --   --  6.6*  < > 7.1   BILITOTAL 1.4*  --   --   --  1.0  --   --   --   --   --   --  1.2  < > 0.7   ALKPHOS 77  --   --   --  91  --   --   --   --   --   --  71  < > 84   ALT 82*  --   --   --  67*  --   --   --   --   --   --  50  < > 27   *  --   --   --  90*  --   --   --   --   --   --  73*  < > 45   LIPASE  --   --   --   --   --   --   --   --   --   --   --   --   --  483*   TROPI  --   --   --   --   --  0.054*  --   --   --  0.048*  --   --   --  0.049*   < > = values in this interval not displayed.  Recent Results (from the past 24 hour(s))   US Abdominal Doppler Complete    Narrative    EXAMINATION: Abdominal Doppler examination, 3/23/2018 11:49 AM     COMPARISON: CT of the abdomen dated 3/20/2018.    HISTORY: Concern for portal venous clot.    TECHNIQUE:   Limited ultrasound evaluation of the right upper quadrant with Doppler  evaluation for portal venous clot. Grayscale imaging was  not  performed.    Findings:    Splenic vein was not visualized.    Main portal vein is patent with bidirectional blood flow. Peak  systolic velocity measuring 38 cm/s antegrade, 34 cm/s retrograde.  Right portal vein is patent with bidirectional blood flow. Peak  systolic velocity measuring 40 cm/s antegrade, 36 cm/s retrograde.  Left portal vein is patent with bidirectional blood flow. Peak  systolic velocity measuring 36 cm/s antegrade, 27 cm/s retrograde.    The left hepatic, middle hepatic and right hepatic veins are patent  with antegrade blood flow.  Left hepatic vein peak systolic velocity 70 cm/s.  Mid hepatic vein peak systolic velocity 58 cm/s.  Right hepatic vein peak systolic velocity 60 cm/s.    The IVC is patent with antegrade blood flow.  Peak systolic velocity 200 cm/s.    The left, right, and proper hepatic arteries are patent with antegrade  blood flow and normal low resistance arterial waveforms.  Proper hepatic artery peak systolic velocity 77 cm/s. Resistive index  0.74.  Right hepatic artery peak systolic velocity 45 cm/s. Resistive index  0.74.  Left hepatic artery peak systolic velocity 47 cm/s. Resistive index  0.70.      Impression    Impression:   1. Limited Doppler ultrasound of the right upper quadrant with  bidirectional flow in the portal veins, likely secondary to portal  hypertension.  2. No convincing portal vein thrombus is seen.  3. Splenic vein not well visualized.  4. Remainder of the Doppler examination was patent and normal.    I have personally reviewed the examination and initial interpretation  and I agree with the findings.    RUBEN DAY MD

## 2018-03-24 NOTE — PLAN OF CARE
Problem: Patient Care Overview  Goal: Plan of Care/Patient Progress Review  PT 6B: Evaluation completed, treatment initiated    Discharge Planner PT   Patient plan for discharge: home vs TCU  Current status: transferred OOB with SBA.  Ambulation limited to 20' with 2 hands on IV due to fatigue and SOB.  Unable to accurately assess SpO2 with activity due to poor wave form.  Barriers to return to prior living situation: assistance level needed for safe mobility  Recommendations for discharge: currently TCU, however pending length of acute stay may be appropriate for discharge to home setting with home PT  Rationale for recommendations: limited tolerance to activity       Entered by: Uvaldo Morgan 03/24/2018 12:39 PM

## 2018-03-24 NOTE — PROGRESS NOTES
[facility], [place]    Internal Medicine Progress Note - [facility] Service    Main Plans for Today   -NAC treatment continues per poison control and GI  --per GI checking hepatitis panels, checking AMPARO, anti mitochondrial, and F actin on 3/23  --per heme/onc consult: checking homocysteine, methylmalonic acid and mixing studies pending  --EKG shows junctional bradycardia at times and afib with RVR at times  --Per EP note, avoid beta blockage, avoid QTc prolonging meds  -giving LR O/N at 100 cc/hr for poor urine output  -given 5 mg vit K PO; will recheck INR and give additional 5 mg PO vit K if INR not less than 3      Assessment & Plan   Carlos Alberto Fenton is a 77 year old female with PMH of HTN, HLD, DM, CVA (11/2016), chronic diastolic HF (last ECHO 12/12/17 EF 45-50%), CAD (s/p 3v CABG: LIMA-LAD, SVG-D1, SVG-dRCA and modified MAZE, ABDIEL ligation - 2/2016), paroxysmal AFib ([id number] - 8 on Xarelto), HIT, RUE DVT, recently diagnosed hypothyroidism, admitted 3/21/2018 for acute GIB, coagulopathy and concern for acute LI 2/2 acetaminophen toxicity with xarelto intake.    Pt does not have suicidal ideation, even though she has acetaminophen toxicity.     # Coagulopathy, INR to 5, elevated PT and PTT  # Concern for tylenol toxicity (elevated acetaminophen levels)  Pt states she was taking 3-4g tylenol per day for the past month for chronic back pain. Last dose was two days ago  Pt previously on warfarin for Afib. She started xarelto as of Jan 2018. She states she was not taking warfarin and xarelto simultaneously. It is possible that her coagulopathy is occurring in the setting of xarelto, tylenol, and acute liver failure. Her LFTs are WNL.     We are continuing her NAC infusion per toxicology and GI. Toxicology does not believe her LFTs are not reliable and they increased slightly in the last 24 hours.     3/22:(Bili elevated 1.2 above 0.8, ALT elevated 50 above 31, and AST elevated 73 above 43).    3/23: ALT 67 up from 50, AST 90 up from 73, 91 up from 71, bili 1.0 down from 1.2, direct bili 0.6 up from 0.4)    -- trend INR  -- GI consult as above: will place on full liquid diet; get anemia/hemolysis w/u; and monitor for signs bleed  --cont NAC treatment  --recheck INR 3/22 PM; if not less than 3 give additional vit K 5 mg  --per GI checking hepatitis panels, checking AMPARO, anti mitochondrial, and F actin on 3/23  --per heme/onc consult: checking homocysteine, methylmalonic acid and mixing studies pending    # Low urine output  --likely dehydration, giving  cc/hr  --UA pending  --bladder scan shows 25 cc residual post-void, may consider kidney U/S in AM    # Junctional Bradycardia  # Afib with RVR  # Prolonged QTc  - EP consult appreciate recs  - hold beta blockade  - D/C trazodone as prolongs QTc        # Acute blood loss anemia (baseline 11-12 1 month prior, current hgb ~7)  Differential includes acute GI bleed (most likely), vs hemolysis, vs other reversible cause of anemia with rapid onset, given recent baseline one month ago showed hgb 11-12.  Plan:  -- GI consult, appreciate recs  -- trend Hgb q6h, transfuse <7  -- continue IV PPI BID   -- hold AC (home ASA 81 and xarelto)  --folate, B12, transferrin, iron, haptoglobin, reticulocyte count, blood smear pending          # HARINI on CKD  Cr at bsl ~1.1-4. On presentation 1.8. UA with hyaline casts, tachy, hypotensive. Likely prerenal in the setting of GIB and decreased PO intake/emesis  -- trend BMP  -- mIVF as below      # Hypothyroidism  Recently diagnosed. On Levothyroxine. Last TSH 2/21 elevated to 88, T4 improved from 0.29 to 0.44.   -- increase levothyroxine to 37.5 from PTA dose of 25 mcg      # Tropinemia, likely 2/2 demand ischemia 2/2 anemia  EKG did not show ST elevation, ST segment depression or T wave inversions. Pt is not having CP. Likely demand ischemia.    # [place]  -holding trazodone in lieu of prolonged QTc      Chronic problems  #  HFpEF  # CAD  # HLD  ECHO shows EF 45-50%.   Plan:  --holding metolazone, spironolactone, and lasix  --cont atorvastatin   --holding ASA    # Depression  -- continue PTA venlafaxine    # Paroxysmal A fib  # [id number]  Pt prev on warfarin, she was transitioned to xarelto in Jan 2018 because she likes leafy greens.  --hold xarelto        # Pain Assessment:   Current Pain Score 3/23/2018 3/23/2018 3/23/2018   Patient currently in pain? no yes yes   Pain score (0-10) - - -   Pain location - Back Back   Pain descriptors - Sore Cramping;Sore   CPOT pain score - - -   Asenath s pain level was assessed and she currently denies pain.      Diet: Low Saturated Fat Na <2400 mg  Fluids: LR at 100 cc/hr  DVT Prophylaxis: mechanical  Code Status: Full Code    Disposition Plan   Expected discharge: 2 - 3 days, recommended to prior living arrangement once treatment completed..     Entered: Ayaka Mclaughlin 03/23/2018, 8:54 PM   Information in the above section will display in the discharge planner report.      The patient's care was discussed with the Attending Physician, Dr. Hopson and Patient.    Ayaka Mclaughlin  [facility]: 1  Pager: [phone]  Please see sticky note for cross cover information    Interval History   Pt is having mild back pain and shoulder pain. She feels weak. Otherwise, no f/c, cp, or sob. No blood in stool.     Physical Exam   Vital Signs: Temp: 97.6  F (36.4  C) Temp src: Oral BP: 124/68   Heart Rate: 122 Resp: 20 SpO2: 97 % O2 Device: Nasal cannula Oxygen Delivery: 4 LPM  Weight: 186 lbs 4.62 oz  Gen: In no acute distress. Patient comfortably lying in bed  HEENT: No scleral icterus, Moist mucous membranes. No nasal discharge.  CV: Normal rate, regular rhythm. S1/S2 normal.  No murmurs, rubs or gallops; JVP elevated to mid-neck at 45 degrees   Resp: faint and few crackles in the bases; no wheezes  Abdomen: Soft, non tender abdomen, normal active bowel  sounds,  Extremities: Peripheral edema bilaterally 1+  Skin: ecchymosis on upper lateral quadrant of buttock, several small ecchymosis on b/l upper extremities, multicolored; gangrenous toes bilaterally R worse than L--have not expanded beyond lines  Back: no expansion of hematoma   Neuro: no gross deficits        Data   Medications     Injection Device for insulin       acetylcysteine (ACETADOTE) infusion *third dose - elevated AST* 6.25 mg/kg/hr (03/23/18 1952)       atropine  0.5 mg Intravenous Once     piperacillin-tazobactam  3.375 g Intravenous Q6H     insulin aspart  0.5-2.5 Units Subcutaneous TID AC     insulin aspart  0.5-2.5 Units Subcutaneous At Bedtime     lidocaine  1 patch Transdermal Once     lidocaine   Transdermal Q24h     lidocaine   Transdermal Q8H     [START ON 3/24/2018] venlafaxine  150 mg Oral Daily with breakfast     lactated ringers  1,000 mL Intravenous Once     pantoprazole (PROTONIX) IV  40 mg Intravenous BID     atorvastatin  40 mg Oral Daily     ferrous sulfate  325 mg Oral Daily with breakfast     levothyroxine  37.5 mcg Oral QAM AC     Data     Recent Labs  Lab 03/23/18  1655 03/23/18  0958 03/23/18  0506 03/23/18  0251  03/22/18  2002  03/22/18  1332  03/22/18  1011 03/22/18  0603  03/21/18  1133 03/21/18  0640 03/20/18  2203   WBC  --   --  9.9  --   --   --   --   --   --   --  8.9  --  12.0*  --  10.3   HGB 8.3* 8.4* 8.2* 8.5*  < >  --   < > 8.1*  < >  --  8.9*  < > 8.4*  --  7.2*   MCV  --   --  119*  --   --   --   --   --   --   --  116*  --  114*  --  119*   PLT  --   --  210  --   --   --   --   --   --   --  256  --  256  --  282   INR  --   --  1.85*  --   --  1.75*  --   --   --  1.93*  --   --  4.15*  --  5.05*   NA  --   --  140  --   --   --   --   --   --   --  141  --   --  138 132*   POTASSIUM  --   --  4.8 4.6  --   --   --   --   --   --  3.4  --   --  4.0 4.5   CHLORIDE  --   --  108  --   --   --   --   --   --   --  106  --   --  99 95   CO2  --   --  22  --    --   --   --   --   --   --  21  --   --  25 27   BUN  --   --  33*  --   --   --   --   --   --   --  58*  --   --  79* 88*   CR  --   --  0.90  --   --   --   --   --   --   --  1.04  --   --  1.38* 1.78*   ANIONGAP  --   --  10  --   --   --   --   --   --   --  15*  --   --  15* 10   CARLY  --   --  8.2*  --   --   --   --   --   --   --  8.4*  --   --  8.8 8.7   GLC  --   --  199*  --   --   --   --   --   --   --  106*  --   --  118* 109*   ALBUMIN  --   --  2.9*  --   --   --   --   --   --   --  3.0*  --   --  3.2* 3.4   PROTTOTAL  --   --  6.3*  --   --   --   --   --   --   --  6.6*  --   --  6.9 7.1   BILITOTAL  --   --  1.0  --   --   --   --   --   --   --  1.2  --   --  0.8 0.7   ALKPHOS  --   --  91  --   --   --   --   --   --   --  71  --   --  82 84   ALT  --   --  67*  --   --   --   --   --   --   --  50  --   --  31 27   AST  --   --  90*  --   --   --   --   --   --   --  73*  --   --  43 45   LIPASE  --   --   --   --   --   --   --   --   --   --   --   --   --   --  483*   TROPI  --   --   --  0.054*  --   --   --  0.048*  --   --   --   --   --   --  0.049*   < > = values in this interval not displayed.  Recent Results (from the past 24 hour(s))   XR Chest Port 1 View    Narrative    EXAM: XR CHEST PORT 1 VW  3/23/2018 2:43 AM      HISTORY: acute desat to 80s, now in Afib with HR in 50s;     COMPARISON: 3/20/2018    FINDINGS: Portable AP view of the chest obtained. Unchanged left  atrial appendage exclusion device and loop recorder. Median sternotomy  wires. Trachea is midline. The cardiac silhouette is within normal  limits. Small bilateral pleural effusions. No significant change in  the hazy left basilar opacities. No pneumothorax.       Impression    IMPRESSION:   1. Small bilateral pleural effusions.  2. Unchanged left basilar airspace opacities, compatible with  atelectasis, aspiration, or infection.    I have personally reviewed the examination and initial interpretation  and I agree  with the findings.    DUC SALGADO MD   US Abdominal Doppler Complete    Narrative    EXAMINATION: Abdominal Doppler examination, 3/23/2018 11:49 AM     COMPARISON: CT of the abdomen dated 3/20/2018.    HISTORY: Concern for portal venous clot.    TECHNIQUE:   Limited ultrasound evaluation of the right upper quadrant with Doppler  evaluation for portal venous clot. Grayscale imaging was not  performed.    Findings:    Splenic vein was not visualized.    Main portal vein is patent with bidirectional blood flow. Peak  systolic velocity measuring 38 cm/s antegrade, 34 cm/s retrograde.  Right portal vein is patent with bidirectional blood flow. Peak  systolic velocity measuring 40 cm/s antegrade, 36 cm/s retrograde.  Left portal vein is patent with bidirectional blood flow. Peak  systolic velocity measuring 36 cm/s antegrade, 27 cm/s retrograde.    The left hepatic, middle hepatic and right hepatic veins are patent  with antegrade blood flow.  Left hepatic vein peak systolic velocity 70 cm/s.  Mid hepatic vein peak systolic velocity 58 cm/s.  Right hepatic vein peak systolic velocity 60 cm/s.    The IVC is patent with antegrade blood flow.  Peak systolic velocity 200 cm/s.    The left, right, and proper hepatic arteries are patent with antegrade  blood flow and normal low resistance arterial waveforms.  Proper hepatic artery peak systolic velocity 77 cm/s. Resistive index  0.74.  Right hepatic artery peak systolic velocity 45 cm/s. Resistive index  0.74.  Left hepatic artery peak systolic velocity 47 cm/s. Resistive index  0.70.      Impression    Impression:   1. Limited Doppler ultrasound of the right upper quadrant with  bidirectional flow in the portal veins, likely secondary to portal  hypertension.  2. No convincing portal vein thrombus is seen.  3. Splenic vein not well visualized.  4. Remainder of the Doppler examination was patent and normal.    I have personally reviewed the examination and initial  interpretation  and I agree with the findings.    RUBEN DAY MD

## 2018-03-24 NOTE — PROGRESS NOTES
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  --to get TTE per cards to check EF  --cont NAC treatment per GI  --hepatitis panel, AMPARO, anti mitochondrial, and F actin pending  --homocysteine, methylmalonic acid studies pending  --per Heme/Onc, does not have factor deficiency  --placed pharmacy consult for looking for drug interaction with xarelto per heme-onc  --Per EP note, avoid beta blockage, avoid QTc prolonging meds  --will get straight cath post-void to see if retaining given low urine output  --getting UA for low urine output  --holding atorvastatin O/N  --cont zosyn for possible aspiration pneumonia      Assessment & Plan   Carlos Alberto Fenton is a 77 year old female with PMH of HTN, HLD, DM, CVA (11/2016), chronic diastolic HF (last ECHO 12/12/17 EF 45-50%), CAD (s/p 3v CABG: LIMA-LAD, SVG-D1, SVG-dRCA and modified MAZE, ABDIEL ligation - 2/2016), paroxysmal AFib ([id number] - 8 on Xarelto), HIT, RUE DVT, recently diagnosed hypothyroidism, admitted 3/21/2018 for acute GIB, coagulopathy and concern for acute LI 2/2 acetaminophen toxicity with xarelto intake.    Pt does not have suicidal ideation, even though she has acetaminophen toxicity.     #Hazy L basilar opacities  --empirical treatment for aspiration PNA (3/23-*)    # Coagulopathy, INR to 5, elevated PT and PTT  # Concern for tylenol toxicity (elevated acetaminophen levels)  Pt states she was taking 3-4g tylenol per day for the past month for chronic back pain. Last dose was two days ago  Pt previously on warfarin for Afib. She started xarelto as of Jan 2018. She states she was not taking warfarin and xarelto simultaneously. It is possible that her coagulopathy is occurring in the setting of xarelto, tylenol, and acute liver failure. Her LFTs are WNL.     We are continuing her NAC infusion per toxicology and  GI. Her INR has continued to improve, but her LFTs have been increasing. Per heme/onc per mixing studies, pt does not have factor deficiency.  -- trend INR  --GI following, we appreciate recs  --cont NAC treatment  --Pending labs: HCV and HBV panels, AMPARO, anti mitochondrial, and F actin, homocysteine, methylmalonic acid  --pharm consult to determine other meds that could interact with xarelto     # Low urine output--approx 26.5 cc/hr on 3/24  Differential includes dehydration (pre-renal) vs obstructive, vs cardiorenal, although Cr not significantly increased  --UA pending  --plan to straight cath post-void to determine if US bladder are accurate  --consider kidney U/S if not retaining and UA normal     # Junctional Bradycardia/Tachycardia  # Afib with RVR  # Prolonged QTc  --EP recs TAVO; EP also recs placement of ppm   --hold beta blockade  --D/C trazodone as prolongs QTc        # Acute blood loss anemia (baseline 11-12 1 month prior, current hgb ~7)  Differential includes acute GI bleed (most likely), vs hemolysis, vs other reversible cause of anemia with rapid onset, given recent baseline one month ago showed hgb 11-12.  Plan:  -- GI consult, appreciate recs  -- trend Hgb q6h, transfuse <7  -- continue IV PPI BID   -- hold AC (home ASA 81 and xarelto)  --folate, B12, transferrin, iron, haptoglobin, reticulocyte count, blood smear pending          # HARINI on CKD  Cr at bsl ~1.1-4. On presentation 1.8. UA with hyaline casts, tachy, hypotensive. Likely prerenal in the setting of GIB and decreased PO intake/emesis  -- trend BMP  -- mIVF as below      # Hypothyroidism  Recently diagnosed. On Levothyroxine. Last TSH 2/21 elevated to 88, T4 improved from 0.29 to 0.44.   -- increase levothyroxine to 37.5 from PTA dose of 25 mcg      # Tropinemia, likely 2/2 demand ischemia 2/2 anemia  EKG did not show ST elevation, ST segment depression or T wave inversions. Pt is not having CP. Likely demand ischemia.    #  Insominia  -holding trazodone in lieu of prolonged QTc      Chronic problems  # HFpEF  # CAD  # HLD  ECHO shows EF 45-50%.   Plan:  --holding metolazone, spironolactone, and lasix  --cont atorvastatin   --holding ASA    # Depression  -- continue PTA venlafaxine    # Paroxysmal A fib  # [id number]  Pt prev on warfarin, she was transitioned to xarelto in Jan 2018 because she likes leafy greens.  --hold xarelto        # Pain Assessment:   Current Pain Score 3/24/2018 3/24/2018 3/23/2018   Patient currently in pain? denies no no   Pain score (0-10) - - -   Pain location - - -   Pain descriptors - - -   CPOT pain score - - -   Asenath s pain level was assessed and she currently denies pain.      Diet:   Cardiac Diet  Fluids: LR at 100 cc/hr  DVT Prophylaxis: mechanical  Code Status: Full Code    Disposition Plan   Expected discharge: 2 - 3 days, recommended to prior living arrangement once treatment completed..     Entered: Ayaka Mclaughlin 03/24/2018, 1:07 PM   Information in the above section will display in the discharge planner report.      The patient's care was discussed with the Attending Physician, Dr. Hopson and Patient.    Ayaka Mclaughlin  [facility]: 1  Pager: [phone]  Please see sticky note for cross cover information    Interval History   Pt is having mild back pain and shoulder pain. She feels weak. Otherwise, no f/c, cp, or sob. No blood in stool.     Physical Exam   Vital Signs: Temp: 97.8  F (36.6  C) Temp src: Axillary BP: 111/89   Heart Rate: 70 Resp: 16 SpO2: 91 % O2 Device: BiPAP/CPAP Oxygen Delivery: 2.5 LPM  Weight: 193 lbs 3.2 oz  Gen: In no acute distress. Patient comfortably lying in bed  HEENT: No scleral icterus, Moist mucous membranes. No nasal discharge.  CV: Normal rate, regular rhythm. S1/S2 normal.  No murmurs, rubs or gallops; JVP elevated to mid-neck at 45 degrees   Resp: faint and few crackles in the bases; no wheezes  Abdomen: Soft, non tender  abdomen, normal active bowel sounds,  Extremities: Peripheral edema bilaterally 1+  Skin: ecchymosis on upper lateral quadrant of buttock, several small ecchymosis on b/l upper extremities, multicolored; gangrenous toes bilaterally R worse than L--ecchymoses have not expanded beyond lines  Back: no expansion of hematoma   Neuro: no gross deficits        Data   Medications     acetylcysteine (ACETADOTE) infusion *third dose - elevated AST* 6.25 mg/kg/hr (03/24/18 1107)     Injection Device for insulin         [START ON 3/25/2018] lidocaine  1 patch Transdermal Q24H     lidocaine   Transdermal Q24h     lidocaine   Transdermal Q8H     piperacillin-tazobactam  3.375 g Intravenous Q6H     insulin aspart  0.5-2.5 Units Subcutaneous TID AC     insulin aspart  0.5-2.5 Units Subcutaneous At Bedtime     lidocaine   Transdermal Q24h     lidocaine   Transdermal Q8H     venlafaxine  150 mg Oral Daily with breakfast     pantoprazole (PROTONIX) IV  40 mg Intravenous BID     ferrous sulfate  325 mg Oral Daily with breakfast     levothyroxine  37.5 mcg Oral QAM AC     Data     Recent Labs  Lab 03/24/18  0444 03/23/18  2241 03/23/18  1655  03/23/18  0506 03/23/18  0251  03/22/18  2002  03/22/18  1332  03/22/18  0603  03/20/18  2203   WBC 7.3  --   --   --  9.9  --   --   --   --   --   --  8.9  < > 10.3   HGB 8.0* 8.0* 8.3*  < > 8.2* 8.5*  < >  --   < > 8.1*  < > 8.9*  < > 7.2*   *  --   --   --  119*  --   --   --   --   --   --  116*  < > 119*     --   --   --  210  --   --   --   --   --   --  256  < > 282   INR 1.74*  --   --   --  1.85*  --   --  1.75*  --   --   < >  --   < > 5.05*     --   --   --  140  --   --   --   --   --   --  141  < > 132*   POTASSIUM 4.6  --   --   --  4.8 4.6  --   --   --   --   --  3.4  < > 4.5   CHLORIDE 105  --   --   --  108  --   --   --   --   --   --  106  < > 95   CO2 19*  --   --   --  22  --   --   --   --   --   --  21  < > 27   BUN 30  --   --   --  33*  --   --   --    --   --   --  58*  < > 88*   CR 1.09*  --   --   --  0.90  --   --   --   --   --   --  1.04  < > 1.78*   ANIONGAP 15*  --   --   --  10  --   --   --   --   --   --  15*  < > 10   CARLY 8.6  --   --   --  8.2*  --   --   --   --   --   --  8.4*  < > 8.7   *  --   --   --  199*  --   --   --   --   --   --  106*  < > 109*   ALBUMIN 2.8*  --   --   --  2.9*  --   --   --   --   --   --  3.0*  < > 3.4   PROTTOTAL 6.1*  --   --   --  6.3*  --   --   --   --   --   --  6.6*  < > 7.1   BILITOTAL 1.4*  --   --   --  1.0  --   --   --   --   --   --  1.2  < > 0.7   ALKPHOS 77  --   --   --  91  --   --   --   --   --   --  71  < > 84   ALT 82*  --   --   --  67*  --   --   --   --   --   --  50  < > 27   *  --   --   --  90*  --   --   --   --   --   --  73*  < > 45   LIPASE  --   --   --   --   --   --   --   --   --   --   --   --   --  483*   TROPI  --   --   --   --   --  0.054*  --   --   --  0.048*  --   --   --  0.049*   < > = values in this interval not displayed.  No results found for this or any previous visit (from the past 24 hour(s)).

## 2018-03-24 NOTE — PROGRESS NOTES
03/24/18 0900   Quick Adds   Type of Visit Initial PT Evaluation       Present no   Living Environment   Lives With child(gris), adult   Living Arrangements house   Home Accessibility stairs to enter home   Number of Stairs to Enter Home 2  (to enter from garage, no rail but can hold wall)   Number of Stairs Within Home 2  (1 rail)   Transportation Available family or friend will provide   Living Environment Comment lives with adult daughter who is able to help as needed   Self-Care   Usual Activity Tolerance moderate   Current Activity Tolerance poor   Regular Exercise no   Equipment Currently Used at Home none   Functional Level Prior   Ambulation 0-->independent   Transferring 0-->independent   Fall history within last six months no   Prior Functional Level Comment independent with all functional mobility and ADLs, limited to primarily household ambulation, uses electric scooters when shopping.   General Information   Onset of Illness/Injury or Date of Surgery - Date 03/20/18   Referring Physician Ayaka Mclaughlin MD   Patient/Family Goals Statement return home   Pertinent History of Current Problem (include personal factors and/or comorbidities that impact the POC) Carlos Alberto Fenton is a 77 year old female with PMH of HTN, HLD, DM, CVA (11/2016), chronic diastolic HF (last ECHO 12/12/17 EF 45-50%), CAD (s/p 3v CABG: LIMA-LAD, SVG-D1, SVG-dRCA and modified MAZE, ABDIEL ligation - 2/2016), paroxysmal AFib ([id number] - 8 on Xarelto), HIT, RUE DVT, recently diagnosed hypothyroidism, admitted 3/21/2018 for acute GIB, coagulopathy and concern for acute LI 2/2 acetaminophen toxicity with xarelto intake.   Cognitive Status Examination   Orientation orientation to person, place and time   Level of Consciousness lethargic/somnolent   Follows Commands and Answers Questions 100% of the time;other (see comments)  (slow to respond to questions)   Personal Safety and Judgment intact  "  Integumentary/Edema   Integumentary/Edema Comments wound on dorsal surface of great toe   Posture    Posture Forward head position;Protracted shoulders   Range of Motion (ROM)   ROM Comment B LE grossly WNL   Strength   Strength Comments B LE grossly 5/5   Transfer Skills   Transfer Comments sit > stand with SBA   Gait   Gait Comments ambulated in room holding IV pole and SBA   Balance   Balance Comments can stand without UE support but prefers UE support   Sensory Examination   Sensory Perception Comments decreased light touch sensation R foot   General Therapy Interventions   Planned Therapy Interventions balance training;bed mobility training;gait training;transfer training;progressive activity/exercise   Clinical Impression   Criteria for Skilled Therapeutic Intervention yes, treatment indicated   PT Diagnosis impaired functional mobility in setting of GI bleeed and cardiac history   Influenced by the following impairments impaired balance, abnormal sensation, decreased functional endurance   Functional limitations due to impairments impaired bed mobility, impaired transfers, impaired gait   Clinical Presentation Evolving/Changing   Clinical Presentation Rationale comorbidities   Clinical Decision Making (Complexity) Moderate complexity   Therapy Frequency` daily   Predicted Duration of Therapy Intervention (days/wks) 1 week   Anticipated Discharge Disposition Transitional Care Facility;Home with Home Therapy   Risk & Benefits of therapy have been explained Yes   Patient, Family & other staff in agreement with plan of care Yes   [facility] AM-PAC  \"6 Clicks\" V.2 Basic Mobility Inpatient Short Form   1. Turning from your back to your side while in a flat bed without using bedrails? 4 - None   2. Moving from lying on your back to sitting on the side of a flat bed without using bedrails? 3 - A Little   3. Moving to and from a bed to a chair (including a wheelchair)? 3 - A Little   4. Standing up from a " chair using your arms (e.g., wheelchair, or bedside chair)? 3 - A Little   5. To walk in hospital room? 3 - A Little   6. Climbing 3-5 steps with a railing? 3 - A Little   Basic Mobility Raw Score (Score out of 24.Lower scores equate to lower levels of function) 19   Total Evaluation Time   Total Evaluation Time (Minutes) 9

## 2018-03-24 NOTE — PLAN OF CARE
Problem: Patient Care Overview  Goal: Plan of Care/Patient Progress Review  OT 6B Cx Pt with other providers.

## 2018-03-24 NOTE — PROVIDER NOTIFICATION
Provider notified for elevated lactic acid now 2.9. Pt's vitals are WDL, WBC WDL but HR elevated in the 120s. Pt is currently almost done with LR bolus with rate of 100/hr from previous shift. Pt denies being SOB; last EF checked 10/2017 was 35-40%. Pt's lungs have slight crackles; urine output was earlier this shift was low; hence bolus given. Provider will order another bolus; will go slowly with bolus.

## 2018-03-24 NOTE — PHARMACY
Home medications were reviewed for possible interactions with rivaroxaban.  Aspirin in combination with rivaroxaban may increase the risk of bleeding.    Vita Elmore, PharmD, Pharmacy Resident

## 2018-03-24 NOTE — PROVIDER NOTIFICATION
Provider notified that pt's heart rate has been decreased from 120s to now junctional/sinus israel ranging between 50s and low 60s. Pt is asymptomatic, BP stable, pt denies CP/SOB. Also, pt's lactic acid at 0200 was 2.0. LR bolus was completed at 0200. Will continue to monitor.

## 2018-03-24 NOTE — PROGRESS NOTES
Electrophysiology Progress Note    Patient Name: Carlos Alberto Fenton  Medical Record Number: [id number]  Date of Service: 3/24/2018    Assessment:   Carlos Alberto Fenton is a 77 year old female with history of pAF with [id number] score of 8, previously on rivaraxaban but held this admission 2/2 to acute blood loss aenmia, now with what appears to be atypical left sided flutter and relative tachy/israel syndrome. She has complicated history including:CAD s/p 3 vessel CABG (LIMA-LAD, v-D1, v-RCA) + MAZE + ABDIEL ligation in 2/2016, heart failure with LV EF 45-50%, hypertension, type 2 diabetes, and history of stroke, DVT, and HIT.      Recommendations:  -continue to hold beta blockers and all av madelyn blocking agents  -her degree of tachybrady syndrome is not the result of her malperfusion given her slowest HR was 50 bpm  -will check echo to ensure EF stable   -her bradycardia is improving 2/2 to holding BB (correg and metoprolol)  -if needed for acute slowing, attempt atropine o.5 mg or possibly dopamine infusion if needed  -we will consider placing a dual chamber ppm for relative tachy/israel syndrome and medical management, there is not acute indication for ppm implant at this time, will tentatively plan for Monday  -after ppm, we will load with amiodarone to lmiit the left atrial arrhythmia, unfortunately she cannot take warfarin or NOAC due to GI bleed at this time, this will limit our ability to ablate the tachycardia.   -once cleared from GI standpoint, we strongly recommend restarting anticoagulation.     Interval Hx:  Pt has improved BP and HR   Meds:    piperacillin-tazobactam  3.375 g Intravenous Q6H     insulin aspart  0.5-2.5 Units Subcutaneous TID AC     insulin aspart  0.5-2.5 Units Subcutaneous At Bedtime     lidocaine   Transdermal Q24h     lidocaine   Transdermal Q8H     venlafaxine  150 mg Oral Daily with breakfast     pantoprazole (PROTONIX) IV  40 mg Intravenous BID     atorvastatin  40 mg Oral Daily     ferrous  "sulfate  325 mg Oral Daily with breakfast     levothyroxine  37.5 mcg Oral QAM AC       Objective:   Vital signs: /89  Pulse 61  Temp 97.8  F (36.6  C) (Axillary)  Resp 16  Ht 1.575 m (5' 2\")  Wt 87.6 kg (193 lb 3.2 oz)  SpO2 91%  BMI 35.34 kg/m2     Intake/Output Summary (Last 24 hours) at 03/24/18 1212  Last data filed at 03/24/18 1150   Gross per 24 hour   Intake           1080.8 ml   Output              970 ml   Net            110.8 ml     Wt Readings from Last 3 Encounters:   03/24/18 87.6 kg (193 lb 3.2 oz)   03/02/18 78.2 kg (172 lb 6.4 oz)   01/16/18 74.8 kg (165 lb)     Gen: NAD  Heent: NCAT, supple neck, and JVD wnl  Chest: poor airway movement,  Cardiac: rr, distant heart sounds, I/VI PURNIMA  Abd: soft, relatively distended   Ext: dry gangrenous right toe, pulses   Neuro: non focal, intermittently somnolent  Skin: scattered echymosies  Psych: mood congruent      LABS:  CMP  Recent Labs  Lab 03/24/18  0444 03/23/18  0506 03/23/18  0251 03/22/18  0603  03/21/18  0640    140  --  141  --  138   POTASSIUM 4.6 4.8 4.6 3.4  --  4.0   CHLORIDE 105 108  --  106  --  99   CO2 19* 22  --  21  --  25   ANIONGAP 15* 10  --  15*  --  15*   * 199*  --  106*  --  118*   BUN 30 33*  --  58*  --  79*   CR 1.09* 0.90  --  1.04  --  1.38*   GFRESTIMATED 49* 60*  --  51*  --  37*   GFRESTBLACK 59* 73  --  62  --  45*   CARLY 8.6 8.2*  --  8.4*  --  8.8   MAG 2.0 2.1 2.1 2.3  < >  --    PROTTOTAL 6.1* 6.3*  --  6.6*  --  6.9   ALBUMIN 2.8* 2.9*  --  3.0*  --  3.2*   BILITOTAL 1.4* 1.0  --  1.2  --  0.8   ALKPHOS 77 91  --  71  --  82   * 90*  --  73*  --  43   ALT 82* 67*  --  50  --  31   < > = values in this interval not displayed.  CBC  Recent Labs  Lab 03/24/18  0444 03/23/18  2241 03/23/18  1655 03/23/18  0958 03/23/18  0506  03/22/18  0603  03/21/18  1133   WBC 7.3  --   --   --  9.9  --  8.9  --  12.0*   RBC 2.08*  --   --   --  2.27*  --  2.37*  --  2.34*   HGB 8.0* 8.0* 8.3* 8.4* 8.2*  < > " 8.9*  < > 8.4*   HCT 24.8*  --   --   --  27.0*  --  27.4*  --  26.7*   *  --   --   --  119*  --  116*  --  114*   MCH 38.5*  --   --   --  36.1*  --  37.6*  --  35.9*   MCHC 32.3  --   --   --  30.4*  --  32.5  --  31.5   RDW 26.0*  --   --   --  25.8*  --  25.5*  --  24.2*     --   --   --  210  --  256  --  256   < > = values in this interval not displayed.  INR  Recent Labs  Lab 18  0444 18  0506 18  1011   INR 1.74* 1.85* 1.75* 1.93*     Arterial Blood Gas  Recent Labs  Lab 18  0416 18  0356 18  0330 18  1332   PH  --  7.37 7.30*  --    PCO2  --  35 46*  --    PO2  --  418* 20*  --    HCO3  --  20* 23  --    O2PER 21.0 90.0 100.0 21     LipidsNo lab results found in last 7 days.    Invalid input(s): TRI  TSH  Recent Labs  Lab 18  2203   TSH 81.10*     [id number] input(s): HGBA1C  Troponin    EK:1 a-tacy vs. flutter    ECHO:New pending      Thank you for allowing us to participate in the care of this  patient. Please call if you have further questions or concerns.     Carlos Alberto Fenton was seen and examined with Dr. Azevedo, attending physician, who agrees with the above assessment and plan.     Praneeth Ortiz M.D.  Cardiac Electrophysiology Fellow  [phone]  2018

## 2018-03-25 ENCOUNTER — APPOINTMENT (OUTPATIENT)
Dept: CARDIOLOGY | Facility: CLINIC | Age: 78
DRG: 813 | End: 2018-03-25
Attending: INTERNAL MEDICINE
Payer: MEDICARE

## 2018-03-25 ENCOUNTER — APPOINTMENT (OUTPATIENT)
Dept: OCCUPATIONAL THERAPY | Facility: CLINIC | Age: 78
DRG: 813 | End: 2018-03-25
Attending: INTERNAL MEDICINE
Payer: MEDICARE

## 2018-03-25 ENCOUNTER — APPOINTMENT (OUTPATIENT)
Dept: PHYSICAL THERAPY | Facility: CLINIC | Age: 78
DRG: 813 | End: 2018-03-25
Attending: INTERNAL MEDICINE
Payer: MEDICARE

## 2018-03-25 LAB
ALBUMIN SERPL-MCNC: 2.5 G/DL (ref 3.4–5)
ALP SERPL-CCNC: 116 U/L (ref 40–150)
ALT SERPL W P-5'-P-CCNC: 78 U/L (ref 0–50)
ANION GAP SERPL CALCULATED.3IONS-SCNC: 13 MMOL/L (ref 3–14)
AST SERPL W P-5'-P-CCNC: 83 U/L (ref 0–45)
BACTERIA SPEC CULT: NO GROWTH
BILIRUB SERPL-MCNC: 1.4 MG/DL (ref 0.2–1.3)
BUN SERPL-MCNC: 23 MG/DL (ref 7–30)
CALCIUM SERPL-MCNC: 8.2 MG/DL (ref 8.5–10.1)
CHLORIDE SERPL-SCNC: 107 MMOL/L (ref 94–109)
CO2 SERPL-SCNC: 22 MMOL/L (ref 20–32)
CREAT SERPL-MCNC: 0.81 MG/DL (ref 0.52–1.04)
ERYTHROCYTE [DISTWIDTH] IN BLOOD BY AUTOMATED COUNT: 25.6 % (ref 10–15)
GFR SERPL CREATININE-BSD FRML MDRD: 68 ML/MIN/1.7M2
GLUCOSE BLDC GLUCOMTR-MCNC: 140 MG/DL (ref 70–99)
GLUCOSE BLDC GLUCOMTR-MCNC: 142 MG/DL (ref 70–99)
GLUCOSE BLDC GLUCOMTR-MCNC: 142 MG/DL (ref 70–99)
GLUCOSE BLDC GLUCOMTR-MCNC: 80 MG/DL (ref 70–99)
GLUCOSE BLDC GLUCOMTR-MCNC: 93 MG/DL (ref 70–99)
GLUCOSE SERPL-MCNC: 94 MG/DL (ref 70–99)
HCT VFR BLD AUTO: 25.5 % (ref 35–47)
HGB BLD-MCNC: 7.8 G/DL (ref 11.7–15.7)
HGB BLD-MCNC: 7.9 G/DL (ref 11.7–15.7)
INR PPP: 1.68 (ref 0.86–1.14)
LACTATE BLD-SCNC: 1.9 MMOL/L (ref 0.7–2)
Lab: NORMAL
MAGNESIUM SERPL-MCNC: 2 MG/DL (ref 1.6–2.3)
MCH RBC QN AUTO: 37.6 PG (ref 26.5–33)
MCHC RBC AUTO-ENTMCNC: 31 G/DL (ref 31.5–36.5)
MCV RBC AUTO: 121 FL (ref 78–100)
PLATELET # BLD AUTO: 172 10E9/L (ref 150–450)
POTASSIUM SERPL-SCNC: 4.1 MMOL/L (ref 3.4–5.3)
PROT SERPL-MCNC: 5.5 G/DL (ref 6.8–8.8)
RBC # BLD AUTO: 2.1 10E12/L (ref 3.8–5.2)
SMA IGG SER-ACNC: 23 UNITS (ref 0–19)
SODIUM SERPL-SCNC: 142 MMOL/L (ref 133–144)
SPECIMEN SOURCE: NORMAL
WBC # BLD AUTO: 5.6 10E9/L (ref 4–11)

## 2018-03-25 PROCEDURE — 25000128 H RX IP 250 OP 636: Performed by: STUDENT IN AN ORGANIZED HEALTH CARE EDUCATION/TRAINING PROGRAM

## 2018-03-25 PROCEDURE — 93005 ELECTROCARDIOGRAM TRACING: CPT

## 2018-03-25 PROCEDURE — 94660 CPAP INITIATION&MGMT: CPT | Performed by: OPTOMETRIST

## 2018-03-25 PROCEDURE — 00000146 ZZHCL STATISTIC GLUCOSE BY METER IP

## 2018-03-25 PROCEDURE — 83735 ASSAY OF MAGNESIUM: CPT | Performed by: STUDENT IN AN ORGANIZED HEALTH CARE EDUCATION/TRAINING PROGRAM

## 2018-03-25 PROCEDURE — 25000132 ZZH RX MED GY IP 250 OP 250 PS 637: Mod: GY | Performed by: HOSPITALIST

## 2018-03-25 PROCEDURE — 40000193 ZZH STATISTIC PT WARD VISIT: Performed by: PHYSICAL THERAPIST

## 2018-03-25 PROCEDURE — A9270 NON-COVERED ITEM OR SERVICE: HCPCS | Mod: GY | Performed by: STUDENT IN AN ORGANIZED HEALTH CARE EDUCATION/TRAINING PROGRAM

## 2018-03-25 PROCEDURE — 25000125 ZZHC RX 250: Performed by: HOSPITALIST

## 2018-03-25 PROCEDURE — 40000133 ZZH STATISTIC OT WARD VISIT: Performed by: OCCUPATIONAL THERAPIST

## 2018-03-25 PROCEDURE — 97116 GAIT TRAINING THERAPY: CPT | Mod: GP | Performed by: PHYSICAL THERAPIST

## 2018-03-25 PROCEDURE — 25500064 ZZH RX 255 OP 636: Performed by: HOSPITALIST

## 2018-03-25 PROCEDURE — A9270 NON-COVERED ITEM OR SERVICE: HCPCS | Mod: GY | Performed by: MARRIAGE & FAMILY THERAPIST

## 2018-03-25 PROCEDURE — 40000275 ZZH STATISTIC RCP TIME EA 10 MIN: Performed by: OPTOMETRIST

## 2018-03-25 PROCEDURE — 97165 OT EVAL LOW COMPLEX 30 MIN: CPT | Mod: GO | Performed by: OCCUPATIONAL THERAPIST

## 2018-03-25 PROCEDURE — 25000132 ZZH RX MED GY IP 250 OP 250 PS 637: Mod: GY | Performed by: STUDENT IN AN ORGANIZED HEALTH CARE EDUCATION/TRAINING PROGRAM

## 2018-03-25 PROCEDURE — 99233 SBSQ HOSP IP/OBS HIGH 50: CPT | Mod: GC | Performed by: HOSPITALIST

## 2018-03-25 PROCEDURE — 85610 PROTHROMBIN TIME: CPT | Performed by: STUDENT IN AN ORGANIZED HEALTH CARE EDUCATION/TRAINING PROGRAM

## 2018-03-25 PROCEDURE — 12000006 ZZH R&B IMCU INTERMEDIATE UMMC

## 2018-03-25 PROCEDURE — 36415 COLL VENOUS BLD VENIPUNCTURE: CPT | Performed by: STUDENT IN AN ORGANIZED HEALTH CARE EDUCATION/TRAINING PROGRAM

## 2018-03-25 PROCEDURE — 93308 TTE F-UP OR LMTD: CPT | Mod: 26 | Performed by: INTERNAL MEDICINE

## 2018-03-25 PROCEDURE — 25000128 H RX IP 250 OP 636: Performed by: INTERNAL MEDICINE

## 2018-03-25 PROCEDURE — 80053 COMPREHEN METABOLIC PANEL: CPT | Performed by: STUDENT IN AN ORGANIZED HEALTH CARE EDUCATION/TRAINING PROGRAM

## 2018-03-25 PROCEDURE — 93321 DOPPLER ECHO F-UP/LMTD STD: CPT | Mod: 26 | Performed by: INTERNAL MEDICINE

## 2018-03-25 PROCEDURE — 36415 COLL VENOUS BLD VENIPUNCTURE: CPT | Performed by: HOSPITALIST

## 2018-03-25 PROCEDURE — 83605 ASSAY OF LACTIC ACID: CPT | Performed by: STUDENT IN AN ORGANIZED HEALTH CARE EDUCATION/TRAINING PROGRAM

## 2018-03-25 PROCEDURE — 25000132 ZZH RX MED GY IP 250 OP 250 PS 637: Mod: GY | Performed by: MARRIAGE & FAMILY THERAPIST

## 2018-03-25 PROCEDURE — 40000047 ZZH STATISTIC CTO2 CONT OXYGEN TECH TIME EA 90 MIN: Performed by: OPTOMETRIST

## 2018-03-25 PROCEDURE — 93010 ELECTROCARDIOGRAM REPORT: CPT | Performed by: INTERNAL MEDICINE

## 2018-03-25 PROCEDURE — 97535 SELF CARE MNGMENT TRAINING: CPT | Mod: GO | Performed by: OCCUPATIONAL THERAPIST

## 2018-03-25 PROCEDURE — 40000264 ECHO LIMITED WITH OPTISON

## 2018-03-25 PROCEDURE — 85018 HEMOGLOBIN: CPT | Performed by: HOSPITALIST

## 2018-03-25 PROCEDURE — 93325 DOPPLER ECHO COLOR FLOW MAPG: CPT | Mod: 26 | Performed by: INTERNAL MEDICINE

## 2018-03-25 PROCEDURE — A9270 NON-COVERED ITEM OR SERVICE: HCPCS | Mod: GY | Performed by: HOSPITALIST

## 2018-03-25 PROCEDURE — 85027 COMPLETE CBC AUTOMATED: CPT | Performed by: STUDENT IN AN ORGANIZED HEALTH CARE EDUCATION/TRAINING PROGRAM

## 2018-03-25 RX ORDER — CEFAZOLIN SODIUM 2 G/100ML
2 INJECTION, SOLUTION INTRAVENOUS
Status: DISCONTINUED | OUTPATIENT
Start: 2018-03-25 | End: 2018-03-25

## 2018-03-25 RX ORDER — LIDOCAINE 40 MG/G
CREAM TOPICAL
Status: DISCONTINUED | OUTPATIENT
Start: 2018-03-25 | End: 2018-03-26 | Stop reason: HOSPADM

## 2018-03-25 RX ORDER — SODIUM CHLORIDE 9 MG/ML
INJECTION, SOLUTION INTRAVENOUS CONTINUOUS
Status: DISCONTINUED | OUTPATIENT
Start: 2018-03-25 | End: 2018-03-26 | Stop reason: HOSPADM

## 2018-03-25 RX ORDER — LANOLIN ALCOHOL/MO/W.PET/CERES
3 CREAM (GRAM) TOPICAL AT BEDTIME
Status: DISCONTINUED | OUTPATIENT
Start: 2018-03-25 | End: 2018-04-04 | Stop reason: HOSPADM

## 2018-03-25 RX ORDER — CEFAZOLIN SODIUM 2 G/100ML
2 INJECTION, SOLUTION INTRAVENOUS
Status: COMPLETED | OUTPATIENT
Start: 2018-03-25 | End: 2018-03-26

## 2018-03-25 RX ORDER — POLYETHYLENE GLYCOL 3350 17 G/17G
17 POWDER, FOR SOLUTION ORAL DAILY
Status: DISCONTINUED | OUTPATIENT
Start: 2018-03-25 | End: 2018-04-04 | Stop reason: HOSPADM

## 2018-03-25 RX ADMIN — SODIUM CHLORIDE: 9 INJECTION, SOLUTION INTRAVENOUS at 20:11

## 2018-03-25 RX ADMIN — PANTOPRAZOLE SODIUM 40 MG: 40 INJECTION, POWDER, FOR SOLUTION INTRAVENOUS at 20:04

## 2018-03-25 RX ADMIN — INSULIN ASPART 0.5 UNITS: 100 INJECTION, SOLUTION INTRAVENOUS; SUBCUTANEOUS at 12:10

## 2018-03-25 RX ADMIN — ACETYLCYSTEINE 6.25 MG/KG/HR: 200 INJECTION, SOLUTION INTRAVENOUS at 10:06

## 2018-03-25 RX ADMIN — ATORVASTATIN CALCIUM 40 MG: 40 TABLET, FILM COATED ORAL at 22:37

## 2018-03-25 RX ADMIN — SENNOSIDES AND DOCUSATE SODIUM 2 TABLET: 8.6; 5 TABLET ORAL at 08:37

## 2018-03-25 RX ADMIN — AMOXICILLIN AND CLAVULANATE POTASSIUM 1 TABLET: 875; 125 TABLET, FILM COATED ORAL at 20:04

## 2018-03-25 RX ADMIN — INSULIN ASPART 0.5 UNITS: 100 INJECTION, SOLUTION INTRAVENOUS; SUBCUTANEOUS at 20:05

## 2018-03-25 RX ADMIN — HUMAN ALBUMIN MICROSPHERES AND PERFLUTREN 7 ML: 10; .22 INJECTION, SOLUTION INTRAVENOUS at 11:15

## 2018-03-25 RX ADMIN — PANTOPRAZOLE SODIUM 40 MG: 40 INJECTION, POWDER, FOR SOLUTION INTRAVENOUS at 08:37

## 2018-03-25 RX ADMIN — AMOXICILLIN AND CLAVULANATE POTASSIUM 1 TABLET: 875; 125 TABLET, FILM COATED ORAL at 10:07

## 2018-03-25 RX ADMIN — PIPERACILLIN SODIUM AND TAZOBACTAM SODIUM 3.38 G: 3; .375 INJECTION, POWDER, LYOPHILIZED, FOR SOLUTION INTRAVENOUS at 04:03

## 2018-03-25 RX ADMIN — FERROUS SULFATE 325 MG: 325 TABLET, FILM COATED ORAL at 12:10

## 2018-03-25 RX ADMIN — MELATONIN 3 MG: 3 TAB ORAL at 20:04

## 2018-03-25 RX ADMIN — Medication 37.5 MCG: at 08:37

## 2018-03-25 RX ADMIN — ACETYLCYSTEINE 6.25 MG/KG/HR: 200 INJECTION, SOLUTION INTRAVENOUS at 23:39

## 2018-03-25 RX ADMIN — LIDOCAINE 1 PATCH: 560 PATCH PERCUTANEOUS; TOPICAL; TRANSDERMAL at 08:33

## 2018-03-25 RX ADMIN — VENLAFAXINE HYDROCHLORIDE 150 MG: 150 CAPSULE, EXTENDED RELEASE ORAL at 08:37

## 2018-03-25 RX ADMIN — POLYETHYLENE GLYCOL 3350 17 G: 17 POWDER, FOR SOLUTION ORAL at 10:07

## 2018-03-25 ASSESSMENT — ACTIVITIES OF DAILY LIVING (ADL)
ADLS_ACUITY_SCORE: 11
ADLS_ACUITY_SCORE: 11
ADLS_ACUITY_SCORE: 12
ADLS_ACUITY_SCORE: 12
PREVIOUS_RESPONSIBILITIES: MEAL PREP;HOUSEKEEPING;LAUNDRY;MEDICATION MANAGEMENT
ADLS_ACUITY_SCORE: 12
ADLS_ACUITY_SCORE: 12

## 2018-03-25 ASSESSMENT — PAIN DESCRIPTION - DESCRIPTORS: DESCRIPTORS: SORE

## 2018-03-25 NOTE — PLAN OF CARE
Problem: Patient Care Overview  Goal: Plan of Care/Patient Progress Review  PT 6B     Discharge Planner PT   Patient plan for discharge: home vs rehab  Current status: ambulation limited to 60' with UE support on IV pole.  Reports fatigue and SOB on 2 LPM, unable to get accurate SpO2 reading.   Barriers to return to prior living situation: assistance needed for safe mobility  Recommendations for discharge: TCU  Rationale for recommendations: limited tolerance to activity and dependence with functional mobility.       Entered by: Uvaldo Morgan 03/25/2018 4:38 PM

## 2018-03-25 NOTE — PLAN OF CARE
"Problem: Patient Care Overview  Goal: Plan of Care/Patient Progress Review  /76  Pulse 61  Temp 97.5  F (36.4  C) (Axillary)  Resp 18  Ht 1.575 m (5' 2\")  Wt 87.6 kg (193 lb 3.2 oz)  SpO2 91%  BMI 35.34 kg/m2    Neuro: A&Ox4, sleeping between cares, able to make needs known. Forgetful at times.   Cardiac: Sinus rhythm/sinus israel, per cardiology will need pacer for sick sinus syndrome. VSS, afebrile.  Respiratory: Sating low 90s on 2L nasal canula. Uses CPAP when sleeping at 60% FiO2.  GI/: Adequate UOP today, average ~45mL/hr. No BM.  Diet/appetite: Tolerating mod CHO diet. Poor appetite, encouraging intake.  Activity:  Assist of 1, up to chair and commode. Worked with PT today, ambulated in room.  Pain: No major complaints of pain.  Skin: Intact, no new deficits noted. Dressing changed on right great toe and left heel.  Plan: Team still working up liver, plan for pacer on Monday. Continue with POC. Notify primary team with changes.      Problem: Diabetes Comorbidity  Goal: Diabetes  Patient comorbidity will be monitored for signs and symptoms of hyperglycemia or hypoglycemia. Problems will be absent, minimized or managed by discharge/transition of care.   Blood glucose monitoring AC/HS and 0200, covered with SSI.    Problem: Cardiac Disease Comorbidity  Goal: Cardiac Disease  Patient comorbidity will be monitored for signs and symptoms of Cardiac Disease.  Problems will be absent, minimized or managed by discharge/transition of care.   Telemetry monitor in place.      "

## 2018-03-25 NOTE — PROGRESS NOTES
[facility] Progress  Note  Date of Service: March 25, 2018            Assessment and Plan:   Carlos Alberto Fenton is a 77 year old female with PMH of HTN, HLD, DM, CVA (11/2016), chronic diastolic HF (last ECHO 12/12/17 EF 45-50%), CAD (s/p 3v CABG: LIMA-LAD, SVG-D1, SVG-dRCA and modified MAZE, ABDIEL ligation - 2/2016), paroxysmal AFib ([id number] - 8 on Xarelto), HIT, RUE DVT, recently diagnosed hypothyroidism, admitted 3/21/2018 for acute GIB, coagulopathy and concern for acute LI 2/2 acetaminophen toxicity with xarelto intake. Now tachy-israel arrhythmia. Planning to PPM placement by EP on 3/26.     Pt does not have suicidal ideation, even though she has acetaminophen toxicity.     Changes today:  - Switched from Zosyn to Augmentin  - NPO AMN  - Hgb Q12 h  - Continue NAC per GI for today--hepatology will follow tomorrow     #Hazy L basilar opacities  --empirical treatment for aspiration PNA with zosyn on 3/24-3/25--switched to Augmentin on 3/25     # Coagulopathy, INR to 5, elevated PT and PTT  # Concern for tylenol toxicity (elevated acetaminophen levels)  Pt states she was taking 3-4g tylenol per day for the past month for chronic back pain. Last dose was two days ago  Pt previously on warfarin for Afib. She started xarelto as of Jan 2018. She states she was not taking warfarin and xarelto simultaneously. It is possible that her coagulopathy is occurring in the setting of xarelto, tylenol, and acute liver failure. Her LFTs are WNL.      We are continuing her NAC infusion per toxicology and GI. Her INR has continued to improve, but her LFTs have been increasing. Per heme/onc per mixing studies, pt does not have factor deficiency.  -- trend INR  --GI following, we appreciate recs  --cont NAC treatment  --Pending labs: HCV and HBV panels, AMPARO, anti mitochondrial, and F actin, homocysteine, methylmalonic acid  --pharm consult to determine other meds that could interact with xarelto     # Junctional  Bradycardia/Tachycardia  # Afib with RVR  # Prolonged QTc   [id number]  Pt prev on warfarin, she was transitioned to xarelto in Jan 2018 because she likes leafy greens.  --hold xarelto as above  --EP consulted. recs placement of ppm on 3/26 -- NPO AMN  -- TTE 3/25 -- pending result  --hold beta blockade  --D/C trazodone as prolongs QTc     # Low urine output--approx 26.5 cc/hr on 3/24 -- resolving  Differential includes dehydration (pre-renal), although Cr not significantly increased. UA showed +ketones, blood sugar normal. UOP improves after IVF. Cardiology thought less likely from bradycardia due to HR never <40.   --plan to straight cath post-void to determine if US bladder are accurate  --consider kidney U/S if not retaining     # Acute blood loss anemia (baseline 11-12 1 month prior, current hgb ~7)  Differential includes acute GI bleed (most likely), vs hemolysis, vs other reversible cause of anemia with rapid onset, given recent baseline one month ago showed hgb 11-12. Hgb has been stable since admission and no evidence of bleeding seen.   Plan:  -- GI consult, appreciate recs  -- trend Hgb q12h, transfuse <7  -- continue IV PPI BID   -- hold AC (home ASA 81 and xarelto)  -- folate, B12, transferrin, iron, haptoglobin, reticulocyte count, blood smear pending      # HARINI on CKD -- resolved  Cr at bsl ~1.1-4. On presentation 1.8. UA with hyaline casts, tachy, hypotensive. Likely prerenal in the setting of GIB and decreased PO intake/emesis. Cr improved with IVF.   -- trend BMP      # Hypothyroidism  Recently diagnosed. On Levothyroxine. Last TSH 2/21 elevated to 88, T4 improved from 0.29 to 0.44.   -- increase levothyroxine to 37.5 from PTA dose of 25 mcg      # Tropinemia, likely 2/2 demand ischemia 2/2 anemia  EKG did not show ST elevation, ST segment depression or T wave inversions. Pt is not having CP. Likely demand ischemia.     # Insominia  -holding trazodone in lieu of prolonged QTc     Chronic  "problems  # HFpEF  # CAD  # HLD  ECHO shows EF 45-50%.   Plan:  --holding metolazone, spironolactone, and lasix  --cont atorvastatin   --holding ASA     # Depression  -- continue PTA venlafaxine        # Pain Assessment:   Current Pain Score 3/25/2018 3/24/2018 3/23/2018   Patient currently in pain? denies no no   Pain score (0-10) - - -   Pain location - - -   Pain descriptors - - -   CPOT pain score - - -   Asenatabel s pain level was assessed and she currently denies pain.       Diet:   Cardiac Diet  Fluids: none  DVT Prophylaxis: mechanical  Code Status: Full Code     - Disposition:   Pending for clinical improvement, likely needs 2-5 nights    Patient was seen and discussed with .     Rosaura Streeter MD  PGY-3  Internal Medicine  [phone]  -----------------------------------------------------------------------------------------------------------------------------------------------      Interval History  No acute events overnight. Patient reported she had a better night last night and felt better. She also felt sleepy in AM. Later afternoon seems to be more awake to her baseline and eating, Denies pain. Breathing is fine.     Review of Systems:   A 4 point review of systems was negative except as noted above.    OBJECTIVE:  /74  Pulse 61  Temp 97.9  F (36.6  C) (Oral)  Resp 16  Ht 1.575 m (5' 2\")  Wt 87.6 kg (193 lb 3.2 oz)  SpO2 95%  BMI 35.34 kg/m2      Intake/Output Summary (Last 24 hours) at 03/25/18 6879  Last data filed at 03/25/18 1600   Gross per 24 hour   Intake           1094.4 ml   Output             1825 ml   Net           -730.6 ml       GENERAL APPEARANCE: drowsy in AM, not in distress  Pulmonary: decreased breath sounds bilaterally, no crackles or wheezing  CV: irregular rate and rhythm,  no murmur, no rub  GI: soft, nontender, no HSM   Extremities: pitting edema 1+ BLE  NEURO: drowsy, but easily to wake up, mentation intact when awake and speech normal, equally " move    Labs:   All labs reviewed by me  Electrolytes/Renal -   Recent Labs   Lab Test  03/25/18   0608  03/24/18   0444  03/23/18   0506   02/16/17   0811  02/15/17   1428  02/14/17   0833   NA  142  139  140   < >  134  139  134   POTASSIUM  4.1  4.6  4.8   < >  3.9  4.1  4.4   CHLORIDE  107  105  108   < >  101  103  101   CO2  22  19*  22   < >  26  27  26   BUN  23  30  33*   < >  12  17  11   CR  0.81  1.09*  0.90   < >  0.65  0.65  0.67   GLC  94  147*  199*   < >  103*  141*  181*   CARLY  8.2*  8.6  8.2*   < >  8.0*  8.1*  8.1*   MAG  2.0  2.0  2.1   < >  1.8  1.8  2.2   PHOS   --    --    --    --   2.2*  2.6  2.3*    < > = values in this interval not displayed.     CBC -   Recent Labs   Lab Test  03/25/18   0608  03/24/18   0444  03/23/18   2241   03/23/18   0506   WBC  5.6  7.3   --    --   9.9   HGB  7.9*  8.0*  8.0*   < >  8.2*   PLT  172  178   --    --   210    < > = values in this interval not displayed.     LFTs   Recent Labs   Lab Test  03/25/18   0608  03/24/18   0444  03/23/18   0506   ALKPHOS  116  77  91   BILITOTAL  1.4*  1.4*  1.0   ALT  78*  82*  67*   AST  83*  100*  90*   PROTTOTAL  5.5*  6.1*  6.3*   ALBUMIN  2.5*  2.8*  2.9*       Imaging:  None     Current Medications:    lidocaine  1 patch Transdermal Q24H     lidocaine   Transdermal Q24h     lidocaine   Transdermal Q8H     Resume a HELD Medication   Does not apply See Admin Instructions     atorvastatin  40 mg Oral At Bedtime     piperacillin-tazobactam  3.375 g Intravenous Q6H     insulin aspart  0.5-2.5 Units Subcutaneous TID AC     insulin aspart  0.5-2.5 Units Subcutaneous At Bedtime     venlafaxine  150 mg Oral Daily with breakfast     pantoprazole (PROTONIX) IV  40 mg Intravenous BID     ferrous sulfate  325 mg Oral Daily with breakfast     levothyroxine  37.5 mcg Oral QAM AC       acetylcysteine (ACETADOTE) infusion *third dose - elevated AST* 6.25 mg/kg/hr (03/24/18 1533)     Injection Device for insulin       Rosaura Streeter,  MD

## 2018-03-25 NOTE — PROGRESS NOTES
Electrophysiology Progress Note    Patient Name: Carlos Alberto Fenton  Medical Record Number: [id number]  Date of Service: 3/25/2018    Assessment:   Carlos Alberto Fenton is a 77 year old female with history of pAF with [id number] score of 8, previously on rivaraxaban but held this admission 2/2 to acute blood loss aenmia, now with what appears to be atypical left sided flutter and relative tachy/israel syndrome. She has complicated history including:CAD s/p 3 vessel CABG (LIMA-LAD, v-D1, v-RCA) + MAZE + ABDIEL ligation in 2/2016, heart failure with LV EF 45-50%, hypertension, type 2 diabetes, and history of stroke, DVT, and HIT.   She is scheduled for pacemaker placement tomorrow afternoon secondary to tachybradycardia syndrome and need for AV madelyn blockade or amiodarone to manage her atrial arrhythmias.         Recommendations:  -She will be n.p.o. after midnight  -Patient is quite somnolent this a.m., limiting ability for us to obtain consent, please workup encephalopathy, which is new  -Continue to hold beta-blockade until placement of permanent pacemaker  -Waiting on formal echo read, but there appears to be significant RV dilation and dysfunction, which raises a concern for potential pulmonary embolism and acute RV failure.   -Although she has newly diagnosed hypothyroidism, her T4 has normalized and her conduction system has not improved, thus her tachybradycardia syndrome will likely not improve upon normalization of her TSH    Interval Hx:  -The patient continues to have intermittent episodes of tachycardia followed by relatively prolonged R-R intervals.  She does not have pauses greater than 3 seconds, but she does have multiple beats of significant bradycardia.  These are followed by a rapid bursts of tachycardia.  Her overall heart rate has increased in 100s and above since the cessation of beta blockade.  She continues to have bursts of this left-sided tach arrhythmia at 120 bpm            Meds:     "amoxicillin-clavulanate  1 tablet Oral Q12H ABHISHEK     polyethylene glycol  17 g Oral Daily     sodium chloride (PF)  3 mL Intracatheter Q8H     lidocaine  1 patch Transdermal Q24H     lidocaine   Transdermal Q24h     lidocaine   Transdermal Q8H     Resume a HELD Medication   Does not apply See Admin Instructions     atorvastatin  40 mg Oral At Bedtime     insulin aspart  0.5-2.5 Units Subcutaneous TID AC     insulin aspart  0.5-2.5 Units Subcutaneous At Bedtime     venlafaxine  150 mg Oral Daily with breakfast     pantoprazole (PROTONIX) IV  40 mg Intravenous BID     ferrous sulfate  325 mg Oral Daily with breakfast     levothyroxine  37.5 mcg Oral QAM AC       Objective:   Vital signs: /75 (BP Location: Left arm)  Pulse 61  Temp 98  F (36.7  C) (Axillary)  Resp 17  Ht 1.575 m (5' 2\")  Wt 87.6 kg (193 lb 3.2 oz)  SpO2 (!) 88%  BMI 35.34 kg/m2     Intake/Output Summary (Last 24 hours) at 03/25/18 1200  Last data filed at 03/25/18 1200   Gross per 24 hour   Intake           1094.4 ml   Output             1550 ml   Net           -455.6 ml     Wt Readings from Last 3 Encounters:   03/24/18 87.6 kg (193 lb 3.2 oz)   03/02/18 78.2 kg (172 lb 6.4 oz)   01/16/18 74.8 kg (165 lb)     Gen: Somnolent  Heent: NCAT  Chest: Nonlabored respirations  Cardiac: Irregularly irregular  Abd: soft, nt,nd bs+  Ext: wwp, pulses 1+, right lower extremity dry gangrene  Neuro: Patient is newly somnolent, responsive and arousable        LABS:  CMP  Recent Labs  Lab 03/25/18  0608 03/24/18  0444 03/23/18  0506 03/23/18  0251 03/22/18  0603    139 140  --  141   POTASSIUM 4.1 4.6 4.8 4.6 3.4   CHLORIDE 107 105 108  --  106   CO2 22 19* 22  --  21   ANIONGAP 13 15* 10  --  15*   GLC 94 147* 199*  --  106*   BUN 23 30 33*  --  58*   CR 0.81 1.09* 0.90  --  1.04   GFRESTIMATED 68 49* 60*  --  51*   GFRESTBLACK 83 59* 73  --  62   CARLY 8.2* 8.6 8.2*  --  8.4*   MAG 2.0 2.0 2.1 2.1 2.3   PROTTOTAL 5.5* 6.1* 6.3*  --  6.6*   ALBUMIN " 2.5* 2.8* 2.9*  --  3.0*   BILITOTAL 1.4* 1.4* 1.0  --  1.2   ALKPHOS 116 77 91  --  71   AST 83* 100* 90*  --  73*   ALT 78* 82* 67*  --  50     CBC  Recent Labs  Lab 03/25/18  0608 03/24/18  0444 03/23/18  2241 03/23/18  1655  03/23/18  0506  03/22/18  0603   WBC 5.6 7.3  --   --   --  9.9  --  8.9   RBC 2.10* 2.08*  --   --   --  2.27*  --  2.37*   HGB 7.9* 8.0* 8.0* 8.3*  < > 8.2*  < > 8.9*   HCT 25.5* 24.8*  --   --   --  27.0*  --  27.4*   * 119*  --   --   --  119*  --  116*   MCH 37.6* 38.5*  --   --   --  36.1*  --  37.6*   MCHC 31.0* 32.3  --   --   --  30.4*  --  32.5   RDW 25.6* 26.0*  --   --   --  25.8*  --  25.5*    178  --   --   --  210  --  256   < > = values in this interval not displayed.  INR  Recent Labs  Lab 03/25/18  0608 03/24/18  0444 03/23/18  0506 03/22/18 2002   INR 1.68* 1.74* 1.85* 1.75*     Arterial Blood Gas  Recent Labs  Lab 03/24/18  0416 03/23/18  0356 03/23/18  0330 03/22/18  1332   PH  --  7.37 7.30*  --    PCO2  --  35 46*  --    PO2  --  418* 20*  --    HCO3  --  20* 23  --    O2PER 21.0 90.0 100.0 21     LipidsNo lab results found in last 7 days.    Invalid input(s): TRI  TSH  Recent Labs  Lab 03/20/18  2203   TSH 81.10*     [id number] input(s): HGBA1C  Troponin    EKG: reviewed     ECHO:No results found for this or any previous visit (from the past 4320 hour(s)).      Imaging/Angiography:      Thank you for allowing us to participate in the care of this  patient. Please call if you have further questions or concerns.     Carlos Alberto Fenton was seen and examined with Dr. Azevedo, attending physician, who agrees with the above assessment and plan.     Praneeth Ortiz M.D.  Cardiac Electrophysiology Fellow  [phone]  March 25, 2018    Attending: Patient seen and examined with Dr. Ortiz. The history and physical findings are accurate as recorded. My additional findings, if any, have been incorporated into the body of the note. All labs, imaging studies, ECG  and telemetry data have been reviewed personally. The assessment and recommendations outlined reflect our joint decision making.    Bakari Azevedo MD

## 2018-03-25 NOTE — PLAN OF CARE
Problem: Patient Care Overview  Goal: Plan of Care/Patient Progress Review  OT 6B: Evaluation complete and treatment initiated.  Discharge Planner OT   Patient plan for discharge: TCU  Current status: Pt initially lethargic, requires physical movement/activity to increase alertness.  Pt Mod A for LE dressing, tolerates ambulating approx 45ft with CGA and UE support on IV pole.  Pt is steady during mobility though c/o SOB and fatigue.  HR variable 60s-130s throughout session.  RN aware and monitoring, anticipating pacemaker placement tomorrow.  Barriers to return to prior living situation: acute medical needs, SOB, deconditioning, lethargy  Recommendations for discharge: TCU  Rationale for recommendations: Pt is currently below baseline with regards to mobility and independence with self cares and will benefit from continued skilled therapy intervention to address deficits.         Entered by: Heather Pedersen 03/25/2018 3:42 PM

## 2018-03-25 NOTE — PROVIDER NOTIFICATION
Pt suddenly switched to afib at 2235 with her heart rate ranging betweeen 124 to 135 bpm; HR in the 130s is not sustained. Pt denies CP, SOB, dizziness, etc. Pt's vitals remain WDL. Per provider, will page if HR sustains in the 130s.

## 2018-03-25 NOTE — PLAN OF CARE
Problem: Patient Care Overview  Goal: Plan of Care/Patient Progress Review  Neuro: Pt has been more sleepy throughout shift. Pt denies feeling SOB but does report feeling more tired than usual.   Cardiac: Pt remained afib israel/tachy with occasional sinus beats.   Respiratory: Sating WDL on 2L or 45-60% on CPAP. Pt was weaned down to needing 45% from 60%; but was increased to 50%. Pt has poor circulation; which makes it difficult to assess O2 needs.   GI/: Adequate urine output. BM X1  Diet/appetite: Tolerating regular diet with low fat and low sodium.  Activity: Stand by assist, up to chair and to the commode.  Pain: At acceptable level on current regimen.   Skin: Wound on toe cleansed with NS and left open to air; wound on L heel was cleansed as well and covered with gauze.    LDA's:    Plan: Continue with POC. Notify primary team with changes.

## 2018-03-25 NOTE — PROGRESS NOTES
03/25/18 1200   Quick Adds   Type of Visit Initial Occupational Therapy Evaluation   Living Environment   Lives With child(gris), adult   Living Arrangements house   Home Accessibility stairs to enter home   Number of Stairs to Enter Home 2   Number of Stairs Within Home 2   Stair Railings at Home (no)   Transportation Available family or friend will provide   Living Environment Comment Pt lives with her adult daughter; 2 stairs to enter from the garage with no rail.     Self-Care   Dominant Hand right   Usual Activity Tolerance moderate   Current Activity Tolerance fair   Regular Exercise no   Equipment Currently Used at Home none   Activity/Exercise/Self-Care Comment Pt was previously independent with in-home mobility and basic self cares.  Pt reports she sponge bathes from seated position rather than showering.  Pt is able to toilet and dress independently.  Pt states she does her own medication management as well as much of the laundry, cooking and cleaning in the home  pt daughter provides transportation and completes most grocery shopping.  Pt reports it had been becoming increasingly difficult to manage at home prior to admission   Functional Level Prior   Ambulation 0-->independent   Transferring 0-->independent   Toileting 0-->independent   Bathing 0-->independent   Dressing 0-->independent   Eating 0-->independent   Communication 0-->understands/communicates without difficulty   Fall history within last six months no   Which of the above functional risks had a recent onset or change? ambulation;transferring;bathing;cognition       Present no   Language english   General Information   Onset of Illness/Injury or Date of Surgery - Date 03/20/18   Referring Physician Ayaka Mclaughlin MD   Patient/Family Goals Statement get stronger, feel better   Additional Occupational Profile Info/Pertinent History of Current Problem Carlos Alberto Fenton is a 77 year old female with PMH of HTN, HLD, DM, CVA  (11/2016), chronic diastolic HF (last ECHO 12/12/17 EF 45-50%), CAD (s/p 3v CABG: LIMA-LAD, SVG-D1, SVG-dRCA and modified MAZE, ABDIEL ligation - 2/2016), paroxysmal AFib ([id number] - 8 on Xarelto), HIT, RUE DVT, recently diagnosed hypothyroidism, admitted 3/21/2018 for acute GIB, coagulopathy and concern for acute LI 2/2 acetaminophen toxicity with xarelto intake. Anticipate pacemaker placement 3/26/18   Precautions/Limitations fall precautions   General Observations Pt initially lethargic; having difficulty attending to interaction; improving with activity; pleasant and agreeable   General Info Comments Activity: up ad sandy   Cognitive Status Examination   Orientation person;place  (month and year)   Level of Consciousness alert;lethargic/somnolent   Able to Follow Commands mild impairment   Memory (mild impairment)   Attention Sustained attention impaired   Cognitive Comment Pt with difficulty remaining alert, frequent redirection to task and conversation requiring physical movement/activity to increase alertness and then more appropriate with responses; to be further assessed   Visual Perception   Visual Perception Wears glasses   Visual Perception Comments no acute visual concerns noted   Sensory Examination   Sensory Comments intact to light touch on all extremities   Integumentary/Edema   Integumentary/Edema Comments RUE DVT   Range of Motion (ROM)   ROM Comment BUE WFL   Strength   Strength Comments BUE grossly 4+/5   Mobility   Bed Mobility Comments to be assessed   Transfer Skill: Bed to Chair/Chair to Bed   Level of [place]: Bed to Chair contact guard   Assistive Device - Transfer Skill Bed to Chair Chair to Bed Rehab Eval (UE support on IV pole)   Transfer Skill: Sit to Stand   Level of [place]: Sit/Stand contact guard   Balance   Balance Comments mild unsteadiness though no overt LOB with transfers and short distance ambulation;  Pt using 1-2 UE support on IV pole during ambulation   Eating/Self  Feeding   Level of [place]: Eating stand-by assist   Physical Assist/Nonphysical Assist: Eating set-up required   Instrumental Activities of Daily Living (IADL)   Previous Responsibilities meal prep;housekeeping;laundry;medication management   IADL Comments Pt reports her daughter is able to assist with IADLs prn but does not typically at baseline with exception of driving and shopping   Activities of Daily Living Analysis   Impairments Contributing to Impaired Activities of Daily Living balance impaired;cognition impaired;strength decreased   ADL Comments fatigue/lethargy, SOB, deconditioning   General Therapy Interventions   Planned Therapy Interventions ADL retraining;IADL retraining;bed mobility training;cognition;strengthening;transfer training   Clinical Impression   Criteria for Skilled Therapeutic Interventions Met yes, treatment indicated   OT Diagnosis decreased activity tolerance and independence with ADL/IADLs   Influenced by the following impairments fatigue/lethargy, acute medical needs, generalized weakness and deconditioning, below baseline cognition   Assessment of Occupational Performance 5 or more Performance Deficits   Identified Performance Deficits bed mobility, toileting, dressing, bathing, functional mobility, home management   Clinical Decision Making (Complexity) Low complexity   Therapy Frequency 5 times/wk   Predicted Duration of Therapy Intervention (days/wks) 3/31/18   Anticipated Discharge Disposition Transitional Care Facility   Risks and Benefits of Treatment have been explained. Yes   Patient, Family & other staff in agreement with plan of care Yes   Total Evaluation Time   Total Evaluation Time (Minutes) 5

## 2018-03-26 ENCOUNTER — APPOINTMENT (OUTPATIENT)
Dept: CARDIOLOGY | Facility: CLINIC | Age: 78
DRG: 813 | End: 2018-03-26
Attending: INTERNAL MEDICINE
Payer: MEDICARE

## 2018-03-26 ENCOUNTER — APPOINTMENT (OUTPATIENT)
Dept: GENERAL RADIOLOGY | Facility: CLINIC | Age: 78
DRG: 813 | End: 2018-03-26
Attending: NURSE PRACTITIONER
Payer: MEDICARE

## 2018-03-26 LAB
ALBUMIN SERPL-MCNC: 2.7 G/DL (ref 3.4–5)
ALP SERPL-CCNC: 151 U/L (ref 40–150)
ALT SERPL W P-5'-P-CCNC: 66 U/L (ref 0–50)
ANA SER QL IF: NEGATIVE
ANION GAP SERPL CALCULATED.3IONS-SCNC: 8 MMOL/L (ref 3–14)
AST SERPL W P-5'-P-CCNC: 65 U/L (ref 0–45)
BILIRUB SERPL-MCNC: 1.2 MG/DL (ref 0.2–1.3)
BUN SERPL-MCNC: 14 MG/DL (ref 7–30)
CALCIUM SERPL-MCNC: 8.7 MG/DL (ref 8.5–10.1)
CHLORIDE SERPL-SCNC: 108 MMOL/L (ref 94–109)
CO2 SERPL-SCNC: 26 MMOL/L (ref 20–32)
CREAT SERPL-MCNC: 0.71 MG/DL (ref 0.52–1.04)
ERYTHROCYTE [DISTWIDTH] IN BLOOD BY AUTOMATED COUNT: 24.9 % (ref 10–15)
GFR SERPL CREATININE-BSD FRML MDRD: 79 ML/MIN/1.7M2
GLUCOSE BLDC GLUCOMTR-MCNC: 110 MG/DL (ref 70–99)
GLUCOSE BLDC GLUCOMTR-MCNC: 115 MG/DL (ref 70–99)
GLUCOSE BLDC GLUCOMTR-MCNC: 124 MG/DL (ref 70–99)
GLUCOSE BLDC GLUCOMTR-MCNC: 145 MG/DL (ref 70–99)
GLUCOSE BLDC GLUCOMTR-MCNC: 66 MG/DL (ref 70–99)
GLUCOSE BLDC GLUCOMTR-MCNC: 74 MG/DL (ref 70–99)
GLUCOSE BLDC GLUCOMTR-MCNC: 98 MG/DL (ref 70–99)
GLUCOSE SERPL-MCNC: 111 MG/DL (ref 70–99)
HBV CORE AB SERPL QL IA: NONREACTIVE
HBV SURFACE AB SERPL IA-ACNC: 0.38 M[IU]/ML
HBV SURFACE AG SERPL QL IA: NONREACTIVE
HCT VFR BLD AUTO: 26.8 % (ref 35–47)
HCV AB SERPL QL IA: NONREACTIVE
HGB BLD-MCNC: 8 G/DL (ref 11.7–15.7)
INR PPP: 1.62 (ref 0.86–1.14)
INTERPRETATION ECG - MUSE: NORMAL
MAGNESIUM SERPL-MCNC: 2.1 MG/DL (ref 1.6–2.3)
MCH RBC QN AUTO: 36.2 PG (ref 26.5–33)
MCHC RBC AUTO-ENTMCNC: 29.9 G/DL (ref 31.5–36.5)
MCV RBC AUTO: 121 FL (ref 78–100)
PLATELET # BLD AUTO: 169 10E9/L (ref 150–450)
POTASSIUM SERPL-SCNC: 3.6 MMOL/L (ref 3.4–5.3)
PROT SERPL-MCNC: 6.1 G/DL (ref 6.8–8.8)
RBC # BLD AUTO: 2.21 10E12/L (ref 3.8–5.2)
SMOOTH MUSCLE ABY IGG TITER: NORMAL
SODIUM SERPL-SCNC: 142 MMOL/L (ref 133–144)
WBC # BLD AUTO: 6.9 10E9/L (ref 4–11)

## 2018-03-26 PROCEDURE — 99153 MOD SED SAME PHYS/QHP EA: CPT

## 2018-03-26 PROCEDURE — C1785 PMKR, DUAL, RATE-RESP: HCPCS

## 2018-03-26 PROCEDURE — 12000006 ZZH R&B IMCU INTERMEDIATE UMMC

## 2018-03-26 PROCEDURE — A9270 NON-COVERED ITEM OR SERVICE: HCPCS | Mod: GY | Performed by: STUDENT IN AN ORGANIZED HEALTH CARE EDUCATION/TRAINING PROGRAM

## 2018-03-26 PROCEDURE — 27210795 ZZH PAD DEFIB QUICK CR4

## 2018-03-26 PROCEDURE — 25000132 ZZH RX MED GY IP 250 OP 250 PS 637: Mod: GY | Performed by: STUDENT IN AN ORGANIZED HEALTH CARE EDUCATION/TRAINING PROGRAM

## 2018-03-26 PROCEDURE — 0JH606Z INSERTION OF PACEMAKER, DUAL CHAMBER INTO CHEST SUBCUTANEOUS TISSUE AND FASCIA, OPEN APPROACH: ICD-10-PCS | Performed by: INTERNAL MEDICINE

## 2018-03-26 PROCEDURE — 25000132 ZZH RX MED GY IP 250 OP 250 PS 637: Mod: GY | Performed by: MARRIAGE & FAMILY THERAPIST

## 2018-03-26 PROCEDURE — 25000128 H RX IP 250 OP 636: Performed by: STUDENT IN AN ORGANIZED HEALTH CARE EDUCATION/TRAINING PROGRAM

## 2018-03-26 PROCEDURE — 02H63JZ INSERTION OF PACEMAKER LEAD INTO RIGHT ATRIUM, PERCUTANEOUS APPROACH: ICD-10-PCS | Performed by: INTERNAL MEDICINE

## 2018-03-26 PROCEDURE — 27210807 ZZH SHEATH CR6

## 2018-03-26 PROCEDURE — 33208 INSRT HEART PM ATRIAL & VENT: CPT | Mod: KX

## 2018-03-26 PROCEDURE — A9270 NON-COVERED ITEM OR SERVICE: HCPCS | Mod: GY | Performed by: HOSPITALIST

## 2018-03-26 PROCEDURE — 80053 COMPREHEN METABOLIC PANEL: CPT | Performed by: STUDENT IN AN ORGANIZED HEALTH CARE EDUCATION/TRAINING PROGRAM

## 2018-03-26 PROCEDURE — C1892 INTRO/SHEATH,FIXED,PEEL-AWAY: HCPCS

## 2018-03-26 PROCEDURE — A9270 NON-COVERED ITEM OR SERVICE: HCPCS | Mod: GY | Performed by: MARRIAGE & FAMILY THERAPIST

## 2018-03-26 PROCEDURE — 71045 X-RAY EXAM CHEST 1 VIEW: CPT

## 2018-03-26 PROCEDURE — 36415 COLL VENOUS BLD VENIPUNCTURE: CPT

## 2018-03-26 PROCEDURE — 83735 ASSAY OF MAGNESIUM: CPT | Performed by: STUDENT IN AN ORGANIZED HEALTH CARE EDUCATION/TRAINING PROGRAM

## 2018-03-26 PROCEDURE — 25000128 H RX IP 250 OP 636: Performed by: NURSE PRACTITIONER

## 2018-03-26 PROCEDURE — 00000146 ZZHCL STATISTIC GLUCOSE BY METER IP

## 2018-03-26 PROCEDURE — 40000556 ZZH STATISTIC PERIPHERAL IV START W US GUIDANCE

## 2018-03-26 PROCEDURE — 93005 ELECTROCARDIOGRAM TRACING: CPT

## 2018-03-26 PROCEDURE — 93010 ELECTROCARDIOGRAM REPORT: CPT | Performed by: INTERNAL MEDICINE

## 2018-03-26 PROCEDURE — 25000125 ZZHC RX 250: Performed by: HOSPITALIST

## 2018-03-26 PROCEDURE — 85610 PROTHROMBIN TIME: CPT | Performed by: STUDENT IN AN ORGANIZED HEALTH CARE EDUCATION/TRAINING PROGRAM

## 2018-03-26 PROCEDURE — C1898 LEAD, PMKR, OTHER THAN TRANS: HCPCS

## 2018-03-26 PROCEDURE — 02HK3JZ INSERTION OF PACEMAKER LEAD INTO RIGHT VENTRICLE, PERCUTANEOUS APPROACH: ICD-10-PCS | Performed by: INTERNAL MEDICINE

## 2018-03-26 PROCEDURE — 25800025 ZZH RX 258: Performed by: STUDENT IN AN ORGANIZED HEALTH CARE EDUCATION/TRAINING PROGRAM

## 2018-03-26 PROCEDURE — 99233 SBSQ HOSP IP/OBS HIGH 50: CPT | Mod: GC | Performed by: HOSPITALIST

## 2018-03-26 PROCEDURE — 27210784 ZZH KIT PACEMAKER CR8

## 2018-03-26 PROCEDURE — 99152 MOD SED SAME PHYS/QHP 5/>YRS: CPT

## 2018-03-26 PROCEDURE — 40000141 ZZH STATISTIC PERIPHERAL IV START W/O US GUIDANCE

## 2018-03-26 PROCEDURE — 85027 COMPLETE CBC AUTOMATED: CPT | Performed by: STUDENT IN AN ORGANIZED HEALTH CARE EDUCATION/TRAINING PROGRAM

## 2018-03-26 PROCEDURE — 25000132 ZZH RX MED GY IP 250 OP 250 PS 637: Mod: GY | Performed by: HOSPITALIST

## 2018-03-26 PROCEDURE — 40000802 ZZH SITE CHECK

## 2018-03-26 RX ORDER — FUROSEMIDE 10 MG/ML
20-100 INJECTION INTRAMUSCULAR; INTRAVENOUS
Status: DISCONTINUED | OUTPATIENT
Start: 2018-03-26 | End: 2018-03-26 | Stop reason: HOSPADM

## 2018-03-26 RX ORDER — NALOXONE HYDROCHLORIDE 0.4 MG/ML
.1-.4 INJECTION, SOLUTION INTRAMUSCULAR; INTRAVENOUS; SUBCUTANEOUS
Status: DISCONTINUED | OUTPATIENT
Start: 2018-03-26 | End: 2018-03-29

## 2018-03-26 RX ORDER — FENTANYL CITRATE 50 UG/ML
25-50 INJECTION, SOLUTION INTRAMUSCULAR; INTRAVENOUS
Status: DISCONTINUED | OUTPATIENT
Start: 2018-03-26 | End: 2018-03-26 | Stop reason: HOSPADM

## 2018-03-26 RX ORDER — IOPAMIDOL 755 MG/ML
50 INJECTION, SOLUTION INTRAVASCULAR ONCE
Status: COMPLETED | OUTPATIENT
Start: 2018-03-26 | End: 2018-03-26

## 2018-03-26 RX ORDER — LORAZEPAM 2 MG/ML
.5-2 INJECTION INTRAMUSCULAR EVERY 10 MIN PRN
Status: DISCONTINUED | OUTPATIENT
Start: 2018-03-26 | End: 2018-03-26 | Stop reason: HOSPADM

## 2018-03-26 RX ORDER — LIDOCAINE 40 MG/G
CREAM TOPICAL
Status: DISCONTINUED | OUTPATIENT
Start: 2018-03-26 | End: 2018-03-29

## 2018-03-26 RX ORDER — NALOXONE HYDROCHLORIDE 0.4 MG/ML
0.4 INJECTION, SOLUTION INTRAMUSCULAR; INTRAVENOUS; SUBCUTANEOUS EVERY 5 MIN PRN
Status: DISCONTINUED | OUTPATIENT
Start: 2018-03-26 | End: 2018-03-26 | Stop reason: HOSPADM

## 2018-03-26 RX ORDER — KETOROLAC TROMETHAMINE 30 MG/ML
15 INJECTION, SOLUTION INTRAMUSCULAR; INTRAVENOUS
Status: DISCONTINUED | OUTPATIENT
Start: 2018-03-26 | End: 2018-03-26 | Stop reason: HOSPADM

## 2018-03-26 RX ORDER — CEFAZOLIN SODIUM 2 G/100ML
2 INJECTION, SOLUTION INTRAVENOUS EVERY 8 HOURS
Status: COMPLETED | OUTPATIENT
Start: 2018-03-26 | End: 2018-03-27

## 2018-03-26 RX ORDER — PROTAMINE SULFATE 10 MG/ML
5-40 INJECTION, SOLUTION INTRAVENOUS EVERY 10 MIN PRN
Status: DISCONTINUED | OUTPATIENT
Start: 2018-03-26 | End: 2018-03-26 | Stop reason: HOSPADM

## 2018-03-26 RX ORDER — ONDANSETRON 2 MG/ML
4 INJECTION INTRAMUSCULAR; INTRAVENOUS EVERY 4 HOURS PRN
Status: DISCONTINUED | OUTPATIENT
Start: 2018-03-26 | End: 2018-03-26 | Stop reason: HOSPADM

## 2018-03-26 RX ORDER — PROMETHAZINE HYDROCHLORIDE 25 MG/ML
6.25-25 INJECTION, SOLUTION INTRAMUSCULAR; INTRAVENOUS EVERY 4 HOURS PRN
Status: DISCONTINUED | OUTPATIENT
Start: 2018-03-26 | End: 2018-03-26 | Stop reason: HOSPADM

## 2018-03-26 RX ORDER — ADENOSINE 3 MG/ML
6-12 INJECTION, SOLUTION INTRAVENOUS EVERY 5 MIN PRN
Status: DISCONTINUED | OUTPATIENT
Start: 2018-03-26 | End: 2018-03-26 | Stop reason: HOSPADM

## 2018-03-26 RX ORDER — DOBUTAMINE HYDROCHLORIDE 200 MG/100ML
5-40 INJECTION INTRAVENOUS CONTINUOUS PRN
Status: DISCONTINUED | OUTPATIENT
Start: 2018-03-26 | End: 2018-03-26 | Stop reason: HOSPADM

## 2018-03-26 RX ORDER — ATROPINE SULFATE 0.1 MG/ML
.5-1 INJECTION INTRAVENOUS
Status: DISCONTINUED | OUTPATIENT
Start: 2018-03-26 | End: 2018-03-26 | Stop reason: HOSPADM

## 2018-03-26 RX ORDER — DIPHENHYDRAMINE HYDROCHLORIDE 50 MG/ML
25-50 INJECTION INTRAMUSCULAR; INTRAVENOUS
Status: DISCONTINUED | OUTPATIENT
Start: 2018-03-26 | End: 2018-03-26 | Stop reason: HOSPADM

## 2018-03-26 RX ORDER — POTASSIUM CHLORIDE 750 MG/1
40 TABLET, EXTENDED RELEASE ORAL DAILY
Status: DISCONTINUED | OUTPATIENT
Start: 2018-03-27 | End: 2018-04-04 | Stop reason: HOSPADM

## 2018-03-26 RX ORDER — PROTAMINE SULFATE 10 MG/ML
1-5 INJECTION, SOLUTION INTRAVENOUS
Status: DISCONTINUED | OUTPATIENT
Start: 2018-03-26 | End: 2018-03-26 | Stop reason: HOSPADM

## 2018-03-26 RX ORDER — FLUMAZENIL 0.1 MG/ML
0.2 INJECTION, SOLUTION INTRAVENOUS
Status: DISCONTINUED | OUTPATIENT
Start: 2018-03-26 | End: 2018-03-26 | Stop reason: HOSPADM

## 2018-03-26 RX ORDER — POTASSIUM CHLORIDE 20MEQ/15ML
40 LIQUID (ML) ORAL DAILY
Status: DISCONTINUED | OUTPATIENT
Start: 2018-03-26 | End: 2018-03-26

## 2018-03-26 RX ADMIN — POLYETHYLENE GLYCOL 3350 17 G: 17 POWDER, FOR SOLUTION ORAL at 08:08

## 2018-03-26 RX ADMIN — FENTANYL CITRATE 25 MCG: 50 INJECTION, SOLUTION INTRAMUSCULAR; INTRAVENOUS at 16:02

## 2018-03-26 RX ADMIN — VENLAFAXINE HYDROCHLORIDE 150 MG: 150 CAPSULE, EXTENDED RELEASE ORAL at 08:11

## 2018-03-26 RX ADMIN — FENTANYL CITRATE 25 MCG: 50 INJECTION, SOLUTION INTRAMUSCULAR; INTRAVENOUS at 14:58

## 2018-03-26 RX ADMIN — POTASSIUM CHLORIDE 40 MEQ: 40 SOLUTION ORAL at 12:00

## 2018-03-26 RX ADMIN — ATORVASTATIN CALCIUM 40 MG: 40 TABLET, FILM COATED ORAL at 21:45

## 2018-03-26 RX ADMIN — LIDOCAINE 1 PATCH: 560 PATCH PERCUTANEOUS; TOPICAL; TRANSDERMAL at 08:08

## 2018-03-26 RX ADMIN — MIDAZOLAM 0.5 MG: 1 INJECTION INTRAMUSCULAR; INTRAVENOUS at 14:57

## 2018-03-26 RX ADMIN — AMOXICILLIN AND CLAVULANATE POTASSIUM 1 TABLET: 875; 125 TABLET, FILM COATED ORAL at 08:09

## 2018-03-26 RX ADMIN — ACETYLCYSTEINE 6.25 MG/KG/HR: 200 INJECTION, SOLUTION INTRAVENOUS at 11:23

## 2018-03-26 RX ADMIN — DEXTROSE MONOHYDRATE 25 ML: 25 INJECTION, SOLUTION INTRAVENOUS at 07:01

## 2018-03-26 RX ADMIN — DEXTROSE MONOHYDRATE 25 ML: 25 INJECTION, SOLUTION INTRAVENOUS at 07:22

## 2018-03-26 RX ADMIN — CEFAZOLIN SODIUM 2 G: 2 INJECTION, SOLUTION INTRAVENOUS at 22:07

## 2018-03-26 RX ADMIN — SODIUM CHLORIDE, POTASSIUM CHLORIDE, SODIUM LACTATE AND CALCIUM CHLORIDE 1000 ML: 600; 310; 30; 20 INJECTION, SOLUTION INTRAVENOUS at 20:22

## 2018-03-26 RX ADMIN — MIDAZOLAM 0.5 MG: 1 INJECTION INTRAMUSCULAR; INTRAVENOUS at 15:03

## 2018-03-26 RX ADMIN — Medication 37.5 MCG: at 06:53

## 2018-03-26 RX ADMIN — PANTOPRAZOLE SODIUM 40 MG: 40 INJECTION, POWDER, FOR SOLUTION INTRAVENOUS at 08:09

## 2018-03-26 RX ADMIN — FENTANYL CITRATE 25 MCG: 50 INJECTION, SOLUTION INTRAMUSCULAR; INTRAVENOUS at 15:03

## 2018-03-26 RX ADMIN — MELATONIN 3 MG: 3 TAB ORAL at 20:09

## 2018-03-26 RX ADMIN — CEFAZOLIN SODIUM 2 G: 2 INJECTION, SOLUTION INTRAVENOUS at 14:30

## 2018-03-26 RX ADMIN — PANTOPRAZOLE SODIUM 40 MG: 40 INJECTION, POWDER, FOR SOLUTION INTRAVENOUS at 20:09

## 2018-03-26 RX ADMIN — IOPAMIDOL 50 ML: 755 INJECTION, SOLUTION INTRAVASCULAR at 16:45

## 2018-03-26 RX ADMIN — FENTANYL CITRATE 25 MCG: 50 INJECTION, SOLUTION INTRAMUSCULAR; INTRAVENOUS at 15:44

## 2018-03-26 RX ADMIN — SENNOSIDES AND DOCUSATE SODIUM 2 TABLET: 8.6; 5 TABLET ORAL at 03:27

## 2018-03-26 ASSESSMENT — ACTIVITIES OF DAILY LIVING (ADL)
ADLS_ACUITY_SCORE: 11
ADLS_ACUITY_SCORE: 12

## 2018-03-26 NOTE — DISCHARGE INSTRUCTIONS
Home Care after a Pacemaker Implant    Wound care:  Keep your incision (surgery wound) dry for 3 days.  After 3 days, you may remove the outer bandage.  Keep the strips of tape on.  They will be removed at your clinic visit.  Check for signs of infection each day.  These include increased redness, swelling, drainage, bleeding or a fever over 101 F (38.3 C).  Call us immediately if you see any of these signs.  If there are no signs of infection, you may shower in 3 days.  Do not submerge the incision (in a bath tub, hot tub, or swimming pool) until fully healed.    Pain:   You may have mild to moderate pain for 3 to 5 days.  Take acetaminophen (Tylenol) or ibuprofen (Advil) for the pain.  Call us if the pain is severe or lasts more than 5 days.    Activity:  After 24 hours, slowly return to your normal activities.   Healing will take 4 to 6 weeks.  No driving for 3 days  Avoid climbing a ladder alone.  It is best to stay within 4 feet of the ground.  Avoid anything that may cause rough contact or a hard hit to your chest.  This includes football, hockey, and other contact sports.  For at least 4 weeks:  Do not raise your affected arm above your shoulder.  Do not use your affected arm to push, pull, or lift anything over 10 pounds.  Avoid repetitive upper body activities for 6 weeks (ie: golf, swimming, and weight lifting)    Follow Up Visits:  Return to the clinic in 7 to 10 days to have your device and wound checked.      Telling others about your device:  Before you have any medical tests or treatments, tell the doctors, dentists, and other care providers about your device.  There are a few tests and treatments that may interfere with your device.  (These include MRI, radiation therapy, electrocautery, and others.)  Your care team may need to take special steps to keep you safe.  Before you leave the hospital, you will receive a temporary ID card.  A permanent card will be mailed to you about 6 to 8 weeks later.   Always carry the ID card with you.  It has important details about your device.  You should also get a MedicAlert ID.  Please ask us for a MedicAlert brochure, or go to [email or web address].    Safety near electrical equipment:  All of these are safe to use when in good repair:    Microwaves    Radios    Cordless phone    Remote controls    Small electrical tools  Cell phones: Keep cell phones at least 6 inches from your device.  Do not carry the phone in a pocket near your device.  Security salvador: It is okay to walk through security salvador at the airports and department stores.  Tell airport security that you have a pacemaker.  They should keep the screening wand at least 6 inches from your device.  Full-body scanners are safe.  Avoid the following:    MRI tests in the hospital unless you have a MRI safe pacemaker.           Arc welding, chain saws and high-powered industrial or commercial tools.    Power lines, power plants and large power generators.    Electric body fat scales.    Magnetic mattress pads or pillow.    Questions?  Please call [facility].    Device Nurse:          Business Hours:  [phone]                       After Hours:  [phone]   Choose option 4, then ask for the                                                  on-call device nurse at job code [id number].    Your next device clinic appointment is scheduled on:     ___________________________ at _____________.            [facility]  [facility] [address]  [address] [place] [address]  [id number]

## 2018-03-26 NOTE — PLAN OF CARE
Problem: Patient Care Overview  Goal: Plan of Care/Patient Progress Review  Neuro: A&Ox4; at times forgeful.   Cardiac: A fib/A flutter/junctional tach/accelerated junctional tach. VSS.   Respiratory: Sating WDL on 3-4 L NC or at 35% on CPAP.   GI/: Adequate urine output. No BM this shift; pt was given 2 tabs senna along with scheduled miralax.   Diet/appetite: Tolerating NPO diet; prep for pace maker placement.   Activity:  Assist of 1, up to commode and to chair.  Pain: At acceptable level on current regimen.   Skin: Wounds on toe and heel are healing; no drainage noted. Ecchymosis at hip are healing up; outlined ecchymosis is receeding.   LDA's:    Plan: Continue with POC. Notify primary team with changes. Staff provided education regarding pace maker placement; pt would need some reinforcement. Pt was given a bed bath last night and a CHG scrub this morning. Pt's daughter up dated that pt might be taken between 11 am and 1200, per cath lab.

## 2018-03-26 NOTE — PROGRESS NOTES
[facility] Progress  Note            Assessment and Plan:   Carlos Alberto Fenton is a 77 year old female with PMH of HTN, HLD, DM, CVA (11/2016), chronic diastolic HF (last ECHO 12/12/17 EF 45-50%), CAD (s/p 3v CABG: LIMA-LAD, SVG-D1, SVG-dRCA and modified MAZE, ABDIEL ligation - 2/2016), paroxysmal AFib ([id number] - 8 on Xarelto), HIT, RUE DVT, recently diagnosed hypothyroidism, admitted 3/21/2018 for acute GIB, coagulopathy and concern for acute LI 2/2 acetaminophen toxicity with xarelto intake. Now tachy-israel arrhythmia. Planning to PPM placement by EP on 3/26.     Pt does not have suicidal ideation, even though she has acetaminophen toxicity.     Changes today:  - cont Augmentin for hazy L basilar opacities and crackles on LLL  - Plan for PPM placement today by EP  - NPO for procedure  - Hgb Q12 h  - f/u w/GI regarding NAC infusion  - F actin weakly positive   - Mitochondial M2 antibody is negative  - for delirium precautions, will schedule melatonin, try to keep pt awake during day     #Hazy L basilar opacities  --empirical treatment for aspiration PNA with zosyn on 3/24-3/25--switched to Augmentin on 3/25     # Coagulopathy, INR to 5, elevated PT and PTT  # Concern for tylenol toxicity (elevated acetaminophen levels)  Pt states she was taking 3-4g tylenol per day for the past month for chronic back pain. Last dose was two days ago  Pt previously on warfarin for Afib. She started xarelto as of Jan 2018. She states she was not taking warfarin and xarelto simultaneously. It is possible that her coagulopathy is occurring in the setting of xarelto, tylenol, and acute liver failure. Her LFTs are WNL.      We are continuing her NAC infusion per toxicology and GI. Her INR has continued to improve, but her LFTs have been increasing. Per heme/onc per mixing studies, pt does not have factor deficiency.  -- trend INR  --GI following, we appreciate recs  --cont NAC treatment  --Pending labs: HCV and HBV panels, AMPARO,  anti mitochondrial, homocysteine, methylmalonic acid  --pharm consult to determine other meds that could interact with xarelto     # Junctional Bradycardia/Tachycardia  # Afib with RVR  # Prolonged QTc   [id number]  Pt prev on warfarin, she was transitioned to xarelto in Jan 2018 because she likes leafy greens.  --hold xarelto as above  --EP consulted. recs placement of ppm on 3/26 -- NPO AMN  -- TTE 3/25 -- pending result  --hold beta blockade  --D/C trazodone as prolongs QTc     # Low urine output--approx 26.5 cc/hr on 3/24 -- resolving  Differential includes dehydration (pre-renal), although Cr not significantly increased. UA showed +ketones, blood sugar normal. UOP improves after IVF. Cardiology thought less likely from bradycardia due to HR never <40.   --plan to straight cath post-void to determine if US bladder are accurate  --consider kidney U/S if not retaining     # Acute blood loss anemia (baseline 11-12 1 month prior, current hgb ~7)  Differential includes acute GI bleed (most likely), vs hemolysis, vs other reversible cause of anemia with rapid onset, given recent baseline one month ago showed hgb 11-12. Hgb has been stable since admission and no evidence of bleeding seen.   Plan:  -- GI consult, appreciate recs  -- trend Hgb q12h, transfuse <7  -- continue IV PPI BID   -- hold AC (home ASA 81 and xarelto)  -- folate, B12, transferrin, iron, haptoglobin, reticulocyte count, blood smear pending      # HARINI on CKD -- resolved  Cr at bsl ~1.1-4. On presentation 1.8. UA with hyaline casts, tachy, hypotensive. Likely prerenal in the setting of GIB and decreased PO intake/emesis. Cr improved with IVF.   -- trend BMP      # Hypothyroidism  Recently diagnosed. On Levothyroxine. Last TSH 2/21 elevated to 88, T4 improved from 0.29 to 0.44.   -- increase levothyroxine to 37.5 from PTA dose of 25 mcg  -- consider re-testing TSH this week      # Tropinemia, likely 2/2 demand ischemia 2/2 anemia  EKG did not show ST  "elevation, ST segment depression or T wave inversions. Pt is not having CP. Likely demand ischemia.     # Insominia  -holding trazodone in lieu of prolonged QTc     Chronic problems  # HFpEF  # CAD  # HLD  ECHO shows EF 45-50%.   Plan:  --holding metolazone, spironolactone, and lasix  --cont atorvastatin   --holding ASA     # Depression  -- continue PTA venlafaxine        # Pain Assessment:   Current Pain Score 3/25/2018 3/24/2018 3/23/2018   Patient currently in pain? denies no no   Pain score (0-10) - - -   Pain location - - -   Pain descriptors - - -   CPOT pain score - - -   Carlos Alberto s pain level was assessed and she currently denies pain.       Diet:   Cardiac Diet after procedure  Fluids: none  DVT Prophylaxis: mechanical  Code Status: Full Code     - Disposition:   Pending for clinical improvement, likely needs 2-5 nights    Patient was seen and discussed with .     Ayaka Mclaughlin MD PhD   Internal Medicine, PGY 1  p     -----------------------------------------------------------------------------------------------------------------------------------------------      Interval History  No acute events overnight.    Pt is sleepy this AM. She says she usually sleeps during the day and is up at night at home.     Review of Systems:   A 4 point review of systems was negative except as noted above.    OBJECTIVE:  BP (!) 121/95  Pulse 61  Temp 97.6  F (36.4  C) (Oral)  Resp 16  Ht 1.575 m (5' 2\")  Wt 91.8 kg (202 lb 6.1 oz)  SpO2 97%  BMI 37.02 kg/m2    Intake/Output Summary (Last 24 hours) at 03/26/18 1231  Last data filed at 03/26/18 1003   Gross per 24 hour   Intake           1266.5 ml   Output             1325 ml   Net            -58.5 ml       GENERAL APPEARANCE: drowsy in AM, not in distress  Pulmonary: decreased breath sounds bilaterally, crackles in LLL   CV: irregular rate and rhythm, no murmur, no rub  GI: soft, nontender, no HSM   Extremities: pitting edema 1+ BLE  NEURO: " drowsy, but easily to wake up, mentation intact when awake and speech normal, equally move  Skin: bruises on back are improving    Labs:   All labs reviewed by me  Electrolytes/Renal -   Recent Labs   Lab Test  03/26/18   0632  03/25/18   0608  03/24/18   0444   02/16/17   0811  02/15/17   1428  02/14/17   0833   NA  142  142  139   < >  134  139  134   POTASSIUM  3.6  4.1  4.6   < >  3.9  4.1  4.4   CHLORIDE  108  107  105   < >  101  103  101   CO2  26  22  19*   < >  26  27  26   BUN  14  23  30   < >  12  17  11   CR  0.71  0.81  1.09*   < >  0.65  0.65  0.67   GLC  111*  94  147*   < >  103*  141*  181*   CARLY  8.7  8.2*  8.6   < >  8.0*  8.1*  8.1*   MAG  2.1  2.0  2.0   < >  1.8  1.8  2.2   PHOS   --    --    --    --   2.2*  2.6  2.3*    < > = values in this interval not displayed.     CBC -   Recent Labs   Lab Test  03/26/18   0632  03/25/18   1754  03/25/18   0608  03/24/18   0444   WBC  6.9   --   5.6  7.3   HGB  8.0*  7.8*  7.9*  8.0*   PLT  169   --   172  178     LFTs   Recent Labs   Lab Test  03/26/18   0632  03/25/18   0608  03/24/18   0444   ALKPHOS  151*  116  77   BILITOTAL  1.2  1.4*  1.4*   ALT  66*  78*  82*   AST  65*  83*  100*   PROTTOTAL  6.1*  5.5*  6.1*   ALBUMIN  2.7*  2.5*  2.8*       Imaging:  None     Current Medications:    [START ON 3/27/2018] potassium chloride  40 mEq Oral Daily     sodium chloride (PF)  3 mL Intravenous Q8H     amoxicillin-clavulanate  1 tablet Oral Q12H ABHISHEK     polyethylene glycol  17 g Oral Daily     sodium chloride (PF)  3 mL Intracatheter Q8H     sodium chloride (PF)  3 mL Intracatheter Q8H     melatonin  3 mg Oral At Bedtime     sodium chloride (PF)  3 mL Intracatheter Q8H     ceFAZolin  2 g Intravenous Pre-Op/Pre-procedure x 1 dose     lidocaine  1 patch Transdermal Q24H     lidocaine   Transdermal Q24h     lidocaine   Transdermal Q8H     Resume a HELD Medication   Does not apply See Admin Instructions     atorvastatin  40 mg Oral At Bedtime     insulin  aspart  0.5-2.5 Units Subcutaneous TID AC     insulin aspart  0.5-2.5 Units Subcutaneous At Bedtime     venlafaxine  150 mg Oral Daily with breakfast     pantoprazole (PROTONIX) IV  40 mg Intravenous BID     ferrous sulfate  325 mg Oral Daily with breakfast     levothyroxine  37.5 mcg Oral QAM AC       - MEDICATION INSTRUCTIONS -       - MEDICATION INSTRUCTIONS -       sodium chloride 30 mL/hr at 03/26/18 0718     acetylcysteine (ACETADOTE) infusion *third dose - elevated AST* 6.25 mg/kg/hr (03/26/18 1123)     Injection Device for insulin       Ayaka Mclaughlin MD

## 2018-03-26 NOTE — OP NOTE
EP PROCEDURE NOTE    Procedures:  1. Dual chamber PPM device implantation.  2. Left subclavian venogram.    Attending: Dr. Vazquez  EP Fellow: Dr. Loyola  Procedure Date: 3/26/2018    Pre-operative Diagnosis:  Tachycardia-bradycardia syndrome.  Post-operative diagnosis:   Successful implantation of PPM.  Complications: None.     Fluoroscopy time/dose: 11.7 minutes, 23885 mGycm2.    Clinical Profile:  Carlos Alberto Fenton is a 77 year old female with history of pAF with [id number] score of 8, previously on rivaraxaban but held this admission 2/2 to acute blood loss aenmia, now with what appears to be atypical left sided flutter and relative tachy/israel syndrome. She has complicated history including:CAD s/p 3 vessel CABG (LIMA-LAD, v-D1, v-RCA) + MAZE + ABDIEL ligation in 2/2016, heart failure with LV EF 45-50%, hypertension, type 2 diabetes, and history of stroke, DVT, and HIT.   She is scheduled for pacemaker placement today secondary to tachybradycardia syndrome and need for AV madelyn blockade or amiodarone to manage her atrial arrhythmias.    PROCEDURE  The risks and benefits of the procedure were explained to the patient in full.  The risks of the procedure include, but are not limited to: pain, bleeding, blood transfusion reactions, infection, pneumothorax, perforation of vessels or heart, pericardial effusion, and death. Informed Consent was obtained. The patient was brought to the EP lab in a fasting and hemodynamically stable condition. The patient was prepped and draped in a sterile fashion.     Sedation: This procedure was performed under moderate sedation under the supervision of the the Staff Physician. The patient received 1 mg Versed and 100 mcg Fentanyl for a total procedural sedation time of 80 minutes. Heart rate, blood pressure, oxygen saturation, and patient responses were monitored throughout the procedure with the assistance of the RN.     The left chest wall was vigorously washed, prepped, and  sterilely draped. The chest wall was anesthetized with 2% lidocaine. A subclavian venogram was performed.    A 5 cm incision was made approximately 2 fingerbreadths from the clavicle. The subcutaneous tissue was carefully dissected down to the pectoral fascia. The dissection was carried inferiorly to form a subcutaneous pocket with adequate hemostasis provided by electrocautery. The subclavian vein was accessed via the pocket and over the first rib under fluoroscopic guidance, and two guidewires were inserted down into the level of the IVC.    A peel away sheath was inserted through the lateral guidewire. The guidewire and dilator was removed. A right ventricular lead was inserted through the sheath down to the RV septum and was screwed into the myocardium. There was very good sensing and pacing threshold at this position. Ten volt Pacing was performed without diaphragmatic stimulation. The sheath was then peeled away, and the sleeve adapter was advanced over the lead. The lead was then secured to the muscle using 0-Ethibond suture.     A peel away sheath was inserted through the medial guidewire. The guidewire and dilator was removed. A right atrial lead was inserted through the sheath and positioned the lead at the RA appendage. The lead was screwed into the myocardium. There was very good sensing and pacing threshold at this position. Ten volt pacing did not produce diaphragmatic stimulation. The sheath was then peeled away. The right atrial lead was sutured down into the underlying muscle using 0-ethibond.      The pocket was then vigorously washed with antibiotic solution. The right atrial and right ventricular leads were inserted into the pulse generator. The pulse generator and the lead were inserted into the subcutaneous pocket and secured with a 0-Ethibond suture.    The pocket was then closed in 3 layers using 2-0 Vicryl for the deep layers and 4-0 Vicryl for the subcuticular layer. Steri-Strips and a  dressing were then applied over the incision site, and the patient was transported to a monitored bed.    Dr. Vazquez was present throughout the entire procedure.    EQUIPMENT  1.The Pulse Generator is a  Medtronic W1DR01 , Serial [id number].   2.The RA Lead is a  Medtronic 5076-52, Serial [id number].   3.The RV Lead is a Medtronic 5076-58, Serial [id number].    MEASUREMENTS  The RA is sensing P waves of 2.0 mV and a pacing capture threshold of 0.5 V @ 0.4 msec with an impedance of 399 ohms.  The RV is sensing R waves of 5.0 mV and a pacing capture threshold of 0.5 V @ 0.4 msec with an impedance of 646 ohms.     FINAL PROGRAMMING  Amrik Settings:  -Pacing mode: AAIR<=>DDDR.  -Lower Rate Limit: 50 ppm.  -Upper Rate Limit: 130 ppm.    PLAN  1. Wound care.  2. Antibiotics.    3. CXR and Device interrogation in a.m.    Elaine Loyola  EP fellow  I appreciated the opportunity to see and assess Mrs Fenton and direct the procedure described above with EP Fellow Dr Loyola. After the procedure we discussed results with patient and family.    I agree with the above note which summarizes my findings and current recommendations. Please do not hesitate to contact me if you have any questions or concerns.    Arnulfo Vazquez MD [facility]   Pager [phone]  Office [phone]

## 2018-03-26 NOTE — PROGRESS NOTES
"[facility]    Hepatology Follow-up    CC: Weakness    Dx: Aspiration Pneumonia   Acetaminophen Toxicity   Hypertransaminasemia   Tachy-israel    24 hour events: Continued on NAC.  Tylenol level negative.  Planned for PPM this AM.      Subjective:  Patient denies abdominal pain.  Otherwise remains anxious for procedure this AM.  No blood in stool.     Medications  Current Facility-Administered Medications   Medication Dose Route Frequency     [START ON 3/27/2018] potassium chloride  40 mEq Oral Daily     sodium chloride (PF)  3 mL Intravenous Q8H     amoxicillin-clavulanate  1 tablet Oral Q12H ABHISHEK     polyethylene glycol  17 g Oral Daily     sodium chloride (PF)  3 mL Intracatheter Q8H     sodium chloride (PF)  3 mL Intracatheter Q8H     melatonin  3 mg Oral At Bedtime     sodium chloride (PF)  3 mL Intracatheter Q8H     ceFAZolin  2 g Intravenous Pre-Op/Pre-procedure x 1 dose     lidocaine  1 patch Transdermal Q24H     lidocaine   Transdermal Q24h     lidocaine   Transdermal Q8H     Resume a HELD Medication   Does not apply See Admin Instructions     atorvastatin  40 mg Oral At Bedtime     insulin aspart  0.5-2.5 Units Subcutaneous TID AC     insulin aspart  0.5-2.5 Units Subcutaneous At Bedtime     venlafaxine  150 mg Oral Daily with breakfast     pantoprazole (PROTONIX) IV  40 mg Intravenous BID     ferrous sulfate  325 mg Oral Daily with breakfast     levothyroxine  37.5 mcg Oral QAM AC       Review of systems  A 10-point review of systems was negative.    Examination  BP (!) 121/95  Pulse 61  Temp 97.6  F (36.4  C) (Oral)  Resp 16  Ht 1.575 m (5' 2\")  Wt 91.8 kg (202 lb 6.1 oz)  SpO2 97%  BMI 37.02 kg/m2    Intake/Output Summary (Last 24 hours) at 03/26/18 1432  Last data filed at 03/26/18 1256   Gross per 24 hour   Intake           1568.9 ml   Output             1175 ml   Net            393.9 ml       Gen- well, NAD, A+Ox3, normal color  CVS- RRR  RS- CTA  Abd- soft, NT, ND, " +BS, obese  Extr- warm and well perfused, no edema  Neuro- grossly intact  Skin- no rash  Psych- normal mood  Lym- no LAD    Laboratory  Lab Results   Component Value Date     03/26/2018    POTASSIUM 3.6 03/26/2018    CHLORIDE 108 03/26/2018    CO2 26 03/26/2018    BUN 14 03/26/2018    CR 0.71 03/26/2018       Lab Results   Component Value Date    BILITOTAL 1.2 03/26/2018    ALT 66 03/26/2018    AST 65 03/26/2018    ALKPHOS 151 03/26/2018       Lab Results   Component Value Date    WBC 6.9 03/26/2018    HGB 8.0 03/26/2018     03/26/2018     03/26/2018       Lab Results   Component Value Date    INR 1.62 03/26/2018     MELD-Na score: 12 at 3/26/2018  6:32 AM  MELD score: 12 at 3/26/2018  6:32 AM  Calculated from:  Serum Creatinine: 0.71 mg/dL (Rounded to 1) at 3/26/2018  6:32 AM  Serum Sodium: 142 mmol/L (Rounded to 137) at 3/26/2018  6:32 AM  Total Bilirubin: 1.2 mg/dL at 3/26/2018  6:32 AM  INR(ratio): 1.62 at 3/26/2018  6:32 AM  Age: 77 years     F actin 23  Smooth -    Radiology    Ultrasound:  1. Limited Doppler ultrasound of the right upper quadrant with  bidirectional flow in the portal veins, likely secondary to portal  hypertension.  2. No convincing portal vein thrombus is seen.  3. Splenic vein not well visualized.  4. Remainder of the Doppler examination was patent and normal.    CTAP without contrast:  1. No retroperitoneal, abdominal or pelvic hematoma.  2. Normal caliber of the abdominal aorta and visualized thoracic  aorta.  3. Mild right upper quadrant and pelvic low density free fluid of  unclear etiology, possibly related to fluid resuscitation.  4. Moderate-to-advanced degenerative spondylosis of the lumbar spine,  greatest at L3-4 and L4-5.  5. Small amount of focal soft tissue stranding lateral to the right  iliac crest, may represent a small ecchymosis.      Assessment  77 year old female with HTN, HLD, DM, CAD, CVA, pAfib on Xarelto presents with mild acute liver injury in  the setting of unintentional acetaminophen toxicity.  Improving.  Ultrasound imaging does raise some question of portal hypertension, though this seems less likely with normal platelets and spleen size.  Patient would be at risk for BRICENO given comorbidities.      Recommendations  1. Trend liver function tests.  2. Discontinue NAC  3. Follow up hepatitis serologies and AMPARO    Case discussed with Dr. Jin who is in agreement with the plan of care.  Please page with ? Or change in clinical status.      Candice Pelletier MD  Hepatology  [phone]

## 2018-03-26 NOTE — PLAN OF CARE
Problem: Patient Care Overview  Goal: Plan of Care/Patient Progress Review  OT 6B: Cancel - pt planning to go to the OR for pacemaker placement. Will reschedule.

## 2018-03-26 NOTE — PLAN OF CARE
Problem: Patient Care Overview  Goal: Plan of Care/Patient Progress Review  6B - Pt in heart cath lab for pacemaker placement, not available for PT.

## 2018-03-26 NOTE — PLAN OF CARE
"Problem: Patient Care Overview  Goal: Plan of Care/Patient Progress Review  /78 (BP Location: Left arm)  Pulse 61  Temp 98  F (36.7  C) (Oral)  Resp 16  Ht 1.575 m (5' 2\")  Wt 87.6 kg (193 lb 3.2 oz)  SpO2 91%  BMI 35.34 kg/m2    Neuro: Sleepy through much of the day, woke to voice and physical touch. More awake this evening and making needs known.  Oriented x4.   Cardiac: Afib/flutter, rates 50-60s and then 100-130s, fluctuated through the day. VSS, afebrile.  Respiratory: Sating low to mid 90s on 1.5L NC or 35% CPAP.  GI/: Adequate urine output. No BM, PRN senna given this morning and miralax.  Diet/appetite: Tolerating low Na and low fat diet. Eating well.  Activity:  Assist of 1, up to chair and commode  Pain: At acceptable level on current regimen.   Skin: Intact, no new deficits noted. Bruising on right hip continues to improve.  Plan: NPO at midnight, plan for pacemaker placement Monday. Continue with POC. Notify primary team with changes.      Problem: Diabetes Comorbidity  Goal: Diabetes  Patient comorbidity will be monitored for signs and symptoms of hyperglycemia or hypoglycemia. Problems will be absent, minimized or managed by discharge/transition of care.   Assessing blood glucose AC/HS and 0200, covering 0200.    Problem: Cardiac Disease Comorbidity  Goal: Cardiac Disease  Patient comorbidity will be monitored for signs and symptoms of Cardiac Disease.  Problems will be absent, minimized or managed by discharge/transition of care.   On continuous telemetry monitoring.      "

## 2018-03-26 NOTE — PROGRESS NOTES
[facility] Progress  Note  Date of Service: March 25, 2018            Assessment and Plan:   Carlos Alberto Fenton is a 77 year old female with PMH of HTN, HLD, DM, CVA (11/2016), chronic diastolic HF (last ECHO 12/12/17 EF 45-50%), CAD (s/p 3v CABG: LIMA-LAD, SVG-D1, SVG-dRCA and modified MAZE, ABDIEL ligation - 2/2016), paroxysmal AFib ([id number] - 8 on Xarelto), HIT, RUE DVT, recently diagnosed hypothyroidism, admitted 3/21/2018 for acute GIB, coagulopathy and concern for acute LI 2/2 acetaminophen toxicity with xarelto intake. Now tachy-israel arrhythmia. Planning to PPM placement by EP today (3/26).     Pt does not have suicidal ideation, even though she has acetaminophen toxicity.     Changes today:  - Plan for PPM placement today by EP  - NPO for procedure  - Hgb Q12 h  - F/U w/ GI regarding NAC  - for delirium precautions, scheduled melatonin and try to keep pt awake during day      #Hazy L basilar opacities  --empirical treatment for aspiration PNA with zosyn on 3/24-3/25--switched to Augmentin on 3/25     # Coagulopathy, INR to 5, elevated PT and PTT  # Concern for tylenol toxicity (elevated acetaminophen levels)  Pt states she was taking 3-4g tylenol per day for the past month for chronic back pain. Last dose was two days ago  Pt previously on warfarin for Afib. She started xarelto as of Jan 2018. She states she was not taking warfarin and xarelto simultaneously. It is possible that her coagulopathy is occurring in the setting of xarelto, tylenol, and acute liver failure. Her LFTs are WNL. Per pharmacy concomittent use of aspirin and xarelto can cause increased risk of bleeding.      We are continuing her NAC infusion per toxicology and GI. Her INR has continued to improve and starting today 3/26 down trending of liver enzymes. Per heme/onc per mixing studies, pt does not have factor deficiency.  -- trend INR  --GI following, we appreciate recs  --f/u w/ GI regarding NAC  --Pending labs: HCV and HBV  panels, AMPARO, anti mitochondrial, and homocysteine, methylmalonic acid        # Junctional Bradycardia/Tachycardia  # Afib with RVR  # Prolonged QTc   [id number]  Pt prev on warfarin, she was transitioned to xarelto in Jan 2018 because she likes leafy greens.  --hold xarelto as above  --EP consulted. recs placement of ppm on 3/26 -- NPO AMN  -- PPM procedure today   --hold beta blockade  --D/C trazodone as prolongs QTc     # Low urine output--approx 26.5 cc/hr on 3/24 -- resolving  Differential includes dehydration (pre-renal), although Cr not significantly increased. UA showed +ketones, blood sugar normal. UOP improves after IVF. Cardiology thought less likely from bradycardia due to HR never <40.   --continue to monitor I/Os    # Acute blood loss anemia (baseline 11-12 1 month prior, current hgb ~7)  Differential includes acute GI bleed (most likely), vs hemolysis, vs other reversible cause of anemia with rapid onset, given recent baseline one month ago showed hgb 11-12. Hgb has been stable since admission and no evidence of bleeding seen.   Plan:  -- GI consult, appreciate recs  -- trend Hgb q12h, transfuse <7  -- continue IV PPI BID   -- hold AC (home ASA 81 and xarelto)        # HARINI on CKD -- resolved  Cr at bsl ~1.1-4. On presentation 1.8. UA with hyaline casts, tachy, hypotensive. Likely prerenal in the setting of GIB and decreased PO intake/emesis. Cr improved with IVF.   -- trend BMP      # Hypothyroidism  Recently diagnosed. On Levothyroxine. Last TSH 2/21 elevated to 88, T4 improved from 0.29 to 0.44.   -- increase levothyroxine to 37.5 from PTA dose of 25 mcg      # Tropinemia, likely 2/2 demand ischemia 2/2 anemia  EKG did not show ST elevation, ST segment depression or T wave inversions. Pt is not having CP. Likely demand ischemia.     # Insominia  -holding trazodone in lieu of prolonged QTc    # Delirium  Has fluctuating mentation throughout the day.   -melatonin at night  -try to keep her awake  "during the day        Chronic problems  # HFpEF  # CAD  # HLD  ECHO shows EF 45-50%.   Plan:  --holding metolazone, spironolactone, and lasix  --cont atorvastatin   --holding ASA     # Depression  -- continue PTA venlafaxine        # Pain Assessment:   Current Pain Score 3/25/2018 3/24/2018 3/23/2018   Patient currently in pain? denies no no   Pain score (0-10) - - -   Pain location - - -   Pain descriptors - - -   CPOT pain score - - -   Asenath s pain level was assessed and she currently denies pain.       Diet:   Cardiac Diet  Fluids: none  DVT Prophylaxis: mechanical  Code Status: Full Code     - Disposition:   Pending for clinical improvement, likely needs 2-5 nights    Patient was seen and discussed with .     Pallavi Ruiz, MS3  -----------------------------------------------------------------------------------------------------------------------------------------------      Interval History  No acute events overnight. Patient stated that she sleeps during the day and stays awake at night at home, discussed with her that while she is in the hospital will try to get her to sleep at night to decrease delirium concern. Denies pain. Breathing is fine.     Review of Systems:   A 4 point review of systems was negative except as noted above.    OBJECTIVE:  BP (!) 139/99  Pulse 61  Temp 97  F (36.1  C) (Axillary)  Resp 17  Ht 1.575 m (5' 2\")  Wt 91.8 kg (202 lb 6.1 oz)  SpO2 99%  BMI 37.02 kg/m2      Intake/Output Summary (Last 24 hours) at 03/26/18 1028  Last data filed at 03/26/18 1003   Gross per 24 hour   Intake           1448.9 ml   Output             1325 ml   Net            123.9 ml         GENERAL APPEARANCE: drowsy in AM, not in distress  Pulmonary: decreased breath sounds bilaterally, some crackles appreciated bilaterally in lung bases   CV: irregular rate and rhythm,  no murmur, no rub  GI: soft, nontender, no HSM   Extremities: pitting edema 1+ BLE  NEURO: drowsy, but easily to wake up, " mentation intact when awake and speech normal, equally move    Labs:   All labs reviewed by me  Electrolytes/Renal -   Recent Labs   Lab Test  03/26/18   0632  03/25/18   0608  03/24/18   0444   02/16/17   0811  02/15/17   1428  02/14/17   0833   NA  142  142  139   < >  134  139  134   POTASSIUM  3.6  4.1  4.6   < >  3.9  4.1  4.4   CHLORIDE  108  107  105   < >  101  103  101   CO2  26  22  19*   < >  26  27  26   BUN  14  23  30   < >  12  17  11   CR  0.71  0.81  1.09*   < >  0.65  0.65  0.67   GLC  111*  94  147*   < >  103*  141*  181*   CARLY  8.7  8.2*  8.6   < >  8.0*  8.1*  8.1*   MAG  2.1  2.0  2.0   < >  1.8  1.8  2.2   PHOS   --    --    --    --   2.2*  2.6  2.3*    < > = values in this interval not displayed.     CBC -   Recent Labs   Lab Test  03/26/18   0632  03/25/18   1754  03/25/18   0608  03/24/18   0444   WBC  6.9   --   5.6  7.3   HGB  8.0*  7.8*  7.9*  8.0*   PLT  169   --   172  178     LFTs   Recent Labs   Lab Test  03/26/18   0632  03/25/18   0608  03/24/18   0444   ALKPHOS  151*  116  77   BILITOTAL  1.2  1.4*  1.4*   ALT  66*  78*  82*   AST  65*  83*  100*   PROTTOTAL  6.1*  5.5*  6.1*   ALBUMIN  2.7*  2.5*  2.8*       Imaging:  None     Current Medications:    sodium chloride (PF)  3 mL Intravenous Q8H     amoxicillin-clavulanate  1 tablet Oral Q12H ABHISHEK     polyethylene glycol  17 g Oral Daily     sodium chloride (PF)  3 mL Intracatheter Q8H     sodium chloride (PF)  3 mL Intracatheter Q8H     melatonin  3 mg Oral At Bedtime     sodium chloride (PF)  3 mL Intracatheter Q8H     ceFAZolin  2 g Intravenous Pre-Op/Pre-procedure x 1 dose     lidocaine  1 patch Transdermal Q24H     lidocaine   Transdermal Q24h     lidocaine   Transdermal Q8H     Resume a HELD Medication   Does not apply See Admin Instructions     atorvastatin  40 mg Oral At Bedtime     insulin aspart  0.5-2.5 Units Subcutaneous TID AC     insulin aspart  0.5-2.5 Units Subcutaneous At Bedtime     venlafaxine  150 mg Oral Daily  with breakfast     pantoprazole (PROTONIX) IV  40 mg Intravenous BID     ferrous sulfate  325 mg Oral Daily with breakfast     levothyroxine  37.5 mcg Oral QAM AC       - MEDICATION INSTRUCTIONS -       - MEDICATION INSTRUCTIONS -       sodium chloride 30 mL/hr at 03/26/18 0718     acetylcysteine (ACETADOTE) infusion *third dose - elevated AST* 6.25 mg/kg/hr (03/26/18 0719)     Injection Device for insulin       Pallavi Ruiz

## 2018-03-26 NOTE — PROVIDER NOTIFICATION
MD notified of -150's after return from cath lab and pacer placement. Rarely AV paced. BP stable. Pt asymptomatic. Will continue to monitor.     2000 - order received for 1L LR bolus.

## 2018-03-27 ENCOUNTER — ALLIED HEALTH/NURSE VISIT (OUTPATIENT)
Dept: CARDIOLOGY | Facility: CLINIC | Age: 78
DRG: 813 | End: 2018-03-27
Attending: INTERNAL MEDICINE
Payer: MEDICARE

## 2018-03-27 ENCOUNTER — APPOINTMENT (OUTPATIENT)
Dept: PHYSICAL THERAPY | Facility: CLINIC | Age: 78
DRG: 813 | End: 2018-03-27
Attending: INTERNAL MEDICINE
Payer: MEDICARE

## 2018-03-27 ENCOUNTER — APPOINTMENT (OUTPATIENT)
Dept: GENERAL RADIOLOGY | Facility: CLINIC | Age: 78
DRG: 813 | End: 2018-03-27
Attending: NURSE PRACTITIONER
Payer: MEDICARE

## 2018-03-27 DIAGNOSIS — I48.0 PAROXYSMAL ATRIAL FIBRILLATION (H): Primary | ICD-10-CM

## 2018-03-27 PROBLEM — Z95.0 CARDIAC PACEMAKER: Status: ACTIVE | Noted: 2018-03-27

## 2018-03-27 LAB
ALBUMIN SERPL-MCNC: 2.8 G/DL (ref 3.4–5)
ALP SERPL-CCNC: 153 U/L (ref 40–150)
ALT SERPL W P-5'-P-CCNC: 54 U/L (ref 0–50)
ANION GAP SERPL CALCULATED.3IONS-SCNC: 9 MMOL/L (ref 3–14)
AST SERPL W P-5'-P-CCNC: 60 U/L (ref 0–45)
BILIRUB SERPL-MCNC: 1.2 MG/DL (ref 0.2–1.3)
BUN SERPL-MCNC: 11 MG/DL (ref 7–30)
CALCIUM SERPL-MCNC: 8.7 MG/DL (ref 8.5–10.1)
CHLORIDE SERPL-SCNC: 102 MMOL/L (ref 94–109)
CO2 SERPL-SCNC: 26 MMOL/L (ref 20–32)
CREAT SERPL-MCNC: 0.68 MG/DL (ref 0.52–1.04)
ERYTHROCYTE [DISTWIDTH] IN BLOOD BY AUTOMATED COUNT: 24.1 % (ref 10–15)
GFR SERPL CREATININE-BSD FRML MDRD: 84 ML/MIN/1.7M2
GLUCOSE BLDC GLUCOMTR-MCNC: 107 MG/DL (ref 70–99)
GLUCOSE BLDC GLUCOMTR-MCNC: 108 MG/DL (ref 70–99)
GLUCOSE BLDC GLUCOMTR-MCNC: 109 MG/DL (ref 70–99)
GLUCOSE BLDC GLUCOMTR-MCNC: 152 MG/DL (ref 70–99)
GLUCOSE BLDC GLUCOMTR-MCNC: 198 MG/DL (ref 70–99)
GLUCOSE SERPL-MCNC: 151 MG/DL (ref 70–99)
HCT VFR BLD AUTO: 26.3 % (ref 35–47)
HGB BLD-MCNC: 7.7 G/DL (ref 11.7–15.7)
INR PPP: 1.47 (ref 0.86–1.14)
LACTATE BLD-SCNC: 1.6 MMOL/L (ref 0.4–1.9)
MAGNESIUM SERPL-MCNC: 1.9 MG/DL (ref 1.6–2.3)
MCH RBC QN AUTO: 35.5 PG (ref 26.5–33)
MCHC RBC AUTO-ENTMCNC: 29.3 G/DL (ref 31.5–36.5)
MCV RBC AUTO: 121 FL (ref 78–100)
METHYLMALONATE SERPL-SCNC: 0.5 UMOL/L (ref 0–0.4)
PLATELET # BLD AUTO: 162 10E9/L (ref 150–450)
POTASSIUM SERPL-SCNC: 3.9 MMOL/L (ref 3.4–5.3)
PROT SERPL-MCNC: 6.3 G/DL (ref 6.8–8.8)
RBC # BLD AUTO: 2.17 10E12/L (ref 3.8–5.2)
SODIUM SERPL-SCNC: 137 MMOL/L (ref 133–144)
WBC # BLD AUTO: 8.2 10E9/L (ref 4–11)

## 2018-03-27 PROCEDURE — 12000006 ZZH R&B IMCU INTERMEDIATE UMMC

## 2018-03-27 PROCEDURE — 99024 POSTOP FOLLOW-UP VISIT: CPT | Performed by: NURSE PRACTITIONER

## 2018-03-27 PROCEDURE — 97530 THERAPEUTIC ACTIVITIES: CPT | Mod: GP

## 2018-03-27 PROCEDURE — 25000132 ZZH RX MED GY IP 250 OP 250 PS 637: Mod: GY

## 2018-03-27 PROCEDURE — 99233 SBSQ HOSP IP/OBS HIGH 50: CPT | Mod: GC | Performed by: INTERNAL MEDICINE

## 2018-03-27 PROCEDURE — 25000128 H RX IP 250 OP 636: Performed by: STUDENT IN AN ORGANIZED HEALTH CARE EDUCATION/TRAINING PROGRAM

## 2018-03-27 PROCEDURE — 25000125 ZZHC RX 250: Performed by: HOSPITALIST

## 2018-03-27 PROCEDURE — 97110 THERAPEUTIC EXERCISES: CPT | Mod: GP

## 2018-03-27 PROCEDURE — A9270 NON-COVERED ITEM OR SERVICE: HCPCS | Mod: GY | Performed by: STUDENT IN AN ORGANIZED HEALTH CARE EDUCATION/TRAINING PROGRAM

## 2018-03-27 PROCEDURE — 25000132 ZZH RX MED GY IP 250 OP 250 PS 637: Mod: GY | Performed by: STUDENT IN AN ORGANIZED HEALTH CARE EDUCATION/TRAINING PROGRAM

## 2018-03-27 PROCEDURE — A9270 NON-COVERED ITEM OR SERVICE: HCPCS | Mod: GY | Performed by: MARRIAGE & FAMILY THERAPIST

## 2018-03-27 PROCEDURE — 33208 INSRT HEART PM ATRIAL & VENT: CPT | Mod: 78 | Performed by: INTERNAL MEDICINE

## 2018-03-27 PROCEDURE — 80053 COMPREHEN METABOLIC PANEL: CPT | Performed by: STUDENT IN AN ORGANIZED HEALTH CARE EDUCATION/TRAINING PROGRAM

## 2018-03-27 PROCEDURE — 93280 PM DEVICE PROGR EVAL DUAL: CPT | Mod: ZF

## 2018-03-27 PROCEDURE — 25000128 H RX IP 250 OP 636: Performed by: MARRIAGE & FAMILY THERAPIST

## 2018-03-27 PROCEDURE — 99152 MOD SED SAME PHYS/QHP 5/>YRS: CPT | Performed by: INTERNAL MEDICINE

## 2018-03-27 PROCEDURE — 93288 INTERROG EVL PM/LDLS PM IP: CPT | Mod: 26 | Performed by: INTERNAL MEDICINE

## 2018-03-27 PROCEDURE — 85027 COMPLETE CBC AUTOMATED: CPT | Performed by: STUDENT IN AN ORGANIZED HEALTH CARE EDUCATION/TRAINING PROGRAM

## 2018-03-27 PROCEDURE — 00000146 ZZHCL STATISTIC GLUCOSE BY METER IP

## 2018-03-27 PROCEDURE — 40000193 ZZH STATISTIC PT WARD VISIT

## 2018-03-27 PROCEDURE — 25000128 H RX IP 250 OP 636: Performed by: NURSE PRACTITIONER

## 2018-03-27 PROCEDURE — A9270 NON-COVERED ITEM OR SERVICE: HCPCS | Mod: GY | Performed by: HOSPITALIST

## 2018-03-27 PROCEDURE — 85610 PROTHROMBIN TIME: CPT | Performed by: STUDENT IN AN ORGANIZED HEALTH CARE EDUCATION/TRAINING PROGRAM

## 2018-03-27 PROCEDURE — A9270 NON-COVERED ITEM OR SERVICE: HCPCS | Mod: GY

## 2018-03-27 PROCEDURE — 83605 ASSAY OF LACTIC ACID: CPT | Performed by: HOSPITALIST

## 2018-03-27 PROCEDURE — 36415 COLL VENOUS BLD VENIPUNCTURE: CPT | Performed by: HOSPITALIST

## 2018-03-27 PROCEDURE — 25000132 ZZH RX MED GY IP 250 OP 250 PS 637: Mod: GY | Performed by: MARRIAGE & FAMILY THERAPIST

## 2018-03-27 PROCEDURE — 83735 ASSAY OF MAGNESIUM: CPT | Performed by: STUDENT IN AN ORGANIZED HEALTH CARE EDUCATION/TRAINING PROGRAM

## 2018-03-27 PROCEDURE — 36415 COLL VENOUS BLD VENIPUNCTURE: CPT | Performed by: STUDENT IN AN ORGANIZED HEALTH CARE EDUCATION/TRAINING PROGRAM

## 2018-03-27 PROCEDURE — 71046 X-RAY EXAM CHEST 2 VIEWS: CPT

## 2018-03-27 PROCEDURE — 25000132 ZZH RX MED GY IP 250 OP 250 PS 637: Mod: GY | Performed by: HOSPITALIST

## 2018-03-27 RX ORDER — METOPROLOL TARTRATE 1 MG/ML
5 INJECTION, SOLUTION INTRAVENOUS ONCE
Status: DISCONTINUED | OUTPATIENT
Start: 2018-03-27 | End: 2018-03-27

## 2018-03-27 RX ORDER — MAGNESIUM SULFATE 1 G/100ML
1 INJECTION INTRAVENOUS ONCE
Status: COMPLETED | OUTPATIENT
Start: 2018-03-27 | End: 2018-03-27

## 2018-03-27 RX ORDER — FUROSEMIDE 10 MG/ML
40 INJECTION INTRAMUSCULAR; INTRAVENOUS ONCE
Status: COMPLETED | OUTPATIENT
Start: 2018-03-27 | End: 2018-03-27

## 2018-03-27 RX ORDER — PANTOPRAZOLE SODIUM 40 MG/1
40 TABLET, DELAYED RELEASE ORAL
Status: DISCONTINUED | OUTPATIENT
Start: 2018-03-27 | End: 2018-04-04 | Stop reason: HOSPADM

## 2018-03-27 RX ORDER — MULTIVITAMIN,THERAPEUTIC
1 TABLET ORAL DAILY
Status: DISCONTINUED | OUTPATIENT
Start: 2018-03-27 | End: 2018-04-04 | Stop reason: HOSPADM

## 2018-03-27 RX ADMIN — MELATONIN 3 MG: 3 TAB ORAL at 20:23

## 2018-03-27 RX ADMIN — AMOXICILLIN AND CLAVULANATE POTASSIUM 1 TABLET: 875; 125 TABLET, FILM COATED ORAL at 20:23

## 2018-03-27 RX ADMIN — ATORVASTATIN CALCIUM 40 MG: 40 TABLET, FILM COATED ORAL at 22:03

## 2018-03-27 RX ADMIN — ALPRAZOLAM 0.25 MG: 0.25 TABLET ORAL at 00:48

## 2018-03-27 RX ADMIN — METOPROLOL TARTRATE 12.5 MG: 25 TABLET, FILM COATED ORAL at 14:26

## 2018-03-27 RX ADMIN — FUROSEMIDE 40 MG: 10 INJECTION, SOLUTION INTRAVENOUS at 11:15

## 2018-03-27 RX ADMIN — LIDOCAINE 1 PATCH: 560 PATCH PERCUTANEOUS; TOPICAL; TRANSDERMAL at 08:35

## 2018-03-27 RX ADMIN — CEFAZOLIN SODIUM 2 G: 2 INJECTION, SOLUTION INTRAVENOUS at 14:26

## 2018-03-27 RX ADMIN — MAGNESIUM SULFATE IN DEXTROSE 1 G: 10 INJECTION, SOLUTION INTRAVENOUS at 23:32

## 2018-03-27 RX ADMIN — METOPROLOL TARTRATE 12.5 MG: 25 TABLET, FILM COATED ORAL at 20:23

## 2018-03-27 RX ADMIN — VENLAFAXINE HYDROCHLORIDE 150 MG: 150 CAPSULE, EXTENDED RELEASE ORAL at 08:35

## 2018-03-27 RX ADMIN — PANTOPRAZOLE SODIUM 40 MG: 40 TABLET, DELAYED RELEASE ORAL at 17:28

## 2018-03-27 RX ADMIN — CEFAZOLIN SODIUM 2 G: 2 INJECTION, SOLUTION INTRAVENOUS at 05:27

## 2018-03-27 RX ADMIN — INSULIN ASPART 0.5 UNITS: 100 INJECTION, SOLUTION INTRAVENOUS; SUBCUTANEOUS at 17:21

## 2018-03-27 RX ADMIN — Medication 37.5 MCG: at 08:34

## 2018-03-27 RX ADMIN — FERROUS SULFATE 325 MG: 325 TABLET, FILM COATED ORAL at 11:15

## 2018-03-27 RX ADMIN — THERA TABS 1 TABLET: TAB at 11:15

## 2018-03-27 RX ADMIN — POLYETHYLENE GLYCOL 3350 17 G: 17 POWDER, FOR SOLUTION ORAL at 08:34

## 2018-03-27 RX ADMIN — FUROSEMIDE 40 MG: 10 INJECTION, SOLUTION INTRAVENOUS at 02:06

## 2018-03-27 RX ADMIN — PANTOPRAZOLE SODIUM 40 MG: 40 INJECTION, POWDER, FOR SOLUTION INTRAVENOUS at 08:35

## 2018-03-27 RX ADMIN — POTASSIUM CHLORIDE 40 MEQ: 750 TABLET, EXTENDED RELEASE ORAL at 08:35

## 2018-03-27 ASSESSMENT — ACTIVITIES OF DAILY LIVING (ADL)
ADLS_ACUITY_SCORE: 11
ADLS_ACUITY_SCORE: 12
ADLS_ACUITY_SCORE: 12
ADLS_ACUITY_SCORE: 11

## 2018-03-27 NOTE — PROGRESS NOTES
[facility] Progress  Note            Assessment and Plan:   Carlos Alberto Fenton is a 77 year old female with PMH of HTN, HLD, DM, CVA (11/2016), chronic diastolic HF (last ECHO 12/12/17 EF 45-50%), CAD (s/p 3v CABG: LIMA-LAD, SVG-D1, SVG-dRCA and modified MAZE, ABDIEL ligation - 2/2016), paroxysmal AFib ([id number] - 8 on Xarelto), HIT, RUE DVT, recently diagnosed hypothyroidism, admitted 3/21/2018 for acute GIB, coagulopathy and concern for acute LI 2/2 acetaminophen toxicity with xarelto intake. Now tachy-israel arrhythmia. Planning to PPM placement by EP on 3/26.     Pt does not have suicidal ideation, even though she has acetaminophen toxicity.     Changes today:  - cont Augmentin for hazy L basilar opacities and crackles on LLL   - PPM placed yesterday   -f/u with hem/onc regarding restarting anticoag   - Hgb Q12 h  - Per GI DC NAC infusion w/ down trending liver enzymes  - F actin weakly positive   - Hep B and C panels negative   - for delirium precautions, will schedule melatonin, try to keep pt awake during day  -nutrition consult for reduced PO intake-start calorie counts      #Hazy L basilar opacities  --empirical treatment for aspiration PNA with zosyn on 3/24-3/25--switched to Augmentin on 3/25     # Coagulopathy, INR to 5, elevated PT and PTT  # Concern for tylenol toxicity (elevated acetaminophen levels)  Pt states she was taking 3-4g tylenol per day for the past month for chronic back pain. Last dose was two days ago  Pt previously on warfarin for Afib. She started xarelto as of Jan 2018. She states she was not taking warfarin and xarelto simultaneously. It is possible that her coagulopathy is occurring in the setting of xarelto, tylenol, and acute liver failure. Her LFTs are WNL.      We are continuing her NAC infusion per toxicology and GI. Her INR has continued to improve, but her LFTs have been increasing. Per heme/onc per mixing studies, pt does not have factor deficiency.  -- trend  INR  --GI following, we appreciate recs  --DCed NAC treatment with down trending liver enzymes   --Pending labs: HCV and HBV panels negative, AMPARO neg, anti mitochondrial neg --homocysteine, methylmalonic acid still pending   --pharm consult to determine other meds that could interact with xarelto     # Junctional Bradycardia/Tachycardia  # Afib with RVR  # Prolonged QTc  #PPM placement   [id number]  Pt prev on warfarin, she was transitioned to xarelto in Jan 2018 because she likes leafy greens.  --hold xarelto as above  --EP consulted. PPM placement yesterday   -- TTE 3/25 that showed no changes from previous TTE 12/12/17  --per EP start beta blockers-12.5 metoprolol BID   --D/C trazodone as prolongs QTc  --EP recs for oral or warfarin will f/u with hem/onc regarding options   --Per EP oral antibiotics x5 days-on Augmentin     # Low urine output--approx 26.5 cc/hr on 3/24 -- resolved  Differential includes dehydration (pre-renal), although Cr not significantly increased. UA showed +ketones, blood sugar normal. UOP improves after IVF. Cardiology thought less likely from bradycardia due to HR never <40.   --CTM    # Acute blood loss anemia (baseline 11-12 1 month prior, current hgb ~7)  Differential includes acute GI bleed (most likely), vs hemolysis, vs other reversible cause of anemia with rapid onset, given recent baseline one month ago showed hgb 11-12. Hgb has been stable since admission and no evidence of bleeding seen.   Plan:  -- GI consult, appreciate recs  -- trend Hgb q12h, transfuse <7  -- continue IV PPI BID   -- hold PTA AC (home ASA 81 and xarelto)    # HARINI on CKD -- resolved  Cr at bsl ~1.1-4. On presentation 1.8. UA with hyaline casts, tachy, hypotensive. Likely prerenal in the setting of GIB and decreased PO intake/emesis. Cr improved with IVF.   -- trend BMP      # Hypothyroidism  Recently diagnosed. On Levothyroxine. Last TSH 2/21 elevated to 88, T4 improved from 0.29 to 0.44.   -- increase  "levothyroxine to 37.5 from PTA dose of 25 mcg  -- consider re-testing TSH this week      # Tropinemia, likely 2/2 demand ischemia 2/2 anemia  EKG did not show ST elevation, ST segment depression or T wave inversions. Pt is not having CP. Likely demand ischemia.     # Insominia  -holding trazodone in lieu of prolonged QTc     Chronic problems  # HFpEF  # CAD  # HLD  ECHO shows EF 45-50%.   Plan:  --holding metolazone, spironolactone, and lasix  --cont atorvastatin   --holding ASA     # Depression  -- continue PTA venlafaxine    #Nutrition  Decreased PO intake  -calorie counts        # Pain Assessment:   Current Pain Score 3/25/2018 3/24/2018 3/23/2018   Patient currently in pain? denies no no   Pain score (0-10) - - -   Pain location - - -   Pain descriptors - - -   CPOT pain score - - -   Carlos Alberto s pain level was assessed and she currently denies pain.       Diet:   Cardiac Diet after procedure  Fluids: none  DVT Prophylaxis: mechanical  Code Status: Full Code     - Disposition:   Pending for clinical improvement, likely needs 2-3 nights    Patient was seen and discussed with .     Pallavi Ruiz, MS3    -----------------------------------------------------------------------------------------------------------------------------------------------      Interval History  After PPM placement pt had HR into 130-150s. She was bolused 1L but found to be volume overloaded so was diuresed with lasix. Pt remained asymptomatic throughout.     Pt says she is feeling well this morning with no complaints.     Review of Systems:   A 4 point review of systems was negative except as noted above.    OBJECTIVE:  BP (!) 128/96  Pulse 61  Temp 98.6  F (37  C) (Oral)  Resp 20  Ht 1.575 m (5' 2\")  Wt 94.8 kg (209 lb)  SpO2 93%  BMI 38.23 kg/m2      Intake/Output Summary (Last 24 hours) at 03/27/18 1141  Last data filed at 03/27/18 1100   Gross per 24 hour   Intake           2342.4 ml   Output             2425 ml   Net         "    -82.6 ml       GENERAL APPEARANCE: drowsy in AM, not in distress  Pulmonary: decreased breath sounds bilaterally, crackles in LLL   CV: irregular rate and rhythm, no murmur, no rub  GI: soft, nontender, no HSM   Extremities: pitting edema 1+ BLE  NEURO: drowsy, but easily to wake up, mentation intact when awake and speech normal, equally move  Skin: bruises on back are improving    Labs:   All labs reviewed by me  Electrolytes/Renal -   Recent Labs   Lab Test  03/27/18   0605  03/26/18   0632  03/25/18   0608   02/16/17   0811  02/15/17   1428  02/14/17   0833   NA  137  142  142   < >  134  139  134   POTASSIUM  3.9  3.6  4.1   < >  3.9  4.1  4.4   CHLORIDE  102  108  107   < >  101  103  101   CO2  26  26  22   < >  26  27  26   BUN  11  14  23   < >  12  17  11   CR  0.68  0.71  0.81   < >  0.65  0.65  0.67   GLC  151*  111*  94   < >  103*  141*  181*   CARLY  8.7  8.7  8.2*   < >  8.0*  8.1*  8.1*   MAG  1.9  2.1  2.0   < >  1.8  1.8  2.2   PHOS   --    --    --    --   2.2*  2.6  2.3*    < > = values in this interval not displayed.     CBC -   Recent Labs   Lab Test  03/27/18   0605  03/26/18   0632  03/25/18   1754  03/25/18   0608   WBC  8.2  6.9   --   5.6   HGB  7.7*  8.0*  7.8*  7.9*   PLT  162  169   --   172     LFTs   Recent Labs   Lab Test  03/27/18   0605  03/26/18   0632  03/25/18   0608   ALKPHOS  153*  151*  116   BILITOTAL  1.2  1.2  1.4*   ALT  54*  66*  78*   AST  60*  65*  83*   PROTTOTAL  6.3*  6.1*  5.5*   ALBUMIN  2.8*  2.7*  2.5*       Imaging:  3/26 CXR   IMPRESSION  1. New postsurgical changes of left chest implantable cardiac device  without evidence of pneumothorax. Additional support devices stable.  2. Increased small bilateral pleural effusions.  3. Increase in interstitial and retrocardiac opacities which is  favored to represent pulmonary edema and/or atelectasis over  infection.    Current Medications:    pantoprazole  40 mg Oral BID AC     multivitamin, therapeutic  1 tablet  Oral Daily     potassium chloride  40 mEq Oral Daily     sodium chloride (PF)  3 mL Intracatheter Q8H     ceFAZolin  2 g Intravenous Q8H     cephalexin  250 mg Oral Q6H ABHISHEK     amoxicillin-clavulanate  1 tablet Oral Q12H ABHISHEK     polyethylene glycol  17 g Oral Daily     sodium chloride (PF)  3 mL Intracatheter Q8H     melatonin  3 mg Oral At Bedtime     lidocaine  1 patch Transdermal Q24H     lidocaine   Transdermal Q24h     lidocaine   Transdermal Q8H     atorvastatin  40 mg Oral At Bedtime     insulin aspart  0.5-2.5 Units Subcutaneous TID AC     insulin aspart  0.5-2.5 Units Subcutaneous At Bedtime     venlafaxine  150 mg Oral Daily with breakfast     ferrous sulfate  325 mg Oral Daily with breakfast     levothyroxine  37.5 mcg Oral QAM AC       Injection Device for insulin       Pallavi Ruiz

## 2018-03-27 NOTE — PROGRESS NOTES
Social Work: Assessment with Discharge Plan    Patient Name:  Carlos Alberto Fenton  :  1940  Age:  77 year old  MRN:  [id number]  Risk/Complexity Score:     Completed assessment with:  Pt and pt's daughter Lindsay (P: [phone]) via phone with pt's permission. Pt was falling asleep during conversation but answered some questions.    ADDENDUM 1500- Met w/pt again and pt was much more awake and alert. Pt is agreeable to TCU and will probably prefer placement near [place] but will discuss w/dtr tonight.     Presenting Information   Reason for Referral:  Discharge plan  Date of Intake:  2018  Referral Source:  Chart Review  Decision Maker:  Pt is usually her own decision maker.  Alternate Decision Maker:  Pt's spouse Darrin Fenton (P: [phone]) would be next of kin and alternate decision maker, but Darrin lives in [facility] and may not be reachable. Pt's daughter Lindsay Smallwood (P: [phone]) would be next of kin after pt's spouse.  Health Care Directive:  Dtr did not believe pt has a HCD. Pt was too tired to discuss during this visit.  Living Situation:  Pt has lives with her daughter Lindsay for the past 12 years in Lindsay's home in [place], [place]. 2 stairs to enter from garage, 2 stairs within home. Lindsay shared that she (Lindsay) just finalized her divorce, and Lindsay's  ex- was awarded the home that she and pt live in. Pt and dtr need to be out of the house by 18. Lindsay has an apartment she is going top move into, but this apartment doesn't allow pets and pt has a pet that she does not want to part with. At this time, pt does not have anywhere to live after .  CLAUS discussed independent living vs BERRY vs LTC depending on pt's needs and financial ability. SW provided Senior Housing Directory and Senior Linkage Line brochure. Encouraged pt and pt's dtr to call [employer] Linkage Line to consult with them, review the Senior Housing Directory to check prices/amenities/pet allowance, and encouraged pt/dtr to talk  to the TCU SW when they arrive at TCU.  Previous Functional Status:  Independent with in-home mobility and basic self cares. Pt manages her own medications, dressing, toileting, sponge bathing, laundry, cooking, cleaning. Dtr assists with transportation and shopping. Per pt's dtr, pt has been using a CPAP during the nights prior to admission. During hospitalization, pt has been ambulating ~45-60' with CGA and support from IV pole.   Patient and family understanding of hospitalization:  Pt did not engage in this discussion. Pt's dtr has a good understanding.  Cultural/Language/Spiritual Considerations:  English speaking; identifies as Christianity.  Adjustment to Illness:  Unable to assess pt.    Physical Health  Reason for Admission:    1. Elevated INR    2. Gastrointestinal hemorrhage, unspecified gastrointestinal hemorrhage type    3. Anemia, unspecified type    4. Paroxysmal atrial fibrillation (H)    5. Atrial fibrillation (H)    6. Allergy to adhesive      Services Needed/Recommended:  TCU    Mental Health/Chemical Dependency  Diagnosis:  none  Support/Services in Place:  n/a  Services Needed/Recommended:  n/a    Support System  Significant relationship at present time:  Pt is  but her spouse, Pardeep, lives in [place] and visits very sporadically. Lindsay states she (Lindsay) has tried to get Pardeep to permanently move back to [place] and care for pt, but Pardeep refuses and states Lindsay can care for pt.   Family of origin is available for support:  Lindsay is available 24/7 but is experiencing caregiver burnout and is very worried about where pt will live after 5/1/18. Pt has one other daughter who is not involved.   Other support available:  none  Gaps in support system:  none  Patient is caregiver to:  None     Provider Information   Primary Care Physician:  Humberto Conner   [phone]   Clinic:  [facility] [address]      :  none    Financial   Income Source:  Dtr is not sure exactly how much  "pt gets per month, but pt did tell her daughter that she could afford $1,700/month in rent.  Financial Concerns:  Pt's daughter is unsure if pt could afford private paying for BERRY or LTC.  Insurance:    Payor/Plan Subscriber Name Rel Member # Group #   MEDICARE - MEDICARE F* MENDOZA GREENBERG  [id number]       ATTN CLAIMS, [address]   MEDICA - MEDICA PRIME* MENDOZA GREENBERG  [id number]      [address] [address]       Discharge Plan   Patient and family discharge goal:  TCU  Provided education on discharge plan:  YES  Patient agreeable to discharge plan:  YES  A list of Medicare Certified Facilities was provided to the patient and/or family to encourage patient choice. Patient's choices for facility are:  Provided lists near [place] and [place]. Pt's dtr thinks pt would want TCU near [facility]. Dtr will be visiting pt tonight and will look over the lists.  Will NH provide Skilled rehabilitation or complex medical:  YES  General information regarding anticipated insurance coverage and possible out of pocket cost was discussed. Patient and patient's family are aware patient may incur the cost of transportation to the facility, pending insurance payment: YES. Discussed Medicare and [employer] coverage for TCU. Discussed requirements for a \"skilled need\" for Medicare to continue paying for TCU. Explained that  and [employer] do not cover LTC or BERRY and discussed approximate private pay costs and how this cost if calculated. Discussed potential for pt to spend down until eligible for MA.  Barriers to discharge:  Medical clearance and TCU placement.    Discharge Recommendations   Anticipated Disposition:  Pt and family agreeable to TCU placement and will review lists tonight and choose referral choices.  Transportation Needs:  TBD  Name of Transportation Company and Phone:  N/A    DAMIAN Roberson, LICSW  6B Intermediate Care Unit  Phone: [phone]  Pager: [phone]    "

## 2018-03-27 NOTE — PROGRESS NOTES
"  Electrophysiology Post Procedure Follow Up  Date of Procedure: 3/26/18  DOS: 3/27/2018   Pre-operative Diagnosis:  Tachycardia-bradycardia syndrome.  Post-operative diagnosis:   Successful implantation of PPM.  Hospital Course:  Ms. Fenton is a 77 year old female who has a past medical history significant for CAD s/p CABG X3 (LIMA-LAD, SVG-D1, SVG-dRCA) with concomitant modified MAZE and ABDIEL ligation 2/2016, PAF (CHADSVASC 8 on warfarin), DM2, HTN, HLD, CVA, RUE DVT, depression,HIT, panic attacks, seizures, and NOAH. She developed tachybrady syndrome and need for AV madelyn blockade or amiodarone to manage her atrial arrhythmias so was referred for PPM implant.       Patient underwent a successful dual chamber PPM implant yesterday. She had an uneventful overnight stay. She remains tachycardic. Device interrogation shows normal device function and stable lead parameters. Chest x-ray demonstrates no evidence of pneumothorax. Left subclavian site is CDI, no bleeding, and no hematoma. Patient is tolerating oral intake, ambulating at baseline, and voiding without difficulties. Patient remains hemodynamically stable.     ROS:   10 point ROS neg other than the symptoms noted above.   Exam:  BP (!) 130/104 (BP Location: Left arm)  Pulse 61  Temp 97.5  F (36.4  C) (Oral)  Resp 22  Ht 1.575 m (5' 2\")  Wt 94.8 kg (209 lb)  SpO2 92%  BMI 38.23 kg/m2    Constitutional: lethargic, NAD  Head: Normocephalic.   Neck: Neck supple.   ENT: ENT exam normal, no neck nodes or sinus tenderness  Cardiovascular: RRR. Tachy.   Respiratory: Good diaphragmatic excursion. Lungs clear  Gastrointestinal: Abdomen soft, non-tender. BS normal. No masses, organomegaly  : Deferred  Musculoskeletal: extremities normal- no gross deformities noted, gait normal and normal muscle tone  Skin: no suspicious lesions or rashes  Neurologic: Gait normal. Reflexes normal and symmetric. Sensation grossly WNL.  Psychiatric: mentation appears normal and affect " normal/bright    Medications:  Current Facility-Administered Medications   Medication     potassium chloride SA (K-DUR/KLOR-CON M) CR tablet 40 mEq     lidocaine 1 % 1 mL     lidocaine (LMX4) kit     sodium chloride (PF) 0.9% PF flush 3 mL     sodium chloride (PF) 0.9% PF flush 3 mL     naloxone (NARCAN) injection 0.1-0.4 mg     HOLD: metformin and metformin containing medications on day of procedure and 48 hours after IV contrast given     HOLD: enoxaparin (LOVENOX) Post Procedure     HOLD: heparin (IV or Subcutaneous) Post Procedure     ceFAZolin (ANCEF) intermittent infusion 2 g in 100 mL dextrose PRE-MIX     cephalexin (KEFLEX) capsule 250 mg     amoxicillin-clavulanate (AUGMENTIN) 875-125 MG per tablet 1 tablet     polyethylene glycol (MIRALAX/GLYCOLAX) Packet 17 g     sodium chloride (PF) 0.9% PF flush 3 mL     melatonin tablet 3 mg     Lidocaine (LIDOCARE) 4 % Patch 1 patch     lidocaine patch REMOVAL     lidocaine patch in PLACE     RESUME A HELD MEDICATION     atorvastatin (LIPITOR) tablet 40 mg     glucose 40 % gel 15-30 g    Or     dextrose 50 % injection 25-50 mL    Or     glucagon injection 1 mg     insulin aspart (NovoPen ECHO/NovoLOG) cartridge     insulin aspart (NovoPen ECHO/NovoLOG) cartridge     Injection Device for insulin (NOVOPEN ECHO) 1 each     venlafaxine (EFFEXOR-XR) 24 hr capsule 150 mg     pantoprazole (PROTONIX) 40 mg IV push injection     ALPRAZolam (XANAX) tablet 0.25 mg     ferrous sulfate (IRON) tablet 325 mg     naloxone (NARCAN) injection 0.1-0.4 mg     senna-docusate (SENOKOT-S;PERICOLACE) 8.6-50 MG per tablet 1 tablet    Or     senna-docusate (SENOKOT-S;PERICOLACE) 8.6-50 MG per tablet 2 tablet     levothyroxine (SYNTHROID/LEVOTHROID) half-tab 37.5 mcg     Labs:  [facility]  Recent Labs  Lab 03/27/18  0605 03/26/18  0632 03/25/18  0608 03/24/18  0444    142 142 139   POTASSIUM 3.9 3.6 4.1 4.6   CHLORIDE 102 108 107 105   CARLY 8.7 8.7 8.2* 8.6   CO2 26 26 22 19*   BUN 11 14 23 30    CR 0.68 0.71 0.81 1.09*   * 111* 94 147*     CBC  Recent Labs  Lab 03/27/18  0605 03/26/18  0632 03/25/18  1754 03/25/18  0608 03/24/18  0444   WBC 8.2 6.9  --  5.6 7.3   RBC 2.17* 2.21*  --  2.10* 2.08*   HGB 7.7* 8.0* 7.8* 7.9* 8.0*   HCT 26.3* 26.8*  --  25.5* 24.8*   * 121*  --  121* 119*   MCH 35.5* 36.2*  --  37.6* 38.5*   MCHC 29.3* 29.9*  --  31.0* 32.3   RDW 24.1* 24.9*  --  25.6* 26.0*    169  --  172 178     INR  Recent Labs  Lab 03/27/18  0605 03/26/18  0632 03/25/18  0608 03/24/18  0444   INR 1.47* 1.62* 1.68* 1.74*   Plan & Follow up:    Please resume beta blockers    Follow up with device clinic in 7-10 days    Oral antibiotics x 5 days    Keep incision clean and dry for 72 hours post procedure    No lifting > 10lbs or over shoulder level with left arm for 4 weeks    Notify device clinic/EP immediately for any redness, swelling, bleeding, discharge, fevers or chills.    CHACORTA Emery CNP  Electrophysiology Consult Service  Pager: [phone]

## 2018-03-27 NOTE — PLAN OF CARE
Problem: Patient Care Overview  Goal: Plan of Care/Patient Progress Review  PT / 6B -     Discharge Planner PT   Patient plan for discharge: TCU  Current status:  RN aware of irregular heart rhythm, HR 70-100s during session on 4L O2, SpO2 ranging between 88-95% throughout session, verbal cueing for PLB.  Pt declining gait this day, engaged in LE strengthening. Min A needed for LE to return to supine, pt requesting to maintain sitting EOB until BiPAP donned.  RN notified and updated of session.    Barriers to return to prior living situation: medical complexity, strength, activity tolerance, supplemental O2  Recommendations for discharge: TCU  Rationale for recommendations: To continue progressing strength and endurance to improve independence and safety with transfers and gait.        Entered by: Livia Geller 03/27/2018 4:45 PM

## 2018-03-27 NOTE — PLAN OF CARE
Problem: Cardiac Disease Comorbidity  Goal: Cardiac Disease  Patient comorbidity will be monitored for signs and symptoms of Cardiac Disease.  Problems will be absent, minimized or managed by discharge/transition of care.   Outcome: No Change  Afebrile. Continued afib: rates , with lower rated occurring in conjunction with occasional PVC activity. -120/100 post LR bolus admin. MD notified. One time lasix dose admin as ordered. Decrease in HR and BP during rest. maintained afib and increased BP with activity. On Cpap during hs and 2-3 lpm nc when up to bsc. Difficult to obtained accurate o2 sats 2/2 to cool extremities and poor circulation. Sepsi protocol triggered. Lactic acid resulted at 1.6.  A+Ox4. Call light appropriate. Bed alarm activated post admin of xanax. PIVx2. SL'd btwn IV abx admin. BG monitored midshift- 106 mg/dL.Up to bsc with assist of 1. Slow/ steady on feet. Void 1325 ml post iv lasix. Tolerated full liquid diet. Able to make needs known. 3kg weight gain noted with daily weight. MD notified.  Rested btwn cares. Continue POC.

## 2018-03-27 NOTE — PROGRESS NOTES
Preliminary Device Interrogation Results.  Final physician signed paceart report to be scanned and attached.    Patient seen in hospital for evaluation and iterative programming of her Medtronic dual lead pacemaker per MD orders.  Patient is post op day 1 from pacemaker placement.  Wound covered ,clean and dry a little soft and swollen at site.  Normal pacemaker function. In one day, 13 NSVT episodes recorded  154-218 bpm. For < 1 - 2 sec. 25 episodes of SVT/-171 bpm. 7 episodes of AF/AF . AT/AF burden 12.9%.  Intrinsic rhythm = irregular AS/VS @ 88 - 125 bpm.  AP = 0.8%.   = 0.5%.     Estimated battery longevity to NELLIE = NA day 1. Patient sleepy, verbally went over post op cares of wound. Verbally educated on her pacemaker. Written instruction available on discharge.    dual lead pacemaker

## 2018-03-27 NOTE — PROGRESS NOTES
Brief Hematology progress note    Recent studies and events reviewed. S/P PPM for tachy-israel syndrome, INR nearly normalized following vitamin K repletion.      When primary service is ready to anticoagulate, recommend initiation of fondaparinux and continuation of this after discharge.  Warfarin would likely quickly lead to supratherapeutic INR levels given that vitamin K dependent factors (e.g. factor VII) are still deficient, and the INR still remains elevated at this time. DOACs would also not be preferred in the setting of continued hepatic dysfunction.  LMWH/heparin are also relatively contraindicated due to history of HIT.  Therefore favor fondaparinux; exact dosing deferred to primary team and CV, as primary indication is atrial fibrillation, and patient recently underwent PPM placement.  Recommend referral to Hematology 2-3 months after hospital dismissal for reconsideration of alternative anticoagulants at that time; as well as to ensure that macrocytic anemia has normalized.      Hematology service will sign off at this time.  Please call with any questions.    Jonny Hernandez MD/PhD  Fellow, Division of Hematology/Oncology and Bone Marrow Transplantation  [facility]  [phone]

## 2018-03-27 NOTE — PROGRESS NOTES
CLINICAL NUTRITION SERVICES - REASSESSMENT NOTE     Nutrition Prescription    RECOMMENDATIONS FOR MDs/PROVIDERS TO ORDER:  Recommend appetite stimulant    Malnutrition Status:     Non-severe malnutrition in the context of cute on chronic illness    Recommendations already ordered by Registered Dietitian (RD):  RD to order calorie counts 3/28-3/30  RD to order Boost Plus BID  RD to order multivitamin   RD to order Room Service with assist     Future/Additional Recommendations:  Monitor PO intakes and if pt is unable to meet at least 75% of lower end estimated needs, may need to consider nutrition support. TwoCal HN @ 35 mL/hr (840 mL/day) to provide 1680 kcals (29 kcals/kg/day), 71 g PRO (1.2 g/kg/day), 588 mL H2O, 184 g CHO and 4 g Fiber daily.     EVALUATION OF THE PROGRESS TOWARD GOALS   Diet: 2 g Na   Nutrition Support: None  Intake: ongoing variable PO intakes d/t diet orders with % of a few meals noted per RN flowsheets. Pt has been NPO at least part of  5/7 days since admit with restricted diet orders between.      NEW FINDINGS   Weight: Pt fluid up, unable to assess any true wt loss  Labs: elevated LFTs  Meds: Insulin, levothyroxine, miralax   GI: BM+ noted 3/25    MALNUTRITION  % Intake: </= 50% for >/= 5 days (severe)  % Weight Loss: Unable to assess  Subcutaneous Fat Loss: None observed  Muscle Loss: Temporal: mild and Facial & jaw region:  mild  Fluid Accumulation/Edema: Mild  Malnutrition Diagnosis: Non-severe malnutrition in the context of cute on chronic illness    Previous Goals   Diet adv v nutrition support within 2-3 days.  Patient to consume % of nutritionally adequate meal trays TID, or the equivalent with supplements/snacks.  Evaluation: Not met    Previous Nutrition Diagnosis  Inadequate oral intakes related to 3 days of n/v and abdominal pain as evidenced by pt report.     Evaluation: Improving    CURRENT NUTRITION DIAGNOSIS  Inadequate oral intake related to ongoing variable diet  orders and decreased appetite as evidenced by pt being NPO for at least part of 5/7 days since admit and 100% of one meals noted per RN flowsheets over the past 1 week.        INTERVENTIONS  Implementation  Collaboration with other providers- 6B rounds  James counts  Supplements  Multivitamin    Goals  Total avg nutritional intake to meet a minimum of 25 kcal/kg and 1.2 g PRO/kg daily (per dosing wt 57 kg).    Monitoring/Evaluation  Progress toward goals will be monitored and evaluated per protocol.      Nazia Tran, RD, MS, LD  6B- Pager: [phone]

## 2018-03-27 NOTE — MR AVS SNAPSHOT
After Visit Summary   3/27/2018    Carlos Alberto Fenton    MRN: [id number]           Patient Information     Date Of Birth          1940        Visit Information        Provider Department      3/27/2018 5:30 AM 1, Uc Cv Device [facility]        Today's Diagnoses     Paroxysmal atrial fibrillation (H)    -  1       Follow-ups after your visit        Your next 10 appointments already scheduled     Sep 07, 2018 11:00 AM CDT   (Arrive by 10:45 AM)   Implantable Loop Recorder with Uc Cv Device 1   [facility] ([facility])    [address] [place] [address]  [address] [place] [id number] [phone]            Sep 07, 2018 11:30 AM CDT   (Arrive by 11:15 AM)   Return Visit with Star Lobato MD   [facility] ([facility])    [address] [place] [address]  [address] [place] [id number] [phone]              Who to contact     If you have questions or need follow up information about today's clinic visit or your schedule please contact [facility] directly at [phone].  Normal or non-critical lab and imaging results will be communicated to you by [employer], letter or phone within 4 business days after the clinic has received the results. If you do not hear from us within 7 days, please contact the clinic through [employer] or phone. If you have a critical or abnormal lab result, we will notify you by phone as soon as possible.  Submit refill requests through [employer] or call your pharmacy and they will forward the refill request to us. Please allow 3 business days for your refill to be completed.          Additional Information About Your Visit        [employer] Information     [employer] gives you secure access to your electronic health record. If you see a primary care provider, you can also send messages to your care team and make appointments. If you have questions, please call your primary care clinic.  If you do not have  a primary care provider, please call [phone] and they will assist you.        Care EveryWhere ID     This is your Care EveryWhere ID. This could be used by other organizations to access your [facility] medical records  [id number]         Blood Pressure from Last 3 Encounters:   03/27/18 (!) 128/96   03/02/18 120/80   02/22/18 109/69    Weight from Last 3 Encounters:   03/27/18 94.8 kg (209 lb)   03/02/18 78.2 kg (172 lb 6.4 oz)   01/16/18 74.8 kg (165 lb)              We Performed the Following     (74514) PM DEVICE PROGRAMMING EVAL, DUAL LEAD PACER     Glucose by meter          Today's Medication Changes      Notice     This visit is during an admission. Changes to the med list made in this visit will be reflected in the After Visit Summary of the admission.             Primary Care Provider Office Phone # Fax #    Humberto Conner [phone]       [address] [place] [address]        Equal Access to Services     [facility] : Hadii aad ku hadasho Soomaali, waaxda luqadaha, qaybta kaalmada adeegyada, waxay idiin hayerin mccollum . So [facility] [phone].    ATENCIÓN: Si habla español, tiene a espinoza disposición servicios gratuitos de asistencia lingüística. Llame al [phone].    We comply with applicable federal civil rights laws and [place] laws. We do not discriminate on the basis of race, color, national origin, age, disability, sex, sexual orientation, or gender identity.            Thank you!     Thank you for choosing [facility]  for your care. Our goal is always to provide you with excellent care. Hearing back from our patients is one way we can continue to improve our services. Please take a few minutes to complete the written survey that you may receive in the mail after your visit with us. Thank you!             Your Updated Medication List - Protect others around you: Learn how to safely use, store and throw away your medicines at  www.disposemymeds.org.      Notice     This visit is during an admission. Changes to the med list made in this visit will be reflected in the After Visit Summary of the admission.

## 2018-03-27 NOTE — PROVIDER NOTIFICATION
MD notified of post LR bolus vitals: continued HR in 140-150- BP now 110-120/100's. Awaiting new orders

## 2018-03-27 NOTE — PLAN OF CARE
Problem: Patient Care Overview  Goal: Plan of Care/Patient Progress Review  Outcome: No Change  Neuro: A&Ox4.   Cardiac: pacemaker placed in IR this afternoon, AAIR-DDDR. Afib with RVR, rates 130-150's this evening (see provider notification). Occasional PVC. Pt receiving 1 L fluid bolus.    Respiratory: Sating 92% on 2L NC. CPAP at HS.  GI/: Adequate urine output.   Diet/appetite: Tolerating full liquid diet. Eating well.  Activity:  Assist of 1, up to chair, dyspneic on exertion.   Pain: At acceptable level on current regimen.   Skin: no new deficits noted. Pacer dressing CDI    Plan: Continue with POC. Notify primary team with changes.

## 2018-03-27 NOTE — PROGRESS NOTES
DAILY ROUNDS CHECKLIST: Reviewed with Gilbert Hopson     Status/Level of care:    - IMC:  change (order for transfer to  has been placed)    Tethers:   - Telemetry: continue   - Urinary Catheter: N/A   - No line    Anticoagulation for VTE prophylaxis:    - order for initiation of new anticoagulant will be placed for this afternoon       Rehab:   - ordered    - Physical Therapy and Occupational Therapy    Time until discharge:    - 2 - 3 days once O2 use is weaned down to PTA, heart rate is controlled, and TCU placement is complete.        Provider was made aware of increasing somnolence today. Pt also reports feeling increasing SOB at rest. Pt will be given Lasix; will continue to monitor.

## 2018-03-28 ENCOUNTER — APPOINTMENT (OUTPATIENT)
Dept: GENERAL RADIOLOGY | Facility: CLINIC | Age: 78
DRG: 813 | End: 2018-03-28
Attending: INTERNAL MEDICINE
Payer: MEDICARE

## 2018-03-28 ENCOUNTER — ALLIED HEALTH/NURSE VISIT (OUTPATIENT)
Dept: CARDIOLOGY | Facility: CLINIC | Age: 78
DRG: 813 | End: 2018-03-28
Attending: INTERNAL MEDICINE
Payer: MEDICARE

## 2018-03-28 DIAGNOSIS — I49.5 SINOATRIAL NODE DYSFUNCTION (H): Primary | ICD-10-CM

## 2018-03-28 LAB
ALBUMIN SERPL-MCNC: 2.8 G/DL (ref 3.4–5)
ALBUMIN SERPL-MCNC: 2.8 G/DL (ref 3.4–5)
ALP SERPL-CCNC: 181 U/L (ref 40–150)
ALP SERPL-CCNC: 195 U/L (ref 40–150)
ALT SERPL W P-5'-P-CCNC: 41 U/L (ref 0–50)
ALT SERPL W P-5'-P-CCNC: 66 U/L (ref 0–50)
ANION GAP SERPL CALCULATED.3IONS-SCNC: 8 MMOL/L (ref 3–14)
ANION GAP SERPL CALCULATED.3IONS-SCNC: 9 MMOL/L (ref 3–14)
AST SERPL W P-5'-P-CCNC: 125 U/L (ref 0–45)
AST SERPL W P-5'-P-CCNC: 63 U/L (ref 0–45)
BACTERIA SPEC CULT: NO GROWTH
BACTERIA SPEC CULT: NO GROWTH
BACTERIA SPEC CULT: NORMAL
BASE EXCESS BLDA CALC-SCNC: 4.2 MMOL/L
BASE EXCESS BLDV CALC-SCNC: 0.5 MMOL/L
BILIRUB SERPL-MCNC: 1.4 MG/DL (ref 0.2–1.3)
BILIRUB SERPL-MCNC: 1.5 MG/DL (ref 0.2–1.3)
BUN SERPL-MCNC: 12 MG/DL (ref 7–30)
BUN SERPL-MCNC: 13 MG/DL (ref 7–30)
CALCIUM SERPL-MCNC: 8.8 MG/DL (ref 8.5–10.1)
CALCIUM SERPL-MCNC: 9 MG/DL (ref 8.5–10.1)
CHLORIDE SERPL-SCNC: 103 MMOL/L (ref 94–109)
CHLORIDE SERPL-SCNC: 104 MMOL/L (ref 94–109)
CO2 SERPL-SCNC: 24 MMOL/L (ref 20–32)
CO2 SERPL-SCNC: 27 MMOL/L (ref 20–32)
CREAT SERPL-MCNC: 0.72 MG/DL (ref 0.52–1.04)
CREAT SERPL-MCNC: 0.73 MG/DL (ref 0.52–1.04)
ERYTHROCYTE [DISTWIDTH] IN BLOOD BY AUTOMATED COUNT: 22.8 % (ref 10–15)
ERYTHROCYTE [DISTWIDTH] IN BLOOD BY AUTOMATED COUNT: 23.2 % (ref 10–15)
GFR SERPL CREATININE-BSD FRML MDRD: 77 ML/MIN/1.7M2
GFR SERPL CREATININE-BSD FRML MDRD: 79 ML/MIN/1.7M2
GLUCOSE BLDC GLUCOMTR-MCNC: 119 MG/DL (ref 70–99)
GLUCOSE BLDC GLUCOMTR-MCNC: 125 MG/DL (ref 70–99)
GLUCOSE BLDC GLUCOMTR-MCNC: 135 MG/DL (ref 70–99)
GLUCOSE BLDC GLUCOMTR-MCNC: 140 MG/DL (ref 70–99)
GLUCOSE BLDC GLUCOMTR-MCNC: 85 MG/DL (ref 70–99)
GLUCOSE BLDC GLUCOMTR-MCNC: 90 MG/DL (ref 70–99)
GLUCOSE SERPL-MCNC: 106 MG/DL (ref 70–99)
GLUCOSE SERPL-MCNC: 110 MG/DL (ref 70–99)
HCO3 BLD-SCNC: 27 MMOL/L (ref 21–28)
HCO3 BLDV-SCNC: 26 MMOL/L (ref 21–28)
HCT VFR BLD AUTO: 26.3 % (ref 35–47)
HCT VFR BLD AUTO: 27.7 % (ref 35–47)
HCYS SERPL-SCNC: <4 UMOL/L (ref 4–12)
HCYS SERPL-SCNC: <4 UMOL/L (ref 4–12)
HGB BLD-MCNC: 7.9 G/DL (ref 11.7–15.7)
HGB BLD-MCNC: 8.6 G/DL (ref 11.7–15.7)
INR PPP: 1.9 (ref 0.86–1.14)
INR PPP: NORMAL (ref 0.86–1.14)
INTERPRETATION ECG - MUSE: NORMAL
LACTATE BLD-SCNC: 1 MMOL/L (ref 0.7–2)
LACTATE BLD-SCNC: 4.7 MMOL/L (ref 0.7–2)
MAGNESIUM SERPL-MCNC: 1.8 MG/DL (ref 1.6–2.3)
MAGNESIUM SERPL-MCNC: 2.2 MG/DL (ref 1.6–2.3)
MCH RBC QN AUTO: 35.6 PG (ref 26.5–33)
MCH RBC QN AUTO: 37.1 PG (ref 26.5–33)
MCHC RBC AUTO-ENTMCNC: 30 G/DL (ref 31.5–36.5)
MCHC RBC AUTO-ENTMCNC: 31 G/DL (ref 31.5–36.5)
MCV RBC AUTO: 119 FL (ref 78–100)
MCV RBC AUTO: 119 FL (ref 78–100)
METHYLMALONATE SERPL-SCNC: 0.41 UMOL/L (ref 0–0.4)
O2/TOTAL GAS SETTING VFR VENT: 100 %
O2/TOTAL GAS SETTING VFR VENT: 60 %
PCO2 BLD: 35 MM HG (ref 35–45)
PCO2 BLDV: 46 MM HG (ref 40–50)
PH BLD: 7.51 PH (ref 7.35–7.45)
PH BLDV: 7.36 PH (ref 7.32–7.43)
PHOSPHATE SERPL-MCNC: 2.2 MG/DL (ref 2.5–4.5)
PLATELET # BLD AUTO: 140 10E9/L (ref 150–450)
PLATELET # BLD AUTO: 159 10E9/L (ref 150–450)
PO2 BLD: 409 MM HG (ref 80–105)
PO2 BLDV: 28 MM HG (ref 25–47)
POTASSIUM SERPL-SCNC: 3.5 MMOL/L (ref 3.4–5.3)
POTASSIUM SERPL-SCNC: 4.2 MMOL/L (ref 3.4–5.3)
PROT SERPL-MCNC: 6.1 G/DL (ref 6.8–8.8)
PROT SERPL-MCNC: 6.2 G/DL (ref 6.8–8.8)
RBC # BLD AUTO: 2.22 10E12/L (ref 3.8–5.2)
RBC # BLD AUTO: 2.32 10E12/L (ref 3.8–5.2)
SODIUM SERPL-SCNC: 137 MMOL/L (ref 133–144)
SODIUM SERPL-SCNC: 138 MMOL/L (ref 133–144)
SPECIMEN SOURCE: NORMAL
WBC # BLD AUTO: 7.6 10E9/L (ref 4–11)
WBC # BLD AUTO: 8.8 10E9/L (ref 4–11)

## 2018-03-28 PROCEDURE — 25000128 H RX IP 250 OP 636: Performed by: INTERNAL MEDICINE

## 2018-03-28 PROCEDURE — 84100 ASSAY OF PHOSPHORUS: CPT | Performed by: INTERNAL MEDICINE

## 2018-03-28 PROCEDURE — 93280 PM DEVICE PROGR EVAL DUAL: CPT | Mod: ZF

## 2018-03-28 PROCEDURE — 87075 CULTR BACTERIA EXCEPT BLOOD: CPT | Performed by: INTERNAL MEDICINE

## 2018-03-28 PROCEDURE — 85610 PROTHROMBIN TIME: CPT | Performed by: INTERNAL MEDICINE

## 2018-03-28 PROCEDURE — 80053 COMPREHEN METABOLIC PANEL: CPT | Performed by: STUDENT IN AN ORGANIZED HEALTH CARE EDUCATION/TRAINING PROGRAM

## 2018-03-28 PROCEDURE — 25000125 ZZHC RX 250: Performed by: NURSE PRACTITIONER

## 2018-03-28 PROCEDURE — 00000146 ZZHCL STATISTIC GLUCOSE BY METER IP

## 2018-03-28 PROCEDURE — A9270 NON-COVERED ITEM OR SERVICE: HCPCS | Mod: GY | Performed by: HOSPITALIST

## 2018-03-28 PROCEDURE — 83735 ASSAY OF MAGNESIUM: CPT | Performed by: INTERNAL MEDICINE

## 2018-03-28 PROCEDURE — A9270 NON-COVERED ITEM OR SERVICE: HCPCS | Mod: GY

## 2018-03-28 PROCEDURE — 40000275 ZZH STATISTIC RCP TIME EA 10 MIN

## 2018-03-28 PROCEDURE — 82803 BLOOD GASES ANY COMBINATION: CPT | Performed by: STUDENT IN AN ORGANIZED HEALTH CARE EDUCATION/TRAINING PROGRAM

## 2018-03-28 PROCEDURE — 83605 ASSAY OF LACTIC ACID: CPT | Performed by: INTERNAL MEDICINE

## 2018-03-28 PROCEDURE — 99233 SBSQ HOSP IP/OBS HIGH 50: CPT | Mod: GC | Performed by: INTERNAL MEDICINE

## 2018-03-28 PROCEDURE — A9270 NON-COVERED ITEM OR SERVICE: HCPCS | Mod: GY | Performed by: STUDENT IN AN ORGANIZED HEALTH CARE EDUCATION/TRAINING PROGRAM

## 2018-03-28 PROCEDURE — 71045 X-RAY EXAM CHEST 1 VIEW: CPT

## 2018-03-28 PROCEDURE — 82803 BLOOD GASES ANY COMBINATION: CPT | Performed by: INTERNAL MEDICINE

## 2018-03-28 PROCEDURE — 99291 CRITICAL CARE FIRST HOUR: CPT | Mod: GC | Performed by: ANESTHESIOLOGY

## 2018-03-28 PROCEDURE — 20000004 ZZH R&B ICU UMMC

## 2018-03-28 PROCEDURE — 83735 ASSAY OF MAGNESIUM: CPT | Performed by: STUDENT IN AN ORGANIZED HEALTH CARE EDUCATION/TRAINING PROGRAM

## 2018-03-28 PROCEDURE — 80053 COMPREHEN METABOLIC PANEL: CPT | Performed by: INTERNAL MEDICINE

## 2018-03-28 PROCEDURE — 25000132 ZZH RX MED GY IP 250 OP 250 PS 637: Mod: GY

## 2018-03-28 PROCEDURE — 36415 COLL VENOUS BLD VENIPUNCTURE: CPT | Performed by: INTERNAL MEDICINE

## 2018-03-28 PROCEDURE — 25000132 ZZH RX MED GY IP 250 OP 250 PS 637: Mod: GY | Performed by: HOSPITALIST

## 2018-03-28 PROCEDURE — 93005 ELECTROCARDIOGRAM TRACING: CPT

## 2018-03-28 PROCEDURE — 25000132 ZZH RX MED GY IP 250 OP 250 PS 637: Mod: GY | Performed by: STUDENT IN AN ORGANIZED HEALTH CARE EDUCATION/TRAINING PROGRAM

## 2018-03-28 PROCEDURE — 85610 PROTHROMBIN TIME: CPT | Performed by: STUDENT IN AN ORGANIZED HEALTH CARE EDUCATION/TRAINING PROGRAM

## 2018-03-28 PROCEDURE — 25000128 H RX IP 250 OP 636: Performed by: STUDENT IN AN ORGANIZED HEALTH CARE EDUCATION/TRAINING PROGRAM

## 2018-03-28 PROCEDURE — 85027 COMPLETE CBC AUTOMATED: CPT | Performed by: INTERNAL MEDICINE

## 2018-03-28 PROCEDURE — 87040 BLOOD CULTURE FOR BACTERIA: CPT | Performed by: STUDENT IN AN ORGANIZED HEALTH CARE EDUCATION/TRAINING PROGRAM

## 2018-03-28 PROCEDURE — 93288 INTERROG EVL PM/LDLS PM IP: CPT | Mod: 26 | Performed by: INTERNAL MEDICINE

## 2018-03-28 PROCEDURE — 93010 ELECTROCARDIOGRAM REPORT: CPT | Performed by: INTERNAL MEDICINE

## 2018-03-28 PROCEDURE — 40000281 ZZH STATISTIC TRANSPORT TIME EA 15 MIN

## 2018-03-28 PROCEDURE — 27210185 ZZH KIT OPEN ENDED DOUBLE LUMEN

## 2018-03-28 PROCEDURE — 36415 COLL VENOUS BLD VENIPUNCTURE: CPT | Performed by: STUDENT IN AN ORGANIZED HEALTH CARE EDUCATION/TRAINING PROGRAM

## 2018-03-28 PROCEDURE — 25000132 ZZH RX MED GY IP 250 OP 250 PS 637: Mod: GY | Performed by: MARRIAGE & FAMILY THERAPIST

## 2018-03-28 PROCEDURE — 85027 COMPLETE CBC AUTOMATED: CPT | Performed by: STUDENT IN AN ORGANIZED HEALTH CARE EDUCATION/TRAINING PROGRAM

## 2018-03-28 PROCEDURE — 94660 CPAP INITIATION&MGMT: CPT

## 2018-03-28 PROCEDURE — 36569 INSJ PICC 5 YR+ W/O IMAGING: CPT

## 2018-03-28 RX ORDER — FUROSEMIDE 10 MG/ML
40 INJECTION INTRAMUSCULAR; INTRAVENOUS ONCE
Status: COMPLETED | OUTPATIENT
Start: 2018-03-28 | End: 2018-03-28

## 2018-03-28 RX ORDER — FUROSEMIDE 10 MG/ML
20 INJECTION INTRAMUSCULAR; INTRAVENOUS ONCE
Status: COMPLETED | OUTPATIENT
Start: 2018-03-28 | End: 2018-03-28

## 2018-03-28 RX ADMIN — VENLAFAXINE HYDROCHLORIDE 150 MG: 150 CAPSULE, EXTENDED RELEASE ORAL at 08:38

## 2018-03-28 RX ADMIN — MELATONIN 3 MG: 3 TAB ORAL at 19:57

## 2018-03-28 RX ADMIN — FUROSEMIDE 40 MG: 10 INJECTION, SOLUTION INTRAVENOUS at 12:16

## 2018-03-28 RX ADMIN — LIDOCAINE 1 PATCH: 560 PATCH PERCUTANEOUS; TOPICAL; TRANSDERMAL at 08:37

## 2018-03-28 RX ADMIN — ATORVASTATIN CALCIUM 40 MG: 40 TABLET, FILM COATED ORAL at 21:54

## 2018-03-28 RX ADMIN — THERA TABS 1 TABLET: TAB at 08:38

## 2018-03-28 RX ADMIN — PANTOPRAZOLE SODIUM 40 MG: 40 TABLET, DELAYED RELEASE ORAL at 15:33

## 2018-03-28 RX ADMIN — AMOXICILLIN AND CLAVULANATE POTASSIUM 1 TABLET: 875; 125 TABLET, FILM COATED ORAL at 08:38

## 2018-03-28 RX ADMIN — POTASSIUM CHLORIDE 40 MEQ: 750 TABLET, EXTENDED RELEASE ORAL at 08:38

## 2018-03-28 RX ADMIN — LIDOCAINE HYDROCHLORIDE 1 ML: 20 INJECTION, SOLUTION INFILTRATION; PERINEURAL at 22:34

## 2018-03-28 RX ADMIN — FERROUS SULFATE 325 MG: 325 TABLET, FILM COATED ORAL at 12:16

## 2018-03-28 RX ADMIN — Medication 37.5 MCG: at 08:38

## 2018-03-28 RX ADMIN — METOPROLOL TARTRATE 12.5 MG: 25 TABLET, FILM COATED ORAL at 19:57

## 2018-03-28 RX ADMIN — METOPROLOL TARTRATE 12.5 MG: 25 TABLET, FILM COATED ORAL at 08:38

## 2018-03-28 RX ADMIN — POLYETHYLENE GLYCOL 3350 17 G: 17 POWDER, FOR SOLUTION ORAL at 08:38

## 2018-03-28 RX ADMIN — FUROSEMIDE 20 MG: 10 INJECTION, SOLUTION INTRAVENOUS at 17:09

## 2018-03-28 RX ADMIN — PANTOPRAZOLE SODIUM 40 MG: 40 TABLET, DELAYED RELEASE ORAL at 08:38

## 2018-03-28 ASSESSMENT — ACTIVITIES OF DAILY LIVING (ADL)
ADLS_ACUITY_SCORE: 12
ADLS_ACUITY_SCORE: 11
ADLS_ACUITY_SCORE: 12

## 2018-03-28 NOTE — PLAN OF CARE
Problem: Patient Care Overview  Goal: Plan of Care/Patient Progress Review  OT-6B: Cancel. Pt not medically appropriate today. Will reschedule.

## 2018-03-28 NOTE — MR AVS SNAPSHOT
After Visit Summary   3/28/2018    Carlos Alberto Fenton    MRN: [id number]           Patient Information     Date Of Birth          1940        Visit Information        Provider Department      3/28/2018 5:00 AM 1, Uc Cv Device [facility]        Today's Diagnoses     Sinoatrial node dysfunction (H)    -  1       Follow-ups after your visit        Your next 10 appointments already scheduled     Sep 07, 2018 11:00 AM CDT   (Arrive by 10:45 AM)   Implantable Loop Recorder with Uc Cv Device 1   [facility] ([facility])    [address] [place] [address]  [address] [place] [id number] [phone]            Sep 07, 2018 11:30 AM CDT   (Arrive by 11:15 AM)   Return Visit with Star Lobato MD   [facility] ([facility])    [address] [place] [address]  [address] [place] [id number] [phone]              Who to contact     If you have questions or need follow up information about today's clinic visit or your schedule please contact [facility] directly at [phone].  Normal or non-critical lab and imaging results will be communicated to you by [employer] Pencil[employer], letter or phone within 4 business days after the clinic has received the results. If you do not hear from us within 7 days, please contact the clinic through [employer] or phone. If you have a critical or abnormal lab result, we will notify you by phone as soon as possible.  Submit refill requests through [employer] or call your pharmacy and they will forward the refill request to us. Please allow 3 business days for your refill to be completed.          Additional Information About Your Visit        Space Pencilhart Information     [employer] gives you secure access to your electronic health record. If you see a primary care provider, you can also send messages to your care team and make appointments. If you have questions, please call your primary care clinic.  If you do not have a  primary care provider, please call [phone] and they will assist you.        Care EveryWhere ID     This is your Care EveryWhere ID. This could be used by other organizations to access your [facility] medical records  [id number]         Blood Pressure from Last 3 Encounters:   03/28/18 120/74   03/02/18 120/80   02/22/18 109/69    Weight from Last 3 Encounters:   03/28/18 94.1 kg (207 lb 7.3 oz)   03/02/18 78.2 kg (172 lb 6.4 oz)   01/16/18 74.8 kg (165 lb)              We Performed the Following     Glucose by meter     Glucose by meter     PM DEVICE PROGRAMMING EVAL, DUAL LEAD PACER          Today's Medication Changes      Notice     This visit is during an admission. Changes to the med list made in this visit will be reflected in the After Visit Summary of the admission.             Primary Care Provider Office Phone # Fax #    Humberto Conner [phone]       [address] [place] [address]        Equal Access to Services     [facility] Center[facility]ORAINE : Hadii aad ku hadasho Soomaali, waaxda luqadaha, qaybta kaalmada adeegyada, waxay idiin hayluisn janette mccollum . So [facility] [phone].    ATENCIÓN: Si habla español, tiene a espinoza disposición servicios gratuitos de asistencia lingüística. [facility] [phone].    We comply with applicable federal civil rights laws and [place] laws. We do not discriminate on the basis of race, color, national origin, age, disability, sex, sexual orientation, or gender identity.            Thank you!     Thank you for choosing [facility]  for your care. Our goal is always to provide you with excellent care. Hearing back from our patients is one way we can continue to improve our services. Please take a few minutes to complete the written survey that you may receive in the mail after your visit with us. Thank you!             Your Updated Medication List - Protect others around you: Learn how to safely use, store and throw away your medicines at  www.disposemymeds.org.      Notice     This visit is during an admission. Changes to the med list made in this visit will be reflected in the After Visit Summary of the admission.

## 2018-03-28 NOTE — PROVIDER NOTIFICATION
03/28/18 1542   Call Information   Date of Call 03/28/18   Time of Call 1542   Name of person requesting the team Scott Crump   Title of person requesting team RN   RRT Arrival time 1545   Time RRT ended 1800   Reason for call   Type of RRT Adult   Primary reason for call Staff concerned;Sudden or unanticipated change in patient condition;Nurse is concerned/worried   Cardiovascular Other (describe)  (unable to maintain sats)   Respiratory O2sat less than 88% for greater than 5 minutes despite O2   Neurological Acute change in LOC or neuro status   Was patient transferred from the ED, ICU, or PACU within last 24 hours prior to RRT call? No   SBAR   Situation sats remain 70--80% on FIO2 100%   Background sudden drop in sats, unable to recoveer   Notable History/Conditions Cancer;Cardiac;Diabetes   Assessment lethargic but arousable   Interventions CXR;Other (describe)  (Unable to obtain abg or other labs)   Patient Outcome   Patient Outcome Transferred to  (Unit 4A)   RRT Team   Attending/Primary/Covering Physician Dr Arce   Date Attending Physician notified 03/28/18   Time Attending Physician notified [id number]   Physician(s) Dr Bolaños RN Jasen SMITH

## 2018-03-28 NOTE — PROGRESS NOTES
[facility] Internal Medicine Progress  Note            Assessment and Plan:   Carlos Alberto Fenton is a 77 year old female with PMH of HTN, HLD, DM, CVA (11/2016), CHF (last ECHO 12/12/17 EF 45-50%), CAD (s/p 3v CABG: LIMA-LAD, SVG-D1, SVG-dRCA and modified MAZE, ABDIEL ligation - 2/2016), paroxysmal AFib ([id number] - 8 on Xarelto), HIT, RUE DVT, recently diagnosed hypothyroidism, admitted 3/21/2018 for acute GIB, coagulopathy and concern for acute LI 2/2 acetaminophen toxicity with xarelto intake. Now tachy-israel arrhythmia.     Pt is s/p PPM placement on 3/26. We have started beta blockers for afib. We have stopped NAC 2/2 improved INR and downtrending LFTs. We are working on an anticoagulation plan.     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 Pt prev on warfarin, she was transitioned to xarelto in Jan 2018 because she likes leafy greens.  --hold xarelto   --Hematology consult to determine safest anticoagulation for stroke prevention in setting of [id number] 8. Per Heme, need to be on fondaporinox. Cardiology was paged to determine if it is okay to start this now given recent PPM placement. Needs Heme f/u in 2-3 months.  --started metoprolol 12.5 mg BID for episodes of RVR  --TTE on 3/25: shows EF 40-45%  --D/C trazodone as prolongs QTc    #Acute hypoxic respiratory failure 2/2 CHF exacerbation.   Secondary to IVF boluses for hypotension episodes with tachy/israel syndrome. CXR shows pulmonary edema. -- IVF are stopped. Diuresing with lasix 40 mg IV once. Consider starting home bumex 2 mg BID tomorrow.   --empirical treatment for aspiration PNA with zosyn on 3/24-3/25--switched to Augmentin on 3/25. CXR today shows no evidence of PNA. Stop augmentin 3/28.      #  Coagulopathy, INR to 5, elevated PT and PTT  # Acetaminophen toxicity (elevated acetaminophen levels)  Pt states she was taking 3-4g tylenol per day for the past month for chronic back pain.   Pt previously on warfarin for Afib. She started xarelto as of Jan 2018. She states she was not taking warfarin and xarelto simultaneously. It is possible that her coagulopathy is occurring in the setting of xarelto. Per Heme, factor 7/10 decreased c/w vit K deficiency, no evidence of hepatic synthetic dysfunction. Her INR has continued to improve.  - NAC infusion D/C on 3/26 per GI.  --trend INR  --Pending labs: homocysteine, methylmalonic acid  --Hep B and C panels negative  --F actin weakly positive; anti-mitochondial joseph neg and AMPARO neg      # Acute blood loss anemia (baseline 11-12 1 month prior, current hgb ~7) - now stable Hgb  Differential includes acute GI bleed (most likely). Hgb has been stable since admission and no evidence of bleeding seen.   Plan:  -- GI consult, appreciate recs  -- trend daily, transfuse <7  -- continue PPI BID   -- holding (home ASA 81 and xarelto). Consider resuming ASA given strong hx of CVA and CAD  -- folate WNL, B12 WNL, transferrin WNL, iron WNL, haptoglobin, reticulocyte index 2 indicating appropriate response.  --Need Hematology f/u in 2-3 months to eval for macrocytic anemia       # Hypothyroidism  Recently diagnosed. On Levothyroxine. Last TSH 2/21 elevated to 88, T4 improved from 0.29 to 0.44.   -- increase levothyroxine to 37.5 from PTA dose of 25 mcg  -- Recheck TSH in 6 weeks.         Resolved issues:    # HARINI on CKD -- resolved  Cr at bsl ~1.1-4. On presentation 1.8. UA with hyaline casts, tachy, hypotensive. Likely prerenal in the setting of GIB and decreased PO intake/emesis. Cr improved with IVF.   -- trend BMP    # Tropinemia, likely 2/2 demand ischemia 2/2 anemia  EKG did not show ST elevation, ST segment depression or T wave inversions. Pt is not having CP. Likely demand  "ischemia.          Chronic problems  # CHF  # CAD  # HLD  ECHO shows EF 45-50%.   Plan:  --holding metolazone, spironolactone, bumex, ASA - consider resuming ASA, bumex today  --cont atorvastatin      # Depression  -- continue PTA venlafaxine    # Insominia  -holding trazodone in lieu of prolonged QTc        # Pain Assessment:   Current Pain Score 3/25/2018 3/24/2018 3/23/2018   Patient currently in pain? denies no no   Pain score (0-10) - - -   Pain location - - -   Pain descriptors - - -   CPOT pain score - - -   Asenath s pain level was assessed and she currently denies pain.       Diet:   Cardiac Diet   Fluids: none  DVT Prophylaxis: mechanical  Code Status: Full Code     - Disposition:   Pending for clinical improvement, likely needs 2-5 nights    Patient was seen and discussed with Dr. Rome.    Ayaka Mclaughlin MD PhD   Internal Medicine, PGY 1  p     -----------------------------------------------------------------------------------------------------------------------------------------------      Interval History  No acute events overnight.    Pt is sleepy this AM. She says she usually sleeps during the day and is up at night at home.     Review of Systems:   A 4 point review of systems was negative except as noted above.    OBJECTIVE:  /78 (BP Location: Left arm)  Pulse 61  Temp 97.5  F (36.4  C) (Oral)  Resp 20  Ht 1.575 m (5' 2\")  Wt 94.8 kg (209 lb)  SpO2 98%  BMI 38.23 kg/m2      Intake/Output Summary (Last 24 hours) at 03/27/18 2120  Last data filed at 03/27/18 2009   Gross per 24 hour   Intake              480 ml   Output             3175 ml   Net            -2695 ml         GENERAL APPEARANCE: drowsy in AM, not in distress  Pulmonary: decreased breath sounds bilaterally, crackles in LLL   CV: irregular rate and rhythm, no murmur, no rub  GI: soft, nontender, no HSM   Extremities: pitting edema 1+ BLE  NEURO: drowsy, but easily to wake up, mentation intact when awake and " speech normal, equally move  Skin: bruises on back are improving    Labs:   All labs reviewed by me  Electrolytes/Renal -   Recent Labs   Lab Test  03/27/18   0605  03/26/18   0632  03/25/18   0608   02/16/17   0811  02/15/17   1428  02/14/17   0833   NA  137  142  142   < >  134  139  134   POTASSIUM  3.9  3.6  4.1   < >  3.9  4.1  4.4   CHLORIDE  102  108  107   < >  101  103  101   CO2  26  26  22   < >  26  27  26   BUN  11  14  23   < >  12  17  11   CR  0.68  0.71  0.81   < >  0.65  0.65  0.67   GLC  151*  111*  94   < >  103*  141*  181*   CARLY  8.7  8.7  8.2*   < >  8.0*  8.1*  8.1*   MAG  1.9  2.1  2.0   < >  1.8  1.8  2.2   PHOS   --    --    --    --   2.2*  2.6  2.3*    < > = values in this interval not displayed.     CBC -   Recent Labs   Lab Test  03/27/18   0605  03/26/18   0632  03/25/18   1754  03/25/18   0608   WBC  8.2  6.9   --   5.6   HGB  7.7*  8.0*  7.8*  7.9*   PLT  162  169   --   172     LFTs   Recent Labs   Lab Test  03/27/18   0605  03/26/18   0632  03/25/18   0608   ALKPHOS  153*  151*  116   BILITOTAL  1.2  1.2  1.4*   ALT  54*  66*  78*   AST  60*  65*  83*   PROTTOTAL  6.3*  6.1*  5.5*   ALBUMIN  2.8*  2.7*  2.5*       Imaging:  None     Current Medications:    multivitamin, therapeutic  1 tablet Oral Daily     metoprolol tartrate  12.5 mg Oral BID     pantoprazole  40 mg Oral BID AC     potassium chloride  40 mEq Oral Daily     sodium chloride (PF)  3 mL Intracatheter Q8H     amoxicillin-clavulanate  1 tablet Oral Q12H ABHISHEK     polyethylene glycol  17 g Oral Daily     sodium chloride (PF)  3 mL Intracatheter Q8H     melatonin  3 mg Oral At Bedtime     lidocaine  1 patch Transdermal Q24H     lidocaine   Transdermal Q24h     lidocaine   Transdermal Q8H     atorvastatin  40 mg Oral At Bedtime     insulin aspart  0.5-2.5 Units Subcutaneous TID AC     insulin aspart  0.5-2.5 Units Subcutaneous At Bedtime     venlafaxine  150 mg Oral Daily with breakfast     ferrous sulfate  325 mg Oral  Daily with breakfast     levothyroxine  37.5 mcg Oral QAM AC       Injection Device for insulin       Ayaka Mclaughlin MD      Physician Attestation  I, Nevaeh Rome MD, saw this patient with the resident and agree with the resident s findings and plan of care as documented in the resident s note with my edits.     I personally reviewed vital signs, medications, labs and imaging.    Nevaeh Rome MD  Date of Service (when I saw the patient): 3/27/18

## 2018-03-28 NOTE — PLAN OF CARE
Problem: Patient Care Overview  Goal: Plan of Care/Patient Progress Review  Neuro: Drowsy but pt is easily aroused. Pt has bouts of forgetfulness, however that is baseline. Pt has remained somnolent but &O x4. Pt reports feeling more tired than usual. Neuro checks have been intact, however. Rapid response was called on pt today for respiratory distress.   Cardiac: Afib with occasional PVCs noted; occasional israel down to the 40s; pt's pacemaker was interrogated during rapid response.   Respiratory: Sating WDL on 3L NC when up; up to 60% CPAP when asleep. Pt is now on BIPAP with FiO2 at 100%.   GI/: Adequate urine output. No BM this shift.   Diet/appetite: Tolerating low sodium/low fat diet. Eats fairly well.   Activity:  Assist of 1, up to chair and to commode.  Pain: At acceptable level on current regimen.   Skin: Wounds at L heel and R great toe intact; no drainage noted. Wounds cleansed with normal saline x1 today.   LDA's:    Plan: Following rapid response, pt was sent to the ICU.

## 2018-03-28 NOTE — PROGRESS NOTES
"Social Work Services Progress Note    Hospital Day: 9  Date of Initial Social Work Evaluation:  3/27/18  Collaborated with:  Pt and pt's dtr Lindsay (P: [phone])    Data:  Carlos Alberto Fenton is a 76 yo female admitted to [facility] 3/20/18.    Intervention: Met w/pt to discuss dc plan and spoke to dtr via phone.    Assessment: Pt has lived with her daughter Lindsay for the past 12 years in Lindsay's home in [place], [place]. Lindsay shared that she (Lindsay) just finalized her divorce, and Lindsay's ex- was awarded the home that she and pt live in. Pt and dtr need to be out of the house by 5/1/18. Lindsay has an apartment she is going to move into in [place] [place] [facility]", but this apartment doesn't allow pets and pt has a pet that she does not want to part with. At this time, pt does not have anywhere to live after 5/1. Pt is  but her spouse, Pardeep, lives in [place] and visits very sporadically.    SW reviewed Medicare and [employer]'s requirements for a \"skilled need\" to continue paying for TCU. Discussed that  and [employer] do not cover LTC or long term. SW discussed independent living vs B[facility] vs LTC depending on pt's needs and financial ability. SW provided Senior Housing Directory and [employer] brochure. Encouraged pt and pt's dtr to call [employer] to consult with them, review the Senior Housing Directory to check prices/amenities/pet allowance, and encouraged pt/dtr to talk to the TCU SW when they arrive at TCU. SW reiterated with pt that pets will not be allowed at the TCU.    Per pt request, referrals sent to facilities near \[place] [place] [facility]\"" as pt wants to be near her dtr's new apt and near [facility]:  1. [facility] (P: [phone], F: [phone]): Accepted.  2. [facility] (P: [phone], F: [phone]): Accepted; would have private room for $15/day extra.  3. [facility] (P: [phone], F: [phone])  4. [facility] (P: [phone], F: [phone]): Still reviewing for TCU. [facility] does have " apartments that allow small dogs as long as pt can care for the dog by herself. The apt coordinator is Tran at [phone].  5. [facility] (P: [phone], F: [phone])  6. [facility] (P: [phone], F: [phone]): Accepted; anticipate bed Friday/Saturday. Pt's dog could come visit.  7. [facility] (P: [phone], F: [phone]): Accepted.  8. [facility] (P: [phone], F: [phone])  9. [facility] (P: [phone], F: [phone])    Plan:    Anticipated Disposition:  MD anticipating DC this week. Pt has been accepted to three TCUs so far.     Barriers to d/c plan:  Medical clearance    Follow Up:  SW will continue to follow and assist as needed.    DAMIAN Roberson, LICSW  6B Intermediate Care Unit  Phone: [phone]  Pager: [phone]

## 2018-03-28 NOTE — PROGRESS NOTES
[facility] Progress  Note            Assessment and Plan:   Carlos Alberto Fenton is a 77 year old female with PMH of HTN, HLD, DM, CVA (11/2016), CHF (last ECHO 12/12/17 EF 45-50%), CAD (s/p 3v CABG: LIMA-LAD, SVG-D1, SVG-dRCA and modified MAZE, ABDIEL ligation - 2/2016), paroxysmal AFib ([id number] - 8 on Xarelto), HIT, RUE DVT, recently diagnosed hypothyroidism, admitted 3/21/2018 for acute GIB, coagulopathy and concern for acute LI 2/2 acetaminophen toxicity with xarelto intake. Now tachy-israel arrhythmia.     Pt is s/p PPM placement on 3/26. We have started beta blockers for afib. We have stopped NAC 2/2 improved INR and downtrending LFTs. We are working on an anticoagulation plan.     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 Pt prev on warfarin, she was transitioned to xarelto in Jan 2018 because she likes leafy greens.  --hold xarelto   --Hematology consult to determine safest anticoagulation for stroke prevention in setting of [id number]. Per Heme, need to be on fondaporinox. Needs Heme f/u in 2-3 months.f/u w/ cardiology to make sure this is okay with PPM placement   --started metoprolol 12.5 mg BID for episodes of RVR  --TTE on 3/25: shows EF 40-45%  --D/C trazodone as prolongs QTc  -- Augmentin for 5 days post op    #Acute hypoxic respiratory failure 2/2 CHF exacerbation.   Secondary to IVF boluses for hypotension episodes with tachy/israel syndrome. CXR shows pulmonary edema. -- IVF are stopped. Diuresing with lasix 40 mg IV once. Consider starting home bumex 2 mg BID tomorrow.   --empirical treatment for aspiration PNA with zosyn on 3/24-3/25--switched to Augmentin on 3/25. CXR today shows no  evidence of PNA.       # Coagulopathy, INR to 5, elevated PT and PTT  # Acetaminophen toxicity (elevated acetaminophen levels)  Pt states she was taking 3-4g tylenol per day for the past month for chronic back pain.   Pt previously on warfarin for Afib. She started xarelto as of Jan 2018. She states she was not taking warfarin and xarelto simultaneously. It is possible that her coagulopathy is occurring in the setting of xarelto. Per Heme, factor 7/10 decreased c/w vit K deficiency, no evidence of hepatic synthetic dysfunction. Her INR has continued to improve.  - NAC infusion D/C on 3/26 per GI.  --trend INR  --Pending labs: homocysteine, methylmalonic acid  --Hep B and C panels negative  --F actin weakly positive; anti-mitochondial joseph neg and AMPARO neg      # Acute blood loss anemia (baseline 11-12 1 month prior, current hgb ~7) - now stable Hgb  Differential includes acute GI bleed (most likely). Hgb has been stable since admission and no evidence of bleeding seen.   Plan:  -- GI consult, appreciate recs  -- trend daily, transfuse <7  -- continue PPI BID   -- holding (home ASA 81 and xarelto).  -- folate WNL, B12 WNL, transferrin WNL, iron WNL, haptoglobin, reticulocyte index 2 indicating appropriate response.  --Need Hematology f/u in 2-3 months to eval for macrocytic anemia       # Hypothyroidism  Recently diagnosed. On Levothyroxine. Last TSH 2/21 elevated to 88, T4 improved from 0.29 to 0.44.   -- increase levothyroxine to 37.5 from PTA dose of 25 mcg  -- Recheck TSH in 6 weeks.         Resolved issues:    # HARINI on CKD -- resolved  Cr at bsl ~1.1-4. On presentation 1.8. UA with hyaline casts, tachy, hypotensive. Likely prerenal in the setting of GIB and decreased PO intake/emesis. Cr improved with IVF.   -- trend BMP    # Tropinemia, likely 2/2 demand ischemia 2/2 anemia  EKG did not show ST elevation, ST segment depression or T wave inversions. Pt is not having CP. Likely demand ischemia.          Chronic  "problems  # CHF  # CAD  # HLD  ECHO shows EF 45-50%.   Plan:  --holding metolazone, spironolactone, bumex, ASA   --cont atorvastatin      # Depression  -- continue PTA venlafaxine    # Insominia  -holding trazodone in lieu of prolonged QTc        # Pain Assessment:   Current Pain Score 3/25/2018 3/24/2018 3/23/2018   Patient currently in pain? denies no no   Pain score (0-10) - - -   Pain location - - -   Pain descriptors - - -   CPOT pain score - - -   Carlos Alberto s pain level was assessed and she currently denies pain.       Diet:   Cardiac Diet   Fluids: none  DVT Prophylaxis: mechanical  Code Status: Full Code     - Disposition:   Pending for clinical improvement, likely needs 1-2 nights    Patient was seen and discussed with Dr. Macey Ruiz, MS3    -----------------------------------------------------------------------------------------------------------------------------------------------      Interval History  She was lethargic last night, SOB using accessory muscles. VBG was drawn and was normal.     Pt is sleepy this AM. States that her breathing is better and denies pain.     Review of Systems:   A 4 point review of systems was negative except as noted above.    OBJECTIVE:  /75 (BP Location: Left arm)  Pulse 61  Temp 97.4  F (36.3  C) (Axillary)  Resp 20  Ht 1.575 m (5' 2\")  Wt 94.1 kg (207 lb 7.3 oz)  SpO2 99%  BMI 37.94 kg/m2      Intake/Output Summary (Last 24 hours) at 03/28/18 0913  Last data filed at 03/28/18 0401   Gross per 24 hour   Intake              340 ml   Output             1500 ml   Net            -1160 ml         GENERAL APPEARANCE: drowsy in AM, not in distress  Pulmonary: decreased breath sounds bilaterally, crackles in LLL   CV: irregular rate and rhythm, no murmur, no rub  GI: soft, nontender, no HSM   Extremities: pitting edema 1-2+ BLE  NEURO: drowsy, but easily to wake up, mentation intact when awake and speech normal, equally move  Skin: bruises on back are " improving    Labs:   All labs reviewed by me  Electrolytes/Renal -   Recent Labs   Lab Test  03/27/18   0605  03/26/18   0632  03/25/18   0608   02/16/17   0811  02/15/17   1428  02/14/17   0833   NA  137  142  142   < >  134  139  134   POTASSIUM  3.9  3.6  4.1   < >  3.9  4.1  4.4   CHLORIDE  102  108  107   < >  101  103  101   CO2  26  26  22   < >  26  27  26   BUN  11  14  23   < >  12  17  11   CR  0.68  0.71  0.81   < >  0.65  0.65  0.67   GLC  151*  111*  94   < >  103*  141*  181*   CARLY  8.7  8.7  8.2*   < >  8.0*  8.1*  8.1*   MAG  1.9  2.1  2.0   < >  1.8  1.8  2.2   PHOS   --    --    --    --   2.2*  2.6  2.3*    < > = values in this interval not displayed.     CBC -   Recent Labs   Lab Test  03/28/18   0745  03/27/18   0605  03/26/18   0632   WBC  8.8  8.2  6.9   HGB  8.6*  7.7*  8.0*   PLT  140*  162  169     LFTs   Recent Labs   Lab Test  03/27/18   0605  03/26/18   0632  03/25/18   0608   ALKPHOS  153*  151*  116   BILITOTAL  1.2  1.2  1.4*   ALT  54*  66*  78*   AST  60*  65*  83*   PROTTOTAL  6.3*  6.1*  5.5*   ALBUMIN  2.8*  2.7*  2.5*       Imaging:  None     Current Medications:    multivitamin, therapeutic  1 tablet Oral Daily     metoprolol tartrate  12.5 mg Oral BID     pantoprazole  40 mg Oral BID AC     potassium chloride  40 mEq Oral Daily     sodium chloride (PF)  3 mL Intracatheter Q8H     amoxicillin-clavulanate  1 tablet Oral Q12H ABHISHEK     polyethylene glycol  17 g Oral Daily     sodium chloride (PF)  3 mL Intracatheter Q8H     melatonin  3 mg Oral At Bedtime     lidocaine  1 patch Transdermal Q24H     lidocaine   Transdermal Q24h     lidocaine   Transdermal Q8H     atorvastatin  40 mg Oral At Bedtime     insulin aspart  0.5-2.5 Units Subcutaneous TID AC     insulin aspart  0.5-2.5 Units Subcutaneous At Bedtime     venlafaxine  150 mg Oral Daily with breakfast     ferrous sulfate  325 mg Oral Daily with breakfast     levothyroxine  37.5 mcg Oral QAM AC       Injection Device for  insulin       Pallavi Ruiz

## 2018-03-28 NOTE — PLAN OF CARE
Problem: Patient Care Overview  Goal: Plan of Care/Patient Progress Review  Neuro: A&Ox4. Pt has been somnolent/lethargic throughout shift; team was made aware.   Cardiac: SB with HR in the 50s intermittently; otherwise pt hangs around low 100s to 1-teens afib. Pt reports feeling overall more SOB at rest and with activity; pt has generalized edema. Pt was given 40 mg Lasix this morning; responded well to it.   Respiratory: Sating WDL on 2-4 L NC; CPAP at 35% otherwise. Pt was placed on CPAP during naps today.   GI/: Adequate urine output. No BM this shift.   Diet/appetite: Tolerating low sodium/low fat diet. Eating fairly well; pt does require reminders to and assistance with ordering meals.   Activity:  Assist of 1, up to chair and to the commode.  Pain: At acceptable level on current regimen.   Skin: Diffuse ecchymosis improving and wounds on BLE intact. No new skin deficits noted.     Plan: Continue with POC. Notify primary team with changes.

## 2018-03-28 NOTE — PLAN OF CARE
"Problem: Patient Care Overview  Goal: Plan of Care/Patient Progress Review  Outcome: No Change  /78 (BP Location: Left arm)  Pulse 61  Temp 97.4  F (36.3  C) (Axillary)  Resp 17  Ht 1.575 m (5' 2\")  Wt 94.1 kg (207 lb 7.3 oz)  SpO2 99%  BMI 37.94 kg/m2  Neuro: Lethargic, oriented x4. Pt withdrawn and forgetful at times.   Cardiac: 50% paced rhythm, with underlying afib/SR. VSS. -120s/70-80s. Pt cool to the touch. .      Respiratory: Sating >92% on % CPAP. Extremities and lips cyanotic.   GI/: Adequate urine output. BM X2. Mild to moderate generalized edema.   Diet/appetite: Tolerating cardiac/2g Na diet. Fair appetite.  Activity:  Assist of 1, up to chair and commode.   Pain: Denies.    Skin: No new deficits noted.   1g Mag replacement given. VBG done this shift for concerns regarding lethargy, results WDL.   R: Continue with POC. Notify primary team with changes. Plans for transfer to  once bed is available.         Problem: Diabetes Comorbidity  Goal: Diabetes  Patient comorbidity will be monitored for signs and symptoms of hyperglycemia or hypoglycemia. Problems will be absent, minimized or managed by discharge/transition of care.   Outcome: No Change  ACHS and 0200 BG checks. Sliding scale insulin per orders.     Problem: Cardiac Disease Comorbidity  Goal: Cardiac Disease  Patient comorbidity will be monitored for signs and symptoms of Cardiac Disease.  Problems will be absent, minimized or managed by discharge/transition of care.   Outcome: No Change  Continuous cardiac monitoring. PM in place.       "

## 2018-03-28 NOTE — PROVIDER NOTIFICATION
Kassie francisco MD regarding pt's continued lethargy, SOB with exertion, and accessory muscle use. Pt also pacing 50% of the time. MD ordered STAT ABG to be done. Will continue to monitor.

## 2018-03-28 NOTE — PROVIDER NOTIFICATION
Pt is somnolent today; team was made aware this morning. Pt has increased SOB with activity; O2, however remains WDL with 2-4L NC. Staff enquires if MD would place order for ABG/VBG this evening or tomorrow. Per on-call MD, team was made aware and no new changes have been noted; therefore, will continue to monitor pt.

## 2018-03-28 NOTE — PROGRESS NOTES
DAILY ROUNDS CHECKLIST:     RN and Provider saw patient together: YES  Checklist was reviewed with Esra Sit or Resident MD      Status/Level of care:    - 6C:  Awaiting bed availability    Tethers:   - Telemetry: continue   - Urinary Catheter: Not present   - No line    Anticoagulation for VTE prophylaxis:    - indicated; will order    Rehab:   - PT/OT indicated and ordered       Time until discharge:    - 1-2 days once O2 use returns to baseline, safe TCU placement.        Staff reported to MD that pt is more somnolent today and reports SOB at rest. Per provider, 40 mg Lasix will be ordered; which was what pt received yesterday. Staff enquires if pt could receive more than 40 mg as pt continues to be SOB and is now increasingly somnolent and activity intolerant. Per provider, pt met the goal of urine output yesterday; will update provider if pt's mentation status worsens. Staff  Also, pt had a few incidents of HR 48-49 up to 3 beats at a time.

## 2018-03-28 NOTE — PROVIDER NOTIFICATION
"Pt's heart rate decreases to 48-49 for a few beats and is then paced for higher rates. Pt was restarted on PTA metoprolol yesterday as pacemaker was placed on 3/26/18. Pt reports feeling SOB but that has not changed from yesterday; pt has been fluid overloaded since placement of pacemaker; up by 3.5 kg. Per SPIKE ALVAREZ, \"rhythm was assessed and looked fine\". Will continue to monitor.     1100  Pt heart rate continues to go lower than 50, now as low as 44 for 2-3 beats at at time and is then paced for rates higher than 50. Guillermo team and SPIKE ALVAREZ paged; will f/u with EP.     0672  Staff followed up with provider regarding low heart beats with pacemaker. Per provider, pacemaker allows for 1-2 beats lower than than threshold (50 bpm) and then paces if needed. Pt's pacemaker will be interrogated as well.   "

## 2018-03-28 NOTE — SIGNIFICANT EVENT
Responded to Rapid Response call. RRT was called due to change in cardiorespiratory status. Patient was on CPAP 5 cmH2O, 100%, RR 20/min, Unable to get reliable sao2 2/2 decreased perfusion. Respiratory interventions included ABG attempt, right radial, unable to palpate pulses in either brachial or left radial. MD's attempted to palpate femoral pulses, but weren't successful.  Float RN tried to get a VBG from PIV's, but neither would draw.  Patient was transferred to ICU for further monitoring, kenisha placement.    Dylan Bazzi, RT  3/28/2018 4:26 PM

## 2018-03-28 NOTE — PLAN OF CARE
Problem: Patient Care Overview  Goal: Plan of Care/Patient Progress Review  PT / 6B - Cancel. Pt not medically appropriate for PT this day. Re-scheduled per POC.

## 2018-03-28 NOTE — PROGRESS NOTES
Pt transported from  to  for monitoring.  Pt on CPAP +10, FiO2 .5.  Pt is not in any distress, unable to get SaO2 due to poor circulation and cold extemities.  After arrival from  pt was placed on Oxi Plus mask at 7 lpm,  Pt is not SOB and we are still unble to get a  SaO2.

## 2018-03-28 NOTE — H&P
[facility]      MICU History and Physicial  Carlos Alberto Fenton MRN: [id number]  1940  Date of Admission:3/20/2018  Primary care provider: Humberto Conner      Assessment and Plan:     Carlos Alberto Fenton is a 77 year old female with PMH of HTN, HLD, DM, CVA (11/2016), chronic diastolic HF (last ECHO 12/12/17 EF 45-50%), CAD (s/p 3v CABG: LIMA-LAD, SVG-D1, SVG-dRCA and modified MAZE, ABDIEL ligation - 2/2016), paroxysmal AFib ([id number] - 8 on Xarelto), HIT, RUE DVT, recently diagnosed hypothyroidism, who presented to [facility] ED on 3/21/18 for generalized fatigue and weakness and dyspnea for 1-2 days. 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 Pt prev on warfarin, she was transitioned to xarelto in Jan 2018 because she likes leafy greens.  - Continue holding xarelto   - Hematology consulted earlier to determine safest anticoagulation for stroke prevention in setting of [id number]: needs to be on fondaporinox, heme f/u in 2-3 months  - Will talk with cards tomorrow to discuss timing of restarting fondaparinux   - Cont metoprolol 12.5 mg BID (started this admission)    # Prolonged QTc, resolved  ECG 3/28 was 400    # HFrEF  ECHO 3/25 shows EF 45-50%. Also showed Basal inferolareatl wall akinesis with aneurysm, and a dilated IVC. Not grossly volume overloaded on exam, mildly elevated JVD, lungs sound clear. CXR with small pleural effusions  - Give lasix 40mg x2 today with good response  - Continue holding PTA metolazone, spironolactone, bumex  - Started beta-blocker this admission  - Not on ACE-I or ARB    # CAD  - Holding ASA  - Cont statin    # HLD  - Cont statin    ===PULMONARY===  #Acute hypoxic respiratory failure 2/2 CHF  exacerbation  Secondary to IVF boluses for hypotension episodes with tachy/israel syndrome. CXR 3/27 shows pulmonary edema, no clear edema today. Was diuresed 3/27 with good urine output. Today had increased respiratory distress with reported sats in 50-60%, however questionable O2 sat readings. Unable to get ABG. Now in MICU, still not able to get good O2 sats, however patient is otherwise vitally stable, not blue, and mentation is mostly intact. Not grossly volume overloaded on exam, mildly elevated JVD, lungs sound clear. CXR with small pleural effusions  - Try to obtain ABG  - s/p lasix x2 with good response, would consider additional lasix if needed  - Low concern for PE at this time: not tachycardic and no chest pain when respiratory distress started   - Stop augmentin, no clear infiltrate on CXR, no fevers, no leukocytosis  - Bipap if needed    ===GASTROINTESTINAL===    # Acetaminophen toxicity (elevated acetaminophen levels)  # Transaminits, alk phos elevation  Pt stated she was taking 3-4g tylenol per day for the past month for chronic back pain.   Pt previously on warfarin for Afib. She started xarelto as of Jan 2018. She states she was not taking warfarin and xarelto simultaneously. It is possible that her coagulopathy is occurring in the setting of xarelto. Per Heme, factor 7/10 decreased c/w vit K deficiency, no evidence of hepatic synthetic dysfunction. Her INR has continued to improve. Hep B and C negative. F-actin weakly positive; anti-mitochondial joseph neg and AMPARO neg. NAC infusion D/C on 3/26 per GI.  - Trend hepatic panel and INR    # Nutrition:   - NPO depressed mental status and concern for respiratory status    ===RENAL===  #Elevated Lactate, resolved  Lactate 4.7 today, but with normal blood pressure and benign abdominal exam. Repeat was 1.0, so 4.7 was likely spurious    # HARINI on CKD -- resolved  Cr at bsl ~1. On presentation 1.8. UA with hyaline casts, tachy, hypotensive. Likely prerenal in  the setting of GIB and decreased PO intake/emesis. Cr improved with IVF. Currently 0.73. Has good urine output  - Trend BMP    ===HEME/ONC===  # Acute blood loss anemia  Baseline 11-12 1 month prior, presented with Hgb ~7.5. Most likely GI bleed. folate WNL, B12 WNL, transferrin WNL, iron WNL, haptoglobin, reticulocyte index 2 indicating appropriate response.  Hgb has been stable since admission and no evidence of bleeding seen.   - GI consulted, they have not scoped patient yet. Tachycardia was barrier to scoping, which has since resolved. Will need re-consult to GI for further evaluation endoscopically, non-urgent currently  - Trend Hgb  - continue PPI BID   - Holding ASA and anticoagulation  - Hematology was consulted and want to see her in clinic in 2-3 months. If still macrocytic anemia, they will consider bone marrow biopsy    # Coagulopathy, INR to 5, elevated PT and PTT  INR 5 on admission, was reportedly not on warfarin. Improved with vitamin K. Continues to trend down, currently 1.47  - Trend INR     ===ENDOCRINE===  # Hypothyroidism  Recently diagnosed. On Levothyroxine. Last TSH 2/21 elevated to 88, T4 improved from 0.29 to 0.44.   - Cont levothyroxine 37.5mcg per day (increased on admission from PTA dose of 25 mcg)  - Recheck TSH and free T4 in 6 weeks     ===INFECTIOUS DISEASE===  No acute issues    ===SKIN/MSK===  No acute issues    Lines: 2 peripherals  Prophylaxis:  DVT: SCDs   GI: PPI  Disposition: MICU for monitoring  Code Status: full    Patient was seen and discussed with attending physician Dr. Jones, who agrees with above assessment and plan.    Ishmael Driver MD  [id number]  Pager: [phone]         Chief Complaint:   Dyspnea         History of Present Illness:   Carlos Alberto Fenton is a 77 year old female with PMH of HTN, HLD, DM, CVA (11/2016), chronic diastolic HF (last ECHO 12/12/17 EF 45-50%), CAD (s/p 3v CABG: LIMA-LAD, SVG-D1, SVG-dRCA and modified MAZE, ABDIEL ligation - 2/2016), paroxysmal AFib  "([id number] - 8 on Xarelto), HIT, RUE DVT, recently diagnosed hypothyroidism, who presented to [facility] ED on 3/21/18 for generalized fatigue and weakness and dyspnea for 1-2 days.     Her tylenol level on admission was 120 on presentation, she had been taking \"2 red capsules\" every 3-4 hours for about a month. She was started on NAC. Her Hgb dropped from 11.2 on 2/22/18 to 7.2 on presentation. INR was 5.05, but she had switched from warfarin to xarelto in 01/2018, so the INR elevation was a surprise. Was given vitamin K and PPI. Her stool occult was positive. CT a/p also done, showing mild RUQ and pelvic low density free fluid and small amount of focal soft tissue stranding lateral and R to iliac crest, suggestive of small ecchymosis.    GI did not want to intervene on patient due to tachyarrhythmia. This was treated with pacemaker on 3/26/18. Her Hgb has since been stable ~8.0, so nothing has been done as of yet. She remained on NAC until 3/26/18. Her HARINI from admission resolved.  Her hypothyroidism slightly improved, but still low free T4, so her levothyroxine was increased. On 3/24, she was started on zosyn for concern for aspiration PNA. ABx stopped 3/28. No fevers this admission. No leukcytosis. Blood and urine cultures negative    Hematology has also been consulted given elevated INR and macrocytic anemia. Mixing studies normal, so no factor deficiency    Came down to MICU due to monitoring for increased shortness of breath with sats in 50s-60s%, unable to get ABG on multiple attempts. Currently no increased work of breathing currently, patient feels comfortable. States overall breathing improved since admission. Has no other complaints         Review of Systems:    12 points ROS otherwise negative  Unable to perform due to patient condition.          Past Medical History:   Medical History reviewed.   Past Medical History:   Diagnosis Date     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) " 10/16/2016    parietral lesions bilateral       Antiplatelet or antithrombotic long-term use      Anxiety      Atrial fibrillation (H)      CAD (coronary artery disease)     2 vessel     Cancer (H) 1990    periodically have cancer on the skin removed     Cerebral artery occlusion with cerebral infarction (H) 10/2016    Cardioembolic strokes related to atrial fibrillation     Deep vein thrombosis (DVT) of axillary vein of right upper extremity (H) 2/25/2017     Depressive disorder 2001     Diabetes (H)      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     Hyperlipidemia LDL goal <130 10/31/2010     Hypertension      Panic attacks      Seizures (H) 10/19/2016     Sleep apnea     Uses CPAP             Past Surgical History:   Surgical History reviewed.   Past Surgical History:   Procedure Laterality Date     AMPUTATE TOE(S) Right 5/26/2017    Procedure: AMPUTATE TOE(S);  Right Great Toe amputation, debriedment of 2 and 3rd toe soft tissu right foot. and application of Grafix;  Surgeon: Leah Santamaria MD;  Location: [facility] OR     ANESTHESIA CARDIOVERSION N/A 12/12/2017    Procedure: ANESTHESIA CARDIOVERSION;  Anesthesia Cardioversion ;  Surgeon: GENERIC ANESTHESIA PROVIDER;  Location: [facility] OR     BACK SURGERY       BYPASS GRAFT ARTERY CORONARY N/A 2/6/2017    Procedure: BYPASS GRAFT ARTERY CORONARY;  Surgeon: Mikhail Quiñones MD;  Location: [facility] OR     C APPENDECTOMY  1959     C CARDIAC SURG PROCEDURE UNLIST       C HAND/FINGER SURGERY UNLISTED       C STOMACH SURGERY PROCEDURE UNLISTED       HC SACROPLASTY       HC VASCULAR SURGERY PROCEDURE UNLIST       HYSTERECTOMY, PASTORA  1988     IRRIGATION AND DEBRIDEMENT FOOT, COMBINED Right 7/7/2017    Procedure: COMBINED IRRIGATION AND DEBRIDEMENT FOOT;  Irrigation and Debridement Right Foot with Graphix  *Latex Allergy*;  Surgeon: Leah Santamaria MD;  Location: [facility] OR     MAZE PROCEDURE N/A 2/6/2017    Procedure: MAZE PROCEDURE;  Surgeon: Mikhail Quiñones MD;  Location:   OR     PICC INSERTION Left 02/25/2017    5fr TL Bard PICC, 47cm (3cm external) in the L basilic vein w/ tip in the SVC RA junction     TONSILLECTOMY  1942             Social History:   Social History reviewed.  Social History   Substance Use Topics     Smoking status: Former Smoker     Packs/day: 1.00     Years: 10.00     Types: Cigarettes     Start date: 10/3/1958     Quit date: 7/4/1976     Smokeless tobacco: Never Used      Comment: Quit in 1976     Alcohol use No             Family History:   Family History reviewed.   Family History   Problem Relation Age of Onset     DIABETES Mother      C.A.D. Mother      Hypertension Mother      CEREBROVASCULAR DISEASE Mother      Mini Strokes     Other Cancer Mother      Skin Cancer     Hyperlipidemia Mother      Coronary Artery Disease Mother      DIABETES Father      C.A.D. Father      Hypertension Father      Other Cancer Father      Hyperlipidemia Father      Coronary Artery Disease Father      HEART DISEASE Sister      Arthritis Sister      Other Cancer Other      Skin Cancer             Allergies:     Allergies   Allergen Reactions     Blood Transfusion Related (Informational Only) Other (See Comments)     Patient has a history of a clinically significant antibody against RBC antigens.  A delay in compatible RBCs may occur.Patient has a history of a significant allergic transfusion reaction.  Consult with Blood Bank MD before ordering components.     Heparin      HIT/ thrombocytopenia     Lovenox [Enoxaparin] Other (See Comments)     Thrombocytopenia      Oxycodone      hallucinations     Toprol Xl [Metoprolol] Nausea and Vomiting     Adhesive Tape Itching and Rash     Diapers & Supplies Rash     Developed yeast infection from previous hospital stay             Medications:   Medications Reviewed.   Current Facility-Administered Medications   Medication     multivitamin, therapeutic (THERA-VIT) tablet 1 tablet     metoprolol tartrate (LOPRESSOR) half-tab 12.5 mg      pantoprazole (PROTONIX) EC tablet 40 mg     potassium chloride SA (K-DUR/KLOR-CON M) CR tablet 40 mEq     lidocaine 1 % 1 mL     lidocaine (LMX4) kit     sodium chloride (PF) 0.9% PF flush 3 mL     sodium chloride (PF) 0.9% PF flush 3 mL     naloxone (NARCAN) injection 0.1-0.4 mg     HOLD: metformin and metformin containing medications on day of procedure and 48 hours after IV contrast given     HOLD: enoxaparin (LOVENOX) Post Procedure     HOLD: heparin (IV or Subcutaneous) Post Procedure     amoxicillin-clavulanate (AUGMENTIN) 875-125 MG per tablet 1 tablet     polyethylene glycol (MIRALAX/GLYCOLAX) Packet 17 g     sodium chloride (PF) 0.9% PF flush 3 mL     melatonin tablet 3 mg     Lidocaine (LIDOCARE) 4 % Patch 1 patch     lidocaine patch REMOVAL     lidocaine patch in PLACE     atorvastatin (LIPITOR) tablet 40 mg     glucose 40 % gel 15-30 g    Or     dextrose 50 % injection 25-50 mL    Or     glucagon injection 1 mg     insulin aspart (NovoPen ECHO/NovoLOG) cartridge     insulin aspart (NovoPen ECHO/NovoLOG) cartridge     Injection Device for insulin (NOVOPEN ECHO) 1 each     venlafaxine (EFFEXOR-XR) 24 hr capsule 150 mg     ferrous sulfate (IRON) tablet 325 mg     naloxone (NARCAN) injection 0.1-0.4 mg     senna-docusate (SENOKOT-S;PERICOLACE) 8.6-50 MG per tablet 1 tablet    Or     senna-docusate (SENOKOT-S;PERICOLACE) 8.6-50 MG per tablet 2 tablet     levothyroxine (SYNTHROID/LEVOTHROID) half-tab 37.5 mcg             Physical Exam:   Vitals were reviewed.  Temp:  [97.3  F (36.3  C)-97.8  F (36.6  C)] 97.5  F (36.4  C)  Heart Rate:  [57-74] 74  Resp:  [15-20] 18  BP: (111-130)/() 128/78  FiO2 (%):  [40 %-60 %] 60 %  SpO2:  [88 %-99 %] 88 %    General: sleeping but arouses to voice, laying in bed  Skin: scattered ecchymoses  HEENT: MMM, EOM intact  CV: RRR, +s1/s2  Resp: CTAB  Abd: Soft, non-tender, BS+, no masses appreciated  Extremities: WWP, LE edema bilaterally, cool hands and warm feet  Neuro:  drowsy but responds to voice, following commands, oriented, moving all extremities but has slow movement          Data:   I/O last 3 completed shifts:  In: 240 [P.O.:240]  Out: 1500 [Urine:1500]    ROUTINE LABS (Last four results)  CMP  Recent Labs  Lab 03/28/18  0745 03/27/18  0605 03/26/18  0632 03/25/18  0608    137 142 142   POTASSIUM 4.2 3.9 3.6 4.1   CHLORIDE 104 102 108 107   CO2 24 26 26 22   ANIONGAP 9 9 8 13   * 151* 111* 94   BUN 12 11 14 23   CR 0.73 0.68 0.71 0.81   GFRESTIMATED 77 84 79 68   GFRESTBLACK >90 >90 >90 83   CARLY 9.0 8.7 8.7 8.2*   MAG 2.2 1.9 2.1 2.0   PROTTOTAL 6.2* 6.3* 6.1* 5.5*   ALBUMIN 2.8* 2.8* 2.7* 2.5*   BILITOTAL 1.5* 1.2 1.2 1.4*   ALKPHOS 195* 153* 151* 116   AST 63* 60* 65* 83*   ALT 41 54* 66* 78*     CBC  Recent Labs  Lab 03/28/18  0745 03/27/18  0605 03/26/18  0632 03/25/18  1754 03/25/18  0608   WBC 8.8 8.2 6.9  --  5.6   RBC 2.32* 2.17* 2.21*  --  2.10*   HGB 8.6* 7.7* 8.0* 7.8* 7.9*   HCT 27.7* 26.3* 26.8*  --  25.5*   * 121* 121*  --  121*   MCH 37.1* 35.5* 36.2*  --  37.6*   MCHC 31.0* 29.3* 29.9*  --  31.0*   RDW 23.2* 24.1* 24.9*  --  25.6*   * 162 169  --  172     INR  Recent Labs  Lab 03/28/18  0745 03/27/18  0605 03/26/18  0632 03/25/18  0608   INR Canceled, Test credited 1.47* 1.62* 1.68*     Arterial Blood Gas  Recent Labs  Lab 03/28/18  0145 03/24/18  0416 03/23/18  0356 03/23/18  0330   PH  --   --  7.37 7.30*   PCO2  --   --  35 46*   PO2  --   --  418* 20*   HCO3  --   --  20* 23   O2PER 60.0 21.0 90.0 100.0

## 2018-03-28 NOTE — PROGRESS NOTES
Rapid response was called on pt for respiratory distress. FiO2 was increased from 60% to 100% but pt's O2 saturations remain between 64-72 sustained. Orders for c-xray, ABG and lactic acid were placed.

## 2018-03-29 ENCOUNTER — APPOINTMENT (OUTPATIENT)
Dept: OCCUPATIONAL THERAPY | Facility: CLINIC | Age: 78
DRG: 813 | End: 2018-03-29
Attending: INTERNAL MEDICINE
Payer: MEDICARE

## 2018-03-29 LAB
ALBUMIN SERPL-MCNC: 2.6 G/DL (ref 3.4–5)
ALP SERPL-CCNC: 176 U/L (ref 40–150)
ALT SERPL W P-5'-P-CCNC: 57 U/L (ref 0–50)
ANION GAP SERPL CALCULATED.3IONS-SCNC: 10 MMOL/L (ref 3–14)
AST SERPL W P-5'-P-CCNC: 108 U/L (ref 0–45)
BACTERIA SPEC CULT: NORMAL
BASE EXCESS BLDA CALC-SCNC: 2.6 MMOL/L
BILIRUB DIRECT SERPL-MCNC: 0.9 MG/DL (ref 0–0.2)
BILIRUB SERPL-MCNC: 1.8 MG/DL (ref 0.2–1.3)
BUN SERPL-MCNC: 12 MG/DL (ref 7–30)
CALCIUM SERPL-MCNC: 8.8 MG/DL (ref 8.5–10.1)
CHLORIDE SERPL-SCNC: 103 MMOL/L (ref 94–109)
CO2 SERPL-SCNC: 27 MMOL/L (ref 20–32)
CREAT SERPL-MCNC: 0.74 MG/DL (ref 0.52–1.04)
ERYTHROCYTE [DISTWIDTH] IN BLOOD BY AUTOMATED COUNT: 23.3 % (ref 10–15)
GFR SERPL CREATININE-BSD FRML MDRD: 76 ML/MIN/1.7M2
GLUCOSE BLDC GLUCOMTR-MCNC: 103 MG/DL (ref 70–99)
GLUCOSE BLDC GLUCOMTR-MCNC: 144 MG/DL (ref 70–99)
GLUCOSE BLDC GLUCOMTR-MCNC: 157 MG/DL (ref 70–99)
GLUCOSE BLDC GLUCOMTR-MCNC: 195 MG/DL (ref 70–99)
GLUCOSE BLDC GLUCOMTR-MCNC: 54 MG/DL (ref 70–99)
GLUCOSE BLDC GLUCOMTR-MCNC: 88 MG/DL (ref 70–99)
GLUCOSE BLDC GLUCOMTR-MCNC: 97 MG/DL (ref 70–99)
GLUCOSE SERPL-MCNC: 91 MG/DL (ref 70–99)
HCO3 BLD-SCNC: 27 MMOL/L (ref 21–28)
HCT VFR BLD AUTO: 24.7 % (ref 35–47)
HGB BLD-MCNC: 7.8 G/DL (ref 11.7–15.7)
INR PPP: 1.66 (ref 0.86–1.14)
INTERPRETATION ECG - MUSE: NORMAL
LACTATE BLD-SCNC: 1 MMOL/L (ref 0.7–2)
MAGNESIUM SERPL-MCNC: 2 MG/DL (ref 1.6–2.3)
MCH RBC QN AUTO: 38.2 PG (ref 26.5–33)
MCHC RBC AUTO-ENTMCNC: 31.6 G/DL (ref 31.5–36.5)
MCV RBC AUTO: 121 FL (ref 78–100)
O2/TOTAL GAS SETTING VFR VENT: ABNORMAL %
PCO2 BLD: 42 MM HG (ref 35–45)
PH BLD: 7.42 PH (ref 7.35–7.45)
PLATELET # BLD AUTO: 131 10E9/L (ref 150–450)
PO2 BLD: 166 MM HG (ref 80–105)
POTASSIUM SERPL-SCNC: 3.6 MMOL/L (ref 3.4–5.3)
PROT SERPL-MCNC: 5.6 G/DL (ref 6.8–8.8)
RBC # BLD AUTO: 2.04 10E12/L (ref 3.8–5.2)
SODIUM SERPL-SCNC: 139 MMOL/L (ref 133–144)
SPECIMEN SOURCE: NORMAL
WBC # BLD AUTO: 7 10E9/L (ref 4–11)

## 2018-03-29 PROCEDURE — 12000025 ZZH R&B TRANSPLANT INTERMEDIATE

## 2018-03-29 PROCEDURE — 36415 COLL VENOUS BLD VENIPUNCTURE: CPT | Performed by: INTERNAL MEDICINE

## 2018-03-29 PROCEDURE — 99232 SBSQ HOSP IP/OBS MODERATE 35: CPT | Mod: GC | Performed by: INTERNAL MEDICINE

## 2018-03-29 PROCEDURE — 25000132 ZZH RX MED GY IP 250 OP 250 PS 637: Mod: GY | Performed by: STUDENT IN AN ORGANIZED HEALTH CARE EDUCATION/TRAINING PROGRAM

## 2018-03-29 PROCEDURE — A9270 NON-COVERED ITEM OR SERVICE: HCPCS | Mod: GY | Performed by: STUDENT IN AN ORGANIZED HEALTH CARE EDUCATION/TRAINING PROGRAM

## 2018-03-29 PROCEDURE — 94660 CPAP INITIATION&MGMT: CPT

## 2018-03-29 PROCEDURE — 83735 ASSAY OF MAGNESIUM: CPT | Performed by: STUDENT IN AN ORGANIZED HEALTH CARE EDUCATION/TRAINING PROGRAM

## 2018-03-29 PROCEDURE — 87040 BLOOD CULTURE FOR BACTERIA: CPT | Performed by: INTERNAL MEDICINE

## 2018-03-29 PROCEDURE — 40000133 ZZH STATISTIC OT WARD VISIT: Performed by: OCCUPATIONAL THERAPIST

## 2018-03-29 PROCEDURE — 25000132 ZZH RX MED GY IP 250 OP 250 PS 637: Mod: GY

## 2018-03-29 PROCEDURE — 80053 COMPREHEN METABOLIC PANEL: CPT | Performed by: STUDENT IN AN ORGANIZED HEALTH CARE EDUCATION/TRAINING PROGRAM

## 2018-03-29 PROCEDURE — 00000146 ZZHCL STATISTIC GLUCOSE BY METER IP

## 2018-03-29 PROCEDURE — 25000132 ZZH RX MED GY IP 250 OP 250 PS 637: Mod: GY | Performed by: HOSPITALIST

## 2018-03-29 PROCEDURE — 85610 PROTHROMBIN TIME: CPT | Performed by: STUDENT IN AN ORGANIZED HEALTH CARE EDUCATION/TRAINING PROGRAM

## 2018-03-29 PROCEDURE — 85027 COMPLETE CBC AUTOMATED: CPT | Performed by: STUDENT IN AN ORGANIZED HEALTH CARE EDUCATION/TRAINING PROGRAM

## 2018-03-29 PROCEDURE — A9270 NON-COVERED ITEM OR SERVICE: HCPCS | Mod: GY | Performed by: HOSPITALIST

## 2018-03-29 PROCEDURE — 82803 BLOOD GASES ANY COMBINATION: CPT | Performed by: STUDENT IN AN ORGANIZED HEALTH CARE EDUCATION/TRAINING PROGRAM

## 2018-03-29 PROCEDURE — 87075 CULTR BACTERIA EXCEPT BLOOD: CPT | Performed by: INTERNAL MEDICINE

## 2018-03-29 PROCEDURE — 25000132 ZZH RX MED GY IP 250 OP 250 PS 637: Mod: GY | Performed by: MARRIAGE & FAMILY THERAPIST

## 2018-03-29 PROCEDURE — 97530 THERAPEUTIC ACTIVITIES: CPT | Mod: GO | Performed by: OCCUPATIONAL THERAPIST

## 2018-03-29 PROCEDURE — A9270 NON-COVERED ITEM OR SERVICE: HCPCS | Mod: GY

## 2018-03-29 PROCEDURE — 25000128 H RX IP 250 OP 636: Performed by: STUDENT IN AN ORGANIZED HEALTH CARE EDUCATION/TRAINING PROGRAM

## 2018-03-29 PROCEDURE — 83605 ASSAY OF LACTIC ACID: CPT | Performed by: INTERNAL MEDICINE

## 2018-03-29 PROCEDURE — 82248 BILIRUBIN DIRECT: CPT | Performed by: STUDENT IN AN ORGANIZED HEALTH CARE EDUCATION/TRAINING PROGRAM

## 2018-03-29 RX ORDER — FUROSEMIDE 10 MG/ML
40 INJECTION INTRAMUSCULAR; INTRAVENOUS ONCE
Status: COMPLETED | OUTPATIENT
Start: 2018-03-29 | End: 2018-03-29

## 2018-03-29 RX ORDER — POTASSIUM CL/LIDO/0.9 % NACL 10MEQ/0.1L
10 INTRAVENOUS SOLUTION, PIGGYBACK (ML) INTRAVENOUS
Status: DISCONTINUED | OUTPATIENT
Start: 2018-03-29 | End: 2018-04-01 | Stop reason: DRUGHIGH

## 2018-03-29 RX ORDER — MAGNESIUM SULFATE HEPTAHYDRATE 40 MG/ML
4 INJECTION, SOLUTION INTRAVENOUS EVERY 4 HOURS PRN
Status: DISCONTINUED | OUTPATIENT
Start: 2018-03-29 | End: 2018-04-04 | Stop reason: HOSPADM

## 2018-03-29 RX ORDER — POTASSIUM CHLORIDE 1.5 G/1.58G
20-40 POWDER, FOR SOLUTION ORAL
Status: DISCONTINUED | OUTPATIENT
Start: 2018-03-29 | End: 2018-04-01 | Stop reason: DRUGHIGH

## 2018-03-29 RX ORDER — POTASSIUM CHLORIDE 29.8 MG/ML
20 INJECTION INTRAVENOUS
Status: DISCONTINUED | OUTPATIENT
Start: 2018-03-29 | End: 2018-04-01 | Stop reason: DRUGHIGH

## 2018-03-29 RX ORDER — POTASSIUM CHLORIDE 750 MG/1
20-40 TABLET, EXTENDED RELEASE ORAL
Status: DISCONTINUED | OUTPATIENT
Start: 2018-03-29 | End: 2018-04-01 | Stop reason: DRUGHIGH

## 2018-03-29 RX ORDER — POTASSIUM CHLORIDE 7.45 MG/ML
10 INJECTION INTRAVENOUS
Status: DISCONTINUED | OUTPATIENT
Start: 2018-03-29 | End: 2018-04-01 | Stop reason: DRUGHIGH

## 2018-03-29 RX ADMIN — INSULIN ASPART 0.5 UNITS: 100 INJECTION, SOLUTION INTRAVENOUS; SUBCUTANEOUS at 12:20

## 2018-03-29 RX ADMIN — INSULIN ASPART 0.5 UNITS: 100 INJECTION, SOLUTION INTRAVENOUS; SUBCUTANEOUS at 18:00

## 2018-03-29 RX ADMIN — THERA TABS 1 TABLET: TAB at 08:49

## 2018-03-29 RX ADMIN — METOPROLOL TARTRATE 12.5 MG: 25 TABLET, FILM COATED ORAL at 08:48

## 2018-03-29 RX ADMIN — Medication 37.5 MCG: at 09:08

## 2018-03-29 RX ADMIN — MELATONIN 3 MG: 3 TAB ORAL at 19:29

## 2018-03-29 RX ADMIN — FUROSEMIDE 40 MG: 10 INJECTION, SOLUTION INTRAVENOUS at 11:10

## 2018-03-29 RX ADMIN — PANTOPRAZOLE SODIUM 40 MG: 40 TABLET, DELAYED RELEASE ORAL at 08:48

## 2018-03-29 RX ADMIN — POLYETHYLENE GLYCOL 3350 17 G: 17 POWDER, FOR SOLUTION ORAL at 09:07

## 2018-03-29 RX ADMIN — FERROUS SULFATE 325 MG: 325 TABLET, FILM COATED ORAL at 12:20

## 2018-03-29 RX ADMIN — LIDOCAINE 1 PATCH: 560 PATCH PERCUTANEOUS; TOPICAL; TRANSDERMAL at 08:49

## 2018-03-29 RX ADMIN — AMOXICILLIN AND CLAVULANATE POTASSIUM 1 TABLET: 875; 125 TABLET, FILM COATED ORAL at 19:29

## 2018-03-29 RX ADMIN — METOPROLOL TARTRATE 12.5 MG: 25 TABLET, FILM COATED ORAL at 19:29

## 2018-03-29 RX ADMIN — PANTOPRAZOLE SODIUM 40 MG: 40 TABLET, DELAYED RELEASE ORAL at 17:42

## 2018-03-29 RX ADMIN — ATORVASTATIN CALCIUM 40 MG: 40 TABLET, FILM COATED ORAL at 21:41

## 2018-03-29 RX ADMIN — VENLAFAXINE HYDROCHLORIDE 150 MG: 150 CAPSULE, EXTENDED RELEASE ORAL at 08:48

## 2018-03-29 RX ADMIN — POTASSIUM CHLORIDE 40 MEQ: 750 TABLET, EXTENDED RELEASE ORAL at 08:48

## 2018-03-29 NOTE — PROGRESS NOTES
MICU Attending Note:  March 29, 2018    I saw and examined the patient with the residents. I reviewed the labs, imaging studies and cultures.    Please see resident/fellow Dr. Trammell's, note from today (March 29, 2018) for details of physical exam, history, medications and labs.  I agree with assessment and plan as documented in said note, with main points listed below.       Carlos Alberto Fenton is a 77 year old female admitted on 3/20/2018 for coagulopathy (iatrogenic from anticoagulation and possibly acute liver failure), presumed GI bleed, and acute blood loss anemia.  Improved but then developed what appeared to be hypoxia.  Moved to ICU where thankfully, her pO2 was over 400. This morning feels OK. Is on much less oxygen this morning. LFTs relatively unchanged with the exception of bilirubin that is out of previous range.     - Repeat blood gas.  If PO2 acceptable will transfer back to the floor.   - Fractionate bilirubin    Rosalinda James MD

## 2018-03-29 NOTE — PROGRESS NOTES
Social Work Services Progress Note    Hospital Day: 10  Date of Initial Social Work Evaluation:  3/27/18- please see for details  Collaborated with:  Chart review    Data:  Pt is a 77 year old female admitted due to generalized fatigue and weakness and dyspnea. Sw assisting ICU sw attempting to see pt regarding being accepted at numerous TCUs.  Per chart review pt has been accepted at [facility], [facility] (with $15/day private room fee), [facility], and [facility].    Intervention:  Sw attempted to see pt to check in and inform pt of accepting TCUs. Pt was with other providers at time of attempted visit and upon return attempt. Sw to follow up at a later time.     Assessment:  See bedside RN, PT/OT, medical team notes    Plan:    Anticipated Disposition:  Facility:  TCU, location to be determined    Barriers to d/c plan:  Medical stability, bed availability    Follow Up:  immanuel ANGLIN will continue to follow    RANDY Messer, MSW  7B   [phone] (pager) [phone]  3/29/2018

## 2018-03-29 NOTE — PLAN OF CARE
Problem: Patient Care Overview  Goal: Plan of Care/Patient Progress Review  Outcome: Improving  Patient was brought down to ICU last night for lethargy and possible hypoxia as evidenced by low 02 sat. During the night a midline was put in to draw blood because patient's vasculature is that poor. MICU stuck patient for ABG during the night and this morning, which both resulted WNL and demonstrated good oxygenation even though her 02 sat was reading 80%. It was concluded that her low 02 sat is a result of her poor vasculature. Patient received orders to med/surg with tele. She is tolerating a cardiac diet and has been up to the chair multiple times today. Transferred to  around 1820.

## 2018-03-29 NOTE — PLAN OF CARE
Problem: Patient Care Overview  Goal: Plan of Care/Patient Progress Review  Pt arrived from 6B as a rapid response around 1800.  Blood pressure stable.  Afib occasionally paced with PVCs.  Unable to obtain a pulse ox reading on fingers and forehead (warm pack applied to fingers).  Pt came down on bipap 100%, respiratory therapist switched pt to 7 L oxyplus.  Pt not in respiratory distress or tachypneic.  Pt's daughter at bedside.  MICU resident at bedside and aware of elevated lactic acid.

## 2018-03-29 NOTE — PROGRESS NOTES
[facility] Progress  Note            Assessment and Plan:   Carlos Alberto Fenton is a 77 year old female with PMH of HTN, HLD, DM, CVA (11/2016), CHF (last ECHO 12/12/17 EF 45-50%), CAD (s/p 3v CABG: LIMA-LAD, SVG-D1, SVG-dRCA and modified MAZE, ABDIEL ligation - 2/2016), paroxysmal AFib ([id number] - 8 on Xarelto), HIT, RUE DVT, recently diagnosed hypothyroidism, admitted 3/21/2018 for acute GIB, coagulopathy and concern for acute LI 2/2 acetaminophen toxicity with xarelto intake. Now tachy-israel arrhythmia.     Pt is s/p PPM placement on 3/26. We have started beta blockers for afib. We have stopped NAC 2/2 improved INR and downtrending LFTs. Rapid response was called on 3/28 due to hypoxia--- difficulty getting O2sat reading with O2 sat ~70-80s% on CPAP FiO2 100%. Assessed the patient bedside. She was more somnolence but able to response, answer questions and reported no pain or SOB.     Plan today:  - Transfer to MICU due for close respiratory monitoring and get ABG  - Diuresis with Lasix 40 mg x2  - Anticoagulation plans: will hold off sq anticoagulation for 1 week per EP, then start sq fondaparinux per heme/onc recs then f/u with hem/onc in 2-3 mo to eval for possible start warfarin  - Increase back up rate of PPM to 70 (50 before but had HR down to 45 without pacing)      #Acute on chronic hypoxic respiratory failure  Rapid response was called on 3/28 due to hypoxia--- difficulty getting O2sat reading with O2 sat ~70-80s% on CPAP FiO2 100%.Unable to get ABG due to soft tissue edema. Unclear etiology. Ddx CHF? However had good urine output after lasix. PE? Not tachycardia or no chest pain.  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 Pt prev on warfarin, she was transitioned to xarelto in Jan 2018 because she likes leafy greens.  --hold xarelto   --Hematology  consult to determine safest anticoagulation for stroke prevention in setting of [id number] 8. Per Heme, need to be on fondaporinox. Cardiology was paged to determine if it is okay to start this now given recent PPM placement. Needs Heme f/u in 2-3 months.  --started metoprolol 12.5 mg BID for episodes of RVR  --TTE on 3/25: shows EF 40-45%  --D/C trazodone as prolongs QTc    #Acute hypoxic respiratory failure 2/2 CHF exacerbation.   Secondary to IVF boluses for hypotension episodes with tachy/israel syndrome. CXR shows pulmonary edema. -- IVF are stopped. Diuresing with lasix 40 mg IV once. Consider starting home bumex 2 mg BID tomorrow.   --empirical treatment for aspiration PNA with zosyn on 3/24-3/25--switched to Augmentin on 3/25. CXR today shows no evidence of PNA. Stop augmentin 3/28.      # Coagulopathy, INR to 5, elevated PT and PTT  # Acetaminophen toxicity (elevated acetaminophen levels)  Pt states she was taking 3-4g tylenol per day for the past month for chronic back pain.   Pt previously on warfarin for Afib. She started xarelto as of Jan 2018. She states she was not taking warfarin and xarelto simultaneously. It is possible that her coagulopathy is occurring in the setting of xarelto. Per Heme, factor 7/10 decreased c/w vit K deficiency, no evidence of hepatic synthetic dysfunction. Her INR has continued to improve.  - NAC infusion D/C on 3/26 per GI.  --trend INR  --Pending labs: homocysteine, methylmalonic acid  --Hep B and C panels negative  --F actin weakly positive; anti-mitochondial joseph neg and AMPARO neg    # Acute blood loss anemia (baseline 11-12 1 month prior, current hgb ~7) - now stable Hgb  Differential includes acute GI bleed (most likely). Hgb has been stable since admission and no evidence of bleeding seen.   Plan:  -- GI consult, appreciate recs  -- trend daily, transfuse <7  -- continue PPI BID   -- holding (home ASA 81 and xarelto). Consider resuming ASA given strong hx of CVA and  CAD  -- folate WNL, B12 WNL, transferrin WNL, iron WNL, haptoglobin, reticulocyte index 2 indicating appropriate response.  --Need Hematology f/u in 2-3 months to eval for macrocytic anemia       # Hypothyroidism  Recently diagnosed. On Levothyroxine. Last TSH 2/21 elevated to 88, T4 improved from 0.29 to 0.44.   -- increase levothyroxine to 37.5 from PTA dose of 25 mcg  -- Recheck TSH in 6 weeks.         Resolved issues:    # HARINI on CKD -- resolved  Cr at bsl ~1.1-4. On presentation 1.8. UA with hyaline casts, tachy, hypotensive. Likely prerenal in the setting of GIB and decreased PO intake/emesis. Cr improved with IVF.   -- trend BMP    # Tropinemia, likely 2/2 demand ischemia 2/2 anemia  EKG did not show ST elevation, ST segment depression or T wave inversions. Pt is not having CP. Likely demand ischemia.       Chronic problems  # CHF  # CAD  # HLD  ECHO shows EF 45-50%.   Plan:  --holding metolazone, spironolactone, bumex, ASA - consider resuming ASA, bumex today  --cont atorvastatin      # Depression  -- continue PTA venlafaxine    # Insominia  -holding trazodone in lieu of prolonged QTc        # Pain Assessment:   Current Pain Score 3/25/2018 3/24/2018 3/23/2018   Patient currently in pain? denies no no   Pain score (0-10) - - -   Pain location - - -   Pain descriptors - - -   CPOT pain score - - -   Asenatabel s pain level was assessed and she currently denies pain.       Diet:   Cardiac Diet   Fluids: none  DVT Prophylaxis: mechanical  Code Status: Full Code     - Disposition:  Transfer to MICU for respiratory     Patient was seen and discussed with Dr. Macey Streeter MD  , PYG-3  [phone]    -----------------------------------------------------------------------------------------------------------------------------------------------      Interval History  No acute events overnight. Pt is sleepy. She reported she feels overall fine. Denies SOB, chest pain, abdominal pain, legs pain. Later in the  "afternoon ~4PM. RN called rapid response due to difficulty getting O2sat reading with O2 sat ~70-80s% on CPAP FiO2 100%. Assessed the patient bedside. She was more somnolence but able to response, answer questions and reported no pain or SOB.      Review of Systems:   A 4 point review of systems was negative except as noted above.    OBJECTIVE:  BP (!) 121/97  Pulse 61  Temp 97.5  F (36.4  C) (Oral)  Resp 15  Ht 1.575 m (5' 2\")  Wt 94.1 kg (207 lb 7.3 oz)  SpO2 91%  BMI 37.94 kg/m2      Intake/Output Summary (Last 24 hours) at 03/27/18 2120  Last data filed at 03/27/18 2009   Gross per 24 hour   Intake              480 ml   Output             3175 ml   Net            -2695 ml       GENERAL APPEARANCE: drowsy in AM, not in distress  Pulmonary: decreased breath sounds bilaterally, crackles in LLL   CV: irregular rate and rhythm, no murmur, no rub  GI: soft, nontender, no HSM   Extremities: pitting edema 2+ BLE  NEURO: drowsy, but easily to wake up, mentation intact when awake and speech normal, equally move  Skin: bruises on back are improving    Labs:   All labs reviewed by me  Electrolytes/Renal -   Recent Labs   Lab Test  03/28/18   0745  03/27/18   0605  03/26/18   0632   02/16/17   0811  02/15/17   1428  02/14/17   0833   NA  137  137  142   < >  134  139  134   POTASSIUM  4.2  3.9  3.6   < >  3.9  4.1  4.4   CHLORIDE  104  102  108   < >  101  103  101   CO2  24  26  26   < >  26  27  26   BUN  12  11  14   < >  12  17  11   CR  0.73  0.68  0.71   < >  0.65  0.65  0.67   GLC  106*  151*  111*   < >  103*  141*  181*   CARLY  9.0  8.7  8.7   < >  8.0*  8.1*  8.1*   MAG  2.2  1.9  2.1   < >  1.8  1.8  2.2   PHOS   --    --    --    --   2.2*  2.6  2.3*    < > = values in this interval not displayed.     CBC -   Recent Labs   Lab Test  03/28/18   0745  03/27/18   0605  03/26/18   0632   WBC  8.8  8.2  6.9   HGB  8.6*  7.7*  8.0*   PLT  140*  162  169     LFTs   Recent Labs   Lab Test  03/28/18   0745  " 03/27/18   0605  03/26/18   0632   ALKPHOS  195*  153*  151*   BILITOTAL  1.5*  1.2  1.2   ALT  41  54*  66*   AST  63*  60*  65*   PROTTOTAL  6.2*  6.3*  6.1*   ALBUMIN  2.8*  2.8*  2.7*       Imaging:  None     Current Medications:    multivitamin, therapeutic  1 tablet Oral Daily     metoprolol tartrate  12.5 mg Oral BID     pantoprazole  40 mg Oral BID AC     potassium chloride  40 mEq Oral Daily     sodium chloride (PF)  3 mL Intracatheter Q8H     polyethylene glycol  17 g Oral Daily     sodium chloride (PF)  3 mL Intracatheter Q8H     melatonin  3 mg Oral At Bedtime     lidocaine  1 patch Transdermal Q24H     lidocaine   Transdermal Q24h     lidocaine   Transdermal Q8H     atorvastatin  40 mg Oral At Bedtime     insulin aspart  0.5-2.5 Units Subcutaneous TID AC     insulin aspart  0.5-2.5 Units Subcutaneous At Bedtime     venlafaxine  150 mg Oral Daily with breakfast     ferrous sulfate  325 mg Oral Daily with breakfast     levothyroxine  37.5 mcg Oral QAM AC       Injection Device for insulin       Rosaura Streeter MD

## 2018-03-29 NOTE — PROGRESS NOTES
Preliminary Device Interrogation Results.  Final physician signed paceart report to be scanned and attached.         Pt was seen on PCU 6B for evaluation and iterative programming of her Medtronic dual chamber pacemaker.  The staff had just called a rapid response on her for respiratory distress.  Her pacemaker check confirms normal function, however , her heart rates were sitting right in the 50's so we increased her LRL to 70 bpm  From 50 bpm.  Pt was transferred to the ICU.  Normal pacemaker function.    Dual pacemaker

## 2018-03-29 NOTE — PROGRESS NOTES
[facility] - Progress Note  Date of Service: 03/29/2018      Patient: Carlos Alberto Fenton  MRN: [id number]  Admission Date: 3/20/2018  Hospital Day # 8    Changes Today 03/29/18 :  -transfer to medicine from ICU  -hold off anticoagulation until 1 week s/p PPM placement per EP, then start sq fondapinux per hem/onc  -continue diureses            Assessment and Plan:   Carlos Alberto Fenton is a 77 year old female with PMH of HTN, HLD, DM, CVA (11/2016), CHF (last ECHO 12/12/17 EF 45-50%), CAD (s/p 3v CABG: LIMA-LAD, SVG-D1, SVG-dRCA and modified MAZE, ABDIEL ligation - 2/2016), paroxysmal AFib ([id number] - 8 on Xarelto), HIT, RUE DVT, recently diagnosed hypothyroidism, admitted 3/21/2018 for acute GIB, coagulopathy and concern for acute LI 2/2 acetaminophen toxicity with xarelto intake. Now tachy-israel arrhythmia.     #Acute on chronic hypoxic respiratory failure  Rapid response was called on 3/28 due to hypoxia--- difficulty getting O2sat reading with O2 sat ~70-80s% on CPAP FiO2 100%.Unable to get ABG due to soft tissue edema.  In the MICU an ABG was drawn that showed O2 sat at 409 (repeat on 3L of O2 was 166).   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 Pt prev on warfarin, she was transitioned to xarelto in Jan 2018 because she likes leafy greens.  --hold xarelto   --Hematology consult to determine safest anticoagulation for stroke prevention in setting of [id number] 8. Per Heme, need to be on fondaporinox. Cardiology was paged to determine if it is okay to start this now given recent PPM placement. Needs Heme f/u in 2-3 months.  --started metoprolol 12.5 mg BID for episodes of RVR  --TTE on 3/25: shows EF 40-45%  --D/C trazodone as prolongs QTc  -continue 2 more days of augmentin per EP 5 days s/p PPM      #Acute hypoxic  respiratory failure 2/2 CHF exacerbation.   Secondary to IVF boluses for hypotension episodes with tachy/israel syndrome. CXR shows pulmonary edema. -- IVF are stopped. Diuresing with lasix 40 mg IV once. Consider starting home bumex 2 mg BID tomorrow.   --empirical treatment for aspiration PNA with zosyn on 3/24-3/25--switched to Augmentin on 3/25. CXR today shows no evidence of PNA.       # Coagulopathy, INR to 5, elevated PT and PTT  # Acetaminophen toxicity (elevated acetaminophen levels)  Pt states she was taking 3-4g tylenol per day for the past month for chronic back pain.   Pt previously on warfarin for Afib. She started xarelto as of Jan 2018. She states she was not taking warfarin and xarelto simultaneously. It is possible that her coagulopathy is occurring in the setting of xarelto. Per Heme, factor 7/10 decreased c/w vit K deficiency, no evidence of hepatic synthetic dysfunction. Her INR has continued to improve.  - NAC infusion D/C on 3/26 per GI.  --trend INR  --Pending labs: homocysteine, methylmalonic acid  --Hep B and C panels negative  --F actin weakly positive; anti-mitochondial joseph neg and AMPARO neg     # Acute blood loss anemia (baseline 11-12 1 month prior, current hgb ~7) - now stable Hgb  Differential includes acute GI bleed (most likely). Hgb has been stable since admission and no evidence of bleeding seen.   Plan:  -- GI consult, appreciate recs  -- trend daily, transfuse <7  -- continue PPI BID   -- holding (home ASA 81 and xarelto). Consider resuming ASA given strong hx of CVA and CAD  -- folate WNL, B12 WNL, transferrin WNL, iron WNL, haptoglobin, reticulocyte index 2 indicating appropriate response.  --Need Hematology f/u in 2-3 months to eval for macrocytic anemia      # Hypothyroidism  Recently diagnosed. On Levothyroxine. Last TSH 2/21 elevated to 88, T4 improved from 0.29 to 0.44.   -- increase levothyroxine to 37.5 from PTA dose of 25 mcg  -- Recheck TSH in 6 weeks.          Resolved  issues:     # HARINI on CKD -- resolved  Cr at bsl ~1.1-4. On presentation 1.8. UA with hyaline casts, tachy, hypotensive. Likely prerenal in the setting of GIB and decreased PO intake/emesis. Cr improved with IVF.   -- trend BMP     # Tropinemia, likely 2/2 demand ischemia 2/2 anemia  EKG did not show ST elevation, ST segment depression or T wave inversions. Pt is not having CP. Likely demand ischemia.         Chronic problems  # CHF  # CAD  # HLD  ECHO shows EF 45-50%.   Plan:  --holding metolazone, spironolactone, bumex, ASA  --cont atorvastatin       # Depression  -- continue PTA venlafaxine     # Insominia  -holding trazodone in lieu of prolonged QTc    Diet:   Cardiac Diet   Fluids: none  DVT Prophylaxis: mechanical  Code Status: Full Code    Pallavi Ruiz, MS3     Interval History:   Nursing notes reviewed. Yesterday evening she was transferred to the ICU due to shortness of breath and inability to read her oxygen saturation by her ear or finger. Attempted to get and ABG but no pulses were detected and unable to get a radial stick.     In the ICU ABG was done under ultrasound and her O2 saturation was 409. Her oxygen was decreased to 4L and her O2 sat remained at 166.     She is feeling much better today and denies shortness of breath or other concerns.        Medications:     Current Facility-Administered Medications   Medication     potassium chloride SA (K-DUR/KLOR-CON M) CR tablet 20-40 mEq     potassium chloride (KLOR-CON) Packet 20-40 mEq     potassium chloride 10 mEq in 100 mL sterile water intermittent infusion (premix)     potassium chloride 10 mEq in 100 mL intermittent infusion with 10 mg lidocaine     potassium chloride 20 mEq in 50 mL intermittent infusion     magnesium sulfate 4 g in 100 mL sterile water (premade)     potassium phosphate 15 mmol in D5W 250 mL intermittent infusion     potassium phosphate 20 mmol in D5W 500 mL intermittent infusion     potassium phosphate 20 mmol in D5W 250 mL  "intermittent infusion     potassium phosphate 25 mmol in D5W 500 mL intermittent infusion     sodium chloride (PF) 0.9% PF flush 10-20 mL     sodium chloride (PF) 0.9% PF flush 10 mL     multivitamin, therapeutic (THERA-VIT) tablet 1 tablet     metoprolol tartrate (LOPRESSOR) half-tab 12.5 mg     pantoprazole (PROTONIX) EC tablet 40 mg     potassium chloride SA (K-DUR/KLOR-CON M) CR tablet 40 mEq     HOLD: metformin and metformin containing medications on day of procedure and 48 hours after IV contrast given     HOLD: heparin (IV or Subcutaneous) Post Procedure     polyethylene glycol (MIRALAX/GLYCOLAX) Packet 17 g     melatonin tablet 3 mg     Lidocaine (LIDOCARE) 4 % Patch 1 patch     lidocaine patch REMOVAL     lidocaine patch in PLACE     atorvastatin (LIPITOR) tablet 40 mg     glucose 40 % gel 15-30 g    Or     dextrose 50 % injection 25-50 mL    Or     glucagon injection 1 mg     insulin aspart (NovoPen ECHO/NovoLOG) cartridge     insulin aspart (NovoPen ECHO/NovoLOG) cartridge     Injection Device for insulin (NOVOPEN ECHO) 1 each     venlafaxine (EFFEXOR-XR) 24 hr capsule 150 mg     ferrous sulfate (IRON) tablet 325 mg     naloxone (NARCAN) injection 0.1-0.4 mg     senna-docusate (SENOKOT-S;PERICOLACE) 8.6-50 MG per tablet 1 tablet    Or     senna-docusate (SENOKOT-S;PERICOLACE) 8.6-50 MG per tablet 2 tablet     levothyroxine (SYNTHROID/LEVOTHROID) half-tab 37.5 mcg         Physical Exam:   Vitals were reviewed  Blood pressure (!) 144/98, pulse 61, temperature 98.1  F (36.7  C), temperature source Oral, resp. rate 21, height 1.575 m (5' 2\"), weight 96.4 kg (212 lb 8.4 oz), SpO2 (!) 75 %, not currently breastfeeding.    Intake/Output Summary (Last 24 hours) at 03/29/18 1528  Last data filed at 03/29/18 1300   Gross per 24 hour   Intake              480 ml   Output             1850 ml   Net            -1370 ml     Vitals:    03/27/18 0257 03/28/18 0400 03/29/18 0600   Weight: 94.8 kg (209 lb) 94.1 kg (207 lb " 7.3 oz) 96.4 kg (212 lb 8.4 oz)       Physical Exam:   Gen: In no acute distress. Patient comfortably lying in bed  HEENT: No scleral icterus, Moist mucous membranes. No nasal discharge.  CV: Irregular rate, no murmurs or rubs detected  Resp: Clear to auscultation bilaterally. Without wheeze or crackles  Abdomen: Soft, non tender abdomen, normal active bowel sounds   Extremities: Pitting edema 2+ bilaterally   Skin: bruises on back are improved  Neuro: mentation in tact, no deficits noted  Oriented to conversation             Data:   ROUTINE LABS (Last four results)  CMP  Recent Labs  Lab 03/29/18 0415 03/28/18 2110 03/28/18  0745 03/27/18  0605    138 137 137   POTASSIUM 3.6 3.5 4.2 3.9   CHLORIDE 103 103 104 102   CO2 27 27 24 26   ANIONGAP 10 8 9 9   GLC 91 110* 106* 151*   BUN 12 13 12 11   CR 0.74 0.72 0.73 0.68   GFRESTIMATED 76 79 77 84   GFRESTBLACK >90 >90 >90 >90   CARLY 8.8 8.8 9.0 8.7   MAG 2.0 1.8 2.2 1.9   PHOS  --  2.2*  --   --    PROTTOTAL 5.6* 6.1* 6.2* 6.3*   ALBUMIN 2.6* 2.8* 2.8* 2.8*   BILITOTAL 1.8* 1.4* 1.5* 1.2   ALKPHOS 176* 181* 195* 153*   * 125* 63* 60*   ALT 57* 66* 41 54*     CBC  Recent Labs  Lab 03/29/18 0415 03/28/18 2110 03/28/18  0745 03/27/18  0605   WBC 7.0 7.6 8.8 8.2   RBC 2.04* 2.22* 2.32* 2.17*   HGB 7.8* 7.9* 8.6* 7.7*   HCT 24.7* 26.3* 27.7* 26.3*   * 119* 119* 121*   MCH 38.2* 35.6* 37.1* 35.5*   MCHC 31.6 30.0* 31.0* 29.3*   RDW 23.3* 22.8* 23.2* 24.1*   * 159 140* 162     INR  Recent Labs  Lab 03/29/18  0415 03/28/18  2110 03/28/18  0745 03/27/18  0605   INR 1.66* 1.90* Canceled, Test credited 1.47*     Arterial Blood Gas  Recent Labs  Lab 03/29/18  1043 03/28/18  2116 03/28/18  0145 03/24/18  0416 03/23/18  0356 03/23/18  0330   PH 7.42 7.51*  --   --  7.37 7.30*   PCO2 42 35  --   --  35 46*   PO2 166* 409*  --   --  418* 20*   HCO3 27 27  --   --  20* 23   O2PER 3L 100 60.0 21.0 90.0 100.0

## 2018-03-29 NOTE — PLAN OF CARE
Problem: Patient Care Overview  Goal: Plan of Care/Patient Progress Review  OT/4A:  Discharge Planner OT   Patient plan for discharge: rehab   Current status: Pt min A for bed mobility and CGA for bed to chair transfer.  Barriers to return to prior living situation: strength, mobility, transfers   Recommendations for discharge: TCU  Rationale for recommendations: to progress ADL I, mobility, strength, transfers        Entered by: Ginger Hurst 03/29/2018 4:10 PM

## 2018-03-29 NOTE — PROGRESS NOTES
[facility]   MICU Progress Note  Carlos Alberto Fenton MRN: [id number]  1940  Date of Admission:3/20/2018  Primary care provider: Humberto Conner      Assessment and Plan:       Carlos Alberto Fenton is a 77 year old woman with a history of HTN, HLD, DM, CVA (11/2016), chronic diastolic HF (last ECHO 12/12/17 EF 45-50%), CAD (s/p 3v CABG: LIMA-LAD, SVG-D1, SVG-dRCA and modified MAZE, ABDIEL ligation - 2/2016), paroxysmal AFib ([id number] - 8 on Xarelto), HIT, RUE DVT, recently diagnosed hypothyroidism, who presented to [facility] ED on 3/21/18 for generalized fatigue and weakness and dyspnea for 1-2 days, transferred to MICU 3/28 for respiratory distress and hypoxia, likely due to poor saturation readings, PaO2 on ABG >400, 166 on recheck, stable on minimal oxygen settings. Likely spurious elevated lactate reading, normal on repeat check. Stable for transfer back to floor.      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 Pt prev on warfarin, she was transitioned to xarelto in Jan 2018 because she likes leafy greens.  - Continue holding xarelto   - Hematology consulted earlier to determine safest anticoagulation for stroke prevention in setting of [id number] 8: needs to be on fondaparinux, heme f/u in 2-3 months  - Follow up with Cards to discuss timing of restarting fondaparinux   - Continue metoprolol 12.5 mg BID (started this admission)     # Prolonged QTc, resolved  ECG 3/28 was 400     # HFrEF  ECHO 3/25 shows EF 45-50%. Also showed Basal inferolareatl wall akinesis with aneurysm, and a dilated IVC. Not grossly volume overloaded on exam, mildly elevated JVD, lungs sound clear. CXR with small pleural effusions, no change on repeat CXR this AM.  - Lasix 40 mg IV once  - Continue holding PTA  metolazone, spironolactone, bumex  - Started beta-blocker this admission  - Not on ACE-I or ARB     # CAD  - Holding ASA  - Continue atorvastatin 40 mg PO QDaily     # HLD  - Continue atorvastatin 40 mg PO QDaily     ===PULMONARY===  #Acute hypoxic respiratory failure 2/2 CHF exacerbation  Secondary to IVF boluses for hypotension episodes with tachy/israel syndrome. CXR 3/27 shows pulmonary edema. Good response to diuresis. Increased respiratory distress 3/28 with reported sats in 50-60%, however questionable O2 sat readings. ABG 3/28 with PaO2 409, repeat ABG 3/29 PaO2 166. Vitally stable, not blue, and mentation is mostly intact. Not grossly volume overloaded on exam, mildly elevated JVD, lungs sound clear. CXR with small pleural effusions.  - Lasix 40 mg IV once today  - Low concern for PE at this time: not tachycardic and no chest pain when respiratory distress started   - Stop augmentin, no clear infiltrate on CXR, no fevers, no leukocytosis  - BiPAP if needed  - Transfer to floor     ===GASTROINTESTINAL===     # Acetaminophen toxicity (elevated acetaminophen levels)  # Transaminits, alk phos elevation  Pt stated she was taking 3-4g tylenol per day for the past month for chronic back pain.   Pt previously on warfarin for Afib. She started xarelto as of Jan 2018. She states she was not taking warfarin and xarelto simultaneously. It is possible that her coagulopathy is occurring in the setting of xarelto. Per Heme, factor 7/10 decreased c/w vit K deficiency, no evidence of hepatic synthetic dysfunction. Her INR has continued to improve. Hep B and C negative. F-actin weakly positive; anti-mitochondial joseph neg and AMPARO neg. NAC infusion D/C on 3/26 per GI. Tbili increased to 1.8 from 1.4, other LFTs downtrending.  - Fractionate bilirubin  - Trend hepatic panel and INR     # Nutrition:   - Advanced to heart healthy diet given normal mentation     ===RENAL===  UOP: 2300 mL 3/28, 450 mL 3/29 thus far  #Elevated Lactate,  resolved  Lactate 4.7 today, but with normal blood pressure and benign abdominal exam. Repeat was 1.0, so 4.7 was likely spurious.     # HARINI on CKD -- resolved  Cr at bsl ~1. On presentation 1.8. UA with hyaline casts, tachy, hypotensive. Likely prerenal in the setting of GIB and decreased PO intake/emesis. Cr improved with IVF. Currently 0.74. Has good urine output.  - Trend BMP     ===HEME/ONC===  # Acute blood loss anemia  Baseline 11-12 1 month prior, presented with Hgb ~7.5. Most likely GI bleed. folate WNL, B12 WNL, transferrin WNL, iron WNL, haptoglobin, reticulocyte index 2 indicating appropriate response.  Hgb has been stable since admission and no evidence of bleeding seen.   - GI consulted, they have not scoped patient yet. Tachycardia was barrier to scoping, which has since resolved. Will need re-consult to GI for further evaluation endoscopically, non-urgent currently  - Trend Hgb  - Continue PPI BID   - Holding ASA and anticoagulation  - Hematology was consulted and want to see her in clinic in 2-3 months. If still macrocytic anemia, they will consider bone marrow biopsy     # Coagulopathy, INR to 5, elevated PT and PTT  INR 5 on admission, was reportedly not on warfarin. Improved with vitamin K. Continues to trend down, currently 1.66.  - Trend INR      ===ENDOCRINE===  # Hypothyroidism  Recently diagnosed. On levothyroxine. Last TSH 2/21 elevated to 88, T4 improved from 0.29 to 0.44.   - Cont levothyroxine 37.5mcg per day (increased on admission from PTA dose of 25 mcg)  - Recheck TSH and free T4 in 6 weeks      ===INFECTIOUS DISEASE===  No acute issues     ===SKIN/MSK===  No acute issues     Lines: 2 peripherals  Prophylaxis:  DVT: SCDs   GI: PPI  Disposition: Transfer to floor  Code Status: Full code     The patient was seen and discussed with attending physician Dr. Rosalinda Hurtado, who agrees with the assessment and plan.    Uzair Chamberlain MD PhD  Inaternal Medicine PGY-1  Pager  [phone]         Subjective:     NAEON. Patient has no complaints this morning. Has been on low oxygen settings since arrival to MICU. ABG on MICU admission with PaO2 409. No respiratory distress, does not complain of shortness of breath, chest pain, abdominal pain. Described home in [facility].          Review of Systems:     4 point review of systems negative unless otherwise specified above.            Physical Exam:   Vitals were reviewed.  Blood pressure 137/71, pulse 61, temperature 97.4  F (36.3  C), temperature source Axillary, resp. rate 12, height 1.575 m (5' 2\"), weight 96.4 kg (212 lb 8.4 oz), SpO2 (!) 83 %, not currently breastfeeding.  I/O last 3 completed shifts:  In: 480 [P.O.:480]  Out: 1850 [Urine:1850]  General: Pleasant elderly woman laying comfortably in bed in NAD, watching television  Skin: scattered ecchymoses, bandage overlying PPM site on L shoulder with bandage c/d/i  HEENT: AT/NC, anicteric sclerae, conjunctivae injection, benign oropharynx, MMM  CV: RRR, normal S1-S2, no M/R/G, weak peripheral pulses  Resp: LCTAB, no w/r/r, normal respiratory effort  Abd: Soft, non-tender, BS+, no masses appreciated  Extremities: WWP, LE edema bilaterally, cool hands and warm feet  Neuro: CN II through XII grossly intact, non-focal findings         Data:     ROUTINE LABS (Last four results)  CMP    Recent Labs  Lab 03/29/18  0415 03/28/18  2110 03/28/18  0745 03/27/18  0605    138 137 137   POTASSIUM 3.6 3.5 4.2 3.9   CHLORIDE 103 103 104 102   CO2 27 27 24 26   ANIONGAP 10 8 9 9   GLC 91 110* 106* 151*   BUN 12 13 12 11   CR 0.74 0.72 0.73 0.68   GFRESTIMATED 76 79 77 84   GFRESTBLACK >90 >90 >90 >90   CARLY 8.8 8.8 9.0 8.7   MAG 2.0 1.8 2.2 1.9   PHOS  --  2.2*  --   --    PROTTOTAL 5.6* 6.1* 6.2* 6.3*   ALBUMIN 2.6* 2.8* 2.8* 2.8*   BILITOTAL 1.8* 1.4* 1.5* 1.2   ALKPHOS 176* 181* 195* 153*   * 125* 63* 60*   ALT 57* 66* 41 54*     CBC    Recent Labs  Lab 03/29/18  0415 03/28/18 2110 " 03/28/18  0745 03/27/18  0605   WBC 7.0 7.6 8.8 8.2   RBC 2.04* 2.22* 2.32* 2.17*   HGB 7.8* 7.9* 8.6* 7.7*   HCT 24.7* 26.3* 27.7* 26.3*   * 119* 119* 121*   MCH 38.2* 35.6* 37.1* 35.5*   MCHC 31.6 30.0* 31.0* 29.3*   RDW 23.3* 22.8* 23.2* 24.1*   * 159 140* 162     INR  Recent Labs  Lab 03/29/18  0415 03/28/18  2110 03/28/18  0745 03/27/18  0605   INR 1.66* 1.90* Canceled, Test credited 1.47*     Arterial Blood Gas    Recent Labs  Lab 03/29/18  1043 03/28/18  2116 03/28/18  0145 03/24/18  0416 03/23/18  0356 03/23/18  0330   PH 7.42 7.51*  --   --  7.37 7.30*   PCO2 42 35  --   --  35 46*   PO2 166* 409*  --   --  418* 20*   HCO3 27 27  --   --  20* 23   O2PER 3L 100 60.0 21.0 90.0 100.0

## 2018-03-29 NOTE — PLAN OF CARE
Problem: Patient Care Overview  Goal: Plan of Care/Patient Progress Review  Outcome: Improving  Pt A&O x4. LAURA to command. Rate controlled Afib occasionally paced. Normotensive. Pt O2 sats difficult to obtain and do not correlate with ABG. Pt on Cpap overnight FIO2 40%. BBS coarse/diminished. NPO. UOP good voiding without difficulty. Midline placed for lab draws d/t difficulty of venopuncture.   Continue plan of care.

## 2018-03-30 ENCOUNTER — APPOINTMENT (OUTPATIENT)
Dept: PHYSICAL THERAPY | Facility: CLINIC | Age: 78
DRG: 813 | End: 2018-03-30
Attending: INTERNAL MEDICINE
Payer: MEDICARE

## 2018-03-30 ENCOUNTER — APPOINTMENT (OUTPATIENT)
Dept: OCCUPATIONAL THERAPY | Facility: CLINIC | Age: 78
DRG: 813 | End: 2018-03-30
Attending: INTERNAL MEDICINE
Payer: MEDICARE

## 2018-03-30 ENCOUNTER — APPOINTMENT (OUTPATIENT)
Dept: GENERAL RADIOLOGY | Facility: CLINIC | Age: 78
DRG: 813 | End: 2018-03-30
Attending: INTERNAL MEDICINE
Payer: MEDICARE

## 2018-03-30 LAB
ALBUMIN SERPL-MCNC: 2.7 G/DL (ref 3.4–5)
ALP SERPL-CCNC: 215 U/L (ref 40–150)
ALT SERPL W P-5'-P-CCNC: 55 U/L (ref 0–50)
ANION GAP SERPL CALCULATED.3IONS-SCNC: 7 MMOL/L (ref 3–14)
AST SERPL W P-5'-P-CCNC: 100 U/L (ref 0–45)
BASE EXCESS BLDA CALC-SCNC: 1 MMOL/L
BILIRUB SERPL-MCNC: 1.9 MG/DL (ref 0.2–1.3)
BUN SERPL-MCNC: 14 MG/DL (ref 7–30)
CALCIUM SERPL-MCNC: 9.3 MG/DL (ref 8.5–10.1)
CHLORIDE SERPL-SCNC: 103 MMOL/L (ref 94–109)
CO2 SERPL-SCNC: 28 MMOL/L (ref 20–32)
CREAT SERPL-MCNC: 0.73 MG/DL (ref 0.52–1.04)
ERYTHROCYTE [DISTWIDTH] IN BLOOD BY AUTOMATED COUNT: 22.8 % (ref 10–15)
GFR SERPL CREATININE-BSD FRML MDRD: 77 ML/MIN/1.7M2
GLUCOSE BLDC GLUCOMTR-MCNC: 147 MG/DL (ref 70–99)
GLUCOSE BLDC GLUCOMTR-MCNC: 151 MG/DL (ref 70–99)
GLUCOSE BLDC GLUCOMTR-MCNC: 158 MG/DL (ref 70–99)
GLUCOSE BLDC GLUCOMTR-MCNC: 166 MG/DL (ref 70–99)
GLUCOSE BLDC GLUCOMTR-MCNC: 198 MG/DL (ref 70–99)
GLUCOSE BLDC GLUCOMTR-MCNC: 200 MG/DL (ref 70–99)
GLUCOSE SERPL-MCNC: 112 MG/DL (ref 70–99)
HCO3 BLD-SCNC: 25 MMOL/L (ref 21–28)
HCT VFR BLD AUTO: 27.4 % (ref 35–47)
HGB BLD-MCNC: 8.1 G/DL (ref 11.7–15.7)
HGB BLD-MCNC: 8.8 G/DL (ref 11.7–15.7)
INR PPP: 1.59 (ref 0.86–1.14)
LACTATE BLD-SCNC: 3 MMOL/L (ref 0.7–2)
LACTATE BLD-SCNC: 4 MMOL/L (ref 0.7–2)
MAGNESIUM SERPL-MCNC: 2 MG/DL (ref 1.6–2.3)
MCH RBC QN AUTO: 35.4 PG (ref 26.5–33)
MCHC RBC AUTO-ENTMCNC: 29.6 G/DL (ref 31.5–36.5)
MCV RBC AUTO: 120 FL (ref 78–100)
O2/TOTAL GAS SETTING VFR VENT: 21 %
PCO2 BLD: 34 MM HG (ref 35–45)
PH BLD: 7.46 PH (ref 7.35–7.45)
PLATELET # BLD AUTO: 154 10E9/L (ref 150–450)
PO2 BLD: 91 MM HG (ref 80–105)
POTASSIUM SERPL-SCNC: 4 MMOL/L (ref 3.4–5.3)
PROCALCITONIN SERPL-MCNC: 0.12 NG/ML
PROT SERPL-MCNC: 6.1 G/DL (ref 6.8–8.8)
RBC # BLD AUTO: 2.29 10E12/L (ref 3.8–5.2)
SODIUM SERPL-SCNC: 138 MMOL/L (ref 133–144)
WBC # BLD AUTO: 6.8 10E9/L (ref 4–11)

## 2018-03-30 PROCEDURE — 99233 SBSQ HOSP IP/OBS HIGH 50: CPT | Mod: GC | Performed by: INTERNAL MEDICINE

## 2018-03-30 PROCEDURE — 40000133 ZZH STATISTIC OT WARD VISIT: Performed by: OCCUPATIONAL THERAPIST

## 2018-03-30 PROCEDURE — 84145 PROCALCITONIN (PCT): CPT | Performed by: STUDENT IN AN ORGANIZED HEALTH CARE EDUCATION/TRAINING PROGRAM

## 2018-03-30 PROCEDURE — 25000132 ZZH RX MED GY IP 250 OP 250 PS 637: Mod: GY | Performed by: MARRIAGE & FAMILY THERAPIST

## 2018-03-30 PROCEDURE — 97535 SELF CARE MNGMENT TRAINING: CPT | Mod: GO | Performed by: OCCUPATIONAL THERAPIST

## 2018-03-30 PROCEDURE — 97110 THERAPEUTIC EXERCISES: CPT | Mod: GP | Performed by: REHABILITATION PRACTITIONER

## 2018-03-30 PROCEDURE — 85610 PROTHROMBIN TIME: CPT | Performed by: STUDENT IN AN ORGANIZED HEALTH CARE EDUCATION/TRAINING PROGRAM

## 2018-03-30 PROCEDURE — A9270 NON-COVERED ITEM OR SERVICE: HCPCS | Mod: GY | Performed by: HOSPITALIST

## 2018-03-30 PROCEDURE — 83735 ASSAY OF MAGNESIUM: CPT | Performed by: STUDENT IN AN ORGANIZED HEALTH CARE EDUCATION/TRAINING PROGRAM

## 2018-03-30 PROCEDURE — 85027 COMPLETE CBC AUTOMATED: CPT | Performed by: STUDENT IN AN ORGANIZED HEALTH CARE EDUCATION/TRAINING PROGRAM

## 2018-03-30 PROCEDURE — 12000025 ZZH R&B TRANSPLANT INTERMEDIATE

## 2018-03-30 PROCEDURE — 87040 BLOOD CULTURE FOR BACTERIA: CPT | Performed by: STUDENT IN AN ORGANIZED HEALTH CARE EDUCATION/TRAINING PROGRAM

## 2018-03-30 PROCEDURE — 97530 THERAPEUTIC ACTIVITIES: CPT | Mod: GP | Performed by: REHABILITATION PRACTITIONER

## 2018-03-30 PROCEDURE — 80053 COMPREHEN METABOLIC PANEL: CPT | Performed by: STUDENT IN AN ORGANIZED HEALTH CARE EDUCATION/TRAINING PROGRAM

## 2018-03-30 PROCEDURE — 40000275 ZZH STATISTIC RCP TIME EA 10 MIN

## 2018-03-30 PROCEDURE — 85018 HEMOGLOBIN: CPT | Performed by: STUDENT IN AN ORGANIZED HEALTH CARE EDUCATION/TRAINING PROGRAM

## 2018-03-30 PROCEDURE — A9270 NON-COVERED ITEM OR SERVICE: HCPCS | Mod: GY

## 2018-03-30 PROCEDURE — 25000132 ZZH RX MED GY IP 250 OP 250 PS 637: Mod: GY

## 2018-03-30 PROCEDURE — 25000132 ZZH RX MED GY IP 250 OP 250 PS 637: Mod: GY | Performed by: STUDENT IN AN ORGANIZED HEALTH CARE EDUCATION/TRAINING PROGRAM

## 2018-03-30 PROCEDURE — 36600 WITHDRAWAL OF ARTERIAL BLOOD: CPT

## 2018-03-30 PROCEDURE — 82803 BLOOD GASES ANY COMBINATION: CPT | Performed by: STUDENT IN AN ORGANIZED HEALTH CARE EDUCATION/TRAINING PROGRAM

## 2018-03-30 PROCEDURE — 36592 COLLECT BLOOD FROM PICC: CPT | Performed by: STUDENT IN AN ORGANIZED HEALTH CARE EDUCATION/TRAINING PROGRAM

## 2018-03-30 PROCEDURE — A9270 NON-COVERED ITEM OR SERVICE: HCPCS | Mod: GY | Performed by: STUDENT IN AN ORGANIZED HEALTH CARE EDUCATION/TRAINING PROGRAM

## 2018-03-30 PROCEDURE — 00000146 ZZHCL STATISTIC GLUCOSE BY METER IP

## 2018-03-30 PROCEDURE — 40000193 ZZH STATISTIC PT WARD VISIT: Performed by: REHABILITATION PRACTITIONER

## 2018-03-30 PROCEDURE — 25000132 ZZH RX MED GY IP 250 OP 250 PS 637: Mod: GY | Performed by: HOSPITALIST

## 2018-03-30 PROCEDURE — 71046 X-RAY EXAM CHEST 2 VIEWS: CPT

## 2018-03-30 PROCEDURE — 83605 ASSAY OF LACTIC ACID: CPT | Performed by: STUDENT IN AN ORGANIZED HEALTH CARE EDUCATION/TRAINING PROGRAM

## 2018-03-30 RX ORDER — ASPIRIN 81 MG/1
81 TABLET, CHEWABLE ORAL DAILY
Status: CANCELLED | OUTPATIENT
Start: 2018-03-30

## 2018-03-30 RX ORDER — ASPIRIN 81 MG/1
81 TABLET, CHEWABLE ORAL EVERY MORNING
Status: DISCONTINUED | OUTPATIENT
Start: 2018-03-30 | End: 2018-04-04 | Stop reason: HOSPADM

## 2018-03-30 RX ORDER — BUMETANIDE 2 MG/1
2 TABLET ORAL
Status: DISCONTINUED | OUTPATIENT
Start: 2018-03-30 | End: 2018-04-01

## 2018-03-30 RX ORDER — BUMETANIDE 2 MG/1
2 TABLET ORAL
Status: CANCELLED | OUTPATIENT
Start: 2018-03-30

## 2018-03-30 RX ORDER — SPIRONOLACTONE 25 MG/1
25 TABLET ORAL DAILY
Status: DISCONTINUED | OUTPATIENT
Start: 2018-03-30 | End: 2018-04-04 | Stop reason: HOSPADM

## 2018-03-30 RX ADMIN — AMOXICILLIN AND CLAVULANATE POTASSIUM 1 TABLET: 875; 125 TABLET, FILM COATED ORAL at 20:30

## 2018-03-30 RX ADMIN — Medication 37.5 MCG: at 08:09

## 2018-03-30 RX ADMIN — METOPROLOL TARTRATE 12.5 MG: 25 TABLET, FILM COATED ORAL at 20:30

## 2018-03-30 RX ADMIN — THERA TABS 1 TABLET: TAB at 08:09

## 2018-03-30 RX ADMIN — LIDOCAINE 1 PATCH: 560 PATCH PERCUTANEOUS; TOPICAL; TRANSDERMAL at 08:09

## 2018-03-30 RX ADMIN — PANTOPRAZOLE SODIUM 40 MG: 40 TABLET, DELAYED RELEASE ORAL at 08:09

## 2018-03-30 RX ADMIN — VENLAFAXINE HYDROCHLORIDE 150 MG: 150 CAPSULE, EXTENDED RELEASE ORAL at 08:09

## 2018-03-30 RX ADMIN — METOPROLOL TARTRATE 12.5 MG: 25 TABLET, FILM COATED ORAL at 08:09

## 2018-03-30 RX ADMIN — BUMETANIDE 2 MG: 2 TABLET ORAL at 16:23

## 2018-03-30 RX ADMIN — PANTOPRAZOLE SODIUM 40 MG: 40 TABLET, DELAYED RELEASE ORAL at 16:23

## 2018-03-30 RX ADMIN — INSULIN ASPART 0.5 UNITS: 100 INJECTION, SOLUTION INTRAVENOUS; SUBCUTANEOUS at 08:11

## 2018-03-30 RX ADMIN — SPIRONOLACTONE 25 MG: 25 TABLET ORAL at 16:23

## 2018-03-30 RX ADMIN — BUMETANIDE 2 MG: 2 TABLET ORAL at 10:34

## 2018-03-30 RX ADMIN — AMOXICILLIN AND CLAVULANATE POTASSIUM 1 TABLET: 875; 125 TABLET, FILM COATED ORAL at 08:09

## 2018-03-30 RX ADMIN — ASPIRIN 81 MG CHEWABLE TABLET 81 MG: 81 TABLET CHEWABLE at 10:34

## 2018-03-30 RX ADMIN — INSULIN ASPART 0.5 UNITS: 100 INJECTION, SOLUTION INTRAVENOUS; SUBCUTANEOUS at 16:25

## 2018-03-30 RX ADMIN — INSULIN ASPART 0.5 UNITS: 100 INJECTION, SOLUTION INTRAVENOUS; SUBCUTANEOUS at 13:05

## 2018-03-30 RX ADMIN — POTASSIUM CHLORIDE 40 MEQ: 750 TABLET, EXTENDED RELEASE ORAL at 08:09

## 2018-03-30 RX ADMIN — ATORVASTATIN CALCIUM 40 MG: 40 TABLET, FILM COATED ORAL at 21:47

## 2018-03-30 RX ADMIN — FERROUS SULFATE 325 MG: 325 TABLET, FILM COATED ORAL at 13:03

## 2018-03-30 RX ADMIN — MELATONIN 3 MG: 3 TAB ORAL at 20:31

## 2018-03-30 NOTE — PROGRESS NOTES
[facility] [place]    Sepsis Evaluation Progress Note    Date of Service: 03/30/2018    I was called to see Carlos Alberto Fenton due to elevated lactate, obtained during ABG and for shivering. . She is not known to have an infection. Pt does not meet any SIRS criteria and has had blood cultures drawn as recently as yesterday that were negative.     Pt endorses shivering and some SOB.  She does not have dysuria.     Physical Exam    Vital Signs:  Temp: 97.6  F (36.4  C) Temp src: Axillary BP: 111/62 Pulse: 71 Heart Rate: 70 Resp: 18 SpO2: 93 % O2 Device: None (Room air) Oxygen Delivery: 4 LPM    Lab:  Lactic Acid   Date Value Ref Range Status   03/30/2018 4.0 (HH) 0.7 - 2.0 mmol/L Final     Comment:     A critical value was identified while performing another test.  Critical value   called to and read back by  KIRBY ROD RN 7A 03/30/18 AT 1155 BY AO         The patient is at baseline mental status.    The rest of their physical exam is significant for lungs CTAB, abdomen nontender, LE without edema, oropharynx moist without lesions, heart RRR.    Assessment and Plan    The SIRS and exam findings are likely due to poor perfusion with PAD, there is no sign of sepsis at this time.    Disposition: The patient will remain on the current unit. We will continue to monitor this patient closely.    We are obtaining BCx, procalcitonin, and hemoglobin as precaution given pt is feeling cold.     We are also obtaining CXR 2 view for SOB.     Ayaka Mclaughlin MD

## 2018-03-30 NOTE — PLAN OF CARE
Problem: Patient Care Overview  Goal: Plan of Care/Patient Progress Review  Discharge Planner OT   Patient plan for discharge: TCU  Current status: Pt CGA for standing ADLs, standing for ~90 secs at a time before requiring ~30 sec rest break. Pt scored 21/30 on SLUMS indicating cognitive deficit.  Barriers to return to prior living situation: deconditioning, cognitive deficits  Recommendations for discharge: TCU  Rationale for recommendations: Pt presents with deconditioning and cognitive deficits limited I and safety in ADL/IADLs. Pt to benefit from skilled OT and PT at TCU setting to address above concerns and increase endurance, safety and I in ADL/IADLs.       Entered by: Kita Akhtar 03/30/2018 11:53 AM

## 2018-03-30 NOTE — PLAN OF CARE
"Problem: Patient Care Overview  Goal: Plan of Care/Patient Progress Review  /75 (BP Location: Left leg)  Pulse 61  Temp 97.4  F (36.3  C) (Oral)  Resp 17  Ht 1.575 m (5' 2\")  Wt 96.4 kg (212 lb 8.4 oz)  SpO2 92%  BMI 38.87 kg/m2    Pt arrived on 7A after 1800. She is afebrile and mildly hypertensive with other vitals stable on 3L O2. Bedtime BG was 144. Pt denied pain and nausea but reported that she often has low back pain. She is on a low saturated fat diet and tolerating well. Her midline IV is SL'd. She is up with an assist of 1 to the commode. She is voiding, no BM this shift. She has what appears to be a diabetic ulcer on the bottom of her L foot and her R big toe is black. She needs more education on diabetes. She stated that she was just recently diagnosed with it. RT came to put her on a cpap. She is resting comfortably now, will continue to monitor.       "

## 2018-03-30 NOTE — PROGRESS NOTES
[facility] - Progress Note  Date of Service: 03/30/2018      Patient: Carlos Alberto Fenton  MRN: [id number]  Admission Date: 3/20/2018  Hospital Day # 9    Changes Today 03/30/18 :  -hold off anticoagulation until 1 week s/p PPM placement per EP, then start sq fondaparinux per hem/onc  -restart PTA aspirin  -restart PTA bumix  -room air trial and ABG one hour following DC of nasal canula            Assessment and Plan:   Carlos Alberto Fenton is a 77 year old female with PMH of HTN, HLD, DM, CVA (11/2016), CHF (last ECHO 12/12/17 EF 45-50%), CAD (s/p 3v CABG: LIMA-LAD, SVG-D1, SVG-dRCA and modified MAZE, ABDIEL ligation - 2/2016), paroxysmal AFib ([id number] - 8 on Xarelto), HIT, RUE DVT, recently diagnosed hypothyroidism, admitted 3/21/2018 for acute GIB, coagulopathy and concern for acute LI 2/2 acetaminophen toxicity with xarelto intake. Now tachy-israel arrhythmia.     #Acute on chronic hypoxic respiratory failure  Rapid response was called on 3/28 due to hypoxia--- difficulty getting O2sat reading with O2 sat ~70-80s% on CPAP FiO2 100%.Unable to get ABG due to soft tissue edema.  In the MICU an ABG was drawn that showed O2 sat at 409 (repeat on 3L of O2 was 166).   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 Pt prev on warfarin, she was transitioned to xarelto in Jan 2018 because she likes leafy greens.  --hold xarelto   --Hematology consult to determine safest anticoagulation for stroke prevention in setting of [id number] 8. Per Heme, need to be on fondaporinox. Cardiology was paged to determine if it is okay to start this now given recent PPM placement. Needs Heme f/u in 2-3 months.  --started metoprolol 12.5 mg BID for episodes of RVR  --TTE on 3/25: shows EF 40-45%  --D/C  trazodone as prolongs QTc  -- augmentin per EP 5 days s/p PPM (3/26-3/30)  --Restart PTA aspirin 81mg daily      #Acute hypoxic respiratory failure 2/2 CHF exacerbation.   Secondary to IVF boluses for hypotension episodes with tachy/israel syndrome. CXR shows pulmonary edema. -- IVF are stopped. Diuresing with lasix 40 mg IV once. Consider starting home bumex 2 mg BID tomorrow.   --empirical treatment for aspiration PNA with zosyn on 3/24-3/25--switched to Augmentin on 3/25. CXR today shows no evidence of PNA.       # Coagulopathy, INR to 5, elevated PT and PTT  # Acetaminophen toxicity (elevated acetaminophen levels)  Pt states she was taking 3-4g tylenol per day for the past month for chronic back pain.   Pt previously on warfarin for Afib. She started xarelto as of Jan 2018. She states she was not taking warfarin and xarelto simultaneously. It is possible that her coagulopathy is occurring in the setting of xarelto. Per Heme, factor 7/10 decreased c/w vit K deficiency, no evidence of hepatic synthetic dysfunction. Her INR has continued to improve.  - NAC infusion D/C on 3/26 per GI.  --trend INR  --trend liver enzymes every couple of days   --Pending labs: homocysteine, methylmalonic acid  --Hep B and C panels negative  --F actin weakly positive; anti-mitochondial joseph neg and AMPARO neg     # Acute blood loss anemia (baseline 11-12 1 month prior, current hgb ~7) - now stable Hgb  Differential includes acute GI bleed (most likely). Hgb has been stable since admission and no evidence of bleeding seen.   Plan:  -- GI consult, appreciate recs  -- trend daily, transfuse <7  -- continue PPI BID   -- holding (home ASA 81 and xarelto). Consider resuming ASA given strong hx of CVA and CAD  -- folate WNL, B12 WNL, transferrin WNL, iron WNL, haptoglobin, reticulocyte index 2 indicating appropriate response.  --Need Hematology f/u in 2-3 months to eval for macrocytic anemia      # Hypothyroidism  Recently diagnosed. On  Levothyroxine. Last TSH 2/21 elevated to 88, T4 improved from 0.29 to 0.44.   -- increase levothyroxine to 37.5 from PTA dose of 25 mcg  -- Recheck TSH in 6 weeks.          Resolved issues:     # HARINI on CKD -- resolved  Cr at bsl ~1.1-4. On presentation 1.8. UA with hyaline casts, tachy, hypotensive. Likely prerenal in the setting of GIB and decreased PO intake/emesis. Cr improved with IVF.   -- trend BMP     # Tropinemia, likely 2/2 demand ischemia 2/2 anemia  EKG did not show ST elevation, ST segment depression or T wave inversions. Pt is not having CP. Likely demand ischemia.         Chronic problems  # CHF  # CAD  # HLD  ECHO shows EF 45-50%.   Plan:  --holding metolazone, spironolactone, bumex, ASA  --cont atorvastatin       # Depression  -- continue PTA venlafaxine     # Insominia  -holding trazodone in lieu of prolonged QTc    Diet:   Cardiac Diet   Fluids: none  DVT Prophylaxis: mechanical  Code Status: Full Code    Pallavi Ruiz, MS3     Interval History:   Nursing notes reviewed. No events overnight.    Patient is responding appropriately and had no new complaints this morning.        Medications:     Current Facility-Administered Medications   Medication     potassium chloride SA (K-DUR/KLOR-CON M) CR tablet 20-40 mEq     potassium chloride (KLOR-CON) Packet 20-40 mEq     potassium chloride 10 mEq in 100 mL sterile water intermittent infusion (premix)     potassium chloride 10 mEq in 100 mL intermittent infusion with 10 mg lidocaine     potassium chloride 20 mEq in 50 mL intermittent infusion     magnesium sulfate 4 g in 100 mL sterile water (premade)     potassium phosphate 15 mmol in D5W 250 mL intermittent infusion     potassium phosphate 20 mmol in D5W 500 mL intermittent infusion     potassium phosphate 20 mmol in D5W 250 mL intermittent infusion     potassium phosphate 25 mmol in D5W 500 mL intermittent infusion     amoxicillin-clavulanate (AUGMENTIN) 875-125 MG per tablet 1 tablet     sodium  "chloride (PF) 0.9% PF flush 10-20 mL     sodium chloride (PF) 0.9% PF flush 10 mL     multivitamin, therapeutic (THERA-VIT) tablet 1 tablet     metoprolol tartrate (LOPRESSOR) half-tab 12.5 mg     pantoprazole (PROTONIX) EC tablet 40 mg     potassium chloride SA (K-DUR/KLOR-CON M) CR tablet 40 mEq     HOLD: metformin and metformin containing medications on day of procedure and 48 hours after IV contrast given     HOLD: heparin (IV or Subcutaneous) Post Procedure     polyethylene glycol (MIRALAX/GLYCOLAX) Packet 17 g     melatonin tablet 3 mg     Lidocaine (LIDOCARE) 4 % Patch 1 patch     lidocaine patch REMOVAL     lidocaine patch in PLACE     atorvastatin (LIPITOR) tablet 40 mg     glucose 40 % gel 15-30 g    Or     dextrose 50 % injection 25-50 mL    Or     glucagon injection 1 mg     insulin aspart (NovoPen ECHO/NovoLOG) cartridge     insulin aspart (NovoPen ECHO/NovoLOG) cartridge     Injection Device for insulin (NOVOPEN ECHO) 1 each     venlafaxine (EFFEXOR-XR) 24 hr capsule 150 mg     ferrous sulfate (IRON) tablet 325 mg     naloxone (NARCAN) injection 0.1-0.4 mg     senna-docusate (SENOKOT-S;PERICOLACE) 8.6-50 MG per tablet 1 tablet    Or     senna-docusate (SENOKOT-S;PERICOLACE) 8.6-50 MG per tablet 2 tablet     levothyroxine (SYNTHROID/LEVOTHROID) half-tab 37.5 mcg         Physical Exam:   Vitals were reviewed  Blood pressure (!) 113/95, pulse 61, temperature 95.9  F (35.5  C), temperature source Axillary, resp. rate 19, height 1.575 m (5' 2\"), weight 96.4 kg (212 lb 8.4 oz), SpO2 91 %, not currently breastfeeding.      Intake/Output Summary (Last 24 hours) at 03/30/18 0912  Last data filed at 03/30/18 0708   Gross per 24 hour   Intake              620 ml   Output             1200 ml   Net             -580 ml       Vitals:    03/27/18 0257 03/28/18 0400 03/29/18 0600   Weight: 94.8 kg (209 lb) 94.1 kg (207 lb 7.3 oz) 96.4 kg (212 lb 8.4 oz)       Physical Exam:   Gen: In no acute distress. Patient " comfortably lying in bed  HEENT: No scleral icterus, Moist mucous membranes. No nasal discharge.  CV: Irregular rate, no murmurs or rubs detected  Resp: Clear to auscultation bilaterally. Without wheeze or crackles  Abdomen: Soft, non tender abdomen, normal active bowel sounds   Extremities: Pitting edema 2+ bilaterally   Skin: bruises on back are improved  Neuro: mentation in tact, no deficits noted  Oriented to conversation             Data:   ROUTINE LABS (Last four results)  CMP    Recent Labs  Lab 03/29/18 0415 03/28/18 2110 03/28/18  0745 03/27/18  0605    138 137 137   POTASSIUM 3.6 3.5 4.2 3.9   CHLORIDE 103 103 104 102   CO2 27 27 24 26   ANIONGAP 10 8 9 9   GLC 91 110* 106* 151*   BUN 12 13 12 11   CR 0.74 0.72 0.73 0.68   GFRESTIMATED 76 79 77 84   GFRESTBLACK >90 >90 >90 >90   CARLY 8.8 8.8 9.0 8.7   MAG 2.0 1.8 2.2 1.9   PHOS  --  2.2*  --   --    PROTTOTAL 5.6* 6.1* 6.2* 6.3*   ALBUMIN 2.6* 2.8* 2.8* 2.8*   BILITOTAL 1.8* 1.4* 1.5* 1.2   ALKPHOS 176* 181* 195* 153*   * 125* 63* 60*   ALT 57* 66* 41 54*     CBC    Recent Labs  Lab 03/29/18 0415 03/28/18 2110 03/28/18  0745 03/27/18  0605   WBC 7.0 7.6 8.8 8.2   RBC 2.04* 2.22* 2.32* 2.17*   HGB 7.8* 7.9* 8.6* 7.7*   HCT 24.7* 26.3* 27.7* 26.3*   * 119* 119* 121*   MCH 38.2* 35.6* 37.1* 35.5*   MCHC 31.6 30.0* 31.0* 29.3*   RDW 23.3* 22.8* 23.2* 24.1*   * 159 140* 162     INR    Recent Labs  Lab 03/29/18  0415 03/28/18 2110 03/28/18  0745 03/27/18  0605   INR 1.66* 1.90* Canceled, Test credited 1.47*     Arterial Blood Gas    Recent Labs  Lab 03/29/18  1043 03/28/18 2116 03/28/18  0145 03/24/18  0416   PH 7.42 7.51*  --   --    PCO2 42 35  --   --    PO2 166* 409*  --   --    HCO3 27 27  --   --    O2PER 3L 100 60.0 21.0

## 2018-03-30 NOTE — PROVIDER NOTIFICATION
03/30/18 1200   Call Information   Date of Call 03/30/18   Time of Call 1233   Name of person requesting the team Jane   Title of person requesting team RN   RRT Arrival time 1335   Time RRT ended 1248   Reason for call   Type of RRT Adult   Primary reason for call Sepsis suspected   Sepsis Suspected Elevated Lactate level   Was patient transferred from the ED, ICU, or PACU within last 24 hours prior to RRT call? No   SBAR   Situation Lactic Acid level 4.0   Background SOB, peripheral vascular disease, Acetaminophen toxicity   Assessment Patient sitting up in chair, alert and communicating, vital signs WDL   Interventions CXR;Labs   Patient Outcome   Patient Outcome Stabilized on unit   RRT Team   Attending/Primary/Covering Physician Dr. Mclaughlin   Date Attending Physician notified 03/30/18   Time Attending Physician notified 1230   Lead RN Nehemias Echeverria, RN   REESE Cardenas, REESE   RT Parker Farris, RT

## 2018-03-30 NOTE — PROGRESS NOTES
CLINICAL NUTRITION SERVICES - BRIEF NOTE    Patient reports good oral intake (% of meals recorded by nursing).  She notes she dislikes the taste of Boost Plus.     Interventions:  Ordered calorie counts to quantify intake, ensure adequacy.    Ordered room service with assist for patient to get assistance with meal ordering.      Ordered Boost Breeze for patient to try.  If she likes, she can order this with meals.     Livia Camacho MS, RD, LD  Pager [phone]

## 2018-03-30 NOTE — PLAN OF CARE
"Problem: Patient Care Overview  Goal: Plan of Care/Patient Progress Review  Outcome: No Change  /68 (BP Location: Left leg)  Pulse 71  Temp 97.8  F (36.6  C) (Axillary)  Resp 18  Ht 1.575 m (5' 2\")  Wt 96.4 kg (212 lb 8.4 oz)  SpO2 98%  BMI 38.87 kg/m2      VSS, afebrile and using CPAP while sleeping. Maintaining O2 saturations while on O2. Can be difficult to read O2 with oximeter probe d/t poor perfusion. Used forehead oximeter with success. Denied any pain or nausea. BS at 0200 was 151. On a low sat fat diet. Midline SL. Voided 300 mL of jessica colored urine. No BM this shift. Up with an assist of 1. Uses call light appropriately, will continue to monitor and alert team of changes.       "

## 2018-03-30 NOTE — PLAN OF CARE
Problem: Patient Care Overview  Goal: Plan of Care/Patient Progress Review  PT - per plan established by the Physical Therapist, according to functional mobility the  discharge recommendation is rehab to progress functional mobility. Pt needing min A for all bed mobility. Pt is SBA for sit to stand with HHA for stability. Pt needing HHA for stand pivot transfer to chair at end of session. Pt demo seated there x x 10. Pt declined amb with PT today due to fatigue.   Discharge Planner PT   Patient plan for discharge: rehab.   Current status: see above.   Barriers to return to prior living situation: weakness, fatigue.   Recommendations for discharge: TCU   Rationale for recommendations: skilled PT to progress functional mob.        Entered by: Sai Mcnulty 03/30/2018 12:10 PM

## 2018-03-30 NOTE — PROGRESS NOTES
[facility], [place]    Internal Medicine Progress Note - [facility] Service    Main Plans for Today   -hold off anticoagulation until 1 week s/p PPM placement per EP, then start sq fondaparinux per hem/onc  -restart PTA aspirin  -restart PTA bumex and spironolactone  -room air trial and ABG one hour following DC of nasal canula   -lactate was 4, rapid response ran, BCx, procal (neg), and hemoglobin  (8.8) obtained given pt felt like she was shivering  -CXR shows slight worsening of pulmonary effusion on L, likely related to heart failure    Assessment & Plan   Carlos Alberto Fenton is a 77 year old female with PMH of HTN, HLD, DM, CVA (11/2016), CHF (last ECHO 12/12/17 EF 45-50%), CAD (s/p 3v CABG: LIMA-LAD, SVG-D1, SVG-dRCA and modified MAZE, ABDIEL ligation - 2/2016), paroxysmal AFib ([id number] - 8 on Xarelto), HIT, RUE DVT, recently diagnosed hypothyroidism, admitted 3/21/2018 for acute GIB, coagulopathy and concern for acute LI 2/2 acetaminophen toxicity with xarelto intake. Pt's liver function has resolved with INR of 1.59. She is s/p PPM for tachy-israel syndrome. Pt continues to have difficult to measure pulse ox due to PAD. She has intermittently elevated lactic acids that resolve without fluids, and in the past, giving fluid for lactate caused the pt to develop low grade pulmonary edema. Plan is to discharge to TCU tomorrow if she is accepted.     #Acute on chronic hypoxic respiratory failure  Rapid response was called on 3/28 due to hypoxia--- difficulty getting O2 sat reading with O2 sat ~70-80s% on CPAP FiO2 100%.Unable to get ABG due to soft tissue edema.  In the MICU an ABG was drawn that showed O2 sat at 409 (repeat on 3L of O2 was 166).   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  --cont hold xarelto   --Hematology consult to determine safest anticoagulation for stroke prevention in setting of [id number]. Per Heme, need to be on fondaparinaux. Cardiology ok with starting Fondaparinaux on April 1. Needs Heme f/u in 2-3 months.  --started metoprolol 12.5 mg BID for episodes of RVR  --TTE on 3/25: shows EF 40-45%  --D/C trazodone as prolongs QTc  --augmentin per EP 5 days s/p PPM (3/26-3/30)  --Restarted PTA aspirin 81mg daily       #Acute hypoxic respiratory failure 2/2 CHF exacerbation.   Secondary to IVF boluses for hypotension episodes with tachy/israel syndrome. CXR shows pulmonary edema. -- IVF are stopped. Diuresing with lasix 40 mg IV once. Consider starting home bumex 2 mg BID tomorrow.   --empirical treatment for aspiration PNA with zosyn on 3/24-3/25--switched to Augmentin on 3/25. CXR today shows no evidence of PNA.       # Coagulopathy, INR to 5, elevated PT and PTT  # Acetaminophen toxicity (elevated acetaminophen levels)  Pt states she was taking 3-4g tylenol per day for the past month for chronic back pain.   Pt previously on warfarin for Afib. She started xarelto as of Jan 2018. She states she was not taking warfarin and xarelto simultaneously. It is possible that her coagulopathy is occurring in the setting of xarelto. Per Heme, factor 7/10 decreased c/w vit K deficiency, no evidence of hepatic synthetic dysfunction. Her INR has continued to improve.  - NAC infusion D/C on 3/26 per GI.  --trend INR  --trend liver enzymes every couple of days   --Pending labs: homocysteine, methylmalonic acid  --Hep B and C panels negative  --F actin weakly positive; anti-mitochondial joseph neg and AMPARO neg      # Acute blood loss anemia (baseline 11-12 1 month prior, current hgb ~7) - now stable Hgb  Differential includes acute GI bleed (most likely). Hgb has been stable since admission and no evidence  of bleeding seen.   Plan:  -- GI consult, appreciate recs  -- trend daily, transfuse <7  -- continue PPI BID   -- folate WNL, B12 WNL, transferrin WNL, iron WNL, haptoglobin, reticulocyte index 2 indicating appropriate response.  --Need Hematology f/u in 2-3 months to eval for macrocytic anemia      # Hypothyroidism  Recently diagnosed. On Levothyroxine. Last TSH 2/21 elevated to 88, T4 improved from 0.29 to 0.44.   -- increase levothyroxine to 37.5 from PTA dose of 25 mcg  -- Recheck TSH in 6 weeks.     # Lactic Acidosis  Pt has had fluctuating LA since admission. Likely 2/2 poor perfusion and liver status, given neg blood cultures and fluctuating downtrending levels without fluids.   -CTM and trend      Resolved issues:      # HARINI on CKD -- resolved  Cr at bsl ~1.1-4. On presentation 1.8. UA with hyaline casts, tachy, hypotensive. Likely prerenal in the setting of GIB and decreased PO intake/emesis. Cr improved with IVF.   -- trend BMP      # Tropinemia, likely 2/2 demand ischemia 2/2 anemia  EKG did not show ST elevation, ST segment depression or T wave inversions. Pt is not having CP. Likely demand ischemia.          Chronic problems  # CHF  # CAD  # HLD  ECHO shows EF 45-50%.   Plan:  --restarted spironolactone, bumex, ASA  --holding metolazone  --cont atorvastatin       # Depression  -- continue PTA venlafaxine      # Insominia  -holding trazodone in lieu of prolonged QTc       # Pain Assessment:  Current Pain Score 3/30/2018   Patient currently in pain? denies   Pain score (0-10) -   Pain location -   Pain descriptors -   CPOT pain score -   [place]enath s pain level was assessed and she currently denies pain.      Diet: Low Saturated Fat Na <2400 mg  Calorie Counts  Snacks/Supplements Adult: Ensure Clear; Between Meals  Room Service  Fluids: none  DVT Prophylaxis: Anti-embolisim stockings (TEDs)  Code Status: Full Code    Disposition Plan   Expected discharge: Tomorrow, recommended to transitional care unit once  safe disposition plan/ TCU bed available.     Entered: Ayaka Mclaughlin 03/30/2018, 3:36 PM   Information in the above section will display in the discharge planner report.      The patient's care was discussed with the Attending Physician, Dr. Rome.    Ayaka Mclaughlin  [facility]: 1  Pager: [phone]  Please see sticky note for cross cover information    Physician Attestation  I, Nevaeh Rome MD, saw this patient with the resident and agree with the resident s findings and plan of care as documented in the resident s note with my edits.     I personally reviewed vital signs, medications, labs and imaging.    Nevaeh Rome MD  Date of Service (when I saw the patient): 3/30/18            Interval History   Nursing notes reviewed. No events overnight.     Patient is responding appropriately and had no new complaints this morning.     Saw pt for lactate of 4. Please refer to sepsis eval notes for updates.     Physical Exam   Vital Signs: Temp: 97.6  F (36.4  C) Temp src: Axillary BP: 111/62 Pulse: 71 Heart Rate: 70 Resp: 18 SpO2: 94 % O2 Device: Nasal cannula Oxygen Delivery: 4 LPM  Weight: 205 lbs 14.4 oz  Gen: In no acute distress. Patient comfortably lying in bed  HEENT: No scleral icterus, Moist mucous membranes. No nasal discharge.  CV: Irregular rate, no murmurs or rubs detected  Resp: Clear to auscultation bilaterally. Without wheeze or crackles  Abdomen: Soft, non tender abdomen, normal active bowel sounds   Extremities: Pitting edema trace, bilaterally   Skin: bruises on back are improved  Neuro: mentation in tact, no deficits noted  Oriented to conversation    Data   Medications     Injection Device for insulin         bumetanide  2 mg Oral BID     aspirin  81 mg Oral QAM     spironolactone  25 mg Oral Daily     amoxicillin-clavulanate  1 tablet Oral Q12H ABHISHEK     sodium chloride (PF)  10 mL Intracatheter Q8H     multivitamin, therapeutic  1 tablet Oral Daily     metoprolol tartrate   12.5 mg Oral BID     pantoprazole  40 mg Oral BID AC     potassium chloride  40 mEq Oral Daily     polyethylene glycol  17 g Oral Daily     melatonin  3 mg Oral At Bedtime     lidocaine  1 patch Transdermal Q24H     lidocaine   Transdermal Q24h     lidocaine   Transdermal Q8H     atorvastatin  40 mg Oral At Bedtime     insulin aspart  0.5-2.5 Units Subcutaneous TID AC     insulin aspart  0.5-2.5 Units Subcutaneous At Bedtime     venlafaxine  150 mg Oral Daily with breakfast     ferrous sulfate  325 mg Oral Daily with breakfast     levothyroxine  37.5 mcg Oral QAM AC     Data     Recent Labs  Lab 03/30/18  1349 03/30/18  0711 03/29/18  0415 03/28/18  2110   WBC  --  6.8 7.0 7.6   HGB 8.8* 8.1* 7.8* 7.9*   MCV  --  120* 121* 119*   PLT  --  154 131* 159   INR  --  1.59* 1.66* 1.90*   NA  --  138 139 138   POTASSIUM  --  4.0 3.6 3.5   CHLORIDE  --  103 103 103   CO2  --  28 27 27   BUN  --  14 12 13   CR  --  0.73 0.74 0.72   ANIONGAP  --  7 10 8   CARLY  --  9.3 8.8 8.8   GLC  --  112* 91 110*   ALBUMIN  --  2.7* 2.6* 2.8*   PROTTOTAL  --  6.1* 5.6* 6.1*   BILITOTAL  --  1.9* 1.8* 1.4*   ALKPHOS  --  215* 176* 181*   ALT  --  55* 57* 66*   AST  --  100* 108* 125*     Recent Results (from the past 24 hour(s))   XR Chest 2 Views    Narrative    Exam: XR CHEST 2 VW, 3/30/2018 1:39 PM    Indication: Elevated Lactate and SOB.;     Comparison: 3/28/2018    Findings:   Left chest wall pacemaker and its leads are unchanged. Left atrial  clip. Monitoring device over the heart. Heart enlarged but stable.  Pulmonary vasculature within normal limits. Bibasilar atelectasis left  greater than right. Effusion seen on the lateral view on the left.      Impression    Impression:   1. Left lower lobe atelectasis and effusion.  2. Minimal atelectasis right lung base.  3. Cardiomegaly without significant pulmonary edema.  4. Stable support devices.    MARY LOU DONOVAN MD

## 2018-03-30 NOTE — PROVIDER NOTIFICATION
Lactic recheck at 1349: 3.0 (down from 4.0 at 1140). Team notified and aware. See previous notes. Will continue to monitor and update team as needed.

## 2018-03-30 NOTE — PROGRESS NOTES
Social Work Services Progress Note    Hospital Day: 11  Date of Initial Social Work Evaluation:  3/27/2018  Collaborated with:  Lindsay.    Data: Pt needs TCU at discharge. Several facilities have accepted pt for TCU. Pt also has no place to live as of 5/1 and is interested in obtaining a senior apartment. Per medical team pt will be ready to discharge tomorrow or Sunday.    Pts daughter would like her to go to [facility] TCU, P: [phone], F: [phone]. Weekend admissions, [place] P: [phone]. She is aware they only have private rooms available and it will be an extra $15/day. Pt will also need a wheelchair ride scheduled.    Intervention:  Spoke with pt and her daughter Lindsay and pt.     Assessment: Pt agreeable to TCU, pts daughter would like her to go to [facility].    Plan:    Anticipated Disposition: TCU- [facility].    Barriers to d/c plan:  Medical stability.    Follow Up:  Weekend SW to follow up with medical team to see if pt can discharge and a bed is available at [facility]. Ride will need to be scheduled via [facility] [phone].     DAMIAN Willams, [facility]  [address]   Ph: [phone], Pager: [phone]

## 2018-03-30 NOTE — PLAN OF CARE
Problem: Patient Care Overview  Goal: Plan of Care/Patient Progress Review  Outcome: No Change  Afebrile; other VSS satting 94-95% on 4L per NC. Patient with poor perfusion - unable to wean O2 this shift. Patient desats to upper 80's on <4L O2. Patient on CPAP at night. Tele monitored; remains in A-fib. Denies pain except slight aching in lower back; Lidocaine patch in place. Denies nausea. Tolerating low fat diet with fair appetite. -200 corrected per sliding scale orders. DL midline SL'd. Adequate UOP and small BM x1 this morning. Patient's lactic 4.0 this morning (recheck 3.0) - see previous note for details. Rapid response called per protocol. ABG drawn - WNL. Blood cultures drawn - results pending. Chest X-ray showed mild atelectasis. Patient is up with Ax1. Daughter at bedside this evening. Will continue with plan of care.

## 2018-03-30 NOTE — PROGRESS NOTES
Reviewed chart to complete PAS for TCU discharge. PAS confirmation number: [id number]    JOSEE Kern

## 2018-03-31 ENCOUNTER — APPOINTMENT (OUTPATIENT)
Dept: PHYSICAL THERAPY | Facility: CLINIC | Age: 78
DRG: 813 | End: 2018-03-31
Attending: INTERNAL MEDICINE
Payer: MEDICARE

## 2018-03-31 LAB
ANION GAP SERPL CALCULATED.3IONS-SCNC: 6 MMOL/L (ref 3–14)
ANION GAP SERPL CALCULATED.3IONS-SCNC: 7 MMOL/L (ref 3–14)
BUN SERPL-MCNC: 12 MG/DL (ref 7–30)
BUN SERPL-MCNC: 12 MG/DL (ref 7–30)
CALCIUM SERPL-MCNC: 8.6 MG/DL (ref 8.5–10.1)
CALCIUM SERPL-MCNC: 9.2 MG/DL (ref 8.5–10.1)
CHLORIDE SERPL-SCNC: 100 MMOL/L (ref 94–109)
CHLORIDE SERPL-SCNC: 100 MMOL/L (ref 94–109)
CO2 SERPL-SCNC: 31 MMOL/L (ref 20–32)
CO2 SERPL-SCNC: 32 MMOL/L (ref 20–32)
CREAT SERPL-MCNC: 0.73 MG/DL (ref 0.52–1.04)
CREAT SERPL-MCNC: 0.84 MG/DL (ref 0.52–1.04)
ERYTHROCYTE [DISTWIDTH] IN BLOOD BY AUTOMATED COUNT: 22.2 % (ref 10–15)
GFR SERPL CREATININE-BSD FRML MDRD: 65 ML/MIN/1.7M2
GFR SERPL CREATININE-BSD FRML MDRD: 77 ML/MIN/1.7M2
GLUCOSE BLDC GLUCOMTR-MCNC: 111 MG/DL (ref 70–99)
GLUCOSE BLDC GLUCOMTR-MCNC: 115 MG/DL (ref 70–99)
GLUCOSE BLDC GLUCOMTR-MCNC: 155 MG/DL (ref 70–99)
GLUCOSE BLDC GLUCOMTR-MCNC: 51 MG/DL (ref 70–99)
GLUCOSE BLDC GLUCOMTR-MCNC: 81 MG/DL (ref 70–99)
GLUCOSE BLDC GLUCOMTR-MCNC: 87 MG/DL (ref 70–99)
GLUCOSE BLDC GLUCOMTR-MCNC: 91 MG/DL (ref 70–99)
GLUCOSE BLDC GLUCOMTR-MCNC: 92 MG/DL (ref 70–99)
GLUCOSE SERPL-MCNC: 152 MG/DL (ref 70–99)
GLUCOSE SERPL-MCNC: 89 MG/DL (ref 70–99)
HCT VFR BLD AUTO: 27.1 % (ref 35–47)
HGB BLD-MCNC: 8 G/DL (ref 11.7–15.7)
INR PPP: 1.61 (ref 0.86–1.14)
LACTATE BLD-SCNC: 2.2 MMOL/L (ref 0.7–2)
LACTATE BLD-SCNC: 3 MMOL/L (ref 0.7–2)
MAGNESIUM SERPL-MCNC: 1.6 MG/DL (ref 1.6–2.3)
MCH RBC QN AUTO: 35.2 PG (ref 26.5–33)
MCHC RBC AUTO-ENTMCNC: 29.5 G/DL (ref 31.5–36.5)
MCV RBC AUTO: 119 FL (ref 78–100)
PLATELET # BLD AUTO: 160 10E9/L (ref 150–450)
POTASSIUM SERPL-SCNC: 3.6 MMOL/L (ref 3.4–5.3)
POTASSIUM SERPL-SCNC: 4.2 MMOL/L (ref 3.4–5.3)
RBC # BLD AUTO: 2.27 10E12/L (ref 3.8–5.2)
SODIUM SERPL-SCNC: 138 MMOL/L (ref 133–144)
SODIUM SERPL-SCNC: 138 MMOL/L (ref 133–144)
WBC # BLD AUTO: 5.7 10E9/L (ref 4–11)

## 2018-03-31 PROCEDURE — A9270 NON-COVERED ITEM OR SERVICE: HCPCS | Mod: GY | Performed by: HOSPITALIST

## 2018-03-31 PROCEDURE — 40000275 ZZH STATISTIC RCP TIME EA 10 MIN

## 2018-03-31 PROCEDURE — 83735 ASSAY OF MAGNESIUM: CPT | Performed by: STUDENT IN AN ORGANIZED HEALTH CARE EDUCATION/TRAINING PROGRAM

## 2018-03-31 PROCEDURE — 94660 CPAP INITIATION&MGMT: CPT

## 2018-03-31 PROCEDURE — 00000146 ZZHCL STATISTIC GLUCOSE BY METER IP

## 2018-03-31 PROCEDURE — 25000132 ZZH RX MED GY IP 250 OP 250 PS 637: Mod: GY | Performed by: HOSPITALIST

## 2018-03-31 PROCEDURE — 25000132 ZZH RX MED GY IP 250 OP 250 PS 637: Mod: GY | Performed by: MARRIAGE & FAMILY THERAPIST

## 2018-03-31 PROCEDURE — 85027 COMPLETE CBC AUTOMATED: CPT | Performed by: STUDENT IN AN ORGANIZED HEALTH CARE EDUCATION/TRAINING PROGRAM

## 2018-03-31 PROCEDURE — A9270 NON-COVERED ITEM OR SERVICE: HCPCS | Mod: GY

## 2018-03-31 PROCEDURE — 25000125 ZZHC RX 250: Performed by: STUDENT IN AN ORGANIZED HEALTH CARE EDUCATION/TRAINING PROGRAM

## 2018-03-31 PROCEDURE — 25000132 ZZH RX MED GY IP 250 OP 250 PS 637: Mod: GY | Performed by: STUDENT IN AN ORGANIZED HEALTH CARE EDUCATION/TRAINING PROGRAM

## 2018-03-31 PROCEDURE — 25800025 ZZH RX 258: Performed by: STUDENT IN AN ORGANIZED HEALTH CARE EDUCATION/TRAINING PROGRAM

## 2018-03-31 PROCEDURE — 40000193 ZZH STATISTIC PT WARD VISIT

## 2018-03-31 PROCEDURE — 97110 THERAPEUTIC EXERCISES: CPT | Mod: GP

## 2018-03-31 PROCEDURE — 80048 BASIC METABOLIC PNL TOTAL CA: CPT | Performed by: STUDENT IN AN ORGANIZED HEALTH CARE EDUCATION/TRAINING PROGRAM

## 2018-03-31 PROCEDURE — 85610 PROTHROMBIN TIME: CPT | Performed by: STUDENT IN AN ORGANIZED HEALTH CARE EDUCATION/TRAINING PROGRAM

## 2018-03-31 PROCEDURE — 25000132 ZZH RX MED GY IP 250 OP 250 PS 637: Mod: GY

## 2018-03-31 PROCEDURE — A9270 NON-COVERED ITEM OR SERVICE: HCPCS | Mod: GY | Performed by: STUDENT IN AN ORGANIZED HEALTH CARE EDUCATION/TRAINING PROGRAM

## 2018-03-31 PROCEDURE — 36592 COLLECT BLOOD FROM PICC: CPT | Performed by: STUDENT IN AN ORGANIZED HEALTH CARE EDUCATION/TRAINING PROGRAM

## 2018-03-31 PROCEDURE — 83605 ASSAY OF LACTIC ACID: CPT | Performed by: INTERNAL MEDICINE

## 2018-03-31 PROCEDURE — 12000025 ZZH R&B TRANSPLANT INTERMEDIATE

## 2018-03-31 PROCEDURE — 36592 COLLECT BLOOD FROM PICC: CPT | Performed by: INTERNAL MEDICINE

## 2018-03-31 PROCEDURE — 99233 SBSQ HOSP IP/OBS HIGH 50: CPT | Mod: GC | Performed by: INTERNAL MEDICINE

## 2018-03-31 PROCEDURE — 40000802 ZZH SITE CHECK

## 2018-03-31 RX ORDER — POTASSIUM CHLORIDE 20MEQ/15ML
40 LIQUID (ML) ORAL ONCE
Status: DISCONTINUED | OUTPATIENT
Start: 2018-03-31 | End: 2018-03-31

## 2018-03-31 RX ADMIN — BUMETANIDE 2 MG: 2 TABLET ORAL at 16:35

## 2018-03-31 RX ADMIN — FERROUS SULFATE 325 MG: 325 TABLET, FILM COATED ORAL at 11:45

## 2018-03-31 RX ADMIN — DEXTROSE MONOHYDRATE 25 ML: 25 INJECTION, SOLUTION INTRAVENOUS at 08:16

## 2018-03-31 RX ADMIN — ASPIRIN 81 MG CHEWABLE TABLET 81 MG: 81 TABLET CHEWABLE at 07:54

## 2018-03-31 RX ADMIN — BUMETANIDE 2 MG: 2 TABLET ORAL at 07:55

## 2018-03-31 RX ADMIN — METOPROLOL TARTRATE 12.5 MG: 25 TABLET, FILM COATED ORAL at 07:54

## 2018-03-31 RX ADMIN — SPIRONOLACTONE 25 MG: 25 TABLET ORAL at 07:55

## 2018-03-31 RX ADMIN — ATORVASTATIN CALCIUM 40 MG: 40 TABLET, FILM COATED ORAL at 21:49

## 2018-03-31 RX ADMIN — PANTOPRAZOLE SODIUM 40 MG: 40 TABLET, DELAYED RELEASE ORAL at 07:55

## 2018-03-31 RX ADMIN — THERA TABS 1 TABLET: TAB at 07:54

## 2018-03-31 RX ADMIN — POTASSIUM CHLORIDE 40 MEQ: 750 TABLET, EXTENDED RELEASE ORAL at 07:55

## 2018-03-31 RX ADMIN — MELATONIN 3 MG: 3 TAB ORAL at 20:00

## 2018-03-31 RX ADMIN — Medication 37.5 MCG: at 07:55

## 2018-03-31 RX ADMIN — Medication 2 G: at 16:35

## 2018-03-31 RX ADMIN — LIDOCAINE 1 PATCH: 560 PATCH PERCUTANEOUS; TOPICAL; TRANSDERMAL at 07:55

## 2018-03-31 RX ADMIN — VENLAFAXINE HYDROCHLORIDE 150 MG: 150 CAPSULE, EXTENDED RELEASE ORAL at 07:54

## 2018-03-31 RX ADMIN — AMOXICILLIN AND CLAVULANATE POTASSIUM 1 TABLET: 875; 125 TABLET, FILM COATED ORAL at 07:55

## 2018-03-31 RX ADMIN — METOPROLOL TARTRATE 12.5 MG: 25 TABLET, FILM COATED ORAL at 20:00

## 2018-03-31 RX ADMIN — PANTOPRAZOLE SODIUM 40 MG: 40 TABLET, DELAYED RELEASE ORAL at 16:36

## 2018-03-31 NOTE — PLAN OF CARE
Problem: Patient Care Overview  Goal: Plan of Care/Patient Progress Review  Outcome: No Change  1900-0730: Afebrile. OVSS on 4L O2 NC, pt wearing CPAP overnight. Pt on telemetry, strip for this shift showing A-Fib. Pt complaining of some lower back pain at the beginning of the shift, pt declined intervention. Pt on low fat diet and calorie counts due to start today, pt was able to eat some dinner, no complaints of nausea. BS check AC and HS. BS check around bedtime was 198, no insulin needed per order parameters. BS check around 0200 was 87. BS recheck 92. Double lumen midline SL. Pt voiding adequate amounts, using commode at the bedside. Pt had two small BMs this shift, pt is also passing flatus. Lactic recheck this shift 3.0. Pt up with SBA, pt has been sleeping on and off throughout the night. Call light in reach. Will continue to monitor and follow plan of care.

## 2018-03-31 NOTE — PLAN OF CARE
Problem: Patient Care Overview  Goal: Plan of Care/Patient Progress Review  Discharge Planner PT   Patient plan for discharge: TCU/ Rehab  Current status: Pt very sleepy, with difficulty participating with therapy session. Pt completing supine exercises for LE strength 1x15, but requiring consistent cues for continued motion as pt continued to fall asleep.   Barriers to return to prior living situation: LE strength, mobility deficits  Recommendations for discharge: TCU  Rationale for recommendations: pt requires continued therapy to strengthening for increased independence with mobility       Entered by: Tamela Robert 03/31/2018 3:16 PM

## 2018-03-31 NOTE — PROGRESS NOTES
[facility] - Progress Note  Date of Service: 03/31/2018      Patient: Carlos Alberto Fenton  MRN: [id number]  Admission Date: 3/20/2018  Hospital Day # 10    Changes Today 03/31/18 :  -lactate levels down to 2.2  -vascular surgery consult for poor extremity profusion   -hold off anticoagulation until 1 week s/p PPM placement per EP, then start sq fondaparinux per hem/onc  -continue diureses w/ home bumex  -2L fluid restriction              Assessment and Plan:   Carlos Alberto Fenton is a 77 year old female with PMH of HTN, HLD, DM, CVA (11/2016), CHF (last ECHO 12/12/17 EF 45-50%), CAD (s/p 3v CABG: LIMA-LAD, SVG-D1, SVG-dRCA and modified MAZE, ABDIEL ligation - 2/2016), paroxysmal AFib ([id number] - 8 on Xarelto), HIT, RUE DVT, recently diagnosed hypothyroidism, admitted 3/21/2018 for acute GIB, coagulopathy and concern for acute LI 2/2 acetaminophen toxicity with xarelto intake. Now tachy-israel arrhythmia.     #Acute on chronic hypoxic respiratory failure  Rapid response was called on 3/28 due to hypoxia--- difficulty getting O2sat reading with O2 sat ~70-80s% on CPAP FiO2 100%.Unable to get ABG due to soft tissue edema.  In the MICU an ABG was drawn that showed O2 sat at 409 (repeat on 3L of O2 was 166). Room air trial 3/30 followed by ABG after one hour showed 91% O2 sat.  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 Pt prev on warfarin, she was transitioned to xarelto in Jan 2018 because she likes leafy greens.  --hold xarelto   --Hematology consult to determine safest anticoagulation for stroke prevention in setting of [id number]. Per Heme, need to be on fondaporinox. Cardiology was paged to determine if it is okay to start this now given recent PPM placement. Needs Heme f/u in 2-3 months.  --started metoprolol 12.5 mg BID for episodes  of RVR  --TTE on 3/25: shows EF 40-45%  --D/C trazodone as prolongs QTc  -- Stop augmentin  --Continue PTA aspirin 81mg daily      #Acute hypoxic respiratory failure 2/2 CHF exacerbation.   Secondary to IVF boluses for hypotension episodes with tachy/israel syndrome. CXR shows worsening pulmonary edema on L side likely due to HR failure and fluid overload.   --empirical treatment for aspiration PNA with zosyn on 3/24-3/25--switched to Augmentin on 3/25. DCed augmentin  -continue home bumex as above       # Coagulopathy, INR to 5, elevated PT and PTT  # Acetaminophen toxicity (elevated acetaminophen levels)  Pt states she was taking 3-4g tylenol per day for the past month for chronic back pain.   Pt previously on warfarin for Afib. She started xarelto as of Jan 2018. She states she was not taking warfarin and xarelto simultaneously. It is possible that her coagulopathy is occurring in the setting of xarelto. Per Heme, factor 7/10 decreased c/w vit K deficiency, no evidence of hepatic synthetic dysfunction. Her INR has continued to improve.  - NAC infusion D/C on 3/26 per GI.  --trend INR  --trend liver enzymes every couple of days   --Pending labs: homocysteine, methylmalonic acid  --Hep B and C panels negative  --F actin weakly positive; anti-mitochondial joseph neg and AMPARO neg     # Acute blood loss anemia (baseline 11-12 1 month prior, current hgb ~7) - now stable Hgb  Differential includes acute GI bleed (most likely). Hgb has been stable since admission and no evidence of bleeding seen.   Plan:  -- GI consult, appreciate recs  -- trend daily, transfuse <7  -- continue PPI BID   -- holding home xarelto  --on PTA aspirin 81mg  -- folate WNL, B12 WNL, transferrin WNL, iron WNL, haptoglobin, reticulocyte index 2 indicating appropriate response.  --Need Hematology f/u in 2-3 months to eval for macrocytic anemia      # Hypothyroidism  Recently diagnosed. On Levothyroxine. Last TSH 2/21 elevated to 88, T4 improved from  0.29 to 0.44.   -- increase levothyroxine to 37.5 from PTA dose of 25 mcg  -- Recheck TSH in 6 weeks.     #cyanotic distal extremities   #elavated lactate levels  Has had elevated lactate levels intermittently throughout this admission. Concerned that it is due to poor profusion of extremities with new cyanosis this admission in fingertips and poor readings on pulse ox.  -consult vascular surgery recs appreciated     Resolved issues:     # HARINI on CKD -- resolved  Cr at bsl ~1.1-4. On presentation 1.8. UA with hyaline casts, tachy, hypotensive. Likely prerenal in the setting of GIB and decreased PO intake/emesis. Cr improved with IVF.   -- trend BMP     # Tropinemia, likely 2/2 demand ischemia 2/2 anemia  EKG did not show ST elevation, ST segment depression or T wave inversions. Pt is not having CP. Likely demand ischemia.         Chronic problems  # CHF  # CAD  # HLD  ECHO shows EF 45-50%.   Plan:  --holding metolazone, spironolactone, bumex, ASA  --cont atorvastatin       # Depression  -- continue PTA venlafaxine     # Insominia  -holding trazodone in lieu of prolonged QTc    Diet:   Cardiac Diet   Fluids: none  DVT Prophylaxis: mechanical  Code Status: Full Code    Pallavi Ruiz, MS3     Interval History:   Nursing notes reviewed. No events overnight.    Patient is responding appropriately and had no new complaints this morning.        Medications:     Current Facility-Administered Medications   Medication     bumetanide (BUMEX) tablet 2 mg     aspirin chewable tablet 81 mg     spironolactone (ALDACTONE) tablet 25 mg     potassium chloride SA (K-DUR/KLOR-CON M) CR tablet 20-40 mEq     potassium chloride (KLOR-CON) Packet 20-40 mEq     potassium chloride 10 mEq in 100 mL sterile water intermittent infusion (premix)     potassium chloride 10 mEq in 100 mL intermittent infusion with 10 mg lidocaine     potassium chloride 20 mEq in 50 mL intermittent infusion     magnesium sulfate 4 g in 100 mL sterile water  "(premade)     potassium phosphate 15 mmol in D5W 250 mL intermittent infusion     potassium phosphate 20 mmol in D5W 500 mL intermittent infusion     potassium phosphate 20 mmol in D5W 250 mL intermittent infusion     potassium phosphate 25 mmol in D5W 500 mL intermittent infusion     amoxicillin-clavulanate (AUGMENTIN) 875-125 MG per tablet 1 tablet     sodium chloride (PF) 0.9% PF flush 10-20 mL     sodium chloride (PF) 0.9% PF flush 10 mL     multivitamin, therapeutic (THERA-VIT) tablet 1 tablet     metoprolol tartrate (LOPRESSOR) half-tab 12.5 mg     pantoprazole (PROTONIX) EC tablet 40 mg     potassium chloride SA (K-DUR/KLOR-CON M) CR tablet 40 mEq     polyethylene glycol (MIRALAX/GLYCOLAX) Packet 17 g     melatonin tablet 3 mg     Lidocaine (LIDOCARE) 4 % Patch 1 patch     lidocaine patch REMOVAL     lidocaine patch in PLACE     atorvastatin (LIPITOR) tablet 40 mg     glucose 40 % gel 15-30 g    Or     dextrose 50 % injection 25-50 mL    Or     glucagon injection 1 mg     insulin aspart (NovoPen ECHO/NovoLOG) cartridge     insulin aspart (NovoPen ECHO/NovoLOG) cartridge     Injection Device for insulin (NOVOPEN ECHO) 1 each     venlafaxine (EFFEXOR-XR) 24 hr capsule 150 mg     ferrous sulfate (IRON) tablet 325 mg     naloxone (NARCAN) injection 0.1-0.4 mg     senna-docusate (SENOKOT-S;PERICOLACE) 8.6-50 MG per tablet 1 tablet    Or     senna-docusate (SENOKOT-S;PERICOLACE) 8.6-50 MG per tablet 2 tablet     levothyroxine (SYNTHROID/LEVOTHROID) half-tab 37.5 mcg         Physical Exam:   Vitals were reviewed  Blood pressure 106/64, pulse 71, temperature 97.5  F (36.4  C), temperature source Oral, resp. rate 18, height 1.575 m (5' 2\"), weight 92.2 kg (203 lb 4.8 oz), SpO2 97 %, not currently breastfeeding.    Intake/Output Summary (Last 24 hours) at 03/31/18 3558  Last data filed at 03/31/18 0434   Gross per 24 hour   Intake              240 ml   Output             2375 ml   Net            -2135 ml "         Vitals:    03/29/18 0600 03/30/18 0940 03/31/18 0144   Weight: 96.4 kg (212 lb 8.4 oz) 93.4 kg (205 lb 14.4 oz) 92.2 kg (203 lb 4.8 oz)       Physical Exam:   Gen: In no acute distress. Patient comfortably lying in bed  HEENT: No scleral icterus, Moist mucous membranes. No nasal discharge.  CV: Irregular rate, no murmurs or rubs detected  Resp: Clear to auscultation bilaterally. Without wheeze or crackles  Abdomen: Soft, non tender abdomen, normal active bowel sounds   Extremities: Pitting edema 2+ bilaterally   Skin: bruises on back are improved  Neuro: mentation in tact, no deficits noted  Oriented to conversation             Data:   ROUTINE LABS (Last four results)  CMP    Recent Labs  Lab 03/30/18  0711 03/29/18  0415 03/28/18 2110 03/28/18  0745    139 138 137   POTASSIUM 4.0 3.6 3.5 4.2   CHLORIDE 103 103 103 104   CO2 28 27 27 24   ANIONGAP 7 10 8 9   * 91 110* 106*   BUN 14 12 13 12   CR 0.73 0.74 0.72 0.73   GFRESTIMATED 77 76 79 77   GFRESTBLACK >90 >90 >90 >90   CARLY 9.3 8.8 8.8 9.0   MAG 2.0 2.0 1.8 2.2   PHOS  --   --  2.2*  --    PROTTOTAL 6.1* 5.6* 6.1* 6.2*   ALBUMIN 2.7* 2.6* 2.8* 2.8*   BILITOTAL 1.9* 1.8* 1.4* 1.5*   ALKPHOS 215* 176* 181* 195*   * 108* 125* 63*   ALT 55* 57* 66* 41     CBC    Recent Labs  Lab 03/30/18  1349 03/30/18  0711 03/29/18  0415 03/28/18 2110 03/28/18  0745   WBC  --  6.8 7.0 7.6 8.8   RBC  --  2.29* 2.04* 2.22* 2.32*   HGB 8.8* 8.1* 7.8* 7.9* 8.6*   HCT  --  27.4* 24.7* 26.3* 27.7*   MCV  --  120* 121* 119* 119*   MCH  --  35.4* 38.2* 35.6* 37.1*   MCHC  --  29.6* 31.6 30.0* 31.0*   RDW  --  22.8* 23.3* 22.8* 23.2*   PLT  --  154 131* 159 140*     INR    Recent Labs  Lab 03/30/18  0711 03/29/18  0415 03/28/18  2110 03/28/18  0745   INR 1.59* 1.66* 1.90* Canceled, Test credited     Arterial Blood Gas    Recent Labs  Lab 03/30/18  1140 03/29/18  1043 03/28/18  2116 03/28/18  0145   PH 7.46* 7.42 7.51*  --    PCO2 34* 42 35  --    PO2 91 166*  409*  --    HCO3 25 27 27  --    O2PER 21.0 3L 100 60.0

## 2018-03-31 NOTE — PLAN OF CARE
Problem: Patient Care Overview  Goal: Plan of Care/Patient Progress Review  Outcome: No Change  Afebrile; other VSS on CPAP (during naps) and 4L O2 while awake. Tele monitored - A-fib + PVC noted. Denies pain besides low back pain - Lidocaine patch in place. Denies nausea. Tolerating low fat diet - 2L fluid restriction in place (currently at 560mL). BG 51 this AM - 25 mL Dextrose 50% given. Subsequent BG this afternoon 115 and 111 not requiring insulin. Mag 1.6 replaced with 2g IV. Lactic recheck 2.2. Midline otherwise SL'd. Adequate UOP and BM x1 this shift. Daughter at bedside; involved and supportive. Patient up with Ax1. Will continue with plan of care.

## 2018-04-01 ENCOUNTER — APPOINTMENT (OUTPATIENT)
Dept: ULTRASOUND IMAGING | Facility: CLINIC | Age: 78
DRG: 813 | End: 2018-04-01
Attending: SURGERY
Payer: MEDICARE

## 2018-04-01 LAB
ANION GAP SERPL CALCULATED.3IONS-SCNC: 4 MMOL/L (ref 3–14)
ANION GAP SERPL CALCULATED.3IONS-SCNC: 7 MMOL/L (ref 3–14)
BUN SERPL-MCNC: 13 MG/DL (ref 7–30)
BUN SERPL-MCNC: 14 MG/DL (ref 7–30)
CALCIUM SERPL-MCNC: 8 MG/DL (ref 8.5–10.1)
CALCIUM SERPL-MCNC: 8.9 MG/DL (ref 8.5–10.1)
CHLORIDE SERPL-SCNC: 100 MMOL/L (ref 94–109)
CHLORIDE SERPL-SCNC: 94 MMOL/L (ref 94–109)
CO2 SERPL-SCNC: 33 MMOL/L (ref 20–32)
CO2 SERPL-SCNC: 38 MMOL/L (ref 20–32)
CREAT SERPL-MCNC: 0.78 MG/DL (ref 0.52–1.04)
CREAT SERPL-MCNC: 0.8 MG/DL (ref 0.52–1.04)
ERYTHROCYTE [DISTWIDTH] IN BLOOD BY AUTOMATED COUNT: 22 % (ref 10–15)
GFR SERPL CREATININE-BSD FRML MDRD: 69 ML/MIN/1.7M2
GFR SERPL CREATININE-BSD FRML MDRD: 72 ML/MIN/1.7M2
GLUCOSE BLDC GLUCOMTR-MCNC: 109 MG/DL (ref 70–99)
GLUCOSE BLDC GLUCOMTR-MCNC: 121 MG/DL (ref 70–99)
GLUCOSE BLDC GLUCOMTR-MCNC: 134 MG/DL (ref 70–99)
GLUCOSE BLDC GLUCOMTR-MCNC: 140 MG/DL (ref 70–99)
GLUCOSE BLDC GLUCOMTR-MCNC: 143 MG/DL (ref 70–99)
GLUCOSE SERPL-MCNC: 116 MG/DL (ref 70–99)
GLUCOSE SERPL-MCNC: 138 MG/DL (ref 70–99)
HCT VFR BLD AUTO: 26.2 % (ref 35–47)
HGB BLD-MCNC: 7.7 G/DL (ref 11.7–15.7)
INR PPP: 1.61 (ref 0.86–1.14)
LACTATE BLD-SCNC: 1 MMOL/L (ref 0.7–2)
MAGNESIUM SERPL-MCNC: 1.6 MG/DL (ref 1.6–2.3)
MAGNESIUM SERPL-MCNC: 2.3 MG/DL (ref 1.6–2.3)
MAGNESIUM SERPL-MCNC: 2.6 MG/DL (ref 1.6–2.3)
MCH RBC QN AUTO: 35.6 PG (ref 26.5–33)
MCHC RBC AUTO-ENTMCNC: 29.4 G/DL (ref 31.5–36.5)
MCV RBC AUTO: 121 FL (ref 78–100)
PLATELET # BLD AUTO: 160 10E9/L (ref 150–450)
POTASSIUM SERPL-SCNC: 3.4 MMOL/L (ref 3.4–5.3)
POTASSIUM SERPL-SCNC: 3.8 MMOL/L (ref 3.4–5.3)
RBC # BLD AUTO: 2.16 10E12/L (ref 3.8–5.2)
SODIUM SERPL-SCNC: 137 MMOL/L (ref 133–144)
SODIUM SERPL-SCNC: 140 MMOL/L (ref 133–144)
WBC # BLD AUTO: 5.4 10E9/L (ref 4–11)

## 2018-04-01 PROCEDURE — 99233 SBSQ HOSP IP/OBS HIGH 50: CPT | Mod: GC | Performed by: INTERNAL MEDICINE

## 2018-04-01 PROCEDURE — 36592 COLLECT BLOOD FROM PICC: CPT | Performed by: STUDENT IN AN ORGANIZED HEALTH CARE EDUCATION/TRAINING PROGRAM

## 2018-04-01 PROCEDURE — 83735 ASSAY OF MAGNESIUM: CPT | Performed by: INTERNAL MEDICINE

## 2018-04-01 PROCEDURE — 93971 EXTREMITY STUDY: CPT | Mod: RT

## 2018-04-01 PROCEDURE — 83735 ASSAY OF MAGNESIUM: CPT | Performed by: STUDENT IN AN ORGANIZED HEALTH CARE EDUCATION/TRAINING PROGRAM

## 2018-04-01 PROCEDURE — 40000802 ZZH SITE CHECK

## 2018-04-01 PROCEDURE — A9270 NON-COVERED ITEM OR SERVICE: HCPCS | Mod: GY

## 2018-04-01 PROCEDURE — 25000132 ZZH RX MED GY IP 250 OP 250 PS 637: Mod: GY | Performed by: HOSPITALIST

## 2018-04-01 PROCEDURE — 25000132 ZZH RX MED GY IP 250 OP 250 PS 637: Mod: GY

## 2018-04-01 PROCEDURE — 85610 PROTHROMBIN TIME: CPT | Performed by: STUDENT IN AN ORGANIZED HEALTH CARE EDUCATION/TRAINING PROGRAM

## 2018-04-01 PROCEDURE — 25000132 ZZH RX MED GY IP 250 OP 250 PS 637: Mod: GY | Performed by: MARRIAGE & FAMILY THERAPIST

## 2018-04-01 PROCEDURE — 12000025 ZZH R&B TRANSPLANT INTERMEDIATE

## 2018-04-01 PROCEDURE — 94660 CPAP INITIATION&MGMT: CPT

## 2018-04-01 PROCEDURE — 25000128 H RX IP 250 OP 636: Performed by: STUDENT IN AN ORGANIZED HEALTH CARE EDUCATION/TRAINING PROGRAM

## 2018-04-01 PROCEDURE — 83605 ASSAY OF LACTIC ACID: CPT | Performed by: STUDENT IN AN ORGANIZED HEALTH CARE EDUCATION/TRAINING PROGRAM

## 2018-04-01 PROCEDURE — A9270 NON-COVERED ITEM OR SERVICE: HCPCS | Mod: GY | Performed by: STUDENT IN AN ORGANIZED HEALTH CARE EDUCATION/TRAINING PROGRAM

## 2018-04-01 PROCEDURE — 85027 COMPLETE CBC AUTOMATED: CPT | Performed by: STUDENT IN AN ORGANIZED HEALTH CARE EDUCATION/TRAINING PROGRAM

## 2018-04-01 PROCEDURE — 25000132 ZZH RX MED GY IP 250 OP 250 PS 637: Mod: GY | Performed by: STUDENT IN AN ORGANIZED HEALTH CARE EDUCATION/TRAINING PROGRAM

## 2018-04-01 PROCEDURE — 36592 COLLECT BLOOD FROM PICC: CPT | Performed by: INTERNAL MEDICINE

## 2018-04-01 PROCEDURE — 00000146 ZZHCL STATISTIC GLUCOSE BY METER IP

## 2018-04-01 PROCEDURE — A9270 NON-COVERED ITEM OR SERVICE: HCPCS | Mod: GY | Performed by: HOSPITALIST

## 2018-04-01 PROCEDURE — 80048 BASIC METABOLIC PNL TOTAL CA: CPT | Performed by: STUDENT IN AN ORGANIZED HEALTH CARE EDUCATION/TRAINING PROGRAM

## 2018-04-01 RX ORDER — BUMETANIDE 1 MG/1
1 TABLET ORAL ONCE
Status: COMPLETED | OUTPATIENT
Start: 2018-04-01 | End: 2018-04-01

## 2018-04-01 RX ORDER — POTASSIUM CHLORIDE 7.45 MG/ML
10 INJECTION INTRAVENOUS
Status: DISCONTINUED | OUTPATIENT
Start: 2018-04-01 | End: 2018-04-04 | Stop reason: HOSPADM

## 2018-04-01 RX ORDER — POTASSIUM CHLORIDE 1.5 G/1.58G
20-40 POWDER, FOR SOLUTION ORAL
Status: DISCONTINUED | OUTPATIENT
Start: 2018-04-01 | End: 2018-04-04 | Stop reason: HOSPADM

## 2018-04-01 RX ORDER — POTASSIUM CHLORIDE 750 MG/1
20-40 TABLET, EXTENDED RELEASE ORAL
Status: DISCONTINUED | OUTPATIENT
Start: 2018-04-01 | End: 2018-04-04 | Stop reason: HOSPADM

## 2018-04-01 RX ORDER — BUMETANIDE 2 MG/1
2 TABLET ORAL EVERY 24 HOURS
Status: DISCONTINUED | OUTPATIENT
Start: 2018-04-01 | End: 2018-04-02

## 2018-04-01 RX ORDER — POTASSIUM CL/LIDO/0.9 % NACL 10MEQ/0.1L
10 INTRAVENOUS SOLUTION, PIGGYBACK (ML) INTRAVENOUS
Status: DISCONTINUED | OUTPATIENT
Start: 2018-04-01 | End: 2018-04-04 | Stop reason: HOSPADM

## 2018-04-01 RX ORDER — FONDAPARINUX SODIUM 2.5 MG/.5ML
2.5 INJECTION SUBCUTANEOUS EVERY 24 HOURS
Status: DISCONTINUED | OUTPATIENT
Start: 2018-04-01 | End: 2018-04-04 | Stop reason: HOSPADM

## 2018-04-01 RX ORDER — POTASSIUM CHLORIDE 29.8 MG/ML
20 INJECTION INTRAVENOUS
Status: DISCONTINUED | OUTPATIENT
Start: 2018-04-01 | End: 2018-04-04 | Stop reason: HOSPADM

## 2018-04-01 RX ADMIN — Medication 37.5 MCG: at 08:13

## 2018-04-01 RX ADMIN — INSULIN ASPART 0.5 UNITS: 100 INJECTION, SOLUTION INTRAVENOUS; SUBCUTANEOUS at 17:09

## 2018-04-01 RX ADMIN — MELATONIN 3 MG: 3 TAB ORAL at 19:50

## 2018-04-01 RX ADMIN — BUMETANIDE 2 MG: 2 TABLET ORAL at 08:13

## 2018-04-01 RX ADMIN — METOPROLOL TARTRATE 12.5 MG: 25 TABLET, FILM COATED ORAL at 08:13

## 2018-04-01 RX ADMIN — ASPIRIN 81 MG CHEWABLE TABLET 81 MG: 81 TABLET CHEWABLE at 08:13

## 2018-04-01 RX ADMIN — FONDAPARINUX SODIUM 2.5 MG: 2.5 INJECTION, SOLUTION SUBCUTANEOUS at 17:09

## 2018-04-01 RX ADMIN — VENLAFAXINE HYDROCHLORIDE 150 MG: 150 CAPSULE, EXTENDED RELEASE ORAL at 08:14

## 2018-04-01 RX ADMIN — POTASSIUM CHLORIDE 20 MEQ: 750 TABLET, EXTENDED RELEASE ORAL at 15:39

## 2018-04-01 RX ADMIN — POTASSIUM CHLORIDE 40 MEQ: 750 TABLET, EXTENDED RELEASE ORAL at 08:13

## 2018-04-01 RX ADMIN — MAGNESIUM SULFATE IN WATER 4 G: 40 INJECTION, SOLUTION INTRAVENOUS at 08:19

## 2018-04-01 RX ADMIN — METOPROLOL TARTRATE 12.5 MG: 25 TABLET, FILM COATED ORAL at 19:50

## 2018-04-01 RX ADMIN — THERA TABS 1 TABLET: TAB at 08:13

## 2018-04-01 RX ADMIN — LIDOCAINE 1 PATCH: 560 PATCH PERCUTANEOUS; TOPICAL; TRANSDERMAL at 08:14

## 2018-04-01 RX ADMIN — FERROUS SULFATE 325 MG: 325 TABLET, FILM COATED ORAL at 11:11

## 2018-04-01 RX ADMIN — PANTOPRAZOLE SODIUM 40 MG: 40 TABLET, DELAYED RELEASE ORAL at 08:14

## 2018-04-01 RX ADMIN — BUMETANIDE 2 MG: 2 TABLET ORAL at 15:39

## 2018-04-01 RX ADMIN — BUMETANIDE 1 MG: 1 TABLET ORAL at 11:11

## 2018-04-01 RX ADMIN — PANTOPRAZOLE SODIUM 40 MG: 40 TABLET, DELAYED RELEASE ORAL at 15:39

## 2018-04-01 RX ADMIN — ATORVASTATIN CALCIUM 40 MG: 40 TABLET, FILM COATED ORAL at 21:58

## 2018-04-01 RX ADMIN — SPIRONOLACTONE 25 MG: 25 TABLET ORAL at 08:14

## 2018-04-01 ASSESSMENT — PAIN DESCRIPTION - DESCRIPTORS: DESCRIPTORS: ACHING

## 2018-04-01 NOTE — PROGRESS NOTES
[facility], [place]    Internal Medicine Progress Note - [facility]    Main Plans for Today   -start sq fondaparinux per hem/onc tomorrow  -vascular surgery consult: recs ABIs and LE U/S to r/o DVT  -pt 27 lb up from admit weight; cont diuresis with bumex and spironolactone, strict I's/O's and daily weights  -increase bumex from 2mg BID to 3mg BID diuresis    Assessment & Plan   Carlos Alberto Fenton is a 77 year old female with PMH of HTN, HLD, DM, CVA (11/2016), CHF (last ECHO 12/12/17 EF 45-50%), CAD (s/p 3v CABG: LIMA-LAD, SVG-D1, SVG-dRCA and modified MAZE, ABDIEL ligation - 2/2016), paroxysmal AFib ([id number] - 8 on Xarelto), HIT, RUE DVT, recently diagnosed hypothyroidism, admitted 3/21/2018 for acute GIB, coagulopathy and concern for acute LI 2/2 acetaminophen toxicity with xarelto intake. Pt's liver function has resolved with INR of 1.59. She is s/p PPM for tachy-israel syndrome. Pt continues to have difficult to measure pulse ox due to PAD. She has intermittently elevated lactic acids that resolve without fluids, and in the past, giving fluid for lactate caused the pt to develop low grade pulmonary edema. Plan is to discharge to TCU once we achieve some diuresis.     #Acute on chronic hypoxic respiratory failure  Rapid response was called on 3/28 due to hypoxia--- difficulty getting O2 sat reading with O2 sat ~70-80s% on CPAP FiO2 100%.Unable to get ABG due to soft tissue edema.  In the MICU an ABG was drawn that showed O2 sat at 409 (repeat on 3L of O2 was 166). Pt has effusion on L side. Likely related to heart failure and vol overload.   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  --cont hold xarelto   --Hematology consult to determine safest anticoagulation for stroke prevention in setting of [id number] 8. Per Heme, need to be on fondaparinaux. Cardiology ok with starting Fondaparinaux on April 1. Needs Heme f/u in 2-3 months.  --started metoprolol 12.5 mg BID for episodes of RVR  --TTE on 3/25: shows EF 40-45%  --D/C trazodone as prolongs QTc  --augmentin per EP 5 days s/p PPM (3/26-3/30)  --cont PTA aspirin 81mg daily       # Coagulopathy, INR to 5, elevated PT and PTT  # Acetaminophen toxicity (elevated acetaminophen levels)  Pt states she was taking 3-4g tylenol per day for the past month for chronic back pain.   Pt previously on warfarin for Afib. She started xarelto as of Jan 2018. She states she was not taking warfarin and xarelto simultaneously. It is possible that her coagulopathy is occurring in the setting of xarelto. Per Heme, factor 7/10 decreased c/w vit K deficiency, no evidence of hepatic synthetic dysfunction. Her INR has continued to improve.  - NAC infusion D/C on 3/26 per GI.  --trend INR  --trend liver enzymes every couple of days   --homocysteine low, methylmalonic acid high  --Hep B and C panels negative  --F actin weakly positive; anti-mitochondial joseph neg and AMPARO neg      # Acute blood loss anemia (baseline 11-12 1 month prior, current hgb ~7) - now stable Hgb  Differential includes acute GI bleed (most likely). Hgb has been stable since admission and no evidence of bleeding seen.   Plan:  -- trend daily, transfuse <7  -- continue PPI BID   -- folate WNL, B12 WNL, transferrin WNL, iron WNL, haptoglobin, reticulocyte index 2 indicating marrow appropriate response.  --Need Hematology f/u in 2-3 months to eval for macrocytic anemia      # Hypothyroidism  Recently diagnosed. On Levothyroxine. Last TSH 2/21 elevated to 88, T4 improved from 0.29 to 0.44.   -- increase levothyroxine to 37.5 from PTA dose of 25 mcg  -- Recheck TSH in 6 weeks.     # Lactic  Acidosis  Pt has had fluctuating LA since admission. Likely 2/2 poor perfusion and liver status, given neg blood cultures and fluctuating downtrending levels without fluids.   -CTM and trend      Resolved issues:      # HARINI on CKD -- resolved  Cr at bsl ~1.1-4. On presentation 1.8. UA with hyaline casts, tachy, hypotensive. Likely prerenal in the setting of GIB and decreased PO intake/emesis. Cr improved with IVF.   -- trend BMP      # Tropinemia, likely 2/2 demand ischemia 2/2 anemia  EKG did not show ST elevation, ST segment depression or T wave inversions. Pt is not having CP. Likely demand ischemia.          Chronic problems  # CAD  # HLD  Plan:  --cont ASA  --cont atorvastatin       # Depression  -- continue PTA venlafaxine      # Insominia  -holding trazodone in lieu of prolonged QTc     Diet:   Cardiac Diet   Fluids: none  DVT Prophylaxis: mechanical  Code Status: Full Code    # Pain Assessment:  Current Pain Score 4/1/2018   Patient currently in pain? yes   Pain score (0-10) -   Pain location Back   Pain descriptors Aching   CPOT pain score -   [place] s pain level was assessed and she currently denies pain.      Diet: Low Saturated Fat Na <2400 mg  Calorie Counts  Snacks/Supplements Adult: Ensure Clear; Between Meals  Room Service  Fluid restriction 2000 ML FLUID  Fluids: none  DVT Prophylaxis: Anti-embolisim stockings (TEDs)  Code Status: Full Code    Disposition Plan   Expected discharge: 3-4 days recommended to transitional care unit once pt had been adequately diuresed.   Entered: Ayaka Mclaughlin 04/01/2018, 9:07 AM   Information in the above section will display in the discharge planner report.      The patient's care was discussed with the Attending Physician, Dr. Rome.    Ayaka Mclaughlin  [facility]  Maroon: 1  Pager: [phone]  Please see sticky note for cross cover information    Interval History   Nursing notes reviewed. No events overnight.     Patient is responding  appropriately and had no new complaints this morning.     Physical Exam   Vital Signs: Temp: 97.5  F (36.4  C) Temp src: Axillary BP: 109/89 Pulse: 108 Heart Rate: 105 Resp: 16 SpO2: 97 % O2 Device: BiPAP/CPAP Oxygen Delivery: 4 LPM  Weight: 201 lbs 0 oz  Gen: In no acute distress. Patient comfortably lying in bed  HEENT: No scleral icterus, Moist mucous membranes. No nasal discharge.  CV: Irregular rate, no murmurs or rubs detected  Resp: Clear to auscultation bilaterally. Without wheeze or crackles  Abdomen: Soft, non tender abdomen, normal active bowel sounds   Extremities: Pitting edema 2+, bilaterally with anasarca   Skin: bruises on back do not show rebleeding  Neuro: mentation in tact, no deficits noted  Oriented to conversation      Intake/Output Summary (Last 24 hours) at 04/01/18 0907  Last data filed at 04/01/18 0848   Gross per 24 hour   Intake              580 ml   Output             3225 ml   Net            -2645 ml           Data   Medications     Injection Device for insulin         bumetanide  2 mg Oral BID     aspirin  81 mg Oral QAM     spironolactone  25 mg Oral Daily     sodium chloride (PF)  10 mL Intracatheter Q8H     multivitamin, therapeutic  1 tablet Oral Daily     metoprolol tartrate  12.5 mg Oral BID     pantoprazole  40 mg Oral BID AC     potassium chloride  40 mEq Oral Daily     polyethylene glycol  17 g Oral Daily     melatonin  3 mg Oral At Bedtime     lidocaine  1 patch Transdermal Q24H     lidocaine   Transdermal Q24h     lidocaine   Transdermal Q8H     atorvastatin  40 mg Oral At Bedtime     insulin aspart  0.5-2.5 Units Subcutaneous TID AC     insulin aspart  0.5-2.5 Units Subcutaneous At Bedtime     venlafaxine  150 mg Oral Daily with breakfast     ferrous sulfate  325 mg Oral Daily with breakfast     levothyroxine  37.5 mcg Oral QAM AC     Data     Recent Labs  Lab 04/01/18  0642 03/31/18  1834 03/31/18  0832 03/30/18  1349 03/30/18  0711 03/29/18  0415   WBC 5.4  --  5.7  --   6.8 7.0   HGB 7.7*  --  8.0* 8.8* 8.1* 7.8*   *  --  119*  --  120* 121*     --  160  --  154 131*   INR 1.61*  --  1.61*  --  1.59* 1.66*   NA  --  138 138  --  138 139   POTASSIUM  --  4.2 3.6  --  4.0 3.6   CHLORIDE  --  100 100  --  103 103   CO2  --  32 31  --  28 27   BUN  --  12 12  --  14 12   CR  --  0.84 0.73  --  0.73 0.74   ANIONGAP  --  6 7  --  7 10   CARLY  --  9.2 8.6  --  9.3 8.8   GLC  --  152* 89  --  112* 91   ALBUMIN  --   --   --   --  2.7* 2.6*   PROTTOTAL  --   --   --   --  6.1* 5.6*   BILITOTAL  --   --   --   --  1.9* 1.8*   ALKPHOS  --   --   --   --  215* 176*   ALT  --   --   --   --  55* 57*   AST  --   --   --   --  100* 108*     No results found for this or any previous visit (from the past 24 hour(s)).

## 2018-04-01 NOTE — PROGRESS NOTES
No acute events, on bipap overnight  Patient denies any new or worsening symptoms in her LE, Right leg more swollen than baseline per patient    B/P: 114/55, T: 97.5, P: 108, R: 16  Alert oriented no acute distress on bipap  Triphasic signals pedal pulses bilaterally  Right great toe with 2cm dry gangrene, no erythema or drainage betadine paint  Right leg significantly more swollen than left    WBC   Date Value Ref Range Status   03/31/2018 5.7 4.0 - 11.0 10e9/L Final   ]  Hemoglobin   Date Value Ref Range Status   03/31/2018 8.0 (L) 11.7 - 15.7 g/dL Final   ]  INR/Prothrombin Time  Creatinine   Date Value Ref Range Status   03/31/2018 0.84 0.52 - 1.04 mg/dL Final   ]      Intake/Output Summary (Last 24 hours) at 04/01/18 0618  Last data filed at 04/01/18 0500   Gross per 24 hour   Intake              840 ml   Output             2825 ml   Net            -1985 ml       Assessment/Plan:  76 yo female with no signs of peripheral arterial disease based on recent normal RADHA and triphasic signals, chronic wound on Right great toe due to embolic event during cardiac surgery.  Patient follows with podiatry and should follow up with them out patient also.    Would recommend RLE venous US to rule out DVT  RADHA with toe pressures to evaluate for small vessel disease (use 2nd toe on right foot)  Continue aspirin and statin,   Blue hands/feet may be due to Raynaud's disease. No necessary intervention at this time.    Marisa Walters MD  Vascular Surgery Fellow  Pager [phone]

## 2018-04-01 NOTE — PLAN OF CARE
Problem: Patient Care Overview  Goal: Plan of Care/Patient Progress Review  Outcome: No Change  [id number]: Afebrile. OVSS on 4L O2 NC, pt wearing CPAP overnight. Pt on telemetry, strip for this shift showing A-Fib. Pt denies pain. Pt on low fat diet and calorie counts, no complaints of nausea overnight. BS checks AC and HS. BS check around bedtime was 155, no insulin needed per order parameters. BS check around 0200 was 134. Pt also on fluid restriction of 2L per day. Double lumen midline SL. Pt voiding adequate amounts, pt using commode at the bedside. No BM this shift, pt is passing flatus. Pt up with SBA, pt has been sleeping throughout the night. Plan for patient to discharge to TCU when bed available. Call light in reach. Will continue to monitor and follow plan of care.

## 2018-04-01 NOTE — PLAN OF CARE
Problem: Patient Care Overview  Goal: Plan of Care/Patient Progress Review  Outcome: No Change  Afebrile; intermittently tachy low 100's. Other VSS on RA / CPAP during naps. Tele shows Afib w/ PVC's noted this afternoon - team aware. C/o lower back pain - lidocaine patch placed and since removed by patient. Denies nausea. Tolerating low fat diet with good appetite. , 121 and 143. Calorie counts recorded. 2L fluid restriction remains. Currently at 580mL. Extra dose of Bumex ordered this afternoon. 2700 mL UOP this shift. No BM. DL midline SL'd. K+ 3.4 replaced with 20 mEq PO to be rechecked in AM. Mag 1.6 replaced with 4g IV - recheck 2.6. Patient had LE US today to r/o suspected DVT d/t swelling. Up with Ax1. Plan for TCU when placement available. Will continue with plan of care.

## 2018-04-01 NOTE — PROGRESS NOTES
[facility], [place]    Internal Medicine Progress Note - [facility]    Main Plans for Today       Assessment & Plan   Carlos Alberto Fenton is a 77 year old female with PMH of HTN, HLD, DM, CVA (11/2016), CHF (last ECHO 12/12/17 EF 45-50%), CAD (s/p 3v CABG: LIMA-LAD, SVG-D1, SVG-dRCA and modified MAZE, ABDIEL ligation - 2/2016), paroxysmal AFib ([id number] - 8 on Xarelto), HIT, RUE DVT, recently diagnosed hypothyroidism, admitted 3/21/2018 for acute GIB, coagulopathy and concern for acute LI 2/2 acetaminophen toxicity with xarelto intake. Now with improved coagulopathy, liver tests, resolved GIB. 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  She has intermittently elevated lactic acids that fluctuates without intervention. Now remains inpt for decompensated HFrEF and concern for PAD in UEs given exam showing signs of distal UE hypoperfusion (bluish discoloration of finger tips (R>L) and cool distal UEs).       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  --Stopped xarelto   --Hematology consult to determine safest anticoagulation for stroke prevention in setting of [id number] 8. Per Heme, need to be on fondaparinaux. Cardiology ok with starting Fondaparinaux on April 1. Needs Heme f/u in 2-3 months.  --started metoprolol 12.5 mg BID for episodes of RVR - may increase given still intermittent RVR episodes   --D/C trazodone as prolongs QTc  --augmentin per EP 5 days s/p PPM (3/26-3/30)  --Restarted PTA aspirin 81mg daily   --PTA bumex 2 mg BID increased to 3 mg AM and 2 mg PM  -BID BMP  --monitor I/Os      --Continue home spironolactone  --Needs afterload reduction (ACEinh), start as able  --fondiparinaux 2.5 mg daily    #  Concern for PAD in UEs vs Reynaud phenomenon:  concern for PAD in UEs given exam showing signs of distal UE hypoperfusion (bluish discoloration of finger tips (R>L) and cool distal UEs). Per vascular surgery consult obtaining ABIs and also DVT U/S of LE that did not show LE DVT.     # Lactic Acidosis, Resolved  Pt has had fluctuating LA since admission. Likely 2/2 poor perfusion of extremities (cyanotic and cool fingers, cools hands) d/t suspected PVD.Low suspicion for sepsis  given neg blood cultures, improving clinical status, no SIRS and fluctuating downtrending lactate levels without intervention      # Hypothyroidism  Recently diagnosed. On Levothyroxine. Last TSH 2/21 elevated to 88, T4 improved from 0.29 to 0.44.   -- increase levothyroxine to 37.5 from PTA dose of 25 mcg  -- Recheck TSH in 6 weeks.       Resolved issues:    #?Acute on chronic hypoxic respiratory failure  Rapid response was called on 3/28 due to hypoxia--- difficulty getting O2 sat reading with O2 sat ~70-80s% on CPAP FiO2 100%.Pt is clinically unchanged, no increased WOB, no SOB, and otherwise stable. Unable to get ABG due to soft tissue edema.  In the MICU an ABG was drawn that showed O2 sat at 409 (repeat on 3L of O2 was 166). So, low sats likely due to inaccurate reading.   --cont PTA bumex 2mg BID and spironolactone 25 mg daily  --BiPAP nightly  --If concern for hypoxia (based on low O2 sats and clinical evidence of respiratory distress), need to check ABG for confirmation.       # Coagulopathy, INR to 5, elevated PT and PTT  # Acetaminophen toxicity (elevated acetaminophen levels)  Pt states she was taking 3-4g tylenol per day for the past month for chronic back pain.   Pt previously on warfarin for Afib. She started xarelto as of Jan 2018. She states she was not taking warfarin and xarelto simultaneously. It is possible that her coagulopathy is occurring in the setting of xarelto. Per Heme, factor 7/10 decreased c/w vit K deficiency,  no evidence of hepatic synthetic dysfunction. Her INR has continued to improve.  - NAC infusion D/C on 3/26 per GI.  --trend INR  --trend liver enzymes every couple of days   --homocysteine low, methylmalonic acid high  --Hep B and C panels negative  --F actin weakly positive; anti-mitochondial joseph neg and AMPARO neg      # Acute blood loss anemia (baseline 11-12 1 month prior, current hgb ~7) - now stable Hgb  Differential includes acute GI bleed (most likely). Hgb has been stable since admission and no evidence of bleeding seen.   Plan:  -- trend daily, transfuse <7  -- continue PPI BID   -- folate WNL, B12 WNL, transferrin WNL, iron WNL, haptoglobin, reticulocyte index 2 indicating marrow appropriate response.  --Need Hematology f/u in 2-3 months to eval for macrocytic anemia        # HARINI on CKD -- resolved  Cr at bsl ~1.1-4. On presentation 1.8. UA with hyaline casts, tachy, hypotensive. Likely prerenal in the setting of GIB and decreased PO intake/emesis. Cr improved with IVF.   -- trend BMP      # Tropinemia, likely 2/2 demand ischemia 2/2 anemia  EKG did not show ST elevation, ST segment depression or T wave inversions. Pt is not having CP. Likely demand ischemia.          Chronic problems  # CAD  # HLD  Plan:  --cont ASA  --cont atorvastatin       # Depression  -- continue PTA venlafaxine      # Insominia  -holding trazodone in lieu of prolonged QTc      # Pain Assessment:  Current Pain Score 4/1/2018   Patient currently in pain? denies   Pain score (0-10) -   Pain location -   Pain descriptors -   CPOT pain score -   [facility] and she currently denies pain.      Diet: Low Saturated Fat Na <2400 mg  Calorie Counts  Snacks/Supplements Adult: Ensure Clear; Between Meals  Room Service  Fluid restriction 2000 ML FLUID  Fluids: none  DVT Prophylaxis: Anti-embolisim stockings (TEDs)  Code Status: Full Code    Disposition Plan   Expected discharge: 3-4 days recommended to transitional care unit  once pt had been adequately diuresed.   Entered: Ayaka Mclaughlin 04/01/2018, 4:38 PM   Information in the above section will display in the discharge planner report.      The patient's care was discussed with the Attending Physician, Dr. Rome.    Ayaka Mclaughlin  [facility]: 1  Pager: [phone]  Please see sticky note for cross cover information    Physician Attestation  I, Nevaeh Rome MD, saw this patient with the resident and agree with the resident s findings and plan of care as documented in the resident s note with my edits.     I personally reviewed vital signs, medications, labs and imaging.    Nevaeh Rome MD  Date of Service (when I saw the patient): 4/1/18            Interval History   Nursing notes reviewed. No events overnight.     No CP, SOB, Abd pain, n/v or f/c.     Physical Exam   Vital Signs: Temp: 97.8  F (36.6  C) Temp src: Oral BP: 105/85 Pulse: 72 Heart Rate: 105 Resp: 16 SpO2: 97 % O2 Device: None (Room air) Oxygen Delivery: 4 LPM  Weight: 201 lbs 0 oz  Gen: In no acute distress. Patient comfortably lying in bed  HEENT: No scleral icterus, Moist mucous membranes. No nasal discharge.  CV: Irregular rate, no murmurs or rubs detected  Resp: Clear to auscultation bilaterally. Without wheeze or crackles  Abdomen: Soft, non tender abdomen, normal active bowel sounds   Extremities: Pitting edema 3+, bilaterally with anasarca   Skin: bruises on back do not show rebleeding  Neuro: mentation in tact, no deficits noted  Oriented to conversation      Intake/Output Summary (Last 24 hours) at 04/01/18 1643  Last data filed at 04/01/18 1600   Gross per 24 hour   Intake              870 ml   Output             2825 ml   Net            -1955 ml       Data   Medications     Injection Device for insulin         [START ON 4/2/2018] bumetanide  3 mg Oral QAM     bumetanide  2 mg Oral Q24H     fondaparinux  2.5 mg Subcutaneous Q24H     aspirin  81 mg Oral QAM     spironolactone  25 mg Oral  Daily     sodium chloride (PF)  10 mL Intracatheter Q8H     multivitamin, therapeutic  1 tablet Oral Daily     metoprolol tartrate  12.5 mg Oral BID     pantoprazole  40 mg Oral BID AC     potassium chloride  40 mEq Oral Daily     polyethylene glycol  17 g Oral Daily     melatonin  3 mg Oral At Bedtime     lidocaine  1 patch Transdermal Q24H     lidocaine   Transdermal Q24h     lidocaine   Transdermal Q8H     atorvastatin  40 mg Oral At Bedtime     insulin aspart  0.5-2.5 Units Subcutaneous TID AC     insulin aspart  0.5-2.5 Units Subcutaneous At Bedtime     venlafaxine  150 mg Oral Daily with breakfast     ferrous sulfate  325 mg Oral Daily with breakfast     levothyroxine  37.5 mcg Oral QAM AC     Data     Recent Labs  Lab 04/01/18  0642 03/31/18  1834 03/31/18  0832 03/30/18  1349 03/30/18  0711 03/29/18  0415   WBC 5.4  --  5.7  --  6.8 7.0   HGB 7.7*  --  8.0* 8.8* 8.1* 7.8*   *  --  119*  --  120* 121*     --  160  --  154 131*   INR 1.61*  --  1.61*  --  1.59* 1.66*    138 138  --  138 139   POTASSIUM 3.4 4.2 3.6  --  4.0 3.6   CHLORIDE 100 100 100  --  103 103   CO2 33* 32 31  --  28 27   BUN 13 12 12  --  14 12   CR 0.78 0.84 0.73  --  0.73 0.74   ANIONGAP 7 6 7  --  7 10   CARLY 8.0* 9.2 8.6  --  9.3 8.8   * 152* 89  --  112* 91   ALBUMIN  --   --   --   --  2.7* 2.6*   PROTTOTAL  --   --   --   --  6.1* 5.6*   BILITOTAL  --   --   --   --  1.9* 1.8*   ALKPHOS  --   --   --   --  215* 176*   ALT  --   --   --   --  55* 57*   AST  --   --   --   --  100* 108*     Recent Results (from the past 24 hour(s))   US Lower Extremity Venous Duplex Right    Narrative    EXAMINATION: DOPPLER VENOUS ULTRASOUND OF THE RIGHT LOWER EXTREMITY,  4/1/2018 1:51 PM     COMPARISON: 3/30/2017    HISTORY: Significant swelling, rule out DVT    TECHNIQUE:  Gray-scale evaluation with compression, spectral flow, and  color Doppler assessment of the deep venous system of the right leg  from groin to knee, and  then at the ankle.    FINDINGS:  In the right lower extremity, the common femoral, femoral, popliteal  and posterior tibial veins demonstrate normal compressibility and  blood flow. Calf edema. Pulsatile flow throughout.        Impression    IMPRESSION: No evidence of right lower extremity deep venous  thrombosis.    I have personally reviewed the examination and initial interpretation  and I agree with the findings.    BEREKET DANIELS MD

## 2018-04-01 NOTE — PROGRESS NOTES
Calorie Counts  Intake recorded for: 3/31 Kcals: 1275  Protein: 82g  # Meals Recorded: 3 meal (First - 100% omelet w/ cheese & veggies, 2 apple juices, 50% orange juice)      (Second - 100% grilled chicken, squash, pears, yogurt, apple juice, brwon crackers)      (Third - 100% apple juice, 75% grilled chicken & mashed potatoes w/ gravy, green beans)  # Supplements Recorded: 0

## 2018-04-01 NOTE — PROGRESS NOTES
[facility], [place]    Internal Medicine Progress Note - [facility] Service    Main Plans for Today       -pt 27 lb up from admit weight; cont diuresis with bumex and spironolactone, strict I's/O's and daily weights  -consider D/C amoxicillin in AM    Assessment & Plan   Carlos Alberto Fenton is a 77 year old female with PMH of HTN, HLD, DM, CVA (11/2016), CHF (last ECHO 12/12/17 EF 45-50%), CAD (s/p 3v CABG: LIMA-LAD, SVG-D1, SVG-dRCA and modified MAZE, ABDIEL ligation - 2/2016), paroxysmal AFib ([id number] - 8 on Xarelto), HIT, RUE DVT, recently diagnosed hypothyroidism, admitted 3/21/2018 for acute GIB, coagulopathy and concern for acute LI 2/2 acetaminophen toxicity with xarelto intake. Now with improved coagulopathy, liver tests, resolved GIB. 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  She has intermittently elevated lactic acids that fluctuates without intervention. Now remains inpt for decompensated HFrEF and concern for PAD in UEs given exam showing signs of distal UE hypoperfusion (bluis discoloration of finger tips (R>L) and cool distal UEs.       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  --Stopped xarelto   --Hematology consult to determine safest anticoagulation for stroke prevention in setting of [id number] 8. Per Heme, need to be on warfarin w/ bridged with fondaparinaux. Cardiology ok with starting Fondaparinaux on April 1. Needs Heme f/u in 2-3 months.  --started metoprolol 12.5 mg BID for episodes of RVR - may increase given still intermittent RVR episodes   --D/C trazodone as prolongs QTc  --augmentin per EP 5 days s/p PPM (3/26-3/30)  --Restarted PTA aspirin 81mg daily   --Resumed home bumex - monitor  I/Os      --Continue home spironolactone  --Needs afterload reduction (ACEinh), start as able.     # Concern for PAD in UEs vs Reynaud phenomenon:  concern for PAD in UEs given exam showing signs of distal UE hypoperfusion (bluis discoloration of finger tips (R>L) and cool distal UEs.  --vascular surgery consult    # Lactic Acidosis  Pt has had fluctuating LA since admission. Likely 2/2 poor perfusion of extremities (cyanotic and cool fingers, cools hands) d/t suspected PVD.Low suspicion for sepsis  given neg blood cultures, improving clinical status, no SIRS and fluctuating downtrending lactate levels without intervention  -CTM and trend    # Hypothyroidism  Recently diagnosed. On Levothyroxine. Last TSH 2/21 elevated to 88, T4 improved from 0.29 to 0.44.   -- increase levothyroxine to 37.5 from PTA dose of 25 mcg  -- Recheck TSH in 6 weeks.       Resolved issues:    #?Acute on chronic hypoxic respiratory failure  Rapid response was called on 3/28 due to hypoxia--- difficulty getting O2 sat reading with O2 sat ~70-80s% on CPAP FiO2 100%.Pt is clinically unchanged, no increased WOB, no SOB, and otherwise stable. Unable to get ABG due to soft tissue edema.  In the MICU an ABG was drawn that showed O2 sat at 409 (repeat on 3L of O2 was 166). So, low sats likely due to inaccurate reading.   --cont PTA bumex 2mg BID and spironolactone 25 mg daily  --BiPAP nightly  --If concern for hypoxia (based on low O2 sats and clinical evidence of respiratory distress), need to check ABG for confirmation.       # Coagulopathy, INR to 5, elevated PT and PTT  # Acetaminophen toxicity (elevated acetaminophen levels)  Pt states she was taking 3-4g tylenol per day for the past month for chronic back pain.   Pt previously on warfarin for Afib. She started xarelto as of Jan 2018. She states she was not taking warfarin and xarelto simultaneously. It is possible that her coagulopathy is occurring in the setting of xarelto. Per Heme, factor  7/10 decreased c/w vit K deficiency, no evidence of hepatic synthetic dysfunction. Her INR has continued to improve.  - NAC infusion D/C on 3/26 per GI.  --trend INR  --trend liver enzymes every couple of days   --homocysteine low, methylmalonic acid high  --Hep B and C panels negative  --F actin weakly positive; anti-mitochondial joseph neg and AMPARO neg      # Acute blood loss anemia (baseline 11-12 1 month prior, current hgb ~7) - now stable Hgb  Differential includes acute GI bleed (most likely). Hgb has been stable since admission and no evidence of bleeding seen.   Plan:  -- trend daily, transfuse <7  -- continue PPI BID   -- folate WNL, B12 WNL, transferrin WNL, iron WNL, haptoglobin, reticulocyte index 2 indicating marrow appropriate response.  --Need Hematology f/u in 2-3 months to eval for macrocytic anemia        # HARINI on CKD -- resolved  Cr at bsl ~1.1-4. On presentation 1.8. UA with hyaline casts, tachy, hypotensive. Likely prerenal in the setting of GIB and decreased PO intake/emesis. Cr improved with IVF.   -- trend BMP      # Tropinemia, likely 2/2 demand ischemia 2/2 anemia  EKG did not show ST elevation, ST segment depression or T wave inversions. Pt is not having CP. Likely demand ischemia.          Chronic problems  # CAD  # HLD  Plan:  --cont ASA  --cont atorvastatin       # Depression  -- continue PTA venlafaxine      # Insominia  -holding trazodone in lieu of prolonged QTc      # Pain Assessment:  Current Pain Score 3/31/2018   Patient currently in pain? denies   Pain score (0-10) -   Pain location -   Pain descriptors -   CPOT pain score -   [facility] s pain level was assessed and she currently denies pain.      Diet: Low Saturated Fat Na <2400 mg  Calorie Counts  Snacks/Supplements Adult: Ensure Clear; Between Meals  Room Service  Fluid restriction 2000 ML FLUID  Fluids: none  DVT Prophylaxis: Anti-embolisim stockings (TEDs)  Code Status: Full Code    Disposition Plan   Expected discharge: 3-4 days  recommended to transitional care unit once pt had been adequately diuresed.   Entered: Ayaka Mclaughlin 03/31/2018, 7:29 PM   Information in the above section will display in the discharge planner report.      The patient's care was discussed with the Attending Physician, Dr. Rome.    Ayaka Mclaughlin  [facility]  Pager: [phone]  Please see sticky note for cross cover information      Physician Attestation  I, Nevaeh Rome MD, saw this patient with the resident and agree with the resident s findings and plan of care as documented in the resident s note with my edits.     I personally reviewed vital signs, medications, labs and imaging.    Nevaeh Rome MD  Date of Service (when I saw the patient): 3/31/18            Interval History   Nursing notes reviewed. No events overnight.     Patient is responding appropriately and had no new complaints this morning.     Physical Exam   Vital Signs: Temp: 97.7  F (36.5  C) Temp src: Oral BP: 110/69 Pulse: 108 Heart Rate: 74 Resp: 16 SpO2: 94 % O2 Device: None (Room air) Oxygen Delivery: 4 LPM  Weight: 203 lbs 4.8 oz  Gen: In no acute distress. Patient comfortably lying in bed  HEENT: No scleral icterus, Moist mucous membranes. No nasal discharge.  CV: Irregular rate, no murmurs or rubs detected  Resp: Clear to auscultation bilaterally. Without wheeze or crackles  Abdomen: Soft, non tender abdomen, normal active bowel sounds   Extremities: Pitting edema 2+, bilaterally with anasarca   Skin: bruises on back do not show rebleeding  Neuro: mentation in tact, no deficits noted  Oriented to conversation      Intake/Output Summary (Last 24 hours) at 03/31/18 1943  Last data filed at 03/31/18 1900   Gross per 24 hour   Intake              840 ml   Output             3050 ml   Net            -2210 ml         Data   Medications     Injection Device for insulin         bumetanide  2 mg Oral BID     aspirin  81 mg Oral QAM     spironolactone  25 mg Oral Daily      sodium chloride (PF)  10 mL Intracatheter Q8H     multivitamin, therapeutic  1 tablet Oral Daily     metoprolol tartrate  12.5 mg Oral BID     pantoprazole  40 mg Oral BID AC     potassium chloride  40 mEq Oral Daily     polyethylene glycol  17 g Oral Daily     melatonin  3 mg Oral At Bedtime     lidocaine  1 patch Transdermal Q24H     lidocaine   Transdermal Q24h     lidocaine   Transdermal Q8H     atorvastatin  40 mg Oral At Bedtime     insulin aspart  0.5-2.5 Units Subcutaneous TID AC     insulin aspart  0.5-2.5 Units Subcutaneous At Bedtime     venlafaxine  150 mg Oral Daily with breakfast     ferrous sulfate  325 mg Oral Daily with breakfast     levothyroxine  37.5 mcg Oral QAM AC     Data     Recent Labs  Lab 03/31/18  1834 03/31/18  0832 03/30/18  1349 03/30/18  0711 03/29/18  0415   WBC  --  5.7  --  6.8 7.0   HGB  --  8.0* 8.8* 8.1* 7.8*   MCV  --  119*  --  120* 121*   PLT  --  160  --  154 131*   INR  --  1.61*  --  1.59* 1.66*    138  --  138 139   POTASSIUM 4.2 3.6  --  4.0 3.6   CHLORIDE 100 100  --  103 103   CO2 32 31  --  28 27   BUN 12 12  --  14 12   CR 0.84 0.73  --  0.73 0.74   ANIONGAP 6 7  --  7 10   CARLY 9.2 8.6  --  9.3 8.8   * 89  --  112* 91   ALBUMIN  --   --   --  2.7* 2.6*   PROTTOTAL  --   --   --  6.1* 5.6*   BILITOTAL  --   --   --  1.9* 1.8*   ALKPHOS  --   --   --  215* 176*   ALT  --   --   --  55* 57*   AST  --   --   --  100* 108*     No results found for this or any previous visit (from the past 24 hour(s)).

## 2018-04-01 NOTE — PROVIDER NOTIFICATION
PVC's noted on telemetry monitor, unchanged from yesterday. Team notified. No new orders. Will continue to monitor.

## 2018-04-01 NOTE — CONSULTS
Vascular Surgery Consult    Carlos Alberto Fenton MRN# [id number]   YOB: 1940 Age: 77 year old      Date of Admission:  3/20/2018        Consult for:    Consulting physician/team: Medicine         Assessment:    Carlos Alberto Fenton is a 77 year old female with a complicated medical history. Vascular surgery has been consulted for evaluation of discoloration of distal extremities concerning for decreased perfusion. As per patient these changes are chronic and she has Dopplerable pulses without signs of sensorimotor deficits in BUE and BLE. Last lactate 2.2        Plan:        No indication for acute surgical intervention    Agree with US RADHA (ordered by primary)    If RADHA abnormal will recommend CT abdomen/pelvis angiogram with LE run off     Vascular surgery team will round on patient again on 4/1         History of Present Illness:     Carlos Alberto Fenton is a 77 year old female with PMH of HTN, HLD, DM, CVA (11/2016), CHF (last ECHO 12/12/17 EF 45-50%), CAD (s/p 3v CABG: LIMA-LAD, SVG-D1, SVG-dRCA and modified MAZE, ABDIEL ligation - 2/2016), paroxysmal AFib ([id number] - 8 on Xarelto), HIT, RUE DVT, recently diagnosed hypothyroidism, admitted 3/21/2018 for acute GIB, coagulopathy and concern for acute LI 2/2 acetaminophen toxicity with xarelto intake and 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 Vascular surgery has been consulted for evaluation of discoloration of distal extremities concerning for decreased perfusion    Ms Fenton reports that her toes and hands have been discolored ever since she had her CABG a year ago. She denies any pain in her extremities, any weakness or tingling /paresthesias, chest pain, abdominal pain, lightheadedness, nausea, emesis, fevers or chills. Of note vascular surgery was consulted in 2017 after she developed gangrene of her right big and 2nd toes from emboli after her CABG ( her toes were debrided). At that time a CTA was performed which did not demonstrated any PAD (Mild aortobiiliac  atherosclerosis without evidence of aneurysm, dissection, or significant stenosis in the chest, abdomen or pelvis. Bilateral patent lower extremity three-vessel runoff). She also had  US RADHA done ealrier this month which demonstrated WNL ABIs R 1.17/ L1.11 ( she does have DM). Last lactate 2.2      Past Medical History:  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    parietral lesions bilateral       Antiplatelet or antithrombotic long-term use      Anxiety      Atrial fibrillation (H)      CAD (coronary artery disease)     2 vessel     Cancer (H) 1990    periodically have cancer on the skin removed     Cerebral artery occlusion with cerebral infarction (H) 10/2016    Cardioembolic strokes related to atrial fibrillation     Deep vein thrombosis (DVT) of axillary vein of right upper extremity (H) 2/25/2017     Depressive disorder 2001     Diabetes (H)      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     Hyperlipidemia LDL goal <130 10/31/2010     Hypertension      Panic attacks      Seizures (H) 10/19/2016     Sleep apnea     Uses CPAP       Past Surgical History:  Past Surgical History:   Procedure Laterality Date     AMPUTATE TOE(S) Right 5/26/2017    Procedure: AMPUTATE TOE(S);  Right Great Toe amputation, debriedment of 2 and 3rd toe soft tissu right foot. and application of Grafix;  Surgeon: Leah Santamaria MD;  Location: [facility] OR     ANESTHESIA CARDIOVERSION N/A 12/12/2017    Procedure: ANESTHESIA CARDIOVERSION;  Anesthesia Cardioversion ;  Surgeon: GENERIC ANESTHESIA PROVIDER;  Location: [facility] OR     BACK SURGERY       BYPASS GRAFT ARTERY CORONARY N/A 2/6/2017    Procedure: BYPASS GRAFT ARTERY CORONARY;  Surgeon: Mikhail Quiñones MD;  Location: [facility] OR     C APPENDECTOMY  1959     C CARDIAC SURG PROCEDURE UNLIST       C HAND/FINGER SURGERY UNLISTED       C STOMACH SURGERY PROCEDURE UNLISTED       HC SACROPLASTY       HC VASCULAR  SURGERY PROCEDURE UNLIST       HYSTERECTOMY, PASTORA  1988     IRRIGATION AND DEBRIDEMENT FOOT, COMBINED Right 7/7/2017    Procedure: COMBINED IRRIGATION AND DEBRIDEMENT FOOT;  Irrigation and Debridement Right Foot with Graphix  *Latex Allergy*;  Surgeon: Leah Santamaria MD;  Location: [facility] OR     MAZE PROCEDURE N/A 2/6/2017    Procedure: MAZE PROCEDURE;  Surgeon: Mikhail Quiñones MD;  Location: [facility] OR     PICC INSERTION Left 02/25/2017    5fr TL Bard PICC, 47cm (3cm external) in the L basilic vein w/ tip in the SVC RA junction     TONSILLECTOMY  1942       Allergies:     Allergies   Allergen Reactions     Blood Transfusion Related (Informational Only) Other (See Comments)     Patient has a history of a clinically significant antibody against RBC antigens.  A delay in compatible RBCs may occur.Patient has a history of a significant allergic transfusion reaction.  Consult with Blood Bank MD before ordering components.     Heparin      HIT/ thrombocytopenia     Lovenox [Enoxaparin] Other (See Comments)     Thrombocytopenia      Oxycodone      hallucinations     Toprol Xl [Metoprolol] Nausea and Vomiting     Adhesive Tape Itching and Rash     Diapers & Supplies Rash     Developed yeast infection from previous hospital stay       Medications:    No current facility-administered medications on file prior to encounter.   Current Outpatient Prescriptions on File Prior to Encounter:  pantoprazole (PROTONIX) 40 MG EC tablet Take 1 tablet (40 mg) by mouth every morning   carvedilol (COREG) 3.125 MG tablet Take 1 tablet (3.125 mg) by mouth 2 times daily (with meals)   gabapentin (NEURONTIN) 100 MG capsule TAKE ONE CAPSULE BY MOUTH TWICE DAILY    bumetanide (BUMEX) 2 MG tablet Take 1 tablet (2 mg) by mouth 2 times daily   levothyroxine (SYNTHROID/LEVOTHROID) 25 MCG tablet Take 1 tablet (25 mcg) by mouth every morning (before breakfast)   Potassium Chloride ER 20 MEQ TBCR Take 1 tablet (20 mEq) by mouth 2 times daily   MELATONIN PO  Take 1 mg by mouth nightly as needed    rivaroxaban ANTICOAGULANT (XARELTO) 20 MG TABS tablet Take 1 tablet (20 mg) by mouth daily (with dinner) . DO NOT start until you have stopped the Warfarin.   ALPRAZolam (XANAX) 0.25 MG tablet Take 1 tablet (0.25 mg) by mouth 3 times daily as needed for anxiety   venlafaxine (EFFEXOR-ER) 150 MG TB24 24 hr tablet Take 1 tablet (150 mg) by mouth daily (with breakfast)   metolazone (ZAROXOLYN) 2.5 MG tablet Take 1 tablet (2.5 mg) by mouth daily as needed For weight over 170 Lbs.   spironolactone (ALDACTONE) 25 MG tablet Take 1 tablet (25 mg) by mouth daily   atorvastatin (LIPITOR) 40 MG tablet Take 1 tablet (40 mg) by mouth daily   traZODone (DESYREL) 50 MG tablet Take 0.5 tablets (25 mg) by mouth nightly as needed for sleep   Elastic Bandages & Supports (ACE BANDAGE SELF-ADHERING) MISC 1 each 2 times daily   Gauze Pads & Dressings (KERLIX GAUZE ROLL MEDIUM) MISC 1 each 2 times daily   ONE TOUCH ULTRA test strip USE AS DIRECTED TO TEST ONE TIME A DAY   Wound Dressings (ADAPTIC NON-ADHERING DRESSING) PADS Externally apply 1 each topically 2 times daily   order for DME Sterile 4x4 gauze; Use gauze twice daily for dressing changes.   aspirin 81 MG chewable tablet Take 1 tablet (81 mg) by mouth daily   ferrous sulfate (IRON SUPPLEMENT) 325 (65 FE) MG tablet Take 1 tablet (325 mg) by mouth daily (with breakfast)   ONETOUCH DELICA LANCETS 33G MISC 1 Device daily   blood glucose monitoring (ONE TOUCH ULTRA 2) meter device kit        Social History:  Social History     Social History     Marital status:      Spouse name: N/A     Number of children: N/A     Years of education: N/A     Occupational History     Not on file.     Social History Main Topics     Smoking status: Former Smoker     Packs/day: 1.00     Years: 10.00     Types: Cigarettes     Start date: 10/3/1958     Quit date: 7/4/1976     Smokeless tobacco: Never Used      Comment: Quit in 1976     Alcohol use No     Drug  "use: No     Sexual activity: Not Currently     Partners: Male     Birth control/ protection: Post-menopausal     Other Topics Concern     Parent/Sibling W/ Cabg, Mi Or Angioplasty Before 65f 55m? Yes     Social History Narrative       Family History:  Family History   Problem Relation Age of Onset     DIABETES Mother      C.A.D. Mother      Hypertension Mother      CEREBROVASCULAR DISEASE Mother      Mini Strokes     Other Cancer Mother      Skin Cancer     Hyperlipidemia Mother      Coronary Artery Disease Mother      DIABETES Father      C.A.D. Father      Hypertension Father      Other Cancer Father      Hyperlipidemia Father      Coronary Artery Disease Father      HEART DISEASE Sister      Arthritis Sister      Other Cancer Other      Skin Cancer       ROS:    The remainder of the complete ROS was negative unless noted in the HPI.    Exam:  /69 (BP Location: Right arm)  Pulse 108  Temp 97.7  F (36.5  C) (Oral)  Resp 16  Ht 1.575 m (5' 2\")  Wt 92.2 kg (203 lb 4.8 oz)  SpO2 94%  BMI 37.18 kg/m2  General: Alert and oriented with appropriate responses to questions, in NAD, on BIPAP  HEENT: NC/AT, sclera anicteric  Resp: NLB on BIPAP  Cardiac: regular rate and rhythm,  Abdomen: Soft, nontender, nondistended. No rebound or guarding. No palpable mass  Extremities:    BUE  Good capillary refill (<1s), intact motor strength and sensation tot touch, ROM intact in all joints, palpable or dopplerable bilateral radial pulses ( has some edema of BUE making palpation difficult)    BLE  Good capillary refill (<1s), intact motor strength and sensation tot touch, ROM intact in all joints, palpable or dopplerable bilateral PT pulses ( has some edema  making palpation difficult)  Partially amputated R big and 2nd toe with necrotic changes , no evidence of infection   Skin: Warm and dry, no jaundice or rash  Neuro: Cn II-XII intact, moves all extremities equally    Labs:  Most Recent CBC:   Recent Labs   Lab Test  " 03/31/18   0832   WBC  5.7   RBC  2.27*   HGB  8.0*   HCT  27.1*   MCV  119*   MCH  35.2*   MCHC  29.5*   RDW  22.2*   PLT  160     Most Recent BMP:   Recent Labs   Lab Test  03/31/18   1834  03/31/18   0832   NA  138  138   POTASSIUM  4.2  3.6   CHLORIDE  100  100   CO2  32  31   BUN  12  12   CR  0.84  0.73   GLC  152*  89   MAG   --   1.6         Imaging:  No results found for this or any previous visit (from the past 24 hour(s)).    Assessment/ Plan: See above.     Mendoza Shah MD   General Surgery Resident PGY-2   Pager: [phone]    Pt reviewed with fellow, Dr. Lejeune

## 2018-04-02 ENCOUNTER — APPOINTMENT (OUTPATIENT)
Dept: GENERAL RADIOLOGY | Facility: CLINIC | Age: 78
DRG: 813 | End: 2018-04-02
Attending: INTERNAL MEDICINE
Payer: MEDICARE

## 2018-04-02 ENCOUNTER — APPOINTMENT (OUTPATIENT)
Dept: ULTRASOUND IMAGING | Facility: CLINIC | Age: 78
DRG: 813 | End: 2018-04-02
Attending: SURGERY
Payer: MEDICARE

## 2018-04-02 LAB
ANION GAP SERPL CALCULATED.3IONS-SCNC: 5 MMOL/L (ref 3–14)
ANION GAP SERPL CALCULATED.3IONS-SCNC: 9 MMOL/L (ref 3–14)
BASE EXCESS BLDA CALC-SCNC: 8.5 MMOL/L
BASE EXCESS BLDV CALC-SCNC: 12.3 MMOL/L
BASE EXCESS BLDV CALC-SCNC: 9.9 MMOL/L
BUN SERPL-MCNC: 16 MG/DL (ref 7–30)
BUN SERPL-MCNC: 18 MG/DL (ref 7–30)
CALCIUM SERPL-MCNC: 9.1 MG/DL (ref 8.5–10.1)
CALCIUM SERPL-MCNC: 9.2 MG/DL (ref 8.5–10.1)
CHLORIDE SERPL-SCNC: 94 MMOL/L (ref 94–109)
CHLORIDE SERPL-SCNC: 95 MMOL/L (ref 94–109)
CO2 SERPL-SCNC: 31 MMOL/L (ref 20–32)
CO2 SERPL-SCNC: 37 MMOL/L (ref 20–32)
CREAT SERPL-MCNC: 0.86 MG/DL (ref 0.52–1.04)
CREAT SERPL-MCNC: 0.93 MG/DL (ref 0.52–1.04)
GFR SERPL CREATININE-BSD FRML MDRD: 58 ML/MIN/1.7M2
GFR SERPL CREATININE-BSD FRML MDRD: 64 ML/MIN/1.7M2
GLUCOSE BLDC GLUCOMTR-MCNC: 100 MG/DL (ref 70–99)
GLUCOSE BLDC GLUCOMTR-MCNC: 109 MG/DL (ref 70–99)
GLUCOSE BLDC GLUCOMTR-MCNC: 140 MG/DL (ref 70–99)
GLUCOSE BLDC GLUCOMTR-MCNC: 176 MG/DL (ref 70–99)
GLUCOSE SERPL-MCNC: 100 MG/DL (ref 70–99)
GLUCOSE SERPL-MCNC: 189 MG/DL (ref 70–99)
HCO3 BLD-SCNC: 32 MMOL/L (ref 21–28)
HCO3 BLDV-SCNC: 35 MMOL/L (ref 21–28)
HCO3 BLDV-SCNC: 38 MMOL/L (ref 21–28)
LACTATE BLD-SCNC: 1.7 MMOL/L (ref 0.4–1.9)
MAGNESIUM SERPL-MCNC: 2.2 MG/DL (ref 1.6–2.3)
MAGNESIUM SERPL-MCNC: 2.3 MG/DL (ref 1.6–2.3)
O2/TOTAL GAS SETTING VFR VENT: 30 %
O2/TOTAL GAS SETTING VFR VENT: ABNORMAL %
O2/TOTAL GAS SETTING VFR VENT: ABNORMAL %
PCO2 BLD: 38 MM HG (ref 35–45)
PCO2 BLDV: 53 MM HG (ref 40–50)
PCO2 BLDV: 59 MM HG (ref 40–50)
PH BLD: 7.53 PH (ref 7.35–7.45)
PH BLDV: 7.42 PH (ref 7.32–7.43)
PH BLDV: 7.43 PH (ref 7.32–7.43)
PO2 BLD: 159 MM HG (ref 80–105)
PO2 BLDV: 18 MM HG (ref 25–47)
PO2 BLDV: 36 MM HG (ref 25–47)
POTASSIUM SERPL-SCNC: 4.1 MMOL/L (ref 3.4–5.3)
POTASSIUM SERPL-SCNC: 5 MMOL/L (ref 3.4–5.3)
SODIUM SERPL-SCNC: 135 MMOL/L (ref 133–144)
SODIUM SERPL-SCNC: 137 MMOL/L (ref 133–144)

## 2018-04-02 PROCEDURE — A9270 NON-COVERED ITEM OR SERVICE: HCPCS | Mod: GY | Performed by: HOSPITALIST

## 2018-04-02 PROCEDURE — 36592 COLLECT BLOOD FROM PICC: CPT | Performed by: INTERNAL MEDICINE

## 2018-04-02 PROCEDURE — 83735 ASSAY OF MAGNESIUM: CPT | Performed by: STUDENT IN AN ORGANIZED HEALTH CARE EDUCATION/TRAINING PROGRAM

## 2018-04-02 PROCEDURE — 94660 CPAP INITIATION&MGMT: CPT

## 2018-04-02 PROCEDURE — 93922 UPR/L XTREMITY ART 2 LEVELS: CPT

## 2018-04-02 PROCEDURE — 36592 COLLECT BLOOD FROM PICC: CPT | Performed by: STUDENT IN AN ORGANIZED HEALTH CARE EDUCATION/TRAINING PROGRAM

## 2018-04-02 PROCEDURE — 71045 X-RAY EXAM CHEST 1 VIEW: CPT

## 2018-04-02 PROCEDURE — 99233 SBSQ HOSP IP/OBS HIGH 50: CPT | Mod: GC | Performed by: INTERNAL MEDICINE

## 2018-04-02 PROCEDURE — 12000025 ZZH R&B TRANSPLANT INTERMEDIATE

## 2018-04-02 PROCEDURE — 25000132 ZZH RX MED GY IP 250 OP 250 PS 637: Mod: GY | Performed by: MARRIAGE & FAMILY THERAPIST

## 2018-04-02 PROCEDURE — 36600 WITHDRAWAL OF ARTERIAL BLOOD: CPT

## 2018-04-02 PROCEDURE — A9270 NON-COVERED ITEM OR SERVICE: HCPCS | Mod: GY | Performed by: STUDENT IN AN ORGANIZED HEALTH CARE EDUCATION/TRAINING PROGRAM

## 2018-04-02 PROCEDURE — 82803 BLOOD GASES ANY COMBINATION: CPT | Performed by: INTERNAL MEDICINE

## 2018-04-02 PROCEDURE — 80048 BASIC METABOLIC PNL TOTAL CA: CPT | Performed by: STUDENT IN AN ORGANIZED HEALTH CARE EDUCATION/TRAINING PROGRAM

## 2018-04-02 PROCEDURE — 00000146 ZZHCL STATISTIC GLUCOSE BY METER IP

## 2018-04-02 PROCEDURE — 25000132 ZZH RX MED GY IP 250 OP 250 PS 637: Mod: GY | Performed by: STUDENT IN AN ORGANIZED HEALTH CARE EDUCATION/TRAINING PROGRAM

## 2018-04-02 PROCEDURE — A9270 NON-COVERED ITEM OR SERVICE: HCPCS | Mod: GY

## 2018-04-02 PROCEDURE — 40000809 ZZH STATISTIC NO DOCUMENTATION TO SUPPORT CHARGE

## 2018-04-02 PROCEDURE — 71045 X-RAY EXAM CHEST 1 VIEW: CPT | Mod: 77

## 2018-04-02 PROCEDURE — 40000275 ZZH STATISTIC RCP TIME EA 10 MIN

## 2018-04-02 PROCEDURE — 25000132 ZZH RX MED GY IP 250 OP 250 PS 637: Mod: GY

## 2018-04-02 PROCEDURE — 82803 BLOOD GASES ANY COMBINATION: CPT | Performed by: STUDENT IN AN ORGANIZED HEALTH CARE EDUCATION/TRAINING PROGRAM

## 2018-04-02 PROCEDURE — 25000128 H RX IP 250 OP 636: Performed by: STUDENT IN AN ORGANIZED HEALTH CARE EDUCATION/TRAINING PROGRAM

## 2018-04-02 PROCEDURE — 83605 ASSAY OF LACTIC ACID: CPT | Performed by: INTERNAL MEDICINE

## 2018-04-02 PROCEDURE — 25000132 ZZH RX MED GY IP 250 OP 250 PS 637: Mod: GY | Performed by: HOSPITALIST

## 2018-04-02 RX ORDER — BUMETANIDE 2 MG/1
2 TABLET ORAL ONCE
Status: COMPLETED | OUTPATIENT
Start: 2018-04-02 | End: 2018-04-02

## 2018-04-02 RX ORDER — BUMETANIDE 2 MG/1
2 TABLET ORAL EVERY MORNING
Status: DISCONTINUED | OUTPATIENT
Start: 2018-04-02 | End: 2018-04-02

## 2018-04-02 RX ADMIN — INSULIN ASPART 0.5 UNITS: 100 INJECTION, SOLUTION INTRAVENOUS; SUBCUTANEOUS at 12:20

## 2018-04-02 RX ADMIN — PANTOPRAZOLE SODIUM 40 MG: 40 TABLET, DELAYED RELEASE ORAL at 17:32

## 2018-04-02 RX ADMIN — VENLAFAXINE HYDROCHLORIDE 150 MG: 150 CAPSULE, EXTENDED RELEASE ORAL at 10:00

## 2018-04-02 RX ADMIN — LIDOCAINE 1 PATCH: 560 PATCH PERCUTANEOUS; TOPICAL; TRANSDERMAL at 10:01

## 2018-04-02 RX ADMIN — ASPIRIN 81 MG CHEWABLE TABLET 81 MG: 81 TABLET CHEWABLE at 10:00

## 2018-04-02 RX ADMIN — POTASSIUM CHLORIDE 40 MEQ: 750 TABLET, EXTENDED RELEASE ORAL at 10:01

## 2018-04-02 RX ADMIN — BUMETANIDE 2 MG: 2 TABLET ORAL at 21:13

## 2018-04-02 RX ADMIN — SPIRONOLACTONE 25 MG: 25 TABLET ORAL at 10:00

## 2018-04-02 RX ADMIN — ATORVASTATIN CALCIUM 40 MG: 40 TABLET, FILM COATED ORAL at 21:13

## 2018-04-02 RX ADMIN — Medication 37.5 MCG: at 10:00

## 2018-04-02 RX ADMIN — METOPROLOL TARTRATE 12.5 MG: 25 TABLET, FILM COATED ORAL at 21:13

## 2018-04-02 RX ADMIN — FONDAPARINUX SODIUM 2.5 MG: 2.5 INJECTION, SOLUTION SUBCUTANEOUS at 17:32

## 2018-04-02 RX ADMIN — METOPROLOL TARTRATE 12.5 MG: 25 TABLET, FILM COATED ORAL at 10:00

## 2018-04-02 RX ADMIN — FERROUS SULFATE 325 MG: 325 TABLET, FILM COATED ORAL at 12:14

## 2018-04-02 RX ADMIN — PANTOPRAZOLE SODIUM 40 MG: 40 TABLET, DELAYED RELEASE ORAL at 10:01

## 2018-04-02 RX ADMIN — POTASSIUM CHLORIDE 20 MEQ: 750 TABLET, EXTENDED RELEASE ORAL at 00:38

## 2018-04-02 RX ADMIN — THERA TABS 1 TABLET: TAB at 10:00

## 2018-04-02 NOTE — PROGRESS NOTES
Calorie Counts  Intake recorded for: 4/1       Kcal: 1106 Protein: 39g  # Meals recorded: 3 meals (First: 100% yogurt, 75% omelet w/onions, green peppers, mushrooms, spinach, tomatoes, 50% apple juice      (Second: 100% yogurt, ice cream, apple juice, 75% pears)      (Third: 100% peaches, apple juice, 50% crumbed cod)  # Supplements recorded: 0

## 2018-04-02 NOTE — PLAN OF CARE
Problem: Patient Care Overview  Goal: Plan of Care/Patient Progress Review  Outcome: No Change  7407-5329: Afebrile. OVSS on RA, pt wearing CPAP when sleeping. Pt denies pain. Pt on low fat diet and calorie counts, pt was able to eat some dinner, no complaints of nausea. Pt also on fluid restriction of 2L. BS checks AC and HS. BS check around bedtime was 140, no insulin needed per order parameters. Double lumen midline SL. Pt voiding adequate amounts, using commode at the bedside. No BM this shift, pt is passing flatus. +2-+3 pitting edema in BUE and BLE. Pt up with SBA. Call light in reach. Will continue to monitor and follow plan of care.

## 2018-04-02 NOTE — PROVIDER NOTIFICATION
04/02/18 1300   Call Information   Date of Call 04/02/18   Time of Call 1315   Name of person requesting the team Yesi   Title of person requesting team RN   RRT Arrival time 1317   Time RRT ended 1338   Reason for call   Type of RRT Adult   Primary reason for call Respiratory;Fluid status   Respiratory O2sat less than 88% for greater than 5 minutes despite O2   Fluid status Intake greater than output   Was patient transferred from the ED, ICU, or PACU within last 24 hours prior to RRT call? No   SBAR   Situation Sats in 70's on CPAP 30%.   Background SOB, sudden decline in O2 saturations.    Notable History/Conditions Cancer;Cardiac;Diabetes   Assessment Pt arouses to voice, following commands, hands are cold and dusky, difficult to get proper saturations monitoring.   Interventions Other (describe);Labs  (temporarily increased FiO2 to 100% and put pt on BIPAP setti)   Patient Outcome   Patient Outcome Stabilized on unit   RRT Team   Attending/Primary/Covering Physician Nevaeh Rome MD   Date Attending Physician notified 04/02/18   Time Attending Physician notified 1315   Physician(s) Rosaura Streeter MD    Lead RN Doris Key   RT Rasheed Bazzi   Post RRT Intervention Assessment   Post RRT Assessment Stable/Improved   Date Follow Up Done 04/02/18   Time Follow Up Done 1600   Comments Continues to need CPAP

## 2018-04-02 NOTE — PLAN OF CARE
Problem: Patient Care Overview  Goal: Plan of Care/Patient Progress Review  PT: CX PT today. Pt RN in with pt needing time to finish procedure.

## 2018-04-02 NOTE — PROGRESS NOTES
"VASCULAR SURGERY PROGRESS NOTE    Subjective:  Pt found sleeping soundly with BIPAP in place. Patient denies any new or worsening symptoms in her LE. Pt denies RLE edema at baseline. Pt denies pain in the RLE.     Objective:  Intake/Output Summary (Last 24 hours) at 04/02/18 0827  Last data filed at 04/02/18 0730   Gross per 24 hour   Intake              870 ml   Output             5150 ml   Net            -4280 ml     Labs:  ROUTINE IP LABS (Last four results)  BMP  Recent Labs  Lab 04/02/18  0734 04/01/18  2146 04/01/18  0642 03/31/18  1834    137 140 138   POTASSIUM 4.1 3.8 3.4 4.2   CHLORIDE 94 94 100 100   CARLY 9.2 8.9 8.0* 9.2   CO2 37* 38* 33* 32   BUN 16 14 13 12   CR 0.86 0.80 0.78 0.84   * 138* 116* 152*     CBC  Recent Labs  Lab 04/01/18  0642 03/31/18  0832 03/30/18  1349 03/30/18  0711 03/29/18  0415   WBC 5.4 5.7  --  6.8 7.0   RBC 2.16* 2.27*  --  2.29* 2.04*   HGB 7.7* 8.0* 8.8* 8.1* 7.8*   HCT 26.2* 27.1*  --  27.4* 24.7*   * 119*  --  120* 121*   MCH 35.6* 35.2*  --  35.4* 38.2*   MCHC 29.4* 29.5*  --  29.6* 31.6   RDW 22.0* 22.2*  --  22.8* 23.3*    160  --  154 131*     INR  Recent Labs  Lab 04/01/18  0642 03/31/18  0832 03/30/18  0711 03/29/18  0415   INR 1.61* 1.61* 1.59* 1.66*     PHYSICAL EXAM:  BP (!) 88/58 (BP Location: Left arm)  Pulse 75  Temp 97.6  F (36.4  C) (Axillary)  Resp 18  Ht 1.575 m (5' 2\")  Wt 88.1 kg (194 lb 4.8 oz)  SpO2 100%  BMI 35.54 kg/m2  General: The patient is alert and oriented. Appropriate. No acute disctress  Psych: pleasant affect, answers questions appropriately  Skin: Color appropriate for race, warm, dry.  Extremities: DP and PT pulses triphasic and audible by doppler,  +2 edema noted. Right leg significantly more swollen than left. Right great toe with 2cm dry gangrene, no erythema or drainage betadine paint.     EXAMINATION: DOPPLER VENOUS ULTRASOUND OF THE RIGHT LOWER EXTREMITY,  4/1/2018 1:51 PM      COMPARISON: " 3/30/2017     HISTORY: Significant swelling, rule out DVT     TECHNIQUE:  Gray-scale evaluation with compression, spectral flow, and  color Doppler assessment of the deep venous system of the right leg  from groin to knee, and then at the ankle.     FINDINGS:  In the right lower extremity, the common femoral, femoral, popliteal  and posterior tibial veins demonstrate normal compressibility and  blood flow. Calf edema. Pulsatile flow throughout.       IMPRESSION: No evidence of right lower extremity deep venous  thrombosis.     I have personally reviewed the examination and initial interpretation  and I agree with the findings.     BEREKET DANIELS MD    ASSESSMENT:  76 yo female with no signs of peripheral arterial disease based on recent normal RADHA and triphasic signals, chronic wound on Right great toe due to embolic event during cardiac surgery.  Patient follows with podiatry and should follow up with them out patient also.      PLAN:  RLE venous U/S shows no DVT  RADHA with toe pressures ordered to evaluate for small vessel disease (use 2nd toe on right foot)  Continue aspirin and statin       Susan Tabor PA-C   Division of Vascular Surgery   [facility]   Pager: [phone]

## 2018-04-02 NOTE — PROGRESS NOTES
[facility]    Internal Medicine Progress Note - [facility] Service    Main Plans for Today   - diuretics holiday today due to soft BP and worsening contraction alkalosis  - rapid response was called in the afternoon due to concern for hypoxia. Patient told the nurse that she felt SOB. Her O2 sat on CPAP FiO2 30% was low 80%. Increased to BIPAP titrated FiO2 to 80% with O2 sat 88% from the  ear lobe probe. MD phelps bedside. Patient was not in distress. Lungs--mild crackles BLL, no wheezing. After warming her hand--the finger probe got a good wave form with O2sat 100%. Able to wean down FiO2 to CPAP 30% and O2 sat 100%. VBG showed compensated metabolic alkalosis. CXR showed increased bibasilar opacities likely from atelectasis. Her low O2 reading this time is likely from poor wave form from poor peripheral perfusion. Will continue weaning her O2, keep O2 sat >92%.  - RADHA--->normal    Assessment & Plan   Carlos Alberto Fenton is a 77 year old female with PMH of HTN, HLD, DM, CVA (11/2016), CHF (last ECHO 12/12/17 EF 45-50%), CAD (s/p 3v CABG: LIMA-LAD, SVG-D1, SVG-dRCA and modified MAZE, ABDIEL ligation - 2/2016), paroxysmal AFib ([id number] - 8 on Xarelto), HIT, RUE DVT, recently diagnosed hypothyroidism, admitted 3/21/2018 for acute GIB, coagulopathy and concern for acute LI 2/2 acetaminophen toxicity with xarelto intake. Now with improved coagulopathy, liver tests, resolved GIB. 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  She has intermittently elevated lactic acids that fluctuates without intervention. Now remains inpt for decompensated HFrEF, which is improving with diuresis.      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  --Stopped xarelto   --Hematology consult to determine safest anticoagulation for stroke prevention in setting of [id number] 8. Per Heme, need to be on fondaparinaux. Cardiology ok with starting Fondaparinaux on April 1. Needs Heme f/u in 2-3 months.  --Fondaparinox started on 4/1 for stroke prevention  --started metoprolol 12.5 mg BID for episodes of RVR - may increase given still intermittent RVR episodes   --D/C trazodone as prolongs QTc  --augmentin per EP 5 days s/p PPM (3/26-3/30)  --Restarted PTA aspirin 81mg daily   --Holding PTA bumex 2 mg BID on 4/2 due to soft BP and alkalosis after got extra 1 mg of bumex on 4/1  -BID BMP  --monitor I/Os      --Continue home spironolactone  --Needs afterload reduction (ACEinh), start as able  --fondiparinaux 2.5 mg daily    # Concern for PAD in UEs vs Reynaud phenomenon:  concern for PAD in UEs given exam showing signs of distal UE hypoperfusion (bluish discoloration of finger tips (R>L) and cool distal UEs). Per vascular surgery consult obtaining ABIs and also DVT U/S of LE that did not show LE DVT. RADHA on 4/2 was normal. Pt's exam today shows improved bluish discoloration of finger tips.       # Hypothyroidism  Recently diagnosed. On Levothyroxine. Last TSH 2/21 elevated to 88, T4 improved from 0.29 to 0.44.   -- increase levothyroxine to 37.5 from PTA dose of 25 mcg  -- Recheck TSH in 6 weeks.       Resolved issues:    # Lactic Acidosis, Resolved  Pt has had fluctuating LA since admission. Likely 2/2 poor perfusion of extremities (cyanotic and cool fingers, cools hands) d/t suspected PVD.Low suspicion for sepsis  given neg blood cultures, improving clinical status, no SIRS and fluctuating downtrending lactate levels without intervention    #?Acute on chronic hypoxic respiratory failure  Rapid response was called on 3/28 due to hypoxia--- difficulty getting O2 sat reading with O2 sat ~70-80s% on CPAP FiO2 100%.Pt is clinically unchanged, no increased WOB, no  SOB, and otherwise stable. Unable to get ABG due to soft tissue edema.  In the MICU an ABG was drawn that showed O2 sat at 409 (repeat on 3L of O2 was 166). So, low sats likely due to inaccurate reading.   --cont PTA bumex 2mg BID and spironolactone 25 mg daily  --BiPAP nightly  --If concern for hypoxia (based on low O2 sats and clinical evidence of respiratory distress), need to check ABG for confirmation.       # Coagulopathy, INR to 5, elevated PT and PTT  # Acetaminophen toxicity (elevated acetaminophen levels)  Pt states she was taking 3-4g tylenol per day for the past month for chronic back pain.   Pt previously on warfarin for Afib. She started xarelto as of Jan 2018. She states she was not taking warfarin and xarelto simultaneously. It is possible that her coagulopathy is occurring in the setting of xarelto. Per Heme, factor 7/10 decreased c/w vit K deficiency, no evidence of hepatic synthetic dysfunction. Her INR has continued to improve.  - NAC infusion D/C on 3/26 per GI.  --trend INR  --trend liver enzymes every couple of days   --homocysteine low, methylmalonic acid high  --Hep B and C panels negative  --F actin weakly positive; anti-mitochondial joseph neg and AMPARO neg      # Acute blood loss anemia (baseline 11-12 1 month prior, current hgb ~7) - now stable Hgb  Differential includes acute GI bleed (most likely). Hgb has been stable since admission and no evidence of bleeding seen.   Plan:  -- trend daily, transfuse <7  -- continue PPI BID   -- folate WNL, B12 WNL, transferrin WNL, iron WNL, haptoglobin, reticulocyte index 2 indicating marrow appropriate response.  --Need Hematology f/u in 2-3 months to eval for macrocytic anemia        # HARINI on CKD -- resolved  Cr at bsl ~1.1-4. On presentation 1.8. UA with hyaline casts, tachy, hypotensive. Likely prerenal in the setting of GIB and decreased PO intake/emesis. Cr improved with IVF.   -- trend BMP      # Tropinemia, likely 2/2 demand ischemia 2/2  anemia  EKG did not show ST elevation, ST segment depression or T wave inversions. Pt is not having CP. Likely demand ischemia.          Chronic problems  # CAD  # HLD  Plan:  --cont ASA  --cont atorvastatin       # Depression  -- continue PTA venlafaxine      # Insominia  -holding trazodone in lieu of prolonged QTc      # Pain Assessment:  Current Pain Score 4/2/2018   Patient currently in pain? yes   Pain score (0-10) -   Pain location Back   Pain descriptors Aching   CPOT pain score -   [facility] s pain level was assessed and she currently denies pain.      Diet: Low Saturated Fat Na <2400 mg  Calorie Counts  Snacks/Supplements Adult: Ensure Clear; Between Meals  Room Service  Fluid restriction 2000 ML FLUID  Fluids: none  DVT Prophylaxis: Anti-embolisim stockings (TEDs)  Code Status: Full Code    Disposition Plan   Expected discharge: 1-2 days recommended to transitional care unit once pt had been adequately diuresed.   Entered: Rosaura Streeter 04/02/2018, 12:24 PM   Information in the above section will display in the discharge planner report.      The patient's care was discussed with the Attending Physician, Dr. Rome.    Rosaura Streeter  [facility]  Maroon: 1  Pager: [phone]  Please see sticky note for cross cover information    Physician Attestation  I, Nevaeh Rome MD, saw this patient with the resident and agree with the resident s findings and plan of care as documented in the resident s note with my edits.     I personally reviewed vital signs, medications, labs and imaging.    Nevaeh Rome MD  Date of Service (when I saw the patient): 4/2/18            Interval History   Nursing notes reviewed. BP overnight was softer to ~90/60s. Her urine output was net negative 3.4L after extra 1 mg of bumex yesterday. She feels overall good. Denies chest pain, SOB. Continues to feel tense both legs. Good appetite.       Physical Exam   Vital Signs: Temp: 97.6  F (36.4  C) Temp src: Oral BP: 90/68 Pulse: 75  Heart Rate: 70 Resp: 20 SpO2: 100 % O2 Device: Nasal cannula Oxygen Delivery: 3 LPM  Weight: 194 lbs 4.8 oz  Gen: In no acute distress. Patient comfortably lying in bed  HEENT: No scleral icterus, Moist mucous membranes. No nasal discharge.  CV: Irregular rate, no murmurs or rubs detected  Resp: Clear to auscultation bilaterally. Without wheeze or crackles  Abdomen: Soft, non tender abdomen, normal active bowel sounds   Extremities: Pitting edema 2+, bilaterally with anasarca -- improving  Skin: bruises on back do not show rebleeding  Neuro: mentation in tact, no deficits noted  Oriented to conversation      Intake/Output Summary (Last 24 hours) at 04/01/18 1643  Last data filed at 04/01/18 1600   Gross per 24 hour   Intake              870 ml   Output             2825 ml   Net            -1955 ml       Data   Medications     Injection Device for insulin         fondaparinux  2.5 mg Subcutaneous Q24H     aspirin  81 mg Oral QAM     spironolactone  25 mg Oral Daily     sodium chloride (PF)  10 mL Intracatheter Q8H     multivitamin, therapeutic  1 tablet Oral Daily     metoprolol tartrate  12.5 mg Oral BID     pantoprazole  40 mg Oral BID AC     potassium chloride  40 mEq Oral Daily     polyethylene glycol  17 g Oral Daily     melatonin  3 mg Oral At Bedtime     lidocaine  1 patch Transdermal Q24H     lidocaine   Transdermal Q24h     lidocaine   Transdermal Q8H     atorvastatin  40 mg Oral At Bedtime     insulin aspart  0.5-2.5 Units Subcutaneous TID AC     insulin aspart  0.5-2.5 Units Subcutaneous At Bedtime     venlafaxine  150 mg Oral Daily with breakfast     ferrous sulfate  325 mg Oral Daily with breakfast     levothyroxine  37.5 mcg Oral QAM AC     Data     Recent Labs  Lab 04/02/18  0734 04/01/18  2146 04/01/18  0642  03/31/18  0832 03/30/18  1349 03/30/18  0711 03/29/18  0415   WBC  --   --  5.4  --  5.7  --  6.8 7.0   HGB  --   --  7.7*  --  8.0* 8.8* 8.1* 7.8*   MCV  --   --  121*  --  119*  --  120* 121*    PLT  --   --  160  --  160  --  154 131*   INR  --   --  1.61*  --  1.61*  --  1.59* 1.66*    137 140  < > 138  --  138 139   POTASSIUM 4.1 3.8 3.4  < > 3.6  --  4.0 3.6   CHLORIDE 94 94 100  < > 100  --  103 103   CO2 37* 38* 33*  < > 31  --  28 27   BUN 16 14 13  < > 12  --  14 12   CR 0.86 0.80 0.78  < > 0.73  --  0.73 0.74   ANIONGAP 5 4 7  < > 7  --  7 10   CARLY 9.2 8.9 8.0*  < > 8.6  --  9.3 8.8   * 138* 116*  < > 89  --  112* 91   ALBUMIN  --   --   --   --   --   --  2.7* 2.6*   PROTTOTAL  --   --   --   --   --   --  6.1* 5.6*   BILITOTAL  --   --   --   --   --   --  1.9* 1.8*   ALKPHOS  --   --   --   --   --   --  215* 176*   ALT  --   --   --   --   --   --  55* 57*   AST  --   --   --   --   --   --  100* 108*   < > = values in this interval not displayed.  Recent Results (from the past 24 hour(s))   US Lower Extremity Venous Duplex Right    Narrative    EXAMINATION: DOPPLER VENOUS ULTRASOUND OF THE RIGHT LOWER EXTREMITY,  4/1/2018 1:51 PM     COMPARISON: 3/30/2017    HISTORY: Significant swelling, rule out DVT    TECHNIQUE:  Gray-scale evaluation with compression, spectral flow, and  color Doppler assessment of the deep venous system of the right leg  from groin to knee, and then at the ankle.    FINDINGS:  In the right lower extremity, the common femoral, femoral, popliteal  and posterior tibial veins demonstrate normal compressibility and  blood flow. Calf edema. Pulsatile flow throughout.        Impression    IMPRESSION: No evidence of right lower extremity deep venous  thrombosis.    I have personally reviewed the examination and initial interpretation  and I agree with the findings.    BEREKET DANIELS MD

## 2018-04-02 NOTE — SIGNIFICANT EVENT
Rapid response called at 1314 regarding hypoxia (O2 sats 70-80%) despite use of CPAP. Patient lethargic, but aroused easily to voice. RR 22. CPAP setting at 30% FiO2. See note by Doris Morales RN and VS flowsheet for details of RRT. Patient is now stable with O2 sats 98-99% on CPAP with FiO2 30%. Orders for port CXR, LA, and VBG. Daughter, Lindsay, updated by phone with patient's consent. Will await results and notify team with changes/concerns.

## 2018-04-02 NOTE — PROGRESS NOTES
Social Work Services Progress Note    Hospital Day: 14  Date of Initial Social Work Evaluation:  3/27/18  Collaborated with:  Lindsay Sethi and Erna (admissions [facility] [phone])    Data:  Patient is in need of TCU at discharge    Intervention:  [place] from Dr Streeter patient should be medically stable on Wednesday.  Spoke with Erna in admissions at [facility], she indicated they should have a bed available for patient.  Informed Lindsay of above conversations and discussed cost of transportation which Lindsay is in agreement with.    Assessment:  Patient will need wheelchair transporation arranged at the time of discharge.    Plan:    Anticipated Disposition:  Facility:  [facility]    Barriers to d/c plan:  Medical stability    Follow Up:  SW will continue to follow.    DAMIAN Pineda, [facility]  Pager: [phone]  Phone: [phone]

## 2018-04-02 NOTE — PROVIDER NOTIFICATION
Guillermo cross-cover paged to follow up on results of CXR and VBG. She states primary team reviewed results prior to sign out. Writer notified MD that patient was taken off CPAP and is requiring 3L O2 to maintain sats. She would like to be notified if patient is requiring more O2 and for patient to be placed on CPAP and have additional VBG if oxygen requirements increase. Will continue to monitor.

## 2018-04-02 NOTE — PLAN OF CARE
Problem: Patient Care Overview  Goal: Plan of Care/Patient Progress Review  AVSS; controlled Afib; denies pain but reports MADDEN;  Spo2> 92%; on CPAP when sleeping;  Up to commode at bedside with assist of one; voids spont w/o difficulty; generalized edema; bilateral fingers cyanotic/dusky and cool to touch; BLE  + dopplers; discharge pending bed availability at TCU;  continue to monitor and with POC.

## 2018-04-02 NOTE — PLAN OF CARE
Problem: Patient Care Overview  Goal: Plan of Care/Patient Progress Review  OT: Cancel, Pt refusing during AM due to fatigue and also refusing in the PM, will reschedule.

## 2018-04-02 NOTE — PLAN OF CARE
"Problem: Patient Care Overview  Goal: Plan of Care/Patient Progress Review  Outcome: Declining  /64 (BP Location: Left arm)  Pulse 70  Temp 97.7  F (36.5  C) (Oral)  Resp 18  Ht 1.575 m (5' 2\")  Wt 88.1 kg (194 lb 4.8 oz)  SpO2 94%  BMI 35.54 kg/m2     1825-6351: Afebrile, RR 8-20, BPs soft (min 80/50s), HR 60-70s, maintaining O2 on 4+L NC or CPAP. RRT called this afternoon d/t hypoxia despite use of CPAP. VBG and CXR obtained. Guillermo cross-cover paged at ~1730 this evening as patient's O2 needs increased. Per conversation with cross-cover earlier, would like to get repeat VBG. Questioned if providers would consider transfer to higher level of care for closer monitoring due to respiratory decline. Discussed POC with on-coming cross-cover at ~1930. Reported continued need for increased O2. Satting 95%+ on 4-5LPM. Lethargic and using CPAP this evening as well. RT to obtain ABG, but was unsuccessful. VBG and second CXR obtained with evidence of increased pulmonary edema. One time dose 2mg PO bumex ordered at ~2100. Asenath is lethargic, but arouses easily to voice. She states she feels better with the CPAP on as she is able to get her breath. On telemetry in rate-controlled A. Fib with occasional PVCs. L chest pacemaker dressing intact. Patient reports chronic lower back pain, lidocaine patch in place from 6332-6700. Denies nausea. Low fat diet with 2L fluid restriction and calorie counts. , 140, 109- corrected per SSI orders. Marginal UOP today, bumex was  Initially held today d/t low BPs. However, dose administered this evening following CXR results. Continue to monitor UOP closely. Continued generalized edema 3+. L foot wounds JORGE. R heel wound JORGE. Dusky, cool extremities. Requires doppler to assess pedal pulses. Request for team to place WOC consult. Vascular surgery following. L midline SL. Up with 1 assist. Up to chair during meals. Encouraged pulmonary toilet. Will continue to monitor and " notify team with changes/concerns.

## 2018-04-03 ENCOUNTER — APPOINTMENT (OUTPATIENT)
Dept: OCCUPATIONAL THERAPY | Facility: CLINIC | Age: 78
DRG: 813 | End: 2018-04-03
Attending: INTERNAL MEDICINE
Payer: MEDICARE

## 2018-04-03 LAB
ANION GAP SERPL CALCULATED.3IONS-SCNC: 8 MMOL/L (ref 3–14)
BACTERIA SPEC CULT: NO GROWTH
BUN SERPL-MCNC: 18 MG/DL (ref 7–30)
CALCIUM SERPL-MCNC: 9.4 MG/DL (ref 8.5–10.1)
CHLORIDE SERPL-SCNC: 96 MMOL/L (ref 94–109)
CO2 SERPL-SCNC: 34 MMOL/L (ref 20–32)
CREAT SERPL-MCNC: 0.95 MG/DL (ref 0.52–1.04)
GFR SERPL CREATININE-BSD FRML MDRD: 57 ML/MIN/1.7M2
GLUCOSE BLDC GLUCOMTR-MCNC: 135 MG/DL (ref 70–99)
GLUCOSE BLDC GLUCOMTR-MCNC: 145 MG/DL (ref 70–99)
GLUCOSE BLDC GLUCOMTR-MCNC: 164 MG/DL (ref 70–99)
GLUCOSE BLDC GLUCOMTR-MCNC: 99 MG/DL (ref 70–99)
GLUCOSE SERPL-MCNC: 101 MG/DL (ref 70–99)
MAGNESIUM SERPL-MCNC: 2.1 MG/DL (ref 1.6–2.3)
MAGNESIUM SERPL-MCNC: 2.2 MG/DL (ref 1.6–2.3)
POTASSIUM SERPL-SCNC: 4.7 MMOL/L (ref 3.4–5.3)
SODIUM SERPL-SCNC: 138 MMOL/L (ref 133–144)
SPECIMEN SOURCE: NORMAL

## 2018-04-03 PROCEDURE — A9270 NON-COVERED ITEM OR SERVICE: HCPCS | Mod: GY | Performed by: STUDENT IN AN ORGANIZED HEALTH CARE EDUCATION/TRAINING PROGRAM

## 2018-04-03 PROCEDURE — 80048 BASIC METABOLIC PNL TOTAL CA: CPT | Performed by: STUDENT IN AN ORGANIZED HEALTH CARE EDUCATION/TRAINING PROGRAM

## 2018-04-03 PROCEDURE — A9270 NON-COVERED ITEM OR SERVICE: HCPCS | Mod: GY | Performed by: HOSPITALIST

## 2018-04-03 PROCEDURE — 97530 THERAPEUTIC ACTIVITIES: CPT | Mod: GO | Performed by: OCCUPATIONAL THERAPIST

## 2018-04-03 PROCEDURE — 94660 CPAP INITIATION&MGMT: CPT

## 2018-04-03 PROCEDURE — 25000132 ZZH RX MED GY IP 250 OP 250 PS 637: Mod: GY

## 2018-04-03 PROCEDURE — 00000146 ZZHCL STATISTIC GLUCOSE BY METER IP

## 2018-04-03 PROCEDURE — 40000275 ZZH STATISTIC RCP TIME EA 10 MIN

## 2018-04-03 PROCEDURE — 25000132 ZZH RX MED GY IP 250 OP 250 PS 637: Mod: GY | Performed by: MARRIAGE & FAMILY THERAPIST

## 2018-04-03 PROCEDURE — 25000132 ZZH RX MED GY IP 250 OP 250 PS 637: Mod: GY | Performed by: STUDENT IN AN ORGANIZED HEALTH CARE EDUCATION/TRAINING PROGRAM

## 2018-04-03 PROCEDURE — 36592 COLLECT BLOOD FROM PICC: CPT | Performed by: STUDENT IN AN ORGANIZED HEALTH CARE EDUCATION/TRAINING PROGRAM

## 2018-04-03 PROCEDURE — 40000802 ZZH SITE CHECK

## 2018-04-03 PROCEDURE — 83735 ASSAY OF MAGNESIUM: CPT | Performed by: STUDENT IN AN ORGANIZED HEALTH CARE EDUCATION/TRAINING PROGRAM

## 2018-04-03 PROCEDURE — 25000128 H RX IP 250 OP 636: Performed by: STUDENT IN AN ORGANIZED HEALTH CARE EDUCATION/TRAINING PROGRAM

## 2018-04-03 PROCEDURE — 12000025 ZZH R&B TRANSPLANT INTERMEDIATE

## 2018-04-03 PROCEDURE — 99233 SBSQ HOSP IP/OBS HIGH 50: CPT | Mod: GC | Performed by: INTERNAL MEDICINE

## 2018-04-03 PROCEDURE — 40000133 ZZH STATISTIC OT WARD VISIT: Performed by: OCCUPATIONAL THERAPIST

## 2018-04-03 PROCEDURE — A9270 NON-COVERED ITEM OR SERVICE: HCPCS | Mod: GY

## 2018-04-03 PROCEDURE — 25000132 ZZH RX MED GY IP 250 OP 250 PS 637: Mod: GY | Performed by: HOSPITALIST

## 2018-04-03 PROCEDURE — 25000125 ZZHC RX 250: Performed by: STUDENT IN AN ORGANIZED HEALTH CARE EDUCATION/TRAINING PROGRAM

## 2018-04-03 RX ORDER — ACETAMINOPHEN 650 MG
TABLET, EXTENDED RELEASE ORAL 2 TIMES DAILY
Status: DISCONTINUED | OUTPATIENT
Start: 2018-04-03 | End: 2018-04-04 | Stop reason: HOSPADM

## 2018-04-03 RX ORDER — BUMETANIDE 2 MG/1
2 TABLET ORAL ONCE
Status: COMPLETED | OUTPATIENT
Start: 2018-04-03 | End: 2018-04-03

## 2018-04-03 RX ADMIN — THERA TABS 1 TABLET: TAB at 08:52

## 2018-04-03 RX ADMIN — FONDAPARINUX SODIUM 2.5 MG: 2.5 INJECTION, SOLUTION SUBCUTANEOUS at 16:51

## 2018-04-03 RX ADMIN — INSULIN ASPART 0.5 UNITS: 100 INJECTION, SOLUTION INTRAVENOUS; SUBCUTANEOUS at 18:03

## 2018-04-03 RX ADMIN — FERROUS SULFATE 325 MG: 325 TABLET, FILM COATED ORAL at 12:20

## 2018-04-03 RX ADMIN — POTASSIUM CHLORIDE 40 MEQ: 750 TABLET, EXTENDED RELEASE ORAL at 08:50

## 2018-04-03 RX ADMIN — POVIDONE-IODINE: 10 SOLUTION TOPICAL at 20:15

## 2018-04-03 RX ADMIN — METOPROLOL TARTRATE 12.5 MG: 25 TABLET, FILM COATED ORAL at 08:51

## 2018-04-03 RX ADMIN — ATORVASTATIN CALCIUM 40 MG: 40 TABLET, FILM COATED ORAL at 21:50

## 2018-04-03 RX ADMIN — BUMETANIDE 2 MG: 2 TABLET ORAL at 14:43

## 2018-04-03 RX ADMIN — ASPIRIN 81 MG CHEWABLE TABLET 81 MG: 81 TABLET CHEWABLE at 08:51

## 2018-04-03 RX ADMIN — LIDOCAINE 1 PATCH: 560 PATCH PERCUTANEOUS; TOPICAL; TRANSDERMAL at 08:52

## 2018-04-03 RX ADMIN — Medication 37.5 MCG: at 08:51

## 2018-04-03 RX ADMIN — INSULIN ASPART 0.5 UNITS: 100 INJECTION, SOLUTION INTRAVENOUS; SUBCUTANEOUS at 12:20

## 2018-04-03 RX ADMIN — VENLAFAXINE HYDROCHLORIDE 150 MG: 150 CAPSULE, EXTENDED RELEASE ORAL at 08:51

## 2018-04-03 RX ADMIN — PANTOPRAZOLE SODIUM 40 MG: 40 TABLET, DELAYED RELEASE ORAL at 08:51

## 2018-04-03 RX ADMIN — SPIRONOLACTONE 25 MG: 25 TABLET ORAL at 08:51

## 2018-04-03 RX ADMIN — METOPROLOL TARTRATE 12.5 MG: 25 TABLET, FILM COATED ORAL at 20:11

## 2018-04-03 RX ADMIN — MELATONIN 3 MG: 3 TAB ORAL at 20:11

## 2018-04-03 RX ADMIN — PANTOPRAZOLE SODIUM 40 MG: 40 TABLET, DELAYED RELEASE ORAL at 16:51

## 2018-04-03 NOTE — PROGRESS NOTES
Calorie Counts  Intake recorded for: 4/2 Kcals: 626  Protein: 25g  # Meals Recorded: 3 meals (First - 100% apple juice, cantaloupe, yogurt)      (Second - 50% crumb cod, mashed potatoes w/ gravy)      (Third - 100% ice cream, 50% yogurt, 25% mashed potatoes w/ gravy, less than 25% crumb cod, pineapple)  # Supplements Recorded: 0

## 2018-04-03 NOTE — PLAN OF CARE
"Problem: Patient Care Overview  Goal: Plan of Care/Patient Progress Review  Outcome: Improving  BP 98/78 (BP Location: Left arm)  Pulse 70  Temp 98.1  F (36.7  C) (Axillary)  Resp 20  Ht 1.575 m (5' 2\")  Wt 84.3 kg (185 lb 14.4 oz)  SpO2 93%  BMI 34 kg/m2     1357-6210: AVSS on 3L NC or CPAP. On tele in A. Fib. Patient reports LBP that is controlled with lido patch. Denies nausea. Low fat diet and 2L fluid restriction with fair appetite.  corrected per SSI. Voiding, received PO Bumex this afternoon. Reports LBM \"a couple days ago\", but states she does not feel constipated. L midline SL. R toe wounds JORGE, betadine applied by previous RN. L heel wound intact. Up with SBA. Plan to DC to TCU tomorrow. Will continue with POC, notify team with changes/concerns.          "

## 2018-04-03 NOTE — PROGRESS NOTES
CLINICAL NUTRITION SERVICES - REASSESSMENT NOTE     Nutrition Prescription      Malnutrition Status:    Non-severe malnutrition in the context of acute illness    Recommendations already ordered by Registered Dietitian (RD):  Ordered Boost Breeze with dinner meal daily.     Future/Additional Recommendations:  Encouraged patient to order/consume 3 meals/day.      EVALUATION OF THE PROGRESS TOWARD GOALS   Diet: Cardiac diet (<2400 mg, low saturated fat)     Intake: Patient and nursing reports good appetite/intake.     3 day calorie count average: 1002 kcal, 49 grams protein daily (meeting ~70% of assessed nutritional needs)     NEW FINDINGS   Patient using CPAP machine sitting up in bed during visit.  She reports she only uses this while she sleeps.     Weight up 6 kg from admission weight of 78.2 kg.     MALNUTRITION  % Intake: < 75% for > 7 days (non-severe)  % Weight Loss: None noted  Subcutaneous Fat Loss: None observed  Muscle Loss: None observed  Fluid Accumulation/Edema: Moderate (BLE)  Malnutrition Diagnosis: Non-severe malnutrition in the context of acute illness    Previous Goals   Total avg nutritional intake to meet a minimum of 25 kcal/kg and 1.2 g PRO/kg daily (per dosing wt 57 kg).  Evaluation: Not met    Previous Nutrition Diagnosis  Inadequate oral intake  Evaluation: Improving    CURRENT NUTRITION DIAGNOSIS  Inadequate oral intake related to prolonged hospital stay as evidenced by patient eating ~70% of assessed needs over the last 3 days per calorie counts.     INTERVENTIONS  Implementation  Medical food supplement therapy - Patient agreeable to getting Boost Breeze (250 kcal, 9 grams protein)    Goals  Patient to consume % of nutritionally adequate meal trays TID, or the equivalent with supplements/snacks.    Monitoring/Evaluation  Progress toward goals will be monitored and evaluated per protocol.    Livia Camacho MS, RD, LD  Pager [phone]

## 2018-04-03 NOTE — PROGRESS NOTES
See ABG results. Per Dr. Quispe turn FiO2 down to mimic RA. RT paged for recommendations and instructed writer to set FiO2 at 21%. Will continue to monitor.

## 2018-04-03 NOTE — PROGRESS NOTES
"../80 (BP Location: Left arm)  Pulse 72  Temp 97.6  F (36.4  C) (Axillary)  Resp 20  Ht 1.575 m (5' 2\")  Wt 88.1 kg (194 lb 4.8 oz)  SpO2 96%  BMI 35.54 kg/m2  Very pleasant,cooperative. Sat up in bed for breakfast and lunch. took am meds well. Pt resting off/on wearing her Cpap or  on 3 liters O2 NC and will need O2 ordered for discharge tomorrow. Pt c/o lower back pain which is decreased with pain patch on lower back. Rt toes black and MD ordered them to be cleaned with betadine. Pt has peripheral IV. Blood glucose check this morning before breakfast was normal but needed sliding scale for lunch. Continues on telemetry, pacemaker and has no c/o chest discomfort. Continue with plan of cares and medicate per orders.  "

## 2018-04-03 NOTE — PLAN OF CARE
Problem: Gastrointestinal Bleeding (Adult)  Goal: Signs and Symptoms of Listed Potential Problems Will be Absent, Minimized or Managed (Gastrointestinal Bleeding)  Signs and symptoms of listed potential problems will be absent, minimized or managed by discharge/transition of care (reference Gastrointestinal Bleeding (Adult) CPG).   Outcome: Improving  Afeb. 's/80's-50's. Remains in A-fib. Difficult to get an O2 sat on d/t poor circulation. On BiPAP overnight. Voided 900 cc. No c/o pain or nausea. Her only complaint was that of being cold. Appeared to slept well overnight.

## 2018-04-03 NOTE — PROGRESS NOTES
[facility], [place]    Internal Medicine Progress Note - [facility] Service    Main Plans for Today   - start Bumex 2 mg this afternoon (got 1 dose last night)      Assessment & Plan   Carlos Alberto Fenton is a 77 year old female with PMH of HTN, HLD, DM, CVA (11/2016), CHF (last ECHO 12/12/17 EF 45-50%), CAD (s/p 3v CABG: LIMA-LAD, SVG-D1, SVG-dRCA and modified MAZE, ABDIEL ligation - 2/2016), paroxysmal AFib ([id number] - 8 on Xarelto), HIT, RUE DVT, recently diagnosed hypothyroidism, admitted 3/21/2018 for acute GIB, coagulopathy and concern for acute LI 2/2 acetaminophen toxicity with xarelto intake. Now with improved coagulopathy, liver tests, resolved GIB. 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  She has intermittently elevated lactic acids that fluctuates without intervention. Now remains inpt for decompensated HFrEF, which is improving with diuresis.      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  --Stopped xarelto   --Hematology consult to determine safest anticoagulation for stroke prevention in setting of [id number]. Per Heme, need to be on fondaparinaux. Cardiology ok with starting Fondaparinaux on April 1. Needs Heme f/u in 2-3 months.  --Fondaparinox started on 4/1 for stroke prevention  --started metoprolol 12.5 mg BID for episodes of RVR - may increase given still intermittent RVR episodes   --D/C trazodone as prolongs QTc  --completed augmentin per EP 5 days s/p PPM (3/26-3/30)  --Restarted PTA aspirin 81mg daily   --Restart bumex 2 mg on 4/3 (got 1 dose at night) plan resume PTA 2 mg BID at discharge  -BID BMP  --monitor I/Os      --Continue home spironolactone  --Needs afterload reduction (ACEinh), start as  able  --fondiparinaux 2.5 mg daily    # Concern for PAD in UEs vs Reynaud phenomenon:  concern for PAD in UEs given exam showing signs of distal UE hypoperfusion (bluish discoloration of finger tips (R>L) and cool distal UEs). Per vascular surgery consult obtaining ABIs and also DVT U/S of LE that did not show LE DVT. RADHA on 4/2 was normal. Pt's exam today shows improved bluish discoloration of finger tips.   - Podiatry referral outpatient per vascular surgery   - Vascular surgery sign off  - Wound care with Betadine BID    # Hypothyroidism  Recently diagnosed. On Levothyroxine. Last TSH 2/21 elevated to 88, T4 improved from 0.29 to 0.44.   -- increase levothyroxine to 37.5 from PTA dose of 25 mcg  -- Recheck TSH in 6 weeks.       Resolved issues:    # Lactic Acidosis, Resolved  Pt has had fluctuating LA since admission. Likely 2/2 poor perfusion of extremities (cyanotic and cool fingers, cools hands) d/t suspected PVD.Low suspicion for sepsis  given neg blood cultures, improving clinical status, no SIRS and fluctuating downtrending lactate levels without intervention    #?Acute on chronic hypoxic respiratory failure  Rapid response was called on 3/28 due to hypoxia--- difficulty getting O2 sat reading with O2 sat ~70-80s% on CPAP FiO2 100%.Pt is clinically unchanged, no increased WOB, no SOB, and otherwise stable. Unable to get ABG due to soft tissue edema.  In the MICU an ABG was drawn that showed O2 sat at 409 (repeat on 3L of O2 was 166). So, low sats likely due to inaccurate reading.   --cont PTA bumex 2mg BID and spironolactone 25 mg daily  --BiPAP nightly  --If concern for hypoxia (based on low O2 sats and clinical evidence of respiratory distress), need to check ABG for confirmation.       # Coagulopathy, INR to 5, elevated PT and PTT  # Acetaminophen toxicity (elevated acetaminophen levels)  Pt states she was taking 3-4g tylenol per day for the past month for chronic back pain.   Pt previously on warfarin  for Afib. She started xarelto as of Jan 2018. She states she was not taking warfarin and xarelto simultaneously. It is possible that her coagulopathy is occurring in the setting of xarelto. Per Heme, factor 7/10 decreased c/w vit K deficiency, no evidence of hepatic synthetic dysfunction. Her INR has continued to improve.  - NAC infusion D/C on 3/26 per GI.  --trend INR  --trend liver enzymes every couple of days   --homocysteine low, methylmalonic acid high  --Hep B and C panels negative  --F actin weakly positive; anti-mitochondial joseph neg and AMPARO neg      # Acute blood loss anemia (baseline 11-12 1 month prior, current hgb ~7) - now stable Hgb  Differential includes acute GI bleed (most likely). Hgb has been stable since admission and no evidence of bleeding seen.   Plan:  -- trend daily, transfuse <7  -- continue PPI BID   -- folate WNL, B12 WNL, transferrin WNL, iron WNL, haptoglobin, reticulocyte index 2 indicating marrow appropriate response.  --Need Hematology f/u in 2-3 months to eval for macrocytic anemia        # HARINI on CKD -- resolved  Cr at bsl ~1.1-4. On presentation 1.8. UA with hyaline casts, tachy, hypotensive. Likely prerenal in the setting of GIB and decreased PO intake/emesis. Cr improved with IVF.   -- trend BMP      # Tropinemia, likely 2/2 demand ischemia 2/2 anemia  EKG did not show ST elevation, ST segment depression or T wave inversions. Pt is not having CP. Likely demand ischemia.          Chronic problems  # CAD  # HLD  Plan:  --cont ASA  --cont atorvastatin       # Depression  -- continue PTA venlafaxine      # Insominia  -holding trazodone in lieu of prolonged QTc      # Pain Assessment:  Current Pain Score 4/3/2018   Patient currently in pain? no   Pain score (0-10) -   Pain location -   Pain descriptors -   CPOT pain score -   Asenat s pain level was assessed and she currently denies pain.      Diet: Low Saturated Fat Na <2400 mg  Room Service  Fluid restriction 2000 ML  FLUID  Snacks/Supplements Adult: Ensure Clear; Between Meals  Fluids: none  DVT Prophylaxis: Anti-embolisim stockings (TEDs)  Code Status: Full Code    Disposition Plan   Expected discharge: 1-2 days recommended to transitional care unit once pt had been adequately diuresed.   Entered: Rosaura Streeter 04/03/2018, 4:30 PM   Information in the above section will display in the discharge planner report.      The patient's care was discussed with the Attending Physician, Dr. Rome.    Rosaura Streeter  [facility]  Pager: [phone]  Please see sticky note for cross cover information      Interval History   Overnight, got 1 dose of bumex 2 mg due to increase WOB, but not with hypoxia per ABG. She reported felt good this morning. Denies chest pain, SOB, abdominal pain, legs pain. Continues to feel tight both legs.       Physical Exam   Vital Signs: Temp: (P) 98.1  F (36.7  C) Temp src: (P) Axillary BP: (P) 98/78 Pulse: 70 Heart Rate: (P) 72 Resp: (P) 20 SpO2: (P) 93 % O2 Device: (P) Nasal cannula Oxygen Delivery: (P) 3 LPM  Weight: 185 lbs 14.4 oz  Gen: In no acute distress. Patient comfortably lying in bed  HEENT: No scleral icterus, Moist mucous membranes. No nasal discharge.  CV: Irregular rate, no murmurs or rubs detected  Resp: Clear to auscultation bilaterally. Without wheeze or crackles  Abdomen: Soft, non tender abdomen, normal active bowel sounds   Extremities: Pitting edema 2+, bilaterally with anasarca -- improving  Skin: bruises on back do not show rebleeding  Neuro: mentation in tact, no deficits noted  Oriented to conversation      Intake/Output Summary (Last 24 hours) at 04/01/18 1643  Last data filed at 04/01/18 1600   Gross per 24 hour   Intake              870 ml   Output             2825 ml   Net            -1955 ml       Data   Medications     Injection Device for insulin         povidone-iodine   Topical BID     fondaparinux  2.5 mg Subcutaneous Q24H     aspirin  81 mg Oral QAM      spironolactone  25 mg Oral Daily     sodium chloride (PF)  10 mL Intracatheter Q8H     multivitamin, therapeutic  1 tablet Oral Daily     metoprolol tartrate  12.5 mg Oral BID     pantoprazole  40 mg Oral BID AC     potassium chloride  40 mEq Oral Daily     polyethylene glycol  17 g Oral Daily     melatonin  3 mg Oral At Bedtime     lidocaine  1 patch Transdermal Q24H     lidocaine   Transdermal Q24h     lidocaine   Transdermal Q8H     atorvastatin  40 mg Oral At Bedtime     insulin aspart  0.5-2.5 Units Subcutaneous TID AC     insulin aspart  0.5-2.5 Units Subcutaneous At Bedtime     venlafaxine  150 mg Oral Daily with breakfast     ferrous sulfate  325 mg Oral Daily with breakfast     levothyroxine  37.5 mcg Oral QAM AC     Data     Recent Labs  Lab 04/03/18  0803 04/02/18  2112 04/02/18  0734  04/01/18  0642  03/31/18  0832 03/30/18  1349 03/30/18  0711 03/29/18  0415   WBC  --   --   --   --  5.4  --  5.7  --  6.8 7.0   HGB  --   --   --   --  7.7*  --  8.0* 8.8* 8.1* 7.8*   MCV  --   --   --   --  121*  --  119*  --  120* 121*   PLT  --   --   --   --  160  --  160  --  154 131*   INR  --   --   --   --  1.61*  --  1.61*  --  1.59* 1.66*    135 137  < > 140  < > 138  --  138 139   POTASSIUM 4.7 5.0 4.1  < > 3.4  < > 3.6  --  4.0 3.6   CHLORIDE 96 95 94  < > 100  < > 100  --  103 103   CO2 34* 31 37*  < > 33*  < > 31  --  28 27   BUN 18 18 16  < > 13  < > 12  --  14 12   CR 0.95 0.93 0.86  < > 0.78  < > 0.73  --  0.73 0.74   ANIONGAP 8 9 5  < > 7  < > 7  --  7 10   CARLY 9.4 9.1 9.2  < > 8.0*  < > 8.6  --  9.3 8.8   * 189* 100*  < > 116*  < > 89  --  112* 91   ALBUMIN  --   --   --   --   --   --   --   --  2.7* 2.6*   PROTTOTAL  --   --   --   --   --   --   --   --  6.1* 5.6*   BILITOTAL  --   --   --   --   --   --   --   --  1.9* 1.8*   ALKPHOS  --   --   --   --   --   --   --   --  215* 176*   ALT  --   --   --   --   --   --   --   --  55* 57*   AST  --   --   --   --   --   --   --   --   100* 108*   < > = values in this interval not displayed.  Recent Results (from the past 24 hour(s))   XR Chest Port 1 View    Narrative    XR CHEST PORT 1 VW  4/2/2018 9:02 PM      HISTORY: worsening SOB;     COMPARISON: Same date at 1439 hrs.    FINDINGS: Single portable AP view of the chest semiupright. Support  devices in stable positions. Cardiac silhouette is enlarged, stable.  Pulmonary vasculature is indistinct. Perihilar and bibasilar opacities  are unchanged from prior. No significant pleural effusion, or  pneumothorax. Left atrial clip device. Cardiac monitoring device is  unchanged. Sternotomy wires.      Impression    IMPRESSION: Stable appearance of bibasilar subsegmental atelectasis  and/or consolidation. Mild perihilar opacities are stable.    I have personally reviewed the examination and initial interpretation  and I agree with the findings.    MANE LAWLER MD

## 2018-04-03 NOTE — PROGRESS NOTES
Approx 1900 crosscover call about Rapid Response for O2 sats 70s. RN endorses patient lethargic. Patient has apparently been on and off BiPAP based on O2 readings and not necessarily symptom changes. RN concern for oxygenation. Appears to have increasing oxygen requirements. RT at bedside endorses patient appears to be doing okay when back on Nasal Cannula 4LPM with O2 sats >90.  Evaluated patient at bedside and she endorses her breathing has not been particularly worse today, does feel improved seated at the bedside and feels like her legs are very edematous and heavy today. No chest pain. Exam with bilateral crackles, decreased breath sounds in bases, +2 pitting lower extremity edema. Mentating well. Patient's Lasix was held today due to concern for contraction alkalosis. I&O Net -60 today. Repeat VBG pCO2 improved from 59 to 53. Pending ABG and CXR. Will reevaluate and consider Lasix.

## 2018-04-03 NOTE — PROGRESS NOTES
VASCULAR SURGERY PROGRESS NOTE      SUBJECTIVE:  Patient laying in bed, denies pain at present.  Offers no specific complaints.     OBJECTIVE:  Vital signs:  Temp: 98.1  F (36.7  C) Temp src: Axillary BP: 100/56 Pulse: 73 Heart Rate: 73 Resp: 20 SpO2: 97 % O2 Device: BiPAP/CPAP Oxygen Delivery: 4 LPM      Intake/Output Summary (Last 24 hours) at 04/03/18 0801  Last data filed at 04/03/18 0659   Gross per 24 hour   Intake              550 ml   Output             1575 ml   Net            -1025 ml       Vitals:    03/31/18 0144 04/01/18 0101 04/02/18 0049   Weight: 92.2 kg (203 lb 4.8 oz) 91.2 kg (201 lb) 88.1 kg (194 lb 4.8 oz)       PHYSICAL EXAM:  NEURO/PSYCH: The patient is alert and oriented.  Appropriate.  Moves all extremities.    SKIN: warm and dry.  PULMONARY: On BiPAP, non-labored breathing   EXTREMITIES: doppler bilateral PT and DP, 2+ bilateral lower extremity edema, right worse than left, right great toe with 2 cm dry gangrene- no erythema or drainage, no signs of infection, right second toe tip dry gangrene, no erythema     BMP  Recent Labs  Lab 04/02/18  2112 04/02/18  0734 04/01/18  2146 04/01/18  1456 04/01/18  0642  03/28/18  2110    137 137  --  140  < > 138   POTASSIUM 5.0 4.1 3.8  --  3.4  < > 3.5   CHLORIDE 95 94 94  --  100  < > 103   CO2 31 37* 38*  --  33*  < > 27   BUN 18 16 14  --  13  < > 13   CR 0.93 0.86 0.80  --  0.78  < > 0.72   * 100* 138*  --  116*  < > 110*   MAG 2.2 2.3 2.3 2.6* 1.6  < > 1.8   PHOS  --   --   --   --   --   --  2.2*   < > = values in this interval not displayed.  CBC  Recent Labs  Lab 04/01/18  0642 03/31/18  0832 03/30/18  1349 03/30/18  0711 03/29/18  0415   WBC 5.4 5.7  --  6.8 7.0   HGB 7.7* 8.0* 8.8* 8.1* 7.8*    160  --  154 131*     INR/PTT  Recent Labs  Lab 04/01/18  0642 03/31/18  0832 03/30/18  0711 03/29/18  0415   INR 1.61* 1.61* 1.59* 1.66*     ABG  Recent Labs  Lab 04/02/18  2153 03/30/18  1140 03/29/18  1043 03/28/18  2116   PH  7.53* 7.46* 7.42 7.51*   PCO2 38 34* 42 35   PO2 159* 91 166* 409*   HCO3 32* 25 27 27     LFT  Recent Labs  Lab 03/30/18  0711 03/29/18  0415 03/28/18  2110 03/28/18  0745   * 108* 125* 63*   ALT 55* 57* 66* 41   ALKPHOS 215* 176* 181* 195*   BILITOTAL 1.9* 1.8* 1.4* 1.5*   ALBUMIN 2.7* 2.6* 2.8* 2.8*         RADHA RESULTS:  Exam: Bilateral lower extremity resting ankle brachial indices dated  4/2/2018 9:51 AM     Comparison study: 3/12/2018    Impression:      1. Right leg: Resting RADHA is 1.26 which is normal (No observed  evidence of increased cardiovascular risk or peripheral arterial  disease.)     2. Left leg: Resting RADHA is 1.23 which is normal (No observed evidence  of increased cardiovascular risk or peripheral arterial disease.) The  left TBI is 0.25, which is reduced. The absolute toe pressure on the  left was 27 mmHg.         ASSESSMENT:  77 year old female with so signs of PAD based on recent normal RADHA and triphasic signals, chronic wound on right great toe due to embolic event during cardiac surgery.    PLAN:  - paint right great toe and second toe with betadine BID  - no vascular intervention warranted at this time, normal ABIs done 4/2/2018  - continue ASA and statin  - follow up with podiatry as outpatient for continued wound care    Vascular surgery will sign off.  Please do not hesitate to contact us with any questions.       Meena WHATLEY, CNS  Division of Vascular Surgery  [facility]  Pager [phone]

## 2018-04-03 NOTE — PLAN OF CARE
Problem: Patient Care Overview  Goal: Plan of Care/Patient Progress Review    Discharge Planner OT   Patient plan for discharge: rehab  Current status: Pt CGA to min A for sit>stand. Ambulated to doorway and back with FWW and CGA, and tolerated 2 x 2 min bouts of standing exercise. Pt SOB, fatigued, and reporting mild dizziness with activity, on 4L O2, needing several rest breaks during activity. Unable to obtain waveform to measure spO2 this session.  Barriers to return to prior living situation: decreased ADL independence and activity tolerance  Recommendations for discharge: TCU  Rationale for recommendations: increase functional endurance and ADL independence       Entered by: Eyal Gallego 04/03/2018 3:42 PM

## 2018-04-03 NOTE — PLAN OF CARE
Problem: Patient Care Overview  Goal: Plan of Care/Patient Progress Review  PT: cancel. Per OT pt fatigued and declining PT for today.

## 2018-04-04 VITALS
BODY MASS INDEX: 34.21 KG/M2 | HEART RATE: 72 BPM | TEMPERATURE: 97.4 F | HEIGHT: 62 IN | OXYGEN SATURATION: 94 % | SYSTOLIC BLOOD PRESSURE: 109 MMHG | RESPIRATION RATE: 18 BRPM | DIASTOLIC BLOOD PRESSURE: 87 MMHG | WEIGHT: 185.9 LBS

## 2018-04-04 LAB
ALBUMIN SERPL-MCNC: 2.9 G/DL (ref 3.4–5)
ALP SERPL-CCNC: 271 U/L (ref 40–150)
ALT SERPL W P-5'-P-CCNC: 54 U/L (ref 0–50)
ANION GAP SERPL CALCULATED.3IONS-SCNC: 6 MMOL/L (ref 3–14)
AST SERPL W P-5'-P-CCNC: 77 U/L (ref 0–45)
BACTERIA SPEC CULT: NO GROWTH
BACTERIA SPEC CULT: NO GROWTH
BILIRUB SERPL-MCNC: 1.9 MG/DL (ref 0.2–1.3)
BUN SERPL-MCNC: 21 MG/DL (ref 7–30)
CALCIUM SERPL-MCNC: 9.2 MG/DL (ref 8.5–10.1)
CHLORIDE SERPL-SCNC: 95 MMOL/L (ref 94–109)
CO2 SERPL-SCNC: 35 MMOL/L (ref 20–32)
CREAT SERPL-MCNC: 0.96 MG/DL (ref 0.52–1.04)
ERYTHROCYTE [DISTWIDTH] IN BLOOD BY AUTOMATED COUNT: 22.2 % (ref 10–15)
GFR SERPL CREATININE-BSD FRML MDRD: 56 ML/MIN/1.7M2
GLUCOSE BLDC GLUCOMTR-MCNC: 130 MG/DL (ref 70–99)
GLUCOSE BLDC GLUCOMTR-MCNC: 131 MG/DL (ref 70–99)
GLUCOSE BLDC GLUCOMTR-MCNC: 132 MG/DL (ref 70–99)
GLUCOSE BLDC GLUCOMTR-MCNC: 89 MG/DL (ref 70–99)
GLUCOSE SERPL-MCNC: 91 MG/DL (ref 70–99)
HCT VFR BLD AUTO: 28.6 % (ref 35–47)
HGB BLD-MCNC: 8.4 G/DL (ref 11.7–15.7)
INR PPP: 1.61 (ref 0.86–1.14)
Lab: NORMAL
MCH RBC QN AUTO: 35.1 PG (ref 26.5–33)
MCHC RBC AUTO-ENTMCNC: 29.4 G/DL (ref 31.5–36.5)
MCV RBC AUTO: 120 FL (ref 78–100)
PLATELET # BLD AUTO: 184 10E9/L (ref 150–450)
POTASSIUM SERPL-SCNC: 4 MMOL/L (ref 3.4–5.3)
PROT SERPL-MCNC: 6.6 G/DL (ref 6.8–8.8)
RBC # BLD AUTO: 2.39 10E12/L (ref 3.8–5.2)
SODIUM SERPL-SCNC: 136 MMOL/L (ref 133–144)
SPECIMEN SOURCE: NORMAL
SPECIMEN SOURCE: NORMAL
WBC # BLD AUTO: 5.5 10E9/L (ref 4–11)

## 2018-04-04 PROCEDURE — 99239 HOSP IP/OBS DSCHRG MGMT >30: CPT | Mod: GC | Performed by: INTERNAL MEDICINE

## 2018-04-04 PROCEDURE — 25000132 ZZH RX MED GY IP 250 OP 250 PS 637: Mod: GY | Performed by: HOSPITALIST

## 2018-04-04 PROCEDURE — 40000802 ZZH SITE CHECK

## 2018-04-04 PROCEDURE — 25000132 ZZH RX MED GY IP 250 OP 250 PS 637: Mod: GY | Performed by: STUDENT IN AN ORGANIZED HEALTH CARE EDUCATION/TRAINING PROGRAM

## 2018-04-04 PROCEDURE — 40000275 ZZH STATISTIC RCP TIME EA 10 MIN

## 2018-04-04 PROCEDURE — A9270 NON-COVERED ITEM OR SERVICE: HCPCS | Mod: GY

## 2018-04-04 PROCEDURE — 85610 PROTHROMBIN TIME: CPT | Performed by: INTERNAL MEDICINE

## 2018-04-04 PROCEDURE — 00000146 ZZHCL STATISTIC GLUCOSE BY METER IP

## 2018-04-04 PROCEDURE — 85027 COMPLETE CBC AUTOMATED: CPT | Performed by: INTERNAL MEDICINE

## 2018-04-04 PROCEDURE — A9270 NON-COVERED ITEM OR SERVICE: HCPCS | Mod: GY | Performed by: STUDENT IN AN ORGANIZED HEALTH CARE EDUCATION/TRAINING PROGRAM

## 2018-04-04 PROCEDURE — 25000132 ZZH RX MED GY IP 250 OP 250 PS 637: Mod: GY

## 2018-04-04 PROCEDURE — A9270 NON-COVERED ITEM OR SERVICE: HCPCS | Mod: GY | Performed by: HOSPITALIST

## 2018-04-04 PROCEDURE — 80053 COMPREHEN METABOLIC PANEL: CPT | Performed by: INTERNAL MEDICINE

## 2018-04-04 PROCEDURE — 36592 COLLECT BLOOD FROM PICC: CPT | Performed by: INTERNAL MEDICINE

## 2018-04-04 PROCEDURE — 40000809 ZZH STATISTIC NO DOCUMENTATION TO SUPPORT CHARGE

## 2018-04-04 PROCEDURE — 94660 CPAP INITIATION&MGMT: CPT

## 2018-04-04 PROCEDURE — 25000132 ZZH RX MED GY IP 250 OP 250 PS 637: Mod: GY | Performed by: MARRIAGE & FAMILY THERAPIST

## 2018-04-04 RX ORDER — AMOXICILLIN 250 MG
1 CAPSULE ORAL 2 TIMES DAILY PRN
Qty: 100 TABLET | Refills: 0 | DISCHARGE
Start: 2018-04-04

## 2018-04-04 RX ORDER — GABAPENTIN 100 MG/1
100 CAPSULE ORAL 2 TIMES DAILY
Qty: 180 CAPSULE | Refills: 0 | DISCHARGE
Start: 2018-04-04

## 2018-04-04 RX ORDER — POLYETHYLENE GLYCOL 3350 17 G/17G
17 POWDER, FOR SOLUTION ORAL DAILY
Qty: 7 PACKET | Refills: 0 | DISCHARGE
Start: 2018-04-04

## 2018-04-04 RX ORDER — FERROUS SULFATE 325(65) MG
325 TABLET ORAL
Qty: 100 TABLET | Refills: 1 | Status: SHIPPED | OUTPATIENT
Start: 2018-04-04

## 2018-04-04 RX ORDER — FONDAPARINUX SODIUM 2.5 MG/.5ML
2.5 INJECTION SUBCUTANEOUS EVERY 24 HOURS
Qty: 3.5 SYRINGE | Refills: 11 | DISCHARGE
Start: 2018-04-04

## 2018-04-04 RX ORDER — ATORVASTATIN CALCIUM 40 MG/1
40 TABLET, FILM COATED ORAL DAILY
Qty: 90 TABLET | Refills: 1 | DISCHARGE
Start: 2018-04-04 | End: 2018-07-06

## 2018-04-04 RX ORDER — PANTOPRAZOLE SODIUM 40 MG/1
40 TABLET, DELAYED RELEASE ORAL 2 TIMES DAILY
Qty: 90 TABLET | Refills: 1 | DISCHARGE
Start: 2018-04-04

## 2018-04-04 RX ORDER — VENLAFAXINE HYDROCHLORIDE 150 MG/1
150 TABLET, EXTENDED RELEASE ORAL
Qty: 90 EACH | Refills: 1 | DISCHARGE
Start: 2018-04-04

## 2018-04-04 RX ORDER — ASPIRIN 81 MG/1
81 TABLET, CHEWABLE ORAL DAILY
Qty: 30 TABLET | Refills: 0 | DISCHARGE
Start: 2018-04-04

## 2018-04-04 RX ORDER — ACETAMINOPHEN 650 MG
TABLET, EXTENDED RELEASE ORAL
Qty: 118 ML | Refills: 0 | DISCHARGE
Start: 2018-04-04

## 2018-04-04 RX ORDER — LEVOTHYROXINE SODIUM 25 UG/1
37.5 TABLET ORAL
Qty: 45 TABLET | Refills: 0 | DISCHARGE
Start: 2018-04-04

## 2018-04-04 RX ORDER — MULTIVITAMIN,THERAPEUTIC
1 TABLET ORAL DAILY
Qty: 30 TABLET | Refills: 0 | DISCHARGE
Start: 2018-04-04

## 2018-04-04 RX ORDER — METOPROLOL TARTRATE 25 MG/1
12.5 TABLET, FILM COATED ORAL 2 TIMES DAILY
Qty: 60 TABLET | Refills: 0 | DISCHARGE
Start: 2018-04-04

## 2018-04-04 RX ORDER — SPIRONOLACTONE 25 MG/1
25 TABLET ORAL DAILY
Qty: 90 TABLET | Refills: 3 | DISCHARGE
Start: 2018-04-04

## 2018-04-04 RX ORDER — BUMETANIDE 2 MG/1
2 TABLET ORAL
Status: DISCONTINUED | OUTPATIENT
Start: 2018-04-04 | End: 2018-04-04 | Stop reason: HOSPADM

## 2018-04-04 RX ORDER — BUMETANIDE 2 MG/1
2 TABLET ORAL 2 TIMES DAILY
Qty: 180 TABLET | Refills: 3 | DISCHARGE
Start: 2018-04-04

## 2018-04-04 RX ADMIN — PANTOPRAZOLE SODIUM 40 MG: 40 TABLET, DELAYED RELEASE ORAL at 08:10

## 2018-04-04 RX ADMIN — VENLAFAXINE HYDROCHLORIDE 150 MG: 150 CAPSULE, EXTENDED RELEASE ORAL at 08:10

## 2018-04-04 RX ADMIN — THERA TABS 1 TABLET: TAB at 08:10

## 2018-04-04 RX ADMIN — POTASSIUM CHLORIDE 40 MEQ: 750 TABLET, EXTENDED RELEASE ORAL at 08:11

## 2018-04-04 RX ADMIN — BUMETANIDE 2 MG: 2 TABLET ORAL at 08:10

## 2018-04-04 RX ADMIN — ASPIRIN 81 MG CHEWABLE TABLET 81 MG: 81 TABLET CHEWABLE at 08:10

## 2018-04-04 RX ADMIN — LIDOCAINE 1 PATCH: 560 PATCH PERCUTANEOUS; TOPICAL; TRANSDERMAL at 08:10

## 2018-04-04 RX ADMIN — POVIDONE-IODINE: 10 SOLUTION TOPICAL at 08:12

## 2018-04-04 RX ADMIN — Medication 37.5 MCG: at 08:11

## 2018-04-04 RX ADMIN — FERROUS SULFATE 325 MG: 325 TABLET, FILM COATED ORAL at 11:55

## 2018-04-04 RX ADMIN — SPIRONOLACTONE 25 MG: 25 TABLET ORAL at 08:10

## 2018-04-04 RX ADMIN — METOPROLOL TARTRATE 12.5 MG: 25 TABLET, FILM COATED ORAL at 08:10

## 2018-04-04 NOTE — PLAN OF CARE
Problem: Patient Care Overview  Goal: Plan of Care/Patient Progress Review  Physical Therapy Discharge Summary    Reason for therapy discharge:    Discharged to transitional care facility.    Progress towards therapy goal(s). See goals on Care Plan in [facility] electronic health record for goal details.  Goals partially met.  Barriers to achieving goals:   discharge from facility.    Therapy recommendation(s):    Continued therapy is recommended.  Rationale/Recommendations:  pt requires continued therapy to strengthening for increased independence with mobility.

## 2018-04-04 NOTE — DISCHARGE SUMMARY
[facility], [place]    Internal Medicine Discharge Summary- [facility] Service    Date of Admission:  3/20/2018  Date of Discharge:  4/4/2018  Discharging Attending Provider: Suzy Dorantes MD  Discharge Team: Guillermo 1    Discharge Diagnoses   Liver injury 2/2 tylenol toxicity  Coagulopathy 2/2 liver injury  Tachy-israel arrhythmia  Acute on chronic systolic heart failure  Discrepancy of oxygen saturation reading and true arterial oxygen level  Acute on chronic hypoxic respiratory failure  Chronic toe gangrene  Raynaud phenomenon  Hypothyroidism  Presume acute blood loss anemia  HARINI on CKD  Troponemia  CAD s/p CABG  HLD  Depression  Insomnia    Follow-ups Needed After Discharge   Follow up with intermediate physician.  The following labs/tests are recommended: CMP, CBC, INR in 1 week.  Follow up with primary care provider in 2 weeks for post hospitalization follow up.  Follow up with GI in clinic in 2-3month. Referral made.   Follow up with EP cardiology in clinic in 1 week. Referral made.  Follow up with hematology in clinic in 3 months. Referral made.  Follow up with podiatry in 2 weeks. Referral made.     Hospital Course       Carlos Alberto Fenton is a 77 year old female with PMH of HTN, HLD, DM, CVA (11/2016), CHF (last ECHO 12/12/17 EF 45-50%), CAD (s/p 3v CABG: LIMA-LAD, SVG-D1, SVG-dRCA and modified MAZE, ABDIEL ligation - 2/2016), paroxysmal AFib ([id number] - 8 on Xarelto at admission), HIT, RUE DVT, recently diagnosed hypothyroidism, admitted 3/21/2018 for acute GI bleed, coagulopathy and concern for acute liver injury 2/2 acetaminophen toxicity with xarelto intake. Now with improved coagulopathy, liver tests, resolved GI bleed. Hospital course was c/b tachy-israel syndrome, and difficult to measure O2 sats (2/2 Raynaud). She is s/p permanent pacemaker for tachy-israel syndrome. She has intermittently elevated lactic acids that fluctuates without intervention. The patient was diuresed for  decompensated HFrEF, which is improved.     # Coagulopathy, INR to 5, elevated PT and PTT  # Acetaminophen toxicity (elevated acetaminophen levels)  Pt states she was taking 3-4g tylenol per day for the past month for chronic back pain.   Pt previously on warfarin for Afib. She started xarelto as of Jan 2018. She states she was not taking warfarin and xarelto simultaneously. It is possible that her coagulopathy is occurring in the setting of xarelto. Per Heme, factor 7/10 decreased c/w vit K deficiency, no evidence of hepatic synthetic dysfunction. Her INR has continued to improve. She was treated with a N acetylcysteine infusion for six days. Other workup of her acute liver injury included an autoimmune workup (F actin, anti-mitochondrial antibody and AMPARO) that was negative. Hepatitis B and C panels were negative. On discharge, her INR was 1.6. LFTs remained slightly elevated at AST 77, ALT 54, alkaline phosphatase 271, and T bili 1.9. We recommend outpatient GI followup with hepatology within a few months for re-evaluation.     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 We held anticoagulation for much of the admission given the patient presented with coagulopathy and then had PPM placed. She is not a candidate for heparin, given her previous history of HIT. Therefore, hematology recommended fondaparinaux 2.5 mg daily. We recommend continuing this, until the patient can follow up with hematology in 2-3 months. To control  her atrial fibrillation, we started the patient on metoprolol 12.5 mg BID. While, inpatient, following beta blockade for afib, the patient developed tachy-israel syndrome and had a permanent pacemaker placed on 3/26/2018. EP cardiology follow up outpatient.     # HFrEF, decompensated: TTE on 3/25: shows EF 40-45%  The patient developed volume overload on this admission. Admission wt was 78 kg and discharge weight was 84 kg. We are discharging the patient on her home diuretics (bumex 2mg BID, spironolactone 25 mg). Her TCU physician can start metolazone 2.5 mg prn (this was a PTA medication), if they believe it is needed. We recommend daily weights.      # Reynaud phenomenon:  concern for PAD in UEs given exam showing signs of distal UE hypoperfusion (bluish discoloration of finger tips (R>L) and cool distal UEs). Per vascular surgery consult obtaining ABIs and also DVT U/S of LE that did not show LE DVT. RADHA on 4/2 was normal. Pt's exam today shows improved bluish discoloration of finger tips.   - Podiatry referral outpatient per vascular surgery   - Vascular surgery sign off  - Wound care with Betadine BID    # Discrepancy of oxygen saturation reading and true arterial oxygen level  Patient has been having difficulty getting accurate reading of her oxygen saturation on the fingers, ear lobes, forehead throughout this admission. This is likely due to poor perfusion from Raynaud. Her PaO2 from ABG at room air was 91, while the reading from the probe was 80%. She can be on oxygen NC as needed for comfort, but she is not hypoxic and does not need oxygen at baseline. Her oxygen measurement should be only if she is symptomatic, and needs to warm her hand before checking. Do not check O2 sat at earlobe.      # Hypothyroidism  Recently diagnosed. On Levothyroxine. Last TSH 2/21 elevated to 88, T4 improved from 0.29 to 0.44.   -- increase levothyroxine to 37.5 from PTA dose of 25 mcg  -- Recheck TSH in 6 weeks.        Resolved issues:     # Lactic Acidosis, Resolved  Pt has had fluctuating LA since admission. Likely 2/2 poor perfusion of extremities (cyanotic and cool fingers, cools hands) d/t suspected PVD.Low suspicion for sepsis  given neg blood cultures, improving clinical status, no SIRS and fluctuating downtrending lactate levels without intervention      # Presumed acute blood loss anemia (baseline 11-12 1 month prior, current hgb ~7) - now stable Hgb  She presented with Hgb dropped 4 g from baseline 2-3 mo prior. No signs of bleeding per patient. Differential includes acute GI bleed (most likely in the setting of elevated INR). Hgb has been stable since admission and no evidence of bleeding seen. Workup included a folate WNL, B12 WNL, transferrin WNL, iron WNL, haptoglobin, reticulocyte index 2. We recommend continuing pantoprazole BID and also hematology f/u in 2-3 months to re-evaluate her macrocytic anemia. May need bone marrow biopsy outpatient if no improvement.       # HARINI on CKD -- resolved  Cr at bsl ~1.1-4. On presentation 1.8. UA with hyaline casts, tachy, hypotensive. Likely prerenal in the setting of GIB and decreased PO intake/emesis. Cr improved with IVF.       # Tropinemia, likely 2/2 demand ischemia 2/2 anemia, Resolved  EKG did not show ST elevation, ST segment depression or T wave inversions. Pt is not having CP. Likely demand ischemia.          Chronic problems  # CAD  # HLD  We continued the patient's ASA and atorvastatin.       # Depression  We continued the patient's PTA venlafaxine.      # Insomnia  We held the patient's trazodone in lieu of prolonged QTc (which resolved).     # Discharge Pain Plan: - Patient currently has NO PAIN and is not being prescribed pain medications on discharge.    Consultations This Hospital Stay   GI LUMINAL ADULT IP CONSULT  MEDICATION HISTORY IP PHARMACY CONSULT  CARDIOLOGY ELECTROPHYSIOLOGY (EP) IP CONSULT  HEMATOLOGY IP CONSULT  PHARMACY IP CONSULT  VASCULAR  ACCESS CARE ADULT IP CONSULT  NUTRITION SERVICES ADULT IP CONSULT  VASCULAR SURGERY ADULT IP CONSULT  PATIENT LEARNING CENTER IP CONSULT  PHYSICAL THERAPY ADULT IP CONSULT  OCCUPATIONAL THERAPY ADULT IP CONSULT     Code Status   Full Code       The patient was discussed with Dr. Dorantes.    Rosaura Streeter MD  [facility]  Pager: [phone]  ______________________________________________________________________    Physical Exam   Vital Signs: Temp: 97.4  F (36.3  C) Temp src: Axillary BP: 109/87 Pulse: 72 Heart Rate: 67 Resp: 18 SpO2: 94 % O2 Device: None (Room air) Oxygen Delivery: 3 LPM  Weight: 185 lbs 14.4 oz    Gen: In no acute distress. Patient comfortably sitting in chair. Breathing roomair.  HEENT: No scleral icterus, Moist mucous membranes. No nasal discharge.  CV: Irregular rate, no murmurs or rubs detected  Resp: Clear to auscultation bilaterally. Without wheeze or crackles  Abdomen: Soft, non tender abdomen, normal active bowel sounds   Extremities: Pitting edema 2+, bilaterally with anasarca -- improving  Skin: bruises on back do not show rebleeding  Neuro: mentation in tact, no deficits noted  Oriented to conversation    Significant Results and Procedures   Most Recent 3 CBC's:  Recent Labs   Lab Test  04/04/18   0730  04/01/18   0642  03/31/18   0832   WBC  5.5  5.4  5.7   HGB  8.4*  7.7*  8.0*   MCV  120*  121*  119*   PLT  184  160  160     Most Recent 3 BMP's:  Recent Labs   Lab Test  04/04/18   0730  04/03/18   0803  04/02/18   2112   NA  136  138  135   POTASSIUM  4.0  4.7  5.0   CHLORIDE  95  96  95   CO2  35*  34*  31   BUN  21 18  18   CR  0.96  0.95  0.93   ANIONGAP  6  8  9   CARLY  9.2  9.4  9.1   GLC  91  101*  189*     Most Recent 2 LFT's:  Recent Labs   Lab Test  04/04/18   0730  03/30/18   0711   AST  77*  100*   ALT  54*  55*   ALKPHOS  271*  215*   BILITOTAL  1.9*  1.9*     Most Recent 3 INR's:  Recent Labs   Lab Test  04/04/18   0730  04/01/18   0642  03/31/18   0832   INR   1.61*  1.61*  1.61*       Pending Results   These results will be followed up by primary care doctor.  Unresulted Labs Ordered in the Past 30 Days of this Admission     Date and Time Order Name Status Description    3/30/2018 1247 Blood culture Preliminary     3/30/2018 1247 Blood culture Preliminary     3/28/2018 2200 Blood culture Preliminary     3/28/2018 2200 Blood culture Preliminary              Primary Care Physician   Humberto Conner    Discharge Disposition   Discharged to short-term care facility  Condition at discharge: Stable    Discharge Orders     Podiatry/Foot & Ankle Surgery Referral     Cardiology Eval Adult Referral     GASTROENTEROLOGY ADULT REF CONSULT ONLY     Onc/Heme Adult Referral     General info for SNF   Length of Stay Estimate: Short Term Care: Estimated # of Days <30  Condition at Discharge: Stable  Level of care:skilled   Rehabilitation Potential: Fair  Admission H&P remains valid and up-to-date: Yes  Recent Chemotherapy: N/A  Use Nursing Home Standing Orders: Yes     Mantoux instructions   Give two-step Mantoux (PPD) Per Facility Policy Yes     Reason for your hospital stay   You were admitted to the hospital due to tylenol toxicity that causing liver injury. Toxicology and GI were consulted. Liver injury is recovering. During hospitalization, there was a discrepancy of your oxygen saturation reading and true oxygen level from blood gas due to poor vascular perfusion to the finger. Your oxygen level at room air is good, however, you can use oxygen as needed for comfort.     You also had episodes of slow-fast heart rate that required pacemaker placement. Due to liver injury, anticoagulation for afib option is limited. Hematology consulted and recommended Fondaparinux. You will continue this medication until follow up with hematology. You also found to have volume overloaded that required diuresis. This needs to be continued in TCU. Dry weight 175 lbs.     Glucose monitor nursing POCT    Before breakfast. If higher than 180, please notify provider.     Daily weights   Call Provider for weight gain of more than 2 pounds per day or 5 pounds per week.     Wound care (specify)   Site:   toe  Instructions:  Apply Betadine BID     Activity - Up with nursing assistance     Follow Up and recommended labs and tests   Follow up with alf physician.  The following labs/tests are recommended: CMP, CBC, INR in 1 week.  Follow up with primary care provider in 2 weeks for post hospitalization follow up.  Follow up with GI in clinic in 2 months. Referral made.   Follow up with EP cardiology in clinic in 1 week. Referral made.  Follow up with hematology in clinic in 3 months. Referral made.  Follow up with podiatry in 2 weeks. Referral made.     Full Code     Physical Therapy Adult Consult   Evaluate and treat as clinically indicated.    Reason:  Weakness, deconditioning.     Occupational Therapy Adult Consult   Evaluate and treat as clinically indicated.    Reason:  ADLs, weakness.     Oxygen - Nasal cannula   1-3 Lpm by nasal cannula to keep O2 sats 92% or greater or for comfort.    Please warm finger with heat pack before checking O2 sat due to poor vascular perfusion, may not get good wave form.  Do not check at ear lobe.     Oxygen Adult   [place] Oxygen Order 3 liter(s) by nasal cannula continuously with use of portable tank. Expected treatment length is indefinite (99 months).. Test on conserving device as applicable.    Patients who qualify for home O2 coverage under the CMS guidelines require ABG tests or O2 sat readings obtained closest to, but no earlier than 2 days prior to the discharge, as evidence of the need for home oxygen therapy. Testing must be performed while patient is in the chronic stable state. See notes for O2 sats.    I certify that this patient, Carlos Alberto Fenton has been under my care and that I, or a nurse practitioner or physician's assistant working with me, had a face-to-face  encounter that meets the face-to-face encounter requirements with this patient on 4/4/2018. The patient, Carlos Alberto Fenton was evaluated or treated in whole, or in part, for the following medical condition, which necessitates the use of the ordered oxygen. Treatment Diagnosis: CHF, acute hypoxic respiratory failure    Attending Provider: Dr. Dorantes  Physician signature: See electronic signature associated with these discharge orders  Date of Order: April 4, 2018     EP ICD/Pacemaker/Loop Recorder     Fall precautions     Advance Diet as Tolerated   Follow this diet upon discharge: Orders Placed This Encounter     Calorie Counts     Room Service     Fluid restriction 2000 ML FLUID     Snacks/Supplements Adult: Ensure Clear; Between Meals     Low Saturated Fat Na <2400 mg       Discharge Medications   Current Discharge Medication List      START taking these medications    Details   carvedilol (COREG) 3.125 MG tablet Take 1 tablet (3.125 mg) by mouth 2 times daily (with meals)  Qty: 180 tablet, Refills: 3    Associated Diagnoses: Chronic diastolic heart failure (H)         CONTINUE these medications which have NOT CHANGED    Details   ACETAMINOPHEN PO Take 500-1,000 mg by mouth every 6 hours as needed for pain      Nutritional Supplements (SILICA PO) Unknown strength. Patient takes daily for hair growth/health.      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 Resume Bumex the evening of 2/23.  Associated Diagnoses: Chronic systolic heart failure (H)      levothyroxine (SYNTHROID/LEVOTHROID) 25 MCG tablet Take 1 tablet (25 mcg) by mouth every morning (before breakfast)  Qty: 30 tablet, Refills: 0    Comments: Repeat thyroid level in 3-6 and follow up with PCP at that time  Associated Diagnoses: Hypothyroidism, unspecified type      Potassium Chloride ER 20 MEQ TBCR Take 1 tablet (20 mEq) by mouth 2 times daily  Qty: 90 tablet, Refills: 3    Associated Diagnoses: Chronic diastolic heart failure (H)      MELATONIN PO Take 1 mg by mouth nightly as needed       rivaroxaban ANTICOAGULANT (XARELTO) 20 MG TABS tablet Take 1 tablet (20 mg) by mouth daily (with dinner) . DO NOT start until you have stopped the Warfarin.  Qty: 90 tablet, Refills: 3    Comments: First dose Tuesday January 16th. Last dose of Warfarin 1/13.  Patient aware  Associated Diagnoses: Atrial fibrillation, unspecified type (H)      ALPRAZolam (XANAX) 0.25 MG tablet Take 1 tablet (0.25 mg) by mouth 3 times daily as needed for anxiety  Qty: 10 tablet, Refills: 0    Associated Diagnoses: Adjustment disorder with anxious mood      venlafaxine (EFFEXOR-ER) 150 MG TB24 24 hr tablet Take 1 tablet (150 mg) by mouth daily (with breakfast)  Qty: 90 each, Refills: 1    Associated Diagnoses: Adjustment disorder with depressed mood      metolazone (ZAROXOLYN) 2.5 MG tablet Take 1 tablet (2.5 mg) by mouth daily as needed For weight over 170 Lbs.  Qty: 30 tablet, Refills: 1    Associated Diagnoses: Chronic diastolic heart failure (H)      spironolactone (ALDACTONE) 25 MG tablet Take 1 tablet (25 mg) by mouth daily  Qty: 90 tablet, Refills: 3    Associated Diagnoses: Chronic systolic heart failure (H)      atorvastatin (LIPITOR) 40 MG tablet Take 1 tablet (40 mg) by mouth daily  Qty: 90 tablet, Refills: 1    Associated Diagnoses: Coronary artery disease with angina pectoris, unspecified vessel or lesion type,  unspecified whether native or transplanted heart (H)      traZODone (DESYREL) 50 MG tablet Take 0.5 tablets (25 mg) by mouth nightly as needed for sleep  Qty: 45 tablet, Refills: 2    Associated Diagnoses: Primary insomnia      Elastic Bandages & Supports (ACE BANDAGE SELF-ADHERING) MISC 1 each 2 times daily  Qty: 6 each, Refills: 3    Associated Diagnoses: Open wound of lower limb, right, subsequent encounter      Gauze Pads & Dressings (KERLIX GAUZE ROLL MEDIUM) MISC 1 each 2 times daily  Qty: 48 each, Refills: 3    Associated Diagnoses: Open wound of lower limb, right, subsequent encounter      ONE TOUCH ULTRA test strip USE AS DIRECTED TO TEST ONE TIME A DAY  Qty: 100 each, Refills: 2    Associated Diagnoses: Type 2 diabetes mellitus without complication, without long-term current use of insulin (H)      Wound Dressings (ADAPTIC NON-ADHERING DRESSING) PADS Externally apply 1 each topically 2 times daily  Qty: 1 each, Refills: 3    Associated Diagnoses: Open wound of lower limb, right, subsequent encounter      order for DME Sterile 4x4 gauze; Use gauze twice daily for dressing changes.  Qty: 1 Box, Refills: 3    Associated Diagnoses: Open wound of lower limb, right, subsequent encounter      aspirin 81 MG chewable tablet Take 1 tablet (81 mg) by mouth daily  Qty: 30 tablet, Refills: 0    Associated Diagnoses: Coronary artery disease with angina pectoris, unspecified vessel or lesion type, unspecified whether native or transplanted heart (H)      ferrous sulfate (IRON SUPPLEMENT) 325 (65 FE) MG tablet Take 1 tablet (325 mg) by mouth daily (with breakfast)  Qty: 100 tablet, Refills: 1    Associated Diagnoses: S/P CABG (coronary artery bypass graft)      ONETOUCH DELICA LANCETS 33G MISC 1 Device daily  Qty: 100 each, Refills: 0    Associated Diagnoses: Type 2 diabetes mellitus without complication, without long-term current use of insulin (H)      blood glucose monitoring (ONE TOUCH ULTRA 2) meter device kit             Allergies   Allergies   Allergen Reactions     Blood Transfusion Related (Informational Only) Other (See Comments)     Patient has a history of a clinically significant antibody against RBC antigens.  A delay in compatible RBCs may occur.Patient has a history of a significant allergic transfusion reaction.  Consult with Blood Bank MD before ordering components.     Heparin      HIT/ thrombocytopenia     Lovenox [Enoxaparin] Other (See Comments)     Thrombocytopenia      Oxycodone      hallucinations     Toprol Xl [Metoprolol] Nausea and Vomiting     Adhesive Tape Itching and Rash     Diapers & Supplies Rash     Developed yeast infection from previous hospital stay

## 2018-04-04 NOTE — PROGRESS NOTES
"../87 (BP Location: Left arm)  Pulse 72  Temp 97.4  F (36.3  C) (Oral)  Resp 18  Ht 1.575 m (5' 2\")  Wt 84.3 kg (185 lb 14.4 oz)  SpO2 94%  BMI 34 kg/m2  Pt sat up for lunch then up to commode voiding and had 1 large stool. Back to bed resting waiting for her ride from [facility] at 1400. Dc's Midline and place an occulsive drsg over site. All personal items packed up and pt ready for her ride. Pt did not want to change into a clean gown. Pericare done and clean depends in place. Continue with plan of cares and medicate per orders.  "

## 2018-04-04 NOTE — PROGRESS NOTES
Social Work Services Discharge Note      Patient Name:  Carlos Alberto Fenton     Anticipated Discharge Date:  4/4/2018    Discharge Disposition:   TCU:  [facility]     Pre-Admission Screening (PAS) online form has been completed.  The Level of Care (LOC) is:  Determined  Confirmation Code is:  [id number]  Patient/caregiver informed of referral to [facility] for Pre-Admission Screening for skilled nursing facility (SNF) placement and to expect a phone call post discharge from SNF.     Additional Services/Equipment Arranged:  [facility] at 2pm wheelchair.  Oxygen arranged for transportation through [facility].     Patient / Family response to discharge plan:  In agreement with discharge plan.     Persons notified of above discharge plan:  Patient, daughter Erna Montoya in admission at [facility],  charge nurse, [facility] Coordinator Mojgan    Staff Discharge Instructions:  Please fax discharge orders and signed hard scripts for any controlled substances.  Please print a packet and send with patient.     CTS Handoff completed:  YES    Medicare Notice of Rights provided to the patient/family:      DAMIAN Pineda, [facility] CareSW  Pager: [phone]  Phone: [phone]

## 2018-04-04 NOTE — PLAN OF CARE
Problem: Patient Care Overview  Goal: Plan of Care/Patient Progress Review  Occupational Therapy Discharge Summary    Reason for therapy discharge:    Discharged to transitional care facility.    Progress towards therapy goal(s). See goals on Care Plan in [facility] electronic health record for goal details.  Goals partially met.  Barriers to achieving goals:   discharge from facility.    Therapy recommendation(s):    Continued therapy is recommended.  Rationale/Recommendations:  to progress safety and IND with ADL and functional mobility.

## 2018-04-04 NOTE — PLAN OF CARE
"Problem: Patient Care Overview  Goal: Plan of Care/Patient Progress Review  Outcome: No Change  /70 (BP Location: Left arm)  Pulse 70  Temp 97.4  F (36.3  C) (Axillary)  Resp 18  Ht 1.575 m (5' 2\")  Wt 84.3 kg (185 lb 14.4 oz)  SpO2 100%  BMI 34 kg/m2. Satting % on CPAP.  B. Pt. on telemetry & in A-fib.  No c/o's pain or nausea. Pt. up to commode with assist of 1 & voided 700cc, no stools this shift. Midline catheter saline locked. Call light in reach.  Pt. slept well between cares. Continue to follow POC & notify MD with changes.        "

## 2018-04-04 NOTE — PROGRESS NOTES
"..BP 91/50 (BP Location: Left arm)  Pulse 72  Temp 98  F (36.7  C) (Oral)  Resp 18  Ht 1.575 m (5' 2\")  Wt 84.3 kg (185 lb 14.4 oz)  SpO2 94%  BMI 34 kg/m2  Up to chair for breakfast and to take her am meds. Lungs clear, on Rm air, Sat > 93%.Stated she slept well. Does c/o lower back discomfort in which she wears a patch for pain and states this helps. Paged  and her team to find out the plan of cares for today. Betadine placed on rt foot/ 2 toes. Up to commode with assist of 1. Continue with plan of cares and medicate per orders.  "

## 2018-04-05 ENCOUNTER — CARE COORDINATION (OUTPATIENT)
Dept: CARE COORDINATION | Facility: CLINIC | Age: 78
End: 2018-04-05

## 2018-04-05 LAB
BACTERIA SPEC CULT: NO GROWTH
BACTERIA SPEC CULT: NO GROWTH
SPECIMEN SOURCE: NORMAL
SPECIMEN SOURCE: NORMAL

## 2018-05-14 ENCOUNTER — TELEPHONE (OUTPATIENT)
Dept: INTERNAL MEDICINE | Facility: CLINIC | Age: 78
End: 2018-05-14

## 2018-05-14 NOTE — TELEPHONE ENCOUNTER
Reason for Call:  Other UPdate    Detailed comments: Homecare calling to state that patient moved back to [place] so homecare has been stopped.    Phone Number Patient can be reached at: Other phone number:  [phone]  Yani Yousif*    Best Time:     Can we leave a detailed message on this number? YES    Call taken on 5/14/2018 at 11:54 AM by Yolanda Martinez

## 2018-05-18 ENCOUNTER — TELEPHONE (OUTPATIENT)
Dept: INTERNAL MEDICINE | Facility: CLINIC | Age: 78
End: 2018-05-18

## 2018-05-18 NOTE — TELEPHONE ENCOUNTER
Forms received from [facility]   on 5/18/18 for med rec.  Forms with RN to review medications.  Orders pended for provider to sign.    Forms given to PCP for signature on .

## 2018-05-21 NOTE — TELEPHONE ENCOUNTER
This form is a Care Plan update-not a certification. Meds reviewed no change needed.  Closing encounter.  Mariela Baker RN

## 2018-06-01 ENCOUNTER — TELEPHONE (OUTPATIENT)
Dept: FAMILY MEDICINE | Facility: OTHER | Age: 78
End: 2018-06-01

## 2018-06-01 NOTE — TELEPHONE ENCOUNTER
Reason for Call:  Form, our goal is to have forms completed with 72 hours, however, some forms may require a visit or additional information.    Type of letter, form or note:  [employer]    Who is the form from?: [employer] (if other please explain)    Where did the form come from: form was faxed in    What clinic location was the form placed at?: [facility] - [phone]    Where the form was placed: 's Box    What number is listed as a contact on the form?:  [phone]       Additional comments: Fax to     Call taken on 6/1/2018 at 4:30 PM by Rosalee Casillas

## 2018-06-06 NOTE — TELEPHONE ENCOUNTER
[facility] contacted and will fax this directly to Dr. Conner at [phone]. Thank you!

## 2018-07-06 DIAGNOSIS — I25.119 CORONARY ARTERY DISEASE WITH ANGINA PECTORIS, UNSPECIFIED VESSEL OR LESION TYPE, UNSPECIFIED WHETHER NATIVE OR TRANSPLANTED HEART (H): ICD-10-CM

## 2018-07-06 NOTE — TELEPHONE ENCOUNTER
"Requested Prescriptions   Pending Prescriptions Disp Refills     atorvastatin (LIPITOR) 40 MG tablet [Pharmacy Med Name: Atorvastatin Calcium Oral Tablet 40 MG] 90 tablet 0    Last Written Prescription Date:  04/04/2018  Last Fill Quantity: 90,  # refills: 1   Last office visit: 12/14/2017 with prescribing provider:  12/14/2017   Future Office Visit:     Sig: TAKE ONE TABLET BY MOUTH ONE TIME DAILY    Statins Protocol Failed    7/6/2018  4:53 PM       Failed - LDL on file in past 12 months    Recent Labs   Lab Test  01/20/17   1617   LDL  168*            Passed - No abnormal creatine kinase in past 12 months    No lab results found.            Passed - Recent (12 mo) or future (30 days) visit within the authorizing provider's specialty    Patient had office visit in the last 12 months or has a visit in the next 30 days with authorizing provider or within the authorizing provider's specialty.  See \"Patient Info\" tab in inbasket, or \"Choose Columns\" in Meds & Orders section of the refill encounter.           Passed - Patient is age 18 or older       Passed - No active pregnancy on record       Passed - No positive pregnancy test in past 12 months          "

## 2018-07-10 RX ORDER — ATORVASTATIN CALCIUM 40 MG/1
TABLET, FILM COATED ORAL
Qty: 90 TABLET | Refills: 0 | Status: SHIPPED | OUTPATIENT
Start: 2018-07-10

## 2018-07-10 NOTE — TELEPHONE ENCOUNTER
"Requested Prescriptions   Pending Prescriptions Disp Refills     atorvastatin (LIPITOR) 40 MG tablet [Pharmacy Med Name: Atorvastatin Calcium Oral Tablet 40 MG] 90 tablet 0     Sig: TAKE ONE TABLET BY MOUTH ONE TIME DAILY    Statins Protocol Failed    7/6/2018  4:57 PM       Failed - LDL on file in past 12 months    Recent Labs   Lab Test  01/20/17   1617   LDL  168*          Passed - No abnormal creatine kinase in past 12 months    No lab results found.          Passed - Recent (12 mo) or future (30 days) visit within the authorizing provider's specialty    Patient had office visit in the last 12 months or has a visit in the next 30 days with authorizing provider or within the authorizing provider's specialty.  See \"Patient Info\" tab in inbasket, or \"Choose Columns\" in Meds & Orders section of the refill encounter.           Passed - Patient is age 18 or older       Passed - No active pregnancy on record       Passed - No positive pregnancy test in past 12 months        atorvastatin (LIPITOR) 40 MG tablet  Routing refill request to provider for review/approval because:  Labs not current:  Fasting lipid panel  Should have refills till 06/2018    Albina Duke, RN, BSN           "

## 2019-01-01 NOTE — PROVIDER NOTIFICATION
03/23/18 0300   Call Information   Date of Call 03/23/18   Time of Call 0312   Name of person requesting the team Caryle   Title of person requesting team RN   RRT Arrival time 0314   Time RRT ended 0500   Reason for call   Type of RRT Adult   Primary reason for call Cardiovascular;Respiratory;Neurological   Cardiovascular Other (describe);SBP less than 90  (hr in 40-50s)   Respiratory O2sat less than 88% for greater than 5 minutes despite O2   Neurological Acute change in LOC or neuro status;Lethargic   Was patient transferred from the ED, ICU, or PACU within last 24 hours prior to RRT call? No   SBAR   Situation Afib in 40-50s, bp low, sats not reading well   Background presents with weakness, drop in her hemoglobin, dyspnea (that is more chronic and evaluated by cardiology) as well as hypothyroidism   Notable History/Conditions Cancer;Cardiac;Hypertension;Neurological;Diabetes   Assessment Pt lethargic but oriented, lungs fluid up  (crackles  in bases)   Interventions Labs;O2 per N/C or mask   Patient Outcome   Patient Outcome Stabilized on unit   RRT Team   Attending/Primary/Covering Physician Dr Arce   Physician(s) Naomy Anaya MD   Lead RN Karleen RN Caryle/Carlene Carrion   Atropine and Dopamine at bedside, if needed.   ABR (auditory brainstem response)

## 2020-03-01 ENCOUNTER — HEALTH MAINTENANCE LETTER (OUTPATIENT)
Age: 80
End: 2020-03-01

## 2020-12-14 ENCOUNTER — HEALTH MAINTENANCE LETTER (OUTPATIENT)
Age: 80
End: 2020-12-14

## 2021-04-13 NOTE — TELEPHONE ENCOUNTER
Patient comes to clinic for follow up anticoagulation visit.  DX: PE Goal range: 2.0-3.0    LAST(INR) 2.2  on 3/17/21. Dose maintained 7.5 mg every M, F; 5 mg all other days=40 mg/wk   Todays INR is 2.1. Dose maintained 7.5 mg every M, F; 5 mg all other days=40 mg/wk  as per protocol.  Follow up 4 wks prior to PCP visit. See Ambulatory Anticoagulation Flow Sheet.  See abnormal findings.     Teaching: dose, return date, conditions requiring contact with [facility]. Dosing calendar provided.  Pt verbalized understanding of instructions and is aware of need to call with any questions or concerns and to report any changes in medications, health, diet and / or lifestyle.     Dr. Cr  is in office today supervising treatment. Note forwarded to physician for review.     Reason for Call:  INR    Who is calling?  Home Care: [facility]    Phone number:  [phone]    Fax number:  n/a    Name of caller: Chantal     INR Value:  3.8    Are there any other concerns:  No- is waiting in home for a call back for medication set up    Can we leave a detailed message on this number? YES    Phone number patient can be reached at: Other phone number:  [phone]      Call taken on 12/6/2017 at 9:08 AM by Osito Richard

## 2021-04-17 ENCOUNTER — HEALTH MAINTENANCE LETTER (OUTPATIENT)
Age: 81
End: 2021-04-17

## 2021-10-01 NOTE — PHARMACY-CONSULT NOTE
QT Interval Prolongation CONSULT     Use of  Trazodone and Venlafaxine may result in increased risk of QT prolongation. Risk of QTC prolongation greater with higher doses of listed agents.     Thanks for the consult.  Nicolas Esquivel Pharm.D, BCPS   oral

## 2021-10-02 ENCOUNTER — HEALTH MAINTENANCE LETTER (OUTPATIENT)
Age: 81
End: 2021-10-02

## 2021-10-14 NOTE — PROGRESS NOTES
Ok for wound care orders     PATIENT: Manjit Carranza   DATE OF SERVICE: 10/14/2021   : 2020 MRN: [id number]       Chief Complaint   Patient presents with   • Cough     dry cough along with wheezing started yesterday       Manjit is a male presents to the [facility] for chief complaint of intermittent wheezing starting yesterday 10/13/2021.  Mother concern for severe pulmonary infection as child had a history of RSV with bronchiolitis.  Mother denies any fevers in the last 24 hours.  Patient is tolerating p.o. foods and fluids.  Mother notes a slight decrease in activity level and appetite however denies lethargy or excessive anorexia.  Child is up-to-date on vaccinations.  Mother denies any known COVID-19 exposure.       MEDICATIONS:  Current Outpatient Medications   Medication Sig   • [START ON 10/15/2021] prednisoLONE (PRELONE) 15 MG/5ML oral solution Take 3.7 mLs by mouth 2 times daily for 3 days. Do not start before October 15, 2021.     Current Facility-Administered Medications   Medication   • prednisoLONE sod-phos (ORAPRED) 15 MG/5ML oral solution 11.1 mg         ALLERGIES:  ALLERGIES:  No Known Allergies    ACTIVE PROBLEM LIST:  Patient Active Problem List   Diagnosis   • Bronchiolitis   • Acute respiratory failure, unsp w hypoxia or hypercapnia (CMS/HCC)         PAST MEDICAL HISTORY:  Past Medical History:   Diagnosis Date   • No known problems        PAST SURGICAL HISTORY:  Past Surgical History:   Procedure Laterality Date   • Circumcision, primary         FAMILY HISTORY:  Family History   Problem Relation Age of Onset   • Heart disease Mother    • Patient is unaware of any medical problems Father    • Diabetes Paternal Grandmother    • Diabetes Paternal Grandfather        SOCIAL HISTORY:  Social History     Tobacco Use   • Smoking status: Never Smoker   • Smokeless tobacco: Never Used   Vaping Use   • Vaping Use: never used   Substance Use Topics   • Alcohol use: Not on file   • Drug use: Not on file         Patient's  medications, allergies, immunizations, past medical, surgical, and social history  were reviewed and updated as appropriate.      Review of Systems   Constitutional: Positive for activity change and appetite change.   Respiratory: Positive for cough and wheezing.    All other systems reviewed and are negative.    12 pt ROS performed. All systems reviewed and neg except HPI. Refer to HPI.      Objective   Visit Vitals  Pulse 124   Temp 97.8 °F (36.6 °C) (Axillary)   Resp 34   Wt 11 kg (24 lb 4 oz)   SpO2 98%     Physical Exam  Vitals and nursing note reviewed.   Constitutional:       General: He is active. He is not in acute distress.     Appearance: Normal appearance. He is well-developed and normal weight. He is not toxic-appearing.   HENT:      Right Ear: Tympanic membrane, ear canal and external ear normal. There is no impacted cerumen. Tympanic membrane is not erythematous or bulging.      Left Ear: Tympanic membrane, ear canal and external ear normal. There is no impacted cerumen. Tympanic membrane is not erythematous or bulging.      Nose: Nose normal. No congestion or rhinorrhea.      Mouth/Throat:      Mouth: Mucous membranes are moist.      Pharynx: Oropharynx is clear. No oropharyngeal exudate or posterior oropharyngeal erythema.   Eyes:      General: Red reflex is present bilaterally.      Conjunctiva/sclera: Conjunctivae normal.      Pupils: Pupils are equal, round, and reactive to light.   Cardiovascular:      Rate and Rhythm: Normal rate and regular rhythm.      Pulses: Normal pulses.      Heart sounds: Normal heart sounds. No murmur heard.   No gallop.    Pulmonary:      Effort: Pulmonary effort is normal. No respiratory distress, nasal flaring or retractions.      Breath sounds: Examination of the right-middle field reveals wheezing. Examination of the left-middle field reveals wheezing. Examination of the left-lower field reveals wheezing. Wheezing present.   Abdominal:      General: Bowel sounds  are normal. There is no distension.      Palpations: Abdomen is soft.      Tenderness: There is no abdominal tenderness.   Skin:     General: Skin is warm and dry.      Capillary Refill: Capillary refill takes less than 2 seconds.      Coloration: Skin is not jaundiced or pale.      Findings: No petechiae.   Neurological:      Mental Status: He is alert.         Vitals:    10/14/21 1446   Pulse: 124   Resp: 34   Temp: 97.8 °F (36.6 °C)   TempSrc: Axillary   SpO2: 98%   Weight: 11 kg (24 lb 4 oz)         Assessment   Problem List Items Addressed This Visit     None      Visit Diagnoses     Mild intermittent reactive airway disease with wheezing with acute exacerbation    -  Primary    Relevant Medications    prednisoLONE sod-phos (ORAPRED) 15 MG/5ML oral solution 11.1 mg    prednisoLONE (PRELONE) 15 MG/5ML oral solution (Start on 10/15/2021)    Other Relevant Orders    RESPIRATORY PATHOGEN PANEL            Plan: Patient presents with appears to be a reactive airway disease exacerbation in the setting of an upper respiratory tract infection, likely viral.     I see no evidence for pneumonia at this time.  The patient did respond well to therapy and I feel it is reasonable to discharge the patient and undertake treatment on an outpatient basis.      Parent(s) were instructed to continue albuterol MDI/nebulizers and I have also administered a short course of prednisone. There is no evidence for more malignant etiology for the patient’s symptoms at this time.  I discussed the possibility of more malignant covert etiologies with parent(s).   Parent(s) understand this possibility and will follow up immediately with worsening symptoms.  The parent(s) understand the importance of proper follow with primary physician.    1) Patient discharge instructions, plan, the use of any OTC supportive care measures and Rx medication reviewed/explained.     2) Discussed with patient to follow-up with pcp in the next 3-5 days for  re-evaluation. Discussed with patient red flags and ER precautions. Discussed with pt if symptoms worsen or new symptoms develop to please follow-up with the [facility] or seek further evaluation at the nearest Emergency Room.     3) Patient voices understanding of all information given today and has no further questions/concerns at this time.     Instructions provided as documented in the AVS.      Thank you for visiting [facility].

## 2021-12-22 NOTE — PLAN OF CARE
"Problem: Goal Outcome Summary  Goal: Goal Outcome Summary  Outcome: No Change  /59 (BP Location: Left arm)  Pulse 72  Temp 99.4  F (37.4  C) (Oral)  Resp 20  Ht 1.575 m (5' 2\")  Wt 77 kg (169 lb 11.2 oz)  SpO2 93%  BMI 31.04 kg/m2     7732-1980: Pt oriented x4, forgetful at times and able to reorient.  Lethargic at times, arousable to voice.  Up with assist of 1 to bedside commode, continues to have loose stools, c-diff negative.  Blood sugar 112 and 104, no insulin coverage.  T-max 100.3.  Running sinus rhythm.  Pleural  Chest tube in place to water seal, no air leak, dressing changed using sterile technique; 50 ml out overnight.  Dressing to the right of pleural CT changed x1 for leaking.  Denies pain.  Midline for access to R) arm, with blood return.  Plan for rehab vs. Home at discharge.  Will continue to monitor per POC.       " today
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
## 2022-01-27 NOTE — PROGRESS NOTES
Prepped for loop recorder insertion.  Denies pain.  Daughter with her.  Labs current.  H & P current.  Consent signed.     Progress Note - Critical Care   Roger Rosa 77 y o  female MRN: [id number]  [facility]: [id number]    SUBJECTIVE :  No active complaints at this time  HPI/24HR Event:  No acute overnight events  ROS  Denies chest pain, abdominal pain, SOB, palpitations, dizziness  Invasive lines and devices: Invasive Devices  Report    Peripheral Intravenous Line            Peripheral IV 22 Left Antecubital 1 day                   Physical exam  General: Alert and oriented x3  Resting comfortably in bed in no acute distress  Neuro: Alert and oriented x3  HENT: Normocephalic and atraumatic, PERRL  Heart: S1,S2, RRR, pulses intact  Lungs: Bilateral breath sounds present  No wheezes or rales appreciated     Abdomen: NT/ND, Bowel sounds present, soft  Skin: Warm, dry skin/Incision site clean dry and intact    Vitals  Temperature:   Temp (24hrs), Av 9 °F (36 6 °C), Min:97 5 °F (36 4 °C), Max:98 9 °F (37 2 °C)    Current: Temperature: 97 6 °F (36 4 °C)    Vitals:    22 0400 22 0500 22 0600 22 0820   BP: 127/72 119/68  113/77   Pulse: (!) 52 58 (!) 52 68   Resp:    22   Temp:    97 6 °F (36 4 °C)   TempSrc:    Oral   SpO2: 95% 95% 97% 93%   Weight:   95 3 kg (210 lb)    Height:                 Labs:   Results from last 7 days   Lab Units 22  0447 22  0433 22  0518   WBC Thousand/uL 11 74* 12 40* 11 06*   HEMOGLOBIN g/dL 12 8 13 0 13 7   HEMATOCRIT % 39 1 39 3 42 0   PLATELETS Thousands/uL 221 195 188   NEUTROS PCT % 91* 89* 90*   MONOS PCT % 4 5 4      Results from last 7 days   Lab Units 22  0447 22  0433 22  0000 22  0524 22  1415   SODIUM mmol/L 135* 136 137   < > 131*   POTASSIUM mmol/L 4 7 4 8 4 7   < > 3 3*   CHLORIDE mmol/L 99* 100 100   < > 94*   CO2 mmol/L 32 32 33*   < > 28   BUN mg/dL 38* 33* 30*   < > 23   CREATININE mg/dL 0 94 1 00 1 04   < > 1 40*   CALCIUM mg/dL 9 7 9 0 9 0   < > 9 0   ALK PHOS U/L --   --   --   --  71   ALT U/L  --   --   --   --  13   AST U/L  --   --   --   --  34    < > = values in this interval not displayed  Results from last 7 days   Lab Units 01/27/22 0447 01/26/22 0433 01/25/22  0000   MAGNESIUM mg/dL 2 2 1 3* 1 5*     Results from last 7 days   Lab Units 01/27/22 0447 01/26/22 0433 01/25/22  0000   PHOSPHORUS mg/dL 4 1 3 4 3 4      Results from last 7 days   Lab Units 01/26/22 0433 01/25/22 2005 01/25/22  1347 01/21/22  2345 01/21/22  1552   INR   --   --   --   --  2 89*   PTT seconds 135* 58* 89*   < >  --     < > = values in this interval not displayed  Results from last 7 days   Lab Units 01/21/22  1415   LACTIC ACID mmol/L 2 0       Other Labs    Intake and Outputs:  I/O       01/22 0701  01/23 0700 01/23 0701  01/24 0700 01/24 0701  01/25 0700    P  O   420     I V  (mL/kg) 125 1 (1 4)  274 1 (3)    IV Piggyback       Total Intake(mL/kg) 125 1 (1 4) 420 (4 6) 274 1 (3)    Urine (mL/kg/hr) 500 (0 2) 1900 (0 9) 150 (0 3)    Total Output 500 1900 150    Net -374 9 -1480 +124 1                 Imaging Studies      Assessment & Plan:   Patient is a 69F with hx of prothrombin gene mutation on Eliquis, hx of PE, hx of CVA, GERD, HTN, depressive disorder, dyslipidemia who is under critical care service for acute hypoxic respiratory failure, COVID pneumonia, bilateral pulmonary embolism with right ventricular strain (likely 2/2 COVID PNA), volume overload, CHAKA  -Neuro:   Dx: Hx stroke (on eliquis), depression  - continue heparin drip   - on home fluoxetine and trazodone  - hold eliquis  - No sedation  - Delirium ppx: CAM-ICU, sleep hygiene      CV:   - Dx:  Intermittent soft BP - resolved, Volume overload, elevated BNP, possibly acute diastolic CHF EF 84%, essential HTN  - will hold atenolol 25 mg and PM dose lasix  - s/p 20 mg IV lasix AM dose today  - total net neg of -5L  - RV strain seen on CT  - Echo 1/22: EF 52% normal systolic function, RV mildly dilated with RV systolic function reduced, PASP 44 mildly increased  - Hemodynamic support: none  - OP: Obtains echo in 3-6 months  - MAP goal > 65   - monitor I/O       Lung:   - Dx: Acute hypoxic respiratory failure, multifactorial 2/2 COVID PNA, bilateral PE with RV strain, possible diastolic CHF; suspected COPD   - on HFNC 70%/50L, Keep O2 sat >88%  - continue COVID severe pathway, day 6 of baricitinib and decadron  - s/p dornase 5 days 1/26, P/F ratio 82 8 (1/22) -> 95 (1/27) improved  - will continue 5 more days of inh dornase starting 1/27-1/31  - CT with R heart strain, not seen on echo  - Lasix held one dose yesterday 2/2 hypotension  - Continue diuresis IV lasix   - Heparin drip for PE -> transition to pradaxa later for anticoagulation, appreciate hematology recommendations  - Breo and PRN albuterol inhalers   - plan to obtain outpatient PFT   - Continue Respiratory Protocol       GI:   - Dx: none  - on protonix      FEN:   - Fluids: no maintenance fluids  - Electrolytes: trend and replete as needed Mg>2, phos>3, K>4  - Nutrition: Continue cardiovascular diet      :   - Dx: CHAKA - resolved   - Cr 1 00 this AM  - Hold Lasix  - I/O last 24 hours: In: 1208 1 [P O :620; I V :88 1; IV Piggyback:500]  Out: 3200 [Urine:3200]  - no white    ID:   - Dx: COVID PNA  - WBC: 12 4 increased from yesterday  - Temp: afebrile  - Abx: none  - Cultures:  Sputum culture: with 2+ mixed respiratory cecilia, gm stain sputum culture: with 2+ g positive cocci in clusters and 1+ Gram-negative rods, Covid/flu/RSV negative; blood cx: neg x2 72h  - Monitor WBC/temp curve    Heme:   - Dx: PE with Right heart strain afte COVID infection,  Prothrombin Gene mutation, history of stroke   - DVT ppx:  On Pradaxa for PE , heparin d/c'd  - hx stroke with BRAYDEN showing small PFO with R->L shunt, appreciate Heme/Onc recommendations      Endo:   - Dx: none  - at goal today  - Goal -180    Msk/Skin:   - PT/OT  - Turning/repositioning  - home gabapentin Disposition: SD1  Non-Invasive/Invasive Ventilation Settings:  Respiratory  Report   Lab Data (Last 4 hours)    None         O2/Vent Data (Last 4 hours)    None              No results found for: PHART, PBA3OMD, PO2ART, UDT9QGD, X1AEOYCA, BEART, SOURCE       Imaging:   VAS lower limb venous duplex study, complete bilateral   Final Result by Kiara Dos Santos DO (01/24 2051)      CTA ED chest PE study   Final Result by Luis Mireles DO (01/21 2337)      Multiple bilateral pulmonary emboli, as described with evidence of right heart strain  Cardiomegaly, moderate to severe multiple focal and confluent areas of groundglass and alveolar opacities seen in the bilateral lungs which could represent edema such as in the setting of fluid overload/CHF and/or infectious or inflammatory pneumonitis  Depending on the clinical setting  Correlation with the patient's symptoms, and laboratory values recommended  Mild scattered pulmonary emphysematous changes  Coronary atherosclerosis, shotty mediastinal and hilar lymph nodes, and other findings as above  I personally discussed this study with Dr Vahe Elena on 1/21/2022 at 10:43 PM                Workstation performed: [id number]         XR chest 1 view portable   ED Interpretation by Faustino Werner MD (01/21 1506)   LEFT lower lobe PNA  Final Result by Arcadio Pineda MD (01/21 1440)      Left-sided pneumonia  Recommend follow-up to ensure complete clearing after treatment  Workstation performed: [id number]           I have personally reviewed pertinent reports  Micro:  Lab Results   Component Value Date    BLOODCX No Growth After 5 Days  01/21/2022    BLOODCX No Growth After 5 Days  01/21/2022    WOUNDCULT No growth 04/11/2017    SPUTUMCULTUR 2+ Growth of  01/21/2022         Allergies:    Allergies   Allergen Reactions    Percocet [Oxycodone-Acetaminophen] Hives    Amlodipine     Percocet [Oxycodone-Acetaminophen]          Medications:   Scheduled Meds:  Current Facility-Administered Medications   Medication Dose Route Frequency Provider Last Rate    acetaminophen  650 mg Oral Q6H PRN Gris Stephenson MD      albuterol  2 5 mg Nebulization Q4H PRN Gris Stephenson MD      aspirin  81 mg Oral Daily Gris Stephenson MD      atenolol  25 mg Oral Daily Obdulia Hector MD      atorvastatin  40 mg Oral QPM Gris Stephenson MD      Baricitinib  2 mg Oral Q24H Anjali Burnette, DO      benzonatate  100 mg Oral TID PRN Gris Stephenson MD      dabigatran etexilate  150 mg Oral Q12H [facility] Sylvia Mendoza MD      dexamethasone  0 1 mg/kg Intravenous Q12H Anjali Burnette, DO      FLUoxetine  30 mg Oral Daily Gris Stephenson MD      fluticasone-vilanterol  1 puff Inhalation Daily Arsenio Dexter MD      furosemide  20 mg Intravenous BID (diuretic) Arsenio Dexter MD      gabapentin  300 mg Oral TID Gris Stephensno MD      melatonin  6 mg Oral HS Sue Zuleta MD      ondansetron  4 mg Intravenous Q6H PRN Gris Stephenson MD      pantoprazole  20 mg Oral Daily Gris Stephenson MD      traZODone  25 mg Oral HS Juliette Dover MD       Continuous Infusions:   PRN Meds:  acetaminophen, 650 mg, Q6H PRN  albuterol, 2 5 mg, Q4H PRN  benzonatate, 100 mg, TID PRN  ondansetron, 4 mg, Q6H PRN        Counseling / Coordination of Care  Total Critical Care time spent 25 minutes excluding procedures, teaching and family updates  Code Status: Level 1 - Full Code     Portions of the record may have been created with voice recognition software  Occasional wrong word or "sound a like" substitutions may have occurred due to the inherent limitations of voice recognition software  Read the chart carefully and recognize, using context, where substitutions have occurred       Obdulia Hector MD  Internal Medicine Resident, PGY1  9:29 AM

## 2022-05-06 NOTE — PROGRESS NOTES
Dear Dr. Humberto Conner  Medicare Home Health regulations requires [facility] to notify the Physician when the plan for visits has been altered.  We have provided fewer visits than ordered.  We are notifying you of a Missed Visit.  Ramasaundraabel Fenton; MRN [id number]  Missed Visit  Is OT  Dates of missed services  12/15/17  Reason: Patient cancelled due to MD chris  Sincerely [facility]  Kelly Baker  [phone]     Implemented All Fall with Harm Risk Interventions:  [place] to call system. Call bell, personal items and telephone within reach. Instruct patient to call for assistance. Room bathroom lighting operational. Non-slip footwear when patient is off stretcher. Physically safe environment: no spills, clutter or unnecessary equipment. Stretcher in lowest position, wheels locked, appropriate side rails in place. Provide visual cue, wrist band, yellow gown, etc. Monitor gait and stability. Monitor for mental status changes and reorient to person, place, and time. Review medications for side effects contributing to fall risk. Reinforce activity limits and safety measures with patient and family. Provide visual clues: red socks.

## 2022-05-08 ENCOUNTER — HEALTH MAINTENANCE LETTER (OUTPATIENT)
Age: 82
End: 2022-05-08

## 2022-07-25 NOTE — TELEPHONE ENCOUNTER
Left message on home number for patient to call back to schedule RADHA & consult appointment for PAD with Dr. Eden.     Total Volume Injected (Ccs- Only Use Numbers And Decimals): 0.3

## 2022-10-14 NOTE — PROGRESS NOTES
ANTICOAGULATION FOLLOW-UP CLINIC VISIT    Patient Name:  Carlos Alberto Fenton  Date:  10/30/2017  Contact Type:  Face to Face    SUBJECTIVE:     Patient Findings     Positives No Problem Findings           OBJECTIVE    INR Protime   Date Value Ref Range Status   10/30/2017 2.8 (A) 0.86 - 1.14 Final     Chromogenic Factor 10   Date Value Ref Range Status   03/02/2017 65 (L) 70 - 130 % Final     Comment:     Therapeutic Range:  A Chromogenic Factor 10 level of approximately 20-40%   inversely correlates with an INR of 2-3 for patients receiving Warfarin.   Chromogenic Factor 10 levels below 20% indicate an INR greater than 3 and   levels above 40% indicate an INR less than 2.       Factor 2 Assay   Date Value Ref Range Status   02/27/2017 37 (L) 60 - 140 % Final       ASSESSMENT / PLAN  INR assessment THER    Recheck INR In: 1 WEEK    INR Location Clinic      Anticoagulation Summary as of 10/30/2017     INR goal 2.0-3.0   Today's INR 2.8   Maintenance plan 2.5 mg (5 mg x 0.5) on Mon, Wed, Fri; 5 mg (5 mg x 1) all other days   Full instructions 2.5 mg on Mon, Wed, Fri; 5 mg all other days   Weekly total 27.5 mg   Plan last modified Gordo Brunner RN (10/30/2017)   Next INR check 11/7/2017   Priority INR   Target end date Indefinite    Indications   Long-term (current) use of anticoagulants [Z79.01] [Z79.01]  New onset atrial fibrillation (H) (Resolved) [I48.91]  Atrial fibrillation (H) [I48.91] [I48.91]         Anticoagulation Episode Summary     INR check location     Preferred lab     Send INR reminders to [facility]    Comments Prounounced A-seenath 4/17/17 Allergice to Heparin/Lovenon per Dr. Angelina Bernal's note  Speak with patients  Darrin in addition to patient.  Hx of 2 strokes.  Pt is confused. Please speak in tablets only (5mg tablets)/Send MyChart message      Anticoagulation Care Providers     Provider Role Specialty Phone number    Star Lobato MD Responsible Cardiology [phone]             See the Encounter Report to view Anticoagulation Flowsheet and Dosing Calendar (Go to Encounters tab in chart review, and find the Anticoagulation Therapy Visit)    Dosage adjustment made based on physician directed care plan.    Gordo Brunner RN                DISCHARGE

## 2023-01-14 ENCOUNTER — HEALTH MAINTENANCE LETTER (OUTPATIENT)
Age: 83
End: 2023-01-14

## 2023-06-02 ENCOUNTER — HEALTH MAINTENANCE LETTER (OUTPATIENT)
Age: 83
End: 2023-06-02

## 2023-08-29 NOTE — TELEPHONE ENCOUNTER
Forms placed in RK's basket for review/signature.  Rosalinda Figueroa, CMA     
Our goal is to have forms completed with 72 hours, however some forms may require a visit or additional information.    Who is the form from?: Home care  Where the form came from: form was faxed in  What clinic location was the form placed at?: [place]  Where the form was placed: 's Box  What number is listed as a contact on the form?: [phone]    Phone call message- patient request for a letter, form or note:    Date needed: as soon as possible  Please fax to [phone]  Has the patient signed a consent form for release of information? NO    Additional comments:     Call taken on 3/1/2017 at 4:41 PM by Sherry Clarke    Type of letter, form or note: medical    
Signed and left in MA task  
faxed  
never

## 2024-04-08 NOTE — MR AVS SNAPSHOT
After Visit Summary   7/11/2017    Carlos Alberto Fenton    MRN: [id number]           Patient Information     Date Of Birth          1940        Visit Information        Provider Department      7/11/2017 6:16 PM Tamela Harvey LICSW [facility]        Today's Diagnoses     Visit for counseling    -  1       Follow-ups after your visit        Your next 10 appointments already scheduled     Jul 28, 2017  8:30 AM CDT   Office Visit with Humberto Conner MD   [facility]    [address] [place] [id number] [phone]           Bring a current list of meds and any records pertaining to this visit.  For Physicals, please bring immunization records and any forms needing to be filled out.  Please arrive 10 minutes early to complete paperwork.            Jul 31, 2017  1:15 PM CDT   (Arrive by 1:00 PM)   Return Vascular Visit with Leah Santamaria MD   [facility] ([facility])    [address] [place] [address]  [address] [facility] [id number] [phone]            Jul 31, 2017  2:00 PM CDT   (Arrive by 1:45 PM)   RETURN FOOT/ANKLE with Easton Torres DPM   [facility] ([facility])    [address] [place] [id number] [phone]            Sep 01, 2017  2:00 PM CDT   (Arrive by 1:45 PM)   Return Visit with Star Lobato MD   [facility] ([facility])    [address] [place] [address]  [address] [facility] [id number] [phone]            Sep 11, 2017  2:00 PM CDT   (Arrive by 1:45 PM)   HOLTER MONITOR VISIT with  Cvc Monitor Tech, [facility] ([facility])    [address] [place] [address]  [address] [facility] [id number] [phone]            Oct 19, 2017  2:30 PM CDT   (Arrive by 2:15 PM)   RETURN ATRIAL FIBULATION VISIT with Tracey Boyer  [facility] ([facility])    [address]  [address]rd [facility] [address] [phone]              Who to contact     Please call your clinic at [phone] to:    Ask questions about your health    Make or cancel appointments    Discuss your medicines    Learn about your test results    Speak to your doctor   If you have compliments or concerns about an experience at your clinic, or if you wish to file a complaint, please contact [facility] Patient Relations at [phone] or email us at [email or web address]         Additional Information About Your Visit        [employer] Information     [employer] gives you secure access to your electronic health record. If you see a primary care provider, you can also send messages to your care team and make appointments. If you have questions, please call your primary care clinic.  If you do not have a primary care provider, please call [phone] and they will assist you.      [employer] is an electronic gateway that provides easy, online access to your medical records. With [employer], you can request a clinic appointment, read your test results, renew a prescription or communicate with your care team.     To access your existing account, please contact your [facility] or call [phone] for assistance.        Care EveryWhere ID     This is your Care EveryWhere ID. This could be used by other organizations to access your [facility] medical records  [id number]         Blood Pressure from Last 3 Encounters:   07/17/17 122/74   07/14/17 117/67   07/11/17 113/78    Weight from Last 3 Encounters:   07/07/17 76 kg (167 lb 8.8 oz)   05/26/17 74.7 kg (164 lb 10.9 oz)   05/24/17 74.8 kg (164 lb 14.4 oz)              Today, you had the following     No orders found for display         Today's Medication Changes          These changes are accurate as of:  7/11/17 11:59 PM.  If you have any questions, ask your nurse or doctor.               These medicines have changed or have updated prescriptions.        Dose/Directions    aspirin 81 MG chewable tablet   This may have changed:  when to take this   Used for:  Coronary artery disease with angina pectoris, unspecified vessel or lesion type, unspecified whether native or transplanted heart (H)        Dose:  81 mg   Take 1 tablet (81 mg) by mouth daily   Quantity:  30 tablet   Refills:  0       ferrous sulfate 325 (65 FE) MG tablet   Commonly known as:  IRON SUPPLEMENT   This may have changed:  when to take this   Used for:  S/P CABG (coronary artery bypass graft)        Dose:  325 mg   Take 1 tablet (325 mg) by mouth daily (with breakfast)   Quantity:  100 tablet   Refills:  1                Primary Care Provider Office Phone # Fax #    Humberto Conner [phone]       [facility] [address] [place] [address]        Equal Access to Services     [facility] Center[facility]ORAINE : Hadii aad ku hadasho Soedwige, waaxda luqadaha, qaybta kaalmada adeegyada, scott mccollum . So [facility] [phone].    ATENCIÓN: Si habla español, tiene a espinoza disposición servicios gratuitos de asistencia lingüística. Zaid al [phone].    We comply with applicable federal civil rights laws and [place] laws. We do not discriminate on the basis of race, color, national origin, age, disability sex, sexual orientation or gender identity.            Thank you!     Thank you for choosing [facility]  for your care. Our goal is always to provide you with excellent care. Hearing back from our patients is one way we can continue to improve our services. Please take a few minutes to complete the written survey that you may receive in the mail after your visit with us. Thank you!             Your Updated Medication List - Protect others around you: Learn how to safely use, store and throw away  your medicines at www.disposemymeds.org.          This list is accurate as of: 7/11/17 11:59 PM.  Always use your most recent med list.                   Brand Name Dispense Instructions for use Diagnosis    acetaminophen 325 MG tablet    TYLENOL    100 tablet    Take 3 tablets (975 mg) by mouth every 6 hours as needed for mild pain    S/P CABG (coronary artery bypass graft)       aspirin 81 MG chewable tablet     30 tablet    Take 1 tablet (81 mg) by mouth daily    Coronary artery disease with angina pectoris, unspecified vessel or lesion type, unspecified whether native or transplanted heart (H)       atorvastatin 40 MG tablet    LIPITOR    90 tablet    Take 1 tablet (40 mg) by mouth daily    Coronary artery disease with angina pectoris, unspecified vessel or lesion type, unspecified whether native or transplanted heart (H)       blood glucose monitoring meter device kit           ferrous sulfate 325 (65 FE) MG tablet    IRON SUPPLEMENT    100 tablet    Take 1 tablet (325 mg) by mouth daily (with breakfast)    S/P CABG (coronary artery bypass graft)       fondaparinux 7.5MG/0.6ML injection    ARIXTRA    10 Syringe    Inject 0.6 mLs (7.5 mg) Subcutaneous every morning *Do not take Saturday, May 27, 2017 *Do not take Adolfo, May 28, 2017 *May resuming taking on Monday, May 29, 2017    Long-term (current) use of anticoagulants       furosemide 40 MG tablet    LASIX    180 tablet    Take 1 tablet (40 mg) by mouth 2 times daily    Bilateral edema of lower extremity       gabapentin 100 MG capsule    NEURONTIN    180 capsule    Take 1 capsule (100 mg) by mouth 2 times daily    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       HYDROcodone-acetaminophen 5-325 MG per tablet    NORCO    72 tablet    Take 1-2 tablets by mouth every 4 hours as needed for moderate to severe pain maximum 12 tablet(s) per day. Do not take more than 4, 000 mg of Tylenol (Acetaminophen) per day. Use caution if taking extra Tylenol (Acetaminophen)     Ulcer of left lower extremity with fat layer exposed (H)       ONETOUCH DELICA LANCETS 33G Misc     100 each    1 Device daily    Type 2 diabetes mellitus without complication, without long-term current use of insulin (H)       potassium chloride SA 10 MEQ CR tablet    K-DUR/KLOR-CON M    180 tablet    Take 1 tablet (10 mEq) by mouth 2 times daily    S/P CABG (coronary artery bypass graft)       traZODone 50 MG tablet    DESYREL    45 tablet    Take 0.5 tablets (25 mg) by mouth nightly as needed for sleep    Primary insomnia       venlafaxine 150 MG Tb24 24 hr tablet    EFFEXOR-ER    90 each    Take 1 tablet (150 mg) by mouth daily (with breakfast)    Adjustment disorder with depressed mood          Fair

## 2024-08-08 NOTE — PLAN OF CARE
D: Pt stable and comfortable. No pain or shortness of breath noted.   I: Monitored/assessed pt. Assisted with cares.  A: Pt stable and comfortable. CPAP on at night. Bed alarm activated for a reminder.   P: Continue to monitor/assess pt, contact provider with concerns.      Body Location Override (Optional - Billing Will Still Be Based On Selected Body Map Location If Applicable): Left medial temple Mohs Case Number: [id number] Date Of Previous Biopsy (Optional): 07/03/2024 Previous Accession (Optional): [id number] Biopsy Photograph Reviewed: Yes Referring Physician (Optional): Yenifer Glez PA-C Consent Type: Consent 1 (Standard) Eye Shield Used: No Initial Size Of Lesion: 0.9 X Size Of Lesion In Cm (Optional): 0.8 Number Of Stages: 2 Primary Defect Width In Cm (Final Defect Size - Required For Flaps/Grafts): 1.5 Primary Defect Depth In Cm (Optional But Required For Some Insurers): 0 Repair Type: Intermediate Layered Repair Which Instrument Did You Use For Dermabrasion?: Wire Brush Which Eyelid Repair Cpt Are You Using?: 26251 Oculoplastic Surgeon Procedure Text (A): After obtaining clear surgical margins the patient was sent to oculoplastics for surgical repair.  The patient understands they will receive post-surgical care and follow-up from the referring physician's office. Otolaryngologist Procedure Text (A): After obtaining clear surgical margins the patient was sent to otolaryngology for surgical repair.  The patient understands they will receive post-surgical care and follow-up from the referring physician's office. Plastic Surgeon Procedure Text (A): After obtaining clear surgical margins the patient was sent to plastics for surgical repair.  The patient understands they will receive post-surgical care and follow-up from the referring physician's office. Mid-Level Procedure Text (A): After obtaining clear surgical margins the patient was sent to a mid-level provider for surgical repair.  The patient understands they will receive post-surgical care and follow-up from the mid-level provider. Provider Procedure Text (A): After obtaining clear surgical margins the defect was repaired by another provider. Asc Procedure Text (A): After obtaining clear surgical margins the patient was sent to an ASC for surgical repair.  The patient understands they will receive post-surgical care and follow-up from the ASC physician. Simple / Intermediate / Complex Repair - Final Wound Length In Cm: 3 Suturegard Retention Suture: 2-0 Nylon Retention Suture Bite Size: 3 mm Length To Time In Minutes Device Was In Place: 10 Number Of Hemigard Strips Per Side: 1 Undermining Type: Entire Wound Debridement Text: The wound edges were debrided prior to proceeding with the closure to facilitate wound healing. Helical Rim Text: The closure involved the helical rim. Vermilion Border Text: The closure involved the vermilion border. Nostril Rim Text: The closure involved the nostril rim. Retention Suture Text: Retention sutures were placed to support the closure and prevent dehiscence. Area H Indication Text: Tumors in this location are included in Area H (eyelids, eyebrows, nose, lips, chin, ear, pre-auricular, post-auricular, temple, genitalia, hands, feet, ankles and areola).  Tissue conservation is critical in these anatomic locations. Area M Indication Text: Tumors in this location are included in Area M (cheek, forehead, scalp, neck, jawline and pretibial skin).  Mohs surgery is indicated for tumors in these anatomic locations. Area L Indication Text: Tumors in this location are included in Area L (trunk and extremities).  Mohs surgery is indicated for larger tumors, or tumors with aggressive histologic features, in these anatomic locations. Tumor Debulked?: not debulked Depth Of Tumor Invasion (For Histology): epidermis Perineural Invasion (For Histology - Be Specific If Possible): absent Surgical Defect Width In Cm (Optional): 1.4 Special Stains Stage 1 - Results: Base On Clearance Noted Above Stage 1 Override Histology Text: full thickness keratinocyte atypia in the epidermis Stage 2: Additional Anesthesia Type: 1% lidocaine with epinephrine Surgical Defect Length In Cm (Optional): 2.0 Staging Info: By selecting yes to the question above you will include information on AJCC 8 tumor staging in your Mohs note. Information on tumor staging will be automatically added for SCCs on the head and neck. AJCC 8 includes tumor size, tumor depth, perineural involvement and bone invasion. Tumor Depth: Less than 6mm from granular layer and no invasion beyond the subcutaneous fat Why Was The Change Made?: Please Select the Appropriate Response Medical Necessity Statement: Based on my medical judgement, Mohs surgery is the most appropriate treatment for this cancer compared to other treatments. Alternatives Discussed Intro (Do Not Add Period): I discussed alternative treatments to Mohs surgery and specifically discussed the risks and benefits of Consent 1/Introductory Paragraph: The rationale for Mohs was explained to the patient and consent was obtained. The risks, benefits and alternatives to therapy were discussed in detail. Specifically, the risks of infection, scarring, bleeding, prolonged wound healing, incomplete removal, allergy to anesthesia, nerve injury and recurrence were addressed. Prior to the procedure, the treatment site was clearly identified and confirmed by the patient. All components of Universal Protocol/PAUSE Rule completed. Consent 2/Introductory Paragraph: Mohs surgery was explained to the patient and consent was obtained. The risks, benefits and alternatives to therapy were discussed in detail. Specifically, the risks of infection, scarring, bleeding, prolonged wound healing, incomplete removal, allergy to anesthesia, nerve injury and recurrence were addressed. Prior to the procedure, the treatment site was clearly identified and confirmed by the patient. All components of Universal Protocol/PAUSE Rule completed. Consent 3/Introductory Paragraph: I gave the patient a chance to ask questions they had about the procedure.  Following this I explained the Mohs procedure and consent was obtained. The risks, benefits and alternatives to therapy were discussed in detail. Specifically, the risks of infection, scarring, bleeding, prolonged wound healing, incomplete removal, allergy to anesthesia, nerve injury and recurrence were addressed. Prior to the procedure, the treatment site was clearly identified and confirmed by the patient. All components of Universal Protocol/PAUSE Rule completed. Consent (Temporal Branch)/Introductory Paragraph: The rationale for Mohs was explained to the patient and consent was obtained. The risks, benefits and alternatives to therapy were discussed in detail. Specifically, the risks of damage to the temporal branch of the facial nerve, infection, scarring, bleeding, prolonged wound healing, incomplete removal, allergy to anesthesia, and recurrence were addressed. Prior to the procedure, the treatment site was clearly identified and confirmed by the patient. All components of Universal Protocol/PAUSE Rule completed. Consent (Marginal Mandibular)/Introductory Paragraph: The rationale for Mohs was explained to the patient and consent was obtained. The risks, benefits and alternatives to therapy were discussed in detail. Specifically, the risks of damage to the marginal mandibular branch of the facial nerve, infection, scarring, bleeding, prolonged wound healing, incomplete removal, allergy to anesthesia, and recurrence were addressed. Prior to the procedure, the treatment site was clearly identified and confirmed by the patient. All components of Universal Protocol/PAUSE Rule completed. Consent (Spinal Accessory)/Introductory Paragraph: The rationale for Mohs was explained to the patient and consent was obtained. The risks, benefits and alternatives to therapy were discussed in detail. Specifically, the risks of damage to the spinal accessory nerve, infection, scarring, bleeding, prolonged wound healing, incomplete removal, allergy to anesthesia, and recurrence were addressed. Prior to the procedure, the treatment site was clearly identified and confirmed by the patient. All components of Universal Protocol/PAUSE Rule completed. Consent (Near Eyelid Margin)/Introductory Paragraph: The rationale for Mohs was explained to the patient and consent was obtained. The risks, benefits and alternatives to therapy were discussed in detail. Specifically, the risks of ectropion or eyelid deformity, infection, scarring, bleeding, prolonged wound healing, incomplete removal, allergy to anesthesia, nerve injury and recurrence were addressed. Prior to the procedure, the treatment site was clearly identified and confirmed by the patient. All components of Universal Protocol/PAUSE Rule completed. Consent (Ear)/Introductory Paragraph: The rationale for Mohs was explained to the patient and consent was obtained. The risks, benefits and alternatives to therapy were discussed in detail. Specifically, the risks of ear deformity, infection, scarring, bleeding, prolonged wound healing, incomplete removal, allergy to anesthesia, nerve injury and recurrence were addressed. Prior to the procedure, the treatment site was clearly identified and confirmed by the patient. All components of Universal Protocol/PAUSE Rule completed. Consent (Nose)/Introductory Paragraph: The rationale for Mohs was explained to the patient and consent was obtained. The risks, benefits and alternatives to therapy were discussed in detail. Specifically, the risks of nasal deformity, changes in the flow of air through the nose, infection, scarring, bleeding, prolonged wound healing, incomplete removal, allergy to anesthesia, nerve injury and recurrence were addressed. Prior to the procedure, the treatment site was clearly identified and confirmed by the patient. All components of Universal Protocol/PAUSE Rule completed. Consent (Lip)/Introductory Paragraph: The rationale for Mohs was explained to the patient and consent was obtained. The risks, benefits and alternatives to therapy were discussed in detail. Specifically, the risks of lip deformity, changes in the oral aperture, infection, scarring, bleeding, prolonged wound healing, incomplete removal, allergy to anesthesia, nerve injury and recurrence were addressed. Prior to the procedure, the treatment site was clearly identified and confirmed by the patient. All components of Universal Protocol/PAUSE Rule completed. Consent (Scalp)/Introductory Paragraph: The rationale for Mohs was explained to the patient and consent was obtained. The risks, benefits and alternatives to therapy were discussed in detail. Specifically, the risks of changes in hair growth pattern secondary to repair, infection, scarring, bleeding, prolonged wound healing, incomplete removal, allergy to anesthesia, nerve injury and recurrence were addressed. Prior to the procedure, the treatment site was clearly identified and confirmed by the patient. All components of Universal Protocol/PAUSE Rule completed. Detail Level: Detailed Postop Diagnosis: same Hemostasis: Electrocautery Estimated Blood Loss (Cc): minimal Repair Anesthesia Method: local infiltration Brow Lift Text: A midfrontal incision was made medially to the defect to allow access to the tissues just superior to the left eyebrow. Following careful dissection inferiorly in a supraperiosteal plane to the level of the left eyebrow, several 3-0 monocryl sutures were used to resuspend the eyebrow orbicularis oculi muscular unit to the superior frontal bone periosteum. This resulted in an appropriate reapproximation of static eyebrow symmetry and correction of the left brow ptosis. Deep Sutures: 5-0 Monocryl Epidermal Sutures: 5-0 Fast Absorbing Gut Epidermal Closure: running Suturegard Intro: Intraoperative tissue expansion was performed, utilizing the SUTUREGARD device, in order to reduce wound tension. Suturegard Body: The suture ends were repeatedly re-tightened and re-clamped to achieve the desired tissue expansion. Hemigard Intro: Due to skin fragility and wound tension, it was decided to use HEMIGARD adhesive retention suture devices to permit a linear closure. The skin was cleaned and dried for a 6cm distance away from the wound. Excessive hair, if present, was removed to allow for adhesion. Hemigard Postcare Instructions: The HEMIGARD strips are to remain completely dry for at least 5-7 days. Donor Site Anesthesia Type: same as repair anesthesia Epidermal Closure Graft Donor Site (Optional): simple interrupted Graft Donor Site Bandage (Optional-Leave Blank If You Don't Want In Note): Steri-strips and a pressure bandage were applied to the donor site. Closure 2 Information: This tab is for additional flaps and grafts, including complex repair and grafts and complex repair and flaps. You can also specify a different location for the additional defect, if the location is the same you do not need to select a new one. We will insert the automated text for the repair you select below just as we do for solitary flaps and grafts. Please note that at this time if you select a location with a different insurance zone you will need to override the ICD10 and CPT if appropriate. Closure 3 Information: This tab is for additional flaps and grafts above and beyond our usual structured repairs.  Please note if you enter information here it will not currently bill and you will need to add the billing information manually. Wound Care: Aquaphor Dressing: pressure dressing Wound Care (No Sutures): Petrolatum Dressing (No Sutures): dry sterile dressing Unna Boot Text: An Unna boot was placed to help immobilize the limb and facilitate more rapid healing. Home Suture Removal Text: Patient was provided instructions on removing sutures and will remove their sutures at home.  If they have any questions or difficulties they will call the office. Post-Care Instructions: I reviewed with the patient in detail post-care instructions. Patient is not to engage in any heavy lifting, exercise, or swimming for the next 14 days. Should the patient develop any fevers, chills, bleeding, severe pain patient will contact the office immediately. Pain Refusal Text: I offered to prescribe pain medication but the patient refused to take this medication. Mauc Instructions: By selecting yes to the question below the MAUC number will be added into the note.  This will be calculated automatically based on the diagnosis chosen, the size entered, the body zone selected (H,M,L) and the specific indications you chose. You will also have the option to override the Mohs AUC if you disagree with the automatically calculated number and this option is found in the Case Summary tab. Where Do You Want The Question To Include Opioid Counseling Located?: Case Summary Tab Eye Protection Verbiage: Before proceeding with the stage, a plastic scleral shield was inserted. The globe was anesthetized with a few drops of 1% lidocaine with 1:100,000 epinephrine. Then, an appropriate sized scleral shield was chosen and coated with lacrilube ointment. The shield was gently inserted and left in place for the duration of each stage. After the stage was completed, the shield was gently removed. Mohs Method Verbiage: An incision at a 45 degree angle following the standard Mohs approach was done and the specimen was harvested as a microscopic controlled layer. Surgeon/Pathologist Verbiage (Will Incorporate Name Of Surgeon From Intro If Not Blank): operated in two distinct and integrated capacities as the surgeon and pathologist. Mohs Histo Method Verbiage: Each section was then chromacoded and processed in the Mohs lab using the Mohs protocol and submitted for frozen section. Subsequent Stages Histo Method Verbiage: Using a similar technique to that described above, a thin layer of tissue was removed from all areas where tumor was visible on the previous stage.  The tissue was again oriented, mapped, dyed, and processed as above. Mohs Rapid Report Verbiage: The area of clinically evident tumor was marked with skin marking ink and appropriately hatched.  The initial incision was made following the Mohs approach through the skin.  The specimen was taken to the lab, divided into the necessary number of pieces, chromacoded and processed according to the Mohs protocol.  This was repeated in successive stages until a tumor free defect was achieved. Complex Repair Preamble Text (Leave Blank If You Do Not Want): Extensive wide undermining was performed. Intermediate Repair Preamble Text (Leave Blank If You Do Not Want): Undermining was performed with blunt dissection. Graft Cartilage Fenestration Text: The cartilage was fenestrated with a 2mm punch biopsy to help facilitate graft survival and healing. Non-Graft Cartilage Fenestration Text: The cartilage was fenestrated with a 2mm punch biopsy to help facilitate healing. Secondary Intention Text (Leave Blank If You Do Not Want): The defect will heal with secondary intention. No Repair - Repaired With Adjacent Surgical Defect Text (Leave Blank If You Do Not Want): After obtaining clear surgical margins the defect was repaired concurrently with another surgical defect which was in close approximation. Adjacent Tissue Transfer Text: The defect edges were debeveled with a #15 scalpel blade.  Given the location of the defect and the proximity to free margins an adjacent tissue transfer was deemed most appropriate.  Using a sterile surgical marker, an appropriate flap was drawn incorporating the defect and placing the expected incisions within the relaxed skin tension lines where possible.    The area thus outlined was incised deep to adipose tissue with a #15 scalpel blade.  The skin margins were undermined to an appropriate distance in all directions utilizing iris scissors. Advancement Flap (Single) Text: The defect edges were debeveled with a #15 scalpel blade.  Given the location of the defect and the proximity to free margins a single advancement flap was deemed most appropriate.  Using a sterile surgical marker, an appropriate advancement flap was drawn incorporating the defect and placing the expected incisions within the relaxed skin tension lines where possible.    The area thus outlined was incised deep to adipose tissue with a #15 scalpel blade.  The skin margins were undermined to an appropriate distance in all directions utilizing iris scissors. Advancement Flap (Double) Text: The defect edges were debeveled with a #15 scalpel blade.  Given the location of the defect and the proximity to free margins a double advancement flap was deemed most appropriate.  Using a sterile surgical marker, the appropriate advancement flaps were drawn incorporating the defect and placing the expected incisions within the relaxed skin tension lines where possible.    The area thus outlined was incised deep to adipose tissue with a #15 scalpel blade.  The skin margins were undermined to an appropriate distance in all directions utilizing iris scissors. Advancement-Rotation Flap Text: The defect edges were debeveled with a #15 scalpel blade.  Given the location of the defect, shape of the defect and the proximity to free margins an advancement-rotation flap was deemed most appropriate.  Using a sterile surgical marker, an appropriate flap was drawn incorporating the defect and placing the expected incisions within the relaxed skin tension lines where possible. The area thus outlined was incised deep to adipose tissue with a #15 scalpel blade.  The skin margins were undermined to an appropriate distance in all directions utilizing iris scissors. Alar Island Pedicle Flap Text: The defect edges were debeveled with a #15 scalpel blade.  Given the location of the defect, shape of the defect and the proximity to the alar rim an island pedicle advancement flap was deemed most appropriate.  Using a sterile surgical marker, an appropriate advancement flap was drawn incorporating the defect, outlining the appropriate donor tissue and placing the expected incisions within the nasal ala running parallel to the alar rim. The area thus outlined was incised with a #15 scalpel blade.  The skin margins were undermined minimally to an appropriate distance in all directions around the primary defect and laterally outward around the island pedicle utilizing iris scissors.  There was minimal undermining beneath the pedicle flap. A-T Advancement Flap Text: The defect edges were debeveled with a #15 scalpel blade.  Given the location of the defect, shape of the defect and the proximity to free margins an A-T advancement flap was deemed most appropriate.  Using a sterile surgical marker, an appropriate advancement flap was drawn incorporating the defect and placing the expected incisions within the relaxed skin tension lines where possible.    The area thus outlined was incised deep to adipose tissue with a #15 scalpel blade.  The skin margins were undermined to an appropriate distance in all directions utilizing iris scissors. Banner Transposition Flap Text: The defect edges were debeveled with a #15 scalpel blade.  Given the location of the defect and the proximity to free margins a Banner transposition flap was deemed most appropriate.  Using a sterile surgical marker, an appropriate flap drawn around the defect. The area thus outlined was incised deep to adipose tissue with a #15 scalpel blade.  The skin margins were undermined to an appropriate distance in all directions utilizing iris scissors. Bilateral Helical Rim Advancement Flap Text: The defect edges were debeveled with a #15 blade scalpel.  Given the location of the defect and the proximity to free margins (helical rim) a bilateral helical rim advancement flap was deemed most appropriate.  Using a sterile surgical marker, the appropriate advancement flaps were drawn incorporating the defect and placing the expected incisions between the helical rim and antihelix where possible.  The area thus outlined was incised through and through with a #15 scalpel blade.  With a skin hook and iris scissors, the flaps were gently and sharply undermined and freed up. Bilateral Rotation Flap Text: The defect edges were debeveled with a #15 scalpel blade. Given the location of the defect, shape of the defect and the proximity to free margins a bilateral rotation flap was deemed most appropriate. Using a sterile surgical marker, an appropriate rotation flap was drawn incorporating the defect and placing the expected incisions within the relaxed skin tension lines where possible. The area thus outlined was incised deep to adipose tissue with a #15 scalpel blade. The skin margins were undermined to an appropriate distance in all directions utilizing iris scissors. Following this, the designed flap was carried over into the primary defect and sutured into place. Bilobed Flap Text: The defect edges were debeveled with a #15 scalpel blade.  Given the location of the defect and the proximity to free margins a bilobe flap was deemed most appropriate.  Using a sterile surgical marker, an appropriate bilobe flap drawn around the defect.    The area thus outlined was incised deep to adipose tissue with a #15 scalpel blade.  The skin margins were undermined to an appropriate distance in all directions utilizing iris scissors. Bilobed Transposition Flap Text: The defect edges were debeveled with a #15 scalpel blade.  Given the location of the defect and the proximity to free margins a bilobed transposition flap was deemed most appropriate.  Using a sterile surgical marker, an appropriate bilobe flap drawn around the defect.    The area thus outlined was incised deep to adipose tissue with a #15 scalpel blade.  The skin margins were undermined to an appropriate distance in all directions utilizing iris scissors. Bi-Rhombic Flap Text: The defect edges were debeveled with a #15 scalpel blade.  Given the location of the defect and the proximity to free margins a bi-rhombic flap was deemed most appropriate.  Using a sterile surgical marker, an appropriate rhombic flap was drawn incorporating the defect. The area thus outlined was incised deep to adipose tissue with a #15 scalpel blade.  The skin margins were undermined to an appropriate distance in all directions utilizing iris scissors. Burow's Advancement Flap Text: The defect edges were debeveled with a #15 scalpel blade.  Given the location of the defect and the proximity to free margins a Burow's advancement flap was deemed most appropriate.  Using a sterile surgical marker, the appropriate advancement flap was drawn incorporating the defect and placing the expected incisions within the relaxed skin tension lines where possible.    The area thus outlined was incised deep to adipose tissue with a #15 scalpel blade.  The skin margins were undermined to an appropriate distance in all directions utilizing iris scissors. Chonodrocutaneous Helical Advancement Flap Text: The defect edges were debeveled with a #15 scalpel blade.  Given the location of the defect and the proximity to free margins a chondrocutaneous helical advancement flap was deemed most appropriate.  Using a sterile surgical marker, the appropriate advancement flap was drawn incorporating the defect and placing the expected incisions within the relaxed skin tension lines where possible.    The area thus outlined was incised deep to adipose tissue with a #15 scalpel blade.  The skin margins were undermined to an appropriate distance in all directions utilizing iris scissors. Crescentic Advancement Flap Text: The defect edges were debeveled with a #15 scalpel blade.  Given the location of the defect and the proximity to free margins a crescentic advancement flap was deemed most appropriate.  Using a sterile surgical marker, the appropriate advancement flap was drawn incorporating the defect and placing the expected incisions within the relaxed skin tension lines where possible.    The area thus outlined was incised deep to adipose tissue with a #15 scalpel blade.  The skin margins were undermined to an appropriate distance in all directions utilizing iris scissors. Dorsal Nasal Flap Text: The defect edges were debeveled with a #15 scalpel blade.  Given the location of the defect and the proximity to free margins a dorsal nasal flap was deemed most appropriate.  Using a sterile surgical marker, an appropriate dorsal nasal flap was drawn around the defect.    The area thus outlined was incised deep to adipose tissue with a #15 scalpel blade.  The skin margins were undermined to an appropriate distance in all directions utilizing iris scissors. Double Island Pedicle Flap Text: The defect edges were debeveled with a #15 scalpel blade.  Given the location of the defect, shape of the defect and the proximity to free margins a double island pedicle advancement flap was deemed most appropriate.  Using a sterile surgical marker, an appropriate advancement flap was drawn incorporating the defect, outlining the appropriate donor tissue and placing the expected incisions within the relaxed skin tension lines where possible.    The area thus outlined was incised deep to adipose tissue with a #15 scalpel blade.  The skin margins were undermined to an appropriate distance in all directions around the primary defect and laterally outward around the island pedicle utilizing iris scissors.  There was minimal undermining beneath the pedicle flap. Double O-Z Flap Text: The defect edges were debeveled with a #15 scalpel blade.  Given the location of the defect, shape of the defect and the proximity to free margins a Double O-Z flap was deemed most appropriate.  Using a sterile surgical marker, an appropriate transposition flap was drawn incorporating the defect and placing the expected incisions within the relaxed skin tension lines where possible. The area thus outlined was incised deep to adipose tissue with a #15 scalpel blade.  The skin margins were undermined to an appropriate distance in all directions utilizing iris scissors. Double O-Z Plasty Text: The defect edges were debeveled with a #15 scalpel blade.  Given the location of the defect, shape of the defect and the proximity to free margins a Double O-Z plasty (double transposition flap) was deemed most appropriate.  Using a sterile surgical marker, the appropriate transposition flaps were drawn incorporating the defect and placing the expected incisions within the relaxed skin tension lines where possible. The area thus outlined was incised deep to adipose tissue with a #15 scalpel blade.  The skin margins were undermined to an appropriate distance in all directions utilizing iris scissors.  Hemostasis was achieved with electrocautery.  The flaps were then transposed into place, one clockwise and the other counterclockwise, and anchored with interrupted buried subcutaneous sutures. Double Z Plasty Text: The lesion was extirpated to the level of the fat with a #15 scalpel blade. Given the location of the defect, shape of the defect and the proximity to free margins a double Z-plasty was deemed most appropriate for repair. Using a sterile surgical marker, the appropriate transposition arms of the double Z-plasty were drawn incorporating the defect and placing the expected incisions within the relaxed skin tension lines where possible. The area thus outlined was incised deep to adipose tissue with a #15 scalpel blade. The skin margins were undermined to an appropriate distance in all directions utilizing iris scissors. The opposing transposition arms were then transposed and carried over into place in opposite direction and anchored with interrupted buried subcutaneous sutures. Ear Star Wedge Flap Text: The defect edges were debeveled with a #15 blade scalpel.  Given the location of the defect and the proximity to free margins (helical rim) an ear star wedge flap was deemed most appropriate.  Using a sterile surgical marker, the appropriate flap was drawn incorporating the defect and placing the expected incisions between the helical rim and antihelix where possible.  The area thus outlined was incised through and through with a #15 scalpel blade. Flip-Flop Flap Text: The defect edges were debeveled with a #15 blade scalpel.  Given the location of the defect and the proximity to free margins a flip-flop flap was deemed most appropriate. Using a sterile surgical marker, the appropriate flap was drawn incorporating the defect and placing the expected incisions between the helical rim and antihelix where possible.  The area thus outlined was incised through and through with a #15 scalpel blade. Following this, the designed flap was carried over into the primary defect and sutured into place. Hatchet Flap Text: The defect edges were debeveled with a #15 scalpel blade.  Given the location of the defect, shape of the defect and the proximity to free margins a hatchet flap was deemed most appropriate.  Using a sterile surgical marker, an appropriate hatchet flap was drawn incorporating the defect and placing the expected incisions within the relaxed skin tension lines where possible.    The area thus outlined was incised deep to adipose tissue with a #15 scalpel blade.  The skin margins were undermined to an appropriate distance in all directions utilizing iris scissors. Helical Rim Advancement Flap Text: The defect edges were debeveled with a #15 blade scalpel.  Given the location of the defect and the proximity to free margins (helical rim) a double helical rim advancement flap was deemed most appropriate.  Using a sterile surgical marker, the appropriate advancement flaps were drawn incorporating the defect and placing the expected incisions between the helical rim and antihelix where possible.  The area thus outlined was incised through and through with a #15 scalpel blade.  With a skin hook and iris scissors, the flaps were gently and sharply undermined and freed up. H Plasty Text: Given the location of the defect, shape of the defect and the proximity to free margins a H-plasty was deemed most appropriate for repair.  Using a sterile surgical marker, the appropriate advancement arms of the H-plasty were drawn incorporating the defect and placing the expected incisions within the relaxed skin tension lines where possible. The area thus outlined was incised deep to adipose tissue with a #15 scalpel blade. The skin margins were undermined to an appropriate distance in all directions utilizing iris scissors.  The opposing advancement arms were then advanced into place in opposite direction and anchored with interrupted buried subcutaneous sutures. Island Pedicle Flap Text: The defect edges were debeveled with a #15 scalpel blade.  Given the location of the defect, shape of the defect and the proximity to free margins an island pedicle advancement flap was deemed most appropriate.  Using a sterile surgical marker, an appropriate advancement flap was drawn incorporating the defect, outlining the appropriate donor tissue and placing the expected incisions within the relaxed skin tension lines where possible.    The area thus outlined was incised deep to adipose tissue with a #15 scalpel blade.  The skin margins were undermined to an appropriate distance in all directions around the primary defect and laterally outward around the island pedicle utilizing iris scissors.  There was minimal undermining beneath the pedicle flap. Island Pedicle Flap With Canthal Suspension Text: The defect edges were debeveled with a #15 scalpel blade.  Given the location of the defect, shape of the defect and the proximity to free margins an island pedicle advancement flap was deemed most appropriate.  Using a sterile surgical marker, an appropriate advancement flap was drawn incorporating the defect, outlining the appropriate donor tissue and placing the expected incisions within the relaxed skin tension lines where possible. The area thus outlined was incised deep to adipose tissue with a #15 scalpel blade.  The skin margins were undermined to an appropriate distance in all directions around the primary defect and laterally outward around the island pedicle utilizing iris scissors.  There was minimal undermining beneath the pedicle flap. A suspension suture was placed in the canthal tendon to prevent tension and prevent ectropion. Island Pedicle Flap-Requiring Vessel Identification Text: The defect edges were debeveled with a #15 scalpel blade.  Given the location of the defect, shape of the defect and the proximity to free margins an island pedicle advancement flap was deemed most appropriate.  Using a sterile surgical marker, an appropriate advancement flap was drawn, based on the axial vessel mentioned above, incorporating the defect, outlining the appropriate donor tissue and placing the expected incisions within the relaxed skin tension lines where possible.    The area thus outlined was incised deep to adipose tissue with a #15 scalpel blade.  The skin margins were undermined to an appropriate distance in all directions around the primary defect and laterally outward around the island pedicle utilizing iris scissors.  There was minimal undermining beneath the pedicle flap. Keystone Flap Text: The defect edges were debeveled with a #15 scalpel blade.  Given the location of the defect, shape of the defect a keystone flap was deemed most appropriate.  Using a sterile surgical marker, an appropriate keystone flap was drawn incorporating the defect, outlining the appropriate donor tissue and placing the expected incisions within the relaxed skin tension lines where possible. The area thus outlined was incised deep to adipose tissue with a #15 scalpel blade.  The skin margins were undermined to an appropriate distance in all directions around the primary defect and laterally outward around the flap utilizing iris scissors. Melolabial Transposition Flap Text: The defect edges were debeveled with a #15 scalpel blade.  Given the location of the defect and the proximity to free margins a melolabial flap was deemed most appropriate.  Using a sterile surgical marker, an appropriate melolabial transposition flap was drawn incorporating the defect.    The area thus outlined was incised deep to adipose tissue with a #15 scalpel blade.  The skin margins were undermined to an appropriate distance in all directions utilizing iris scissors. Mercedes Flap Text: The defect edges were debeveled with a #15 scalpel blade.  Given the location of the defect, shape of the defect and the proximity to free margins a Mercedes flap was deemed most appropriate.  Using a sterile surgical marker, an appropriate advancement flap was drawn incorporating the defect and placing the expected incisions within the relaxed skin tension lines where possible. The area thus outlined was incised deep to adipose tissue with a #15 scalpel blade.  The skin margins were undermined to an appropriate distance in all directions utilizing iris scissors. Modified Advancement Flap Text: The defect edges were debeveled with a #15 scalpel blade.  Given the location of the defect, shape of the defect and the proximity to free margins a modified advancement flap was deemed most appropriate.  Using a sterile surgical marker, an appropriate advancement flap was drawn incorporating the defect and placing the expected incisions within the relaxed skin tension lines where possible.    The area thus outlined was incised deep to adipose tissue with a #15 scalpel blade.  The skin margins were undermined to an appropriate distance in all directions utilizing iris scissors. Mucosal Advancement Flap Text: Given the location of the defect, shape of the defect and the proximity to free margins a mucosal advancement flap was deemed most appropriate. Incisions were made with a 15 blade scalpel in the appropriate fashion along the cutaneous vermilion border and the mucosal lip. The remaining actinically damaged mucosal tissue was excised.  The mucosal advancement flap was then elevated to the gingival sulcus with care taken to preserve the neurovascular structures and advanced into the primary defect. Care was taken to ensure that precise realignment of the vermilion border was achieved. Muscle Hinge Flap Text: The defect edges were debeveled with a #15 scalpel blade.  Given the size, depth and location of the defect and the proximity to free margins a muscle hinge flap was deemed most appropriate.  Using a sterile surgical marker, an appropriate hinge flap was drawn incorporating the defect. The area thus outlined was incised with a #15 scalpel blade.  The skin margins were undermined to an appropriate distance in all directions utilizing iris scissors. Mustarde Flap Text: The defect edges were debeveled with a #15 scalpel blade.  Given the size, depth and location of the defect and the proximity to free margins a Mustarde flap was deemed most appropriate.  Using a sterile surgical marker, an appropriate flap was drawn incorporating the defect. The area thus outlined was incised with a #15 scalpel blade.  The skin margins were undermined to an appropriate distance in all directions utilizing iris scissors. Nasal Turnover Hinge Flap Text: The defect edges were debeveled with a #15 scalpel blade.  Given the size, depth, location of the defect and the defect being full thickness a nasal turnover hinge flap was deemed most appropriate.  Using a sterile surgical marker, an appropriate hinge flap was drawn incorporating the defect. The area thus outlined was incised with a #15 scalpel blade. The flap was designed to recreate the nasal mucosal lining and the alar rim. The skin margins were undermined to an appropriate distance in all directions utilizing iris scissors. Nasalis-Muscle-Based Myocutaneous Island Pedicle Flap Text: Using a #15 blade, an incision was made around the donor flap to the level of the nasalis muscle. Wide lateral undermining was then performed in both the subcutaneous plane above the nasalis muscle, and in a submuscular plane just above periosteum. This allowed the formation of a free nasalis muscle axial pedicle (based on the angular artery) which was still attached to the actual cutaneous flap, increasing its mobility and vascular viability. Hemostasis was obtained with pinpoint electrocoagulation. The flap was mobilized into position and the pivotal anchor points positioned and stabilized with buried interrupted sutures. Subcutaneous and dermal tissues were closed in a multilayered fashion with sutures. Tissue redundancies were excised, and the epidermal edges were apposed without significant tension and sutured with sutures. Nasalis Myocutaneous Flap Text: Using a #15 blade, an incision was made around the donor flap to the level of the nasalis muscle. Wide lateral undermining was then performed in both the subcutaneous plane above the nasalis muscle, and in a submuscular plane just above periosteum. This allowed the formation of a free nasalis muscle axial pedicle which was still attached to the actual cutaneous flap, increasing its mobility and vascular viability. Hemostasis was obtained with pinpoint electrocoagulation. The flap was mobilized into position and the pivotal anchor points positioned and stabilized with buried interrupted sutures. Subcutaneous and dermal tissues were closed in a multilayered fashion with sutures. Tissue redundancies were excised, and the epidermal edges were apposed without significant tension and sutured with sutures. Nasolabial Transposition Flap Text: The defect edges were debeveled with a #15 scalpel blade.  Given the size, depth and location of the defect and the proximity to free margins a nasolabial transposition flap was deemed most appropriate. Using a sterile surgical marker, an appropriate flap was drawn incorporating the defect. The area thus outlined was incised with a #15 scalpel blade. The skin margins were undermined to an appropriate distance in all directions utilizing iris scissors. Following this, the designed flap was carried into the primary defect and sutured into place. Orbicularis Oris Muscle Flap Text: The defect edges were debeveled with a #15 scalpel blade.  Given that the defect affected the competency of the oral sphincter an orbicularis oris muscle flap was deemed most appropriate to restore this competency and normal muscle function.  Using a sterile surgical marker, an appropriate flap was drawn incorporating the defect. The area thus outlined was incised with a #15 scalpel blade. O-T Advancement Flap Text: The defect edges were debeveled with a #15 scalpel blade.  Given the location of the defect, shape of the defect and the proximity to free margins an O-T advancement flap was deemed most appropriate.  Using a sterile surgical marker, an appropriate advancement flap was drawn incorporating the defect and placing the expected incisions within the relaxed skin tension lines where possible.    The area thus outlined was incised deep to adipose tissue with a #15 scalpel blade.  The skin margins were undermined to an appropriate distance in all directions utilizing iris scissors. O-T Plasty Text: The defect edges were debeveled with a #15 scalpel blade.  Given the location of the defect, shape of the defect and the proximity to free margins an O-T plasty was deemed most appropriate.  Using a sterile surgical marker, an appropriate O-T plasty was drawn incorporating the defect and placing the expected incisions within the relaxed skin tension lines where possible.    The area thus outlined was incised deep to adipose tissue with a #15 scalpel blade.  The skin margins were undermined to an appropriate distance in all directions utilizing iris scissors. O-L Flap Text: The defect edges were debeveled with a #15 scalpel blade.  Given the location of the defect, shape of the defect and the proximity to free margins an O-L flap was deemed most appropriate.  Using a sterile surgical marker, an appropriate advancement flap was drawn incorporating the defect and placing the expected incisions within the relaxed skin tension lines where possible.    The area thus outlined was incised deep to adipose tissue with a #15 scalpel blade.  The skin margins were undermined to an appropriate distance in all directions utilizing iris scissors. O-Z Flap Text: The defect edges were debeveled with a #15 scalpel blade.  Given the location of the defect, shape of the defect and the proximity to free margins an O-Z flap was deemed most appropriate.  Using a sterile surgical marker, an appropriate transposition flap was drawn incorporating the defect and placing the expected incisions within the relaxed skin tension lines where possible. The area thus outlined was incised deep to adipose tissue with a #15 scalpel blade.  The skin margins were undermined to an appropriate distance in all directions utilizing iris scissors. O-Z Plasty Text: The defect edges were debeveled with a #15 scalpel blade.  Given the location of the defect, shape of the defect and the proximity to free margins an O-Z plasty (double transposition flap) was deemed most appropriate.  Using a sterile surgical marker, the appropriate transposition flaps were drawn incorporating the defect and placing the expected incisions within the relaxed skin tension lines where possible.    The area thus outlined was incised deep to adipose tissue with a #15 scalpel blade.  The skin margins were undermined to an appropriate distance in all directions utilizing iris scissors.  Hemostasis was achieved with electrocautery.  The flaps were then transposed into place, one clockwise and the other counterclockwise, and anchored with interrupted buried subcutaneous sutures. Peng Advancement Flap Text: The defect edges were debeveled with a #15 scalpel blade.  Given the location of the defect, shape of the defect and the proximity to free margins a Peng advancement flap was deemed most appropriate.  Using a sterile surgical marker, an appropriate advancement flap was drawn incorporating the defect and placing the expected incisions within the relaxed skin tension lines where possible. The area thus outlined was incised deep to adipose tissue with a #15 scalpel blade.  The skin margins were undermined to an appropriate distance in all directions utilizing iris scissors. Rectangular Flap Text: The defect edges were debeveled with a #15 scalpel blade. Given the location of the defect and the proximity to free margins a rectangular flap was deemed most appropriate. Using a sterile surgical marker, an appropriate rectangular flap was drawn incorporating the defect. The area thus outlined was incised deep to adipose tissue with a #15 scalpel blade. The skin margins were undermined to an appropriate distance in all directions utilizing iris scissors. Following this, the designed flap was carried over into the primary defect and sutured into place. Rhombic Flap Text: The defect edges were debeveled with a #15 scalpel blade.  Given the location of the defect and the proximity to free margins a rhombic flap was deemed most appropriate.  Using a sterile surgical marker, an appropriate rhombic flap was drawn incorporating the defect.    The area thus outlined was incised deep to adipose tissue with a #15 scalpel blade.  The skin margins were undermined to an appropriate distance in all directions utilizing iris scissors. Rhomboid Transposition Flap Text: The defect edges were debeveled with a #15 scalpel blade.  Given the location of the defect and the proximity to free margins a rhomboid transposition flap was deemed most appropriate.  Using a sterile surgical marker, an appropriate rhomboid flap was drawn incorporating the defect.    The area thus outlined was incised deep to adipose tissue with a #15 scalpel blade.  The skin margins were undermined to an appropriate distance in all directions utilizing iris scissors. Rotation Flap Text: The defect edges were debeveled with a #15 scalpel blade.  Given the location of the defect, shape of the defect and the proximity to free margins a rotation flap was deemed most appropriate.  Using a sterile surgical marker, an appropriate rotation flap was drawn incorporating the defect and placing the expected incisions within the relaxed skin tension lines where possible.    The area thus outlined was incised deep to adipose tissue with a #15 scalpel blade.  The skin margins were undermined to an appropriate distance in all directions utilizing iris scissors. Spiral Flap Text: The defect edges were debeveled with a #15 scalpel blade.  Given the location of the defect, shape of the defect and the proximity to free margins a spiral flap was deemed most appropriate.  Using a sterile surgical marker, an appropriate rotation flap was drawn incorporating the defect and placing the expected incisions within the relaxed skin tension lines where possible. The area thus outlined was incised deep to adipose tissue with a #15 scalpel blade.  The skin margins were undermined to an appropriate distance in all directions utilizing iris scissors. Staged Advancement Flap Text: The defect edges were debeveled with a #15 scalpel blade.  Given the location of the defect, shape of the defect and the proximity to free margins a staged advancement flap was deemed most appropriate.  Using a sterile surgical marker, an appropriate advancement flap was drawn incorporating the defect and placing the expected incisions within the relaxed skin tension lines where possible. The area thus outlined was incised deep to adipose tissue with a #15 scalpel blade.  The skin margins were undermined to an appropriate distance in all directions utilizing iris scissors. Star Wedge Flap Text: The defect edges were debeveled with a #15 scalpel blade.  Given the location of the defect, shape of the defect and the proximity to free margins a star wedge flap was deemed most appropriate.  Using a sterile surgical marker, an appropriate rotation flap was drawn incorporating the defect and placing the expected incisions within the relaxed skin tension lines where possible. The area thus outlined was incised deep to adipose tissue with a #15 scalpel blade.  The skin margins were undermined to an appropriate distance in all directions utilizing iris scissors. Transposition Flap Text: The defect edges were debeveled with a #15 scalpel blade.  Given the location of the defect and the proximity to free margins a transposition flap was deemed most appropriate.  Using a sterile surgical marker, an appropriate transposition flap was drawn incorporating the defect.    The area thus outlined was incised deep to adipose tissue with a #15 scalpel blade.  The skin margins were undermined to an appropriate distance in all directions utilizing iris scissors. Trilobed Flap Text: The defect edges were debeveled with a #15 scalpel blade.  Given the location of the defect and the proximity to free margins a trilobed flap was deemed most appropriate.  Using a sterile surgical marker, an appropriate trilobed flap drawn around the defect.    The area thus outlined was incised deep to adipose tissue with a #15 scalpel blade.  The skin margins were undermined to an appropriate distance in all directions utilizing iris scissors. V-Y Flap Text: The defect edges were debeveled with a #15 scalpel blade.  Given the location of the defect, shape of the defect and the proximity to free margins a V-Y flap was deemed most appropriate.  Using a sterile surgical marker, an appropriate advancement flap was drawn incorporating the defect and placing the expected incisions within the relaxed skin tension lines where possible.    The area thus outlined was incised deep to adipose tissue with a #15 scalpel blade.  The skin margins were undermined to an appropriate distance in all directions utilizing iris scissors. V-Y Plasty Text: The defect edges were debeveled with a #15 scalpel blade.  Given the location of the defect, shape of the defect and the proximity to free margins an V-Y advancement flap was deemed most appropriate.  Using a sterile surgical marker, an appropriate advancement flap was drawn incorporating the defect and placing the expected incisions within the relaxed skin tension lines where possible.    The area thus outlined was incised deep to adipose tissue with a #15 scalpel blade.  The skin margins were undermined to an appropriate distance in all directions utilizing iris scissors. W Plasty Text: The lesion was extirpated to the level of the fat with a #15 scalpel blade.  Given the location of the defect, shape of the defect and the proximity to free margins a W-plasty was deemed most appropriate for repair.  Using a sterile surgical marker, the appropriate transposition arms of the W-plasty were drawn incorporating the defect and placing the expected incisions within the relaxed skin tension lines where possible.    The area thus outlined was incised deep to adipose tissue with a #15 scalpel blade.  The skin margins were undermined to an appropriate distance in all directions utilizing iris scissors.  The opposing transposition arms were then transposed into place in opposite direction and anchored with interrupted buried subcutaneous sutures. Z Plasty Text: The lesion was extirpated to the level of the fat with a #15 scalpel blade.  Given the location of the defect, shape of the defect and the proximity to free margins a Z-plasty was deemed most appropriate for repair.  Using a sterile surgical marker, the appropriate transposition arms of the Z-plasty were drawn incorporating the defect and placing the expected incisions within the relaxed skin tension lines where possible.    The area thus outlined was incised deep to adipose tissue with a #15 scalpel blade.  The skin margins were undermined to an appropriate distance in all directions utilizing iris scissors.  The opposing transposition arms were then transposed into place in opposite direction and anchored with interrupted buried subcutaneous sutures. Zygomaticofacial Flap Text: Given the location of the defect, shape of the defect and the proximity to free margins a zygomaticofacial flap was deemed most appropriate for repair.  Using a sterile surgical marker, the appropriate flap was drawn incorporating the defect and placing the expected incisions within the relaxed skin tension lines where possible. The area thus outlined was incised deep to adipose tissue with a #15 scalpel blade with preservation of a vascular pedicle.  The skin margins were undermined to an appropriate distance in all directions utilizing iris scissors.  The flap was then placed into the defect and anchored with interrupted buried subcutaneous sutures. Abbe Flap (Lower To Upper Lip) Text: The defect of the upper lip was assessed and measured.  Given the location and size of the defect, an Abbe flap was deemed most appropriate.  Using a sterile surgical marker, an appropriate Abbe flap was measured and drawn on the lower lip. Local anesthesia was then infiltrated. A scalpel was then used to incise the upper lip through and through the skin, vermilion, muscle and mucosa, leaving the flap pedicled on the opposite side.  The flap was then rotated and transferred to the lower lip defect.  The flap was then sutured into place with a three layer technique, closing the orbicularis oris muscle layer with subcutaneous buried sutures, followed by a mucosal layer and an epidermal layer. Abbe Flap (Upper To Lower Lip) Text: The defect of the lower lip was assessed and measured.  Given the location and size of the defect, an Abbe flap was deemed most appropriate.  Using a sterile surgical marker, an appropriate Abbe flap was measured and drawn on the upper lip. Local anesthesia was then infiltrated.  A scalpel was then used to incise the upper lip through and through the skin, vermilion, muscle and mucosa, leaving the flap pedicled on the opposite side.  The flap was then rotated and transferred to the lower lip defect.  The flap was then sutured into place with a three layer technique, closing the orbicularis oris muscle layer with subcutaneous buried sutures, followed by a mucosal layer and an epidermal layer. Cheek Interpolation Flap Text: A decision was made to reconstruct the defect utilizing an interpolation axial flap and a staged reconstruction.  A telfa template was made of the defect.  This telfa template was then used to outline the Cheek Interpolation flap.  The donor area for the pedicle flap was then injected with anesthesia.  The flap was excised through the skin and subcutaneous tissue down to the layer of the underlying musculature.  The interpolation flap was carefully excised within this deep plane to maintain its blood supply.  The edges of the donor site were undermined.   The donor site was closed in a primary fashion.  The pedicle was then rotated into position and sutured.  Once the tube was sutured into place, adequate blood supply was confirmed with blanching and refill.  The pedicle was then wrapped with xeroform gauze and dressed appropriately with a telfa and gauze bandage to ensure continued blood supply and protect the attached pedicle. Cheek-To-Nose Interpolation Flap Text: A decision was made to reconstruct the defect utilizing an interpolation axial flap and a staged reconstruction.  A telfa template was made of the defect.  This telfa template was then used to outline the Cheek-To-Nose Interpolation flap.  The donor area for the pedicle flap was then injected with anesthesia.  The flap was excised through the skin and subcutaneous tissue down to the layer of the underlying musculature.  The interpolation flap was carefully excised within this deep plane to maintain its blood supply.  The edges of the donor site were undermined.   The donor site was closed in a primary fashion.  The pedicle was then rotated into position and sutured.  Once the tube was sutured into place, adequate blood supply was confirmed with blanching and refill.  The pedicle was then wrapped with xeroform gauze and dressed appropriately with a telfa and gauze bandage to ensure continued blood supply and protect the attached pedicle. Estlander Flap (Lower To Upper Lip) Text: The defect of the lower lip was assessed and measured.  Given the location and size of the defect, an Estlander flap was deemed most appropriate.  Using a sterile surgical marker, an appropriate Estlander flap was measured and drawn on the upper lip. Local anesthesia was then infiltrated. A scalpel was then used to incise the lateral aspect of the flap, through skin, muscle and mucosa, leaving the flap pedicled medially.  The flap was then rotated and positioned to fill the lower lip defect.  The flap was then sutured into place with a three layer technique, closing the orbicularis oris muscle layer with subcutaneous buried sutures, followed by a mucosal layer and an epidermal layer. Interpolation Flap Text: A decision was made to reconstruct the defect utilizing an interpolation axial flap and a staged reconstruction.  A telfa template was made of the defect.  This telfa template was then used to outline the interpolation flap.  The donor area for the pedicle flap was then injected with anesthesia.  The flap was excised through the skin and subcutaneous tissue down to the layer of the underlying musculature.  The interpolation flap was carefully excised within this deep plane to maintain its blood supply.  The edges of the donor site were undermined.   The donor site was closed in a primary fashion.  The pedicle was then rotated into position and sutured.  Once the tube was sutured into place, adequate blood supply was confirmed with blanching and refill.  The pedicle was then wrapped with xeroform gauze and dressed appropriately with a telfa and gauze bandage to ensure continued blood supply and protect the attached pedicle. Melolabial Interpolation Flap Text: A decision was made to reconstruct the defect utilizing an interpolation axial flap and a staged reconstruction.  A telfa template was made of the defect.  This telfa template was then used to outline the melolabial interpolation flap.  The donor area for the pedicle flap was then injected with anesthesia.  The flap was excised through the skin and subcutaneous tissue down to the layer of the underlying musculature.  The pedicle flap was carefully excised within this deep plane to maintain its blood supply.  The edges of the donor site were undermined.   The donor site was closed in a primary fashion.  The pedicle was then rotated into position and sutured.  Once the tube was sutured into place, adequate blood supply was confirmed with blanching and refill.  The pedicle was then wrapped with xeroform gauze and dressed appropriately with a telfa and gauze bandage to ensure continued blood supply and protect the attached pedicle. Mastoid Interpolation Flap Text: A decision was made to reconstruct the defect utilizing an interpolation axial flap and a staged reconstruction.  A telfa template was made of the defect.  This telfa template was then used to outline the mastoid interpolation flap.  The donor area for the pedicle flap was then injected with anesthesia.  The flap was excised through the skin and subcutaneous tissue down to the layer of the underlying musculature.  The pedicle flap was carefully excised within this deep plane to maintain its blood supply.  The edges of the donor site were undermined.   The donor site was closed in a primary fashion.  The pedicle was then rotated into position and sutured.  Once the tube was sutured into place, adequate blood supply was confirmed with blanching and refill.  The pedicle was then wrapped with xeroform gauze and dressed appropriately with a telfa and gauze bandage to ensure continued blood supply and protect the attached pedicle. Paramedian Forehead Flap Text: A decision was made to reconstruct the defect utilizing an interpolation axial flap and a staged reconstruction.  A telfa template was made of the defect.  This telfa template was then used to outline the paramedian forehead pedicle flap.  The donor area for the pedicle flap was then injected with anesthesia.  The flap was excised through the skin and subcutaneous tissue down to the layer of the underlying musculature.  The pedicle flap was carefully excised within this deep plane to maintain its blood supply.  The edges of the donor site were undermined.   The donor site was closed in a primary fashion.  The pedicle was then rotated into position and sutured.  Once the tube was sutured into place, adequate blood supply was confirmed with blanching and refill.  The pedicle was then wrapped with xeroform gauze and dressed appropriately with a telfa and gauze bandage to ensure continued blood supply and protect the attached pedicle. Posterior Auricular Interpolation Flap Text: A decision was made to reconstruct the defect utilizing an interpolation axial flap and a staged reconstruction.  A telfa template was made of the defect.  This telfa template was then used to outline the posterior auricular interpolation flap.  The donor area for the pedicle flap was then injected with anesthesia.  The flap was excised through the skin and subcutaneous tissue down to the layer of the underlying musculature.  The pedicle flap was carefully excised within this deep plane to maintain its blood supply.  The edges of the donor site were undermined.   The donor site was closed in a primary fashion.  The pedicle was then rotated into position and sutured.  Once the tube was sutured into place, adequate blood supply was confirmed with blanching and refill.  The pedicle was then wrapped with xeroform gauze and dressed appropriately with a telfa and gauze bandage to ensure continued blood supply and protect the attached pedicle. Cheiloplasty (Complex) Text: A decision was made to reconstruct the defect with a  cheiloplasty.  The defect was undermined extensively.  Additional orbicularis oris muscle was excised with a 15 blade scalpel.  The defect was converted into a full thickness wedge to facilite a better cosmetic result.  Small vessels were then tied off with 5-0 monocyrl. The orbicularis oris, superficial fascia, adipose and dermis were then reapproximated.  After the deeper layers were approximated the epidermis was reapproximated with particular care given to realign the vermilion border. Cheiloplasty (Less Than 50%) Text: A decision was made to reconstruct the defect with a  cheiloplasty.  The defect was undermined extensively.  Additional orbicularis oris muscle was excised with a 15 blade scalpel.  The defect was converted into a full thickness wedge, of less than 50% of the vertical height of the lip, to facilite a better cosmetic result.  Small vessels were then tied off with 5-0 monocyrl. The orbicularis oris, superficial fascia, adipose and dermis were then reapproximated.  After the deeper layers were approximated the epidermis was reapproximated with particular care given to realign the vermilion border. Ear Wedge Repair Text: A wedge excision was completed by carrying down an excision through the full thickness of the ear and cartilage with an inward facing Burow's triangle. The wound was then closed in a layered fashion. Full Thickness Lip Wedge Repair (Flap) Text: Given the location of the defect and the proximity to free margins a full thickness wedge repair was deemed most appropriate.  Using a sterile surgical marker, the appropriate repair was drawn incorporating the defect and placing the expected incisions perpendicular to the vermilion border.  The vermilion border was also meticulously outlined to ensure appropriate reapproximation during the repair.  The area thus outlined was incised through and through with a #15 scalpel blade.  The muscularis and dermis were reaproximated with deep sutures following hemostasis. Care was taken to realign the vermilion border before proceeding with the superficial closure.  Once the vermilion was realigned the superfical and mucosal closure was finished. Burow's Graft Text: The defect edges were debeveled with a #15 scalpel blade.  Given the location of the defect, shape of the defect, the proximity to free margins and the presence of a standing cone deformity a Burow's skin graft was deemed most appropriate. The standing cone was removed and this tissue was then trimmed to the shape of the primary defect. The adipose tissue was also removed until only dermis and epidermis were left.  The skin margins of the secondary defect were undermined to an appropriate distance in all directions utilizing iris scissors.  The secondary defect was closed with interrupted buried subcutaneous sutures.  The skin edges were then re-apposed with running  sutures.  The skin graft was then placed in the primary defect and oriented appropriately. Cartilage Graft Text: The defect edges were debeveled with a #15 scalpel blade.  Given the location of the defect, shape of the defect, the fact the defect involved a full thickness cartilage defect a cartilage graft was deemed most appropriate.  An appropriate donor site was identified, cleansed, and anesthetized. The cartilage graft was then harvested and transferred to the recipient site, oriented appropriately and then sutured into place.  The secondary defect was then repaired using a primary closure. Composite Graft Text: The defect edges were debeveled with a #15 scalpel blade.  Given the location of the defect, shape of the defect, the proximity to free margins and the fact the defect was full thickness a composite graft was deemed most appropriate.  The defect was outline and then transferred to the donor site.  A full thickness graft was then excised from the donor site. The graft was then placed in the primary defect, oriented appropriately and then sutured into place.  The secondary defect was then repaired using a primary closure. Epidermal Autograft Text: The defect edges were debeveled with a #15 scalpel blade.  Given the location of the defect, shape of the defect and the proximity to free margins an epidermal autograft was deemed most appropriate.  Using a sterile surgical marker, the primary defect shape was transferred to the donor site. The epidermal graft was then harvested.  The skin graft was then placed in the primary defect and oriented appropriately. Dermal Autograft Text: The defect edges were debeveled with a #15 scalpel blade.  Given the location of the defect, shape of the defect and the proximity to free margins a dermal autograft was deemed most appropriate.  Using a sterile surgical marker, the primary defect shape was transferred to the donor site. The area thus outlined was incised deep to adipose tissue with a #15 scalpel blade.  The harvested graft was then trimmed of adipose and epidermal tissue until only dermis was left.  The skin graft was then placed in the primary defect and oriented appropriately. Ftsg Text: The defect edges were debeveled with a #15 scalpel blade.  Given the location of the defect, shape of the defect and the proximity to free margins a full thickness skin graft was deemed most appropriate.  Using a sterile surgical marker, the primary defect shape was transferred to the donor site. The area thus outlined was incised deep to adipose tissue with a #15 scalpel blade.  The harvested graft was then trimmed of adipose tissue until only dermis and epidermis was left.  The skin margins of the secondary defect were undermined to an appropriate distance in all directions utilizing iris scissors.  The secondary defect was closed with interrupted buried subcutaneous sutures.  The skin edges were then re-apposed with running  sutures.  The skin graft was then placed in the primary defect and oriented appropriately. Pinch Graft Text: The defect edges were debeveled with a #15 scalpel blade. Given the location of the defect, shape of the defect and the proximity to free margins a pinch graft was deemed most appropriate. Using a sterile surgical marker, the primary defect shape was transferred to the donor site. The area thus outlined was incised deep to adipose tissue with a #15 scalpel blade.  The harvested graft was then trimmed of adipose tissue until only dermis and epidermis was left. The skin margins of the secondary defect were undermined to an appropriate distance in all directions utilizing iris scissors.  The secondary defect was closed with interrupted buried subcutaneous sutures.  The skin edges were then re-apposed with running  sutures.  The skin graft was then placed in the primary defect and oriented appropriately. Skin Substitute Text: The defect edges were debeveled with a #15 scalpel blade.  Given the location of the defect, shape of the defect and the proximity to free margins a skin substitute graft was deemed most appropriate.  The graft material was trimmed to fit the size of the defect. The graft was then placed in the primary defect and oriented appropriately. Split-Thickness Skin Graft Text: The defect edges were debeveled with a #15 scalpel blade.  Given the location of the defect, shape of the defect and the proximity to free margins a split thickness skin graft was deemed most appropriate.  Using a sterile surgical marker, the primary defect shape was transferred to the donor site. The split thickness graft was then harvested.  The skin graft was then placed in the primary defect and oriented appropriately. Tissue Cultured Epidermal Autograft Text: The defect edges were debeveled with a #15 scalpel blade.  Given the location of the defect, shape of the defect and the proximity to free margins a tissue cultured epidermal autograft was deemed most appropriate.  The graft was then trimmed to fit the size of the defect.  The graft was then placed in the primary defect and oriented appropriately. Xenograft Text: The defect edges were debeveled with a #15 scalpel blade.  Given the location of the defect, shape of the defect and the proximity to free margins a xenograft was deemed most appropriate.  The graft was then trimmed to fit the size of the defect.  The graft was then placed in the primary defect and oriented appropriately. Complex Repair And Flap Additional Text (Will Appearing After The Standard Complex Repair Text): The complex repair was not sufficient to completely close the primary defect. The remaining additional defect was repaired with the flap mentioned below. Complex Repair And Graft Additional Text (Will Appearing After The Standard Complex Repair Text): The complex repair was not sufficient to completely close the primary defect. The remaining additional defect was repaired with the graft mentioned below. Eyelid Full Thickness Repair - 50582: The eyelid defect was full thickness which required a wedge repair of the eyelid. Special care was taken to ensure that the eyelid margin was realligned when placing sutures. Eyelid Partial Thickness Repair - 38372: The eyelid defect was partial thickness which required a wedge repair of the eyelid. Special care was taken to ensure that the eyelid margin was realligned when placing sutures. Intermediate Repair And Flap Additional Text (Will Appearing After The Standard Complex Repair Text): The intermediate repair was not sufficient to completely close the primary defect. The remaining additional defect was repaired with the flap mentioned below. Intermediate Repair And Graft Additional Text (Will Appearing After The Standard Complex Repair Text): The intermediate repair was not sufficient to completely close the primary defect. The remaining additional defect was repaired with the graft mentioned below. Localized Dermabrasion With 15 Blade Text: The patient was draped in routine manner.  Localized dermabrasion using a 15 blade was performed in routine manner to papillary dermis. This spot dermabrasion is being performed to complete skin cancer reconstruction. It also will eliminate the other sun damaged precancerous cells that are known to be part of the regional effect of a lifetime's worth of sun exposure. This localized dermabrasion is therapeutic and should not be considered cosmetic in any regard. Localized Dermabrasion With Sand Papertext: The patient was draped in routine manner.  Localized dermabrasion using sterile sand paper was performed in routine manner to papillary dermis. This spot dermabrasion is being performed to complete skin cancer reconstruction. It also will eliminate the other sun damaged precancerous cells that are known to be part of the regional effect of a lifetime's worth of sun exposure. This localized dermabrasion is therapeutic and should not be considered cosmetic in any regard. Localized Dermabrasion With Wire Brush Text: The patient was draped in routine manner.  Localized dermabrasion using 3 x 17 mm wire brush was performed in routine manner to papillary dermis. This spot dermabrasion is being performed to complete skin cancer reconstruction. It also will eliminate the other sun damaged precancerous cells that are known to be part of the regional effect of a lifetime's worth of sun exposure. This localized dermabrasion is therapeutic and should not be considered cosmetic in any regard. Purse String (Simple) Text: Given the location of the defect and the characteristics of the surrounding skin a purse string closure was deemed most appropriate.  Undermining was performed circumferentially around the surgical defect.  A purse string suture was then placed and tightened. Purse String (Intermediate) Text: Given the location of the defect and the characteristics of the surrounding skin a purse string intermediate closure was deemed most appropriate.  Undermining was performed circumferentially around the surgical defect.  A purse string suture was then placed and tightened. Partial Purse String (Simple) Text: Given the location of the defect and the characteristics of the surrounding skin a simple purse string closure was deemed most appropriate.  Undermining was performed circumferentially around the surgical defect.  A purse string suture was then placed and tightened. Wound tension only allowed a partial closure of the circular defect. Partial Purse String (Intermediate) Text: Given the location of the defect and the characteristics of the surrounding skin an intermediate purse string closure was deemed most appropriate.  Undermining was performed circumferentially around the surgical defect.  A purse string suture was then placed and tightened. Wound tension only allowed a partial closure of the circular defect. Tarsorrhaphy Text: A tarsorrhaphy was performed using Frost sutures. Manual Repair Warning Statement: We plan on removing the manually selected variable below in favor of our much easier automatic structured text blocks found in the previous tab. We decided to do this to help make the flow better and give you the full power of structured data. Manual selection is never going to be ideal in our platform and I would encourage you to avoid using manual selection from this point on, especially since I will be sunsetting this feature. It is important that you do one of two things with the customized text below. First, you can save all of the text in a word file so you can have it for future reference. Second, transfer the text to the appropriate area in the Library tab. Lastly, if there is a flap or graft type which we do not have you need to let us know right away so I can add it in before the variable is hidden. No need to panic, we plan to give you roughly 6 months to make the change. Same Histology In Subsequent Stages Text: The pattern and morphology of the tumor is as described in the first stage. No Residual Tumor Seen Histology Text: There were no malignant cells seen in the sections examined. Inflammation Suggestive Of Cancer Camouflage Histology Text: There was a dense lymphocytic infiltrate which prevented adequate histologic evaluation of adjacent structures. Bcc Histology Text: There were numerous aggregates of basaloid cells. Bcc Infiltrative Histology Text: There were numerous aggregates of basaloid cells demonstrating an infiltrative pattern. Mart-1 - Positive Histology Text: MART-1 staining demonstrates areas of higher density and clustering of melanocytes with Pagetoid spread upwards within the epidermis. The surgical margins are positive for tumor cells. Mart-1 - Negative Histology Text: MART-1 staining demonstrates a normal density and pattern of melanocytes along the dermal-epidermal junction. The surgical margins are negative for tumor cells. Information: Selecting Yes will display possible errors in your note based on the variables you have selected. This validation is only offered as a suggestion for you. PLEASE NOTE THAT THE VALIDATION TEXT WILL BE REMOVED WHEN YOU FINALIZE YOUR NOTE. IF YOU WANT TO FAX A PRELIMINARY NOTE YOU WILL NEED TO TOGGLE THIS TO 'NO' IF YOU DO NOT WANT IT IN YOUR FAXED NOTE. Bill 59 Modifier?: No - Continue to Bill 79 Modifier

## 2024-09-18 NOTE — TELEPHONE ENCOUNTER
Has declined Bipap three nights running now.   Message from MyChart:  Original authorizing provider: MD Carlos Alberto Connelly would like a refill of the following medications:  venlafaxine (EFFEXOR-ER) 150 MG TB24 24 hr tablet [Humberto Conner MD]    Preferred pharmacy: [facility] - [place], [place] - [address]    Comment:     [Mother] : mother

## 2024-10-21 NOTE — MR AVS SNAPSHOT
Carlos Alberto Fenton   11/14/2017   Anticoagulation Therapy Visit    Description:  77 year old female   Provider:  Humberto Conner MD   Department:  Ph Anticoag           INR as of 11/14/2017     Today's INR 3.3!      Anticoagulation Summary as of 11/14/2017     INR goal 2.0-3.0   Today's INR 3.3!   Full instructions 11/14: 2.5 mg; Otherwise 2.5 mg on Mon, Wed, Fri; 5 mg all other days   Next INR check 11/21/2017    Indications   Long-term (current) use of anticoagulants [Z79.01] [Z79.01]  New onset atrial fibrillation (H) (Resolved) [I48.91]  Atrial fibrillation (H) [I48.91] [I48.91]         Contact Numbers     Clinic Number:         November 2017 Details    Sun Mon Tue Wed Thu Fri Sat        1               2               3               4                 5               6               7               8               9               10               11                 12               13               14      2.5 mg   See details      15      2.5 mg         16      5 mg         17      2.5 mg         18      5 mg           19      5 mg         20      2.5 mg         21            22               23               24               25                 26               27               28               29               30                  Date Details   11/14 This INR check       Date of next INR:  11/21/2017         How to take your warfarin dose     To take:  2.5 mg Take 0.5 of a 5 mg tablet.    To take:  5 mg Take 1 of the 5 mg tablets.           
3 = A little assistance

## 2025-03-05 NOTE — MR AVS SNAPSHOT
03/05/2025  Socorro Huang is a 50 y.o., female.      Pre-op Assessment    I have reviewed the Patient Summary Reports.       I have reviewed the Medications.   Steroids Taken In Past Year: Prednisone    Review of Systems  Anesthesia Hx:   History of prior surgery of interest to airway management or planning:  Previous anesthesia: MAC, General 10/12/23 spine arthroplasty with general anesthesia.  Procedure performed at an [facility].      EGD 12/18/24 with MAC.  Procedure performed at an [facility]. Airway issues documented on chart review include mask, easy, GETA, videolaryngoscope used, easy direct laryngoscopy , view on direct laryngoscopy Grade I , view on video-laryngoscopy Grade I     Personal Hx of Anesthesia complications, Post-Operative Nausea/Vomiting, in the past, but not with recent anesthetics / prophylaxis                    Social:  Non-Smoker, Alcohol Use       EENT/Dental:   Benign positionial vertigo          Hepatic/GI:     GERD   diverticulitis             Musculoskeletal:  Arthritis   Carpal tunnel syndrome of right wrist  Ganglion cyst of volar aspect of right wrist  Right wrist pain  S/p cervical disc replacement  Lumbar radiculopathy         Spine Disorders: cervical and lumbar Disc disease and Degenerative disease           Neurological:      Headaches                                 Endocrine:   Hypothyroidism          Psych:   anxiety                  Past Medical History:   Diagnosis Date    Abnormal Pap smear     Anxiety     celexa & prozac didn't help much    Constipation - functional     Headache(784.0)     Hemorrhoid     Leukopenia     benign, evaluated in [facility] y 2000    Menorrhagia     PONV (postoperative nausea and vomiting)     Strain of neck muscle     tx with PT at [facility] in past     Past Surgical History:   Procedure Laterality Date    ANOSCOPY N/A 07/20/2022                After Visit Summary   3/16/2017    Carlos Alberto Fenton    MRN: [id number]           Patient Information     Date Of Birth          1940        Visit Information        Provider Department      3/16/2017 4:00 PM Easton Torres DPM [facility]        Today's Diagnoses     Toe gangrene (H)    -  1    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        Type 2 diabetes mellitus with complication, without long-term current use of insulin (H)        Edema, unspecified type           Follow-ups after your visit        Additional Services     VASCULAR SURGERY REFERRAL       Your provider has referred you to: [facility]: Vascular Surgery Clinic [facility] [phone]   [email or web address]    Please be aware that coverage of these services is subject to the terms and limitations of your health insurance plan.  Call member services at your health plan with any benefit or coverage questions.      Please bring the following to your appointment:  >>   Any x-rays, CTs or MRIs which have been performed.  Contact the facility where they were done to arrange for  prior to your scheduled appointment.  Any new CT, MRI or other procedures ordered by your specialist must be performed at a [place] facility or coordinated by your clinic's referral office.    >>   List of current medications   >>   This referral request   >>   Any documents/labs given to you for this referral                  Your next 10 appointments already scheduled     Mar 20, 2017 11:45 AM CDT   CT CHEST W/O CONTRAST with MGCT1   [facility]    [address] [place] [id number] [phone]           Please bring any scans or X-rays taken at other hospitals, if similar tests were done. Also bring a list of your medicines, including vitamins, minerals and over-the-counter drugs. It is safest to leave personal items at home.  Be sure to     Procedure: ANOSCOPY;  Surgeon: ASHTYN Melo MD;  Location: [facility] OR 2ND FLR;  Service: Colon and Rectal;  Laterality: N/A;    ARTHROPLASTY,SPINE,CERVICAL N/A 10/12/2023    Procedure: ARTHROPLASTY,SPINE,CERVICAL;  Surgeon: Amador Wood MD;  Location: [facility] OR;  Service: Neurosurgery;  Laterality: N/A;  Anterior Cervical Discectomy C5/6, C6/7 with artifical disc, possible fusion        BREAST RECONSTRUCTION  2018    implant removal and lift    BREAST SURGERY      CHEILECTOMY Right 08/18/2023    Procedure: CHEILECTOMY;  Surgeon: Tacho Sanchez DPM;  Location: [facility] OR;  Service: Podiatry;  Laterality: Right;    COLON SURGERY  2014         COLONOSCOPY N/A 02/03/2022    Procedure: COLONOSCOPY;  Surgeon: ASHTYN Melo MD;  Location: [facility] ENDO;  Service: Endoscopy;  Laterality: N/A;    CORRECTION OF HAMMER TOE Right 08/18/2023    Procedure: CORRECTION, HAMMER TOE;  Surgeon: Tacho Sanchez DPM;  Location: [facility] OR;  Service: Podiatry;  Laterality: Right;    DILATION AND CURETTAGE OF UTERUS  12/18/2012    complex hyperplasia, no atypia    ESOPHAGOGASTRODUODENOSCOPY N/A 12/18/2024    Procedure: EGD (ESOPHAGOGASTRODUODENOSCOPY);  Surgeon: Edith Bourgeois MD;  Location: [facility] ENDOSCOPY;  Service: Endoscopy;  Laterality: N/A;  EGD/Bravo-Instr send portal-OFF PPI (96hrs)-ASul  9/10/24- pc complete. DBM  9/16-pt r/s, no allergy or sensitivity to metal/jewelry per pt, updated instructions sent to portal-Kpvt  12/18 R/s update instru to portal. ASam  12/11/24- portal msg for pc. DBM      FLEXIBLE SIGMOIDOSCOPY N/A 07/20/2022    Procedure: SIGMOIDOSCOPY, FLEXIBLE;  Surgeon: ASHTYN Melo MD;  Location: [facility] OR 2ND FLR;  Service: Colon and Rectal;  Laterality: N/A;    HEMORRHOID SURGERY  2014    HYSTERECTOMY      INJECTION, SPINE, LUMBOSACRAL, TRANSFORAMINAL APPROACH Bilateral 10/31/2024    Procedure: TFESI B/L L4/5;  Surgeon: Anthony Macias MD;  Location: [facility] PAIN MANAGEMENT;  Service: Pain  tell your doctor:   If you have any allergies.   If there s any chance you are pregnant.   If you are breastfeeding.   If you have any special needs.  You do not need to do anything special to prepare.  Please wear loose clothing, such as a sweat suit or jogging clothes. Avoid snaps, zippers and other metal. We may ask you to undress and put on a hospital gown.            Mar 22, 2017  3:00 PM CDT   Office Visit with Humberto Conner MD   [facility] ([facility])    [address] [place] [id number] [phone]           Bring a current list of meds and any records pertaining to this visit.  For Physicals, please bring immunization records and any forms needing to be filled out.  Please arrive 10 minutes early to complete paperwork.            Mar 23, 2017  4:00 PM CDT   (Arrive by 3:45 PM)   Return Visit with Easton Torres DPM   [facility] ([facility] [place])    [address] [place] [address]  4th [facility] [id number] [phone]            Apr 03, 2017  3:00 PM CDT   (Arrive by 2:45 PM)   Return Visit with Angelina Bernal MD   [facility] ([facility] [place])    [address] [place] [address]  2nd [facility] [id number] [phone]            Apr 17, 2017  2:20 PM CDT   (Arrive by 2:05 PM)   ATRIAL FIBULATION VISIT with Paige Santos MD   [facility] ([facility] [place])    [address] [place] [address]  3rd [facility] [id number] [phone]            Apr 24, 2017  2:00 PM CDT   (Arrive by 1:45 PM)   New Vascular Visit with Leah Santamaria MD   [facility] ([facility] [place])    [address] [place] [address]  3rd [facility] [id number] [phone]            Jul 14, 2017  1:30 PM CDT   (Arrive by 1:15 PM)   Return Visit with Star Lobato MD   [facility] ([facility] [place])    [address] [place] [address]  Management;  Laterality: Bilateral;  no ac    LAPAROSCOPIC SIGMOIDECTOMY Left 07/20/2022    Procedure: COLECTOMY, SIGMOID, LAPAROSCOPIC, ERAS low;  Surgeon: ASHTYN Melo MD;  Location: [facility] OR 2ND FLR;  Service: Colon and Rectal;  Laterality: Left;    MOBILIZATION OF SPLENIC FLEXURE N/A 07/20/2022    Procedure: MOBILIZATION, SPLENIC FLEXURE;  Surgeon: ASHTYN Melo MD;  Location: [facility] OR 2ND FLR;  Service: Colon and Rectal;  Laterality: N/A;    OOPHORECTOMY      PARTIAL HYSTERECTOMY  2013    for atypia    PH MONITORING, ESOPHAGUS, WIRELESS, (OFF REFLUX MEDS) N/A 12/18/2024    Procedure: PH MONITORING, ESOPHAGUS, WIRELESS, (OFF REFLUX MEDS);  Surgeon: Edith Bourgeois MD;  Location: [facility] END[facility]COPY;  Service: Endoscopy;  Laterality: N/A;    SMALL INTESTINE SURGERY  2017    THYROIDECTOMY, PARTIAL  2013    TONSILLECTOMY      TUBAL LIGATION       Anesthesia Assessment: Preoperative EQUATION    Planned Procedure: Procedure(s) (LRB):  RELEASE, CARPAL TUNNEL (Right)  EXCISION, GANGLION CYST, WRIST (Right)  ARTHROTOMY, HAND RADIOCARPAL (Right)  Requested Anesthesia Type:Regional  Surgeon: Sara Lees MD  Service: Orthopedics  Known or anticipated Date of Surgery:3/20/2025    Surgeon notes: reviewed    Electronic QUestionnaire Assessment completed via nurse interview with patient.      Triage considerations:     The patient has no apparent active cardiac condition (No unstable coronary Syndrome such as severe unstable angina or recent [<1 month] myocardial infarction, decompensated CHF, severe valvular   disease or significant arrhythmia)    Previous anesthesia records:GETA, MAC, and Hx PONV    Last PCP note: 6-12 months ago   Subspecialty notes: Gastroenterology, Neurology, Neurosurgery, Ortho, Pain Management    Other important co-morbidities: GERD and Hypothyroid   EKG 10/9/23  Vent. Rate : 075 BPM     Atrial Rate : 075 BPM      P-R Int : 162 ms          QRS Dur : 088 ms       QT Int : 382 ms        [address]  3rd Floor  [facility] [address] [phone]              Who to contact     Please call your clinic at [phone] to:    Ask questions about your health    Make or cancel appointments    Discuss your medicines    Learn about your test results    Speak to your doctor   If you have compliments or concerns about an experience at your clinic, or if you wish to file a complaint, please contact [facility] Patient Relations at [phone] or email us at [email or web address]         Additional Information About Your Visit        [employer] Information     [employer] gives you secure access to your electronic health record. If you see a primary care provider, you can also send messages to your care team and make appointments. If you have questions, please call your primary care clinic.  If you do not have a primary care provider, please call [phone] and they will assist you.      [employer] is an electronic gateway that provides easy, online access to your medical records. With [employer], you can request a clinic appointment, read your test results, renew a prescription or communicate with your care team.     To access your existing account, please contact your [facility] or call [phone] for assistance.        Care EveryWhere ID     This is your Care EveryWhere ID. This could be used by other organizations to access your [place] medical records  [id number]         Blood Pressure from Last 3 Encounters:   03/08/17 108/84   03/03/17 114/78   02/24/17 (!) 115/96    Weight from Last 3 Encounters:   03/08/17 174 lb   03/03/17 174 lb 1.6 oz   02/24/17 170 lb              We Performed the Following     VASCULAR SURGERY REFERRAL        Primary Care Provider    Physician No Ref-Primary       No address on file        Thank you!     Thank you for choosing [facility]  for your care. Our goal is always to provide you with excellent  "P-R-T Axes : 064 -06 068 degrees      QTc Int : 426 ms     Normal sinus rhythm   Normal ECG   When compared with ECG of 10-OCT-2017 10:04,   No significant change was found   Confirmed by Tony Sandoval JR., MD ([id number]) on 10/9/2023 12:17:19 PM       Tests already available:  Results have been reviewed.             Instructions given. (See in Nurse's note) Pre op medication instructions sent via portal message.    Optimization:  Anesthesia Preop Clinic Assessment not Indicated    Medical Opinion Indicated: no       Sub-specialist consult indicated: no      Plan:   No pre op medical clearance requested.    Navigation:  Straight Line to surgery.               No tests, anesthesia preop clinic visit, or consult required.    Ht: 5'7"  Wt: 74.8 kg (165 lb)  BMI: 24.83  " care. Hearing back from our patients is one way we can continue to improve our services. Please take a few minutes to complete the written survey that you may receive in the mail after your visit with us. Thank you!             Your Updated Medication List - Protect others around you: Learn how to safely use, store and throw away your medicines at www.disposemymeds.org.          This list is accurate as of: 3/16/17  5:04 PM.  Always use your most recent med list.                   Brand Name Dispense Instructions for use    acetaminophen 325 MG tablet    TYLENOL    100 tablet    Take 1-2 tablets (325-650 mg) by mouth every 4 hours as needed for mild pain or fever       amiodarone 200 MG tablet    PACERONE/CODARONE    45 tablet    decrease dose to 1 tab (200 mg) daily for 14 days, then stop       aspirin 81 MG chewable tablet     30 tablet    Take 1 tablet (81 mg) by mouth daily       atorvastatin 40 MG tablet    LIPITOR    90 tablet    Take 1 tablet (40 mg) by mouth daily       blood glucose monitoring meter device kit          cephALEXin 500 MG capsule    KEFLEX    28 capsule    Take 1 capsule (500 mg) by mouth every 6 hours       clotrimazole 1 % cream    LOTRIMIN    12 g    Please apply to groins two times daily for one week after discharge and then follow up with PCP if no improvement of your skin rashes       ferrous sulfate 325 (65 FE) MG tablet    IRON SUPPLEMENT    100 tablet    Take 1 tablet (325 mg) by mouth daily (with breakfast)       fondaparinux 7.5MG/0.6ML injection    ARIXTRA    40 Syringe    Inject 0.6 mLs (7.5 mg) Subcutaneous daily       furosemide 40 MG tablet    LASIX    60 tablet    Take 1 tablet (40 mg) by mouth 2 times daily       gabapentin 100 MG capsule    NEURONTIN    60 capsule    Take 1 capsule (100 mg) by mouth 2 times daily       HYDROmorphone 2 MG tablet    DILAUDID    30 tablet    Take 0.5 tablets (1 mg) by mouth every 4 hours as needed for moderate to severe pain       loperamide 2  MG capsule    IMODIUM    20 capsule    Take 1 capsule (2 mg) by mouth 4 times daily as needed for diarrhea       melatonin 1 MG Tabs tablet     30 tablet    Take 1 tablet (1 mg) by mouth nightly as needed       ONE TOUCH ULTRA test strip   Generic drug:  blood glucose monitoring     100 each    Test once daily       ONETOUCH DELICA LANCETS 33G Misc     100 each    1 Device daily       pantoprazole 40 MG EC tablet    PROTONIX    30 tablet    Take 1 tablet (40 mg) by mouth every morning       potassium chloride SA 10 MEQ CR tablet    K-DUR/KLOR-CON M    60 tablet    Take 1 tablet (10 mEq) by mouth 2 times daily       traZODone 50 MG tablet    DESYREL    45 tablet    Take 0.5 tablets (25 mg) by mouth nightly as needed for sleep       venlafaxine 150 MG Tb24 24 hr tablet    EFFEXOR-ER    90 each    Take 1 tablet (150 mg) by mouth daily (with breakfast)

## 2025-05-19 NOTE — PROCEDURES
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, in the setting of subtherapeutic INR  Not a candidate for anticoagulation so IVC filter was felt to be the best course of action  IVC filter placed 5/9    PRELIMINARY CARDIAC CATH REPORT:     PROCEDURES PERFORMED:   Coronary Angiography    PHYSICIANS:  Attending Physician: Gonsalo Gonsalez MD  Interventional Cardiology Fellow: None  Cardiology Fellow: Zack Hannon MD    INDICATION:  Carlos Alberto Fenton is a 76 year old female with risk factor profile (+) HTN, (+) Type II DM on metformin, (+) hypercholesterolemia, (+) former smoker, (-) fam Hx premature CAD, who presents on an elective outpatient basis for coronary angiography for further workup of cardiomyopathy. Pt was hospitalized in Oct 2016 with R & L parietal CVA, cardioembolic source was suspected, and pt was found to have new dignosis atrial fibrillation and cardiomyopathy (EF 40%). She now presents for elective outpatient angiogram. She is currently in SR. Metformin has been held. Coumadin held for 5 days, INR 1.16. Cr 0.68.    DESCRIPTION:  1. Consent obtained with discussion of risks.  All questions were answered.  2. Sterile prep and procedure.  3. Location with Sheaths:   Rt Femoral Arterial  4 Fr 23 cm [long]  4. Access: Local anesthetic with lidocaine.  A standard 18 guage needle with ultrasound guidance was used to establish vascular access using a modified Seldinger technique.  5. Diagnostic Catheters:   4 Fr  JL 4, 3DRC  6. Guiding Catheters:  None  6. Estimated blood loss: < 5 ml    MEDICATIONS:  The procedure was performed under conscious sedation for 20 minutes.  Midazolam 1.5 mg and Fentanyl 75 mcg were administered.  Heart rate, BP, respiration, oxygen saturation and patient responses were monitored throughout the procedure with the assistance of the RN.    Procedures:    CORONARY ANGIOGRAM:   1. Both coronary arteries arise from their respective cusps.  2. Right dominant.  3. LM has ostial 50-60% stenosis  4. LAD is heavily calcified. It supplies the entire apex (type 3 vessel) and gives rise to septal perforators, trivial high D1 and D2, and large D3. mLAD has focal 90% stenosis just distal  to the D3 take off. There is a long segment 50% stenosis in the more proximal portion of the mLAD (between D1 and D2, and D2 and D3).  5. LCX gives rise to OM1, OM2, and OM3 vessels. There are luminal irregularities throughout the LCx system, but no focal stenosis.    6. RCA has anterior take off and was suboptimally engaged with the 3DRC catheter. Despite this, images were diagnostic for mRCA 80% stenosis, just distal to the take off a large acute RV marginal branch. The dRCA has <25% stenosis. Tthe remainder of the RCA has luminal irregularities, and it gives RPDA and RPL branches.    Sheath Removal:  The 4Fr arterial sheath will be removed after the patient has been transferred to the unit.    Contrast: Isovue, 47 ml     Fluoroscopy Time: 2.0 min    COMPLICATIONS:  1. No immediate complications    SUMMARY:   -- Diagnostic angiography shows two vessel coronary artery disease (mid LAD and mid RCA) with LM disease.  -- No intervention performed at this time.    PLAN:   --Referral to CV surgery for consideration of coronary artery bypass grafting, suitable targets in dLAD,Diagonal,RPDA/RPL. Will arrange consultation on an outpatient basis.  --If patient is averse to surgery would be reasonable to schedule revascularization with percutaneous coronary intervention as lesions appear amenable.  --Continue optimal medical therapy with BB, Asa, Statin  --Resume coumadin as previously planned  --Hold metformin for 48 hours  --Discharge home tonight    The attending interventional cardiologist was present and supervised all critical aspects the procedure.    Findings were discussed with CV surgery Dr Quiñones and primary cardiologist Dr Lobato.    Zack Hannon MD   Cardiology Fellow

## (undated) DEVICE — PUNCH AORTIC 4.0MM LONG AP-540

## (undated) DEVICE — SPONGE LAP 12X12" X8425

## (undated) DEVICE — LINEN TOWEL PACK X6 WHITE 5487

## (undated) DEVICE — BONE WAX 2.5GM W31G

## (undated) DEVICE — LEAD ELEC MYOCARDIO PACING TEMPORARY MEDTRONIC

## (undated) DEVICE — SU SILK 3-0 TIE 18" SA64H

## (undated) DEVICE — GLOVE LINER HALF FINGER NYLON KP5015/50

## (undated) DEVICE — DECANTER BAG 2002S

## (undated) DEVICE — SU PROLENE 5-0 RB-2DA 30" 8710H

## (undated) DEVICE — SYR BULB IRRIG 50ML LATEX FREE 0035280

## (undated) DEVICE — SU PROLENE 3-0 SHDA 36" 8522H

## (undated) DEVICE — ESU PENCIL W/COATED BLADE E2450H

## (undated) DEVICE — BLADE SAW STERNAL 20X30MM KM-32

## (undated) DEVICE — DRAPE EXTREMITY UPPER 120X76" 29414

## (undated) DEVICE — BNDG ELASTIC 4"X5YDS STERILE 6611-4S

## (undated) DEVICE — SOL NACL 0.9% IRRIG 3000ML BAG 2B7477

## (undated) DEVICE — SU PLEDGET SOFT TFE 13MMX7MMX1.5MM D7044

## (undated) DEVICE — SUCTION TIP YANKAUER STR K87

## (undated) DEVICE — CONNECTOR DRAIN CHEST Y EXTENSION SET 19909

## (undated) DEVICE — TUBING INSUFFLATION W/FILTER CPC TO LUER 620-030-301

## (undated) DEVICE — SOL NACL 0.9% 10ML VIAL 0409-4888-02

## (undated) DEVICE — PREP SKIN SCRUB TRAY 4461A

## (undated) DEVICE — ANTIFOG SOLUTION W/FOAM PAD 31142527

## (undated) DEVICE — ESU GROUND PAD ADULT W/CORD E7507

## (undated) DEVICE — CANNULA VESSEL DLP  30001

## (undated) DEVICE — DRAPE SLUSH/WARMER 66X44" ORS-320

## (undated) DEVICE — PREP POVIDONE IODINE SOLUTION 10% 120ML

## (undated) DEVICE — PROTECTOR ARM ONE-STEP TRENDELENBURG 40418

## (undated) DEVICE — SYR 01ML 27GA 0.5" NDL TBC 309623

## (undated) DEVICE — DRAIN CHEST TUBE 36FR STR 8036

## (undated) DEVICE — SOL NACL 0.9% INJ 1000ML BAG 07983-09

## (undated) DEVICE — KIT ENDO VASOVIEW HEMOPRO 2 VH-4000

## (undated) DEVICE — BNDG ELASTIC 4"X5YDS UNSTERILE 6611-40

## (undated) DEVICE — SPONGE RAY-TEC 4X8" 7318

## (undated) DEVICE — BNDG ELASTIC 6"X5YDS UNSTERILE 6611-60

## (undated) DEVICE — SOL WATER IRRIG 1000ML BOTTLE 2F7114

## (undated) DEVICE — CLIP HORIZON MED BLUE 002200

## (undated) DEVICE — SU VICRYL 2-0 CT-1 27" UND J259H

## (undated) DEVICE — GOWN XLG DISP 9545

## (undated) DEVICE — CLIP HORIZON LG ORANGE 004200

## (undated) DEVICE — Device

## (undated) DEVICE — BLADE CLIPPER SGL USE 9680

## (undated) DEVICE — DRSG GAUZE 4X4" TRAY 6939

## (undated) DEVICE — WAND SUCTION LP SOFT 15.2CM SU-22702

## (undated) DEVICE — BLADE KNIFE BEAVER MINI STR BEAVER6900

## (undated) DEVICE — BLADE KNIFE BEAVER MICROSHARP ORANGE 7511

## (undated) DEVICE — SU SILK 0 TIE 6X18" A186H

## (undated) DEVICE — DRSG ADAPTIC 3X16"  6114

## (undated) DEVICE — SU STEEL MYO/WIRE II STERNOTOMY 8 BE-1 3X14" 048-217

## (undated) DEVICE — SU PROLENE 4-0 RB-1DA 36" 8557H

## (undated) DEVICE — PREP CHLORAPREP 26ML TINTED ORANGE  260815

## (undated) DEVICE — SU ETHIBOND 2-0 SHDA 30" X563H

## (undated) DEVICE — TOURNIQUET VASCULAR KIT ARGYLE 8888-585000

## (undated) DEVICE — SU VICRYL 3-0 SH 27" J316H

## (undated) DEVICE — GLOVE PROTEXIS POWDER FREE SMT 5.5 2D72PT55X

## (undated) DEVICE — WIPES FOLEY CARE SURESTEP PROVON DFC100

## (undated) DEVICE — SU SILK 2-0 TIE 18 SA65H

## (undated) DEVICE — SOL NACL 0.9% IRRIG 1000ML BOTTLE 2F7124

## (undated) DEVICE — CAST PADDING 4" STERILE 9044S

## (undated) DEVICE — LINEN TOWEL PACK X30 5481

## (undated) DEVICE — SU DERMABOND ADVANCED .7ML DNX12

## (undated) DEVICE — SPONGE LAP 18X18" X8435

## (undated) DEVICE — SU PROLENE 6-0 C-1DA 30" 8706H

## (undated) DEVICE — BRUSH SURGICAL SCRUB W/4% CHG SOL 25ML 371073

## (undated) DEVICE — SU VICRYL 0 CTX 36" J370H

## (undated) DEVICE — TUBING SUCTION 10'X3/16" N510

## (undated) DEVICE — SU RETRACT-O-TAPE 1041

## (undated) DEVICE — SUCTION CATH AIRLIFE TRI-FLO W/CONTROL PORT 14FR  T60C

## (undated) DEVICE — BONE CLEANING TIP INTERPULSE  0210-010-000

## (undated) DEVICE — SU ETHILON 3-0 PS-1 18" 1663H

## (undated) DEVICE — PAD CHUX UNDERPAD 23X24" 7136

## (undated) DEVICE — SUCTION IRR SYSTEM W/O TIP INTERPULSE HANDPIECE 0210-100-000

## (undated) DEVICE — SUCTION MANIFOLD DORNOCH ULTRA CART UL-CL500

## (undated) DEVICE — DRAIN CHEST TUBE RIGHT ANGLED 28FR 8128

## (undated) DEVICE — LINEN TOWEL PACK X5 5464

## (undated) DEVICE — TIES BANDING T50R

## (undated) DEVICE — DRSG KERLIX 4 1/2"X4YDS ROLL 6715

## (undated) DEVICE — SU ETHIBOND 0 CT-1 CR 8X18" CX21D

## (undated) DEVICE — CAST PADDING 6" STERILE 9046S

## (undated) DEVICE — SU STEEL 6 CCS 4X18" M654G

## (undated) DEVICE — CLIP HORIZON SM RED WIDE SLOT 001201

## (undated) DEVICE — SU PROLENE 6-0 C-1DA 4X24" M8726

## (undated) DEVICE — ESU HOLSTER PLASTIC DISP E2400

## (undated) DEVICE — ESU ELEC BLADE 2.75" COATED/INSULATED E1455

## (undated) DEVICE — BLADE KNIFE SURG 15C 371716

## (undated) DEVICE — SU PROLENE 7-0 BV-1DA 4X24" M8702

## (undated) DEVICE — DRSG STERI STRIP 1/2X4" R1547

## (undated) DEVICE — SU ETHIBOND 0 TIE 6X30" X306H

## (undated) DEVICE — ADPT 5 IN 1 360

## (undated) DEVICE — DRSG TELFA 3X8" 1238

## (undated) DEVICE — BLADE KNIFE BEAVER 6900

## (undated) DEVICE — SU ETHIBOND 3-0 BBDA 36" X588H

## (undated) DEVICE — BNDG ELASTIC 6"X5YDS STERILE 6611-6S

## (undated) DEVICE — BASIN SET SINGLE STERILE 13752-624

## (undated) DEVICE — SU VICRYL 2-0 CT-1 27" J339H

## (undated) DEVICE — CAST PADDING 4" UNSTERILE 9044

## (undated) DEVICE — SU SILK 4-0 TIE 18' A183H

## (undated) DEVICE — CONNECTOR Y 3/8" 369

## (undated) DEVICE — SUCTION DRY CHEST DRAIN OASIS 3600-100

## (undated) DEVICE — CLIP SPRING FOGARTY SOFTJAW CSOFT6

## (undated) DEVICE — SU PROLENE 4-0 SHDA 36" 8521H

## (undated) DEVICE — PREP POVIDONE IODINE SCRUB 7.5% 120ML

## (undated) DEVICE — BLANKET BAIR HUGGER UNDERBODY AU63500

## (undated) DEVICE — SOL ADH LIQUID BENZOIN SWAB 0.6ML C1544

## (undated) DEVICE — DRAPE SHEET REV FOLD 3/4 9349

## (undated) DEVICE — DRAPE SHEET MED 44X70" 9355

## (undated) DEVICE — SPECIMEN CONTAINER 5OZ STERILE 2600SA

## (undated) DEVICE — SU VICRYL 3-0 FS-1 27" J442H

## (undated) DEVICE — GOWN IMPERVIOUS BREATHABLE SMART LG 89015

## (undated) DEVICE — STRAP ARMBOARD IV POSITIONING 1.5X32" VELCRO 31142980

## (undated) DEVICE — PACK ADULT HEART UMMC PV15CG92D

## (undated) DEVICE — BLADE KNIFE BEAVER MICROSHARP GREEN 377515

## (undated) DEVICE — SYR 10ML SLIP TIP W/O NDL 303134

## (undated) DEVICE — SOL NACL 0.9% INJ 1000ML BAG 2B1324X

## (undated) RX ORDER — HEPARIN SODIUM 1000 [USP'U]/ML
INJECTION, SOLUTION INTRAVENOUS; SUBCUTANEOUS
Status: DISPENSED
Start: 2017-02-06

## (undated) RX ORDER — LIDOCAINE HYDROCHLORIDE 20 MG/ML
INJECTION, SOLUTION EPIDURAL; INFILTRATION; INTRACAUDAL; PERINEURAL
Status: DISPENSED
Start: 2017-07-07

## (undated) RX ORDER — PROPOFOL 10 MG/ML
INJECTION, EMULSION INTRAVENOUS
Status: DISPENSED
Start: 2017-07-07

## (undated) RX ORDER — ASPIRIN 325 MG
TABLET ORAL
Status: DISPENSED
Start: 2017-01-30

## (undated) RX ORDER — CEFAZOLIN SODIUM 2 G/100ML
INJECTION, SOLUTION INTRAVENOUS
Status: DISPENSED
Start: 2018-03-26

## (undated) RX ORDER — FENTANYL CITRATE 50 UG/ML
INJECTION, SOLUTION INTRAMUSCULAR; INTRAVENOUS
Status: DISPENSED
Start: 2017-03-01

## (undated) RX ORDER — DIPHENHYDRAMINE HYDROCHLORIDE 50 MG/ML
INJECTION INTRAMUSCULAR; INTRAVENOUS
Status: DISPENSED
Start: 2017-01-30

## (undated) RX ORDER — FENTANYL CITRATE 50 UG/ML
INJECTION, SOLUTION INTRAMUSCULAR; INTRAVENOUS
Status: DISPENSED
Start: 2017-05-26

## (undated) RX ORDER — FENTANYL CITRATE 50 UG/ML
INJECTION, SOLUTION INTRAMUSCULAR; INTRAVENOUS
Status: DISPENSED
Start: 2017-07-07

## (undated) RX ORDER — EPHEDRINE SULFATE 50 MG/ML
INJECTION, SOLUTION INTRAMUSCULAR; INTRAVENOUS; SUBCUTANEOUS
Status: DISPENSED
Start: 2017-07-07

## (undated) RX ORDER — CEFAZOLIN SODIUM 2 G/100ML
INJECTION, SOLUTION INTRAVENOUS
Status: DISPENSED
Start: 2017-02-06

## (undated) RX ORDER — SODIUM CHLORIDE 9 MG/ML
INJECTION, SOLUTION INTRAVENOUS
Status: DISPENSED
Start: 2018-01-16

## (undated) RX ORDER — PAPAVERINE HYDROCHLORIDE 30 MG/ML
INJECTION INTRAMUSCULAR; INTRAVENOUS
Status: DISPENSED
Start: 2017-02-06

## (undated) RX ORDER — CEFAZOLIN SODIUM 1 G/3ML
INJECTION, POWDER, FOR SOLUTION INTRAMUSCULAR; INTRAVENOUS
Status: DISPENSED
Start: 2017-02-06

## (undated) RX ORDER — FENTANYL CITRATE 50 UG/ML
INJECTION, SOLUTION INTRAMUSCULAR; INTRAVENOUS
Status: DISPENSED
Start: 2017-01-30

## (undated) RX ORDER — FENTANYL CITRATE 50 UG/ML
INJECTION, SOLUTION INTRAMUSCULAR; INTRAVENOUS
Status: DISPENSED
Start: 2017-12-12

## (undated) RX ORDER — CEFAZOLIN SODIUM 2 G/100ML
INJECTION, SOLUTION INTRAVENOUS
Status: DISPENSED
Start: 2017-07-07

## (undated) RX ORDER — LIDOCAINE HYDROCHLORIDE 10 MG/ML
INJECTION, SOLUTION EPIDURAL; INFILTRATION; INTRACAUDAL; PERINEURAL
Status: DISPENSED
Start: 2018-03-28

## (undated) RX ORDER — ONDANSETRON 2 MG/ML
INJECTION INTRAMUSCULAR; INTRAVENOUS
Status: DISPENSED
Start: 2017-07-07

## (undated) RX ORDER — FENTANYL CITRATE 50 UG/ML
INJECTION, SOLUTION INTRAMUSCULAR; INTRAVENOUS
Status: DISPENSED
Start: 2018-03-26

## (undated) RX ORDER — SODIUM CHLORIDE 9 MG/ML
INJECTION, SOLUTION INTRAVENOUS
Status: DISPENSED
Start: 2017-01-30

## (undated) RX ORDER — FENTANYL CITRATE 50 UG/ML
INJECTION, SOLUTION INTRAMUSCULAR; INTRAVENOUS
Status: DISPENSED
Start: 2017-11-21